# Patient Record
Sex: FEMALE | Race: BLACK OR AFRICAN AMERICAN | NOT HISPANIC OR LATINO | Employment: OTHER | URBAN - METROPOLITAN AREA
[De-identification: names, ages, dates, MRNs, and addresses within clinical notes are randomized per-mention and may not be internally consistent; named-entity substitution may affect disease eponyms.]

---

## 2018-02-08 ENCOUNTER — HOSPITAL ENCOUNTER (EMERGENCY)
Facility: OTHER | Age: 68
Discharge: HOME | End: 2018-02-08
Attending: EMERGENCY MEDICINE
Payer: MEDICARE

## 2018-02-08 VITALS
TEMPERATURE: 98 F | BODY MASS INDEX: 34.15 KG/M2 | RESPIRATION RATE: 18 BRPM | HEART RATE: 61 BPM | WEIGHT: 200 LBS | DIASTOLIC BLOOD PRESSURE: 63 MMHG | OXYGEN SATURATION: 96 % | HEIGHT: 64 IN | SYSTOLIC BLOOD PRESSURE: 135 MMHG

## 2018-02-08 DIAGNOSIS — M54.16 LUMBAR RADICULOPATHY: Primary | ICD-10-CM

## 2018-02-08 LAB
BACTERIA #/AREA URNS HPF: ABNORMAL /HPF
BILIRUB UR QL STRIP: NEGATIVE
CLARITY UR: CLEAR
COLOR UR: YELLOW
GLUCOSE UR QL STRIP: NEGATIVE
HGB UR QL STRIP: ABNORMAL
KETONES UR QL STRIP: NEGATIVE
LEUKOCYTE ESTERASE UR QL STRIP: ABNORMAL
MICROSCOPIC COMMENT: ABNORMAL
NITRITE UR QL STRIP: POSITIVE
PH UR STRIP: 6 [PH] (ref 5–8)
POCT GLUCOSE: 125 MG/DL (ref 70–110)
PROT UR QL STRIP: ABNORMAL
RBC #/AREA URNS HPF: 6 /HPF (ref 0–4)
SP GR UR STRIP: 1.02 (ref 1–1.03)
SQUAMOUS #/AREA URNS HPF: 15 /HPF
URN SPEC COLLECT METH UR: ABNORMAL
UROBILINOGEN UR STRIP-ACNC: NEGATIVE EU/DL
WBC #/AREA URNS HPF: 25 /HPF (ref 0–5)

## 2018-02-08 PROCEDURE — 87086 URINE CULTURE/COLONY COUNT: CPT

## 2018-02-08 PROCEDURE — 99284 EMERGENCY DEPT VISIT MOD MDM: CPT | Mod: 25

## 2018-02-08 PROCEDURE — 81000 URINALYSIS NONAUTO W/SCOPE: CPT

## 2018-02-08 PROCEDURE — 25000003 PHARM REV CODE 250: Performed by: EMERGENCY MEDICINE

## 2018-02-08 PROCEDURE — 82962 GLUCOSE BLOOD TEST: CPT

## 2018-02-08 PROCEDURE — 63600175 PHARM REV CODE 636 W HCPCS: Performed by: EMERGENCY MEDICINE

## 2018-02-08 PROCEDURE — 96372 THER/PROPH/DIAG INJ SC/IM: CPT

## 2018-02-08 RX ORDER — IBUPROFEN 800 MG/1
800 TABLET ORAL EVERY 8 HOURS PRN
Qty: 20 TABLET | Refills: 0 | Status: SHIPPED | OUTPATIENT
Start: 2018-02-08 | End: 2018-06-13

## 2018-02-08 RX ORDER — METFORMIN HYDROCHLORIDE 1000 MG/1
1000 TABLET ORAL NIGHTLY
COMMUNITY
End: 2020-07-22 | Stop reason: SDUPTHER

## 2018-02-08 RX ORDER — ATENOLOL 100 MG/1
100 TABLET ORAL DAILY
COMMUNITY
End: 2018-07-05 | Stop reason: SDUPTHER

## 2018-02-08 RX ORDER — METHOCARBAMOL 500 MG/1
1000 TABLET, FILM COATED ORAL 3 TIMES DAILY PRN
Qty: 20 TABLET | Refills: 0 | Status: SHIPPED | OUTPATIENT
Start: 2018-02-08 | End: 2018-02-13

## 2018-02-08 RX ORDER — METHOCARBAMOL 500 MG/1
1000 TABLET, FILM COATED ORAL
Status: COMPLETED | OUTPATIENT
Start: 2018-02-08 | End: 2018-02-08

## 2018-02-08 RX ORDER — FUROSEMIDE 20 MG/1
20 TABLET ORAL 2 TIMES DAILY
COMMUNITY
End: 2018-07-05 | Stop reason: SDUPTHER

## 2018-02-08 RX ORDER — CIPROFLOXACIN 500 MG/1
500 TABLET ORAL 2 TIMES DAILY
Qty: 10 TABLET | Refills: 0 | Status: SHIPPED | OUTPATIENT
Start: 2018-02-08 | End: 2018-02-15

## 2018-02-08 RX ORDER — GLIPIZIDE 10 MG/1
10 TABLET ORAL 2 TIMES DAILY WITH MEALS
COMMUNITY
End: 2020-07-22 | Stop reason: SDUPTHER

## 2018-02-08 RX ORDER — SIMVASTATIN 40 MG/1
40 TABLET, FILM COATED ORAL NIGHTLY
COMMUNITY
End: 2018-06-07 | Stop reason: DRUGHIGH

## 2018-02-08 RX ORDER — PAROXETINE HYDROCHLORIDE 20 MG/1
20 TABLET, FILM COATED ORAL EVERY MORNING
COMMUNITY
End: 2018-04-24

## 2018-02-08 RX ORDER — KETOROLAC TROMETHAMINE 30 MG/ML
30 INJECTION, SOLUTION INTRAMUSCULAR; INTRAVENOUS
Status: COMPLETED | OUTPATIENT
Start: 2018-02-08 | End: 2018-02-08

## 2018-02-08 RX ORDER — LORAZEPAM 2 MG/1
0.5 TABLET ORAL 2 TIMES DAILY PRN
COMMUNITY
End: 2019-05-13

## 2018-02-08 RX ORDER — TRAZODONE HYDROCHLORIDE 100 MG/1
100 TABLET ORAL NIGHTLY
COMMUNITY
End: 2018-03-27

## 2018-02-08 RX ADMIN — METHOCARBAMOL 1000 MG: 500 TABLET ORAL at 02:02

## 2018-02-08 RX ADMIN — KETOROLAC TROMETHAMINE 30 MG: 30 INJECTION, SOLUTION INTRAMUSCULAR at 02:02

## 2018-02-08 NOTE — DISCHARGE INSTRUCTIONS
We have prescribed you antibiotics. Please fill and take as directed. It is important that you completely finish the antibiotics.      We have prescribed you muscle relaxants and anti-inflammatory medication. Please fill and take as directed.    Please return to the ER if you have chest pain, difficulty breathing, fevers, altered mental status, dizziness, weakness, or any other concerns.      Follow up with your primary care physician.

## 2018-02-08 NOTE — ED PROVIDER NOTES
"Encounter Date: 2018    SCRIBE #1 NOTE: I, Uriel Hamlin, am scribing for, and in the presence of, Dr. Yadav.       History     Chief Complaint   Patient presents with    Leg Pain     L leg pain x 1 month that radiates down leg to foot.      1:22 PM    Patient is a 67 y.o. female with a history of DM who presents to the ED with complaint of left leg pain for the last 3-4 weeks ago. Pain originates in the left hip region and radiates down the front of the leg to the foot. Pain is constant and described as "like a toothache." She states "the joint feels like it slipped out the socket." She reports associated leg stiffness and difficulty moving the leg. She also reports intermittent urinary incontinence for the last 4-5 months.  She states this occurs "once or twice a day." She reports feeling the urge to urinate prior to these episodes, and states "I just can't make it to the bathroom."  She denies dysuria or hematuria.  She denies any bowel incontinence. She also reports chronic numbness and tingling in her feet bilaterally. She denies numbness or tingling in her groin region. She reports a history of arthritis in her back. She denies being evaluated by a physician previously for her current complaints. She states that her sugars have been running between .       The history is provided by the patient.     Review of patient's allergies indicates:  No Known Allergies  Past Medical History:   Diagnosis Date    Arthritis     Diabetes mellitus     Hypertension      Past Surgical History:   Procedure Laterality Date     SECTION      CHOLECYSTECTOMY      HYSTERECTOMY       History reviewed. No pertinent family history.  Social History   Substance Use Topics    Smoking status: Never Smoker    Smokeless tobacco: Never Used    Alcohol use No     Review of Systems   Constitutional: Negative for fever.   Respiratory: Negative for cough and shortness of breath.    Cardiovascular: Negative for chest " pain.   Gastrointestinal: Negative for abdominal pain.        Negative for bowel incontinence.   Genitourinary: Positive for urgency. Negative for dysuria.        Positive for urinary incontinence.   Musculoskeletal: Positive for back pain (chronic, hx arthritis in spine). Negative for gait problem.        Positive for left hip and leg pain and stiffness.   Skin: Negative for rash.   Neurological: Positive for numbness (chronic, bilateral feet). Negative for weakness.   Psychiatric/Behavioral: Negative for confusion.       Physical Exam     Initial Vitals [02/08/18 1156]   BP Pulse Resp Temp SpO2   (!) 141/64 67 18 97.9 °F (36.6 °C) 96 %      MAP       89.67         Physical Exam    Nursing note and vitals reviewed.  Constitutional: She appears well-developed and well-nourished. No distress.   HENT:   Head: Atraumatic.   Eyes: Conjunctivae and EOM are normal.   Neck: Normal range of motion. Neck supple.   Cardiovascular: Normal rate, regular rhythm and normal heart sounds.   2+ DP/PT pulses bilaterally.   Pulmonary/Chest: Breath sounds normal. No respiratory distress. She has no wheezes. She has no rales.   Musculoskeletal: Normal range of motion.   No midline spinal tenderness.   Neurological: She is alert and oriented to person, place, and time.   Strength 5/5 throughout. LLE straight leg raise test positive at 30 degrees. Straight leg raise negative RLE. Ambulatory with steady gait.   Sensation intact   Skin: Skin is warm and dry.   Psychiatric: She has a normal mood and affect. Her behavior is normal.         ED Course   Procedures  Labs Reviewed   URINALYSIS - Abnormal; Notable for the following:        Result Value    Protein, UA Trace (*)     Occult Blood UA Trace (*)     Nitrite, UA Positive (*)     Leukocytes, UA 1+ (*)     All other components within normal limits   URINALYSIS MICROSCOPIC - Abnormal; Notable for the following:     RBC, UA 6 (*)     WBC, UA 25 (*)     Bacteria, UA Moderate (*)     All other  components within normal limits   POCT GLUCOSE - Abnormal; Notable for the following:     POCT Glucose 125 (*)     All other components within normal limits   CULTURE, URINE   CULTURE, URINE      Imaging Results          X-Ray Hip 2 View Left (Final result)  Result time 02/08/18 14:00:45    Final result by Darron Meek Jr., MD (02/08/18 14:00:45)                 Narrative:    Left hip 2 views.  There are degenerative changes at the hips with more narrowing of the right hip.  Hypertrophic new bone noted.  No acute fracture seen.  Degenerative changes in the lower lumbar spine.    Impression degenerative change.  There is some hypertrophic new bone seen about the ischial tuberosities bilaterally.  This is fairly symmetric though if there is concern for fracture oblique films would be helpful.      Electronically signed by: DARRON MEEK MD  Date:     02/08/18  Time:    14:00                                  X-Rays:   Independently Interpreted Readings:   Other Readings:  X-Ray Hip 2 View Left: no fracture or dislocation.        Medical Decision Making:   History:   Old Medical Records: I decided to obtain old medical records.  Old Records Summarized: other records.  Initial Assessment:   1:22PM:  Pt is a 68 y/o F who presents to ED with LLE radiating pain. Pt appears well, nontoxic. She is neurologically intact.  Pt appears to have a lumbar radiculopathy.  However she has been having urinary symptoms, though this seems to be going on longer than her LLE pain.  Will plan for a UA, analgesia, will continue to follow and reassess.    Independently Interpreted Test(s):   I have ordered and independently interpreted X-rays - see prior notes.  Clinical Tests:   Lab Tests: Ordered and Reviewed  Radiological Study: Ordered and Reviewed    3:05 PM:  Pt doing well, remains stable. She remains neurologically intact and her pain has improved with analgesia.  She has been ambulating with no issues.  Her UA does have +nitrite  with moderate bacteria and 1+ leukocytes, possibly a UTI which would explain her urinary urgency and incontinence. Will plan to send culture and treat with abx along with providing a prescription for NSAIDs and muscle relaxants. I did update pt regarding results and counseled pt regarding supportive care measures.   I have discussed with the pt ED return warnings and need for close PCP f/u, edenilson if new neuro symptoms develop  Pt agreeable to plan and all questions answered.  I feel that pt is stable for discharge and management as an outpatient and no further intervention is needed at this time.  Pt is comfortable returning to the ED if needed.  Will DC home in stable condition.                  Scribe Attestation:   Scribe #1: I performed the above scribed service and the documentation accurately describes the services I performed. I attest to the accuracy of the note.    Attending Attestation:           Physician Attestation for Scribe:  Physician Attestation Statement for Scribe #1: I, Dr. Yadav, reviewed documentation, as scribed by Uriel Hamlin in my presence, and it is both accurate and complete.                 ED Course      Clinical Impression:     1. Lumbar radiculopathy                               Mickie Yadav MD  02/08/18 8038

## 2018-02-10 LAB
BACTERIA UR CULT: NORMAL
BACTERIA UR CULT: NORMAL

## 2018-02-15 ENCOUNTER — HOSPITAL ENCOUNTER (EMERGENCY)
Facility: OTHER | Age: 68
Discharge: HOME | End: 2018-02-15
Attending: EMERGENCY MEDICINE
Payer: MEDICARE

## 2018-02-15 VITALS
BODY MASS INDEX: 34.33 KG/M2 | DIASTOLIC BLOOD PRESSURE: 80 MMHG | HEART RATE: 64 BPM | OXYGEN SATURATION: 100 % | RESPIRATION RATE: 17 BRPM | SYSTOLIC BLOOD PRESSURE: 185 MMHG | TEMPERATURE: 98 F | WEIGHT: 200 LBS

## 2018-02-15 DIAGNOSIS — M54.16 LUMBAR RADICULOPATHY: Primary | ICD-10-CM

## 2018-02-15 PROCEDURE — 99283 EMERGENCY DEPT VISIT LOW MDM: CPT

## 2018-02-15 PROCEDURE — 25000003 PHARM REV CODE 250: Performed by: PHYSICIAN ASSISTANT

## 2018-02-15 RX ORDER — TRAMADOL HYDROCHLORIDE 50 MG/1
50 TABLET ORAL
Status: COMPLETED | OUTPATIENT
Start: 2018-02-15 | End: 2018-02-15

## 2018-02-15 RX ORDER — OXYCODONE AND ACETAMINOPHEN 5; 325 MG/1; MG/1
1 TABLET ORAL EVERY 6 HOURS PRN
Qty: 12 TABLET | Refills: 0 | Status: SHIPPED | OUTPATIENT
Start: 2018-02-15 | End: 2018-03-27

## 2018-02-15 RX ADMIN — TRAMADOL HYDROCHLORIDE 50 MG: 50 TABLET, FILM COATED ORAL at 04:02

## 2018-02-15 NOTE — ED PROVIDER NOTES
"Encounter Date: 2/15/2018       History     Chief Complaint   Patient presents with    Hip Pain     Was seen and treated here last Thursday with diagnosis of Lumbar radiculopathy .  She states that she is not any better and can't put weight on her left leg     Patient is a 67-year-old female with arthritis, diabetes, and hypertension who presents to the ED with pain.  Patient reports a 3 week history of atraumatic left-sided hip pain that radiates down her left leg and intermittently radiates to her left lumbar region.  Patient states the pain feels like "a thumping toothache." Patient states she was seen here one week ago and was diagnosed with lumbar radiculopathy.  She was sent home with muscle relaxer and anti-inflammatories that provided her with no relief. Patient also reports intermittent numbness and tingling to her left lower extremity.  She also reports a history of urinary incontinence.  She states she realizes when this happens but cannot get to the restroom in time.  She states this been going on for approximately 4 months.  She denies any new neurologic symptoms in the past week.  She states she has returned today because her pain has worsened, specifically with ambulation.  She denies saddle anesthesia.  She denies bowel incontinence.       The history is provided by the patient.     Review of patient's allergies indicates:   Allergen Reactions    Aspirin Other (See Comments)     Has been told not to take it due to bariatric surgery     Past Medical History:   Diagnosis Date    Arthritis     Diabetes mellitus     Hypertension      Past Surgical History:   Procedure Laterality Date     SECTION      CHOLECYSTECTOMY      HYSTERECTOMY       No family history on file.  Social History   Substance Use Topics    Smoking status: Never Smoker    Smokeless tobacco: Never Used    Alcohol use No     Review of Systems   Constitutional: Negative for chills and fever.   HENT: Negative for " congestion and sore throat.    Eyes: Negative for pain.   Respiratory: Negative for shortness of breath.    Cardiovascular: Negative for chest pain.   Gastrointestinal: Negative for abdominal pain, diarrhea, nausea and vomiting.   Genitourinary: Negative for dysuria.   Musculoskeletal:        Left hip pain radiating to left lower extremity   Skin: Negative for rash.   Neurological: Negative for headaches.       Physical Exam     Initial Vitals [02/15/18 1454]   BP Pulse Resp Temp SpO2   (!) 156/71 67 17 98.6 °F (37 °C) 97 %      MAP       99.33         Physical Exam    Constitutional: Vital signs are normal. She is cooperative.   -American female in no acute distress. She is using a wheelchair secondary to pain.   HENT:   Head: Normocephalic and atraumatic.   Mouth/Throat: Oropharynx is clear and moist.   Eyes: Conjunctivae and EOM are normal. Pupils are equal, round, and reactive to light.   Neck: Normal range of motion. Neck supple.   Cardiovascular: Normal rate, regular rhythm and intact distal pulses.   No murmur heard.  Pulmonary/Chest: Breath sounds normal. She has no wheezes. She has no rhonchi. She has no rales.   Abdominal: Soft. Bowel sounds are normal. There is no tenderness. There is no rebound and no guarding.   Musculoskeletal:   No CTL midline tenderness, crepitus, masses, or step-offs. LLE straight leg raise test positive at 30 degrees. Straight leg raise negative RLE.   Neurological: GCS eye subscore is 4. GCS verbal subscore is 5. GCS motor subscore is 6.   AAOx4. CN II-XII were intact. Good finger-to-nose task ability. Strength 5/5 in all extremities. No ankle clonus.  Patient walks with antalgic gait but is able to bear weight. Normal sensation to light touch.   Skin: Skin is warm and dry. Capillary refill takes less than 2 seconds. No rash noted.   Psychiatric: She has a normal mood and affect. Her behavior is normal.         ED Course   Procedures  Labs Reviewed - No data to display           Medical Decision Making:   Initial Assessment:   Urgent evaluation of a 67 y.o. female presenting to the emergency department complaining of hip pain. Patient is afebrile, nontoxic appearing and hemodynamically stable.  ED Management:  Patient appears to be no acute distress. Patient does not have any new symptoms from visit one week ago.  However, she states her pain is worse.  No focal neurological deficits on exam.  Left lower extremity is distally neurovascular intact. Imaging from one week ago revealed degenerative changes to her hips bilaterally. Patient's reports urinary incontinence is chronic in nature and patient is aware when this happens. There are no signs of saddle anesthesia, bowel incontinence, neurologic deficits, fevers, trauma or midline tenderness on history or physical to suggest cauda equina, infectious process, fracture or subluxation. I do not believe that emergent MRI is indicated at this time.  I have advised the patient with her PCP or the spine clinic for an MRI. Patient will be sent home with a short course of Percocet until she can make an appointment.  I have educated patient that in the future she will need to get pain medication from her primary care provider.  She is amenable to this plan.  She is stable for discharge. I have given specific return precautions for new symptoms. I have discussed the patient with the attending thoroughly and he/she agrees to the treatment and plan.   This note was created using Dragon Medical Dictation. There may be typographical errors secondary to dictation.                    ED Course      Clinical Impression:     1. Lumbar radiculopathy                             Alexis James PA-C  02/15/18 7191

## 2018-03-13 ENCOUNTER — TELEPHONE (OUTPATIENT)
Dept: SPINE | Facility: CLINIC | Age: 68
End: 2018-03-13

## 2018-03-13 DIAGNOSIS — M54.5 LOW BACK PAIN, UNSPECIFIED BACK PAIN LATERALITY, UNSPECIFIED CHRONICITY, WITH SCIATICA PRESENCE UNSPECIFIED: Primary | ICD-10-CM

## 2018-03-14 ENCOUNTER — TELEPHONE (OUTPATIENT)
Dept: SPINE | Facility: CLINIC | Age: 68
End: 2018-03-14

## 2018-03-14 ENCOUNTER — DOCUMENTATION ONLY (OUTPATIENT)
Dept: SPINE | Facility: CLINIC | Age: 68
End: 2018-03-14

## 2018-03-14 NOTE — TELEPHONE ENCOUNTER
----- Message from Kaitlynn John sent at 3/14/2018  9:56 AM CDT -----  Contact: Patient herself  X  1st Request  _  2nd Request  _  3rd Request    Who: Faby Luna (mrn# [id number])    Why: Patient called requesting a to be seen to day and she was upset that she was advised to reschedule. I did reschedule first available and placed patient on the wait list. Please give a call back at your earliest convenience.  THANKS!     What Number to Call Back: [phone]

## 2018-03-14 NOTE — PROGRESS NOTES
Called and left message on patient voice mail to notify patient that the appointment for today has been cancelled due to provider lala out ill.   Patient to call back and reschedule appointment.

## 2018-03-27 ENCOUNTER — OFFICE VISIT (OUTPATIENT)
Dept: PAIN MEDICINE | Facility: CLINIC | Age: 68
End: 2018-03-27
Attending: ANESTHESIOLOGY
Payer: MEDICARE

## 2018-03-27 ENCOUNTER — HOSPITAL ENCOUNTER (OUTPATIENT)
Dept: RADIOLOGY | Facility: OTHER | Age: 68
Discharge: HOME | End: 2018-03-27
Attending: PHYSICIAN ASSISTANT
Payer: MEDICARE

## 2018-03-27 VITALS
WEIGHT: 207.25 LBS | DIASTOLIC BLOOD PRESSURE: 76 MMHG | SYSTOLIC BLOOD PRESSURE: 142 MMHG | BODY MASS INDEX: 35.38 KG/M2 | HEART RATE: 67 BPM | HEIGHT: 64 IN | TEMPERATURE: 98 F

## 2018-03-27 DIAGNOSIS — M54.5 LOW BACK PAIN, UNSPECIFIED BACK PAIN LATERALITY, UNSPECIFIED CHRONICITY, WITH SCIATICA PRESENCE UNSPECIFIED: ICD-10-CM

## 2018-03-27 DIAGNOSIS — M47.26 OSTEOARTHRITIS OF SPINE WITH RADICULOPATHY, LUMBAR REGION: Primary | ICD-10-CM

## 2018-03-27 DIAGNOSIS — N39.41 URGE INCONTINENCE OF URINE: ICD-10-CM

## 2018-03-27 PROCEDURE — 72100 X-RAY EXAM L-S SPINE 2/3 VWS: CPT | Mod: TC,FY

## 2018-03-27 PROCEDURE — 72100 X-RAY EXAM L-S SPINE 2/3 VWS: CPT | Mod: 26,,, | Performed by: RADIOLOGY

## 2018-03-27 PROCEDURE — 99205 OFFICE O/P NEW HI 60 MIN: CPT | Mod: S$GLB,,, | Performed by: ANESTHESIOLOGY

## 2018-03-27 PROCEDURE — 72120 X-RAY BEND ONLY L-S SPINE: CPT | Mod: 26,,, | Performed by: RADIOLOGY

## 2018-03-27 PROCEDURE — 99999 PR PBB SHADOW E&M-EST. PATIENT-LVL IV: CPT | Mod: PBBFAC,,, | Performed by: ANESTHESIOLOGY

## 2018-03-27 RX ORDER — PROMETHAZINE HYDROCHLORIDE 6.25 MG/5ML
SYRUP ORAL
COMMUNITY
Start: 2018-03-05 | End: 2018-05-03

## 2018-03-27 RX ORDER — TRAZODONE HYDROCHLORIDE 50 MG/1
TABLET ORAL
COMMUNITY
Start: 2018-03-22 | End: 2018-04-24

## 2018-03-27 RX ORDER — PANTOPRAZOLE SODIUM 40 MG/1
TABLET, DELAYED RELEASE ORAL
COMMUNITY
Start: 2018-03-22 | End: 2018-05-03 | Stop reason: SDUPTHER

## 2018-03-27 RX ORDER — ZONISAMIDE 100 MG/1
CAPSULE ORAL
Qty: 120 CAPSULE | Refills: 2 | Status: SHIPPED | OUTPATIENT
Start: 2018-03-27 | End: 2018-06-13 | Stop reason: SDUPTHER

## 2018-03-27 NOTE — PROGRESS NOTES
"Subjective:      Patient ID: Faby Luna is a 67 y.o. female.    Chief Complaint: Leg Pain; Low-back Pain; and Knee Pain    Referred by: Self, Aaareferral     Pain Scales  Best: 8/10  Worst: 10/10  Usually: 10/10  Today: 10/10    Leg Pain      Low-back Pain   Associated symptoms include leg pain.   Knee Pain      HPI:  Faby Luna is a 67 y.o. female who presents today with low back pain radiating into LLE. The back pain has been present for 40 years without inciting trauma. She fell in December and has had worsening back pain and worsening radiation of her pain into the left posterior thigh and anterior lower leg. The pain in the back is described as sharp. The pain in the leg is described as "like a toothache." The pain is always on the left but also occasionally on the right. The pain is worsened by lifting heavy objects, walking, or standing too long. She has gone to the ED twice and received Toradol injection and oral medication but she doesn't know what. She reports that her left leg gives out occasionally due to weakness. Of note, she reports new urge incontinence since the fall. She feels the urge to urinate but does not have time to get to the bathroom. Denies dysuria or malodorous urine. No bowel incontinence. No saddle anesthesia. No night sweats, unexpected weight loss, or personal history of cancer.     Imaging was reviewed. X-rays of the l-spine showed degenerative changes including anterior osteophytes at L4 and borderline grade 1-2 anterolisthesis of L4 on L5. No MRI is available.    Interventional Pain History  ~2006: Lumbar ELISE by Dr. Hudson  ~2013: Lumbar ELISE by Dr. Jurado    Past Medical History:   Diagnosis Date    Arthritis     Diabetes mellitus     Hypertension        Past Surgical History:   Procedure Laterality Date     SECTION      CHOLECYSTECTOMY      HYSTERECTOMY         Review of patient's allergies indicates:   Allergen Reactions    Aspirin Other (See Comments)     Has " "been told not to take it due to bariatric surgery       Current Outpatient Prescriptions   Medication Sig Dispense Refill    atenolol (TENORMIN) 100 MG tablet Take 100 mg by mouth once daily.      furosemide (LASIX) 20 MG tablet Take 20 mg by mouth 2 (two) times daily.      glipiZIDE (GLUCOTROL) 10 MG tablet Take 10 mg by mouth 2 (two) times daily before meals.      ibuprofen (ADVIL,MOTRIN) 800 MG tablet Take 1 tablet (800 mg total) by mouth every 8 (eight) hours as needed for Pain. 20 tablet 0    LORazepam (ATIVAN) 2 MG Tab Take 0.5 mg by mouth every 6 (six) hours as needed.      metFORMIN (GLUCOPHAGE) 1000 MG tablet Take 1,000 mg by mouth daily with breakfast.       pantoprazole (PROTONIX) 40 MG tablet       paroxetine (PAXIL) 20 MG tablet Take 20 mg by mouth every morning.      promethazine (PHENERGAN) 6.25 mg/5 mL syrup       simvastatin (ZOCOR) 40 MG tablet Take 40 mg by mouth every evening.      traZODone (DESYREL) 50 MG tablet       zonisamide (ZONEGRAN) 100 MG Cap one @ bedtime X3 days then 2 @ bedtime X3 days then 3 @ bedtime X3 days then 4 @ bedtime to follow.Stay at the most comfortable dose. 120 capsule 2     No current facility-administered medications for this visit.        No family history on file.    Social History     Social History    Marital status:      Spouse name: N/A    Number of children: N/A    Years of education: N/A     Occupational History    Not on file.     Social History Main Topics    Smoking status: Never Smoker    Smokeless tobacco: Never Used    Alcohol use No    Drug use: No    Sexual activity: Not on file     Other Topics Concern    Not on file     Social History Narrative    No narrative on file           Review of Systems   Musculoskeletal: Positive for back pain.        Leg pain, knee pain           Objective:      BP (!) 142/76   Pulse 67   Temp 98.4 °F (36.9 °C)   Ht 5' 4" (1.626 m)   Wt 94 kg (207 lb 3.7 oz)   BMI 35.57 kg/m² "   Normocephalic.  Atraumatic.  Affect appropriate.  Breathing unlabored.  Extra ocular muscles intact.    Lumbar Spine Exam:  INSPECTION: skin intact. No kyphosis, lordosis or scoliosis noted  PALPATION:    Lumbar paraspinals: left sided TTP   SIJ:     Right: no TTP    Left: + TTP  ROM:    LUMBAR FLEXION:  Full ROM   LUMBAR EXTENSION: full ROM   FACET LOADING:  Positive to the left   HIP EXAM: no pain with internal and external rotation of the hip joint.   MOTOR:    RLE: 5 / 5    LLE: 5 / 5   SENSORY:  Intact to light touch in bilateral lower extremities   DEEP TENDON REFLEXES:   PATELLAR:2+ and symmetrical.    ACHILLES: 2+ and symmetrical.   PULSES: 2+ distal Bilateral lower extremities  BABINSKI:  down going toes bilat   ANKLE CLONUS:   Absent.     STRAIGHT LEG RAISE: neg right side; neg left side  FEMORAL NERVE STRETCH TEST:  positive left side        Ortho/SPM Exam      Assessment:       Encounter Diagnoses   Name Primary?    Osteoarthritis of spine with radiculopathy, lumbar region Yes    Urge incontinence of urine          Plan:       Faby was seen today for leg pain, low-back pain and knee pain.    Diagnoses and all orders for this visit:    Osteoarthritis of spine with radiculopathy, lumbar region  -     Ambulatory consult to Physical Therapy  -     MRI Lumbar Spine Without Contrast; Future  -     zonisamide (ZONEGRAN) 100 MG Cap; one @ bedtime X3 days then 2 @ bedtime X3 days then 3 @ bedtime X3 days then 4 @ bedtime to follow.Stay at the most comfortable dose.    Urge incontinence of urine  -     Ambulatory consult to Physical Therapy  -     MRI Lumbar Spine Without Contrast; Future         We discussed with the patient the assessment and recommendations. The following is the plan we agreed on:  1. HTN management per PCP  2. Zonegran as above.  3. Obtain MRI of the lumbar spine. She has new onset urinary incontinence and radicular signs and symptoms.   4. If her MRI shows signs of cauda equina syndrome,  refer to surgery. Otherwise, consider TFESI at the appropriate levels.  5. Refer to physical therapy to eval and treat lumbar radiculopathy  6. Refer to Urology to eval and treat urinary urge incontinence  7. RTC 1 week after MRI      Herrera Ambriz MD, PGY-2  03/27/2018   I have personally taken the history and examined this patient and agree with the resident's note as stated above.

## 2018-03-31 ENCOUNTER — HOSPITAL ENCOUNTER (OUTPATIENT)
Dept: RADIOLOGY | Facility: OTHER | Age: 68
Discharge: HOME | End: 2018-03-31
Attending: ANESTHESIOLOGY
Payer: MEDICARE

## 2018-03-31 DIAGNOSIS — M47.26 OSTEOARTHRITIS OF SPINE WITH RADICULOPATHY, LUMBAR REGION: ICD-10-CM

## 2018-03-31 DIAGNOSIS — N39.41 URGE INCONTINENCE OF URINE: ICD-10-CM

## 2018-03-31 PROCEDURE — 72148 MRI LUMBAR SPINE W/O DYE: CPT | Mod: 26,,, | Performed by: RADIOLOGY

## 2018-03-31 PROCEDURE — 72148 MRI LUMBAR SPINE W/O DYE: CPT | Mod: TC

## 2018-04-04 ENCOUNTER — OFFICE VISIT (OUTPATIENT)
Dept: PAIN MEDICINE | Facility: CLINIC | Age: 68
End: 2018-04-04
Payer: MEDICARE

## 2018-04-04 VITALS
HEART RATE: 69 BPM | DIASTOLIC BLOOD PRESSURE: 75 MMHG | BODY MASS INDEX: 34.25 KG/M2 | HEIGHT: 64 IN | SYSTOLIC BLOOD PRESSURE: 144 MMHG | TEMPERATURE: 97 F | RESPIRATION RATE: 18 BRPM | WEIGHT: 200.63 LBS

## 2018-04-04 DIAGNOSIS — M47.816 LUMBAR SPONDYLOSIS: ICD-10-CM

## 2018-04-04 DIAGNOSIS — M54.16 LUMBAR RADICULOPATHY: Primary | ICD-10-CM

## 2018-04-04 DIAGNOSIS — M48.061 SPINAL STENOSIS OF LUMBAR REGION, UNSPECIFIED WHETHER NEUROGENIC CLAUDICATION PRESENT: ICD-10-CM

## 2018-04-04 PROCEDURE — 99213 OFFICE O/P EST LOW 20 MIN: CPT | Mod: S$GLB,,, | Performed by: NURSE PRACTITIONER

## 2018-04-04 PROCEDURE — 99999 PR PBB SHADOW E&M-EST. PATIENT-LVL III: CPT | Mod: PBBFAC,,, | Performed by: NURSE PRACTITIONER

## 2018-04-04 NOTE — PROGRESS NOTES
Chronic patient Established Note (Follow up visit)      SUBJECTIVE:    Faby Luna presents to the clinic for a follow-up appointment for lower back and lower extremity pain.  He biggest complaint is left sided back pain with radiation into left hip, and anterolateral thigh.  The pain is burning and stabbing in nature.  It prevents her from walking long distances.  She also has an aching back pain which feels separate.  Since her last visit, she did have updated lumbar MRI which does not show any symptoms of caudal equina.  She is scheduled with urology on 5/2/18 to discuss urine leakge.  Since the last visit, Faby Luna states the pain has been persistent. Current pain intensity is 10/10.    Pain Disability Index Review:  Last 3 PDI Scores 4/4/2018 3/27/2018   Pain Disability Index (PDI) 51 46       Pain Medications:  Ibuprofen and Zonegran    Opioid Contract: no     report:  Not applicable    Pain Procedures: None recently- reports lumbar ESIs in the past    Physical Therapy/Home Exercise: yes- starting pelvic floor PT on 5/2/18    Imaging:     3/31/18 Lumbar MRI    Narrative     EXAMINATION:  MRI LUMBAR SPINE WITHOUT CONTRAST    CLINICAL HISTORY:  Low back pain, >6wks conservative tx, persistent-progressive sx, surgical candidate; Other spondylosis with radiculopathy, lumbar region    TECHNIQUE:  Multiplanar, multisequence MR images were acquired from the thoracolumbar junction to the sacrum without the administration of contrast.    COMPARISON:  Plain films from 03/27/2018    FINDINGS:  There is grade 1 spondylolisthesis of L4 on L5. The vertebral body heights are well maintained, with no fracture.  No marrow signal abnormality suspicious for an infiltrative process.    The conus medullaris terminates at approximately the mid body of L1.  There is a cyst associated with the upper pole of the right kidney.  There is disc desiccation noted throughout the lumbar spine with relative sparing of the L5-S1 disc.   Mild disc space narrowing present at the L4-5 level.    L1-L2: Mild diffuse disc bulge resulting in no significant central or neural foraminal canal narrowing.    L2-L3: No significant central canal narrowing.  There is mild narrowing of either neural foraminal canal secondary to disc material.    L3-L4: Disc bulging in the bilateral foraminal regions resulting in no significant central canal narrowing.  Mild-to-moderate bilateral facet arthropathy also noted.  The bilateral neural foraminal canals are moderately narrowed with some mild effacement of either exiting L3 nerve root in the extraforaminal regions bilaterally.    L4-L5:  Grade 1 spondylolisthesis along with moderate to severe bilateral facet arthropathy and ligamentum flavum hypertrophy resulting in at least moderate narrowing of the central canal.  The right neural foraminal canal is mildly to moderately narrowed.  Left neural foraminal canal is moderately to severely narrowed with mild effacement of the exiting L4 nerve root.    L5-S1:  No significant central or neural foraminal canal narrowing noted.  Mild bilateral facet arthropathy noted.   Impression       1. Multilevel degenerative changes of the lumbar spine as detailed above     Lumbar XRAYs 3/27/18    Narrative     EXAMINATION:  XR LUMBAR SPINE AP AND LAT WITH FLEX/EXT    CLINICAL HISTORY:  Low back pain    TECHNIQUE:  AP and lateral views as well as lateral flexion and extension images are performed through the lumbar spine.    COMPARISON:  None    FINDINGS:  X-ray lumbar spine with flexion and extension demonstrates grade 1 spondylolisthesis at L4-5 measuring around 6 mm which does not change significantly with flexion and extension.  The L4-5 disc is narrowed and degenerated.  Other lumbar vertebral discs are maintained.  Vertebral body heights are maintained.  Small anterior spurs are seen, and there is small posterior osteophyte along the inferior endplate of L4.  There is lumbar facet  arthropathy at L4-5 and L5-S1.   Impression       Degenerative changes as above.  Grade 1 anterolisthesis and degenerative disc disease at L4-5.         Allergies:   Review of patient's allergies indicates:   Allergen Reactions    Aspirin Other (See Comments)     Has been told not to take it due to bariatric surgery       Current Medications:   Current Outpatient Prescriptions   Medication Sig Dispense Refill    atenolol (TENORMIN) 100 MG tablet Take 100 mg by mouth once daily.      furosemide (LASIX) 20 MG tablet Take 20 mg by mouth 2 (two) times daily.      glipiZIDE (GLUCOTROL) 10 MG tablet Take 10 mg by mouth 2 (two) times daily before meals.      ibuprofen (ADVIL,MOTRIN) 800 MG tablet Take 1 tablet (800 mg total) by mouth every 8 (eight) hours as needed for Pain. 20 tablet 0    LORazepam (ATIVAN) 2 MG Tab Take 0.5 mg by mouth every 6 (six) hours as needed.      metFORMIN (GLUCOPHAGE) 1000 MG tablet Take 1,000 mg by mouth daily with breakfast.       pantoprazole (PROTONIX) 40 MG tablet       paroxetine (PAXIL) 20 MG tablet Take 20 mg by mouth every morning.      promethazine (PHENERGAN) 6.25 mg/5 mL syrup       simvastatin (ZOCOR) 40 MG tablet Take 40 mg by mouth every evening.      traZODone (DESYREL) 50 MG tablet       zonisamide (ZONEGRAN) 100 MG Cap one @ bedtime X3 days then 2 @ bedtime X3 days then 3 @ bedtime X3 days then 4 @ bedtime to follow.Stay at the most comfortable dose. 120 capsule 2     No current facility-administered medications for this visit.        REVIEW OF SYSTEMS:    GENERAL:  No weight loss, malaise or fevers.  HEENT:  Negative for frequent or significant headaches.  NECK:  Negative for lumps, goiter, pain and significant neck swelling.  RESPIRATORY:  Negative for cough, wheezing or shortness of breath.  CARDIOVASCULAR:  Negative for chest pain, leg swelling or palpitations. Hypertension.  GI:  Negative for abdominal discomfort, blood in stools or black stools or change in  bowel habits.  MUSCULOSKELETAL:  See HPI.  SKIN:  Negative for lesions, rash, and itching.  PSYCH:  Negative for sleep disturbance, mood disorder and recent psychosocial stressors.  HEMATOLOGY/LYMPHOLOGY:  Negative for prolonged bleeding, bruising easily or swollen nodes.  NEURO:   No history of headaches, syncope, paralysis, seizures or tremors.  ENDO: Diabetes.  All other reviewed and negative other than HPI.    Past Medical History:  Past Medical History:   Diagnosis Date    Arthritis     Diabetes mellitus     Hypertension        Past Surgical History:  Past Surgical History:   Procedure Laterality Date     SECTION      CHOLECYSTECTOMY      HYSTERECTOMY         Family History:  No family history on file.    Social History:  Social History     Social History    Marital status:      Spouse name: N/A    Number of children: N/A    Years of education: N/A     Social History Main Topics    Smoking status: Never Smoker    Smokeless tobacco: Never Used    Alcohol use No    Drug use: No    Sexual activity: Not on file     Other Topics Concern    Not on file     Social History Narrative    No narrative on file       OBJECTIVE:    There were no vitals taken for this visit.    PHYSICAL EXAMINATION:    General appearance: Well appearing, in no acute distress, alert and oriented x3.  Psych:  Mood and affect appropriate.  Skin: Skin color, texture, turgor normal, no rashes or lesions, in both upper and lower body.  Head/face:  Atraumatic, normocephalic. No palpable lymph nodes  Cor: RRR  Pulm: CTA  GI: Abdomen soft and non-tender.  Back: Straight leg raising in the sitting and supine positions is negative to radicular pain. Mild pain with palpation to lumbar facet joints.  Limited flexion and extension with pain.  Positive facet loading bilaterally.  Mild pain with palpation to left SI joint.  TOBY is negative.  Extremities: Peripheral joint ROM is full and pain free without obvious instability  or laxity in all four extremities. No deformities, edema, or skin discoloration. Good capillary refill.  Musculoskeletal: Pain with external rotation of left hip.  Beltran's is negative.  No atrophy or tone abnormalities are noted.  Neuro: Bilateral upper and lower extremity coordination and muscle stretch reflexes are physiologic and symmetric.  Plantar response are downgoing. Decreased sensation at left L4 distribution.  Gait: Antalgic.    ASSESSMENT: 67 y.o. year old female with back and leg pain, consistent with the following diagnoses:     1. Lumbar radiculopathy     2. Lumbar spondylosis     3. Spinal stenosis of lumbar region, unspecified whether neurogenic claudication present           PLAN:     - Previous imaging was reviewed and discussed with the patient today.    - I will schedule the patient for left L3 and L4 TF ELISE.   The procedure, risks, benefits and options were discussed with patient. There are no contraindications to the procedure. The patient expressed understanding and agreed to proceed.  Consent obtained today.    - Consider lumbar facet joint and hip injections if limited benefit from above.    - She will keep appointments next month for pelvic floor PT and urology consult.    - Continue Zonegran at current dose.    - The patient will continue a home exercise routine to help with pain and strengthening.      - RTC 2 weeks after procedure.    - Counseled patient regarding the importance of constant sleeping habits and physical therapy.      The above plan and management options were discussed at length with patient. Patient is in agreement with the above and verbalized understanding.    Renetta Steele  04/04/2018

## 2018-04-05 ENCOUNTER — TELEPHONE (OUTPATIENT)
Dept: PAIN MEDICINE | Facility: CLINIC | Age: 68
End: 2018-04-05

## 2018-04-05 NOTE — TELEPHONE ENCOUNTER
----- Message from Jessica Vicente LPN sent at 4/4/2018  5:09 PM CDT -----      ----- Message -----  From: Lazara Collazo  Sent: 4/4/2018   4:29 PM  To: Cedric IRVING Staff    Tried calling pt. To schedule procedure  With Dr. Silverio , mailbox full.

## 2018-04-05 NOTE — TELEPHONE ENCOUNTER
----- Message from Jessica Vicente LPN sent at 4/4/2018  5:09 PM CDT -----  Contact: Pt      ----- Message -----  From: Pravin Martinez  Sent: 4/4/2018   4:32 PM  To: Cedric IRVING Staff    X_ 1st Request  _ 2nd Request  _ 3rd Request    Who: CLAUDIA MARTINEZ [[id number]]    Why: Patient returning a call in regards to an epidural appointment    What Number to Call Back: [phone]    When to Expect a call back: (Before the end of the day)  -- if call after 3:00 call back will be tomorrow.

## 2018-04-05 NOTE — TELEPHONE ENCOUNTER
Patient is returning your call regarding her epidural injection with Dr. Silverio.    Please contact patient.

## 2018-04-06 ENCOUNTER — TELEPHONE (OUTPATIENT)
Dept: PAIN MEDICINE | Facility: CLINIC | Age: 68
End: 2018-04-06

## 2018-04-06 NOTE — TELEPHONE ENCOUNTER
----- Message from Lazara Collazo sent at 4/6/2018 10:05 AM CDT -----  Tried calling pt. Mailbox full     Patient is returning your call regarding her epidural injection with Dr. Silverio.     Please contact patient.

## 2018-04-23 ENCOUNTER — TELEPHONE (OUTPATIENT)
Dept: PAIN MEDICINE | Facility: CLINIC | Age: 68
End: 2018-04-23

## 2018-04-23 NOTE — TELEPHONE ENCOUNTER
----- Message from Cristal Cook sent at 4/23/2018  9:55 AM CDT -----  Contact: perfecto            Name of Who is Calling: perfecto      What is the request in detail: pt wants to schedule epidural. Call pt      Can the clinic reply by [facility]: no      What Number to Call Back if not in [facility]: [phone]

## 2018-04-24 ENCOUNTER — LAB VISIT (OUTPATIENT)
Dept: LAB | Facility: HOSPITAL | Age: 68
End: 2018-04-24
Attending: PHYSICIAN ASSISTANT
Payer: MEDICARE

## 2018-04-24 ENCOUNTER — OFFICE VISIT (OUTPATIENT)
Dept: FAMILY MEDICINE | Facility: CLINIC | Age: 68
End: 2018-04-24
Payer: MEDICARE

## 2018-04-24 VITALS
WEIGHT: 200.19 LBS | HEIGHT: 64 IN | SYSTOLIC BLOOD PRESSURE: 148 MMHG | DIASTOLIC BLOOD PRESSURE: 70 MMHG | OXYGEN SATURATION: 96 % | BODY MASS INDEX: 34.18 KG/M2 | HEART RATE: 70 BPM | TEMPERATURE: 98 F | RESPIRATION RATE: 20 BRPM

## 2018-04-24 DIAGNOSIS — R00.2 PALPITATION: Primary | ICD-10-CM

## 2018-04-24 DIAGNOSIS — I10 ESSENTIAL HYPERTENSION: ICD-10-CM

## 2018-04-24 DIAGNOSIS — F32.A ANXIETY AND DEPRESSION: ICD-10-CM

## 2018-04-24 DIAGNOSIS — R00.2 PALPITATION: ICD-10-CM

## 2018-04-24 DIAGNOSIS — R53.83 FATIGUE, UNSPECIFIED TYPE: ICD-10-CM

## 2018-04-24 DIAGNOSIS — F41.9 ANXIETY AND DEPRESSION: ICD-10-CM

## 2018-04-24 LAB
T4 FREE SERPL-MCNC: 0.77 NG/DL
TSH SERPL DL<=0.005 MIU/L-ACNC: 1.85 UIU/ML

## 2018-04-24 PROCEDURE — 99204 OFFICE O/P NEW MOD 45 MIN: CPT | Mod: S$GLB,,, | Performed by: PHYSICIAN ASSISTANT

## 2018-04-24 PROCEDURE — 82306 VITAMIN D 25 HYDROXY: CPT

## 2018-04-24 PROCEDURE — 36415 COLL VENOUS BLD VENIPUNCTURE: CPT

## 2018-04-24 PROCEDURE — 99999 PR PBB SHADOW E&M-EST. PATIENT-LVL V: CPT | Mod: PBBFAC,,, | Performed by: PHYSICIAN ASSISTANT

## 2018-04-24 PROCEDURE — 84443 ASSAY THYROID STIM HORMONE: CPT

## 2018-04-24 PROCEDURE — 84439 ASSAY OF FREE THYROXINE: CPT

## 2018-04-24 PROCEDURE — 93005 ELECTROCARDIOGRAM TRACING: CPT | Mod: S$GLB,,, | Performed by: PHYSICIAN ASSISTANT

## 2018-04-24 PROCEDURE — 3078F DIAST BP <80 MM HG: CPT | Mod: CPTII,S$GLB,, | Performed by: PHYSICIAN ASSISTANT

## 2018-04-24 PROCEDURE — 3077F SYST BP >= 140 MM HG: CPT | Mod: CPTII,S$GLB,, | Performed by: PHYSICIAN ASSISTANT

## 2018-04-24 PROCEDURE — 93010 ELECTROCARDIOGRAM REPORT: CPT | Mod: S$GLB,,, | Performed by: INTERNAL MEDICINE

## 2018-04-24 RX ORDER — ESCITALOPRAM OXALATE 10 MG/1
10 TABLET ORAL DAILY
Qty: 30 TABLET | Refills: 11 | Status: SHIPPED | OUTPATIENT
Start: 2018-04-24 | End: 2018-05-03 | Stop reason: SDUPTHER

## 2018-04-24 NOTE — PATIENT INSTRUCTIONS
Escitalopram tablets  What is this medicine?  ESCITALOPRAM (es sye ADOLFO oh pram) is used to treat depression and certain types of anxiety.  How should I use this medicine?  Take this medicine by mouth with a glass of water. Follow the directions on the prescription label. You can take it with or without food. If it upsets your stomach, take it with food. Take your medicine at regular intervals. Do not take it more often than directed. Do not stop taking this medicine suddenly except upon the advice of your doctor. Stopping this medicine too quickly may cause serious side effects or your condition may worsen.  A special MedGuide will be given to you by the pharmacist with each prescription and refill. Be sure to read this information carefully each time.  Talk to your pediatrician regarding the use of this medicine in children. Special care may be needed.  What side effects may I notice from receiving this medicine?  Side effects that you should report to your doctor or health care professional as soon as possible:  · allergic reactions like skin rash, itching or hives, swelling of the face, lips, or tongue  · confusion  · feeling faint or lightheaded, falls  · fast talking and excited feelings or actions that are out of control  · hallucination, loss of contact with reality  · seizures  · suicidal thoughts or other mood changes  · unusual bleeding or bruising  Side effects that usually do not require medical attention (report to your doctor or health care professional if they continue or are bothersome):  · blurred vision  · changes in appetite  · change in sex drive or performance  · headache  · increased sweating  · nausea  What may interact with this medicine?  Do not take this medicine with any of the following medications:  · certain medicines for fungal infections like fluconazole, itraconazole, ketoconazole, posaconazole,  voriconazole  · cisapride  · citalopram  · dofetilide  · dronedarone  · linezolid  · MAOIs like Carbex, Eldepryl, Marplan, Nardil, and Parnate  · methylene blue (injected into a vein)  · pimozide  · thioridazine  · ziprasidone  This medicine may also interact with the following medications:  · alcohol  · aspirin and aspirin-like medicines  · carbamazepine  · certain medicines for depression, anxiety, or psychotic disturbances  · certain medicines for migraine headache like almotriptan, eletriptan, frovatriptan, naratriptan, rizatriptan, sumatriptan, zolmitriptan  · certain medicines for sleep  · certain medicines that treat or prevent blood clots like warfarin, enoxaparin, dalteparin  · cimetidine  · diuretics  · fentanyl  · furazolidone  · isoniazid  · lithium  · metoprolol  · NSAIDs, medicines for pain and inflammation, like ibuprofen or naproxen  · other medicines that prolong the QT interval (cause an abnormal heart rhythm)  · procarbazine  · rasagiline  · supplements like David's wort, kava kava, valerian  · tramadol  · tryptophan  What if I miss a dose?  If you miss a dose, take it as soon as you can. If it is almost time for your next dose, take only that dose. Do not take double or extra doses.  Where should I keep my medicine?  Keep out of reach of children.  Store at room temperature between 15 and 30 degrees C (59 and 86 degrees F). Throw away any unused medicine after the expiration date.  What should I tell my health care provider before I take this medicine?  They need to know if you have any of these conditions:  · bipolar disorder or a family history of bipolar disorder  · diabetes  · glaucoma  · heart disease  · kidney or liver disease  · receiving electroconvulsive therapy  · seizures (convulsions)  · suicidal thoughts, plans, or attempt by you or a family member  · an unusual or allergic reaction to escitalopram, the related drug citalopram, other medicines, foods, dyes, or  preservatives  · pregnant or trying to become pregnant  · breast-feeding  What should I watch for while using this medicine?  Tell your doctor if your symptoms do not get better or if they get worse. Visit your doctor or health care professional for regular checks on your progress. Because it may take several weeks to see the full effects of this medicine, it is important to continue your treatment as prescribed by your doctor.  Patients and their families should watch out for new or worsening thoughts of suicide or depression. Also watch out for sudden changes in feelings such as feeling anxious, agitated, panicky, irritable, hostile, aggressive, impulsive, severely restless, overly excited and hyperactive, or not being able to sleep. If this happens, especially at the beginning of treatment or after a change in dose, call your health care professional.  You may get drowsy or dizzy. Do not drive, use machinery, or do anything that needs mental alertness until you know how this medicine affects you. Do not stand or sit up quickly, especially if you are an older patient. This reduces the risk of dizzy or fainting spells. Alcohol may interfere with the effect of this medicine. Avoid alcoholic drinks.  Your mouth may get dry. Chewing sugarless gum or sucking hard candy, and drinking plenty of water may help. Contact your doctor if the problem does not go away or is severe.  NOTE:This sheet is a summary. It may not cover all possible information. If you have questions about this medicine, talk to your doctor, pharmacist, or health care provider. Copyright© 2017 [employer]      [id number] scheduling   blood work and Holter monitor

## 2018-04-24 NOTE — LETTER
April 24, 2018               [facility]  Family Medicine  [address]  [address]  [place] [address]  Phone: [phone]  Fax: [phone]   April 24, 2018     Patient: Faby Luna   YOB: 1950   Date of Visit: 4/24/2018       To Whom it May Concern:    Faby Luna was seen in my clinic on 4/24/2018. She may return to work on 4/26/18.    If you have any questions or concerns, please don't hesitate to call.    Sincerely,         EDMUNDO Kate

## 2018-04-24 NOTE — PROGRESS NOTES
Subjective:       Patient ID: Faby Luna is a 67 y.o. female with multiple medical diagnoses as listed in the medical history and problem list that presents for Discuss Palpitations and Depression  .    Chief Complaint: Discuss Palpitations and Depression      Depression   Visit Type: follow-up (she is a new patient to me and [facility] but was on Paxil with her old PCP )  Patient presents with the following symptoms: chest pain, depressed mood, dizziness, fatigue, insomnia, irritability, nausea, nervousness/anxiety, palpitations, panic and shortness of breath.  Frequency of symptoms: constantly (she states chronic fof years but worse in the last 2 weeks with her current situation of her house for closing and having to care for her elderly mother )   Severity: causing significant distress   Sleep quality: poor  Nighttime awakenings: several  Compliance with medications: was on paxil but she was taken off when her PCP started  her on Zonegran         Palpitations    This is a chronic problem. The current episode started more than 1 year ago. The problem occurs intermittently. Progression since onset: worse in the last week  The symptoms are aggravated by stress. Associated symptoms include anxiety, chest pain (center of chest and she has reflux symptoms does improve with reflux medicine sometimes but states she is off of it right now bc he last doc told her to stop ), dizziness, an irregular heartbeat, nausea (intermittent ), numbness (hands and feet which she states has been associated with her diabetes ) and shortness of breath. Pertinent negatives include no coughing, fever, vomiting or weakness.     Review of Systems   Constitutional: Positive for fatigue and irritability. Negative for chills and fever.   HENT: Negative for congestion, postnasal drip and rhinorrhea.    Eyes: Negative for pain, discharge, redness and itching.   Respiratory: Positive for shortness of breath. Negative for cough.     Cardiovascular: Positive for chest pain (center of chest and she has reflux symptoms does improve with reflux medicine sometimes but states she is off of it right now bc he last doc told her to stop ) and palpitations.   Gastrointestinal: Positive for nausea (intermittent ). Negative for abdominal pain, constipation and vomiting.   Endocrine: Negative for cold intolerance and heat intolerance.   Genitourinary: Negative for dysuria.   Skin: Negative for rash.   Neurological: Positive for dizziness, light-headedness and numbness (hands and feet which she states has been associated with her diabetes ). Negative for facial asymmetry, speech difficulty and weakness.   Psychiatric/Behavioral: Positive for agitation, depression, dysphoric mood and sleep disturbance (issues staying a sleep ). The patient is nervous/anxious and has insomnia.         Current stress: process of moving because house has been foreclosed on, she is plan on moving to ? They are still working it out. She is with her son today.     She is also has to care for her mother who is 89 who is semi-mobile.              PAST MEDICAL HISTORY:  Past Medical History:   Diagnosis Date    Arthritis     Diabetes mellitus     Hypertension        SOCIAL HISTORY:  Social History     Social History    Marital status:      Spouse name: N/A    Number of children: N/A    Years of education: N/A     Occupational History    Not on file.     Social History Main Topics    Smoking status: Never Smoker    Smokeless tobacco: Never Used    Alcohol use No    Drug use: No    Sexual activity: Not on file     Other Topics Concern    Not on file     Social History Narrative    No narrative on file       ALLERGIES AND MEDICATIONS: updated and reviewed.  Review of patient's allergies indicates:   Allergen Reactions    Aspirin Other (See Comments)     Has been told not to take it due to bariatric surgery     Current Outpatient Prescriptions   Medication Sig  "Dispense Refill    atenolol (TENORMIN) 100 MG tablet Take 100 mg by mouth once daily.      furosemide (LASIX) 20 MG tablet Take 20 mg by mouth 2 (two) times daily.      glipiZIDE (GLUCOTROL) 10 MG tablet Take 10 mg by mouth 2 (two) times daily before meals.      ibuprofen (ADVIL,MOTRIN) 800 MG tablet Take 1 tablet (800 mg total) by mouth every 8 (eight) hours as needed for Pain. 20 tablet 0    LORazepam (ATIVAN) 2 MG Tab Take 0.5 mg by mouth every 6 (six) hours as needed.      metFORMIN (GLUCOPHAGE) 1000 MG tablet Take 1,000 mg by mouth daily with breakfast.       pantoprazole (PROTONIX) 40 MG tablet       promethazine (PHENERGAN) 6.25 mg/5 mL syrup       simvastatin (ZOCOR) 40 MG tablet Take 40 mg by mouth every evening.      zonisamide (ZONEGRAN) 100 MG Cap one @ bedtime X3 days then 2 @ bedtime X3 days then 3 @ bedtime X3 days then 4 @ bedtime to follow.Stay at the most comfortable dose. 120 capsule 2    escitalopram oxalate (LEXAPRO) 10 MG tablet Take 1 tablet (10 mg total) by mouth once daily. 30 tablet 11     No current facility-administered medications for this visit.          Objective:   BP (!) 148/70   Pulse 70   Temp 98.4 °F (36.9 °C) (Oral)   Resp 20   Ht 5' 4" (1.626 m)   Wt 90.8 kg (200 lb 2.8 oz)   SpO2 96%   BMI 34.36 kg/m²      Physical Exam   Constitutional: She is oriented to person, place, and time. No distress.   HENT:   Head: Normocephalic and atraumatic.   Eyes: Conjunctivae and EOM are normal.   Neck: Normal range of motion.   Cardiovascular: Normal rate and regular rhythm.    Musculoskeletal: Normal range of motion.   Neurological: She is alert and oriented to person, place, and time.   Psychiatric: Judgment and thought content normal. Cognition and memory are normal.   She does appear stressed and depressed.              Assessment:       1. Palpitation    2. Body mass index (BMI) of 34.0-34.9 in adult     3. Anxiety and depression    4. Essential hypertension        Plan: "       Palpitation  -     IN OFFICE EKG 12-LEAD (to [facility])  -     TSH; Future; Expected date: 04/24/2018  -     T4, free; Future; Expected date: 04/24/2018  -     Holter monitor - 48 hour; Future    Body mass index (BMI) of 34.0-34.9 in adult   -     Vitamin D; Future; Expected date: 04/24/2018  Her son was very adamant about checking this because he was told that it could be related to depression and had a close family friend who was admitted to facilities because this was low  And missed.   We do have old labs form 2011 that showed very high levels of both b12 and vit D  She is unsure if her old PCP was checking. She states she takes vitamins that contain vitamin daily still.     Anxiety and depression  -     escitalopram oxalate (LEXAPRO) 10 MG tablet; Take 1 tablet (10 mg total) by mouth once daily.  Dispense: 30 tablet; Refill: 11      She has an appt to [facility] with Dr. Yessy Keith 5/8/18.  I will have her bring all her medicine bottles to that visit.   I also advised if she could obtain a copy of her last labs and office visit for Dr. Flores it would be helpful.   I believe her palpitations are related to her depression and anxiety from the stresses of her situation. She is on on Ativan for the anxiety but not on an SSRI or SNRI so I will start her on this. I have gone over potential side effects as well as printed the info for her. She will follow up in a couple weeks to est care and assess for this treatment plan. She is aware she will need to be tapered off of this medication in the future if she needs to get off. I will additionally check her thyroid and get a Holter monitor on her. Her EKG showed flatten T waves which I spoke to Dr. Hale about and will not work up at this time as they are nonspecific.     Essential hypertension   Only on atenolol  States she thinks she was on something else before but cannot recall. I mentioned ACE and ARBs. ACE can be stop for cough and she thinks that what is was but  unsure.   If still high, Dr. Keith can add a medication at her next visit. Will see if treatment anxiety helps with this.             No Follow-up on file.

## 2018-04-25 LAB — 25(OH)D3+25(OH)D2 SERPL-MCNC: 32 NG/ML

## 2018-05-01 ENCOUNTER — TELEPHONE (OUTPATIENT)
Dept: PAIN MEDICINE | Facility: CLINIC | Age: 68
End: 2018-05-01

## 2018-05-01 NOTE — TELEPHONE ENCOUNTER
----- Message from Rajinder Hale sent at 5/1/2018  3:17 PM CDT -----  Contact: patient            Name of Who is Calling: Faby      What is the request in detail: patient called is upset no one has called her with her procedure and arrival time tomorrow.  Patient stated she needs to set up her ride.  Please call the patient.      Can the clinic reply by [facility]: no      What [facility]: [phone]

## 2018-05-02 ENCOUNTER — HOSPITAL ENCOUNTER (OUTPATIENT)
Facility: OTHER | Age: 68
Discharge: HOME | End: 2018-05-02
Attending: ANESTHESIOLOGY | Admitting: ANESTHESIOLOGY
Payer: MEDICARE

## 2018-05-02 ENCOUNTER — SURGERY (OUTPATIENT)
Age: 68
End: 2018-05-02

## 2018-05-02 VITALS
OXYGEN SATURATION: 97 % | DIASTOLIC BLOOD PRESSURE: 65 MMHG | SYSTOLIC BLOOD PRESSURE: 136 MMHG | BODY MASS INDEX: 33.63 KG/M2 | WEIGHT: 197 LBS | HEART RATE: 68 BPM | RESPIRATION RATE: 18 BRPM | HEIGHT: 64 IN | TEMPERATURE: 98 F

## 2018-05-02 DIAGNOSIS — M47.26 OSTEOARTHRITIS OF SPINE WITH RADICULOPATHY, LUMBAR REGION: Primary | ICD-10-CM

## 2018-05-02 DIAGNOSIS — G89.29 CHRONIC PAIN: ICD-10-CM

## 2018-05-02 LAB — POCT GLUCOSE: 125 MG/DL (ref 70–110)

## 2018-05-02 PROCEDURE — 25500020 PHARM REV CODE 255: Performed by: ANESTHESIOLOGY

## 2018-05-02 PROCEDURE — 64484 NJX AA&/STRD TFRM EPI L/S EA: CPT | Performed by: ANESTHESIOLOGY

## 2018-05-02 PROCEDURE — 25000003 PHARM REV CODE 250: Performed by: STUDENT IN AN ORGANIZED HEALTH CARE EDUCATION/TRAINING PROGRAM

## 2018-05-02 PROCEDURE — 64483 NJX AA&/STRD TFRM EPI L/S 1: CPT | Mod: LT,,, | Performed by: ANESTHESIOLOGY

## 2018-05-02 PROCEDURE — 64483 NJX AA&/STRD TFRM EPI L/S 1: CPT | Performed by: ANESTHESIOLOGY

## 2018-05-02 PROCEDURE — 25000003 PHARM REV CODE 250: Performed by: ANESTHESIOLOGY

## 2018-05-02 PROCEDURE — 82947 ASSAY GLUCOSE BLOOD QUANT: CPT | Performed by: ANESTHESIOLOGY

## 2018-05-02 PROCEDURE — 64484 NJX AA&/STRD TFRM EPI L/S EA: CPT | Mod: LT,,, | Performed by: ANESTHESIOLOGY

## 2018-05-02 PROCEDURE — 99152 MOD SED SAME PHYS/QHP 5/>YRS: CPT | Mod: ,,, | Performed by: ANESTHESIOLOGY

## 2018-05-02 PROCEDURE — 63600175 PHARM REV CODE 636 W HCPCS: Performed by: ANESTHESIOLOGY

## 2018-05-02 RX ORDER — SODIUM CHLORIDE 9 MG/ML
500 INJECTION, SOLUTION INTRAVENOUS CONTINUOUS
Status: DISCONTINUED | OUTPATIENT
Start: 2018-05-02 | End: 2018-05-02 | Stop reason: HOSPADM

## 2018-05-02 RX ORDER — DEXAMETHASONE SODIUM PHOSPHATE 100 MG/10ML
INJECTION INTRAMUSCULAR; INTRAVENOUS
Status: DISCONTINUED | OUTPATIENT
Start: 2018-05-02 | End: 2018-05-02 | Stop reason: HOSPADM

## 2018-05-02 RX ORDER — LIDOCAINE HYDROCHLORIDE 20 MG/ML
INJECTION, SOLUTION INFILTRATION; PERINEURAL
Status: DISCONTINUED | OUTPATIENT
Start: 2018-05-02 | End: 2018-05-02 | Stop reason: HOSPADM

## 2018-05-02 RX ORDER — FENTANYL CITRATE 50 UG/ML
INJECTION, SOLUTION INTRAMUSCULAR; INTRAVENOUS
Status: DISCONTINUED | OUTPATIENT
Start: 2018-05-02 | End: 2018-05-02 | Stop reason: HOSPADM

## 2018-05-02 RX ORDER — LIDOCAINE HYDROCHLORIDE 10 MG/ML
INJECTION, SOLUTION EPIDURAL; INFILTRATION; INTRACAUDAL; PERINEURAL
Status: DISCONTINUED | OUTPATIENT
Start: 2018-05-02 | End: 2018-05-02 | Stop reason: HOSPADM

## 2018-05-02 RX ORDER — MIDAZOLAM HYDROCHLORIDE 1 MG/ML
INJECTION INTRAMUSCULAR; INTRAVENOUS
Status: DISCONTINUED | OUTPATIENT
Start: 2018-05-02 | End: 2018-05-02 | Stop reason: HOSPADM

## 2018-05-02 RX ADMIN — FENTANYL CITRATE 25 MCG: 50 INJECTION, SOLUTION INTRAMUSCULAR; INTRAVENOUS at 09:05

## 2018-05-02 RX ADMIN — LIDOCAINE HYDROCHLORIDE 10 MG: 20 INJECTION, SOLUTION INFILTRATION; PERINEURAL at 09:05

## 2018-05-02 RX ADMIN — MIDAZOLAM HYDROCHLORIDE 2 MG: 1 INJECTION, SOLUTION INTRAMUSCULAR; INTRAVENOUS at 09:05

## 2018-05-02 RX ADMIN — IOHEXOL 5 ML: 300 INJECTION, SOLUTION INTRAVENOUS at 09:05

## 2018-05-02 RX ADMIN — DEXAMETHASONE SODIUM PHOSPHATE 10 MG: 10 INJECTION INTRAMUSCULAR; INTRAVENOUS at 09:05

## 2018-05-02 RX ADMIN — SODIUM CHLORIDE 500 ML: 0.9 INJECTION, SOLUTION INTRAVENOUS at 09:05

## 2018-05-02 RX ADMIN — LIDOCAINE HYDROCHLORIDE 5 ML: 10 INJECTION, SOLUTION EPIDURAL; INFILTRATION; INTRACAUDAL; PERINEURAL at 09:05

## 2018-05-02 NOTE — OP NOTE
Date of Service: 05/02/2018    PCP: Luis Fernando Rincon MD    Time-out taken to identify patient and procedure side prior to starting the procedure.   I attest that I have reviewed the patient's home medications prior to the procedure and no contraindication have been identified. I  re-evaluated the patient after the patient was positioned for the procedure in the procedure room immediately before the procedural time-out. The vital signs are current and represent the current state of the patient which has not significantly changed since the preprocedure assessment.                                                           PROCEDURE: Left L3 and L4 transforaminal epidural steroid injection under fluoroscopy    REASON FOR PROCEDURE: Left Lumbar radiculopathy [M54.16]  1. Osteoarthritis of spine with radiculopathy, lumbar region    2. Chronic pain      POSTPROCEDURE DIAGNOSIS:   Lumbar radiculopathy [M54.16]    1. Osteoarthritis of spine with radiculopathy, lumbar region    2. Chronic pain           PHYSICIAN: Shaq Silverio MD  ASSISTANTS: Bethel Daniels MD    MEDICATIONS INJECTED:  Preservative-free dexamethasone 10mg, Xylocaine 1% MPF 3-5ml. 3ml per level. Preservative free, sterile normal saline is used to get larger volume as needed.  LOCAL ANESTHETIC INJECTED:  Xylocaine 1% 9ml with Sodium Bicarbonate 1ml. 3ml per site.    SEDATION MEDICATIONS: Versed 2 mg IV; Fentanyl 25 mcg IV    ESTIMATED BLOOD LOSS:  None.    COMPLICATIONS:  None.    TECHNIQUE:   Laying in a prone position, the patient was prepped and draped in the usual sterile fashion using ChloraPrep and fenestrated drape.  The area to be injected was determined under fluoroscopic guidance.  Local anesthetic was given by raising a wheel and going down to the hub of a 27-gauge 1.25 inch needle.  The 3.5inch 22-gauge spinal needle was introduced towards the transverse process of each above named nerve root level.  The needle was walked medially then hinged into  the neural foramen.  Omnipaque was injected to confirm appropriate placement and that there was no vascular runoff.  The medication was then injected after applying negative pressure. The patient tolerated the procedure well.    PAIN BEFORE THE PROCEDURE: 9/10.    PAIN AFTER THE PROCEDURE: 0/10.    The patient was monitored after the procedure.  Patient was given post procedure and discharge instructions to follow at home.  We will see the patient back in two weeks or the patient may call to inform of status. The patient was discharged in a stable condition.

## 2018-05-02 NOTE — INTERVAL H&P NOTE
"HPI  Patient is here today for planned left L3 and L4 TFESI.  Denies any recent fever, chills, nausea/vomiting, new onset numbness/weakness in BLE.    PMHx, PSHx, Allergies, Medications reviewed in epic    ROS negative except pain complaints in HPI    OBJECTIVE:    BP (!) 150/70 (BP Location: Right arm, Patient Position: Lying)   Pulse 71   Temp 98.4 °F (36.9 °C) (Oral)   Resp 18   Ht 5' 4" (1.626 m)   Wt 89.4 kg (197 lb)   SpO2 98%   BMI 33.81 kg/m²     PHYSICAL EXAMINATION:    GENERAL: Well appearing, in no acute distress, alert and oriented x3.  PSYCH:  Mood and affect appropriate.  SKIN: Skin color, texture, turgor normal, no rashes or lesions.  CV: RRR with palpation of the radial artery.  PULM: No evidence of respiratory difficulty, symmetric chest rise. Clear to auscultation.  NEURO: Cranial nerves grossly intact.    Plan:    Proceed with  left L3 and L4 TFESI    Bethel Daniels  05/02/2018    "

## 2018-05-02 NOTE — DISCHARGE INSTRUCTIONS
Adult Procedural Sedation Instructions    Recovery After Procedural Sedation (Adult)  You have been given medicine by vein to make you sleep during your surgery. This may have included both a pain medicine and sleeping medicine. Most of the effects have worn off. But you may still have some drowsiness for the next 6 to 8 hours.  Home care  Follow these guidelines when you get home:  · For the next 8 hours, you should be watched by a responsible adult. This person should make sure your condition is not getting worse.  · Don't drink any alcohol for the next 24 hours.  · Don't drive, operate dangerous machinery, or make important business or personal decisions during the next 24 hours.  Note: Your healthcare provider may tell you not to take any medicine by mouth for pain or sleep in the next 4 hours. These medicines may react with the medicines you were given in the hospital. This could cause a much stronger response than usual.  Follow-up care  Follow up with your healthcare provider if you are not alert and back to your usual level of activity within 12 hours.  When to seek medical advice  Call your healthcare provider right away if any of these occur:  · Drowsiness gets worse  · Weakness or dizziness gets worse  · Repeated vomiting  · You can't be awakened   Date Last Reviewed: 10/18/2016  © 0412-6577 [employer]. [address] [place] [address], [place] [address]. All rights reserved. This information is not intended as a substitute for professional medical care. Always follow your healthcare professional's instructions.    Home Care Instructions Pain Management:    1. DIET:   You may resume your normal diet today.   2. BATHING:   You may shower with luke warm water.  3. DRESSING:   You may remove your bandage today.   4. ACTIVITY LEVEL:   You may resume your normal activities 24 hrs after your procedure.  5. MEDICATIONS:   You may resume your normal medications today.   6. SPECIAL INSTRUCTIONS:   No heat to  the injection site for 24 hrs including, bath or shower, heating pad, moist heat, or hot tubs.    Use ice pack to injection site for any pain or discomfort.  Apply ice packs for 20 minute intervals as needed.   If you have received any sedatives by mouth today you may not drive for 12 hours.    If you have received any sedation through your IV, you may not drive for 24 hrs.     PLEASE CALL YOUR DOCTOR IF:  1. Redness or swelling around the injection site.  2. Fever of 101 degrees  3. Drainage (pus) from the injection site.  4. For any continuous bleeding (some dried blood over the incision is normal.)    FOR EMERGENCIES:   If any unusual problems or difficulties occur during clinic hours, call [phone] or [phone].     Thank you for allowing us to care for you today. You may receive a survey about the care we provided. Your feedback is valuable and helps us provide excellent care throughout the community.

## 2018-05-02 NOTE — DISCHARGE SUMMARY
Discharge Diagnosis:Lumbar djd with radiculopathy   Condition on Discharge: Stable.  Diet on Discharge: Same as before.  Activity: as per instruction sheet.  Discharge to: Home with a responsible adult.  Follow up: 2-4 weeks &/or as per Discharge instructions

## 2018-05-03 ENCOUNTER — OFFICE VISIT (OUTPATIENT)
Dept: FAMILY MEDICINE | Facility: CLINIC | Age: 68
End: 2018-05-03
Payer: MEDICARE

## 2018-05-03 VITALS
DIASTOLIC BLOOD PRESSURE: 72 MMHG | OXYGEN SATURATION: 96 % | SYSTOLIC BLOOD PRESSURE: 140 MMHG | HEIGHT: 64 IN | HEART RATE: 64 BPM | WEIGHT: 198.19 LBS | BODY MASS INDEX: 33.84 KG/M2 | TEMPERATURE: 98 F

## 2018-05-03 DIAGNOSIS — F43.23 ADJUSTMENT REACTION WITH ANXIETY AND DEPRESSION: ICD-10-CM

## 2018-05-03 PROCEDURE — 3078F DIAST BP <80 MM HG: CPT | Mod: CPTII,S$GLB,, | Performed by: FAMILY MEDICINE

## 2018-05-03 PROCEDURE — 99214 OFFICE O/P EST MOD 30 MIN: CPT | Mod: S$GLB,,, | Performed by: FAMILY MEDICINE

## 2018-05-03 PROCEDURE — 99999 PR PBB SHADOW E&M-EST. PATIENT-LVL III: CPT | Mod: PBBFAC,,, | Performed by: FAMILY MEDICINE

## 2018-05-03 PROCEDURE — 3077F SYST BP >= 140 MM HG: CPT | Mod: CPTII,S$GLB,, | Performed by: FAMILY MEDICINE

## 2018-05-03 RX ORDER — PANTOPRAZOLE SODIUM 40 MG/1
40 TABLET, DELAYED RELEASE ORAL
Qty: 90 TABLET | Refills: 0 | Status: SHIPPED | OUTPATIENT
Start: 2018-05-03 | End: 2018-07-05 | Stop reason: SDUPTHER

## 2018-05-03 RX ORDER — ESCITALOPRAM OXALATE 20 MG/1
20 TABLET ORAL DAILY
Qty: 90 TABLET | Refills: 1 | Status: SHIPPED | OUTPATIENT
Start: 2018-05-03 | End: 2018-08-23

## 2018-05-03 NOTE — PROGRESS NOTES
Chief Complaint   Patient presents with    Establish Care    Depression    Palpitations       HPI    Faby Luna is 67 y.o. female. The encounter diagnosis was Adjustment reaction with anxiety and depression.    67 year old female comes to clinic to discuss depression and anxiety.  Patient reports a history of depression.  She is currently caring for her mother.  She notes the death of her spouse 18 months ago which has led to some financial difficulties.  She reports adequate work and shelter and she and her mother are staying with a friend.    Patient reports having an injury and was unable to maintain [employer], which cuased her house to foreclose.  Patient reports that she will be moving in 2 weeks.        Patient notes feeling depressed and anxious for years.  Since her financial difficulties she notes panic symptoms being unable to sit still and palpitations.  She denies the need for counseling as she has a healthy support system.  She does admit need to improve self care.      Review of Systems   Constitutional: Negative for activity change.   HENT: Negative for congestion.    Respiratory: Positive for shortness of breath.    Cardiovascular: Positive for palpitations. Negative for chest pain.   Gastrointestinal: Negative for abdominal pain.   Genitourinary: Negative for dysuria.   Musculoskeletal: Negative for gait problem.   Skin: Negative for rash.   Neurological: Negative for dizziness.   Psychiatric/Behavioral: Positive for agitation, decreased concentration, dysphoric mood and sleep disturbance. Negative for suicidal ideas. The patient is nervous/anxious.            Current Outpatient Prescriptions:     atenolol (TENORMIN) 100 MG tablet, Take 100 mg by mouth once daily., Disp: , Rfl:     escitalopram oxalate (LEXAPRO) 20 MG tablet, Take 1 tablet (20 mg total) by mouth once daily., Disp: 90 tablet, Rfl: 1    furosemide (LASIX) 20 MG tablet, Take 20 mg by mouth 2 (two) times daily., Disp: , Rfl:      "glipiZIDE (GLUCOTROL) 10 MG tablet, Take 10 mg by mouth 2 (two) times daily before meals., Disp: , Rfl:     LORazepam (ATIVAN) 2 MG Tab, Take 0.5 tablets by mouth 2 (two) times daily as needed for Anxiety., Disp: , Rfl:     metFORMIN (GLUCOPHAGE) 1000 MG tablet, Take 1,000 mg by mouth daily with breakfast. , Disp: , Rfl:     pantoprazole (PROTONIX) 40 MG tablet, Take 1 tablet (40 mg total) by mouth before breakfast., Disp: 90 tablet, Rfl: 0    simvastatin (ZOCOR) 40 MG tablet, Take 40 mg by mouth every evening., Disp: , Rfl:     ibuprofen (ADVIL,MOTRIN) 800 MG tablet, Take 1 tablet (800 mg total) by mouth every 8 (eight) hours as needed for Pain., Disp: 20 tablet, Rfl: 0    zonisamide (ZONEGRAN) 100 MG Cap, one @ bedtime X3 days then 2 @ bedtime X3 days then 3 @ bedtime X3 days then 4 @ bedtime to follow.Stay at the most comfortable dose., Disp: 120 capsule, Rfl: 2      Blood pressure (!) 140/72, pulse 64, temperature 97.7 °F (36.5 °C), temperature source Oral, height 5' 4" (1.626 m), weight 89.9 kg (198 lb 3.1 oz), SpO2 96 %.    Physical Exam   Constitutional: Vital signs are normal. She appears well-developed.   HENT:   Mouth/Throat: Normal dentition.   Neck: Trachea normal. No thyromegaly present.   Cardiovascular: Normal rate, regular rhythm and intact distal pulses.    No murmur heard.  Pulmonary/Chest: Effort normal. She has no decreased breath sounds. She has no wheezes. She exhibits no deformity.   Musculoskeletal:   Normal gait. No decreased range of motion of major joints.   Neurological: She is not disoriented.   Skin: Skin is intact. Capillary refill takes less than 2 seconds.   Psychiatric: Judgment normal. Her mood appears not anxious. Her speech is slurred. She is slowed. Cognition and memory are normal. She exhibits a depressed mood.       Admission on 05/02/2018, Discharged on 05/02/2018   Component Date Value Ref Range Status    POCT Glucose 05/02/2018 125* 70 - 110 mg/dL Final   Lab Visit " on 04/24/2018   Component Date Value Ref Range Status    TSH 04/24/2018 1.848  0.400 - 4.000 uIU/mL Final    Free T4 04/24/2018 0.77  0.71 - 1.51 ng/dL Final    Vit D, 25-Hydroxy 04/24/2018 32  30 - 96 ng/mL Final   Admission on 02/08/2018, Discharged on 02/08/2018   Component Date Value Ref Range Status    Specimen UA 02/08/2018 Urine, Clean Catch   Final    Color, UA 02/08/2018 Yellow  Yellow, Straw, Christie Final    Appearance, UA 02/08/2018 Clear  Clear Final    pH, UA 02/08/2018 6.0  5.0 - 8.0 Final    Specific Gravity, UA 02/08/2018 1.025  1.005 - 1.030 Final    Protein, UA 02/08/2018 Trace* Negative Final    Glucose, UA 02/08/2018 Negative  Negative Final    Ketones, UA 02/08/2018 Negative  Negative Final    Bilirubin (UA) 02/08/2018 Negative  Negative Final    Occult Blood UA 02/08/2018 Trace* Negative Final    Nitrite, UA 02/08/2018 Positive* Negative Final    Urobilinogen, UA 02/08/2018 Negative  <2.0 EU/dL Final    Leukocytes, UA 02/08/2018 1+* Negative Final    RBC, UA 02/08/2018 6* 0 - 4 /hpf Final    WBC, UA 02/08/2018 25* 0 - 5 /hpf Final    Bacteria, UA 02/08/2018 Moderate* None-Occ /hpf Final    Squam Epithel, UA 02/08/2018 15  /hpf Final    Microscopic Comment 02/08/2018 SEE COMMENT   Final    POCT Glucose 02/08/2018 125* 70 - 110 mg/dL Final    Urine Culture, Routine 02/08/2018 Multiple organisms isolated. None in predominance.  Repeat if   Final    Urine Culture, Routine 02/08/2018 clinically necessary.   Final   ]    Assessment:    1. Adjustment reaction with anxiety and depression          Faby was seen today for establish care, depression and palpitations.    Diagnoses and all orders for this visit:    Adjustment reaction with anxiety and depression  -     escitalopram oxalate (LEXAPRO) 20 MG tablet; Take 1 tablet (20 mg total) by mouth once daily.  - New problem.  Increase Lexapro to 20 mg daily and reduce benzo use as patient currently taking 2 mg daily. Complete  current prescription at recommended dose of 1/2 tablet 1-2 times per day for panic attacks only.  - No further refills on Xanax.  - Self care as therapy discussed.    Other orders  -     pantoprazole (PROTONIX) 40 MG tablet; Take 1 tablet (40 mg total) by mouth before breakfast.          FOLLOW UP: Follow-up in about 2 weeks (around 5/17/2018) for Follow up.

## 2018-05-03 NOTE — PATIENT INSTRUCTIONS
Homework: twice weekly self -care activity.  1. See your friends  2. _____________    Extra credit:    Self-care calendar

## 2018-05-16 ENCOUNTER — OFFICE VISIT (OUTPATIENT)
Dept: PAIN MEDICINE | Facility: CLINIC | Age: 68
End: 2018-05-16
Payer: MEDICARE

## 2018-05-16 VITALS
HEIGHT: 64 IN | DIASTOLIC BLOOD PRESSURE: 79 MMHG | RESPIRATION RATE: 18 BRPM | TEMPERATURE: 99 F | BODY MASS INDEX: 33.12 KG/M2 | HEART RATE: 66 BPM | WEIGHT: 194 LBS | SYSTOLIC BLOOD PRESSURE: 165 MMHG

## 2018-05-16 DIAGNOSIS — M54.16 LUMBAR RADICULOPATHY: ICD-10-CM

## 2018-05-16 DIAGNOSIS — M47.26 OSTEOARTHRITIS OF SPINE WITH RADICULOPATHY, LUMBAR REGION: ICD-10-CM

## 2018-05-16 DIAGNOSIS — G89.4 CHRONIC PAIN SYNDROME: ICD-10-CM

## 2018-05-16 DIAGNOSIS — M47.816 LUMBAR SPONDYLOSIS: Primary | ICD-10-CM

## 2018-05-16 PROCEDURE — 3077F SYST BP >= 140 MM HG: CPT | Mod: CPTII,S$GLB,, | Performed by: NURSE PRACTITIONER

## 2018-05-16 PROCEDURE — 3078F DIAST BP <80 MM HG: CPT | Mod: CPTII,S$GLB,, | Performed by: NURSE PRACTITIONER

## 2018-05-16 PROCEDURE — 99213 OFFICE O/P EST LOW 20 MIN: CPT | Mod: S$GLB,,, | Performed by: NURSE PRACTITIONER

## 2018-05-16 PROCEDURE — 99999 PR PBB SHADOW E&M-EST. PATIENT-LVL III: CPT | Mod: PBBFAC,,, | Performed by: NURSE PRACTITIONER

## 2018-05-16 NOTE — PROGRESS NOTES
Chronic patient Established Note (Follow up visit)      SUBJECTIVE:    Faby Luna presents to the clinic for a follow-up appointment for lower back and lower extremity pain.  She is s/p left L3 and L4 TF ELISE on 5/2/18 with 20% pain relief.  She continues with pain that starts across the lower back.  It radiates down the side and back of the left leg.  Today, her back pain is greater than her leg pain.  She reports that this pain is worse in the morning.  She feels an aching in the morning which improves when she moves around a bit.  Since the last visit, Faby Luna states the pain has been improving. Current pain intensity is 7/10.    Pain Disability Index Review:  Last 3 PDI Scores 5/16/2018 4/4/2018 3/27/2018   Pain Disability Index (PDI) 35 51 46       Pain Medications:  Ibuprofen and Zonegran    Opioid Contract: no     report:  Not applicable    Pain Procedures:   5/2/18 Left L3 and L4 TF ELISE- 20% relief    Physical Therapy/Home Exercise: yes    Imaging:     3/31/18 Lumbar MRI    Narrative     EXAMINATION:  MRI LUMBAR SPINE WITHOUT CONTRAST    CLINICAL HISTORY:  Low back pain, >6wks conservative tx, persistent-progressive sx, surgical candidate; Other spondylosis with radiculopathy, lumbar region    TECHNIQUE:  Multiplanar, multisequence MR images were acquired from the thoracolumbar junction to the sacrum without the administration of contrast.    COMPARISON:  Plain films from 03/27/2018    FINDINGS:  There is grade 1 spondylolisthesis of L4 on L5. The vertebral body heights are well maintained, with no fracture.  No marrow signal abnormality suspicious for an infiltrative process.    The conus medullaris terminates at approximately the mid body of L1.  There is a cyst associated with the upper pole of the right kidney.  There is disc desiccation noted throughout the lumbar spine with relative sparing of the L5-S1 disc.  Mild disc space narrowing present at the L4-5 level.    L1-L2: Mild diffuse disc bulge  resulting in no significant central or neural foraminal canal narrowing.    L2-L3: No significant central canal narrowing.  There is mild narrowing of either neural foraminal canal secondary to disc material.    L3-L4: Disc bulging in the bilateral foraminal regions resulting in no significant central canal narrowing.  Mild-to-moderate bilateral facet arthropathy also noted.  The bilateral neural foraminal canals are moderately narrowed with some mild effacement of either exiting L3 nerve root in the extraforaminal regions bilaterally.    L4-L5:  Grade 1 spondylolisthesis along with moderate to severe bilateral facet arthropathy and ligamentum flavum hypertrophy resulting in at least moderate narrowing of the central canal.  The right neural foraminal canal is mildly to moderately narrowed.  Left neural foraminal canal is moderately to severely narrowed with mild effacement of the exiting L4 nerve root.    L5-S1:  No significant central or neural foraminal canal narrowing noted.  Mild bilateral facet arthropathy noted.   Impression       1. Multilevel degenerative changes of the lumbar spine as detailed above     Lumbar XRAYs 3/27/18    Narrative     EXAMINATION:  XR LUMBAR SPINE AP AND LAT WITH FLEX/EXT    CLINICAL HISTORY:  Low back pain    TECHNIQUE:  AP and lateral views as well as lateral flexion and extension images are performed through the lumbar spine.    COMPARISON:  None    FINDINGS:  X-ray lumbar spine with flexion and extension demonstrates grade 1 spondylolisthesis at L4-5 measuring around 6 mm which does not change significantly with flexion and extension.  The L4-5 disc is narrowed and degenerated.  Other lumbar vertebral discs are maintained.  Vertebral body heights are maintained.  Small anterior spurs are seen, and there is small posterior osteophyte along the inferior endplate of L4.  There is lumbar facet arthropathy at L4-5 and L5-S1.   Impression       Degenerative changes as above.  Grade 1  anterolisthesis and degenerative disc disease at L4-5.         Allergies:   Review of patient's allergies indicates:   Allergen Reactions    Aspirin Other (See Comments)     Has been told not to take it due to bariatric surgery       Current Medications:   Current Outpatient Prescriptions   Medication Sig Dispense Refill    atenolol (TENORMIN) 100 MG tablet Take 100 mg by mouth once daily.      escitalopram oxalate (LEXAPRO) 20 MG tablet Take 1 tablet (20 mg total) by mouth once daily. 90 tablet 1    furosemide (LASIX) 20 MG tablet Take 20 mg by mouth 2 (two) times daily.      glipiZIDE (GLUCOTROL) 10 MG tablet Take 10 mg by mouth 2 (two) times daily before meals.      ibuprofen (ADVIL,MOTRIN) 800 MG tablet Take 1 tablet (800 mg total) by mouth every 8 (eight) hours as needed for Pain. 20 tablet 0    LORazepam (ATIVAN) 2 MG Tab Take 0.5 tablets by mouth 2 (two) times daily as needed for Anxiety.      metFORMIN (GLUCOPHAGE) 1000 MG tablet Take 1,000 mg by mouth daily with breakfast.       pantoprazole (PROTONIX) 40 MG tablet Take 1 tablet (40 mg total) by mouth before breakfast. 90 tablet 0    simvastatin (ZOCOR) 40 MG tablet Take 40 mg by mouth every evening.      zonisamide (ZONEGRAN) 100 MG Cap one @ bedtime X3 days then 2 @ bedtime X3 days then 3 @ bedtime X3 days then 4 @ bedtime to follow.Stay at the most comfortable dose. 120 capsule 2     No current facility-administered medications for this visit.        REVIEW OF SYSTEMS:    GENERAL:  No weight loss, malaise or fevers.  HEENT:  Negative for frequent or significant headaches.  NECK:  Negative for lumps, goiter, pain and significant neck swelling.  RESPIRATORY:  Negative for cough, wheezing or shortness of breath.  CARDIOVASCULAR:  Negative for chest pain, leg swelling or palpitations. Hypertension.  GI:  Negative for abdominal discomfort, blood in stools or black stools or change in bowel habits.  MUSCULOSKELETAL:  See HPI.  SKIN:  Negative for  "lesions, rash, and itching.  PSYCH:  Negative for sleep disturbance, mood disorder and recent psychosocial stressors.  HEMATOLOGY/LYMPHOLOGY:  Negative for prolonged bleeding, bruising easily or swollen nodes.  NEURO:   No history of headaches, syncope, paralysis, seizures or tremors.  ENDO: Diabetes.  All other reviewed and negative other than HPI.    Past Medical History:  Past Medical History:   Diagnosis Date    Arthritis     Diabetes mellitus     Hypertension        Past Surgical History:  Past Surgical History:   Procedure Laterality Date     SECTION      CHOLECYSTECTOMY      HYSTERECTOMY         Family History:  No family history on file.    Social History:  Social History     Social History    Marital status:      Spouse name: N/A    Number of children: N/A    Years of education: N/A     Social History Main Topics    Smoking status: Never Smoker    Smokeless tobacco: Never Used    Alcohol use No    Drug use: No    Sexual activity: Not on file     Other Topics Concern    Not on file     Social History Narrative    No narrative on file       OBJECTIVE:    BP (!) 165/79   Pulse 66   Temp 98.9 °F (37.2 °C) (Oral)   Resp 18   Ht 5' 4" (1.626 m)   Wt 88 kg (194 lb)   BMI 33.30 kg/m²     PHYSICAL EXAMINATION:    General appearance: Well appearing, in no acute distress, alert and oriented x3.  Psych:  Mood and affect appropriate.  Skin: Skin color, texture, turgor normal, no rashes or lesions, in both upper and lower body.  Head/face:  Atraumatic, normocephalic. No palpable lymph nodes  Cor: RRR  Pulm: CTA  GI: Abdomen soft and non-tender.  Back: Straight leg raising in the sitting and supine positions is negative to radicular pain. No pain with palpation to lumbar facet joints.  Limited flexion and extension with pain.  Positive facet loading bilaterally, L>R.  There is pain with palpation to left SI joint.  TOBY is negative.  Extremities: Peripheral joint ROM is full and pain " free without obvious instability or laxity in all four extremities. No deformities, edema, or skin discoloration. Good capillary refill.  Musculoskeletal: Pain with external rotation of left hip.  Beltran's is negative.  No atrophy or tone abnormalities are noted.  Neuro: Bilateral upper and lower extremity coordination and muscle stretch reflexes are physiologic and symmetric.  Plantar response are downgoing. Decreased sensation at left L4 distribution.  Gait: Antalgic- ambulates without assistance.    ASSESSMENT: 67 y.o. year old female with back and leg pain, consistent with the following diagnoses:     1. Lumbar radiculopathy     2. Osteoarthritis of spine with radiculopathy, lumbar region     3. Chronic pain syndrome           PLAN:     - Previous imaging was reviewed and discussed with the patient today.    - I will schedule the patient for bilateral L4-5 and L5-S1 facet joint injections.  Consider RFA in the future.  The procedure, risks, benefits and options were discussed with patient. There are no contraindications to the procedure. The patient expressed understanding and agreed to proceed.  Consent obtained today.    - If limited benefit, consider neurosurgical consult.    - Continue Zonegran at current dose (400 mg at bedtime).    - The patient will continue a home exercise routine to help with pain and strengthening.      - RTC 2 weeks after procedure.    - Counseled patient regarding the importance of constant sleeping habits and physical therapy.      The above plan and management options were discussed at length with patient. Patient is in agreement with the above and verbalized understanding.    Renetta Steele  05/16/2018

## 2018-05-21 RX ORDER — LORAZEPAM 2 MG/1
1 TABLET ORAL 2 TIMES DAILY PRN
OUTPATIENT
Start: 2018-05-21

## 2018-05-21 NOTE — TELEPHONE ENCOUNTER
----- Message from Nany Villanueva sent at 5/21/2018  3:31 PM CDT -----  Contact: [phone]  Pt is requesting a refill on Lorazepam. [facility]s call  [facility] [phone]. Thanks.......Jeanine

## 2018-05-28 ENCOUNTER — SURGERY (OUTPATIENT)
Age: 68
End: 2018-05-28

## 2018-05-28 ENCOUNTER — HOSPITAL ENCOUNTER (OUTPATIENT)
Facility: OTHER | Age: 68
Discharge: HOME | End: 2018-05-28
Attending: ANESTHESIOLOGY | Admitting: ANESTHESIOLOGY
Payer: MEDICARE

## 2018-05-28 VITALS
RESPIRATION RATE: 18 BRPM | SYSTOLIC BLOOD PRESSURE: 143 MMHG | DIASTOLIC BLOOD PRESSURE: 70 MMHG | HEIGHT: 64 IN | HEART RATE: 62 BPM | BODY MASS INDEX: 33.12 KG/M2 | TEMPERATURE: 98 F | OXYGEN SATURATION: 93 % | WEIGHT: 194 LBS

## 2018-05-28 DIAGNOSIS — M47.816 LUMBAR SPONDYLOSIS: Primary | ICD-10-CM

## 2018-05-28 DIAGNOSIS — G89.29 CHRONIC PAIN: ICD-10-CM

## 2018-05-28 LAB — POCT GLUCOSE: 123 MG/DL (ref 70–110)

## 2018-05-28 PROCEDURE — 25500020 PHARM REV CODE 255: Performed by: ANESTHESIOLOGY

## 2018-05-28 PROCEDURE — 64493 INJ PARAVERT F JNT L/S 1 LEV: CPT | Mod: 50,,, | Performed by: ANESTHESIOLOGY

## 2018-05-28 PROCEDURE — 99152 MOD SED SAME PHYS/QHP 5/>YRS: CPT | Mod: ,,, | Performed by: ANESTHESIOLOGY

## 2018-05-28 PROCEDURE — 82947 ASSAY GLUCOSE BLOOD QUANT: CPT | Performed by: ANESTHESIOLOGY

## 2018-05-28 PROCEDURE — 25000003 PHARM REV CODE 250: Performed by: STUDENT IN AN ORGANIZED HEALTH CARE EDUCATION/TRAINING PROGRAM

## 2018-05-28 PROCEDURE — 25000003 PHARM REV CODE 250: Performed by: ANESTHESIOLOGY

## 2018-05-28 PROCEDURE — 63600175 PHARM REV CODE 636 W HCPCS: Performed by: ANESTHESIOLOGY

## 2018-05-28 PROCEDURE — 64494 INJ PARAVERT F JNT L/S 2 LEV: CPT | Mod: 50 | Performed by: ANESTHESIOLOGY

## 2018-05-28 PROCEDURE — 64493 INJ PARAVERT F JNT L/S 1 LEV: CPT | Mod: 50 | Performed by: ANESTHESIOLOGY

## 2018-05-28 PROCEDURE — 64494 INJ PARAVERT F JNT L/S 2 LEV: CPT | Mod: 50,,, | Performed by: ANESTHESIOLOGY

## 2018-05-28 RX ORDER — TRIAMCINOLONE ACETONIDE 40 MG/ML
INJECTION, SUSPENSION INTRA-ARTICULAR; INTRAMUSCULAR
Status: DISCONTINUED | OUTPATIENT
Start: 2018-05-28 | End: 2018-05-28 | Stop reason: HOSPADM

## 2018-05-28 RX ORDER — LIDOCAINE HYDROCHLORIDE 10 MG/ML
INJECTION INFILTRATION; PERINEURAL
Status: DISCONTINUED | OUTPATIENT
Start: 2018-05-28 | End: 2018-05-28 | Stop reason: HOSPADM

## 2018-05-28 RX ORDER — FENTANYL CITRATE 50 UG/ML
INJECTION, SOLUTION INTRAMUSCULAR; INTRAVENOUS
Status: DISCONTINUED | OUTPATIENT
Start: 2018-05-28 | End: 2018-05-28 | Stop reason: HOSPADM

## 2018-05-28 RX ORDER — MIDAZOLAM HYDROCHLORIDE 1 MG/ML
INJECTION INTRAMUSCULAR; INTRAVENOUS
Status: DISCONTINUED | OUTPATIENT
Start: 2018-05-28 | End: 2018-05-28 | Stop reason: HOSPADM

## 2018-05-28 RX ORDER — BUPIVACAINE HYDROCHLORIDE 2.5 MG/ML
INJECTION, SOLUTION EPIDURAL; INFILTRATION; INTRACAUDAL
Status: DISCONTINUED | OUTPATIENT
Start: 2018-05-28 | End: 2018-05-28 | Stop reason: HOSPADM

## 2018-05-28 RX ORDER — SODIUM CHLORIDE 9 MG/ML
500 INJECTION, SOLUTION INTRAVENOUS CONTINUOUS
Status: DISCONTINUED | OUTPATIENT
Start: 2018-05-28 | End: 2018-05-28 | Stop reason: HOSPADM

## 2018-05-28 RX ADMIN — SODIUM CHLORIDE 500 ML: 0.9 INJECTION, SOLUTION INTRAVENOUS at 03:05

## 2018-05-28 RX ADMIN — LIDOCAINE HYDROCHLORIDE 10 ML: 10 INJECTION, SOLUTION INFILTRATION; PERINEURAL at 03:05

## 2018-05-28 RX ADMIN — FENTANYL CITRATE 25 MCG: 50 INJECTION, SOLUTION INTRAMUSCULAR; INTRAVENOUS at 03:05

## 2018-05-28 RX ADMIN — TRIAMCINOLONE ACETONIDE 40 MG: 40 INJECTION, SUSPENSION INTRA-ARTICULAR; INTRAMUSCULAR at 03:05

## 2018-05-28 RX ADMIN — BUPIVACAINE HYDROCHLORIDE 10 ML: 2.5 INJECTION, SOLUTION EPIDURAL; INFILTRATION; INTRACAUDAL; PERINEURAL at 03:05

## 2018-05-28 RX ADMIN — IOHEXOL 5 ML: 300 INJECTION, SOLUTION INTRAVENOUS at 03:05

## 2018-05-28 RX ADMIN — MIDAZOLAM HYDROCHLORIDE 2 MG: 1 INJECTION, SOLUTION INTRAMUSCULAR; INTRAVENOUS at 03:05

## 2018-05-28 NOTE — DISCHARGE INSTRUCTIONS
Adult Procedural Sedation Instructions    Recovery After Procedural Sedation (Adult)  You have been given medicine by vein to make you sleep during your surgery. This may have included both a pain medicine and sleeping medicine. Most of the effects have worn off. But you may still have some drowsiness for the next 6 to 8 hours.  Home care  Follow these guidelines when you get home:  · For the next 8 hours, you should be watched by a responsible adult. This person should make sure your condition is not getting worse.  · Don't drink any alcohol for the next 24 hours.  · Don't drive, operate dangerous machinery, or make important business or personal decisions during the next 24 hours.  Note: Your healthcare provider may tell you not to take any medicine by mouth for pain or sleep in the next 4 hours. These medicines may react with the medicines you were given in the hospital. This could cause a much stronger response than usual.  Follow-up care  Follow up with your healthcare provider if you are not alert and back to your usual level of activity within 12 hours.  When to seek medical advice  Call your healthcare provider right away if any of these occur:  · Drowsiness gets worse  · Weakness or dizziness gets worse  · Repeated vomiting  · You can't be awakened   Date Last Reviewed: 10/18/2016  © 9395-4188 [employer]. [address] [place] [address], [place] [address]. All rights reserved. This information is not intended as a substitute for professional medical care. Always follow your healthcare professional's instructions.    Home Care Instructions Pain Management:    1. DIET:   You may resume your normal diet today.   2. BATHING:   You may shower with luke warm water.  3. DRESSING:   You may remove your bandage today.   4. ACTIVITY LEVEL:   You may resume your normal activities 24 hrs after your procedure.  5. MEDICATIONS:   You may resume your normal medications today.   6. SPECIAL INSTRUCTIONS:   No heat to  the injection site for 24 hrs including, bath or shower, heating pad, moist heat, or hot tubs.    Use ice pack to injection site for any pain or discomfort.  Apply ice packs for 20 minute intervals as needed.   If you have received any sedatives by mouth today you may not drive for 12 hours.    If you have received any sedation through your IV, you may not drive for 24 hrs.     PLEASE CALL YOUR DOCTOR IF:  1. Redness or swelling around the injection site.  2. Fever of 101 degrees  3. Drainage (pus) from the injection site.  4. For any continuous bleeding (some dried blood over the incision is normal.)    FOR EMERGENCIES:   If any unusual problems or difficulties occur during clinic hours, call [phone] or [phone].     Thank you for allowing us to care for you today. You may receive a survey about the care we provided. Your feedback is valuable and helps us provide excellent care throughout the community.

## 2018-05-28 NOTE — OP NOTE
Thoracic/Lumbar Facet Joint Injection Under Fluoroscopy  Time-out taken to identify patient and procedure side prior to starting the procedure.            I attest that I have reviewed the patient's home medications prior to the procedure and no contraindication have been identified. I  re-evaluated the patient after the patient was positioned for the procedure in the procedure room immediately before the procedural time-out. The vital signs are current and represent the current state of the patient which has not significantly changed since the preprocedure assessment.   Date of Service: 05/28/2018    PCP: Yessy Keith MD        PROCEDURE:  bilateral facet joint injection at L4/5, L5/S1 under fluoroscopy.    REASON FOR PROCEDURE: Lumbar spondylosis [M47.816]  1. Lumbar spondylosis    2. Chronic pain      POSTOP DIAGNOSIS: Lumbar spondylosis [M47.816]  1. Lumbar spondylosis    2. Chronic pain      PHYSICIAN: Shaq Silverio MD  ASSISTANTS: Bethel Daniels MD; Enrique Dietrich DO    MEDICATIONS INJECTED:  0.5ml Kenalog 40mg and Bupivacaine 0.25% 10ml. Injected 1.5ml  per level.  LOCAL ANESTHETIC USED:   Xylocaine 1% 9ml with Sodium Bicarbonate 1ml. 3ml per site.  SEDATION MEDICATIONS: Versed 2 mg IV; Fentanyl 25 mcg IV    ESTIMATED BLOOD LOSS:  None.    COMPLICATIONS:  None.    TECHNIQUE:   Lying in a prone position, the patient was prepped and draped in the usual sterile fashion using ChloraPrep and fenestrated drape.  The level was determined under fluoroscopic guidance.  Local anesthetic was given by going down to the hub of the 27-gauge 1.25in needle and raising a wheel.  The 3.5in 22-gauge needle was introduced into the >>facet joint.  Negative pressure applied to make sure that there was no intravascular placement.  Omnipaque was injected to confirm placement.  It was also done to confirm that there was no vascular runoff.  Medication was then injected slowly.  The patient tolerated the procedure well.     PAIN BEFORE  THE PROCEDURE:  8/10.    PAIN AFTER THE PROCEDURE: 0/10.    The patient was monitored after the procedure.  Patient was given post procedure and discharge instructions to follow at home.  We will see the patient back in two weeks or the patient may call to inform of status. The patient was discharged in a stable condition

## 2018-05-28 NOTE — DISCHARGE SUMMARY
Discharge Diagnosis:Lumbar spondylosis [M47.816]  Condition on Discharge: Stable.  Diet on Discharge: Same as before.  Activity: as per instruction sheet.  Discharge to: Home with a responsible adult.  Follow up: 2-4 weeks &/or as per Discharge instructions

## 2018-05-28 NOTE — INTERVAL H&P NOTE
HPI  Patient is here today for planned Bilateral L4/5 and L5/S1 Facet injections.  Denies recent fever, chills, nausea/vomiting, new onset numbness/weakness in BLE, saddle anesthesia, new onset bowel/bladder incontinence.    Past Medical History:   Diagnosis Date    Arthritis     Diabetes mellitus     Hypertension        Past Surgical History:   Procedure Laterality Date     SECTION      CHOLECYSTECTOMY      HYSTERECTOMY         Review of patient's allergies indicates:   Allergen Reactions    Aspirin Other (See Comments)     Has been told not to take it due to bariatric surgery       Current Facility-Administered Medications   Medication Dose Route Frequency Provider Last Rate Last Dose    0.9%  NaCl infusion  500 mL Intravenous Continuous Bethel Daniels MD           History reviewed. No pertinent family history.    Social History     Social History    Marital status:      Spouse name: N/A    Number of children: N/A    Years of education: N/A     Occupational History    Not on file.     Social History Main Topics    Smoking status: Never Smoker    Smokeless tobacco: Never Used    Alcohol use No    Drug use: No    Sexual activity: Not on file     Other Topics Concern    Not on file     Social History Narrative    No narrative on file         ROS negative except pain complaints in HPI    OBJECTIVE:    Breastfeeding? No     PHYSICAL EXAMINATION:    GENERAL: Well appearing, in no acute distress, alert and oriented x3.  PSYCH:  Mood and affect appropriate.  SKIN: Skin color, texture, turgor normal, no rashes or lesions.  CV: distal pulses intact  PULM: No evidence of respiratory difficulty, symmetric chest rise. Clear to auscultation.  NEURO: Cranial nerves grossly intact.    Plan:    Proceed with Bilateral L4/5 and L5/S1 Facet injections    Bethel Daniels  2018

## 2018-05-30 ENCOUNTER — LAB VISIT (OUTPATIENT)
Dept: LAB | Facility: HOSPITAL | Age: 68
End: 2018-05-30
Attending: FAMILY MEDICINE
Payer: MEDICARE

## 2018-05-30 ENCOUNTER — OFFICE VISIT (OUTPATIENT)
Dept: FAMILY MEDICINE | Facility: CLINIC | Age: 68
End: 2018-05-30
Payer: MEDICARE

## 2018-05-30 VITALS
TEMPERATURE: 98 F | BODY MASS INDEX: 32.03 KG/M2 | DIASTOLIC BLOOD PRESSURE: 82 MMHG | HEIGHT: 64 IN | SYSTOLIC BLOOD PRESSURE: 140 MMHG | HEART RATE: 71 BPM | OXYGEN SATURATION: 95 % | WEIGHT: 187.63 LBS

## 2018-05-30 DIAGNOSIS — Z11.59 NEED FOR HEPATITIS C SCREENING TEST: ICD-10-CM

## 2018-05-30 DIAGNOSIS — Z12.31 ENCOUNTER FOR SCREENING MAMMOGRAM FOR BREAST CANCER: ICD-10-CM

## 2018-05-30 DIAGNOSIS — F43.23 ADJUSTMENT REACTION WITH ANXIETY AND DEPRESSION: ICD-10-CM

## 2018-05-30 DIAGNOSIS — I10 ESSENTIAL HYPERTENSION: ICD-10-CM

## 2018-05-30 DIAGNOSIS — E11.9 TYPE 2 DIABETES MELLITUS WITHOUT COMPLICATION, WITHOUT LONG-TERM CURRENT USE OF INSULIN: ICD-10-CM

## 2018-05-30 DIAGNOSIS — Z23 NEED FOR VACCINATION AGAINST STREPTOCOCCUS PNEUMONIAE USING PNEUMOCOCCAL CONJUGATE VACCINE 13: ICD-10-CM

## 2018-05-30 DIAGNOSIS — E11.9 TYPE 2 DIABETES MELLITUS WITHOUT COMPLICATION, WITHOUT LONG-TERM CURRENT USE OF INSULIN: Primary | ICD-10-CM

## 2018-05-30 LAB
ALBUMIN SERPL BCP-MCNC: 4 G/DL
ALP SERPL-CCNC: 95 U/L
ALT SERPL W/O P-5'-P-CCNC: 28 U/L
ANION GAP SERPL CALC-SCNC: 10 MMOL/L
AST SERPL-CCNC: 23 U/L
BILIRUB SERPL-MCNC: 0.2 MG/DL
BUN SERPL-MCNC: 15 MG/DL
CALCIUM SERPL-MCNC: 10.4 MG/DL
CHLORIDE SERPL-SCNC: 107 MMOL/L
CHOLEST SERPL-MCNC: 215 MG/DL
CHOLEST/HDLC SERPL: 3.6 {RATIO}
CO2 SERPL-SCNC: 24 MMOL/L
CREAT SERPL-MCNC: 1 MG/DL
EST. GFR  (AFRICAN AMERICAN): >60 ML/MIN/1.73 M^2
EST. GFR  (NON AFRICAN AMERICAN): 58 ML/MIN/1.73 M^2
GLUCOSE SERPL-MCNC: 113 MG/DL
HDLC SERPL-MCNC: 60 MG/DL
HDLC SERPL: 27.9 %
LDLC SERPL CALC-MCNC: 116.4 MG/DL
NONHDLC SERPL-MCNC: 155 MG/DL
POTASSIUM SERPL-SCNC: 4.6 MMOL/L
PROT SERPL-MCNC: 8.4 G/DL
SODIUM SERPL-SCNC: 141 MMOL/L
TRIGL SERPL-MCNC: 193 MG/DL
TSH SERPL DL<=0.005 MIU/L-ACNC: 2.27 UIU/ML

## 2018-05-30 PROCEDURE — 90670 PCV13 VACCINE IM: CPT | Mod: S$GLB,,, | Performed by: FAMILY MEDICINE

## 2018-05-30 PROCEDURE — 99214 OFFICE O/P EST MOD 30 MIN: CPT | Mod: S$GLB,,, | Performed by: FAMILY MEDICINE

## 2018-05-30 PROCEDURE — 3079F DIAST BP 80-89 MM HG: CPT | Mod: CPTII,S$GLB,, | Performed by: FAMILY MEDICINE

## 2018-05-30 PROCEDURE — 83036 HEMOGLOBIN GLYCOSYLATED A1C: CPT

## 2018-05-30 PROCEDURE — 99999 PR PBB SHADOW E&M-EST. PATIENT-LVL IV: CPT | Mod: PBBFAC,,, | Performed by: FAMILY MEDICINE

## 2018-05-30 PROCEDURE — 36415 COLL VENOUS BLD VENIPUNCTURE: CPT | Mod: PN

## 2018-05-30 PROCEDURE — 80053 COMPREHEN METABOLIC PANEL: CPT

## 2018-05-30 PROCEDURE — G0009 ADMIN PNEUMOCOCCAL VACCINE: HCPCS | Mod: S$GLB,,, | Performed by: FAMILY MEDICINE

## 2018-05-30 PROCEDURE — 80061 LIPID PANEL: CPT

## 2018-05-30 PROCEDURE — 84443 ASSAY THYROID STIM HORMONE: CPT

## 2018-05-30 PROCEDURE — 86803 HEPATITIS C AB TEST: CPT

## 2018-05-30 PROCEDURE — 3077F SYST BP >= 140 MM HG: CPT | Mod: CPTII,S$GLB,, | Performed by: FAMILY MEDICINE

## 2018-05-30 RX ORDER — LOSARTAN POTASSIUM 25 MG/1
25 TABLET ORAL DAILY
Qty: 90 TABLET | Refills: 3 | Status: SHIPPED | OUTPATIENT
Start: 2018-05-30 | End: 2019-05-14 | Stop reason: SDUPTHER

## 2018-05-30 RX ORDER — INSULIN PUMP SYRINGE, 3 ML
EACH MISCELLANEOUS
Qty: 1 EACH | Refills: 0 | Status: SHIPPED | OUTPATIENT
Start: 2018-05-30 | End: 2021-05-13 | Stop reason: SDUPTHER

## 2018-05-30 RX ORDER — TRAZODONE HYDROCHLORIDE 50 MG/1
TABLET ORAL
COMMUNITY
Start: 2018-05-19 | End: 2018-09-11 | Stop reason: SDUPTHER

## 2018-05-30 RX ORDER — LANCETS
1 EACH MISCELLANEOUS 2 TIMES DAILY WITH MEALS
Qty: 100 EACH | Refills: 11 | Status: SHIPPED | OUTPATIENT
Start: 2018-05-30 | End: 2019-05-13

## 2018-05-30 RX ORDER — LANCING DEVICE
1 EACH MISCELLANEOUS 2 TIMES DAILY WITH MEALS
Qty: 1 EACH | Refills: 0 | Status: SHIPPED | OUTPATIENT
Start: 2018-05-30 | End: 2019-05-13

## 2018-05-30 NOTE — PROGRESS NOTES
Chief Complaint   Patient presents with    Follow-up       HPI    Faby Luna is 67 y.o. female. The primary encounter diagnosis was Type 2 diabetes mellitus without complication, without long-term current use of insulin. Diagnoses of Essential hypertension, Adjustment reaction with anxiety and depression, Encounter for screening mammogram for breast cancer, Need for vaccination against Streptococcus pneumoniae using pneumococcal conjugate vaccine 13, and Need for hepatitis C screening test were also pertinent to this visit.    67 year old female comes to clinic for follow up exam.  Patient reports the following regarding her medical conditions.    Diabetes - patient takes all medications at daily in the evening.  She denies taking these medications 2 times per day as instructed.  She reports that she is unable to find her glucometer and does not check her glucose levels.  She is not aware of any specific changes to be made to follow a Diabetic diet.   HTN - patient admits that she has had elevated blood pressures due to recent stress.  She also reports eating out more. She does not have a blood pressure cuff at home.  She denies owning a blood pressure cuff for home monitoring.  Adjustment - patient reports some improvement in her housing.  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  She reports being compliant with taking the Lexapro.  Patient inquires about refills of Ativan.    Request record of Colonoscopy - 2018 - [facility]    Review of Systems   Constitutional: Negative for activity change, chills, diaphoresis, fatigue and fever.   Respiratory: Negative for shortness of breath.    Cardiovascular: Negative for chest pain.   Musculoskeletal: Negative for gait problem.   Psychiatric/Behavioral: Positive for decreased concentration, dysphoric mood and sleep disturbance. Negative for suicidal ideas. The patient is nervous/anxious.            Current Outpatient Prescriptions:     atenolol (TENORMIN) 100 MG tablet, Take 100  "mg by mouth once daily., Disp: , Rfl:     escitalopram oxalate (LEXAPRO) 20 MG tablet, Take 1 tablet (20 mg total) by mouth once daily., Disp: 90 tablet, Rfl: 1    furosemide (LASIX) 20 MG tablet, Take 20 mg by mouth 2 (two) times daily., Disp: , Rfl:     glipiZIDE (GLUCOTROL) 10 MG tablet, Take 10 mg by mouth every evening., Disp: , Rfl:     LORazepam (ATIVAN) 2 MG Tab, Take 0.5 tablets by mouth 2 (two) times daily as needed for Anxiety., Disp: , Rfl:     metFORMIN (GLUCOPHAGE) 1000 MG tablet, Take 1,000 mg by mouth every evening., Disp: , Rfl:     pantoprazole (PROTONIX) 40 MG tablet, Take 1 tablet (40 mg total) by mouth before breakfast., Disp: 90 tablet, Rfl: 0    simvastatin (ZOCOR) 40 MG tablet, Take 40 mg by mouth every evening., Disp: , Rfl:     traZODone (DESYREL) 50 MG tablet, , Disp: , Rfl:     zonisamide (ZONEGRAN) 100 MG Cap, one @ bedtime X3 days then 2 @ bedtime X3 days then 3 @ bedtime X3 days then 4 @ bedtime to follow.Stay at the most comfortable dose., Disp: 120 capsule, Rfl: 2    blood sugar diagnostic Strp, 1 strip by Misc.(Non-Drug; Combo Route) route 2 (two) times daily with meals., Disp: 100 strip, Rfl: 11    blood-glucose meter kit, Use as instructed, Disp: 1 each, Rfl: 0    ibuprofen (ADVIL,MOTRIN) 800 MG tablet, Take 1 tablet (800 mg total) by mouth every 8 (eight) hours as needed for Pain., Disp: 20 tablet, Rfl: 0    lancets Misc, 1 lancet by Misc.(Non-Drug; Combo Route) route 2 (two) times daily with meals., Disp: 100 each, Rfl: 11    lancing device Misc, 1 Device by Misc.(Non-Drug; Combo Route) route 2 (two) times daily with meals., Disp: 1 each, Rfl: 0    losartan (COZAAR) 25 MG tablet, Take 1 tablet (25 mg total) by mouth once daily., Disp: 90 tablet, Rfl: 3      Blood pressure (!) 140/82, pulse 71, temperature 98 °F (36.7 °C), temperature source Oral, height 5' 4" (1.626 m), weight 85.1 kg (187 lb 9.8 oz), SpO2 95 %.    Physical Exam   Constitutional: Vital signs are " normal. She appears well-developed.   HENT:   Mouth/Throat: Normal dentition.   Neck: Trachea normal. No thyromegaly present.   Cardiovascular: Normal rate, regular rhythm and intact distal pulses.    No murmur heard.  Pulmonary/Chest: Effort normal. She has no decreased breath sounds. She has no wheezes. She exhibits no deformity.   Musculoskeletal:   Normal gait. No decreased range of motion of major joints.   Neurological: She is not disoriented.   Skin: Skin is intact. Capillary refill takes less than 2 seconds.   Psychiatric: Her speech is normal and behavior is normal. Her mood appears not anxious. She does not exhibit a depressed mood.       Lab Visit on 05/30/2018   Component Date Value Ref Range Status    Cholesterol 05/30/2018 215* 120 - 199 mg/dL Final    Triglycerides 05/30/2018 193* 30 - 150 mg/dL Final    HDL 05/30/2018 60  40 - 75 mg/dL Final    LDL Cholesterol 05/30/2018 116.4  63.0 - 159.0 mg/dL Final    HDL/Chol Ratio 05/30/2018 27.9  20.0 - 50.0 % Final    Total Cholesterol/HDL Ratio 05/30/2018 3.6  2.0 - 5.0 Final    Non-HDL Cholesterol 05/30/2018 155  mg/dL Final    Sodium 05/30/2018 141  136 - 145 mmol/L Final    Potassium 05/30/2018 4.6  3.5 - 5.1 mmol/L Final    Chloride 05/30/2018 107  95 - 110 mmol/L Final    CO2 05/30/2018 24  23 - 29 mmol/L Final    Glucose 05/30/2018 113* 70 - 110 mg/dL Final    BUN, Bld 05/30/2018 15  8 - 23 mg/dL Final    Creatinine 05/30/2018 1.0  0.5 - 1.4 mg/dL Final    Calcium 05/30/2018 10.4  8.7 - 10.5 mg/dL Final    Total Protein 05/30/2018 8.4  6.0 - 8.4 g/dL Final    Albumin 05/30/2018 4.0  3.5 - 5.2 g/dL Final    Total Bilirubin 05/30/2018 0.2  0.1 - 1.0 mg/dL Final    Alkaline Phosphatase 05/30/2018 95  55 - 135 U/L Final    AST 05/30/2018 23  10 - 40 U/L Final    ALT 05/30/2018 28  10 - 44 U/L Final    Anion Gap 05/30/2018 10  8 - 16 mmol/L Final    eGFR if African American 05/30/2018 >60  >60 mL/min/1.73 m^2 Final    eGFR if non   05/30/2018 58* >60 mL/min/1.73 m^2 Final    TSH 05/30/2018 2.267  0.400 - 4.000 uIU/mL Final   Admission on 05/28/2018, Discharged on 05/28/2018   Component Date Value Ref Range Status    POCT Glucose 05/28/2018 123* 70 - 110 mg/dL Final   Admission on 05/02/2018, Discharged on 05/02/2018   Component Date Value Ref Range Status    POCT Glucose 05/02/2018 125* 70 - 110 mg/dL Final   Lab Visit on 04/24/2018   Component Date Value Ref Range Status    TSH 04/24/2018 1.848  0.400 - 4.000 uIU/mL Final    Free T4 04/24/2018 0.77  0.71 - 1.51 ng/dL Final    Vit D, 25-Hydroxy 04/24/2018 32  30 - 96 ng/mL Final   ]    Assessment:    1. Type 2 diabetes mellitus without complication, without long-term current use of insulin    2. Essential hypertension    3. Adjustment reaction with anxiety and depression    4. Encounter for screening mammogram for breast cancer    5. Need for vaccination against Streptococcus pneumoniae using pneumococcal conjugate vaccine 13    6. Need for hepatitis C screening test          Faby was seen today for follow-up.    Diagnoses and all orders for this visit:    Type 2 diabetes mellitus without complication, without long-term current use of insulin  -     blood-glucose meter kit; Use as instructed  -     blood sugar diagnostic Strp; 1 strip by Misc.(Non-Drug; Combo Route) route 2 (two) times daily with meals.  -     lancing device Misc; 1 Device by Misc.(Non-Drug; Combo Route) route 2 (two) times daily with meals.  -     lancets Misc; 1 lancet by Misc.(Non-Drug; Combo Route) route 2 (two) times daily with meals.  -     Hemoglobin A1c; Future  -     Lipid panel; Future  -     Comprehensive metabolic panel; Future  -     Microalbumin/creatinine urine ratio; Future  -     TSH; Future  - New problem. Last A1c in 2010 noted to be 7.9%. No previous documentation of Diabetes.  - Supplies prescribed. Begin checking 2 times per day. Obtain labs for A1c and co-morbidities.  - Will  discuss starting statin therapy, eye and foot exams at follow up visit. ARB started today.  See plan below.    Essential hypertension  -     losartan (COZAAR) 25 MG tablet; Take 1 tablet (25 mg total) by mouth once daily.  - New problem.  Trend of blood pressure readings over the last year reviewed with patient.  - Start ARB add HCTZ if needed and once normal CrCl confirmed.    Adjustment reaction with anxiety and depression  -     Ambulatory referral to Psychiatry  - Improved. Patient improved on today's exam. Would like to continue Ativan.   - Patient referred to Psychiatry to discuss medication management.   - Patient informed that no further Ativan refills.    Encounter for screening mammogram for breast cancer  -     Mammo Digital Screening Bilateral With CAD; Future    Need for vaccination against Streptococcus pneumoniae using pneumococcal conjugate vaccine 13  -     (In Office Administered) Pneumococcal Conjugate Vaccine (13 Valent) (IM)    Need for hepatitis C screening test  -     Hepatitis C antibody; Future          FOLLOW UP: Follow-up in about 4 weeks (around 6/27/2018) for Follow up. Bone density, eye/foot exam

## 2018-05-30 NOTE — PATIENT INSTRUCTIONS
Eating Out When You Have Diabetes  Eating right is an important part of keeping your blood sugar in your target range. You just need to make healthy choices.    Tips for restaurant meals  When you eat away from home try these tips:  · Try to schedule your dining-out meal at your normal meal time. Make a reservation if possible, so you don't have to wait to eat. If you can't make a reservation, try to arrive at the restaurant at a less-busy time to cut down your wait time. Eat a small fruit or starch snack at your regular mealtime if your restaurant meal is going to be later than usual.   · Call ahead to see if the restaurant can meet your dietary needs if you've never been there before. Or you can go online to see the menu ahead of time.  · Carry some crackers with you in case the restaurant needs you to wait until you can be served.  · Ask how foods are prepared before you order.  · Instead of fried, sautéed, or breaded foods, choose ones that are broiled, steamed, grilled, or baked.  · Ask for sauces, gravies, and dressings on the side.  · Only eat an amount that fits your meal plan. Remember: You can take home the leftovers.  · Save dessert for special occasions. Then choose a small dessert or share one with a friend or family member.  Make healthy choices  Fast food  · Garden salad with light dressing on the side  · Baked potato with vegetables or herbs  · Broiled, roasted, or grilled chicken sandwich  · Sliced turkey or lean roast beef sandwich  Mexican  · Chicken enchilada, without cheese or sour cream   · Small burrito with whole beans and chicken  · Whole beans (not refried) and rice  · Chicken or fish fajitas  Steakhouse  · Grilled or broiled lean cuts of beef  · Baked potato with vegetables or herbs  · Broiled or baked chicken. Dont eat the skin.  · Steamed vegetables  Asian  · Steamed dumplings or potstickers  · Broiled, boiled, or steamed meats or fish  · Sushi or sashimi  · Steamed rice or boiled  noodles. One serving is equal to 1/3 cup.  Date Last Reviewed: 6/1/2016  © 2511-2569 [employer]. [address] [place] [address], [place] [address]. All rights reserved. This information is not intended as a substitute for professional medical care. Always follow your healthcare professional's instructions.        Understanding Carbohydrates, Fats, and Protein  Food is a source of fuel and nourishment for your body. Its also a source of pleasure. Having diabetes doesnt mean you have to eat special foods or give up desserts. Instead, your dietitian can show you how to plan meals to suit your body. To start, learn how different foods affect blood sugar.  Carbohydrates  Carbohydrates are the main source of fuel for the body. Carbohydrates raise blood sugar. Many people think carbohydrates are only found in pasta or bread. But carbohydrates are actually in many kinds of foods:  · Sugars occur naturally in foods such as fruit, milk, honey, and molasses. Sugars can also be added to many foods, from cereals and yogurt to candy and desserts. Sugars raise blood sugar.  · Starches are found in bread, cereals, pasta, and dried beans. Theyre also found in corn, peas, potatoes, yam, acorn squash, and butternut squash. Starches also raise blood sugar.   · Fiber is found in foods such as vegetables, fruits, beans, and whole grains. Unlike other carbs, fiber isnt digested or absorbed. So it doesnt raise blood sugar. In fact, fiber can help keep blood sugar from rising too fast. It also helps keep blood cholesterol at a healthy level.  Did you know?  Even though carbohydrates raise blood sugar, its best to have some in every meal. They are an important part of a healthy diet.   Fat  Fat is an energy source that can be stored until needed. Fat does not raise blood sugar. However, it can raise blood cholesterol, increasing the risk of heart disease. Fat is also high in calories, which can cause weight gain. Not all types  of fat are the same.  More Healthy:  · Monounsaturated fats are mostly found in vegetable oils, such as olive, canola, and peanut oils. They are also found in avocados and some nuts. Monounsaturated fats are healthy for your heart. Thats because they lower LDL (unhealthy) cholesterol.  · Polyunsaturated fats are mostly found in vegetable oils, such as corn, safflower, and soybean oils. They are also found in some seeds, nuts, and fish. Polyunsaturated fats lower LDL (unhealthy) cholesterol. So, choosing them instead of saturated fats is healthy for your heart. Certain unsaturated fats can help lower triglycerides.   Less Healthy:  · Saturated fats are found in animal products, such as meat, poultry, whole milk, lard, and butter. Saturated fats raise LDL cholesterol and are not healthy for your heart.  · Hydrogenated oils and trans fats are formed when vegetable oils are processed into solid fats. They are found in many processed foods. Hydrogenated oils and trans fats raise LDL cholesterol and lower HDL (healthy) cholesterol. They are not healthy for your heart.  Protein  Protein helps the body build and repair muscle and other tissue. Protein has little or no effect on blood sugar. However, many foods that contain protein also contain saturated fat. By choosing low-fat protein sources, you can get the benefits of protein without the extra fat:  · Plant protein is found in dry beans and peas, nuts, and soy products, such as tofu and soymilk. These sources tend to be cholesterol-free and low in saturated fat.  · Animal protein is found in fish, poultry, meat, cheese, milk, and eggs. These contain cholesterol and can be high in saturated fat. Aim for lean, lower-fat choices.  Date Last Reviewed: 3/1/2016  © 3830-9731 [employer]. [address] [place] [address], [place] [address]. All rights reserved. This information is not intended as a substitute for professional medical care. Always follow your healthcare  professional's instructions.        Healthy Meals for Diabetes     A healthcare provider will help you develop a meal plan that fits your needs.   Ask your healthcare team to help you make a meal plan that fits your needs. Your meal plan tells you when to eat your meals and snacks, what kinds of foods to eat, and how much of each food to eat. You dont have to give up all the foods you like. But you do need to follow some guidelines.  Choose healthy carbohydrates  Starches, sugars, and fiber are all types of carbohydrates. Fiber can help lower your cholesterol and triglycerides. Fiber is also healthy for your heart. You should have 20 to 35 grams of total fiber each day. Fiber-rich foods include:  · Whole-grain breads and cereals  · Bulgur wheat  · Brown rice     · Whole-wheat pasta  · Fruits and vegetables  · Dry beans, and peas   Keep track of the amount of carbohydrates you eat. This can help you keep the right balance of physical activity and medicine. The amount of carbohydrates needed will vary for each person. It depends on many things such as your health, the medicines you take, and how active you are. Your healthcare team will help you figure out the right amount of carbohydrates for you. You may start with around 45 to 60 grams of carbohydrates per meal, depending on your situation.   Here are some examples of foods containing about 15 grams of carbohydrates (1 serving of carbohydrates):  · 1/2 cup of canned or frozen fruit  · A small piece of fresh fruit (4 ounces)  · 1 slice of bread  · 1/2 cup of oatmeal  · 1/3 cup of rice  · 4 to 6 crackers  · 1/2 English muffin  · 1/2 cup of black beans  · 1/4 of a large baked potato (3 ounces)  · 2/3 cup of plain fat-free yogurt  · 1 cup of soup  · 1/2 cup of casserole  · 6 chicken nuggets  · 2-inch-square brownie or cake without frosting  · 2 small cookies  · 1/2 cup of ice cream or sherbet  Choose healthy protein foods  Eating protein that is low in fat can help  you control your weight. It also helps keep your heart healthy. Low-fat protein foods include:  · Fish  · Plant proteins, such as dry beans and peas, nuts, and soy products like tofu and soymilk  · Lean meat with all visible fat removed  · Poultry with the skin removed  · Low-fat or nonfat milk, cheese, and yogurt  Limit unhealthy fats and sugar  Saturated and trans fats are unhealthy for your heart. They raise LDL (bad) cholesterol. Fat is also high in calories, so it can make you gain weight. To cut down on unhealthy fats and sugar, limit these foods:  · Butter or margarine  · Palm and palm kernel oils and coconut oil  · Cream  · Cheese  · Momin  · Lunch meats     · Ice cream  · Sweet bakery goods such as pies, muffins, and donuts  · Jams and jellies  · Candy bars  · Regular sodas   How much to eat  The amount of food you eat affects your blood sugar. It also affects your weight. Your healthcare team will tell you how much of each type of food you should eat.  · Use measuring cups and spoons and a food scale to measure serving sizes.  · Learn what a correct serving size looks like on your plate. This will help when you are away from home and cant measure your servings.  · Eat only the number of servings given on your meal plan for each food. Dont take seconds.  · Learn to read food labels. Be sure to look at serving size, total carbohydrates, fiber, calories, sugar, and saturated and trans fats. Look for healthier alternatives to foods that have added sugar.  · Plan ahead for parties so you can still have a good time without going overboard with unhealthy food choices. Set a good example yourself by bringing a healthy dish to pot lucks.   Choose healthy snacks  When it comes to snacks, we usually think about foods with added sugar and fats. But there are many other options for healthier snack choices. Here are a few snack ideas to choose from:  Snacks with less than 5 grams of carbohydrates  · 1 piece of string  cheese  · 3 celery sticks plus 1 tablespoon of peanut butter  · 5 cherry tomatoes plus 1 tablespoon of ranch dressing  · 1 hard-boiled egg  · 1/4 cup of fresh blueberries  ·  5 baby carrots  · 1 cup of light popcorn  · 1/2 cup of sugar-free gelatin  · 15 almonds  Snacks with about 10 to 20 grams of carbohydrates  · 1/3 cup of hummus plus 1 cup of fresh cut nonstarchy vegetables (carrots, green peppers, broccoli, celery, or a combination)  · 1/2 cup of fresh or canned fruit plus 1/4 cup of cottage cheese  · 1/2 cup of tuna salad with 4 crackers  · 2 rice cakes and a tablespoon of peanut butter  · 1 small apple or orange  · 3 cups light popcorn  · 1/2 of a turkey sandwich (1 slice of whole-wheat bread, 2 ounces of turkey, and mustard)  Portion sizes are important to controlling your blood sugar and staying at a healthy weight. Stock up on healthy snack items so you always have them on hand.  When to eat  Your meal plan will likely include breakfast, lunch, dinner, and some snacks.  · Try to eat your meals and snacks at about the same times each day.  · Eat all your meals and snacks. Skipping a meal or snack can make your blood sugar drop too low. It can also cause you to eat too much at the next meal or snack. Then your blood sugar could get too high.  Date Last Reviewed: 7/1/2016  © 8087-5743 [employer]. [address] [place] [address], [place] [address]. All rights reserved. This information is not intended as a substitute for professional medical care. Always follow your healthcare professional's instructions.

## 2018-05-30 NOTE — PROGRESS NOTES
Pt tolerated pneumonia 13 vaccine to left deltoid without difficulty; no adverse reaction noted; VIS given

## 2018-05-31 DIAGNOSIS — Z12.11 COLON CANCER SCREENING: ICD-10-CM

## 2018-05-31 DIAGNOSIS — E11.9 TYPE 2 DIABETES MELLITUS WITHOUT COMPLICATION: ICD-10-CM

## 2018-05-31 LAB
ESTIMATED AVG GLUCOSE: 143 MG/DL
HBA1C MFR BLD HPLC: 6.6 %
HCV AB SERPL QL IA: NEGATIVE

## 2018-06-07 ENCOUNTER — TELEPHONE (OUTPATIENT)
Dept: FAMILY MEDICINE | Facility: CLINIC | Age: 68
End: 2018-06-07

## 2018-06-07 DIAGNOSIS — E11.9 TYPE 2 DIABETES MELLITUS WITHOUT COMPLICATION, WITHOUT LONG-TERM CURRENT USE OF INSULIN: Primary | ICD-10-CM

## 2018-06-07 RX ORDER — ATORVASTATIN CALCIUM 40 MG/1
40 TABLET, FILM COATED ORAL DAILY
Qty: 90 TABLET | Refills: 3 | Status: SHIPPED | OUTPATIENT
Start: 2018-06-07 | End: 2018-10-16 | Stop reason: CLARIF

## 2018-06-07 NOTE — LETTER
June 12, 2018    Faby Luna  [address]  [place] [address]             [facility]  [address] [place] [address]  Phone: [phone] Dear Dustin Luna:    We have been unable to reach you re: your lab results. Please give our office a call at [phone].         Sincerely,        Natalie Gates MA

## 2018-06-07 NOTE — TELEPHONE ENCOUNTER
----- Message from Yessy Keith MD sent at 6/7/2018  6:59 AM CDT -----  Please contact patient and inform that I have reviewed her labs.  Her hepatitis C screen was negative.  Her Diabetes is well controlled A1c was 6.6%.  Her thyroid, kidney and liver function are normal.  Cholesterol is elevated.  Instructions: No change to Diabetes regimen.  Her cholesterol medication should be increased in strength.  Discontinue Zocor (Simvastatin) start Lipitor 40 mg.  This medication has been sent to your pharmacy.  Please schedule follow up at the end of June to review health maintenance measures and recheck blood pressure.

## 2018-06-13 ENCOUNTER — OFFICE VISIT (OUTPATIENT)
Dept: PAIN MEDICINE | Facility: CLINIC | Age: 68
End: 2018-06-13
Payer: MEDICARE

## 2018-06-13 VITALS
HEIGHT: 64 IN | HEART RATE: 74 BPM | WEIGHT: 186.5 LBS | TEMPERATURE: 99 F | DIASTOLIC BLOOD PRESSURE: 65 MMHG | SYSTOLIC BLOOD PRESSURE: 145 MMHG | BODY MASS INDEX: 31.84 KG/M2

## 2018-06-13 DIAGNOSIS — M47.816 LUMBAR SPONDYLOSIS: ICD-10-CM

## 2018-06-13 DIAGNOSIS — M54.16 LUMBAR RADICULOPATHY: Primary | ICD-10-CM

## 2018-06-13 DIAGNOSIS — M47.26 OSTEOARTHRITIS OF SPINE WITH RADICULOPATHY, LUMBAR REGION: ICD-10-CM

## 2018-06-13 DIAGNOSIS — G89.4 CHRONIC PAIN SYNDROME: ICD-10-CM

## 2018-06-13 PROCEDURE — 3077F SYST BP >= 140 MM HG: CPT | Mod: CPTII,S$GLB,, | Performed by: NURSE PRACTITIONER

## 2018-06-13 PROCEDURE — 99999 PR PBB SHADOW E&M-EST. PATIENT-LVL III: CPT | Mod: PBBFAC,,, | Performed by: NURSE PRACTITIONER

## 2018-06-13 PROCEDURE — 3078F DIAST BP <80 MM HG: CPT | Mod: CPTII,S$GLB,, | Performed by: NURSE PRACTITIONER

## 2018-06-13 PROCEDURE — 99213 OFFICE O/P EST LOW 20 MIN: CPT | Mod: S$GLB,,, | Performed by: NURSE PRACTITIONER

## 2018-06-13 RX ORDER — LANCETS 33 GAUGE
EACH MISCELLANEOUS
COMMUNITY
Start: 2018-05-30 | End: 2019-05-13

## 2018-06-13 RX ORDER — BLOOD-GLUCOSE METER
EACH MISCELLANEOUS
COMMUNITY
Start: 2018-05-30 | End: 2019-05-13

## 2018-06-13 RX ORDER — ZONISAMIDE 100 MG/1
CAPSULE ORAL
Qty: 120 CAPSULE | Refills: 2 | Status: SHIPPED | OUTPATIENT
Start: 2018-06-13 | End: 2018-09-29 | Stop reason: SDUPTHER

## 2018-06-13 NOTE — PROGRESS NOTES
Chronic patient Established Note (Follow up visit)      SUBJECTIVE:    Faby Luna presents to the clinic for a follow-up appointment for lower back and lower extremity pain.  She is s/p bilateral L4-5 and L5-S1 facet joint injections on 5/21/18 with no pain relief, not even for a few days.  She continues with pain that starts across the lower back.  It radiates down the side and back of the left leg to the lateral ankle and anterior foot.  Her back pain is greater than her leg pain.  She would like to try another injection.  She has not completed PT in a few years.  She has some benefit with zonegran at night.  Since the last visit, Faby Luna states the pain has been worsening.  She is no longer having any bladder incontinence.  Current pain intensity is 10/10.    Pain Disability Index Review:  Last 3 PDI Scores 6/13/2018 5/16/2018 4/4/2018   Pain Disability Index (PDI) 54 35 51       Pain Medications:  Ibuprofen and Zonegran    Opioid Contract: no     report:  Not applicable    Pain Procedures:   5/2/18 Left L3 and L4 TF ELISE- 20% relief  5/21/18 Bilateral L4-5 and L5-S1 facet joint injections- no relief    Physical Therapy/Home Exercise: no    Imaging:     3/31/18 Lumbar MRI    Narrative     EXAMINATION:  MRI LUMBAR SPINE WITHOUT CONTRAST    CLINICAL HISTORY:  Low back pain, >6wks conservative tx, persistent-progressive sx, surgical candidate; Other spondylosis with radiculopathy, lumbar region    TECHNIQUE:  Multiplanar, multisequence MR images were acquired from the thoracolumbar junction to the sacrum without the administration of contrast.    COMPARISON:  Plain films from 03/27/2018    FINDINGS:  There is grade 1 spondylolisthesis of L4 on L5. The vertebral body heights are well maintained, with no fracture.  No marrow signal abnormality suspicious for an infiltrative process.    The conus medullaris terminates at approximately the mid body of L1.  There is a cyst associated with the upper pole of the right  kidney.  There is disc desiccation noted throughout the lumbar spine with relative sparing of the L5-S1 disc.  Mild disc space narrowing present at the L4-5 level.    L1-L2: Mild diffuse disc bulge resulting in no significant central or neural foraminal canal narrowing.    L2-L3: No significant central canal narrowing.  There is mild narrowing of either neural foraminal canal secondary to disc material.    L3-L4: Disc bulging in the bilateral foraminal regions resulting in no significant central canal narrowing.  Mild-to-moderate bilateral facet arthropathy also noted.  The bilateral neural foraminal canals are moderately narrowed with some mild effacement of either exiting L3 nerve root in the extraforaminal regions bilaterally.    L4-L5:  Grade 1 spondylolisthesis along with moderate to severe bilateral facet arthropathy and ligamentum flavum hypertrophy resulting in at least moderate narrowing of the central canal.  The right neural foraminal canal is mildly to moderately narrowed.  Left neural foraminal canal is moderately to severely narrowed with mild effacement of the exiting L4 nerve root.    L5-S1:  No significant central or neural foraminal canal narrowing noted.  Mild bilateral facet arthropathy noted.   Impression       1. Multilevel degenerative changes of the lumbar spine as detailed above     Lumbar XRAYs 3/27/18    Narrative     EXAMINATION:  XR LUMBAR SPINE AP AND LAT WITH FLEX/EXT    CLINICAL HISTORY:  Low back pain    TECHNIQUE:  AP and lateral views as well as lateral flexion and extension images are performed through the lumbar spine.    COMPARISON:  None    FINDINGS:  X-ray lumbar spine with flexion and extension demonstrates grade 1 spondylolisthesis at L4-5 measuring around 6 mm which does not change significantly with flexion and extension.  The L4-5 disc is narrowed and degenerated.  Other lumbar vertebral discs are maintained.  Vertebral body heights are maintained.  Small anterior spurs  are seen, and there is small posterior osteophyte along the inferior endplate of L4.  There is lumbar facet arthropathy at L4-5 and L5-S1.   Impression       Degenerative changes as above.  Grade 1 anterolisthesis and degenerative disc disease at L4-5.         Allergies:   Review of patient's allergies indicates:   Allergen Reactions    Aspirin Other (See Comments)     Has been told not to take it due to bariatric surgery       Current Medications:   Current Outpatient Prescriptions   Medication Sig Dispense Refill    atenolol (TENORMIN) 100 MG tablet Take 100 mg by mouth once daily.      atorvastatin (LIPITOR) 40 MG tablet Take 1 tablet (40 mg total) by mouth once daily. 90 tablet 3    blood sugar diagnostic Strp 1 strip by Misc.(Non-Drug; Combo Route) route 2 (two) times daily with meals. 100 strip 11    blood-glucose meter kit Use as instructed 1 each 0    escitalopram oxalate (LEXAPRO) 20 MG tablet Take 1 tablet (20 mg total) by mouth once daily. 90 tablet 1    furosemide (LASIX) 20 MG tablet Take 20 mg by mouth 2 (two) times daily.      glipiZIDE (GLUCOTROL) 10 MG tablet Take 10 mg by mouth every evening.      ibuprofen (ADVIL,MOTRIN) 800 MG tablet Take 1 tablet (800 mg total) by mouth every 8 (eight) hours as needed for Pain. 20 tablet 0    lancets Misc 1 lancet by Misc.(Non-Drug; Combo Route) route 2 (two) times daily with meals. 100 each 11    lancing device Misc 1 Device by Misc.(Non-Drug; Combo Route) route 2 (two) times daily with meals. 1 each 0    LORazepam (ATIVAN) 2 MG Tab Take 0.5 tablets by mouth 2 (two) times daily as needed for Anxiety.      losartan (COZAAR) 25 MG tablet Take 1 tablet (25 mg total) by mouth once daily. 90 tablet 3    metFORMIN (GLUCOPHAGE) 1000 MG tablet Take 1,000 mg by mouth every evening.      pantoprazole (PROTONIX) 40 MG tablet Take 1 tablet (40 mg total) by mouth before breakfast. 90 tablet 0    traZODone (DESYREL) 50 MG tablet       zonisamide (ZONEGRAN)  100 MG Cap one @ bedtime X3 days then 2 @ bedtime X3 days then 3 @ bedtime X3 days then 4 @ bedtime to follow.Stay at the most comfortable dose. 120 capsule 2     No current facility-administered medications for this visit.        REVIEW OF SYSTEMS:    GENERAL:  No weight loss, malaise or fevers.  HEENT:  Negative for frequent or significant headaches.  NECK:  Negative for lumps, goiter, pain and significant neck swelling.  RESPIRATORY:  Negative for cough, wheezing or shortness of breath.  CARDIOVASCULAR:  Negative for chest pain, leg swelling or palpitations. Hypertension.  GI:  Negative for abdominal discomfort, blood in stools or black stools or change in bowel habits.  MUSCULOSKELETAL:  See HPI.  SKIN:  Negative for lesions, rash, and itching.  PSYCH:  Negative for sleep disturbance, mood disorder and recent psychosocial stressors.  HEMATOLOGY/LYMPHOLOGY:  Negative for prolonged bleeding, bruising easily or swollen nodes.  NEURO:   No history of headaches, syncope, paralysis, seizures or tremors.  ENDO: Diabetes.  All other reviewed and negative other than HPI.    Past Medical History:  Past Medical History:   Diagnosis Date    Arthritis     Diabetes mellitus     Hypertension        Past Surgical History:  Past Surgical History:   Procedure Laterality Date     SECTION      CHOLECYSTECTOMY      HYSTERECTOMY      INJECTION OF FACET JOINT Bilateral 2018    Procedure: INJECTION-FACET;  Surgeon: Shaq Silverio MD;  Location: [facility];  Service: Pain Management;  Laterality: Bilateral;  LUMBAR BILATERAL L4-L5 AND L5-S1 FACET STEROID INJECTION  [id number]    W/ SEDATION        Family History:  No family history on file.    Social History:  Social History     Social History    Marital status:      Spouse name: N/A    Number of children: N/A    Years of education: N/A     Social History Main Topics    Smoking status: Never Smoker    Smokeless tobacco: Never Used    Alcohol use No     "Drug use: No    Sexual activity: Not Asked     Other Topics Concern    None     Social History Narrative    None       OBJECTIVE:    BP (!) 145/65   Pulse 74   Temp 99.3 °F (37.4 °C)   Ht 5' 4" (1.626 m)   Wt 84.6 kg (186 lb 8.2 oz)   BMI 32.01 kg/m²     PHYSICAL EXAMINATION:    General appearance: Well appearing, in no acute distress, alert and oriented x3.  Psych:  Mood and affect appropriate.  Skin: Skin color, texture, turgor normal, no rashes or lesions, in both upper and lower body.  Head/face:  Atraumatic, normocephalic. No palpable lymph nodes  Cor: RRR  Pulm: CTA  GI: Abdomen soft and non-tender.  Back: Straight leg raising in the sitting and supine positions is negative to radicular pain. No pain with palpation to lumbar facet joints.  Limited flexion and extension with pain.  Positive facet loading bilaterally, L>R.  There is pain with palpation to bilateral SI joints.  TOBY is negative.  Extremities: Peripheral joint ROM is full and pain free without obvious instability or laxity in all four extremities. No deformities, edema, or skin discoloration. Good capillary refill.  Musculoskeletal: Hip maneuvers are negative.  Beltran's is negative.  No atrophy or tone abnormalities are noted.  Neuro: Bilateral upper and lower extremity coordination and muscle stretch reflexes are physiologic and symmetric.  Plantar response are downgoing.  Decreased sensation at left L4 distribution.  Gait: Antalgic- ambulates without assistance.    ASSESSMENT: 67 y.o. year old female with back and leg pain, consistent with the following diagnoses:     1. Lumbar radiculopathy  Ambulatory consult to Physical Therapy   2. Lumbar spondylosis  Ambulatory consult to Physical Therapy   3. Chronic pain syndrome  Ambulatory consult to Physical Therapy   4. Osteoarthritis of spine with radiculopathy, lumbar region  Ambulatory consult to Physical Therapy    zonisamide (ZONEGRAN) 100 MG Cap         PLAN:     - Previous imaging was " reviewed and discussed with the patient today.    - Schedule for L4/5 IL ELISE.  She is aware that this will be the last injection for some time.  Her sugars have been running around 80 in the morning, per her report.  The procedure, risks, benefits and options were discussed with patient. There are no contraindications to the procedure. The patient expressed understanding and agreed to proceed.      - Start physical therapy.    - Continue Zonegran at current dose (400 mg at bedtime).    - The patient will continue a home exercise routine to help with pain and strengthening.      - If limited benefit, will refer to neurosurgery.  She declined at this time.    - RTC 2 weeks after procedure.    - Counseled patient regarding the importance of constant sleeping habits and physical therapy.      The above plan and management options were discussed at length with patient. Patient is in agreement with the above and verbalized understanding.    Renetta Steele  06/13/2018

## 2018-06-14 ENCOUNTER — SURGERY (OUTPATIENT)
Age: 68
End: 2018-06-14

## 2018-06-14 ENCOUNTER — HOSPITAL ENCOUNTER (OUTPATIENT)
Facility: OTHER | Age: 68
Discharge: HOME | End: 2018-06-14
Attending: ANESTHESIOLOGY | Admitting: ANESTHESIOLOGY
Payer: MEDICARE

## 2018-06-14 VITALS
HEIGHT: 64 IN | TEMPERATURE: 98 F | HEART RATE: 68 BPM | SYSTOLIC BLOOD PRESSURE: 136 MMHG | WEIGHT: 185 LBS | BODY MASS INDEX: 31.58 KG/M2 | DIASTOLIC BLOOD PRESSURE: 68 MMHG | OXYGEN SATURATION: 98 % | RESPIRATION RATE: 18 BRPM

## 2018-06-14 DIAGNOSIS — M47.26 OSTEOARTHRITIS OF SPINE WITH RADICULOPATHY, LUMBAR REGION: Primary | ICD-10-CM

## 2018-06-14 DIAGNOSIS — R52 PAIN: ICD-10-CM

## 2018-06-14 LAB — POCT GLUCOSE: 102 MG/DL (ref 70–110)

## 2018-06-14 PROCEDURE — 62323 NJX INTERLAMINAR LMBR/SAC: CPT | Performed by: ANESTHESIOLOGY

## 2018-06-14 PROCEDURE — 63600175 PHARM REV CODE 636 W HCPCS: Performed by: ANESTHESIOLOGY

## 2018-06-14 PROCEDURE — 25000003 PHARM REV CODE 250: Performed by: STUDENT IN AN ORGANIZED HEALTH CARE EDUCATION/TRAINING PROGRAM

## 2018-06-14 PROCEDURE — 82947 ASSAY GLUCOSE BLOOD QUANT: CPT | Performed by: ANESTHESIOLOGY

## 2018-06-14 PROCEDURE — 25500020 PHARM REV CODE 255: Performed by: ANESTHESIOLOGY

## 2018-06-14 PROCEDURE — 62323 NJX INTERLAMINAR LMBR/SAC: CPT | Mod: ,,, | Performed by: ANESTHESIOLOGY

## 2018-06-14 PROCEDURE — 25000003 PHARM REV CODE 250: Performed by: ANESTHESIOLOGY

## 2018-06-14 RX ORDER — DEXAMETHASONE SODIUM PHOSPHATE 100 MG/10ML
INJECTION INTRAMUSCULAR; INTRAVENOUS
Status: DISCONTINUED | OUTPATIENT
Start: 2018-06-14 | End: 2018-06-14 | Stop reason: HOSPADM

## 2018-06-14 RX ORDER — SODIUM CHLORIDE 9 MG/ML
500 INJECTION, SOLUTION INTRAVENOUS CONTINUOUS
Status: DISCONTINUED | OUTPATIENT
Start: 2018-06-14 | End: 2018-06-14 | Stop reason: HOSPADM

## 2018-06-14 RX ORDER — FENTANYL CITRATE 50 UG/ML
INJECTION, SOLUTION INTRAMUSCULAR; INTRAVENOUS
Status: DISCONTINUED | OUTPATIENT
Start: 2018-06-14 | End: 2018-06-14 | Stop reason: HOSPADM

## 2018-06-14 RX ORDER — LIDOCAINE HYDROCHLORIDE 10 MG/ML
INJECTION INFILTRATION; PERINEURAL
Status: DISCONTINUED | OUTPATIENT
Start: 2018-06-14 | End: 2018-06-14 | Stop reason: HOSPADM

## 2018-06-14 RX ORDER — LIDOCAINE HYDROCHLORIDE 10 MG/ML
INJECTION, SOLUTION EPIDURAL; INFILTRATION; INTRACAUDAL; PERINEURAL
Status: DISCONTINUED | OUTPATIENT
Start: 2018-06-14 | End: 2018-06-14 | Stop reason: HOSPADM

## 2018-06-14 RX ORDER — MIDAZOLAM HYDROCHLORIDE 1 MG/ML
INJECTION INTRAMUSCULAR; INTRAVENOUS
Status: DISCONTINUED | OUTPATIENT
Start: 2018-06-14 | End: 2018-06-14 | Stop reason: HOSPADM

## 2018-06-14 RX ADMIN — LIDOCAINE HYDROCHLORIDE 5 ML: 10 INJECTION, SOLUTION EPIDURAL; INFILTRATION; INTRACAUDAL; PERINEURAL at 11:06

## 2018-06-14 RX ADMIN — DEXAMETHASONE SODIUM PHOSPHATE 10 MG: 10 INJECTION INTRAMUSCULAR; INTRAVENOUS at 11:06

## 2018-06-14 RX ADMIN — FENTANYL CITRATE 25 MCG: 50 INJECTION, SOLUTION INTRAMUSCULAR; INTRAVENOUS at 11:06

## 2018-06-14 RX ADMIN — LIDOCAINE HYDROCHLORIDE 10 ML: 10 INJECTION, SOLUTION INFILTRATION; PERINEURAL at 11:06

## 2018-06-14 RX ADMIN — IOHEXOL 3 ML: 300 INJECTION, SOLUTION INTRAVENOUS at 11:06

## 2018-06-14 RX ADMIN — SODIUM CHLORIDE 500 ML: 0.9 INJECTION, SOLUTION INTRAVENOUS at 11:06

## 2018-06-14 RX ADMIN — MIDAZOLAM HYDROCHLORIDE 2 MG: 1 INJECTION, SOLUTION INTRAMUSCULAR; INTRAVENOUS at 11:06

## 2018-06-14 NOTE — DISCHARGE SUMMARY
Discharge Diagnosis:Lumbar radiculopathy [M54.16], lumbar djd with radiculopathy  Condition on Discharge: Stable.  Diet on Discharge: Same as before.  Activity: as per instruction sheet.  Discharge to: Home with a responsible adult.  Follow up: 2-4 weeks &/or as per Discharge instructions

## 2018-06-14 NOTE — INTERVAL H&P NOTE
"HPI  Ms. Luna is a 67 year old female previously seen in clinic who presents today for L4-L5 ILESI. Her pain complaints are unchanged from previous visit. She has no contraindications currently to procedure. She is agreeable to proceed with L4-L5 ILESI.        Past Medical History:   Diagnosis Date    Arthritis     Diabetes mellitus     Hypertension      Past Surgical History:   Procedure Laterality Date     SECTION      CHOLECYSTECTOMY      HYSTERECTOMY      INJECTION OF FACET JOINT Bilateral 2018    Procedure: INJECTION-FACET;  Surgeon: Shaq Silverio MD;  Location: [facility];  Service: Pain Management;  Laterality: Bilateral;  LUMBAR BILATERAL L4-L5 AND L5-S1 FACET STEROID INJECTION  [id number]    W/ SEDATION      Review of patient's allergies indicates:   Allergen Reactions    Aspirin Other (See Comments)     Has been told not to take it due to bariatric surgery        PMHx, PSHx, Allergies, Medications reviewed in epic      ROS negative except pain complaints in HPI    OBJECTIVE:    /63   Pulse 69   Temp 98.3 °F (36.8 °C) (Oral)   Resp 18   Ht 5' 4" (1.626 m)   Wt 83.9 kg (185 lb)   SpO2 98%   Breastfeeding? No   BMI 31.76 kg/m²     PHYSICAL EXAMINATION:    GENERAL: Well appearing, in no acute distress, alert and oriented x3.  PSYCH:  Mood and affect appropriate.  SKIN: Skin color, texture, turgor normal, no rashes or lesions.  CV: RRR with palpation of the radial artery.  PULM: No evidence of respiratory difficulty, symmetric chest rise. Clear to auscultation.  NEURO: Cranial nerves grossly intact.    Plan:    Proceed with procedure as planned: L4-L5 ILESI    Bethel Lange  2018    The patient has been examined and the H&P has been reviewed:    I concur with the findings and no changes have occurred since H&P was written.    Anesthesia/Surgery risks, benefits and alternative options discussed and understood by patient/family.          Active Hospital Problems    " Diagnosis  POA    Pain [R52]  Yes      Resolved Hospital Problems    Diagnosis Date Resolved POA   No resolved problems to display.

## 2018-06-14 NOTE — OP NOTE
Lumbar Interlaminar Epidural Steroid Injection under Fluoroscopic Guidance.  Time-out taken to identify patient and procedure side prior to starting the procedure.   I attest that I have reviewed the patient's home medications prior to the procedure and no contraindication have been identified. I  re-evaluated the patient after the patient was positioned for the procedure in the procedure room immediately before the procedural time-out. The vital signs are current and represent the current state of the patient which has not significantly changed since the preprocedure assessment.                                                               Date of Service: 06/14/2018    PCP: Yessy eKith MD      Procedure:  L4-L5 Interlaminar epidural steroid injection under fluoroscopic guidance.    Reasons for procedure: Lumbar radiculopathy [M54.16]   1. Osteoarthritis of spine with radiculopathy, lumbar region    2. Pain      POSTOP DIAGNOSIS:  Lumbar radiculopathy [M54.16]     1. Osteoarthritis of spine with radiculopathy, lumbar region    2. Pain      Physician: Shaq Silverio MD  ASSISTANTS: Bethel Lange MD    Medications injected: Preservative-free dexamethasone 10mg with 4mL of sterile Xylocaine-MPF 1% and 1ml of sterile preservative-free normal saline.    Local anesthetic injected:    Xylocaine 1% 9ml with Sodium Bicarbonate 1ml.   Sedation Medications: Versed 2 mg, Fentanyl 75 mcg IV    Estimated blood loss:  none.    Complications:  none.    Technique:  With the patient laying in a prone position, the area was prepped and draped in the usual sterile fashion using ChloraPrep and a fenestrated drape.  Local anesthetic was given using a 27-gauge needle by raising a wheal and going down to the hub of the needle.  A 3.5 inch 20-gauge Touhy needle was introduced under fluoroscopic guidance.  It met the lamina of the posterior element. The needle was then hinged above the lamina.  Loss of resistance technique was employed  while advancing the needle.  Once in the desired position, contrast dye Omnipaque was injected to confirm placement and there was no vascular runoff.  Digital subtraction was employed to confirm that there was no vascular runoff.  The medication was then injected slowly.  The patient tolerated the procedure well.      Pain before the procedure: 10/10    Pain after the procedure: 5/10    The patient was monitored after the procedure.   They were given post-procedure and discharge instructions to follow at home.  The patient was discharged in a stable condition.

## 2018-06-14 NOTE — H&P (VIEW-ONLY)
Chronic patient Established Note (Follow up visit)      SUBJECTIVE:    Faby Luna presents to the clinic for a follow-up appointment for lower back and lower extremity pain.  She is s/p bilateral L4-5 and L5-S1 facet joint injections on 5/21/18 with no pain relief, not even for a few days.  She continues with pain that starts across the lower back.  It radiates down the side and back of the left leg to the lateral ankle and anterior foot.  Her back pain is greater than her leg pain.  She would like to try another injection.  She has not completed PT in a few years.  She has some benefit with zonegran at night.  Since the last visit, Faby Luna states the pain has been worsening.  She is no longer having any bladder incontinence.  Current pain intensity is 10/10.    Pain Disability Index Review:  Last 3 PDI Scores 6/13/2018 5/16/2018 4/4/2018   Pain Disability Index (PDI) 54 [phone] 51       Pain Medications:  Ibuprofen and Zonegran    Opioid Contract: no     report:  Not applicable    Pain Procedures:   5/2/18 Left L3 and L4 TF ELISE- 20% relief  5/21/18 Bilateral L4-5 and L5-S1 facet joint injections- no relief    Physical Therapy/Home Exercise: no    Imaging:     3/31/18 Lumbar MRI    Narrative     EXAMINATION:  MRI LUMBAR SPINE WITHOUT CONTRAST    CLINICAL HISTORY:  Low back pain, >6wks conservative tx, persistent-progressive sx, surgical candidate; Other spondylosis with radiculopathy, lumbar region    TECHNIQUE:  Multiplanar, multisequence MR images were acquired from the thoracolumbar junction to the sacrum without the administration of contrast.    COMPARISON:  Plain films from 03/27/2018    FINDINGS:  There is grade 1 spondylolisthesis of L4 on L5. The vertebral body heights are well maintained, with no fracture.  No marrow signal abnormality suspicious for an infiltrative process.    The conus medullaris terminates at approximately the mid body of L1.  There is a cyst associated with the upper pole of the right  kidney.  There is disc desiccation noted throughout the lumbar spine with relative sparing of the L5-S1 disc.  Mild disc space narrowing present at the L4-5 level.    L1-L2: Mild diffuse disc bulge resulting in no significant central or neural foraminal canal narrowing.    L2-L3: No significant central canal narrowing.  There is mild narrowing of either neural foraminal canal secondary to disc material.    L3-L4: Disc bulging in the bilateral foraminal regions resulting in no significant central canal narrowing.  Mild-to-moderate bilateral facet arthropathy also noted.  The bilateral neural foraminal canals are moderately narrowed with some mild effacement of either exiting L3 nerve root in the extraforaminal regions bilaterally.    L4-L5:  Grade 1 spondylolisthesis along with moderate to severe bilateral facet arthropathy and ligamentum flavum hypertrophy resulting in at least moderate narrowing of the central canal.  The right neural foraminal canal is mildly to moderately narrowed.  Left neural foraminal canal is moderately to severely narrowed with mild effacement of the exiting L4 nerve root.    L5-S1:  No significant central or neural foraminal canal narrowing noted.  Mild bilateral facet arthropathy noted.   Impression       1. Multilevel degenerative changes of the lumbar spine as detailed above     Lumbar XRAYs 3/27/18    Narrative     EXAMINATION:  XR LUMBAR SPINE AP AND LAT WITH FLEX/EXT    CLINICAL HISTORY:  Low back pain    TECHNIQUE:  AP and lateral views as well as lateral flexion and extension images are performed through the lumbar spine.    COMPARISON:  None    FINDINGS:  X-ray lumbar spine with flexion and extension demonstrates grade 1 spondylolisthesis at L4-5 measuring around 6 mm which does not change significantly with flexion and extension.  The L4-5 disc is narrowed and degenerated.  Other lumbar vertebral discs are maintained.  Vertebral body heights are maintained.  Small anterior spurs  are seen, and there is small posterior osteophyte along the inferior endplate of L4.  There is lumbar facet arthropathy at L4-5 and L5-S1.   Impression       Degenerative changes as above.  Grade 1 anterolisthesis and degenerative disc disease at L4-5.         Allergies:   Review of patient's allergies indicates:   Allergen Reactions    Aspirin Other (See Comments)     Has been told not to take it due to bariatric surgery       Current Medications:   Current Outpatient Prescriptions   Medication Sig Dispense Refill    atenolol (TENORMIN) 100 MG tablet Take 100 mg by mouth once daily.      atorvastatin (LIPITOR) 40 MG tablet Take 1 tablet (40 mg total) by mouth once daily. 90 tablet 3    blood sugar diagnostic Strp 1 strip by Misc.(Non-Drug; Combo Route) route 2 (two) times daily with meals. 100 strip 11    blood-glucose meter kit Use as instructed 1 each 0    escitalopram oxalate (LEXAPRO) 20 MG tablet Take 1 tablet (20 mg total) by mouth once daily. 90 tablet 1    furosemide (LASIX) 20 MG tablet Take 20 mg by mouth 2 (two) times daily.      glipiZIDE (GLUCOTROL) 10 MG tablet Take 10 mg by mouth every evening.      ibuprofen (ADVIL,MOTRIN) 800 MG tablet Take 1 tablet (800 mg total) by mouth every 8 (eight) hours as needed for Pain. 20 tablet 0    lancets Misc 1 lancet by Misc.(Non-Drug; Combo Route) route 2 (two) times daily with meals. 100 each 11    lancing device Misc 1 Device by Misc.(Non-Drug; Combo Route) route 2 (two) times daily with meals. 1 each 0    LORazepam (ATIVAN) 2 MG Tab Take 0.5 tablets by mouth 2 (two) times daily as needed for Anxiety.      losartan (COZAAR) 25 MG tablet Take 1 tablet (25 mg total) by mouth once daily. 90 tablet 3    metFORMIN (GLUCOPHAGE) 1000 MG tablet Take 1,000 mg by mouth every evening.      pantoprazole (PROTONIX) 40 MG tablet Take 1 tablet (40 mg total) by mouth before breakfast. 90 tablet 0    traZODone (DESYREL) 50 MG tablet       zonisamide (ZONEGRAN)  100 MG Cap one @ bedtime X3 days then 2 @ bedtime X3 days then 3 @ bedtime X3 days then 4 @ bedtime to follow.Stay at the most comfortable dose. 120 capsule 2     No current facility-administered medications for this visit.        REVIEW OF SYSTEMS:    GENERAL:  No weight loss, malaise or fevers.  HEENT:  Negative for frequent or significant headaches.  NECK:  Negative for lumps, goiter, pain and significant neck swelling.  RESPIRATORY:  Negative for cough, wheezing or shortness of breath.  CARDIOVASCULAR:  Negative for chest pain, leg swelling or palpitations. Hypertension.  GI:  Negative for abdominal discomfort, blood in stools or black stools or change in bowel habits.  MUSCULOSKELETAL:  See HPI.  SKIN:  Negative for lesions, rash, and itching.  PSYCH:  Negative for sleep disturbance, mood disorder and recent psychosocial stressors.  HEMATOLOGY/LYMPHOLOGY:  Negative for prolonged bleeding, bruising easily or swollen nodes.  NEURO:   No history of headaches, syncope, paralysis, seizures or tremors.  ENDO: Diabetes.  All other reviewed and negative other than HPI.    Past Medical History:  Past Medical History:   Diagnosis Date    Arthritis     Diabetes mellitus     Hypertension        Past Surgical History:  Past Surgical History:   Procedure Laterality Date     SECTION      CHOLECYSTECTOMY      HYSTERECTOMY      INJECTION OF FACET JOINT Bilateral 2018    Procedure: INJECTION-FACET;  Surgeon: Shaq Silverio MD;  Location: [facility];  Service: Pain Management;  Laterality: Bilateral;  LUMBAR BILATERAL L4-L5 AND L5-S1 FACET STEROID INJECTION  [id number]    W/ SEDATION        Family History:  No family history on file.    Social History:  Social History     Social History    Marital status:      Spouse name: N/A    Number of children: N/A    Years of education: N/A     Social History Main Topics    Smoking status: Never Smoker    Smokeless tobacco: Never Used    Alcohol use No     "Drug use: No    Sexual activity: Not Asked     Other Topics Concern    None     Social History Narrative    None       OBJECTIVE:    BP (!) 145/65   Pulse 74   Temp 99.3 °F (37.4 °C)   Ht 5' 4" (1.626 m)   Wt 84.6 kg (186 lb 8.2 oz)   BMI 32.01 kg/m²     PHYSICAL EXAMINATION:    General appearance: Well appearing, in no acute distress, alert and oriented x3.  Psych:  Mood and affect appropriate.  Skin: Skin color, texture, turgor normal, no rashes or lesions, in both upper and lower body.  Head/face:  Atraumatic, normocephalic. No palpable lymph nodes  Cor: RRR  Pulm: CTA  GI: Abdomen soft and non-tender.  Back: Straight leg raising in the sitting and supine positions is negative to radicular pain. No pain with palpation to lumbar facet joints.  Limited flexion and extension with pain.  Positive facet loading bilaterally, L>R.  There is pain with palpation to bilateral SI joints.  TOBY is negative.  Extremities: Peripheral joint ROM is full and pain free without obvious instability or laxity in all four extremities. No deformities, edema, or skin discoloration. Good capillary refill.  Musculoskeletal: Hip maneuvers are negative.  Beltran's is negative.  No atrophy or tone abnormalities are noted.  Neuro: Bilateral upper and lower extremity coordination and muscle stretch reflexes are physiologic and symmetric.  Plantar response are downgoing.  Decreased sensation at left L4 distribution.  Gait: Antalgic- ambulates without assistance.    ASSESSMENT: 67 y.o. year old female with back and leg pain, consistent with the following diagnoses:     1. Lumbar radiculopathy  Ambulatory consult to Physical Therapy   2. Lumbar spondylosis  Ambulatory consult to Physical Therapy   3. Chronic pain syndrome  Ambulatory consult to Physical Therapy   4. Osteoarthritis of spine with radiculopathy, lumbar region  Ambulatory consult to Physical Therapy    zonisamide (ZONEGRAN) 100 MG Cap         PLAN:     - Previous imaging was " reviewed and discussed with the patient today.    - Schedule for L4/5 IL ELISE.  She is aware that this will be the last injection for some time.  Her sugars have been running around 80 in the morning, per her report.  The procedure, risks, benefits and options were discussed with patient. There are no contraindications to the procedure. The patient expressed understanding and agreed to proceed.      - Start physical therapy.    - Continue Zonegran at current dose (400 mg at bedtime).    - The patient will continue a home exercise routine to help with pain and strengthening.      - If limited benefit, will refer to neurosurgery.  She declined at this time.    - RTC 2 weeks after procedure.    - Counseled patient regarding the importance of constant sleeping habits and physical therapy.      The above plan and management options were discussed at length with patient. Patient is in agreement with the above and verbalized understanding.    Renetta Steele  06/13/2018

## 2018-06-14 NOTE — DISCHARGE INSTRUCTIONS
Home Care Instructions Pain Management:    1. DIET:   You may resume your normal diet today.   2. BATHING:   You may shower with luke warm water.  3. DRESSING:   You may remove your bandage today.   4. ACTIVITY LEVEL:   You may resume your normal activities 24 hrs after your procedure.  5. MEDICATIONS:   You may resume your normal medications today.   6. SPECIAL INSTRUCTIONS:   No heat to the injection site for 24 hrs including, bath or shower, heating pad, moist heat, or hot tubs.    Use ice pack to injection site for any pain or discomfort.  Apply ice packs for 20 minute intervals as needed.   If you have received any sedatives by mouth today you may not drive for 12 hours.    If you have received any sedation through your IV, you may not drive for 24 hrs.     PLEASE CALL YOUR DOCTOR IF:  1. Redness or swelling around the injection site.  2. Fever of 101 degrees  3. Drainage (pus) from the injection site.  4. For any continuous bleeding (some dried blood over the incision is normal.)    FOR EMERGENCIES:   If any unusual problems or difficulties occur during clinic hours, call [phone] or [phone].     Thank you for allowing us to care for you today. You may receive a survey about the care we provided. Your feedback is valuable and helps us provide excellent care throughout the community.   Adult Procedural Sedation Instructions    Recovery After Procedural Sedation (Adult)  You have been given medicine by vein to make you sleep during your surgery. This may have included both a pain medicine and sleeping medicine. Most of the effects have worn off. But you may still have some drowsiness for the next 6 to 8 hours.  Home care  Follow these guidelines when you get home:  · For the next 8 hours, you should be watched by a responsible adult. This person should make sure your condition is not getting worse.  · Don't drink any alcohol for the next 24 hours.  · Don't drive, operate dangerous machinery, or make important  business or personal decisions during the next 24 hours.  Note: Your healthcare provider may tell you not to take any medicine by mouth for pain or sleep in the next 4 hours. These medicines may react with the medicines you were given in the hospital. This could cause a much stronger response than usual.  Follow-up care  Follow up with your healthcare provider if you are not alert and back to your usual level of activity within 12 hours.  When to seek medical advice  Call your healthcare provider right away if any of these occur:  · Drowsiness gets worse  · Weakness or dizziness gets worse  · Repeated vomiting  · You can't be awakened   Date Last Reviewed: 10/18/2016  © 6940-2882 [employer]. [address] [place] [address], [place] [address]. All rights reserved. This information is not intended as a substitute for professional medical care. Always follow your healthcare professional's instructions.

## 2018-06-15 ENCOUNTER — TELEPHONE (OUTPATIENT)
Dept: FAMILY MEDICINE | Facility: CLINIC | Age: 68
End: 2018-06-15

## 2018-06-15 NOTE — TELEPHONE ENCOUNTER
----- Message from Adan Jalloh sent at 6/15/2018 10:01 AM CDT -----  Contact: [phone]/PT  Calling TO speak nurse regarding medication

## 2018-06-15 NOTE — TELEPHONE ENCOUNTER
----- Message from Adan Jalloh sent at 6/15/2018 10:01 AM CDT -----  Contact: [phone]/PT  Calling TO speak nurse regarding medication

## 2018-06-15 NOTE — TELEPHONE ENCOUNTER
Returned pt's call. Scheduled Mammo, Hmg A1C and f/u appt with Dr. Keith.     Pt. Will sign release for Colonoscopy and eye exam when she comes in for her appt and have the rest of her health main after getting orders

## 2018-06-20 ENCOUNTER — HOSPITAL ENCOUNTER (OUTPATIENT)
Dept: RADIOLOGY | Facility: HOSPITAL | Age: 68
Discharge: HOME | End: 2018-06-20
Attending: FAMILY MEDICINE
Payer: MEDICARE

## 2018-06-20 DIAGNOSIS — Z12.31 ENCOUNTER FOR SCREENING MAMMOGRAM FOR BREAST CANCER: ICD-10-CM

## 2018-06-20 PROCEDURE — 77067 SCR MAMMO BI INCL CAD: CPT | Mod: 26,,, | Performed by: RADIOLOGY

## 2018-06-20 PROCEDURE — 77063 BREAST TOMOSYNTHESIS BI: CPT | Mod: 26,,, | Performed by: RADIOLOGY

## 2018-06-20 PROCEDURE — 77067 SCR MAMMO BI INCL CAD: CPT | Mod: TC

## 2018-06-21 ENCOUNTER — TELEPHONE (OUTPATIENT)
Dept: FAMILY MEDICINE | Facility: CLINIC | Age: 68
End: 2018-06-21

## 2018-06-21 NOTE — TELEPHONE ENCOUNTER
----- Message from Yessy Keith MD sent at 6/21/2018  6:48 AM CDT -----  Please contact patient and inform that her mammogram was normal.  She will repeat her screening mammogram in 1 year.

## 2018-06-22 DIAGNOSIS — E11.9 TYPE 2 DIABETES MELLITUS WITHOUT COMPLICATION, UNSPECIFIED WHETHER LONG TERM INSULIN USE: ICD-10-CM

## 2018-07-03 ENCOUNTER — OFFICE VISIT (OUTPATIENT)
Dept: PAIN MEDICINE | Facility: CLINIC | Age: 68
End: 2018-07-03
Attending: ANESTHESIOLOGY
Payer: MEDICARE

## 2018-07-03 VITALS
SYSTOLIC BLOOD PRESSURE: 123 MMHG | HEART RATE: 84 BPM | HEIGHT: 64 IN | TEMPERATURE: 98 F | BODY MASS INDEX: 31.91 KG/M2 | DIASTOLIC BLOOD PRESSURE: 73 MMHG | WEIGHT: 186.94 LBS

## 2018-07-03 DIAGNOSIS — G89.4 CHRONIC PAIN SYNDROME: ICD-10-CM

## 2018-07-03 DIAGNOSIS — M54.9 INTRACTABLE BACK PAIN: ICD-10-CM

## 2018-07-03 DIAGNOSIS — M54.16 LUMBAR RADICULOPATHY: Primary | ICD-10-CM

## 2018-07-03 PROCEDURE — 99999 PR PBB SHADOW E&M-EST. PATIENT-LVL III: CPT | Mod: PBBFAC,,, | Performed by: ANESTHESIOLOGY

## 2018-07-03 PROCEDURE — 99214 OFFICE O/P EST MOD 30 MIN: CPT | Mod: S$GLB,,, | Performed by: ANESTHESIOLOGY

## 2018-07-03 PROCEDURE — 3078F DIAST BP <80 MM HG: CPT | Mod: CPTII,S$GLB,, | Performed by: ANESTHESIOLOGY

## 2018-07-03 PROCEDURE — 3074F SYST BP LT 130 MM HG: CPT | Mod: CPTII,S$GLB,, | Performed by: ANESTHESIOLOGY

## 2018-07-03 NOTE — PROGRESS NOTES
Subjective:      Patient ID: Faby Luna is a 67 y.o. female.    Chief Complaint: Follow-up (2 week)    Referred by: No ref. provider found     Pain Scales  Best: 0/10  Worst: 0/10  Usually: 0/10  Today: 6/10    HPI  Miss Luna returns today 2 weeks s/p ILESI. Continues to have LBP with L leg radiation in L4 dermatomal fashion with only 2 days relief from last injection. Pain continues to be sharp, burning and electric running down L thigh and medial leg to L foot. Worse with activity and also with sleep but improved since starting Zonegran QHS. No s/e. She is to start PT today but states may not be able to participate as pain may limit her. She continues to deny B/B changes, weakness, fevers, recent illness. No CP, SOB, N/V/D.      Interventional Pain History  18 ILESI L4-L5  18 L4/L5 and L5/S1 FJIs  18 L3 and L4 TFESI      Review of Systems   Constitution: Negative.   HENT: Negative.    Cardiovascular: Negative.    Respiratory: Negative.    Endocrine: Negative.    Hematologic/Lymphatic: Negative.    Skin: Negative.    Musculoskeletal: Negative.    Gastrointestinal: Negative.    Genitourinary: Negative.    Neurological: Negative.    Psychiatric/Behavioral: Negative.    Allergic/Immunologic: Negative.      Past Medical History:   Diagnosis Date    Arthritis     Diabetes mellitus     Hypertension        Past Surgical History:   Procedure Laterality Date     SECTION      CHOLECYSTECTOMY      EPIDURAL STEROID INJECTION INTO LUMBAR SPINE N/A 2018    Procedure: INJECTION, STEROID, SPINE, LUMBAR, EPIDURAL;  Surgeon: Shaq Silverio MD;  Location: [facility];  Service: Pain Management;  Laterality: N/A;  LUMBAR L4-L5 INTERLAMINAR ELISE'  [id number]  W/ SEDATION     HYSTERECTOMY      INJECTION OF FACET JOINT Bilateral 2018    Procedure: INJECTION-FACET;  Surgeon: Shaq Silverio MD;  Location: [facility];  Service: Pain Management;  Laterality: Bilateral;  LUMBAR BILATERAL L4-L5 AND L5-S1  FACET STEROID INJECTION  [id number]    W/ SEDATION          Current Outpatient Prescriptions:     atenolol (TENORMIN) 100 MG tablet, Take 100 mg by mouth once daily., Disp: , Rfl:     atorvastatin (LIPITOR) 40 MG tablet, Take 1 tablet (40 mg total) by mouth once daily., Disp: 90 tablet, Rfl: 3    blood sugar diagnostic Strp, 1 strip by Misc.(Non-Drug; Combo Route) route 2 (two) times daily with meals., Disp: 100 strip, Rfl: 11    blood-glucose meter kit, Use as instructed, Disp: 1 each, Rfl: 0    furosemide (LASIX) 20 MG tablet, Take 20 mg by mouth 2 (two) times daily., Disp: , Rfl:     glipiZIDE (GLUCOTROL) 10 MG tablet, Take 10 mg by mouth every evening., Disp: , Rfl:     lancets Misc, 1 lancet by Misc.(Non-Drug; Combo Route) route 2 (two) times daily with meals., Disp: 100 each, Rfl: 11    lancing device Misc, 1 Device by Misc.(Non-Drug; Combo Route) route 2 (two) times daily with meals., Disp: 1 each, Rfl: 0    losartan (COZAAR) 25 MG tablet, Take 1 tablet (25 mg total) by mouth once daily., Disp: 90 tablet, Rfl: 3    metFORMIN (GLUCOPHAGE) 1000 MG tablet, Take 1,000 mg by mouth every evening., Disp: , Rfl:     pantoprazole (PROTONIX) 40 MG tablet, Take 1 tablet (40 mg total) by mouth before breakfast., Disp: 90 tablet, Rfl: 0    traZODone (DESYREL) 50 MG tablet, , Disp: , Rfl:     TRUE METRIX GLUCOSE METER Misc, , Disp: , Rfl:     TRUEPLUS LANCETS 33 gauge Misc, , Disp: , Rfl:     zonisamide (ZONEGRAN) 100 MG Cap, Take 4 at bedtime, Disp: 120 capsule, Rfl: 2    escitalopram oxalate (LEXAPRO) 20 MG tablet, Take 1 tablet (20 mg total) by mouth once daily., Disp: 90 tablet, Rfl: 1    LORazepam (ATIVAN) 2 MG Tab, Take 0.5 tablets by mouth 2 (two) times daily as needed for Anxiety., Disp: , Rfl:     Social History     Social History    Marital status:      Spouse name: N/A    Number of children: N/A    Years of education: N/A     Occupational History    Not on file.     Social History  Main Topics    Smoking status: Never Smoker    Smokeless tobacco: Never Used    Alcohol use No    Drug use: No    Sexual activity: Not on file     Other Topics Concern    Not on file     Social History Narrative    No narrative on file       Review of patient's allergies indicates:   Allergen Reactions    Aspirin Other (See Comments)     Has been told not to take it due to bariatric surgery           Objective:      General appearance: Well appearing, in no acute distress, alert and oriented x3.  Psych:  Mood and affect appropriate.  Skin: Skin color, texture, turgor normal, no rashes or lesions, in both upper and lower body.  Head/face:  Atraumatic, normocephalic. No palpable lymph nodes  Cor: RRR  Pulm: CTA  GI: Abdomen soft and non-tender.  Back: Straight leg raising in the sitting and supine positions is negative to radicular pain. No pain with palpation to lumbar facet joints.  Limited flexion and extension with pain.  Positive facet loading bilaterally, L>R.  There is pain with palpation to bilateral SI joints.  TOBY is negative.  Extremities: Peripheral joint ROM is full and pain free without obvious instability or laxity in all four extremities. No deformities, edema, or skin discoloration. Good capillary refill.  Musculoskeletal: Hip maneuvers are negative.  Beltran's is negative.  No atrophy or tone abnormalities are noted.  Neuro: Bilateral upper and lower extremity coordination and muscle stretch reflexes are physiologic and symmetric.  Plantar response are downgoing.  Decreased sensation at left L4 distribution.  Gait: Antalgic- ambulates without assistance.    Ortho/SPM Exam      Assessment:       Encounter Diagnoses   Name Primary?    Lumbar radiculopathy Yes    Intractable back pain     Chronic pain syndrome          Plan:       Faby was seen today for follow-up.    Diagnoses and all orders for this visit:    Lumbar radiculopathy  -     Ambulatory consult to Psychology    Intractable back  pain  -     Ambulatory consult to Psychology    Chronic pain syndrome  -     Ambulatory consult to Psychology         We discussed with the patient the assessment and recommendations. The following is the plan we agreed on:    1) Referral to spine surgery, may need surgical intervention now failed interventional therapy.  2) Continue PT  3) RTC PRN. Discussed SCS as option and spine surgery and Psych eval. Info provided. I have explained the risks, benefits, and alternatives of the procedure in detail. The patient voices understanding and all questions have been answered. The patient agrees to proceed as planned.      Yosef Harris, DO  [employer] [facility] PGYIII  I have personally taken the history and examined this patient and agree with the resident's note as stated above.

## 2018-07-05 ENCOUNTER — OFFICE VISIT (OUTPATIENT)
Dept: FAMILY MEDICINE | Facility: CLINIC | Age: 68
End: 2018-07-05
Payer: MEDICARE

## 2018-07-05 VITALS
DIASTOLIC BLOOD PRESSURE: 68 MMHG | RESPIRATION RATE: 18 BRPM | HEIGHT: 64 IN | WEIGHT: 182.31 LBS | SYSTOLIC BLOOD PRESSURE: 131 MMHG | TEMPERATURE: 99 F | BODY MASS INDEX: 31.12 KG/M2 | OXYGEN SATURATION: 98 % | HEART RATE: 95 BPM

## 2018-07-05 DIAGNOSIS — F43.23 ADJUSTMENT REACTION WITH ANXIETY AND DEPRESSION: ICD-10-CM

## 2018-07-05 DIAGNOSIS — I10 ESSENTIAL HYPERTENSION: ICD-10-CM

## 2018-07-05 DIAGNOSIS — M94.9 DISORDER OF BONE AND CARTILAGE: ICD-10-CM

## 2018-07-05 DIAGNOSIS — M89.9 DISORDER OF BONE AND CARTILAGE: ICD-10-CM

## 2018-07-05 DIAGNOSIS — E11.9 TYPE 2 DIABETES MELLITUS WITHOUT COMPLICATION, WITHOUT LONG-TERM CURRENT USE OF INSULIN: Primary | ICD-10-CM

## 2018-07-05 DIAGNOSIS — K21.9 GASTROESOPHAGEAL REFLUX DISEASE WITHOUT ESOPHAGITIS: ICD-10-CM

## 2018-07-05 PROCEDURE — 99214 OFFICE O/P EST MOD 30 MIN: CPT | Mod: S$GLB,,, | Performed by: FAMILY MEDICINE

## 2018-07-05 PROCEDURE — 3075F SYST BP GE 130 - 139MM HG: CPT | Mod: CPTII,S$GLB,, | Performed by: FAMILY MEDICINE

## 2018-07-05 PROCEDURE — 3044F HG A1C LEVEL LT 7.0%: CPT | Mod: CPTII,S$GLB,, | Performed by: FAMILY MEDICINE

## 2018-07-05 PROCEDURE — 3078F DIAST BP <80 MM HG: CPT | Mod: CPTII,S$GLB,, | Performed by: FAMILY MEDICINE

## 2018-07-05 PROCEDURE — 99999 PR PBB SHADOW E&M-EST. PATIENT-LVL IV: CPT | Mod: PBBFAC,,, | Performed by: FAMILY MEDICINE

## 2018-07-05 RX ORDER — PANTOPRAZOLE SODIUM 40 MG/1
40 TABLET, DELAYED RELEASE ORAL
Qty: 90 TABLET | Refills: 1 | Status: SHIPPED | OUTPATIENT
Start: 2018-07-05 | End: 2019-05-13 | Stop reason: SDUPTHER

## 2018-07-05 RX ORDER — ATENOLOL 100 MG/1
100 TABLET ORAL DAILY
Qty: 90 TABLET | Refills: 1 | Status: SHIPPED | OUTPATIENT
Start: 2018-07-05 | End: 2019-01-02 | Stop reason: SDUPTHER

## 2018-07-05 RX ORDER — FUROSEMIDE 20 MG/1
20 TABLET ORAL DAILY
Qty: 90 TABLET | Refills: 1 | Status: SHIPPED | OUTPATIENT
Start: 2018-07-05 | End: 2019-05-13 | Stop reason: SDUPTHER

## 2018-07-05 NOTE — PROGRESS NOTES
Chief Complaint   Patient presents with    Follow-up       HPI    Faby Luna is 67 y.o. female. The primary encounter diagnosis was Type 2 diabetes mellitus without complication, without long-term current use of insulin. Diagnoses of Adjustment reaction with anxiety and depression, Essential hypertension, Gastroesophageal reflux disease without esophagitis, and Disorder of bone and cartilage were also pertinent to this visit.    67-year-old female with diabetes and hypertension comes to clinic for follow-up visit.  Patient reports since last visit she has been compliant with her medication regimen.  She reports continuing to take Lexapro daily and notes an improvement in her depressed mood and fewer anxiety episodes.  Patient requests refills on medications.  She has no other issues for discussion today    Eye exam - up to date - MD on [facility]    Review of Systems   Constitutional: Negative for activity change.   Respiratory: Negative for shortness of breath.    Cardiovascular: Negative for chest pain.   Musculoskeletal: Negative for gait problem.   Psychiatric/Behavioral: Negative for suicidal ideas.           Current Outpatient Prescriptions:     atenolol (TENORMIN) 100 MG tablet, Take 1 tablet (100 mg total) by mouth once daily., Disp: 90 tablet, Rfl: 1    atorvastatin (LIPITOR) 40 MG tablet, Take 1 tablet (40 mg total) by mouth once daily., Disp: 90 tablet, Rfl: 3    blood sugar diagnostic Strp, 1 strip by Misc.(Non-Drug; Combo Route) route 2 (two) times daily with meals., Disp: 100 strip, Rfl: 11    blood-glucose meter kit, Use as instructed, Disp: 1 each, Rfl: 0    escitalopram oxalate (LEXAPRO) 20 MG tablet, Take 1 tablet (20 mg total) by mouth once daily., Disp: 90 tablet, Rfl: 1    furosemide (LASIX) 20 MG tablet, Take 1 tablet (20 mg total) by mouth once daily., Disp: 90 tablet, Rfl: 1    glipiZIDE (GLUCOTROL) 10 MG tablet, Take 10 mg by mouth every evening., Disp: , Rfl:  "    lancets Misc, 1 lancet by Misc.(Non-Drug; Combo Route) route 2 (two) times daily with meals., Disp: 100 each, Rfl: 11    lancing device Misc, 1 Device by Misc.(Non-Drug; Combo Route) route 2 (two) times daily with meals., Disp: 1 each, Rfl: 0    LORazepam (ATIVAN) 2 MG Tab, Take 0.5 tablets by mouth 2 (two) times daily as needed for Anxiety., Disp: , Rfl:     losartan (COZAAR) 25 MG tablet, Take 1 tablet (25 mg total) by mouth once daily., Disp: 90 tablet, Rfl: 3    metFORMIN (GLUCOPHAGE) 1000 MG tablet, Take 1,000 mg by mouth every evening., Disp: , Rfl:     pantoprazole (PROTONIX) 40 MG tablet, Take 1 tablet (40 mg total) by mouth before breakfast., Disp: 90 tablet, Rfl: 1    traZODone (DESYREL) 50 MG tablet, , Disp: , Rfl:     TRUE METRIX GLUCOSE METER Misc, , Disp: , Rfl:     TRUEPLUS LANCETS 33 gauge Misc, , Disp: , Rfl:     zonisamide (ZONEGRAN) 100 MG Cap, Take 4 at bedtime, Disp: 120 capsule, Rfl: 2      Blood pressure 131/68, pulse 95, temperature 98.7 °F (37.1 °C), temperature source Oral, resp. rate 18, height 5' 4" (1.626 m), weight 82.7 kg (182 lb 5.1 oz), SpO2 98 %.    Physical Exam   Constitutional: Vital signs are normal. She appears well-developed.   HENT:   Mouth/Throat: Normal dentition.   Neck: Trachea normal. No thyromegaly present.   Cardiovascular: Normal rate, regular rhythm and intact distal pulses.    No murmur heard.  Pulmonary/Chest: Effort normal. She has no decreased breath sounds. She has no wheezes. She exhibits no deformity.   Musculoskeletal:   Normal gait. No decreased range of motion of major joints.   Neurological: She is not disoriented.   Skin: Skin is intact. Capillary refill takes less than 2 seconds.   Psychiatric: Her speech is normal and behavior is normal. Her mood appears not anxious. She does not exhibit a depressed mood.       Lab Visit on 06/20/2018   Component Date Value Ref Range Status    Hemoglobin A1C 06/20/2018 6.4* 4.0 - 5.6 % Final    Estimated " Avg Glucose 06/20/2018 137* 68 - 131 mg/dL Final   Admission on 06/14/2018, Discharged on 06/14/2018   Component Date Value Ref Range Status    POCT Glucose 06/14/2018 102  70 - 110 mg/dL Final   Lab Visit on 05/30/2018   Component Date Value Ref Range Status    Microalbum.,U,Random 05/30/2018 30.0  ug/mL Final    Creatinine, Random Ur 05/30/2018 163.0  15.0 - 325.0 mg/dL Final    Microalb Creat Ratio 05/30/2018 18.4  0.0 - 30.0 ug/mg Final   Lab Visit on 05/30/2018   Component Date Value Ref Range Status    Hemoglobin A1C 05/30/2018 6.6* 4.0 - 5.6 % Final    Estimated Avg Glucose 05/30/2018 143* 68 - 131 mg/dL Final    Cholesterol 05/30/2018 215* 120 - 199 mg/dL Final    Triglycerides 05/30/2018 193* 30 - 150 mg/dL Final    HDL 05/30/2018 60  40 - 75 mg/dL Final    LDL Cholesterol 05/30/2018 116.4  63.0 - 159.0 mg/dL Final    HDL/Chol Ratio 05/30/2018 27.9  20.0 - 50.0 % Final    Total Cholesterol/HDL Ratio 05/30/2018 3.6  2.0 - 5.0 Final    Non-HDL Cholesterol 05/30/2018 155  mg/dL Final    Sodium 05/30/2018 141  136 - 145 mmol/L Final    Potassium 05/30/2018 4.6  3.5 - 5.1 mmol/L Final    Chloride 05/30/2018 107  95 - 110 mmol/L Final    CO2 05/30/2018 24  23 - 29 mmol/L Final    Glucose 05/30/2018 113* 70 - 110 mg/dL Final    BUN, Bld 05/30/2018 15  8 - 23 mg/dL Final    Creatinine 05/30/2018 1.0  0.5 - 1.4 mg/dL Final    Calcium 05/30/2018 10.4  8.7 - 10.5 mg/dL Final    Total Protein 05/30/2018 8.4  6.0 - 8.4 g/dL Final    Albumin 05/30/2018 4.0  3.5 - 5.2 g/dL Final    Total Bilirubin 05/30/2018 0.2  0.1 - 1.0 mg/dL Final    Alkaline Phosphatase 05/30/2018 95  55 - 135 U/L Final    AST 05/30/2018 23  10 - 40 U/L Final    ALT 05/30/2018 28  10 - 44 U/L Final    Anion Gap 05/30/2018 10  8 - 16 mmol/L Final    eGFR if African American 05/30/2018 >60  >60 mL/min/1.73 m^2 Final    eGFR if non African American 05/30/2018 58* >60 mL/min/1.73 m^2 Final    TSH 05/30/2018 2.267  0.400 -  4.000 uIU/mL Final    Hepatitis C Ab 05/30/2018 Negative   Final   Admission on 05/28/2018, Discharged on 05/28/2018   Component Date Value Ref Range Status    POCT Glucose 05/28/2018 123* 70 - 110 mg/dL Final   Admission on 05/02/2018, Discharged on 05/02/2018   Component Date Value Ref Range Status    POCT Glucose 05/02/2018 125* 70 - 110 mg/dL Final   Lab Visit on 04/24/2018   Component Date Value Ref Range Status    TSH 04/24/2018 1.848  0.400 - 4.000 uIU/mL Final    Free T4 04/24/2018 0.77  0.71 - 1.51 ng/dL Final    Vit D, 25-Hydroxy 04/24/2018 32  30 - 96 ng/mL Final   ]    Assessment:    1. Type 2 diabetes mellitus without complication, without long-term current use of insulin    2. Adjustment reaction with anxiety and depression    3. Essential hypertension    4. Gastroesophageal reflux disease without esophagitis    5. Disorder of bone and cartilage          Faby was seen today for follow-up.    Diagnoses and all orders for this visit:    Type 2 diabetes mellitus without complication, without long-term current use of insulin   -stable.  No change to current medication regimen.  Next follow-up in 6 months with A1c.    Adjustment reaction with anxiety and depression   -improved.  Continue SSRI.  Patient encouraged to continue self-care regimen.  Continue to monitor.    Essential hypertension  -     atenolol (TENORMIN) 100 MG tablet; Take 1 tablet (100 mg total) by mouth once daily.  -     furosemide (LASIX) 20 MG tablet; Take 1 tablet (20 mg total) by mouth once daily.  - stable.  No change to current medication regimen.  Medications refilled per patient request.    Gastroesophageal reflux disease without esophagitis  -     pantoprazole (PROTONIX) 40 MG tablet; Take 1 tablet (40 mg total) by mouth before breakfast.  - stable.  Discussed food triggers and avoidance.  Decreasedbedtime eating and laying flat immediately after eating.  Medication refill per patient request.    Disorder of bone and  cartilage  -     DXA Bone Density Spine And Hip; Future          FOLLOW UP: Follow-up in about 6 months (around 1/5/2019) for Follow up.

## 2018-07-05 NOTE — PATIENT INSTRUCTIONS
September 2018 - Flu shot and eye exam report    ---    Your Bodys Response to Anxiety    Normal anxiety is part of the bodys natural defense system. It's an alert to a threat that is unknown, vague, or comes from your own internal fears. While youre in this state, your feelings can range from a vague sense of worry to physical sensations such as a pounding heartbeat. These feelings make you want to react to the threat. An anxiety response is normal in many situations. But when you have an anxiety disorder, the same response can occur at the wrong times.  Anxiety can be helpful  Normal anxiety is a signal from your brain that warns you of a threat and is a normal response to help you prevent something or decrease the bad effects of something you can't control. For example, anxiety is a normal response to situations that might damage your body, separate you from a loved one, or lose your job. The symptoms of anxiety can be physical and mental.  How does it feel?  At certain times, people with anxiety may have:  · Dizziness  · Muscle tension or pain  · Restlessness  · Sleeplessness  · Trouble concentrating  · Racing heartbeat  · Fast breathing  · Shaking or trembling  · Stomachache  · Diarrhea  · Loss of energy  · Sweating  · Cold, clammy hands  · Chest pain  · Dry mouth  Anxiety can also be a problem  Anxiety can become a problem when it is hard to control, occurs for months, and interferes with important parts of your life. With an anxiety disorder, your body has the response described above, but in inappropriate ways. The response a person has depends on the anxiety disorder he or she has. With some disorders, the anxiety is way out of proportion to the threat that triggers it. With others, anxiety may occur even when there isnt a clear threat or trigger.  Who does it affect?  Some people are more prone to persistent anxiety than others. It tends to run in families, and it affects more younger people than  "older people, and more women than men. But no age, race, or gender is immune to anxiety problems.  Anxiety can be treated  The good news is that the anxiety thats disrupting your life can be treated. Check with your healthcare provider and rule out any physical problems that may be causing the anxiety symptoms. If an anxiety disorder is diagnosed seek mental healthcare. This is an illness and it can respond to treatment. Most types of anxiety disorders will respond to "talk therapy" and medicines. Working with your doctor or other healthcare provider, you can develop skills to help you cope with anxiety. You can also gain the perspective you need to overcome your fears. Note: Good sources of support or guidance can be found at your local hospital, mental health clinic, or an employee assistance program.  How to cope with anxiety  If anxiety is wearing you down, here are some things you can do to cope:  · Keep in mind that you cant control everything about a situation. Change what you can and let the rest take its course.  · Exercise--its a great way to relieve tension and help your body feel relaxed.  · Avoid caffeine and nicotine, which can make anxiety symptoms worse.  · Fight the temptation to turn to alcohol or unprescribed drugs for relief. They only make things worse in the long run.  · Educate yourself about anxiety disorders. Keep track of helpful online resources and books you can use during stressful periods.  · Try stress management techniques such as meditation.  · Consider online or in-person support groups.   Date Last Reviewed: 1/1/2017  © 6056-9021 [employer]. [address] [place] [address], [place] [address]. All rights reserved. This information is not intended as a substitute for professional medical care. Always follow your healthcare professional's instructions.        "

## 2018-08-21 ENCOUNTER — TELEPHONE (OUTPATIENT)
Dept: NEUROSURGERY | Facility: CLINIC | Age: 68
End: 2018-08-21

## 2018-08-23 ENCOUNTER — OFFICE VISIT (OUTPATIENT)
Dept: NEUROSURGERY | Facility: CLINIC | Age: 68
End: 2018-08-23
Payer: MEDICARE

## 2018-08-23 VITALS
BODY MASS INDEX: 31.57 KG/M2 | HEIGHT: 64 IN | SYSTOLIC BLOOD PRESSURE: 135 MMHG | HEART RATE: 65 BPM | WEIGHT: 184.94 LBS | DIASTOLIC BLOOD PRESSURE: 77 MMHG

## 2018-08-23 DIAGNOSIS — M43.16 SPONDYLOLISTHESIS OF LUMBAR REGION: Primary | ICD-10-CM

## 2018-08-23 PROCEDURE — 99999 PR PBB SHADOW E&M-EST. PATIENT-LVL III: CPT | Mod: PBBFAC,,, | Performed by: NEUROLOGICAL SURGERY

## 2018-08-23 PROCEDURE — 3078F DIAST BP <80 MM HG: CPT | Mod: CPTII,S$GLB,, | Performed by: NEUROLOGICAL SURGERY

## 2018-08-23 PROCEDURE — 3075F SYST BP GE 130 - 139MM HG: CPT | Mod: CPTII,S$GLB,, | Performed by: NEUROLOGICAL SURGERY

## 2018-08-23 PROCEDURE — 99204 OFFICE O/P NEW MOD 45 MIN: CPT | Mod: S$GLB,,, | Performed by: NEUROLOGICAL SURGERY

## 2018-08-23 RX ORDER — ESCITALOPRAM OXALATE 20 MG/1
20 TABLET ORAL DAILY
COMMUNITY
Start: 2018-08-02 | End: 2018-10-29 | Stop reason: SDUPTHER

## 2018-08-23 NOTE — LETTER
August 25, 2018      Shaq Silverio MD  [address]  [address]  [facility] [address]           [facility] - Neurosurgery 7th Fl  [address]  [place] [address]  Phone: [phone]          Patient: Faby Luna   MR Number: [id number]   YOB: 1950   Date of Visit: 8/23/2018       Dear Dr. Shaq Silverio:    Thank you for referring Faby Luna to me for evaluation. Attached you will find relevant portions of my assessment and plan of care.    If you have questions, please do not hesitate to call me. I look forward to following Faby Luna along with you.    Sincerely,    Galen Lockhart MD    Enclosure  CC:  No Recipients    If you would like to receive this communication electronically, please contact [email or web address] or [phone] to request more information on [employer] access.    For providers and/or their staff who would like to refer a patient to [facility], please contact us through our one-stop-shop provider referral line, [facility] , at [phone].    If you feel you have received this communication in error or would no longer like to receive these types of communications, please e-mail [email or web address]

## 2018-08-23 NOTE — PROGRESS NOTES
Dear Dr. Silverio,      Thank you for referring this patient to my clinic. The full details of my evaluation will also be forthcoming to you by letter.    CHIEF COMPLAINT:  Consult for evaluation of low back and LLE pain    I, Darshan Haile, attest that this documentation has been prepared under the direction and in the presence of Galen Lockhart MD.    HPI:  Faby Luna is a 67 y.o.  female with history of diabetes, who is referred to me by Dr. Silverio for evaluation of low back and LLE pain. Pt reports longstanding progressive low back pain. She notes shooting BLE pain radiating down her entire legs. Her back pain is worse than her leg pain. Pt feels unsteady on her feet and notes occasional falls. She denies b/b dysfunction. Nothing alleviates her back pain. Standing or walking for long periods and lifting heavy objects exacerbate her pain. She experiences bilateral hand numbness in her index and middle fingers. Pt denies shooting arm pain. She endures occasional neck pain. Pt has not participated in physical therapy recently due to her pain. She states that when she previously participated in physical therapy, it made her pain worse. Pt is interested in a spinal cord stimulator implant.        Review of patient's allergies indicates:   Allergen Reactions    Aspirin Other (See Comments)     Has been told not to take it due to bariatric surgery       Past Medical History:   Diagnosis Date    Arthritis     Diabetes mellitus     Hypertension      Past Surgical History:   Procedure Laterality Date     SECTION      CHOLECYSTECTOMY      HYSTERECTOMY       No family history on file.  Social History     Tobacco Use    Smoking status: Never Smoker    Smokeless tobacco: Never Used   Substance Use Topics    Alcohol use: No    Drug use: No        Review of Systems   Constitutional: Negative.    HENT: Negative.    Eyes: Negative.    Respiratory: Negative.    Cardiovascular: Negative.    Gastrointestinal:  Negative.    Endocrine: Negative.    Genitourinary: Negative.    Musculoskeletal: Positive for back pain (low back), myalgias (BLE) and neck pain. Negative for gait problem.   Skin: Negative.    Allergic/Immunologic: Negative.    Neurological: Positive for numbness (bilateral 2nd and 3rd digits). Negative for weakness, light-headedness and headaches.   Hematological: Negative.    Psychiatric/Behavioral: Negative.        OBJECTIVE:   Vital Signs:  Pulse: 65 (08/23/18 1259)  BP: 135/77 (08/23/18 1259)    Physical Exam:    Vital signs: All nursing notes and vital signs reviewed -- afebrile, vital signs stable.  Constitutional: Patient sitting comfortably in chair. Appears well developed and well nourished.  Skin: Exposed areas are intact without abnormal markings, rashes or other lesions.  HEENT: Normocephalic. Normal conjunctivae.  Cardiovascular: Normal rate and regular rhythm.  Respiratory: Chest wall rises and falls symmetrically, without signs of respiratory distress.  Abdomen: Soft and non-tender.  Extremities: Warm and without edema. Calves supple, non-tender.  Psych/Behavior: Normal affect.    Neurological:    Mental status: Alert and oriented. Conversational and appropriate.       Cranial Nerves: Grossly intact.     Motor:    Upper:  Deltoids Triceps Biceps WE WF     R 5/5 5/5 5/5 5/5 5/5 5/5    L 5/5 5/5 5/5 5/5 5/5 5/5      Lower:  HF KE KF DF PF EHL    R 5/5 5/5 5/5 5/5 5/5 5/5    L 5/5 5/5 5/5 5/5 5/5 5/5     Sensory: Intact sensation to light touch in all extremities. Romberg negative.    Reflexes:          DTR: 2+ symmetrically throughout.     Arredondo's: Negative.     Babinski's: Negative.     Clonus: Negative.    Cerebellar: Finger-to-nose and rapid alternating movements normal. Gait stable, fluid.    Spine:    Posture: Head well aligned over pelvis in front and side views.  No focal or global spinal deformity visible on inspection. Shoulders and hips even. No obvious leg length discrepancy. No  scapula winging.    Bending: Full ROM with forward, back and lateral bending. No rib prominence with forward bend.    Cervical:      ROM: Full with flexion, extension, lateral rotation and ear-to-shoulder bend.      Midline TTP: Negative.     Spurling's test: Negative.     Lhermitte's: Negative.    Thoracic:     Midline TTP: Negative    Lumbar:     Midline TTP: Negative     Straight Leg Test: Negative     Crossed Straight Leg Test: Negative     Sciatic notch tenderness: Negative.    Other:     SI joint TTP: Negative.     Greater trochanter TTP: Negative.     Tenderness with external/internal hip rotation: Negative.    Diagnostic Results:  All imaging was independently reviewed by me.    MRI L-spine, dated 3/31/2018:  1. Grade 1 spondylolisthesis of L4 on L5   2. Severe central and bilateral neuroforaminal stenosis at L4/5   3. Multilevel degenerative changes    Flex/Ex X-ray L-spine, dated 3/27/2018:  1. Grade 1 spondylolisthesis at L4/5   2. No instability    ASSESSMENT/PLAN:     Faby Luna is a 66 y/o female with a grade 1 L4/5 spondylolisthesis. We discussed surgical fusion with the pt as treatment for spondylisthesis versus spinal cord stimulator implantation by Pain Management. At this time, she is more interested in the spinal cord stimulator implantation. We let her know that she can always follow up with us if the spinal cord stimulator trial fails. Pt will follow up as needed.     The patient understands and agrees with the plan of care. All questions were answered.     1. RTC PRN      I, Dr. Galen Lockhart personally performed the services described in this documentation. All medical record entries made by the scribe, Darshan Haile, were at my direction and in my presence.  I have reviewed the chart and agree that the record reflects my personal performance and is accurate and complete.      Galen Lockhart M.D.  Department of Neurosurgery  [facility]      .

## 2018-08-27 ENCOUNTER — TELEPHONE (OUTPATIENT)
Dept: PAIN MEDICINE | Facility: CLINIC | Age: 68
End: 2018-08-27

## 2018-08-27 NOTE — TELEPHONE ENCOUNTER
Attempted to contact patient regarding message, no answer, unable to leave message, recording states mailbox is full.

## 2018-08-27 NOTE — TELEPHONE ENCOUNTER
----- Message from Shaq Silverio MD sent at 8/26/2018  1:54 PM CDT -----  Please schedule psych eval & F/U with me or [facility] to discuss & consent for SCS trial  ----- Message -----  From: Galen Lockhart MD  Sent: 8/25/2018   4:26 PM  To: Shaq Silverio MD

## 2018-08-28 ENCOUNTER — TELEPHONE (OUTPATIENT)
Dept: PAIN MEDICINE | Facility: CLINIC | Age: 68
End: 2018-08-28

## 2018-08-28 NOTE — TELEPHONE ENCOUNTER
----- Message from Maggy Rivas sent at 8/28/2018  4:02 PM CDT -----  Contact: Pt  Name of Who is Calling:CLAUDIA MARTINEZ [[id number]]    What is the request in detail: Patient returning a call to the staff in regarding to procedure Please contact to further discuss and advise    Can the clinic reply by [facility]: No    What Number to Call Back if not in [facility]: [phone]

## 2018-08-28 NOTE — TELEPHONE ENCOUNTER
Contacted and spoke to patient regarding message.     Patient was informed of the SCS Trial process.    Patient was advised to contact [facility] at [phone] to schedule psychological evaluation.    Patient was also advised to call us back and notify when her psych eval is scheduled for because she needs to schedule a follow up with Dr. Silverio or the NP to discuss and consent for the SCS trial.    Patient verbalized understanding and had no additional questions.

## 2018-08-28 NOTE — TELEPHONE ENCOUNTER
----- Message from Jessica Vicente LPN sent at 8/28/2018 10:20 AM CDT -----  Contact: Pt      ----- Message -----  From: Maggy Rivas  Sent: 8/28/2018   9:35 AM  To: [facility] Staff    Name of Who is Calling:CLAUDIA MARTINEZ [[id number]]    What is the request in detail:  Patient returning a call to the staff in regards to  procedure Please contact to further discuss and advise    Can the clinic reply by [facility]: No    What Number to Call Back if not in [facility]: [phone]

## 2018-08-30 NOTE — TELEPHONE ENCOUNTER
Attempted to contact patient regarding scheduling f/u to discuss SCS trial, no answer, left detailed voice message and requested a return call.

## 2018-09-05 ENCOUNTER — OFFICE VISIT (OUTPATIENT)
Dept: PAIN MEDICINE | Facility: CLINIC | Age: 68
End: 2018-09-05
Attending: ANESTHESIOLOGY
Payer: MEDICARE

## 2018-09-05 VITALS
HEIGHT: 64 IN | TEMPERATURE: 98 F | BODY MASS INDEX: 30.39 KG/M2 | SYSTOLIC BLOOD PRESSURE: 129 MMHG | HEART RATE: 64 BPM | WEIGHT: 178 LBS | RESPIRATION RATE: 18 BRPM | DIASTOLIC BLOOD PRESSURE: 58 MMHG

## 2018-09-05 DIAGNOSIS — M54.9 INTRACTABLE BACK PAIN: ICD-10-CM

## 2018-09-05 DIAGNOSIS — M54.16 LUMBAR RADICULOPATHY: Primary | ICD-10-CM

## 2018-09-05 DIAGNOSIS — M47.816 LUMBAR SPONDYLOSIS: ICD-10-CM

## 2018-09-05 PROCEDURE — 1101F PT FALLS ASSESS-DOCD LE1/YR: CPT | Mod: CPTII,,, | Performed by: ANESTHESIOLOGY

## 2018-09-05 PROCEDURE — 3074F SYST BP LT 130 MM HG: CPT | Mod: CPTII,,, | Performed by: ANESTHESIOLOGY

## 2018-09-05 PROCEDURE — 99999 PR PBB SHADOW E&M-EST. PATIENT-LVL III: CPT | Mod: PBBFAC,,, | Performed by: ANESTHESIOLOGY

## 2018-09-05 PROCEDURE — 3078F DIAST BP <80 MM HG: CPT | Mod: CPTII,,, | Performed by: ANESTHESIOLOGY

## 2018-09-05 PROCEDURE — 99213 OFFICE O/P EST LOW 20 MIN: CPT | Mod: S$PBB,,, | Performed by: ANESTHESIOLOGY

## 2018-09-05 PROCEDURE — 99213 OFFICE O/P EST LOW 20 MIN: CPT | Mod: PBBFAC | Performed by: ANESTHESIOLOGY

## 2018-09-05 NOTE — H&P (VIEW-ONLY)
Subjective:      Patient ID: Faby Luna is a 67 y.o. female.    Chief Complaint: Follow-up    Referred by: No ref. provider found     Pain Scales  Best: 9/10  Worst: 9/10  Usually: 910  Today: 9/10     she returns for follow-up after she saw back in spine.  Recommendations were surgery.  She declined.  She has spinal stenosis and spondylolisthesis.  She has radicular symptoms.  She would like to consider spinal cord stimulator rather than spine surgery.  She is scheduled to see the psychologist for psychology evaluation in 2 days.  No new symptomatology.      Past Medical History:   Diagnosis Date    Arthritis     Diabetes mellitus     Hypertension        Past Surgical History:   Procedure Laterality Date     SECTION      CHOLECYSTECTOMY      HYSTERECTOMY         Review of patient's allergies indicates:   Allergen Reactions    Aspirin Other (See Comments)     Has been told not to take it due to bariatric surgery       Current Outpatient Medications   Medication Sig Dispense Refill    atenolol (TENORMIN) 100 MG tablet Take 1 tablet (100 mg total) by mouth once daily. 90 tablet 1    atorvastatin (LIPITOR) 40 MG tablet Take 1 tablet (40 mg total) by mouth once daily. 90 tablet 3    blood sugar diagnostic Strp 1 strip by Misc.(Non-Drug; Combo Route) route 2 (two) times daily with meals. 100 strip 11    blood-glucose meter kit Use as instructed 1 each 0    escitalopram oxalate (LEXAPRO) 20 MG tablet Take 20 mg by mouth once daily.       furosemide (LASIX) 20 MG tablet Take 1 tablet (20 mg total) by mouth once daily. 90 tablet 1    glipiZIDE (GLUCOTROL) 10 MG tablet Take 10 mg by mouth every evening.      lancets Misc 1 lancet by Misc.(Non-Drug; Combo Route) route 2 (two) times daily with meals. 100 each 11    lancing device Misc 1 Device by Misc.(Non-Drug; Combo Route) route 2 (two) times daily with meals. 1 each 0    LORazepam (ATIVAN) 2 MG Tab Take 0.5 tablets by mouth 2 (two) times daily as  "needed for Anxiety.      losartan (COZAAR) 25 MG tablet Take 1 tablet (25 mg total) by mouth once daily. 90 tablet 3    metFORMIN (GLUCOPHAGE) 1000 MG tablet Take 1,000 mg by mouth every evening.      pantoprazole (PROTONIX) 40 MG tablet Take 1 tablet (40 mg total) by mouth before breakfast. 90 tablet 1    traZODone (DESYREL) 50 MG tablet       TRUE METRIX GLUCOSE METER Misc       TRUEPLUS LANCETS 33 gauge Misc       zonisamide (ZONEGRAN) 100 MG Cap Take 4 at bedtime 120 capsule 2     No current facility-administered medications for this visit.        History reviewed. No pertinent family history.    Social History     Socioeconomic History    Marital status:      Spouse name: Not on file    Number of children: Not on file    Years of education: Not on file    Highest education level: Not on file   Social Needs    Financial resource strain: Not on file    Food insecurity - worry: Not on file    Food insecurity - inability: Not on file    Transportation needs - medical: Not on file    Transportation needs - non-medical: Not on file   Occupational History    Not on file   Tobacco Use    Smoking status: Never Smoker    Smokeless tobacco: Never Used   Substance and Sexual Activity    Alcohol use: No    Drug use: No    Sexual activity: Not on file   Other Topics Concern    Not on file   Social History Narrative    Not on file           Review of Systems   Musculoskeletal: Positive for back pain.        Bilateral knee and leg pain           Objective:   BP (!) 129/58   Pulse 64   Temp 98.1 °F (36.7 °C)   Resp 18   Ht 5' 4" (1.626 m)   Wt 80.7 kg (178 lb)   BMI 30.55 kg/m²   Pain Disability Index Review:  Last 3 PDI Scores 9/5/2018 7/3/2018 6/13/2018   Pain Disability Index (PDI) 56 48 54     Normocephalic.  Atraumatic.  Affect appropriate.  Breathing unlabored.  Extra ocular muscles intact.           Ortho/SPM Exam      Imaging reviewed with the patient  Assessment:       Encounter " Diagnoses   Name Primary?    Lumbar radiculopathy Yes    Lumbar spondylosis     Intractable back pain          Plan:   We discussed with the patient the assessment and recommendations. The following is the plan we agreed on:        Faby was seen today for follow-up.    Diagnoses and all orders for this visit:    Lumbar radiculopathy    Lumbar spondylosis    Intractable back pain        1. Schedule patient for spinal cord stimulator trial with  [facility].    2. Return as needed.  Follow-up after the procedure

## 2018-09-11 ENCOUNTER — TELEPHONE (OUTPATIENT)
Dept: PAIN MEDICINE | Facility: CLINIC | Age: 68
End: 2018-09-11

## 2018-09-11 ENCOUNTER — OFFICE VISIT (OUTPATIENT)
Dept: PSYCHIATRY | Facility: CLINIC | Age: 68
End: 2018-09-11
Payer: MEDICARE

## 2018-09-11 DIAGNOSIS — M54.16 LUMBAR RADICULOPATHY: ICD-10-CM

## 2018-09-11 DIAGNOSIS — K21.9 GASTROESOPHAGEAL REFLUX DISEASE WITHOUT ESOPHAGITIS: ICD-10-CM

## 2018-09-11 DIAGNOSIS — F33.41 MAJOR DEPRESSIVE DISORDER, RECURRENT EPISODE, IN PARTIAL REMISSION: ICD-10-CM

## 2018-09-11 DIAGNOSIS — M47.816 LUMBAR SPONDYLOSIS: ICD-10-CM

## 2018-09-11 DIAGNOSIS — Z01.818 PREOP EXAMINATION: Primary | ICD-10-CM

## 2018-09-11 DIAGNOSIS — I10 ESSENTIAL HYPERTENSION: ICD-10-CM

## 2018-09-11 DIAGNOSIS — G89.4 CHRONIC PAIN SYNDROME: Primary | ICD-10-CM

## 2018-09-11 DIAGNOSIS — E11.9 TYPE 2 DIABETES MELLITUS WITHOUT COMPLICATION, WITHOUT LONG-TERM CURRENT USE OF INSULIN: ICD-10-CM

## 2018-09-11 DIAGNOSIS — G89.4 CHRONIC PAIN SYNDROME: ICD-10-CM

## 2018-09-11 DIAGNOSIS — M48.061 SPINAL STENOSIS OF LUMBAR REGION, UNSPECIFIED WHETHER NEUROGENIC CLAUDICATION PRESENT: ICD-10-CM

## 2018-09-11 PROCEDURE — 99999 PR PBB SHADOW E&M-EST. PATIENT-LVL I: CPT | Mod: PBBFAC,,, | Performed by: PSYCHOLOGIST

## 2018-09-11 PROCEDURE — 96102 PR PSYCHOLOGIC TESTING BY TECHNICIAN: CPT | Mod: 59,S$PBB,, | Performed by: PSYCHOLOGIST

## 2018-09-11 PROCEDURE — 90791 PSYCH DIAGNOSTIC EVALUATION: CPT | Mod: PBBFAC | Performed by: PSYCHOLOGIST

## 2018-09-11 PROCEDURE — 90791 PSYCH DIAGNOSTIC EVALUATION: CPT | Mod: S$PBB,,, | Performed by: PSYCHOLOGIST

## 2018-09-11 PROCEDURE — 96102 PR PSYCHOLOGIC TESTING BY TECHNICIAN: CPT | Mod: PBBFAC | Performed by: PSYCHOLOGIST

## 2018-09-11 PROCEDURE — 96101 PR PSYCHOLOGIC TESTING BY PSYCH/PHYS: CPT | Mod: S$PBB,,, | Performed by: PSYCHOLOGIST

## 2018-09-11 PROCEDURE — 99211 OFF/OP EST MAY X REQ PHY/QHP: CPT | Mod: PBBFAC | Performed by: PSYCHOLOGIST

## 2018-09-11 PROCEDURE — 96101 PR PSYCHOLOGIC TESTING BY PSYCH/PHYS: CPT | Mod: PBBFAC | Performed by: PSYCHOLOGIST

## 2018-09-11 RX ORDER — TRAZODONE HYDROCHLORIDE 50 MG/1
50 TABLET ORAL NIGHTLY PRN
Qty: 90 TABLET | Refills: 0 | Status: SHIPPED | OUTPATIENT
Start: 2018-09-11 | End: 2018-12-19 | Stop reason: SDUPTHER

## 2018-09-11 NOTE — PSYCH TESTING
[facility]  [address] [place]  [address]  [phone]    REPORT OF PSYCHOLOGICAL TESTING    NAME: Faby Luna  OC #: [id number]  : 1950    REFERRED BY:  Shaq Silverio M.D.    EVALUATED BY:  Heidi Rodriguez, Ph.D., Clinical Psychologist  JORDY Guzman, Psychometrician    DATES OF EVALUATION:  2018, 2018    EVALUATION PROCEDURES AND TIMES:  Conducted by Psychologist:  Interpretation and report of test data  Integration of information from diagnostic interview, medical record, and testing data  Conducted by Technician:  [place] Multiphasic Personality Inventory - 2 - Restructured Form (MMPI-2-RF)  [facility] Behavioral Medicine Diagnostic (MBMD)  B[place] Depression Inventory - II (BDI-II)  CPT Codes and Time:  89575 - 2 hours; 12500 - 3 hours    EVALUATION FINDINGS:  The diagnostic interview revealed that Ms. Faby Luna is a 67-year-old Black  female referred for Psychological Evaluation prior to surgical implantation of a spinal cord stimulator (SCS) to address chronic pain.  Her pain has been present for many years, but it has worsened in the past five years.  Since 2017, she had a really bad fall and it has really gotten worse since then.  I think I got a pinched nerve.  She has chronic pain in her lower back, legs, and ankles.  She has tried physical therapy (I tried this once upon a time and it actually aggravated the pain more), medications, and injections without receiving sufficient relief.  Her activities are greatly limited.  She has difficulty walking, standing, and sitting.  She cannot walk up and down steps or more than a block.  She is able to complete activities of daily living, but it is very hard.  Ms. Luna was able to walk to her appointment today without assistance.    Ms. Luna is now interested in a trial of SCS.  She has reviewed the educational materials and is familiar with the procedures involved.  When asked about  potential concerns regarding SCS, she indicated no concerns.  Her expectations regarding SCS include I hope it will relieve the pain.  Nitin heard different things about it.  All Nitin heard are good things.  My primary care physician told me its a good choice.  She is motivated to get back to walking, get back to standing as much as I can, and do what I was doing without hurting.  And bending.  And being more active with my grandchildren.  Her children will be available to help her during recovery after surgery.    Ms. Luna has a history of depression and anxiety that is well-managed with psychotropic medication prescribed by her PCP Yessy Keith MD ([facility]).  She does not report current psychiatric problems or adjustment issues.  She was pleasant and cooperative in interview and appeared to be in good spirits.  Of note, Ms. Luna previously attended a pre-operative psychological evaluation for bariatric surgery with clinical psychologist Rick Lawson, PhD, on 11/02/2010.  She was psychologically cleared to proceed with surgery and has continued to maintain weight loss.      The medical record also revealed the following diagnoses:  Lumbar radiculopathy; Lumbar spondylosis; Intractable back pain; Essential hypertension; Type 2 diabetes mellitus without complication, without long-term current use of insulin; GERD.    TEST DATA:  Psychological Testing data revealed that she was fully cooperative and engaged in the assessment process. Effort on all tests was satisfactory to produce interpretable results.    Ms. Luna produced an interpretable MMPI-2-RF profile.  Of note, validity scale results suggest Ms. Luna tended to endorse items that may be considered infrequent as compared to the population.  This can occur in individuals with credible symptoms but can also indicate over-reporting in those with no corroborating evidence of psychopathology.  This profile should be  interpreted with these issues in mind.  Ms. Luna reports vague neurological complaints and is likely to present with physical health concerns.  Emotionally, she reports multiple specific fears (i.e., fear of blood, fire, thunder, water, natural disasters, spiders, mice, and other animals).  Behaviorally, Ms. Luna reports episodes of over-activation such as heightened excitation and energy levels.  She should be queried for a history of manic or hypomanic symptoms.  Interpersonally, Ms. Luna reports disliking people and being around them and is likely to be introverted.  Of note, Ms. Luna reports she has experienced over-activation sometimes but had difficulty explaining when those times occurred.  It appears she endorsed this item based on a day with real good sleep and being excited because I dont always feel active due to my back.    The MBMD indicated that Ms. Luna responded in a manner suggesting a need for social approval or naivete about psychological matters.  Scoring adjustments were made to correct for these tendencies, and the following profile should be interpreted with these issues in mind.  Ms. Luna reports a moderately high level of depressive symptoms.  Her illness may come with a loss of freedom and autonomy, which can cause irritation and frustration for her.  She reports significant decrements in her ability to maintain activities of daily living, and she may experience pain problems that have a psychological component.  Patients with similar profiles may experience significant complications in recovery if negatively influenced by depression (which can affect her ability to follow prescribed medication regimens) or lack of routine exercise; however, these issues may be mitigated by her strong spiritual levi, which can be an asset in her recovery.  Ms. Luna is generally confident and self-assured, and typically exhibits responsible and efficient behavior with healthcare standards.  She  should be encouraged to regain a sense of control and mastery through self-care and proactive behaviors.  Based on her profile, she may benefit from pharmacologic or psychosocial intervention as needed to address psychological issues that could affect her recovery.    The BDI-II revealed the presence of minimal depression with a total score of 0.    DIAGNOSTIC IMPRESSIONS:    ICD-10-CM ICD-9-CM   1. Preop examination Z01.818 V72.84   2. Lumbar radiculopathy M54.16 724.4   3. Chronic pain syndrome G89.4 338.4   4. Lumbar spondylosis M47.816 721.3   5. Essential hypertension I10 401.9   6. Type 2 diabetes mellitus without complication, without long-term current use of insulin E11.9 250.00   7. Gastroesophageal reflux disease without esophagitis K21.9 530.81   8. Major depressive disorder, recurrent episode, in partial remission F33.41 296.35       SUMMARY AND RECOMMENDATIONS:  Ms. Luna has a long history of severe pain and is pursuing the spinal cord stimulator (SCS) in an effort to improve pain and quality of life.  Test results should be considered interpretable, and indicate that she reports physical health concerns and minimal depressive symptoms.  Based on research and recommendations by Tiffany et al. (2017) and Tiffany and Melita (2013) regarding presurgical psychological screening for pain control procedures, her test results and reports are within the expected range to predict adequate outcomes and patient satisfaction.  In the clinical interview, Ms. Luna acknowledged a history of depression and anxiety that is well-managed with psychotropic medication.  There are no recommendations for psychological intervention at this time.  This evaluation revealed NO contraindications to SCS from a psychological perspective.      Report and interpretation and coding were completed on 09/11/2018.

## 2018-09-11 NOTE — TELEPHONE ENCOUNTER
----- Message from Elizabeth Anderson sent at 9/11/2018 11:11 AM CDT -----  Contact: Faby [phone]  REFILL: traZODone (DESYREL) 50 MG tablet    PHARMACY: [facility] - [place], [place] - [address]

## 2018-09-11 NOTE — Clinical Note
There are no overt psychological contraindications for the spinal cord stimulator procedure. Please do not hesitate to contact me with any questions or concerns. JR Helio

## 2018-09-11 NOTE — PROGRESS NOTES
Psychiatry Initial Visit (PhD/LCSW)    Date:  09/11/2018    Site: [facility]     CPT Code: 62540    Site:  [facility]    Referred by:  Shaq Silverio M.D.    Chief complaint/reason for encounter:  Psychological Evaluation prior to surgical implantation of a spinal cord stimulator (SCS) to address chronic pain    Clinical status of patient:  Outpatient    Met with:  Patient    History of present illness:  Ms. Faby Luna is a 67-year-old Black  female referred for Psychological Evaluation prior to surgical implantation of a spinal cord stimulator (SCS) to address chronic pain.  Her pain has been present for many years, but it has worsened in the past five years.  Since December 2017, she had a really bad fall and it has really gotten worse since then.  I think I got a pinched nerve.  She has chronic pain in her lower back, legs, and ankles.  She has tried physical therapy (I tried this once upon a time and it actually aggravated the pain more), medications, and injections without receiving sufficient relief.  Her activities are greatly limited.  She has difficulty walking, standing, and sitting.  She cannot walk up and down steps or more than a block.  She is able to complete activities of daily living, but it is very hard.  Ms. Luna was able to walk to her appointment today without assistance.    Ms. Luna is now interested in a trial of SCS.  She has reviewed the educational materials and is familiar with the procedures involved.  When asked about potential concerns regarding SCS, she indicated no concerns.  Her expectations regarding SCS include I hope it will relieve the pain.  Nitin heard different things about it.  All Nitin heard are good things.  My primary care physician told me its a good choice.  She is motivated to get back to walking, get back to standing as much as I can, and do what I was doing without hurting.  And bending.  And being more active with my  grandchildren.  Her children will be available to help her during recovery after surgery.    Ms. Luna has a history of depression and anxiety that is well-managed with psychotropic medication prescribed by her PCP Yessy Keith MD ([facility]).  She does not report current psychiatric problems or adjustment issues.  She was pleasant and cooperative in interview and appeared to be in good spirits.  Of note, Ms. Luna previously attended a pre-operative psychological evaluation for bariatric surgery with clinical psychologist Rick Lawson, PhD, on 2010.  She was psychologically cleared to proceed with surgery and has continued to maintain weight loss.      Medical history:  Lumbar radiculopathy; Lumbar spondylosis; Intractable back pain; Essential hypertension; Type 2 diabetes mellitus without complication, without long-term current use of insulin; GERD    Pain scales:   Current level of pain:  8/10 (lower back, lower part of my spine)  Worst pain rating:  10/10  Best pain ratin/10    Psychiatric Symptoms:  Depression:  She reports, I went through a period of time after my   and I was really depressed.  I had made some changes in my life and I had given up my house and moved from a larger house to a smaller house.  I was depressed because my mom could not stay in the house we were living in so I had to downsize my house.  I had to adjust my life.  At that time, she was experiencing depressed mood nearly every day for most of the day (pretty much so).  During her depression, she also felt tired, less motivated, less interested, withdrawn from others, decreased appetite, and feelings of worthlessness.  She denied feelings of hopelessness and suicidal ideation.  She stopped feeling depressed within the last 8-9 months.  She notes that this was her first depressive episode.  Because she continues to experience some pain-related depressive symptoms, her  diagnosis will be continued at this time.  Based on her reports, her symptoms met criteria for Major Depressive Disorder, single episode, in partial remission.  Rohini/Hypomania:  Denied.  She denied periods of elevated mood or abnormally increased energy or goal-directed activity.  Anxiety:  She started experiencing anxiety over 30 years ago during her first marriage of 11 years when she was a single parent in the marriage.  The last time she felt anxious was 8-9 months ago when she made her move.  When her anxiety was her worst, she experienced it most of the day nearly every day.  However, at this time it does not appear to be present for her.  She notes, Not even with the pain really (do I experience anxiety).  Based on her reports, it is likely she met criteria for Generalized Anxiety Disorder in the past.  However, she does not currently experience significant distress or impairment due to anxiety.  Therefore, a diagnosis will not be assigned at this time.  Thoughts:  Denied delusions, hallucinations.  Suicidal thoughts/behaviors:  Denied.  Self-injury:  Denied.  Sleep:  She experiences significant problems falling and staying asleep.  It takes her approximately a few hours to fall asleep, if I fall asleep.  She has sleep apnea for which she uses a CPAP machine, but she does not often use it.  Right now, she cannot find it after the move because she has not unpacked everything yet.  However, even with the CPAP machine her sleep is hit or miss.    Cognitive functioning:  Denied problems.    Current psychosocial stressors:  Trying to get over this back issue.  Report of coping skills:  I look to myself and say my prayers.  She enjoys going to [employer] and different meetings at [employer].  Support system:  I have a girlfriend at [employer].  I have one of my classmates I talk to.  Strengths and liabilities:  Strength: Patient accepts guidance/feedback, Strength: Patient is expressive/articulate., Strength:  Patient is intelligent., Strength: Patient is motivated for change., Strength: Patient has positive support network., Strength: Patient has reasonable judgment., Strength: Patient is stable., Liability: Patient has poor health.    Current and past substance use:  Alcohol: Denied current use; denied history of abuse or dependency.   Drugs: Denied current use; denied history of abuse or dependency.  Tobacco: None.   Caffeine: None.    Current Psychiatric Treatment:  Medications:  She is currently prescribed Lexapro, Ativan, and trazodone by her PCP Yessy Keith MD ([facility]).  She has been taking these medications for the past 8-9 months since Ive been going through a little issues.  I had an anxiety problem.  She reports the medications are working very well.  A review of her medical record indicates she started taking the medication in May 2018 (around 4 months ago).  Psychotherapy:  Denied.    Psychiatric History:  Previous diagnosis:  Adjustment reaction with anxiety and depression  Previous hospitalizations:  Denied.  History of outpatient treatment:  She has taken lorazepam prescribed by her PCP on and off in the past for anxiety.  Previous suicide attempt:  Denied.    Trauma history:  Denied.    Family history of psychiatric illness:  None reported.    Social history (marriage, employment, etc.):   Ms. Luna was born and raised in [place], [place] by her biological mother along with one brother.  She described her childhood as very well, good.  She denied childhood trauma, abuse, and neglect.  She did very well in school and earned a Masters degree in Social Work.  She is currently teaching at [employer].  She is not on disability and finances are so-so.  She is currently  (her second  passed away two years ago after 23 years of marriage).  She has three children (ages 46, 41, 37).  She currently lives with her mother.  Synagogue is important to her and  she identifies as Protestant.    Legal history:  Denied.    Mental Status Exam:  General appearance:  appears stated age, neatly dressed, well groomed  Speech:  normal rate and tone  Level of cooperation:  cooperative  Thought processes:  logical, goal-directed  Mood:  euthymic (Good)  Thought content:  no illusions, no visual hallucinations, no auditory hallucinations, no delusions, no active or passive homicidal thoughts, no active or passive suicidal ideation, no obsessions, no compulsions, no violence  Affect:  appropriate  Orientation:  oriented to person, place, and date  Memory:  Recent memory:  2 of 3 objects after brief delay.    Remote memory - intact  Attention span and concentration:  spelled HOUSE forward and backwards  Fund of general knowledge: 3 of 3 recent presidents  Abstract reasoning:    Similarities: abstract.    Proverbs: abstract.  Judgment and insight: fair  Language:  intact    Diagnostic impressions:    ICD-10-CM ICD-9-CM   1. Preop examination Z01.818 V72.84   2. Lumbar radiculopathy M54.16 724.4   3. Chronic pain syndrome G89.4 338.4   4. Lumbar spondylosis M47.816 721.3   5. Essential hypertension I10 401.9   6. Type 2 diabetes mellitus without complication, without long-term current use of insulin E11.9 250.00   7. Gastroesophageal reflux disease without esophagitis K21.9 530.81   8. Major depressive disorder, recurrent episode, in partial remission F33.41 296.35       Plan and Recommendations:  Ms. Luna completed psychological testing.  The testing report of this psychological evaluation will follow in the Notes folder in the patients chart in the encounter titled Psychological Testing.    Ms. Luna acknowledged a history of depression and anxiety that is well-managed with psychotropic medication.  She reports no significant current psychiatric problems or adjustment issues.  There are no recommendations for psychological treatment at this time, and she is aware of resources available  should her needs change in the future.  This evaluation revealed NO contraindications to the spinal cord stimulator (SCS) from a psychological perspective.      Length of time:  45 minutes

## 2018-09-11 NOTE — TELEPHONE ENCOUNTER
----- Message from Ni Barnett sent at 9/11/2018  1:29 PM CDT -----            Name of Who is Calling:CLAUDIA AMRTINEZ [[id number]]      What is the request in detail: Pt is requesting to speak with Amaya regarding a procedure that's supposed to be scheduled for her. Please call to discuss.      Can the clinic reply by [facility]: no      What Number to Call Back if not in [facility]: [phone]

## 2018-09-11 NOTE — TELEPHONE ENCOUNTER
Contacted patient regarding message. Patient was scheduled for SCS Trial and Post-op. Dates/times and Pre-procedure instructions were given, staff answered all patient's questions.    Patient verbalized understanding and had no additional questions.

## 2018-09-24 ENCOUNTER — HOSPITAL ENCOUNTER (OUTPATIENT)
Facility: OTHER | Age: 68
Discharge: HOME | End: 2018-09-24
Attending: ANESTHESIOLOGY | Admitting: ANESTHESIOLOGY
Payer: MEDICARE

## 2018-09-24 VITALS
HEART RATE: 61 BPM | SYSTOLIC BLOOD PRESSURE: 139 MMHG | OXYGEN SATURATION: 97 % | RESPIRATION RATE: 18 BRPM | DIASTOLIC BLOOD PRESSURE: 65 MMHG

## 2018-09-24 DIAGNOSIS — M47.816 DEGENERATIVE JOINT DISEASE (DJD) OF LUMBAR SPINE: ICD-10-CM

## 2018-09-24 DIAGNOSIS — M47.26 OSTEOARTHRITIS OF SPINE WITH RADICULOPATHY, LUMBAR REGION: Primary | ICD-10-CM

## 2018-09-24 LAB — POCT GLUCOSE: 85 MG/DL (ref 70–110)

## 2018-09-24 PROCEDURE — 63650 IMPLANT NEUROELECTRODES: CPT | Mod: ,,, | Performed by: ANESTHESIOLOGY

## 2018-09-24 PROCEDURE — 82947 ASSAY GLUCOSE BLOOD QUANT: CPT | Performed by: ANESTHESIOLOGY

## 2018-09-24 PROCEDURE — 25000003 PHARM REV CODE 250: Performed by: GENERAL PRACTICE

## 2018-09-24 PROCEDURE — 99152 MOD SED SAME PHYS/QHP 5/>YRS: CPT | Mod: ,,, | Performed by: ANESTHESIOLOGY

## 2018-09-24 PROCEDURE — 25000003 PHARM REV CODE 250: Performed by: ANESTHESIOLOGY

## 2018-09-24 PROCEDURE — 63600175 PHARM REV CODE 636 W HCPCS: Performed by: ANESTHESIOLOGY

## 2018-09-24 PROCEDURE — 63650 IMPLANT NEUROELECTRODES: CPT | Performed by: ANESTHESIOLOGY

## 2018-09-24 PROCEDURE — C1778 LEAD, NEUROSTIMULATOR: HCPCS | Performed by: ANESTHESIOLOGY

## 2018-09-24 PROCEDURE — 95971 ALYS SMPL SP/PN NPGT W/PRGRM: CPT | Mod: ,,, | Performed by: ANESTHESIOLOGY

## 2018-09-24 RX ORDER — SODIUM CHLORIDE 9 MG/ML
500 INJECTION, SOLUTION INTRAVENOUS CONTINUOUS
Status: DISCONTINUED | OUTPATIENT
Start: 2018-09-24 | End: 2018-09-24 | Stop reason: HOSPADM

## 2018-09-24 RX ORDER — MIDAZOLAM HYDROCHLORIDE 1 MG/ML
INJECTION INTRAMUSCULAR; INTRAVENOUS
Status: DISCONTINUED | OUTPATIENT
Start: 2018-09-24 | End: 2018-09-24 | Stop reason: HOSPADM

## 2018-09-24 RX ORDER — BUPIVACAINE HYDROCHLORIDE 2.5 MG/ML
INJECTION, SOLUTION EPIDURAL; INFILTRATION; INTRACAUDAL
Status: DISCONTINUED | OUTPATIENT
Start: 2018-09-24 | End: 2018-09-24 | Stop reason: HOSPADM

## 2018-09-24 RX ORDER — FENTANYL CITRATE 50 UG/ML
INJECTION, SOLUTION INTRAMUSCULAR; INTRAVENOUS
Status: DISCONTINUED | OUTPATIENT
Start: 2018-09-24 | End: 2018-09-24 | Stop reason: HOSPADM

## 2018-09-24 RX ORDER — CEFAZOLIN SODIUM 1 G/50ML
1 SOLUTION INTRAVENOUS ONCE
Status: COMPLETED | OUTPATIENT
Start: 2018-09-24 | End: 2018-09-24

## 2018-09-24 RX ORDER — LIDOCAINE HYDROCHLORIDE 10 MG/ML
INJECTION, SOLUTION EPIDURAL; INFILTRATION; INTRACAUDAL; PERINEURAL
Status: DISCONTINUED | OUTPATIENT
Start: 2018-09-24 | End: 2018-09-24 | Stop reason: HOSPADM

## 2018-09-24 NOTE — INTERVAL H&P NOTE
The patient has been examined and the H&P has been reviewed:    I concur with the findings and no changes have occurred since H&P was written.    Anesthesia/Surgery risks, benefits and alternative options discussed and understood by patient/family.    General: well developed, well nourished  Head/Neck: atraumatic, thyroid normal, no palpable mass  Eyes:  fundus normal, no disc edema, no vessel changes EOMI  Respiratory:  clear to auscultation bilaterally and normal respiratory effort  Vascular: no carotid bruits  Cardiac: regular rate and rhythm, S1, S2 normal, no murmur, click, rub or gallop  Abdomen: soft, non-tender non-distented; bowel sounds normal; no masses,  no organomegaly  Skin: Skin color, texture, turgor normal. 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     Proceed as planned with SCS trial placement.

## 2018-09-24 NOTE — DISCHARGE SUMMARY
Discharge Diagnosis:Lumbar radiculopathy [M54.16]  Lumbar spondylosis [M47.816]  Chronic pain syndrome [G89.4]  Spinal stenosis of lumbar region, unspecified whether neurogenic claudication present [M48.061]  Condition on Discharge: Stable.  Diet on Discharge: Same as before.  Activity: as per instruction sheet.  Discharge to: Home with a responsible adult.  Follow up: 2-4 weeks &/or as per Discharge instructions

## 2018-09-24 NOTE — DISCHARGE INSTRUCTIONS
Adult Procedural Sedation Instructions    Recovery After Procedural Sedation (Adult)  You have been given medicine by vein to make you sleep during your surgery. This may have included both a pain medicine and sleeping medicine. Most of the effects have worn off. But you may still have some drowsiness for the next 6 to 8 hours.  Home care  Follow these guidelines when you get home:  · For the next 8 hours, you should be watched by a responsible adult. This person should make sure your condition is not getting worse.  · Don't drink any alcohol for the next 24 hours.  · Don't drive, operate dangerous machinery, or make important business or personal decisions during the next 24 hours.  Note: Your healthcare provider may tell you not to take any medicine by mouth for pain or sleep in the next 4 hours. These medicines may react with the medicines you were given in the hospital. This could cause a much stronger response than usual.  Follow-up care  Follow up with your healthcare provider if you are not alert and back to your usual level of activity within 12 hours.  When to seek medical advice  Call your healthcare provider right away if any of these occur:  · Drowsiness gets worse  · Weakness or dizziness gets worse  · Repeated vomiting  · You can't be awakened   Date Last Reviewed: 10/18/2016  © 2482-6483 [employer]. [address] [place] [address], [place] [id number]. All rights reserved. This information is not intended as a substitute for professional medical care. Always follow your healthcare professional's instructions.         Spinal Cord Stimulation  Pain messages travel over nerve pathways to the spinal cord, inside the spine. The spinal cord carries the messages to the brain. Constant pain messages can cause long-term pain that is hard to treat. This is known as chronic pain. Spinal cord stimulation uses a medical device to send signals to the nerve pathways inside your phil cord. These signals help  block the pain.    Keeping a pain log  Your doctor may ask you to keep a pain log for a certain amount of time. In it, you may answer certain questions: When do you feel pain? What does it feel like? How long does the pain last? What makes it better or worse? When the stimulation is on, is your pain relieved? Your answers help show how well spinal cord stimulation may work for you. Your doctor will give you guidelines for your pain log. During the time you write the log, you may not be able to take pain medications. Be sure to discuss this with your doctor.    Spinal cord stimulation may help  Spinal cord stimulation is one treatment for chronic pain. Certain criteria need to be met to be a good candidate for spinal cord stimulation. A small medical device sends signals to your spinal cord. These signals keep the chronic pain messages from being sent to your brain. Instead, you may feel tingling from the electrical signals. A trial stimulator that is worn outside the body in tried first to see if it will work. If it does, the permanent stimulator system may be used. This device can be removed at any time.  The stimulator system  The stimulator system has several parts. A power source makes the signals. This power source may be worn outside the body or implanted under the skin on your abdomen or buttocks. One or more leads (flexible, plastic-covered wires or paddle) are placed inside the body to carry the signals to the spinal cord. Your doctor can explain the system youll be using in more detail.  Risks and possible complications  · Infection  · Bleeding  · Nerve damage  · Spinal cord damage  · Failure to relieve pain    © 2321-6322 [employer]. [address] [place] [address], [place] [address]. All rights reserved. This information is not intended as a substitute for professional medical care. Always follow your healthcare professional's instructions.      Spinal Cord Stimulation: Your Experience  Pain  messages travel over nerve pathways to the spinal cord, inside the spine. The spinal cord carries the messages to the brain. Constant pain messages can cause long-term pain that is hard to treat. This is known as chronic pain. Spinal cord stimulation uses a medical device to send signals to the nerve pathways inside your spinal cord. These signals help block the pain.  Your healthcare provider does a stimulator placement in two stages. He or she does a test (trial) stage to see how well spinal cord stimulation works for you. If the trial stage is a success, the permanent stimulator system is put into place.      Discuss the results of the trial stage with your doctor.    Getting ready at home  Your doctor will give you guidelines on how to get ready for the procedures. Tell your doctor what medicines you take, and ask if you should stop taking any of them. Do not eat or drink for 8 hours before you check in for the procedures. Certain criteria must be met to be a good candidate for the spinal cord stimulation device.  Placing the trial leads  Your doctor will place the trial leads under the skin on your back through a small incision. He or she will place one end of the leads near your spinal cord. Your doctor will attach the other end of the leads to the stimulator power source. He or she will then adjust the stimulator to the right level. For the trial stage, you wear the power source outside your body.  The trial stage  Your doctor will instruct you to keep a second pain log during the trial stage. You can compare this log with your first pain log to show how well the stimulator system is working for you.  Placing the permanent system  If the trial stimulator works well for you, a permanent system might be indicated. This must be done in the hospital. Prepare for it as instructed. The  or the power source is implanted under the skin on your abdomen or buttocks. The power source is small, so it wont show  under your clothing. Some devices are rechargeable After the system is in place, the settings are checked to make sure they are at the right level for you. If necessary, the spinal cord stimulation device can be removed at any time. Not all these systems are MRI compatible. Find out from your doctor if you still can have an MRI once the system is installed.  After the procedures  You may stay in the hospital overnight. The implant site will be sore for a few days. The leads need some time to become fixed so they dont move around. Your doctor will tell you what activities to avoid for the next month or so.  When to call your doctor  Call your doctor right away if you:  · Have fever over 100.4°F (38°C)  · Have chills  · Have pain, drainage, or increased redness at the implant site  · If you don't feel the stimulation anymore  Also call your doctor if the pain symptoms return.

## 2018-09-24 NOTE — OP NOTE
Spinal Cord Stimulator Trial    Time-out taken to identify patient and procedure side prior to starting the procedure.   I attest that I have reviewed the patient's home medications prior to the procedure and no contraindication have been identified. I  re-evaluated the patient after the patient was positioned for the procedure in the procedure room immediately before the procedural time-out. The vital signs are current and represent the current state of the patient which has not significantly changed since the preprocedure assessment.  Date of Service: 09/24/2018    PCP: Yessy Keith MD      Pre-Op Diagnosis: Lumbar radiculopathy [M54.16]  Lumbar spondylosis [M47.816]  Chronic pain syndrome [G89.4]  Spinal stenosis of lumbar region, unspecified whether neurogenic claudication present [M48.061]  1. Osteoarthritis of spine with radiculopathy, lumbar region    2. Degenerative joint disease (DJD) of lumbar spine        Post-Op Diagnosis: Lumbar radiculopathy [M54.16]  Lumbar spondylosis [M47.816]  Chronic pain syndrome [G89.4]  Spinal stenosis of lumbar region, unspecified whether neurogenic claudication present [M48.061]   1. Osteoarthritis of spine with radiculopathy, lumbar region    2. Degenerative joint disease (DJD) of lumbar spine        Procedure:  1. Insertion of Spinal Cord Stimulator Leads x 2    2. Initial Programming of Device    3. Flouroscopic Needle Guidance    Surgeon:  Shaq Silverio MD    Assistant: Filiberto    Anesthesia: Versed 2 mg IV and Fentanyl 50 mg IV (See nurse documentation and case log for sedation time)    Op Note:     Informed consent was obtained after explaining the risks, benefits and alternatives to the procedure.  The patient was taken to the operating room and placed in the prone position.  Routine monitors were applied and anesthesia was initiated.  The patient remained conversant throughout the procedure.  The patients back and buttocks were prepped in a sterile fashion using  alcohol and a Chloroprep solution.  Sterile drapes were applied.  Fluoroscopy was used to examine the patients spine.     The T12-L1  interspace was identified.  Through a 1% local lidocaine skin wheel a 22 gauge 3 ½ spinal needle was used to anesthetize the deeper tissue up to the supraspinous ligament.   A 14 gauge Touhy needle was then advanced to contact the right L1 lamina.  It was walked off in a superior medial direction until it entered the epidural space using loss of resistance to saline and air.  The spinal cord stimulator lead was advanced through the Touhy needle and directed to rest the tip at the T6-7 intervertebral space.  The same procedure was repeated in detail for the left side to insert a second lead within the epidural space to reside adjacent to the first lead.  The patient was allowed to fully awaken from anesthesia.  Testing was carried out by the device representative under the guidance of Dr. Silverio.  Once the leads were adjusted to by within the proper positioning, needles were withdrawn leaving the leads in place and were then secured to the patients skin using Stay-Fix adhesive bandages and tegaderm.  The patients back was cleaned.  The patient was allowed to fully recover from anesthesia and taken to recovery room in good condition.    There were no complications to the procedure.  Final stimulation programming and testing of the device was done in the recovery room by the device representative under the guidance of Dr. Silverio.    Complications:  None    Disposition:  To home in good condition with resolution of their chief complaint.

## 2018-09-29 DIAGNOSIS — M47.26 OSTEOARTHRITIS OF SPINE WITH RADICULOPATHY, LUMBAR REGION: ICD-10-CM

## 2018-10-01 ENCOUNTER — OFFICE VISIT (OUTPATIENT)
Dept: PAIN MEDICINE | Facility: CLINIC | Age: 68
End: 2018-10-01
Payer: MEDICARE

## 2018-10-01 VITALS
TEMPERATURE: 99 F | WEIGHT: 178.63 LBS | DIASTOLIC BLOOD PRESSURE: 67 MMHG | HEIGHT: 64 IN | SYSTOLIC BLOOD PRESSURE: 121 MMHG | BODY MASS INDEX: 30.5 KG/M2 | HEART RATE: 68 BPM | RESPIRATION RATE: 18 BRPM

## 2018-10-01 DIAGNOSIS — M47.26 OSTEOARTHRITIS OF SPINE WITH RADICULOPATHY, LUMBAR REGION: ICD-10-CM

## 2018-10-01 DIAGNOSIS — M47.816 LUMBAR SPONDYLOSIS: ICD-10-CM

## 2018-10-01 DIAGNOSIS — G89.4 CHRONIC PAIN SYNDROME: Primary | ICD-10-CM

## 2018-10-01 DIAGNOSIS — M54.16 LUMBAR RADICULOPATHY: ICD-10-CM

## 2018-10-01 PROCEDURE — 99024 POSTOP FOLLOW-UP VISIT: CPT | Mod: ,,, | Performed by: NURSE PRACTITIONER

## 2018-10-01 PROCEDURE — 99999 PR PBB SHADOW E&M-EST. PATIENT-LVL III: CPT | Mod: PBBFAC,,, | Performed by: NURSE PRACTITIONER

## 2018-10-01 PROCEDURE — 99213 OFFICE O/P EST LOW 20 MIN: CPT | Mod: PBBFAC | Performed by: NURSE PRACTITIONER

## 2018-10-01 RX ORDER — ZONISAMIDE 100 MG/1
CAPSULE ORAL
Qty: 120 CAPSULE | Refills: 2 | Status: SHIPPED | OUTPATIENT
Start: 2018-10-01 | End: 2019-01-02 | Stop reason: SDUPTHER

## 2018-10-01 NOTE — PROGRESS NOTES
Chronic patient Established Note (Follow up visit)      SUBJECTIVE:    Faby Luna presents to the clinic for a follow-up appointment for lower back and lower extremity pain.  She is s/p lumbar SCS trial on 9/24/18 with Dr. Silverio.  She is reporting 100% pain relief.  Since the last visit, Faby Luna states the pain has been improving.  She denies any fever or drainage during the trial period.  Current pain intensity is 0/10.    Pain Disability Index Review:  Last 3 PDI Scores 10/1/2018 9/5/2018 7/3/2018   Pain Disability Index (PDI) 43 [phone] 48       Pain Medications:  Ibuprofen and Zonegran    Opioid Contract: no     report:  Not applicable    Pain Procedures:   5/2/18 Left L3 and L4 TF ELISE- 20% relief  5/21/18 Bilateral L4-5 and L5-S1 facet joint injections- no relief  9/24/18 Lumbar SCS trial ([facility])- 100% relief    Physical Therapy/Home Exercise: no    Imaging:     3/31/18 Lumbar MRI    Narrative     EXAMINATION:  MRI LUMBAR SPINE WITHOUT CONTRAST    CLINICAL HISTORY:  Low back pain, >6wks conservative tx, persistent-progressive sx, surgical candidate; Other spondylosis with radiculopathy, lumbar region    TECHNIQUE:  Multiplanar, multisequence MR images were acquired from the thoracolumbar junction to the sacrum without the administration of contrast.    COMPARISON:  Plain films from 03/27/2018    FINDINGS:  There is grade 1 spondylolisthesis of L4 on L5. The vertebral body heights are well maintained, with no fracture.  No marrow signal abnormality suspicious for an infiltrative process.    The conus medullaris terminates at approximately the mid body of L1.  There is a cyst associated with the upper pole of the right kidney.  There is disc desiccation noted throughout the lumbar spine with relative sparing of the L5-S1 disc.  Mild disc space narrowing present at the L4-5 level.    L1-L2: Mild diffuse disc bulge resulting in no significant central or neural foraminal canal narrowing.    L2-L3: No significant  central canal narrowing.  There is mild narrowing of either neural foraminal canal secondary to disc material.    L3-L4: Disc bulging in the bilateral foraminal regions resulting in no significant central canal narrowing.  Mild-to-moderate bilateral facet arthropathy also noted.  The bilateral neural foraminal canals are moderately narrowed with some mild effacement of either exiting L3 nerve root in the extraforaminal regions bilaterally.    L4-L5:  Grade 1 spondylolisthesis along with moderate to severe bilateral facet arthropathy and ligamentum flavum hypertrophy resulting in at least moderate narrowing of the central canal.  The right neural foraminal canal is mildly to moderately narrowed.  Left neural foraminal canal is moderately to severely narrowed with mild effacement of the exiting L4 nerve root.    L5-S1:  No significant central or neural foraminal canal narrowing noted.  Mild bilateral facet arthropathy noted.   Impression       1. Multilevel degenerative changes of the lumbar spine as detailed above     Lumbar XRAYs 3/27/18    Narrative     EXAMINATION:  XR LUMBAR SPINE AP AND LAT WITH FLEX/EXT    CLINICAL HISTORY:  Low back pain    TECHNIQUE:  AP and lateral views as well as lateral flexion and extension images are performed through the lumbar spine.    COMPARISON:  None    FINDINGS:  X-ray lumbar spine with flexion and extension demonstrates grade 1 spondylolisthesis at L4-5 measuring around 6 mm which does not change significantly with flexion and extension.  The L4-5 disc is narrowed and degenerated.  Other lumbar vertebral discs are maintained.  Vertebral body heights are maintained.  Small anterior spurs are seen, and there is small posterior osteophyte along the inferior endplate of L4.  There is lumbar facet arthropathy at L4-5 and L5-S1.   Impression       Degenerative changes as above.  Grade 1 anterolisthesis and degenerative disc disease at L4-5.         Allergies:   Review of patient's  allergies indicates:   Allergen Reactions    Aspirin Other (See Comments)     Has been told not to take it due to bariatric surgery       Current Medications:   Current Outpatient Medications   Medication Sig Dispense Refill    atenolol (TENORMIN) 100 MG tablet Take 1 tablet (100 mg total) by mouth once daily. 90 tablet 1    atorvastatin (LIPITOR) 40 MG tablet Take 1 tablet (40 mg total) by mouth once daily. 90 tablet 3    blood sugar diagnostic Strp 1 strip by Misc.(Non-Drug; Combo Route) route 2 (two) times daily with meals. 100 strip 11    blood-glucose meter kit Use as instructed 1 each 0    escitalopram oxalate (LEXAPRO) 20 MG tablet Take 20 mg by mouth once daily.       furosemide (LASIX) 20 MG tablet Take 1 tablet (20 mg total) by mouth once daily. 90 tablet 1    glipiZIDE (GLUCOTROL) 10 MG tablet Take 10 mg by mouth every evening.      lancets Misc 1 lancet by Misc.(Non-Drug; Combo Route) route 2 (two) times daily with meals. 100 each 11    lancing device Misc 1 Device by Misc.(Non-Drug; Combo Route) route 2 (two) times daily with meals. 1 each 0    LORazepam (ATIVAN) 2 MG Tab Take 0.5 tablets by mouth 2 (two) times daily as needed for Anxiety.      losartan (COZAAR) 25 MG tablet Take 1 tablet (25 mg total) by mouth once daily. 90 tablet 3    metFORMIN (GLUCOPHAGE) 1000 MG tablet Take 1,000 mg by mouth every evening.      pantoprazole (PROTONIX) 40 MG tablet Take 1 tablet (40 mg total) by mouth before breakfast. 90 tablet 1    traZODone (DESYREL) 50 MG tablet Take 1 tablet (50 mg total) by mouth nightly as needed for Insomnia. 90 tablet 0    TRUE METRIX GLUCOSE METER Misc       TRUEPLUS LANCETS 33 gauge Misc       zonisamide (ZONEGRAN) 100 MG Cap TAKE 4 CAPSULES BY MOUTH AT BEDTIME 120 capsule 2     No current facility-administered medications for this visit.        REVIEW OF SYSTEMS:    GENERAL:  No weight loss, malaise or fevers.  HEENT:  Negative for frequent or significant  headaches.  NECK:  Negative for lumps, goiter, pain and significant neck swelling.  RESPIRATORY:  Negative for cough, wheezing or shortness of breath.  CARDIOVASCULAR:  Negative for chest pain, leg swelling or palpitations. Hypertension.  GI:  Negative for abdominal discomfort, blood in stools or black stools or change in bowel habits.  MUSCULOSKELETAL:  See HPI.  SKIN:  Negative for lesions, rash, and itching.  PSYCH:  Negative for sleep disturbance, mood disorder and recent psychosocial stressors.  HEMATOLOGY/LYMPHOLOGY:  Negative for prolonged bleeding, bruising easily or swollen nodes.  NEURO:   No history of headaches, syncope, paralysis, seizures or tremors.  ENDO: Diabetes.  All other reviewed and negative other than HPI.    Past Medical History:  Past Medical History:   Diagnosis Date    Arthritis     Diabetes mellitus     Hypertension        Past Surgical History:  Past Surgical History:   Procedure Laterality Date     SECTION      CHOLECYSTECTOMY      EPIDURAL STEROID INJECTION INTO LUMBAR SPINE N/A 2018    Procedure: INJECTION, STEROID, SPINE, LUMBAR, EPIDURAL;  Surgeon: Shaq Silverio MD;  Location: [facility];  Service: Pain Management;  Laterality: N/A;  LUMBAR L4-L5 INTERLAMINAR ELISE'  [id number]  W/ SEDATION     HYSTERECTOMY      INJECTION OF FACET JOINT Bilateral 2018    Procedure: INJECTION-FACET;  Surgeon: Shaq Silverio MD;  Location: [facility];  Service: Pain Management;  Laterality: Bilateral;  LUMBAR BILATERAL L4-L5 AND L5-S1 FACET STEROID INJECTION  [id number]    W/ SEDATION     INJECTION, STEROID, SPINE, LUMBAR, EPIDURAL N/A 2018    [facility]    INJECTION-FACET Bilateral 2018    Performed by [facility] at [facility]    INJECTION-STEROID-EPIDURAL-TRANSFORAMINAL Left 2018    Performed by [facility] MD at [facility]    TRIAL OF SPINAL CORD NERVE STIMULATOR N/A 2018    Procedure: TRIAL,  "NEUROSTIMULATOR, SPINAL CORD, SPINAL CORD STIMULATOR TRIAL-INTERNAL WIRES TO EXTERNAL BATTERY;  Surgeon: Shaq Silverio MD;  Location: [facility];  Service: Pain Management;  Laterality: N/A;  COPE REP NOTIFIED    TRIAL, NEUROSTIMULATOR, SPINAL CORD, SPINAL CORD STIMULATOR TRIAL-INTERNAL WIRES TO EXTERNAL BATTERY N/A 9/24/2018    Performed by [facility] at [facility]       Family History:  No family history on file.    Social History:  Social History     Socioeconomic History    Marital status:      Spouse name: None    Number of children: None    Years of education: None    Highest education level: None   Social Needs    Financial resource strain: None    Food insecurity - worry: None    Food insecurity - inability: None    Transportation needs - medical: None    Transportation needs - non-medical: None   Occupational History    None   Tobacco Use    Smoking status: Never Smoker    Smokeless tobacco: Never Used   Substance and Sexual Activity    Alcohol use: No    Drug use: No    Sexual activity: None   Other Topics Concern    None   Social History Narrative    None       OBJECTIVE:    /67   Pulse 68   Temp 98.7 °F (37.1 °C) (Oral)   Resp 18   Ht 5' 4" (1.626 m)   Wt 81 kg (178 lb 9.6 oz)   BMI 30.66 kg/m²     PHYSICAL EXAMINATION:    General appearance: Well appearing, in no acute distress, alert and oriented x3.  Psych:  Mood and affect appropriate.  Skin: Skin color, texture, turgor normal, no rashes or lesions, in both upper and lower body.  SCS site without drainage or signs of infection.  Leads removed without difficulty with tips intact.  Area cleaned with alcohol and Bacitracin applied.  Head/face:  Atraumatic, normocephalic. No palpable lymph nodes  Cor: RRR  Pulm: CTA  Gait: Antalgic- ambulates without assistance.    ASSESSMENT: 67 y.o. year old female with back and leg pain, consistent with the following diagnoses:     1. Chronic pain syndrome     2. " Osteoarthritis of spine with radiculopathy, lumbar region     3. Lumbar spondylosis           PLAN:     - Schedule for lumbar SCS implant with Dr. Esteves.      - Continue Zonegran at current dose (400 mg at bedtime).    - RTC after implant for post op.    - Counseled patient regarding the importance of constant sleeping habits and physical therapy.      The above plan and management options were discussed at length with patient. Patient is in agreement with the above and verbalized understanding.    Renetta Steele  10/01/2018

## 2018-10-05 ENCOUNTER — TELEPHONE (OUTPATIENT)
Dept: PAIN MEDICINE | Facility: CLINIC | Age: 68
End: 2018-10-05

## 2018-10-05 DIAGNOSIS — Z01.818 PRE-OP TESTING: ICD-10-CM

## 2018-10-05 DIAGNOSIS — G89.4 CHRONIC PAIN SYNDROME: Primary | ICD-10-CM

## 2018-10-05 DIAGNOSIS — M54.16 LUMBAR RADICULOPATHY: ICD-10-CM

## 2018-10-05 DIAGNOSIS — M96.1 FAILED BACK SURGICAL SYNDROME: ICD-10-CM

## 2018-10-05 RX ORDER — CHLORHEXIDINE GLUCONATE 40 MG/ML
SOLUTION TOPICAL ONCE
Refills: 0
Start: 2018-10-05 | End: 2018-10-05

## 2018-10-05 RX ORDER — LIDOCAINE HYDROCHLORIDE 10 MG/ML
1 INJECTION, SOLUTION EPIDURAL; INFILTRATION; INTRACAUDAL; PERINEURAL ONCE
Status: CANCELLED | OUTPATIENT
Start: 2018-10-05 | End: 2018-10-05

## 2018-10-05 RX ORDER — SODIUM CHLORIDE 9 MG/ML
1000 INJECTION, SOLUTION INTRAVENOUS CONTINUOUS
Status: CANCELLED | OUTPATIENT
Start: 2018-10-05

## 2018-10-05 NOTE — TELEPHONE ENCOUNTER
"----- Message from Jessica Vicente LPN sent at 10/5/2018 11:34 AM CDT -----  Contact: Patient herself     JASON  ----- Message -----  From: Haley Whelan MA  Sent: 10/5/2018  11:15 AM  To: Jessica Vicente LPN    Ok. Just waiting on Dr. Arredondo to put the case in.   ----- Message -----  From: Jessica Vicente LPN  Sent: 10/5/2018  11:03 AM  To: Haley Whelan MA    It will done in [place]. Dr Arredondo will be training Dr Silverio  ----- Message -----  From: Haley Whelan MA  Sent: 10/5/2018  10:59 AM  To: Jessica Vicente LPN    Will the procedure be done in [place] or [facility]?  ----- Message -----  From: Jessica Vicente LPN  Sent: 10/5/2018  10:56 AM  To: Maria Elena Blue [facility] Staff     Patient requesting status of being scheduled for procedure.  ----- Message -----  From: Kaitlynn John  Sent: 10/5/2018  10:06 AM  To: [facility] Staff            Name of Who is Calling: Patient herself      What is the request in detail:   Patient called requesting a call. Says, "she ready to schedule her procedure."   Please give a call back at your earliest convenience.     THANKS!      Can the clinic reply by [facility]: NO      What Number to Call Back: Patient / ph# [phone]                                                                    "

## 2018-10-05 NOTE — TELEPHONE ENCOUNTER
Contacted and spoke to patient regarding SCS Implant.     Patient stated she is ready to be scheduled as soon as possible for her SCS Implant.    Staff informed patient that the SCS Implant will be scheduled by Dr. Arredondo's staff in [place], as Dr. Arredondo and Dr. Silverio will be doing the Implant at [facility].    Staff provided patient with Dr. Arredondo's office number and advised her to contact his office to be scheduled as soon as possible, as Dr. Arredondo and Dr. Silverio had agreed to do the SCS Implant on October 19th (as per previous staff messages).     Patient acknowledged information given and expressed understanding.

## 2018-10-05 NOTE — TELEPHONE ENCOUNTER
Dr. Arredondo's staff--please contact patient to schedule SCS Implant.     Please see messages below. Thank you.

## 2018-10-05 NOTE — TELEPHONE ENCOUNTER
"----- Message from Kaitlynn John sent at 10/5/2018 10:06 AM CDT -----  Contact: Patient herself            Name of Who is Calling: Patient herself      What is the request in detail:   Patient called requesting a call. Says, "she ready to schedule her procedure."   Please give a call back at your earliest convenience.     THANKS!      Can the clinic reply by [facility]: NO      What Number to Call Back: Patient / ph# [phone]                                                          "

## 2018-10-05 NOTE — TELEPHONE ENCOUNTER
----- Message from Aide Tierney sent at 10/5/2018 12:30 PM CDT -----  Contact: Self [phone]  New Patient would like to speak with you about getting the information of when her procedure will take place. Please advise

## 2018-10-05 NOTE — PROGRESS NOTES
Surgical orders placed for spinal cord stimulator implant.    Orders were also placed for Hibiclens wash starting 4 days before surgery, Bactroban nasal application, and urinalysis to ensure the absence of urinary tract infection.    Su Arredondo Jr, MD  Interventional Pain Medicine / Anesthesiology

## 2018-10-05 NOTE — TELEPHONE ENCOUNTER
Returned patient call to advise that she is waiting to contacted by [facility] to be scheduled for her procedure,  no answer, message stated mailbox is full, message forwarded  to Dr Su Arredondo staff

## 2018-10-10 ENCOUNTER — TELEPHONE (OUTPATIENT)
Dept: PAIN MEDICINE | Facility: CLINIC | Age: 68
End: 2018-10-10

## 2018-10-10 NOTE — TELEPHONE ENCOUNTER
Attempted to contact pt regarding her procedure with Dr. Arredondo and Dr. Silverio. No answer and voicemail was full.

## 2018-10-10 NOTE — TELEPHONE ENCOUNTER
----- Message from Haley Whelan MA sent at 10/10/2018  9:28 AM CDT -----  Attempted to contact pt twice with no success. Can you please reach out to pt and let her know she's scheduled to have her SCS Implant on 10/26/18 in [place] with Dr. Arredondo and Dr. Silverio. Our scheduling department will contact her the day before to give her a time of arrival. Also, I put in the orders for the surgery.  Can you also call her on Monday and let her know that she needs a urinalysis?  She also needs to  hibiclens and the bactroban nasal ointment.

## 2018-10-16 ENCOUNTER — ANESTHESIA EVENT (OUTPATIENT)
Dept: SURGERY | Facility: HOSPITAL | Age: 68
End: 2018-10-16
Payer: MEDICARE

## 2018-10-16 ENCOUNTER — HOSPITAL ENCOUNTER (OUTPATIENT)
Dept: PREADMISSION TESTING | Facility: HOSPITAL | Age: 68
Discharge: HOME | End: 2018-10-16
Attending: PAIN MEDICINE
Payer: MEDICARE

## 2018-10-16 ENCOUNTER — TELEPHONE (OUTPATIENT)
Dept: PAIN MEDICINE | Facility: CLINIC | Age: 68
End: 2018-10-16

## 2018-10-16 ENCOUNTER — LAB VISIT (OUTPATIENT)
Dept: LAB | Facility: HOSPITAL | Age: 68
End: 2018-10-16
Attending: PAIN MEDICINE
Payer: MEDICARE

## 2018-10-16 DIAGNOSIS — G89.4 CHRONIC PAIN SYNDROME: ICD-10-CM

## 2018-10-16 DIAGNOSIS — Z01.818 PRE-OP TESTING: ICD-10-CM

## 2018-10-16 LAB
BACTERIA #/AREA URNS HPF: ABNORMAL /HPF
BILIRUB UR QL STRIP: NEGATIVE
CLARITY UR: CLEAR
COLOR UR: YELLOW
GLUCOSE UR QL STRIP: NEGATIVE
HGB UR QL STRIP: NEGATIVE
KETONES UR QL STRIP: ABNORMAL
LEUKOCYTE ESTERASE UR QL STRIP: NEGATIVE
MICROSCOPIC COMMENT: ABNORMAL
NITRITE UR QL STRIP: POSITIVE
PH UR STRIP: 6 [PH] (ref 5–8)
PROT UR QL STRIP: NEGATIVE
RBC #/AREA URNS HPF: 1 /HPF (ref 0–4)
SP GR UR STRIP: 1.02 (ref 1–1.03)
SQUAMOUS #/AREA URNS HPF: 0 /HPF
URN SPEC COLLECT METH UR: ABNORMAL
UROBILINOGEN UR STRIP-ACNC: NEGATIVE EU/DL
WBC #/AREA URNS HPF: 3 /HPF (ref 0–5)

## 2018-10-16 PROCEDURE — 81000 URINALYSIS NONAUTO W/SCOPE: CPT

## 2018-10-16 RX ORDER — SODIUM CHLORIDE, SODIUM LACTATE, POTASSIUM CHLORIDE, CALCIUM CHLORIDE 600; 310; 30; 20 MG/100ML; MG/100ML; MG/100ML; MG/100ML
INJECTION, SOLUTION INTRAVENOUS CONTINUOUS
Status: CANCELLED | OUTPATIENT
Start: 2018-10-16

## 2018-10-16 RX ORDER — LIDOCAINE HYDROCHLORIDE 10 MG/ML
1 INJECTION, SOLUTION EPIDURAL; INFILTRATION; INTRACAUDAL; PERINEURAL ONCE
Status: CANCELLED | OUTPATIENT
Start: 2018-10-16 | End: 2018-10-16

## 2018-10-16 NOTE — DISCHARGE INSTRUCTIONS
Your surgery is scheduled for 10/26/18.    Please report to Outpatient Surgery Intake Office on the 2nd FLOOR at 5:30 a.m.          INSTRUCTIONS IMPORTANT!!!  ¨ Do not eat or drink after 12 midnight-including water. OK to brush teeth, no   gum, candy or mints!    ¨ Take only these medicines with a small swallow of water-morning of surgery: losartan        ____  Proceed to [facility] on 10/16/18 for additional blood test.        ____  Do not wear makeup, including mascara.  ____  No powder, lotions or creams to surgical area.  ____  Please remove all jewelry, including piercings and leave at home.  ____  No money or valuables needed. Please leave at home.  ____  Please bring any documents given by your doctor.  ____  If going home the same day, arrange for a ride home. You will not be able to             drive if Anesthesia was used.  ____  Wear loose fitting clothing. Allow for dressings, bandages.  ____  Stop Aspirin, Ibuprofen, Motrin and Aleve at least 3-5 days before surgery, unless otherwise instructed by your doctor, or the nurse.   You MAY use Tylenol/acetaminophen until day of surgery.  ____  Wash the surgical area with Hibiclens the night before surgery, and again the             morning of surgery.  Be sure to rinse hibiclens off completely (if instructed by   nurse).  ____  If you take diabetic medication, do not take am of surgery unless instructed by Doctor.  ____  Call MD for temperature above 101 degrees or any other signs of infection such as Urinary (bladder) infection, Upper respiratory infection, skin boils, etc.  ____ Stop taking any Fish Oil supplement or any Vitamins that contain Vitamin E at least 5 days prior to surgery.  ____ Do Not wear your contact lenses the day of your procedure.  You may wear your glasses.      ____Do not shave for 3 days prior to surgery.  ____ Practice Good hand washing before, during, and after procedure.      I have read or had read and explained to  me, and understand the above information.  Additional comments or instructions:  For additional questions call [phone]      Pre-Op Bathing Instructions    Before surgery, you can play an important role in your own health.    Because skin is not sterile, we need to be sure that your skin is as free of germs as possible. By following the instructions below, you can reduce the number of germs on your skin before surgery.    IMPORTANT: You will need to shower with a special soap called Hibiclens*, available at any pharmacy.  If you are allergic to Chlorhexidine (the antiseptic in Hibiclens), use an antibacterial soap such as Dial Soap for your preoperative shower.  You will shower with Hibiclens both the night before your surgery and the morning of your surgery.  Do not use Hibiclens on the head, face or genitals to avoid injury to those areas.    STEP #1: THE NIGHT BEFORE YOUR SURGERY     1. Do not shave the area of your body where your surgery will be performed.  2. Shower and wash your hair and body as usual with your normal soap and shampoo.  3. Rinse your hair and body thoroughly after you shower to remove all soap residue.  4. With your hand, apply one packet of Hibiclens soap to the surgical site.   5. Wash the site gently for five (5) minutes. Do not scrub your skin too hard.   6. Do not wash with your regular soap after Hibiclens is used.  7. Rinse your body thoroughly.  8. Pat yourself dry with a clean, soft towel.  9. Do not use lotion, cream, or powder.  10. Wear clean clothes.    STEP #2: THE MORNING OF YOUR SURGERY     1. Repeat Step #1.    * Not to be used by people allergic to Chlorhexidine.          Spinal Cord Stimulation    Pain messages travel over nerve pathways to the spinal cord, inside the spine. The spinal cord carries the messages to the brain. Constant pain messages can cause long-term pain that is hard to treat. This is known as chronic pain. Spinal cord stimulation uses a medical device to  send signals to the nerve pathways inside your phil cord. These signals help block the pain.  Keeping a pain log  Your doctor may ask you to keep a pain log for a certain amount of time. In it, you may answer certain questions: When do you feel pain? What does it feel like? How long does the pain last? What makes it better or worse? When the stimulation is on, is your pain relieved? Your answers help show how well spinal cord stimulation may work for you. Your doctor will give you guidelines for your pain log. During the time you write the log, you may not be able to take pain medications. Be sure to discuss this with your doctor.  Spinal cord stimulation may help  Spinal cord stimulation is one treatment for chronic pain. Certain criteria need to be met to be a good candidate for spinal cord stimulation. A small medical device sends signals to your spinal cord. These signals keep the chronic pain messages from being sent to your brain. Instead, you may feel tingling from the electrical signals. A trial stimulator that is worn outside the body in tried first to see if it will work. If it does, the permanent stimulator system may be used. This device can be removed at any time.  The stimulator system  The stimulator system has several parts. A power source makes the signals. This power source may be worn outside the body or implanted under the skin on your abdomen or buttocks. One or more leads (flexible, plastic-covered wires or paddle) are placed inside the body to carry the signals to the spinal cord. Your doctor can explain the system youll be using in more detail.  Risks and possible complications  · Infection  · Bleeding  · Nerve damage  · Spinal cord damage  · Failure to relieve pain   Date Last Reviewed: 10/19/2015  © 2111-9055 [employer] [address] [place] [address]. All rights reserved. This information is not intended as a substitute for professional medical care. Always  follow your healthcare professional's instructions.

## 2018-10-16 NOTE — TELEPHONE ENCOUNTER
Spoke with pt regarding a prescription for Hibiclens. I explained to pt she does not need a prescription for Hibiclens she can buy that OTC at any drug store. Pt verbalized understanding.

## 2018-10-16 NOTE — TELEPHONE ENCOUNTER
----- Message from Garry Berrios sent at 10/16/2018  1:42 PM CDT -----  Contact: self/[phone]  Patient called to speak with your office about the prescription for the body wash she needs for her procedure scheduled next week.    Please call and advise.

## 2018-10-16 NOTE — ANESTHESIA PREPROCEDURE EVALUATION
10/16/2018  Faby Luna is a 68 y.o., female.    Review of patient's allergies indicates:   Allergen Reactions    Aspirin Other (See Comments)     Has been told not to take it due to bariatric surgery     Past Medical History:   Diagnosis Date    Arthritis     Diabetes mellitus     Hypertension      Past Surgical History:   Procedure Laterality Date     SECTION      CHOLECYSTECTOMY      EPIDURAL STEROID INJECTION INTO LUMBAR SPINE N/A 2018    Procedure: INJECTION, STEROID, SPINE, LUMBAR, EPIDURAL;  Surgeon: Shaq Silverio MD;  Location: [facility];  Service: Pain Management;  Laterality: N/A;  LUMBAR L4-L5 INTERLAMINAR ELISE'  [id number]  W/ SEDATION     HYSTERECTOMY      INJECTION OF FACET JOINT Bilateral 2018    Procedure: INJECTION-FACET;  Surgeon: Shaq Silverio MD;  Location: [facility];  Service: Pain Management;  Laterality: Bilateral;  LUMBAR BILATERAL L4-L5 AND L5-S1 FACET STEROID INJECTION  [id number]    W/ SEDATION     INJECTION, STEROID, SPINE, LUMBAR, EPIDURAL N/A 2018    Performed by [facility] at [facility]    INJECTION-FACET Bilateral 2018    [facility]    INJECTION-STEROID-EPIDURAL-TRANSFORAMINAL Left 2018    Performed by Shaq Silverio MD at [facility]    TRIAL OF SPINAL CORD NERVE STIMULATOR N/A 2018    Procedure: TRIAL, NEUROSTIMULATOR, SPINAL CORD, SPINAL CORD STIMULATOR TRIAL-INTERNAL WIRES TO EXTERNAL BATTERY;  Surgeon: Shaq Silverio MD;  Location: [facility];  Service: Pain Management;  Laterality: N/A;  COPE REP NOTIFIED    TRIAL, NEUROSTIMULATOR, SPINAL CORD, SPINAL CORD STIMULATOR TRIAL-INTERNAL WIRES TO EXTERNAL BATTERY N/A 2018    Performed by Shaq Silverio MD at [facility]         Anesthesia Evaluation         Review of Systems  Anesthesia Hx:  No problems with previous  Anesthesia Denies Hx of Anesthetic complications History of prior surgery of interest to airway management or planning:  Denies Personal Hx of Anesthesia complications.   Social:  Non-Smoker, No Alcohol Use    Cardiovascular:   Exercise tolerance: good Hypertension    Pulmonary:   Asthma mild Sleep Apnea, CPAP    Hepatic/GI:   GERD    Musculoskeletal:   Arthritis  Lower back pain   Endocrine:   Diabetes    Psych:   Psychiatric History depression        Lab Results   Component Value Date    WBC 10.85 (H) 07/01/2011    HGB 11.7 (L) 07/01/2011    HCT 35.6 (L) 07/01/2011     (H) 07/01/2011    CHOL 215 (H) 05/30/2018    TRIG 193 (H) 05/30/2018    HDL 60 05/30/2018    ALT 28 05/30/2018    AST 23 05/30/2018     05/30/2018    K 4.6 05/30/2018     05/30/2018    CREATININE 1.0 05/30/2018    BUN 15 05/30/2018    CO2 24 05/30/2018    TSH 2.267 05/30/2018    HGBA1C 6.4 (H) 06/20/2018         Physical Exam     Dental:  Dental Findings: Upper Dentures, Lower Dentures             Anesthesia Plan  Type of Anesthesia, risks & benefits discussed:  Anesthesia Type:  MAC  Patient's Preference:   Intra-op Monitoring Plan: standard ASA monitors  Intra-op Monitoring Plan Comments:   Post Op Pain Control Plan: per primary service following discharge from PACU  Post Op Pain Control Plan Comments:   Induction:   IV  Beta Blocker:         Informed Consent: Patient understands risks and agrees with Anesthesia plan.  Questions answered. Anesthesia consent signed with patient.  ASA Score: 2     Day of Surgery Review of History & Physical:            Ready For Surgery From Anesthesia Perspective.

## 2018-10-16 NOTE — PRE-PROCEDURE INSTRUCTIONS
ramin Holland - [phone]    Allergies, medical, surgical, family and psychosocial histories reviewed with patient. Periop plan of care reviewed. Preop instructions given, including medications to take and to hold. Hibiclens soap and instructions on use given. Time allotted for questions to be addressed.  Patient verbalized understanding.

## 2018-10-17 ENCOUNTER — TELEPHONE (OUTPATIENT)
Dept: PAIN MEDICINE | Facility: CLINIC | Age: 68
End: 2018-10-17

## 2018-10-17 DIAGNOSIS — N39.0 URINARY TRACT INFECTION WITHOUT HEMATURIA, SITE UNSPECIFIED: Primary | ICD-10-CM

## 2018-10-17 RX ORDER — NITROFURANTOIN 25; 75 MG/1; MG/1
100 CAPSULE ORAL 2 TIMES DAILY
Qty: 10 CAPSULE | Refills: 0 | Status: SHIPPED | OUTPATIENT
Start: 2018-10-17 | End: 2018-10-22

## 2018-10-23 ENCOUNTER — TELEPHONE (OUTPATIENT)
Dept: PAIN MEDICINE | Facility: CLINIC | Age: 68
End: 2018-10-23

## 2018-10-23 NOTE — TELEPHONE ENCOUNTER
Patient requesting to be contacted by Haley / Dr. Arredondo's staff regarding SCS Implant on 10/26/18.    Please see message below. Thank you.

## 2018-10-23 NOTE — TELEPHONE ENCOUNTER
Contacted patient to schedule post-op appointment.     Post-op after SCS Implant on 10/26/18 was scheduled for 11/02/18 at 9:20am with Renetta Steele NP.    Patient also stated she's been trying to contact Dr. Arredondo's office to confirm surgery and ask about the nasal ointment, that she left several messages for Haley but has not received a returned call.    Patient was informed that per documentation it shows that Haley has tried to contact her but unable due to no answer and her voice mail is full.    Patient was also informed that system it's showing her surgery is scheduled for 10/26/18 at 7:00am, her arrival time should be 2 hours prior surgery, however staff advised patient to contact Dr. Arredondo's office to confirm surgery and nasal ointment prescription, and also that her message will be relayed to Haley /Dr. Arredondo's staff.    Patient acknowledged information given and expressed understanding.

## 2018-10-23 NOTE — TELEPHONE ENCOUNTER
----- Message from Flory Hale sent at 10/23/2018  2:53 PM CDT -----  Contact: [phone]/self  Patient would like a callback concerning procedure time and schedule. As well as medication for ''nose swipe''. Please call and advise @ [phone].

## 2018-10-24 ENCOUNTER — TELEPHONE (OUTPATIENT)
Dept: PAIN MEDICINE | Facility: CLINIC | Age: 68
End: 2018-10-24

## 2018-10-24 NOTE — TELEPHONE ENCOUNTER
Spoke with pt regarding her procedure scheduled for 10/26/18. Pt stated she wanted to know what time is her arrival time for her procedure. I explained to pt someone from OR will contact her the day before to give her the arrival time. Pt verbalized understanding.

## 2018-10-24 NOTE — TELEPHONE ENCOUNTER
----- Message from Aide Tierney sent at 10/24/2018 12:32 PM CDT -----  Contact: Self [phone]  Patient would like to speak with you about getting the time of her procedure and she has questions about the procedure. Please advise

## 2018-10-26 ENCOUNTER — ANESTHESIA (OUTPATIENT)
Dept: SURGERY | Facility: HOSPITAL | Age: 68
End: 2018-10-26
Payer: MEDICARE

## 2018-10-26 ENCOUNTER — HOSPITAL ENCOUNTER (OUTPATIENT)
Facility: HOSPITAL | Age: 68
Discharge: HOME | End: 2018-10-26
Attending: PAIN MEDICINE | Admitting: PAIN MEDICINE
Payer: MEDICARE

## 2018-10-26 VITALS
BODY MASS INDEX: 30.39 KG/M2 | HEART RATE: 66 BPM | SYSTOLIC BLOOD PRESSURE: 141 MMHG | TEMPERATURE: 98 F | HEIGHT: 64 IN | DIASTOLIC BLOOD PRESSURE: 72 MMHG | RESPIRATION RATE: 18 BRPM | WEIGHT: 178 LBS | OXYGEN SATURATION: 99 %

## 2018-10-26 DIAGNOSIS — M96.1 FAILED BACK SURGICAL SYNDROME: ICD-10-CM

## 2018-10-26 DIAGNOSIS — G89.4 CHRONIC PAIN SYNDROME: Primary | ICD-10-CM

## 2018-10-26 DIAGNOSIS — M54.16 LUMBAR RADICULOPATHY: ICD-10-CM

## 2018-10-26 LAB — POCT GLUCOSE: 66 MG/DL (ref 70–110)

## 2018-10-26 PROCEDURE — 25000003 PHARM REV CODE 250: Performed by: STUDENT IN AN ORGANIZED HEALTH CARE EDUCATION/TRAINING PROGRAM

## 2018-10-26 PROCEDURE — 25000003 PHARM REV CODE 250: Performed by: PAIN MEDICINE

## 2018-10-26 PROCEDURE — 95971 ALYS SMPL SP/PN NPGT W/PRGRM: CPT | Mod: ,,, | Performed by: PAIN MEDICINE

## 2018-10-26 PROCEDURE — 37000009 HC ANESTHESIA EA ADD 15 MINS: Performed by: PAIN MEDICINE

## 2018-10-26 PROCEDURE — 63600175 PHARM REV CODE 636 W HCPCS: Performed by: NURSE ANESTHETIST, CERTIFIED REGISTERED

## 2018-10-26 PROCEDURE — C1767 GENERATOR, NEURO NON-RECHARG: HCPCS | Performed by: PAIN MEDICINE

## 2018-10-26 PROCEDURE — 71000015 HC POSTOP RECOV 1ST HR: Performed by: PAIN MEDICINE

## 2018-10-26 PROCEDURE — 63650 IMPLANT NEUROELECTRODES: CPT | Mod: 51,,, | Performed by: PAIN MEDICINE

## 2018-10-26 PROCEDURE — C1778 LEAD, NEUROSTIMULATOR: HCPCS | Performed by: PAIN MEDICINE

## 2018-10-26 PROCEDURE — 37000008 HC ANESTHESIA 1ST 15 MINUTES: Performed by: PAIN MEDICINE

## 2018-10-26 PROCEDURE — 36000707: Performed by: PAIN MEDICINE

## 2018-10-26 PROCEDURE — 36000706: Performed by: PAIN MEDICINE

## 2018-10-26 PROCEDURE — 71000016 HC POSTOP RECOV ADDL HR: Performed by: PAIN MEDICINE

## 2018-10-26 PROCEDURE — 63600175 PHARM REV CODE 636 W HCPCS: Performed by: PAIN MEDICINE

## 2018-10-26 PROCEDURE — 63685 INS/RPLC SPI NPG/RCVR POCKET: CPT | Mod: ,,, | Performed by: PAIN MEDICINE

## 2018-10-26 DEVICE — PROCLAIM ELITE 5 PATIENT CTRL: Type: IMPLANTABLE DEVICE | Site: BACK | Status: FUNCTIONAL

## 2018-10-26 DEVICE — LEAD OCTRODE 4MM SPACING 60CM: Type: IMPLANTABLE DEVICE | Site: BACK | Status: FUNCTIONAL

## 2018-10-26 RX ORDER — LIDOCAINE HYDROCHLORIDE 10 MG/ML
1 INJECTION, SOLUTION EPIDURAL; INFILTRATION; INTRACAUDAL; PERINEURAL ONCE
Status: DISCONTINUED | OUTPATIENT
Start: 2018-10-26 | End: 2018-10-26 | Stop reason: HOSPADM

## 2018-10-26 RX ORDER — LIDOCAINE HCL/PF 100 MG/5ML
SYRINGE (ML) INTRAVENOUS
Status: DISCONTINUED | OUTPATIENT
Start: 2018-10-26 | End: 2018-10-26

## 2018-10-26 RX ORDER — CEFAZOLIN SODIUM 2 G/50ML
2 SOLUTION INTRAVENOUS
Status: COMPLETED | OUTPATIENT
Start: 2018-10-26 | End: 2018-10-26

## 2018-10-26 RX ORDER — BACITRACIN 50000 [IU]/1
INJECTION, POWDER, FOR SOLUTION INTRAMUSCULAR
Status: DISCONTINUED | OUTPATIENT
Start: 2018-10-26 | End: 2018-10-26 | Stop reason: HOSPADM

## 2018-10-26 RX ORDER — BUPIVACAINE HYDROCHLORIDE AND EPINEPHRINE 5; 5 MG/ML; UG/ML
INJECTION, SOLUTION EPIDURAL; INTRACAUDAL; PERINEURAL
Status: DISCONTINUED | OUTPATIENT
Start: 2018-10-26 | End: 2018-10-26 | Stop reason: HOSPADM

## 2018-10-26 RX ORDER — HYDROCODONE BITARTRATE AND ACETAMINOPHEN 5; 325 MG/1; MG/1
1 TABLET ORAL EVERY 8 HOURS PRN
Qty: 15 TABLET | Refills: 0 | Status: SHIPPED | OUTPATIENT
Start: 2018-10-26 | End: 2018-10-31

## 2018-10-26 RX ORDER — LIDOCAINE HYDROCHLORIDE 10 MG/ML
INJECTION INFILTRATION; PERINEURAL
Status: DISCONTINUED | OUTPATIENT
Start: 2018-10-26 | End: 2018-10-26 | Stop reason: HOSPADM

## 2018-10-26 RX ORDER — SODIUM CHLORIDE, SODIUM LACTATE, POTASSIUM CHLORIDE, CALCIUM CHLORIDE 600; 310; 30; 20 MG/100ML; MG/100ML; MG/100ML; MG/100ML
INJECTION, SOLUTION INTRAVENOUS CONTINUOUS
Status: DISCONTINUED | OUTPATIENT
Start: 2018-10-26 | End: 2018-10-26 | Stop reason: HOSPADM

## 2018-10-26 RX ORDER — PROPOFOL 10 MG/ML
VIAL (ML) INTRAVENOUS
Status: DISCONTINUED | OUTPATIENT
Start: 2018-10-26 | End: 2018-10-26

## 2018-10-26 RX ORDER — PROPOFOL 10 MG/ML
VIAL (ML) INTRAVENOUS CONTINUOUS PRN
Status: DISCONTINUED | OUTPATIENT
Start: 2018-10-26 | End: 2018-10-26

## 2018-10-26 RX ORDER — SODIUM CHLORIDE 9 MG/ML
1000 INJECTION, SOLUTION INTRAVENOUS CONTINUOUS
Status: DISCONTINUED | OUTPATIENT
Start: 2018-10-26 | End: 2018-10-26 | Stop reason: HOSPADM

## 2018-10-26 RX ORDER — FENTANYL CITRATE 50 UG/ML
INJECTION, SOLUTION INTRAMUSCULAR; INTRAVENOUS
Status: DISCONTINUED | OUTPATIENT
Start: 2018-10-26 | End: 2018-10-26

## 2018-10-26 RX ORDER — CEPHALEXIN 500 MG/1
500 CAPSULE ORAL 4 TIMES DAILY
Qty: 28 CAPSULE | Refills: 0 | Status: SHIPPED | OUTPATIENT
Start: 2018-10-26 | End: 2018-11-02

## 2018-10-26 RX ORDER — MIDAZOLAM HYDROCHLORIDE 1 MG/ML
INJECTION, SOLUTION INTRAMUSCULAR; INTRAVENOUS
Status: DISCONTINUED | OUTPATIENT
Start: 2018-10-26 | End: 2018-10-26

## 2018-10-26 RX ADMIN — PROPOFOL 75 MCG/KG/MIN: 10 INJECTION, EMULSION INTRAVENOUS at 07:10

## 2018-10-26 RX ADMIN — CEFAZOLIN SODIUM 2 G: 2 SOLUTION INTRAVENOUS at 07:10

## 2018-10-26 RX ADMIN — PROPOFOL 30 MG: 10 INJECTION, EMULSION INTRAVENOUS at 07:10

## 2018-10-26 RX ADMIN — SODIUM CHLORIDE, SODIUM LACTATE, POTASSIUM CHLORIDE, AND CALCIUM CHLORIDE 10 ML: .6; .31; .03; .02 INJECTION, SOLUTION INTRAVENOUS at 06:10

## 2018-10-26 RX ADMIN — FENTANYL CITRATE 25 MCG: 50 INJECTION, SOLUTION INTRAMUSCULAR; INTRAVENOUS at 07:10

## 2018-10-26 RX ADMIN — SODIUM CHLORIDE, SODIUM LACTATE, POTASSIUM CHLORIDE, AND CALCIUM CHLORIDE: .6; .31; .03; .02 INJECTION, SOLUTION INTRAVENOUS at 07:10

## 2018-10-26 RX ADMIN — MIDAZOLAM 2 MG: 1 INJECTION INTRAMUSCULAR; INTRAVENOUS at 07:10

## 2018-10-26 RX ADMIN — FENTANYL CITRATE 25 MCG: 50 INJECTION, SOLUTION INTRAMUSCULAR; INTRAVENOUS at 08:10

## 2018-10-26 RX ADMIN — LIDOCAINE HYDROCHLORIDE 75 MG: 20 INJECTION, SOLUTION INTRAVENOUS at 07:10

## 2018-10-26 RX ADMIN — FENTANYL CITRATE 50 MCG: 50 INJECTION, SOLUTION INTRAMUSCULAR; INTRAVENOUS at 07:10

## 2018-10-26 NOTE — ANESTHESIA POSTPROCEDURE EVALUATION
"Anesthesia Post Evaluation    Patient: Faby Luna    Procedure(s) Performed: Procedure(s) (LRB):  INSERTION,NEUROSTIMULATOR,SPINAL CORD (N/A)    Final Anesthesia Type: MAC  Patient location during evaluation: OPS  Patient participation: Yes- Able to Participate  Level of consciousness: awake and alert and oriented  Post-procedure vital signs: reviewed and stable  Pain management: adequate  Airway patency: patent  PONV status at discharge: No PONV  Anesthetic complications: no      Cardiovascular status: blood pressure returned to baseline  Respiratory status: unassisted, spontaneous ventilation and room air  Hydration status: euvolemic  Follow-up not needed.        Visit Vitals  /60   Pulse 60   Temp 36.5 °C (97.7 °F) (Skin)   Resp 20   Ht 5' 4" (1.626 m)   Wt 80.7 kg (178 lb)   SpO2 97%   BMI 30.55 kg/m²       Pain/Ron Score: No Data Recorded      "

## 2018-10-26 NOTE — DISCHARGE SUMMARY
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     Discharged Condition: Good, Stable, Alert     Hospital Course: Patient was admitted for an outpatient interventional pain management procedure and tolerated the procedure well with no complications.     Final Diagnoses: Same as principal problem.     Disposition: Home or Self Care     Follow up/Patient Instructions:        Reconciled Medications:     Medication List      START taking these medications    cephALEXin 500 MG capsule  Commonly known as:  KEFLEX  Take 1 capsule (500 mg total) by mouth 4 (four) times daily. for 7 days     HYDROcodone-acetaminophen 5-325 mg per tablet  Commonly known as:  NORCO  Take 1 tablet by mouth every 8 (eight) hours as needed for Pain.        CONTINUE taking these medications    atenolol 100 MG tablet  Commonly known as:  TENORMIN  Take 1 tablet (100 mg total) by mouth once daily.     blood sugar diagnostic Strp  1 strip by Misc.(Non-Drug; Combo Route) route 2 (two) times daily with meals.     * blood-glucose meter kit  Use as instructed     * TRUE METRIX GLUCOSE METER Misc  Generic drug:  blood-glucose meter     escitalopram oxalate 20 MG tablet  Commonly known as:  LEXAPRO  Take 20 mg by mouth once daily.     furosemide 20 MG tablet  Commonly known as:  LASIX  Take 1 tablet (20 mg total) by mouth once daily.     glipiZIDE 10 MG tablet  Commonly known as:  GLUCOTROL  Take 10 mg by mouth every evening.     * lancets Misc  1 lancet by Misc.(Non-Drug; Combo Route) route 2 (two) times daily with meals.     * TRUEPLUS LANCETS 33 gauge Misc  Generic drug:  lancets     lancing device Misc  1 Device by Misc.(Non-Drug; Combo  Route) route 2 (two) times daily with meals.     LORazepam 2 MG Tab  Commonly known as:  ATIVAN  Take 0.5 tablets by mouth 2 (two) times daily as needed for Anxiety.     losartan 25 MG tablet  Commonly known as:  COZAAR  Take 1 tablet (25 mg total) by mouth once daily.     metFORMIN 1000 MG tablet  Commonly known as:  GLUCOPHAGE  Take 1,000 mg by mouth every evening.     pantoprazole 40 MG tablet  Commonly known as:  PROTONIX  Take 1 tablet (40 mg total) by mouth before breakfast.     traZODone 50 MG tablet  Commonly known as:  DESYREL  Take 1 tablet (50 mg total) by mouth nightly as needed for Insomnia.     zonisamide 100 MG Cap  Commonly known as:  ZONEGRAN  TAKE 4 CAPSULES BY MOUTH AT BEDTIME         * This list has 4 medication(s) that are the same as other medications prescribed for you. Read the directions carefully, and ask your doctor or other care provider to review them with you.               Discharge Procedure Orders (must include Diet, Follow-up, Activity)   Call MD for:  temperature >100.4     Call MD for:  severe uncontrolled pain     Call MD for:  redness, tenderness, or signs of infection (pain, swelling, redness, odor or green/yellow discharge around incision site)     Call MD for:  difficulty breathing or increased cough     Call MD for:  severe persistent headache     Call MD for:  worsening rash     Sponge bath only until clinic visit       Su Arredondo Jr, MD  Interventional Pain Medicine / Anesthesiology

## 2018-10-26 NOTE — H&P
[facility]  History & Physical - Short Stay  Pain Management           SUBJECTIVE:     Procedure: Procedure(s) (LRB):  INSERTION,NEUROSTIMULATOR,SPINAL CORD (N/A)    Chief Complaint/Reason for Admission:    Lumbar radiculopathy [M54.16]  Chronic pain syndrome [G89.4]    Patient underwent successful trial and is here for implant.    PTA Medications   Medication Sig    atenolol (TENORMIN) 100 MG tablet Take 1 tablet (100 mg total) by mouth once daily.    escitalopram oxalate (LEXAPRO) 20 MG tablet Take 20 mg by mouth once daily.     furosemide (LASIX) 20 MG tablet Take 1 tablet (20 mg total) by mouth once daily.    glipiZIDE (GLUCOTROL) 10 MG tablet Take 10 mg by mouth every evening.    LORazepam (ATIVAN) 2 MG Tab Take 0.5 tablets by mouth 2 (two) times daily as needed for Anxiety.    losartan (COZAAR) 25 MG tablet Take 1 tablet (25 mg total) by mouth once daily.    metFORMIN (GLUCOPHAGE) 1000 MG tablet Take 1,000 mg by mouth every evening.    pantoprazole (PROTONIX) 40 MG tablet Take 1 tablet (40 mg total) by mouth before breakfast.    traZODone (DESYREL) 50 MG tablet Take 1 tablet (50 mg total) by mouth nightly as needed for Insomnia.    zonisamide (ZONEGRAN) 100 MG Cap TAKE 4 CAPSULES BY MOUTH AT BEDTIME    blood sugar diagnostic Strp 1 strip by Misc.(Non-Drug; Combo Route) route 2 (two) times daily with meals.    blood-glucose meter kit Use as instructed    lancets Misc 1 lancet by Misc.(Non-Drug; Combo Route) route 2 (two) times daily with meals.    lancing device Misc 1 Device by Misc.(Non-Drug; Combo Route) route 2 (two) times daily with meals.    TRUE METRIX GLUCOSE METER Misc     TRUEPLUS LANCETS 33 gauge [facility] [place]        Review of patient's allergies indicates:   Allergen Reactions    Aspirin Other (See Comments)     Has been told not to take it due to bariatric surgery       Past Medical History:   Diagnosis Date    Arthritis     Diabetes mellitus     Hypertension      Past  Surgical History:   Procedure Laterality Date     SECTION      CHOLECYSTECTOMY      EPIDURAL STEROID INJECTION INTO LUMBAR SPINE N/A 2018    Procedure: INJECTION, STEROID, SPINE, LUMBAR, EPIDURAL;  Surgeon: Shaq Silverio MD;  Location: [facility];  Service: Pain Management;  Laterality: N/A;  LUMBAR L4-L5 INTERLAMINAR ELISE'  [id number]  W/ SEDATION     HYSTERECTOMY      INJECTION OF FACET JOINT Bilateral 2018    Procedure: INJECTION-FACET;  Surgeon: Shaq Silverio MD;  Location: [facility];  Service: Pain Management;  Laterality: Bilateral;  LUMBAR BILATERAL L4-L5 AND L5-S1 FACET STEROID INJECTION  [id number]    W/ SEDATION     INJECTION, STEROID, SPINE, LUMBAR, EPIDURAL N/A 2018    Performed by Shaq Silverio MD at [facility]    INJECTION-FACET Bilateral 2018    Performed by Shaq Silverio MD at [facility]    INJECTION-STEROID-EPIDURAL-TRANSFORAMINAL Left 2018    Performed by Shaq Silverio MD at [facility]    TRIAL OF SPINAL CORD NERVE STIMULATOR N/A 2018    Procedure: TRIAL, NEUROSTIMULATOR, SPINAL CORD, SPINAL CORD STIMULATOR TRIAL-INTERNAL WIRES TO EXTERNAL BATTERY;  Surgeon: Shaq Silverio MD;  Location: [facility];  Service: Pain Management;  Laterality: N/A;  COPE REP NOTIFIED    TRIAL, NEUROSTIMULATOR, SPINAL CORD, SPINAL CORD STIMULATOR TRIAL-INTERNAL WIRES TO EXTERNAL BATTERY N/A 2018    Performed by Shaq Silverio MD at [facility]     History reviewed. No pertinent family history.  Social History     Tobacco Use    Smoking status: Never Smoker    Smokeless tobacco: Never Used   Substance Use Topics    Alcohol use: No    Drug use: No        Review of Systems:  ROS    OBJECTIVE:     Vital Signs (Most Recent):  Temp: 97.7 °F (36.5 °C) (10/26/18 0632)  Pulse: 60 (10/26/18 0632)  Resp: 20 (10/26/18 0632)  BP: 129/60 (10/26/18 0632)  SpO2: 97 % (10/26/18 0632)    Physical Exam:  General appearance - alert, well appearing, and in no distress  Mental  status - AOx3  Eyes - pupils equal and reactive, extraocular eye movements intact  Heart - normal rate, regular rhythm, normal S1, S2, no murmurs, rubs, clicks or gallops  Chest - clear to auscultation, no wheezes, rales or rhonchi, symmetric air entry  Abdomen - soft, nontender, nondistended, no masses or organomegaly  Neurological - alert, oriented, normal speech, no focal findings or movement disorder noted  Extremities - peripheral pulses normal, no pedal edema, no clubbing or cyanosis        ASSESSMENT/PLAN:     Active Hospital Problems    Diagnosis  POA    Failed back surgical syndrome [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        The risks and benefits of this intervention, and alternative therapies were discussed with the patient.  The discussion of risks included infection, bleeding, need for additional procedures or surgery, nerve damage, paralysis, adverse medication reaction(s), stroke, and/or death.  Questions regarding the procedure, risks, expected outcome, and possible side effects were solicited and answered to the patient's satisfaction.  Faby Luna wishes to proceed with the implant.  Verbal and written consent were verified.      Proceed with intervention as scheduled.      Su Arredondo Jr, MD  Interventional Pain Medicine / Anesthesiology

## 2018-10-26 NOTE — OP NOTE
Operative Report: Spinal Cord Stimulator Implantation    Pre-operative Diagnosis: Chronic pain syndrome  Post-operative Diagnosis: Chronic pain syndrome  Procedure Date: 10/26/2018  Procedure: (1) Placement of Implanted Spinal Cord Stimulator Lead x2    (2) Placement of Implanted Pulse Generator    (3) Intraoperative fluoroscopy    (4) Initial Device Programming    Device : [employer]  Anesthesia: Local & Monitored Anesthesia Care  Indications: To alleviate pain & suffering and reduce functional impairment associated with Chronic pain syndrome.      Surgeon: Su Arredondo Jr, MD  Assistant: None    Preoperative Antibiotics: Ancef 2gm IV 30 minutes prior to incision    Procedure in Detail: The patient was brought to the operating room and was placed in the prone position, then prepped and draped in the usual sterile fashion with chlorhexidine and ioban. Time out was performed and consent verified.      With fluoroscopic guidance, the location of the desired site for placement of the percutaneous leads was identified and local anesthetic consisting of a mixture of 2% Lidocaine and Bupivacaine 0.5% with epinephrine was injected subcutaneously over the area. An approximately 2 cm longitudinal incision was made in the midline. The incision was dissected down to the supraspinous ligament. Hemostasis was established and a self-retaining retractor was used to allow adequate visualization.  A 14 gauge touhy was then introduced with a paraspinous approach from the left side under fluoroscopic guidance into the L1-2 interlaminar space. Entry of the Tuohy into the epidural space was verified by using loss of resistance to a saline with a 5 mL glass loss-of-resistance syringe. Once the epidural space was entered, the syringe was removed and an 8-contact lead was passed through the needle and advanced to the top of the T8 vertebra using continuous fluoroscopy. A second lead was then placed in similar fashion using a  right paraspinous approach and guided using into position, adjacent to the first lead, with fluoroscopy.  A test stimulation was performed and we ensured adequate coverage of the painful areas as well as appropriate function of the device. After removal of the Touhy needles, the leads were then anchored to the supraspinous ligaments with #2-0 Ethibond suture on a UR-6 needle.  Intermittent fluoroscopy demonstrated maintenance of lead position throughout this process.      A pocket site was identified just below the left iliac crest and marked.  Local anesthetic was administered in the region and a 4-5 centimeter incision was made.  A subcutaneous pocket was then created with a blunt dissection technique for placement of the implantable pulse generator.  The leads were then tunneled from the midline incision to the IPG pocket using a maurizio-tipped tunneling tool.  The leads were then inserted into the implantable pulse generator and the anchoring screws were tightened with the provided torque wrench. The generator was then tested to ensure appropriate function.     Both surgical wounds were irrigated with antibiotic solution.  A small loop in the leads was created and inserted into the midline incision.  Excess lead was coiled behind the IPG and both were inserted into the pocket site.  Both incisions were closed with a 3-layer closure employing #1-0 Vicryl for the deep fascia; #2-0 Vicryl for superficial fascia; and #3-0 Monocryl for skin.  Dermabond was applied to the wounds.  Wound dressing consisted of non-adhesive dressing followed by gauze and secured with transparant, adhesive bandages.    Complications: There were no complications.    Blood Loss: 5-10 mL    Follow-Up: The patient will follow up in clinic as scheduled for post-op wound check.    Discharge Medications: (1) Kelfex 500 mg PO QID x 7 days, (2) Norco 5-325 mg PO q8 prn #15    Su Arredondo Jr, MD  Interventional Pain Medicine /  Anesthesiology

## 2018-10-26 NOTE — TRANSFER OF CARE
"Anesthesia Transfer of Care Note    Patient: Faby Luna    Procedure(s) Performed: Procedure(s) (LRB):  INSERTION,NEUROSTIMULATOR,SPINAL CORD (N/A)    Patient location: OPS    Anesthesia Type: MAC    Transport from OR: Transported from OR on room air with adequate spontaneous ventilation    Post pain: adequate analgesia    Post assessment: no apparent anesthetic complications    Post vital signs: stable    Nausea/Vomiting: no nausea/vomiting    Complications: none    Transfer of care protocol was followed      Last vitals:   Visit Vitals  /60   Pulse 60   Temp 36.5 °C (97.7 °F) (Skin)   Resp 20   Ht 5' 4" (1.626 m)   Wt 80.7 kg (178 lb)   SpO2 97%   BMI 30.55 kg/m²     "

## 2018-10-26 NOTE — DISCHARGE INSTRUCTIONS
Spinal Cord Stimulation    Pain messages travel over nerve pathways to the spinal cord, inside the spine. The spinal cord carries the messages to the brain. Constant pain messages can cause long-term pain that is hard to treat. This is known as chronic pain. Spinal cord stimulation uses a medical device to send signals to the nerve pathways inside your phil cord. These signals help block the pain.    Keeping a pain log  Your doctor may ask you to keep a pain log for a certain amount of time. In it, you may answer certain questions: When do you feel pain? What does it feel like? How long does the pain last? What makes it better or worse? When the stimulation is on, is your pain relieved? Your answers help show how well spinal cord stimulation may work for you. Your doctor will give you guidelines for your pain log. During the time you write the log, you may not be able to take pain medications. Be sure to discuss this with your doctor.    Spinal cord stimulation may help  Spinal cord stimulation is one treatment for chronic pain. Certain criteria need to be met to be a good candidate for spinal cord stimulation. A small medical device sends signals to your spinal cord. These signals keep the chronic pain messages from being sent to your brain. Instead, you may feel tingling from the electrical signals. A trial stimulator that is worn outside the body in tried first to see if it will work. If it does, the permanent stimulator system may be used. This device can be removed at any time.    The stimulator system  The stimulator system has several parts. A power source makes the signals. This power source may be worn outside the body or implanted under the skin on your abdomen or buttocks. One or more leads (flexible, plastic-covered wires or paddle) are placed inside the body to carry the signals to the spinal cord. Your doctor can explain the system youll be using in more detail.    Risks and possible  complications  · Infection  · Bleeding  · Nerve damage  · Spinal cord damage  · Failure to relieve pain     ANESTHESIA  -For the first 24 hours after surgery:  Do not drive, use heavy equipment, make important decisions, or drink alcohol  -It is normal to feel sleepy for several hours.  Rest until you are more awake.  -Have someone stay with you, if needed.  They can watch for problems and help keep you safe.  -Some possible post anesthesia side effects include: nausea and vomiting, sore throat and hoarseness, sleepiness, and dizziness.    PAIN  -If you have pain after surgery, pain medicine will help you feel better.  Take it as directed, before pain becomes severe.  Most pain relievers taken by mouth need at least 20-30 minutes to start working.  -Do not drive or drink alcohol while taking pain medicine.  -Pain medication can upset your stomach.  Taking them with a little food may help.  -Other ways to help control pain: elevation, ice, and relaxation  -Call your surgeon if still having unmanageable pain an hour after taking pain medicine.  -Pain medicine can cause constipation.  Taking an over-the counter stool softener while on prescription pain medicine and drinking plenty of fluids can prevent this side effect.  -Call your surgeon if you have severe side effects like: breathing problems, trouble waking up, dizziness, confusion, or severe constipation.    NAUSEA  -Some people have nausea after surgery.  This is often because of anesthesia, pain, pain medicine, or the stress of surgery.  -Do not push yourself to eat.  Start off with clear liquids and soup.  Slowly move to solid foods.  Don't eat fatty, rich, spicy foods at first.  Eat smaller amounts.  -If you develop persistent nausea and vomiting please notify your surgeon immediately.    BLEEDING  -Different types of surgery require different types of care and dressing changes.  It is important to follow all instructions and advice from your surgeon.  Change  dressing as directed.  Call your surgeon for any concerns regarding postop bleeding.    SIGNS OF INFECTION  -Signs of infection include: fever, swelling, drainage, and redness  -Notify your surgeon if you have a fever of 100.4 F (38.0 C) or higher.  -Notify your surgeon if you notice redness, swelling, increased pain, pus, or a foul smell at the incision site.    Notify Dr. Nino for any problems or concerns.

## 2018-10-29 DIAGNOSIS — F43.23 ADJUSTMENT REACTION WITH ANXIETY AND DEPRESSION: ICD-10-CM

## 2018-10-29 RX ORDER — ESCITALOPRAM OXALATE 20 MG/1
TABLET ORAL
Qty: 90 TABLET | Refills: 1 | Status: SHIPPED | OUTPATIENT
Start: 2018-10-29 | End: 2019-05-13 | Stop reason: SDUPTHER

## 2018-11-02 ENCOUNTER — OFFICE VISIT (OUTPATIENT)
Dept: PAIN MEDICINE | Facility: CLINIC | Age: 68
End: 2018-11-02
Payer: MEDICARE

## 2018-11-02 VITALS
HEIGHT: 64 IN | RESPIRATION RATE: 18 BRPM | TEMPERATURE: 98 F | BODY MASS INDEX: 29.77 KG/M2 | DIASTOLIC BLOOD PRESSURE: 70 MMHG | HEART RATE: 63 BPM | WEIGHT: 174.38 LBS | SYSTOLIC BLOOD PRESSURE: 146 MMHG

## 2018-11-02 DIAGNOSIS — G89.4 CHRONIC PAIN SYNDROME: Primary | ICD-10-CM

## 2018-11-02 DIAGNOSIS — M54.16 LUMBAR RADICULOPATHY: ICD-10-CM

## 2018-11-02 DIAGNOSIS — M96.1 FAILED BACK SURGICAL SYNDROME: ICD-10-CM

## 2018-11-02 PROCEDURE — 99024 POSTOP FOLLOW-UP VISIT: CPT | Mod: S$GLB,,, | Performed by: NURSE PRACTITIONER

## 2018-11-02 PROCEDURE — 99999 PR PBB SHADOW E&M-EST. PATIENT-LVL III: CPT | Mod: PBBFAC,,, | Performed by: NURSE PRACTITIONER

## 2018-11-02 PROCEDURE — 99213 OFFICE O/P EST LOW 20 MIN: CPT | Mod: PBBFAC | Performed by: NURSE PRACTITIONER

## 2018-11-02 NOTE — PROGRESS NOTES
Chronic patient Established Note (Follow up visit)      SUBJECTIVE:    Faby Luna presents to the clinic for a follow-up appointment for lower back and lower extremity pain.  She is s/p lumbar SCS trial on 10/26/18 with Dr. Arredondo.  She is reporting 100% pain relief.  She took pain medication directly after the procedure but has not recently.  She denies any fever or malaise.  She completed oral antibiotics.  Since the last visit, Faby Luna states the pain has been improving.    Current pain intensity is 0/10.    Pain Disability Index Review:  Last 3 PDI Scores 11/2/2018 10/1/2018 9/5/2018   Pain Disability Index (PDI) 0 43 56       Opioid Contract: no     report:  Not applicable    Pain Procedures:   5/2/18 Left L3 and L4 TF ELISE- 20% relief  5/21/18 Bilateral L4-5 and L5-S1 facet joint injections- no relief  9/24/18 Lumbar SCS trial ([facility])- 100% relief    Physical Therapy/Home Exercise: no    Imaging:     3/31/18 Lumbar MRI    Narrative     EXAMINATION:  MRI LUMBAR SPINE WITHOUT CONTRAST    CLINICAL HISTORY:  Low back pain, >6wks conservative tx, persistent-progressive sx, surgical candidate; Other spondylosis with radiculopathy, lumbar region    TECHNIQUE:  Multiplanar, multisequence MR images were acquired from the thoracolumbar junction to the sacrum without the administration of contrast.    COMPARISON:  Plain films from 03/27/2018    FINDINGS:  There is grade 1 spondylolisthesis of L4 on L5. The vertebral body heights are well maintained, with no fracture.  No marrow signal abnormality suspicious for an infiltrative process.    The conus medullaris terminates at approximately the mid body of L1.  There is a cyst associated with the upper pole of the right kidney.  There is disc desiccation noted throughout the lumbar spine with relative sparing of the L5-S1 disc.  Mild disc space narrowing present at the L4-5 level.    L1-L2: Mild diffuse disc bulge resulting in no significant central or  neural foraminal canal narrowing.    L2-L3: No significant central canal narrowing.  There is mild narrowing of either neural foraminal canal secondary to disc material.    L3-L4: Disc bulging in the bilateral foraminal regions resulting in no significant central canal narrowing.  Mild-to-moderate bilateral facet arthropathy also noted.  The bilateral neural foraminal canals are moderately narrowed with some mild effacement of either exiting L3 nerve root in the extraforaminal regions bilaterally.    L4-L5:  Grade 1 spondylolisthesis along with moderate to severe bilateral facet arthropathy and ligamentum flavum hypertrophy resulting in at least moderate narrowing of the central canal.  The right neural foraminal canal is mildly to moderately narrowed.  Left neural foraminal canal is moderately to severely narrowed with mild effacement of the exiting L4 nerve root.    L5-S1:  No significant central or neural foraminal canal narrowing noted.  Mild bilateral facet arthropathy noted.   Impression       1. Multilevel degenerative changes of the lumbar spine as detailed above     Lumbar XRAYs 3/27/18    Narrative     EXAMINATION:  XR LUMBAR SPINE AP AND LAT WITH FLEX/EXT    CLINICAL HISTORY:  Low back pain    TECHNIQUE:  AP and lateral views as well as lateral flexion and extension images are performed through the lumbar spine.    COMPARISON:  None    FINDINGS:  X-ray lumbar spine with flexion and extension demonstrates grade 1 spondylolisthesis at L4-5 measuring around 6 mm which does not change significantly with flexion and extension.  The L4-5 disc is narrowed and degenerated.  Other lumbar vertebral discs are maintained.  Vertebral body heights are maintained.  Small anterior spurs are seen, and there is small posterior osteophyte along the inferior endplate of L4.  There is lumbar facet arthropathy at L4-5 and L5-S1.   Impression       Degenerative changes as above.  Grade 1 anterolisthesis and degenerative disc  disease at L4-5.         Allergies:   Review of patient's allergies indicates:   Allergen Reactions    Aspirin Other (See Comments)     Has been told not to take it due to bariatric surgery       Current Medications:   Current Outpatient Medications   Medication Sig Dispense Refill    atenolol (TENORMIN) 100 MG tablet Take 1 tablet (100 mg total) by mouth once daily. 90 tablet 1    blood sugar diagnostic Strp 1 strip by Misc.(Non-Drug; Combo Route) route 2 (two) times daily with meals. 100 strip 11    blood-glucose meter kit Use as instructed 1 each 0    cephALEXin (KEFLEX) 500 MG capsule Take 1 capsule (500 mg total) by mouth 4 (four) times daily. for 7 days 28 capsule 0    escitalopram oxalate (LEXAPRO) 20 MG tablet TAKE 1 TABLET BY MOUTH ONCE DAILY 90 tablet 1    furosemide (LASIX) 20 MG tablet Take 1 tablet (20 mg total) by mouth once daily. 90 tablet 1    glipiZIDE (GLUCOTROL) 10 MG tablet Take 10 mg by mouth every evening.      lancets Misc 1 lancet by Misc.(Non-Drug; Combo Route) route 2 (two) times daily with meals. 100 each 11    lancing device Misc 1 Device by Misc.(Non-Drug; Combo Route) route 2 (two) times daily with meals. 1 each 0    LORazepam (ATIVAN) 2 MG Tab Take 0.5 tablets by mouth 2 (two) times daily as needed for Anxiety.      losartan (COZAAR) 25 MG tablet Take 1 tablet (25 mg total) by mouth once daily. 90 tablet 3    metFORMIN (GLUCOPHAGE) 1000 MG tablet Take 1,000 mg by mouth every evening.      pantoprazole (PROTONIX) 40 MG tablet Take 1 tablet (40 mg total) by mouth before breakfast. 90 tablet 1    traZODone (DESYREL) 50 MG tablet Take 1 tablet (50 mg total) by mouth nightly as needed for Insomnia. 90 tablet 0    TRUE METRIX GLUCOSE METER Misc       TRUEPLUS LANCETS 33 gauge Misc       zonisamide (ZONEGRAN) 100 MG Cap TAKE 4 CAPSULES BY MOUTH AT BEDTIME 120 capsule 2     No current facility-administered medications for this visit.        REVIEW OF SYSTEMS:    GENERAL:  No  weight loss, malaise or fevers.  HEENT:  Negative for frequent or significant headaches.  NECK:  Negative for lumps, goiter, pain and significant neck swelling.  RESPIRATORY:  Negative for cough, wheezing or shortness of breath.  CARDIOVASCULAR:  Negative for chest pain, leg swelling or palpitations. Hypertension.  GI:  Negative for abdominal discomfort, blood in stools or black stools or change in bowel habits.  MUSCULOSKELETAL:  See HPI.  SKIN:  Negative for lesions, rash, and itching.  PSYCH:  Negative for sleep disturbance, mood disorder and recent psychosocial stressors.  HEMATOLOGY/LYMPHOLOGY:  Negative for prolonged bleeding, bruising easily or swollen nodes.  NEURO:   No history of headaches, syncope, paralysis, seizures or tremors.  ENDO: Diabetes.  All other reviewed and negative other than HPI.    Past Medical History:  Past Medical History:   Diagnosis Date    Arthritis     Diabetes mellitus     Hypertension        Past Surgical History:  Past Surgical History:   Procedure Laterality Date     SECTION      CHOLECYSTECTOMY      EPIDURAL STEROID INJECTION INTO LUMBAR SPINE N/A 2018    Procedure: INJECTION, STEROID, SPINE, LUMBAR, EPIDURAL;  Surgeon: Shaq Silverio MD;  Location: [facility];  Service: Pain Management;  Laterality: N/A;  LUMBAR L4-L5 INTERLAMINAR ELISE'  95712  W/ SEDATION     HYSTERECTOMY      INJECTION OF FACET JOINT Bilateral 2018    Procedure: INJECTION-FACET;  Surgeon: Shaq Silverio MD;  Location: [facility];  Service: Pain Management;  Laterality: Bilateral;  LUMBAR BILATERAL L4-L5 AND L5-S1 FACET STEROID INJECTION  [id number]    W/ SEDATION     INJECTION, STEROID, SPINE, LUMBAR, EPIDURAL N/A 2018    [facility] at [facility]    INJECTION-FACET Bilateral 2018    [facility] at [facility]    INJECTION-STEROID-EPIDURAL-TRANSFORAMINAL Left 2018    [facility] by [facility] at [facility]     "INSERTION,NEUROSTIMULATOR,SPINAL CORD N/A 10/26/2018    [facility] Su [facility] Jr[facility] OR    TRIAL OF SPINAL CORD NERVE STIMULATOR N/A 9/24/2018    Procedure: TRIAL, NEUROSTIMULATOR, SPINAL CORD, SPINAL CORD STIMULATOR TRIAL-INTERNAL WIRES TO EXTERNAL BATTERY;  Surgeon: Shaq Silverio MD;  Location: [facility];  Service: Pain Management;  Laterality: N/A;  COPE REP NOTIFIED    TRIAL, NEUROSTIMULATOR, SPINAL CORD, SPINAL CORD STIMULATOR TRIAL-INTERNAL WIRES TO EXTERNAL BATTERY N/A 9/24/2018    Performed by Shaq Silverio MD at [facility]       Family History:  No family history on file.    Social History:  Social History     Socioeconomic History    Marital status:      Spouse name: None    Number of children: None    Years of education: None    Highest education level: None   Social Needs    Financial resource strain: None    Food insecurity - worry: None    Food insecurity - inability: None    Transportation needs - medical: None    Transportation needs - non-medical: None   Occupational History    None   Tobacco Use    Smoking status: Never Smoker    Smokeless tobacco: Never Used   Substance and Sexual Activity    Alcohol use: No    Drug use: No    Sexual activity: None   Other Topics Concern    None   Social History Narrative    None       OBJECTIVE:    BP (!) 146/70   Pulse 63   Temp 97.7 °F (36.5 °C) (Oral)   Resp 18   Ht 5' 4" (1.626 m)   Wt 79.1 kg (174 lb 6.4 oz)   BMI 29.94 kg/m²     PHYSICAL EXAMINATION:    General appearance: Well appearing, in no acute distress, alert and oriented x3.  Psych:  Mood and affect appropriate.  Skin: Skin color, texture, turgor normal, no rashes or lesions, in both upper and lower body.  SCS sites without drainage or signs of infection.   Head/face:  Atraumatic, normocephalic. No palpable lymph nodes  Cor: RRR  Pulm: CTA  Gait: Antalgic- ambulates without assistance.            ASSESSMENT: 68 y.o. year old female with back " and leg pain, consistent with the following diagnoses:     1. Chronic pain syndrome     2. Failed back surgical syndrome     3. Lumbar radiculopathy           PLAN:     - She is s/p SCS implant without complication.    - Continue with activity restrictions.  She can shower but not bathe.    - She is meeting with Sasha next week for programming.    - RTC in 2 weeks or sooner if needed.    - Counseled patient regarding the importance of constant sleeping habits and physical therapy.      The above plan and management options were discussed at length with patient. Patient is in agreement with the above and verbalized understanding.    Renetta Steele  11/02/2018

## 2018-11-27 ENCOUNTER — OFFICE VISIT (OUTPATIENT)
Dept: PAIN MEDICINE | Facility: CLINIC | Age: 68
End: 2018-11-27
Payer: MEDICARE

## 2018-11-27 VITALS
WEIGHT: 174.19 LBS | BODY MASS INDEX: 29.74 KG/M2 | SYSTOLIC BLOOD PRESSURE: 137 MMHG | HEART RATE: 69 BPM | RESPIRATION RATE: 18 BRPM | DIASTOLIC BLOOD PRESSURE: 77 MMHG | HEIGHT: 64 IN | TEMPERATURE: 98 F

## 2018-11-27 DIAGNOSIS — M96.1 FAILED BACK SURGICAL SYNDROME: ICD-10-CM

## 2018-11-27 DIAGNOSIS — G56.00 CARPAL TUNNEL SYNDROME, UNSPECIFIED LATERALITY: ICD-10-CM

## 2018-11-27 DIAGNOSIS — G89.4 CHRONIC PAIN SYNDROME: Primary | ICD-10-CM

## 2018-11-27 DIAGNOSIS — M54.16 LUMBAR RADICULOPATHY: ICD-10-CM

## 2018-11-27 PROCEDURE — 99213 OFFICE O/P EST LOW 20 MIN: CPT | Mod: HCNC,S$GLB,, | Performed by: NURSE PRACTITIONER

## 2018-11-27 PROCEDURE — 1101F PT FALLS ASSESS-DOCD LE1/YR: CPT | Mod: CPTII,HCNC,S$GLB, | Performed by: NURSE PRACTITIONER

## 2018-11-27 PROCEDURE — 3075F SYST BP GE 130 - 139MM HG: CPT | Mod: CPTII,HCNC,S$GLB, | Performed by: NURSE PRACTITIONER

## 2018-11-27 PROCEDURE — 99999 PR PBB SHADOW E&M-EST. PATIENT-LVL V: CPT | Mod: PBBFAC,HCNC,, | Performed by: NURSE PRACTITIONER

## 2018-11-27 PROCEDURE — 3078F DIAST BP <80 MM HG: CPT | Mod: CPTII,HCNC,S$GLB, | Performed by: NURSE PRACTITIONER

## 2018-11-27 NOTE — PROGRESS NOTES
Chronic patient Established Note (Follow up visit)      SUBJECTIVE:    Faby Luna presents to the clinic for a follow-up appointment for lower back and lower extremity pain.  She is s/p lumbar SCS trial on 10/26/18 with Dr. Arredondo and Jean Claude.  She is reporting 100% pain relief.  She had not had any pain recently.  She denies any fever or malaise.  Since the last visit, Faby Luna states the pain has been improving.  Current pain intensity is 0/10.    Pain Disability Index Review:  Last 3 PDI Scores 11/27/2018 11/2/2018 10/1/2018   Pain Disability Index (PDI) 9 0 43       Opioid Contract: no     report:  Not applicable    Pain Procedures:   5/2/18 Left L3 and L4 TF ELISE- 20% relief  5/21/18 Bilateral L4-5 and L5-S1 facet joint injections- no relief  9/24/18 Lumbar SCS trial ([facility])- 100% relief    Physical Therapy/Home Exercise: no    Imaging:     3/31/18 Lumbar MRI    Narrative     EXAMINATION:  MRI LUMBAR SPINE WITHOUT CONTRAST    CLINICAL HISTORY:  Low back pain, >6wks conservative tx, persistent-progressive sx, surgical candidate; Other spondylosis with radiculopathy, lumbar region    TECHNIQUE:  Multiplanar, multisequence MR images were acquired from the thoracolumbar junction to the sacrum without the administration of contrast.    COMPARISON:  Plain films from 03/27/2018    FINDINGS:  There is grade 1 spondylolisthesis of L4 on L5. The vertebral body heights are well maintained, with no fracture.  No marrow signal abnormality suspicious for an infiltrative process.    The conus medullaris terminates at approximately the mid body of L1.  There is a cyst associated with the upper pole of the right kidney.  There is disc desiccation noted throughout the lumbar spine with relative sparing of the L5-S1 disc.  Mild disc space narrowing present at the L4-5 level.    L1-L2: Mild diffuse disc bulge resulting in no significant central or neural foraminal canal narrowing.    L2-L3: No significant central  canal narrowing.  There is mild narrowing of either neural foraminal canal secondary to disc material.    L3-L4: Disc bulging in the bilateral foraminal regions resulting in no significant central canal narrowing.  Mild-to-moderate bilateral facet arthropathy also noted.  The bilateral neural foraminal canals are moderately narrowed with some mild effacement of either exiting L3 nerve root in the extraforaminal regions bilaterally.    L4-L5:  Grade 1 spondylolisthesis along with moderate to severe bilateral facet arthropathy and ligamentum flavum hypertrophy resulting in at least moderate narrowing of the central canal.  The right neural foraminal canal is mildly to moderately narrowed.  Left neural foraminal canal is moderately to severely narrowed with mild effacement of the exiting L4 nerve root.    L5-S1:  No significant central or neural foraminal canal narrowing noted.  Mild bilateral facet arthropathy noted.   Impression       1. Multilevel degenerative changes of the lumbar spine as detailed above     Lumbar XRAYs 3/27/18    Narrative     EXAMINATION:  XR LUMBAR SPINE AP AND LAT WITH FLEX/EXT    CLINICAL HISTORY:  Low back pain    TECHNIQUE:  AP and lateral views as well as lateral flexion and extension images are performed through the lumbar spine.    COMPARISON:  None    FINDINGS:  X-ray lumbar spine with flexion and extension demonstrates grade 1 spondylolisthesis at L4-5 measuring around 6 mm which does not change significantly with flexion and extension.  The L4-5 disc is narrowed and degenerated.  Other lumbar vertebral discs are maintained.  Vertebral body heights are maintained.  Small anterior spurs are seen, and there is small posterior osteophyte along the inferior endplate of L4.  There is lumbar facet arthropathy at L4-5 and L5-S1.   Impression       Degenerative changes as above.  Grade 1 anterolisthesis and degenerative disc disease at L4-5.         Allergies:   Review of patient's allergies  indicates:   Allergen Reactions    Aspirin Other (See Comments)     Has been told not to take it due to bariatric surgery       Current Medications:   Current Outpatient Medications   Medication Sig Dispense Refill    atenolol (TENORMIN) 100 MG tablet Take 1 tablet (100 mg total) by mouth once daily. 90 tablet 1    blood sugar diagnostic Strp 1 strip by Misc.(Non-Drug; Combo Route) route 2 (two) times daily with meals. 100 strip 11    blood-glucose meter kit Use as instructed 1 each 0    escitalopram oxalate (LEXAPRO) 20 MG tablet TAKE 1 TABLET BY MOUTH ONCE DAILY 90 tablet 1    furosemide (LASIX) 20 MG tablet Take 1 tablet (20 mg total) by mouth once daily. 90 tablet 1    glipiZIDE (GLUCOTROL) 10 MG tablet Take 10 mg by mouth every evening.      lancets Misc 1 lancet by Misc.(Non-Drug; Combo Route) route 2 (two) times daily with meals. 100 each 11    lancing device Misc 1 Device by Misc.(Non-Drug; Combo Route) route 2 (two) times daily with meals. 1 each 0    LORazepam (ATIVAN) 2 MG Tab Take 0.5 tablets by mouth 2 (two) times daily as needed for Anxiety.      losartan (COZAAR) 25 MG tablet Take 1 tablet (25 mg total) by mouth once daily. 90 tablet 3    metFORMIN (GLUCOPHAGE) 1000 MG tablet Take 1,000 mg by mouth every evening.      pantoprazole (PROTONIX) 40 MG tablet Take 1 tablet (40 mg total) by mouth before breakfast. 90 tablet 1    traZODone (DESYREL) 50 MG tablet Take 1 tablet (50 mg total) by mouth nightly as needed for Insomnia. 90 tablet 0    TRUE METRIX GLUCOSE METER Misc       TRUEPLUS LANCETS 33 gauge Misc       zonisamide (ZONEGRAN) 100 MG Cap TAKE 4 CAPSULES BY MOUTH AT BEDTIME 120 capsule 2     No current facility-administered medications for this visit.        REVIEW OF SYSTEMS:    GENERAL:  No weight loss, malaise or fevers.  HEENT:  Negative for frequent or significant headaches.  NECK:  Negative for lumps, goiter, pain and significant neck swelling.  RESPIRATORY:  Negative for  cough, wheezing or shortness of breath.  CARDIOVASCULAR:  Negative for chest pain, leg swelling or palpitations. Hypertension.  GI:  Negative for abdominal discomfort, blood in stools or black stools or change in bowel habits.  MUSCULOSKELETAL:  See HPI.  SKIN:  Negative for lesions, rash, and itching.  PSYCH:  Negative for sleep disturbance, mood disorder and recent psychosocial stressors.  HEMATOLOGY/LYMPHOLOGY:  Negative for prolonged bleeding, bruising easily or swollen nodes.  NEURO:   No history of headaches, syncope, paralysis, seizures or tremors.  ENDO: Diabetes.  All other reviewed and negative other than HPI.    Past Medical History:  Past Medical History:   Diagnosis Date    Arthritis     Diabetes mellitus     Hypertension        Past Surgical History:  Past Surgical History:   Procedure Laterality Date     SECTION      CHOLECYSTECTOMY      EPIDURAL STEROID INJECTION INTO LUMBAR SPINE N/A 2018    Procedure: INJECTION, STEROID, SPINE, LUMBAR, EPIDURAL;  Surgeon: Shaq Silverio MD;  Location: [facility];  Service: Pain Management;  Laterality: N/A;  LUMBAR L4-L5 INTERLAMINAR ELISE'  [id number]  W/ SEDATION     HYSTERECTOMY      INJECTION OF FACET JOINT Bilateral 2018    Procedure: INJECTION-FACET;  Surgeon: Shaq Silverio MD;  Location: [facility];  Service: Pain Management;  Laterality: Bilateral;  LUMBAR BILATERAL L4-L5 AND L5-S1 FACET STEROID INJECTION  [id number]    W/ SEDATION     INJECTION, STEROID, SPINE, LUMBAR, EPIDURAL N/A 2018    [facility]    INJECTION-FACET Bilateral 2018    [facility] at [facility]    INJECTION-STEROID-EPIDURAL-TRANSFORAMINAL Left 2018    [facility] at [facility]    INSERTION,NEUROSTIMULATOR,SPINAL CORD N/A 10/26/2018    [facility] by Su [facility] Jr[facility] OR    TRIAL OF SPINAL CORD NERVE STIMULATOR N/A 2018    Procedure: TRIAL,  "NEUROSTIMULATOR, SPINAL CORD, SPINAL CORD STIMULATOR TRIAL-INTERNAL WIRES TO EXTERNAL BATTERY;  Surgeon: Shaq Silverio MD;  Location: [facility];  Service: Pain Management;  Laterality: N/A;  COPE REP NOTIFIED    TRIAL, NEUROSTIMULATOR, SPINAL CORD, SPINAL CORD STIMULATOR TRIAL-INTERNAL WIRES TO EXTERNAL BATTERY N/A 9/24/2018    Performed by [facility]q [facility]io MD at [facility]       Family History:  History reviewed. No pertinent family history.    Social History:  Social History     Socioeconomic History    Marital status:      Spouse name: None    Number of children: None    Years of education: None    Highest education level: None   Social Needs    Financial resource strain: None    Food insecurity - worry: None    Food insecurity - inability: None    Transportation needs - medical: None    Transportation needs - non-medical: None   Occupational History    None   Tobacco Use    Smoking status: Never Smoker    Smokeless tobacco: Never Used   Substance and Sexual Activity    Alcohol use: No    Drug use: No    Sexual activity: None   Other Topics Concern    None   Social History Narrative    None       OBJECTIVE:    /77   Pulse 69   Temp 97.7 °F (36.5 °C) (Oral)   Resp 18   Ht 5' 4" (1.626 m)   Wt 79 kg (174 lb 3.2 oz)   BMI 29.90 kg/m²     PHYSICAL EXAMINATION:    General appearance: Well appearing, in no acute distress, alert and oriented x3.  Psych:  Mood and affect appropriate.  Skin: Skin color, texture, turgor normal, no rashes or lesions, in both upper and lower body.  SCS sites well healed.  Head/face:  Atraumatic, normocephalic. No palpable lymph nodes  Cor: RRR  Pulm: CTA  Back: No pain with palpation to lumbar spine.  Full ROM without pain.  Negative facet loading.  Negative SLR.  Gait: Antalgic- ambulates without assistance.      ASSESSMENT: 68 y.o. year old female with back and leg pain, consistent with the following diagnoses:     1. Chronic pain syndrome     2. " Failed back surgical syndrome     3. Lumbar radiculopathy     4. Carpal tunnel syndrome, unspecified laterality  Ambulatory Referral to Orthopedics         PLAN:     - She is s/p SCS implant without complication and significant relief.    - She can slowly increase her activity.    - She would like a referral to hand clinic for bilateral carpal tunnel syndrome.    - RTC in 3 months or sooner if needed.    - Counseled patient regarding the importance of constant sleeping habits and physical therapy.      The above plan and management options were discussed at length with patient. Patient is in agreement with the above and verbalized understanding.    Renetta Steele  11/27/2018

## 2018-11-30 DIAGNOSIS — M79.642 BILATERAL HAND PAIN: Primary | ICD-10-CM

## 2018-11-30 DIAGNOSIS — M79.641 BILATERAL HAND PAIN: Primary | ICD-10-CM

## 2018-12-03 ENCOUNTER — OFFICE VISIT (OUTPATIENT)
Dept: ORTHOPEDICS | Facility: CLINIC | Age: 68
End: 2018-12-03
Payer: MEDICARE

## 2018-12-03 ENCOUNTER — HOSPITAL ENCOUNTER (OUTPATIENT)
Dept: RADIOLOGY | Facility: OTHER | Age: 68
Discharge: HOME | End: 2018-12-03
Attending: ORTHOPAEDIC SURGERY
Payer: MEDICARE

## 2018-12-03 VITALS
BODY MASS INDEX: 29.71 KG/M2 | SYSTOLIC BLOOD PRESSURE: 146 MMHG | WEIGHT: 174 LBS | HEIGHT: 64 IN | HEART RATE: 75 BPM | DIASTOLIC BLOOD PRESSURE: 62 MMHG

## 2018-12-03 DIAGNOSIS — G56.03 BILATERAL CARPAL TUNNEL SYNDROME: Primary | ICD-10-CM

## 2018-12-03 DIAGNOSIS — M79.642 BILATERAL HAND PAIN: ICD-10-CM

## 2018-12-03 DIAGNOSIS — M79.641 BILATERAL HAND PAIN: ICD-10-CM

## 2018-12-03 PROCEDURE — 3077F SYST BP >= 140 MM HG: CPT | Mod: CPTII,HCNC,S$GLB, | Performed by: ORTHOPAEDIC SURGERY

## 2018-12-03 PROCEDURE — 1101F PT FALLS ASSESS-DOCD LE1/YR: CPT | Mod: CPTII,HCNC,S$GLB, | Performed by: ORTHOPAEDIC SURGERY

## 2018-12-03 PROCEDURE — 3078F DIAST BP <80 MM HG: CPT | Mod: CPTII,HCNC,S$GLB, | Performed by: ORTHOPAEDIC SURGERY

## 2018-12-03 PROCEDURE — 73130 X-RAY EXAM OF HAND: CPT | Mod: 26,50,HCNC, | Performed by: RADIOLOGY

## 2018-12-03 PROCEDURE — 99999 PR PBB SHADOW E&M-EST. PATIENT-LVL III: CPT | Mod: PBBFAC,HCNC,, | Performed by: ORTHOPAEDIC SURGERY

## 2018-12-03 PROCEDURE — 99203 OFFICE O/P NEW LOW 30 MIN: CPT | Mod: HCNC,S$GLB,, | Performed by: ORTHOPAEDIC SURGERY

## 2018-12-03 PROCEDURE — 73130 X-RAY EXAM OF HAND: CPT | Mod: 50,TC,FY,HCNC

## 2018-12-03 NOTE — LETTER
December 3, 2018      Renetta Steele, [facility]  [address]  [facility] [address]           [facility]  [address], [address]  [facility] [address]  Phone: [phone]          Patient: Faby Luna   MR Number: [id number]   YOB: 1950   Date of Visit: 12/3/2018       Dear Renetta Steele:    Thank you for referring Faby Luna to me for evaluation. Attached you will find relevant portions of my assessment and plan of care.    If you have questions, please do not hesitate to call me. I look forward to following Faby Luna along with you.    Sincerely,    Pan Goodman MD    Enclosure  CC:  No Recipients    If you would like to receive this communication electronically, please contact [email or web address] or [phone] to request more information on [employer] access.    For providers and/or their staff who would like to refer a patient to [facility], please contact us through our one-stop-shop provider referral line, [facility], at [phone].    If you feel you have received this communication in error or would no longer like to receive these types of communications, please e-mail [email or web address]

## 2018-12-03 NOTE — PROGRESS NOTES
[facility]  History & Physical  Orthopedics    SUBJECTIVE:      Chief Complaint: b/l hand N/T    Referring Provider: Renetta Steele,*       History of Present Illness:  Patient is a 68 y.o. right hand dominant female who presents today with complaints of b/l hand N/T.     The patient is a/an teacher.    Onset of symptoms/DOI was years ago.    Symptoms are aggravated by at night.    Symptoms are alleviated by during the day.    Symptoms consist of pain and N/T.    The patient rates their pain as a 10/10.    Attempted treatment(s) and/or interventions include rest and immobilization.     The patient denies any fevers, chills, N/V, D/C and presents for evaluation.       Past Medical History:   Diagnosis Date    Arthritis     Diabetes mellitus     Hypertension      Past Surgical History:   Procedure Laterality Date     SECTION      CHOLECYSTECTOMY      EPIDURAL STEROID INJECTION INTO LUMBAR SPINE N/A 2018    Procedure: INJECTION, STEROID, SPINE, LUMBAR, EPIDURAL;  Surgeon: Shaq Silverio MD;  [facility];  Service: Pain Management;  Laterality: N/A;  LUMBAR L4-L5 INTERLAMINAR ELISE'  29427  W/ SEDATION     HYSTERECTOMY      INJECTION OF FACET JOINT Bilateral 2018    Procedure: INJECTION-FACET;  Surgeon: Shaq Silverio MD;  [facility];  Service: Pain Management;  Laterality: Bilateral;  LUMBAR BILATERAL L4-L5 AND L5-S1 FACET STEROID INJECTION  [id number]    W/ SEDATION     INJECTION, STEROID, SPINE, LUMBAR, EPIDURAL N/A 2018    [facility]    INJECTION-FACET Bilateral 2018    [facility] by [facility] at [facility]    INJECTION-STEROID-EPIDURAL-TRANSFORAMINAL Left 2018    [facility] by [facility] at [facility]    INSERTION,NEUROSTIMULATOR,SPINAL CORD N/A 10/26/2018    Performed by Su Arredondo Jr., [facility] OR    TRIAL OF SPINAL CORD NERVE STIMULATOR N/A 2018     Procedure: TRIAL, NEUROSTIMULATOR, SPINAL CORD, SPINAL CORD STIMULATOR TRIAL-INTERNAL WIRES TO EXTERNAL BATTERY;  Surgeon: Shaq Silverio MD;  Location: [facility];  Service: Pain Management;  Laterality: N/A;  COPE REP NOTIFIED    TRIAL, NEUROSTIMULATOR, SPINAL CORD, SPINAL CORD STIMULATOR TRIAL-INTERNAL WIRES TO EXTERNAL BATTERY N/A 9/24/2018    Performed by [facility] MD at [facility]     Review of patient's allergies indicates:   Allergen Reactions    Aspirin Other (See Comments)     Has been told not to take it due to bariatric surgery     Social History     Social History Narrative    Not on file     No family history on file.      Current Outpatient Medications:     atenolol (TENORMIN) 100 MG tablet, Take 1 tablet (100 mg total) by mouth once daily., Disp: 90 tablet, Rfl: 1    blood sugar diagnostic Strp, 1 strip by Misc.(Non-Drug; Combo Route) route 2 (two) times daily with meals., Disp: 100 strip, Rfl: 11    blood-glucose meter kit, Use as instructed, Disp: 1 each, Rfl: 0    escitalopram oxalate (LEXAPRO) 20 MG tablet, TAKE 1 TABLET BY MOUTH ONCE DAILY, Disp: 90 tablet, Rfl: 1    furosemide (LASIX) 20 MG tablet, Take 1 tablet (20 mg total) by mouth once daily., Disp: 90 tablet, Rfl: 1    glipiZIDE (GLUCOTROL) 10 MG tablet, Take 10 mg by mouth every evening., Disp: , Rfl:     lancets Misc, 1 lancet by Misc.(Non-Drug; Combo Route) route 2 (two) times daily with meals., Disp: 100 each, Rfl: 11    lancing device Misc, 1 Device by Misc.(Non-Drug; Combo Route) route 2 (two) times daily with meals., Disp: 1 each, Rfl: 0    LORazepam (ATIVAN) 2 MG Tab, Take 0.5 tablets by mouth 2 (two) times daily as needed for Anxiety., Disp: , Rfl:     losartan (COZAAR) 25 MG tablet, Take 1 tablet (25 mg total) by mouth once daily., Disp: 90 tablet, Rfl: 3    metFORMIN (GLUCOPHAGE) 1000 MG tablet, Take 1,000 mg by mouth every evening., Disp: , Rfl:     pantoprazole (PROTONIX) 40 MG tablet, Take 1 tablet (40  "mg total) by mouth before breakfast., Disp: 90 tablet, Rfl: 1    traZODone (DESYREL) 50 MG tablet, Take 1 tablet (50 mg total) by mouth nightly as needed for Insomnia., Disp: 90 tablet, Rfl: 0    TRUE METRIX GLUCOSE METER Misc, , Disp: , Rfl:     TRUEPLUS LANCETS 33 gauge Misc, , Disp: , Rfl:     zonisamide (ZONEGRAN) 100 MG Cap, TAKE 4 CAPSULES BY MOUTH AT BEDTIME, Disp: 120 capsule, Rfl: 2      Review of Systems:  Constitutional: no fever or chills  Eyes: no visual changes  ENT: no nasal congestion or sore throat  Respiratory: no cough or shortness of breath  Cardiovascular: no chest pain  Gastrointestinal: no nausea or vomiting, tolerating diet  Musculoskeletal: pain and numbness/tingling    OBJECTIVE:      Vital Signs (Most Recent):  Vitals:    12/03/18 1519   BP: (!) 146/62   Pulse: 75   Weight: 78.9 kg (174 lb)   Height: 5' 4" (1.626 m)     Body mass index is 29.87 kg/m².      Physical Exam:  Constitutional: The patient appears well-developed and well-nourished. No distress.   Head: Normocephalic and atraumatic.   Nose: Nose normal.   Eyes: Conjunctivae and EOM are normal.   Neck: No tracheal deviation present.   Cardiovascular: Normal rate and intact distal pulses.    Pulmonary/Chest: Effort normal. No respiratory distress.   Abdominal: There is no guarding.   Neurological: The patient is alert.   Psychiatric: The patient has a normal mood and affect.     Bilateral Hand/Wrist Examination:    Observation/Inspection:  Swelling  none    Deformity  none  Discoloration  none     Scars   none    Atrophy  + thenar atrophy R>L    HAND/WRIST EXAMINATION:  Finkelstein's Test   Neg  Snuff box tenderness   Neg  Paulino's Test    Neg  Hook of Hamate Tenderness  Neg  CMC grind    Neg  Circumduction test   Neg    Neurovascular Exam:  Digits WWP, brisk CR < 3s throughout  NVI motor/LTS to M/R/U nerves, radial pulse 2+  Tinel's Test - Carpal Tunnel  Pos  Tinel's Test - Cubital Tunnel  Neg  Phalen's Test    Pos  Median " Nerve Compression Test Pos    ROM hand/wrist/elbow full, painless      Diagnostic Results:     Xray - right SL interval widened, no acute findings  EMG - none    ASSESSMENT/PLAN:      68 y.o. yo female with b/l CTS  1) B/L carpal tunnel splints  2) EMG BUE  3) NSAIDs prn pain  4) F/U after EMG        Pan Goodman M.D.

## 2018-12-04 ENCOUNTER — OUTPATIENT CASE MANAGEMENT (OUTPATIENT)
Dept: ADMINISTRATIVE | Facility: OTHER | Age: 68
End: 2018-12-04

## 2018-12-04 NOTE — PROGRESS NOTES
The following patient has been assigned to Janki Dudley RN with Outpatient Complex Care Management for high risk screening.    Reason: High Risk Recently Seen in Clinic/Admission on 10-26    Please contact [facility] at [phone] with any questions.    Thank you,    Claribel Lewis    Outpatient Case Management

## 2018-12-07 ENCOUNTER — OUTPATIENT CASE MANAGEMENT (OUTPATIENT)
Dept: ADMINISTRATIVE | Facility: OTHER | Age: 68
End: 2018-12-07

## 2018-12-07 NOTE — PROGRESS NOTES
12/17/18  Summary:  1st Attempt to complete initial assessment for Outpatient Care Management; left message requesting return call.    Interventions:  None.    Plan:  Make 2nd attempt to complete initial assessment for OPCM

## 2018-12-11 ENCOUNTER — OUTPATIENT CASE MANAGEMENT (OUTPATIENT)
Dept: ADMINISTRATIVE | Facility: OTHER | Age: 68
End: 2018-12-11

## 2018-12-11 NOTE — LETTER
December 11, 2018    Faby Luna  [address]  [place] [address]             [facility]  [address]  [place] [address] Dear Mrs. Faby Luna:    I work with [facility]'s Outpatient Case Management Department. I have been unsuccessful at reaching you to follow-up to see how you have been doing. If you require any future assistance or if any new concerns or problems arise, please do not hesitate to call.     The Outpatient Care Management Department can be reached at [phone] from 8:00 am to 4:30 pm on Monday thru Friday.   [facility] also has a program where a nurse is available 24/7 to answer questions or provide medical advice. Their phone number is [phone].     Sincerely,      Janki Dudley RN, [facility]  Outpatient Care Management  TEL: [phone]    If you have any questions or concerns, please don't hesitate to call.    Sincerely,        Janki Dudley RN

## 2018-12-11 NOTE — PROGRESS NOTES
12/11/18  Summary:  2nd attempt to complete initial assessment for OPCM. Mail box full. Unable to leave a message.      Interventions:  Mailed letter-unable to reach with request for call back.     Plan:  Make 3rd & last attempt to complete initial assessment for OPCM.

## 2018-12-17 ENCOUNTER — OUTPATIENT CASE MANAGEMENT (OUTPATIENT)
Dept: ADMINISTRATIVE | Facility: OTHER | Age: 68
End: 2018-12-17

## 2018-12-17 NOTE — PROGRESS NOTES
12/17/18  Summary:  -- 3rd Attempt to complete initial assessment for Outpatient Care Management; reached mailbox that is full. Unable to leave voice message.     Interventions:  None.     Plan:  Referral closed. Unable to reach. No response to letter mailed or to initial message left.

## 2018-12-19 RX ORDER — TRAZODONE HYDROCHLORIDE 50 MG/1
TABLET ORAL
Qty: 90 TABLET | Refills: 0 | Status: SHIPPED | OUTPATIENT
Start: 2018-12-19 | End: 2019-03-20 | Stop reason: SDUPTHER

## 2019-01-02 DIAGNOSIS — M47.26 OSTEOARTHRITIS OF SPINE WITH RADICULOPATHY, LUMBAR REGION: ICD-10-CM

## 2019-01-02 DIAGNOSIS — I10 ESSENTIAL HYPERTENSION: ICD-10-CM

## 2019-01-02 RX ORDER — ATENOLOL 100 MG/1
TABLET ORAL
Qty: 90 TABLET | Refills: 0 | Status: SHIPPED | OUTPATIENT
Start: 2019-01-02 | End: 2019-03-29 | Stop reason: SDUPTHER

## 2019-01-02 RX ORDER — ZONISAMIDE 100 MG/1
CAPSULE ORAL
Qty: 120 CAPSULE | Refills: 2 | Status: SHIPPED | OUTPATIENT
Start: 2019-01-02 | End: 2019-04-05 | Stop reason: SDUPTHER

## 2019-01-02 NOTE — TELEPHONE ENCOUNTER
Please contact patient and inform that she is due for 6 month follow up this month. Please schedule.

## 2019-01-25 DIAGNOSIS — E11.9 TYPE 2 DIABETES MELLITUS WITHOUT COMPLICATION: ICD-10-CM

## 2019-02-20 ENCOUNTER — PROCEDURE VISIT (OUTPATIENT)
Dept: NEUROLOGY | Facility: CLINIC | Age: 69
End: 2019-02-20
Payer: MEDICARE

## 2019-02-20 DIAGNOSIS — G56.03 BILATERAL CARPAL TUNNEL SYNDROME: ICD-10-CM

## 2019-02-20 PROCEDURE — 95886 MUSC TEST DONE W/N TEST COMP: CPT | Mod: HCNC,S$GLB,, | Performed by: PSYCHIATRY & NEUROLOGY

## 2019-02-20 PROCEDURE — 95913 NRV CNDJ TEST 13/> STUDIES: CPT | Mod: HCNC,S$GLB,, | Performed by: PSYCHIATRY & NEUROLOGY

## 2019-02-20 PROCEDURE — 95886 PR EMG COMPLETE, W/ NERVE CONDUCTION STUDIES, 5+ MUSCLES: ICD-10-PCS | Mod: HCNC,S$GLB,, | Performed by: PSYCHIATRY & NEUROLOGY

## 2019-02-20 PROCEDURE — 95913 PR NERVE CONDUCTION STUDY; 13 OR MORE STUDIES: ICD-10-PCS | Mod: HCNC,S$GLB,, | Performed by: PSYCHIATRY & NEUROLOGY

## 2019-02-25 ENCOUNTER — OFFICE VISIT (OUTPATIENT)
Dept: ORTHOPEDICS | Facility: CLINIC | Age: 69
End: 2019-02-25
Payer: MEDICARE

## 2019-02-25 VITALS — HEIGHT: 64 IN | BODY MASS INDEX: 29.7 KG/M2 | WEIGHT: 173.94 LBS

## 2019-02-25 DIAGNOSIS — G56.01 RIGHT CARPAL TUNNEL SYNDROME: Primary | ICD-10-CM

## 2019-02-25 DIAGNOSIS — G56.21 CUBITAL TUNNEL SYNDROME ON RIGHT: ICD-10-CM

## 2019-02-25 PROCEDURE — 99214 PR OFFICE/OUTPT VISIT, EST, LEVL IV, 30-39 MIN: ICD-10-PCS | Mod: HCNC,S$GLB,, | Performed by: ORTHOPAEDIC SURGERY

## 2019-02-25 PROCEDURE — 1101F PR PT FALLS ASSESS DOC 0-1 FALLS W/OUT INJ PAST YR: ICD-10-PCS | Mod: HCNC,CPTII,S$GLB, | Performed by: ORTHOPAEDIC SURGERY

## 2019-02-25 PROCEDURE — 99999 PR PBB SHADOW E&M-EST. PATIENT-LVL IV: CPT | Mod: PBBFAC,HCNC,, | Performed by: ORTHOPAEDIC SURGERY

## 2019-02-25 PROCEDURE — 99999 PR PBB SHADOW E&M-EST. PATIENT-LVL IV: ICD-10-PCS | Mod: PBBFAC,HCNC,, | Performed by: ORTHOPAEDIC SURGERY

## 2019-02-25 PROCEDURE — 1101F PT FALLS ASSESS-DOCD LE1/YR: CPT | Mod: HCNC,CPTII,S$GLB, | Performed by: ORTHOPAEDIC SURGERY

## 2019-02-25 PROCEDURE — 99214 OFFICE O/P EST MOD 30 MIN: CPT | Mod: HCNC,S$GLB,, | Performed by: ORTHOPAEDIC SURGERY

## 2019-02-25 RX ORDER — SODIUM CHLORIDE 9 MG/ML
INJECTION, SOLUTION INTRAVENOUS CONTINUOUS
Status: CANCELLED | OUTPATIENT
Start: 2019-02-25

## 2019-02-25 RX ORDER — MUPIROCIN 20 MG/G
OINTMENT TOPICAL
Status: CANCELLED | OUTPATIENT
Start: 2019-02-25

## 2019-02-25 NOTE — H&P
[facility]  History & Physical  Orthopedics     SUBJECTIVE:       Chief Complaint: b/l hand N/T     Referring Provider: No ref. provider found      Interval History 19:  Here for follow-up of EMG results. No interval changes in symptoms. States she wears her brace at night. Has had several carpal tunnel injections in past (several years ago).  Has constant N/T in the IF, LF, RFs with intermittent symptoms in the SF. Reports weakness in the thumb and thenar atrophy.     History of Present Illness:  Patient is a 68 y.o. right hand dominant female who presents today with complaints of b/l hand N/T.      The patient is a/an teacher.     Onset of symptoms/DOI was years ago.     Symptoms are aggravated by at night.     Symptoms are alleviated by during the day.     Symptoms consist of pain and N/T.     The patient rates their pain as a 10/10.     Attempted treatment(s) and/or interventions include rest and immobilization.     The patient denies any fevers, chills, N/V, D/C and presents for evaluation.             Past Medical History:   Diagnosis Date    Arthritis      Diabetes mellitus      Hypertension              Past Surgical History:   Procedure Laterality Date     SECTION        CHOLECYSTECTOMY        HYSTERECTOMY        INJECTION, STEROID, SPINE, LUMBAR, EPIDURAL N/A 2018     [facility]    INJECTION-FACET Bilateral 2018     Performed by [facility] at [facility]    INJECTION-STEROID-EPIDURAL-TRANSFORAMINAL Left 2018     Performed by [facility] at [facility]    INSERTION,NEUROSTIMULATOR,SPINAL CORD N/A 10/26/2018     [facility] by [facility] Jr., [facility] at [facility] OR    TRIAL, NEUROSTIMULATOR, SPINAL CORD, SPINAL CORD STIMULATOR TRIAL-INTERNAL WIRES TO EXTERNAL BATTERY N/A 2018     Performed by Shaq [facility]io MD at [facility] PAIN [facility]GT            Review of patient's allergies indicates:   Allergen Reactions     Aspirin Other (See Comments)       Has been told not to take it due to bariatric surgery      Social History          Social History Narrative    Not on file      No family history on file.        Current Outpatient Medications:     atenolol (TENORMIN) 100 MG tablet, TAKE 1 TABLET BY MOUTH ONCE DAILY, Disp: 90 tablet, Rfl: 0    blood sugar diagnostic Strp, 1 strip by Misc.(Non-Drug; Combo Route) route 2 (two) times daily with meals., Disp: 100 strip, Rfl: 11    blood-glucose meter kit, Use as instructed, Disp: 1 each, Rfl: 0    escitalopram oxalate (LEXAPRO) 20 MG tablet, TAKE 1 TABLET BY MOUTH ONCE DAILY, Disp: 90 tablet, Rfl: 1    furosemide (LASIX) 20 MG tablet, Take 1 tablet (20 mg total) by mouth once daily., Disp: 90 tablet, Rfl: 1    glipiZIDE (GLUCOTROL) 10 MG tablet, Take 10 mg by mouth every evening., Disp: , Rfl:     lancets Misc, 1 lancet by Misc.(Non-Drug; Combo Route) route 2 (two) times daily with meals., Disp: 100 each, Rfl: 11    lancing device Misc, 1 Device by Misc.(Non-Drug; Combo Route) route 2 (two) times daily with meals., Disp: 1 each, Rfl: 0    LORazepam (ATIVAN) 2 MG Tab, Take 0.5 tablets by mouth 2 (two) times daily as needed for Anxiety., Disp: , Rfl:     losartan (COZAAR) 25 MG tablet, Take 1 tablet (25 mg total) by mouth once daily., Disp: 90 tablet, Rfl: 3    metFORMIN (GLUCOPHAGE) 1000 MG tablet, Take 1,000 mg by mouth every evening., Disp: , Rfl:     pantoprazole (PROTONIX) 40 MG tablet, Take 1 tablet (40 mg total) by mouth before breakfast., Disp: 90 tablet, Rfl: 1    traZODone (DESYREL) 50 MG tablet, TAKE 1 TABLET BY MOUTH NIGHTLY AS NEEDED FOR  INSOMNIA, Disp: 90 tablet, Rfl: 0    TRUE METRIX GLUCOSE METER Misc, , Disp: , Rfl:     TRUEPLUS LANCETS 33 gauge Misc, , Disp: , Rfl:     zonisamide (ZONEGRAN) 100 MG Cap, TAKE 4 CAPSULES BY MOUTH AT BEDTIME, Disp: 120 capsule, Rfl: 2        Review of Systems:  Constitutional: no fever or chills  Eyes: no visual  "changes  ENT: no nasal congestion or sore throat  Respiratory: no cough or shortness of breath  Cardiovascular: no chest pain  Gastrointestinal: no nausea or vomiting, tolerating diet  Musculoskeletal: pain and numbness/tingling     OBJECTIVE:       Vital Signs (Most Recent):  Vitals       Vitals:     02/25/19 1417   Weight: 78.9 kg (173 lb 15.1 oz)   Height: 5' 4" (1.626 m)         Body mass index is 29.86 kg/m².        Physical Exam:  Constitutional: The patient appears well-developed and well-nourished. No distress.   Head: Normocephalic and atraumatic.   Nose: Nose normal.   Eyes: Conjunctivae and EOM are normal.   Neck: No tracheal deviation present.   Cardiovascular: Normal rate and intact distal pulses.    Pulmonary/Chest: Effort normal. No respiratory distress.   Abdominal: There is no guarding.   Neurological: The patient is alert.   Psychiatric: The patient has a normal mood and affect.      Bilateral Hand/Wrist Examination:     Observation/Inspection:  Swelling                       none                  Deformity                     none  Discoloration               none                  Scars                           none                  Atrophy                        + thenar atrophy R>L     HAND/WRIST EXAMINATION:  Finkelstein's Test                                Neg  Snuff box tenderness                          Neg  Paulino's Test                                     Neg  Hook of Hamate Tenderness              Neg  CMC grind                                           Neg  Circumduction test                              Neg     Neurovascular Exam:  Digits WWP, brisk CR < 3s throughout  NVI motor/LTS to M/R/U nerves, radial pulse 2+  Tinel's Test - Carpal Tunnel                Pos  Tinel's Test - Cubital Tunnel               Pos  Phalen's Test                                      Pos  Median Nerve Compression Test       Pos     ROM hand/wrist/elbow full, painless        Diagnostic Results:     Xray " - right SL interval widened, no acute findings  EMG - neurology read below  1. A moderate left carpal tunnel syndrome and a moderate to severe right carpal tunnel syndrome;   2. 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         ASSESSMENT/PLAN:       68 y.o. yo female with b/l CTS  1) Discussed treatment options with patient including conservative management, injections, carpal/cubital tunnel release  2) Patient would like to proceed with right carpal and cubital tunnel release. Surgical consents signed in clinic. Informed consent was obtained.  3) Patient has DM II, last HbA1c on 6/20/18 was 6.4  4) The risks, benefits and alternatives to surgery were discussed with the patient in detail.  Risks discussed include but are not limited to bleeding, infection, vessel and/or nerve damage, pain, numbness, tingling, complex regional pain syndrome, hardware/surgical failure, prominent and/or symptomatic hardware possibly necessitating future removal, osteomyelitis, amputation, loss of function, stiffness, rotational malalignment, debility, dysfunction, need for further surgery, malunion, nonunion, DVT, PE, arthritis and death.  Patient states an understanding and wishes to proceed with surgery.   All questions were answered.  No guarantees were implied or stated.  Informed consent was obtained.  5) To OR 3/8/19 for right CTR, RobertR             Pan Goodman M.D.

## 2019-02-25 NOTE — PROGRESS NOTES
[facility]  History & Physical  Orthopedics    SUBJECTIVE:      Chief Complaint: b/l hand N/T    Referring Provider: No ref. provider found     Interval History 19:  Here for follow-up of EMG results. No interval changes in symptoms. States she wears her brace at night. Has had several carpal tunnel injections in past (several years ago).  Has constant N/T in the IF, LF, RFs with intermittent symptoms in the SF. Reports weakness in the thumb and thenar atrophy.    History of Present Illness:  Patient is a 68 y.o. right hand dominant female who presents today with complaints of b/l hand N/T.     The patient is a/an teacher.    Onset of symptoms/DOI was years ago.    Symptoms are aggravated by at night.    Symptoms are alleviated by during the day.    Symptoms consist of pain and N/T.    The patient rates their pain as a 10/10.    Attempted treatment(s) and/or interventions include rest and immobilization.     The patient denies any fevers, chills, N/V, D/C and presents for evaluation.       Past Medical History:   Diagnosis Date    Arthritis     Diabetes mellitus     Hypertension      Past Surgical History:   Procedure Laterality Date     SECTION      CHOLECYSTECTOMY      HYSTERECTOMY      INJECTION, STEROID, SPINE, LUMBAR, EPIDURAL N/A 2018    [facility]    INJECTION-FACET Bilateral 2018    [facility] by [facility] at [facility] PA[facility]    INJECTION-STEROID-EPIDURAL-TRANSFORAMINAL Left 2018    [facility] by [facility] at [facility] PA[facility]    INSERTION,NEUROSTIMULATOR,SPINAL CORD N/A 10/26/2018    [facility]    TRIAL, NEUROSTIMULATOR, SPINAL CORD, SPINAL CORD STIMULATOR TRIAL-INTERNAL WIRES TO EXTERNAL BATTERY N/A 2018    Performed by [facility]q [facility] at [facility] PAIN MGT     Review of patient's allergies indicates:   Allergen Reactions    Aspirin Other (See Comments)     Has been told  not to take it due to bariatric surgery     Social History     Social History Narrative    Not on file     No family history on file.      Current Outpatient Medications:     atenolol (TENORMIN) 100 MG tablet, TAKE 1 TABLET BY MOUTH ONCE DAILY, Disp: 90 tablet, Rfl: 0    blood sugar diagnostic Strp, 1 strip by Misc.(Non-Drug; Combo Route) route 2 (two) times daily with meals., Disp: 100 strip, Rfl: 11    blood-glucose meter kit, Use as instructed, Disp: 1 each, Rfl: 0    escitalopram oxalate (LEXAPRO) 20 MG tablet, TAKE 1 TABLET BY MOUTH ONCE DAILY, Disp: 90 tablet, Rfl: 1    furosemide (LASIX) 20 MG tablet, Take 1 tablet (20 mg total) by mouth once daily., Disp: 90 tablet, Rfl: 1    glipiZIDE (GLUCOTROL) 10 MG tablet, Take 10 mg by mouth every evening., Disp: , Rfl:     lancets Misc, 1 lancet by Misc.(Non-Drug; Combo Route) route 2 (two) times daily with meals., Disp: 100 each, Rfl: 11    lancing device Misc, 1 Device by Misc.(Non-Drug; Combo Route) route 2 (two) times daily with meals., Disp: 1 each, Rfl: 0    LORazepam (ATIVAN) 2 MG Tab, Take 0.5 tablets by mouth 2 (two) times daily as needed for Anxiety., Disp: , Rfl:     losartan (COZAAR) 25 MG tablet, Take 1 tablet (25 mg total) by mouth once daily., Disp: 90 tablet, Rfl: 3    metFORMIN (GLUCOPHAGE) 1000 MG tablet, Take 1,000 mg by mouth every evening., Disp: , Rfl:     pantoprazole (PROTONIX) 40 MG tablet, Take 1 tablet (40 mg total) by mouth before breakfast., Disp: 90 tablet, Rfl: 1    traZODone (DESYREL) 50 MG tablet, TAKE 1 TABLET BY MOUTH NIGHTLY AS NEEDED FOR  INSOMNIA, Disp: 90 tablet, Rfl: 0    TRUE METRIX GLUCOSE METER Misc, , Disp: , Rfl:     TRUEPLUS LANCETS 33 gauge Misc, , Disp: , Rfl:     zonisamide (ZONEGRAN) 100 MG Cap, TAKE 4 CAPSULES BY MOUTH AT BEDTIME, Disp: 120 capsule, Rfl: 2      Review of Systems:  Constitutional: no fever or chills  Eyes: no visual changes  ENT: no nasal congestion or sore throat  Respiratory: no  "cough or shortness of breath  Cardiovascular: no chest pain  Gastrointestinal: no nausea or vomiting, tolerating diet  Musculoskeletal: pain and numbness/tingling    OBJECTIVE:      Vital Signs (Most Recent):  Vitals:    02/25/19 1417   Weight: 78.9 kg (173 lb 15.1 oz)   Height: 5' 4" (1.626 m)     Body mass index is 29.86 kg/m².      Physical Exam:  Constitutional: The patient appears well-developed and well-nourished. No distress.   Head: Normocephalic and atraumatic.   Nose: Nose normal.   Eyes: Conjunctivae and EOM are normal.   Neck: No tracheal deviation present.   Cardiovascular: Normal rate and intact distal pulses.    Pulmonary/Chest: Effort normal. No respiratory distress.   Abdominal: There is no guarding.   Neurological: The patient is alert.   Psychiatric: The patient has a normal mood and affect.     Bilateral Hand/Wrist Examination:    Observation/Inspection:  Swelling  none    Deformity  none  Discoloration  none     Scars   none    Atrophy  + thenar atrophy R>L    HAND/WRIST EXAMINATION:  Finkelstein's Test   Neg  Snuff box tenderness   Neg  Paulino's Test    Neg  Hook of Hamate Tenderness  Neg  CMC grind    Neg  Circumduction test   Neg    Neurovascular Exam:  Digits WWP, brisk CR < 3s throughout  NVI motor/LTS to M/R/U nerves, radial pulse 2+  Tinel's Test - Carpal Tunnel  Pos  Tinel's Test - Cubital Tunnel  Pos  Phalen's Test    Pos  Median Nerve Compression Test Pos    ROM hand/wrist/elbow full, painless       Diagnostic Results:     Xray - right SL interval widened, no acute findings  EMG - neurology read below  1. A moderate left carpal tunnel syndrome and a moderate to severe right carpal tunnel syndrome;   2. 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       ASSESSMENT/PLAN:      68 y.o. yo female with b/l CTS  1) Discussed treatment options with patient including conservative management, injections, carpal/cubital tunnel release  2) Patient would " like to proceed with right carpal and cubital tunnel release. Surgical consents signed in clinic. Informed consent was obtained.  3) Patient has DM II, last HbA1c on 6/20/18 was 6.4  4) The risks, benefits and alternatives to surgery were discussed with the patient in detail.  Risks discussed include but are not limited to bleeding, infection, vessel and/or nerve damage, pain, numbness, tingling, complex regional pain syndrome, hardware/surgical failure, prominent and/or symptomatic hardware possibly necessitating future removal, osteomyelitis, amputation, loss of function, stiffness, rotational malalignment, debility, dysfunction, need for further surgery, malunion, nonunion, DVT, PE, arthritis and death.  Patient states an understanding and wishes to proceed with surgery.   All questions were answered.  No guarantees were implied or stated.  Informed consent was obtained.  5) To OR 3/8/19 for right CTR, Seth Goodman M.D.

## 2019-02-25 NOTE — H&P (VIEW-ONLY)
[facility]  History & Physical  Orthopedics     SUBJECTIVE:       Chief Complaint: b/l hand N/T     Referring Provider: No ref. provider found      Interval History 19:  Here for follow-up of EMG results. No interval changes in symptoms. States she wears her brace at night. Has had several carpal tunnel injections in past (several years ago).  Has constant N/T in the IF, LF, RFs with intermittent symptoms in the SF. Reports weakness in the thumb and thenar atrophy.     History of Present Illness:  Patient is a 68 y.o. right hand dominant female who presents today with complaints of b/l hand N/T.      The patient is a/an teacher.     Onset of symptoms/DOI was years ago.     Symptoms are aggravated by at night.     Symptoms are alleviated by during the day.     Symptoms consist of pain and N/T.     The patient rates their pain as a 10/10.     Attempted treatment(s) and/or interventions include rest and immobilization.     The patient denies any fevers, chills, N/V, D/C and presents for evaluation.             Past Medical History:   Diagnosis Date    Arthritis      Diabetes mellitus      Hypertension              Past Surgical History:   Procedure Laterality Date     SECTION        CHOLECYSTECTOMY        HYSTERECTOMY        INJECTION, STEROID, SPINE, LUMBAR, EPIDURAL N/A 2018     [facility]    INJECTION-FACET Bilateral 2018     [facility] at [facility]    INJECTION-STEROID-EPIDURAL-TRANSFORAMINAL Left 2018     [facility] at [facility]    INSERTION,NEUROSTIMULATOR,SPINAL CORD N/A 10/26/2018     [facility] by [facility]    TRIAL, NEUROSTIMULATOR, SPINAL CORD, SPINAL CORD STIMULATOR TRIAL-INTERNAL WIRES TO EXTERNAL BATTERY N/A 2018     Performed by [facility]q [facility] at [facility]            Review of patient's allergies indicates:   Allergen Reactions     Aspirin Other (See Comments)       Has been told not to take it due to bariatric surgery      Social History          Social History Narrative    Not on file      No family history on file.        Current Outpatient Medications:     atenolol (TENORMIN) 100 MG tablet, TAKE 1 TABLET BY MOUTH ONCE DAILY, Disp: 90 tablet, Rfl: 0    blood sugar diagnostic Strp, 1 strip by Misc.(Non-Drug; Combo Route) route 2 (two) times daily with meals., Disp: 100 strip, Rfl: 11    blood-glucose meter kit, Use as instructed, Disp: 1 each, Rfl: 0    escitalopram oxalate (LEXAPRO) 20 MG tablet, TAKE 1 TABLET BY MOUTH ONCE DAILY, Disp: 90 tablet, Rfl: 1    furosemide (LASIX) 20 MG tablet, Take 1 tablet (20 mg total) by mouth once daily., Disp: 90 tablet, Rfl: 1    glipiZIDE (GLUCOTROL) 10 MG tablet, Take 10 mg by mouth every evening., Disp: , Rfl:     lancets Misc, 1 lancet by Misc.(Non-Drug; Combo Route) route 2 (two) times daily with meals., Disp: 100 each, Rfl: 11    lancing device Misc, 1 Device by Misc.(Non-Drug; Combo Route) route 2 (two) times daily with meals., Disp: 1 each, Rfl: 0    LORazepam (ATIVAN) 2 MG Tab, Take 0.5 tablets by mouth 2 (two) times daily as needed for Anxiety., Disp: , Rfl:     losartan (COZAAR) 25 MG tablet, Take 1 tablet (25 mg total) by mouth once daily., Disp: 90 tablet, Rfl: 3    metFORMIN (GLUCOPHAGE) 1000 MG tablet, Take 1,000 mg by mouth every evening., Disp: , Rfl:     pantoprazole (PROTONIX) 40 MG tablet, Take 1 tablet (40 mg total) by mouth before breakfast., Disp: 90 tablet, Rfl: 1    traZODone (DESYREL) 50 MG tablet, TAKE 1 TABLET BY MOUTH NIGHTLY AS NEEDED FOR  INSOMNIA, Disp: 90 tablet, Rfl: 0    TRUE METRIX GLUCOSE METER Misc, , Disp: , Rfl:     TRUEPLUS LANCETS 33 gauge Misc, , Disp: , Rfl:     zonisamide (ZONEGRAN) 100 MG Cap, TAKE 4 CAPSULES BY MOUTH AT BEDTIME, Disp: 120 capsule, Rfl: 2        Review of Systems:  Constitutional: no fever or chills  Eyes: no visual  "changes  ENT: no nasal congestion or sore throat  Respiratory: no cough or shortness of breath  Cardiovascular: no chest pain  Gastrointestinal: no nausea or vomiting, tolerating diet  Musculoskeletal: pain and numbness/tingling     OBJECTIVE:       Vital Signs (Most Recent):  Vitals       Vitals:     02/25/19 1417   Weight: 78.9 kg (173 lb 15.1 oz)   Height: 5' 4" (1.626 m)         Body mass index is 29.86 kg/m².        Physical Exam:  Constitutional: The patient appears well-developed and well-nourished. No distress.   Head: Normocephalic and atraumatic.   Nose: Nose normal.   Eyes: Conjunctivae and EOM are normal.   Neck: No tracheal deviation present.   Cardiovascular: Normal rate and intact distal pulses.    Pulmonary/Chest: Effort normal. No respiratory distress.   Abdominal: There is no guarding.   Neurological: The patient is alert.   Psychiatric: The patient has a normal mood and affect.      Bilateral Hand/Wrist Examination:     Observation/Inspection:  Swelling                       none                  Deformity                     none  Discoloration               none                  Scars                           none                  Atrophy                        + thenar atrophy R>L     HAND/WRIST EXAMINATION:  Finkelstein's Test                                Neg  Snuff box tenderness                          Neg  Paulino's Test                                     Neg  Hook of Hamate Tenderness              Neg  CMC grind                                           Neg  Circumduction test                              Neg     Neurovascular Exam:  Digits WWP, brisk CR < 3s throughout  NVI motor/LTS to M/R/U nerves, radial pulse 2+  Tinel's Test - Carpal Tunnel                Pos  Tinel's Test - Cubital Tunnel               Pos  Phalen's Test                                      Pos  Median Nerve Compression Test       Pos     ROM hand/wrist/elbow full, painless        Diagnostic Results:     Xray " - right SL interval widened, no acute findings  EMG - neurology read below  1. A moderate left carpal tunnel syndrome and a moderate to severe right carpal tunnel syndrome;   2. 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         ASSESSMENT/PLAN:       68 y.o. yo female with b/l CTS  1) Discussed treatment options with patient including conservative management, injections, carpal/cubital tunnel release  2) Patient would like to proceed with right carpal and cubital tunnel release. Surgical consents signed in clinic. Informed consent was obtained.  3) Patient has DM II, last HbA1c on 6/20/18 was 6.4  4) The risks, benefits and alternatives to surgery were discussed with the patient in detail.  Risks discussed include but are not limited to bleeding, infection, vessel and/or nerve damage, pain, numbness, tingling, complex regional pain syndrome, hardware/surgical failure, prominent and/or symptomatic hardware possibly necessitating future removal, osteomyelitis, amputation, loss of function, stiffness, rotational malalignment, debility, dysfunction, need for further surgery, malunion, nonunion, DVT, PE, arthritis and death.  Patient states an understanding and wishes to proceed with surgery.   All questions were answered.  No guarantees were implied or stated.  Informed consent was obtained.  5) To OR 3/8/19 for right CTR, [facility]             Pan Goodman M.D.

## 2019-02-27 ENCOUNTER — OFFICE VISIT (OUTPATIENT)
Dept: PAIN MEDICINE | Facility: CLINIC | Age: 69
End: 2019-02-27
Payer: MEDICARE

## 2019-02-27 VITALS
TEMPERATURE: 98 F | RESPIRATION RATE: 18 BRPM | SYSTOLIC BLOOD PRESSURE: 117 MMHG | HEART RATE: 69 BPM | DIASTOLIC BLOOD PRESSURE: 70 MMHG | BODY MASS INDEX: 30.5 KG/M2 | HEIGHT: 64 IN | WEIGHT: 178.63 LBS

## 2019-02-27 DIAGNOSIS — M54.16 LUMBAR RADICULOPATHY: ICD-10-CM

## 2019-02-27 DIAGNOSIS — G89.4 CHRONIC PAIN SYNDROME: Primary | ICD-10-CM

## 2019-02-27 DIAGNOSIS — M53.3 SACROILIAC JOINT PAIN: ICD-10-CM

## 2019-02-27 DIAGNOSIS — M96.1 FAILED BACK SURGICAL SYNDROME: ICD-10-CM

## 2019-02-27 PROCEDURE — 99999 PR PBB SHADOW E&M-EST. PATIENT-LVL III: CPT | Mod: PBBFAC,HCNC,, | Performed by: NURSE PRACTITIONER

## 2019-02-27 PROCEDURE — 99213 PR OFFICE/OUTPT VISIT, EST, LEVL III, 20-29 MIN: ICD-10-PCS | Mod: HCNC,S$GLB,, | Performed by: NURSE PRACTITIONER

## 2019-02-27 PROCEDURE — 3078F PR MOST RECENT DIASTOLIC BLOOD PRESSURE < 80 MM HG: ICD-10-PCS | Mod: HCNC,CPTII,S$GLB, | Performed by: NURSE PRACTITIONER

## 2019-02-27 PROCEDURE — 1101F PR PT FALLS ASSESS DOC 0-1 FALLS W/OUT INJ PAST YR: ICD-10-PCS | Mod: HCNC,CPTII,S$GLB, | Performed by: NURSE PRACTITIONER

## 2019-02-27 PROCEDURE — 3074F PR MOST RECENT SYSTOLIC BLOOD PRESSURE < 130 MM HG: ICD-10-PCS | Mod: HCNC,CPTII,S$GLB, | Performed by: NURSE PRACTITIONER

## 2019-02-27 PROCEDURE — 99999 PR PBB SHADOW E&M-EST. PATIENT-LVL III: ICD-10-PCS | Mod: PBBFAC,HCNC,, | Performed by: NURSE PRACTITIONER

## 2019-02-27 PROCEDURE — 99213 OFFICE O/P EST LOW 20 MIN: CPT | Mod: HCNC,S$GLB,, | Performed by: NURSE PRACTITIONER

## 2019-02-27 PROCEDURE — 1101F PT FALLS ASSESS-DOCD LE1/YR: CPT | Mod: HCNC,CPTII,S$GLB, | Performed by: NURSE PRACTITIONER

## 2019-02-27 PROCEDURE — 3074F SYST BP LT 130 MM HG: CPT | Mod: HCNC,CPTII,S$GLB, | Performed by: NURSE PRACTITIONER

## 2019-02-27 PROCEDURE — 3078F DIAST BP <80 MM HG: CPT | Mod: HCNC,CPTII,S$GLB, | Performed by: NURSE PRACTITIONER

## 2019-02-27 NOTE — PROGRESS NOTES
Chronic patient Established Note (Follow up visit)      SUBJECTIVE:    Faby Luna presents to the clinic for a follow-up appointment for lower back and lower extremity pain.  She had lumbar SCS implant on 10/26/18 with Dr. Arredondo and Jean Claude.  She initially had 100% pain relief.  She has been having increased back pain over the past week.  She is not having any leg pain.  She has not adjusted her SCS settings or tried to increase it.  She is scheduled for right CTR on 3/8/19 with Dr. Goodman.  Since the last visit, Faby Luna states the pain has been persistent.  Current pain intensity is 8/10.    Pain Disability Index Review:  Last 3 PDI Scores 2/27/2019 11/27/2018 11/2/2018   Pain Disability Index (PDI) 8 9 0       Opioid Contract: no     report:  Not applicable    Pain Procedures:   5/2/18 Left L3 and L4 TF ELISE- 20% relief  5/21/18 Bilateral L4-5 and L5-S1 facet joint injections- no relief  9/24/18 Lumbar SCS trial ([facility])- 100% relief  10/26/18 Lumbar SCS implant ([facility])    Physical Therapy/Home Exercise: no    Imaging:     3/31/18 Lumbar MRI    Narrative     EXAMINATION:  MRI LUMBAR SPINE WITHOUT CONTRAST    CLINICAL HISTORY:  Low back pain, >6wks conservative tx, persistent-progressive sx, surgical candidate; Other spondylosis with radiculopathy, lumbar region    TECHNIQUE:  Multiplanar, multisequence MR images were acquired from the thoracolumbar junction to the sacrum without the administration of contrast.    COMPARISON:  Plain films from 03/27/2018    FINDINGS:  There is grade 1 spondylolisthesis of L4 on L5. The vertebral body heights are well maintained, with no fracture.  No marrow signal abnormality suspicious for an infiltrative process.    The conus medullaris terminates at approximately the mid body of L1.  There is a cyst associated with the upper pole of the right kidney.  There is disc desiccation noted throughout the lumbar spine with relative sparing of the L5-S1 disc.  Mild  disc space narrowing present at the L4-5 level.    L1-L2: Mild diffuse disc bulge resulting in no significant central or neural foraminal canal narrowing.    L2-L3: No significant central canal narrowing.  There is mild narrowing of either neural foraminal canal secondary to disc material.    L3-L4: Disc bulging in the bilateral foraminal regions resulting in no significant central canal narrowing.  Mild-to-moderate bilateral facet arthropathy also noted.  The bilateral neural foraminal canals are moderately narrowed with some mild effacement of either exiting L3 nerve root in the extraforaminal regions bilaterally.    L4-L5:  Grade 1 spondylolisthesis along with moderate to severe bilateral facet arthropathy and ligamentum flavum hypertrophy resulting in at least moderate narrowing of the central canal.  The right neural foraminal canal is mildly to moderately narrowed.  Left neural foraminal canal is moderately to severely narrowed with mild effacement of the exiting L4 nerve root.    L5-S1:  No significant central or neural foraminal canal narrowing noted.  Mild bilateral facet arthropathy noted.   Impression       1. Multilevel degenerative changes of the lumbar spine as detailed above     Lumbar XRAYs 3/27/18    Narrative     EXAMINATION:  XR LUMBAR SPINE AP AND LAT WITH FLEX/EXT    CLINICAL HISTORY:  Low back pain    TECHNIQUE:  AP and lateral views as well as lateral flexion and extension images are performed through the lumbar spine.    COMPARISON:  None    FINDINGS:  X-ray lumbar spine with flexion and extension demonstrates grade 1 spondylolisthesis at L4-5 measuring around 6 mm which does not change significantly with flexion and extension.  The L4-5 disc is narrowed and degenerated.  Other lumbar vertebral discs are maintained.  Vertebral body heights are maintained.  Small anterior spurs are seen, and there is small posterior osteophyte along the inferior endplate of L4.  There is lumbar facet  arthropathy at L4-5 and L5-S1.   Impression       Degenerative changes as above.  Grade 1 anterolisthesis and degenerative disc disease at L4-5.         Allergies:   Review of patient's allergies indicates:   Allergen Reactions    Aspirin Other (See Comments)     Has been told not to take it due to bariatric surgery       Current Medications:   Current Outpatient Medications   Medication Sig Dispense Refill    atenolol (TENORMIN) 100 MG tablet TAKE 1 TABLET BY MOUTH ONCE DAILY 90 tablet 0    blood sugar diagnostic Strp 1 strip by Misc.(Non-Drug; Combo Route) route 2 (two) times daily with meals. 100 strip 11    blood-glucose meter kit Use as instructed 1 each 0    escitalopram oxalate (LEXAPRO) 20 MG tablet TAKE 1 TABLET BY MOUTH ONCE DAILY 90 tablet 1    furosemide (LASIX) 20 MG tablet Take 1 tablet (20 mg total) by mouth once daily. 90 tablet 1    glipiZIDE (GLUCOTROL) 10 MG tablet Take 10 mg by mouth every evening.      lancets Misc 1 lancet by Misc.(Non-Drug; Combo Route) route 2 (two) times daily with meals. 100 each 11    lancing device Misc 1 Device by Misc.(Non-Drug; Combo Route) route 2 (two) times daily with meals. 1 each 0    LORazepam (ATIVAN) 2 MG Tab Take 0.5 tablets by mouth 2 (two) times daily as needed for Anxiety.      losartan (COZAAR) 25 MG tablet Take 1 tablet (25 mg total) by mouth once daily. 90 tablet 3    metFORMIN (GLUCOPHAGE) 1000 MG tablet Take 1,000 mg by mouth every evening.      pantoprazole (PROTONIX) 40 MG tablet Take 1 tablet (40 mg total) by mouth before breakfast. 90 tablet 1    traZODone (DESYREL) 50 MG tablet TAKE 1 TABLET BY MOUTH NIGHTLY AS NEEDED FOR  INSOMNIA 90 tablet 0    TRUE METRIX GLUCOSE METER Misc       TRUEPLUS LANCETS 33 gauge Misc       zonisamide (ZONEGRAN) 100 MG Cap TAKE 4 CAPSULES BY MOUTH AT BEDTIME 120 capsule 2     No current facility-administered medications for this visit.        REVIEW OF SYSTEMS:    GENERAL:  No weight loss, malaise or  fevers.  HEENT:  Negative for frequent or significant headaches.  NECK:  Negative for lumps, goiter, pain and significant neck swelling.  RESPIRATORY:  Negative for cough, wheezing or shortness of breath.  CARDIOVASCULAR:  Negative for chest pain, leg swelling or palpitations. Hypertension.  GI:  Negative for abdominal discomfort, blood in stools or black stools or change in bowel habits.  MUSCULOSKELETAL:  See HPI.  SKIN:  Negative for lesions, rash, and itching.  PSYCH:  Negative for sleep disturbance, mood disorder and recent psychosocial stressors.  HEMATOLOGY/LYMPHOLOGY:  Negative for prolonged bleeding, bruising easily or swollen nodes.  NEURO:   No history of headaches, syncope, paralysis, seizures or tremors.  ENDO: Diabetes.  All other reviewed and negative other than HPI.    Past Medical History:  Past Medical History:   Diagnosis Date    Arthritis     Diabetes mellitus     Hypertension        Past Surgical History:  Past Surgical History:   Procedure Laterality Date     SECTION      CHOLECYSTECTOMY      HYSTERECTOMY      INJECTION, STEROID, SPINE, LUMBAR, EPIDURAL N/A 2018    [facility]    INJECTION-FACET Bilateral 2018    [facility]    INJECTION-STEROID-EPIDURAL-TRANSFORAMINAL Left 2018    [facility] at [facility]    INSERTION,NEUROSTIMULATOR,SPINAL CORD N/A 10/26/2018    Performed by Su Arredondo Jr., [facility]    TRIAL, NEUROSTIMULATOR, SPINAL CORD, SPINAL CORD STIMULATOR TRIAL-INTERNAL WIRES TO EXTERNAL BATTERY N/A 2018    Performed by [facility]q [facility] at [facility]       Family History:  No family history on file.    Social History:  Social History     Socioeconomic History    Marital status:      Spouse name: None    Number of children: None    Years of education: None    Highest education level: None   Social Needs    Financial resource strain: None  "   Food insecurity - worry: None    Food insecurity - inability: None    Transportation needs - medical: None    Transportation needs - non-medical: None   Occupational History    None   Tobacco Use    Smoking status: Never Smoker    Smokeless tobacco: Never Used   Substance and Sexual Activity    Alcohol use: No    Drug use: No    Sexual activity: None   Other Topics Concern    None   Social History Narrative    None       OBJECTIVE:    /70   Pulse 69   Temp 97.8 °F (36.6 °C) (Oral)   Resp 18   Ht 5' 4" (1.626 m)   Wt 81 kg (178 lb 9.6 oz)   BMI 30.66 kg/m²     PHYSICAL EXAMINATION:    General appearance: Well appearing, in no acute distress, alert and oriented x3.  Psych:  Mood and affect appropriate.  Skin: Skin color, texture, turgor normal, no rashes or lesions, in both upper and lower body.  SCS sites well healed.  Head/face:  Atraumatic, normocephalic. No palpable lymph nodes  Back: Straight leg raising in the sitting and supine positions is negative to radicular pain. No pain with palpation to lumbar facet joints.  Limited flexion and extension with pain.  Positive facet loading bilaterally.  There is no pain with palpation to SI joints.  TOBY is negative.  Extremities: Peripheral joint ROM is full and pain free without obvious instability or laxity in all four extremities. No deformities, edema, or skin discoloration. Good capillary refill.  Musculoskeletal: Pain with external rotation of left hip.  No atrophy or tone abnormalities are noted.  Neuro: Bilateral upper and lower extremity coordination and muscle stretch reflexes are physiologic and symmetric.  Plantar response are downgoing.  No loss of sensation.  Gait: Antalgic- ambulates without assistance.    ASSESSMENT: 68 y.o. year old female with back and leg pain, consistent with the following diagnoses:     1. Chronic pain syndrome     2. Sacroiliac joint pain     3. Failed back surgical syndrome     4. Lumbar radiculopathy   "         PLAN:     - She will increase her SCS setting by one every 2 days.  If limited benefit, will contact rep.  I provided her with Ildefonso Kang's number.    - Consider lumbar MBBs.    - She has CTR scheduled for next month.    - RTC PRN.    - Counseled patient regarding the importance of constant sleeping habits and physical therapy.      The above plan and management options were discussed at length with patient. Patient is in agreement with the above and verbalized understanding.    Renetta Steele  02/27/2019

## 2019-03-07 ENCOUNTER — TELEPHONE (OUTPATIENT)
Dept: ORTHOPEDICS | Facility: CLINIC | Age: 69
End: 2019-03-07

## 2019-03-07 NOTE — TELEPHONE ENCOUNTER
Informed patient of arrival time for surgery, location, and scheduled patient for 2 week post op visit. Patient verbalized understanding.

## 2019-03-08 ENCOUNTER — ANESTHESIA (OUTPATIENT)
Dept: SURGERY | Facility: OTHER | Age: 69
End: 2019-03-08
Payer: MEDICARE

## 2019-03-08 ENCOUNTER — HOSPITAL ENCOUNTER (OUTPATIENT)
Facility: OTHER | Age: 69
Discharge: HOME | End: 2019-03-08
Attending: ORTHOPAEDIC SURGERY | Admitting: ORTHOPAEDIC SURGERY
Payer: MEDICARE

## 2019-03-08 ENCOUNTER — ANESTHESIA EVENT (OUTPATIENT)
Dept: SURGERY | Facility: OTHER | Age: 69
End: 2019-03-08
Payer: MEDICARE

## 2019-03-08 VITALS
OXYGEN SATURATION: 95 % | RESPIRATION RATE: 16 BRPM | SYSTOLIC BLOOD PRESSURE: 149 MMHG | HEIGHT: 64 IN | TEMPERATURE: 99 F | WEIGHT: 173.94 LBS | DIASTOLIC BLOOD PRESSURE: 70 MMHG | HEART RATE: 60 BPM | BODY MASS INDEX: 29.7 KG/M2

## 2019-03-08 DIAGNOSIS — G56.01 RIGHT CARPAL TUNNEL SYNDROME: Primary | ICD-10-CM

## 2019-03-08 DIAGNOSIS — G56.21 CUBITAL TUNNEL SYNDROME ON RIGHT: ICD-10-CM

## 2019-03-08 LAB — POCT GLUCOSE: 105 MG/DL (ref 70–110)

## 2019-03-08 PROCEDURE — 64718 REVISE ULNAR NERVE AT ELBOW: CPT | Mod: HCNC,RT,, | Performed by: ORTHOPAEDIC SURGERY

## 2019-03-08 PROCEDURE — 63600175 PHARM REV CODE 636 W HCPCS: Mod: HCNC | Performed by: NURSE ANESTHETIST, CERTIFIED REGISTERED

## 2019-03-08 PROCEDURE — 71000015 HC POSTOP RECOV 1ST HR: Mod: HCNC | Performed by: ORTHOPAEDIC SURGERY

## 2019-03-08 PROCEDURE — 25000003 PHARM REV CODE 250: Mod: HCNC | Performed by: ANESTHESIOLOGY

## 2019-03-08 PROCEDURE — 64718 PR REVISE ULNAR NERVE AT ELBOW: ICD-10-PCS | Mod: HCNC,RT,, | Performed by: ORTHOPAEDIC SURGERY

## 2019-03-08 PROCEDURE — 64721 CARPAL TUNNEL SURGERY: CPT | Mod: HCNC,51,RT, | Performed by: ORTHOPAEDIC SURGERY

## 2019-03-08 PROCEDURE — 37000008 HC ANESTHESIA 1ST 15 MINUTES: Mod: HCNC | Performed by: ORTHOPAEDIC SURGERY

## 2019-03-08 PROCEDURE — 25000003 PHARM REV CODE 250: Mod: HCNC | Performed by: NURSE ANESTHETIST, CERTIFIED REGISTERED

## 2019-03-08 PROCEDURE — 36000706: Mod: HCNC | Performed by: ORTHOPAEDIC SURGERY

## 2019-03-08 PROCEDURE — 36000707: Mod: HCNC | Performed by: ORTHOPAEDIC SURGERY

## 2019-03-08 PROCEDURE — 63600175 PHARM REV CODE 636 W HCPCS: Mod: HCNC | Performed by: ANESTHESIOLOGY

## 2019-03-08 PROCEDURE — 25000003 PHARM REV CODE 250: Mod: HCNC | Performed by: ORTHOPAEDIC SURGERY

## 2019-03-08 PROCEDURE — 63600175 PHARM REV CODE 636 W HCPCS: Mod: HCNC | Performed by: ORTHOPAEDIC SURGERY

## 2019-03-08 PROCEDURE — 64721 PR REVISE MEDIAN N/CARPAL TUNNEL SURG: ICD-10-PCS | Mod: HCNC,51,RT, | Performed by: ORTHOPAEDIC SURGERY

## 2019-03-08 PROCEDURE — 37000009 HC ANESTHESIA EA ADD 15 MINS: Mod: HCNC | Performed by: ORTHOPAEDIC SURGERY

## 2019-03-08 PROCEDURE — 71000016 HC POSTOP RECOV ADDL HR: Mod: HCNC | Performed by: ORTHOPAEDIC SURGERY

## 2019-03-08 RX ORDER — OXYCODONE HYDROCHLORIDE 5 MG/1
5 TABLET ORAL
Status: DISCONTINUED | OUTPATIENT
Start: 2019-03-08 | End: 2019-03-08 | Stop reason: HOSPADM

## 2019-03-08 RX ORDER — SODIUM CHLORIDE 9 MG/ML
INJECTION, SOLUTION INTRAVENOUS CONTINUOUS
Status: DISCONTINUED | OUTPATIENT
Start: 2019-03-08 | End: 2019-03-08 | Stop reason: HOSPADM

## 2019-03-08 RX ORDER — PROPOFOL 10 MG/ML
VIAL (ML) INTRAVENOUS CONTINUOUS PRN
Status: DISCONTINUED | OUTPATIENT
Start: 2019-03-08 | End: 2019-03-08

## 2019-03-08 RX ORDER — HYDROCODONE BITARTRATE AND ACETAMINOPHEN 7.5; 325 MG/1; MG/1
1 TABLET ORAL 3 TIMES DAILY PRN
Qty: 90 TABLET | Refills: 0 | Status: SHIPPED | OUTPATIENT
Start: 2019-03-08 | End: 2019-03-08 | Stop reason: HOSPADM

## 2019-03-08 RX ORDER — HYDROCODONE BITARTRATE AND ACETAMINOPHEN 7.5; 325 MG/1; MG/1
1 TABLET ORAL 3 TIMES DAILY PRN
Qty: 90 TABLET | Refills: 0 | Status: SHIPPED | OUTPATIENT
Start: 2019-05-08 | End: 2019-03-08 | Stop reason: HOSPADM

## 2019-03-08 RX ORDER — HYDROCODONE BITARTRATE AND ACETAMINOPHEN 7.5; 325 MG/1; MG/1
1 TABLET ORAL 3 TIMES DAILY PRN
Qty: 90 TABLET | Refills: 0 | Status: SHIPPED | OUTPATIENT
Start: 2019-04-08 | End: 2019-03-08 | Stop reason: HOSPADM

## 2019-03-08 RX ORDER — ONDANSETRON 8 MG/1
8 TABLET, ORALLY DISINTEGRATING ORAL EVERY 8 HOURS PRN
Status: CANCELLED | OUTPATIENT
Start: 2019-03-08

## 2019-03-08 RX ORDER — MIDAZOLAM HYDROCHLORIDE 1 MG/ML
INJECTION INTRAMUSCULAR; INTRAVENOUS
Status: DISCONTINUED | OUTPATIENT
Start: 2019-03-08 | End: 2019-03-08

## 2019-03-08 RX ORDER — HYDROCODONE BITARTRATE AND ACETAMINOPHEN 5; 325 MG/1; MG/1
1 TABLET ORAL EVERY 4 HOURS PRN
Status: CANCELLED | OUTPATIENT
Start: 2019-03-08

## 2019-03-08 RX ORDER — ROPIVACAINE HYDROCHLORIDE 5 MG/ML
INJECTION, SOLUTION EPIDURAL; INFILTRATION; PERINEURAL
Status: COMPLETED | OUTPATIENT
Start: 2019-03-08 | End: 2019-03-08

## 2019-03-08 RX ORDER — FENTANYL CITRATE 50 UG/ML
100 INJECTION, SOLUTION INTRAMUSCULAR; INTRAVENOUS EVERY 5 MIN PRN
Status: COMPLETED | OUTPATIENT
Start: 2019-03-08 | End: 2019-03-08

## 2019-03-08 RX ORDER — ONDANSETRON 2 MG/ML
4 INJECTION INTRAMUSCULAR; INTRAVENOUS DAILY PRN
Status: DISCONTINUED | OUTPATIENT
Start: 2019-03-08 | End: 2019-03-08 | Stop reason: HOSPADM

## 2019-03-08 RX ORDER — PROPOFOL 10 MG/ML
VIAL (ML) INTRAVENOUS
Status: DISCONTINUED | OUTPATIENT
Start: 2019-03-08 | End: 2019-03-08

## 2019-03-08 RX ORDER — SODIUM CHLORIDE, SODIUM LACTATE, POTASSIUM CHLORIDE, CALCIUM CHLORIDE 600; 310; 30; 20 MG/100ML; MG/100ML; MG/100ML; MG/100ML
INJECTION, SOLUTION INTRAVENOUS CONTINUOUS PRN
Status: DISCONTINUED | OUTPATIENT
Start: 2019-03-08 | End: 2019-03-08

## 2019-03-08 RX ORDER — SODIUM CHLORIDE 0.9 % (FLUSH) 0.9 %
3 SYRINGE (ML) INJECTION
Status: DISCONTINUED | OUTPATIENT
Start: 2019-03-08 | End: 2019-03-08 | Stop reason: HOSPADM

## 2019-03-08 RX ORDER — HYDROMORPHONE HYDROCHLORIDE 2 MG/ML
0.4 INJECTION, SOLUTION INTRAMUSCULAR; INTRAVENOUS; SUBCUTANEOUS EVERY 5 MIN PRN
Status: DISCONTINUED | OUTPATIENT
Start: 2019-03-08 | End: 2019-03-08 | Stop reason: HOSPADM

## 2019-03-08 RX ORDER — HYDROCODONE BITARTRATE AND ACETAMINOPHEN 7.5; 325 MG/1; MG/1
1 TABLET ORAL EVERY 6 HOURS PRN
Qty: 25 TABLET | Refills: 0 | Status: SHIPPED | OUTPATIENT
Start: 2019-03-08 | End: 2019-04-07

## 2019-03-08 RX ORDER — MIDAZOLAM HYDROCHLORIDE 1 MG/ML
2 INJECTION INTRAMUSCULAR; INTRAVENOUS
Status: COMPLETED | OUTPATIENT
Start: 2019-03-08 | End: 2019-03-08

## 2019-03-08 RX ORDER — CEFAZOLIN SODIUM 1 G/3ML
2 INJECTION, POWDER, FOR SOLUTION INTRAMUSCULAR; INTRAVENOUS
Status: DISCONTINUED | OUTPATIENT
Start: 2019-03-08 | End: 2019-03-08 | Stop reason: HOSPADM

## 2019-03-08 RX ORDER — PROMETHAZINE HYDROCHLORIDE 12.5 MG/1
12.5 TABLET ORAL EVERY 6 HOURS PRN
Qty: 60 TABLET | Refills: 0 | Status: SHIPPED | OUTPATIENT
Start: 2019-03-08 | End: 2019-05-13

## 2019-03-08 RX ORDER — POLYETHYLENE GLYCOL 3350 17 G/17G
17 POWDER, FOR SOLUTION ORAL DAILY
Qty: 30 EACH | Refills: 0 | Status: SHIPPED | OUTPATIENT
Start: 2019-03-08 | End: 2019-05-13

## 2019-03-08 RX ORDER — MUPIROCIN 20 MG/G
OINTMENT TOPICAL
Status: DISCONTINUED | OUTPATIENT
Start: 2019-03-08 | End: 2019-03-08 | Stop reason: HOSPADM

## 2019-03-08 RX ORDER — MEPERIDINE HYDROCHLORIDE 25 MG/ML
12.5 INJECTION INTRAMUSCULAR; INTRAVENOUS; SUBCUTANEOUS ONCE AS NEEDED
Status: DISCONTINUED | OUTPATIENT
Start: 2019-03-08 | End: 2019-03-08 | Stop reason: HOSPADM

## 2019-03-08 RX ORDER — FENTANYL CITRATE 50 UG/ML
25 INJECTION, SOLUTION INTRAMUSCULAR; INTRAVENOUS EVERY 5 MIN PRN
Status: DISCONTINUED | OUTPATIENT
Start: 2019-03-08 | End: 2019-03-08 | Stop reason: HOSPADM

## 2019-03-08 RX ORDER — PHENYLEPHRINE HYDROCHLORIDE 10 MG/ML
INJECTION INTRAVENOUS
Status: DISCONTINUED | OUTPATIENT
Start: 2019-03-08 | End: 2019-03-08

## 2019-03-08 RX ORDER — LIDOCAINE HYDROCHLORIDE 10 MG/ML
INJECTION, SOLUTION INTRAVENOUS
Status: DISCONTINUED | OUTPATIENT
Start: 2019-03-08 | End: 2019-03-08

## 2019-03-08 RX ADMIN — SODIUM CHLORIDE, SODIUM LACTATE, POTASSIUM CHLORIDE, AND CALCIUM CHLORIDE: 600; 310; 30; 20 INJECTION, SOLUTION INTRAVENOUS at 11:03

## 2019-03-08 RX ADMIN — PROPOFOL 40 MG: 10 INJECTION, EMULSION INTRAVENOUS at 12:03

## 2019-03-08 RX ADMIN — PHENYLEPHRINE HYDROCHLORIDE 100 MCG: 10 INJECTION INTRAVENOUS at 01:03

## 2019-03-08 RX ADMIN — FENTANYL CITRATE 100 MCG: 50 INJECTION, SOLUTION INTRAMUSCULAR; INTRAVENOUS at 11:03

## 2019-03-08 RX ADMIN — MIDAZOLAM HYDROCHLORIDE 2 MG: 1 INJECTION, SOLUTION INTRAMUSCULAR; INTRAVENOUS at 12:03

## 2019-03-08 RX ADMIN — PROPOFOL 20 MG: 10 INJECTION, EMULSION INTRAVENOUS at 12:03

## 2019-03-08 RX ADMIN — CEFAZOLIN 2 G: 330 INJECTION, POWDER, FOR SOLUTION INTRAMUSCULAR; INTRAVENOUS at 12:03

## 2019-03-08 RX ADMIN — ROPIVACAINE HYDROCHLORIDE 30 ML: 5 INJECTION, SOLUTION EPIDURAL; INFILTRATION; PERINEURAL at 11:03

## 2019-03-08 RX ADMIN — MIDAZOLAM HYDROCHLORIDE 2 MG: 1 INJECTION, SOLUTION INTRAMUSCULAR; INTRAVENOUS at 11:03

## 2019-03-08 RX ADMIN — MUPIROCIN: 20 OINTMENT TOPICAL at 11:03

## 2019-03-08 RX ADMIN — LIDOCAINE HYDROCHLORIDE 50 MG: 10 INJECTION, SOLUTION INTRAVENOUS at 12:03

## 2019-03-08 RX ADMIN — PROPOFOL 50 MCG/KG/MIN: 10 INJECTION, EMULSION INTRAVENOUS at 12:03

## 2019-03-08 NOTE — DISCHARGE INSTRUCTIONS
When to seek medical care  Call 911 right away if you have any of the following:  · Chest pain  · Shortness of breath  Otherwise, call your doctor immediately if you have any of the following:  · A splint, cast, or dressing that has gotten wet  · Increased bleeding or drainage from the incision (cut)  · Opening of the incision  · Fever above 100.4°F (38.9°C) taken by mouth, or shaking chills  · Any new numbness in the fingers or thumb  · Blue hand or fingers  · Increased pain with or without activity  · Increased redness, tenderness, or swelling of the incision       Anesthesia: Monitored Anesthesia Care (MAC)    Anesthesia Safety  · Have an adult family member or friend drive you home after the procedure.  · For the first 24 hours after your surgery:  ¨ Do not drive or use heavy equipment.  ¨ Do not make important decisions or sign documents.  ¨ Avoid alcohol.  ¨ Have someone stay with you, if possible. They can watch for problems and help keep you safe.      Please keep splint clean dry and intact  aggressively ice and elevate extremity  Follow up in clinic in 2 weeks for suture removal    PLEASE FOLLOW ANY OTHER INSTRUCTIONS PROVIDED TO YOU BY DR. RAMIREZ!

## 2019-03-08 NOTE — OP NOTE
DATE OF PROCEDURE:  03/08/2019     SERVICE:  Orthopedics.     SURGEON:  Pan Goodman M.D.     FIRST ASSISTANT:  Raymond Dobbins MD (RES).     PREOPERATIVE DIAGNOSES:  1.  Right cubital tunnel syndrome.  2.  Right carpal tunnel syndrome.     POSTOPERATIVE DIAGNOSES:  1.  Right cubital tunnel syndrome.  2.  Right carpal tunnel syndrome.     PROCEDURES PERFORMED:  1.  Right ulnar nerve decompression at the elbow for a cubital tunnel release.  2.  Right carpal tunnel release.  3.  Application of long arm posterior splint.     ANESTHESIA:  Regional MAC.     ESTIMATED BLOOD LOSS:  5 mL.     URINE OUTPUT:  No Cervantes.     IV FLUIDS:  Crystalloid.     SPECIMENS:  None.     COMPLICATIONS:  None.     TOURNIQUET TIME:  41 minutes at 250 mmHg.     INDICATION FOR PROCEDURE:  The patient is a 67 yo female who   previously presented to the Orthopedics Clinic complaining of significant   numbness, tingling and weakness throughout the right hand and upper extremity.    Preoperative workup as well as EMG nerve conduction test were significant for   carpal and cubital tunnel syndromes.  The risks, benefits and   alternatives to operative intervention were discussed with the patient in   detail.  Specific risks discussed included but were not limited to bleeding,   infection, nerve damage to neurovascular structures, numbness, tingling, pain,   scarring, complex regional pain syndrome, hardware and/or surgical failure, need   for additional surgery, persistent pain, persistent and/or worsening   dysfunction, debility, heart attacks, blood clots, strokes and death, incomplete   nerve recovery and others.  The patient agreed to these risks as described and   consented to procedure.     PROCEDURE IN DETAIL:  On the date of the operative intervention, the patient was   evaluated in the preoperative holding area.  With her participation, the right   upper extremity was marked as the operative site of the carpal and cubital   tunnels.  She was  administered regional anesthesia and taken to the Operative   Theater with the right upper extremity was placed on a hand table.  The right   upper extremity was then prepped and draped in the usual sterile fashion and   timeout was taken and confirmed by all present to confirm correct patient,   procedure and administration of preoperative antibiotics.  All were in   agreement, so I proceeded.  Surgical incisions were marked out first over the   carpal tunnel.  This was marked out for approximately 2 cm overlying the   patient's right carpal tunnel using Jiménez's cardinal lines as well as the   radial border of the right ring finger.  After this was done, incision was   marked out overlying the right cubital tunnel for a length of approximately 8 cm   just posterior to the medial epicondyle.  Esmarch was then utilized to   exsanguinate the right upper extremity and a sterile tourniquet, which was   placed on the upper right arm previously, was then inflated to 250 mmHg where it   remained for the duration of the procedure.  I first turned my attention   towards carpal tunnel release.  The skin incision, which was previously marked   out was then made sharply with a scalpel and dissection was carried down through   the skin and subcutaneous tissues.  Careful dissection was made down to the   palmar fascia which was identified.  The palmar fascia was retracted radially   and the ulnar fat pad was taken ulnarly.  A Mosquito hemostat was utilized to   spread the superficial tissues off of the transverse carpal ligament and then   retractors consisting of a cricket and a Nisha retractor were placed to expose   the ligament along its length.  At this time, the transverse carpal ligament was   then readily identified and isolated.  A scalpel was then utilized to incise   the ligament with a small poke hole incision in its mid substance.  A Mosquito   hemostat and a Kendall elevator were then utilized to free the deep surface  of the   ligament from underlying structures and confirm our orientation within the   carpal tunnel.  Ligament division was then completed with tenotomy scissors,   both proximally and distally.  Care was taken to ensure not to cut past Jiménez's   cardinal line or enter the superficial palmar arch.  The distal fat was   visualized of the distal most aspect of our ligament division and there was no   remaining transverse carpal ligament after our division.  The distal most aspect   of the antebrachial forearm fascia was also sharply divided to ensure complete   release of the median nerve.  Mosquito hemostat was then passed both proximally   and distally to confirm that it was a complete release.  There was no remaining   constriction and at this time I then turned my attention towards the cubital   tunnel release.  The previously marked out incision just posterior to the medial   epicondyle was then made sharply with a scalpel and dissection was carried down   to skin and subcutaneous tissues.  Several branches of the medial antebrachial   cutaneous nerve were identified and protected throughout the duration of the   procedure.  Dissection was carried down more deeply just posterior to the medial   epicondyle and a gentle dissection was carried down to identify the ulnar nerve   in the proximal portion of our incision.  Dissection was then continued   proximally and the ulnar nerve was completely decompressed all the way up the   medial intermuscular septum for approximately 15 cm and well proximal to the   arcade of [place].  Finger dissection along the course of the nerve revealed   that there was no additional constriction at this time and it was free and well   decompressed proximally.  I then continued my decompression of the ulnar nerve   more distally in the wound.  The nerve was decompressed in a proximal to distal   direction at this time, and motor branches to the flexor carpi ulnaris were   identified  and protected throughout the dissection.  The FCU fascia was also   released well distal to the mid aspect of the patient's right forearm.  After   this, finger dissection was utilized to ensure complete release with no evidence   of any further constriction and I was very pleased with my decompression at   this time.  After I had ensured complete decompression with proximally and   distally, I then placed the right elbow through a full range of motion with   flexion and extension and the nerve was stable and did not sublux with full   range of motion of the elbow.  At this time, I then irrigated the wound   copiously with sterile saline.  Tourniquet was then deflated and brisk capillary   refill ensued throughout the patient's right upper extremity.  There was no   significant bleeding and hemostasis was achieved with bipolar electrocautery.    The subcutaneous tissues at the elbow incision were closed with 4-0 Vicryl   fascia in an inverted interrupted fashion and then the skin was closed with 4-0   nylon suture.  Sterile dressing was then applied consisting of Xeroform, 4 x 4,   gauze and a long arm posterior slab splint to the right upper extremity.  The   patient was then awakened from anesthesia and returned to the Postanesthesia   Care Unit in stable condition.  There were no complications.  As attending   surgeon, I was present and performed the critical portion of the procedure.     POSTOPERATIVE PLAN FOR THE PATIENT:  The patient will be discharged home in   stable condition.  I will reevaluate her in approximately 2 weeks for suture   removal and reevaluation of the postoperative plan.

## 2019-03-08 NOTE — ANESTHESIA PREPROCEDURE EVALUATION
03/08/2019  Faby Luna is a 68 y.o., female.    Anesthesia Evaluation    I have reviewed the Patient Summary Reports.    I have reviewed the Nursing Notes.   I have reviewed the Medications.     Review of Systems  Anesthesia Hx:  No problems with previous Anesthesia  Denies Family Hx of Anesthesia complications.   Denies Personal Hx of Anesthesia complications.   Social:  Non-Smoker    Hematology/Oncology:  Hematology Normal   Oncology Normal     Cardiovascular:   Hypertension    Pulmonary:  Pulmonary Normal    Hepatic/GI:   GERD    Musculoskeletal:   Arthritis  Spinal cord stimulator   Neurological:  Neurology Normal    Endocrine:   Diabetes, type 2        Physical Exam   Airway/Jaw/Neck:  Airway Findings: Mouth Opening: Normal Tongue: Normal  General Airway Assessment: Adult  Mallampati: I  TM Distance: Normal, at least 6 cm         Dental:  Dental Findings: Upper Dentures, Lower Dentures             Anesthesia Plan  Type of Anesthesia, risks & benefits discussed:  Anesthesia Type:  regional  Patient's Preference:   Intra-op Monitoring Plan: standard ASA monitors  Intra-op Monitoring Plan Comments:   Post Op Pain Control Plan: peripheral nerve block  Post Op Pain Control Plan Comments:   Induction:    Beta Blocker:         Informed Consent: Patient understands risks and agrees with Anesthesia plan.  Questions answered. Anesthesia consent signed with patient.  ASA Score: 3     Day of Surgery Review of History & Physical:    H&P update referred to the surgeon.         Ready For Surgery From Anesthesia Perspective.

## 2019-03-08 NOTE — ANESTHESIA POSTPROCEDURE EVALUATION
"Anesthesia Post Evaluation    Patient: Faby Luna    Procedure(s) Performed: Procedure(s) (LRB):  RELEASE, CARPAL TUNNEL right (Right)  DECOMPRESSION, NERVE, ULNAR - right (Right)    Final Anesthesia Type: regional  Patient location during evaluation: [facility]  Patient participation: Yes- Able to Participate  Level of consciousness: awake and alert and oriented  Post-procedure vital signs: reviewed and stable  Pain management: adequate  Airway patency: patent  PONV status at discharge: No PONV  Anesthetic complications: no      Cardiovascular status: hemodynamically stable  Respiratory status: unassisted, spontaneous ventilation and room air  Hydration status: euvolemic  Follow-up not needed.        Visit Vitals  /67 (BP Location: Right arm, Patient Position: Sitting)   Pulse 64   Temp 36.9 °C (98.4 °F)   Resp 16   Ht 5' 4" (1.626 m)   Wt 78.9 kg (173 lb 15.1 oz)   SpO2 (!) 94%   Breastfeeding? No   BMI 29.86 kg/m²       Pain/Ron Score: No Data Recorded      "

## 2019-03-08 NOTE — PLAN OF CARE
Aisha Jeremy has met all discharge criteria from Phase II. Vital Signs are stable, ambulating  without difficulty. Discharge instructions given, patient verbalized understanding. Discharged from facility via wheelchair in stable condition.

## 2019-03-08 NOTE — BRIEF OP NOTE
[facility]  Brief Operative Note     SUMMARY     Surgery Date: 3/8/2019     Surgeon(s) and Role:     * Pan Goodman MD - Primary    Assisting Surgeon: None    Pre-op Diagnosis:  Right carpal tunnel syndrome [G56.01]  Cubital tunnel syndrome on right [G56.21]    Post-op Diagnosis:  Post-Op Diagnosis Codes:     * Right carpal tunnel syndrome [G56.01]     * Cubital tunnel syndrome on right [G56.21]    Procedure(s) (LRB):  RELEASE, CARPAL TUNNEL right (Right)  DECOMPRESSION, NERVE, ULNAR - right (Right)    Anesthesia: Regional    Description of the findings of the procedure: see op note    Findings/Key Components: see op note    Estimated Blood Loss: * No values recorded between 3/8/2019  1:04 PM and 3/8/2019  2:09 PM *         Specimens:   Specimen (12h ago, onward)    None          Discharge Note    SUMMARY     Admit Date: 3/8/2019    Discharge Date and Time: No discharge date for patient encounter.    Hospital Course (synopsis of major diagnoses, care, treatment, and services provided during the course of the hospital stay): the patient arrived to pre-op area for proper pre-operative management.  Upon completion of pre-operative preparation, the patient was taken back to the operative theatre.  A Right cubital tunnel release and carpal tunnel release was performed without complication and the patient was transported to the post anesthesia care unit in stable condition. After appropriate recovery from the anaesthetic agents used during the surgery the patient was deemed safe for DC, they were discharged home.           Final Diagnosis: Post-Op Diagnosis Codes:     * Right carpal tunnel syndrome [G56.01]     * Cubital tunnel syndrome on right [G56.21]    Disposition: Home or Self Care    Follow Up/Patient Instructions:     Medications:  Reconciled Home Medications:      Medication List      START taking these medications    HYDROcodone-acetaminophen 7.5-325 mg per tablet  Commonly known as:   NORCO  Take 1 tablet by mouth every 6 (six) hours as needed for Pain.     polyethylene glycol 17 gram Pwpk  Commonly known as:  GLYCOLAX  Take 17 g by mouth once daily.     promethazine 12.5 MG Tab  Commonly known as:  PHENERGAN  Take 1 tablet (12.5 mg total) by mouth every 6 (six) hours as needed.        CONTINUE taking these medications    atenolol 100 MG tablet  Commonly known as:  TENORMIN  TAKE 1 TABLET BY MOUTH ONCE DAILY     blood sugar diagnostic Strp  1 strip by Misc.(Non-Drug; Combo Route) route 2 (two) times daily with meals.     * blood-glucose meter kit  Use as instructed     * TRUE METRIX GLUCOSE METER Misc  Generic drug:  blood-glucose meter     escitalopram oxalate 20 MG tablet  Commonly known as:  LEXAPRO  TAKE 1 TABLET BY MOUTH ONCE DAILY     furosemide 20 MG tablet  Commonly known as:  LASIX  Take 1 tablet (20 mg total) by mouth once daily.     glipiZIDE 10 MG tablet  Commonly known as:  GLUCOTROL  Take 10 mg by mouth every evening.     * lancets Misc  1 lancet by Misc.(Non-Drug; Combo Route) route 2 (two) times daily with meals.     * TRUEPLUS LANCETS 33 gauge Misc  Generic drug:  lancets     lancing device Misc  1 Device by Misc.(Non-Drug; Combo Route) route 2 (two) times daily with meals.     LORazepam 2 MG Tab  Commonly known as:  ATIVAN  Take 0.5 tablets by mouth 2 (two) times daily as needed for Anxiety.     losartan 25 MG tablet  Commonly known as:  COZAAR  Take 1 tablet (25 mg total) by mouth once daily.     metFORMIN 1000 MG tablet  Commonly known as:  GLUCOPHAGE  Take 1,000 mg by mouth every evening.     pantoprazole 40 MG tablet  Commonly known as:  PROTONIX  Take 1 tablet (40 mg total) by mouth before breakfast.     traZODone 50 MG tablet  Commonly known as:  DESYREL  TAKE 1 TABLET BY MOUTH NIGHTLY AS NEEDED FOR  INSOMNIA     zonisamide 100 MG Cap  Commonly known as:  ZONEGRAN  TAKE 4 CAPSULES BY MOUTH AT BEDTIME         * This list has 4 medication(s) that are the same as other  medications prescribed for you. Read the directions carefully, and ask your doctor or other care provider to review them with you.              No discharge procedures on file.

## 2019-03-08 NOTE — ANESTHESIA PROCEDURE NOTES
Right supraclavicular nerve block    Patient location during procedure: holding area    Reason for block: primary anesthetic   Diagnosis: carpal and ulnar tunnels   Start time: 3/8/2019 11:31 AM  Timeout: 3/8/2019 11:30 AM   End time: 3/8/2019 11:40 AM  Staffing  Anesthesiologist: Akil Wise MD  Performed: anesthesiologist   Preanesthetic Checklist  Completed: patient identified, site marked, surgical consent, pre-op evaluation, timeout performed, IV checked, risks and benefits discussed and monitors and equipment checked  Peripheral Block  Patient position: supine  Prep: ChloraPrep and site prepped and draped  Patient monitoring: heart rate, cardiac monitor, continuous pulse ox and frequent blood pressure checks  Block type: supraclavicular  Laterality: right  Injection technique: single shot  Needle  Needle type: Echogenic   Needle gauge: 22 G  Needle length: 4 in  Needle localization: anatomical landmarks, nerve stimulator and ultrasound guidance   -ultrasound image captured on disc.  Assessment  Injection assessment: negative aspiration, negative parasthesia and local visualized surrounding nerve  Paresthesia pain: none  Heart rate change: no  Slow fractionated injection: yes

## 2019-03-13 ENCOUNTER — TELEPHONE (OUTPATIENT)
Dept: ORTHOPEDICS | Facility: CLINIC | Age: 69
End: 2019-03-13

## 2019-03-13 NOTE — TELEPHONE ENCOUNTER
----- Message from Demetra Funk sent at 3/13/2019 11:01 AM CDT -----  Contact: patient   Please call pt at [phone]     Patient has had pain of the entire right arm for 3 days.   Also complaining that the cast is too tight    Thank you

## 2019-03-13 NOTE — TELEPHONE ENCOUNTER
This is my second attempt to reach the patient in regards to her phone messages. I have left two voicemails in regards to her concerns about her cast. Left a detailed message along with a call back number so we can discuss.

## 2019-03-13 NOTE — TELEPHONE ENCOUNTER
Called patient back so we can discuss what's going on with her right arm. Left a detailed message along with a call back number so we can discuss.

## 2019-03-13 NOTE — TELEPHONE ENCOUNTER
----- Message from Xin Love sent at 3/13/2019  3:26 PM CDT -----  Contact: self  Pt called requesting a call back regarding tenderness with her arm cast.Stating that she has called twice with no call back. Pt could be reached at [phone]

## 2019-03-14 ENCOUNTER — TELEPHONE (OUTPATIENT)
Dept: ORTHOPEDICS | Facility: CLINIC | Age: 69
End: 2019-03-14

## 2019-03-14 NOTE — TELEPHONE ENCOUNTER
----- Message from Cindy Pierson sent at 3/14/2019 10:22 AM CDT -----  Contact: CLAUDIA MARTINEZ [[id number]]  Name of Who is Calling: CLAUDIA MARTINEZ [[id number]]      What is the request in detail: Pt is requesting a call back from clinical team in regards to having her cast changed. Please contact to further discuss and advise.          Can the clinic reply by [facility]:       What [facility]: [phone]

## 2019-03-14 NOTE — TELEPHONE ENCOUNTER
Called patient in regards to getting her splint off and redone because she said that it is too tight. She will come to clinic tomorrow 3/15 to get that changed.

## 2019-03-15 ENCOUNTER — DOCUMENTATION ONLY (OUTPATIENT)
Dept: ORTHOPEDICS | Facility: CLINIC | Age: 69
End: 2019-03-15

## 2019-03-15 ENCOUNTER — OFFICE VISIT (OUTPATIENT)
Dept: ORTHOPEDICS | Facility: CLINIC | Age: 69
End: 2019-03-15
Payer: MEDICARE

## 2019-03-15 VITALS
HEART RATE: 71 BPM | WEIGHT: 173.94 LBS | BODY MASS INDEX: 29.7 KG/M2 | HEIGHT: 64 IN | SYSTOLIC BLOOD PRESSURE: 98 MMHG | DIASTOLIC BLOOD PRESSURE: 64 MMHG

## 2019-03-15 DIAGNOSIS — G56.01 RIGHT CARPAL TUNNEL SYNDROME: Primary | ICD-10-CM

## 2019-03-15 PROCEDURE — 99999 PR PBB SHADOW E&M-EST. PATIENT-LVL IV: ICD-10-PCS | Mod: PBBFAC,HCNC,, | Performed by: ORTHOPAEDIC SURGERY

## 2019-03-15 PROCEDURE — 99999 PR PBB SHADOW E&M-EST. PATIENT-LVL IV: CPT | Mod: PBBFAC,HCNC,, | Performed by: ORTHOPAEDIC SURGERY

## 2019-03-15 PROCEDURE — 99024 POSTOP FOLLOW-UP VISIT: CPT | Mod: HCNC,S$GLB,, | Performed by: ORTHOPAEDIC SURGERY

## 2019-03-15 PROCEDURE — 99024 PR POST-OP FOLLOW-UP VISIT: ICD-10-PCS | Mod: HCNC,S$GLB,, | Performed by: ORTHOPAEDIC SURGERY

## 2019-03-15 NOTE — PROGRESS NOTES
Faby Luna is a 68 y.o. female presents for post-operative evaluation.  The patient is now 1 weeks s/p: Right cubital tunnel release with carpal tunnel release     PROCEDURES PERFORMED:  1.Right cubital tunnel release with carpal tunnel release     Overall the patient reports doing well.  The patient reports appropriate postoperative soreness with well controlled overall pain. She presents earlier than scheduled due to splint irritation but denies problems otherwise.  She reports her N/T feels slightly improved and has no new symptoms or complaints.   PE:     AA&O x 4.  NAD  HEENT:  NCAT, sclera nonicteric  Lungs:  Respirations are equal and unlabored.  CV:  2+ bilateral upper and lower extremity pulses.  MSK: The wound is healing well with no signs of erythema or warmth.  There is no drainage.  No clinical signs or symptoms of infection are present.  No sutures were removed today. RUE NVI with profound thenar atrophy and weak opposition similar to preop.  Decreased LTS right IF, LF similar to preop per patient.     A/P: Status post above, doing well  1) Replaced in splint, well padded  2) Encouraged aggressive elevation of extremity   3) Call with any questions/concerns in the interim  4) F/U as scheduled for suture removal

## 2019-03-20 RX ORDER — TRAZODONE HYDROCHLORIDE 50 MG/1
TABLET ORAL
Qty: 90 TABLET | Refills: 1 | Status: SHIPPED | OUTPATIENT
Start: 2019-03-20 | End: 2019-07-16 | Stop reason: CLARIF

## 2019-03-22 ENCOUNTER — DOCUMENTATION ONLY (OUTPATIENT)
Dept: ORTHOPEDICS | Facility: CLINIC | Age: 69
End: 2019-03-22

## 2019-03-22 ENCOUNTER — TELEPHONE (OUTPATIENT)
Dept: ORTHOPEDICS | Facility: CLINIC | Age: 69
End: 2019-03-22

## 2019-03-22 NOTE — PROGRESS NOTES
"Patient called stating that the sutures by her elbow are exposed and that the splint is "hanging off" and wants to know if she can take the splint off.     I spoke with one of our Physician Assistants and I explained to the patient that we advise against that until she sees Dr. Goodman in the office next week on the 26th. I suggested that she get an ace bandage to wrap around the splint enough to stabilize it to make it more comfortable. I also advised her to cover up the sutures with a large band aid if they are exposed. She verbalized understanding.   "

## 2019-03-22 NOTE — TELEPHONE ENCOUNTER
----- Message from Xin Love sent at 3/22/2019  2:13 PM CDT -----  Contact: Self  Pt called requesting a return call back regarding her arm cast. Pt could be reached at [phone]

## 2019-03-22 NOTE — TELEPHONE ENCOUNTER
Called patient back in regards to phone message. Patient did not answer so I left a detailed v/m along with a call back number so we can discuss.

## 2019-03-26 ENCOUNTER — OFFICE VISIT (OUTPATIENT)
Dept: ORTHOPEDICS | Facility: CLINIC | Age: 69
End: 2019-03-26
Payer: MEDICARE

## 2019-03-26 VITALS — HEIGHT: 64 IN | WEIGHT: 173.94 LBS | BODY MASS INDEX: 29.7 KG/M2

## 2019-03-26 DIAGNOSIS — G56.21 CUBITAL TUNNEL SYNDROME ON RIGHT: Primary | ICD-10-CM

## 2019-03-26 PROCEDURE — 99024 POSTOP FOLLOW-UP VISIT: CPT | Mod: HCNC,S$GLB,, | Performed by: ORTHOPAEDIC SURGERY

## 2019-03-26 PROCEDURE — 99999 PR PBB SHADOW E&M-EST. PATIENT-LVL III: ICD-10-PCS | Mod: PBBFAC,HCNC,, | Performed by: ORTHOPAEDIC SURGERY

## 2019-03-26 PROCEDURE — 99999 PR PBB SHADOW E&M-EST. PATIENT-LVL III: CPT | Mod: PBBFAC,HCNC,, | Performed by: ORTHOPAEDIC SURGERY

## 2019-03-26 PROCEDURE — 99024 PR POST-OP FOLLOW-UP VISIT: ICD-10-PCS | Mod: HCNC,S$GLB,, | Performed by: ORTHOPAEDIC SURGERY

## 2019-03-26 NOTE — PROGRESS NOTES
Faby Luna is a 68 y.o. female presents for post-operative evaluation.  The patient is now 2.5 weeks s/p: Right cubital tunnel release with carpal tunnel release     PROCEDURES PERFORMED:  1.Right cubital tunnel release with carpal tunnel release     Overall the patient reports doing well.  The patient reports appropriate postoperative soreness with well controlled overall pain. Her N/T is improved and she is able to sleep through the night now but it is not 100% resolved.  No new complaints.    PE:     AA&O x 4.  NAD  HEENT:  NCAT, sclera nonicteric  Lungs:  Respirations are equal and unlabored.  CV:  2+ bilateral upper and lower extremity pulses.  MSK: The wound is healing well with no signs of erythema or warmth.  There is no drainage.  No clinical signs or symptoms of infection are present.  No sutures were removed today. RUE NVI with profound thenar atrophy and weak opposition similar to preop.  Decreased LTS right IF, LF similar to preop per patient.     A/P: Status post above, doing well  1) Sutures removed  2) OT referral for ROM/strengthening as tolerated  3) F/U 4 weeks for reevaluation

## 2019-03-29 DIAGNOSIS — I10 ESSENTIAL HYPERTENSION: ICD-10-CM

## 2019-03-29 RX ORDER — ATENOLOL 100 MG/1
TABLET ORAL
Qty: 90 TABLET | Refills: 0 | Status: SHIPPED | OUTPATIENT
Start: 2019-03-29 | End: 2019-08-02 | Stop reason: SDUPTHER

## 2019-04-05 DIAGNOSIS — M47.26 OSTEOARTHRITIS OF SPINE WITH RADICULOPATHY, LUMBAR REGION: ICD-10-CM

## 2019-04-05 RX ORDER — ZONISAMIDE 100 MG/1
CAPSULE ORAL
Qty: 120 CAPSULE | Refills: 2 | Status: SHIPPED | OUTPATIENT
Start: 2019-04-05 | End: 2019-07-10 | Stop reason: SDUPTHER

## 2019-04-23 ENCOUNTER — OFFICE VISIT (OUTPATIENT)
Dept: ORTHOPEDICS | Facility: CLINIC | Age: 69
End: 2019-04-23
Payer: MEDICARE

## 2019-04-23 VITALS — WEIGHT: 173.94 LBS | BODY MASS INDEX: 29.7 KG/M2 | HEIGHT: 64 IN

## 2019-04-23 DIAGNOSIS — G56.03 BILATERAL CARPAL TUNNEL SYNDROME: ICD-10-CM

## 2019-04-23 DIAGNOSIS — G56.21 CUBITAL TUNNEL SYNDROME ON RIGHT: Primary | ICD-10-CM

## 2019-04-23 DIAGNOSIS — G56.01 RIGHT CARPAL TUNNEL SYNDROME: ICD-10-CM

## 2019-04-23 PROCEDURE — 99024 POSTOP FOLLOW-UP VISIT: CPT | Mod: HCNC,S$GLB,, | Performed by: ORTHOPAEDIC SURGERY

## 2019-04-23 PROCEDURE — 99999 PR PBB SHADOW E&M-EST. PATIENT-LVL III: CPT | Mod: PBBFAC,HCNC,, | Performed by: ORTHOPAEDIC SURGERY

## 2019-04-23 PROCEDURE — 99999 PR PBB SHADOW E&M-EST. PATIENT-LVL III: ICD-10-PCS | Mod: PBBFAC,HCNC,, | Performed by: ORTHOPAEDIC SURGERY

## 2019-04-23 PROCEDURE — 99024 PR POST-OP FOLLOW-UP VISIT: ICD-10-PCS | Mod: HCNC,S$GLB,, | Performed by: ORTHOPAEDIC SURGERY

## 2019-04-23 NOTE — PROGRESS NOTES
Faby Luna is a 68 y.o. female presents for post-operative evaluation.  The patient is now 6  weeks s/p: Right cubital tunnel release with carpal tunnel release     PROCEDURES PERFORMED:  1.Right cubital tunnel release with carpal tunnel release     Overall the patient reports doing well.  The patient reports appropriate postoperative soreness with well controlled overall pain. Her N/T is improved and she is able to sleep through the night now but it is not 100% resolved.  No new complaints.    Interval history April 23, 2019:  The patient returns today for re-evaluation.  She reports that she is somewhat discouraged by the length of time it is apparently taking her to regain the strength in her hand.  She does recall that her thumb was significantly weak preoperatively and this is evidenced by her profound preoperative thenar atrophy.    PE:     AA&O x 4.  NAD  HEENT:  NCAT, sclera nonicteric  Lungs:  Respirations are equal and unlabored.  CV:  2+ bilateral upper and lower extremity pulses.  MSK: The wound is healing well with no signs of erythema or warmth.  There is no drainage.  No clinical signs or symptoms of infection are present.  RUE NVI with profound thenar atrophy and weak opposition similar to preop.  Able to oppose thumb to small finger.  Decreased LTS right IF, LF similar to preop per patient. No new findings today.     A/P: Status post above, doing well  1) will refer for occupational therapy for strengthening as tolerated and activities as tolerated of the right upper extremity.  I discussed with the patient that she had significant nerve compression preoperatively with profound thenar weakness and atrophy. Her recovery may be prolonged and she is on in understanding of this today.  I believe that she is looking and doing well postoperatively and would like to re-evaluate her in 6 weeks for further evaluation.

## 2019-05-06 ENCOUNTER — CLINICAL SUPPORT (OUTPATIENT)
Dept: REHABILITATION | Facility: HOSPITAL | Age: 69
End: 2019-05-06
Attending: ORTHOPAEDIC SURGERY
Payer: MEDICARE

## 2019-05-06 DIAGNOSIS — M79.641 PAIN OF RIGHT HAND: ICD-10-CM

## 2019-05-06 DIAGNOSIS — G56.01 RIGHT CARPAL TUNNEL SYNDROME: ICD-10-CM

## 2019-05-06 DIAGNOSIS — R29.898 HAND WEAKNESS: ICD-10-CM

## 2019-05-06 DIAGNOSIS — G56.21 CUBITAL TUNNEL SYNDROME ON RIGHT: ICD-10-CM

## 2019-05-06 PROBLEM — M79.643 PAIN, HAND: Status: ACTIVE | Noted: 2019-05-06

## 2019-05-06 PROCEDURE — 97110 THERAPEUTIC EXERCISES: CPT | Mod: HCNC,PN

## 2019-05-06 PROCEDURE — 97165 OT EVAL LOW COMPLEX 30 MIN: CPT | Mod: HCNC,PN

## 2019-05-06 NOTE — PLAN OF CARE
[facility]ational [facility]  Initial Evaluation     Date: 5/6/2019  Name: Faby Luna  Clinic Number: [id number]    Therapy Diagnosis:   1. Cubital tunnel syndrome on right     2. Right carpal tunnel syndrome     3. Pain of right hand     4. Hand weakness           Physician: Pan Goodman MD    Physician Orders: Evaluate and Treat  Medical Diagnosis:   G56.21 (ICD-10-CM) - Cubital tunnel syndrome on right   G56.01 (ICD-10-CM) - Right carpal tunnel syndrome       Surgical Procedure and Date: CTR 3/8/19  Evaluation Date: 5/6/19  Insurance Authorization Period Expiration: 4/22/2020  Plan of Care Certification Period: 5/6/19-7/29/19  Date of Return to MD: 6/11/19    Visit # / Visits authorized: 1 / Pending  Time In:4:00 pm  Time Out: 4:45 pm  Total Billable Time: 45 minutes    Precautions:  Diabetes, Standard, Back Pain Device     Subjective     Involved Side: right  Dominant Side: Right  Date of Onset: 3/8/19  Mechanism of Injury: Insidious  History of Current Condition: Pt is a right handed 68 year old female who arrives to therapy roughly two months post right carpal tunnel and cubital tunnel release.   Surgical Procedure: CTR, Cubital tunnel release, 3/8/19  Imaging: x-rays 3/8/19 unimpressive  Previous Therapy: None    Patient's Goals for Therapy: Pt wishes to be pain free    Pain:  Functional Pain Scale Rating 0-10:   5/10 on average  3/10 at best  10/10 at worst  Location: [place]  Description: Burning  Aggravating Factors: Lifting  Easing Factors rest     Occupation:  Occasional Substitute teaching  Working presently: yes  Duties: Classroom general activities    Functional Limitations/Social History:    Previous functional status includes: Independent with all ADLs. I    Current FunctionalStatus   Home/Living environment : pt cares for her mother      Limitation of Functional Status as follows:   ADLs/IADLs:     - Feeding: I    - Bathing: I    - Dressing/Grooming: I    - Driving:  I     Leisure: none      Past Medical History/Physical Systems Review:   Faby Luna  has a past medical history of Arthritis, Diabetes mellitus, and Hypertension.    Faby Luna  has a past surgical history that includes Cholecystectomy; Hysterectomy;  section; Injection of facet joint (Bilateral, 2018); Epidural steroid injection into lumbar spine (N/A, 2018); Trial of spinal cord nerve stimulator (N/A, 2018); and Carpal tunnel release (Right, 3/8/2019).    Faby has a current medication list which includes the following prescription(s): atenolol, blood sugar diagnostic, blood-glucose meter, escitalopram oxalate, furosemide, glipizide, lancets, lancing device, lorazepam, losartan, metformin, pantoprazole, polyethylene glycol, promethazine, trazodone, true metrix glucose meter, trueplus lancets, and zonisamide.    Review of patient's allergies indicates:   Allergen Reactions    Aspirin Other (See Comments)     Has been told not to take it due to bariatric surgery          Objective       Edema. Measured in centimeters.  Date 2019      Left Right   Wrist Crease 16.5 17 cm   2 below elbow 26 28     Elbow grossly intact  Wrist grossly intact  Hand Grossly intact       Strength (Dyanmometer) and Pinch Strength (Pinch Gauge)  Measured in pounds.  Date 2019      Left Right   Rung II 31 12   [place] Pinch 14 12       Sensation   2019    Right   [place] Kavitha    Normal 1.65-2.83 X Ulnar   Diminished Light Touch 3.22-3.61 X Median          Manual Muscle Test  Date 2019      Right   Wrist Extension  4+/5   Wrist Flexion 5/5   Radial Deviation 5/5   Ulnar Deviation 5/5   Supination 5/5   Pronation 5/5   EPL 4+/5   FLP 5/5   OP 5/5   APL 4+/5         CMS Impairment/Limitation/Restriction for FOTO arm Survey    Therapist reviewed FOTO scores for Faby Luna on 2019.   FOTO documents entered into [employer] - see Media section.    Limitation Score:  50%  Category: Self Care    Current : CK = at least 40% but < 60% impaired, limited or restricted  Goal: CJ = at least 20% but < 40% impaired, limited or restricted           Treatment     Treatment Time In: 4:30 pm  Treatment Time Out: 4:45 pm  Total Treatment time separate from Evaluation time:15 min  Faby Luna received therapeutic exercises to develop strength, endurance, ROM, flexibility and posture for 15 minutes including:  Median nerve glide     Ulnar dynamic nerve glide  Carpal canal stretch  Putty   Intrinsic plus grippinng soft yellow putty  Gross grasp soft yellow putty  Finger abduction soft yellow putty  Digits 4,5 BL grap   Intrinsic scissoring         Home Exercise Program/Education:  Issued HEP (see patient instructions in EMR) and educated on modality use for pain management . Exercises were reviewed and Faby Luna was able to demonstrate them prior to the end of the session.   Pt received a written copy of exercises to perform at home. Faby Luna demonstrated good  understanding of the education provided.  Pt was advised to perform these exercises free of pain, and to stop performing them if pain occurs.    Patient/Family Education: role of OT, goals for OT, scheduling/cancellations - pt verbalized understanding. Discussed insurance limitations with patient.    Additional Education provided: Pt instructed use of ice and heat    Assessment     Faby Luna is a 68 y.o. female referred to outpatient occupational therapy and presents with a medical diagnosis of CTS/Cubital tunnel release, resulting in Decreased  strength, Decreased pinch strength, Decreased muscle strength, Decreased functional hand use, Increased pain and Edema and demonstrates limitations as described in the chart below. Following medical record review it is determined that pt will benefit from occupational therapy services in order to maximize pain free and/or functional use of right arm. The following goals were discussed  with the patient and patient is in agreement with them as to be addressed in the treatment plan. The patient's rehab potential is Excellent.     Anticipated barriers to occupational therapy: none  Pt has no cultural, educational or language barriers to learning provided.    Profile and History Assessment of Occupational Performance Level of Clinical Decision Making Complexity Score   Occupational Profile:   Faby Luna is a 68 y.o. female who lives with their family and is currently employed as . Faby Luna has difficulty with  bathing, grooming and dressing  finance management, driving/transportation management, shopping, phone/computer use and housework/household chores  affecting his/her daily functional abilities. His/her main goal for therapy is to be pain free.     Comorbidities:   none    Medical and Therapy History Review:   Brief               Performance Deficits    Physical:  Muscle Power/Strength  Edema   Strength  Muscle Tone  Pain    Cognitive:  No Deficits    Psychosocial:    No Deficits     Clinical Decision Making:  low    Assessment Process:  Problem-Focused Assessments    Modification/Need for Assistance:  Not Necessary    Intervention Selection:  Multiple Treatment Options       low  Based on PMHX, co morbidities , data from assessments and functional level of assistance required with task and clinical presentation directly impacting function.       The following goals were discussed with the patient and patient is in agreement with them as to be addressed in the treatment plan.     Goals:     Goals to be met in 4 weeks: (6/3/19)  - Patient is independent with initial home exercise program to improve participation with ADL's.  - Pt to increase pinch strength of the right hand by 2 lbs to improve with patients ability to functional utilize arm for snapping snaps.  - Pt to decrease edema in the elbow by .5 cm   - Pt to decrease pain to less than or equal to 3/10  while performing all ADL's.  - Patient will be able to achieve less than or equal to 45% limitation on the FOTO, demonstrating overall improved functional ability with upper extremity.      Goals to be met by discharge:  - Patient is independent with advanced final home exercise program to improve participation with ADL's and IADL's.  - Pt to increase  strength by 20 lbs to improve patients ability to hold grocery bag  - Pt to increase edema to WNL as compared to RUE by d/c to improve patients ability  - Pt to report decrease in pain to less than or equal to 2/10 when performing ADL's such as   - Patient will be able to achieve less than or equal to 30% limitation on the FOTO, demonstrating overall improved functional ability with upper extremity.         Plan   Certification Period/Plan of care expiration: 5/6/2019 to 7/29/19    Outpatient Occupational Therapy 2 times weekly for 12 weeks to include the following interventions: Paraffin, Fluidotherapy, Manual therapy/joint mobilizations, Modalities for pain management, US 3 mhz, Therapeutic exercises/activities., Iontophoresis with 2.0 cc Dexamethasone, Strengthening, Orthotic Fabrication/Fit/Training, Edema Control, Scar Management, Wound Care, Electrical Modalities, Joint Protection and Energy Conservation.      Abhay Gaytan, OTR/L,CHT

## 2019-05-06 NOTE — PATIENT INSTRUCTIONS
"MEDIAN NERVE GLIDE - C    Start with your arm out to the side with your elbows straight and palm facing upward. Next, bend your wrist up and down.       Your other hand should be checking to make sure that your shoulder stays down and drawn back the entire time.    Dynamic Ulnar Nerve Glide    Put both arms in front of you with your pointer finger and your thumb touching. Then, rotate your fingers towards your face so that you can make "goggles" around your eyes with your pointer finger and thumb together. A nerve mobilization should be felt along the pinky side. Hold for 1 second, relax your arms, and repeat.    Carpal Tunnel Stretch    Position self as shown. Gently pull the thumb outward and down to stretch the tissues and ligament above the carpal tunnel.  Hold 20 sec  Repeat hourly during the day     PUTTY     Place putty in your hand and squeeze it firmly and slowly. Reshape it and repeat.     PUTTY LUMBRICALS    Hold the putty with all fingers straight as shown, then squeeze. Keep your fingers straight the entire time.     PUTTY  EXTENSION  LOOP    Create a small tubular section of putty. Form a loop around your fingers and then pull it apart as shown.     Two-Handed Grasp Theraputty    -Grasp the putty with the little finger side of both hands.    -Pull the putty apart.     Putty Finger Abduction    Lay the putty flat on the table. Starting with all four fingers pressed together, push them into the putty and spread them apart.  "

## 2019-05-13 ENCOUNTER — OFFICE VISIT (OUTPATIENT)
Dept: FAMILY MEDICINE | Facility: CLINIC | Age: 69
End: 2019-05-13
Payer: MEDICARE

## 2019-05-13 VITALS
WEIGHT: 185.88 LBS | RESPIRATION RATE: 18 BRPM | DIASTOLIC BLOOD PRESSURE: 78 MMHG | HEIGHT: 64 IN | SYSTOLIC BLOOD PRESSURE: 132 MMHG | BODY MASS INDEX: 31.73 KG/M2 | TEMPERATURE: 97 F | OXYGEN SATURATION: 98 % | HEART RATE: 70 BPM

## 2019-05-13 DIAGNOSIS — G47.00 INSOMNIA, UNSPECIFIED TYPE: ICD-10-CM

## 2019-05-13 DIAGNOSIS — Z12.11 SCREEN FOR COLON CANCER: ICD-10-CM

## 2019-05-13 DIAGNOSIS — I10 ESSENTIAL HYPERTENSION: ICD-10-CM

## 2019-05-13 DIAGNOSIS — E11.9 TYPE 2 DIABETES MELLITUS WITHOUT COMPLICATION, WITHOUT LONG-TERM CURRENT USE OF INSULIN: Primary | ICD-10-CM

## 2019-05-13 DIAGNOSIS — F33.0 MILD RECURRENT MAJOR DEPRESSION: ICD-10-CM

## 2019-05-13 DIAGNOSIS — K21.9 GASTROESOPHAGEAL REFLUX DISEASE WITHOUT ESOPHAGITIS: ICD-10-CM

## 2019-05-13 DIAGNOSIS — F41.9 ANXIETY: ICD-10-CM

## 2019-05-13 DIAGNOSIS — Z12.39 SCREENING FOR BREAST CANCER: ICD-10-CM

## 2019-05-13 DIAGNOSIS — E78.5 DYSLIPIDEMIA: ICD-10-CM

## 2019-05-13 PROBLEM — F33.41 MAJOR DEPRESSIVE DISORDER, RECURRENT EPISODE, IN PARTIAL REMISSION: Status: ACTIVE | Noted: 2019-05-13

## 2019-05-13 PROCEDURE — 3078F PR MOST RECENT DIASTOLIC BLOOD PRESSURE < 80 MM HG: ICD-10-PCS | Mod: HCNC,CPTII,S$GLB, | Performed by: FAMILY MEDICINE

## 2019-05-13 PROCEDURE — 3075F PR MOST RECENT SYSTOLIC BLOOD PRESS GE 130-139MM HG: ICD-10-PCS | Mod: HCNC,CPTII,S$GLB, | Performed by: FAMILY MEDICINE

## 2019-05-13 PROCEDURE — 3044F HG A1C LEVEL LT 7.0%: CPT | Mod: HCNC,CPTII,S$GLB, | Performed by: FAMILY MEDICINE

## 2019-05-13 PROCEDURE — 99999 PR PBB SHADOW E&M-EST. PATIENT-LVL IV: ICD-10-PCS | Mod: PBBFAC,HCNC,, | Performed by: FAMILY MEDICINE

## 2019-05-13 PROCEDURE — 99999 PR PBB SHADOW E&M-EST. PATIENT-LVL IV: CPT | Mod: PBBFAC,HCNC,, | Performed by: FAMILY MEDICINE

## 2019-05-13 PROCEDURE — 99214 OFFICE O/P EST MOD 30 MIN: CPT | Mod: HCNC,S$GLB,, | Performed by: FAMILY MEDICINE

## 2019-05-13 PROCEDURE — 3075F SYST BP GE 130 - 139MM HG: CPT | Mod: HCNC,CPTII,S$GLB, | Performed by: FAMILY MEDICINE

## 2019-05-13 PROCEDURE — 3078F DIAST BP <80 MM HG: CPT | Mod: HCNC,CPTII,S$GLB, | Performed by: FAMILY MEDICINE

## 2019-05-13 PROCEDURE — 1101F PR PT FALLS ASSESS DOC 0-1 FALLS W/OUT INJ PAST YR: ICD-10-PCS | Mod: HCNC,CPTII,S$GLB, | Performed by: FAMILY MEDICINE

## 2019-05-13 PROCEDURE — 1101F PT FALLS ASSESS-DOCD LE1/YR: CPT | Mod: HCNC,CPTII,S$GLB, | Performed by: FAMILY MEDICINE

## 2019-05-13 PROCEDURE — 99214 PR OFFICE/OUTPT VISIT, EST, LEVL IV, 30-39 MIN: ICD-10-PCS | Mod: HCNC,S$GLB,, | Performed by: FAMILY MEDICINE

## 2019-05-13 PROCEDURE — 3044F PR MOST RECENT HEMOGLOBIN A1C LEVEL <7.0%: ICD-10-PCS | Mod: HCNC,CPTII,S$GLB, | Performed by: FAMILY MEDICINE

## 2019-05-13 RX ORDER — FUROSEMIDE 20 MG/1
20 TABLET ORAL DAILY
Qty: 90 TABLET | Refills: 1 | Status: SHIPPED | OUTPATIENT
Start: 2019-05-13 | End: 2020-05-04

## 2019-05-13 RX ORDER — ESCITALOPRAM OXALATE 20 MG/1
20 TABLET ORAL DAILY
Qty: 90 TABLET | Refills: 1 | Status: SHIPPED | OUTPATIENT
Start: 2019-05-13 | End: 2019-11-06 | Stop reason: SDUPTHER

## 2019-05-13 RX ORDER — HYDROXYZINE HYDROCHLORIDE 25 MG/1
25 TABLET, FILM COATED ORAL NIGHTLY PRN
Qty: 90 TABLET | Refills: 0 | Status: SHIPPED | OUTPATIENT
Start: 2019-05-13 | End: 2019-08-27 | Stop reason: SDUPTHER

## 2019-05-13 RX ORDER — PANTOPRAZOLE SODIUM 20 MG/1
40 TABLET, DELAYED RELEASE ORAL DAILY PRN
Qty: 90 TABLET | Refills: 1 | Status: SHIPPED | OUTPATIENT
Start: 2019-05-13 | End: 2019-11-22 | Stop reason: SDUPTHER

## 2019-05-14 DIAGNOSIS — I10 ESSENTIAL HYPERTENSION: ICD-10-CM

## 2019-05-14 RX ORDER — LOSARTAN POTASSIUM 25 MG/1
25 TABLET ORAL DAILY
Qty: 90 TABLET | Refills: 3 | Status: SHIPPED | OUTPATIENT
Start: 2019-05-14 | End: 2019-05-27 | Stop reason: SDUPTHER

## 2019-05-14 NOTE — PROGRESS NOTES
Routine Office Visit    Patient Name: Faby Luna    : 1950  MRN: [id number]    Subjective:  Faby is a 68 y.o. female who presents today for:   Chief Complaint   Patient presents with    Establish Care       68-year-old female with diabetes, hypertension, dyslipidemia, GERD, anxiety disorder, depression, and insomnia comes in to establish care with new PCP.  She reports that she follows the regimen previously prescribed by her for PCP for her medical conditions.  She reports that she tolerates the regimen well.  She states that for her diabetes she is on glipizide 10 mg twice a day, and metformin once a day.  She is on atenolol, Lasix, and losartan for her blood pressure.  She states that she has been on pantoprazole 40 milligrams for several years for gastroesophageal reflux disease. However she only takes it when needed.  For her anxiety and depression she is on Lexapro and states that she does quite well on this her only concern is that she has a lot of insomnia and trazodone does not help for it.    Lastly she is prescribed Zonegran for chronic pain by pain management.  The patient is due for colon cancer screening and breast cancer screening.    Past Medical History  Past Medical History:   Diagnosis Date    Arthritis     Diabetes mellitus     Hypertension        Past Surgical History  Past Surgical History:   Procedure Laterality Date     SECTION      CHOLECYSTECTOMY      DECOMPRESSION, NERVE, ULNAR - right Right 3/8/2019    Performed [facility] at [facility] OR    HYSTERECTOMY      INJECTION, STEROID, SPINE, LUMBAR, EPIDURAL N/A 2018    Performed [facility] Shaq [facility]io [facility] at [facility] PAIN MGT    INJECTION-FACET Bilateral 2018    Per[facility] at [facility] PAIN [facility]GT    INJECTION-STEROID-EPIDURAL-TRANSFORAMINAL Left 2018    Performed by [facility]q [facility] at [facility] PAIN MGT    INSERTION,NEUROSTIMULATOR,SPINAL CORD N/A 10/26/2018    Performed by Su Arredondo Jr.,  [facility] at [facility] OR    RELEASE, CARPAL TUNNEL right Right 3/8/2019    [facility]    TRIAL, NEUROSTIMULATOR, SPINAL CORD, SPINAL CORD STIMULATOR TRIAL-INTERNAL WIRES TO EXTERNAL BATTERY N/A 9/24/2018    Performed by Shaq [facility]io [facility] at [facility] PAIN MGT        Family History  History reviewed. No pertinent family history.    Social History  Social History     Socioeconomic History    Marital status:      Spouse name: Not on file    Number of children: Not on file    Years of education: Not on file    Highest education level: Not on file   Occupational History    Not on file   Social Needs    Financial resource strain: Not on file    Food insecurity:     Worry: Not on file     Inability: Not on file    Transportation needs:     Medical: Not on file     Non-medical: Not on file   Tobacco Use    Smoking status: Never Smoker    Smokeless tobacco: Never Used   Substance and Sexual Activity    Alcohol use: No    Drug use: No    Sexual activity: Not on file   Lifestyle    Physical activity:     Days per week: Not on file     Minutes per session: Not on file    Stress: Not on file   Relationships    Social connections:     Talks on phone: Not on file     Gets together: Not on file     Attends [employer] service: Not on file     Active member of club or organization: Not on file     Attends meetings of clubs or organizations: Not on file     Relationship status: Not on file   Other Topics Concern    Not on file   Social History Narrative    Not on file       Current Medications  Current Outpatient Medications on File Prior to Visit   Medication Sig Dispense Refill    atenolol (TENORMIN) 100 MG tablet TAKE 1 TABLET BY MOUTH ONCE DAILY 90 tablet 0    blood-glucose meter kit Use as instructed 1 each 0    glipiZIDE (GLUCOTROL) 10 MG tablet Take 10 mg by mouth 2 (two) times daily with meals.       losartan (COZAAR) 25 MG tablet Take 1 tablet (25 mg total) by mouth once daily. 90  tablet 3    metFORMIN (GLUCOPHAGE) 1000 MG tablet Take 1,000 mg by mouth every evening.      traZODone (DESYREL) 50 MG tablet TAKE 1 TABLET BY MOUTH NIGHTLY AS NEEDED FOR  INSOMNIA 90 tablet 1    zonisamide (ZONEGRAN) 100 MG Cap TAKE 4 CAPSULES BY MOUTH AT BEDTIME 120 capsule 2    [DISCONTINUED] blood sugar diagnostic Strp 1 strip by Misc.(Non-Drug; Combo Route) route 2 (two) times daily with meals. 100 strip 11    [DISCONTINUED] escitalopram oxalate (LEXAPRO) 20 MG tablet TAKE 1 TABLET BY MOUTH ONCE DAILY 90 tablet 1    [DISCONTINUED] furosemide (LASIX) 20 MG tablet Take 1 tablet (20 mg total) by mouth once daily. 90 tablet 1    [DISCONTINUED] lancets Misc 1 lancet by Misc.(Non-Drug; Combo Route) route 2 (two) times daily with meals. 100 each 11    [DISCONTINUED] lancing device Misc 1 Device by Misc.(Non-Drug; Combo Route) route 2 (two) times daily with meals. 1 each 0    [DISCONTINUED] LORazepam (ATIVAN) 2 MG Tab Take 0.5 tablets by mouth 2 (two) times daily as needed for Anxiety.      [DISCONTINUED] pantoprazole (PROTONIX) 40 MG tablet Take 1 tablet (40 mg total) by mouth before breakfast. 90 tablet 1    [DISCONTINUED] polyethylene glycol (GLYCOLAX) 17 gram PwPk Take 17 g by mouth once daily. 30 each 0    [DISCONTINUED] promethazine (PHENERGAN) 12.5 MG Tab Take 1 tablet (12.5 mg total) by mouth every 6 (six) hours as needed. 60 tablet 0    [DISCONTINUED] TRUE METRIX GLUCOSE METER Misc       [DISCONTINUED] TRUEPLUS LANCETS 33 gauge Misc        No current facility-administered medications on file prior to visit.        Allergies   Review of patient's allergies indicates:   Allergen Reactions    Atorvastatin      Pain    Aspirin Other (See Comments)     Has been told not to take it due to bariatric surgery       Review of Systems   Constitutional: Negative for unexpected weight change.   HENT: Negative for ear pain and sore throat.    Eyes: Negative for visual disturbance.   Respiratory: Negative for  "shortness of breath.    Cardiovascular: Negative for chest pain.   Gastrointestinal: Negative for abdominal pain, blood in stool, constipation and diarrhea.   Genitourinary: Negative for dysuria and frequency.   Skin: Negative for rash.   Neurological: Negative for weakness, numbness and headaches.   Hematological: Negative for adenopathy.   Psychiatric/Behavioral: Positive for sleep disturbance. Negative for dysphoric mood and suicidal ideas. The patient is not nervous/anxious.      /78 (BP Location: Left arm, Patient Position: Sitting, BP Method: Medium (Manual))   Pulse 70   Temp 97.4 °F (36.3 °C) (Oral)   Resp 18   Ht 5' 4" (1.626 m)   Wt 84.3 kg (185 lb 13.6 oz)   SpO2 98%   BMI 31.90 kg/m²     Physical Exam   Constitutional: She appears well-developed and well-nourished.   HENT:   Head: Normocephalic and atraumatic.   Right Ear: External ear normal.   Left Ear: External ear normal.   Nose: Nose normal.   Mouth/Throat: Oropharynx is clear and moist. No oropharyngeal exudate.   Eyes: Pupils are equal, round, and reactive to light. Conjunctivae and EOM are normal. Right eye exhibits no discharge. Left eye exhibits no discharge.   Neck: Normal range of motion. Neck supple. No tracheal deviation present.   Cardiovascular: Normal rate, regular rhythm, S1 normal, S2 normal and intact distal pulses.   Pulses:       Radial pulses are 2+ on the right side, and 2+ on the left side.   Pulmonary/Chest: Effort normal and breath sounds normal. She has no wheezes. She has no rales.   Abdominal: Soft. Bowel sounds are normal. She exhibits no mass. There is no tenderness.   Lymphadenopathy:     She has no cervical adenopathy.   Psychiatric: She has a normal mood and affect.   Vitals reviewed.        Assessment/Plan:  Faby was seen today for establish care.    Diagnoses and all orders for this visit:    Type 2 diabetes mellitus without complication, without long-term current use of insulin  -     Comprehensive " metabolic panel; Future  -     Hemoglobin A1c; Future  -     Lipid panel; Future  -     Microalbumin/creatinine urine ratio; Future  Patient to continue current regimen and have labs done.  The follow up next week for results to review labs and discuss any changes if needed.  Patient has an appointment for her eye exam scheduled in a few weeks, with her eye doctor. .    Essential hypertension  -     Comprehensive metabolic panel; Future  -     Microalbumin/creatinine urine ratio; Future  -     furosemide (LASIX) 20 MG tablet; Take 1 tablet (20 mg total) by mouth once daily.  Continue current blood pressure medication.    Dyslipidemia  -     Comprehensive metabolic panel; Future  -     Lipid panel; Future  Patient reports intolerance to atorvastatin, and to simvastatin.  Will check lipid panel.  Will likely need to prescribed pravastatin given intolerance to atorvastatin and simvastatin.    Anxiety  -     escitalopram oxalate (LEXAPRO) 20 MG tablet; Take 1 tablet (20 mg total) by mouth once daily.  Continue current dose of Lexapro.  Discussed risk of suicidal thoughts.  Patient reports none.    Mild recurrent major depression  -     escitalopram oxalate (LEXAPRO) 20 MG tablet; Take 1 tablet (20 mg total) by mouth once daily.  As above.    Insomnia, unspecified type  -     hydrOXYzine HCl (ATARAX) 25 MG tablet; Take 1 tablet (25 mg total) by mouth nightly as needed (Insomnia).  Discussed with patient using hydroxyzine as needed 1 hour prior to sleep.  Will re-evaluate next week.    Screen for colon cancer  -     Fecal Immunochemical Test (iFOBT); Future  Patient agrees to be screened with fecal immunochemical testing.  [employer] was given to patient on 5/13/2019   Patient expressed understanding of verbal instruction of collection process    Screening for breast cancer  -     Mammo Digital Screening Bilat w/ Misael; Future  Screening mammogram ordered for June.    Gastroesophageal reflux disease without esophagitis  -      pantoprazole (PROTONIX) 20 MG tablet; Take 2 tablets (40 mg total) by mouth daily as needed (Stomach pain).  Discussed with patient some possible side effects of long-term use of proton pump inhibitors.  The encouraged patient to try a reduced dose and to continue using as needed.  Patient was agreeable to this.              -Sea eHr Jr., MD, AAHIVS          This office note has been dictated.  This dictation has been generated using M-Modal Fluency Direct dictation; some phonetic errors may occur.

## 2019-05-15 ENCOUNTER — LAB VISIT (OUTPATIENT)
Dept: LAB | Facility: HOSPITAL | Age: 69
End: 2019-05-15
Attending: FAMILY MEDICINE
Payer: MEDICARE

## 2019-05-15 DIAGNOSIS — E11.9 TYPE 2 DIABETES MELLITUS WITHOUT COMPLICATION, WITHOUT LONG-TERM CURRENT USE OF INSULIN: ICD-10-CM

## 2019-05-15 DIAGNOSIS — I10 ESSENTIAL HYPERTENSION: ICD-10-CM

## 2019-05-15 DIAGNOSIS — E78.5 DYSLIPIDEMIA: ICD-10-CM

## 2019-05-15 LAB
ALBUMIN SERPL BCP-MCNC: 3.8 G/DL (ref 3.5–5.2)
ALP SERPL-CCNC: 109 U/L (ref 55–135)
ALT SERPL W/O P-5'-P-CCNC: 22 U/L (ref 10–44)
ANION GAP SERPL CALC-SCNC: 6 MMOL/L (ref 8–16)
AST SERPL-CCNC: 17 U/L (ref 10–40)
BILIRUB SERPL-MCNC: 0.4 MG/DL (ref 0.1–1)
BUN SERPL-MCNC: 12 MG/DL (ref 8–23)
CALCIUM SERPL-MCNC: 9.5 MG/DL (ref 8.7–10.5)
CHLORIDE SERPL-SCNC: 109 MMOL/L (ref 95–110)
CHOLEST SERPL-MCNC: 272 MG/DL (ref 120–199)
CHOLEST/HDLC SERPL: 4.3 {RATIO} (ref 2–5)
CO2 SERPL-SCNC: 26 MMOL/L (ref 23–29)
CREAT SERPL-MCNC: 0.9 MG/DL (ref 0.5–1.4)
EST. GFR  (AFRICAN AMERICAN): >60 ML/MIN/1.73 M^2
EST. GFR  (NON AFRICAN AMERICAN): >60 ML/MIN/1.73 M^2
GLUCOSE SERPL-MCNC: 87 MG/DL (ref 70–110)
HDLC SERPL-MCNC: 63 MG/DL (ref 40–75)
HDLC SERPL: 23.2 % (ref 20–50)
LDLC SERPL CALC-MCNC: 174.6 MG/DL (ref 63–159)
NONHDLC SERPL-MCNC: 209 MG/DL
POTASSIUM SERPL-SCNC: 4.4 MMOL/L (ref 3.5–5.1)
PROT SERPL-MCNC: 7.7 G/DL (ref 6–8.4)
SODIUM SERPL-SCNC: 141 MMOL/L (ref 136–145)
TRIGL SERPL-MCNC: 172 MG/DL (ref 30–150)

## 2019-05-15 PROCEDURE — 36415 COLL VENOUS BLD VENIPUNCTURE: CPT | Mod: HCNC,PN

## 2019-05-15 PROCEDURE — 83036 HEMOGLOBIN GLYCOSYLATED A1C: CPT | Mod: HCNC

## 2019-05-15 PROCEDURE — 80053 COMPREHEN METABOLIC PANEL: CPT | Mod: HCNC

## 2019-05-15 PROCEDURE — 80061 LIPID PANEL: CPT | Mod: HCNC

## 2019-05-16 LAB
ESTIMATED AVG GLUCOSE: 128 MG/DL (ref 68–131)
HBA1C MFR BLD HPLC: 6.1 % (ref 4–5.6)

## 2019-05-27 ENCOUNTER — OFFICE VISIT (OUTPATIENT)
Dept: FAMILY MEDICINE | Facility: CLINIC | Age: 69
End: 2019-05-27
Payer: MEDICARE

## 2019-05-27 VITALS
WEIGHT: 182.75 LBS | DIASTOLIC BLOOD PRESSURE: 74 MMHG | OXYGEN SATURATION: 97 % | SYSTOLIC BLOOD PRESSURE: 122 MMHG | HEART RATE: 65 BPM | TEMPERATURE: 98 F | BODY MASS INDEX: 31.2 KG/M2 | RESPIRATION RATE: 17 BRPM | HEIGHT: 64 IN

## 2019-05-27 DIAGNOSIS — Z78.0 POSTMENOPAUSAL: ICD-10-CM

## 2019-05-27 DIAGNOSIS — E78.5 DYSLIPIDEMIA: ICD-10-CM

## 2019-05-27 DIAGNOSIS — E11.9 TYPE 2 DIABETES MELLITUS WITHOUT COMPLICATION, WITHOUT LONG-TERM CURRENT USE OF INSULIN: Primary | ICD-10-CM

## 2019-05-27 DIAGNOSIS — J30.2 SEASONAL ALLERGIES: ICD-10-CM

## 2019-05-27 DIAGNOSIS — I10 ESSENTIAL HYPERTENSION: ICD-10-CM

## 2019-05-27 PROCEDURE — 1101F PT FALLS ASSESS-DOCD LE1/YR: CPT | Mod: HCNC,CPTII,S$GLB, | Performed by: FAMILY MEDICINE

## 2019-05-27 PROCEDURE — 99214 OFFICE O/P EST MOD 30 MIN: CPT | Mod: HCNC,S$GLB,, | Performed by: FAMILY MEDICINE

## 2019-05-27 PROCEDURE — 3078F DIAST BP <80 MM HG: CPT | Mod: HCNC,CPTII,S$GLB, | Performed by: FAMILY MEDICINE

## 2019-05-27 PROCEDURE — 99999 PR PBB SHADOW E&M-EST. PATIENT-LVL III: ICD-10-PCS | Mod: PBBFAC,HCNC,, | Performed by: FAMILY MEDICINE

## 2019-05-27 PROCEDURE — 3074F PR MOST RECENT SYSTOLIC BLOOD PRESSURE < 130 MM HG: ICD-10-PCS | Mod: HCNC,CPTII,S$GLB, | Performed by: FAMILY MEDICINE

## 2019-05-27 PROCEDURE — 99999 PR PBB SHADOW E&M-EST. PATIENT-LVL III: CPT | Mod: PBBFAC,HCNC,, | Performed by: FAMILY MEDICINE

## 2019-05-27 PROCEDURE — 99214 PR OFFICE/OUTPT VISIT, EST, LEVL IV, 30-39 MIN: ICD-10-PCS | Mod: HCNC,S$GLB,, | Performed by: FAMILY MEDICINE

## 2019-05-27 PROCEDURE — 3078F PR MOST RECENT DIASTOLIC BLOOD PRESSURE < 80 MM HG: ICD-10-PCS | Mod: HCNC,CPTII,S$GLB, | Performed by: FAMILY MEDICINE

## 2019-05-27 PROCEDURE — 3044F HG A1C LEVEL LT 7.0%: CPT | Mod: HCNC,CPTII,S$GLB, | Performed by: FAMILY MEDICINE

## 2019-05-27 PROCEDURE — 3044F PR MOST RECENT HEMOGLOBIN A1C LEVEL <7.0%: ICD-10-PCS | Mod: HCNC,CPTII,S$GLB, | Performed by: FAMILY MEDICINE

## 2019-05-27 PROCEDURE — 3074F SYST BP LT 130 MM HG: CPT | Mod: HCNC,CPTII,S$GLB, | Performed by: FAMILY MEDICINE

## 2019-05-27 PROCEDURE — 1101F PR PT FALLS ASSESS DOC 0-1 FALLS W/OUT INJ PAST YR: ICD-10-PCS | Mod: HCNC,CPTII,S$GLB, | Performed by: FAMILY MEDICINE

## 2019-05-27 RX ORDER — LORATADINE 10 MG/1
10 TABLET ORAL DAILY PRN
Qty: 90 TABLET | Refills: 0 | Status: SHIPPED | OUTPATIENT
Start: 2019-05-27 | End: 2019-08-27 | Stop reason: SDUPTHER

## 2019-05-27 RX ORDER — PRAVASTATIN SODIUM 20 MG/1
20 TABLET ORAL DAILY
Qty: 90 TABLET | Refills: 0 | Status: SHIPPED | OUTPATIENT
Start: 2019-05-27 | End: 2019-08-19 | Stop reason: SDUPTHER

## 2019-05-27 RX ORDER — LOSARTAN POTASSIUM 25 MG/1
25 TABLET ORAL DAILY
Qty: 90 TABLET | Refills: 3 | Status: SHIPPED | OUTPATIENT
Start: 2019-05-27 | End: 2020-02-03 | Stop reason: SDUPTHER

## 2019-05-27 NOTE — PROGRESS NOTES
Routine Office Visit    Patient Name: Faby Luna    : 1950  MRN: [id number]    Subjective:  Faby is a 68 y.o. female who presents today for:   Chief Complaint   Patient presents with    Diabetes    Follow-up       68-year-old female with diabetes, hypertension, and dyslipidemia comes in for routine follow-up on these.  She reports that she takes her diabetic regimen at nighttime.  She does get some episodes of sweating, and speaking with her today she reports that she does not eat after taking the glipizide.  Otherwise she has no concerns with her current medications.  She was taken off atorvastatin and simvastatin previously because of severe muscle aches with them.  She states that the muscle aches resolved after she stopped them.  No other statins were tried.  The patient reports that she suffers with sinus issues constantly.  She states that during the spring she gets runny nose, postnasal drip, and itchy eyes.  She would like something for this.  She is currently not using anything for it.  The patient reports she has an eye exam scheduled with the doctor on an [place] next month.    Past Medical History  Past Medical History:   Diagnosis Date    Arthritis     Diabetes mellitus     Hypertension        Past Surgical History  Past Surgical History:   Procedure Laterality Date     SECTION      CHOLECYSTECTOMY      DECOMPRESSION, NERVE, ULNAR - right Right 3/8/2019    Performed by [facility] at [facility] OR    HYSTERECTOMY      INJECTION, STEROID, SPINE, LUMBAR, EPIDURAL N/A 2018    Performed by Shaq Silverio MD at [facility] PAIN MGT    INJECTION-FACET Bilateral 2018    Performed by Shaq [facility] at [facility] PAIN [facility]GT    INJECTION-STEROID-EPIDURAL-TRANSFORAMINAL Left 2018    Performed by Shaq [facility]io [facility] at [facility] PAIN MGT    INSERTION,NEUROSTIMULATOR,SPINAL CORD N/A 10/26/2018    Per[facility] Su [facility]redondo Jr[facility] OR    RELEASE, CARPAL TUNNEL right Right  3/8/2019    Performed by [facility] at [facility] OR    TRIAL, NEUROSTIMULATOR, SPINAL CORD, SPINAL CORD STIMULATOR TRIAL-INTERNAL WIRES TO EXTERNAL BATTERY N/A 9/24/2018    Performed by Shaq Silverio MD at [facility] PAIN MGT        Family History  History reviewed. No pertinent family history.    Social History  Social History     Socioeconomic History    Marital status:      Spouse name: Not on file    Number of children: Not on file    Years of education: Not on file    Highest education level: Not on file   Occupational History    Not on file   Social Needs    Financial resource strain: Not on file    Food insecurity:     Worry: Not on file     Inability: Not on file    Transportation needs:     Medical: Not on file     Non-medical: Not on file   Tobacco Use    Smoking status: Never Smoker    Smokeless tobacco: Never Used   Substance and Sexual Activity    Alcohol use: No    Drug use: No    Sexual activity: Not on file   Lifestyle    Physical activity:     Days per week: Not on file     Minutes per session: Not on file    Stress: Not on file   Relationships    Social connections:     Talks on phone: Not on file     Gets together: Not on file     Attends Gnosticism service: Not on file     Active member of club or organization: Not on file     Attends meetings of clubs or organizations: Not on file     Relationship status: Not on file   Other Topics Concern    Not on file   Social History Narrative    Not on file       Current Medications  Current Outpatient Medications on File Prior to Visit   Medication Sig Dispense Refill    atenolol (TENORMIN) 100 MG tablet TAKE 1 TABLET BY MOUTH ONCE DAILY 90 tablet 0    blood-glucose meter kit Use as instructed 1 each 0    escitalopram oxalate (LEXAPRO) 20 MG tablet Take 1 tablet (20 mg total) by mouth once daily. 90 tablet 1    furosemide (LASIX) 20 MG tablet Take 1 tablet (20 mg total) by mouth once daily. 90 tablet 1    glipiZIDE (GLUCOTROL) 10  "MG tablet Take 10 mg by mouth 2 (two) times daily with meals.       hydrOXYzine HCl (ATARAX) 25 MG tablet Take 1 tablet (25 mg total) by mouth nightly as needed (Insomnia). 90 tablet 0    metFORMIN (GLUCOPHAGE) 1000 MG tablet Take 1,000 mg by mouth every evening.      pantoprazole (PROTONIX) 20 MG tablet Take 2 tablets (40 mg total) by mouth daily as needed (Stomach pain). 90 tablet 1    traZODone (DESYREL) 50 MG tablet TAKE 1 TABLET BY MOUTH NIGHTLY AS NEEDED FOR  INSOMNIA 90 tablet 1    zonisamide (ZONEGRAN) 100 MG Cap TAKE 4 CAPSULES BY MOUTH AT BEDTIME 120 capsule 2    [DISCONTINUED] losartan (COZAAR) 25 MG tablet Take 1 tablet (25 mg total) by mouth once daily. 90 tablet 3     No current facility-administered medications on file prior to visit.        Allergies   Review of patient's allergies indicates:   Allergen Reactions    Atorvastatin      Pain    Aspirin Other (See Comments)     Has been told not to take it due to bariatric surgery       Review of Systems   Constitutional: Negative for unexpected weight change.   HENT: Positive for sinus pressure and sneezing. Negative for ear pain and sore throat.    Eyes: Positive for itching. Negative for visual disturbance.   Respiratory: Positive for cough. Negative for shortness of breath.    Cardiovascular: Negative for chest pain.   Gastrointestinal: Negative for abdominal pain, blood in stool, constipation and diarrhea.   Genitourinary: Negative for dysuria and frequency.   Skin: Negative for rash.   Neurological: Negative for weakness, numbness and headaches.   Hematological: Negative for adenopathy.   Psychiatric/Behavioral: Positive for sleep disturbance. Negative for dysphoric mood and suicidal ideas. The patient is not nervous/anxious.      /74 (BP Location: Left arm, Patient Position: Sitting, BP Method: Small (Manual))   Pulse 65   Temp 98 °F (36.7 °C) (Oral)   Resp 17   Ht 5' 4" (1.626 m)   Wt 82.9 kg (182 lb 12.2 oz)   SpO2 97%   BMI " 31.37 kg/m²     Physical Exam   Constitutional: She is oriented to person, place, and time. She appears well-developed and well-nourished.   HENT:   Head: Normocephalic and atraumatic.   Right Ear: External ear normal.   Left Ear: External ear normal.   Nose: Nose normal.   Mouth/Throat: Oropharynx is clear and moist. No oropharyngeal exudate.   Eyes: Pupils are equal, round, and reactive to light. EOM and lids are normal. Right eye exhibits no discharge. Left eye exhibits no discharge. Right conjunctiva is injected. Left conjunctiva is injected.   Neck: Normal range of motion. Neck supple. No tracheal deviation present.   Cardiovascular: Normal rate, regular rhythm, normal heart sounds and intact distal pulses.   No murmur heard.  Pulmonary/Chest: Effort normal and breath sounds normal. She has no wheezes. She has no rales.   Abdominal: Soft. Bowel sounds are normal. She exhibits no mass. There is no tenderness. There is no rigidity, no guarding and no CVA tenderness.   Lymphadenopathy:     She has no cervical adenopathy.   Neurological: She is oriented to person, place, and time. She has normal strength. She displays no atrophy. No sensory deficit. Gait normal.   Reflex Scores:       Patellar reflexes are 2+ on the right side and 2+ on the left side.  Psychiatric: She has a normal mood and affect.   Vitals reviewed.    Lab Visit on 05/15/2019   Component Date Value Ref Range Status    Sodium 05/15/2019 141  136 - 145 mmol/L Final    Potassium 05/15/2019 4.4  3.5 - 5.1 mmol/L Final    Chloride 05/15/2019 109  95 - 110 mmol/L Final    CO2 05/15/2019 26  23 - 29 mmol/L Final    Glucose 05/15/2019 87  70 - 110 mg/dL Final    BUN, Bld 05/15/2019 12  8 - 23 mg/dL Final    Creatinine 05/15/2019 0.9  0.5 - 1.4 mg/dL Final    Calcium 05/15/2019 9.5  8.7 - 10.5 mg/dL Final    Total Protein 05/15/2019 7.7  6.0 - 8.4 g/dL Final    Albumin 05/15/2019 3.8  3.5 - 5.2 g/dL Final    Total Bilirubin 05/15/2019 0.4  0.1 -  1.0 mg/dL Final    Comment: For infants and newborns, interpretation of results should be based  on gestational age, weight and in agreement with clinical  observations.  Premature Infant recommended reference ranges:  Up to 24 hours.............<8.0 mg/dL  Up to 48 hours............<12.0 mg/dL  3-5 days..................<15.0 mg/dL  6-29 days.................<15.0 mg/dL      Alkaline Phosphatase 05/15/2019 109  55 - 135 U/L Final    AST 05/15/2019 17  10 - 40 U/L Final    ALT 05/15/2019 22  10 - 44 U/L Final    Anion Gap 05/15/2019 6* 8 - 16 mmol/L Final    eGFR if African American 05/15/2019 >60  >60 mL/min/1.73 m^2 Final    eGFR if non African American 05/15/2019 >60  >60 mL/min/1.73 m^2 Final    Comment: Calculation used to obtain the estimated glomerular filtration  rate (eGFR) is the CKD-EPI equation.       Hemoglobin A1C 05/15/2019 6.1* 4.0 - 5.6 % Final    Comment: ADA Screening Guidelines:  5.7-6.4%  Consistent with prediabetes  >or=6.5%  Consistent with diabetes  High levels of fetal hemoglobin interfere with the HbA1C  assay. Heterozygous hemoglobin variants (HbS, HgC, etc)do  not significantly interfere with this assay.   However, presence of multiple variants may affect accuracy.      Estimated Avg Glucose 05/15/2019 128  68 - 131 mg/dL Final    Cholesterol 05/15/2019 272* 120 - 199 mg/dL Final    Comment: The National Cholesterol Education Program (NCEP) has set the  following guidelines (reference ranges) for Cholesterol:  Optimal.....................<200 mg/dL  Borderline High.............200-239 mg/dL  High........................> or = 240 mg/dL      Triglycerides 05/15/2019 172* 30 - 150 mg/dL Final    Comment: The National Cholesterol Education Program (NCEP) has set the  following guidelines (reference values) for triglycerides:  Normal......................<150 mg/dL  Borderline High.............150-199 mg/dL  High........................200-499 mg/dL      HDL 05/15/2019 63  40 - 75  mg/dL Final    Comment: The National Cholesterol Education Program (NCEP) has set the  following guidelines (reference values) for HDL Cholesterol:  Low...............<40 mg/dL  Optimal...........>60 mg/dL      LDL Cholesterol 05/15/2019 174.6* 63.0 - 159.0 mg/dL Final    Comment: The National Cholesterol Education Program (NCEP) has set the  following guidelines (reference values) for LDL Cholesterol:  Optimal.......................<130 mg/dL  Borderline High...............130-159 mg/dL  High..........................160-189 mg/dL  Very High.....................>190 mg/dL      Hdl/Cholesterol Ratio 05/15/2019 23.2  20.0 - 50.0 % Final    Total Cholesterol/HDL Ratio 05/15/2019 4.3  2.0 - 5.0 Final    Non-HDL Cholesterol 05/15/2019 209  mg/dL Final    Comment: Risk category and Non-HDL cholesterol goals:  Coronary heart disease (CHD)or equivalent (10-year risk of CHD >20%):  Non-HDL cholesterol goal     <130 mg/dL  Two or more CHD risk factors and 10-year risk of CHD <= 20%:  Non-HDL cholesterol goal     <160 mg/dL  0 to 1 CHD risk factor:  Non-HDL cholesterol goal     <190 mg/dL     Lab Visit on 05/15/2019   Component Date Value Ref Range Status    Microalbum.,U,Random 05/15/2019 47.0  ug/mL Final    Creatinine, Random Ur 05/15/2019 168.0  15.0 - 325.0 mg/dL Final    Comment: The random urine reference ranges provided were established   for 24 hour urine collections.  No reference ranges exist for  random urine specimens.  Correlate clinically.      Microalb Creat Ratio 05/15/2019 28.0  0.0 - 30.0 ug/mg Final         Assessment/Plan:  Faby was seen today for diabetes and follow-up.    Diagnoses and all orders for this visit:    Type 2 diabetes mellitus without complication, without long-term current use of insulin  Discussed with patient that she should take her glipizide prior to breakfast as she needs to eat after taking.  Discussed side effects of it if she does not eat with.  Otherwise will continue  current dosages of the medication because her A1c seems to be well controlled.    Dyslipidemia  -     pravastatin (PRAVACHOL) 20 MG tablet; Take 1 tablet (20 mg total) by mouth once daily.  Lipids not controlled.  Discussed with patient dangers of uncontrolled lipids including increased risk for cardiovascular disease/event.  As patient cannot tolerate atorvastatin or simvastatin secondary to myalgias, will try pravastatin.  Will re-evaluate patient in two weeks to make sure she is tolerating well.  Will need a lipid panel and hepatic panel in four weeks.    Essential hypertension  -     losartan (COZAAR) 25 MG tablet; Take 1 tablet (25 mg total) by mouth once daily.  Continue current dosage of losartan.    Seasonal allergies  -     loratadine (CLARITIN) 10 mg tablet; Take 1 tablet (10 mg total) by mouth daily as needed for Allergies.  Patient reports symptoms are consistent with seasonal allergies.  Discussed with patient starting a daily Claritin for symptom relief.    Postmenopausal  -     DXA Bone Density Spine And Hip; Future  Patient due for bone density and this was ordered.              -Sea Her Jr., MD, AAHIVS          This office note has been dictated.  This dictation has been generated using M-Modal Fluency Direct dictation; some phonetic errors may occur.

## 2019-05-29 NOTE — PROGRESS NOTES
"  Occupational Therapy Daily Treatment Note     Date: 5/31/2019  Name: Faby Luna  Clinic Number: [id number]    Therapy Diagnosis: No diagnosis found.  Physician: Pan Goodman MD    Physician Orders: Evaluate and Treat  Medical Diagnosis:   G56.21 (ICD-10-CM) - Cubital tunnel syndrome on right   G56.01 (ICD-10-CM) - Right carpal tunnel syndrome         Surgical Procedure and Date: CTR 3/8/19  Evaluation Date: 5/6/19  Insurance Authorization Period Expiration: 4/22/2020  Plan of Care Certification Period: 5/6/19-7/29/19  Date of Return to MD: 6/11/19     Visit # / Visits authorized: 2 / 20   Time In:2:55 pm  Time Out: 3:40 pm  Total Billable Time: 45 minutes     Precautions:  Diabetes, Standard, Back Pain Device        Subjective     Pt reports: "Its feeling better now that school things are done"  she was compliant with home exercise program given last session.   Response to previous treatment:Positive  Functional change: Pt reports increased functional use with cooking    Pain: 2/10  Location: right wrists      Objective     Faby Luna received therapeutic exercises to develop strength, endurance, ROM, flexibility and posture for 20 minutes including:    Median nerve glide   Ulnar dynamic nerve glide  Carpal canal stretch  Putty   Intrinsic plus grippinng soft yellow putty  Gross grasp soft yellow putty  Finger abduction soft yellow putty  Digits 4,5 BL grap   Intrinsic scissoring   Theraputty: soft yellow: gross grasp 2 min, flatten into gentry and use highligher to dimple x 2 min, roll into snake chop using scrapping tool. X 2 min. Roll into snake and pinch 3 pt/2 pt x 2 min, intrinsic scissoring finger/abd/add x 2min        Faby Luna received the following manual therapy techniques: applied to the: carpal tunnel for 10 minutes, including:  Joint mobilizations and cross friction massage to carpal tunnel to increase volume  Scar mobilization using dycem    Faby Jeremy received the following supervised " modalities after being cleared for contradictions: Patient received ultrasound to carpal tunnel area to increase blood flow, circulation, tissue elasticity, pain management and for wound/scar management for 8 minutes @ 3.3 Mhz, Intensity .08 w/cm2 at 100% duty cycle.      Faby Luna received hot pack for 5 minutes to right wrist.        Home Exercises and Education Provided     Education provided:   - Reinforced median nerve stretch   - Progress towards goals     Written Home Exercises Provided: Patient instructed to cont prior HEP.  Exercises were reviewed and Faby Luna was able to demonstrate them prior to the end of the session.  Faby Luna demonstrated good  understanding of the HEP provided.   .   See EMR under 5/6/19 for exercises provided 5/6/19.        Assessment     Pt arrives presenting with improved motion, strength and functional mary. Strength and dexterity still having fluidity deficits. Pt reported no discomfort during manual therapy and continues to demonstrate benefit at this time.      Faby Luna is progressing well towards her goals and there are no updates to goals at this time. Pt prognosis is Excellent.     Pt will continue to benefit from skilled outpatient occupational therapy to address the deficits listed in the problem list on initial evaluation provide pt/family education and to maximize pt's level of independence in the home and community environment.     Anticipated barriers to occupational therapy: None    Pt's spiritual, cultural and educational needs considered and pt agreeable to plan of care and goals.    Goals:    Goals to be met in 4 weeks: (6/3/19)  - Patient is independent with initial home exercise program to improve participation with ADL's. NOT MET  - Pt to increase pinch strength of the right hand by 2 lbs to improve with patients ability to functional utilize arm for snapping snaps. NOT MET  - Pt to decrease edema in the elbow by .5 cm   - Pt to decrease pain to less than  or equal to 3/10 while performing all ADL's. NOT MET  - Patient will be able to achieve less than or equal to 45% limitation on the FOTO, demonstrating overall improved functional ability with upper extremity. NOT MET     Goals to be met by discharge:  - Patient is independent with advanced final home exercise program to improve participation with ADL's and IADL's. NOT MET  - Pt to increase  strength by 20 lbs to improve patients ability to hold grocery bag NOT MET  - Pt to increase edema to WNL as compared to RUE by d/c to improve patients ability NOT MET  - Pt to report decrease in pain to less than or equal to 2/10 when performing ADL's such as   - Patient will be able to achieve less than or equal to 30% limitation on the FOTO, demonstrating overall improved functional ability with upper extremity. NOT MET    Plan   Continue with occupational therapy POC to progress patient to improved participation with ADL's and IADL's.   Updates/Grading for next session: Continue to address strength, functional use and scar mobility      Abhay Gaytan, OTR/L,CHT

## 2019-05-31 ENCOUNTER — CLINICAL SUPPORT (OUTPATIENT)
Dept: REHABILITATION | Facility: HOSPITAL | Age: 69
End: 2019-05-31
Attending: ORTHOPAEDIC SURGERY
Payer: MEDICARE

## 2019-05-31 DIAGNOSIS — M79.641 PAIN OF RIGHT HAND: ICD-10-CM

## 2019-05-31 DIAGNOSIS — R29.898 HAND WEAKNESS: ICD-10-CM

## 2019-05-31 PROCEDURE — 97035 APP MDLTY 1+ULTRASOUND EA 15: CPT | Mod: HCNC,PN

## 2019-05-31 PROCEDURE — 97110 THERAPEUTIC EXERCISES: CPT | Mod: HCNC,PN

## 2019-05-31 PROCEDURE — 97140 MANUAL THERAPY 1/> REGIONS: CPT | Mod: HCNC,PN

## 2019-06-07 NOTE — PROGRESS NOTES
"  Occupational Therapy Daily Treatment Note     Date: 6/10/2019  Name: Faby Luna  Clinic Number: [id number]    Therapy Diagnosis:   Encounter Diagnoses   Name Primary?    Pain of right hand     Hand weakness      Physician: Pan Goodman MD    Physician Orders: Evaluate and Treat  Medical Diagnosis:   G56.21 (ICD-10-CM) - Cubital tunnel syndrome on right   G56.01 (ICD-10-CM) - Right carpal tunnel syndrome         Surgical Procedure and Date: CTR 3/8/19  Evaluation Date: 5/6/19  Insurance Authorization Period Expiration: 4/22/2020  Plan of Care Certification Period: 5/6/19-7/29/19  Date of Return to MD: 6/11/19     Visit # / Visits authorized: 3 / 20   Time In:2:05 pm  Time Out: 3:35 pm  Total Billable Time: 35 minutes     Precautions:  Diabetes, Standard, Back Pain Device        Subjective     Pt reports: "Its feeling better in the fingertips"  she was compliant with home exercise program given last session.   Response to previous treatment:Positive  Functional change: Pt able to cook for party    Pain: 2/10  Location: right wrists      Objective     Faby uLna received therapeutic exercises to develop strength, endurance, ROM, flexibility and posture for 20 minutes including:    Median nerve glide   Ulnar dynamic nerve glide  Carpal canal stretch  Putty   Intrinsic plus grippinng soft yellow putty  Gross grasp soft yellow putty  Finger abduction soft yellow putty  Digits 4,5 BL grap   Intrinsic scissoring   Theraputty: soft yellow: gross grasp 2 min, flatten into getnry and use highligher to dimple x 2 min, roll into snake chop using scrapping tool. X 2 min. Roll into snake and pinch 3 pt/2 pt x 2 min, intrinsic scissoring finger/abd/add x 2min  KT tape to palm for support of scar mobility    Faby Luna received the following manual therapy techniques: applied to the: carpal tunnel for 5 minutes, including:  Joint mobilizations and cross friction massage to carpal tunnel to increase volume  Scar " mobilization using dycem    Faby Luna received the following supervised modalities after being cleared for contradictions: Patient received ultrasound to carpal tunnel area to increase blood flow, circulation, tissue elasticity, pain management and for wound/scar management for 8 minutes @ 3.3 Mhz, Intensity .08 w/cm2 at 100% duty cycle.            Home Exercises and Education Provided     Education provided:   - Reinforced median nerve stretch   - Progress towards goals     Written Home Exercises Provided: Patient instructed to cont prior HEP.  Exercises were reviewed and Faby Luna was able to demonstrate them prior to the end of the session.  Faby Luna demonstrated good  understanding of the HEP provided.   .   See EMR under 5/6/19 for exercises provided 5/6/19.        Assessment     Pt continues to present with improving strength and decreased pain. Fine motor activities performed with excellent mary. DC planning initiated. Pt will benefit from last two sessions to address final edema and strength.     Faby Luna is progressing well towards her goals and there are no updates to goals at this time. Pt prognosis is Excellent.     Pt will continue to benefit from skilled outpatient occupational therapy to address the deficits listed in the problem list on initial evaluation provide pt/family education and to maximize pt's level of independence in the home and community environment.     Anticipated barriers to occupational therapy: None    Pt's spiritual, cultural and educational needs considered and pt agreeable to plan of care and goals.    Goals:    Goals to be met in 4 weeks: (6/3/19)  - Patient is independent with initial home exercise program to improve participation with ADL's. NOT MET  - Pt to increase pinch strength of the right hand by 2 lbs to improve with patients ability to functional utilize arm for snapping snaps. NOT MET  - Pt to decrease edema in the elbow by .5 cm   - Pt to decrease pain to less  than or equal to 3/10 while performing all ADL's. NOT MET  - Patient will be able to achieve less than or equal to 45% limitation on the FOTO, demonstrating overall improved functional ability with upper extremity. NOT MET     Goals to be met by discharge:  - Patient is independent with advanced final home exercise program to improve participation with ADL's and IADL's. NOT MET  - Pt to increase  strength by 20 lbs to improve patients ability to hold grocery bag NOT MET  - Pt to increase edema to WNL as compared to RUE by d/c to improve patients ability NOT MET  - Pt to report decrease in pain to less than or equal to 2/10 when performing ADL's such as   - Patient will be able to achieve less than or equal to 30% limitation on the FOTO, demonstrating overall improved functional ability with upper extremity. NOT MET    Plan   Continue with occupational therapy POC to progress patient to improved participation with ADL's and IADL's.   Updates/Grading for next session: Continue to address strength, functional use and scar mobility      Abhay Gaytan, OTR/L,CHT

## 2019-06-10 ENCOUNTER — OFFICE VISIT (OUTPATIENT)
Dept: FAMILY MEDICINE | Facility: CLINIC | Age: 69
End: 2019-06-10
Payer: MEDICARE

## 2019-06-10 ENCOUNTER — CLINICAL SUPPORT (OUTPATIENT)
Dept: REHABILITATION | Facility: HOSPITAL | Age: 69
End: 2019-06-10
Attending: ORTHOPAEDIC SURGERY
Payer: MEDICARE

## 2019-06-10 ENCOUNTER — LAB VISIT (OUTPATIENT)
Dept: LAB | Facility: HOSPITAL | Age: 69
End: 2019-06-10
Attending: FAMILY MEDICINE
Payer: MEDICARE

## 2019-06-10 VITALS
TEMPERATURE: 98 F | OXYGEN SATURATION: 97 % | BODY MASS INDEX: 31.24 KG/M2 | WEIGHT: 183 LBS | HEIGHT: 64 IN | SYSTOLIC BLOOD PRESSURE: 130 MMHG | DIASTOLIC BLOOD PRESSURE: 64 MMHG | HEART RATE: 63 BPM

## 2019-06-10 DIAGNOSIS — E78.5 DYSLIPIDEMIA: Primary | ICD-10-CM

## 2019-06-10 DIAGNOSIS — E11.9 TYPE 2 DIABETES MELLITUS WITHOUT COMPLICATION, WITHOUT LONG-TERM CURRENT USE OF INSULIN: ICD-10-CM

## 2019-06-10 DIAGNOSIS — I10 ESSENTIAL HYPERTENSION: ICD-10-CM

## 2019-06-10 DIAGNOSIS — R29.898 HAND WEAKNESS: ICD-10-CM

## 2019-06-10 DIAGNOSIS — M79.641 PAIN OF RIGHT HAND: ICD-10-CM

## 2019-06-10 DIAGNOSIS — Z23 NEED FOR VACCINATION: ICD-10-CM

## 2019-06-10 DIAGNOSIS — E78.5 DYSLIPIDEMIA: ICD-10-CM

## 2019-06-10 LAB
ALBUMIN SERPL BCP-MCNC: 3.7 G/DL (ref 3.5–5.2)
ALP SERPL-CCNC: 92 U/L (ref 55–135)
ALT SERPL W/O P-5'-P-CCNC: 37 U/L (ref 10–44)
AST SERPL-CCNC: 23 U/L (ref 10–40)
BILIRUB DIRECT SERPL-MCNC: 0.2 MG/DL (ref 0.1–0.3)
BILIRUB SERPL-MCNC: 0.4 MG/DL (ref 0.1–1)
PROT SERPL-MCNC: 7.2 G/DL (ref 6–8.4)

## 2019-06-10 PROCEDURE — 1101F PR PT FALLS ASSESS DOC 0-1 FALLS W/OUT INJ PAST YR: ICD-10-PCS | Mod: HCNC,CPTII,S$GLB, | Performed by: FAMILY MEDICINE

## 2019-06-10 PROCEDURE — 3075F PR MOST RECENT SYSTOLIC BLOOD PRESS GE 130-139MM HG: ICD-10-PCS | Mod: HCNC,CPTII,S$GLB, | Performed by: FAMILY MEDICINE

## 2019-06-10 PROCEDURE — 3078F PR MOST RECENT DIASTOLIC BLOOD PRESSURE < 80 MM HG: ICD-10-PCS | Mod: HCNC,CPTII,S$GLB, | Performed by: FAMILY MEDICINE

## 2019-06-10 PROCEDURE — G0009 PNEUMOCOCCAL POLYSACCHARIDE VACCINE 23-VALENT =>2YO SQ IM: ICD-10-PCS | Mod: HCNC,59,S$GLB, | Performed by: FAMILY MEDICINE

## 2019-06-10 PROCEDURE — 99999 PR PBB SHADOW E&M-EST. PATIENT-LVL III: CPT | Mod: PBBFAC,HCNC,, | Performed by: FAMILY MEDICINE

## 2019-06-10 PROCEDURE — 97140 MANUAL THERAPY 1/> REGIONS: CPT | Mod: HCNC,PN

## 2019-06-10 PROCEDURE — 90471 IMMUNIZATION ADMIN: CPT | Mod: HCNC,S$GLB,, | Performed by: FAMILY MEDICINE

## 2019-06-10 PROCEDURE — 97110 THERAPEUTIC EXERCISES: CPT | Mod: HCNC,PN

## 2019-06-10 PROCEDURE — 99999 PR PBB SHADOW E&M-EST. PATIENT-LVL III: ICD-10-PCS | Mod: PBBFAC,HCNC,, | Performed by: FAMILY MEDICINE

## 2019-06-10 PROCEDURE — 90732 PPSV23 VACC 2 YRS+ SUBQ/IM: CPT | Mod: HCNC,S$GLB,, | Performed by: FAMILY MEDICINE

## 2019-06-10 PROCEDURE — G0009 ADMIN PNEUMOCOCCAL VACCINE: HCPCS | Mod: HCNC,59,S$GLB, | Performed by: FAMILY MEDICINE

## 2019-06-10 PROCEDURE — 97035 APP MDLTY 1+ULTRASOUND EA 15: CPT | Mod: HCNC,PN

## 2019-06-10 PROCEDURE — 3075F SYST BP GE 130 - 139MM HG: CPT | Mod: HCNC,CPTII,S$GLB, | Performed by: FAMILY MEDICINE

## 2019-06-10 PROCEDURE — 99214 OFFICE O/P EST MOD 30 MIN: CPT | Mod: HCNC,25,S$GLB, | Performed by: FAMILY MEDICINE

## 2019-06-10 PROCEDURE — 3078F DIAST BP <80 MM HG: CPT | Mod: HCNC,CPTII,S$GLB, | Performed by: FAMILY MEDICINE

## 2019-06-10 PROCEDURE — 3044F HG A1C LEVEL LT 7.0%: CPT | Mod: HCNC,CPTII,S$GLB, | Performed by: FAMILY MEDICINE

## 2019-06-10 PROCEDURE — 99214 PR OFFICE/OUTPT VISIT, EST, LEVL IV, 30-39 MIN: ICD-10-PCS | Mod: HCNC,25,S$GLB, | Performed by: FAMILY MEDICINE

## 2019-06-10 PROCEDURE — 3044F PR MOST RECENT HEMOGLOBIN A1C LEVEL <7.0%: ICD-10-PCS | Mod: HCNC,CPTII,S$GLB, | Performed by: FAMILY MEDICINE

## 2019-06-10 PROCEDURE — 36415 COLL VENOUS BLD VENIPUNCTURE: CPT | Mod: HCNC,PN

## 2019-06-10 PROCEDURE — 90714 TD VACCINE GREATER THAN OR EQUAL TO 7YO PRESERVATIVE FREE IM: ICD-10-PCS | Mod: HCNC,S$GLB,, | Performed by: FAMILY MEDICINE

## 2019-06-10 PROCEDURE — 90714 TD VACC NO PRESV 7 YRS+ IM: CPT | Mod: HCNC,S$GLB,, | Performed by: FAMILY MEDICINE

## 2019-06-10 PROCEDURE — 1101F PT FALLS ASSESS-DOCD LE1/YR: CPT | Mod: HCNC,CPTII,S$GLB, | Performed by: FAMILY MEDICINE

## 2019-06-10 PROCEDURE — 80076 HEPATIC FUNCTION PANEL: CPT | Mod: HCNC

## 2019-06-10 PROCEDURE — 90732 PNEUMOCOCCAL POLYSACCHARIDE VACCINE 23-VALENT =>2YO SQ IM: ICD-10-PCS | Mod: HCNC,S$GLB,, | Performed by: FAMILY MEDICINE

## 2019-06-10 PROCEDURE — 90471 TD VACCINE GREATER THAN OR EQUAL TO 7YO PRESERVATIVE FREE IM: ICD-10-PCS | Mod: HCNC,S$GLB,, | Performed by: FAMILY MEDICINE

## 2019-06-10 RX ORDER — LORAZEPAM 2 MG/1
TABLET ORAL
COMMUNITY
Start: 2019-05-31 | End: 2019-07-16 | Stop reason: CLARIF

## 2019-06-10 RX ORDER — LORAZEPAM 2 MG/1
2 TABLET ORAL 2 TIMES DAILY
Refills: 0 | COMMUNITY
Start: 2019-05-31 | End: 2019-07-16 | Stop reason: CLARIF

## 2019-06-10 NOTE — PROGRESS NOTES
Pt tolerated PCV 23 Left arm and TD Right arm injection well. Instructed to wait in the clinic for 15 minutes and report any adverse effects immediately to the nurse. Verbalized understanding.

## 2019-06-10 NOTE — PROGRESS NOTES
Routine Office Visit    Patient Name: Faby Luna    : 1950  MRN: [id number]    Subjective:  Faby is a 68 y.o. female who presents today for:   Chief Complaint   Patient presents with    Hyperlipidemia     68-year-old female comes in for follow-up on hyperlipidemia since starting pravastatin.  She was started on pravastatin for her cholesterol because in the past she had severe muscle aches with atorvastatin and with simvastatin.  She reports that she is tolerating the pravastatin without any concerns.  She has not gotten any muscle aches.  The patient is diabetic reports that since her last visit when she checks her sugars they are usually inches the 90s.  She reports no concerns with her blood pressure or diabetic medications.  She is scheduled to have her mammogram and bone density done on .  She is scheduled to see her eye doctor on .    Past Medical History  Past Medical History:   Diagnosis Date    Arthritis     Diabetes mellitus     Hypertension        Past Surgical History  Past Surgical History:   Procedure Laterality Date     SECTION      CHOLECYSTECTOMY      DECOMPRESSION, NERVE, ULNAR - right Right 3/8/2019    Performed by [facility] at [facility] OR    HYSTERECTOMY      INJECTION, STEROID, SPINE, LUMBAR, EPIDURAL N/A 2018    Performed by hSaq Silverio [facility] at [facility] PAIN [facility]GT    INJECTION-FACET Bilateral 2018    Performed by Shaq [facility] at [facility]    INJECTION-STEROID-EPIDURAL-TRANSFORAMINAL Left 2018    Performed by [facility]q [facility] at [facility]    INSERTION,NEUROSTIMULATOR,SPINAL CORD N/A 10/26/2018    Performed by Su Arredondo Jr., [facility] at [facility] OR    RELEASE, CARPAL TUNNEL right Right 3/8/2019    Performed by [facility] at [facility] OR    TRIAL, NEUROSTIMULATOR, SPINAL CORD, SPINAL CORD STIMULATOR TRIAL-INTERNAL WIRES TO EXTERNAL BATTERY N/A 2018    Performed by Shaq [facility]io MD at [facility] PAIN [facility]GT        Family  History  History reviewed. No pertinent family history.    Social History  Social History     Socioeconomic History    Marital status:      Spouse name: Not on file    Number of children: Not on file    Years of education: Not on file    Highest education level: Not on file   Occupational History    Not on file   Social Needs    Financial resource strain: Not on file    Food insecurity:     Worry: Not on file     Inability: Not on file    Transportation needs:     Medical: Not on file     Non-medical: Not on file   Tobacco Use    Smoking status: Never Smoker    Smokeless tobacco: Never Used   Substance and Sexual Activity    Alcohol use: No    Drug use: No    Sexual activity: Not on file   Lifestyle    Physical activity:     Days per week: Not on file     Minutes per session: Not on file    Stress: Not on file   Relationships    Social connections:     Talks on phone: Not on file     Gets together: Not on file     Attends L[employer] service: Not on file     Active member of club or organization: Not on file     Attends meetings of clubs or organizations: Not on file     Relationship status: Not on file   Other Topics Concern    Not on file   Social History Narrative    Not on file       Current Medications  Current Outpatient Medications on File Prior to Visit   Medication Sig Dispense Refill    atenolol (TENORMIN) 100 MG tablet TAKE 1 TABLET BY MOUTH ONCE DAILY 90 tablet 0    escitalopram oxalate (LEXAPRO) 20 MG tablet Take 1 tablet (20 mg total) by mouth once daily. 90 tablet 1    furosemide (LASIX) 20 MG tablet Take 1 tablet (20 mg total) by mouth once daily. 90 tablet 1    glipiZIDE (GLUCOTROL) 10 MG tablet Take 10 mg by mouth 2 (two) times daily with meals.       hydrOXYzine HCl (ATARAX) 25 MG tablet Take 1 tablet (25 mg total) by mouth nightly as needed (Insomnia). 90 tablet 0    loratadine (CLARITIN) 10 mg tablet Take 1 tablet (10 mg total) by mouth daily as needed for Allergies.  "90 tablet 0    LORazepam (ATIVAN) 2 MG Tab TAKE 1 TABLET BY MOUTH TWICE DAILY AS NEEDED FOR ANXIETY      LORazepam (ATIVAN) 2 MG Tab Take 2 mg by mouth 2 (two) times daily. as needed for anxiety.  0    losartan (COZAAR) 25 MG tablet Take 1 tablet (25 mg total) by mouth once daily. 90 tablet 3    metFORMIN (GLUCOPHAGE) 1000 MG tablet Take 1,000 mg by mouth every evening.      pantoprazole (PROTONIX) 20 MG tablet Take 2 tablets (40 mg total) by mouth daily as needed (Stomach pain). 90 tablet 1    pravastatin (PRAVACHOL) 20 MG tablet Take 1 tablet (20 mg total) by mouth once daily. 90 tablet 0    traZODone (DESYREL) 50 MG tablet TAKE 1 TABLET BY MOUTH NIGHTLY AS NEEDED FOR  INSOMNIA 90 tablet 1    zonisamide (ZONEGRAN) 100 MG Cap TAKE 4 CAPSULES BY MOUTH AT BEDTIME 120 capsule 2    blood-glucose meter kit Use as instructed 1 each 0     No current facility-administered medications on file prior to visit.        Allergies   Review of patient's allergies indicates:   Allergen Reactions    Atorvastatin      Pain    Aspirin Other (See Comments)     Has been told not to take it due to bariatric surgery       ROS    /64 (BP Location: Right arm, Patient Position: Sitting, BP Method: Medium (Manual))   Pulse 63   Temp 98.3 °F (36.8 °C) (Oral)   Ht 5' 4" (1.626 m)   Wt 83 kg (182 lb 15.7 oz)   SpO2 97%   BMI 31.41 kg/m²     PHYS    Assessment/Plan:  Faby was seen today for hyperlipidemia.    Diagnoses and all orders for this visit:    Dyslipidemia  -     Hepatic function panel; Future  -     Comprehensive metabolic panel; Future  -     Lipid panel; Future  Patient tolerating current statin without any concerns.  Will check hepatic function today to make sure no liver side effects.  Otherwise follow-up in August for her regular diabetic checkup.    Essential hypertension  -     Comprehensive metabolic panel; Future  Continue current blood pressure regimen as pressure well controlled.  Follow-up in " August.    Type 2 diabetes mellitus without complication, without long-term current use of insulin  -     Comprehensive metabolic panel; Future  -     Hemoglobin A1c; Future  Reported fingersticks are within good control.  Continue current regimen.  Follow-up in August.    Need for vaccination  -     (In Office Administered) Pneumococcal Polysaccharide Vaccine (23 Valent) (SQ/IM)  -     (In Office Administered) Td Vaccine - Preservative Free  Patient due for Pneumovax and tetanus vaccination agrees to been done today.              -Sea Her Jr., MD, AAHIVS          This office note has been dictated.  This dictation has been generated using M-Modal Fluency Direct dictation; some phonetic errors may occur.

## 2019-06-11 ENCOUNTER — OFFICE VISIT (OUTPATIENT)
Dept: ORTHOPEDICS | Facility: CLINIC | Age: 69
End: 2019-06-11
Payer: MEDICARE

## 2019-06-11 VITALS — WEIGHT: 183 LBS | HEIGHT: 64 IN | BODY MASS INDEX: 31.24 KG/M2

## 2019-06-11 DIAGNOSIS — G56.21 CUBITAL TUNNEL SYNDROME ON RIGHT: Primary | ICD-10-CM

## 2019-06-11 PROCEDURE — 99024 POSTOP FOLLOW-UP VISIT: CPT | Mod: HCNC,S$GLB,, | Performed by: ORTHOPAEDIC SURGERY

## 2019-06-11 PROCEDURE — 99999 PR PBB SHADOW E&M-EST. PATIENT-LVL III: ICD-10-PCS | Mod: PBBFAC,HCNC,, | Performed by: ORTHOPAEDIC SURGERY

## 2019-06-11 PROCEDURE — 99024 PR POST-OP FOLLOW-UP VISIT: ICD-10-PCS | Mod: HCNC,S$GLB,, | Performed by: ORTHOPAEDIC SURGERY

## 2019-06-11 PROCEDURE — 99999 PR PBB SHADOW E&M-EST. PATIENT-LVL III: CPT | Mod: PBBFAC,HCNC,, | Performed by: ORTHOPAEDIC SURGERY

## 2019-06-11 NOTE — PROGRESS NOTES
Faby Luna is a 68 y.o. female presents for post-operative evaluation.  The patient is now 6  weeks s/p: Right cubital tunnel release with carpal tunnel release     PROCEDURES PERFORMED:  1.Right cubital tunnel release with carpal tunnel release     Overall the patient reports doing well.  The patient reports appropriate postoperative soreness with well controlled overall pain. Her N/T is improved and she is able to sleep through the night now but it is not 100% resolved.  No new complaints.    Interval history April 23, 2019:  The patient returns today for re-evaluation.  She reports that she is somewhat discouraged by the length of time it is apparently taking her to regain the strength in her hand.  She does recall that her thumb was significantly weak preoperatively and this is evidenced by her profound preoperative thenar atrophy.    Interval update June 11, 2019:  Patient returns today for re-evaluation she states that the symptoms in her ring and small finger have significantly improved she has good sensation in these digits and she feels much less pain.  She still has significant numbness in her thumb index and long finger and she still has weakness.  She is doing therapy she is only completed 2 weeks of therapy.      PE:     AA&O x 4.  NAD  HEENT:  NCAT, sclera nonicteric  Lungs:  Respirations are equal and unlabored.  CV:  2+ bilateral upper and lower extremity pulses.  MSK: The wound is well healed.  There is no drainage.  No clinical signs or symptoms of infection are present.  RUE NVI with profound thenar atrophy and weak opposition similar to preop.  Able to oppose thumb to small finger.  Improved sensation to ring and small finger.  No worsening.     A/P: Status post above, doing well  1) she is only completed a few weeks of therapy and we will have her continue her occupational therapy she is aware that she had profound carpal tunnel significant thenar weakness and atrophy preop and that she  will likely have a long recovery.  She also knows that she main never have completely normal function in the median nerve distribution due to her prolonged median nerve compression.  She is understanding of this.  She is happy that her ulnar nerve symptoms have improved.  We will see her back in 2 weeks to discuss her left carpal tunnel.

## 2019-06-17 NOTE — PROGRESS NOTES
"  Occupational Therapy Daily Treatment Note     Date: 6/18/2019  Name: Faby Luna  Clinic Number: [id number]    Therapy Diagnosis:   1. Pain of right hand     2. Hand weakness         Physician: Pan Goodman MD    Physician Orders: Evaluate and Treat  Medical Diagnosis:   G56.21 (ICD-10-CM) - Cubital tunnel syndrome on right   G56.01 (ICD-10-CM) - Right carpal tunnel syndrome         Surgical Procedure and Date: CTR 3/8/19  Evaluation Date: 5/6/19  Insurance Authorization Period Expiration: 4/22/2020  Plan of Care Certification Period: 5/6/19-7/29/19  Date of Return to MD: 6/11/19     Visit # / Visits authorized: 4 / 20   Time In: 2:10 pm  Time Out: 2:33 pm  Total Billable Time: 23 minutes     Precautions:  Diabetes, Standard, Back Pain Device        Subjective     Pt reports: "Its getting slightly better, I was able to cook for my firend" Pt arrives 10 min late to session  she was compliant with home exercise program given last session.   Response to previous treatment:Positive  Functional change: Pt able to cook for party    Pain: 2/10  Location: right wrists      Objective     Faby Luna received therapeutic exercises to develop strength, endurance, ROM, flexibility and posture for 10 minutes including:    Median nerve glide   Ulnar dynamic nerve glide  Carpal canal stretch  Putty   Intrinsic plus grippinng soft yellow putty  Gross grasp soft yellow putty  Finger abduction soft yellow putty  Digits 4,5 BL grap   Intrinsic scissoring   Theraputty: soft yellow: gross grasp 2 min, flatten into gentry and use highligher to dimple x 2 min, roll into snake chop using scrapping tool. X 2 min. Roll into snake and pinch 3 pt/2 pt x 2 min, intrinsic scissoring finger/abd/add x 2min  flexbar smiles and frowns x 20 each    Faby Luna received the following manual therapy techniques: applied to the: carpal tunnel for 5 minutes, including:  Joint mobilizations and cross friction massage to carpal tunnel to increase " volume  Scar mobilization using dycem    Faby Luna received the following supervised modalities after being cleared for contradictions: Patient received ultrasound to carpal tunnel area to increase blood flow, circulation, tissue elasticity, pain management and for wound/scar management for 8 minutes @ 3.3 Mhz, Intensity .08 w/cm2 at 100% duty cycle.      Home Exercises and Education Provided     Education provided:   - Reinforced median nerve stretch   - Progress towards goals     Written Home Exercises Provided: Patient instructed to cont prior HEP.  Exercises were reviewed and Faby Luna was able to demonstrate them prior to the end of the session.  Faby Luna demonstrated good  understanding of the HEP provided.   .   See EMR under 5/6/19 for exercises provided 5/6/19.        Assessment     Mrs. Luna returns to therapy reporting improved functional use of her hand to cook and carry bags. She appears to be pain free during all therex. No difficulty reported with the exception of strength during flex bar therex.     Faby Luna is progressing well towards her goals and there are no updates to goals at this time. Pt prognosis is Excellent.     Pt will continue to benefit from skilled outpatient occupational therapy to address the deficits listed in the problem list on initial evaluation provide pt/family education and to maximize pt's level of independence in the home and community environment.     Anticipated barriers to occupational therapy: None    Pt's spiritual, cultural and educational needs considered and pt agreeable to plan of care and goals.    Goals:    Goals to be met in 4 weeks: (6/3/19)  - Patient is independent with initial home exercise program to improve participation with ADL's. NOT MET  - Pt to increase pinch strength of the right hand by 2 lbs to improve with patients ability to functional utilize arm for snapping snaps. NOT MET  - Pt to decrease edema in the elbow by .5 cm   - Pt to decrease  pain to less than or equal to 3/10 while performing all ADL's. NOT MET  - Patient will be able to achieve less than or equal to 45% limitation on the FOTO, demonstrating overall improved functional ability with upper extremity. NOT MET     Goals to be met by discharge:  - Patient is independent with advanced final home exercise program to improve participation with ADL's and IADL's. NOT MET  - Pt to increase  strength by 20 lbs to improve patients ability to hold grocery bag NOT MET  - Pt to increase edema to WNL as compared to RUE by d/c to improve patients ability NOT MET  - Pt to report decrease in pain to less than or equal to 2/10 when performing ADL's such as   - Patient will be able to achieve less than or equal to 30% limitation on the FOTO, demonstrating overall improved functional ability with upper extremity. NOT MET    Plan   Continue with occupational therapy POC to progress patient to improved participation with ADL's and IADL's.   Updates/Grading for next session: Continue to address strength, functional use and scar mobility      Abhay Gaytan, OTR/L,CHT

## 2019-06-18 ENCOUNTER — CLINICAL SUPPORT (OUTPATIENT)
Dept: REHABILITATION | Facility: HOSPITAL | Age: 69
End: 2019-06-18
Attending: ORTHOPAEDIC SURGERY
Payer: MEDICARE

## 2019-06-18 DIAGNOSIS — M79.641 PAIN OF RIGHT HAND: ICD-10-CM

## 2019-06-18 DIAGNOSIS — R29.898 HAND WEAKNESS: ICD-10-CM

## 2019-06-18 PROCEDURE — 97110 THERAPEUTIC EXERCISES: CPT | Mod: HCNC,PN

## 2019-06-18 PROCEDURE — 97140 MANUAL THERAPY 1/> REGIONS: CPT | Mod: HCNC,PN

## 2019-06-24 ENCOUNTER — HOSPITAL ENCOUNTER (OUTPATIENT)
Dept: RADIOLOGY | Facility: HOSPITAL | Age: 69
Discharge: HOME | End: 2019-06-24
Attending: FAMILY MEDICINE
Payer: MEDICARE

## 2019-06-24 DIAGNOSIS — Z12.39 SCREENING FOR BREAST CANCER: ICD-10-CM

## 2019-06-24 DIAGNOSIS — Z78.0 POSTMENOPAUSAL: ICD-10-CM

## 2019-06-24 PROCEDURE — 77067 SCR MAMMO BI INCL CAD: CPT | Mod: 26,HCNC,, | Performed by: RADIOLOGY

## 2019-06-24 PROCEDURE — 77080 DXA BONE DENSITY AXIAL: CPT | Mod: TC,HCNC

## 2019-06-24 PROCEDURE — 77063 BREAST TOMOSYNTHESIS BI: CPT | Mod: 26,HCNC,, | Performed by: RADIOLOGY

## 2019-06-24 PROCEDURE — 77080 DXA BONE DENSITY AXIAL: CPT | Mod: 26,HCNC,, | Performed by: RADIOLOGY

## 2019-06-24 PROCEDURE — 77067 SCR MAMMO BI INCL CAD: CPT | Mod: TC,HCNC

## 2019-06-24 PROCEDURE — 77080 DEXA BONE DENSITY SPINE HIP: ICD-10-PCS | Mod: 26,HCNC,, | Performed by: RADIOLOGY

## 2019-06-24 PROCEDURE — 77067 MAMMO DIGITAL SCREENING BILAT WITH TOMOSYNTHESIS_CAD: ICD-10-PCS | Mod: 26,HCNC,, | Performed by: RADIOLOGY

## 2019-06-24 PROCEDURE — 77063 MAMMO DIGITAL SCREENING BILAT WITH TOMOSYNTHESIS_CAD: ICD-10-PCS | Mod: 26,HCNC,, | Performed by: RADIOLOGY

## 2019-06-27 ENCOUNTER — PATIENT OUTREACH (OUTPATIENT)
Dept: ADMINISTRATIVE | Facility: OTHER | Age: 69
End: 2019-06-27

## 2019-06-27 DIAGNOSIS — Z13.5 ENCOUNTER FOR SCREENING FOR DIABETIC RETINOPATHY: Primary | ICD-10-CM

## 2019-06-27 LAB
LEFT EYE DM RETINOPATHY: NEGATIVE
RIGHT EYE DM RETINOPATHY: NEGATIVE

## 2019-06-28 ENCOUNTER — OFFICE VISIT (OUTPATIENT)
Dept: ORTHOPEDICS | Facility: CLINIC | Age: 69
End: 2019-06-28
Payer: MEDICARE

## 2019-06-28 VITALS — BODY MASS INDEX: 31.24 KG/M2 | HEIGHT: 64 IN | WEIGHT: 183 LBS

## 2019-06-28 DIAGNOSIS — G56.00 CARPAL TUNNEL SYNDROME: ICD-10-CM

## 2019-06-28 DIAGNOSIS — G56.00 CARPAL TUNNEL SYNDROME, UNSPECIFIED LATERALITY: Primary | ICD-10-CM

## 2019-06-28 PROCEDURE — 99024 PR POST-OP FOLLOW-UP VISIT: ICD-10-PCS | Mod: HCNC,S$GLB,, | Performed by: ORTHOPAEDIC SURGERY

## 2019-06-28 PROCEDURE — 99024 POSTOP FOLLOW-UP VISIT: CPT | Mod: HCNC,S$GLB,, | Performed by: ORTHOPAEDIC SURGERY

## 2019-06-28 PROCEDURE — 99999 PR PBB SHADOW E&M-EST. PATIENT-LVL IV: ICD-10-PCS | Mod: PBBFAC,HCNC,, | Performed by: ORTHOPAEDIC SURGERY

## 2019-06-28 PROCEDURE — 99999 PR PBB SHADOW E&M-EST. PATIENT-LVL IV: CPT | Mod: PBBFAC,HCNC,, | Performed by: ORTHOPAEDIC SURGERY

## 2019-06-28 RX ORDER — SODIUM CHLORIDE 9 MG/ML
INJECTION, SOLUTION INTRAVENOUS CONTINUOUS
Status: CANCELLED | OUTPATIENT
Start: 2019-06-28

## 2019-06-28 NOTE — H&P
I have seen the patient, reviewed the Resident's history and physical. I have personally interviewed and examined the patient at bedside and agree with the findings.      Clinical and EMG proven left carpal tunnel syndrome.  Patient is feeling well status post right carpal and cubital tunnel releases.  She wishes to proceed with a left carpal tunnel release which I feel is reasonable.  The patient has not responded to adequate non operative treatment at this time and/or non operative treatment is not indicated. Thus, the risks, benefits and alternatives to surgery were discussed with the patient in detail.  Specific risks include but are not limited to bleeding, infection, vessel and/or nerve damage, pain, numbness, tingling, complex regional pain syndrome, compartment syndrome, failure to return to pre-injury and/or preoperative functional status, scar sensitivity, delayed healing, inability to return to work, pulley injury, tendon injury, bowstringing, partial and/or incomplete relief of symptoms, weakness, persistence of and/or worsening of symptoms, surgical failure, osteomyelitis, amputation, loss of function, stiffness, functional debility, dysfunction, decreased  strength, need for prolonged postoperative rehabilitation, need for further surgery, deep venous thrombosis, pulmonary embolism, arthritis and death.  The patient states an understanding and wishes to proceed with surgery.   All questions were answered.  No guarantees were implied or stated.  Written informed consent was obtained.     RRR  CTAB  Abd S/NT/ND +BS            Pan Goodman MD  [facility] [address]      Expand All Collapse All      []Hide copied text    []Hover for details  [facility]  History & Physical  Orthopedics     SUBJECTIVE:       Chief Complaint: Left carpal tunnel      Referring Provider: No ref. provider found      History of Present Illness:  Patient is a 68 y.o. right hand dominant  female who is status post right cubital tunnel and carpal tunnel release by me on 2019.  The patient states that she is doing very well from that she has had significant pain relief as well as improvement in her sensory and motor function.  The patient presents today to discuss her left carpal tunnel.  She has had EMG proven severe left carpal tunnel syndrome which she has been waiting to get taken care of in till after she has fully recovered from her previous surgery.      The patient is a/an .        Symptoms are aggravated by activity.     Symptoms are alleviated by rest.     Symptoms consist of pain.     The patient rates their pain as a 8/10.     Attempted treatment(s) and/or interventions include rest, activity modification, anti-inflammatory medications and steroid injection.     The patient denies any fevers, chills, N/V, D/C and presents for evaluation.             Past Medical History:   Diagnosis Date    Arthritis      Diabetes mellitus      Hypertension              Past Surgical History:   Procedure Laterality Date     SECTION        CHOLECYSTECTOMY        DECOMPRESSION, NERVE, ULNAR - right Right 3/8/2019     Performed by Pan Goodman MD at [facility] [place], [facility] by [facility] OR    HYSTERECTOMY        INJECTION, STEROID, SPINE, LUMBAR, EPIDURAL N/A 2018     Performed by Shaq Silverio MD at [facility], [place], operated by [facility] PAIN MGT    INJECTION-FACET Bilateral 2018     Performed by Shaq Silverio MD at [facility], [place], operated [facility] PAIN MGT    INJECTION-STEROID-EPIDURAL-TRANSFORAMINAL Left 2018     Performed by Shaq Silverio MD at [facility] [place], operated [facility] PAIN [facility]GT    INSERTION,NEUROSTIMULATOR,SPINAL CORD N/A 10/26/2018     Performed by Su Arredondo Jr., MD at [facility] [place]    RELEASE, CARPAL TUNNEL right Right 3/8/2019     Performed by Pan Goodman MD at [facility], [place], [facility] OR    TRIAL, NEUROSTIMULATOR, SPINAL CORD, SPINAL CORD STIMULATOR TRIAL-INTERNAL WIRES TO EXTERNAL BATTERY N/A 2018     Performed by Shaq Silverio MD at [facility], [place], operated by [facility] PAIN T             Review of patient's allergies indicates:   Allergen Reactions    Atorvastatin         Pain    Aspirin Other (See Comments)       Has been told not to take it due to bariatric surgery      Social History          Social History Narrative    Not on file      No family history on file.        Current Outpatient Medications:     atenolol (TENORMIN) 100 MG tablet, TAKE 1 TABLET BY MOUTH ONCE DAILY, Disp: 90 tablet, Rfl: 0    escitalopram oxalate (LEXAPRO) 20 MG tablet, Take 1 tablet (20 mg total) by mouth once daily., Disp: 90 tablet, Rfl: 1    furosemide (LASIX) 20 MG tablet, Take 1 tablet (20 mg total) by mouth once daily., Disp: 90 tablet, Rfl: 1    glipiZIDE (GLUCOTROL) 10 MG tablet, Take 10 mg by mouth 2 (two) times daily with meals. , Disp: , Rfl:     hydrOXYzine HCl (ATARAX) 25 MG tablet, Take 1 tablet (25 mg total) by mouth nightly as needed (Insomnia)., Disp: 90 tablet, Rfl: 0    loratadine (CLARITIN) 10 mg tablet, Take 1 tablet (10 mg total) by mouth daily as needed for Allergies., Disp: 90 tablet, Rfl: 0    LORazepam (ATIVAN) 2 MG Tab, TAKE 1 TABLET BY MOUTH TWICE DAILY AS NEEDED FOR ANXIETY, Disp: , Rfl:     LORazepam (ATIVAN) 2 MG Tab, Take 2 mg by mouth 2 (two) times daily. as needed for anxiety., Disp: , Rfl: 0    losartan (COZAAR) 25 MG tablet, Take 1 tablet (25 mg total) by mouth once daily., Disp: 90 tablet, Rfl: 3    metFORMIN (GLUCOPHAGE) 1000 MG tablet, Take 1,000 mg by mouth every evening., Disp: , Rfl:     pantoprazole (PROTONIX) 20 MG tablet, Take 2 tablets (40 mg total) by mouth daily as needed (Stomach pain)., Disp: 90 tablet, Rfl: 1    pravastatin (PRAVACHOL) 20 MG tablet, Take 1 tablet (20 mg total) by mouth once daily., Disp: 90 tablet, Rfl: 0    traZODone (DESYREL) 50 MG tablet, TAKE 1 TABLET BY MOUTH NIGHTLY AS NEEDED FOR  INSOMNIA, Disp: 90 tablet, Rfl: 1    zonisamide (ZONEGRAN) 100 MG Cap, TAKE 4 CAPSULES BY MOUTH AT BEDTIME, Disp: 120 capsule, Rfl: 2    blood-glucose  "meter kit, Use as instructed, Disp: 1 each, Rfl: 0        Review of Systems:  Constitutional: no fever or chills  Eyes: no visual changes  ENT: no nasal congestion or sore throat  Respiratory: no cough or shortness of breath  Cardiovascular: no chest pain  Gastrointestinal: no nausea or vomiting, tolerating diet  Musculoskeletal: pain and numbness/tingling     OBJECTIVE:       Vital Signs (Most Recent):  Vitals       Vitals:     06/28/19 1015   Weight: 83 kg (182 lb 15.7 oz)   Height: 5' 4" (1.626 m)         Body mass index is 31.41 kg/m².        Physical Exam:  Constitutional: The patient appears well-developed and well-nourished. No distress.   Head: Normocephalic and atraumatic.   Nose: Nose normal.   Eyes: Conjunctivae and EOM are normal.   Neck: No tracheal deviation present.   Cardiovascular: Normal rate and intact distal pulses.    Pulmonary/Chest: Effort normal. No respiratory distress.   Abdominal: There is no guarding.   Neurological: The patient is alert.   Psychiatric: The patient has a normal mood and affect.      Left Hand/Wrist Examination:     Observation/Inspection:  Swelling                       none                  Deformity                     none  Discoloration               none                  Scars                           none                  Atrophy                        none     HAND/WRIST EXAMINATION:  Finkelstein's Test                                Neg  WHAT Test                                         Neg  Snuff box tenderness                          Neg  Paulino's Test                                     Neg  Hook of Hamate Tenderness              Neg  CMC grind                                           Neg  Circumduction test                              Neg     Neurovascular Exam:  Digits WWP, brisk CR < 3s throughout  NVI motor/LTS to M/R/U nerves, radial pulse 2+  Tinel's Test - Carpal Tunnel                positive  Tinel's Test - Cubital Tunnel               " Neg  Phalen's Test                                      positive  Median Nerve Compression Test       Neg     ROM hand/wrist/elbow full, painless     RRR  CTAB  Abd S/NT/ND +BS     Diagnostic Results:        EMG - severe left carpal tunnel syndrome     ASSESSMENT/PLAN:       68 y.o. yo female who is status post right carpal tunnel and cubital tunnel release by me on March 9, 2019 who is here today to schedule her left carpal tunnel release.  Plan: Discussed with the patient extensively about the different management options both conservative and surgical options.  Patient would like to have her left carpal tunnel release done we will schedule this for July 18, 2019.  I have explained the risks, benefits, and alternatives of the procedure in detail.  The patient voices understanding and all questions have been answered.  The patient agrees to proceed as planned.            Pan Goodman M.D.     · Please be aware that this note has been generated with the assistance of Kassie voice-to-text.  Please excuse any spelling or grammatical errors.

## 2019-06-28 NOTE — PROGRESS NOTES
[facility]  History & Physical  Orthopedics    SUBJECTIVE:      Chief Complaint: Left carpal tunnel     Referring Provider: No ref. provider found     History of Present Illness:  Patient is a 68 y.o. right hand dominant female who is status post right cubital tunnel and carpal tunnel release by me on 2019.  The patient states that she is doing very well from that she has had significant pain relief as well as improvement in her sensory and motor function.  The patient presents today to discuss her left carpal tunnel.  She has had EMG proven severe left carpal tunnel syndrome which she has been waiting to get taken care of in till after she has fully recovered from her previous surgery.     The patient is a/an .      Symptoms are aggravated by activity.    Symptoms are alleviated by rest.    Symptoms consist of pain.    The patient rates their pain as a 8/10.    Attempted treatment(s) and/or interventions include rest, activity modification, anti-inflammatory medications and steroid injection.     The patient denies any fevers, chills, N/V, D/C and presents for evaluation.       Past Medical History:   Diagnosis Date    Arthritis     Diabetes mellitus     Hypertension      Past Surgical History:   Procedure Laterality Date     SECTION      CHOLECYSTECTOMY      DECOMPRESSION, NERVE, ULNAR - right Right 3/8/2019    Performed by Pan Goodman MD at [facility], [place], operated by [facility] OR    HYSTERECTOMY      INJECTION, STEROID, SPINE, LUMBAR, EPIDURAL N/A 2018    Performed by Shaq Silverio MD at [facility], [place], operated by [facility] PAIN MGT    INJECTION-FACET Bilateral 2018    Performed by Shaq Silverio MD at [facility], [place], operated by [facility] PAIN MGT    INJECTION-STEROID-EPIDURAL-TRANSFORAMINAL Left 2018    Performed by Shaq Silverio MD at [facility], [place], operated by [facility] PAIN MGT    INSERTION,NEUROSTIMULATOR,SPINAL CORD N/A 10/26/2018    Performed by Su Arredondo Jr., MD at [facility] OR    RELEASE, CARPAL TUNNEL right Right 3/8/2019    Performed by Pan CAMPOVERDE  MD Rex at [facility] OR    TRIAL, NEUROSTIMULATOR, SPINAL CORD, SPINAL CORD STIMULATOR TRIAL-INTERNAL WIRES TO EXTERNAL BATTERY N/A 9/24/2018    Performed by Shaq Silverio MD at [facility] PAIN [facility]GT     Review of patient's allergies indicates:   Allergen Reactions    Atorvastatin      Pain    Aspirin Other (See Comments)     Has been told not to take it due to bariatric surgery     Social History     Social History Narrative    Not on file     No family history on file.      Current Outpatient Medications:     atenolol (TENORMIN) 100 MG tablet, TAKE 1 TABLET BY MOUTH ONCE DAILY, Disp: 90 tablet, Rfl: 0    escitalopram oxalate (LEXAPRO) 20 MG tablet, Take 1 tablet (20 mg total) by mouth once daily., Disp: 90 tablet, Rfl: 1    furosemide (LASIX) 20 MG tablet, Take 1 tablet (20 mg total) by mouth once daily., Disp: 90 tablet, Rfl: 1    glipiZIDE (GLUCOTROL) 10 MG tablet, Take 10 mg by mouth 2 (two) times daily with meals. , Disp: , Rfl:     hydrOXYzine HCl (ATARAX) 25 MG tablet, Take 1 tablet (25 mg total) by mouth nightly as needed (Insomnia)., Disp: 90 tablet, Rfl: 0    loratadine (CLARITIN) 10 mg tablet, Take 1 tablet (10 mg total) by mouth daily as needed for Allergies., Disp: 90 tablet, Rfl: 0    LORazepam (ATIVAN) 2 MG Tab, TAKE 1 TABLET BY MOUTH TWICE DAILY AS NEEDED FOR ANXIETY, Disp: , Rfl:     LORazepam (ATIVAN) 2 MG Tab, Take 2 mg by mouth 2 (two) times daily. as needed for anxiety., Disp: , Rfl: 0    losartan (COZAAR) 25 MG tablet, Take 1 tablet (25 mg total) by mouth once daily., Disp: 90 tablet, Rfl: 3    metFORMIN (GLUCOPHAGE) 1000 MG tablet, Take 1,000 mg by mouth every evening., Disp: , Rfl:     pantoprazole (PROTONIX) 20 MG tablet, Take 2 tablets (40 mg total) by mouth daily as needed (Stomach pain)., Disp: 90 tablet, Rfl: 1    pravastatin (PRAVACHOL) 20 MG tablet, Take 1 tablet (20 mg total) by mouth once daily., Disp: 90 tablet, Rfl: 0    traZODone (DESYREL) 50 MG tablet, TAKE 1 TABLET BY  "MOUTH NIGHTLY AS NEEDED FOR  INSOMNIA, Disp: 90 tablet, Rfl: 1    zonisamide (ZONEGRAN) 100 MG Cap, TAKE 4 CAPSULES BY MOUTH AT BEDTIME, Disp: 120 capsule, Rfl: 2    blood-glucose meter kit, Use as instructed, Disp: 1 each, Rfl: 0      Review of Systems:  Constitutional: no fever or chills  Eyes: no visual changes  ENT: no nasal congestion or sore throat  Respiratory: no cough or shortness of breath  Cardiovascular: no chest pain  Gastrointestinal: no nausea or vomiting, tolerating diet  Musculoskeletal: pain and numbness/tingling    OBJECTIVE:      Vital Signs (Most Recent):  Vitals:    06/28/19 1015   Weight: 83 kg (182 lb 15.7 oz)   Height: 5' 4" (1.626 m)     Body mass index is 31.41 kg/m².      Physical Exam:  Constitutional: The patient appears well-developed and well-nourished. No distress.   Head: Normocephalic and atraumatic.   Nose: Nose normal.   Eyes: Conjunctivae and EOM are normal.   Neck: No tracheal deviation present.   Cardiovascular: Normal rate and intact distal pulses.    Pulmonary/Chest: Effort normal. No respiratory distress.   Abdominal: There is no guarding.   Neurological: The patient is alert.   Psychiatric: The patient has a normal mood and affect.     Left Hand/Wrist Examination:    Observation/Inspection:  Swelling  none    Deformity  none  Discoloration  none     Scars   none    Atrophy  none    HAND/WRIST EXAMINATION:  Finkelstein's Test   Neg  WHAT Test    Neg  Snuff box tenderness   Neg  Paulino's Test    Neg  Hook of Hamate Tenderness  Neg  CMC grind    Neg  Circumduction test   Neg    Neurovascular Exam:  Digits WWP, brisk CR < 3s throughout  NVI motor/LTS to M/R/U nerves, radial pulse 2+  Tinel's Test - Carpal Tunnel  positive  Tinel's Test - Cubital Tunnel  Neg  Phalen's Test    positive  Median Nerve Compression Test Neg    ROM hand/wrist/elbow full, painless     RRR  CTAB  Abd S/NT/ND +BS    Diagnostic Results:       EMG - severe left carpal tunnel syndrome    ASSESSMENT/PLAN: "      68 y.o. yo female who is status post right carpal tunnel and cubital tunnel release by me on March 9, 2019 who is here today to schedule her left carpal tunnel release.  Plan: Discussed with the patient extensively about the different management options both conservative and surgical options.  Patient would like to have her left carpal tunnel release done we will schedule this for July 18, 2019.  I have explained the risks, benefits, and alternatives of the procedure in detail.  The patient voices understanding and all questions have been answered.  The patient agrees to proceed as planned.          Pan Goodman M.D.     Please be aware that this note has been generated with the assistance of yrn voice-to-text.  Please excuse any spelling or grammatical errors.

## 2019-06-28 NOTE — H&P (VIEW-ONLY)
I have seen the patient, reviewed the Resident's history and physical. I have personally interviewed and examined the patient at bedside and agree with the findings.      Clinical and EMG proven left carpal tunnel syndrome.  Patient is feeling well status post right carpal and cubital tunnel releases.  She wishes to proceed with a left carpal tunnel release which I feel is reasonable.  The patient has not responded to adequate non operative treatment at this time and/or non operative treatment is not indicated. Thus, the risks, benefits and alternatives to surgery were discussed with the patient in detail.  Specific risks include but are not limited to bleeding, infection, vessel and/or nerve damage, pain, numbness, tingling, complex regional pain syndrome, compartment syndrome, failure to return to pre-injury and/or preoperative functional status, scar sensitivity, delayed healing, inability to return to work, pulley injury, tendon injury, bowstringing, partial and/or incomplete relief of symptoms, weakness, persistence of and/or worsening of symptoms, surgical failure, osteomyelitis, amputation, loss of function, stiffness, functional debility, dysfunction, decreased  strength, need for prolonged postoperative rehabilitation, need for further surgery, deep venous thrombosis, pulmonary embolism, arthritis and death.  The patient states an understanding and wishes to proceed with surgery.   All questions were answered.  No guarantees were implied or stated.  Written informed consent was obtained.     RRR  CTAB  Abd S/NT/ND +BS            Pan Goodman MD  [facility] [address]      Expand All Collapse All      []Hide copied text    []Hover for details  [facility]  History & Physical  Orthopedics     SUBJECTIVE:       Chief Complaint: Left carpal tunnel      Referring Provider: No ref. provider found      History of Present Illness:  Patient is a 68 y.o. right hand dominant  female who is status post right cubital tunnel and carpal tunnel release by me on 2019.  The patient states that she is doing very well from that she has had significant pain relief as well as improvement in her sensory and motor function.  The patient presents today to discuss her left carpal tunnel.  She has had EMG proven severe left carpal tunnel syndrome which she has been waiting to get taken care of in till after she has fully recovered from her previous surgery.      The patient is a/an .        Symptoms are aggravated by activity.     Symptoms are alleviated by rest.     Symptoms consist of pain.     The patient rates their pain as a 8/10.     Attempted treatment(s) and/or interventions include rest, activity modification, anti-inflammatory medications and steroid injection.     The patient denies any fevers, chills, N/V, D/C and presents for evaluation.             Past Medical History:   Diagnosis Date    Arthritis      Diabetes mellitus      Hypertension              Past Surgical History:   Procedure Laterality Date     SECTION        CHOLECYSTECTOMY        DECOMPRESSION, NERVE, ULNAR - right Right 3/8/2019     Performed by Pan Goodman MD at [facility] OR    HYSTERECTOMY        INJECTION, STEROID, SPINE, LUMBAR, EPIDURAL N/A 2018     Performed by Shaq Silverio MD at [facility] PAIN MGT    INJECTION-FACET Bilateral 2018     Performed by Shaq Silverio MD at [facility] PAIN MGT    INJECTION-STEROID-EPIDURAL-TRANSFORAMINAL Left 2018     Performed by Shaq Silverio MD at [facility] PAIN MGT    INSERTION,NEUROSTIMULATOR,SPINAL CORD N/A 10/26/2018     Performed by Su [facility]redondo Jr., [facility] at [facility] OR    RELEASE, CARPAL TUNNEL right Right 3/8/2019     Performed by Pan Goodman MD at [facility] OR    TRIAL, NEUROSTIMULATOR, SPINAL CORD, SPINAL CORD STIMULATOR TRIAL-INTERNAL WIRES TO EXTERNAL BATTERY N/A 2018     Performed by Shaq Silverio MD at [facility] PAIN T             Review of patient's allergies indicates:   Allergen Reactions    Atorvastatin         Pain    Aspirin Other (See Comments)       Has been told not to take it due to bariatric surgery      Social History          Social History Narrative    Not on file      No family history on file.        Current Outpatient Medications:     atenolol (TENORMIN) 100 MG tablet, TAKE 1 TABLET BY MOUTH ONCE DAILY, Disp: 90 tablet, Rfl: 0    escitalopram oxalate (LEXAPRO) 20 MG tablet, Take 1 tablet (20 mg total) by mouth once daily., Disp: 90 tablet, Rfl: 1    furosemide (LASIX) 20 MG tablet, Take 1 tablet (20 mg total) by mouth once daily., Disp: 90 tablet, Rfl: 1    glipiZIDE (GLUCOTROL) 10 MG tablet, Take 10 mg by mouth 2 (two) times daily with meals. , Disp: , Rfl:     hydrOXYzine HCl (ATARAX) 25 MG tablet, Take 1 tablet (25 mg total) by mouth nightly as needed (Insomnia)., Disp: 90 tablet, Rfl: 0    loratadine (CLARITIN) 10 mg tablet, Take 1 tablet (10 mg total) by mouth daily as needed for Allergies., Disp: 90 tablet, Rfl: 0    LORazepam (ATIVAN) 2 MG Tab, TAKE 1 TABLET BY MOUTH TWICE DAILY AS NEEDED FOR ANXIETY, Disp: , Rfl:     LORazepam (ATIVAN) 2 MG Tab, Take 2 mg by mouth 2 (two) times daily. as needed for anxiety., Disp: , Rfl: 0    losartan (COZAAR) 25 MG tablet, Take 1 tablet (25 mg total) by mouth once daily., Disp: 90 tablet, Rfl: 3    metFORMIN (GLUCOPHAGE) 1000 MG tablet, Take 1,000 mg by mouth every evening., Disp: , Rfl:     pantoprazole (PROTONIX) 20 MG tablet, Take 2 tablets (40 mg total) by mouth daily as needed (Stomach pain)., Disp: 90 tablet, Rfl: 1    pravastatin (PRAVACHOL) 20 MG tablet, Take 1 tablet (20 mg total) by mouth once daily., Disp: 90 tablet, Rfl: 0    traZODone (DESYREL) 50 MG tablet, TAKE 1 TABLET BY MOUTH NIGHTLY AS NEEDED FOR  INSOMNIA, Disp: 90 tablet, Rfl: 1    zonisamide (ZONEGRAN) 100 MG Cap, TAKE 4 CAPSULES BY MOUTH AT BEDTIME, Disp: 120 capsule, Rfl: 2    blood-glucose  "meter kit, Use as instructed, Disp: 1 each, Rfl: 0        Review of Systems:  Constitutional: no fever or chills  Eyes: no visual changes  ENT: no nasal congestion or sore throat  Respiratory: no cough or shortness of breath  Cardiovascular: no chest pain  Gastrointestinal: no nausea or vomiting, tolerating diet  Musculoskeletal: pain and numbness/tingling     OBJECTIVE:       Vital Signs (Most Recent):  Vitals       Vitals:     06/28/19 1015   Weight: 83 kg (182 lb 15.7 oz)   Height: 5' 4" (1.626 m)         Body mass index is 31.41 kg/m².        Physical Exam:  Constitutional: The patient appears well-developed and well-nourished. No distress.   Head: Normocephalic and atraumatic.   Nose: Nose normal.   Eyes: Conjunctivae and EOM are normal.   Neck: No tracheal deviation present.   Cardiovascular: Normal rate and intact distal pulses.    Pulmonary/Chest: Effort normal. No respiratory distress.   Abdominal: There is no guarding.   Neurological: The patient is alert.   Psychiatric: The patient has a normal mood and affect.      Left Hand/Wrist Examination:     Observation/Inspection:  Swelling                       none                  Deformity                     none  Discoloration               none                  Scars                           none                  Atrophy                        none     HAND/WRIST EXAMINATION:  Finkelstein's Test                                Neg  WHAT Test                                         Neg  Snuff box tenderness                          Neg  Paulino's Test                                     Neg  Hook of Hamate Tenderness              Neg  CMC grind                                           Neg  Circumduction test                              Neg     Neurovascular Exam:  Digits WWP, brisk CR < 3s throughout  NVI motor/LTS to M/R/U nerves, radial pulse 2+  Tinel's Test - Carpal Tunnel                positive  Tinel's Test - Cubital Tunnel               " Neg  Phalen's Test                                      positive  Median Nerve Compression Test       Neg     ROM hand/wrist/elbow full, painless     RRR  CTAB  Abd S/NT/ND +BS     Diagnostic Results:        EMG - severe left carpal tunnel syndrome     ASSESSMENT/PLAN:       68 y.o. yo female who is status post right carpal tunnel and cubital tunnel release by me on March 9, 2019 who is here today to schedule her left carpal tunnel release.  Plan: Discussed with the patient extensively about the different management options both conservative and surgical options.  Patient would like to have her left carpal tunnel release done we will schedule this for July 18, 2019.  I have explained the risks, benefits, and alternatives of the procedure in detail.  The patient voices understanding and all questions have been answered.  The patient agrees to proceed as planned.            Pan Goodman M.D.     · Please be aware that this note has been generated with the assistance of Kassie voice-to-text.  Please excuse any spelling or grammatical errors.

## 2019-07-10 DIAGNOSIS — M47.26 OSTEOARTHRITIS OF SPINE WITH RADICULOPATHY, LUMBAR REGION: ICD-10-CM

## 2019-07-11 RX ORDER — ZONISAMIDE 100 MG/1
CAPSULE ORAL
Qty: 360 CAPSULE | Refills: 0 | Status: SHIPPED | OUTPATIENT
Start: 2019-07-11 | End: 2019-10-18 | Stop reason: SDUPTHER

## 2019-07-16 ENCOUNTER — TELEPHONE (OUTPATIENT)
Dept: ORTHOPEDICS | Facility: CLINIC | Age: 69
End: 2019-07-16

## 2019-07-16 NOTE — PRE-PROCEDURE INSTRUCTIONS
Preop instructions: NPO solids/ milk products after midnight or clears up to 2 hours before arrival (clear liquids are: water, SUGAR-FREE Gatorade & Jell-O, black coffee/no milk, cream or creamer), shower instructions, directions, leave all valuables at home, medication instructions for PM prior & am of procedure explained. Patient stated an understanding.      Patient denies any side effects or issues with anesthesia or sedation.                                                                                                                                    Patient does not know arrival time. Explained that this information comes from the surgeons office and if they have not heard from them by 2 pm, to call office. Patient stated an understanding.

## 2019-07-16 NOTE — TELEPHONE ENCOUNTER
----- Message from Desirae Aldana sent at 7/16/2019 10:11 AM CDT -----  Contact: pt   Name of Who is Calling: CLAUDIA MARTINEZ [[id number]]    What is the request in detail: Patient is requesting a call back in regards to her surgery on 7/18 patient states she was told to reach out to her doctors office for approval....Please contact to further discuss and advise      Can the clinic reply by [facility]: No     What Number to Call Back if not in [facility]: [phone].

## 2019-07-16 NOTE — TELEPHONE ENCOUNTER
Called patient in regard to phone message. Informed her that her surgery case for the 18th has been approved. I also informed her that I will call her the day before with her time of arrival. She verbalized understanding of information.

## 2019-07-17 ENCOUNTER — TELEPHONE (OUTPATIENT)
Dept: ORTHOPEDICS | Facility: CLINIC | Age: 69
End: 2019-07-17

## 2019-07-17 ENCOUNTER — ANESTHESIA EVENT (OUTPATIENT)
Dept: SURGERY | Facility: HOSPITAL | Age: 69
End: 2019-07-17
Payer: MEDICARE

## 2019-07-17 NOTE — ANESTHESIA PREPROCEDURE EVALUATION
[facility]  Anesthesia Pre-Operative Evaluation         Patient Name: Faby Luna  YOB: 1950  MRN: [id number]    SUBJECTIVE:     Pre-operative evaluation for Procedure(s) (LRB):  RELEASE, CARPAL TUNNEL- LEFT (Left)     07/17/2019    Faby Luna is a 68 y.o. female w/ a significant PMHx of arthritis, diabetes, hypertension, anxiety, depression, DJD of lumbar spine .    Clinical and EMG proven left carpal tunnel syndrome.  Patient s/p right carpal and cubital tunnel release and tolerated procedure well.    Patient now presents for the above procedure(s).      LDA: None documented.     Prev airway: None documented.    Drips: None documented.      Patient Active Problem List   Diagnosis    Chronic pain    Adjustment reaction with anxiety and depression    Type 2 diabetes mellitus without complication, without long-term current use of insulin    Essential hypertension    Pain    Gastroesophageal reflux disease without esophagitis    Degenerative joint disease (DJD) of lumbar spine    Lumbar radiculopathy    Pre-op testing    Failed back surgical syndrome    Chronic pain syndrome    Bilateral carpal tunnel syndrome    Right carpal tunnel syndrome    Cubital tunnel syndrome on right    Pain, hand    Hand weakness    Anxiety    Major depressive disorder, recurrent episode, in partial remission    Carpal tunnel syndrome       Review of patient's allergies indicates:   Allergen Reactions    Atorvastatin      Pain    Aspirin Other (See Comments)     Has been told not to take it due to bariatric surgery       Current Inpatient Medications:      No current facility-administered medications on file prior to encounter.      Current Outpatient Medications on File Prior to Encounter   Medication Sig Dispense Refill    atenolol (TENORMIN) 100 MG tablet TAKE 1 TABLET BY MOUTH ONCE DAILY 90 tablet 0    escitalopram oxalate (LEXAPRO) 20 MG tablet Take 1 tablet (20 mg  total) by mouth once daily. 90 tablet 1    furosemide (LASIX) 20 MG tablet Take 1 tablet (20 mg total) by mouth once daily. 90 tablet 1    glipiZIDE (GLUCOTROL) 10 MG tablet Take 10 mg by mouth 2 (two) times daily with meals.       hydrOXYzine HCl (ATARAX) 25 MG tablet Take 1 tablet (25 mg total) by mouth nightly as needed (Insomnia). 90 tablet 0    loratadine (CLARITIN) 10 mg tablet Take 1 tablet (10 mg total) by mouth daily as needed for Allergies. 90 tablet 0    losartan (COZAAR) 25 MG tablet Take 1 tablet (25 mg total) by mouth once daily. 90 tablet 3    metFORMIN (GLUCOPHAGE) 1000 MG tablet Take 1,000 mg by mouth every evening.      pantoprazole (PROTONIX) 20 MG tablet Take 2 tablets (40 mg total) by mouth daily as needed (Stomach pain). 90 tablet 1    pravastatin (PRAVACHOL) 20 MG tablet Take 1 tablet (20 mg total) by mouth once daily. 90 tablet 0    blood-glucose meter kit Use as instructed 1 each 0       Past Surgical History:   Procedure Laterality Date     SECTION      CHOLECYSTECTOMY      DECOMPRESSION, NERVE, ULNAR - right Right 3/8/2019    Performed by Pan Goodman [facility] at [facility] OR    HYSTERECTOMY      INJECTION, STEROID, SPINE, LUMBAR, EPIDURAL N/A 2018    Performed by Shaq Silverio MD at [facility] PAIN MGT    INJECTION-FACET Bilateral 2018    Performed by Shaq [facility]io MD at [facility] PAIN MGT    INJECTION-STEROID-EPIDURAL-TRANSFORAMINAL Left 2018    Performed by Shaq Silverio MD at [facility] PAIN MGT    INSERTION,NEUROSTIMULATOR,SPINAL CORD N/A 10/26/2018    Performed by Su Arredondo Jr., [facility] at [facility] OR    RELEASE, CARPAL TUNNEL right Right 3/8/2019    Performed by Pan Goodman MD at [facility] OR    TRIAL, NEUROSTIMULATOR, SPINAL CORD, SPINAL CORD STIMULATOR TRIAL-INTERNAL WIRES TO EXTERNAL BATTERY N/A 2018    Performed by Shaq Silverio MD at [facility] PAIN MGT       Social History     Socioeconomic History    Marital status:      Spouse name: Not on file    Number of  children: Not on file    Years of education: Not on file    Highest education level: Not on file   Occupational History    Not on file   Social Needs    Financial resource strain: Not on file    Food insecurity:     Worry: Not on file     Inability: Not on file    Transportation needs:     Medical: Not on file     Non-medical: Not on file   Tobacco Use    Smoking status: Never Smoker    Smokeless tobacco: Never Used   Substance and Sexual Activity    Alcohol use: No    Drug use: No    Sexual activity: Not on file   Lifestyle    Physical activity:     Days per week: Not on file     Minutes per session: Not on file    Stress: Not on file   Relationships    Social connections:     Talks on phone: Not on file     Gets together: Not on file     Attends Sikhism service: Not on file     Active member of club or organization: Not on file     Attends meetings of clubs or organizations: Not on file     Relationship status: Not on file   Other Topics Concern    Not on file   Social History Narrative    Not on file       OBJECTIVE:     Vital Signs Range (Last 24H):         Significant Labs:  Lab Results   Component Value Date    WBC 10.85 (H) 07/01/2011    HGB 11.7 (L) 07/01/2011    HCT 35.6 (L) 07/01/2011     (H) 07/01/2011    CHOL 272 (H) 05/15/2019    TRIG 172 (H) 05/15/2019    HDL 63 05/15/2019    ALT 37 06/10/2019    AST 23 06/10/2019     05/15/2019    K 4.4 05/15/2019     05/15/2019    CREATININE 0.9 05/15/2019    BUN 12 05/15/2019    CO2 26 05/15/2019    TSH 2.267 05/30/2018    HGBA1C 6.1 (H) 05/15/2019       Diagnostic Studies: No relevant studies.    EKG: Normal sinus rhythm  Nonspecific T wave abnormality  Abnormal ECG  When compared with ECG of 20-DEC-2010 15:20,  Vent. rate has decreased BY  38 BPM  Confirmed by Alexsander Rodriguez MD ([id number]) on 4/24/2018 7:32:07 PM    2D ECHO:  No results found for this or any previous visit.      ASSESSMENT/PLAN:                                                                                                               07/17/2019  Faby Luna is a 68 y.o., female.    Anesthesia Evaluation    I have reviewed the Patient Summary Reports.    I have reviewed the Nursing Notes.   I have reviewed the Medications.     Review of Systems      Physical Exam  General:  Well nourished    Airway/Jaw/Neck:  Airway Findings: Mouth Opening: Normal General Airway Assessment: Adult  Mallampati: II  Improves to II with phonation.  Jaw/Neck Findings:  Limited Ability to Prognath  Neck ROM: Normal ROM     Eyes/Ears/Nose:  Eyes/Ears/Nose Findings:    Dental:  Dental Findings: In tact   Chest/Lungs:  Chest/Lungs Findings: Clear to auscultation, Normal Respiratory Rate     Heart/Vascular:  Heart Findings: Rate: Normal  Rhythm: Regular Rhythm  Sounds: Normal     Abdomen:  Abdomen Findings:  Normal     Musculoskeletal:  Musculoskeletal Findings:    Skin:  Skin Findings:     Mental Status:  Mental Status Findings:  Cooperative, Alert and Oriented         Anesthesia Plan  Type of Anesthesia, risks & benefits discussed:  Anesthesia Type:  MAC  Patient's Preference:   Intra-op Monitoring Plan: standard ASA monitors  Intra-op Monitoring Plan Comments:   Post Op Pain Control Plan: per primary service following discharge from PACU, IV/PO Opioids PRN and multimodal analgesia  Post Op Pain Control Plan Comments:   Induction:   IV  Beta Blocker:  Patient is not currently on a Beta-Blocker (No further documentation required).       Informed Consent: Patient understands risks and agrees with Anesthesia plan.  Questions answered. Anesthesia consent signed with patient.  ASA Score: 3     Day of Surgery Review of History & Physical:    H&P update referred to the surgeon.         Ready For Surgery From Anesthesia Perspective.

## 2019-07-17 NOTE — TELEPHONE ENCOUNTER
Second attempt to call the patient. Left two voicemails.     Left arrival time for the patient on her voicemail since I could not reach her and she is not active in the patient portal. Arrival time is 5:00 am with surgery at 7:00 am. Left a call back number for the patient to call me back.     2 week post op has been made.

## 2019-07-18 ENCOUNTER — ANESTHESIA (OUTPATIENT)
Dept: SURGERY | Facility: HOSPITAL | Age: 69
End: 2019-07-18
Payer: MEDICARE

## 2019-07-18 ENCOUNTER — HOSPITAL ENCOUNTER (OUTPATIENT)
Facility: HOSPITAL | Age: 69
Discharge: HOME | End: 2019-07-18
Attending: ORTHOPAEDIC SURGERY | Admitting: ORTHOPAEDIC SURGERY
Payer: MEDICARE

## 2019-07-18 VITALS
HEART RATE: 77 BPM | WEIGHT: 183 LBS | SYSTOLIC BLOOD PRESSURE: 132 MMHG | DIASTOLIC BLOOD PRESSURE: 60 MMHG | HEIGHT: 64 IN | BODY MASS INDEX: 31.24 KG/M2 | RESPIRATION RATE: 20 BRPM | OXYGEN SATURATION: 97 % | TEMPERATURE: 98 F

## 2019-07-18 DIAGNOSIS — G56.00 CARPAL TUNNEL SYNDROME, UNSPECIFIED LATERALITY: ICD-10-CM

## 2019-07-18 DIAGNOSIS — G56.00 CARPAL TUNNEL SYNDROME: ICD-10-CM

## 2019-07-18 LAB
POCT GLUCOSE: 114 MG/DL (ref 70–110)
POCT GLUCOSE: 120 MG/DL (ref 70–110)

## 2019-07-18 PROCEDURE — 71000045 HC DOSC ROUTINE RECOVERY EA ADD'L HR: Mod: HCNC | Performed by: ORTHOPAEDIC SURGERY

## 2019-07-18 PROCEDURE — 82962 GLUCOSE BLOOD TEST: CPT | Mod: HCNC | Performed by: ORTHOPAEDIC SURGERY

## 2019-07-18 PROCEDURE — 64721 PR REVISE MEDIAN N/CARPAL TUNNEL SURG: ICD-10-PCS | Mod: HCNC,LT,, | Performed by: ORTHOPAEDIC SURGERY

## 2019-07-18 PROCEDURE — 64721 CARPAL TUNNEL SURGERY: CPT | Mod: HCNC,LT,, | Performed by: ORTHOPAEDIC SURGERY

## 2019-07-18 PROCEDURE — 25000003 PHARM REV CODE 250: Mod: HCNC | Performed by: STUDENT IN AN ORGANIZED HEALTH CARE EDUCATION/TRAINING PROGRAM

## 2019-07-18 PROCEDURE — 71000015 HC POSTOP RECOV 1ST HR: Mod: HCNC | Performed by: ORTHOPAEDIC SURGERY

## 2019-07-18 PROCEDURE — 37000008 HC ANESTHESIA 1ST 15 MINUTES: Mod: HCNC | Performed by: ORTHOPAEDIC SURGERY

## 2019-07-18 PROCEDURE — 25000003 PHARM REV CODE 250: Mod: HCNC | Performed by: ORTHOPAEDIC SURGERY

## 2019-07-18 PROCEDURE — 71000044 HC DOSC ROUTINE RECOVERY FIRST HOUR: Mod: HCNC | Performed by: ORTHOPAEDIC SURGERY

## 2019-07-18 PROCEDURE — D9220A PRA ANESTHESIA: Mod: HCNC,,, | Performed by: ANESTHESIOLOGY

## 2019-07-18 PROCEDURE — 37000009 HC ANESTHESIA EA ADD 15 MINS: Mod: HCNC | Performed by: ORTHOPAEDIC SURGERY

## 2019-07-18 PROCEDURE — D9220A PRA ANESTHESIA: ICD-10-PCS | Mod: HCNC,,, | Performed by: ANESTHESIOLOGY

## 2019-07-18 PROCEDURE — 36000707: Mod: HCNC | Performed by: ORTHOPAEDIC SURGERY

## 2019-07-18 PROCEDURE — 36000706: Mod: HCNC | Performed by: ORTHOPAEDIC SURGERY

## 2019-07-18 PROCEDURE — 63600175 PHARM REV CODE 636 W HCPCS: Mod: HCNC | Performed by: STUDENT IN AN ORGANIZED HEALTH CARE EDUCATION/TRAINING PROGRAM

## 2019-07-18 PROCEDURE — 27201423 OPTIME MED/SURG SUP & DEVICES STERILE SUPPLY: Mod: HCNC | Performed by: ORTHOPAEDIC SURGERY

## 2019-07-18 RX ORDER — KETAMINE HCL IN 0.9 % NACL 50 MG/5 ML
SYRINGE (ML) INTRAVENOUS
Status: DISCONTINUED | OUTPATIENT
Start: 2019-07-18 | End: 2019-07-18

## 2019-07-18 RX ORDER — HYDROCODONE BITARTRATE AND ACETAMINOPHEN 5; 325 MG/1; MG/1
1 TABLET ORAL EVERY 4 HOURS PRN
Qty: 20 TABLET | Refills: 0 | Status: SHIPPED | OUTPATIENT
Start: 2019-07-18 | End: 2019-07-18 | Stop reason: SDUPTHER

## 2019-07-18 RX ORDER — FENTANYL CITRATE 50 UG/ML
INJECTION, SOLUTION INTRAMUSCULAR; INTRAVENOUS
Status: DISCONTINUED | OUTPATIENT
Start: 2019-07-18 | End: 2019-07-18

## 2019-07-18 RX ORDER — PHENYLEPHRINE HYDROCHLORIDE 10 MG/ML
INJECTION INTRAVENOUS
Status: DISCONTINUED | OUTPATIENT
Start: 2019-07-18 | End: 2019-07-18

## 2019-07-18 RX ORDER — SODIUM CHLORIDE 9 MG/ML
INJECTION, SOLUTION INTRAVENOUS CONTINUOUS
Status: DISCONTINUED | OUTPATIENT
Start: 2019-07-18 | End: 2019-07-18 | Stop reason: HOSPADM

## 2019-07-18 RX ORDER — LIDOCAINE HCL/PF 100 MG/5ML
SYRINGE (ML) INTRAVENOUS
Status: DISCONTINUED | OUTPATIENT
Start: 2019-07-18 | End: 2019-07-18

## 2019-07-18 RX ORDER — HYDROCODONE BITARTRATE AND ACETAMINOPHEN 5; 325 MG/1; MG/1
1 TABLET ORAL EVERY 4 HOURS PRN
Qty: 20 TABLET | Refills: 0 | Status: SHIPPED | OUTPATIENT
Start: 2019-07-18 | End: 2019-08-29

## 2019-07-18 RX ORDER — ONDANSETRON 2 MG/ML
INJECTION INTRAMUSCULAR; INTRAVENOUS
Status: DISCONTINUED | OUTPATIENT
Start: 2019-07-18 | End: 2019-07-18

## 2019-07-18 RX ORDER — PROPOFOL 10 MG/ML
VIAL (ML) INTRAVENOUS
Status: DISCONTINUED | OUTPATIENT
Start: 2019-07-18 | End: 2019-07-18

## 2019-07-18 RX ORDER — LIDOCAINE HYDROCHLORIDE 10 MG/ML
1 INJECTION, SOLUTION EPIDURAL; INFILTRATION; INTRACAUDAL; PERINEURAL ONCE
Status: COMPLETED | OUTPATIENT
Start: 2019-07-18 | End: 2019-07-18

## 2019-07-18 RX ORDER — DEXAMETHASONE SODIUM PHOSPHATE 4 MG/ML
INJECTION, SOLUTION INTRA-ARTICULAR; INTRALESIONAL; INTRAMUSCULAR; INTRAVENOUS; SOFT TISSUE
Status: DISCONTINUED | OUTPATIENT
Start: 2019-07-18 | End: 2019-07-18

## 2019-07-18 RX ORDER — PROPOFOL 10 MG/ML
VIAL (ML) INTRAVENOUS CONTINUOUS PRN
Status: DISCONTINUED | OUTPATIENT
Start: 2019-07-18 | End: 2019-07-18

## 2019-07-18 RX ORDER — LIDOCAINE HYDROCHLORIDE 10 MG/ML
INJECTION, SOLUTION EPIDURAL; INFILTRATION; INTRACAUDAL; PERINEURAL
Status: DISCONTINUED | OUTPATIENT
Start: 2019-07-18 | End: 2019-07-18 | Stop reason: HOSPADM

## 2019-07-18 RX ADMIN — PHENYLEPHRINE HYDROCHLORIDE 100 MCG: 10 INJECTION INTRAVENOUS at 07:07

## 2019-07-18 RX ADMIN — DEXAMETHASONE SODIUM PHOSPHATE 4 MG: 4 INJECTION, SOLUTION INTRAMUSCULAR; INTRAVENOUS at 07:07

## 2019-07-18 RX ADMIN — LIDOCAINE HYDROCHLORIDE 100 MG: 20 INJECTION, SOLUTION INTRAVENOUS at 07:07

## 2019-07-18 RX ADMIN — PROPOFOL 20 MG: 10 INJECTION, EMULSION INTRAVENOUS at 07:07

## 2019-07-18 RX ADMIN — PROPOFOL 50 MCG/KG/MIN: 10 INJECTION, EMULSION INTRAVENOUS at 07:07

## 2019-07-18 RX ADMIN — ONDANSETRON 4 MG: 2 INJECTION INTRAMUSCULAR; INTRAVENOUS at 07:07

## 2019-07-18 RX ADMIN — Medication 20 MG: at 07:07

## 2019-07-18 RX ADMIN — LIDOCAINE HYDROCHLORIDE 0.1 MG: 10 INJECTION, SOLUTION EPIDURAL; INFILTRATION; INTRACAUDAL; PERINEURAL at 06:07

## 2019-07-18 RX ADMIN — FENTANYL CITRATE 50 MCG: 50 INJECTION, SOLUTION INTRAMUSCULAR; INTRAVENOUS at 07:07

## 2019-07-18 RX ADMIN — SODIUM CHLORIDE: 0.9 INJECTION, SOLUTION INTRAVENOUS at 06:07

## 2019-07-18 NOTE — OP NOTE
ORTHOPEDIC SURGERY OPERATIVE NOTE    SERVICE: ORTHOPEDICS     DATE OF PROCEDURE: 7/18/2019     SURGEON: Pan Goodman M.D.    ASSISTANT: None    PREOPERATIVE DIAGNOSIS: Left sided carpal tunnel syndrome    POSTOPERATIVE DIAGNOSIS: Left sided carpal tunnel syndrome    PROCEDURE PERFORMED: Left sided carpal tunnel release    ANESTHESIA: Local/MAC    ESTIMATED BLOOD LOSS: Minimal           SPECIMENS: None    COMPLICATIONS: None              CONDITION: Stable    IV FLUIDS: Crystalloid per anesthesia    IMPLANTS: None    TOURNIQUET TIME: 20 minutes at 250 mmHg    INDICATIONS FOR PROCEDURE: Faby Luna is a 68 y.o. female who presented to clinic with findings and workup consistent with carpal tunnel syndrome of the left hand.  The patient had not responded to nonoperative management and wished to proceed with surgical intervention.  The risks, benefits and alternatives to surgery were discussed with the patient at great length and in great detail.  Specific risks discussed include but are not limited to bleeding, infection, vessel damage, nerve damage, pain, numbness, tingling, weakness, stiffness, complex regional pain syndrome, hardware/surgical failure, need for further surgery, DVT, PE, arthritis, scar sensitivity, delayed healing, need for prolonged postoperative rehabilitation, and death amongst others.  The patient stated an understanding of the risks and wished to proceed with surgery.   All questions were answered.  No guarantees were implied or stated.  Informed consent was obtained.    PROCEDURE IN DETAIL: With the patient's participation in the preoperative holding area, the left upper extremity was marked as the surgical site. Preoperative checks were completed and consents were verified.  The patient was brought to the Operating Room and placed in supine position.  A well-padded nonsterile tourniquet was placed on the upper arm.  The left arm was then prepped and draped in the usual sterile  fashion.  Timeout was called for to verify the correct site, procedure and patient.  All were in agreement and we proceeded.  MAC anesthesia was introduced.  Under sterile conditions, an injection of 10cc 1% plain lidocaine was injected into the skin and subcutaneous tissues. The incision was marked out using Jiménez's cardinal line and the radial border of the ring finger. The arm was exsanguinated using an Esmarch and the tourniquet was inflated to 250mmHg. The incision was made in line with radial aspect of the 4th finger over the carpal tunnel for a distance of approximately 1.5cm. Careful dissection was made down to the palmar fascia. The ulnar fat pad was taken ulnarly and the palmar fascia was taken radially.  A mosquito hemostat was used to spread the superficial tissues off the transverse carpal ligament.  Retractors were placed proximally and distally to expose the ligament along its length.  The transverse carpal ligament was thus identified and exposed.  A scalpel was utilized to incise the ligament mid-substance.  A mosquito hemostat and freer elevator were then utilized to free the deep surface of the ligament from underlying structures.  Ligament division was then completed with tenotomy scissors both proximally and distally.  Care was taken distally to ensure not to cut past Kaplans Cardinal Line or injure the superficial palmar arch. The distal fat was visualized at the distalmost aspect of the ligament division.  Mosquito hemostat was passed proximally and distally to confirm that it was a complete release. The area was irrigated with copious amounts of normal saline and 4-0 nylon suture was used to close the skin with horizontal mattress sutures. Sterile dressing was applied. Tourniquet was deflated. Brisk capillary refill ensued. The patient was transferred to the recovery room in stable condition.     DISPOSITION: The patient will be discharged home with followup in approximately 2 weeks for  suture removal.  Early active range of motion in the acute postoperative period was discussed and encouraged.  They are to keep the dressing clean, dry, and intact until that time.

## 2019-07-18 NOTE — ANESTHESIA POSTPROCEDURE EVALUATION
Anesthesia Post Evaluation    Patient: Faby Luna    Procedure(s) Performed: Procedure(s) (LRB):  RELEASE, CARPAL TUNNEL- LEFT (Left)    Final Anesthesia Type: MAC  Patient location during evaluation: PACU  Patient participation: Yes- Able to Participate  Level of consciousness: awake and alert and oriented  Pain management: adequate  Airway patency: patent  PONV status at discharge: No PONV  Anesthetic complications: no      Cardiovascular status: blood pressure returned to baseline and hemodynamically stable  Respiratory status: unassisted  Hydration status: euvolemic  Follow-up not needed.          Vitals Value Taken Time   BP 99/52 7/18/2019  8:45 AM   Temp 36.2 °C (97.2 °F) 7/18/2019  7:58 AM   Pulse 78 7/18/2019  8:46 AM   Resp 29 7/18/2019  8:46 AM   SpO2 97 % 7/18/2019  8:46 AM   Vitals shown include unvalidated device data.      No case tracking events are documented in the log.      Pain/Ron Score: Ron Score: 10 (7/18/2019  8:45 AM)

## 2019-07-18 NOTE — BRIEF OP NOTE
[facility]  Brief Operative Note     SUMMARY     Surgery Date: 7/18/2019     Surgeon(s) and Role:     * Pan Goodman MD - Primary    Assisting Surgeon: None    Pre-op Diagnosis:  Carpal tunnel syndrome, unspecified laterality [G56.00]    Post-op Diagnosis:  Post-Op Diagnosis Codes:     * Carpal tunnel syndrome, unspecified laterality [G56.00]    Procedure(s) (LRB):  RELEASE, CARPAL TUNNEL- LEFT (Left)    Anesthesia: Local MAC    Description of the findings of the procedure: see op note    Findings/Key Components: see op note    Estimated Blood Loss: * No values recorded between 7/18/2019  7:26 AM and 7/18/2019  7:53 AM *         Specimens:   Specimen (12h ago, onward)    None          Discharge Note    SUMMARY     Admit Date: 7/18/2019    Discharge Date and Time: 07/18/2019     Hospital Course (synopsis of major diagnoses, care, treatment, and services provided during the course of the hospital stay): Hospital Course:  On 7/18/2019, the patient arrived to pre-op area for proper pre-operative management.  Upon completion of pre-operative preparation, the patient was taken back to the operative theatre.  A left carpal tunnel release was performed without complication and the patient was transported to the post anesthesia care unit in stable condition. The patient suffered minimal blood loss and electrolyte imbalances during the procedure, which were correct accordingly if neccessary. After appropriate recovery from the anaesthetic agents used during the surgery the patient was discharged home.         Final Diagnosis: Post-Op Diagnosis Codes:     * Carpal tunnel syndrome, unspecified laterality [G56.00]    Disposition: Home or Self Care    Follow Up/Patient Instructions:     Medications:  Reconciled Home Medications:      Medication List      START taking these medications    HYDROcodone-acetaminophen 5-325 mg per tablet  Commonly known as:  NORCO  Take 1 tablet by mouth every 4 (four) hours as  needed for Pain.        CONTINUE taking these medications    atenolol 100 MG tablet  Commonly known as:  TENORMIN  TAKE 1 TABLET BY MOUTH ONCE DAILY     blood-glucose meter kit  Use as instructed     escitalopram oxalate 20 MG tablet  Commonly known as:  LEXAPRO  Take 1 tablet (20 mg total) by mouth once daily.     furosemide 20 MG tablet  Commonly known as:  LASIX  Take 1 tablet (20 mg total) by mouth once daily.     glipiZIDE 10 MG tablet  Commonly known as:  GLUCOTROL  Take 10 mg by mouth 2 (two) times daily with meals.     hydrOXYzine HCl 25 MG tablet  Commonly known as:  ATARAX  Take 1 tablet (25 mg total) by mouth nightly as needed (Insomnia).     loratadine 10 mg tablet  Commonly known as:  CLARITIN  Take 1 tablet (10 mg total) by mouth daily as needed for Allergies.     losartan 25 MG tablet  Commonly known as:  COZAAR  Take 1 tablet (25 mg total) by mouth once daily.     metFORMIN 1000 MG tablet  Commonly known as:  GLUCOPHAGE  Take 1,000 mg by mouth every evening.     pantoprazole 20 MG tablet  Commonly known as:  PROTONIX  Take 2 tablets (40 mg total) by mouth daily as needed (Stomach pain).     pravastatin 20 MG tablet  Commonly known as:  PRAVACHOL  Take 1 tablet (20 mg total) by mouth once daily.     zonisamide 100 MG Cap  Commonly known as:  ZONEGRAN  TAKE 4 CAPSULES BY MOUTH AT BEDTIME          No discharge procedures on file.

## 2019-07-18 NOTE — TRANSFER OF CARE
"Anesthesia Transfer of Care Note    Patient: Faby Luna    Procedure(s) Performed: Procedure(s) (LRB):  RELEASE, CARPAL TUNNEL- LEFT (Left)    Patient location: [facility]    Anesthesia Type: general    Transport from OR: Transported from OR on 6-10 L/min O2 by face mask with adequate spontaneous ventilation    Post pain: adequate analgesia    Post assessment: no apparent anesthetic complications    Post vital signs: stable    Level of consciousness: awake    Nausea/Vomiting: no nausea/vomiting    Complications: none    Transfer of care protocol was followed      Last vitals:   Visit Vitals  BP (!) 75/54 (BP Location: Right arm, Patient Position: Lying)   Pulse 76   Temp 36.2 °C (97.2 °F) (Temporal)   Resp 20   Ht 5' 4" (1.626 m)   Wt 83 kg (183 lb)   SpO2 100%   Breastfeeding? No   BMI 31.41 kg/m²     "

## 2019-07-18 NOTE — PROGRESS NOTES
Patient assigned to this writer by charge nurse. The patient is in the waiting room but, will be escorted to [facility] room 4. This writer is completing a chart review and reviewing MD orders.

## 2019-07-18 NOTE — ANESTHESIA RELEASE NOTE
"Anesthesia Release from PACU Note    Patient: Faby Luna    Procedure(s) Performed: Procedure(s) (LRB):  RELEASE, CARPAL TUNNEL- LEFT (Left)    Anesthesia type: MAC    Post pain: Adequate analgesia    Post assessment: no apparent anesthetic complications    Last Vitals:   Visit Vitals  BP (!) 99/52 (BP Location: Right arm, Patient Position: Sitting)   Pulse 76   Temp 36.2 °C (97.2 °F) (Temporal)   Resp 20   Ht 5' 4" (1.626 m)   Wt 83 kg (183 lb)   SpO2 100%   Breastfeeding? No   BMI 31.41 kg/m²       Post vital signs: stable    Level of consciousness: awake    Nausea/Vomiting: no nausea/no vomiting    Complications: none    Airway Patency: patent    Respiratory: unassisted    Cardiovascular: stable and blood pressure at baseline    Hydration: euvolemic  "

## 2019-07-30 ENCOUNTER — OFFICE VISIT (OUTPATIENT)
Dept: ORTHOPEDICS | Facility: CLINIC | Age: 69
End: 2019-07-30
Payer: MEDICARE

## 2019-07-30 VITALS — HEIGHT: 64 IN | WEIGHT: 183 LBS | BODY MASS INDEX: 31.24 KG/M2

## 2019-07-30 DIAGNOSIS — G56.02 LEFT CARPAL TUNNEL SYNDROME: Primary | ICD-10-CM

## 2019-07-30 PROCEDURE — 99999 PR PBB SHADOW E&M-EST. PATIENT-LVL III: ICD-10-PCS | Mod: PBBFAC,HCNC,, | Performed by: ORTHOPAEDIC SURGERY

## 2019-07-30 PROCEDURE — 99999 PR PBB SHADOW E&M-EST. PATIENT-LVL III: CPT | Mod: PBBFAC,HCNC,, | Performed by: ORTHOPAEDIC SURGERY

## 2019-07-30 PROCEDURE — 99024 PR POST-OP FOLLOW-UP VISIT: ICD-10-PCS | Mod: HCNC,S$GLB,, | Performed by: ORTHOPAEDIC SURGERY

## 2019-07-30 PROCEDURE — 99024 POSTOP FOLLOW-UP VISIT: CPT | Mod: HCNC,S$GLB,, | Performed by: ORTHOPAEDIC SURGERY

## 2019-07-30 NOTE — PROGRESS NOTES
Faby Luna presents for post-operative evaluation.  The patient is now 2 weeks s/p left carpal tunnel release.  Overall the patient reports doing well.  She has essentially no complaints of pain today.  She notes that she feels she has 100% better than preop and .  She reports that her numbness and tingling was worse in the thumb index and long fingers and is now essentially resolved.  She did remove her postoperative dressing and has been applying some cream/spray topically to the wound but otherwise has no complaints and is very pleased thus far her recovery.      PE:    AA&O x 4.  NAD  HEENT:  NCAT, sclera nonicteric  Lungs:  Respirations are equal and unlabored.  CV:  2+ bilateral upper and lower extremity pulses.  MSK: The wound is healing well with no signs of erythema or warmth.  There is minimal maceration.  There is no drainage.  No clinical signs or symptoms of infection are present.  All sutures were removed today. The patient is able to make a full composite fist without any difficulty and extend all fingers fully.  The left upper extremity is neurovascularly intact.    A/P: Status post above, doing well  1) Continue with full weight bearing  2) F/U 4 weeks  3) Call with any questions/concerns in the interim     Please be aware that this note has been generated with the assistance of [facility] voice-to-text.  Please excuse any spelling or grammatical errors.

## 2019-08-02 DIAGNOSIS — I10 ESSENTIAL HYPERTENSION: ICD-10-CM

## 2019-08-02 RX ORDER — ATENOLOL 100 MG/1
100 TABLET ORAL DAILY
Qty: 90 TABLET | Refills: 0 | Status: SHIPPED | OUTPATIENT
Start: 2019-08-02 | End: 2019-11-11 | Stop reason: SDUPTHER

## 2019-08-05 ENCOUNTER — PATIENT OUTREACH (OUTPATIENT)
Dept: ADMINISTRATIVE | Facility: HOSPITAL | Age: 69
End: 2019-08-05

## 2019-08-05 NOTE — LETTER
AUTHORIZATION FOR RELEASE OF   CONFIDENTIAL INFORMATION    Dear Babar Garcia MD,    We are seeing Faby Luna, date of birth 1950, in the clinic at [facility]. Sea Her Jr, MD is the patient's PCP. Faby Luna has an outstanding lab/procedure at the time we reviewed her chart. In order to help keep her health information updated, she has authorized us to request the following medical record(s):        (  )  MAMMOGRAM                                      (  )  COLONOSCOPY      (  )  PAP SMEAR                                          (  )  OUTSIDE LAB RESULTS     (  )  DEXA SCAN                                          ( X )  DIABETIC EYE EXAM            (  )  FOOT EXAM                                          (  )  ENTIRE RECORD     (  )  OUTSIDE IMMUNIZATIONS                 (  )  _______________         Please fax records to [facility], Cesar R Roque Jr, MD,  [phone]   [address] [facility] [address]       If you have any questions, please contact Fariba Pascal at [phone].           Patient Name: Faby Luna  : 1950  Patient Phone #: [phone]

## 2019-08-16 ENCOUNTER — LAB VISIT (OUTPATIENT)
Dept: LAB | Facility: HOSPITAL | Age: 69
End: 2019-08-16
Attending: FAMILY MEDICINE
Payer: MEDICARE

## 2019-08-16 DIAGNOSIS — E78.5 DYSLIPIDEMIA: ICD-10-CM

## 2019-08-16 DIAGNOSIS — I10 ESSENTIAL HYPERTENSION: ICD-10-CM

## 2019-08-16 DIAGNOSIS — E11.9 TYPE 2 DIABETES MELLITUS WITHOUT COMPLICATION, WITHOUT LONG-TERM CURRENT USE OF INSULIN: ICD-10-CM

## 2019-08-16 LAB
ALBUMIN SERPL BCP-MCNC: 3.5 G/DL (ref 3.5–5.2)
ALP SERPL-CCNC: 100 U/L (ref 55–135)
ALT SERPL W/O P-5'-P-CCNC: 16 U/L (ref 10–44)
ANION GAP SERPL CALC-SCNC: 8 MMOL/L (ref 8–16)
AST SERPL-CCNC: 14 U/L (ref 10–40)
BILIRUB SERPL-MCNC: 0.1 MG/DL (ref 0.1–1)
BUN SERPL-MCNC: 14 MG/DL (ref 8–23)
CALCIUM SERPL-MCNC: 9.4 MG/DL (ref 8.7–10.5)
CHLORIDE SERPL-SCNC: 108 MMOL/L (ref 95–110)
CHOLEST SERPL-MCNC: 218 MG/DL (ref 120–199)
CHOLEST/HDLC SERPL: 4.8 {RATIO} (ref 2–5)
CO2 SERPL-SCNC: 26 MMOL/L (ref 23–29)
CREAT SERPL-MCNC: 1 MG/DL (ref 0.5–1.4)
EST. GFR  (AFRICAN AMERICAN): >60 ML/MIN/1.73 M^2
EST. GFR  (NON AFRICAN AMERICAN): 58 ML/MIN/1.73 M^2
ESTIMATED AVG GLUCOSE: 146 MG/DL (ref 68–131)
GLUCOSE SERPL-MCNC: 84 MG/DL (ref 70–110)
HBA1C MFR BLD HPLC: 6.7 % (ref 4–5.6)
HDLC SERPL-MCNC: 45 MG/DL (ref 40–75)
HDLC SERPL: 20.6 % (ref 20–50)
LDLC SERPL CALC-MCNC: 142.2 MG/DL (ref 63–159)
NONHDLC SERPL-MCNC: 173 MG/DL
POTASSIUM SERPL-SCNC: 4.4 MMOL/L (ref 3.5–5.1)
PROT SERPL-MCNC: 7.8 G/DL (ref 6–8.4)
SODIUM SERPL-SCNC: 142 MMOL/L (ref 136–145)
TRIGL SERPL-MCNC: 154 MG/DL (ref 30–150)

## 2019-08-16 PROCEDURE — 80061 LIPID PANEL: CPT | Mod: HCNC

## 2019-08-16 PROCEDURE — 83036 HEMOGLOBIN GLYCOSYLATED A1C: CPT | Mod: HCNC

## 2019-08-16 PROCEDURE — 80053 COMPREHEN METABOLIC PANEL: CPT | Mod: HCNC

## 2019-08-16 PROCEDURE — 36415 COLL VENOUS BLD VENIPUNCTURE: CPT | Mod: HCNC,PN

## 2019-08-19 ENCOUNTER — OFFICE VISIT (OUTPATIENT)
Dept: FAMILY MEDICINE | Facility: CLINIC | Age: 69
End: 2019-08-19
Payer: MEDICARE

## 2019-08-19 VITALS
HEIGHT: 64 IN | TEMPERATURE: 98 F | WEIGHT: 201.94 LBS | HEART RATE: 73 BPM | BODY MASS INDEX: 34.48 KG/M2 | RESPIRATION RATE: 17 BRPM | OXYGEN SATURATION: 97 % | DIASTOLIC BLOOD PRESSURE: 76 MMHG | SYSTOLIC BLOOD PRESSURE: 136 MMHG

## 2019-08-19 DIAGNOSIS — E78.5 DYSLIPIDEMIA: ICD-10-CM

## 2019-08-19 DIAGNOSIS — E11.9 TYPE 2 DIABETES MELLITUS WITHOUT COMPLICATION, WITHOUT LONG-TERM CURRENT USE OF INSULIN: Primary | ICD-10-CM

## 2019-08-19 DIAGNOSIS — Z23 NEED FOR VACCINATION: ICD-10-CM

## 2019-08-19 DIAGNOSIS — I10 ESSENTIAL HYPERTENSION: ICD-10-CM

## 2019-08-19 PROCEDURE — 3044F HG A1C LEVEL LT 7.0%: CPT | Mod: HCNC,CPTII,S$GLB, | Performed by: FAMILY MEDICINE

## 2019-08-19 PROCEDURE — 99214 PR OFFICE/OUTPT VISIT, EST, LEVL IV, 30-39 MIN: ICD-10-PCS | Mod: HCNC,S$GLB,, | Performed by: FAMILY MEDICINE

## 2019-08-19 PROCEDURE — 99999 PR PBB SHADOW E&M-EST. PATIENT-LVL III: ICD-10-PCS | Mod: PBBFAC,HCNC,, | Performed by: FAMILY MEDICINE

## 2019-08-19 PROCEDURE — 1101F PT FALLS ASSESS-DOCD LE1/YR: CPT | Mod: HCNC,CPTII,S$GLB, | Performed by: FAMILY MEDICINE

## 2019-08-19 PROCEDURE — 3044F PR MOST RECENT HEMOGLOBIN A1C LEVEL <7.0%: ICD-10-PCS | Mod: HCNC,CPTII,S$GLB, | Performed by: FAMILY MEDICINE

## 2019-08-19 PROCEDURE — 3075F PR MOST RECENT SYSTOLIC BLOOD PRESS GE 130-139MM HG: ICD-10-PCS | Mod: HCNC,CPTII,S$GLB, | Performed by: FAMILY MEDICINE

## 2019-08-19 PROCEDURE — 1101F PR PT FALLS ASSESS DOC 0-1 FALLS W/OUT INJ PAST YR: ICD-10-PCS | Mod: HCNC,CPTII,S$GLB, | Performed by: FAMILY MEDICINE

## 2019-08-19 PROCEDURE — 3078F PR MOST RECENT DIASTOLIC BLOOD PRESSURE < 80 MM HG: ICD-10-PCS | Mod: HCNC,CPTII,S$GLB, | Performed by: FAMILY MEDICINE

## 2019-08-19 PROCEDURE — 99999 PR PBB SHADOW E&M-EST. PATIENT-LVL III: CPT | Mod: PBBFAC,HCNC,, | Performed by: FAMILY MEDICINE

## 2019-08-19 PROCEDURE — 3075F SYST BP GE 130 - 139MM HG: CPT | Mod: HCNC,CPTII,S$GLB, | Performed by: FAMILY MEDICINE

## 2019-08-19 PROCEDURE — 99214 OFFICE O/P EST MOD 30 MIN: CPT | Mod: HCNC,S$GLB,, | Performed by: FAMILY MEDICINE

## 2019-08-19 PROCEDURE — 3078F DIAST BP <80 MM HG: CPT | Mod: HCNC,CPTII,S$GLB, | Performed by: FAMILY MEDICINE

## 2019-08-19 RX ORDER — ZOSTER VACCINE RECOMBINANT, ADJUVANTED 50 MCG/0.5
0.5 KIT INTRAMUSCULAR ONCE
Qty: 1 EACH | Refills: 1 | Status: SHIPPED | OUTPATIENT
Start: 2019-08-19 | End: 2019-08-19

## 2019-08-19 RX ORDER — PRAVASTATIN SODIUM 40 MG/1
40 TABLET ORAL DAILY
Qty: 90 TABLET | Refills: 0 | Status: SHIPPED | OUTPATIENT
Start: 2019-08-19 | End: 2019-10-11

## 2019-08-20 ENCOUNTER — TELEPHONE (OUTPATIENT)
Dept: ORTHOPEDICS | Facility: CLINIC | Age: 69
End: 2019-08-20

## 2019-08-20 NOTE — TELEPHONE ENCOUNTER
Called patient in regard to appt on 8/30/19 with Dr Goodman at [facility]. Left a detailed message explaining to her that Dr. Goodman will be out and that we need to get her rescheduled. Left a call back number so we could take care of this for her.

## 2019-08-20 NOTE — PROGRESS NOTES
Routine Office Visit    Patient Name: Faby Luna    : 1950  MRN: [id number]    Subjective:  Faby is a 68 y.o. female who presents today for:   Chief Complaint   Patient presents with    Diabetes    Follow-up     68-year-old female with diabetes, hypertension, and dyslipidemia comes in for routine follow-up on these.  She reports taking her medications as prescribed.  She reports that overall she is feeling well.  She reports no new concerns since her last visit.  She reports that she is tolerating the pravastatin well without any pain.  She had been prescribed pravastatin as in the past she could not tolerate atorvastatin at all, as it caused her severe muscle pains.    Past Medical History  Past Medical History:   Diagnosis Date    Arthritis     Diabetes mellitus     Hypertension        Past Surgical History  Past Surgical History:   Procedure Laterality Date     SECTION      CHOLECYSTECTOMY      DECOMPRESSION, NERVE, ULNAR - right Right 3/8/2019    Performed by Pan Goodman [facility] at [facility] OR    HYSTERECTOMY      INJECTION, STEROID, SPINE, LUMBAR, EPIDURAL N/A 2018    Performed by Shaq Silverio MD at [facility] PAIN MGT    INJECTION-FACET Bilateral 2018    Performed by Shaq [facility]io [facility] at [facility] PAIN [facility]GT    INJECTION-STEROID-EPIDURAL-TRANSFORAMINAL Left 2018    Performed by Shaq [facility]io [facility] at [facility] PAIN [facility]GT    INSERTION,NEUROSTIMULATOR,SPINAL CORD N/A 10/26/2018    [facility]    RELEASE, CARPAL TUNNEL right Right 3/8/2019    Performed by Pan [facility] at [facility] OR    RELEASE, CARPAL TUNNEL- LEFT Left 2019    Performed by Pan Goodman MD at [facility] OR 2ND FLR    TRIAL, NEUROSTIMULATOR, SPINAL CORD, SPINAL CORD STIMULATOR TRIAL-INTERNAL WIRES TO EXTERNAL BATTERY N/A 2018    Performed by Shaq Silverio MD at [facility] PAIN MGT        Family History  No family history on file.    Social History  Social History     Socioeconomic  History    Marital status:      Spouse name: Not on file    Number of children: Not on file    Years of education: Not on file    Highest education level: Not on file   Occupational History    Not on file   Social Needs    Financial resource strain: Not on file    Food insecurity:     Worry: Not on file     Inability: Not on file    Transportation needs:     Medical: Not on file     Non-medical: Not on file   Tobacco Use    Smoking status: Never Smoker    Smokeless tobacco: Never Used   Substance and Sexual Activity    Alcohol use: No    Drug use: No    Sexual activity: Not on file   Lifestyle    Physical activity:     Days per week: Not on file     Minutes per session: Not on file    Stress: Not on file   Relationships    Social connections:     Talks on phone: Not on file     Gets together: Not on file     Attends [employer] service: Not on file     Active member of club or organization: Not on file     Attends meetings of clubs or organizations: Not on file     Relationship status: Not on file   Other Topics Concern    Not on file   Social History Narrative    Not on file       Current Medications  Current Outpatient Medications on File Prior to Visit   Medication Sig Dispense Refill    atenolol (TENORMIN) 100 MG tablet Take 1 tablet (100 mg total) by mouth once daily. 90 tablet 0    blood-glucose meter kit Use as instructed 1 each 0    escitalopram oxalate (LEXAPRO) 20 MG tablet Take 1 tablet (20 mg total) by mouth once daily. 90 tablet 1    furosemide (LASIX) 20 MG tablet Take 1 tablet (20 mg total) by mouth once daily. 90 tablet 1    glipiZIDE (GLUCOTROL) 10 MG tablet Take 10 mg by mouth 2 (two) times daily with meals.       HYDROcodone-acetaminophen (NORCO) 5-325 mg per tablet Take 1 tablet by mouth every 4 (four) hours as needed for Pain. 20 tablet 0    hydrOXYzine HCl (ATARAX) 25 MG tablet Take 1 tablet (25 mg total) by mouth nightly as needed (Insomnia). 90 tablet 0     "loratadine (CLARITIN) 10 mg tablet Take 1 tablet (10 mg total) by mouth daily as needed for Allergies. 90 tablet 0    losartan (COZAAR) 25 MG tablet Take 1 tablet (25 mg total) by mouth once daily. 90 tablet 3    metFORMIN (GLUCOPHAGE) 1000 MG tablet Take 1,000 mg by mouth every evening.      pantoprazole (PROTONIX) 20 MG tablet Take 2 tablets (40 mg total) by mouth daily as needed (Stomach pain). 90 tablet 1    zonisamide (ZONEGRAN) 100 MG Cap TAKE 4 CAPSULES BY MOUTH AT BEDTIME 360 capsule 0    [DISCONTINUED] pravastatin (PRAVACHOL) 20 MG tablet Take 1 tablet (20 mg total) by mouth once daily. 90 tablet 0     No current facility-administered medications on file prior to visit.        Allergies   Review of patient's allergies indicates:   Allergen Reactions    Atorvastatin      Pain    Aspirin Other (See Comments)     Has been told not to take it due to bariatric surgery     Review of Systems   Constitutional: Negative for unexpected weight change.   HENT: Negative for ear pain and sore throat.    Eyes: Negative for visual disturbance.   Respiratory: Negative for shortness of breath.    Cardiovascular: Negative for chest pain.   Gastrointestinal: Negative for abdominal pain, blood in stool, constipation and diarrhea.   Genitourinary: Negative for dysuria and frequency.   Skin: Negative for rash.   Neurological: Negative for weakness, numbness and headaches.   Hematological: Negative for adenopathy.     /76 (BP Location: Left arm, Patient Position: Sitting, BP Method: Small (Manual))   Pulse 73   Temp 97.6 °F (36.4 °C) (Oral)   Resp 17   Ht 5' 4" (1.626 m)   Wt 91.6 kg (201 lb 15.1 oz)   SpO2 97%   BMI 34.66 kg/m²     Physical Exam   Constitutional: She is oriented to person, place, and time. She appears well-developed and well-nourished.   HENT:   Head: Normocephalic and atraumatic.   Right Ear: External ear normal.   Left Ear: External ear normal.   Nose: Nose normal.   Mouth/Throat: Oropharynx " is clear and moist. No oropharyngeal exudate.   Eyes: Pupils are equal, round, and reactive to light. Conjunctivae and EOM are normal. Right eye exhibits no discharge. Left eye exhibits no discharge.   Neck: Normal range of motion. Neck supple. No tracheal deviation present.   Cardiovascular: Normal rate, regular rhythm, normal heart sounds and intact distal pulses.   No murmur heard.  Pulmonary/Chest: Effort normal and breath sounds normal. She has no wheezes. She has no rales.   Abdominal: Soft. Bowel sounds are normal. She exhibits no mass. There is no tenderness. There is no rigidity, no guarding and no CVA tenderness.   Lymphadenopathy:     She has no cervical adenopathy.   Neurological: She is oriented to person, place, and time. She has normal strength. She displays no atrophy. No sensory deficit. Gait normal.   Reflex Scores:       Patellar reflexes are 2+ on the right side and 2+ on the left side.  Psychiatric: She has a normal mood and affect.   Vitals reviewed.    Protective Sensation (w/ 10 gram monofilament):  Right: Intact  Left: Intact    Visual Inspection:  Normal -  Bilateral    Pedal Pulses:   Right: Present  Left: Present    Posterior tibialis:   Right:Present  Left: Present        Lab Visit on 08/16/2019   Component Date Value Ref Range Status    Sodium 08/16/2019 142  136 - 145 mmol/L Final    Potassium 08/16/2019 4.4  3.5 - 5.1 mmol/L Final    Chloride 08/16/2019 108  95 - 110 mmol/L Final    CO2 08/16/2019 26  23 - 29 mmol/L Final    Glucose 08/16/2019 84  70 - 110 mg/dL Final    BUN, Bld 08/16/2019 14  8 - 23 mg/dL Final    Creatinine 08/16/2019 1.0  0.5 - 1.4 mg/dL Final    Calcium 08/16/2019 9.4  8.7 - 10.5 mg/dL Final    Total Protein 08/16/2019 7.8  6.0 - 8.4 g/dL Final    Albumin 08/16/2019 3.5  3.5 - 5.2 g/dL Final    Total Bilirubin 08/16/2019 0.1  0.1 - 1.0 mg/dL Final    Comment: For infants and newborns, interpretation of results should be based  on gestational age,  weight and in agreement with clinical  observations.  Premature Infant recommended reference ranges:  Up to 24 hours.............<8.0 mg/dL  Up to 48 hours............<12.0 mg/dL  3-5 days..................<15.0 mg/dL  6-29 days.................<15.0 mg/dL      Alkaline Phosphatase 08/16/2019 100  55 - 135 U/L Final    AST 08/16/2019 14  10 - 40 U/L Final    ALT 08/16/2019 16  10 - 44 U/L Final    Anion Gap 08/16/2019 8  8 - 16 mmol/L Final    eGFR if African American 08/16/2019 >60  >60 mL/min/1.73 m^2 Final    eGFR if non African American 08/16/2019 58* >60 mL/min/1.73 m^2 Final    Comment: Calculation used to obtain the estimated glomerular filtration  rate (eGFR) is the CKD-EPI equation.       Cholesterol 08/16/2019 218* 120 - 199 mg/dL Final    Comment: The National Cholesterol Education Program (NCEP) has set the  following guidelines (reference ranges) for Cholesterol:  Optimal.....................<200 mg/dL  Borderline High.............200-239 mg/dL  High........................> or = 240 mg/dL      Triglycerides 08/16/2019 154* 30 - 150 mg/dL Final    Comment: The National Cholesterol Education Program (NCEP) has set the  following guidelines (reference values) for triglycerides:  Normal......................<150 mg/dL  Borderline High.............150-199 mg/dL  High........................200-499 mg/dL      HDL 08/16/2019 45  40 - 75 mg/dL Final    Comment: The National Cholesterol Education Program (NCEP) has set the  following guidelines (reference values) for HDL Cholesterol:  Low...............<40 mg/dL  Optimal...........>60 mg/dL      LDL Cholesterol 08/16/2019 142.2  63.0 - 159.0 mg/dL Final    Comment: The National Cholesterol Education Program (NCEP) has set the  following guidelines (reference values) for LDL Cholesterol:  Optimal.......................<130 mg/dL  Borderline High...............130-159 mg/dL  High..........................160-189 mg/dL  Very High.....................>190  mg/dL      Hdl/Cholesterol Ratio 08/16/2019 20.6  20.0 - 50.0 % Final    Total Cholesterol/HDL Ratio 08/16/2019 4.8  2.0 - 5.0 Final    Non-HDL Cholesterol 08/16/2019 173  mg/dL Final    Comment: Risk category and Non-HDL cholesterol goals:  Coronary heart disease (CHD)or equivalent (10-year risk of CHD >20%):  Non-HDL cholesterol goal     <130 mg/dL  Two or more CHD risk factors and 10-year risk of CHD <= 20%:  Non-HDL cholesterol goal     <160 mg/dL  0 to 1 CHD risk factor:  Non-HDL cholesterol goal     <190 mg/dL      Hemoglobin A1C 08/16/2019 6.7* 4.0 - 5.6 % Final    Comment: ADA Screening Guidelines:  5.7-6.4%  Consistent with prediabetes  >or=6.5%  Consistent with diabetes  High levels of fetal hemoglobin interfere with the HbA1C  assay. Heterozygous hemoglobin variants (HbS, HgC, etc)do  not significantly interfere with this assay.   However, presence of multiple variants may affect accuracy.      Estimated Avg Glucose 08/16/2019 146* 68 - 131 mg/dL Final         Assessment/Plan:  Faby was seen today for diabetes and follow-up.    Diagnoses and all orders for this visit:    Type 2 diabetes mellitus without complication, without long-term current use of insulin  -     Comprehensive metabolic panel; Future  -     Hemoglobin A1c; Future  A1c shows good control.  Continue current regimen.  Follow-up in three months.  Labs to be done prior to next visit.    Essential hypertension  -     Comprehensive metabolic panel; Future  Fair control blood pressure.  Continue current regimen.    Dyslipidemia  -     pravastatin (PRAVACHOL) 40 MG tablet; Take 1 tablet (40 mg total) by mouth once daily.  -     Comprehensive metabolic panel; Future  -     Lipid panel; Future  LDL still elevated, will increase her pravastatin to 40 milligrams.    Need for vaccination  -     SHINGRIX, PF, 50 mcg/0.5 mL injection; Inject 0.5 mLs into the muscle once. Now and 1 dose in 2-6 months for 1 dose  Shingles vaccination prescription  sent to pharmacy.              -Sea Her Jr., MD, AAHIVS          This office note has been dictated.  This dictation has been generated using M-Modal Fluency Direct dictation; some phonetic errors may occur.

## 2019-08-23 DIAGNOSIS — Z12.11 COLON CANCER SCREENING: Primary | ICD-10-CM

## 2019-08-27 DIAGNOSIS — J30.2 SEASONAL ALLERGIES: ICD-10-CM

## 2019-08-27 DIAGNOSIS — G47.00 INSOMNIA, UNSPECIFIED TYPE: ICD-10-CM

## 2019-08-27 RX ORDER — HYDROXYZINE HYDROCHLORIDE 25 MG/1
TABLET, FILM COATED ORAL
Qty: 90 TABLET | Refills: 0 | Status: SHIPPED | OUTPATIENT
Start: 2019-08-27 | End: 2020-07-16

## 2019-08-27 RX ORDER — LORATADINE 10 MG/1
TABLET ORAL
Qty: 90 TABLET | Refills: 0 | Status: SHIPPED | OUTPATIENT
Start: 2019-08-27 | End: 2019-11-22 | Stop reason: SDUPTHER

## 2019-08-28 ENCOUNTER — TELEPHONE (OUTPATIENT)
Dept: PAIN MEDICINE | Facility: CLINIC | Age: 69
End: 2019-08-28

## 2019-08-28 ENCOUNTER — TELEPHONE (OUTPATIENT)
Dept: ORTHOPEDICS | Facility: CLINIC | Age: 69
End: 2019-08-28

## 2019-08-28 ENCOUNTER — PATIENT OUTREACH (OUTPATIENT)
Dept: ADMINISTRATIVE | Facility: OTHER | Age: 69
End: 2019-08-28

## 2019-08-28 NOTE — TELEPHONE ENCOUNTER
My name is Staff, I am contacting you from [facility] regarding your appointment scheduled for 08.29.19, with WARREN, just confirming you will be able to make it.    If you feel you need to reschedule or canceled please give our office a call so we can better assist you.      Staff requesting patient to arrive 15 mins ahead of schedule appointment time.    Pt mailbox was full to leave a messag

## 2019-08-28 NOTE — TELEPHONE ENCOUNTER
2nd attempt to reach patient in regard to appt scheduled on 8/30/2019. Called the patient on 8/20/2019 to let her know that Dr. Goodman would be out of the office on the 30th and that we needed to reschedule. Haven't heard back since.     Tried to call again today, and was unable to make contact or leave a voicemail due to the mailbox being full.

## 2019-08-29 ENCOUNTER — OFFICE VISIT (OUTPATIENT)
Dept: PAIN MEDICINE | Facility: CLINIC | Age: 69
End: 2019-08-29
Payer: MEDICARE

## 2019-08-29 VITALS
BODY MASS INDEX: 31.24 KG/M2 | TEMPERATURE: 99 F | DIASTOLIC BLOOD PRESSURE: 54 MMHG | WEIGHT: 183 LBS | RESPIRATION RATE: 18 BRPM | HEART RATE: 75 BPM | SYSTOLIC BLOOD PRESSURE: 110 MMHG | HEIGHT: 64 IN

## 2019-08-29 DIAGNOSIS — M47.26 OSTEOARTHRITIS OF SPINE WITH RADICULOPATHY, LUMBAR REGION: ICD-10-CM

## 2019-08-29 DIAGNOSIS — G89.4 CHRONIC PAIN SYNDROME: ICD-10-CM

## 2019-08-29 DIAGNOSIS — M48.061 SPINAL STENOSIS OF LUMBAR REGION, UNSPECIFIED WHETHER NEUROGENIC CLAUDICATION PRESENT: ICD-10-CM

## 2019-08-29 DIAGNOSIS — M47.816 LUMBAR SPONDYLOSIS: Primary | ICD-10-CM

## 2019-08-29 PROCEDURE — 99213 OFFICE O/P EST LOW 20 MIN: CPT | Mod: HCNC,S$GLB,, | Performed by: NURSE PRACTITIONER

## 2019-08-29 PROCEDURE — 3078F DIAST BP <80 MM HG: CPT | Mod: HCNC,CPTII,S$GLB, | Performed by: NURSE PRACTITIONER

## 2019-08-29 PROCEDURE — 1101F PT FALLS ASSESS-DOCD LE1/YR: CPT | Mod: HCNC,CPTII,S$GLB, | Performed by: NURSE PRACTITIONER

## 2019-08-29 PROCEDURE — 3074F SYST BP LT 130 MM HG: CPT | Mod: HCNC,CPTII,S$GLB, | Performed by: NURSE PRACTITIONER

## 2019-08-29 PROCEDURE — 3074F PR MOST RECENT SYSTOLIC BLOOD PRESSURE < 130 MM HG: ICD-10-PCS | Mod: HCNC,CPTII,S$GLB, | Performed by: NURSE PRACTITIONER

## 2019-08-29 PROCEDURE — 99999 PR PBB SHADOW E&M-EST. PATIENT-LVL III: CPT | Mod: PBBFAC,HCNC,, | Performed by: NURSE PRACTITIONER

## 2019-08-29 PROCEDURE — 99213 PR OFFICE/OUTPT VISIT, EST, LEVL III, 20-29 MIN: ICD-10-PCS | Mod: HCNC,S$GLB,, | Performed by: NURSE PRACTITIONER

## 2019-08-29 PROCEDURE — 99999 PR PBB SHADOW E&M-EST. PATIENT-LVL III: ICD-10-PCS | Mod: PBBFAC,HCNC,, | Performed by: NURSE PRACTITIONER

## 2019-08-29 PROCEDURE — 3078F PR MOST RECENT DIASTOLIC BLOOD PRESSURE < 80 MM HG: ICD-10-PCS | Mod: HCNC,CPTII,S$GLB, | Performed by: NURSE PRACTITIONER

## 2019-08-29 PROCEDURE — 1101F PR PT FALLS ASSESS DOC 0-1 FALLS W/OUT INJ PAST YR: ICD-10-PCS | Mod: HCNC,CPTII,S$GLB, | Performed by: NURSE PRACTITIONER

## 2019-08-29 NOTE — H&P (VIEW-ONLY)
Chronic patient Established Note (Follow up visit)      SUBJECTIVE:    Faby Luna presents to the clinic for a follow-up appointment for lower back pain.  She previously had significant leg pain which has resolved with [employer] SCS implant.  She is here today to discuss increased lower back pain.  It is sharp and throbbing in nature.  It is significant in the morning and with prolonged standing and activity.  She has some benefit with stretching.  She denies any recent falls.  Since the last visit, Faby Luna states the pain has been worsening.  Current pain intensity is 8/10.    Pain Disability Index Review:  Last 3 PDI Scores 8/29/2019 2/27/2019 11/27/2018   Pain Disability Index (PDI) 44 8 9       Opioid Contract: no     report:  Not applicable    Pain Procedures:   5/2/18 Left L3 and L4 TF ELISE- 20% relief  5/21/18 Bilateral L4-5 and L5-S1 facet joint injections- no relief  9/24/18 Lumbar SCS trial ([employer]ott)- 100% relief  10/26/18 Lumbar SCS implant ([employer])- 100% relief of leg pain    Physical Therapy/Home Exercise:   yes in the past    Imaging:     3/31/18 Lumbar MRI    Narrative     EXAMINATION:  MRI LUMBAR SPINE WITHOUT CONTRAST    CLINICAL HISTORY:  Low back pain, >6wks conservative tx, persistent-progressive sx, surgical candidate; Other spondylosis with radiculopathy, lumbar region    TECHNIQUE:  Multiplanar, multisequence MR images were acquired from the thoracolumbar junction to the sacrum without the administration of contrast.    COMPARISON:  Plain films from 03/27/2018    FINDINGS:  There is grade 1 spondylolisthesis of L4 on L5. The vertebral body heights are well maintained, with no fracture.  No marrow signal abnormality suspicious for an infiltrative process.    The conus medullaris terminates at approximately the mid body of L1.  There is a cyst associated with the upper pole of the right kidney.  There is disc desiccation noted throughout the lumbar spine with  relative sparing of the L5-S1 disc.  Mild disc space narrowing present at the L4-5 level.    L1-L2: Mild diffuse disc bulge resulting in no significant central or neural foraminal canal narrowing.    L2-L3: No significant central canal narrowing.  There is mild narrowing of either neural foraminal canal secondary to disc material.    L3-L4: Disc bulging in the bilateral foraminal regions resulting in no significant central canal narrowing.  Mild-to-moderate bilateral facet arthropathy also noted.  The bilateral neural foraminal canals are moderately narrowed with some mild effacement of either exiting L3 nerve root in the extraforaminal regions bilaterally.    L4-L5:  Grade 1 spondylolisthesis along with moderate to severe bilateral facet arthropathy and ligamentum flavum hypertrophy resulting in at least moderate narrowing of the central canal.  The right neural foraminal canal is mildly to moderately narrowed.  Left neural foraminal canal is moderately to severely narrowed with mild effacement of the exiting L4 nerve root.    L5-S1:  No significant central or neural foraminal canal narrowing noted.  Mild bilateral facet arthropathy noted.   Impression       1. Multilevel degenerative changes of the lumbar spine as detailed above     Lumbar XRAYs 3/27/18    Narrative     EXAMINATION:  XR LUMBAR SPINE AP AND LAT WITH FLEX/EXT    CLINICAL HISTORY:  Low back pain    TECHNIQUE:  AP and lateral views as well as lateral flexion and extension images are performed through the lumbar spine.    COMPARISON:  None    FINDINGS:  X-ray lumbar spine with flexion and extension demonstrates grade 1 spondylolisthesis at L4-5 measuring around 6 mm which does not change significantly with flexion and extension.  The L4-5 disc is narrowed and degenerated.  Other lumbar vertebral discs are maintained.  Vertebral body heights are maintained.  Small anterior spurs are seen, and there is small posterior osteophyte along the inferior  endplate of L4.  There is lumbar facet arthropathy at L4-5 and L5-S1.   Impression       Degenerative changes as above.  Grade 1 anterolisthesis and degenerative disc disease at L4-5.         Allergies:   Review of patient's allergies indicates:   Allergen Reactions    Aspirin Other (See Comments)     Has been told not to take it due to bariatric surgery       Current Medications:   Current Outpatient Medications   Medication Sig Dispense Refill    atenolol (TENORMIN) 100 MG tablet Take 1 tablet (100 mg total) by mouth once daily. 90 tablet 0    escitalopram oxalate (LEXAPRO) 20 MG tablet Take 1 tablet (20 mg total) by mouth once daily. 90 tablet 1    furosemide (LASIX) 20 MG tablet Take 1 tablet (20 mg total) by mouth once daily. 90 tablet 1    glipiZIDE (GLUCOTROL) 10 MG tablet Take 10 mg by mouth 2 (two) times daily with meals.       HYDROcodone-acetaminophen (NORCO) 5-325 mg per tablet Take 1 tablet by mouth every 4 (four) hours as needed for Pain. 20 tablet 0    hydrOXYzine HCl (ATARAX) 25 MG tablet TAKE 1 TABLET BY MOUTH NIGHTLY AS NEEDED FOR INSOMNIA 90 tablet 0    loratadine (CLARITIN) 10 mg tablet TAKE 1 TABLET BY MOUTH ONCE DAILY AS NEEDED FOR ALLERGIES 90 tablet 0    losartan (COZAAR) 25 MG tablet Take 1 tablet (25 mg total) by mouth once daily. 90 tablet 3    metFORMIN (GLUCOPHAGE) 1000 MG tablet Take 1,000 mg by mouth every evening.      pantoprazole (PROTONIX) 20 MG tablet Take 2 tablets (40 mg total) by mouth daily as needed (Stomach pain). 90 tablet 1    pravastatin (PRAVACHOL) 40 MG tablet Take 1 tablet (40 mg total) by mouth once daily. 90 tablet 0    zonisamide (ZONEGRAN) 100 MG Cap TAKE 4 CAPSULES BY MOUTH AT BEDTIME 360 capsule 0    blood-glucose meter kit Use as instructed 1 each 0     No current facility-administered medications for this visit.        REVIEW OF SYSTEMS:    GENERAL:  No weight loss, malaise or fevers.  HEENT:  Negative for frequent or significant headaches.  NECK:   Negative for lumps, goiter, pain and significant neck swelling.  RESPIRATORY:  Negative for cough, wheezing or shortness of breath.  CARDIOVASCULAR:  Negative for chest pain, leg swelling or palpitations. Hypertension.  GI:  Negative for abdominal discomfort, blood in stools or black stools or change in bowel habits.  MUSCULOSKELETAL:  See HPI.  SKIN:  Negative for lesions, rash, and itching.  PSYCH:  Negative for sleep disturbance, mood disorder and recent psychosocial stressors.  HEMATOLOGY/LYMPHOLOGY:  Negative for prolonged bleeding, bruising easily or swollen nodes.  NEURO:   No history of headaches, syncope, paralysis, seizures or tremors.  ENDO: Diabetes.  All other reviewed and negative other than HPI.    Past Medical History:  Past Medical History:   Diagnosis Date    Arthritis     Diabetes mellitus     Hypertension        Past Surgical History:  Past Surgical History:   Procedure Laterality Date     SECTION      CHOLECYSTECTOMY      DECOMPRESSION, NERVE, ULNAR - right Right 3/8/2019    [facility] OR    HYSTERECTOMY      INJECTION, STEROID, SPINE, LUMBAR, EPIDURAL N/A 2018    Performed by [facility]q [facility]io [facility] at [facility] PAIN MGT    INJECTION-FACET Bilateral 2018    [facility] at [facility] PAIN MGT    INJECTION-STEROID-EPIDURAL-TRANSFORAMINAL Left 2018    Performed [facility] at [facility] PAIN MGT    INSERTION,NEUROSTIMULATOR,SPINAL CORD N/A 10/26/2018    [facility] OR    RELEASE, CARPAL TUNNEL right Right 3/8/2019    [facility] by [facility] at [facility] OR    RELEASE, CARPAL TUNNEL- LEFT Left 2019    [facility] 2ND FLR    TRIAL, NEUROSTIMULATOR, SPINAL CORD, SPINAL CORD STIMULATOR TRIAL-INTERNAL WIRES TO EXTERNAL BATTERY N/A 2018    Performed by [facility]q [facility]io [facility] at [facility] PAIN MGT       Family History:  No family history on file.    Social History:  Social  "History     Socioeconomic History    Marital status:      Spouse name: Not on file    Number of children: Not on file    Years of education: Not on file    Highest education level: Not on file   Occupational History    Not on file   Social Needs    Financial resource strain: Not on file    Food insecurity:     Worry: Not on file     Inability: Not on file    Transportation needs:     Medical: Not on file     Non-medical: Not on file   Tobacco Use    Smoking status: Never Smoker    Smokeless tobacco: Never Used   Substance and Sexual Activity    Alcohol use: No    Drug use: No    Sexual activity: Not on file   Lifestyle    Physical activity:     Days per week: Not on file     Minutes per session: Not on file    Stress: Not on file   Relationships    Social connections:     Talks on phone: Not on file     Gets together: Not on file     Attends Hoahaoism service: Not on file     Active member of club or organization: Not on file     Attends meetings of clubs or organizations: Not on file     Relationship status: Not on file   Other Topics Concern    Not on file   Social History Narrative    Not on file       OBJECTIVE:    BP (!) 110/54   Pulse 75   Temp 98.5 °F (36.9 °C)   Resp 18   Ht 5' 4" (1.626 m)   Wt 83 kg (183 lb)   BMI 31.41 kg/m²     PHYSICAL EXAMINATION:    General appearance: Well appearing, in no acute distress, alert and oriented x3.  Psych:  Mood and affect appropriate.  Skin: Skin color, texture, turgor normal, no rashes or lesions, in both upper and lower body.  SCS sites well healed.  Head/face:  Atraumatic, normocephalic. No palpable lymph nodes  Back: Straight leg raising in the sitting and supine positions is negative to radicular pain. No pain with palpation to lumbar facet joints.  Limited flexion and extension with pain.  Positive facet loading bilaterally.  There is mild pain with palpation to SI joints.  TOBY is negative bilaterally.  Extremities: Peripheral " joint ROM is full and pain free without obvious instability or laxity in all four extremities. No deformities, edema, or skin discoloration. Good capillary refill.  Musculoskeletal: 5/5 strength in right ankle with plantar and dorsiflexion. 5/5 strength in left ankle with plantar and dorsiflexion. 5/5 strength with right knee flexion and extension. 5/5 strength with left knee flexion and extension. 5/5 strength in right EHL, 5/5 strength in left EHL. No atrophy or tone abnormalities are noted.  Neuro: Bilateral upper and lower extremity coordination and muscle stretch reflexes are physiologic and symmetric.  Plantar response are downgoing.  No loss of sensation in BLE.  Gait: Antalgic- ambulates without assistance.    ASSESSMENT: 68 y.o. year old female with back pain, consistent with the following diagnoses:     1. Lumbar spondylosis     2. Chronic pain syndrome     3. Osteoarthritis of spine with radiculopathy, lumbar region     4. Spinal stenosis of lumbar region, unspecified whether neurogenic claudication present           PLAN:     - Previous imaging was reviewed and discussed with the patient today.    - Continue with lumbar SCS for leg pain.    - She is having back pain which seems mainly consistent with lumbar facet arthropathy.  Will schedule for bilateral L3,4,5 MBB.  The patient will call with relief and if diagnostic, we can schedule radiofrequency ablation of affected nerves, one side followed by the other 2 weeks apart.    - RTC after completion of procedures.    - Counseled patient regarding the importance of constant sleeping habits and physical therapy.      The above plan and management options were discussed at length with patient. Patient is in agreement with the above and verbalized understanding.    Renetta Steele  08/29/2019

## 2019-08-30 ENCOUNTER — TELEPHONE (OUTPATIENT)
Dept: ORTHOPEDICS | Facility: CLINIC | Age: 69
End: 2019-08-30

## 2019-08-30 NOTE — TELEPHONE ENCOUNTER
----- Message from Kvng Quijano, Patient Care Assistant sent at 8/30/2019  9:18 AM CDT -----  Contact: CLAUDIA MARTINEZ [[id number]  Name of Who is Calling: CLAUDIA MARTINEZ [[phone]    What is the request in detail: Patient requesting a call back in regards to rescheduling Post Op appointment. Please contact to further discuss and advise      Can the clinic reply by [facility]: No    [facility]   [phone]

## 2019-09-06 ENCOUNTER — OFFICE VISIT (OUTPATIENT)
Dept: ORTHOPEDICS | Facility: CLINIC | Age: 69
End: 2019-09-06
Payer: MEDICARE

## 2019-09-06 VITALS — HEIGHT: 64 IN | BODY MASS INDEX: 31.24 KG/M2 | WEIGHT: 183 LBS

## 2019-09-06 DIAGNOSIS — G56.02 LEFT CARPAL TUNNEL SYNDROME: Primary | ICD-10-CM

## 2019-09-06 PROCEDURE — 99999 PR PBB SHADOW E&M-EST. PATIENT-LVL III: ICD-10-PCS | Mod: PBBFAC,HCNC,, | Performed by: ORTHOPAEDIC SURGERY

## 2019-09-06 PROCEDURE — 99024 POSTOP FOLLOW-UP VISIT: CPT | Mod: HCNC,S$GLB,, | Performed by: ORTHOPAEDIC SURGERY

## 2019-09-06 PROCEDURE — 99024 PR POST-OP FOLLOW-UP VISIT: ICD-10-PCS | Mod: HCNC,S$GLB,, | Performed by: ORTHOPAEDIC SURGERY

## 2019-09-06 PROCEDURE — 99999 PR PBB SHADOW E&M-EST. PATIENT-LVL III: CPT | Mod: PBBFAC,HCNC,, | Performed by: ORTHOPAEDIC SURGERY

## 2019-09-06 NOTE — PROGRESS NOTES
AishaAnjelica Luna presents for post-operative evaluation.  The patient is now 6 weeks s/p left carpal tunnel release.  Overall the patient reports doing well.  She has essentially no complaints of pain today.  She notes that her symptoms are 100% resolved bilaterally and she is very pleased with her results of surgical treatment with bilateral carpal tunnel releases.  No complaints today.    PE:    AA&O x 4.  NAD  HEENT:  NCAT, sclera nonicteric  Lungs:  Respirations are equal and unlabored.  CV:  2+ bilateral upper and lower extremity pulses.  MSK: The wound is healing well with no signs of erythema or warmth.  There is no drainage.  No clinical signs or symptoms of infection are present. The patient is able to make a full composite fist without any difficulty and extend all fingers fully.  Bilateral upper extremities are neurovascularly intact.    A/P: Status post above, doing well  1) Continue with full weight bearing  2) F/U as needed and I would be happy to re-evaluate her at any time     Please be aware that this note has been generated with the assistance of Kassie voice-to-text.  Please excuse any spelling or grammatical errors.

## 2019-09-11 ENCOUNTER — HOSPITAL ENCOUNTER (EMERGENCY)
Facility: HOSPITAL | Age: 69
Discharge: HOME | End: 2019-09-11
Attending: EMERGENCY MEDICINE
Payer: MEDICARE

## 2019-09-11 VITALS
OXYGEN SATURATION: 99 % | BODY MASS INDEX: 30.39 KG/M2 | HEIGHT: 64 IN | RESPIRATION RATE: 22 BRPM | HEART RATE: 64 BPM | DIASTOLIC BLOOD PRESSURE: 70 MMHG | WEIGHT: 178 LBS | TEMPERATURE: 98 F | SYSTOLIC BLOOD PRESSURE: 153 MMHG

## 2019-09-11 DIAGNOSIS — R10.9 ACUTE ABDOMINAL PAIN: Primary | ICD-10-CM

## 2019-09-11 DIAGNOSIS — R11.0 NAUSEA: ICD-10-CM

## 2019-09-11 LAB
ALBUMIN SERPL BCP-MCNC: 3.4 G/DL (ref 3.5–5.2)
ALP SERPL-CCNC: 88 U/L (ref 55–135)
ALT SERPL W/O P-5'-P-CCNC: 18 U/L (ref 10–44)
ANION GAP SERPL CALC-SCNC: 6 MMOL/L (ref 8–16)
AST SERPL-CCNC: 18 U/L (ref 10–40)
BACTERIA #/AREA URNS HPF: ABNORMAL /HPF
BASOPHILS # BLD AUTO: 0.02 K/UL (ref 0–0.2)
BASOPHILS NFR BLD: 0.1 % (ref 0–1.9)
BILIRUB SERPL-MCNC: 0.2 MG/DL (ref 0.1–1)
BILIRUB UR QL STRIP: NEGATIVE
BUN SERPL-MCNC: 13 MG/DL (ref 8–23)
CALCIUM SERPL-MCNC: 8.9 MG/DL (ref 8.7–10.5)
CHLORIDE SERPL-SCNC: 110 MMOL/L (ref 95–110)
CLARITY UR: ABNORMAL
CO2 SERPL-SCNC: 24 MMOL/L (ref 23–29)
COLOR UR: YELLOW
CREAT SERPL-MCNC: 1 MG/DL (ref 0.5–1.4)
DIFFERENTIAL METHOD: ABNORMAL
EOSINOPHIL # BLD AUTO: 0.3 K/UL (ref 0–0.5)
EOSINOPHIL NFR BLD: 2.1 % (ref 0–8)
ERYTHROCYTE [DISTWIDTH] IN BLOOD BY AUTOMATED COUNT: 15.3 % (ref 11.5–14.5)
EST. GFR  (AFRICAN AMERICAN): >60 ML/MIN/1.73 M^2
EST. GFR  (NON AFRICAN AMERICAN): 58 ML/MIN/1.73 M^2
GLUCOSE SERPL-MCNC: 126 MG/DL (ref 70–110)
GLUCOSE UR QL STRIP: NEGATIVE
HCT VFR BLD AUTO: 37.7 % (ref 37–48.5)
HGB BLD-MCNC: 12 G/DL (ref 12–16)
HGB UR QL STRIP: NEGATIVE
KETONES UR QL STRIP: NEGATIVE
LEUKOCYTE ESTERASE UR QL STRIP: ABNORMAL
LIPASE SERPL-CCNC: 22 U/L (ref 4–60)
LYMPHOCYTES # BLD AUTO: 3 K/UL (ref 1–4.8)
LYMPHOCYTES NFR BLD: 21.1 % (ref 18–48)
MCH RBC QN AUTO: 27.1 PG (ref 27–31)
MCHC RBC AUTO-ENTMCNC: 31.8 G/DL (ref 32–36)
MCV RBC AUTO: 85 FL (ref 82–98)
MICROSCOPIC COMMENT: ABNORMAL
MONOCYTES # BLD AUTO: 0.4 K/UL (ref 0.3–1)
MONOCYTES NFR BLD: 2.5 % (ref 4–15)
NEUTROPHILS # BLD AUTO: 10.5 K/UL (ref 1.8–7.7)
NEUTROPHILS NFR BLD: 74.6 % (ref 38–73)
NITRITE UR QL STRIP: POSITIVE
PH UR STRIP: 6 [PH] (ref 5–8)
PLATELET # BLD AUTO: 431 K/UL (ref 150–350)
PMV BLD AUTO: 9.3 FL (ref 9.2–12.9)
POTASSIUM SERPL-SCNC: 4.2 MMOL/L (ref 3.5–5.1)
PROT SERPL-MCNC: 7.2 G/DL (ref 6–8.4)
PROT UR QL STRIP: NEGATIVE
RBC # BLD AUTO: 4.43 M/UL (ref 4–5.4)
RBC #/AREA URNS HPF: 3 /HPF (ref 0–4)
SODIUM SERPL-SCNC: 140 MMOL/L (ref 136–145)
SP GR UR STRIP: 1.01 (ref 1–1.03)
SQUAMOUS #/AREA URNS HPF: 5 /HPF
URN SPEC COLLECT METH UR: ABNORMAL
UROBILINOGEN UR STRIP-ACNC: NEGATIVE EU/DL
WBC # BLD AUTO: 14.19 K/UL (ref 3.9–12.7)
WBC #/AREA URNS HPF: 50 /HPF (ref 0–5)

## 2019-09-11 PROCEDURE — 96374 THER/PROPH/DIAG INJ IV PUSH: CPT | Mod: HCNC

## 2019-09-11 PROCEDURE — 85025 COMPLETE CBC W/AUTO DIFF WBC: CPT | Mod: HCNC

## 2019-09-11 PROCEDURE — 80053 COMPREHEN METABOLIC PANEL: CPT | Mod: HCNC

## 2019-09-11 PROCEDURE — 81000 URINALYSIS NONAUTO W/SCOPE: CPT | Mod: HCNC

## 2019-09-11 PROCEDURE — 87086 URINE CULTURE/COLONY COUNT: CPT | Mod: HCNC

## 2019-09-11 PROCEDURE — 25000003 PHARM REV CODE 250: Mod: HCNC | Performed by: EMERGENCY MEDICINE

## 2019-09-11 PROCEDURE — 99284 EMERGENCY DEPT VISIT MOD MDM: CPT | Mod: 25,HCNC

## 2019-09-11 PROCEDURE — 87077 CULTURE AEROBIC IDENTIFY: CPT | Mod: HCNC

## 2019-09-11 PROCEDURE — 83690 ASSAY OF LIPASE: CPT | Mod: HCNC

## 2019-09-11 PROCEDURE — 63600175 PHARM REV CODE 636 W HCPCS: Mod: HCNC | Performed by: EMERGENCY MEDICINE

## 2019-09-11 PROCEDURE — 87088 URINE BACTERIA CULTURE: CPT | Mod: HCNC

## 2019-09-11 PROCEDURE — 87186 SC STD MICRODIL/AGAR DIL: CPT | Mod: HCNC

## 2019-09-11 RX ORDER — ONDANSETRON 4 MG/1
4 TABLET, ORALLY DISINTEGRATING ORAL EVERY 6 HOURS PRN
Qty: 10 TABLET | Refills: 0 | Status: SHIPPED | OUTPATIENT
Start: 2019-09-11 | End: 2020-01-09

## 2019-09-11 RX ORDER — ONDANSETRON 2 MG/ML
4 INJECTION INTRAMUSCULAR; INTRAVENOUS
Status: COMPLETED | OUTPATIENT
Start: 2019-09-11 | End: 2019-09-11

## 2019-09-11 RX ORDER — DICYCLOMINE HYDROCHLORIDE 20 MG/1
20 TABLET ORAL 4 TIMES DAILY PRN
Qty: 20 TABLET | Refills: 0 | Status: SHIPPED | OUTPATIENT
Start: 2019-09-11 | End: 2019-10-01

## 2019-09-11 RX ORDER — DICYCLOMINE HYDROCHLORIDE 10 MG/1
20 CAPSULE ORAL
Status: COMPLETED | OUTPATIENT
Start: 2019-09-11 | End: 2019-09-11

## 2019-09-11 RX ADMIN — DICYCLOMINE HYDROCHLORIDE 20 MG: 10 CAPSULE ORAL at 07:09

## 2019-09-11 RX ADMIN — ONDANSETRON 4 MG: 2 INJECTION INTRAMUSCULAR; INTRAVENOUS at 07:09

## 2019-09-12 ENCOUNTER — HOSPITAL ENCOUNTER (OUTPATIENT)
Facility: OTHER | Age: 69
Discharge: HOME | End: 2019-09-12
Attending: ANESTHESIOLOGY | Admitting: ANESTHESIOLOGY
Payer: MEDICARE

## 2019-09-12 VITALS
HEIGHT: 64 IN | RESPIRATION RATE: 18 BRPM | WEIGHT: 178 LBS | TEMPERATURE: 99 F | BODY MASS INDEX: 30.39 KG/M2 | HEART RATE: 70 BPM | OXYGEN SATURATION: 98 % | DIASTOLIC BLOOD PRESSURE: 88 MMHG | SYSTOLIC BLOOD PRESSURE: 132 MMHG

## 2019-09-12 DIAGNOSIS — G89.29 CHRONIC PAIN: ICD-10-CM

## 2019-09-12 DIAGNOSIS — M47.816 LUMBAR SPONDYLOSIS: Primary | ICD-10-CM

## 2019-09-12 DIAGNOSIS — G89.4 CHRONIC PAIN SYNDROME: ICD-10-CM

## 2019-09-12 LAB — POCT GLUCOSE: 118 MG/DL (ref 70–110)

## 2019-09-12 PROCEDURE — 64493 INJ PARAVERT F JNT L/S 1 LEV: CPT | Mod: 50,HCNC | Performed by: ANESTHESIOLOGY

## 2019-09-12 PROCEDURE — 64493 PR INJ DX/THER AGNT PARAVERT FACET JOINT,IMG GUIDE,LUMBAR/SAC,1ST LVL: ICD-10-PCS | Mod: 50,HCNC,, | Performed by: ANESTHESIOLOGY

## 2019-09-12 PROCEDURE — 64494 PR INJ DX/THER AGNT PARAVERT FACET JOINT,IMG GUIDE,LUMBAR/SAC, 2ND LEVEL: ICD-10-PCS | Mod: 50,HCNC,, | Performed by: ANESTHESIOLOGY

## 2019-09-12 PROCEDURE — 25000003 PHARM REV CODE 250: Mod: HCNC | Performed by: ANESTHESIOLOGY

## 2019-09-12 PROCEDURE — 64494 INJ PARAVERT F JNT L/S 2 LEV: CPT | Mod: 50,HCNC,, | Performed by: ANESTHESIOLOGY

## 2019-09-12 PROCEDURE — 64494 INJ PARAVERT F JNT L/S 2 LEV: CPT | Mod: 50,HCNC | Performed by: ANESTHESIOLOGY

## 2019-09-12 PROCEDURE — 64493 INJ PARAVERT F JNT L/S 1 LEV: CPT | Mod: 50,HCNC,, | Performed by: ANESTHESIOLOGY

## 2019-09-12 PROCEDURE — 64495 INJ PARAVERT F JNT L/S 3 LEV: CPT | Mod: 50,HCNC | Performed by: ANESTHESIOLOGY

## 2019-09-12 RX ORDER — LIDOCAINE HYDROCHLORIDE 10 MG/ML
INJECTION INFILTRATION; PERINEURAL
Status: DISCONTINUED | OUTPATIENT
Start: 2019-09-12 | End: 2019-09-12 | Stop reason: HOSPADM

## 2019-09-12 RX ORDER — BUPIVACAINE HYDROCHLORIDE 2.5 MG/ML
INJECTION, SOLUTION EPIDURAL; INFILTRATION; INTRACAUDAL
Status: DISCONTINUED | OUTPATIENT
Start: 2019-09-12 | End: 2019-09-12 | Stop reason: HOSPADM

## 2019-09-12 RX ORDER — SODIUM CHLORIDE 9 MG/ML
500 INJECTION, SOLUTION INTRAVENOUS CONTINUOUS
Status: DISCONTINUED | OUTPATIENT
Start: 2019-09-12 | End: 2019-09-12 | Stop reason: HOSPADM

## 2019-09-12 NOTE — OP NOTE
LUMBAR Medial Branch Block Under Fluoroscopy  Time-out taken to identify patient and procedure side prior to starting the procedure.   I attest that I have reviewed the patient's home medications prior to the procedure and no contraindication have been identified. I  re-evaluated the patient after the patient was positioned for the procedure in the procedure room immediately before the procedural time-out. The vital signs are current and represent the current state of the patient which has not significantly changed since the preprocedure assessment.            Date of Service: 09/12/2019    PCP: Sea Her Jr, MD    Referring Physician:                                                           PROCEDURE:  Bilateral  L3, 4, & 5 medial branch block    REASON FOR PROCEDURE: Lumbar spondylosis [M47.816]  1. Lumbar spondylosis    2. Chronic pain syndrome    3. Chronic pain      POSTPROCEDURE DIAGNOSIS:   Lumbar spondylosis [M47.816]    1. Lumbar spondylosis    2. Chronic pain syndrome    3. Chronic pain           PHYSICIAN: Shaq Silverio MD  ASSISTANTS: Jarrett Sewell MD, fellow     Jonny Maloney MD resident      MEDICATIONS INJECTED: Bupivicaine 25% 1.5ml at each level      LOCAL ANESTHETIC USED: Xylocaine 1% 9ml with Sodium Bicarbonate 1ml.  3ml each site.    SEDATION MEDICATIONS: None    ESTIMATED BLOOD LOSS:  None.    COMPLICATIONS:  None.    TECHNIQUE: Laying in a prone position, the patient was prepped and draped in the usual sterile fashion using ChloraPrep and fenestrated drape.  The level was determined under fluoroscopic guidance.  Local anesthetic was given by going down to the hub of the 27-gauge 1.25in needle and raising a wheel.  A 22-gauge 3.5inch needle was introduced to the anatomic local of the medial branch at the lateral mass of all levels as stated above utilizing live fluoroscopy. Medication was then injected slowly. The patient tolerated the procedure well.     PAIN BEFORE THE PROCEDURE:   8/10.    PAIN AFTER THE PROCEDURE:  0/10.    The patient was monitored after the procedure.  Patient was given post procedure and discharge instructions to follow at home.  We will see the patient back in two weeks or the patient may call to inform of status. The patient was discharged in a stable condition

## 2019-09-12 NOTE — ED PROVIDER NOTES
Encounter Date: 2019    SCRIBE #1 NOTE: I, Elisabet Medrano, am scribing for, and in the presence of, Nirav Peter MD. Other sections scribed: JEWELS, ALLISON.       History     Chief Complaint   Patient presents with    Abdominal Pain     upper abdominal pain onset 3PM today, also reports nausea. Denies emeis or diarrhea     Time of initial exam: 19:30     The patient is a 68-year-old female who has a past medical history of arthritis, diabetes mellitus, hypertension who presents with acute abdominal pain that began at around 3:00 p.m. today.  The pain is located in the central abdominal region.  She describes it as severe cramping.  She has associated nausea but has not vomited.  The symptoms began 5 min after eating a premade BLT salad.  The salad did not smell or taste abnormal.  She took Tums and Protonix but had no relief.  She denies fever and chills. She denies chest pain but did have mild shortness of breath with the onset of the symptoms. She sometimes has gastroesophageal reflux but not regularly.  She does not take Protonix daily.  She had a normal bowel movement prior to arriving at the ED.  There are no exacerbating or alleviating factors.    The history is provided by the patient. No  was used.     Review of patient's allergies indicates:   Allergen Reactions    Atorvastatin      Pain    Aspirin Other (See Comments)     Has been told not to take it due to bariatric surgery     Past Medical History:   Diagnosis Date    Arthritis     Diabetes mellitus     Hypertension      Past Surgical History:   Procedure Laterality Date     SECTION      CHOLECYSTECTOMY      DECOMPRESSION, NERVE, ULNAR - right Right 3/8/2019    [facility] OR    HYSTERECTOMY      INJECTION, STEROID, SPINE, LUMBAR, EPIDURAL N/A 2018    [facility] at [facility] PAIN MGT    INJECTION-FACET Bilateral 2018    Performed by [facility] at [facility] PAIN T     INJECTION-STEROID-EPIDURAL-TRANSFORAMINAL Left 5/2/2018    [facility] at [facility] PAIN MGT    INSERTION,NEUROSTIMULATOR,SPINAL CORD N/A 10/26/2018    [facility]    RELEASE, CARPAL TUNNEL right Right 3/8/2019    [facility] by [facility] at [facility] OR    RELEASE, CARPAL TUNNEL- LEFT Left 7/18/2019    [facility]    TRIAL, NEUROSTIMULATOR, SPINAL CORD, SPINAL CORD STIMULATOR TRIAL-INTERNAL WIRES TO EXTERNAL BATTERY N/A 9/24/2018    Performed by [facility]q [facility] at [facility] PAIN MGT     No family history on file.  Social History     Tobacco Use    Smoking status: Never Smoker    Smokeless tobacco: Never Used   Substance Use Topics    Alcohol use: No    Drug use: No     Review of Systems   Constitutional: Negative for chills and fever.   HENT: Negative for sore throat and trouble swallowing.    Eyes: Negative for visual disturbance.   Respiratory: Positive for shortness of breath. Negative for cough.    Cardiovascular: Negative for chest pain and palpitations.   Gastrointestinal: Positive for abdominal pain and nausea. Negative for blood in stool, constipation, diarrhea and vomiting.   Endocrine: Negative for polydipsia and polyuria.   Genitourinary: Negative for difficulty urinating, dysuria and hematuria.   Musculoskeletal: Negative for arthralgias and myalgias.   Neurological: Negative for dizziness, syncope, light-headedness and headaches.       Physical Exam     Initial Vitals [09/11/19 1847]   BP Pulse Resp Temp SpO2   (!) 151/72 72 18 98.3 °F (36.8 °C) 98 %      MAP       --         Physical Exam    Nursing note and vitals reviewed.  Constitutional: She appears well-developed and well-nourished. She is not diaphoretic. No distress.   HENT:   Head: Normocephalic and atraumatic.   Mouth/Throat: Oropharynx is clear and moist.   Eyes: Conjunctivae are normal. No scleral icterus.   Neck: No JVD present.    Cardiovascular: Normal rate, regular rhythm and normal heart sounds. Exam reveals no gallop and no friction rub.    No murmur heard.  Pulmonary/Chest: Breath sounds normal. No stridor. No respiratory distress. She has no wheezes. She has no rhonchi. She has no rales.   Abdominal: Soft. Bowel sounds are normal. She exhibits no distension. There is no tenderness.   Neurological: She is alert and oriented to person, place, and time. GCS score is 15. GCS eye subscore is 4. GCS verbal subscore is 5. GCS motor subscore is 6.   Skin: Skin is warm and dry. No pallor.         ED Course   Procedures  Labs Reviewed   CBC W/ AUTO DIFFERENTIAL - Abnormal; Notable for the following components:       Result Value    WBC 14.19 (*)     Mean Corpuscular Hemoglobin Conc 31.8 (*)     RDW 15.3 (*)     Platelets 431 (*)     Gran # (ANC) 10.5 (*)     Gran% 74.6 (*)     Mono% 2.5 (*)     All other components within normal limits   COMPREHENSIVE METABOLIC PANEL - Abnormal; Notable for the following components:    Glucose 126 (*)     Albumin 3.4 (*)     Anion Gap 6 (*)     eGFR if non  58 (*)     All other components within normal limits   URINALYSIS, REFLEX TO URINE CULTURE - Abnormal; Notable for the following components:    Appearance, UA Hazy (*)     Nitrite, UA Positive (*)     Leukocytes, UA 2+ (*)     All other components within normal limits    Narrative:     Preferred Collection Type->Urine, Clean Catch   URINALYSIS MICROSCOPIC - Abnormal; Notable for the following components:    WBC, UA 50 (*)     Bacteria Many (*)     All other components within normal limits    Narrative:     Preferred Collection Type->Urine, Clean Catch   CULTURE, URINE   CULTURE, RESPIRATORY   LIPASE          Imaging Results    None          Medical Decision Making:   History:   Old Medical Records: I decided to obtain old medical records.  Clinical Tests:   Lab Tests: Ordered and Reviewed    Medical decision making:  This patient was  evaluated a few hours after acute onset of abdominal pain with associated nausea and shortness of breath. She was afebrile with stable vital signs. She had no clinical signs of dehydration.  She was in no respiratory distress and had clear breath sounds. Abdomen was soft, nontender, nondistended. She was evaluated with labs.  White blood cell count was mildly elevated.  There were no electrolyte anomalies or renal insufficiency.  She was treated with oral Bentyl and IV Zofran.  She reported significant improvement in her symptoms with these therapies.  She was tolerating oral intake.  She requested to be discharged.  Symptoms likely due to acute GI irritation, gastroenteritis, food poisoning, or some other benign, self-limiting process.  She was given strict ED return precautions.  She was prescribed a course of Bentyl and Zofran for home and instructed to arrange close follow-up with her PCP.            Scribe Attestation:   Scribe #1: I performed the above scribed service and the documentation accurately describes the services I performed. I attest to the accuracy of the note.    Attending Attestation:             Attending ED Notes:   Portions of this chart were completed by the scribe by interpretive transcription of statements made by the patient as a result of my questions at the bedside. Other portions were completed by the scribe from statements made by me for direct transcription into the medical record. Following completion of the charting by the scribe, I made modifications for both correctness and proper phrasing.  - Nirav Peter III, M.D.          ED Course as of Sep 11 2120   Wed Sep 11, 2019   2115 The patient is feeling much better.    [LP]      ED Course User Index  [LP] Nirav Peter III, MD     Clinical Impression:     1. Acute abdominal pain    2. Nausea          Disposition:   Disposition: Discharged  Condition: Stable                        Nirav Peter III, MD  09/11/19 [id number]

## 2019-09-12 NOTE — DISCHARGE SUMMARY
Discharge Note  Short Stay      SUMMARY     Admit Date: 9/12/2019    Attending Physician: Shaq Silverio      Discharge Physician: Shaq Silverio      Discharge Date: 9/12/2019 3:36 PM    Procedure(s) (LRB):  BLOCK, NERVE, L3-L4-L5 MEDIAL BRANCH (Bilateral)    Final Diagnosis: Lumbar spondylosis [M47.816]    Disposition: Home or self care    Patient Instructions:   Current Discharge Medication List      CONTINUE these medications which have NOT CHANGED    Details   atenolol (TENORMIN) 100 MG tablet Take 1 tablet (100 mg total) by mouth once daily.  Qty: 90 tablet, Refills: 0    Associated Diagnoses: Essential hypertension      blood-glucose meter kit Use as instructed  Qty: 1 each, Refills: 0    Associated Diagnoses: Type 2 diabetes mellitus without complication, without long-term current use of insulin      dicyclomine (BENTYL) 20 mg tablet Take 1 tablet (20 mg total) by mouth 4 (four) times daily as needed (abdominal cramping).  Qty: 20 tablet, Refills: 0      escitalopram oxalate (LEXAPRO) 20 MG tablet Take 1 tablet (20 mg total) by mouth once daily.  Qty: 90 tablet, Refills: 1    Associated Diagnoses: Mild recurrent major depression; Anxiety      furosemide (LASIX) 20 MG tablet Take 1 tablet (20 mg total) by mouth once daily.  Qty: 90 tablet, Refills: 1    Associated Diagnoses: Essential hypertension      glipiZIDE (GLUCOTROL) 10 MG tablet Take 10 mg by mouth 2 (two) times daily with meals.       hydrOXYzine HCl (ATARAX) 25 MG tablet TAKE 1 TABLET BY MOUTH NIGHTLY AS NEEDED FOR INSOMNIA  Qty: 90 tablet, Refills: 0    Associated Diagnoses: Insomnia, unspecified type      loratadine (CLARITIN) 10 mg tablet TAKE 1 TABLET BY MOUTH ONCE DAILY AS NEEDED FOR ALLERGIES  Qty: 90 tablet, Refills: 0    Associated Diagnoses: Seasonal allergies      losartan (COZAAR) 25 MG tablet Take 1 tablet (25 mg total) by mouth once daily.  Qty: 90 tablet, Refills: 3    Associated Diagnoses: Essential hypertension      metFORMIN (GLUCOPHAGE)  1000 MG tablet Take 1,000 mg by mouth every evening.      ondansetron (ZOFRAN-ODT) 4 MG TbDL Take 1 tablet (4 mg total) by mouth every 6 (six) hours as needed (nausea).  Qty: 10 tablet, Refills: 0      pantoprazole (PROTONIX) 20 MG tablet Take 2 tablets (40 mg total) by mouth daily as needed (Stomach pain).  Qty: 90 tablet, Refills: 1    Associated Diagnoses: Gastroesophageal reflux disease without esophagitis      pravastatin (PRAVACHOL) 40 MG tablet Take 1 tablet (40 mg total) by mouth once daily.  Qty: 90 tablet, Refills: 0    Associated Diagnoses: Dyslipidemia      zonisamide (ZONEGRAN) 100 MG Cap TAKE 4 CAPSULES BY MOUTH AT BEDTIME  Qty: 360 capsule, Refills: 0    Associated Diagnoses: Osteoarthritis of spine with radiculopathy, lumbar region                 Discharge Diagnosis: Lumbar spondylosis [M47.816]  Condition on Discharge: Stable with no complications to procedure   Diet on Discharge: Same as before.  Activity: as per instruction sheet.  Discharge to: Home with a responsible adult.  Follow up: 2-4 weeks       Please call my office or pager at [phone] if experienced any weakness or loss of sensation, fever > 101.5, pain uncontrolled with oral medications, persistent nausea/vomiting/or diarrhea, redness or drainage from the incisions, or any other worrisome concerns. If physician on call was not reached or could not communicate with our office for any reason please go to the nearest emergency department

## 2019-09-12 NOTE — DISCHARGE INSTRUCTIONS

## 2019-09-13 LAB — BACTERIA UR CULT: ABNORMAL

## 2019-09-16 ENCOUNTER — TELEPHONE (OUTPATIENT)
Dept: PAIN MEDICINE | Facility: CLINIC | Age: 69
End: 2019-09-16

## 2019-09-16 NOTE — TELEPHONE ENCOUNTER
----- Message from Martha Cash sent at 9/16/2019  1:21 PM CDT -----  Contact: CLAUDIA MARTINEZ   Name of Who is Calling: CLAUDIA MARTINEZ       What is the request in detail: Patient is requesting a call back.   Thursday 4:30pm no pain  5:30 no pain  6:30 no pain  7:30 pain level 6  8:30 pain level 7  9:30 pain level 7  10:30 to 7:30 am Sleeping  Friday 7:30am    8:30 pain level 6  9:30 no pain  10:30 pain level 5  11:30 pain level 5  12:30 no pain  1:30 no pain  2:30 pain level 7  3:30 pain level 7    Can the clinic reply by [facility]: No      What Number to Call Back if not in [facility]:  [phone]

## 2019-09-16 NOTE — TELEPHONE ENCOUNTER
Shaq Silverio MD  You 20 minutes ago (2:04 PM)     repeat    Routing comment       You  Shaq Silverio MD 46 minutes ago (1:38 PM)     Patient stated she received about 50% out of 100% relief from BLOCK, NERVE, L3-L4-L5 MEDIAL BRANCH     1st Block    Routing comment       You  Claudia Martinez 47 minutes ago (1:37 PM)      You 47 minutes ago (1:37 PM)        ----- Message from Martha Cash sent at 9/16/2019  1:21 PM CDT -----  Contact: CLAUDIA MARTINEZ   Name of Who is Calling: CLAUDIA MARTINEZ         What is the request in detail: Patient is requesting a call back.   Thursday 4:30pm no pain  5:30 no pain  6:30 no pain  7:30 pain level 6  8:30 pain level 7  9:30 pain level 7  10:30 to 7:30 am Sleeping  Friday 7:30am    8:30 pain level 6  9:30 no pain  10:30 pain level 5  11:30 pain level 5  12:30 no pain  1:30 no pain  2:30 pain level 7  3:30 pain level 7     Can the clinic reply by [facility]: No        What Number to Call Back if not in [facility]:  [phone]

## 2019-09-17 ENCOUNTER — TELEPHONE (OUTPATIENT)
Dept: PAIN MEDICINE | Facility: CLINIC | Age: 69
End: 2019-09-17

## 2019-09-17 NOTE — TELEPHONE ENCOUNTER
Faby Tam [phone]  15 minutes ago (2:10 PM)     L/M for pt to return call to schedule repeat block with Dr Silverio.

## 2019-09-30 ENCOUNTER — TELEPHONE (OUTPATIENT)
Dept: EMERGENCY MEDICINE | Facility: HOSPITAL | Age: 69
End: 2019-09-30

## 2019-09-30 DIAGNOSIS — N30.00 ACUTE CYSTITIS WITHOUT HEMATURIA: Primary | ICD-10-CM

## 2019-09-30 RX ORDER — NITROFURANTOIN 25; 75 MG/1; MG/1
100 CAPSULE ORAL 2 TIMES DAILY
Qty: 10 CAPSULE | Refills: 0 | Status: SHIPPED | OUTPATIENT
Start: 2019-09-30 | End: 2019-10-05

## 2019-09-30 NOTE — TELEPHONE ENCOUNTER
Patient with UTI on Culture. No abx on discharge from ED on 9/11/2019. Called back today. Will treat with macrobid per recommendations by Dr. Lucio. ELTON Edmonds, FNP-C

## 2019-10-11 ENCOUNTER — OFFICE VISIT (OUTPATIENT)
Dept: FAMILY MEDICINE | Facility: CLINIC | Age: 69
End: 2019-10-11
Payer: MEDICARE

## 2019-10-11 VITALS
TEMPERATURE: 99 F | OXYGEN SATURATION: 98 % | BODY MASS INDEX: 30.53 KG/M2 | HEART RATE: 69 BPM | HEIGHT: 64 IN | SYSTOLIC BLOOD PRESSURE: 113 MMHG | WEIGHT: 178.81 LBS | DIASTOLIC BLOOD PRESSURE: 55 MMHG

## 2019-10-11 DIAGNOSIS — Z12.11 SCREEN FOR COLON CANCER: ICD-10-CM

## 2019-10-11 DIAGNOSIS — N39.0 URINARY TRACT INFECTION WITHOUT HEMATURIA, SITE UNSPECIFIED: Primary | ICD-10-CM

## 2019-10-11 DIAGNOSIS — G47.00 INSOMNIA, UNSPECIFIED TYPE: ICD-10-CM

## 2019-10-11 DIAGNOSIS — E78.5 DYSLIPIDEMIA: ICD-10-CM

## 2019-10-11 DIAGNOSIS — Z23 NEED FOR VACCINATION: ICD-10-CM

## 2019-10-11 PROCEDURE — 3078F DIAST BP <80 MM HG: CPT | Mod: HCNC,CPTII,S$GLB, | Performed by: FAMILY MEDICINE

## 2019-10-11 PROCEDURE — 99214 PR OFFICE/OUTPT VISIT, EST, LEVL IV, 30-39 MIN: ICD-10-PCS | Mod: HCNC,25,S$GLB, | Performed by: FAMILY MEDICINE

## 2019-10-11 PROCEDURE — 3074F PR MOST RECENT SYSTOLIC BLOOD PRESSURE < 130 MM HG: ICD-10-PCS | Mod: HCNC,CPTII,S$GLB, | Performed by: FAMILY MEDICINE

## 2019-10-11 PROCEDURE — G0008 ADMIN INFLUENZA VIRUS VAC: HCPCS | Mod: HCNC,S$GLB,, | Performed by: FAMILY MEDICINE

## 2019-10-11 PROCEDURE — 3074F SYST BP LT 130 MM HG: CPT | Mod: HCNC,CPTII,S$GLB, | Performed by: FAMILY MEDICINE

## 2019-10-11 PROCEDURE — 1101F PR PT FALLS ASSESS DOC 0-1 FALLS W/OUT INJ PAST YR: ICD-10-PCS | Mod: HCNC,CPTII,S$GLB, | Performed by: FAMILY MEDICINE

## 2019-10-11 PROCEDURE — 99999 PR PBB SHADOW E&M-EST. PATIENT-LVL III: CPT | Mod: PBBFAC,HCNC,, | Performed by: FAMILY MEDICINE

## 2019-10-11 PROCEDURE — G0008 FLU VACCINE - HIGH DOSE (65+) PRESERVATIVE FREE IM: ICD-10-PCS | Mod: HCNC,S$GLB,, | Performed by: FAMILY MEDICINE

## 2019-10-11 PROCEDURE — 90662 FLU VACCINE - HIGH DOSE (65+) PRESERVATIVE FREE IM: ICD-10-PCS | Mod: HCNC,S$GLB,, | Performed by: FAMILY MEDICINE

## 2019-10-11 PROCEDURE — 99214 OFFICE O/P EST MOD 30 MIN: CPT | Mod: HCNC,25,S$GLB, | Performed by: FAMILY MEDICINE

## 2019-10-11 PROCEDURE — 1101F PT FALLS ASSESS-DOCD LE1/YR: CPT | Mod: HCNC,CPTII,S$GLB, | Performed by: FAMILY MEDICINE

## 2019-10-11 PROCEDURE — 99999 PR PBB SHADOW E&M-EST. PATIENT-LVL III: ICD-10-PCS | Mod: PBBFAC,HCNC,, | Performed by: FAMILY MEDICINE

## 2019-10-11 PROCEDURE — 90662 IIV NO PRSV INCREASED AG IM: CPT | Mod: HCNC,S$GLB,, | Performed by: FAMILY MEDICINE

## 2019-10-11 PROCEDURE — 3078F PR MOST RECENT DIASTOLIC BLOOD PRESSURE < 80 MM HG: ICD-10-PCS | Mod: HCNC,CPTII,S$GLB, | Performed by: FAMILY MEDICINE

## 2019-10-11 RX ORDER — TRAZODONE HYDROCHLORIDE 50 MG/1
50 TABLET ORAL NIGHTLY
Qty: 30 TABLET | Refills: 5 | Status: SHIPPED | OUTPATIENT
Start: 2019-10-11 | End: 2020-04-20

## 2019-10-11 RX ORDER — PRAVASTATIN SODIUM 40 MG/1
20 TABLET ORAL DAILY
Qty: 90 TABLET | Refills: 0
Start: 2019-10-11 | End: 2019-12-30

## 2019-10-11 RX ORDER — LORAZEPAM 2 MG/1
2 TABLET ORAL 2 TIMES DAILY
Refills: 0 | COMMUNITY
Start: 2019-07-23 | End: 2020-01-09

## 2019-10-14 NOTE — PROGRESS NOTES
Routine Office Visit    Patient Name: Faby Luna    : 1950  MRN: [id number]    Subjective:  Faby is a 69 y.o. female who presents today for:   Chief Complaint   Patient presents with    ER follow up     68-year-old female comes follow-up from the emergency room for low back pain for which she was eventually diagnosed with a UTI according to the patient.  She reports improvement since completing antibiotic.  The patient reports that she continues to have difficult time sleeping.  She has not made appointment with Psychiatry as previously advised.  She has used melatonin, hydroxyzine, and neither have helped.    Past Medical History  Past Medical History:   Diagnosis Date    Arthritis     Diabetes mellitus     Hypertension        Past Surgical History  Past Surgical History:   Procedure Laterality Date    CARPAL TUNNEL RELEASE Right 3/8/2019    Procedure: RELEASE, CARPAL TUNNEL right;  Surgeon: Pan Goodman MD;  Location: [facility];  Service: Orthopedics;  Laterality: Right;    CARPAL TUNNEL RELEASE Left 2019    Procedure: RELEASE, CARPAL TUNNEL- LEFT;  Surgeon: Pan Goodman MD;  Location: [address];  Service: Orthopedics;  Laterality: Left;     SECTION      CHOLECYSTECTOMY      EPIDURAL STEROID INJECTION INTO LUMBAR SPINE N/A 2018    Procedure: INJECTION, STEROID, SPINE, LUMBAR, EPIDURAL;  Surgeon: Shaq Silverio MD;  Location: [facility];  Service: Pain Management;  Laterality: N/A;  LUMBAR L4-L5 INTERLAMINAR ELISE'  [id number]  W/ SEDATION     HYSTERECTOMY      INJECTION OF ANESTHETIC AGENT AROUND NERVE Bilateral 2019    Procedure: BLOCK, NERVE, L3-L4-L5 MEDIAL BRANCH;  Surgeon: Shaq Silverio MD;  Location: [facility];  Service: Pain Management;  Laterality: Bilateral;    INJECTION OF FACET JOINT Bilateral 2018    Procedure: INJECTION-FACET;  Surgeon: Shaq Silverio MD;  Location: [facility];  Service: Pain Management;  Laterality: Bilateral;   LUMBAR BILATERAL L4-L5 AND L5-S1 FACET STEROID INJECTION  [id number]    W/ SEDATION     TRIAL OF SPINAL CORD NERVE STIMULATOR N/A 9/24/2018    Procedure: TRIAL, NEUROSTIMULATOR, SPINAL CORD, SPINAL CORD STIMULATOR TRIAL-INTERNAL WIRES TO EXTERNAL BATTERY;  Surgeon: Shaq Silverio MD;  Location: [facility];  Service: Pain Management;  Laterality: N/A;  [facility]OTT REP NOTIFIED        Family History  History reviewed. No pertinent family history.    Social History  Social History     Socioeconomic History    Marital status:      Spouse name: Not on file    Number of children: Not on file    Years of education: Not on file    Highest education level: Not on file   Occupational History    Not on file   Social Needs    Financial resource strain: Not on file    Food insecurity:     Worry: Not on file     Inability: Not on file    Transportation needs:     Medical: Not on file     Non-medical: Not on file   Tobacco Use    Smoking status: Never Smoker    Smokeless tobacco: Never Used   Substance and Sexual Activity    Alcohol use: No    Drug use: No    Sexual activity: Not on file   Lifestyle    Physical activity:     Days per week: Not on file     Minutes per session: Not on file    Stress: Not on file   Relationships    Social connections:     Talks on phone: Not on file     Gets together: Not on file     Attends Bahai service: Not on file     Active member of club or organization: Not on file     Attends meetings of clubs or organizations: Not on file     Relationship status: Not on file   Other Topics Concern    Not on file   Social History Narrative    Not on file       Current Medications  Current Outpatient Medications on File Prior to Visit   Medication Sig Dispense Refill    atenolol (TENORMIN) 100 MG tablet Take 1 tablet (100 mg total) by mouth once daily. 90 tablet 0    escitalopram oxalate (LEXAPRO) 20 MG tablet Take 1 tablet (20 mg total) by mouth once daily. 90 tablet 1     "furosemide (LASIX) 20 MG tablet Take 1 tablet (20 mg total) by mouth once daily. 90 tablet 1    glipiZIDE (GLUCOTROL) 10 MG tablet Take 10 mg by mouth 2 (two) times daily with meals.       hydrOXYzine HCl (ATARAX) 25 MG tablet TAKE 1 TABLET BY MOUTH NIGHTLY AS NEEDED FOR INSOMNIA 90 tablet 0    loratadine (CLARITIN) 10 mg tablet TAKE 1 TABLET BY MOUTH ONCE DAILY AS NEEDED FOR ALLERGIES 90 tablet 0    LORazepam (ATIVAN) 2 MG Tab Take 2 mg by mouth 2 (two) times daily. as needed for anxiety.  0    losartan (COZAAR) 25 MG tablet Take 1 tablet (25 mg total) by mouth once daily. 90 tablet 3    metFORMIN (GLUCOPHAGE) 1000 MG tablet Take 1,000 mg by mouth every evening.      ondansetron (ZOFRAN-ODT) 4 MG TbDL Take 1 tablet (4 mg total) by mouth every 6 (six) hours as needed (nausea). 10 tablet 0    pantoprazole (PROTONIX) 20 MG tablet Take 2 tablets (40 mg total) by mouth daily as needed (Stomach pain). 90 tablet 1    zonisamide (ZONEGRAN) 100 MG Cap TAKE 4 CAPSULES BY MOUTH AT BEDTIME 360 capsule 0    blood-glucose meter kit Use as instructed 1 each 0     No current facility-administered medications on file prior to visit.        Allergies   Review of patient's allergies indicates:   Allergen Reactions    Atorvastatin      Pain    Aspirin Other (See Comments)     Has been told not to take it due to bariatric surgery     Review of Systems   Constitutional: Negative for unexpected weight change.   Respiratory: Negative for shortness of breath and wheezing.    Cardiovascular: Negative for chest pain and palpitations.   Gastrointestinal: Negative for abdominal pain, blood in stool, constipation and diarrhea.   Genitourinary: Negative for dysuria.   Psychiatric/Behavioral: Positive for decreased concentration and sleep disturbance. Negative for self-injury and suicidal ideas. The patient is not nervous/anxious.        BP (!) 113/55   Pulse 69   Temp 98.5 °F (36.9 °C) (Oral)   Ht 5' 4" (1.626 m)   Wt 81.1 kg " (178 lb 12.7 oz)   SpO2 98%   BMI 30.69 kg/m²     Physical Exam   Constitutional: She is oriented to person, place, and time. She appears well-developed and well-nourished.   HENT:   Head: Normocephalic and atraumatic.   Right Ear: External ear normal.   Left Ear: External ear normal.   Nose: Nose normal.   Mouth/Throat: Oropharynx is clear and moist. No oropharyngeal exudate.   Eyes: Pupils are equal, round, and reactive to light. Conjunctivae and EOM are normal. Right eye exhibits no discharge. Left eye exhibits no discharge.   Neck: Normal range of motion. Neck supple. No tracheal deviation present.   Cardiovascular: Normal rate, regular rhythm, normal heart sounds and intact distal pulses.   No murmur heard.  Pulmonary/Chest: Effort normal and breath sounds normal. She has no wheezes. She has no rales.   Abdominal: Soft. Bowel sounds are normal. She exhibits no mass. There is no tenderness. There is no rigidity, no guarding and no CVA tenderness.   Lymphadenopathy:     She has no cervical adenopathy.   Neurological: She is oriented to person, place, and time. She has normal strength. She displays no atrophy. No sensory deficit. Gait normal.   Reflex Scores:       Patellar reflexes are 2+ on the right side and 2+ on the left side.  Psychiatric: She has a normal mood and affect.   Vitals reviewed.        Assessment/Plan:  Diagnoses and all orders for this visit:    Urinary tract infection without hematuria, site unspecified  No current symptoms.  Patient is concerns for which she went to the emergency room have resolved according to her.    Insomnia, unspecified type  -     traZODone (DESYREL) 50 MG tablet; Take 1 tablet (50 mg total) by mouth every evening.  Will do a trial trazodone.  Advised patient that if she continues to have difficulty may increase to 100 milligrams.  Past this, will need to see Psychiatry for evaluation.    Dyslipidemia  -     pravastatin (PRAVACHOL) 40 MG tablet; Take 0.5 tablets (20 mg  total) by mouth once daily.  Patient reports that she started to have the muscle aches with a pravastatin 40.  She wants to return to taking 20 milligrams as she did not have any concerns with that.  Will return to 20 milligrams and recheck lipids with next labs.    Need for vaccination  -     Influenza - High Dose (65+) (PF) (IM)    Screen for colon cancer  -     Fecal Immunochemical Test (iFOBT); Future  [employer]Kit was given to patient on 10/11/2019                      -Sea Her Jr., MD, AAHIVS          This office note has been dictated.  This dictation has been generated using M-Modal Fluency Direct dictation; some phonetic errors may occur.

## 2019-10-14 NOTE — H&P (VIEW-ONLY)
Routine Office Visit    Patient Name: Faby Luna    : 1950  MRN: [id number]    Subjective:  Faby is a 69 y.o. female who presents today for:   Chief Complaint   Patient presents with    ER follow up     68-year-old female comes follow-up from the emergency room for low back pain for which she was eventually diagnosed with a UTI according to the patient.  She reports improvement since completing antibiotic.  The patient reports that she continues to have difficult time sleeping.  She has not made appointment with Psychiatry as previously advised.  She has used melatonin, hydroxyzine, and neither have helped.    Past Medical History  Past Medical History:   Diagnosis Date    Arthritis     Diabetes mellitus     Hypertension        Past Surgical History  Past Surgical History:   Procedure Laterality Date    CARPAL TUNNEL RELEASE Right 3/8/2019    Procedure: RELEASE, CARPAL TUNNEL right;  Surgeon: Pan Goodman MD;  Location: [facility];  Service: Orthopedics;  Laterality: Right;    CARPAL TUNNEL RELEASE Left 2019    Procedure: RELEASE, CARPAL TUNNEL- LEFT;  Surgeon: Pan Goodman MD;  Location: [address];  Service: Orthopedics;  Laterality: Left;     SECTION      CHOLECYSTECTOMY      EPIDURAL STEROID INJECTION INTO LUMBAR SPINE N/A 2018    Procedure: INJECTION, STEROID, SPINE, LUMBAR, EPIDURAL;  Surgeon: Shaq Silverio MD;  Location: [facility];  Service: Pain Management;  Laterality: N/A;  LUMBAR L4-L5 INTERLAMINAR ELISE'  [id number]  W/ SEDATION     HYSTERECTOMY      INJECTION OF ANESTHETIC AGENT AROUND NERVE Bilateral 2019    Procedure: BLOCK, NERVE, L3-L4-L5 MEDIAL BRANCH;  Surgeon: Shaq Silverio MD;  Location: [facility];  Service: Pain Management;  Laterality: Bilateral;    INJECTION OF FACET JOINT Bilateral 2018    Procedure: INJECTION-FACET;  Surgeon: Shaq Silverio MD;  Location: [facility];  Service: Pain Management;  Laterality: Bilateral;   LUMBAR BILATERAL L4-L5 AND L5-S1 FACET STEROID INJECTION  [id number]    W/ SEDATION     TRIAL OF SPINAL CORD NERVE STIMULATOR N/A 9/24/2018    Procedure: TRIAL, NEUROSTIMULATOR, SPINAL CORD, SPINAL CORD STIMULATOR TRIAL-INTERNAL WIRES TO EXTERNAL BATTERY;  Surgeon: Shaq Silverio MD;  Location: [facility];  Service: Pain Management;  Laterality: N/A;  [facility] REP NOTIFIED        Family History  History reviewed. No pertinent family history.    Social History  Social History     Socioeconomic History    Marital status:      Spouse name: Not on file    Number of children: Not on file    Years of education: Not on file    Highest education level: Not on file   Occupational History    Not on file   Social Needs    Financial resource strain: Not on file    Food insecurity:     Worry: Not on file     Inability: Not on file    Transportation needs:     Medical: Not on file     Non-medical: Not on file   Tobacco Use    Smoking status: Never Smoker    Smokeless tobacco: Never Used   Substance and Sexual Activity    Alcohol use: No    Drug use: No    Sexual activity: Not on file   Lifestyle    Physical activity:     Days per week: Not on file     Minutes per session: Not on file    Stress: Not on file   Relationships    Social connections:     Talks on phone: Not on file     Gets together: Not on file     Attends Ep[employer] service: Not on file     Active member of club or organization: Not on file     Attends meetings of clubs or organizations: Not on file     Relationship status: Not on file   Other Topics Concern    Not on file   Social History Narrative    Not on file       Current Medications  Current Outpatient Medications on File Prior to Visit   Medication Sig Dispense Refill    atenolol (TENORMIN) 100 MG tablet Take 1 tablet (100 mg total) by mouth once daily. 90 tablet 0    escitalopram oxalate (LEXAPRO) 20 MG tablet Take 1 tablet (20 mg total) by mouth once daily. 90 tablet 1     "furosemide (LASIX) 20 MG tablet Take 1 tablet (20 mg total) by mouth once daily. 90 tablet 1    glipiZIDE (GLUCOTROL) 10 MG tablet Take 10 mg by mouth 2 (two) times daily with meals.       hydrOXYzine HCl (ATARAX) 25 MG tablet TAKE 1 TABLET BY MOUTH NIGHTLY AS NEEDED FOR INSOMNIA 90 tablet 0    loratadine (CLARITIN) 10 mg tablet TAKE 1 TABLET BY MOUTH ONCE DAILY AS NEEDED FOR ALLERGIES 90 tablet 0    LORazepam (ATIVAN) 2 MG Tab Take 2 mg by mouth 2 (two) times daily. as needed for anxiety.  0    losartan (COZAAR) 25 MG tablet Take 1 tablet (25 mg total) by mouth once daily. 90 tablet 3    metFORMIN (GLUCOPHAGE) 1000 MG tablet Take 1,000 mg by mouth every evening.      ondansetron (ZOFRAN-ODT) 4 MG TbDL Take 1 tablet (4 mg total) by mouth every 6 (six) hours as needed (nausea). 10 tablet 0    pantoprazole (PROTONIX) 20 MG tablet Take 2 tablets (40 mg total) by mouth daily as needed (Stomach pain). 90 tablet 1    zonisamide (ZONEGRAN) 100 MG Cap TAKE 4 CAPSULES BY MOUTH AT BEDTIME 360 capsule 0    blood-glucose meter kit Use as instructed 1 each 0     No current facility-administered medications on file prior to visit.        Allergies   Review of patient's allergies indicates:   Allergen Reactions    Atorvastatin      Pain    Aspirin Other (See Comments)     Has been told not to take it due to bariatric surgery     Review of Systems   Constitutional: Negative for unexpected weight change.   Respiratory: Negative for shortness of breath and wheezing.    Cardiovascular: Negative for chest pain and palpitations.   Gastrointestinal: Negative for abdominal pain, blood in stool, constipation and diarrhea.   Genitourinary: Negative for dysuria.   Psychiatric/Behavioral: Positive for decreased concentration and sleep disturbance. Negative for self-injury and suicidal ideas. The patient is not nervous/anxious.        BP (!) 113/55   Pulse 69   Temp 98.5 °F (36.9 °C) (Oral)   Ht 5' 4" (1.626 m)   Wt 81.1 kg " (178 lb 12.7 oz)   SpO2 98%   BMI 30.69 kg/m²     Physical Exam   Constitutional: She is oriented to person, place, and time. She appears well-developed and well-nourished.   HENT:   Head: Normocephalic and atraumatic.   Right Ear: External ear normal.   Left Ear: External ear normal.   Nose: Nose normal.   Mouth/Throat: Oropharynx is clear and moist. No oropharyngeal exudate.   Eyes: Pupils are equal, round, and reactive to light. Conjunctivae and EOM are normal. Right eye exhibits no discharge. Left eye exhibits no discharge.   Neck: Normal range of motion. Neck supple. No tracheal deviation present.   Cardiovascular: Normal rate, regular rhythm, normal heart sounds and intact distal pulses.   No murmur heard.  Pulmonary/Chest: Effort normal and breath sounds normal. She has no wheezes. She has no rales.   Abdominal: Soft. Bowel sounds are normal. She exhibits no mass. There is no tenderness. There is no rigidity, no guarding and no CVA tenderness.   Lymphadenopathy:     She has no cervical adenopathy.   Neurological: She is oriented to person, place, and time. She has normal strength. She displays no atrophy. No sensory deficit. Gait normal.   Reflex Scores:       Patellar reflexes are 2+ on the right side and 2+ on the left side.  Psychiatric: She has a normal mood and affect.   Vitals reviewed.        Assessment/Plan:  Diagnoses and all orders for this visit:    Urinary tract infection without hematuria, site unspecified  No current symptoms.  Patient is concerns for which she went to the emergency room have resolved according to her.    Insomnia, unspecified type  -     traZODone (DESYREL) 50 MG tablet; Take 1 tablet (50 mg total) by mouth every evening.  Will do a trial trazodone.  Advised patient that if she continues to have difficulty may increase to 100 milligrams.  Past this, will need to see Psychiatry for evaluation.    Dyslipidemia  -     pravastatin (PRAVACHOL) 40 MG tablet; Take 0.5 tablets (20 mg  total) by mouth once daily.  Patient reports that she started to have the muscle aches with a pravastatin 40.  She wants to return to taking 20 milligrams as she did not have any concerns with that.  Will return to 20 milligrams and recheck lipids with next labs.    Need for vaccination  -     Influenza - High Dose (65+) (PF) (IM)    Screen for colon cancer  -     Fecal Immunochemical Test (iFOBT); Future  [employer] was given to patient on 10/11/2019                      -Sea Her Jr., MD, AAHIVS          This office note has been dictated.  This dictation has been generated using M-Modal Fluency Direct dictation; some phonetic errors may occur.

## 2019-10-17 ENCOUNTER — HOSPITAL ENCOUNTER (OUTPATIENT)
Facility: OTHER | Age: 69
Discharge: HOME | End: 2019-10-17
Attending: ANESTHESIOLOGY | Admitting: ANESTHESIOLOGY
Payer: MEDICARE

## 2019-10-17 VITALS
OXYGEN SATURATION: 97 % | HEART RATE: 65 BPM | SYSTOLIC BLOOD PRESSURE: 145 MMHG | BODY MASS INDEX: 30.22 KG/M2 | TEMPERATURE: 99 F | HEIGHT: 64 IN | DIASTOLIC BLOOD PRESSURE: 71 MMHG | RESPIRATION RATE: 18 BRPM | WEIGHT: 177 LBS

## 2019-10-17 DIAGNOSIS — M47.9 OSTEOARTHRITIS OF SPINE, UNSPECIFIED SPINAL OSTEOARTHRITIS COMPLICATION STATUS, UNSPECIFIED SPINAL REGION: Primary | ICD-10-CM

## 2019-10-17 DIAGNOSIS — M47.9 SPONDYLOSIS: ICD-10-CM

## 2019-10-17 LAB — POCT GLUCOSE: 60 MG/DL (ref 70–110)

## 2019-10-17 PROCEDURE — 64493 INJ PARAVERT F JNT L/S 1 LEV: CPT | Mod: HCNC | Performed by: ANESTHESIOLOGY

## 2019-10-17 PROCEDURE — 64495 INJ PARAVERT F JNT L/S 3 LEV: CPT | Mod: HCNC | Performed by: ANESTHESIOLOGY

## 2019-10-17 PROCEDURE — 64494 INJ PARAVERT F JNT L/S 2 LEV: CPT | Mod: 50,HCNC,, | Performed by: ANESTHESIOLOGY

## 2019-10-17 PROCEDURE — 64493 INJ PARAVERT F JNT L/S 1 LEV: CPT | Mod: 50,HCNC,, | Performed by: ANESTHESIOLOGY

## 2019-10-17 PROCEDURE — 64493 PR INJ DX/THER AGNT PARAVERT FACET JOINT,IMG GUIDE,LUMBAR/SAC,1ST LVL: ICD-10-PCS | Mod: 50,HCNC,, | Performed by: ANESTHESIOLOGY

## 2019-10-17 PROCEDURE — 64494 INJ PARAVERT F JNT L/S 2 LEV: CPT | Mod: HCNC | Performed by: ANESTHESIOLOGY

## 2019-10-17 PROCEDURE — 25000003 PHARM REV CODE 250: Mod: HCNC | Performed by: ANESTHESIOLOGY

## 2019-10-17 PROCEDURE — 64494 PR INJ DX/THER AGNT PARAVERT FACET JOINT,IMG GUIDE,LUMBAR/SAC, 2ND LEVEL: ICD-10-PCS | Mod: 50,HCNC,, | Performed by: ANESTHESIOLOGY

## 2019-10-17 RX ORDER — LIDOCAINE HYDROCHLORIDE 10 MG/ML
INJECTION INFILTRATION; PERINEURAL
Status: DISCONTINUED | OUTPATIENT
Start: 2019-10-17 | End: 2019-10-17 | Stop reason: HOSPADM

## 2019-10-17 RX ORDER — BUPIVACAINE HYDROCHLORIDE 2.5 MG/ML
INJECTION, SOLUTION EPIDURAL; INFILTRATION; INTRACAUDAL
Status: DISCONTINUED | OUTPATIENT
Start: 2019-10-17 | End: 2019-10-17 | Stop reason: HOSPADM

## 2019-10-17 RX ORDER — SODIUM CHLORIDE 9 MG/ML
500 INJECTION, SOLUTION INTRAVENOUS CONTINUOUS
Status: DISCONTINUED | OUTPATIENT
Start: 2019-10-17 | End: 2019-10-17 | Stop reason: HOSPADM

## 2019-10-17 NOTE — OP NOTE
LUMBAR Medial Branch Block Under Fluoroscopy  Time-out taken to identify patient and procedure side prior to starting the procedure.   I attest that I have reviewed the patient's home medications prior to the procedure and no contraindication have been identified. I  re-evaluated the patient after the patient was positioned for the procedure in the procedure room immediately before the procedural time-out. The vital signs are current and represent the current state of the patient which has not significantly changed since the preprocedure assessment.            Date of Service: 10/17/2019    PCP: Sea Her Jr, MD    Referring Physician:                                                           PROCEDURE:  Bilateral  L3, 4, & 5 medial branch block    REASON FOR PROCEDURE: Lumbar spondylosis [M47.816]  1. Osteoarthritis of spine, unspecified spinal osteoarthritis complication status, unspecified spinal region    2. Spondylosis      POSTPROCEDURE DIAGNOSIS:   Lumbar spondylosis [M47.816]    1. Osteoarthritis of spine, unspecified spinal osteoarthritis complication status, unspecified spinal region    2. Spondylosis           PHYSICIAN: Shaq Silverio MD  ASSISTANTS: None    MEDICATIONS INJECTED: Bupivicaine 25% 1.5ml at each level      LOCAL ANESTHETIC USED: Xylocaine 1% 9ml with Sodium Bicarbonate 1ml.  3ml each site.    SEDATION MEDICATIONS: None    ESTIMATED BLOOD LOSS:  None.    COMPLICATIONS:  None.    TECHNIQUE: Laying in a prone position, the patient was prepped and draped in the usual sterile fashion using ChloraPrep and fenestrated drape.  The level was determined under fluoroscopic guidance.  Local anesthetic was given by going down to the hub of the 27-gauge 1.25in needle and raising a wheel.  A 22-gauge 3.5inch needle was introduced to the anatomic local of the medial branch at the lateral mass of all levels as stated above utilizing live fluoroscopy. Medication was then injected slowly. The patient  tolerated the procedure well.     PAIN BEFORE THE PROCEDURE:  0-10/10.    PAIN AFTER THE PROCEDURE:  0/10.    The patient was monitored after the procedure.  Patient was given post procedure and discharge instructions to follow at home.  We will see the patient back in two weeks or the patient may call to inform of status. The patient was discharged in a stable condition

## 2019-10-17 NOTE — DISCHARGE INSTRUCTIONS

## 2019-10-17 NOTE — DISCHARGE SUMMARY
Discharge Note  Short Stay      SUMMARY     Admit Date: 10/17/2019    Attending Physician: Shaq Silverio      Discharge Physician: Shaq Silverio      Discharge Date: 10/17/2019 3:20 PM    Procedure(s) (LRB):  BLOCK, NERVE (Bilateral)    Final Diagnosis: Lumbar spondylosis [M47.816]    Disposition: Home or self care    Patient Instructions:   Current Discharge Medication List      CONTINUE these medications which have NOT CHANGED    Details   atenolol (TENORMIN) 100 MG tablet Take 1 tablet (100 mg total) by mouth once daily.  Qty: 90 tablet, Refills: 0    Associated Diagnoses: Essential hypertension      blood-glucose meter kit Use as instructed  Qty: 1 each, Refills: 0    Associated Diagnoses: Type 2 diabetes mellitus without complication, without long-term current use of insulin      escitalopram oxalate (LEXAPRO) 20 MG tablet Take 1 tablet (20 mg total) by mouth once daily.  Qty: 90 tablet, Refills: 1    Associated Diagnoses: Mild recurrent major depression; Anxiety      furosemide (LASIX) 20 MG tablet Take 1 tablet (20 mg total) by mouth once daily.  Qty: 90 tablet, Refills: 1    Associated Diagnoses: Essential hypertension      glipiZIDE (GLUCOTROL) 10 MG tablet Take 10 mg by mouth 2 (two) times daily with meals.       hydrOXYzine HCl (ATARAX) 25 MG tablet TAKE 1 TABLET BY MOUTH NIGHTLY AS NEEDED FOR INSOMNIA  Qty: 90 tablet, Refills: 0    Associated Diagnoses: Insomnia, unspecified type      loratadine (CLARITIN) 10 mg tablet TAKE 1 TABLET BY MOUTH ONCE DAILY AS NEEDED FOR ALLERGIES  Qty: 90 tablet, Refills: 0    Associated Diagnoses: Seasonal allergies      LORazepam (ATIVAN) 2 MG Tab Take 2 mg by mouth 2 (two) times daily. as needed for anxiety.  Refills: 0      losartan (COZAAR) 25 MG tablet Take 1 tablet (25 mg total) by mouth once daily.  Qty: 90 tablet, Refills: 3    Associated Diagnoses: Essential hypertension      metFORMIN (GLUCOPHAGE) 1000 MG tablet Take 1,000 mg by mouth every evening.      ondansetron  (ZOFRAN-ODT) 4 MG TbDL Take 1 tablet (4 mg total) by mouth every 6 (six) hours as needed (nausea).  Qty: 10 tablet, Refills: 0      pantoprazole (PROTONIX) 20 MG tablet Take 2 tablets (40 mg total) by mouth daily as needed (Stomach pain).  Qty: 90 tablet, Refills: 1    Associated Diagnoses: Gastroesophageal reflux disease without esophagitis      pravastatin (PRAVACHOL) 40 MG tablet Take 0.5 tablets (20 mg total) by mouth once daily.  Qty: 90 tablet, Refills: 0    Associated Diagnoses: Dyslipidemia      traZODone (DESYREL) 50 MG tablet Take 1 tablet (50 mg total) by mouth every evening.  Qty: 30 tablet, Refills: 5    Associated Diagnoses: Insomnia, unspecified type      zonisamide (ZONEGRAN) 100 MG Cap TAKE 4 CAPSULES BY MOUTH AT BEDTIME  Qty: 360 capsule, Refills: 0    Associated Diagnoses: Osteoarthritis of spine with radiculopathy, lumbar region                 Discharge Diagnosis: Lumbar spondylosis [M47.816]  Condition on Discharge: Stable with no complications to procedure   Diet on Discharge: Same as before.  Activity: as per instruction sheet.  Discharge to: Home with a responsible adult.  Follow up: 2-4 weeks       Please call my office or pager at [phone] if experienced any weakness or loss of sensation, fever > 101.5, pain uncontrolled with oral medications, persistent nausea/vomiting/or diarrhea, redness or drainage from the incisions, or any other worrisome concerns. If physician on call was not reached or could not communicate with our office for any reason please go to the nearest emergency department

## 2019-10-18 DIAGNOSIS — M47.26 OSTEOARTHRITIS OF SPINE WITH RADICULOPATHY, LUMBAR REGION: ICD-10-CM

## 2019-10-18 RX ORDER — ZONISAMIDE 100 MG/1
CAPSULE ORAL
Qty: 360 CAPSULE | Refills: 0 | Status: SHIPPED | OUTPATIENT
Start: 2019-10-18 | End: 2020-01-27

## 2019-10-21 ENCOUNTER — TELEPHONE (OUTPATIENT)
Dept: PAIN MEDICINE | Facility: CLINIC | Age: 69
End: 2019-10-21

## 2019-10-21 NOTE — TELEPHONE ENCOUNTER
Shaq Silverio MD  You 3 minutes ago (3:44 PM)     RFA    Routing comment       You  Shaq Silverio MD 8 minutes ago (3:38 PM)     Spoke with patient in regards to retrieving her pain scores from B/L MBB L3-L4-L5   REPEAT that was done on 10.17.19     Patient has stated that she have received 100% out of 100% relief from 2nd block     How would like to proceed?    Routing comment       You  Claudia Martinez 9 minutes ago (3:37 PM)      You 9 minutes ago (3:37 PM)        ----- Message from Ijeoma Santos sent at 10/21/2019  3:26 PM CDT -----  Contact: CLAUDIA MARTINEZ [[id number]]  Name of Who is Calling: CLAUDIA MARTINEZ [[id number]]      What is the request in detail:CLAUDIA MARTINEZ [[id number]]  Is requesting a call back in regards to recent procedure .Patient is stating that she is not experiencing any pain  .. ..  Please contact to further discuss and advise       Can the clinic reply by [facility]: No     What Number to Call Back if not in [facility]:  [phone]

## 2019-10-21 NOTE — TELEPHONE ENCOUNTER
----- Message from Ijeoma Santos sent at 10/21/2019  3:26 PM CDT -----  Contact: CLAUDIA MARTINEZ [[id number]]  Name of Who is Calling: CLAUDIA MARTINEZ [[id number]]     What is the request in detail:CLAUDIA MARTINEZ [[id number]]  Is requesting a call back in regards to recent procedure .Patient is stating that she is not experiencing any pain  .. ..  Please contact to further discuss and advise      Can the clinic reply by [facility]: No    [facility]:  [phone]

## 2019-11-06 DIAGNOSIS — F33.0 MILD RECURRENT MAJOR DEPRESSION: ICD-10-CM

## 2019-11-06 DIAGNOSIS — F41.9 ANXIETY: ICD-10-CM

## 2019-11-06 RX ORDER — ESCITALOPRAM OXALATE 20 MG/1
TABLET ORAL
Qty: 90 TABLET | Refills: 1 | Status: SHIPPED | OUTPATIENT
Start: 2019-11-06 | End: 2020-05-04

## 2019-11-11 DIAGNOSIS — I10 ESSENTIAL HYPERTENSION: ICD-10-CM

## 2019-11-13 RX ORDER — ATENOLOL 100 MG/1
TABLET ORAL
Qty: 90 TABLET | Refills: 0 | Status: SHIPPED | OUTPATIENT
Start: 2019-11-13 | End: 2020-02-19

## 2019-11-21 ENCOUNTER — HOSPITAL ENCOUNTER (OUTPATIENT)
Facility: OTHER | Age: 69
Discharge: HOME | End: 2019-11-21
Attending: ANESTHESIOLOGY | Admitting: ANESTHESIOLOGY
Payer: MEDICARE

## 2019-11-21 VITALS
BODY MASS INDEX: 30.73 KG/M2 | WEIGHT: 180 LBS | HEIGHT: 64 IN | SYSTOLIC BLOOD PRESSURE: 157 MMHG | RESPIRATION RATE: 18 BRPM | HEART RATE: 65 BPM | TEMPERATURE: 98 F | DIASTOLIC BLOOD PRESSURE: 66 MMHG | OXYGEN SATURATION: 99 %

## 2019-11-21 DIAGNOSIS — M47.9 SPONDYLOSIS: ICD-10-CM

## 2019-11-21 DIAGNOSIS — G89.29 CHRONIC PAIN: ICD-10-CM

## 2019-11-21 DIAGNOSIS — M47.9 OSTEOARTHRITIS OF SPINE, UNSPECIFIED SPINAL OSTEOARTHRITIS COMPLICATION STATUS, UNSPECIFIED SPINAL REGION: Primary | ICD-10-CM

## 2019-11-21 LAB — POCT GLUCOSE: 68 MG/DL (ref 70–110)

## 2019-11-21 PROCEDURE — 63600175 PHARM REV CODE 636 W HCPCS: Mod: HCNC | Performed by: STUDENT IN AN ORGANIZED HEALTH CARE EDUCATION/TRAINING PROGRAM

## 2019-11-21 PROCEDURE — 64636 PR DESTROY L/S FACET JNT ADDL: ICD-10-PCS | Mod: HCNC,RT,, | Performed by: ANESTHESIOLOGY

## 2019-11-21 PROCEDURE — 64635 PR DESTROY LUMB/SAC FACET JNT: ICD-10-PCS | Mod: HCNC,RT,, | Performed by: ANESTHESIOLOGY

## 2019-11-21 PROCEDURE — 99152 MOD SED SAME PHYS/QHP 5/>YRS: CPT | Mod: HCNC,,, | Performed by: ANESTHESIOLOGY

## 2019-11-21 PROCEDURE — 64636 DESTROY L/S FACET JNT ADDL: CPT | Mod: HCNC,RT,, | Performed by: ANESTHESIOLOGY

## 2019-11-21 PROCEDURE — 99152 PR MOD CONSCIOUS SEDATION, SAME PHYS, 5+ YRS, FIRST 15 MIN: ICD-10-PCS | Mod: HCNC,,, | Performed by: ANESTHESIOLOGY

## 2019-11-21 PROCEDURE — 63600175 PHARM REV CODE 636 W HCPCS: Mod: HCNC | Performed by: ANESTHESIOLOGY

## 2019-11-21 PROCEDURE — 64635 DESTROY LUMB/SAC FACET JNT: CPT | Mod: HCNC,RT | Performed by: ANESTHESIOLOGY

## 2019-11-21 PROCEDURE — 25000003 PHARM REV CODE 250: Mod: HCNC | Performed by: ANESTHESIOLOGY

## 2019-11-21 PROCEDURE — 64635 DESTROY LUMB/SAC FACET JNT: CPT | Mod: HCNC,RT,, | Performed by: ANESTHESIOLOGY

## 2019-11-21 PROCEDURE — 64636 DESTROY L/S FACET JNT ADDL: CPT | Mod: HCNC,RT | Performed by: ANESTHESIOLOGY

## 2019-11-21 RX ORDER — SODIUM CHLORIDE 9 MG/ML
500 INJECTION, SOLUTION INTRAVENOUS CONTINUOUS
Status: DISCONTINUED | OUTPATIENT
Start: 2019-11-21 | End: 2019-11-21 | Stop reason: HOSPADM

## 2019-11-21 RX ORDER — LIDOCAINE HYDROCHLORIDE 10 MG/ML
INJECTION INFILTRATION; PERINEURAL
Status: DISCONTINUED | OUTPATIENT
Start: 2019-11-21 | End: 2019-11-21 | Stop reason: HOSPADM

## 2019-11-21 RX ORDER — TRIAMCINOLONE ACETONIDE 40 MG/ML
INJECTION, SUSPENSION INTRA-ARTICULAR; INTRAMUSCULAR
Status: DISCONTINUED | OUTPATIENT
Start: 2019-11-21 | End: 2019-11-21 | Stop reason: HOSPADM

## 2019-11-21 RX ORDER — MIDAZOLAM HYDROCHLORIDE 1 MG/ML
INJECTION INTRAMUSCULAR; INTRAVENOUS
Status: DISCONTINUED | OUTPATIENT
Start: 2019-11-21 | End: 2019-11-21 | Stop reason: HOSPADM

## 2019-11-21 RX ORDER — BUPIVACAINE HYDROCHLORIDE 2.5 MG/ML
INJECTION, SOLUTION EPIDURAL; INFILTRATION; INTRACAUDAL
Status: DISCONTINUED | OUTPATIENT
Start: 2019-11-21 | End: 2019-11-21 | Stop reason: HOSPADM

## 2019-11-21 RX ORDER — FENTANYL CITRATE 50 UG/ML
INJECTION, SOLUTION INTRAMUSCULAR; INTRAVENOUS
Status: DISCONTINUED | OUTPATIENT
Start: 2019-11-21 | End: 2019-11-21 | Stop reason: HOSPADM

## 2019-11-21 RX ADMIN — SODIUM CHLORIDE 500 ML: 0.9 INJECTION, SOLUTION INTRAVENOUS at 10:11

## 2019-11-21 NOTE — DISCHARGE INSTRUCTIONS

## 2019-11-21 NOTE — H&P
HPI  Patient presenting for Procedure(s) (LRB):  RADIOFREQUENCY ABLATION RIGHT L3, L4, L5 (Right)     Patient on Anti-coagulation No    No health changes since previous encounter    Past Medical History:   Diagnosis Date    Arthritis     Diabetes mellitus     Hypertension      Past Surgical History:   Procedure Laterality Date    CARPAL TUNNEL RELEASE Right 3/8/2019    Procedure: RELEASE, CARPAL TUNNEL right;  Surgeon: Pan Goodman MD;  Location: [facility] OR;  Service: Orthopedics;  Laterality: Right;    CARPAL TUNNEL RELEASE Left 2019    Procedure: RELEASE, CARPAL TUNNEL- LEFT;  Surgeon: Pan Goodman MD;  [facility];  Service: Orthopedics;  Laterality: Left;     SECTION      CHOLECYSTECTOMY      EPIDURAL STEROID INJECTION INTO LUMBAR SPINE N/A 2018    Procedure: INJECTION, STEROID, SPINE, LUMBAR, EPIDURAL;  Surgeon: Shaq Silverio MD;  Location: [facility] PAIN MGT;  Service: Pain Management;  Laterality: N/A;  LUMBAR L4-L5 INTERLAMINAR ELISE'  [id number]  W/ SEDATION     HYSTERECTOMY      INJECTION OF ANESTHETIC AGENT AROUND NERVE Bilateral 2019    Procedure: BLOCK, NERVE, L3-L4-L5 MEDIAL BRANCH;  Surgeon: hSaq Silveiro MD;  Location: [facility] PAIN MGT;  Service: Pain Management;  Laterality: Bilateral;    INJECTION OF ANESTHETIC AGENT AROUND NERVE Bilateral 10/17/2019    Procedure: BLOCK, NERVE;  Surgeon: Shaq Silverio MD;  Location: [facility] PAIN MGT;  Service: Pain Management;  Laterality: Bilateral;  B/L MBB L3-L4-L5  REPEAT  CONSENT NEEDED    INJECTION OF FACET JOINT Bilateral 2018    Procedure: INJECTION-FACET;  Surgeon: Shaq Silverio MD;  Location: [facility] PAIN MGT;  Service: Pain Management;  Laterality: Bilateral;  LUMBAR BILATERAL L4-L5 AND L5-S1 FACET STEROID INJECTION  [id number]    W/ SEDATION     TRIAL OF SPINAL CORD NERVE STIMULATOR N/A 2018    Procedure: TRIAL, NEUROSTIMULATOR, SPINAL CORD, SPINAL CORD STIMULATOR TRIAL-INTERNAL WIRES TO EXTERNAL BATTERY;  Surgeon:  "Shaq Silverio MD;  Location: [facility] PAIN MGT;  Service: Pain Management;  Laterality: N/A;  ABBOTT REP NOTIFIED     Review of patient's allergies indicates:   Allergen Reactions    Atorvastatin      Pain    Aspirin Other (See Comments)     Has been told not to take it due to bariatric surgery      No current facility-administered medications for this encounter.        PMHx, PSHx, Allergies, Medications reviewed in epic    ROS negative except pain complaints in HPI    OBJECTIVE:    BP (!) 142/77   Pulse (!) 58   Temp 98.3 °F (36.8 °C) (Oral)   Resp 18   Ht 5' 4" (1.626 m)   Wt 81.6 kg (180 lb)   SpO2 99%   BMI 30.90 kg/m²     PHYSICAL EXAMINATION:    GENERAL: Well appearing, in no acute distress, alert and oriented x3.  PSYCH:  Mood and affect appropriate.  SKIN: Skin color, texture, turgor normal, no rashes or lesions which will impact the procedure.  CV: RRR with palpation of the radial artery.  PULM: No evidence of respiratory difficulty, symmetric chest rise. Clear to auscultation.  NEURO: Cranial nerves grossly intact.    Plan:    Proceed with procedure as planned Procedure(s) (LRB):  RADIOFREQUENCY ABLATION RIGHT L3, L4, L5 (Right)    Manuel Hurtado  11/21/2019          "

## 2019-11-21 NOTE — DISCHARGE SUMMARY
Discharge Note  Short Stay      SUMMARY     Admit Date: 11/21/2019    Attending Physician: Shaq Silverio      Discharge Physician: Shaq Silverio      Discharge Date: 11/21/2019 11:14 AM    Procedure(s) (LRB):  RADIOFREQUENCY ABLATION RIGHT L3, L4, L5 (Right)    Final Diagnosis: Lumbar spondylosis [M47.816]    Disposition: Home or self care    Patient Instructions:   Current Discharge Medication List      CONTINUE these medications which have NOT CHANGED    Details   atenolol (TENORMIN) 100 MG tablet TAKE 1 TABLET BY MOUTH ONCE DAILY  Qty: 90 tablet, Refills: 0    Associated Diagnoses: Essential hypertension      escitalopram oxalate (LEXAPRO) 20 MG tablet TAKE 1 TABLET BY MOUTH ONCE DAILY  Qty: 90 tablet, Refills: 1    Associated Diagnoses: Mild recurrent major depression; Anxiety      furosemide (LASIX) 20 MG tablet Take 1 tablet (20 mg total) by mouth once daily.  Qty: 90 tablet, Refills: 1    Associated Diagnoses: Essential hypertension      glipiZIDE (GLUCOTROL) 10 MG tablet Take 10 mg by mouth 2 (two) times daily with meals.       hydrOXYzine HCl (ATARAX) 25 MG tablet TAKE 1 TABLET BY MOUTH NIGHTLY AS NEEDED FOR INSOMNIA  Qty: 90 tablet, Refills: 0    Associated Diagnoses: Insomnia, unspecified type      LORazepam (ATIVAN) 2 MG Tab Take 2 mg by mouth 2 (two) times daily. as needed for anxiety.  Refills: 0      losartan (COZAAR) 25 MG tablet Take 1 tablet (25 mg total) by mouth once daily.  Qty: 90 tablet, Refills: 3    Associated Diagnoses: Essential hypertension      metFORMIN (GLUCOPHAGE) 1000 MG tablet Take 1,000 mg by mouth every evening.      traZODone (DESYREL) 50 MG tablet Take 1 tablet (50 mg total) by mouth every evening.  Qty: 30 tablet, Refills: 5    Associated Diagnoses: Insomnia, unspecified type      zonisamide (ZONEGRAN) 100 MG Cap TAKE 4 CAPSULES BY MOUTH AT BEDTIME  Qty: 360 capsule, Refills: 0    Associated Diagnoses: Osteoarthritis of spine with radiculopathy, lumbar region      blood-glucose  meter kit Use as instructed  Qty: 1 each, Refills: 0    Associated Diagnoses: Type 2 diabetes mellitus without complication, without long-term current use of insulin      loratadine (CLARITIN) 10 mg tablet TAKE 1 TABLET BY MOUTH ONCE DAILY AS NEEDED FOR ALLERGIES  Qty: 90 tablet, Refills: 0    Associated Diagnoses: Seasonal allergies      ondansetron (ZOFRAN-ODT) 4 MG TbDL Take 1 tablet (4 mg total) by mouth every 6 (six) hours as needed (nausea).  Qty: 10 tablet, Refills: 0      pantoprazole (PROTONIX) 20 MG tablet Take 2 tablets (40 mg total) by mouth daily as needed (Stomach pain).  Qty: 90 tablet, Refills: 1    Associated Diagnoses: Gastroesophageal reflux disease without esophagitis      pravastatin (PRAVACHOL) 40 MG tablet Take 0.5 tablets (20 mg total) by mouth once daily.  Qty: 90 tablet, Refills: 0    Associated Diagnoses: Dyslipidemia                 Discharge Diagnosis: Lumbar spondylosis [M47.816]  Condition on Discharge: Stable with no complications to procedure   Diet on Discharge: Same as before.  Activity: as per instruction sheet.  Discharge to: Home with a responsible adult.  Follow up: 2-4 weeks       Please call my office or pager at [phone] if experienced any weakness or loss of sensation, fever > 101.5, pain uncontrolled with oral medications, persistent nausea/vomiting/or diarrhea, redness or drainage from the incisions, or any other worrisome concerns. If physician on call was not reached or could not communicate with our office for any reason please go to the nearest emergency department

## 2019-11-21 NOTE — OP NOTE
LUMBAR Medial Branch Radiofrequency Ablation Under Fluoroscopy  Time-out taken to identify patient and procedure side prior to starting the procedure.   I attest that I have reviewed the patient's home medications prior to the procedure and no contraindication have been identified. I  re-evaluated the patient after the patient was positioned for the procedure in the procedure room immediately before the procedural time-out. The vital signs are current and represent the current state of the patient which has not significantly changed since the preprocedure assessment.                   Date of Service: 11/21/2019    PCP: Sea Her Jr, MD    Referring Physician:                                                PROCEDURE:  right L3, 4, & 5 medial branch radiofrequency ablation    REASON FOR PROCEDURE: Lumbar spondylosis [M47.816]  1. Osteoarthritis of spine, unspecified spinal osteoarthritis complication status, unspecified spinal region    2. Spondylosis    3. Chronic pain      POSTPROCEDURE DIAGNOSIS:   Lumbar spondylosis [M47.816]    1. Osteoarthritis of spine, unspecified spinal osteoarthritis complication status, unspecified spinal region    2. Spondylosis    3. Chronic pain           PHYSICIAN: Shaq Silverio MD  ASSISTANTS: William Hurtado MD resident     MEDICATIONS INJECTED:  0.5 ml of Kenalog 40mg/ml, and Bupivacaine 0.25% 10ml. Of that, 1.5-3ml injected per level before & after the ablation.    LOCAL ANESTHETIC USED: Xylocaine 1% 9ml with Sodium Bicarbonate 1ml.  3ml each site.  SEDATION MEDICATIONS: Versed 2 mg IV, Fentanyl 25 mcg IV    ESTIMATED BLOOD LOSS:  None.    COMPLICATIONS:  None.    TECHNIQUE:   Laying in a prone position, the patient was prepped and draped in the usual sterile fashion using ChloraPrep and fenestrated drape.  The level was determined under fluoroscopic guidance.  Local anesthetic was given by going down to the hub of the 27-gauge 1.25in needle and raising a wheel.  The 20-gauge  needle was introduced to the anatomic local of the median branch at the lateral mass of all levels as stated above utilizing live fluoroscopy. Motor stimulation done to confirm no motor nerve ablation takes place up to 2 Volt 2Hz..  Sensory stimulation done to detect similarities in pain location 1.5 Volts 50Hz.. Medication was then injected slowly.  Ablation then done per level utilizing Refresh Body radiofrequency generator 80°C for 90 seconds. The patient tolerated the procedure well.     PAIN BEFORE THE PROCEDURE:  10/10.    PAIN AFTER THE PROCEDURE: 0/10.    The patient was monitored after the procedure.  Patient was given post procedure and discharge instructions to follow at home.  We will see the patient back in two weeks or the patient may call to inform of status. The patient was discharged in a stable condition

## 2019-11-22 DIAGNOSIS — J30.2 SEASONAL ALLERGIES: ICD-10-CM

## 2019-11-22 DIAGNOSIS — K21.9 GASTROESOPHAGEAL REFLUX DISEASE WITHOUT ESOPHAGITIS: ICD-10-CM

## 2019-11-22 RX ORDER — LORATADINE 10 MG/1
TABLET ORAL
Qty: 90 TABLET | Refills: 0 | Status: SHIPPED | OUTPATIENT
Start: 2019-11-22 | End: 2020-02-19

## 2019-11-22 RX ORDER — PANTOPRAZOLE SODIUM 20 MG/1
TABLET, DELAYED RELEASE ORAL
Qty: 180 TABLET | Refills: 0 | Status: SHIPPED | OUTPATIENT
Start: 2019-11-22 | End: 2020-10-18

## 2019-12-05 ENCOUNTER — HOSPITAL ENCOUNTER (OUTPATIENT)
Facility: OTHER | Age: 69
Discharge: HOME | End: 2019-12-05
Attending: ANESTHESIOLOGY | Admitting: ANESTHESIOLOGY
Payer: MEDICARE

## 2019-12-05 VITALS
RESPIRATION RATE: 18 BRPM | DIASTOLIC BLOOD PRESSURE: 50 MMHG | TEMPERATURE: 98 F | WEIGHT: 180 LBS | HEART RATE: 65 BPM | SYSTOLIC BLOOD PRESSURE: 105 MMHG | BODY MASS INDEX: 30.73 KG/M2 | OXYGEN SATURATION: 97 % | HEIGHT: 64 IN

## 2019-12-05 DIAGNOSIS — M47.816 SPONDYLOSIS OF LUMBAR REGION WITHOUT MYELOPATHY OR RADICULOPATHY: ICD-10-CM

## 2019-12-05 DIAGNOSIS — G89.29 CHRONIC PAIN: ICD-10-CM

## 2019-12-05 DIAGNOSIS — M54.16 LUMBAR RADICULOPATHY: Primary | ICD-10-CM

## 2019-12-05 LAB — POCT GLUCOSE: 62 MG/DL (ref 70–110)

## 2019-12-05 PROCEDURE — 64636 DESTROY L/S FACET JNT ADDL: CPT | Mod: HCNC,LT,, | Performed by: ANESTHESIOLOGY

## 2019-12-05 PROCEDURE — 99152 MOD SED SAME PHYS/QHP 5/>YRS: CPT | Mod: HCNC,,, | Performed by: ANESTHESIOLOGY

## 2019-12-05 PROCEDURE — 64636 DESTROY L/S FACET JNT ADDL: CPT | Mod: HCNC | Performed by: ANESTHESIOLOGY

## 2019-12-05 PROCEDURE — 64635 DESTROY LUMB/SAC FACET JNT: CPT | Mod: HCNC,LT,, | Performed by: ANESTHESIOLOGY

## 2019-12-05 PROCEDURE — 63600175 PHARM REV CODE 636 W HCPCS: Mod: HCNC | Performed by: ANESTHESIOLOGY

## 2019-12-05 PROCEDURE — 64636 PR DESTROY L/S FACET JNT ADDL: ICD-10-PCS | Mod: HCNC,LT,, | Performed by: ANESTHESIOLOGY

## 2019-12-05 PROCEDURE — 64635 DESTROY LUMB/SAC FACET JNT: CPT | Mod: HCNC,LT | Performed by: ANESTHESIOLOGY

## 2019-12-05 PROCEDURE — 64635 PR DESTROY LUMB/SAC FACET JNT: ICD-10-PCS | Mod: HCNC,LT,, | Performed by: ANESTHESIOLOGY

## 2019-12-05 PROCEDURE — 25000003 PHARM REV CODE 250: Mod: HCNC | Performed by: ANESTHESIOLOGY

## 2019-12-05 PROCEDURE — 99152 PR MOD CONSCIOUS SEDATION, SAME PHYS, 5+ YRS, FIRST 15 MIN: ICD-10-PCS | Mod: HCNC,,, | Performed by: ANESTHESIOLOGY

## 2019-12-05 RX ORDER — SODIUM CHLORIDE 9 MG/ML
500 INJECTION, SOLUTION INTRAVENOUS CONTINUOUS
Status: DISCONTINUED | OUTPATIENT
Start: 2019-12-05 | End: 2019-12-05 | Stop reason: HOSPADM

## 2019-12-05 RX ORDER — LIDOCAINE HYDROCHLORIDE 10 MG/ML
INJECTION INFILTRATION; PERINEURAL
Status: DISCONTINUED | OUTPATIENT
Start: 2019-12-05 | End: 2019-12-05 | Stop reason: HOSPADM

## 2019-12-05 RX ORDER — MIDAZOLAM HYDROCHLORIDE 1 MG/ML
INJECTION INTRAMUSCULAR; INTRAVENOUS
Status: DISCONTINUED | OUTPATIENT
Start: 2019-12-05 | End: 2019-12-05 | Stop reason: HOSPADM

## 2019-12-05 RX ORDER — TRIAMCINOLONE ACETONIDE 40 MG/ML
INJECTION, SUSPENSION INTRA-ARTICULAR; INTRAMUSCULAR
Status: DISCONTINUED | OUTPATIENT
Start: 2019-12-05 | End: 2019-12-05 | Stop reason: HOSPADM

## 2019-12-05 RX ORDER — FENTANYL CITRATE 50 UG/ML
INJECTION, SOLUTION INTRAMUSCULAR; INTRAVENOUS
Status: DISCONTINUED | OUTPATIENT
Start: 2019-12-05 | End: 2019-12-05 | Stop reason: HOSPADM

## 2019-12-05 RX ORDER — BUPIVACAINE HYDROCHLORIDE 2.5 MG/ML
INJECTION, SOLUTION EPIDURAL; INFILTRATION; INTRACAUDAL
Status: DISCONTINUED | OUTPATIENT
Start: 2019-12-05 | End: 2019-12-05 | Stop reason: HOSPADM

## 2019-12-05 NOTE — DISCHARGE INSTRUCTIONS
Thank you for allowing us to care for you today. You may receive a survey about the care we provided. Your feedback is valuable and helps us provide excellent care throughout the community.     Home Care Instructions for Pain Management:    1. DIET:   You may resume your normal diet today.   2. BATHING:   You may shower with luke warm water. No tub baths or anything that will soak injection sites under water for the next 24 hours.  3. DRESSING:   You may remove your bandage today.   4. ACTIVITY LEVEL:   You may resume your normal activities 24 hrs after your procedure. Nothing strenuous today.  5. MEDICATIONS:   You may resume your normal medications today. To restart blood thinners, ask your doctor.  6. DRIVING    If you have received any sedatives by mouth today, you may not drive for 12 hours.    If you have received any sedation through your IV, you may not drive for 24 hrs.   7. SPECIAL INSTRUCTIONS:   No heat to the injection site for 24 hrs including, hot bath or shower, heating pad, moist heat, or hot tubs.    Use ice pack to injection site for any pain or discomfort.  Apply ice packs for 20 minute intervals as needed.    IF you have diabetes, be sure to monitor your blood sugar more closely. IF your injection contained steroids your blood sugar levels may become higher than normal.    If you are still having pain upon discharge:  Your pain may improve over the next 48 hours. The anesthetic (numbing medication) works immediately to 48 hours. IF your injection contained a steroid (anti-inflammatory medication), it takes approximately 3 days to start feeling relief and 7-10 days to see your greatest results from the medication. It is possible you may need subsequent injections. This would be discussed at your follow up appointment with pain management or your referring doctor.    Please call the PAIN MANAGEMENT office at [phone] or ON CALL pager at [phone] if you experienced any:   -Weakness or  loss of sensation  -Fever > 101.5  -Pain uncontrolled with oral medications   -Persistent nausea, vomiting, or diarrhea  -Redness or drainage from the injection sites, or any other worrisome concerns.   If physician on call was not reached or could not communicate with our office for any reason please go to the nearest emergency department.    Recovery After Procedural Sedation (Adult)  You have been given medicine by vein to make you sleep during your surgery. This may have included both a pain medicine and sleeping medicine. Most of the effects have worn off. But you may still have some drowsiness for the next 6 to 8 hours.  Home care  Follow these guidelines when you get home:  · For the next 8 hours, you should be watched by a responsible adult. This person should make sure your condition is not getting worse.  · Don't drink any alcohol for the next 24 hours.  · Don't drive, operate dangerous machinery, or make important business or personal decisions during the next 24 hours.  Note: Your healthcare provider may tell you not to take any medicine by mouth for pain or sleep in the next 4 hours. These medicines may react with the medicines you were given in the hospital. This could cause a much stronger response than usual.  Follow-up care  Follow up with your healthcare provider if you are not alert and back to your usual level of activity within 12 hours.  When to seek medical advice  Call your healthcare provider right away if any of these occur:  · Drowsiness gets worse  · Weakness or dizziness gets worse  · Repeated vomiting  · You can't be awakened   Date Last Reviewed: 10/18/2016  © 6031-1484 [employer]. [address] [place] [address], [place] [address]. All rights reserved. This information is not intended as a substitute for professional medical care. Always follow your healthcare professional's instructions.

## 2019-12-05 NOTE — H&P
HPI  Patient presenting for Procedure(s) (LRB):  RADIOFREQUENCY ABLATION LEFT L3-5 (Left)     Patient on Anti-coagulation No    No health changes since previous encounter    Past Medical History:   Diagnosis Date    Arthritis     Diabetes mellitus     Hypertension      Past Surgical History:   Procedure Laterality Date    CARPAL TUNNEL RELEASE Right 3/8/2019    Procedure: RELEASE, CARPAL TUNNEL right;  Surgeon: Pan Goodman MD;  Location: [facility] OR;  Service: Orthopedics;  Laterality: Right;    CARPAL TUNNEL RELEASE Left 2019    Procedure: RELEASE, CARPAL TUNNEL- LEFT;  Surgeon: Pan Goodman MD;  Location: [facility] OR 2ND FLR;  Service: Orthopedics;  Laterality: Left;     SECTION      CHOLECYSTECTOMY      EPIDURAL STEROID INJECTION INTO LUMBAR SPINE N/A 2018    Procedure: INJECTION, STEROID, SPINE, LUMBAR, EPIDURAL;  Surgeon: Shaq Silverio MD;  Location: [facility] PAIN MGT;  Service: Pain Management;  Laterality: N/A;  LUMBAR L4-L5 INTERLAMINAR ELISE'  [id number]  W/ SEDATION     HYSTERECTOMY      INJECTION OF ANESTHETIC AGENT AROUND NERVE Bilateral 2019    Procedure: BLOCK, NERVE, L3-L4-L5 MEDIAL BRANCH;  Surgeon: Shaq Silverio MD;  Location: [facility] PAIN MGT;  Service: Pain Management;  Laterality: Bilateral;    INJECTION OF ANESTHETIC AGENT AROUND NERVE Bilateral 10/17/2019    Procedure: BLOCK, NERVE;  Surgeon: Shaq Silverio MD;  Location: [facility] PAIN MGT;  Service: Pain Management;  Laterality: Bilateral;  B/L MBB L3-L4-L5  REPEAT  CONSENT NEEDED    INJECTION OF FACET JOINT Bilateral 2018    Procedure: INJECTION-FACET;  Surgeon: Shaq Silverio MD;  Location: [facility] PAIN MGT;  Service: Pain Management;  Laterality: Bilateral;  LUMBAR BILATERAL L4-L5 AND L5-S1 FACET STEROID INJECTION  [id number]    W/ SEDATION     RADIOFREQUENCY ABLATION Right 2019    Procedure: RADIOFREQUENCY ABLATION RIGHT L3, L4, L5;  Surgeon: Shaq Silverio MD;  Location: [facility] PAIN MGT;  Service: Pain Management;   "Laterality: Right;  Right RFA L3-L4-L5  1 of 2  Consent Needed    TRIAL OF SPINAL CORD NERVE STIMULATOR N/A 9/24/2018    Procedure: TRIAL, NEUROSTIMULATOR, SPINAL CORD, SPINAL CORD STIMULATOR TRIAL-INTERNAL WIRES TO EXTERNAL BATTERY;  Surgeon: Shaq Silverio MD;  Location: [facility] MGT;  Service: Pain Management;  Laterality: N/A;  [facility]OTT REP NOTIFIED     Review of patient's allergies indicates:   Allergen Reactions    Atorvastatin      Pain    Aspirin Other (See Comments)     Has been told not to take it due to bariatric surgery      Current Facility-Administered Medications   Medication    0.9%  NaCl infusion       PMHx, PSHx, Allergies, Medications reviewed in epic    ROS negative except pain complaints in HPI    OBJECTIVE:    /66 (BP Location: Right arm, Patient Position: Lying)   Pulse 62   Temp 98.2 °F (36.8 °C) (Oral)   Resp 18   Ht 5' 4" (1.626 m)   Wt 81.6 kg (180 lb)   SpO2 97%   Breastfeeding? No   BMI 30.90 kg/m²     PHYSICAL EXAMINATION:    GENERAL: Well appearing, in no acute distress, alert and oriented x3.  PSYCH:  Mood and affect appropriate.  SKIN: Skin color, texture, turgor normal, no rashes or lesions which will impact the procedure.  CV: RRR with palpation of the radial artery.  PULM: No evidence of respiratory difficulty, symmetric chest rise. Clear to auscultation.  NEURO: Cranial nerves grossly intact.    Plan:    Proceed with procedure as planned Procedure(s) (LRB):  RADIOFREQUENCY ABLATION LEFT L3-5 (Left)    Don Miller  12/05/2019            "

## 2019-12-05 NOTE — OP NOTE
LUMBAR Medial Branch Radiofrequency Ablation Under Fluoroscopy  Time-out taken to identify patient and procedure side prior to starting the procedure.   I attest that I have reviewed the patient's home medications prior to the procedure and no contraindication have been identified. I  re-evaluated the patient after the patient was positioned for the procedure in the procedure room immediately before the procedural time-out. The vital signs are current and represent the current state of the patient which has not significantly changed since the preprocedure assessment.                   Date of Service: 12/05/2019    PCP: Sea Her Jr, MD    Referring Physician:                                                PROCEDURE:  left L3, 4, & 5 medial branch radiofrequency ablation    REASON FOR PROCEDURE: Lumbar spondylosis [M47.816]  1. Lumbar radiculopathy    2. Spondylosis of lumbar region without myelopathy or radiculopathy    3. Chronic pain      POSTPROCEDURE DIAGNOSIS:   Lumbar spondylosis [M47.816]    1. Lumbar radiculopathy    2. Spondylosis of lumbar region without myelopathy or radiculopathy    3. Chronic pain           PHYSICIAN: Shaq Silverio MD  ASSISTANTS: Don Miller MD [employer] pain fellow       MEDICATIONS INJECTED:  0.5 ml of Kenalog 40mg/ml, and Bupivacaine 0.25% 10ml. Of that, 1.5-3ml injected per level before & after the ablation.    LOCAL ANESTHETIC USED: Xylocaine 1% 9ml with Sodium Bicarbonate 1ml.  3ml each site.  SEDATION MEDICATIONS: Versed 2mg IV, Fentanyl 25mcg IV    ESTIMATED BLOOD LOSS:  None.    COMPLICATIONS:  None.    TECHNIQUE:   Laying in a prone position, the patient was prepped and draped in the usual sterile fashion using ChloraPrep and fenestrated drape.  The level was determined under fluoroscopic guidance.  Local anesthetic was given by going down to the hub of the 27-gauge 1.25in needle and raising a wheel.  The 20-gauge needle was introduced to the anatomic local of the median branch  at the lateral mass of all levels as stated above utilizing live fluoroscopy. Motor stimulation done to confirm no motor nerve ablation takes place up to 2 Volt 2Hz..  Sensory stimulation done to detect similarities in pain location 1.5 Volts 50Hz.. Medication was then injected slowly.  Ablation then done per level utilizing Apiary radiofrequency generator 80°C for 90 seconds. The patient tolerated the procedure well.     PAIN BEFORE THE PROCEDURE:  5/10.    PAIN AFTER THE PROCEDURE: 0/10.    The patient was monitored after the procedure.  Patient was given post procedure and discharge instructions to follow at home.  We will see the patient back in two weeks or the patient may call to inform of status. The patient was discharged in a stable condition

## 2019-12-05 NOTE — BRIEF OP NOTE
Discharge Note  Short Stay      SUMMARY     Admit Date: 12/5/2019    Attending Physician: Shaq Silverio      Discharge Physician: Shaq Silverio      Discharge Date: 12/5/2019 11:12 AM    Procedure(s) (LRB):  RADIOFREQUENCY ABLATION LEFT L3-5 (Left)    Final Diagnosis: Lumbar spondylosis [M47.816]    Disposition: Home or self care    Patient Instructions:   Current Discharge Medication List      CONTINUE these medications which have NOT CHANGED    Details   atenolol (TENORMIN) 100 MG tablet TAKE 1 TABLET BY MOUTH ONCE DAILY  Qty: 90 tablet, Refills: 0    Associated Diagnoses: Essential hypertension      blood-glucose meter kit Use as instructed  Qty: 1 each, Refills: 0    Associated Diagnoses: Type 2 diabetes mellitus without complication, without long-term current use of insulin      escitalopram oxalate (LEXAPRO) 20 MG tablet TAKE 1 TABLET BY MOUTH ONCE DAILY  Qty: 90 tablet, Refills: 1    Associated Diagnoses: Mild recurrent major depression; Anxiety      furosemide (LASIX) 20 MG tablet Take 1 tablet (20 mg total) by mouth once daily.  Qty: 90 tablet, Refills: 1    Associated Diagnoses: Essential hypertension      glipiZIDE (GLUCOTROL) 10 MG tablet Take 10 mg by mouth 2 (two) times daily with meals.       hydrOXYzine HCl (ATARAX) 25 MG tablet TAKE 1 TABLET BY MOUTH NIGHTLY AS NEEDED FOR INSOMNIA  Qty: 90 tablet, Refills: 0    Associated Diagnoses: Insomnia, unspecified type      loratadine (CLARITIN) 10 mg tablet TAKE 1 TABLET BY MOUTH ONCE DAILY AS NEEDED FOR ALLERGIES  Qty: 90 tablet, Refills: 0    Associated Diagnoses: Seasonal allergies      LORazepam (ATIVAN) 2 MG Tab Take 2 mg by mouth 2 (two) times daily. as needed for anxiety.  Refills: 0      losartan (COZAAR) 25 MG tablet Take 1 tablet (25 mg total) by mouth once daily.  Qty: 90 tablet, Refills: 3    Associated Diagnoses: Essential hypertension      metFORMIN (GLUCOPHAGE) 1000 MG tablet Take 1,000 mg by mouth every evening.      ondansetron (ZOFRAN-ODT) 4 MG  TbDL Take 1 tablet (4 mg total) by mouth every 6 (six) hours as needed (nausea).  Qty: 10 tablet, Refills: 0      pantoprazole (PROTONIX) 20 MG tablet TAKE 2 TABLETS BY MOUTH ONCE DAILY AS NEEDED (STOMACH  PAIN)  Qty: 180 tablet, Refills: 0    Associated Diagnoses: Gastroesophageal reflux disease without esophagitis      pravastatin (PRAVACHOL) 40 MG tablet Take 0.5 tablets (20 mg total) by mouth once daily.  Qty: 90 tablet, Refills: 0    Associated Diagnoses: Dyslipidemia      traZODone (DESYREL) 50 MG tablet Take 1 tablet (50 mg total) by mouth every evening.  Qty: 30 tablet, Refills: 5    Associated Diagnoses: Insomnia, unspecified type      zonisamide (ZONEGRAN) 100 MG Cap TAKE 4 CAPSULES BY MOUTH AT BEDTIME  Qty: 360 capsule, Refills: 0    Associated Diagnoses: Osteoarthritis of spine with radiculopathy, lumbar region                 Discharge Diagnosis: Lumbar spondylosis [M47.816]  Condition on Discharge: Stable with no complications to procedure   Diet on Discharge: Same as before.  Activity: as per instruction sheet.  Discharge to: Home with a responsible adult.  Follow up: 2-4 weeks       Please call my office or pager at [phone] if experienced any weakness or loss of sensation, fever > 101.5, pain uncontrolled with oral medications, persistent nausea/vomiting/or diarrhea, redness or drainage from the incisions, or any other worrisome concerns. If physician on call was not reached or could not communicate with our office for any reason please go to the nearest emergency department

## 2019-12-17 ENCOUNTER — PATIENT OUTREACH (OUTPATIENT)
Dept: ADMINISTRATIVE | Facility: OTHER | Age: 69
End: 2019-12-17

## 2019-12-29 DIAGNOSIS — E78.5 DYSLIPIDEMIA: ICD-10-CM

## 2019-12-30 RX ORDER — PRAVASTATIN SODIUM 40 MG/1
TABLET ORAL
Qty: 90 TABLET | Refills: 3 | Status: SHIPPED | OUTPATIENT
Start: 2019-12-30 | End: 2020-01-09

## 2020-01-07 ENCOUNTER — PATIENT OUTREACH (OUTPATIENT)
Dept: ADMINISTRATIVE | Facility: OTHER | Age: 70
End: 2020-01-07

## 2020-01-09 ENCOUNTER — OFFICE VISIT (OUTPATIENT)
Dept: SPINE | Facility: CLINIC | Age: 70
End: 2020-01-09
Payer: MEDICARE

## 2020-01-09 VITALS
TEMPERATURE: 98 F | HEART RATE: 64 BPM | DIASTOLIC BLOOD PRESSURE: 71 MMHG | SYSTOLIC BLOOD PRESSURE: 116 MMHG | WEIGHT: 180 LBS | BODY MASS INDEX: 30.73 KG/M2 | HEIGHT: 64 IN | RESPIRATION RATE: 18 BRPM

## 2020-01-09 DIAGNOSIS — M51.36 DDD (DEGENERATIVE DISC DISEASE), LUMBAR: ICD-10-CM

## 2020-01-09 DIAGNOSIS — M47.816 LUMBAR SPONDYLOSIS: ICD-10-CM

## 2020-01-09 DIAGNOSIS — G89.4 CHRONIC PAIN SYNDROME: Primary | ICD-10-CM

## 2020-01-09 PROCEDURE — 99213 PR OFFICE/OUTPT VISIT, EST, LEVL III, 20-29 MIN: ICD-10-PCS | Mod: HCNC,S$GLB,, | Performed by: NURSE PRACTITIONER

## 2020-01-09 PROCEDURE — 3078F PR MOST RECENT DIASTOLIC BLOOD PRESSURE < 80 MM HG: ICD-10-PCS | Mod: HCNC,CPTII,S$GLB, | Performed by: NURSE PRACTITIONER

## 2020-01-09 PROCEDURE — 99999 PR PBB SHADOW E&M-EST. PATIENT-LVL III: CPT | Mod: PBBFAC,HCNC,, | Performed by: NURSE PRACTITIONER

## 2020-01-09 PROCEDURE — 3078F DIAST BP <80 MM HG: CPT | Mod: HCNC,CPTII,S$GLB, | Performed by: NURSE PRACTITIONER

## 2020-01-09 PROCEDURE — 1101F PT FALLS ASSESS-DOCD LE1/YR: CPT | Mod: HCNC,CPTII,S$GLB, | Performed by: NURSE PRACTITIONER

## 2020-01-09 PROCEDURE — 1159F PR MEDICATION LIST DOCUMENTED IN MEDICAL RECORD: ICD-10-PCS | Mod: HCNC,S$GLB,, | Performed by: NURSE PRACTITIONER

## 2020-01-09 PROCEDURE — 3074F PR MOST RECENT SYSTOLIC BLOOD PRESSURE < 130 MM HG: ICD-10-PCS | Mod: HCNC,CPTII,S$GLB, | Performed by: NURSE PRACTITIONER

## 2020-01-09 PROCEDURE — 1125F AMNT PAIN NOTED PAIN PRSNT: CPT | Mod: HCNC,S$GLB,, | Performed by: NURSE PRACTITIONER

## 2020-01-09 PROCEDURE — 99999 PR PBB SHADOW E&M-EST. PATIENT-LVL III: ICD-10-PCS | Mod: PBBFAC,HCNC,, | Performed by: NURSE PRACTITIONER

## 2020-01-09 PROCEDURE — 99213 OFFICE O/P EST LOW 20 MIN: CPT | Mod: HCNC,S$GLB,, | Performed by: NURSE PRACTITIONER

## 2020-01-09 PROCEDURE — 1125F PR PAIN SEVERITY QUANTIFIED, PAIN PRESENT: ICD-10-PCS | Mod: HCNC,S$GLB,, | Performed by: NURSE PRACTITIONER

## 2020-01-09 PROCEDURE — 1159F MED LIST DOCD IN RCRD: CPT | Mod: HCNC,S$GLB,, | Performed by: NURSE PRACTITIONER

## 2020-01-09 PROCEDURE — 1101F PR PT FALLS ASSESS DOC 0-1 FALLS W/OUT INJ PAST YR: ICD-10-PCS | Mod: HCNC,CPTII,S$GLB, | Performed by: NURSE PRACTITIONER

## 2020-01-09 PROCEDURE — 3074F SYST BP LT 130 MM HG: CPT | Mod: HCNC,CPTII,S$GLB, | Performed by: NURSE PRACTITIONER

## 2020-01-09 RX ORDER — MELOXICAM 7.5 MG/1
7.5 TABLET ORAL 2 TIMES DAILY PRN
Qty: 60 TABLET | Refills: 2 | Status: SHIPPED | OUTPATIENT
Start: 2020-01-09 | End: 2020-04-08

## 2020-01-09 NOTE — PROGRESS NOTES
Chronic patient Established Note (Follow up visit)      SUBJECTIVE:    Interval History 1/9/2020:  The patient is here for follow up of lower back and leg pain.  She is s/p lumbar RFAs with about 50% relief.  Her leg pain is well controlled with implant.  She still has intermittent flare ups of back pain, like today.  When this happens, she has some benefit with rest.  She has tried Tylenol and OTC NSAIDs without benefit.  She denies any recent falls or weakness.  The patient denies any bowel or bladder incontinence or signs of saddle paresthesia.  The patient denies any major medical changes since last office visit.  Her pain today is 7/10.    Previous Encounter:  Faby Luna presents to the clinic for a follow-up appointment for lower back pain.  She previously had significant leg pain which has resolved with [employer] SCS implant.  She is here today to discuss increased lower back pain.  It is sharp and throbbing in nature.  It is significant in the morning and with prolonged standing and activity.  She has some benefit with stretching.  She denies any recent falls.  Since the last visit, Faby Luna states the pain has been worsening.  Current pain intensity is 8/10.    Pain Disability Index Review:  Last 3 PDI Scores 1/9/2020 8/29/2019 2/27/2019   Pain Disability Index (PDI) 40 44 8       Opioid Contract: no     report:  Not applicable    Pain Procedures:   5/2/18 Left L3 and L4 TF ELISE- 20% relief  5/21/18 Bilateral L4-5 and L5-S1 facet joint injections- no relief  9/24/18 Lumbar SCS trial ([employer])- 100% relief  10/26/18 Lumbar SCS implant ([employer])- 100% relief of leg pain  9/12/19 Bilateral L3,4,5 MBB- helpful  10/17/19 Bilateral L3,4,5 MBB- helpful  11/21/19 Right L3,4,5 RFA- 50% relief  12/5/19 Left L3,4,5 RFA- 50% relief    Physical Therapy/Home Exercise:   yes in the past with limited benefit    Imaging:     3/31/18 Lumbar MRI    Narrative     EXAMINATION:  MRI LUMBAR SPINE  WITHOUT CONTRAST    CLINICAL HISTORY:  Low back pain, >6wks conservative tx, persistent-progressive sx, surgical candidate; Other spondylosis with radiculopathy, lumbar region    TECHNIQUE:  Multiplanar, multisequence MR images were acquired from the thoracolumbar junction to the sacrum without the administration of contrast.    COMPARISON:  Plain films from 03/27/2018    FINDINGS:  There is grade 1 spondylolisthesis of L4 on L5. The vertebral body heights are well maintained, with no fracture.  No marrow signal abnormality suspicious for an infiltrative process.    The conus medullaris terminates at approximately the mid body of L1.  There is a cyst associated with the upper pole of the right kidney.  There is disc desiccation noted throughout the lumbar spine with relative sparing of the L5-S1 disc.  Mild disc space narrowing present at the L4-5 level.    L1-L2: Mild diffuse disc bulge resulting in no significant central or neural foraminal canal narrowing.    L2-L3: No significant central canal narrowing.  There is mild narrowing of either neural foraminal canal secondary to disc material.    L3-L4: Disc bulging in the bilateral foraminal regions resulting in no significant central canal narrowing.  Mild-to-moderate bilateral facet arthropathy also noted.  The bilateral neural foraminal canals are moderately narrowed with some mild effacement of either exiting L3 nerve root in the extraforaminal regions bilaterally.    L4-L5:  Grade 1 spondylolisthesis along with moderate to severe bilateral facet arthropathy and ligamentum flavum hypertrophy resulting in at least moderate narrowing of the central canal.  The right neural foraminal canal is mildly to moderately narrowed.  Left neural foraminal canal is moderately to severely narrowed with mild effacement of the exiting L4 nerve root.    L5-S1:  No significant central or neural foraminal canal narrowing noted.  Mild bilateral facet arthropathy noted.   Impression        1. Multilevel degenerative changes of the lumbar spine as detailed above     Lumbar XRAYs 3/27/18    Narrative     EXAMINATION:  XR LUMBAR SPINE AP AND LAT WITH FLEX/EXT    CLINICAL HISTORY:  Low back pain    TECHNIQUE:  AP and lateral views as well as lateral flexion and extension images are performed through the lumbar spine.    COMPARISON:  None    FINDINGS:  X-ray lumbar spine with flexion and extension demonstrates grade 1 spondylolisthesis at L4-5 measuring around 6 mm which does not change significantly with flexion and extension.  The L4-5 disc is narrowed and degenerated.  Other lumbar vertebral discs are maintained.  Vertebral body heights are maintained.  Small anterior spurs are seen, and there is small posterior osteophyte along the inferior endplate of L4.  There is lumbar facet arthropathy at L4-5 and L5-S1.   Impression       Degenerative changes as above.  Grade 1 anterolisthesis and degenerative disc disease at L4-5.         Allergies:   Review of patient's allergies indicates:   Allergen Reactions    Aspirin Other (See Comments)     Has been told not to take it due to bariatric surgery       Current Medications:   Current Outpatient Medications   Medication Sig Dispense Refill    atenolol (TENORMIN) 100 MG tablet TAKE 1 TABLET BY MOUTH ONCE DAILY 90 tablet 0    escitalopram oxalate (LEXAPRO) 20 MG tablet TAKE 1 TABLET BY MOUTH ONCE DAILY 90 tablet 1    furosemide (LASIX) 20 MG tablet Take 1 tablet (20 mg total) by mouth once daily. 90 tablet 1    glipiZIDE (GLUCOTROL) 10 MG tablet Take 10 mg by mouth 2 (two) times daily with meals.       hydrOXYzine HCl (ATARAX) 25 MG tablet TAKE 1 TABLET BY MOUTH NIGHTLY AS NEEDED FOR INSOMNIA 90 tablet 0    loratadine (CLARITIN) 10 mg tablet TAKE 1 TABLET BY MOUTH ONCE DAILY AS NEEDED FOR ALLERGIES 90 tablet 0    losartan (COZAAR) 25 MG tablet Take 1 tablet (25 mg total) by mouth once daily. 90 tablet 3    metFORMIN (GLUCOPHAGE) 1000 MG tablet  Take 1,000 mg by mouth every evening.      pantoprazole (PROTONIX) 20 MG tablet TAKE 2 TABLETS BY MOUTH ONCE DAILY AS NEEDED (STOMACH  PAIN) 180 tablet 0    traZODone (DESYREL) 50 MG tablet Take 1 tablet (50 mg total) by mouth every evening. 30 tablet 5    zonisamide (ZONEGRAN) 100 MG Cap TAKE 4 CAPSULES BY MOUTH AT BEDTIME 360 capsule 0    blood-glucose meter kit Use as instructed 1 each 0    LORazepam (ATIVAN) 2 MG Tab Take 2 mg by mouth 2 (two) times daily. as needed for anxiety.  0    ondansetron (ZOFRAN-ODT) 4 MG TbDL Take 1 tablet (4 mg total) by mouth every 6 (six) hours as needed (nausea). (Patient not taking: Reported on 1/9/2020) 10 tablet 0    pravastatin (PRAVACHOL) 40 MG tablet TAKE 1 TABLET BY MOUTH ONCE DAILY (Patient not taking: Reported on 1/9/2020) 90 tablet 3     No current facility-administered medications for this visit.        REVIEW OF SYSTEMS:    GENERAL:  No weight loss, malaise or fevers.  HEENT:  Negative for frequent or significant headaches.  NECK:  Negative for lumps, goiter, pain and significant neck swelling.  RESPIRATORY:  Negative for cough, wheezing or shortness of breath.  CARDIOVASCULAR:  Negative for chest pain, leg swelling or palpitations. Hypertension.  GI:  Negative for abdominal discomfort, blood in stools or black stools or change in bowel habits.  MUSCULOSKELETAL:  See HPI.  SKIN:  Negative for lesions, rash, and itching.  PSYCH:  Negative for sleep disturbance, mood disorder and recent psychosocial stressors.  HEMATOLOGY/LYMPHOLOGY:  Negative for prolonged bleeding, bruising easily or swollen nodes.  NEURO:   No history of headaches, syncope, paralysis, seizures or tremors.  ENDO: Diabetes.  All other reviewed and negative other than HPI.    Past Medical History:  Past Medical History:   Diagnosis Date    Arthritis     Diabetes mellitus     Hypertension        Past Surgical History:  Past Surgical History:   Procedure Laterality Date    CARPAL TUNNEL RELEASE  Right 3/8/2019    Procedure: RELEASE, CARPAL TUNNEL right;  Surgeon: Pan Goodman MD;  Location: [facility] OR;  Service: Orthopedics;  Laterality: Right;    CARPAL TUNNEL RELEASE Left 2019    Procedure: RELEASE, CARPAL TUNNEL- LEFT;  Surgeon: Pan Goodman MD;  Location: [facility] OR 2ND FLR;  Service: Orthopedics;  Laterality: Left;     SECTION      CHOLECYSTECTOMY      EPIDURAL STEROID INJECTION INTO LUMBAR SPINE N/A 2018    Procedure: INJECTION, STEROID, SPINE, LUMBAR, EPIDURAL;  Surgeon: Shaq Silverio MD;  Location: [facility] PAIN MGT;  Service: Pain Management;  Laterality: N/A;  LUMBAR L4-L5 INTERLAMINAR ELISE'  [id number]  W/ SEDATION     HYSTERECTOMY      INJECTION OF ANESTHETIC AGENT AROUND NERVE Bilateral 2019    Procedure: BLOCK, NERVE, L3-L4-L5 MEDIAL BRANCH;  Surgeon: Shaq Silverio MD;  Location: [facility] PAIN MGT;  Service: Pain Management;  Laterality: Bilateral;    INJECTION OF ANESTHETIC AGENT AROUND NERVE Bilateral 10/17/2019    Procedure: BLOCK, NERVE;  Surgeon: Shaq Silverio MD;  Location: [facility] PAIN MGT;  Service: Pain Management;  Laterality: Bilateral;  B/L MBB L3-L4-L5  REPEAT  CONSENT NEEDED    INJECTION OF FACET JOINT Bilateral 2018    Procedure: INJECTION-FACET;  Surgeon: Shaq Silverio MD;  Location: [facility] PAIN MGT;  Service: Pain Management;  Laterality: Bilateral;  LUMBAR BILATERAL L4-L5 AND L5-S1 FACET STEROID INJECTION  [id number]    W/ SEDATION     RADIOFREQUENCY ABLATION Right 2019    Procedure: RADIOFREQUENCY ABLATION RIGHT L3, L4, L5;  Surgeon: Shaq Silverio MD;  Location: [facility] PAIN MGT;  Service: Pain Management;  Laterality: Right;  Right RFA L3-L4-L5  1 of 2  Consent Needed    RADIOFREQUENCY ABLATION Left 2019    Procedure: RADIOFREQUENCY ABLATION LEFT L3-5;  Surgeon: Shaq Silverio MD;  Location: [facility] PAIN MGT;  Service: Pain Management;  Laterality: Left;  Left RFA L3-L4-L5  2 of 2  Consent Needed    TRIAL OF SPINAL CORD NERVE STIMULATOR N/A 2018     "Procedure: TRIAL, NEUROSTIMULATOR, SPINAL CORD, SPINAL CORD STIMULATOR TRIAL-INTERNAL WIRES TO EXTERNAL BATTERY;  Surgeon: Shaq Silverio MD;  Location: [facility];  Service: Pain Management;  Laterality: N/A;  [facility] REP NOTIFIED       Family History:  No family history on file.    Social History:  Social History     Socioeconomic History    Marital status:      Spouse name: Not on file    Number of children: Not on file    Years of education: Not on file    Highest education level: Not on file   Occupational History    Not on file   Social Needs    Financial resource strain: Not on file    Food insecurity:     Worry: Not on file     Inability: Not on file    Transportation needs:     Medical: Not on file     Non-medical: Not on file   Tobacco Use    Smoking status: Never Smoker    Smokeless tobacco: Never Used   Substance and Sexual Activity    Alcohol use: No    Drug use: No    Sexual activity: Not on file   Lifestyle    Physical activity:     Days per week: Not on file     Minutes per session: Not on file    Stress: Not on file   Relationships    Social connections:     Talks on phone: Not on file     Gets together: Not on file     Attends Taoism service: Not on file     Active member of club or organization: Not on file     Attends meetings of clubs or organizations: Not on file     Relationship status: Not on file   Other Topics Concern    Not on file   Social History Narrative    Not on file       OBJECTIVE:    /71   Pulse 64   Temp 98.1 °F (36.7 °C) (Oral)   Resp 18   Ht 5' 4" (1.626 m)   Wt 81.6 kg (180 lb)   BMI 30.90 kg/m²     PHYSICAL EXAMINATION:    General appearance: Well appearing, in no acute distress, alert and oriented x3.  Psych:  Mood and affect appropriate.  Skin: Skin color, texture, turgor normal, no rashes or lesions, in both upper and lower body.  SCS sites well healed.  Head/face:  Atraumatic, normocephalic. No palpable lymph nodes  Back: Straight " leg raising in the sitting and supine positions is negative to radicular pain. No pain with palpation to lumbar facet joints.  Limited flexion and extension with pain.  Positive facet loading bilaterally, R>L.  There is no pain with palpation to SI joints.  TOBY is negative bilaterally.  Extremities: Peripheral joint ROM is full and pain free without obvious instability or laxity in all four extremities. No deformities, edema, or skin discoloration. Good capillary refill.  Musculoskeletal: 5/5 strength in right ankle with plantar and dorsiflexion. 5/5 strength in left ankle with plantar and dorsiflexion. 5/5 strength with right knee flexion and extension. 5/5 strength with left knee flexion and extension. 5/5 strength in right EHL, 5/5 strength in left EHL. No atrophy or tone abnormalities are noted.  Neuro: Bilateral upper and lower extremity coordination and muscle stretch reflexes are physiologic and symmetric.  Plantar response are downgoing.  No loss of sensation in BLE.  Gait: Antalgic- ambulates without assistance.    ASSESSMENT: 69 y.o. year old female with back pain, consistent with the following diagnoses:     No diagnosis found.      PLAN:     - Previous imaging was reviewed and discussed with the patient today.    - Continue with lumbar SCS for leg pain.    - She is having back pain which seems mainly consistent with lumbar facet arthropathy.  Will schedule for bilateral L3,4,5 MBB.  The patient will call with relief and if diagnostic, we can schedule radiofrequency ablation of affected nerves, one side followed by the other 2 weeks apart.    - RTC after completion of procedures.    - Counseled patient regarding the importance of constant sleeping habits and physical therapy.      The above plan and management options were discussed at length with patient. Patient is in agreement with the above and verbalized understanding.    Renetta Steele  01/09/2020

## 2020-01-20 ENCOUNTER — OFFICE VISIT (OUTPATIENT)
Dept: FAMILY MEDICINE | Facility: CLINIC | Age: 70
End: 2020-01-20
Payer: MEDICARE

## 2020-01-20 VITALS
HEART RATE: 61 BPM | RESPIRATION RATE: 17 BRPM | OXYGEN SATURATION: 97 % | SYSTOLIC BLOOD PRESSURE: 135 MMHG | DIASTOLIC BLOOD PRESSURE: 80 MMHG | WEIGHT: 188.06 LBS | TEMPERATURE: 98 F | BODY MASS INDEX: 32.28 KG/M2

## 2020-01-20 DIAGNOSIS — Z12.11 SCREEN FOR COLON CANCER: ICD-10-CM

## 2020-01-20 DIAGNOSIS — E78.5 DYSLIPIDEMIA: ICD-10-CM

## 2020-01-20 DIAGNOSIS — E11.9 TYPE 2 DIABETES MELLITUS WITHOUT COMPLICATION, WITHOUT LONG-TERM CURRENT USE OF INSULIN: Primary | ICD-10-CM

## 2020-01-20 DIAGNOSIS — I10 ESSENTIAL HYPERTENSION: ICD-10-CM

## 2020-01-20 DIAGNOSIS — F33.41 MAJOR DEPRESSIVE DISORDER, RECURRENT EPISODE, IN PARTIAL REMISSION: ICD-10-CM

## 2020-01-20 DIAGNOSIS — Z12.12 SCREENING FOR RECTAL CANCER: ICD-10-CM

## 2020-01-20 PROCEDURE — 99499 RISK ADDL DX/OHS AUDIT: ICD-10-PCS | Mod: HCNC,S$GLB,, | Performed by: FAMILY MEDICINE

## 2020-01-20 PROCEDURE — 1101F PR PT FALLS ASSESS DOC 0-1 FALLS W/OUT INJ PAST YR: ICD-10-PCS | Mod: HCNC,CPTII,S$GLB, | Performed by: FAMILY MEDICINE

## 2020-01-20 PROCEDURE — 99999 PR PBB SHADOW E&M-EST. PATIENT-LVL III: ICD-10-PCS | Mod: PBBFAC,HCNC,, | Performed by: FAMILY MEDICINE

## 2020-01-20 PROCEDURE — 1159F MED LIST DOCD IN RCRD: CPT | Mod: HCNC,S$GLB,, | Performed by: FAMILY MEDICINE

## 2020-01-20 PROCEDURE — 99214 PR OFFICE/OUTPT VISIT, EST, LEVL IV, 30-39 MIN: ICD-10-PCS | Mod: HCNC,S$GLB,, | Performed by: FAMILY MEDICINE

## 2020-01-20 PROCEDURE — 1101F PT FALLS ASSESS-DOCD LE1/YR: CPT | Mod: HCNC,CPTII,S$GLB, | Performed by: FAMILY MEDICINE

## 2020-01-20 PROCEDURE — 99999 PR PBB SHADOW E&M-EST. PATIENT-LVL III: CPT | Mod: PBBFAC,HCNC,, | Performed by: FAMILY MEDICINE

## 2020-01-20 PROCEDURE — 99499 UNLISTED E&M SERVICE: CPT | Mod: HCNC,S$GLB,, | Performed by: FAMILY MEDICINE

## 2020-01-20 PROCEDURE — 3044F PR MOST RECENT HEMOGLOBIN A1C LEVEL <7.0%: ICD-10-PCS | Mod: HCNC,CPTII,S$GLB, | Performed by: FAMILY MEDICINE

## 2020-01-20 PROCEDURE — 3075F SYST BP GE 130 - 139MM HG: CPT | Mod: HCNC,CPTII,S$GLB, | Performed by: FAMILY MEDICINE

## 2020-01-20 PROCEDURE — 1159F PR MEDICATION LIST DOCUMENTED IN MEDICAL RECORD: ICD-10-PCS | Mod: HCNC,S$GLB,, | Performed by: FAMILY MEDICINE

## 2020-01-20 PROCEDURE — 3044F HG A1C LEVEL LT 7.0%: CPT | Mod: HCNC,CPTII,S$GLB, | Performed by: FAMILY MEDICINE

## 2020-01-20 PROCEDURE — 99214 OFFICE O/P EST MOD 30 MIN: CPT | Mod: HCNC,S$GLB,, | Performed by: FAMILY MEDICINE

## 2020-01-20 PROCEDURE — 3079F PR MOST RECENT DIASTOLIC BLOOD PRESSURE 80-89 MM HG: ICD-10-PCS | Mod: HCNC,CPTII,S$GLB, | Performed by: FAMILY MEDICINE

## 2020-01-20 PROCEDURE — 3079F DIAST BP 80-89 MM HG: CPT | Mod: HCNC,CPTII,S$GLB, | Performed by: FAMILY MEDICINE

## 2020-01-20 PROCEDURE — 3075F PR MOST RECENT SYSTOLIC BLOOD PRESS GE 130-139MM HG: ICD-10-PCS | Mod: HCNC,CPTII,S$GLB, | Performed by: FAMILY MEDICINE

## 2020-01-20 RX ORDER — ROSUVASTATIN CALCIUM 5 MG/1
5 TABLET, COATED ORAL DAILY
Qty: 90 TABLET | Refills: 3 | Status: SHIPPED | OUTPATIENT
Start: 2020-01-20 | End: 2021-01-25

## 2020-01-20 NOTE — PROGRESS NOTES
Subjective:       Patient ID: Faby Luna is a 69 y.o. female.    Chief Complaint: Diabetes and Hyperlipidemia    Diabetes   She presents for her follow-up diabetic visit. She has type 2 diabetes mellitus. There are no hypoglycemic associated symptoms. Pertinent negatives for diabetes include no chest pain, no fatigue, no foot paresthesias, no polydipsia, no polyphagia, no polyuria, no visual change, no weakness and no weight loss. There are no hypoglycemic complications. Risk factors for coronary artery disease include diabetes mellitus, hypertension and obesity. Current diabetic treatment includes oral agent (dual therapy). She is compliant with treatment all of the time. Her weight is stable. She is following a generally healthy diet. When asked about meal planning, she reported none. She participates in exercise intermittently. There is no change in her home blood glucose trend. Her breakfast blood glucose range is generally  mg/dl. An ACE inhibitor/angiotensin II receptor blocker is being taken. Eye exam is current.   Hyperlipidemia   This is a chronic problem. The problem is uncontrolled. Exacerbating diseases include diabetes and obesity. She has no history of chronic renal disease, hypothyroidism, liver disease or nephrotic syndrome. Pertinent negatives include no chest pain. She is currently on no antihyperlipidemic treatment (did not tolerate pravastatin, and states not tolerating atorvastatin so stopped). Compliance problems include medication side effects.  Risk factors for coronary artery disease include diabetes mellitus, hypertension and obesity.       Review of Systems   Constitutional: Negative for fatigue and weight loss.   Cardiovascular: Negative for chest pain.   Endocrine: Negative for polydipsia, polyphagia and polyuria.   Neurological: Negative for weakness.       Objective:     /80 (BP Location: Left arm, Patient Position: Sitting, BP Method: Large (Automatic))    Pulse 61   Temp 98.3 °F (36.8 °C) (Oral)   Resp 17   Wt 85.3 kg (188 lb 0.8 oz)   SpO2 97%   BMI 32.28 kg/m²   Physical Exam   Constitutional: She is oriented to person, place, and time. She appears well-developed and well-nourished.   HENT:   Head: Normocephalic and atraumatic.   Right Ear: External ear normal.   Left Ear: External ear normal.   Nose: Nose normal.   Mouth/Throat: Oropharynx is clear and moist. No oropharyngeal exudate.   Eyes: Pupils are equal, round, and reactive to light. Conjunctivae and EOM are normal. Right eye exhibits no discharge. Left eye exhibits no discharge.   Neck: Normal range of motion. Neck supple. No tracheal deviation present.   Cardiovascular: Normal rate, regular rhythm, normal heart sounds and intact distal pulses.   No murmur heard.  Pulmonary/Chest: Effort normal and breath sounds normal. She has no wheezes. She has no rales.   Abdominal: Soft. Bowel sounds are normal. She exhibits no mass. There is no tenderness. There is no rigidity, no guarding and no CVA tenderness.   Lymphadenopathy:     She has no cervical adenopathy.   Neurological: She is oriented to person, place, and time. She has normal strength. She displays no atrophy. No sensory deficit. Gait normal.   Reflex Scores:       Patellar reflexes are 2+ on the right side and 2+ on the left side.  Psychiatric: She has a normal mood and affect.   Vitals reviewed.          Assessment:       1. Type 2 diabetes mellitus without complication, without long-term current use of insulin    2. Essential hypertension    3. Dyslipidemia    4. Major depressive disorder, recurrent episode, in partial remission    5. Screening for rectal cancer    6. Screen for colon cancer        Plan:       Faby was seen today for diabetes and hyperlipidemia.    Diagnoses and all orders for this visit:    Type 2 diabetes mellitus without complication, without long-term current use of insulin  -     Comprehensive metabolic panel; Future  -      Hemoglobin A1c; Future  -     Microalbumin/creatinine urine ratio; Future  Check labs.  Continue current regimen for now.  Follow up in 2 weeks.    Essential hypertension  -     Comprehensive metabolic panel; Future  -     Microalbumin/creatinine urine ratio; Future  BP slightly above target.  Repeat BP in 2 weeks    Dyslipidemia  -     Comprehensive metabolic panel; Future  -     rosuvastatin (CRESTOR) 5 MG tablet; Take 1 tablet (5 mg total) by mouth once daily.  -     Lipid panel; Future  Change statin to Crestor and if still has aches, will go to [facility]    Major depressive disorder, recurrent episode, in partial remission  Good control as per patient with current regimen    Screening for rectal cancer  -     Cologuard Screening (Multitarget Stool DNA); Future  -     Cologuard Screening (Multitarget Stool DNA)    Screen for colon cancer  -     Cologuard Screening (Multitarget Stool DNA); Future  -     Cologuard Screening (Multitarget Stool DNA)

## 2020-01-25 DIAGNOSIS — M47.26 OSTEOARTHRITIS OF SPINE WITH RADICULOPATHY, LUMBAR REGION: ICD-10-CM

## 2020-01-27 RX ORDER — ZONISAMIDE 100 MG/1
CAPSULE ORAL
Qty: 120 CAPSULE | Refills: 2 | Status: SHIPPED | OUTPATIENT
Start: 2020-01-27 | End: 2020-07-16

## 2020-02-03 ENCOUNTER — OFFICE VISIT (OUTPATIENT)
Dept: FAMILY MEDICINE | Facility: CLINIC | Age: 70
End: 2020-02-03
Payer: MEDICARE

## 2020-02-03 VITALS
WEIGHT: 186.75 LBS | RESPIRATION RATE: 16 BRPM | DIASTOLIC BLOOD PRESSURE: 72 MMHG | TEMPERATURE: 98 F | HEART RATE: 75 BPM | SYSTOLIC BLOOD PRESSURE: 140 MMHG | OXYGEN SATURATION: 96 % | BODY MASS INDEX: 32.05 KG/M2

## 2020-02-03 DIAGNOSIS — B96.89 ACUTE BACTERIAL SINUSITIS: ICD-10-CM

## 2020-02-03 DIAGNOSIS — I10 ESSENTIAL HYPERTENSION: ICD-10-CM

## 2020-02-03 DIAGNOSIS — E11.9 TYPE 2 DIABETES MELLITUS WITHOUT COMPLICATION, WITHOUT LONG-TERM CURRENT USE OF INSULIN: Primary | ICD-10-CM

## 2020-02-03 DIAGNOSIS — E78.5 DYSLIPIDEMIA: ICD-10-CM

## 2020-02-03 DIAGNOSIS — J01.90 ACUTE BACTERIAL SINUSITIS: ICD-10-CM

## 2020-02-03 PROCEDURE — 1101F PT FALLS ASSESS-DOCD LE1/YR: CPT | Mod: HCNC,CPTII,S$GLB, | Performed by: FAMILY MEDICINE

## 2020-02-03 PROCEDURE — 1159F MED LIST DOCD IN RCRD: CPT | Mod: HCNC,S$GLB,, | Performed by: FAMILY MEDICINE

## 2020-02-03 PROCEDURE — 3044F PR MOST RECENT HEMOGLOBIN A1C LEVEL <7.0%: ICD-10-PCS | Mod: HCNC,CPTII,S$GLB, | Performed by: FAMILY MEDICINE

## 2020-02-03 PROCEDURE — 99214 OFFICE O/P EST MOD 30 MIN: CPT | Mod: HCNC,S$GLB,, | Performed by: FAMILY MEDICINE

## 2020-02-03 PROCEDURE — 3078F PR MOST RECENT DIASTOLIC BLOOD PRESSURE < 80 MM HG: ICD-10-PCS | Mod: HCNC,CPTII,S$GLB, | Performed by: FAMILY MEDICINE

## 2020-02-03 PROCEDURE — 99214 PR OFFICE/OUTPT VISIT, EST, LEVL IV, 30-39 MIN: ICD-10-PCS | Mod: HCNC,S$GLB,, | Performed by: FAMILY MEDICINE

## 2020-02-03 PROCEDURE — 99999 PR PBB SHADOW E&M-EST. PATIENT-LVL III: CPT | Mod: PBBFAC,HCNC,, | Performed by: FAMILY MEDICINE

## 2020-02-03 PROCEDURE — 3077F SYST BP >= 140 MM HG: CPT | Mod: HCNC,CPTII,S$GLB, | Performed by: FAMILY MEDICINE

## 2020-02-03 PROCEDURE — 3078F DIAST BP <80 MM HG: CPT | Mod: HCNC,CPTII,S$GLB, | Performed by: FAMILY MEDICINE

## 2020-02-03 PROCEDURE — 99999 PR PBB SHADOW E&M-EST. PATIENT-LVL III: ICD-10-PCS | Mod: PBBFAC,HCNC,, | Performed by: FAMILY MEDICINE

## 2020-02-03 PROCEDURE — 3077F PR MOST RECENT SYSTOLIC BLOOD PRESSURE >= 140 MM HG: ICD-10-PCS | Mod: HCNC,CPTII,S$GLB, | Performed by: FAMILY MEDICINE

## 2020-02-03 PROCEDURE — 3044F HG A1C LEVEL LT 7.0%: CPT | Mod: HCNC,CPTII,S$GLB, | Performed by: FAMILY MEDICINE

## 2020-02-03 PROCEDURE — 1101F PR PT FALLS ASSESS DOC 0-1 FALLS W/OUT INJ PAST YR: ICD-10-PCS | Mod: HCNC,CPTII,S$GLB, | Performed by: FAMILY MEDICINE

## 2020-02-03 PROCEDURE — 1159F PR MEDICATION LIST DOCUMENTED IN MEDICAL RECORD: ICD-10-PCS | Mod: HCNC,S$GLB,, | Performed by: FAMILY MEDICINE

## 2020-02-03 RX ORDER — LOSARTAN POTASSIUM 50 MG/1
50 TABLET ORAL DAILY
Qty: 90 TABLET | Refills: 3 | Status: SHIPPED | OUTPATIENT
Start: 2020-02-03 | End: 2021-02-15 | Stop reason: SDUPTHER

## 2020-02-03 RX ORDER — AMOXICILLIN AND CLAVULANATE POTASSIUM 875; 125 MG/1; MG/1
1 TABLET, FILM COATED ORAL 2 TIMES DAILY
Qty: 14 TABLET | Refills: 0 | Status: SHIPPED | OUTPATIENT
Start: 2020-02-03 | End: 2020-07-16

## 2020-02-03 RX ORDER — PROMETHAZINE HYDROCHLORIDE AND CODEINE PHOSPHATE 6.25; 1 MG/5ML; MG/5ML
5 SOLUTION ORAL EVERY 4 HOURS PRN
Qty: 240 ML | Refills: 0 | Status: SHIPPED | OUTPATIENT
Start: 2020-02-03 | End: 2020-02-13

## 2020-02-03 RX ORDER — IPRATROPIUM BROMIDE 42 UG/1
2 SPRAY, METERED NASAL 4 TIMES DAILY
Qty: 15 ML | Refills: 0 | Status: SHIPPED | OUTPATIENT
Start: 2020-02-03 | End: 2022-04-26

## 2020-02-03 NOTE — PROGRESS NOTES
Subjective:       Patient ID: Faby Luna is a 69 y.o. female.    Chief Complaint: Diabetes; Hypertension; and URI    Diabetes   She presents for her follow-up diabetic visit. She has type 2 diabetes mellitus. Hypoglycemia symptoms include headaches. Associated symptoms include fatigue. Pertinent negatives for diabetes include no chest pain, no foot paresthesias, no polydipsia, no polyphagia, no polyuria, no visual change, no weakness and no weight loss. There are no hypoglycemic complications. Risk factors for coronary artery disease include diabetes mellitus, hypertension, dyslipidemia and obesity. Current diabetic treatment includes oral agent (dual therapy). She is compliant with treatment all of the time. Her weight is stable. She is following a generally healthy diet. When asked about meal planning, she reported none. She participates in exercise intermittently. There is no change in her home blood glucose trend. Her breakfast blood glucose range is generally  mg/dl. An ACE inhibitor/angiotensin II receptor blocker is being taken. Eye exam is current.   Hypertension   Associated symptoms include headaches. Pertinent negatives include no chest pain, palpitations or shortness of breath. There is no history of chronic renal disease.   URI    Associated symptoms include coughing, headaches, rhinorrhea, sinus pain, sneezing and wheezing. Pertinent negatives include no abdominal pain or chest pain.   Hyperlipidemia   This is a chronic problem. The problem is uncontrolled. Exacerbating diseases include diabetes and obesity. She has no history of chronic renal disease, hypothyroidism, liver disease or nephrotic syndrome. Pertinent negatives include no chest pain or shortness of breath. Current antihyperlipidemic treatment includes statins (did not tolerate pravastatin, nor atorvastatin, taking rosuvastatin and has minim al muscle aches). Compliance problems include medication side effects.  Risk  factors for coronary artery disease include diabetes mellitus, hypertension and obesity.   Sinus Pain  Patient complains of congestion, cough, fevers, itchy eyes, itchy nose, nasal congestion, post nasal drip, sinus pressure and sore throat. Onset of symptoms was 2 weeks ago. Symptoms have been gradually worsening since that time. She is drinking plenty of fluids.   Patient is non-smoker.    Review of Systems   Constitutional: Positive for fatigue and fever. Negative for weight loss.   HENT: Positive for postnasal drip, rhinorrhea, sinus pain and sneezing.    Eyes: Positive for itching.   Respiratory: Positive for cough and wheezing. Negative for shortness of breath.    Cardiovascular: Negative for chest pain and palpitations.   Gastrointestinal: Negative for abdominal pain and blood in stool.   Endocrine: Negative for polydipsia, polyphagia and polyuria.   Neurological: Positive for headaches. Negative for weakness.       Objective:     BP (!) 140/72 (BP Location: Left arm, Patient Position: Sitting, BP Method: Small (Manual))   Pulse 75   Temp 98.2 °F (36.8 °C) (Oral)   Resp 16   Wt 84.7 kg (186 lb 11.7 oz)   SpO2 96%   BMI 32.05 kg/m²   Physical Exam   Constitutional: She is oriented to person, place, and time. She appears well-developed and well-nourished.   HENT:   Head: Normocephalic and atraumatic.   Right Ear: External ear normal.   Left Ear: External ear normal.   Nose: Nose normal.   Mouth/Throat: Oropharynx is clear and moist. No oropharyngeal exudate.   Eyes: Pupils are equal, round, and reactive to light. Conjunctivae and EOM are normal. Right eye exhibits no discharge. Left eye exhibits no discharge.   Neck: Normal range of motion. Neck supple. No tracheal deviation present.   Cardiovascular: Normal rate, regular rhythm, normal heart sounds and intact distal pulses.   No murmur heard.  Pulmonary/Chest: Effort normal and breath sounds normal. She has no wheezes. She has no rales.   Abdominal:  Soft. Bowel sounds are normal. She exhibits no mass. There is no tenderness. There is no rigidity, no guarding and no CVA tenderness.   Lymphadenopathy:     She has no cervical adenopathy.   Neurological: She is oriented to person, place, and time. She has normal strength. She displays no atrophy. No sensory deficit. Gait normal.   Reflex Scores:       Patellar reflexes are 2+ on the right side and 2+ on the left side.  Psychiatric: She has a normal mood and affect.   Vitals reviewed.    Lab Visit on 01/20/2020   Component Date Value Ref Range Status    Microalbum.,U,Random 01/20/2020 256.0  ug/mL Final    Creatinine, Random Ur 01/20/2020 288.0  15.0 - 325.0 mg/dL Final    Comment: The random urine reference ranges provided were established   for 24 hour urine collections.  No reference ranges exist for  random urine specimens.  Correlate clinically.      Microalb Creat Ratio 01/20/2020 88.9* 0.0 - 30.0 ug/mg Final   Lab Visit on 01/20/2020   Component Date Value Ref Range Status    Sodium 01/20/2020 141  136 - 145 mmol/L Final    Potassium 01/20/2020 3.6  3.5 - 5.1 mmol/L Final    Chloride 01/20/2020 110  95 - 110 mmol/L Final    CO2 01/20/2020 24  23 - 29 mmol/L Final    Glucose 01/20/2020 68* 70 - 110 mg/dL Final    BUN, Bld 01/20/2020 11  8 - 23 mg/dL Final    Creatinine 01/20/2020 1.0  0.5 - 1.4 mg/dL Final    Calcium 01/20/2020 9.0  8.7 - 10.5 mg/dL Final    Total Protein 01/20/2020 7.2  6.0 - 8.4 g/dL Final    Albumin 01/20/2020 3.5  3.5 - 5.2 g/dL Final    Total Bilirubin 01/20/2020 0.3  0.1 - 1.0 mg/dL Final    Comment: For infants and newborns, interpretation of results should be based  on gestational age, weight and in agreement with clinical  observations.  Premature Infant recommended reference ranges:  Up to 24 hours.............<8.0 mg/dL  Up to 48 hours............<12.0 mg/dL  3-5 days..................<15.0 mg/dL  6-29 days.................<15.0 mg/dL      Alkaline Phosphatase  01/20/2020 103  55 - 135 U/L Final    AST 01/20/2020 21  10 - 40 U/L Final    ALT 01/20/2020 31  10 - 44 U/L Final    Anion Gap 01/20/2020 7* 8 - 16 mmol/L Final    eGFR if African American 01/20/2020 >60  >60 mL/min/1.73 m^2 Final    eGFR if non African American 01/20/2020 57* >60 mL/min/1.73 m^2 Final    Comment: Calculation used to obtain the estimated glomerular filtration  rate (eGFR) is the CKD-EPI equation.       Hemoglobin A1C 01/20/2020 6.2* 4.0 - 5.6 % Final    Comment: ADA Screening Guidelines:  5.7-6.4%  Consistent with prediabetes  >or=6.5%  Consistent with diabetes  High levels of fetal hemoglobin interfere with the HbA1C  assay. Heterozygous hemoglobin variants (HbS, HgC, etc)do  not significantly interfere with this assay.   However, presence of multiple variants may affect accuracy.      Estimated Avg Glucose 01/20/2020 131  68 - 131 mg/dL Final    Cholesterol 01/20/2020 240* 120 - 199 mg/dL Final    Comment: The National Cholesterol Education Program (NCEP) has set the  following guidelines (reference ranges) for Cholesterol:  Optimal.....................<200 mg/dL  Borderline High.............200-239 mg/dL  High........................> or = 240 mg/dL      Triglycerides 01/20/2020 136  30 - 150 mg/dL Final    Comment: The National Cholesterol Education Program (NCEP) has set the  following guidelines (reference values) for triglycerides:  Normal......................<150 mg/dL  Borderline High.............150-199 mg/dL  High........................200-499 mg/dL      HDL 01/20/2020 62  40 - 75 mg/dL Final    Comment: The National Cholesterol Education Program (NCEP) has set the  following guidelines (reference values) for HDL Cholesterol:  Low...............<40 mg/dL  Optimal...........>60 mg/dL      LDL Cholesterol 01/20/2020 150.8  63.0 - 159.0 mg/dL Final    Comment: The National Cholesterol Education Program (NCEP) has set the  following guidelines (reference values) for LDL  Cholesterol:  Optimal.......................<130 mg/dL  Borderline High...............130-159 mg/dL  High..........................160-189 mg/dL  Very High.....................>190 mg/dL      Hdl/Cholesterol Ratio 01/20/2020 25.8  20.0 - 50.0 % Final    Total Cholesterol/HDL Ratio 01/20/2020 3.9  2.0 - 5.0 Final    Non-HDL Cholesterol 01/20/2020 178  mg/dL Final    Comment: Risk category and Non-HDL cholesterol goals:  Coronary heart disease (CHD)or equivalent (10-year risk of CHD >20%):  Non-HDL cholesterol goal     <130 mg/dL  Two or more CHD risk factors and 10-year risk of CHD <= 20%:  Non-HDL cholesterol goal     <160 mg/dL  0 to 1 CHD risk factor:  Non-HDL cholesterol goal     <190 mg/dL             Assessment:       1. Type 2 diabetes mellitus without complication, without long-term current use of insulin    2. Essential hypertension    3. Dyslipidemia    4. Acute bacterial sinusitis        Plan:       Faby was seen today for diabetes, hypertension and uri.    Diagnoses and all orders for this visit:    Type 2 diabetes mellitus without complication, without long-term current use of insulin  Continue current regimen.    Essential hypertension  -     losartan (COZAAR) 50 MG tablet; Take 1 tablet (50 mg total) by mouth once daily.  BP still not controlled.  Increase losartan to 50 mg and recheck BP in 4 weeks.    Dyslipidemia  -     Comprehensive metabolic panel; Future  -     Lipid panel; Future  Repeat lipid panel in 4 weeks    Acute bacterial sinusitis  -     amoxicillin-clavulanate 875-125mg (AUGMENTIN) 875-125 mg per tablet; Take 1 tablet by mouth 2 (two) times daily.  -     ipratropium (ATROVENT) 42 mcg (0.06 %) nasal spray; 2 sprays by Nasal route 4 (four) times daily.  -     promethazine-codeine 6.25-10 mg/5 ml (PHENERGAN WITH CODEINE) 6.25-10 mg/5 mL syrup; Take 5 mLs by mouth every 4 (four) hours as needed for Cough.  Advised patient to use an over the counter nasal saline every 1-2 hours.  Patient  instructed on how to use the nasal saline.   Use prescribed nasal spray as instructed.  Given severity of symptoms will start on antibiotic regimen.  Advised using a probiotic or eating a yogurt every day to minimize GI side effects of antibiotics.

## 2020-02-19 DIAGNOSIS — I10 ESSENTIAL HYPERTENSION: ICD-10-CM

## 2020-02-19 DIAGNOSIS — J30.2 SEASONAL ALLERGIES: ICD-10-CM

## 2020-02-19 RX ORDER — LORATADINE 10 MG/1
TABLET ORAL
Qty: 90 TABLET | Refills: 3 | Status: SHIPPED | OUTPATIENT
Start: 2020-02-19 | End: 2021-02-15 | Stop reason: SDUPTHER

## 2020-02-19 RX ORDER — ATENOLOL 100 MG/1
TABLET ORAL
Qty: 90 TABLET | Refills: 3 | Status: SHIPPED | OUTPATIENT
Start: 2020-02-19 | End: 2021-02-15 | Stop reason: SDUPTHER

## 2020-02-21 ENCOUNTER — PATIENT OUTREACH (OUTPATIENT)
Dept: ADMINISTRATIVE | Facility: HOSPITAL | Age: 70
End: 2020-02-21

## 2020-03-03 ENCOUNTER — LAB VISIT (OUTPATIENT)
Dept: LAB | Facility: HOSPITAL | Age: 70
End: 2020-03-03
Attending: FAMILY MEDICINE
Payer: MEDICARE

## 2020-03-03 DIAGNOSIS — E78.5 DYSLIPIDEMIA: ICD-10-CM

## 2020-03-03 LAB
ALBUMIN SERPL BCP-MCNC: 3.5 G/DL (ref 3.5–5.2)
ALP SERPL-CCNC: 91 U/L (ref 55–135)
ALT SERPL W/O P-5'-P-CCNC: 17 U/L (ref 10–44)
ANION GAP SERPL CALC-SCNC: 8 MMOL/L (ref 8–16)
AST SERPL-CCNC: 14 U/L (ref 10–40)
BILIRUB SERPL-MCNC: 0.2 MG/DL (ref 0.1–1)
BUN SERPL-MCNC: 15 MG/DL (ref 8–23)
CALCIUM SERPL-MCNC: 9.3 MG/DL (ref 8.7–10.5)
CHLORIDE SERPL-SCNC: 110 MMOL/L (ref 95–110)
CHOLEST SERPL-MCNC: 163 MG/DL (ref 120–199)
CHOLEST/HDLC SERPL: 3 {RATIO} (ref 2–5)
CO2 SERPL-SCNC: 24 MMOL/L (ref 23–29)
CREAT SERPL-MCNC: 0.9 MG/DL (ref 0.5–1.4)
EST. GFR  (AFRICAN AMERICAN): >60 ML/MIN/1.73 M^2
EST. GFR  (NON AFRICAN AMERICAN): >60 ML/MIN/1.73 M^2
GLUCOSE SERPL-MCNC: 68 MG/DL (ref 70–110)
HDLC SERPL-MCNC: 54 MG/DL (ref 40–75)
HDLC SERPL: 33.1 % (ref 20–50)
LDLC SERPL CALC-MCNC: 78.2 MG/DL (ref 63–159)
NONHDLC SERPL-MCNC: 109 MG/DL
POTASSIUM SERPL-SCNC: 4 MMOL/L (ref 3.5–5.1)
PROT SERPL-MCNC: 7.3 G/DL (ref 6–8.4)
SODIUM SERPL-SCNC: 142 MMOL/L (ref 136–145)
TRIGL SERPL-MCNC: 154 MG/DL (ref 30–150)

## 2020-03-03 PROCEDURE — 36415 COLL VENOUS BLD VENIPUNCTURE: CPT | Mod: HCNC,PN

## 2020-03-03 PROCEDURE — 80061 LIPID PANEL: CPT | Mod: HCNC

## 2020-03-03 PROCEDURE — 80053 COMPREHEN METABOLIC PANEL: CPT | Mod: HCNC

## 2020-03-31 ENCOUNTER — TELEPHONE (OUTPATIENT)
Dept: PAIN MEDICINE | Facility: CLINIC | Age: 70
End: 2020-03-31

## 2020-03-31 DIAGNOSIS — E11.9 TYPE 2 DIABETES MELLITUS WITHOUT COMPLICATION, WITHOUT LONG-TERM CURRENT USE OF INSULIN: Primary | ICD-10-CM

## 2020-03-31 DIAGNOSIS — E78.5 DYSLIPIDEMIA: ICD-10-CM

## 2020-03-31 DIAGNOSIS — I10 ESSENTIAL HYPERTENSION: ICD-10-CM

## 2020-03-31 NOTE — TELEPHONE ENCOUNTER
"Staff contacted the patient regarding rescheduling her 4/9/20 2 pm appointment with Renetta Steele Np due to her being redeployed to the hospital to assist. Staff offered to get the patient rescheduled to Dr. Jean Claude horan as a video call through the [employer] chart mike. Patient currently pending access. Staff asked if the patient had a smart device.     Patient did not answer home and mobile number on file an automated message displayed saying, " We're sorry your call cannot be completed at this time please hand up and try your call again later.     Staff called Ariel Bolaños (son) number on file and asked if he would get in contact with the patient and give her a verbal message informing the patient that [facility] office contacted her regarding her 4/9/20 appointment and have cancelled it at this time due to Renetta not being available. If the patient feels the need to she may contact our office at [phone] to reschedule visit as a video visit through [employer] chart mike.     Ariel verbalized understanding and expressed thanks for the call.           "

## 2020-04-01 ENCOUNTER — PATIENT OUTREACH (OUTPATIENT)
Dept: ADMINISTRATIVE | Facility: HOSPITAL | Age: 70
End: 2020-04-01

## 2020-04-18 DIAGNOSIS — G47.00 INSOMNIA, UNSPECIFIED TYPE: ICD-10-CM

## 2020-04-20 RX ORDER — MELOXICAM 7.5 MG/1
TABLET ORAL
Qty: 180 TABLET | Refills: 0 | OUTPATIENT
Start: 2020-04-20

## 2020-04-20 RX ORDER — TRAZODONE HYDROCHLORIDE 50 MG/1
TABLET ORAL
Qty: 90 TABLET | Refills: 0 | Status: SHIPPED | OUTPATIENT
Start: 2020-04-20 | End: 2020-07-22

## 2020-05-03 DIAGNOSIS — I10 ESSENTIAL HYPERTENSION: ICD-10-CM

## 2020-05-03 DIAGNOSIS — F41.9 ANXIETY: ICD-10-CM

## 2020-05-03 DIAGNOSIS — F33.0 MILD RECURRENT MAJOR DEPRESSION: ICD-10-CM

## 2020-05-04 RX ORDER — FUROSEMIDE 20 MG/1
TABLET ORAL
Qty: 90 TABLET | Refills: 0 | Status: SHIPPED | OUTPATIENT
Start: 2020-05-04 | End: 2020-08-04

## 2020-05-04 RX ORDER — ESCITALOPRAM OXALATE 20 MG/1
TABLET ORAL
Qty: 90 TABLET | Refills: 0 | Status: SHIPPED | OUTPATIENT
Start: 2020-05-04 | End: 2020-07-22 | Stop reason: SDUPTHER

## 2020-05-18 DIAGNOSIS — J01.90 ACUTE BACTERIAL SINUSITIS: ICD-10-CM

## 2020-05-18 DIAGNOSIS — B96.89 ACUTE BACTERIAL SINUSITIS: ICD-10-CM

## 2020-05-18 RX ORDER — PROMETHAZINE HYDROCHLORIDE AND CODEINE PHOSPHATE 6.25; 1 MG/5ML; MG/5ML
SOLUTION ORAL
Qty: 240 ML | Refills: 0 | OUTPATIENT
Start: 2020-05-18

## 2020-06-05 ENCOUNTER — TELEPHONE (OUTPATIENT)
Dept: FAMILY MEDICINE | Facility: CLINIC | Age: 70
End: 2020-06-05

## 2020-06-05 ENCOUNTER — TELEPHONE (OUTPATIENT)
Dept: PAIN MEDICINE | Facility: CLINIC | Age: 70
End: 2020-06-05

## 2020-06-05 NOTE — TELEPHONE ENCOUNTER
Pt contacted and given message from PCP. Pt verbalizes understanding and scheduled for 06/08/2020 with NP

## 2020-06-05 NOTE — TELEPHONE ENCOUNTER
----- Message from Martha Cash sent at 6/5/2020 10:19 AM CDT -----  Contact: CLAUDIA MARTINEZ [[id number]]  Type: Patient Call Back    Who called: CLAUDIA MARTINEZ [[id number]]    What is the request in detail: CLAUDIA MARTINEZ [[id number]]  is requesting a call back. She states that she is having back pain and she would like to know if zonisamide (ZONEGRAN) 100 MG Cap can be called into [facility] [address].   Please advise.    Can the clinic reply by [facility]? No    Best call back number: [phone]    Additional Information: N/A

## 2020-06-05 NOTE — TELEPHONE ENCOUNTER
----- Message from Patricia Alexis MA sent at 6/5/2020 10:49 AM CDT -----  Contact: Self/  [phone]      ----- Message -----  From: Jeni Lucas  Sent: 6/5/2020  10:24 AM CDT  To: Arden Osei Staff    Type: Patient Call Back    Who called:  Patient    What is the request in detail:  Patient would like PCP to give her a call.  She stated she would like to know if a cough syrup can be called in for her.  Thank you    Would the patient rather a call back or a response via [facility]?   Call back    Best call back number:   [phone]

## 2020-06-05 NOTE — TELEPHONE ENCOUNTER
Staff was unable to leave a message for patient.    Staff wanted to informed patient that WARREN Renetta is out of clinic today and patient needs to schedule an office visit, Audio visit, or Virtual Visit with provider to discuss about having medication called in (pt last visit was 01/09/20)    IF PATIENT CALLS BACK PLEASE HAVE PT SCHEDULE WITH PROVIDER AT NEXT AVAILABLE DATE & TIME

## 2020-07-03 DIAGNOSIS — E11.9 TYPE 2 DIABETES MELLITUS WITHOUT COMPLICATION, UNSPECIFIED WHETHER LONG TERM INSULIN USE: ICD-10-CM

## 2020-07-15 ENCOUNTER — PATIENT OUTREACH (OUTPATIENT)
Dept: ADMINISTRATIVE | Facility: OTHER | Age: 70
End: 2020-07-15

## 2020-07-15 DIAGNOSIS — Z12.31 BREAST CANCER SCREENING BY MAMMOGRAM: Primary | ICD-10-CM

## 2020-07-15 NOTE — PROGRESS NOTES
Chart reviewed.   Immunizations: Triggered Imm Registry     Orders placed: Mammo w/ CAD   Upcoming appts to satisfy TESSY topics: n/a

## 2020-07-16 ENCOUNTER — OFFICE VISIT (OUTPATIENT)
Dept: PAIN MEDICINE | Facility: CLINIC | Age: 70
End: 2020-07-16
Payer: MEDICARE

## 2020-07-16 VITALS
RESPIRATION RATE: 18 BRPM | SYSTOLIC BLOOD PRESSURE: 144 MMHG | HEART RATE: 67 BPM | DIASTOLIC BLOOD PRESSURE: 76 MMHG | TEMPERATURE: 98 F | WEIGHT: 205 LBS | HEIGHT: 64 IN | BODY MASS INDEX: 35 KG/M2

## 2020-07-16 DIAGNOSIS — M54.16 LUMBAR RADICULOPATHY: ICD-10-CM

## 2020-07-16 DIAGNOSIS — M47.9 SPONDYLOSIS: Primary | ICD-10-CM

## 2020-07-16 DIAGNOSIS — M47.816 SPONDYLOSIS OF LUMBAR REGION WITHOUT MYELOPATHY OR RADICULOPATHY: ICD-10-CM

## 2020-07-16 PROCEDURE — 99213 OFFICE O/P EST LOW 20 MIN: CPT | Mod: HCNC,S$GLB,, | Performed by: NURSE PRACTITIONER

## 2020-07-16 PROCEDURE — 1125F AMNT PAIN NOTED PAIN PRSNT: CPT | Mod: HCNC,S$GLB,, | Performed by: NURSE PRACTITIONER

## 2020-07-16 PROCEDURE — 1101F PR PT FALLS ASSESS DOC 0-1 FALLS W/OUT INJ PAST YR: ICD-10-PCS | Mod: HCNC,CPTII,S$GLB, | Performed by: NURSE PRACTITIONER

## 2020-07-16 PROCEDURE — 99213 PR OFFICE/OUTPT VISIT, EST, LEVL III, 20-29 MIN: ICD-10-PCS | Mod: HCNC,S$GLB,, | Performed by: NURSE PRACTITIONER

## 2020-07-16 PROCEDURE — 1125F PR PAIN SEVERITY QUANTIFIED, PAIN PRESENT: ICD-10-PCS | Mod: HCNC,S$GLB,, | Performed by: NURSE PRACTITIONER

## 2020-07-16 PROCEDURE — 1101F PT FALLS ASSESS-DOCD LE1/YR: CPT | Mod: HCNC,CPTII,S$GLB, | Performed by: NURSE PRACTITIONER

## 2020-07-16 PROCEDURE — 3077F SYST BP >= 140 MM HG: CPT | Mod: HCNC,CPTII,S$GLB, | Performed by: NURSE PRACTITIONER

## 2020-07-16 PROCEDURE — 1159F PR MEDICATION LIST DOCUMENTED IN MEDICAL RECORD: ICD-10-PCS | Mod: HCNC,S$GLB,, | Performed by: NURSE PRACTITIONER

## 2020-07-16 PROCEDURE — 3078F PR MOST RECENT DIASTOLIC BLOOD PRESSURE < 80 MM HG: ICD-10-PCS | Mod: HCNC,CPTII,S$GLB, | Performed by: NURSE PRACTITIONER

## 2020-07-16 PROCEDURE — 3008F BODY MASS INDEX DOCD: CPT | Mod: HCNC,CPTII,S$GLB, | Performed by: NURSE PRACTITIONER

## 2020-07-16 PROCEDURE — 3008F PR BODY MASS INDEX (BMI) DOCUMENTED: ICD-10-PCS | Mod: HCNC,CPTII,S$GLB, | Performed by: NURSE PRACTITIONER

## 2020-07-16 PROCEDURE — 3077F PR MOST RECENT SYSTOLIC BLOOD PRESSURE >= 140 MM HG: ICD-10-PCS | Mod: HCNC,CPTII,S$GLB, | Performed by: NURSE PRACTITIONER

## 2020-07-16 PROCEDURE — 3078F DIAST BP <80 MM HG: CPT | Mod: HCNC,CPTII,S$GLB, | Performed by: NURSE PRACTITIONER

## 2020-07-16 PROCEDURE — 1159F MED LIST DOCD IN RCRD: CPT | Mod: HCNC,S$GLB,, | Performed by: NURSE PRACTITIONER

## 2020-07-16 PROCEDURE — 99999 PR PBB SHADOW E&M-EST. PATIENT-LVL III: CPT | Mod: PBBFAC,HCNC,, | Performed by: NURSE PRACTITIONER

## 2020-07-16 PROCEDURE — 99999 PR PBB SHADOW E&M-EST. PATIENT-LVL III: ICD-10-PCS | Mod: PBBFAC,HCNC,, | Performed by: NURSE PRACTITIONER

## 2020-07-16 NOTE — PROGRESS NOTES
Chronic patient Established Note (Follow up visit)      SUBJECTIVE:    Interval History 7/16/2020:  The patient is here for follow up of lower back pain.  She previously had benefit with lumbar RFAs for similar pain.  She is also having radiation into her legs with numbness, left greater than right.  Ildefonso is here today to meet with her for programming.  She previously was having significant benefit of leg pain with SCS implant.  She denies any recent trauma or falls. Her pain today is 9/10.    Interval History 1/9/2020:  The patient is here for follow up of lower back and leg pain.  She is s/p lumbar RFAs with about 50% relief.  Her leg pain is well controlled with implant.  She still has intermittent flare ups of back pain, like today.  When this happens, she has some benefit with rest.  She has tried Tylenol and OTC NSAIDs without benefit.  She denies any recent falls or weakness.  The patient denies any bowel or bladder incontinence or signs of saddle paresthesia.  The patient denies any major medical changes since last office visit.  Her pain today is 7/10.    Previous Encounter:  Faby Luna presents to the clinic for a follow-up appointment for lower back pain.  She previously had significant leg pain which has resolved with [employer] SCS implant.  She is here today to discuss increased lower back pain.  It is sharp and throbbing in nature.  It is significant in the morning and with prolonged standing and activity.  She has some benefit with stretching.  She denies any recent falls.  Since the last visit, Faby Luna states the pain has been worsening.  Current pain intensity is 8/10.    Pain Disability Index Review:  Last 3 PDI Scores 7/16/2020 1/9/2020 8/29/2019   Pain Disability Index (PDI) 63 40 44       Opioid Contract: no     report:  Not applicable    Pain Procedures:   5/2/18 Left L3 and L4 TF ELISE- 20% relief  5/21/18 Bilateral L4-5 and L5-S1 facet joint injections- no  relief  9/24/18 Lumbar SCS trial ([employer])- 100% relief  10/26/18 Lumbar SCS implant ([facility])- 100% relief of leg pain  9/12/19 Bilateral L3,4,5 MBB- helpful  10/17/19 Bilateral L3,4,5 MBB- helpful  11/21/19 Right L3,4,5 RFA- 50% relief  12/5/19 Left L3,4,5 RFA- 50% relief    Physical Therapy/Home Exercise:   yes in the past with limited benefit    Imaging:     3/31/18 Lumbar MRI    Narrative     EXAMINATION:  MRI LUMBAR SPINE WITHOUT CONTRAST    CLINICAL HISTORY:  Low back pain, >6wks conservative tx, persistent-progressive sx, surgical candidate; Other spondylosis with radiculopathy, lumbar region    TECHNIQUE:  Multiplanar, multisequence MR images were acquired from the thoracolumbar junction to the sacrum without the administration of contrast.    COMPARISON:  Plain films from 03/27/2018    FINDINGS:  There is grade 1 spondylolisthesis of L4 on L5. The vertebral body heights are well maintained, with no fracture.  No marrow signal abnormality suspicious for an infiltrative process.    The conus medullaris terminates at approximately the mid body of L1.  There is a cyst associated with the upper pole of the right kidney.  There is disc desiccation noted throughout the lumbar spine with relative sparing of the L5-S1 disc.  Mild disc space narrowing present at the L4-5 level.    L1-L2: Mild diffuse disc bulge resulting in no significant central or neural foraminal canal narrowing.    L2-L3: No significant central canal narrowing.  There is mild narrowing of either neural foraminal canal secondary to disc material.    L3-L4: Disc bulging in the bilateral foraminal regions resulting in no significant central canal narrowing.  Mild-to-moderate bilateral facet arthropathy also noted.  The bilateral neural foraminal canals are moderately narrowed with some mild effacement of either exiting L3 nerve root in the extraforaminal regions bilaterally.    L4-L5:  Grade 1 spondylolisthesis along with moderate to severe bilateral  facet arthropathy and ligamentum flavum hypertrophy resulting in at least moderate narrowing of the central canal.  The right neural foraminal canal is mildly to moderately narrowed.  Left neural foraminal canal is moderately to severely narrowed with mild effacement of the exiting L4 nerve root.    L5-S1:  No significant central or neural foraminal canal narrowing noted.  Mild bilateral facet arthropathy noted.   Impression       1. Multilevel degenerative changes of the lumbar spine as detailed above     Lumbar XRAYs 3/27/18    Narrative     EXAMINATION:  XR LUMBAR SPINE AP AND LAT WITH FLEX/EXT    CLINICAL HISTORY:  Low back pain    TECHNIQUE:  AP and lateral views as well as lateral flexion and extension images are performed through the lumbar spine.    COMPARISON:  None    FINDINGS:  X-ray lumbar spine with flexion and extension demonstrates grade 1 spondylolisthesis at L4-5 measuring around 6 mm which does not change significantly with flexion and extension.  The L4-5 disc is narrowed and degenerated.  Other lumbar vertebral discs are maintained.  Vertebral body heights are maintained.  Small anterior spurs are seen, and there is small posterior osteophyte along the inferior endplate of L4.  There is lumbar facet arthropathy at L4-5 and L5-S1.   Impression       Degenerative changes as above.  Grade 1 anterolisthesis and degenerative disc disease at L4-5.         Allergies:   Review of patient's allergies indicates:   Allergen Reactions    Aspirin Other (See Comments)     Has been told not to take it due to bariatric surgery       Current Medications:   Current Outpatient Medications   Medication Sig Dispense Refill    atenoloL (TENORMIN) 100 MG tablet TAKE 1 TABLET BY MOUTH ONCE DAILY 90 tablet 3    escitalopram oxalate (LEXAPRO) 20 MG tablet Take 1 tablet by mouth once daily 90 tablet 0    furosemide (LASIX) 20 MG tablet Take 1 tablet by mouth once daily 90 tablet 0    glipiZIDE (GLUCOTROL) 10 MG tablet  Take 10 mg by mouth 2 (two) times daily with meals.       ipratropium (ATROVENT) 42 mcg (0.06 %) nasal spray 2 sprays by Nasal route 4 (four) times daily. 15 mL 0    loratadine (CLARITIN) 10 mg tablet TAKE 1 TABLET BY MOUTH ONCE DAILY AS NEEDED FOR ALLERGIES 90 tablet 3    losartan (COZAAR) 50 MG tablet Take 1 tablet (50 mg total) by mouth once daily. 90 tablet 3    metFORMIN (GLUCOPHAGE) 1000 MG tablet Take 1,000 mg by mouth every evening.      pantoprazole (PROTONIX) 20 MG tablet TAKE 2 TABLETS BY MOUTH ONCE DAILY AS NEEDED (STOMACH  PAIN) 180 tablet 0    rosuvastatin (CRESTOR) 5 MG tablet Take 1 tablet (5 mg total) by mouth once daily. 90 tablet 3    traZODone (DESYREL) 50 MG tablet Take 1 tablet by mouth in the evening 90 tablet 0    amoxicillin-clavulanate 875-125mg (AUGMENTIN) 875-125 mg per tablet Take 1 tablet by mouth 2 (two) times daily. (Patient not taking: Reported on 7/16/2020) 14 tablet 0    blood-glucose meter kit Use as instructed 1 each 0    hydrOXYzine HCl (ATARAX) 25 MG tablet TAKE 1 TABLET BY MOUTH NIGHTLY AS NEEDED FOR INSOMNIA (Patient not taking: Reported on 7/16/2020) 90 tablet 0    zonisamide (ZONEGRAN) 100 MG Cap TAKE 4 CAPSULES BY MOUTH AT BEDTIME (Patient not taking: Reported on 7/16/2020) 120 capsule 2     No current facility-administered medications for this visit.        REVIEW OF SYSTEMS:    GENERAL:  No weight loss, malaise or fevers.  HEENT:  Negative for frequent or significant headaches.  NECK:  Negative for lumps, goiter, pain and significant neck swelling.  RESPIRATORY:  Negative for cough, wheezing or shortness of breath.  CARDIOVASCULAR:  Negative for chest pain, leg swelling or palpitations. Hypertension.  GI:  Negative for abdominal discomfort, blood in stools or black stools or change in bowel habits.  MUSCULOSKELETAL:  See HPI.  SKIN:  Negative for lesions, rash, and itching.  PSYCH:  Negative for sleep disturbance, mood disorder and recent psychosocial  stressors.  HEMATOLOGY/LYMPHOLOGY:  Negative for prolonged bleeding, bruising easily or swollen nodes.  NEURO:   No history of headaches, syncope, paralysis, seizures or tremors.  ENDO: Diabetes.  All other reviewed and negative other than HPI.    Past Medical History:  Past Medical History:   Diagnosis Date    Arthritis     Diabetes mellitus     Hypertension        Past Surgical History:  Past Surgical History:   Procedure Laterality Date    CARPAL TUNNEL RELEASE Right 3/8/2019    Procedure: RELEASE, CARPAL TUNNEL right;  Surgeon: Pan Goodman MD;  Location: [facility] OR;  Service: Orthopedics;  Laterality: Right;    CARPAL TUNNEL RELEASE Left 2019    Procedure: RELEASE, CARPAL TUNNEL- LEFT;  Surgeon: Pan Goodman MD;  [facility];  Service: Orthopedics;  Laterality: Left;     SECTION      CHOLECYSTECTOMY      EPIDURAL STEROID INJECTION INTO LUMBAR SPINE N/A 2018    Procedure: INJECTION, STEROID, SPINE, LUMBAR, EPIDURAL;  Surgeon: Shaq Silverio MD;  Location: [facility] PAIN MGT;  Service: Pain Management;  Laterality: N/A;  LUMBAR L4-L5 INTERLAMINAR EILSE'  92729  W/ SEDATION     HYSTERECTOMY      INJECTION OF ANESTHETIC AGENT AROUND NERVE Bilateral 2019    Procedure: BLOCK, NERVE, L3-L4-L5 MEDIAL BRANCH;  Surgeon: Shaq Silverio MD;  Location: [facility] PAIN [facility]GT;  Service: Pain Management;  Laterality: Bilateral;    INJECTION OF ANESTHETIC AGENT AROUND NERVE Bilateral 10/17/2019    Procedure: BLOCK, NERVE;  Surgeon: Shaq Silverio MD;  Location: [facility] PAIN [facility]GT;  Service: Pain Management;  Laterality: Bilateral;  B/L MBB L3-L4-L5  REPEAT  CONSENT NEEDED    INJECTION OF FACET JOINT Bilateral 2018    Procedure: INJECTION-FACET;  Surgeon: Shaq Silverio MD;  Location: [facility] PAIN [facility]GT;  Service: Pain Management;  Laterality: Bilateral;  LUMBAR BILATERAL L4-L5 AND L5-S1 FACET STEROID INJECTION  [id number]    W/ SEDATION     RADIOFREQUENCY ABLATION Right 2019    Procedure:  RADIOFREQUENCY ABLATION RIGHT L3, L4, L5;  Surgeon: Shaq Silverio MD;  Location: [facility]IN [facility]GT;  Service: Pain Management;  Laterality: Right;  Right RFA L3-L4-L5  1 of 2  Consent Needed    RADIOFREQUENCY ABLATION Left 12/5/2019    Procedure: RADIOFREQUENCY ABLATION LEFT L3-5;  Surgeon: Shaq Silverio MD;  Location: [facility];  Service: Pain Management;  Laterality: Left;  Left RFA L3-L4-L5  2 of 2  Consent Needed    TRIAL OF SPINAL CORD NERVE STIMULATOR N/A 9/24/2018    Procedure: TRIAL, NEUROSTIMULATOR, SPINAL CORD, SPINAL CORD STIMULATOR TRIAL-INTERNAL WIRES TO EXTERNAL BATTERY;  Surgeon: Shaq Silverio MD;  Location: [facility];  Service: Pain Management;  Laterality: N/A;  [facility] REP NOTIFIED       Family History:  No family history on file.    Social History:  Social History     Socioeconomic History    Marital status:      Spouse name: Not on file    Number of children: Not on file    Years of education: Not on file    Highest education level: Not on file   Occupational History    Not on file   Social Needs    Financial resource strain: Not on file    Food insecurity     Worry: Not on file     Inability: Not on file    Transportation needs     Medical: Not on file     Non-medical: Not on file   Tobacco Use    Smoking status: Never Smoker    Smokeless tobacco: Never Used   Substance and Sexual Activity    Alcohol use: No    Drug use: No    Sexual activity: Not on file   Lifestyle    Physical activity     Days per week: Not on file     Minutes per session: Not on file    Stress: Not on file   Relationships    Social connections     Talks on phone: Not on file     Gets together: Not on file     Attends [employer] service: Not on file     Active member of club or organization: Not on file     Attends meetings of clubs or organizations: Not on file     Relationship status: Not on file   Other Topics Concern    Not on file   Social History Narrative    Not on file       OBJECTIVE:    BP  "(!) 144/76   Pulse 67   Temp 98 °F (36.7 °C) (Oral)   Resp 18   Ht 5' 4" (1.626 m)   Wt 93 kg (205 lb 0.4 oz)   BMI 35.19 kg/m²     PHYSICAL EXAMINATION:    General appearance: Well appearing, in no acute distress, alert and oriented x3.  Psych:  Mood and affect appropriate.  Skin: Skin color, texture, turgor normal, no rashes or lesions, in both upper and lower body.   Head/face:  Atraumatic, normocephalic. No palpable lymph nodes  Back: Straight leg raising in the sitting and supine positions is negative to radicular pain. There is pain with palpation to lumbar facet joints.  Limited flexion and extension with pain.  Positive facet loading bilaterally, L>R.  There is no pain with palpation to SI joints.  TOBY is negative bilaterally.  Extremities: Peripheral joint ROM is full and pain free without obvious instability or laxity in all four extremities. No deformities, edema, or skin discoloration. Good capillary refill.  Musculoskeletal: 5/5 strength in right ankle with plantar and dorsiflexion. 5/5 strength in left ankle with plantar and dorsiflexion. 5/5 strength with right knee flexion and extension. 5/5 strength with left knee flexion and extension. 5/5 strength in right EHL, 5/5 strength in left EHL. No atrophy or tone abnormalities are noted.  Neuro: Bilateral upper and lower extremity coordination and muscle stretch reflexes are physiologic and symmetric.  Plantar response are downgoing. Decreased sensation to LLE.  Gait: Abnormal.    ASSESSMENT: 69 y.o. year old female with back pain, consistent with the following diagnoses:     1. Spondylosis     2. Lumbar radiculopathy     3. Spondylosis of lumbar region without myelopathy or radiculopathy           PLAN:     - Previous imaging was reviewed and discussed with the patient today.    - Ildefonso Potts met with her today and programmed her lumbar SCS for leg pain.      - Schedule for repeat left then right L3,4,5 RFAs, 2 weeks apart.  Previous provided " significant benefit for about 6 months.    - We discussed exercising for lower back pain today.    - RTC 4 weeks after completion of procedures.    - Counseled patient regarding the importance of constant sleeping habits and physical therapy.      The above plan and management options were discussed at length with patient. Patient is in agreement with the above and verbalized understanding.    Renetta Steele  07/16/2020

## 2020-07-16 NOTE — PATIENT INSTRUCTIONS
Exercises to Strengthen Your Lower Back  Strong lower back and abdominal muscles work together to support your spine. The exercises below will help strengthen the lower back. It is important that you begin exercising slowly and increase levels gradually.  Always begin any exercise program with stretching. If you feel pain while doing any of these exercises, stop and talk to your doctor about a more specific exercise program that better suits your condition.   Low back stretch  The point of stretching is to make you more flexible and increase your range of motion. Stretch only as much as you are able. Stretch slowly. Do not push your stretch to the limit. If at any point you feel pain while stretching, this is your (temporary) limit.  · Lie on your back with your knees bent and both feet on the ground.  · Slowly raise your left knee to your chest as you flatten your lower back against the floor. Hold for 5 seconds.  · Relax and repeat the exercise with your right knee.  · Do 10 of these exercises for each leg.  · Repeat hugging both knees to your chest at the same time.  Building lower back strength  Start your exercise routine with 10 to 30 minutes a day, 1 to 3 times a day.  Initial exercises  Lying on your back:  1. Ankle pumps: Move your foot up and down, towards your head, and then away. Repeat 10 times with each foot.  2. Heel slides: Slowly bend your knee, drawing the heel of your foot towards you. Then slide your heel/foot from you, straightening your knee. Do not lift your foot off the floor (this is not a leg lift).  3. Abdominal contraction: Bend your knees and put your hands on your stomach. Tighten your stomach muscles. Hold for 5 seconds, then relax. Repeat 10 times.  4. Straight leg raise: Bend one leg at the knee and keep the other leg straight. Tighten your stomach muscles. Slowly lift your straight leg 6 to 12 inches off the floor and hold for up to 5 seconds. Repeat 10 times on each  side.  Standin. Wall squats: Stand with your back against the wall. Move your feet about 12 inches away from the wall. Tighten your stomach muscles, and slowly bend your knees until they are at about a 45 degree angle. Do not go down too far. Hold about 5 seconds. Then slowly return to your starting position. Repeat 10 times.  2. Heel raises: Stand facing the wall. Slowly raise the heels of your feet up and down, while keeping your toes on the floor. If you have trouble balancing, you can touch the wall with your hands. Repeat 10 times.  More advanced exercises  When you feel comfortable enough, try these exercises.  1. Kneeling lumbar extension: Begin on your hands and knees. At the same time, raise and straighten your right arm and left leg until they are parallel to the ground. Hold for 2 seconds and come back slowly to a starting position. Repeat with left arm and right leg, alternating 10 times.  2. Prone lumbar extension: Lie face down, arms extended overhead, palms on the floor. At the same time, raise your right arm and left leg as high as comfortably possible. Hold for 10 seconds and slowly return to start. Repeat with left arm and right leg, alternating 10 times. Gradually build up to 20 times. (Advanced: Repeat this exercise raising both arms and both legs a few inches off the floor at the same time. Hold for 5 seconds and release.)  3. Pelvic tilt: Lie on the floor on your back with your knees bent at 90 degrees. Your feet should be flat on the floor. Inhale, exhale, then slowly contract your abdominal muscles bringing your navel toward your spine. Let your pelvis rock back until your lower back is flat on the floor. Hold for 10 seconds while breathing smoothly.  4. Abdominal crunch: Perform a pelvic tilt (above) flattening your lower back against the floor. Holding the tension in your abdominal muscles, take another breath and raise your shoulder blades off the ground (this is not a full sit-up).  Keep your head in line with your body (dont bend your neck forward). Hold for 2 seconds, then slowly lower.  Date Last Reviewed: 6/1/2016  © 2680-0975 [employer]. [address] [place] [address], [place] [address]. All rights reserved. This information is not intended as a substitute for professional medical care. Always follow your healthcare professional's instructions.

## 2020-07-17 ENCOUNTER — TELEPHONE (OUTPATIENT)
Dept: PAIN MEDICINE | Facility: CLINIC | Age: 70
End: 2020-07-17

## 2020-07-17 RX ORDER — ZONISAMIDE 25 MG/1
50 CAPSULE ORAL NIGHTLY
Qty: 60 CAPSULE | Refills: 11 | Status: SHIPPED | OUTPATIENT
Start: 2020-07-17 | End: 2020-09-28 | Stop reason: SDUPTHER

## 2020-07-17 NOTE — TELEPHONE ENCOUNTER
----- Message from Sonya Berrios sent at 7/17/2020  8:05 AM CDT -----  Regarding: medication  Name of Who is Calling: CLAUDIA MARTINEZ [[id number]]      What is the request in detail: Patient is requesting a call back she states she was suppose to have medication called in yesterday but it was never called in for her        Can the clinic reply by [facility]: no      What Number to Call Back if not in [facility]: [phone]

## 2020-07-20 ENCOUNTER — TELEPHONE (OUTPATIENT)
Dept: PAIN MEDICINE | Facility: CLINIC | Age: 70
End: 2020-07-20

## 2020-07-20 NOTE — TELEPHONE ENCOUNTER
Staff contacted the patient regarding scheduling an injection.    Patient did not answer therefore staff left a detailed voice message instructing the patient to ggive our office a call back.

## 2020-07-20 NOTE — TELEPHONE ENCOUNTER
----- Message from Maggy Rivas sent at 7/20/2020 10:33 AM CDT -----    Name of Who is Calling: CLAUDIA MARTINEZ [[id number]]    What is the request in detail: Pt would like a call back regarding scheduling injection Please contact to further discuss and advise    Can the clinic reply by [facility]: No    What Number to Call Back if not in [facility]: [phone]

## 2020-07-21 DIAGNOSIS — M47.26 OSTEOARTHRITIS OF SPINE WITH RADICULOPATHY, LUMBAR REGION: ICD-10-CM

## 2020-07-21 DIAGNOSIS — M47.9 OSTEOARTHRITIS OF SPINE, UNSPECIFIED SPINAL OSTEOARTHRITIS COMPLICATION STATUS, UNSPECIFIED SPINAL REGION: Primary | ICD-10-CM

## 2020-07-21 RX ORDER — ZONISAMIDE 100 MG/1
CAPSULE ORAL
Qty: 120 CAPSULE | Refills: 2 | Status: SHIPPED | OUTPATIENT
Start: 2020-07-21 | End: 2020-09-28 | Stop reason: SDUPTHER

## 2020-07-21 NOTE — TELEPHONE ENCOUNTER
----- Message from Kvng Quijano, Patient Care Assistant sent at 7/21/2020  9:21 AM CDT -----  Name of Who is Calling: CLAUDIA MARTINEZ [[id number]]    What is the request in detail: Requesting a call back in regards of Rx zonisamide (ZONEGRAN) 25 MG Cap, stating the wrong strength was called in. Please contact to further discuss and advise      Can the clinic reply by [facility]: No    [facility]:   [phone]

## 2020-07-21 NOTE — TELEPHONE ENCOUNTER
Staff contacted the patient to inform her that her request for refill of zonegran 100mg has been approved and e prescribed to her requested pharmacy [facility] [address] [place], [place] - [address].     Patient verbalized understanding and expressed thanks.

## 2020-07-21 NOTE — TELEPHONE ENCOUNTER
Staff spoke to patient to inform her that her refill correction will be presented to you for review. Patient take Zonegran 100 mg and 25 mg was sent in.     Please review and resign prescription attached.

## 2020-07-22 ENCOUNTER — TELEPHONE (OUTPATIENT)
Dept: FAMILY MEDICINE | Facility: CLINIC | Age: 70
End: 2020-07-22

## 2020-07-22 ENCOUNTER — OFFICE VISIT (OUTPATIENT)
Dept: FAMILY MEDICINE | Facility: CLINIC | Age: 70
End: 2020-07-22
Payer: MEDICARE

## 2020-07-22 VITALS
HEART RATE: 68 BPM | SYSTOLIC BLOOD PRESSURE: 134 MMHG | OXYGEN SATURATION: 95 % | DIASTOLIC BLOOD PRESSURE: 78 MMHG | TEMPERATURE: 99 F | BODY MASS INDEX: 35.38 KG/M2 | WEIGHT: 207.25 LBS | HEIGHT: 64 IN | RESPIRATION RATE: 18 BRPM

## 2020-07-22 DIAGNOSIS — G47.00 INSOMNIA, UNSPECIFIED TYPE: ICD-10-CM

## 2020-07-22 DIAGNOSIS — E78.5 DYSLIPIDEMIA: ICD-10-CM

## 2020-07-22 DIAGNOSIS — G47.33 OBSTRUCTIVE SLEEP APNEA: ICD-10-CM

## 2020-07-22 DIAGNOSIS — F41.9 ANXIETY: ICD-10-CM

## 2020-07-22 DIAGNOSIS — Z13.5 SCREENING FOR DIABETIC RETINOPATHY: ICD-10-CM

## 2020-07-22 DIAGNOSIS — E11.9 TYPE 2 DIABETES MELLITUS WITHOUT COMPLICATION, UNSPECIFIED WHETHER LONG TERM INSULIN USE: Primary | ICD-10-CM

## 2020-07-22 DIAGNOSIS — F33.0 MILD RECURRENT MAJOR DEPRESSION: ICD-10-CM

## 2020-07-22 DIAGNOSIS — R06.01 ORTHOPNEA: Primary | ICD-10-CM

## 2020-07-22 DIAGNOSIS — I10 ESSENTIAL HYPERTENSION: ICD-10-CM

## 2020-07-22 DIAGNOSIS — Z12.31 VISIT FOR SCREENING MAMMOGRAM: ICD-10-CM

## 2020-07-22 PROCEDURE — 1159F MED LIST DOCD IN RCRD: CPT | Mod: HCNC,S$GLB,, | Performed by: FAMILY MEDICINE

## 2020-07-22 PROCEDURE — 3078F DIAST BP <80 MM HG: CPT | Mod: HCNC,CPTII,S$GLB, | Performed by: FAMILY MEDICINE

## 2020-07-22 PROCEDURE — 3075F SYST BP GE 130 - 139MM HG: CPT | Mod: HCNC,CPTII,S$GLB, | Performed by: FAMILY MEDICINE

## 2020-07-22 PROCEDURE — 99499 RISK ADDL DX/OHS AUDIT: ICD-10-PCS | Mod: HCNC,S$GLB,, | Performed by: FAMILY MEDICINE

## 2020-07-22 PROCEDURE — 99999 PR PBB SHADOW E&M-EST. PATIENT-LVL V: ICD-10-PCS | Mod: PBBFAC,HCNC,, | Performed by: FAMILY MEDICINE

## 2020-07-22 PROCEDURE — 3075F PR MOST RECENT SYSTOLIC BLOOD PRESS GE 130-139MM HG: ICD-10-PCS | Mod: HCNC,CPTII,S$GLB, | Performed by: FAMILY MEDICINE

## 2020-07-22 PROCEDURE — 3044F PR MOST RECENT HEMOGLOBIN A1C LEVEL <7.0%: ICD-10-PCS | Mod: HCNC,CPTII,S$GLB, | Performed by: FAMILY MEDICINE

## 2020-07-22 PROCEDURE — 1159F PR MEDICATION LIST DOCUMENTED IN MEDICAL RECORD: ICD-10-PCS | Mod: HCNC,S$GLB,, | Performed by: FAMILY MEDICINE

## 2020-07-22 PROCEDURE — 3008F PR BODY MASS INDEX (BMI) DOCUMENTED: ICD-10-PCS | Mod: HCNC,CPTII,S$GLB, | Performed by: FAMILY MEDICINE

## 2020-07-22 PROCEDURE — 3044F HG A1C LEVEL LT 7.0%: CPT | Mod: HCNC,CPTII,S$GLB, | Performed by: FAMILY MEDICINE

## 2020-07-22 PROCEDURE — 1101F PT FALLS ASSESS-DOCD LE1/YR: CPT | Mod: HCNC,CPTII,S$GLB, | Performed by: FAMILY MEDICINE

## 2020-07-22 PROCEDURE — 3078F PR MOST RECENT DIASTOLIC BLOOD PRESSURE < 80 MM HG: ICD-10-PCS | Mod: HCNC,CPTII,S$GLB, | Performed by: FAMILY MEDICINE

## 2020-07-22 PROCEDURE — 99499 UNLISTED E&M SERVICE: CPT | Mod: HCNC,S$GLB,, | Performed by: FAMILY MEDICINE

## 2020-07-22 PROCEDURE — 1101F PR PT FALLS ASSESS DOC 0-1 FALLS W/OUT INJ PAST YR: ICD-10-PCS | Mod: HCNC,CPTII,S$GLB, | Performed by: FAMILY MEDICINE

## 2020-07-22 PROCEDURE — 99214 PR OFFICE/OUTPT VISIT, EST, LEVL IV, 30-39 MIN: ICD-10-PCS | Mod: HCNC,S$GLB,, | Performed by: FAMILY MEDICINE

## 2020-07-22 PROCEDURE — 3008F BODY MASS INDEX DOCD: CPT | Mod: HCNC,CPTII,S$GLB, | Performed by: FAMILY MEDICINE

## 2020-07-22 PROCEDURE — 99999 PR PBB SHADOW E&M-EST. PATIENT-LVL V: CPT | Mod: PBBFAC,HCNC,, | Performed by: FAMILY MEDICINE

## 2020-07-22 PROCEDURE — 99214 OFFICE O/P EST MOD 30 MIN: CPT | Mod: HCNC,S$GLB,, | Performed by: FAMILY MEDICINE

## 2020-07-22 PROCEDURE — 1126F PR PAIN SEVERITY QUANTIFIED, NO PAIN PRESENT: ICD-10-PCS | Mod: HCNC,S$GLB,, | Performed by: FAMILY MEDICINE

## 2020-07-22 PROCEDURE — 1126F AMNT PAIN NOTED NONE PRSNT: CPT | Mod: HCNC,S$GLB,, | Performed by: FAMILY MEDICINE

## 2020-07-22 RX ORDER — GLIPIZIDE 10 MG/1
10 TABLET ORAL 2 TIMES DAILY WITH MEALS
Qty: 180 TABLET | Refills: 3 | Status: SHIPPED | OUTPATIENT
Start: 2020-07-22 | End: 2021-02-15 | Stop reason: SDUPTHER

## 2020-07-22 RX ORDER — ESCITALOPRAM OXALATE 20 MG/1
20 TABLET ORAL DAILY
Qty: 90 TABLET | Refills: 3 | Status: SHIPPED | OUTPATIENT
Start: 2020-07-22 | End: 2021-04-21 | Stop reason: SDUPTHER

## 2020-07-22 RX ORDER — METFORMIN HYDROCHLORIDE 1000 MG/1
1000 TABLET ORAL NIGHTLY
Qty: 90 TABLET | Refills: 3 | Status: SHIPPED | OUTPATIENT
Start: 2020-07-22 | End: 2021-02-23 | Stop reason: SDUPTHER

## 2020-07-22 RX ORDER — ALBUTEROL SULFATE 90 UG/1
2 AEROSOL, METERED RESPIRATORY (INHALATION) EVERY 6 HOURS PRN
Qty: 8.5 G | Refills: 1 | Status: SHIPPED | OUTPATIENT
Start: 2020-07-22 | End: 2021-12-31 | Stop reason: SDUPTHER

## 2020-07-22 RX ORDER — TRAZODONE HYDROCHLORIDE 100 MG/1
50 TABLET ORAL NIGHTLY
Qty: 90 TABLET | Refills: 1 | Status: SHIPPED | OUTPATIENT
Start: 2020-07-22 | End: 2020-08-03

## 2020-07-22 NOTE — TELEPHONE ENCOUNTER
----- Message from Haley Francois MA sent at 7/22/2020 10:19 AM CDT -----    ----- Message -----  From: Sugey shericonchis  Sent: 7/22/2020   9:59 AM CDT  To: Arden Osei Staff    Type: Patient Call Back    Who called: pt     What is the request in detail: pt states she just left appt and fogot to ask for a prescription for cough syrup.     Can the clinic reply by [facility]? No     Would the patient rather a call back or a response via [facility]? Call back     Best call back number: [phone]    Additional Information:  ..  [facility] [address] [place] [address] - [address] [place] [address]  Phone: [phone] Fax: [phone]

## 2020-07-22 NOTE — PROGRESS NOTES
"Subjective:       Patient ID: Faby Luna is a 69 y.o. female.    Chief Complaint: Cough and Insomnia    HPI   69 year old female with diabetes, hypertension, dyslipidemia, depression, and anxiety, comes in for follow up on these. She also reports chronic insomnia and dry cough for several years. The cough is not associated with chest pain, chest tightness, shortness of breath, or wheezing. She reports no coughing blood. She states that trazodone 50 mg helped her fall asleep initially but no longer working. She wants the dose increased. When suggestion that her symptoms may be consistent with sleep apnea she states she was diagnosed with that over 10 years ago, she just doesn't use a machine as it got misplaced many years ago. She reports having to sleep in sitting position. She states that if she sleeps laying back she feels short of breath. She denies fluid in the legs.     Review of Systems   Constitutional: Positive for activity change and fatigue. Negative for appetite change, chills, diaphoresis, fever and unexpected weight change.   Respiratory: Positive for cough.    Cardiovascular: Negative for palpitations and leg swelling.   Gastrointestinal: Negative for abdominal pain, constipation, diarrhea, reflux and fecal incontinence.   Genitourinary: Negative for dysuria, flank pain, frequency and hematuria.   Psychiatric/Behavioral: Positive for sleep disturbance. Negative for suicidal ideas.         Objective:     /78 (BP Location: Right arm, Patient Position: Sitting, BP Method: Large (Manual))   Pulse 68   Temp 98.7 °F (37.1 °C)   Resp 18   Ht 5' 4" (1.626 m)   Wt 94 kg (207 lb 3.7 oz)   SpO2 95%   BMI 35.57 kg/m²     Physical Exam  Vitals signs reviewed.   Constitutional:       Appearance: She is well-developed.   HENT:      Head: Normocephalic and atraumatic.      Right Ear: External ear normal.      Left Ear: External ear normal.      Nose: Nose normal.      Mouth/Throat:      " Pharynx: No oropharyngeal exudate.   Eyes:      General:         Right eye: No discharge.         Left eye: No discharge.      Conjunctiva/sclera: Conjunctivae normal.      Pupils: Pupils are equal, round, and reactive to light.   Neck:      Musculoskeletal: Normal range of motion and neck supple.      Trachea: No tracheal deviation.   Cardiovascular:      Rate and Rhythm: Normal rate and regular rhythm.      Heart sounds: Normal heart sounds. No murmur.   Pulmonary:      Effort: Pulmonary effort is normal.      Breath sounds: Normal breath sounds. No wheezing or rales.   Abdominal:      General: Bowel sounds are normal.      Palpations: Abdomen is soft. Abdomen is not rigid. There is no mass.      Tenderness: There is no abdominal tenderness. There is no guarding.   Lymphadenopathy:      Cervical: No cervical adenopathy.   Neurological:      Mental Status: She is oriented to person, place, and time.      Sensory: No sensory deficit.      Motor: No atrophy.      Gait: Gait normal.      Deep Tendon Reflexes:      Reflex Scores:       Patellar reflexes are 2+ on the right side and 2+ on the left side.        Protective Sensation (w/ 10 gram monofilament):  Right: Intact  Left: Intact    Visual Inspection:  Normal -  Bilateral    Pedal Pulses:   Right: Present  Left: Present    Posterior tibialis:   Right:Present  Left: Present      Assessment:       1. Type 2 diabetes mellitus without complication, unspecified whether long term insulin use    2. Essential hypertension    3. Dyslipidemia    4. Obstructive sleep apnea    5. Screening for diabetic retinopathy    6. Visit for screening mammogram    7. Insomnia, unspecified type    8. Mild recurrent major depression    9. Anxiety        Plan:       Faby was seen today for cough and insomnia.    Diagnoses and all orders for this visit:    Type 2 diabetes mellitus without complication, unspecified whether long term insulin use  -     Comprehensive metabolic panel; Future  -      Lipid Panel; Future  -     Hemoglobin A1C; Future  -     Microalbumin/creatinine urine ratio; Future  -     metFORMIN (GLUCOPHAGE) 1000 MG tablet; Take 1 tablet (1,000 mg total) by mouth every evening.  -     glipiZIDE (GLUCOTROL) 10 MG tablet; Take 1 tablet (10 mg total) by mouth 2 (two) times daily with meals.  Check diabetic labs    Essential hypertension  -     Comprehensive metabolic panel; Future  -     Lipid Panel; Future  -     Microalbumin/creatinine urine ratio; Future  Continue current regimen.    Dyslipidemia  -     Comprehensive metabolic panel; Future  -     Lipid Panel; Future  Check lipids    Obstructive sleep apnea  -     Ambulatory referral/consult to Sleep Disorders; Future  -     albuterol (PROAIR HFA) 90 mcg/actuation inhaler; Inhale 2 puffs into the lungs every 6 (six) hours as needed for Shortness of Breath. Rescue  Importance of managing sleep apnea explained.  Patient agrees to make an appointment.    Screening for diabetic retinopathy  Patient states she had her eye exam earlier this month with Dr. Luis Fernando Garcia.  Will request report    Visit for screening mammogram  -     Mammo Digital Screening Bilat w/ Misael; Future    Insomnia, unspecified type  -     traZODone (DESYREL) 100 MG tablet; Take 0.5 tablets (50 mg total) by mouth every evening.  Discussed with patient that insomnia is likely from DAHLIA.   Insists on increasing trazodone.    Mild recurrent major depression  -     escitalopram oxalate (LEXAPRO) 20 MG tablet; Take 1 tablet (20 mg total) by mouth once daily.    Anxiety  -     escitalopram oxalate (LEXAPRO) 20 MG tablet; Take 1 tablet (20 mg total) by mouth once daily.

## 2020-07-22 NOTE — TELEPHONE ENCOUNTER
As we discussed at visit, cough is chronic, and likely from sleep apnea.   Treatment is not cough syrup, rather seeing sleep medicine, to get sleep apnea under controlled

## 2020-07-22 NOTE — TELEPHONE ENCOUNTER
Attempted to contact pt regarding pt's upcoming procedure. I could not leave a message pt's voicemail box was full.   
Patient's urine came back positive for UTI.  We will reach out to her to confirm allergies and prescribe Macrobid 100 mg PO BID x5 days.    Su Arredondo Jr, MD  Interventional Pain Medicine / Anesthesiology    
no

## 2020-07-23 NOTE — PROGRESS NOTES
Patient, Faby Luna (MRN #[id number]), presented with a recorded BMI of 35.57 kg/m^2 and a documented comorbidity(s):  - Diabetes Mellitus Type 2  - Obstructive Sleep Apnea  - Hypertension  - Hyperlipidemia  to which the severe obesity is a contributing factor. This is consistent with the definition of severe obesity (BMI 35.0-39.9) with comorbidity (ICD-10 E66.01, Z68.35). The patient's severe obesity was monitored, evaluated, addressed and/or treated. This addendum to the medical record is made on 07/22/2020.

## 2020-07-23 NOTE — TELEPHONE ENCOUNTER
Please call patient and let her know I was reviewing some records from [facility]. Based on her cough and those records, I placed an order for an echocardiogram and a chest xray. Should call scheduling to get scheduled as soon as she can. The chest xray can be done at time of echo or can be done her in clinic.

## 2020-07-23 NOTE — TELEPHONE ENCOUNTER
I spoke to the pt and advised per PCP that she needs an ECHO and a Chest x-ray. Pt was provided the contact number for scheduling and she will call and have both tests completed the same day.

## 2020-07-28 ENCOUNTER — TELEPHONE (OUTPATIENT)
Dept: PAIN MEDICINE | Facility: CLINIC | Age: 70
End: 2020-07-28

## 2020-07-29 ENCOUNTER — PATIENT OUTREACH (OUTPATIENT)
Dept: ADMINISTRATIVE | Facility: OTHER | Age: 70
End: 2020-07-29

## 2020-07-29 NOTE — PROGRESS NOTES
Chart reviewed.   Immunizations: Triggered Imm Registry     Orders placed: n/a  Upcoming appts to satisfy TESSY topics: n/a

## 2020-07-30 ENCOUNTER — OFFICE VISIT (OUTPATIENT)
Dept: SLEEP MEDICINE | Facility: CLINIC | Age: 70
End: 2020-07-30
Payer: MEDICARE

## 2020-07-30 VITALS
BODY MASS INDEX: 35.12 KG/M2 | SYSTOLIC BLOOD PRESSURE: 144 MMHG | DIASTOLIC BLOOD PRESSURE: 79 MMHG | HEART RATE: 72 BPM | WEIGHT: 205.69 LBS | HEIGHT: 64 IN

## 2020-07-30 DIAGNOSIS — F43.23 ADJUSTMENT REACTION WITH ANXIETY AND DEPRESSION: Primary | ICD-10-CM

## 2020-07-30 DIAGNOSIS — I10 ESSENTIAL HYPERTENSION: ICD-10-CM

## 2020-07-30 DIAGNOSIS — G47.33 OSA (OBSTRUCTIVE SLEEP APNEA): ICD-10-CM

## 2020-07-30 DIAGNOSIS — G47.33 OBSTRUCTIVE SLEEP APNEA: ICD-10-CM

## 2020-07-30 DIAGNOSIS — E11.9 TYPE 2 DIABETES MELLITUS WITHOUT COMPLICATION, WITHOUT LONG-TERM CURRENT USE OF INSULIN: ICD-10-CM

## 2020-07-30 PROCEDURE — 1101F PR PT FALLS ASSESS DOC 0-1 FALLS W/OUT INJ PAST YR: ICD-10-PCS | Mod: HCNC,CPTII,S$GLB, | Performed by: INTERNAL MEDICINE

## 2020-07-30 PROCEDURE — 99999 PR PBB SHADOW E&M-EST. PATIENT-LVL IV: CPT | Mod: PBBFAC,HCNC,, | Performed by: INTERNAL MEDICINE

## 2020-07-30 PROCEDURE — 1126F PR PAIN SEVERITY QUANTIFIED, NO PAIN PRESENT: ICD-10-PCS | Mod: HCNC,S$GLB,, | Performed by: INTERNAL MEDICINE

## 2020-07-30 PROCEDURE — 3008F BODY MASS INDEX DOCD: CPT | Mod: HCNC,CPTII,S$GLB, | Performed by: INTERNAL MEDICINE

## 2020-07-30 PROCEDURE — 3077F SYST BP >= 140 MM HG: CPT | Mod: HCNC,CPTII,S$GLB, | Performed by: INTERNAL MEDICINE

## 2020-07-30 PROCEDURE — 1159F MED LIST DOCD IN RCRD: CPT | Mod: HCNC,S$GLB,, | Performed by: INTERNAL MEDICINE

## 2020-07-30 PROCEDURE — 3078F PR MOST RECENT DIASTOLIC BLOOD PRESSURE < 80 MM HG: ICD-10-PCS | Mod: HCNC,CPTII,S$GLB, | Performed by: INTERNAL MEDICINE

## 2020-07-30 PROCEDURE — 3077F PR MOST RECENT SYSTOLIC BLOOD PRESSURE >= 140 MM HG: ICD-10-PCS | Mod: HCNC,CPTII,S$GLB, | Performed by: INTERNAL MEDICINE

## 2020-07-30 PROCEDURE — 99202 PR OFFICE/OUTPT VISIT, NEW, LEVL II, 15-29 MIN: ICD-10-PCS | Mod: HCNC,S$GLB,, | Performed by: INTERNAL MEDICINE

## 2020-07-30 PROCEDURE — 3008F PR BODY MASS INDEX (BMI) DOCUMENTED: ICD-10-PCS | Mod: HCNC,CPTII,S$GLB, | Performed by: INTERNAL MEDICINE

## 2020-07-30 PROCEDURE — 3044F HG A1C LEVEL LT 7.0%: CPT | Mod: HCNC,CPTII,S$GLB, | Performed by: INTERNAL MEDICINE

## 2020-07-30 PROCEDURE — 3078F DIAST BP <80 MM HG: CPT | Mod: HCNC,CPTII,S$GLB, | Performed by: INTERNAL MEDICINE

## 2020-07-30 PROCEDURE — 1101F PT FALLS ASSESS-DOCD LE1/YR: CPT | Mod: HCNC,CPTII,S$GLB, | Performed by: INTERNAL MEDICINE

## 2020-07-30 PROCEDURE — 3044F PR MOST RECENT HEMOGLOBIN A1C LEVEL <7.0%: ICD-10-PCS | Mod: HCNC,CPTII,S$GLB, | Performed by: INTERNAL MEDICINE

## 2020-07-30 PROCEDURE — 99999 PR PBB SHADOW E&M-EST. PATIENT-LVL IV: ICD-10-PCS | Mod: PBBFAC,HCNC,, | Performed by: INTERNAL MEDICINE

## 2020-07-30 PROCEDURE — 1126F AMNT PAIN NOTED NONE PRSNT: CPT | Mod: HCNC,S$GLB,, | Performed by: INTERNAL MEDICINE

## 2020-07-30 PROCEDURE — 99202 OFFICE O/P NEW SF 15 MIN: CPT | Mod: HCNC,S$GLB,, | Performed by: INTERNAL MEDICINE

## 2020-07-30 PROCEDURE — 1159F PR MEDICATION LIST DOCUMENTED IN MEDICAL RECORD: ICD-10-PCS | Mod: HCNC,S$GLB,, | Performed by: INTERNAL MEDICINE

## 2020-07-30 NOTE — LETTER
July 30, 2020      Sea Her Jr., MD  [address]  [place] [address]           [id number]  [address]  [facility] [address]  Phone: [phone]          Patient: Faby Luna   MR Number: [id number]   YOB: 1950   Date of Visit: 7/30/2020       Dear Dr. Sea Her Jr.:    Thank you for referring Faby Luna to me for evaluation. Attached you will find relevant portions of my assessment and plan of care.    If you have questions, please do not hesitate to call me. I look forward to following Faby Luna along with you.    Sincerely,    Kimberly Stewart MD    Enclosure  CC:  No Recipients    If you would like to receive this communication electronically, please contact [email or web address] or [phone] to request more information on [employer] access.    For providers and/or their staff who would like to refer a patient to [facility], please contact us through our one-stop-shop provider referral line, [facility], at [phone].    If you feel you have received this communication in error or would no longer like to receive these types of communications, please e-mail [email or web address]

## 2020-07-30 NOTE — PROGRESS NOTES
"              After Visit Summary   2018    Eugenia Krishnamurthy    MRN: [id number]           Patient Information     Date Of Birth          2019        Visit Information        Provider Department      2018 10:20 AM Effie Meade MD [facility]        Today's Diagnoses      acne    -  1       Follow-ups after your visit        Who to contact     If you have questions or need follow up information about today's clinic visit or your schedule please contact [facility] directly at [phone].  Normal or non-critical lab and imaging results will be communicated to you by [employer], letter or phone within 4 business days after the clinic has received the results. If you do not hear from us within 7 days, please contact the clinic through [employer] or phone. If you have a critical or abnormal lab result, we will notify you by phone as soon as possible.  Submit refill requests through [employer] or call your pharmacy and they will forward the refill request to us. Please allow 3 business days for your refill to be completed.          Additional Information About Your Visit        MyChart Information     [employer] lets you send messages to your doctor, view your test results, renew your prescriptions, schedule appointments and more. To sign up, go to [email or web address], contact your [place] clinic or call [phone] during business hours.            Care EveryWhere ID     This is your Care EveryWhere ID. This could be used by other organizations to access your [place] medical records  [id number]        Your Vitals Were     Pulse Temperature Height Pulse Oximetry BMI (Body Mass Index)       158 98.5  F (36.9  C) (Axillary) 1' 8\" (0.508 m) 98% 12.3 kg/m2        Blood Pressure from Last 3 Encounters:   No data found for BP    Weight from Last 3 Encounters:   18 7 lb (3.175 kg) (7 %)*   18 6 lb 9.4 oz (2.988 kg) (6 %)*   18 5 lb 11.5 oz " Subjective:       Patient ID: Faby Luna is a 69 y.o. female.    Chief Complaint: Sleeping Problem    HPI     I had the pleasure of seeing Faby Luna today, who is a 69 y.o. female that presents with Obstructive Sleep Apnea.    Faby Luna does not have a CDL.    Faby Luna is not a shift worker.    Faby Luna presents with DAHLIA that has been going on for 15 years  I do not have copy of prior sleep studies.   She was on CPAP for a few years but it got lost in a move.      Bedtime when working ranges from NA to NA.   When not working, bedtime ranges from 2000 to 0300.   Sleep latency ranges from 3 to 4 hours.  She watches TV prior to sleep   Average number of awakenings is 2-3 and return to sleep is quick.   Wake up time when working is NA to NA.   When not working, wake up time is 0330 to 0400.   Patient does not feel rested upon awakening.    Faby Luna consumes approximately 0 beverages with caffeine are consumed daily.   An average of 1 beverages with alcohol are consumed monthly   Medications taken for sleep currently: none  Previous medications taken: none     Faby Luna does experience daytime sleepiness.   Naps are taken about 0 times weekly, usually lasting NA to NA minutes.  aFby currently does operate an automobile.  Faby Luna does not experience drowsiness when driving.   Patient does doze off when sedentary.   Faby Luna does not have auxiliary symptoms of narcolepsy including sleep onset paralysis, hypnagogic hallucinations, sleep attacks and cataplexy  No flowsheet data found.    Faby Luna has a history of snoring.   Snoring is described as unknown.   Apneic episodes have been noticed during sleep.   A witness to sleep is not present.   The patient awakens with mouth dryness.      Faby Luna does not not have symptoms of Restless Legs Syndrome.  (2.594 kg) (5 %)*     * Growth percentiles are based on WHO (Girls, 0-2 years) data.              Today, you had the following     No orders found for display       Primary Care Provider Office Phone # Fax #    Effie Meade [phone]       [address]  [facility] [address]        Equal Access to Services     [facility]: Hadii aad ku hadasho Soomaali, waaxda luqadaha, qaybta kaalmada adeegyada, waxay idiin hayaan adeeg kharash la'aan ah. So [facility] [phone].    ATENCIÓN: Si habla español, tiene a larry disposición servicios gratuitos de asistencia lingüística. Llame al [phone].    We comply with applicable federal civil rights laws and [place] laws. We do not discriminate on the basis of race, color, national origin, age, disability, sex, sexual orientation, or gender identity.            Thank you!     Thank you for choosing [facility]  for your care. Our goal is always to provide you with excellent care. Hearing back from our patients is one way we can continue to improve our services. Please take a few minutes to complete the written survey that you may receive in the mail after your visit with us. Thank you!             Your Updated Medication List - Protect others around you: Learn how to safely use, store and throw away your medicines at www.disposemymeds.org.          This list is accurate as of 9/11/18 10:52 AM.  Always use your most recent med list.                   Brand Name Dispense Instructions for use Diagnosis    cholecalciferol 400 UNIT/ML Liqd liquid    vitamin D/D-VI-SOL    30 mL    Take 1 mL (400 Units) by mouth daily    Encounter for routine child health examination w/o abnormal findings          Nocturnal leg movements have not been noticed.   The patient does experience sleep related leg cramps.   There is not a history of parasomnia.      Current Outpatient Medications:     albuterol (PROAIR HFA) 90 mcg/actuation inhaler, Inhale 2 puffs into the lungs every 6 (six) hours as needed for Shortness of Breath. Rescue, Disp: 8.5 g, Rfl: 1    atenoloL (TENORMIN) 100 MG tablet, TAKE 1 TABLET BY MOUTH ONCE DAILY, Disp: 90 tablet, Rfl: 3    blood-glucose meter kit, Use as instructed, Disp: 1 each, Rfl: 0    escitalopram oxalate (LEXAPRO) 20 MG tablet, Take 1 tablet (20 mg total) by mouth once daily., Disp: 90 tablet, Rfl: 3    furosemide (LASIX) 20 MG tablet, Take 1 tablet by mouth once daily, Disp: 90 tablet, Rfl: 0    glipiZIDE (GLUCOTROL) 10 MG tablet, Take 1 tablet (10 mg total) by mouth 2 (two) times daily with meals., Disp: 180 tablet, Rfl: 3    ipratropium (ATROVENT) 42 mcg (0.06 %) nasal spray, 2 sprays by Nasal route 4 (four) times daily., Disp: 15 mL, Rfl: 0    loratadine (CLARITIN) 10 mg tablet, TAKE 1 TABLET BY MOUTH ONCE DAILY AS NEEDED FOR ALLERGIES, Disp: 90 tablet, Rfl: 3    losartan (COZAAR) 50 MG tablet, Take 1 tablet (50 mg total) by mouth once daily., Disp: 90 tablet, Rfl: 3    metFORMIN (GLUCOPHAGE) 1000 MG tablet, Take 1 tablet (1,000 mg total) by mouth every evening., Disp: 90 tablet, Rfl: 3    pantoprazole (PROTONIX) 20 MG tablet, TAKE 2 TABLETS BY MOUTH ONCE DAILY AS NEEDED (STOMACH  PAIN), Disp: 180 tablet, Rfl: 0    rosuvastatin (CRESTOR) 5 MG tablet, Take 1 tablet (5 mg total) by mouth once daily., Disp: 90 tablet, Rfl: 3    traZODone (DESYREL) 100 MG tablet, Take 0.5 tablets (50 mg total) by mouth every evening., Disp: 90 tablet, Rfl: 1    zonisamide (ZONEGRAN) 100 MG Cap, TAKE 4 CAPSULES BY MOUTH AT BEDTIME (Patient not taking: Reported on 7/22/2020), Disp: 120 capsule, Rfl: 2    zonisamide (ZONEGRAN) 25 MG Cap, Take 2 capsules (50 mg total) by mouth nightly. (Patient  not taking: Reported on 2020), Disp: 60 capsule, Rfl: 11     Review of patient's allergies indicates:   Allergen Reactions    Atorvastatin      Pain    Aspirin Other (See Comments)     Has been told not to take it due to bariatric surgery         Past Medical History:   Diagnosis Date    Arthritis     Diabetes mellitus     Hypertension        Past Surgical History:   Procedure Laterality Date    CARPAL TUNNEL RELEASE Right 3/8/2019    Procedure: RELEASE, CARPAL TUNNEL right;  Surgeon: Pan Goodman MD;  Location: [facility] OR;  Service: Orthopedics;  Laterality: Right;    CARPAL TUNNEL RELEASE Left 2019    Procedure: RELEASE, CARPAL TUNNEL- LEFT;  Surgeon: Pan Goodman MD;  Location: [facility] OR 2ND FLR;  Service: Orthopedics;  Laterality: Left;     SECTION      CHOLECYSTECTOMY      EPIDURAL STEROID INJECTION INTO LUMBAR SPINE N/A 2018    Procedure: INJECTION, STEROID, SPINE, LUMBAR, EPIDURAL;  Surgeon: Shaq Silverio MD;  Location: [facility] PAIN MGT;  Service: Pain Management;  Laterality: N/A;  LUMBAR L4-L5 INTERLAMINAR ELISE'  [id number]  W/ SEDATION     HYSTERECTOMY      INJECTION OF ANESTHETIC AGENT AROUND NERVE Bilateral 2019    Procedure: BLOCK, NERVE, L3-L4-L5 MEDIAL BRANCH;  Surgeon: Shaq Silverio MD;  Location: [facility] PAIN MGT;  Service: Pain Management;  Laterality: Bilateral;    INJECTION OF ANESTHETIC AGENT AROUND NERVE Bilateral 10/17/2019    Procedure: BLOCK, NERVE;  Surgeon: Shaq Silverio MD;  Location: [facility] PAIN MGT;  Service: Pain Management;  Laterality: Bilateral;  B/L MBB L3-L4-L5  REPEAT  CONSENT NEEDED    INJECTION OF FACET JOINT Bilateral 2018    Procedure: INJECTION-FACET;  Surgeon: Shaq Silverio MD;  Location: [facility] PAIN MGT;  Service: Pain Management;  Laterality: Bilateral;  LUMBAR BILATERAL L4-L5 AND L5-S1 FACET STEROID INJECTION  [id number]    W/ SEDATION     RADIOFREQUENCY ABLATION Right 2019    Procedure: RADIOFREQUENCY ABLATION RIGHT L3, L4, L5;   Surgeon: Shaq Silverio MD;  Location: [facility] PAIN MGT;  Service: Pain Management;  Laterality: Right;  Right RFA L3-L4-L5  1 of 2  Consent Needed    RADIOFREQUENCY ABLATION Left 12/5/2019    Procedure: RADIOFREQUENCY ABLATION LEFT L3-5;  Surgeon: Shaq Silverio MD;  Location: [facility] PAIN MGT;  Service: Pain Management;  Laterality: Left;  Left RFA L3-L4-L5  2 of 2  Consent Needed    TRIAL OF SPINAL CORD NERVE STIMULATOR N/A 9/24/2018    Procedure: TRIAL, NEUROSTIMULATOR, SPINAL CORD, SPINAL CORD STIMULATOR TRIAL-INTERNAL WIRES TO EXTERNAL BATTERY;  Surgeon: Shaq Silverio MD;  Location: [facility] PAIN MGT;  Service: Pain Management;  Laterality: N/A;  ABBOTT REP NOTIFIED       History reviewed. No pertinent family history.    Social History     Socioeconomic History    Marital status:      Spouse name: Not on file    Number of children: Not on file    Years of education: Not on file    Highest education level: Not on file   Occupational History    Not on file   Social Needs    Financial resource strain: Not on file    Food insecurity     Worry: Not on file     Inability: Not on file    Transportation needs     Medical: Not on file     Non-medical: Not on file   Tobacco Use    Smoking status: Never Smoker    Smokeless tobacco: Never Used   Substance and Sexual Activity    Alcohol use: No    Drug use: No    Sexual activity: Not on file   Lifestyle    Physical activity     Days per week: Not on file     Minutes per session: Not on file    Stress: Not on file   Relationships    Social connections     Talks on phone: Not on file     Gets together: Not on file     Attends Latter-day service: Not on file     Active member of club or organization: Not on file     Attends meetings of clubs or organizations: Not on file     Relationship status: Not on file   Other Topics Concern    Not on file   Social History Narrative    Not on file           Old medical records.    Vitals:    07/30/20 1155   BP: (!) 144/79    Pulse: 72              The patient was given open opportunity to ask questions and/or express concerns about treatment plan.   All questions/concerns were discussed.   Driving precautions were provided.     Two patient identifiers used prior to evaluation.    Thank you for referring Aishaepifanio Luna for evaluation.           Review of Systems      Objective:      Physical Exam    Assessment:       1. Adjustment reaction with anxiety and depression    2. Obstructive sleep apnea    3. Type 2 diabetes mellitus without complication, without long-term current use of insulin    4. Essential hypertension    5. DAHLIA (obstructive sleep apnea)        Plan:       Due to listed symptoms, a polysomnogram is recommended and ordered.   Description of procedure given to patient.   If significant Obstructive Sleep Apnea (DAHLIA) is found during the initial portion of the study, therapy will be initiated with nasal Continuous Positive Airway Pressure (CPAP).   Goals of therapy were discussed, alternative treatments listed and patient agrees to this form of therapy if indicated.   The pathophysiology of DAHLIA was discussed.   The effects of DAHLIA on patient's co-morbid conditions and the increased morbidity and/or mortality associated with this condition were reviewed.   The patient was given open opportunity to ask questions and/or express concerns about treatment plan.   All questions/concerns were discussed.   Driving precautions were provided.       Thank you for referring Aisha Manoj Luna for evaluation.       21-minute visit. >50% spent counseling patient and coordination of care.

## 2020-07-31 ENCOUNTER — PES CALL (OUTPATIENT)
Dept: ADMINISTRATIVE | Facility: CLINIC | Age: 70
End: 2020-07-31

## 2020-08-03 ENCOUNTER — TELEPHONE (OUTPATIENT)
Dept: FAMILY MEDICINE | Facility: CLINIC | Age: 70
End: 2020-08-03

## 2020-08-03 DIAGNOSIS — G47.00 INSOMNIA, UNSPECIFIED TYPE: ICD-10-CM

## 2020-08-03 RX ORDER — TRAZODONE HYDROCHLORIDE 100 MG/1
100 TABLET ORAL NIGHTLY
Qty: 90 TABLET | Refills: 1 | Status: SHIPPED | OUTPATIENT
Start: 2020-08-03 | End: 2021-02-23 | Stop reason: SDUPTHER

## 2020-08-03 NOTE — TELEPHONE ENCOUNTER
----- Message from Sugey Allison sent at 8/3/2020 12:24 PM CDT -----  Type: Patient Call Back    Who called: pt     What is the request in detail: pt asking for a call back to discuss med directions.     Can the clinic reply by [facility]? No     Would the patient rather a call back or a response via [facility]? Call back     Best call back number: [phone]    Additional Information:

## 2020-08-03 NOTE — TELEPHONE ENCOUNTER
I spoke to the pt and she reports that she believes her Trazodone was going to be raised to 100MG and the prescription was still written for 50MG. Please advise

## 2020-08-07 DIAGNOSIS — E78.5 DYSLIPIDEMIA: ICD-10-CM

## 2020-08-07 DIAGNOSIS — I10 ESSENTIAL HYPERTENSION: Primary | ICD-10-CM

## 2020-08-07 DIAGNOSIS — E11.9 TYPE 2 DIABETES MELLITUS WITHOUT COMPLICATION, UNSPECIFIED WHETHER LONG TERM INSULIN USE: ICD-10-CM

## 2020-08-10 ENCOUNTER — TELEPHONE (OUTPATIENT)
Dept: FAMILY MEDICINE | Facility: CLINIC | Age: 70
End: 2020-08-10

## 2020-08-10 NOTE — TELEPHONE ENCOUNTER
----- Message from Sea Her Jr., MD sent at 8/7/2020 12:30 AM CDT -----  Labs show good control. 6 month labs placed. To be done fasting, and follow in office a few days later.

## 2020-08-10 NOTE — TELEPHONE ENCOUNTER
Pt voice understanding  about labs. Pt is schedule for 02/10/21 for lab and 02/15/21 for follow up visit.

## 2020-08-17 ENCOUNTER — TELEPHONE (OUTPATIENT)
Dept: PAIN MEDICINE | Facility: CLINIC | Age: 70
End: 2020-08-17

## 2020-08-17 ENCOUNTER — HOSPITAL ENCOUNTER (OUTPATIENT)
Facility: OTHER | Age: 70
Discharge: HOME | End: 2020-08-17
Attending: ANESTHESIOLOGY | Admitting: ANESTHESIOLOGY
Payer: MEDICARE

## 2020-08-17 VITALS
RESPIRATION RATE: 16 BRPM | BODY MASS INDEX: 33.97 KG/M2 | SYSTOLIC BLOOD PRESSURE: 133 MMHG | WEIGHT: 199 LBS | HEIGHT: 64 IN | DIASTOLIC BLOOD PRESSURE: 61 MMHG | HEART RATE: 69 BPM | TEMPERATURE: 99 F | OXYGEN SATURATION: 96 %

## 2020-08-17 DIAGNOSIS — M47.9 OSTEOARTHRITIS OF SPINE, UNSPECIFIED SPINAL OSTEOARTHRITIS COMPLICATION STATUS, UNSPECIFIED SPINAL REGION: ICD-10-CM

## 2020-08-17 DIAGNOSIS — M47.819 SPONDYLOSIS WITHOUT MYELOPATHY: Primary | ICD-10-CM

## 2020-08-17 LAB — POCT GLUCOSE: 105 MG/DL (ref 70–110)

## 2020-08-17 PROCEDURE — 25000003 PHARM REV CODE 250: Mod: HCNC | Performed by: ANESTHESIOLOGY

## 2020-08-17 PROCEDURE — 82947 ASSAY GLUCOSE BLOOD QUANT: CPT | Mod: HCNC | Performed by: ANESTHESIOLOGY

## 2020-08-17 PROCEDURE — 64636 DESTROY L/S FACET JNT ADDL: CPT | Mod: HCNC,LT,, | Performed by: ANESTHESIOLOGY

## 2020-08-17 PROCEDURE — 64635 DESTROY LUMB/SAC FACET JNT: CPT | Mod: HCNC,LT,, | Performed by: ANESTHESIOLOGY

## 2020-08-17 PROCEDURE — 64635 DESTROY LUMB/SAC FACET JNT: CPT | Mod: HCNC,LT | Performed by: ANESTHESIOLOGY

## 2020-08-17 PROCEDURE — 64636 DESTROY L/S FACET JNT ADDL: CPT | Mod: HCNC,LT | Performed by: ANESTHESIOLOGY

## 2020-08-17 PROCEDURE — 64635 PR DESTROY LUMB/SAC FACET JNT: ICD-10-PCS | Mod: HCNC,LT,, | Performed by: ANESTHESIOLOGY

## 2020-08-17 PROCEDURE — 63600175 PHARM REV CODE 636 W HCPCS: Mod: HCNC | Performed by: ANESTHESIOLOGY

## 2020-08-17 PROCEDURE — 64636 PR DESTROY L/S FACET JNT ADDL: ICD-10-PCS | Mod: HCNC,LT,, | Performed by: ANESTHESIOLOGY

## 2020-08-17 RX ORDER — LIDOCAINE HYDROCHLORIDE 10 MG/ML
INJECTION INFILTRATION; PERINEURAL
Status: DISCONTINUED | OUTPATIENT
Start: 2020-08-17 | End: 2020-08-17 | Stop reason: HOSPADM

## 2020-08-17 RX ORDER — TRIAMCINOLONE ACETONIDE 40 MG/ML
INJECTION, SUSPENSION INTRA-ARTICULAR; INTRAMUSCULAR
Status: DISCONTINUED | OUTPATIENT
Start: 2020-08-17 | End: 2020-08-17 | Stop reason: HOSPADM

## 2020-08-17 RX ORDER — SODIUM CHLORIDE 9 MG/ML
500 INJECTION, SOLUTION INTRAVENOUS CONTINUOUS
Status: DISCONTINUED | OUTPATIENT
Start: 2020-08-17 | End: 2020-08-17 | Stop reason: HOSPADM

## 2020-08-17 RX ORDER — BUPIVACAINE HYDROCHLORIDE 2.5 MG/ML
INJECTION, SOLUTION EPIDURAL; INFILTRATION; INTRACAUDAL
Status: DISCONTINUED | OUTPATIENT
Start: 2020-08-17 | End: 2020-08-17 | Stop reason: HOSPADM

## 2020-08-17 RX ORDER — MIDAZOLAM HYDROCHLORIDE 1 MG/ML
INJECTION INTRAMUSCULAR; INTRAVENOUS
Status: DISCONTINUED | OUTPATIENT
Start: 2020-08-17 | End: 2020-08-17 | Stop reason: HOSPADM

## 2020-08-17 RX ORDER — FENTANYL CITRATE 50 UG/ML
INJECTION, SOLUTION INTRAMUSCULAR; INTRAVENOUS
Status: DISCONTINUED | OUTPATIENT
Start: 2020-08-17 | End: 2020-08-17 | Stop reason: HOSPADM

## 2020-08-17 NOTE — DISCHARGE INSTRUCTIONS
Thank you for allowing us to care for you today. You may receive a survey about the care we provided. Your feedback is valuable and helps us provide excellent care throughout the community.     Home Care Instructions for Pain Management:    1. DIET:   You may resume your normal diet today.   2. BATHING:   You may shower with luke warm water. No tub baths or anything that will soak injection sites under water for the next 24 hours.  3. DRESSING:   You may remove your bandage today.   4. ACTIVITY LEVEL:   You may resume your normal activities 24 hrs after your procedure. Nothing strenuous today.  5. MEDICATIONS:   You may resume your normal medications today. To restart blood thinners, ask your doctor.  6. DRIVING    If you have received any sedatives by mouth today, you may not drive for 12 hours.    If you have received any sedation through your IV, you may not drive for 24 hrs.   7. SPECIAL INSTRUCTIONS:   No heat to the injection site for 24 hrs including, hot bath or shower, heating pad, moist heat, or hot tubs.    Use ice pack to injection site for any pain or discomfort.  Apply ice packs for 20 minute intervals as needed.    IF you have diabetes, be sure to monitor your blood sugar more closely. IF your injection contained steroids your blood sugar levels may become higher than normal.    If you are still having pain upon discharge:  Your pain may improve over the next 48 hours. The anesthetic (numbing medication) works immediately to 48 hours. IF your injection contained a steroid (anti-inflammatory medication), it takes approximately 3 days to start feeling relief and 7-10 days to see your greatest results from the medication. It is possible you may need subsequent injections. This would be discussed at your follow up appointment with pain management or your referring doctor.    Please call the PAIN MANAGEMENT office at [phone] or ON CALL pager at [phone] if you experienced any:   -Weakness or  loss of sensation  -Fever > 101.5  -Pain uncontrolled with oral medications   -Persistent nausea, vomiting, or diarrhea  -Redness or drainage from the injection sites, or any other worrisome concerns.   If physician on call was not reached or could not communicate with our office for any reason please go to the nearest emergency department.    Recovery After Procedural Sedation (Adult)   You have been given medicine by vein to make you sleep during your surgery. This may have included both a pain medicine and sleeping medicine. Most of the effects have worn off. But you may still have some drowsiness for the next 6 to 8 hours.  Home care  Follow these guidelines when you get home:  · For the next 8 hours, you should be watched by a responsible adult. This person should make sure your condition is not getting worse.  · Don't drink any alcohol for the next 24 hours.  · Don't drive, operate dangerous machinery, or make important business or personal decisions during the next 24 hours.  · To prevent injury or falls, use caution when standing and walking for at least 24 hours after your procedure.  Note: Your healthcare provider may tell you not to take any medicine by mouth for pain or sleep in the next 4 hours. These medicines may react with the medicines you were given in the hospital. This could cause a much stronger response than usual.  Follow-up care  Follow up with your healthcare provider if you are not alert and back to your usual level of activity within 12 hours.  When to seek medical advice  Call your healthcare provider right away if any of these occur:  · Drowsiness gets worse  · Weakness or dizziness gets worse  · Repeated vomiting  · You can't be awakened  · Fever  · New rash  [employer] last reviewed this educational content on 9/1/2019 © 2000-2020 [employer]. [address] [place] [address], [place] [address]. All rights reserved. This information is not intended as a substitute for professional  medical care. Always follow your healthcare professional's instructions.

## 2020-08-17 NOTE — TELEPHONE ENCOUNTER
Patient was contacted and informed that her  can drop her off and procedure area staff will notify them when she is ready for

## 2020-08-17 NOTE — H&P (VIEW-ONLY)
HPI  Patient presenting for Procedure(s) (LRB):  RADIOFREQUENCY ABLATION LEFT L3,4,5 1 of 2 (Left)     Patient on Anti-coagulation No    No health changes since previous encounter    Past Medical History:   Diagnosis Date    Arthritis     Diabetes mellitus     Hypertension      Past Surgical History:   Procedure Laterality Date    CARPAL TUNNEL RELEASE Right 3/8/2019    Procedure: RELEASE, CARPAL TUNNEL right;  Surgeon: Pan Goodman MD;  Location: [facility] OR;  Service: Orthopedics;  Laterality: Right;    CARPAL TUNNEL RELEASE Left 2019    Procedure: RELEASE, CARPAL TUNNEL- LEFT;  Surgeon: Pan Goodman MD;  Location: [facility] OR 2ND FLR;  Service: Orthopedics;  Laterality: Left;     SECTION      CHOLECYSTECTOMY      EPIDURAL STEROID INJECTION INTO LUMBAR SPINE N/A 2018    Procedure: INJECTION, STEROID, SPINE, LUMBAR, EPIDURAL;  Surgeon: Shaq Silverio MD;  Location: [facility] PAIN MGT;  Service: Pain Management;  Laterality: N/A;  LUMBAR L4-L5 INTERLAMINAR ELISE'  [address]  W/ SEDATION     HYSTERECTOMY      INJECTION OF ANESTHETIC AGENT AROUND NERVE Bilateral 2019    Procedure: BLOCK, NERVE, L3-L4-L5 MEDIAL BRANCH;  Surgeon: Shaq Silverio MD;  Location: [facility] PAIN MGT;  Service: Pain Management;  Laterality: Bilateral;    INJECTION OF ANESTHETIC AGENT AROUND NERVE Bilateral 10/17/2019    Procedure: BLOCK, NERVE;  Surgeon: Shaq Silverio MD;  Location: [facility] PAIN MGT;  Service: Pain Management;  Laterality: Bilateral;  B/L MBB L3-L4-L5  REPEAT  CONSENT NEEDED    INJECTION OF FACET JOINT Bilateral 2018    Procedure: INJECTION-FACET;  Surgeon: Shaq Silverio MD;  Location: [facility] PAIN MGT;  Service: Pain Management;  Laterality: Bilateral;  LUMBAR BILATERAL L4-L5 AND L5-S1 FACET STEROID INJECTION  [id number]    W/ SEDATION     RADIOFREQUENCY ABLATION Right 2019    Procedure: RADIOFREQUENCY ABLATION RIGHT L3, L4, L5;  Surgeon: Shaq Silverio MD;  Location: [facility] PAIN MGT;  Service: Pain Management;  " Laterality: Right;  Right RFA L3-L4-L5  1 of 2  Consent Needed    RADIOFREQUENCY ABLATION Left 12/5/2019    Procedure: RADIOFREQUENCY ABLATION LEFT L3-5;  Surgeon: Shaq Silverio MD;  Location: [facility] PAIN MGT;  Service: Pain Management;  Laterality: Left;  Left RFA L3-L4-L5  2 of 2  Consent Needed    TRIAL OF SPINAL CORD NERVE STIMULATOR N/A 9/24/2018    Procedure: TRIAL, NEUROSTIMULATOR, SPINAL CORD, SPINAL CORD STIMULATOR TRIAL-INTERNAL WIRES TO EXTERNAL BATTERY;  Surgeon: Shaq Silverio MD;  Location: [facility] PAIN MGT;  Service: Pain Management;  Laterality: N/A;  ABBOTT REP NOTIFIED     Review of patient's allergies indicates:   Allergen Reactions    Atorvastatin      Pain    Aspirin Other (See Comments)     Has been told not to take it due to bariatric surgery      No current facility-administered medications for this encounter.        PMHx, PSHx, Allergies, Medications reviewed in epic    ROS negative except pain complaints in HPI    OBJECTIVE:    /60 (BP Location: Right arm, Patient Position: Sitting)   Pulse 70   Temp 98.8 °F (37.1 °C) (Oral)   Resp 18   Ht 5' 4" (1.626 m)   Wt 90.3 kg (199 lb)   SpO2 100%   BMI 34.16 kg/m²     PHYSICAL EXAMINATION:    GENERAL: Well appearing, in no acute distress, alert and oriented x3.  PSYCH:  Mood and affect appropriate.  SKIN: Skin color, texture, turgor normal, no rashes or lesions which will impact the procedure.  CV: RRR with palpation of the radial artery.  PULM: No evidence of respiratory difficulty, symmetric chest rise. Clear to auscultation.  NEURO: Cranial nerves grossly intact.    Plan:    Proceed with procedure as planned Procedure(s) (LRB):  RADIOFREQUENCY ABLATION LEFT L3,4,5 1 of 2 (Left)    Shaq Sliverio  08/17/2020        "

## 2020-08-17 NOTE — H&P
HPI  Patient presenting for Procedure(s) (LRB):  RADIOFREQUENCY ABLATION LEFT L3,4,5 1 of 2 (Left)     Patient on Anti-coagulation No    No health changes since previous encounter    Past Medical History:   Diagnosis Date    Arthritis     Diabetes mellitus     Hypertension      Past Surgical History:   Procedure Laterality Date    CARPAL TUNNEL RELEASE Right 3/8/2019    Procedure: RELEASE, CARPAL TUNNEL right;  Surgeon: Pan Goodman MD;  Location: [facility] OR;  Service: Orthopedics;  Laterality: Right;    CARPAL TUNNEL RELEASE Left 2019    Procedure: RELEASE, CARPAL TUNNEL- LEFT;  Surgeon: Pan Goodman MD;  Location: [facility] OR 2ND FLR;  Service: Orthopedics;  Laterality: Left;     SECTION      CHOLECYSTECTOMY      EPIDURAL STEROID INJECTION INTO LUMBAR SPINE N/A 2018    Procedure: INJECTION, STEROID, SPINE, LUMBAR, EPIDURAL;  Surgeon: Shaq Silverio MD;  Location: [facility] PAIN MGT;  Service: Pain Management;  Laterality: N/A;  LUMBAR L4-L5 INTERLAMINAR ELISE'  [address]457  W/ SEDATION     HYSTERECTOMY      INJECTION OF ANESTHETIC AGENT AROUND NERVE Bilateral 2019    Procedure: BLOCK, NERVE, L3-L4-L5 MEDIAL BRANCH;  Surgeon: Shaq Silverio MD;  Location: [facility] PAIN MGT;  Service: Pain Management;  Laterality: Bilateral;    INJECTION OF ANESTHETIC AGENT AROUND NERVE Bilateral 10/17/2019    Procedure: BLOCK, NERVE;  Surgeon: Shaq Silverio MD;  Location: [facility] PAIN MGT;  Service: Pain Management;  Laterality: Bilateral;  B/L MBB L3-L4-L5  REPEAT  CONSENT NEEDED    INJECTION OF FACET JOINT Bilateral 2018    Procedure: INJECTION-FACET;  Surgeon: Shaq Silverio MD;  Location: [facility] PAIN MGT;  Service: Pain Management;  Laterality: Bilateral;  LUMBAR BILATERAL L4-L5 AND L5-S1 FACET STEROID INJECTION  [id number]    W/ SEDATION     RADIOFREQUENCY ABLATION Right 2019    Procedure: RADIOFREQUENCY ABLATION RIGHT L3, L4, L5;  Surgeon: Shaq Silverio MD;  Location: [facility] PAIN MGT;  Service: Pain Management;  " Laterality: Right;  Right RFA L3-L4-L5  1 of 2  Consent Needed    RADIOFREQUENCY ABLATION Left 12/5/2019    Procedure: RADIOFREQUENCY ABLATION LEFT L3-5;  Surgeon: Shaq Silverio MD;  Location: [facility] PAIN MGT;  Service: Pain Management;  Laterality: Left;  Left RFA L3-L4-L5  2 of 2  Consent Needed    TRIAL OF SPINAL CORD NERVE STIMULATOR N/A 9/24/2018    Procedure: TRIAL, NEUROSTIMULATOR, SPINAL CORD, SPINAL CORD STIMULATOR TRIAL-INTERNAL WIRES TO EXTERNAL BATTERY;  Surgeon: Shaq Silverio MD;  Location: [facility] PAIN MGT;  Service: Pain Management;  Laterality: N/A;  ABBOTT REP NOTIFIED     Review of patient's allergies indicates:   Allergen Reactions    Atorvastatin      Pain    Aspirin Other (See Comments)     Has been told not to take it due to bariatric surgery      No current facility-administered medications for this encounter.        PMHx, PSHx, Allergies, Medications reviewed in epic    ROS negative except pain complaints in HPI    OBJECTIVE:    /60 (BP Location: Right arm, Patient Position: Sitting)   Pulse 70   Temp 98.8 °F (37.1 °C) (Oral)   Resp 18   Ht 5' 4" (1.626 m)   Wt 90.3 kg (199 lb)   SpO2 100%   BMI 34.16 kg/m²     PHYSICAL EXAMINATION:    GENERAL: Well appearing, in no acute distress, alert and oriented x3.  PSYCH:  Mood and affect appropriate.  SKIN: Skin color, texture, turgor normal, no rashes or lesions which will impact the procedure.  CV: RRR with palpation of the radial artery.  PULM: No evidence of respiratory difficulty, symmetric chest rise. Clear to auscultation.  NEURO: Cranial nerves grossly intact.    Plan:    Proceed with procedure as planned Procedure(s) (LRB):  RADIOFREQUENCY ABLATION LEFT L3,4,5 1 of 2 (Left)    Shaq Silverio  08/17/2020        "

## 2020-08-17 NOTE — OP NOTE
LUMBAR Medial Branch Radiofrequency Ablation Under Fluoroscopy  Time-out taken to identify patient and procedure side prior to starting the procedure.   I attest that I have reviewed the patient's home medications prior to the procedure and no contraindication have been identified. I  re-evaluated the patient after the patient was positioned for the procedure in the procedure room immediately before the procedural time-out. The vital signs are current and represent the current state of the patient which has not significantly changed since the preprocedure assessment.                   Date of Service: 08/17/2020    PCP: Sea Her Jr, MD    Referring Physician:                                                PROCEDURE:  left L3, 4, & 5 medial branch radiofrequency ablation    REASON FOR PROCEDURE: Lumbar spondylosis [M47.816]  1. Spondylosis without myelopathy    2. Osteoarthritis of spine, unspecified spinal osteoarthritis complication status, unspecified spinal region      POSTPROCEDURE DIAGNOSIS:   Lumbar spondylosis [M47.816]    1. Spondylosis without myelopathy    2. Osteoarthritis of spine, unspecified spinal osteoarthritis complication status, unspecified spinal region           PHYSICIAN: Shaq Silverio MD  ASSISTANTS: Raul Quick M.D. [employer] Pain Fellow  Beltran Edgar MD  [employer] [facility]R      MEDICATIONS INJECTED:  0.5 ml of Kenalog 40mg/ml, and Bupivacaine 0.25% 10ml. Of that, 1.5-3ml injected per level before & after the ablation.    LOCAL ANESTHETIC USED: Xylocaine 1% 9ml with Sodium Bicarbonate 1ml.  3ml each site.  SEDATION MEDICATIONS: local/IV sedation: Versed 2 mg and fentanyl 25 mcg IV.  Conscious sedation ordered by MD.  Patient reevaluated and sedation administered by MD and monitored by RN.  Total sedation time was less than 20 min. (See nurse documentation and case log for sedation time)    ESTIMATED BLOOD LOSS:  None.    COMPLICATIONS:  None.    TECHNIQUE:   Laying in a prone position, the  patient was prepped and draped in the usual sterile fashion using ChloraPrep and fenestrated drape.  The level was determined under fluoroscopic guidance.  Local anesthetic was given by going down to the hub of the 27-gauge 1.25in needle and raising a wheel.  The 20-gauge needle was introduced to the anatomic local of the median branch at the lateral mass of all levels as stated above utilizing live fluoroscopy. Motor stimulation done to confirm no motor nerve ablation takes place up to 2 Volt 2Hz..  Sensory stimulation done to detect similarities in pain location 1.5 Volts 50Hz.. Medication was then injected slowly.  Ablation then done per level utilizing Interface21 radiofrequency generator 80°C for 90 seconds. The patient tolerated the procedure well.     PAIN BEFORE THE PROCEDURE:  8/10.    PAIN AFTER THE PROCEDURE: 0/10.    The patient was monitored after the procedure.  Patient was given post procedure and discharge instructions to follow at home.  We will see the patient back in two weeks or the patient may call to inform of status. The patient was discharged in a stable condition

## 2020-08-17 NOTE — TELEPHONE ENCOUNTER
----- Message from Martha Cash sent at 8/17/2020  8:53 AM CDT -----  Contact: CLAUDIA MARTINEZ [[id number]]  Type: Patient Call Back    Who called: CLAUDIA MARTINEZ [[id number]]    What is the request in detail: Patient is requesting a call back. She would like to know if she can have the person that is bringing her to her procedure appointment drop her off and pick her up. She states that the person do not want to sit in the waiting room. She also states that she did not get a arrival time for the procedure.   Please advise.    Can the clinic reply by [facility]? No    Best call back number: [phone]    Additional Information: N/A

## 2020-08-17 NOTE — DISCHARGE SUMMARY
Discharge Note  Short Stay      SUMMARY     Admit Date: 8/17/2020    Attending Physician: Shaq Silverio      Discharge Physician: Shaq Silverio      Discharge Date: 8/17/2020 3:20 PM    Procedure(s) (LRB):  RADIOFREQUENCY ABLATION LEFT L3,4,5 1 of 2 (Left)    Final Diagnosis: Lumbar spondylosis [M47.816]    Disposition: Home or self care    Patient Instructions:   Current Discharge Medication List      CONTINUE these medications which have NOT CHANGED    Details   albuterol (PROAIR HFA) 90 mcg/actuation inhaler Inhale 2 puffs into the lungs every 6 (six) hours as needed for Shortness of Breath. Rescue  Qty: 8.5 g, Refills: 1    Associated Diagnoses: Obstructive sleep apnea      atenoloL (TENORMIN) 100 MG tablet TAKE 1 TABLET BY MOUTH ONCE DAILY  Qty: 90 tablet, Refills: 3    Associated Diagnoses: Essential hypertension      blood-glucose meter kit Use as instructed  Qty: 1 each, Refills: 0    Associated Diagnoses: Type 2 diabetes mellitus without complication, without long-term current use of insulin      escitalopram oxalate (LEXAPRO) 20 MG tablet Take 1 tablet (20 mg total) by mouth once daily.  Qty: 90 tablet, Refills: 3    Associated Diagnoses: Mild recurrent major depression; Anxiety      furosemide (LASIX) 20 MG tablet Take 1 tablet by mouth once daily  Qty: 90 tablet, Refills: 0    Associated Diagnoses: Essential hypertension      glipiZIDE (GLUCOTROL) 10 MG tablet Take 1 tablet (10 mg total) by mouth 2 (two) times daily with meals.  Qty: 180 tablet, Refills: 3    Associated Diagnoses: Type 2 diabetes mellitus without complication, unspecified whether long term insulin use      ipratropium (ATROVENT) 42 mcg (0.06 %) nasal spray 2 sprays by Nasal route 4 (four) times daily.  Qty: 15 mL, Refills: 0    Associated Diagnoses: Acute bacterial sinusitis      loratadine (CLARITIN) 10 mg tablet TAKE 1 TABLET BY MOUTH ONCE DAILY AS NEEDED FOR ALLERGIES  Qty: 90 tablet, Refills: 3    Associated Diagnoses: Seasonal allergies       losartan (COZAAR) 50 MG tablet Take 1 tablet (50 mg total) by mouth once daily.  Qty: 90 tablet, Refills: 3    Associated Diagnoses: Essential hypertension      metFORMIN (GLUCOPHAGE) 1000 MG tablet Take 1 tablet (1,000 mg total) by mouth every evening.  Qty: 90 tablet, Refills: 3    Associated Diagnoses: Type 2 diabetes mellitus without complication, unspecified whether long term insulin use      pantoprazole (PROTONIX) 20 MG tablet TAKE 2 TABLETS BY MOUTH ONCE DAILY AS NEEDED (STOMACH  PAIN)  Qty: 180 tablet, Refills: 0    Associated Diagnoses: Gastroesophageal reflux disease without esophagitis      rosuvastatin (CRESTOR) 5 MG tablet Take 1 tablet (5 mg total) by mouth once daily.  Qty: 90 tablet, Refills: 3    Associated Diagnoses: Dyslipidemia      traZODone (DESYREL) 100 MG tablet Take 1 tablet (100 mg total) by mouth every evening.  Qty: 90 tablet, Refills: 1    Associated Diagnoses: Insomnia, unspecified type      !! zonisamide (ZONEGRAN) 100 MG Cap TAKE 4 CAPSULES BY MOUTH AT BEDTIME  Qty: 120 capsule, Refills: 2    Associated Diagnoses: Osteoarthritis of spine with radiculopathy, lumbar region      !! zonisamide (ZONEGRAN) 25 MG Cap Take 2 capsules (50 mg total) by mouth nightly.  Qty: 60 capsule, Refills: 11       !! - Potential duplicate medications found. Please discuss with provider.              Discharge Diagnosis: Lumbar spondylosis [M47.816]  Condition on Discharge: Stable with no complications to procedure   Diet on Discharge: Same as before.  Activity: as per instruction sheet.  Discharge to: Home with a responsible adult.  Follow up: 2-4 weeks       Please call my office or pager at [phone] if experienced any weakness or loss of sensation, fever > 101.5, pain uncontrolled with oral medications, persistent nausea/vomiting/or diarrhea, redness or drainage from the incisions, or any other worrisome concerns. If physician on call was not reached or could not communicate with our office for  any reason please go to the nearest emergency department

## 2020-08-21 ENCOUNTER — TELEPHONE (OUTPATIENT)
Dept: PAIN MEDICINE | Facility: CLINIC | Age: 70
End: 2020-08-21

## 2020-08-21 NOTE — TELEPHONE ENCOUNTER
Faby Luna was contacted in regard to a research survey questionnaire (IRB #[id number]) by myself. This is the 1st contact attempt in regard to this study. Prior to administration of the survey, the following consent elements were discussed and understood by the patient:    ? Purpose of the study   ? Study and questionnaire design  ? Confidentiality and HIPAA Authorization for Release of Medical Records  ? Risks, Benefits, Compensations, and Costs  ? Participation in the survey is voluntary and patient may withdraw at anytime  ? Contact information for questions regarding your rights    Patient verbalizes understanding of the above: Yes  Contact information for CRC and PI given to patient: Yes  Patient was able to adequately summarize the study and seems to have a generalizable understanding of the study: Yes  Survey questionnaire completed: Yes.

## 2020-08-31 ENCOUNTER — HOSPITAL ENCOUNTER (OUTPATIENT)
Facility: OTHER | Age: 70
Discharge: HOME | End: 2020-08-31
Attending: ANESTHESIOLOGY | Admitting: ANESTHESIOLOGY
Payer: MEDICARE

## 2020-08-31 VITALS
HEART RATE: 62 BPM | OXYGEN SATURATION: 95 % | DIASTOLIC BLOOD PRESSURE: 61 MMHG | HEIGHT: 64 IN | TEMPERATURE: 99 F | SYSTOLIC BLOOD PRESSURE: 129 MMHG | WEIGHT: 199 LBS | BODY MASS INDEX: 33.97 KG/M2 | RESPIRATION RATE: 16 BRPM

## 2020-08-31 DIAGNOSIS — G89.29 CHRONIC PAIN: ICD-10-CM

## 2020-08-31 DIAGNOSIS — M47.816 OSTEOARTHRITIS OF LUMBAR SPINE, UNSPECIFIED SPINAL OSTEOARTHRITIS COMPLICATION STATUS: Primary | ICD-10-CM

## 2020-08-31 DIAGNOSIS — M47.819 SPONDYLOSIS WITHOUT MYELOPATHY: ICD-10-CM

## 2020-08-31 LAB
POCT GLUCOSE: 144 MG/DL (ref 70–110)
POCT GLUCOSE: 47 MG/DL (ref 70–110)
POCT GLUCOSE: 49 MG/DL (ref 70–110)

## 2020-08-31 PROCEDURE — 64636 PR DESTROY L/S FACET JNT ADDL: ICD-10-PCS | Mod: HCNC,RT,, | Performed by: ANESTHESIOLOGY

## 2020-08-31 PROCEDURE — 64636 DESTROY L/S FACET JNT ADDL: CPT | Mod: HCNC,RT | Performed by: ANESTHESIOLOGY

## 2020-08-31 PROCEDURE — 25000003 PHARM REV CODE 250: Mod: HCNC | Performed by: STUDENT IN AN ORGANIZED HEALTH CARE EDUCATION/TRAINING PROGRAM

## 2020-08-31 PROCEDURE — 64635 PR DESTROY LUMB/SAC FACET JNT: ICD-10-PCS | Mod: HCNC,RT,, | Performed by: ANESTHESIOLOGY

## 2020-08-31 PROCEDURE — 99152 PR MOD CONSCIOUS SEDATION, SAME PHYS, 5+ YRS, FIRST 15 MIN: ICD-10-PCS | Mod: HCNC,,, | Performed by: ANESTHESIOLOGY

## 2020-08-31 PROCEDURE — 99152 MOD SED SAME PHYS/QHP 5/>YRS: CPT | Mod: HCNC,,, | Performed by: ANESTHESIOLOGY

## 2020-08-31 PROCEDURE — 64635 DESTROY LUMB/SAC FACET JNT: CPT | Mod: HCNC,RT,, | Performed by: ANESTHESIOLOGY

## 2020-08-31 PROCEDURE — 25000003 PHARM REV CODE 250: Mod: HCNC | Performed by: ANESTHESIOLOGY

## 2020-08-31 PROCEDURE — 99152 MOD SED SAME PHYS/QHP 5/>YRS: CPT | Mod: HCNC | Performed by: ANESTHESIOLOGY

## 2020-08-31 PROCEDURE — 63600175 PHARM REV CODE 636 W HCPCS: Mod: HCNC | Performed by: ANESTHESIOLOGY

## 2020-08-31 PROCEDURE — 64636 DESTROY L/S FACET JNT ADDL: CPT | Mod: HCNC,RT,, | Performed by: ANESTHESIOLOGY

## 2020-08-31 PROCEDURE — 64635 DESTROY LUMB/SAC FACET JNT: CPT | Mod: HCNC,RT | Performed by: ANESTHESIOLOGY

## 2020-08-31 RX ORDER — BUPIVACAINE HYDROCHLORIDE 2.5 MG/ML
INJECTION, SOLUTION EPIDURAL; INFILTRATION; INTRACAUDAL
Status: DISCONTINUED | OUTPATIENT
Start: 2020-08-31 | End: 2020-08-31 | Stop reason: HOSPADM

## 2020-08-31 RX ORDER — DEXTROSE 50 % IN WATER (D50W) INTRAVENOUS SYRINGE
25 ONCE
Status: COMPLETED | OUTPATIENT
Start: 2020-08-31 | End: 2020-08-31

## 2020-08-31 RX ORDER — TRIAMCINOLONE ACETONIDE 40 MG/ML
INJECTION, SUSPENSION INTRA-ARTICULAR; INTRAMUSCULAR
Status: DISCONTINUED | OUTPATIENT
Start: 2020-08-31 | End: 2020-08-31 | Stop reason: HOSPADM

## 2020-08-31 RX ORDER — LIDOCAINE HYDROCHLORIDE 10 MG/ML
INJECTION INFILTRATION; PERINEURAL
Status: DISCONTINUED | OUTPATIENT
Start: 2020-08-31 | End: 2020-08-31 | Stop reason: HOSPADM

## 2020-08-31 RX ORDER — SODIUM CHLORIDE 9 MG/ML
500 INJECTION, SOLUTION INTRAVENOUS CONTINUOUS
Status: DISCONTINUED | OUTPATIENT
Start: 2020-08-31 | End: 2020-08-31 | Stop reason: HOSPADM

## 2020-08-31 RX ORDER — FENTANYL CITRATE 50 UG/ML
INJECTION, SOLUTION INTRAMUSCULAR; INTRAVENOUS
Status: DISCONTINUED | OUTPATIENT
Start: 2020-08-31 | End: 2020-08-31 | Stop reason: HOSPADM

## 2020-08-31 RX ORDER — MIDAZOLAM HYDROCHLORIDE 1 MG/ML
INJECTION INTRAMUSCULAR; INTRAVENOUS
Status: DISCONTINUED | OUTPATIENT
Start: 2020-08-31 | End: 2020-08-31 | Stop reason: HOSPADM

## 2020-08-31 RX ADMIN — SODIUM CHLORIDE 500 ML: 0.9 INJECTION, SOLUTION INTRAVENOUS at 02:08

## 2020-08-31 RX ADMIN — DEXTROSE MONOHYDRATE 25 G: 25 INJECTION, SOLUTION INTRAVENOUS at 02:08

## 2020-08-31 NOTE — DISCHARGE SUMMARY
Discharge Note  Short Stay      SUMMARY     Admit Date: 8/31/2020    Attending Physician: Shaq Silverio      Discharge Physician: Shaq Silverio      Discharge Date: 8/31/2020 3:26 PM    Procedure(s) (LRB):  RADIOFREQUENCY ABLATION RIGHT L3,4,5 2 of 2 (Right)    Final Diagnosis: Lumbar spondylosis [M47.816]    Disposition: Home or self care    Patient Instructions:   Current Discharge Medication List      CONTINUE these medications which have NOT CHANGED    Details   albuterol (PROAIR HFA) 90 mcg/actuation inhaler Inhale 2 puffs into the lungs every 6 (six) hours as needed for Shortness of Breath. Rescue  Qty: 8.5 g, Refills: 1    Associated Diagnoses: Obstructive sleep apnea      atenoloL (TENORMIN) 100 MG tablet TAKE 1 TABLET BY MOUTH ONCE DAILY  Qty: 90 tablet, Refills: 3    Associated Diagnoses: Essential hypertension      blood-glucose meter kit Use as instructed  Qty: 1 each, Refills: 0    Associated Diagnoses: Type 2 diabetes mellitus without complication, without long-term current use of insulin      escitalopram oxalate (LEXAPRO) 20 MG tablet Take 1 tablet (20 mg total) by mouth once daily.  Qty: 90 tablet, Refills: 3    Associated Diagnoses: Mild recurrent major depression; Anxiety      furosemide (LASIX) 20 MG tablet Take 1 tablet by mouth once daily  Qty: 90 tablet, Refills: 0    Associated Diagnoses: Essential hypertension      glipiZIDE (GLUCOTROL) 10 MG tablet Take 1 tablet (10 mg total) by mouth 2 (two) times daily with meals.  Qty: 180 tablet, Refills: 3    Associated Diagnoses: Type 2 diabetes mellitus without complication, unspecified whether long term insulin use      ipratropium (ATROVENT) 42 mcg (0.06 %) nasal spray 2 sprays by Nasal route 4 (four) times daily.  Qty: 15 mL, Refills: 0    Associated Diagnoses: Acute bacterial sinusitis      loratadine (CLARITIN) 10 mg tablet TAKE 1 TABLET BY MOUTH ONCE DAILY AS NEEDED FOR ALLERGIES  Qty: 90 tablet, Refills: 3    Associated Diagnoses: Seasonal  allergies      losartan (COZAAR) 50 MG tablet Take 1 tablet (50 mg total) by mouth once daily.  Qty: 90 tablet, Refills: 3    Associated Diagnoses: Essential hypertension      metFORMIN (GLUCOPHAGE) 1000 MG tablet Take 1 tablet (1,000 mg total) by mouth every evening.  Qty: 90 tablet, Refills: 3    Associated Diagnoses: Type 2 diabetes mellitus without complication, unspecified whether long term insulin use      pantoprazole (PROTONIX) 20 MG tablet TAKE 2 TABLETS BY MOUTH ONCE DAILY AS NEEDED (STOMACH  PAIN)  Qty: 180 tablet, Refills: 0    Associated Diagnoses: Gastroesophageal reflux disease without esophagitis      rosuvastatin (CRESTOR) 5 MG tablet Take 1 tablet (5 mg total) by mouth once daily.  Qty: 90 tablet, Refills: 3    Associated Diagnoses: Dyslipidemia      traZODone (DESYREL) 100 MG tablet Take 1 tablet (100 mg total) by mouth every evening.  Qty: 90 tablet, Refills: 1    Associated Diagnoses: Insomnia, unspecified type      !! zonisamide (ZONEGRAN) 100 MG Cap TAKE 4 CAPSULES BY MOUTH AT BEDTIME  Qty: 120 capsule, Refills: 2    Associated Diagnoses: Osteoarthritis of spine with radiculopathy, lumbar region      !! zonisamide (ZONEGRAN) 25 MG Cap Take 2 capsules (50 mg total) by mouth nightly.  Qty: 60 capsule, Refills: 11       !! - Potential duplicate medications found. Please discuss with provider.          Discharge Diagnosis: Lumbar spondylosis [M47.816]  Condition on Discharge: Stable with no complications to procedure   Diet on Discharge: Same as before.  Activity: as per instruction sheet.  Discharge to: Home with a responsible adult.  Follow up: 2-4 weeks    Please call my office or pager at [phone] if experienced any weakness or loss of sensation, fever > 101.5, pain uncontrolled with oral medications, persistent nausea/vomiting/or diarrhea, redness or drainage from the incisions, or any other worrisome concerns. If physician on call was not reached or could not communicate with our office  for any reason please go to the nearest emergency department

## 2020-08-31 NOTE — PROGRESS NOTES
Pts fasting BS-47, retaken-49. Pt reports feeling hot as only symptom. Dr Silverio notifed. Received order to give pt Dextrose IVP and retake BS. Pt instructed to F/U with primary care regarding recent low blood sugars.

## 2020-08-31 NOTE — OP NOTE
LUMBAR Medial Branch Radiofrequency Ablation Under Fluoroscopy  Time-out taken to identify patient and procedure side prior to starting the procedure.   I attest that I have reviewed the patient's home medications prior to the procedure and no contraindication have been identified. I  re-evaluated the patient after the patient was positioned for the procedure in the procedure room immediately before the procedural time-out. The vital signs are current and represent the current state of the patient which has not significantly changed since the preprocedure assessment.                   Date of Service: 08/31/2020    PCP: Sea Her Jr, MD    Referring Physician:                                              PROCEDURE:  right L3, 4, 5 medial branch radiofrequency ablation    REASON FOR PROCEDURE: Lumbar spondylosis [M47.816]  1. Osteoarthritis of lumbar spine, unspecified spinal osteoarthritis complication status    2. Spondylosis without myelopathy    3. Chronic pain      POSTPROCEDURE DIAGNOSIS:   Lumbar spondylosis [M47.816]    1. Osteoarthritis of lumbar spine, unspecified spinal osteoarthritis complication status    2. Spondylosis without myelopathy    3. Chronic pain           PHYSICIAN: Shaq Silverio MD  ASSISTANTS: Dania Rivera DO ([facility])    MEDICATIONS INJECTED:  0.5 ml of Kenalog 40mg/ml, and Bupivacaine 0.25% 10ml. Of that, 1.5-3ml injected per level before & after the ablation.    LOCAL ANESTHETIC USED: Xylocaine 1% 9ml with Sodium Bicarbonate 1ml.  3ml each site.  SEDATION MEDICATIONS: local/IV sedation: Versed 2 mg and fentanyl 50 mcg IV.  Conscious sedation ordered by MD.  Patient reevaluated and sedation administered by MD and monitored by RN.  Total sedation time was less than 20 min. (See nurse documentation and case log for sedation time)    ESTIMATED BLOOD LOSS:  None.    COMPLICATIONS:  None.    TECHNIQUE:   Laying in a prone position, the patient was prepped and draped in the usual  sterile fashion using ChloraPrep and fenestrated drape.  The level was determined under fluoroscopic guidance.  Local anesthetic was given by going down to the hub of the 27-gauge 1.25in needle and raising a wheel.  The 20-gauge needle was introduced to the anatomic local of the median branch at the lateral mass of all levels as stated above utilizing live fluoroscopy. Motor stimulation done to confirm no motor nerve ablation takes place up to 2 Volt 2Hz..  Sensory stimulation done to detect similarities in pain location 1.5 Volts 50Hz.. Medication was then injected slowly.  Ablation then done per level utilizing [employer] radiofrequency generator 80°C for 90 seconds. The patient tolerated the procedure well.     PAIN BEFORE THE PROCEDURE:  5/10.    PAIN AFTER THE PROCEDURE: 3/10.    The patient was monitored after the procedure.  Patient was given post procedure and discharge instructions to follow at home.  We will see the patient back in two weeks or the patient may call to inform of status. The patient was discharged in a stable condition

## 2020-08-31 NOTE — DISCHARGE SUMMARY
Discharge Note  Short Stay      SUMMARY     Admit Date: 8/31/2020    Attending Physician: Shaq Silverio      Discharge Physician: Shaq Silverio      Discharge Date: 8/31/2020 3:26 PM    Procedure(s) (LRB):  RADIOFREQUENCY ABLATION RIGHT L3,4,5 2 of 2 (Right)    Final Diagnosis: Lumbar spondylosis [M47.816]    Disposition: Home or self care    Patient Instructions:   Current Discharge Medication List      CONTINUE these medications which have NOT CHANGED    Details   albuterol (PROAIR HFA) 90 mcg/actuation inhaler Inhale 2 puffs into the lungs every 6 (six) hours as needed for Shortness of Breath. Rescue  Qty: 8.5 g, Refills: 1    Associated Diagnoses: Obstructive sleep apnea      atenoloL (TENORMIN) 100 MG tablet TAKE 1 TABLET BY MOUTH ONCE DAILY  Qty: 90 tablet, Refills: 3    Associated Diagnoses: Essential hypertension      blood-glucose meter kit Use as instructed  Qty: 1 each, Refills: 0    Associated Diagnoses: Type 2 diabetes mellitus without complication, without long-term current use of insulin      escitalopram oxalate (LEXAPRO) 20 MG tablet Take 1 tablet (20 mg total) by mouth once daily.  Qty: 90 tablet, Refills: 3    Associated Diagnoses: Mild recurrent major depression; Anxiety      furosemide (LASIX) 20 MG tablet Take 1 tablet by mouth once daily  Qty: 90 tablet, Refills: 0    Associated Diagnoses: Essential hypertension      glipiZIDE (GLUCOTROL) 10 MG tablet Take 1 tablet (10 mg total) by mouth 2 (two) times daily with meals.  Qty: 180 tablet, Refills: 3    Associated Diagnoses: Type 2 diabetes mellitus without complication, unspecified whether long term insulin use      ipratropium (ATROVENT) 42 mcg (0.06 %) nasal spray 2 sprays by Nasal route 4 (four) times daily.  Qty: 15 mL, Refills: 0    Associated Diagnoses: Acute bacterial sinusitis      loratadine (CLARITIN) 10 mg tablet TAKE 1 TABLET BY MOUTH ONCE DAILY AS NEEDED FOR ALLERGIES  Qty: 90 tablet, Refills: 3    Associated Diagnoses: Seasonal  allergies      losartan (COZAAR) 50 MG tablet Take 1 tablet (50 mg total) by mouth once daily.  Qty: 90 tablet, Refills: 3    Associated Diagnoses: Essential hypertension      metFORMIN (GLUCOPHAGE) 1000 MG tablet Take 1 tablet (1,000 mg total) by mouth every evening.  Qty: 90 tablet, Refills: 3    Associated Diagnoses: Type 2 diabetes mellitus without complication, unspecified whether long term insulin use      pantoprazole (PROTONIX) 20 MG tablet TAKE 2 TABLETS BY MOUTH ONCE DAILY AS NEEDED (STOMACH  PAIN)  Qty: 180 tablet, Refills: 0    Associated Diagnoses: Gastroesophageal reflux disease without esophagitis      rosuvastatin (CRESTOR) 5 MG tablet Take 1 tablet (5 mg total) by mouth once daily.  Qty: 90 tablet, Refills: 3    Associated Diagnoses: Dyslipidemia      traZODone (DESYREL) 100 MG tablet Take 1 tablet (100 mg total) by mouth every evening.  Qty: 90 tablet, Refills: 1    Associated Diagnoses: Insomnia, unspecified type      !! zonisamide (ZONEGRAN) 100 MG Cap TAKE 4 CAPSULES BY MOUTH AT BEDTIME  Qty: 120 capsule, Refills: 2    Associated Diagnoses: Osteoarthritis of spine with radiculopathy, lumbar region      !! zonisamide (ZONEGRAN) 25 MG Cap Take 2 capsules (50 mg total) by mouth nightly.  Qty: 60 capsule, Refills: 11       !! - Potential duplicate medications found. Please discuss with provider.              Discharge Diagnosis: Lumbar spondylosis [M47.816]  Condition on Discharge: Stable with no complications to procedure   Diet on Discharge: Same as before.  Activity: as per instruction sheet.  Discharge to: Home with a responsible adult.  Follow up: 2-4 weeks       Please call my office or pager at [phone] if experienced any weakness or loss of sensation, fever > 101.5, pain uncontrolled with oral medications, persistent nausea/vomiting/or diarrhea, redness or drainage from the incisions, or any other worrisome concerns. If physician on call was not reached or could not communicate with our  office for any reason please go to the nearest emergency department

## 2020-08-31 NOTE — DISCHARGE INSTRUCTIONS
Thank you for allowing us to care for you today. You may receive a survey about the care we provided. Your feedback is valuable and helps us provide excellent care throughout the community.     Home Care Instructions for Pain Management:    1. DIET:   You may resume your normal diet today.   2. BATHING:   You may shower with luke warm water. No tub baths or anything that will soak injection sites under water for the next 24 hours.  3. DRESSING:   You may remove your bandage today.   4. ACTIVITY LEVEL:   You may resume your normal activities 24 hrs after your procedure. Nothing strenuous today.  5. MEDICATIONS:   You may resume your normal medications today. To restart blood thinners, ask your doctor.  6. DRIVING    If you have received any sedatives by mouth today, you may not drive for 12 hours.    If you have received any sedation through your IV, you may not drive for 24 hrs.   7. SPECIAL INSTRUCTIONS:   No heat to the injection site for 24 hrs including, hot bath or shower, heating pad, moist heat, or hot tubs.    Use ice pack to injection site for any pain or discomfort.  Apply ice packs for 20 minute intervals as needed.    IF you have diabetes, be sure to monitor your blood sugar more closely. IF your injection contained steroids your blood sugar levels may become higher than normal.    If you are still having pain upon discharge:  Your pain may improve over the next 48 hours. The anesthetic (numbing medication) works immediately to 48 hours. IF your injection contained a steroid (anti-inflammatory medication), it takes approximately 3 days to start feeling relief and 7-10 days to see your greatest results from the medication. It is possible you may need subsequent injections. This would be discussed at your follow up appointment with pain management or your referring doctor.    Please call the PAIN MANAGEMENT office at [phone] or ON CALL pager at [phone] if you experienced any:   -Weakness or  loss of sensation  -Fever > 101.5  -Pain uncontrolled with oral medications   -Persistent nausea, vomiting, or diarrhea  -Redness or drainage from the injection sites, or any other worrisome concerns.   If physician on call was not reached or could not communicate with our office for any reason please go to the nearest emergency department.    Recovery After Procedural Sedation (Adult)   You have been given medicine by vein to make you sleep during your surgery. This may have included both a pain medicine and sleeping medicine. Most of the effects have worn off. But you may still have some drowsiness for the next 6 to 8 hours.  Home care  Follow these guidelines when you get home:  · For the next 8 hours, you should be watched by a responsible adult. This person should make sure your condition is not getting worse.  · Don't drink any alcohol for the next 24 hours.  · Don't drive, operate dangerous machinery, or make important business or personal decisions during the next 24 hours.  · To prevent injury or falls, use caution when standing and walking for at least 24 hours after your procedure.  Note: Your healthcare provider may tell you not to take any medicine by mouth for pain or sleep in the next 4 hours. These medicines may react with the medicines you were given in the hospital. This could cause a much stronger response than usual.  Follow-up care  Follow up with your healthcare provider if you are not alert and back to your usual level of activity within 12 hours.  When to seek medical advice  Call your healthcare provider right away if any of these occur:  · Drowsiness gets worse  · Weakness or dizziness gets worse  · Repeated vomiting  · You can't be awakened  · Fever  · New rash  mAPPn last reviewed this educational content on 9/1/2019 © 2000-2020 [employer]. [address] [place] [address], [place] [address]. All rights reserved. This information is not intended as a substitute for professional  medical care. Always follow your healthcare professional's instructions.

## 2020-09-09 ENCOUNTER — TELEPHONE (OUTPATIENT)
Dept: FAMILY MEDICINE | Facility: CLINIC | Age: 70
End: 2020-09-09

## 2020-09-09 ENCOUNTER — LAB VISIT (OUTPATIENT)
Dept: FAMILY MEDICINE | Facility: CLINIC | Age: 70
End: 2020-09-09
Payer: MEDICARE

## 2020-09-09 DIAGNOSIS — Z01.818 PREPROCEDURAL EXAMINATION: Primary | ICD-10-CM

## 2020-09-09 DIAGNOSIS — Z01.818 PREPROCEDURAL EXAMINATION: ICD-10-CM

## 2020-09-09 PROCEDURE — U0003 INFECTIOUS AGENT DETECTION BY NUCLEIC ACID (DNA OR RNA); SEVERE ACUTE RESPIRATORY SYNDROME CORONAVIRUS 2 (SARS-COV-2) (CORONAVIRUS DISEASE [COVID-19]), AMPLIFIED PROBE TECHNIQUE, MAKING USE OF HIGH THROUGHPUT TECHNOLOGIES AS DESCRIBED BY CMS-2020-01-R: HCPCS | Mod: HCNC

## 2020-09-09 NOTE — TELEPHONE ENCOUNTER
----- Message from Natty Loyola MA sent at 9/9/2020 12:58 PM CDT -----  Regarding: FW: pt  Pt requesting a covid test before sleep study on Saturday   ----- Message -----  From: Cristal Cook  Sent: 9/9/2020  11:48 AM CDT  To: [facility]se[facility] Staff  Subject: pt                                               Type: Patient Call Back    Who called:pt    What is the request in detail:pt requesting covid test to be done today. She has a sleep study on Saturday and requires her to get test. Call pt    Can the clinic reply by [facility]?    Would the patient rather a call back or a response via [facility]? call    Best call back number: [phone] (home)         Additional Information:

## 2020-09-10 ENCOUNTER — TELEPHONE (OUTPATIENT)
Dept: FAMILY MEDICINE | Facility: CLINIC | Age: 70
End: 2020-09-10

## 2020-09-10 LAB — SARS-COV-2 RNA RESP QL NAA+PROBE: NOT DETECTED

## 2020-09-12 ENCOUNTER — HOSPITAL ENCOUNTER (OUTPATIENT)
Dept: SLEEP MEDICINE | Facility: HOSPITAL | Age: 70
Discharge: HOME | End: 2020-09-12
Attending: INTERNAL MEDICINE
Payer: MEDICARE

## 2020-09-12 DIAGNOSIS — F43.23 ADJUSTMENT REACTION WITH ANXIETY AND DEPRESSION: ICD-10-CM

## 2020-09-12 DIAGNOSIS — E11.9 TYPE 2 DIABETES MELLITUS WITHOUT COMPLICATION, WITHOUT LONG-TERM CURRENT USE OF INSULIN: ICD-10-CM

## 2020-09-12 DIAGNOSIS — G47.33 OSA (OBSTRUCTIVE SLEEP APNEA): ICD-10-CM

## 2020-09-12 DIAGNOSIS — I10 ESSENTIAL HYPERTENSION: ICD-10-CM

## 2020-09-12 PROCEDURE — 95810 PR POLYSOMNOGRAPHY, 4 OR MORE: ICD-10-PCS | Mod: 26,HCNC,, | Performed by: INTERNAL MEDICINE

## 2020-09-12 PROCEDURE — 95810 POLYSOM 6/> YRS 4/> PARAM: CPT | Mod: HCNC

## 2020-09-12 PROCEDURE — 95810 POLYSOM 6/> YRS 4/> PARAM: CPT | Mod: 26,HCNC,, | Performed by: INTERNAL MEDICINE

## 2020-09-13 NOTE — PROGRESS NOTES
A PSG was performed on Faby Luna. The entire procedure was explained, including Bio calibration procedure, patient was informed that there may be a need to enter the room during the night to fix lead or make adjustments to the equipment. Questions were answered prior to start of study.She was given instructions on how to call out for help including how to use the nurse call unit in the bathroom. Patient was given [employer] phone number to call if patient needed tech for anything, in case tech was out of tech room.  Patient education was performed. This includes the possible use of CPAP machine and different CPAP masks. She was given the after visit summary.   She did not meet criteria  EKG: The EKG appeared to be NSR  Technical difficulties during the study: Fixing leads and adjusting belts throuhout the study.  Soft Snoring was observed. Involuntary eye movements was observed doing the study.Applying lead to patients scalp was challenging due to patient having hair extentions.Patient usually takes a sleep aid before bed but forgot it.

## 2020-09-13 NOTE — PATIENT INSTRUCTIONS
Your sleep study will be scored and interpreted by one of our physicians who are board certified in sleep medicine.  Within two weeks the results will be sent to the physician who referred you. Your physician should then contact you to go over the results, along with any recommendations. If you do not hear from your physician within two weeks, please call them

## 2020-09-17 NOTE — PROCEDURES
"Diagnostic Report  [facility] [address], [place] [address]  Phone: [phone]  Fax: [phone]       Patient Name: Faby Luna Study Date: 9/12/2020   YOB: 1950 Patient MRN: [id number]   Age:  69 year Hospital #: [id number]   Sex: Female Referring Physician: Kimberly Stewart MD   Height: 5' 4" Recording Tech: Shelly Berrios LPSGT   Weight: 199.0 lbs Scoring Tech: MAURICE Conley RRT, RPSGT   BMI:  34.4 AASM:  1B   AHI: 8.3 Interpreting Physician:       RERA index: 3.0 Low oxygen sat: 81.0%   RDI: 11.3     Sleep architecture: This is a baseline polysomnogram. At lights out, the patient fell asleep in 15.7 minutes and slept for 73.2% of the time. Total sleep time (TST) was 281.0 minutes. 6.2% of TST was in Stage N1 sleep, 0.0% TST in slow wave sleep, and 15.5% TST in REM sleep. The REM latency was 168.5 minutes.     Respiratory: Mild snoring was present. The overall AHI was 8.3 with an oxygen tiffany of 81.0%. The AHI in REM sleep was 16.6. The central apnea index was 0.0.  The supine AHI was 5.6.  The patient did not qualify for a split night study due to an insufficient number of events in the first half of the study.     Motor movement / Parasomnia: There were no significant limb movements of sleep noted. The total limb movement index was - (- with arousal).     Cardiac: Cardiac rhythm monitoring revealed a sinus rhythm. with occasional PAC's and PVC's.    IMPRESSION:  1. Obstructive Sleep Apnea (G47.33),  moderate based on AHI criteria.    RECOMMENDATION:  1. CPAP is recommended.  2. An oral appliance is alternative treatment option  3. Patient has follow up with Sleep Medicine                                          Study Overview    First Lights Off: 10:56:20 PM  COUNT INDEX   Last Lights On: 05:20:21 AM Awakenings: 14 3.0   Time in Bed: 384.0 Arousals: 41 8.8   Total Sleep Time: 281.0 Apneas & Hypopneas: 39 8.3   Sleep Efficiency: 73.2% Limb Movements: - - "   Sleep Period Time: 372.0 Snores: - -   Sleep Maintenance Efficiency: 75.5% Desaturations: 40 8.5   Sleep Latency: 15.7      REM Latency from Sleep Onset: 168.5 Minimum Oxygen Saturation during Desaturation:  83.0%      SAO2 Range(%) Time in range (min) Time in range (%)   0.0 - 88.0 0.9 0.3%       Sleep Architecture                   % of Time in Bed  Stages TIME (mins) % SLEEP TIME   WAKE 103.5    Stage N1 17.5 6.2%   Stage N2 220.0 78.3%   Stage N3 - 0.0%   REM 43.5 15.5%     Arousal Summary     NREM REM Total Sleep Time   Apnea & Hypopnea Arousals 8 - 8   PLM Arousals - - -   Isolated Limb Movement Arousals - - -   Spontaneous Arousals 8 - 8   RERAs  13 1 14   Total 39 2 41   Arousal Index 9.9 2.8 8.8     Respiratory Summary     By Sleep Stage By Body Position TOTAL    NREM REM SUPINE NON-SUPINE    Time (min) 237.5 43.5 74.5 206.5 281.0           Hypopneas   20     11 4 27 31   Obstructive Apnea 7 1 3 5 8   Mixed Apnea - - - - -   Central Apnea - - - - -   Central Apnea Index     -     Total Apneas 7 1 3 5 8   Total Apnea Index 1.8 1.4 2.4 1.5 1.7           AHI 6.8 16.6 5.6 9.3 8.3           RERAs 13 1 6 8 14   RERA Index 3.3 1.4 4.8 2.3 3.0           Respiratory Event Durations     Apnea Hypopnea    NREM REM NREM REM   Average (seconds) 16.8 21.1 17.7 18.1   Maximum (seconds) 19.7 21.1 34.1 22.9     Table of EtCO2 by Distribution    Range(mmHg) Time in range (min) Time in range (%) Time in or below range (min) Time in or below range (%)   0.0 - 20.0 - - - -   20.0 - 40.0 - - - -   Excluded data <20.0 & >65.0 384.5 100.0% - -         Oxygen Saturation Summary     WAKE NREM REM   Average OSat (%) 95.6% 94.3% 96.1%   Minimum OSat (%) 81.0% 82.0% 86.0%   Maximum OSat (%) 99.0% 99.0% 99.0%                            Oxygen Saturation Distribution    Range(%) Time in range (min) Time in range (%)    90.0 - 100.0 352.7 93.7%   80.0 - 90.0 2.7 0.7%   70.0 - 80.0 - -   60.0 - 70.0 - -   50.0 - 60.0 - -   0.0 - 50.0 - -                 Time Spent Less than 88% OSat    Range(%) Time in range (min) Time in range (%)   0.0 - 88.0 0.9 0.3%     # of Desaturations 40   Minimum Oxygen Saturation During Desaturation 83.0%      SUPINE LEFT RIGHT PRONE   Minimum Oxygen Saturation During Desaturation by BP 89.0% - 83.0% -     Limb Movement Summary      COUNT INDEX   Isolated Limb Movements - -   Periodic Limb Movements (PLMs) - -   Total Limb Movements - -     Cardiac Summary     WAKE NREM REM Sleep TOTAL    Average Pulse Rate (BPM) 70.9 64.3 70.3 65.3 66.7   Minimum Pulse Rate (BPM) 25.0 54.0 61.0 54.0 25.0   Maximum Pulse Rate (BPM) 95.0 90.0 85.0 90.0 95.0     Pulse Rate Distribution    Range(bpm) Time in range (min) Time in range (%)   0.0 - 40.0 0.1 0.0%   40.0 - 60.0 17.6 4.7%   60.0 - 80.0 345.6 91.8%   80.0 - 100.0 13.0 3.5%   100.0 - 120.0 - -   120.0 - 140.0 - -   140.0 - 200.0 - -

## 2020-09-18 ENCOUNTER — TELEPHONE (OUTPATIENT)
Dept: PAIN MEDICINE | Facility: CLINIC | Age: 70
End: 2020-09-18

## 2020-09-18 NOTE — TELEPHONE ENCOUNTER
"This message is for patient in regards to his/her appointment 09/21/20 at 1:40p       Staff informed patient of the policy in place by [facility] for the safety of all patients and staff members.   Staff also informed patient that all visitors will not be allowed to accompany patient during their appointment with provider ZAY Magana and any visitors will have to remain inside his/her vehicle until patient appointment is over and patient must wear a face mask the whole time here at [facility].    Upon arriving patient must contact clinic at this number [phone] to notify staff that they have arrive, Staff will give the patient the "okay" to come up or wait inside their vehicle until clinic is ready for the next patient to enter inside the building.       Pt verbalized understanding and confirmed appt  "

## 2020-09-24 ENCOUNTER — TELEPHONE (OUTPATIENT)
Dept: PAIN MEDICINE | Facility: CLINIC | Age: 70
End: 2020-09-24

## 2020-09-24 ENCOUNTER — PATIENT OUTREACH (OUTPATIENT)
Dept: ADMINISTRATIVE | Facility: OTHER | Age: 70
End: 2020-09-24

## 2020-09-24 NOTE — TELEPHONE ENCOUNTER
Staff called to confirm 09/28/20 1:40 pm in office visit with Renetta Steele Np. Patient informed of instructions.     Patient confirmed and verbalized understanding and expressed thanks.

## 2020-09-25 NOTE — PROGRESS NOTES
Health Maintenance Due   Topic Date Due    Shingles Vaccine (1 of 2) 10/14/1999    Mammogram  06/24/2020    Influenza Vaccine (1) 08/01/2020     Updates were requested from care everywhere.  Chart was reviewed for overdue Proactive [facility] Encounters (TESSY) topics (CRS, Breast Cancer Screening, Eye exam)  Health Maintenance has been updated.  LINKS immunization registry triggered.  Immunizations were reconciled.

## 2020-09-28 ENCOUNTER — OFFICE VISIT (OUTPATIENT)
Dept: PAIN MEDICINE | Facility: CLINIC | Age: 70
End: 2020-09-28
Payer: MEDICARE

## 2020-09-28 VITALS
SYSTOLIC BLOOD PRESSURE: 154 MMHG | OXYGEN SATURATION: 100 % | DIASTOLIC BLOOD PRESSURE: 78 MMHG | BODY MASS INDEX: 34.16 KG/M2 | HEART RATE: 73 BPM | RESPIRATION RATE: 18 BRPM | HEIGHT: 64 IN

## 2020-09-28 DIAGNOSIS — M47.819 SPONDYLOSIS WITHOUT MYELOPATHY: ICD-10-CM

## 2020-09-28 DIAGNOSIS — M47.816 OSTEOARTHRITIS OF LUMBAR SPINE, UNSPECIFIED SPINAL OSTEOARTHRITIS COMPLICATION STATUS: Primary | ICD-10-CM

## 2020-09-28 DIAGNOSIS — M54.16 LUMBAR RADICULOPATHY: ICD-10-CM

## 2020-09-28 DIAGNOSIS — M79.18 MYOFASCIAL PAIN: ICD-10-CM

## 2020-09-28 DIAGNOSIS — M47.26 OSTEOARTHRITIS OF SPINE WITH RADICULOPATHY, LUMBAR REGION: ICD-10-CM

## 2020-09-28 PROCEDURE — 20553 NJX 1/MLT TRIGGER POINTS 3/>: CPT | Mod: HCNC,S$GLB,, | Performed by: NURSE PRACTITIONER

## 2020-09-28 PROCEDURE — 3078F DIAST BP <80 MM HG: CPT | Mod: HCNC,CPTII,S$GLB, | Performed by: NURSE PRACTITIONER

## 2020-09-28 PROCEDURE — 20553 PR INJECT TRIGGER POINTS, > 3: ICD-10-PCS | Mod: HCNC,S$GLB,, | Performed by: NURSE PRACTITIONER

## 2020-09-28 PROCEDURE — 3008F PR BODY MASS INDEX (BMI) DOCUMENTED: ICD-10-PCS | Mod: HCNC,CPTII,S$GLB, | Performed by: NURSE PRACTITIONER

## 2020-09-28 PROCEDURE — 1125F PR PAIN SEVERITY QUANTIFIED, PAIN PRESENT: ICD-10-PCS | Mod: HCNC,S$GLB,, | Performed by: NURSE PRACTITIONER

## 2020-09-28 PROCEDURE — 1101F PT FALLS ASSESS-DOCD LE1/YR: CPT | Mod: HCNC,CPTII,S$GLB, | Performed by: NURSE PRACTITIONER

## 2020-09-28 PROCEDURE — 3077F SYST BP >= 140 MM HG: CPT | Mod: HCNC,CPTII,S$GLB, | Performed by: NURSE PRACTITIONER

## 2020-09-28 PROCEDURE — 1159F MED LIST DOCD IN RCRD: CPT | Mod: HCNC,S$GLB,, | Performed by: NURSE PRACTITIONER

## 2020-09-28 PROCEDURE — 3077F PR MOST RECENT SYSTOLIC BLOOD PRESSURE >= 140 MM HG: ICD-10-PCS | Mod: HCNC,CPTII,S$GLB, | Performed by: NURSE PRACTITIONER

## 2020-09-28 PROCEDURE — 3008F BODY MASS INDEX DOCD: CPT | Mod: HCNC,CPTII,S$GLB, | Performed by: NURSE PRACTITIONER

## 2020-09-28 PROCEDURE — 99213 PR OFFICE/OUTPT VISIT, EST, LEVL III, 20-29 MIN: ICD-10-PCS | Mod: 25,HCNC,S$GLB, | Performed by: NURSE PRACTITIONER

## 2020-09-28 PROCEDURE — 99999 PR PBB SHADOW E&M-EST. PATIENT-LVL IV: CPT | Mod: PBBFAC,HCNC,, | Performed by: NURSE PRACTITIONER

## 2020-09-28 PROCEDURE — 1125F AMNT PAIN NOTED PAIN PRSNT: CPT | Mod: HCNC,S$GLB,, | Performed by: NURSE PRACTITIONER

## 2020-09-28 PROCEDURE — 3078F PR MOST RECENT DIASTOLIC BLOOD PRESSURE < 80 MM HG: ICD-10-PCS | Mod: HCNC,CPTII,S$GLB, | Performed by: NURSE PRACTITIONER

## 2020-09-28 PROCEDURE — 99999 PR PBB SHADOW E&M-EST. PATIENT-LVL IV: ICD-10-PCS | Mod: PBBFAC,HCNC,, | Performed by: NURSE PRACTITIONER

## 2020-09-28 PROCEDURE — 99213 OFFICE O/P EST LOW 20 MIN: CPT | Mod: 25,HCNC,S$GLB, | Performed by: NURSE PRACTITIONER

## 2020-09-28 PROCEDURE — 1159F PR MEDICATION LIST DOCUMENTED IN MEDICAL RECORD: ICD-10-PCS | Mod: HCNC,S$GLB,, | Performed by: NURSE PRACTITIONER

## 2020-09-28 PROCEDURE — 1101F PR PT FALLS ASSESS DOC 0-1 FALLS W/OUT INJ PAST YR: ICD-10-PCS | Mod: HCNC,CPTII,S$GLB, | Performed by: NURSE PRACTITIONER

## 2020-09-28 RX ORDER — ZONISAMIDE 100 MG/1
CAPSULE ORAL
Qty: 120 CAPSULE | Refills: 2 | Status: SHIPPED | OUTPATIENT
Start: 2020-09-28 | End: 2021-01-22

## 2020-09-28 RX ORDER — BUPIVACAINE HYDROCHLORIDE 2.5 MG/ML
9 INJECTION, SOLUTION EPIDURAL; INFILTRATION; INTRACAUDAL
Status: COMPLETED | OUTPATIENT
Start: 2020-09-28 | End: 2020-09-28

## 2020-09-28 RX ORDER — METHYLPREDNISOLONE ACETATE 40 MG/ML
40 INJECTION, SUSPENSION INTRA-ARTICULAR; INTRALESIONAL; INTRAMUSCULAR; SOFT TISSUE
Status: COMPLETED | OUTPATIENT
Start: 2020-09-28 | End: 2020-09-28

## 2020-09-28 RX ADMIN — BUPIVACAINE HYDROCHLORIDE 22.5 MG: 2.5 INJECTION, SOLUTION EPIDURAL; INFILTRATION; INTRACAUDAL at 02:09

## 2020-09-28 RX ADMIN — METHYLPREDNISOLONE ACETATE 40 MG: 40 INJECTION, SUSPENSION INTRA-ARTICULAR; INTRALESIONAL; INTRAMUSCULAR; SOFT TISSUE at 02:09

## 2020-09-28 NOTE — PROGRESS NOTES
Chronic patient Established Note (Follow up visit)      SUBJECTIVE:    Interval History 9/28/2020:  The patient is here today for increased lower back pain.  She is status post right than left L3, 4, 5 radiofrequency ablation completed on 08/31/2020 with approximately 50% relief.  However, she is feeling tightness across the lower back without radiation at this time.  She would like an injection today.  Additionally, she states that she has not completed physical therapy for her back in some time and is not currently doing any exercises.  Her leg pain is currently well controlled with spinal cord stimulator and she had a recent adjustment.  Her pain today is 8/10.    Interval History 7/16/2020:  The patient is here for follow up of lower back pain.  She previously had benefit with lumbar RFAs for similar pain.  She is also having radiation into her legs with numbness, left greater than right.  Ildefonso is here today to meet with her for programming.  She previously was having significant benefit of leg pain with SCS implant.  She denies any recent trauma or falls. Her pain today is 9/10.    Interval History 1/9/2020:  The patient is here for follow up of lower back and leg pain.  She is s/p lumbar RFAs with about 50% relief.  Her leg pain is well controlled with implant.  She still has intermittent flare ups of back pain, like today.  When this happens, she has some benefit with rest.  She has tried Tylenol and OTC NSAIDs without benefit.  She denies any recent falls or weakness.  The patient denies any bowel or bladder incontinence or signs of saddle paresthesia.  The patient denies any major medical changes since last office visit.  Her pain today is 7/10.    Previous Encounter:  Faby Luna presents to the clinic for a follow-up appointment for lower back pain.  She previously had significant leg pain which has resolved with [employer] SCS implant.  She is here today to discuss increased lower back pain.  It  is sharp and throbbing in nature.  It is significant in the morning and with prolonged standing and activity.  She has some benefit with stretching.  She denies any recent falls.  Since the last visit, Faby Luna states the pain has been worsening.  Current pain intensity is 8/10.    Pain Disability Index Review:  Last 3 PDI Scores 9/28/2020 7/16/2020 1/9/2020   Pain Disability Index (PDI) 5 63 40       Opioid Contract: no     report:  Not applicable    Pain Procedures:   5/2/18 Left L3 and L4 TF ELISE- 20% relief  5/21/18 Bilateral L4-5 and L5-S1 facet joint injections- no relief  9/24/18 Lumbar SCS trial ([facility])- 100% relief  10/26/18 Lumbar SCS implant ([facility])- 100% relief of leg pain  9/12/19 Bilateral L3,4,5 MBB- helpful  10/17/19 Bilateral L3,4,5 MBB- helpful  11/21/19 Right L3,4,5 RFA- 50% relief  12/5/19 Left L3,4,5 RFA- 50% relief  8/17/20 Left L3,4,5 RFA- 50% relief  8/31/20 Right L3,4,5 RFA- 50% relief    Physical Therapy/Home Exercise:   yes in the past with limited benefit    Imaging:     3/31/18 Lumbar MRI    Narrative     EXAMINATION:  MRI LUMBAR SPINE WITHOUT CONTRAST    CLINICAL HISTORY:  Low back pain, >6wks conservative tx, persistent-progressive sx, surgical candidate; Other spondylosis with radiculopathy, lumbar region    TECHNIQUE:  Multiplanar, multisequence MR images were acquired from the thoracolumbar junction to the sacrum without the administration of contrast.    COMPARISON:  Plain films from 03/27/2018    FINDINGS:  There is grade 1 spondylolisthesis of L4 on L5. The vertebral body heights are well maintained, with no fracture.  No marrow signal abnormality suspicious for an infiltrative process.    The conus medullaris terminates at approximately the mid body of L1.  There is a cyst associated with the upper pole of the right kidney.  There is disc desiccation noted throughout the lumbar spine with relative sparing of the L5-S1 disc.  Mild disc space narrowing present  at the L4-5 level.    L1-L2: Mild diffuse disc bulge resulting in no significant central or neural foraminal canal narrowing.    L2-L3: No significant central canal narrowing.  There is mild narrowing of either neural foraminal canal secondary to disc material.    L3-L4: Disc bulging in the bilateral foraminal regions resulting in no significant central canal narrowing.  Mild-to-moderate bilateral facet arthropathy also noted.  The bilateral neural foraminal canals are moderately narrowed with some mild effacement of either exiting L3 nerve root in the extraforaminal regions bilaterally.    L4-L5:  Grade 1 spondylolisthesis along with moderate to severe bilateral facet arthropathy and ligamentum flavum hypertrophy resulting in at least moderate narrowing of the central canal.  The right neural foraminal canal is mildly to moderately narrowed.  Left neural foraminal canal is moderately to severely narrowed with mild effacement of the exiting L4 nerve root.    L5-S1:  No significant central or neural foraminal canal narrowing noted.  Mild bilateral facet arthropathy noted.   Impression       1. Multilevel degenerative changes of the lumbar spine as detailed above     Lumbar XRAYs 3/27/18    Narrative     EXAMINATION:  XR LUMBAR SPINE AP AND LAT WITH FLEX/EXT    CLINICAL HISTORY:  Low back pain    TECHNIQUE:  AP and lateral views as well as lateral flexion and extension images are performed through the lumbar spine.    COMPARISON:  None    FINDINGS:  X-ray lumbar spine with flexion and extension demonstrates grade 1 spondylolisthesis at L4-5 measuring around 6 mm which does not change significantly with flexion and extension.  The L4-5 disc is narrowed and degenerated.  Other lumbar vertebral discs are maintained.  Vertebral body heights are maintained.  Small anterior spurs are seen, and there is small posterior osteophyte along the inferior endplate of L4.  There is lumbar facet arthropathy at L4-5 and L5-S1.    Impression       Degenerative changes as above.  Grade 1 anterolisthesis and degenerative disc disease at L4-5.         Allergies:   Review of patient's allergies indicates:   Allergen Reactions    Aspirin Other (See Comments)     Has been told not to take it due to bariatric surgery       Current Medications:   Current Outpatient Medications   Medication Sig Dispense Refill    albuterol (PROAIR HFA) 90 mcg/actuation inhaler Inhale 2 puffs into the lungs every 6 (six) hours as needed for Shortness of Breath. Rescue 8.5 g 1    atenoloL (TENORMIN) 100 MG tablet TAKE 1 TABLET BY MOUTH ONCE DAILY 90 tablet 3    escitalopram oxalate (LEXAPRO) 20 MG tablet Take 1 tablet (20 mg total) by mouth once daily. 90 tablet 3    furosemide (LASIX) 20 MG tablet Take 1 tablet by mouth once daily 90 tablet 0    glipiZIDE (GLUCOTROL) 10 MG tablet Take 1 tablet (10 mg total) by mouth 2 (two) times daily with meals. 180 tablet 3    ipratropium (ATROVENT) 42 mcg (0.06 %) nasal spray 2 sprays by Nasal route 4 (four) times daily. 15 mL 0    loratadine (CLARITIN) 10 mg tablet TAKE 1 TABLET BY MOUTH ONCE DAILY AS NEEDED FOR ALLERGIES 90 tablet 3    losartan (COZAAR) 50 MG tablet Take 1 tablet (50 mg total) by mouth once daily. 90 tablet 3    metFORMIN (GLUCOPHAGE) 1000 MG tablet Take 1 tablet (1,000 mg total) by mouth every evening. 90 tablet 3    pantoprazole (PROTONIX) 20 MG tablet TAKE 2 TABLETS BY MOUTH ONCE DAILY AS NEEDED (STOMACH  PAIN) 180 tablet 0    rosuvastatin (CRESTOR) 5 MG tablet Take 1 tablet (5 mg total) by mouth once daily. 90 tablet 3    traZODone (DESYREL) 100 MG tablet Take 1 tablet (100 mg total) by mouth every evening. 90 tablet 1    zonisamide (ZONEGRAN) 100 MG Cap TAKE 4 CAPSULES BY MOUTH AT BEDTIME 120 capsule 2    blood-glucose meter kit Use as instructed 1 each 0    zonisamide (ZONEGRAN) 25 MG Cap Take 2 capsules (50 mg total) by mouth nightly. (Patient not taking: Reported on 7/22/2020) 60 capsule  11     No current facility-administered medications for this visit.        REVIEW OF SYSTEMS:    GENERAL:  No weight loss, malaise or fevers.  HEENT:  Negative for frequent or significant headaches.  NECK:  Negative for lumps, goiter, pain and significant neck swelling.  RESPIRATORY:  Negative for cough, wheezing or shortness of breath.  CARDIOVASCULAR:  Negative for chest pain, leg swelling or palpitations. Hypertension.  GI:  Negative for abdominal discomfort, blood in stools or black stools or change in bowel habits.  MUSCULOSKELETAL:  See HPI.  SKIN:  Negative for lesions, rash, and itching.  PSYCH:  Negative for sleep disturbance, mood disorder and recent psychosocial stressors.  HEMATOLOGY/LYMPHOLOGY:  Negative for prolonged bleeding, bruising easily or swollen nodes.  NEURO:   No history of headaches, syncope, paralysis, seizures or tremors.  ENDO: Diabetes.  All other reviewed and negative other than HPI.    Past Medical History:  Past Medical History:   Diagnosis Date    Arthritis     Diabetes mellitus     Hypertension        Past Surgical History:  Past Surgical History:   Procedure Laterality Date    CARPAL TUNNEL RELEASE Right 3/8/2019    Procedure: RELEASE, CARPAL TUNNEL right;  Surgeon: Pan Goodman MD;  Location: [facility] OR;  Service: Orthopedics;  Laterality: Right;    CARPAL TUNNEL RELEASE Left 2019    Procedure: RELEASE, CARPAL TUNNEL- LEFT;  Surgeon: Pan Goodman MD;  Location: [address];  Service: Orthopedics;  Laterality: Left;     SECTION      CHOLECYSTECTOMY      EPIDURAL STEROID INJECTION INTO LUMBAR SPINE N/A 2018    Procedure: INJECTION, STEROID, SPINE, LUMBAR, EPIDURAL;  Surgeon: Shaq Silverio MD;  Location: [facility] PAIN MGT;  Service: Pain Management;  Laterality: N/A;  LUMBAR L4-L5 INTERLAMINAR ELISE'  51310  W/ SEDATION     HYSTERECTOMY      INJECTION OF ANESTHETIC AGENT AROUND NERVE Bilateral 2019    Procedure: BLOCK, NERVE, L3-L4-L5 MEDIAL BRANCH;   Surgeon: Shaq Silverio MD;  Location: [facility] PAIN MGT;  Service: Pain Management;  Laterality: Bilateral;    INJECTION OF ANESTHETIC AGENT AROUND NERVE Bilateral 10/17/2019    Procedure: BLOCK, NERVE;  Surgeon: Shaq Silverio MD;  Location: [facility] PAIN MGT;  Service: Pain Management;  Laterality: Bilateral;  B/L MBB L3-L4-L5  REPEAT  CONSENT NEEDED    INJECTION OF FACET JOINT Bilateral 5/28/2018    Procedure: INJECTION-FACET;  Surgeon: Shaq Silverio MD;  Location: [facility] PAIN MGT;  Service: Pain Management;  Laterality: Bilateral;  LUMBAR BILATERAL L4-L5 AND L5-S1 FACET STEROID INJECTION  [id number]    W/ SEDATION     RADIOFREQUENCY ABLATION Right 11/21/2019    Procedure: RADIOFREQUENCY ABLATION RIGHT L3, L4, L5;  Surgeon: Shaq Silverio MD;  Location: [facility] PAIN MGT;  Service: Pain Management;  Laterality: Right;  Right RFA L3-L4-L5  1 of 2  Consent Needed    RADIOFREQUENCY ABLATION Left 12/5/2019    Procedure: RADIOFREQUENCY ABLATION LEFT L3-5;  Surgeon: Shaq Silverio MD;  Location: [facility] PAIN MGT;  Service: Pain Management;  Laterality: Left;  Left RFA L3-L4-L5  2 of 2  Consent Needed    RADIOFREQUENCY ABLATION Left 8/17/2020    Procedure: RADIOFREQUENCY ABLATION LEFT L3,4,5 1 of 2;  Surgeon: Shaq Silverio MD;  Location: [facility] PAIN MGT;  Service: Pain Management;  Laterality: Left;  Left RFA L3,4,5  1 of 2    RADIOFREQUENCY ABLATION Right 8/31/2020    Procedure: RADIOFREQUENCY ABLATION RIGHT L3,4,5 2 of 2;  Surgeon: Shaq Silverio MD;  Location: [facility] PAIN MGT;  Service: Pain Management;  Laterality: Right;  RADIOFREQUENCY ABLATION RIGHT L3,4,5  2 of 2    TRIAL OF SPINAL CORD NERVE STIMULATOR N/A 9/24/2018    Procedure: TRIAL, NEUROSTIMULATOR, SPINAL CORD, SPINAL CORD STIMULATOR TRIAL-INTERNAL WIRES TO EXTERNAL BATTERY;  Surgeon: Shaq Silverio MD;  Location: [facility] PAIN MGT;  Service: Pain Management;  Laterality: N/A;  [facility] NOTIFIED       Family History:  No family history on file.    Social History:  Social History  "    Socioeconomic History    Marital status:      Spouse name: Not on file    Number of children: Not on file    Years of education: Not on file    Highest education level: Not on file   Occupational History    Not on file   Social Needs    Financial resource strain: Not on file    Food insecurity     Worry: Not on file     Inability: Not on file    Transportation needs     Medical: Not on file     Non-medical: Not on file   Tobacco Use    Smoking status: Never Smoker    Smokeless tobacco: Never Used   Substance and Sexual Activity    Alcohol use: No    Drug use: No    Sexual activity: Not on file   Lifestyle    Physical activity     Days per week: Not on file     Minutes per session: Not on file    Stress: Not on file   Relationships    Social connections     Talks on phone: Not on file     Gets together: Not on file     Attends Christian service: Not on file     Active member of club or organization: Not on file     Attends meetings of clubs or organizations: Not on file     Relationship status: Not on file   Other Topics Concern    Not on file   Social History Narrative    Not on file       OBJECTIVE:    BP (!) 154/78   Pulse 73   Resp 18   Ht 5' 4" (1.626 m)   SpO2 100%   BMI 34.16 kg/m²     PHYSICAL EXAMINATION:    General appearance: Well appearing, in no acute distress, alert and oriented x3.  Psych:  Mood and affect appropriate.  Skin: Skin color, texture, turgor normal, no rashes or lesions, in both upper and lower body.   Head/face:  Atraumatic, normocephalic. No palpable lymph nodes  Back: Straight leg raising in the sitting and supine positions is negative to radicular pain. There is pain with palpation to lumbar paraspinals bilaterally.  Limited flexion and extension with pain.  Positive facet loading bilaterally.  There is no pain with palpation to SI joints.  TOBY is negative bilaterally.  Extremities: Peripheral joint ROM is full and pain free without obvious " instability or laxity in all four extremities. No deformities, edema, or skin discoloration. Good capillary refill.  Musculoskeletal: 5/5 strength in right ankle with plantar and dorsiflexion. 5/5 strength in left ankle with plantar and dorsiflexion. 5/5 strength with right knee flexion and extension. 5/5 strength with left knee flexion and extension. 5/5 strength in right EHL, 5/5 strength in left EHL. No atrophy or tone abnormalities are noted.  Neuro: Bilateral upper and lower extremity coordination and muscle stretch reflexes are physiologic and symmetric.  Plantar response are downgoing. No loss of sensation.  Gait: Antalgic- ambulates without assistance.    ASSESSMENT: 69 y.o. year old female with back pain, consistent with the following diagnoses:     1. Osteoarthritis of lumbar spine, unspecified spinal osteoarthritis complication status     2. Osteoarthritis of spine with radiculopathy, lumbar region  zonisamide (ZONEGRAN) 100 MG Cap   3. Spondylosis without myelopathy     4. Lumbar radiculopathy           PLAN:     - Previous imaging was reviewed and discussed with the patient today.    - She is s/p repeat L3,4,5 RFAs with some benefit.      - We discussed exercising for lower back pain today. Will start PT.    - TPIs today as per below.    - RTC in 2 months or sooner if needed.    - Counseled patient regarding the importance of constant sleeping habits and physical therapy.      The above plan and management options were discussed at length with patient. Patient is in agreement with the above and verbalized understanding.    Renetta Steele  09/28/2020     Trigger Point Injection:   The procedure was discussed with the patient including complications of nerve damage,  bleeding, infection, and failure of pain relief.   Trigger points were identified by palpation and marked. Alcohol prep of sites done. A mixture of 9mL 0.25% bupivacaine +40mg Depo-Medrol was prepared (10 mL total).   A 27-gauge needle  was advanced to the point of maximal tenderness, and medication was injected after negative aspiration. All sites done in the same manner. Patient tolerated the procedure well and without complications. Sites injected included: lumbar paraspinals bilaterally (6 sites total).

## 2020-10-30 ENCOUNTER — TELEPHONE (OUTPATIENT)
Dept: PAIN MEDICINE | Facility: CLINIC | Age: 70
End: 2020-10-30

## 2020-10-30 NOTE — TELEPHONE ENCOUNTER
Staff contacted the patient to reschedule 11/24/20 3 pm in office visit  appointment with Renetta Steele Np due to the provider being unavailable. At this time the appointment is cancelled and may be rescheduled by calling [phone]. Staff apologize for any inconvenience.    Patient did not answer and mail box was full. Staff was unable to leave a voice message and my chart is in pending status.

## 2020-11-16 ENCOUNTER — TELEPHONE (OUTPATIENT)
Dept: FAMILY MEDICINE | Facility: CLINIC | Age: 70
End: 2020-11-16

## 2021-02-10 ENCOUNTER — LAB VISIT (OUTPATIENT)
Dept: LAB | Facility: HOSPITAL | Age: 71
End: 2021-02-10
Attending: FAMILY MEDICINE
Payer: MEDICARE

## 2021-02-10 DIAGNOSIS — E11.9 TYPE 2 DIABETES MELLITUS WITHOUT COMPLICATION, UNSPECIFIED WHETHER LONG TERM INSULIN USE: ICD-10-CM

## 2021-02-10 DIAGNOSIS — I10 ESSENTIAL HYPERTENSION: ICD-10-CM

## 2021-02-10 DIAGNOSIS — E78.5 DYSLIPIDEMIA: ICD-10-CM

## 2021-02-10 LAB
ALBUMIN SERPL BCP-MCNC: 3.8 G/DL (ref 3.5–5.2)
ALP SERPL-CCNC: 85 U/L (ref 55–135)
ALT SERPL W/O P-5'-P-CCNC: 16 U/L (ref 10–44)
ANION GAP SERPL CALC-SCNC: 12 MMOL/L (ref 8–16)
AST SERPL-CCNC: 13 U/L (ref 10–40)
BILIRUB SERPL-MCNC: 0.2 MG/DL (ref 0.1–1)
BUN SERPL-MCNC: 12 MG/DL (ref 8–23)
CALCIUM SERPL-MCNC: 9.2 MG/DL (ref 8.7–10.5)
CHLORIDE SERPL-SCNC: 105 MMOL/L (ref 95–110)
CHOLEST SERPL-MCNC: 174 MG/DL (ref 120–199)
CHOLEST/HDLC SERPL: 3.3 {RATIO} (ref 2–5)
CO2 SERPL-SCNC: 25 MMOL/L (ref 23–29)
CREAT SERPL-MCNC: 1.1 MG/DL (ref 0.5–1.4)
EST. GFR  (AFRICAN AMERICAN): 58 ML/MIN/1.73 M^2
EST. GFR  (NON AFRICAN AMERICAN): 51 ML/MIN/1.73 M^2
ESTIMATED AVG GLUCOSE: 126 MG/DL (ref 68–131)
GLUCOSE SERPL-MCNC: 62 MG/DL (ref 70–110)
HBA1C MFR BLD: 6 % (ref 4–5.6)
HDLC SERPL-MCNC: 52 MG/DL (ref 40–75)
HDLC SERPL: 29.9 % (ref 20–50)
LDLC SERPL CALC-MCNC: 90.2 MG/DL (ref 63–159)
NONHDLC SERPL-MCNC: 122 MG/DL
POTASSIUM SERPL-SCNC: 4.1 MMOL/L (ref 3.5–5.1)
PROT SERPL-MCNC: 8 G/DL (ref 6–8.4)
SODIUM SERPL-SCNC: 142 MMOL/L (ref 136–145)
TRIGL SERPL-MCNC: 159 MG/DL (ref 30–150)

## 2021-02-10 PROCEDURE — 80053 COMPREHEN METABOLIC PANEL: CPT

## 2021-02-10 PROCEDURE — 83036 HEMOGLOBIN GLYCOSYLATED A1C: CPT

## 2021-02-10 PROCEDURE — 80061 LIPID PANEL: CPT

## 2021-02-10 PROCEDURE — 36415 COLL VENOUS BLD VENIPUNCTURE: CPT | Mod: PN

## 2021-02-15 ENCOUNTER — OFFICE VISIT (OUTPATIENT)
Dept: FAMILY MEDICINE | Facility: CLINIC | Age: 71
End: 2021-02-15
Payer: MEDICARE

## 2021-02-15 VITALS
HEIGHT: 64 IN | TEMPERATURE: 98 F | DIASTOLIC BLOOD PRESSURE: 60 MMHG | RESPIRATION RATE: 18 BRPM | BODY MASS INDEX: 30.78 KG/M2 | OXYGEN SATURATION: 98 % | WEIGHT: 180.31 LBS | SYSTOLIC BLOOD PRESSURE: 121 MMHG | HEART RATE: 73 BPM

## 2021-02-15 DIAGNOSIS — M24.28 HYPERTROPHY OF LIGAMENTUM FLAVUM: ICD-10-CM

## 2021-02-15 DIAGNOSIS — M47.26 OSTEOARTHRITIS OF SPINE WITH RADICULOPATHY, LUMBAR REGION: ICD-10-CM

## 2021-02-15 DIAGNOSIS — R10.30 LOWER ABDOMINAL PAIN: ICD-10-CM

## 2021-02-15 DIAGNOSIS — N18.31 CHRONIC RENAL FAILURE, STAGE 3A: ICD-10-CM

## 2021-02-15 DIAGNOSIS — E11.22 TYPE 2 DIABETES MELLITUS WITH STAGE 3A CHRONIC KIDNEY DISEASE, WITHOUT LONG-TERM CURRENT USE OF INSULIN: Primary | ICD-10-CM

## 2021-02-15 DIAGNOSIS — F33.0 MILD RECURRENT MAJOR DEPRESSION: ICD-10-CM

## 2021-02-15 DIAGNOSIS — R10.2 PELVIC PAIN: ICD-10-CM

## 2021-02-15 DIAGNOSIS — E78.5 DYSLIPIDEMIA: ICD-10-CM

## 2021-02-15 DIAGNOSIS — J30.2 SEASONAL ALLERGIES: ICD-10-CM

## 2021-02-15 DIAGNOSIS — I10 ESSENTIAL HYPERTENSION: ICD-10-CM

## 2021-02-15 DIAGNOSIS — N18.31 TYPE 2 DIABETES MELLITUS WITH STAGE 3A CHRONIC KIDNEY DISEASE, WITHOUT LONG-TERM CURRENT USE OF INSULIN: Primary | ICD-10-CM

## 2021-02-15 PROCEDURE — 99499 RISK ADDL DX/OHS AUDIT: ICD-10-PCS | Mod: S$PBB,,, | Performed by: FAMILY MEDICINE

## 2021-02-15 PROCEDURE — 3078F PR MOST RECENT DIASTOLIC BLOOD PRESSURE < 80 MM HG: ICD-10-PCS | Mod: CPTII,S$GLB,, | Performed by: FAMILY MEDICINE

## 2021-02-15 PROCEDURE — 1126F AMNT PAIN NOTED NONE PRSNT: CPT | Mod: S$GLB,,, | Performed by: FAMILY MEDICINE

## 2021-02-15 PROCEDURE — 99999 PR PBB SHADOW E&M-EST. PATIENT-LVL IV: CPT | Mod: PBBFAC,,, | Performed by: FAMILY MEDICINE

## 2021-02-15 PROCEDURE — 1126F PR PAIN SEVERITY QUANTIFIED, NO PAIN PRESENT: ICD-10-PCS | Mod: S$GLB,,, | Performed by: FAMILY MEDICINE

## 2021-02-15 PROCEDURE — 99499 UNLISTED E&M SERVICE: CPT | Mod: S$PBB,,, | Performed by: FAMILY MEDICINE

## 2021-02-15 PROCEDURE — 3288F FALL RISK ASSESSMENT DOCD: CPT | Mod: CPTII,S$GLB,, | Performed by: FAMILY MEDICINE

## 2021-02-15 PROCEDURE — 99214 OFFICE O/P EST MOD 30 MIN: CPT | Mod: S$GLB,,, | Performed by: FAMILY MEDICINE

## 2021-02-15 PROCEDURE — 1101F PR PT FALLS ASSESS DOC 0-1 FALLS W/OUT INJ PAST YR: ICD-10-PCS | Mod: CPTII,S$GLB,, | Performed by: FAMILY MEDICINE

## 2021-02-15 PROCEDURE — 3074F PR MOST RECENT SYSTOLIC BLOOD PRESSURE < 130 MM HG: ICD-10-PCS | Mod: CPTII,S$GLB,, | Performed by: FAMILY MEDICINE

## 2021-02-15 PROCEDURE — 1159F PR MEDICATION LIST DOCUMENTED IN MEDICAL RECORD: ICD-10-PCS | Mod: S$GLB,,, | Performed by: FAMILY MEDICINE

## 2021-02-15 PROCEDURE — 3288F PR FALLS RISK ASSESSMENT DOCUMENTED: ICD-10-PCS | Mod: CPTII,S$GLB,, | Performed by: FAMILY MEDICINE

## 2021-02-15 PROCEDURE — 3044F HG A1C LEVEL LT 7.0%: CPT | Mod: CPTII,S$GLB,, | Performed by: FAMILY MEDICINE

## 2021-02-15 PROCEDURE — 3008F PR BODY MASS INDEX (BMI) DOCUMENTED: ICD-10-PCS | Mod: CPTII,S$GLB,, | Performed by: FAMILY MEDICINE

## 2021-02-15 PROCEDURE — 3044F PR MOST RECENT HEMOGLOBIN A1C LEVEL <7.0%: ICD-10-PCS | Mod: CPTII,S$GLB,, | Performed by: FAMILY MEDICINE

## 2021-02-15 PROCEDURE — 3008F BODY MASS INDEX DOCD: CPT | Mod: CPTII,S$GLB,, | Performed by: FAMILY MEDICINE

## 2021-02-15 PROCEDURE — 3078F DIAST BP <80 MM HG: CPT | Mod: CPTII,S$GLB,, | Performed by: FAMILY MEDICINE

## 2021-02-15 PROCEDURE — 3074F SYST BP LT 130 MM HG: CPT | Mod: CPTII,S$GLB,, | Performed by: FAMILY MEDICINE

## 2021-02-15 PROCEDURE — 1159F MED LIST DOCD IN RCRD: CPT | Mod: S$GLB,,, | Performed by: FAMILY MEDICINE

## 2021-02-15 PROCEDURE — 99999 PR PBB SHADOW E&M-EST. PATIENT-LVL IV: ICD-10-PCS | Mod: PBBFAC,,, | Performed by: FAMILY MEDICINE

## 2021-02-15 PROCEDURE — 99214 PR OFFICE/OUTPT VISIT, EST, LEVL IV, 30-39 MIN: ICD-10-PCS | Mod: S$GLB,,, | Performed by: FAMILY MEDICINE

## 2021-02-15 PROCEDURE — 1101F PT FALLS ASSESS-DOCD LE1/YR: CPT | Mod: CPTII,S$GLB,, | Performed by: FAMILY MEDICINE

## 2021-02-15 RX ORDER — LORATADINE 10 MG/1
TABLET ORAL
Qty: 90 TABLET | Refills: 3 | Status: SHIPPED | OUTPATIENT
Start: 2021-02-15 | End: 2021-10-11 | Stop reason: SDUPTHER

## 2021-02-15 RX ORDER — DICYCLOMINE HYDROCHLORIDE 10 MG/1
10 CAPSULE ORAL
Qty: 120 CAPSULE | Refills: 0 | Status: SHIPPED | OUTPATIENT
Start: 2021-02-15 | End: 2021-03-17

## 2021-02-15 RX ORDER — FUROSEMIDE 20 MG/1
20 TABLET ORAL DAILY
Qty: 90 TABLET | Refills: 3 | Status: SHIPPED | OUTPATIENT
Start: 2021-02-15 | End: 2022-02-10

## 2021-02-15 RX ORDER — GLIPIZIDE 10 MG/1
10 TABLET ORAL 2 TIMES DAILY WITH MEALS
Qty: 180 TABLET | Refills: 3 | Status: SHIPPED | OUTPATIENT
Start: 2021-02-15 | End: 2021-02-23 | Stop reason: SDUPTHER

## 2021-02-15 RX ORDER — ATENOLOL 100 MG/1
100 TABLET ORAL DAILY
Qty: 90 TABLET | Refills: 3 | Status: SHIPPED | OUTPATIENT
Start: 2021-02-15 | End: 2021-10-11 | Stop reason: SDUPTHER

## 2021-02-15 RX ORDER — LOSARTAN POTASSIUM 50 MG/1
50 TABLET ORAL DAILY
Qty: 90 TABLET | Refills: 3 | Status: SHIPPED | OUTPATIENT
Start: 2021-02-15 | End: 2021-02-23 | Stop reason: SDUPTHER

## 2021-02-17 ENCOUNTER — TELEPHONE (OUTPATIENT)
Dept: FAMILY MEDICINE | Facility: CLINIC | Age: 71
End: 2021-02-17

## 2021-02-17 RX ORDER — SULFAMETHOXAZOLE AND TRIMETHOPRIM 800; 160 MG/1; MG/1
1 TABLET ORAL 2 TIMES DAILY
Qty: 10 TABLET | Refills: 0 | Status: SHIPPED | OUTPATIENT
Start: 2021-02-17 | End: 2021-02-22

## 2021-02-19 ENCOUNTER — HOSPITAL ENCOUNTER (OUTPATIENT)
Dept: RADIOLOGY | Facility: HOSPITAL | Age: 71
Discharge: HOME | End: 2021-02-19
Attending: FAMILY MEDICINE
Payer: MEDICARE

## 2021-02-19 VITALS — HEIGHT: 64 IN | BODY MASS INDEX: 30.78 KG/M2 | WEIGHT: 180.31 LBS

## 2021-02-19 DIAGNOSIS — Z12.31 BREAST CANCER SCREENING BY MAMMOGRAM: ICD-10-CM

## 2021-02-19 PROCEDURE — 77067 SCR MAMMO BI INCL CAD: CPT | Mod: TC

## 2021-02-19 PROCEDURE — 77067 SCR MAMMO BI INCL CAD: CPT | Mod: 26,,, | Performed by: RADIOLOGY

## 2021-02-19 PROCEDURE — 77063 MAMMO DIGITAL SCREENING BILAT WITH TOMO: ICD-10-PCS | Mod: 26,,, | Performed by: RADIOLOGY

## 2021-02-19 PROCEDURE — 77063 BREAST TOMOSYNTHESIS BI: CPT | Mod: 26,,, | Performed by: RADIOLOGY

## 2021-02-19 PROCEDURE — 77067 MAMMO DIGITAL SCREENING BILAT WITH TOMO: ICD-10-PCS | Mod: 26,,, | Performed by: RADIOLOGY

## 2021-02-20 ENCOUNTER — TELEPHONE (OUTPATIENT)
Dept: FAMILY MEDICINE | Facility: CLINIC | Age: 71
End: 2021-02-20

## 2021-02-23 DIAGNOSIS — N18.31 TYPE 2 DIABETES MELLITUS WITH STAGE 3A CHRONIC KIDNEY DISEASE, WITHOUT LONG-TERM CURRENT USE OF INSULIN: ICD-10-CM

## 2021-02-23 DIAGNOSIS — G47.00 INSOMNIA, UNSPECIFIED TYPE: ICD-10-CM

## 2021-02-23 DIAGNOSIS — E11.22 TYPE 2 DIABETES MELLITUS WITH STAGE 3A CHRONIC KIDNEY DISEASE, WITHOUT LONG-TERM CURRENT USE OF INSULIN: ICD-10-CM

## 2021-02-23 DIAGNOSIS — E78.5 DYSLIPIDEMIA: ICD-10-CM

## 2021-02-23 DIAGNOSIS — E11.9 TYPE 2 DIABETES MELLITUS WITHOUT COMPLICATION, UNSPECIFIED WHETHER LONG TERM INSULIN USE: ICD-10-CM

## 2021-02-23 DIAGNOSIS — I10 ESSENTIAL HYPERTENSION: ICD-10-CM

## 2021-02-23 DIAGNOSIS — M47.26 OSTEOARTHRITIS OF SPINE WITH RADICULOPATHY, LUMBAR REGION: ICD-10-CM

## 2021-02-23 DIAGNOSIS — K21.9 GASTROESOPHAGEAL REFLUX DISEASE WITHOUT ESOPHAGITIS: ICD-10-CM

## 2021-02-25 RX ORDER — GLIPIZIDE 10 MG/1
10 TABLET ORAL 2 TIMES DAILY WITH MEALS
Qty: 180 TABLET | Refills: 3 | Status: SHIPPED | OUTPATIENT
Start: 2021-02-25 | End: 2022-02-10

## 2021-02-25 RX ORDER — PANTOPRAZOLE SODIUM 20 MG/1
20 TABLET, DELAYED RELEASE ORAL
Qty: 180 TABLET | Refills: 0 | Status: SHIPPED | OUTPATIENT
Start: 2021-02-25 | End: 2021-04-25

## 2021-02-25 RX ORDER — TRAZODONE HYDROCHLORIDE 100 MG/1
100 TABLET ORAL NIGHTLY
Qty: 90 TABLET | Refills: 1 | Status: SHIPPED | OUTPATIENT
Start: 2021-02-25 | End: 2021-10-11 | Stop reason: SDUPTHER

## 2021-02-25 RX ORDER — ZONISAMIDE 100 MG/1
400 CAPSULE ORAL NIGHTLY
Qty: 360 CAPSULE | Refills: 0 | OUTPATIENT
Start: 2021-02-25

## 2021-02-25 RX ORDER — ROSUVASTATIN CALCIUM 5 MG/1
5 TABLET, COATED ORAL DAILY
Qty: 90 TABLET | Refills: 0 | Status: SHIPPED | OUTPATIENT
Start: 2021-02-25 | End: 2021-04-21 | Stop reason: SDUPTHER

## 2021-02-25 RX ORDER — METFORMIN HYDROCHLORIDE 1000 MG/1
1000 TABLET ORAL NIGHTLY
Qty: 90 TABLET | Refills: 3 | Status: SHIPPED | OUTPATIENT
Start: 2021-02-25 | End: 2021-10-11

## 2021-02-25 RX ORDER — LOSARTAN POTASSIUM 50 MG/1
50 TABLET ORAL DAILY
Qty: 90 TABLET | Refills: 3 | Status: SHIPPED | OUTPATIENT
Start: 2021-02-25 | End: 2021-05-21 | Stop reason: SDUPTHER

## 2021-03-14 ENCOUNTER — HOSPITAL ENCOUNTER (EMERGENCY)
Facility: HOSPITAL | Age: 71
Discharge: HOME | End: 2021-03-14
Attending: EMERGENCY MEDICINE
Payer: MEDICARE

## 2021-03-14 VITALS
DIASTOLIC BLOOD PRESSURE: 65 MMHG | HEART RATE: 68 BPM | SYSTOLIC BLOOD PRESSURE: 150 MMHG | HEIGHT: 64 IN | TEMPERATURE: 99 F | WEIGHT: 179 LBS | BODY MASS INDEX: 30.56 KG/M2 | RESPIRATION RATE: 18 BRPM | OXYGEN SATURATION: 98 %

## 2021-03-14 DIAGNOSIS — M25.552 LEFT HIP PAIN: Primary | ICD-10-CM

## 2021-03-14 DIAGNOSIS — M79.605 LEFT LEG PAIN: ICD-10-CM

## 2021-03-14 PROCEDURE — 63600175 PHARM REV CODE 636 W HCPCS: Performed by: PHYSICIAN ASSISTANT

## 2021-03-14 PROCEDURE — 96372 THER/PROPH/DIAG INJ SC/IM: CPT

## 2021-03-14 PROCEDURE — 99284 EMERGENCY DEPT VISIT MOD MDM: CPT | Mod: 25

## 2021-03-14 PROCEDURE — 25000003 PHARM REV CODE 250: Performed by: PHYSICIAN ASSISTANT

## 2021-03-14 RX ORDER — HYDROCODONE BITARTRATE AND ACETAMINOPHEN 5; 325 MG/1; MG/1
1 TABLET ORAL
Status: COMPLETED | OUTPATIENT
Start: 2021-03-14 | End: 2021-03-14

## 2021-03-14 RX ORDER — HYDROCODONE BITARTRATE AND ACETAMINOPHEN 5; 325 MG/1; MG/1
1 TABLET ORAL EVERY 6 HOURS PRN
Qty: 8 TABLET | Refills: 0 | Status: SHIPPED | OUTPATIENT
Start: 2021-03-14 | End: 2021-08-04

## 2021-03-14 RX ORDER — DEXAMETHASONE SODIUM PHOSPHATE 4 MG/ML
12 INJECTION, SOLUTION INTRA-ARTICULAR; INTRALESIONAL; INTRAMUSCULAR; INTRAVENOUS; SOFT TISSUE
Status: COMPLETED | OUTPATIENT
Start: 2021-03-14 | End: 2021-03-14

## 2021-03-14 RX ADMIN — HYDROCODONE BITARTRATE AND ACETAMINOPHEN 1 TABLET: 5; 325 TABLET ORAL at 06:03

## 2021-03-14 RX ADMIN — DEXAMETHASONE SODIUM PHOSPHATE 12 MG: 4 INJECTION INTRA-ARTICULAR; INTRALESIONAL; INTRAMUSCULAR; INTRAVENOUS; SOFT TISSUE at 06:03

## 2021-03-17 ENCOUNTER — PATIENT OUTREACH (OUTPATIENT)
Dept: ADMINISTRATIVE | Facility: OTHER | Age: 71
End: 2021-03-17

## 2021-04-21 DIAGNOSIS — F41.9 ANXIETY: ICD-10-CM

## 2021-04-21 DIAGNOSIS — F33.0 MILD RECURRENT MAJOR DEPRESSION: ICD-10-CM

## 2021-04-21 DIAGNOSIS — E78.5 DYSLIPIDEMIA: ICD-10-CM

## 2021-04-21 RX ORDER — ESCITALOPRAM OXALATE 20 MG/1
20 TABLET ORAL DAILY
Qty: 90 TABLET | Refills: 3 | Status: SHIPPED | OUTPATIENT
Start: 2021-04-21 | End: 2021-04-27 | Stop reason: SDUPTHER

## 2021-04-21 RX ORDER — ROSUVASTATIN CALCIUM 5 MG/1
5 TABLET, COATED ORAL DAILY
Qty: 90 TABLET | Refills: 0 | Status: SHIPPED | OUTPATIENT
Start: 2021-04-21 | End: 2021-04-27 | Stop reason: SDUPTHER

## 2021-04-27 DIAGNOSIS — F33.0 MILD RECURRENT MAJOR DEPRESSION: ICD-10-CM

## 2021-04-27 DIAGNOSIS — E78.5 DYSLIPIDEMIA: ICD-10-CM

## 2021-04-27 DIAGNOSIS — F41.9 ANXIETY: ICD-10-CM

## 2021-04-29 DIAGNOSIS — E78.5 DYSLIPIDEMIA: ICD-10-CM

## 2021-04-29 DIAGNOSIS — F33.0 MILD RECURRENT MAJOR DEPRESSION: ICD-10-CM

## 2021-04-29 DIAGNOSIS — F41.9 ANXIETY: ICD-10-CM

## 2021-04-29 RX ORDER — ROSUVASTATIN CALCIUM 5 MG/1
5 TABLET, COATED ORAL DAILY
Qty: 90 TABLET | Refills: 0 | Status: SHIPPED | OUTPATIENT
Start: 2021-04-29 | End: 2021-10-12 | Stop reason: SDUPTHER

## 2021-04-29 RX ORDER — ESCITALOPRAM OXALATE 20 MG/1
20 TABLET ORAL DAILY
Qty: 90 TABLET | Refills: 3 | Status: SHIPPED | OUTPATIENT
Start: 2021-04-29 | End: 2022-06-09

## 2021-04-29 RX ORDER — ESCITALOPRAM OXALATE 20 MG/1
20 TABLET ORAL DAILY
Qty: 90 TABLET | Refills: 3 | Status: SHIPPED | OUTPATIENT
Start: 2021-04-29 | End: 2021-04-29 | Stop reason: SDUPTHER

## 2021-04-29 RX ORDER — ROSUVASTATIN CALCIUM 5 MG/1
5 TABLET, COATED ORAL DAILY
Qty: 90 TABLET | Refills: 0 | Status: SHIPPED | OUTPATIENT
Start: 2021-04-29 | End: 2021-04-29 | Stop reason: SDUPTHER

## 2021-05-05 ENCOUNTER — PATIENT OUTREACH (OUTPATIENT)
Dept: ADMINISTRATIVE | Facility: HOSPITAL | Age: 71
End: 2021-05-05

## 2021-05-13 DIAGNOSIS — M47.26 OSTEOARTHRITIS OF SPINE WITH RADICULOPATHY, LUMBAR REGION: ICD-10-CM

## 2021-05-13 DIAGNOSIS — E11.9 TYPE 2 DIABETES MELLITUS WITHOUT COMPLICATION, WITHOUT LONG-TERM CURRENT USE OF INSULIN: ICD-10-CM

## 2021-05-13 RX ORDER — ZONISAMIDE 100 MG/1
400 CAPSULE ORAL NIGHTLY
Qty: 360 CAPSULE | Refills: 0 | OUTPATIENT
Start: 2021-05-13

## 2021-05-13 RX ORDER — INSULIN PUMP SYRINGE, 3 ML
EACH MISCELLANEOUS
Qty: 1 EACH | Refills: 0 | Status: SHIPPED | OUTPATIENT
Start: 2021-05-13 | End: 2023-05-03 | Stop reason: CLARIF

## 2021-05-14 RX ORDER — ZONISAMIDE 100 MG/1
400 CAPSULE ORAL NIGHTLY
Qty: 360 CAPSULE | Refills: 0 | OUTPATIENT
Start: 2021-05-14

## 2021-05-21 DIAGNOSIS — I10 ESSENTIAL HYPERTENSION: ICD-10-CM

## 2021-05-21 RX ORDER — LOSARTAN POTASSIUM 50 MG/1
50 TABLET ORAL DAILY
Qty: 90 TABLET | Refills: 3 | Status: SHIPPED | OUTPATIENT
Start: 2021-05-21 | End: 2021-06-02 | Stop reason: SDUPTHER

## 2021-06-02 DIAGNOSIS — I10 ESSENTIAL HYPERTENSION: ICD-10-CM

## 2021-06-02 RX ORDER — LOSARTAN POTASSIUM 50 MG/1
50 TABLET ORAL DAILY
Qty: 90 TABLET | Refills: 3 | Status: SHIPPED | OUTPATIENT
Start: 2021-06-02 | End: 2022-01-10

## 2021-07-07 ENCOUNTER — OFFICE VISIT (OUTPATIENT)
Dept: FAMILY MEDICINE | Facility: CLINIC | Age: 71
End: 2021-07-07
Payer: MEDICARE

## 2021-07-07 ENCOUNTER — TELEPHONE (OUTPATIENT)
Dept: FAMILY MEDICINE | Facility: CLINIC | Age: 71
End: 2021-07-07

## 2021-07-07 VITALS
HEIGHT: 64 IN | OXYGEN SATURATION: 96 % | DIASTOLIC BLOOD PRESSURE: 70 MMHG | TEMPERATURE: 98 F | WEIGHT: 193.44 LBS | HEART RATE: 77 BPM | BODY MASS INDEX: 33.02 KG/M2 | SYSTOLIC BLOOD PRESSURE: 118 MMHG

## 2021-07-07 DIAGNOSIS — Z09 HOSPITAL DISCHARGE FOLLOW-UP: Primary | ICD-10-CM

## 2021-07-07 DIAGNOSIS — N12 PYELONEPHRITIS: ICD-10-CM

## 2021-07-07 DIAGNOSIS — N17.9 AKI (ACUTE KIDNEY INJURY): ICD-10-CM

## 2021-07-07 DIAGNOSIS — B96.20 BACTEREMIA DUE TO ESCHERICHIA COLI: ICD-10-CM

## 2021-07-07 DIAGNOSIS — Z86.19 HISTORY OF SEPSIS: ICD-10-CM

## 2021-07-07 DIAGNOSIS — R78.81 BACTEREMIA DUE TO ESCHERICHIA COLI: ICD-10-CM

## 2021-07-07 PROCEDURE — 3008F PR BODY MASS INDEX (BMI) DOCUMENTED: ICD-10-PCS | Mod: CPTII,S$GLB,, | Performed by: FAMILY MEDICINE

## 2021-07-07 PROCEDURE — 1101F PT FALLS ASSESS-DOCD LE1/YR: CPT | Mod: CPTII,S$GLB,, | Performed by: FAMILY MEDICINE

## 2021-07-07 PROCEDURE — 99999 PR PBB SHADOW E&M-EST. PATIENT-LVL V: CPT | Mod: PBBFAC,,, | Performed by: FAMILY MEDICINE

## 2021-07-07 PROCEDURE — 3288F FALL RISK ASSESSMENT DOCD: CPT | Mod: CPTII,S$GLB,, | Performed by: FAMILY MEDICINE

## 2021-07-07 PROCEDURE — 3008F BODY MASS INDEX DOCD: CPT | Mod: CPTII,S$GLB,, | Performed by: FAMILY MEDICINE

## 2021-07-07 PROCEDURE — 1159F MED LIST DOCD IN RCRD: CPT | Mod: S$GLB,,, | Performed by: FAMILY MEDICINE

## 2021-07-07 PROCEDURE — 3288F PR FALLS RISK ASSESSMENT DOCUMENTED: ICD-10-PCS | Mod: CPTII,S$GLB,, | Performed by: FAMILY MEDICINE

## 2021-07-07 PROCEDURE — 99999 PR PBB SHADOW E&M-EST. PATIENT-LVL V: ICD-10-PCS | Mod: PBBFAC,,, | Performed by: FAMILY MEDICINE

## 2021-07-07 PROCEDURE — 1126F PR PAIN SEVERITY QUANTIFIED, NO PAIN PRESENT: ICD-10-PCS | Mod: S$GLB,,, | Performed by: FAMILY MEDICINE

## 2021-07-07 PROCEDURE — 1126F AMNT PAIN NOTED NONE PRSNT: CPT | Mod: S$GLB,,, | Performed by: FAMILY MEDICINE

## 2021-07-07 PROCEDURE — 1101F PR PT FALLS ASSESS DOC 0-1 FALLS W/OUT INJ PAST YR: ICD-10-PCS | Mod: CPTII,S$GLB,, | Performed by: FAMILY MEDICINE

## 2021-07-07 PROCEDURE — 99214 PR OFFICE/OUTPT VISIT, EST, LEVL IV, 30-39 MIN: ICD-10-PCS | Mod: S$GLB,,, | Performed by: FAMILY MEDICINE

## 2021-07-07 PROCEDURE — 99214 OFFICE O/P EST MOD 30 MIN: CPT | Mod: S$GLB,,, | Performed by: FAMILY MEDICINE

## 2021-07-07 PROCEDURE — 1159F PR MEDICATION LIST DOCUMENTED IN MEDICAL RECORD: ICD-10-PCS | Mod: S$GLB,,, | Performed by: FAMILY MEDICINE

## 2021-07-09 ENCOUNTER — LAB VISIT (OUTPATIENT)
Dept: LAB | Facility: HOSPITAL | Age: 71
End: 2021-07-09
Attending: FAMILY MEDICINE
Payer: MEDICARE

## 2021-07-09 DIAGNOSIS — R78.81 BACTEREMIA DUE TO ESCHERICHIA COLI: ICD-10-CM

## 2021-07-09 DIAGNOSIS — N12 PYELONEPHRITIS: ICD-10-CM

## 2021-07-09 DIAGNOSIS — Z86.19 HISTORY OF SEPSIS: ICD-10-CM

## 2021-07-09 DIAGNOSIS — B96.20 BACTEREMIA DUE TO ESCHERICHIA COLI: ICD-10-CM

## 2021-07-09 DIAGNOSIS — N17.9 AKI (ACUTE KIDNEY INJURY): ICD-10-CM

## 2021-07-09 LAB
ALBUMIN SERPL BCP-MCNC: 3.4 G/DL (ref 3.5–5.2)
ALP SERPL-CCNC: 90 U/L (ref 55–135)
ALT SERPL W/O P-5'-P-CCNC: 26 U/L (ref 10–44)
ANION GAP SERPL CALC-SCNC: 10 MMOL/L (ref 8–16)
AST SERPL-CCNC: 27 U/L (ref 10–40)
BASOPHILS # BLD AUTO: 0.06 K/UL (ref 0–0.2)
BASOPHILS NFR BLD: 0.5 % (ref 0–1.9)
BILIRUB SERPL-MCNC: 0.1 MG/DL (ref 0.1–1)
BUN SERPL-MCNC: 13 MG/DL (ref 8–23)
CALCIUM SERPL-MCNC: 9.5 MG/DL (ref 8.7–10.5)
CHLORIDE SERPL-SCNC: 112 MMOL/L (ref 95–110)
CO2 SERPL-SCNC: 21 MMOL/L (ref 23–29)
CREAT SERPL-MCNC: 1 MG/DL (ref 0.5–1.4)
DIFFERENTIAL METHOD: ABNORMAL
EOSINOPHIL # BLD AUTO: 0.4 K/UL (ref 0–0.5)
EOSINOPHIL NFR BLD: 3.4 % (ref 0–8)
ERYTHROCYTE [DISTWIDTH] IN BLOOD BY AUTOMATED COUNT: 16.1 % (ref 11.5–14.5)
EST. GFR  (AFRICAN AMERICAN): >60 ML/MIN/1.73 M^2
EST. GFR  (NON AFRICAN AMERICAN): 56.8 ML/MIN/1.73 M^2
GLUCOSE SERPL-MCNC: 89 MG/DL (ref 70–110)
HCT VFR BLD AUTO: 36.1 % (ref 37–48.5)
HGB BLD-MCNC: 11.6 G/DL (ref 12–16)
IMM GRANULOCYTES # BLD AUTO: 0.13 K/UL (ref 0–0.04)
IMM GRANULOCYTES NFR BLD AUTO: 1.1 % (ref 0–0.5)
LYMPHOCYTES # BLD AUTO: 3.4 K/UL (ref 1–4.8)
LYMPHOCYTES NFR BLD: 28.8 % (ref 18–48)
MCH RBC QN AUTO: 27 PG (ref 27–31)
MCHC RBC AUTO-ENTMCNC: 32.1 G/DL (ref 32–36)
MCV RBC AUTO: 84 FL (ref 82–98)
MONOCYTES # BLD AUTO: 0.6 K/UL (ref 0.3–1)
MONOCYTES NFR BLD: 5.3 % (ref 4–15)
NEUTROPHILS # BLD AUTO: 7.1 K/UL (ref 1.8–7.7)
NEUTROPHILS NFR BLD: 60.9 % (ref 38–73)
NRBC BLD-RTO: 0 /100 WBC
PLATELET # BLD AUTO: 433 K/UL (ref 150–450)
PMV BLD AUTO: 9.9 FL (ref 9.2–12.9)
POTASSIUM SERPL-SCNC: 4 MMOL/L (ref 3.5–5.1)
PROT SERPL-MCNC: 7.4 G/DL (ref 6–8.4)
RBC # BLD AUTO: 4.3 M/UL (ref 4–5.4)
SODIUM SERPL-SCNC: 143 MMOL/L (ref 136–145)
WBC # BLD AUTO: 11.64 K/UL (ref 3.9–12.7)

## 2021-07-09 PROCEDURE — 80053 COMPREHEN METABOLIC PANEL: CPT | Performed by: FAMILY MEDICINE

## 2021-07-09 PROCEDURE — 36415 COLL VENOUS BLD VENIPUNCTURE: CPT | Mod: PO | Performed by: FAMILY MEDICINE

## 2021-07-09 PROCEDURE — 85025 COMPLETE CBC W/AUTO DIFF WBC: CPT | Performed by: FAMILY MEDICINE

## 2021-07-09 PROCEDURE — 87040 BLOOD CULTURE FOR BACTERIA: CPT | Performed by: FAMILY MEDICINE

## 2021-07-12 ENCOUNTER — TELEPHONE (OUTPATIENT)
Dept: FAMILY MEDICINE | Facility: CLINIC | Age: 71
End: 2021-07-12

## 2021-07-13 ENCOUNTER — TELEPHONE (OUTPATIENT)
Dept: FAMILY MEDICINE | Facility: CLINIC | Age: 71
End: 2021-07-13

## 2021-07-14 LAB — BACTERIA BLD CULT: NORMAL

## 2021-07-15 ENCOUNTER — HOSPITAL ENCOUNTER (EMERGENCY)
Facility: HOSPITAL | Age: 71
Discharge: HOME | End: 2021-07-15
Attending: EMERGENCY MEDICINE
Payer: MEDICARE

## 2021-07-15 VITALS
SYSTOLIC BLOOD PRESSURE: 151 MMHG | HEART RATE: 55 BPM | BODY MASS INDEX: 32.27 KG/M2 | RESPIRATION RATE: 16 BRPM | WEIGHT: 189 LBS | HEIGHT: 64 IN | DIASTOLIC BLOOD PRESSURE: 75 MMHG | OXYGEN SATURATION: 100 % | TEMPERATURE: 98 F

## 2021-07-15 DIAGNOSIS — T82.898A OCCLUSION OF PERIPHERALLY INSERTED CENTRAL CATHETER (PICC) LINE, INITIAL ENCOUNTER: Primary | ICD-10-CM

## 2021-07-15 PROCEDURE — 99282 EMERGENCY DEPT VISIT SF MDM: CPT

## 2021-07-30 ENCOUNTER — PATIENT OUTREACH (OUTPATIENT)
Dept: ADMINISTRATIVE | Facility: OTHER | Age: 71
End: 2021-07-30

## 2021-07-30 DIAGNOSIS — E11.22 TYPE 2 DIABETES MELLITUS WITH STAGE 3A CHRONIC KIDNEY DISEASE, WITHOUT LONG-TERM CURRENT USE OF INSULIN: Primary | ICD-10-CM

## 2021-07-30 DIAGNOSIS — N18.31 TYPE 2 DIABETES MELLITUS WITH STAGE 3A CHRONIC KIDNEY DISEASE, WITHOUT LONG-TERM CURRENT USE OF INSULIN: Primary | ICD-10-CM

## 2021-08-03 ENCOUNTER — OFFICE VISIT (OUTPATIENT)
Dept: SPINE | Facility: CLINIC | Age: 71
End: 2021-08-03
Attending: ANESTHESIOLOGY
Payer: MEDICARE

## 2021-08-03 VITALS
BODY MASS INDEX: 32.26 KG/M2 | SYSTOLIC BLOOD PRESSURE: 118 MMHG | WEIGHT: 188.94 LBS | HEIGHT: 64 IN | DIASTOLIC BLOOD PRESSURE: 56 MMHG | HEART RATE: 66 BPM

## 2021-08-03 DIAGNOSIS — G89.4 CHRONIC PAIN SYNDROME: ICD-10-CM

## 2021-08-03 DIAGNOSIS — M47.26 OSTEOARTHRITIS OF SPINE WITH RADICULOPATHY, LUMBAR REGION: Primary | ICD-10-CM

## 2021-08-03 PROCEDURE — 3044F PR MOST RECENT HEMOGLOBIN A1C LEVEL <7.0%: ICD-10-PCS | Mod: CPTII,S$GLB,, | Performed by: ANESTHESIOLOGY

## 2021-08-03 PROCEDURE — 99214 PR OFFICE/OUTPT VISIT, EST, LEVL IV, 30-39 MIN: ICD-10-PCS | Mod: S$GLB,,, | Performed by: ANESTHESIOLOGY

## 2021-08-03 PROCEDURE — 99999 PR PBB SHADOW E&M-EST. PATIENT-LVL III: ICD-10-PCS | Mod: PBBFAC,,, | Performed by: ANESTHESIOLOGY

## 2021-08-03 PROCEDURE — 3044F HG A1C LEVEL LT 7.0%: CPT | Mod: CPTII,S$GLB,, | Performed by: ANESTHESIOLOGY

## 2021-08-03 PROCEDURE — 3288F PR FALLS RISK ASSESSMENT DOCUMENTED: ICD-10-PCS | Mod: CPTII,S$GLB,, | Performed by: ANESTHESIOLOGY

## 2021-08-03 PROCEDURE — 99214 OFFICE O/P EST MOD 30 MIN: CPT | Mod: S$GLB,,, | Performed by: ANESTHESIOLOGY

## 2021-08-03 PROCEDURE — 99499 RISK ADDL DX/OHS AUDIT: ICD-10-PCS | Mod: S$PBB,,, | Performed by: ANESTHESIOLOGY

## 2021-08-03 PROCEDURE — 1101F PT FALLS ASSESS-DOCD LE1/YR: CPT | Mod: CPTII,S$GLB,, | Performed by: ANESTHESIOLOGY

## 2021-08-03 PROCEDURE — 3074F SYST BP LT 130 MM HG: CPT | Mod: CPTII,S$GLB,, | Performed by: ANESTHESIOLOGY

## 2021-08-03 PROCEDURE — 99499 UNLISTED E&M SERVICE: CPT | Mod: S$PBB,,, | Performed by: ANESTHESIOLOGY

## 2021-08-03 PROCEDURE — 3078F PR MOST RECENT DIASTOLIC BLOOD PRESSURE < 80 MM HG: ICD-10-PCS | Mod: CPTII,S$GLB,, | Performed by: ANESTHESIOLOGY

## 2021-08-03 PROCEDURE — 1125F PR PAIN SEVERITY QUANTIFIED, PAIN PRESENT: ICD-10-PCS | Mod: CPTII,S$GLB,, | Performed by: ANESTHESIOLOGY

## 2021-08-03 PROCEDURE — 3074F PR MOST RECENT SYSTOLIC BLOOD PRESSURE < 130 MM HG: ICD-10-PCS | Mod: CPTII,S$GLB,, | Performed by: ANESTHESIOLOGY

## 2021-08-03 PROCEDURE — 3288F FALL RISK ASSESSMENT DOCD: CPT | Mod: CPTII,S$GLB,, | Performed by: ANESTHESIOLOGY

## 2021-08-03 PROCEDURE — 3008F BODY MASS INDEX DOCD: CPT | Mod: CPTII,S$GLB,, | Performed by: ANESTHESIOLOGY

## 2021-08-03 PROCEDURE — 1101F PR PT FALLS ASSESS DOC 0-1 FALLS W/OUT INJ PAST YR: ICD-10-PCS | Mod: CPTII,S$GLB,, | Performed by: ANESTHESIOLOGY

## 2021-08-03 PROCEDURE — 99999 PR PBB SHADOW E&M-EST. PATIENT-LVL III: CPT | Mod: PBBFAC,,, | Performed by: ANESTHESIOLOGY

## 2021-08-03 PROCEDURE — 3008F PR BODY MASS INDEX (BMI) DOCUMENTED: ICD-10-PCS | Mod: CPTII,S$GLB,, | Performed by: ANESTHESIOLOGY

## 2021-08-03 PROCEDURE — 1160F RVW MEDS BY RX/DR IN RCRD: CPT | Mod: CPTII,S$GLB,, | Performed by: ANESTHESIOLOGY

## 2021-08-03 PROCEDURE — 1159F PR MEDICATION LIST DOCUMENTED IN MEDICAL RECORD: ICD-10-PCS | Mod: CPTII,S$GLB,, | Performed by: ANESTHESIOLOGY

## 2021-08-03 PROCEDURE — 1159F MED LIST DOCD IN RCRD: CPT | Mod: CPTII,S$GLB,, | Performed by: ANESTHESIOLOGY

## 2021-08-03 PROCEDURE — 1125F AMNT PAIN NOTED PAIN PRSNT: CPT | Mod: CPTII,S$GLB,, | Performed by: ANESTHESIOLOGY

## 2021-08-03 PROCEDURE — 1160F PR REVIEW ALL MEDS BY PRESCRIBER/CLIN PHARMACIST DOCUMENTED: ICD-10-PCS | Mod: CPTII,S$GLB,, | Performed by: ANESTHESIOLOGY

## 2021-08-03 PROCEDURE — 3078F DIAST BP <80 MM HG: CPT | Mod: CPTII,S$GLB,, | Performed by: ANESTHESIOLOGY

## 2021-08-04 ENCOUNTER — TELEPHONE (OUTPATIENT)
Dept: ADMINISTRATIVE | Facility: OTHER | Age: 71
End: 2021-08-04

## 2021-08-05 ENCOUNTER — OFFICE VISIT (OUTPATIENT)
Dept: INFECTIOUS DISEASES | Facility: CLINIC | Age: 71
End: 2021-08-05
Payer: MEDICARE

## 2021-08-05 VITALS
BODY MASS INDEX: 33.69 KG/M2 | DIASTOLIC BLOOD PRESSURE: 73 MMHG | HEIGHT: 64 IN | WEIGHT: 197.31 LBS | HEART RATE: 72 BPM | TEMPERATURE: 99 F | SYSTOLIC BLOOD PRESSURE: 138 MMHG

## 2021-08-05 DIAGNOSIS — N12 PYELONEPHRITIS: ICD-10-CM

## 2021-08-05 DIAGNOSIS — R78.81 BACTEREMIA DUE TO ESCHERICHIA COLI: Primary | ICD-10-CM

## 2021-08-05 DIAGNOSIS — N39.0 COMPLICATED UTI (URINARY TRACT INFECTION): ICD-10-CM

## 2021-08-05 DIAGNOSIS — N18.31 TYPE 2 DIABETES MELLITUS WITH STAGE 3A CHRONIC KIDNEY DISEASE, WITHOUT LONG-TERM CURRENT USE OF INSULIN: ICD-10-CM

## 2021-08-05 DIAGNOSIS — E11.22 TYPE 2 DIABETES MELLITUS WITH STAGE 3A CHRONIC KIDNEY DISEASE, WITHOUT LONG-TERM CURRENT USE OF INSULIN: ICD-10-CM

## 2021-08-05 DIAGNOSIS — B96.20 BACTEREMIA DUE TO ESCHERICHIA COLI: Primary | ICD-10-CM

## 2021-08-05 DIAGNOSIS — N18.31 CHRONIC RENAL FAILURE, STAGE 3A: ICD-10-CM

## 2021-08-05 PROCEDURE — 3044F HG A1C LEVEL LT 7.0%: CPT | Mod: CPTII,S$GLB,, | Performed by: INTERNAL MEDICINE

## 2021-08-05 PROCEDURE — 3075F PR MOST RECENT SYSTOLIC BLOOD PRESS GE 130-139MM HG: ICD-10-PCS | Mod: CPTII,S$GLB,, | Performed by: INTERNAL MEDICINE

## 2021-08-05 PROCEDURE — 99202 PR OFFICE/OUTPT VISIT, NEW, LEVL II, 15-29 MIN: ICD-10-PCS | Mod: S$GLB,,, | Performed by: INTERNAL MEDICINE

## 2021-08-05 PROCEDURE — 3078F DIAST BP <80 MM HG: CPT | Mod: CPTII,S$GLB,, | Performed by: INTERNAL MEDICINE

## 2021-08-05 PROCEDURE — 3288F PR FALLS RISK ASSESSMENT DOCUMENTED: ICD-10-PCS | Mod: CPTII,S$GLB,, | Performed by: INTERNAL MEDICINE

## 2021-08-05 PROCEDURE — 1126F AMNT PAIN NOTED NONE PRSNT: CPT | Mod: CPTII,S$GLB,, | Performed by: INTERNAL MEDICINE

## 2021-08-05 PROCEDURE — 99499 RISK ADDL DX/OHS AUDIT: ICD-10-PCS | Mod: S$PBB,,, | Performed by: INTERNAL MEDICINE

## 2021-08-05 PROCEDURE — 1159F MED LIST DOCD IN RCRD: CPT | Mod: CPTII,S$GLB,, | Performed by: INTERNAL MEDICINE

## 2021-08-05 PROCEDURE — 99999 PR PBB SHADOW E&M-EST. PATIENT-LVL III: CPT | Mod: PBBFAC,,, | Performed by: INTERNAL MEDICINE

## 2021-08-05 PROCEDURE — 3075F SYST BP GE 130 - 139MM HG: CPT | Mod: CPTII,S$GLB,, | Performed by: INTERNAL MEDICINE

## 2021-08-05 PROCEDURE — 99999 PR PBB SHADOW E&M-EST. PATIENT-LVL III: ICD-10-PCS | Mod: PBBFAC,,, | Performed by: INTERNAL MEDICINE

## 2021-08-05 PROCEDURE — 99202 OFFICE O/P NEW SF 15 MIN: CPT | Mod: S$GLB,,, | Performed by: INTERNAL MEDICINE

## 2021-08-05 PROCEDURE — 3008F BODY MASS INDEX DOCD: CPT | Mod: CPTII,S$GLB,, | Performed by: INTERNAL MEDICINE

## 2021-08-05 PROCEDURE — 3008F PR BODY MASS INDEX (BMI) DOCUMENTED: ICD-10-PCS | Mod: CPTII,S$GLB,, | Performed by: INTERNAL MEDICINE

## 2021-08-05 PROCEDURE — 1101F PR PT FALLS ASSESS DOC 0-1 FALLS W/OUT INJ PAST YR: ICD-10-PCS | Mod: CPTII,S$GLB,, | Performed by: INTERNAL MEDICINE

## 2021-08-05 PROCEDURE — 1126F PR PAIN SEVERITY QUANTIFIED, NO PAIN PRESENT: ICD-10-PCS | Mod: CPTII,S$GLB,, | Performed by: INTERNAL MEDICINE

## 2021-08-05 PROCEDURE — 99499 UNLISTED E&M SERVICE: CPT | Mod: S$PBB,,, | Performed by: INTERNAL MEDICINE

## 2021-08-05 PROCEDURE — 1159F PR MEDICATION LIST DOCUMENTED IN MEDICAL RECORD: ICD-10-PCS | Mod: CPTII,S$GLB,, | Performed by: INTERNAL MEDICINE

## 2021-08-05 PROCEDURE — 3044F PR MOST RECENT HEMOGLOBIN A1C LEVEL <7.0%: ICD-10-PCS | Mod: CPTII,S$GLB,, | Performed by: INTERNAL MEDICINE

## 2021-08-05 PROCEDURE — 3078F PR MOST RECENT DIASTOLIC BLOOD PRESSURE < 80 MM HG: ICD-10-PCS | Mod: CPTII,S$GLB,, | Performed by: INTERNAL MEDICINE

## 2021-08-05 PROCEDURE — 1101F PT FALLS ASSESS-DOCD LE1/YR: CPT | Mod: CPTII,S$GLB,, | Performed by: INTERNAL MEDICINE

## 2021-08-05 PROCEDURE — 3288F FALL RISK ASSESSMENT DOCD: CPT | Mod: CPTII,S$GLB,, | Performed by: INTERNAL MEDICINE

## 2021-08-05 RX ORDER — LINAGLIPTIN 5 MG/1
5 TABLET, FILM COATED ORAL DAILY
COMMUNITY
End: 2021-10-11 | Stop reason: SDUPTHER

## 2021-08-18 ENCOUNTER — TELEPHONE (OUTPATIENT)
Dept: FAMILY MEDICINE | Facility: CLINIC | Age: 71
End: 2021-08-18

## 2021-09-08 ENCOUNTER — TELEPHONE (OUTPATIENT)
Dept: PAIN MEDICINE | Facility: CLINIC | Age: 71
End: 2021-09-08

## 2021-09-08 NOTE — TELEPHONE ENCOUNTER
----- Message from Lina Leon sent at 9/7/2021  8:56 AM CDT -----  Regarding: missed call   Who Called: Faby Who Left Message for Patient:Unknown Does the patient know what this is regarding? [facility] Call Back Number:[phone] Additional Information:

## 2021-09-09 ENCOUNTER — HOSPITAL ENCOUNTER (OUTPATIENT)
Facility: OTHER | Age: 71
Discharge: HOME | End: 2021-09-09
Attending: ANESTHESIOLOGY | Admitting: ANESTHESIOLOGY
Payer: MEDICARE

## 2021-09-09 VITALS
OXYGEN SATURATION: 97 % | RESPIRATION RATE: 16 BRPM | DIASTOLIC BLOOD PRESSURE: 58 MMHG | HEART RATE: 68 BPM | SYSTOLIC BLOOD PRESSURE: 124 MMHG | TEMPERATURE: 99 F | HEIGHT: 64 IN | WEIGHT: 189 LBS | BODY MASS INDEX: 32.27 KG/M2

## 2021-09-09 DIAGNOSIS — M47.819 SPONDYLOSIS WITHOUT MYELOPATHY: ICD-10-CM

## 2021-09-09 DIAGNOSIS — M47.816 OSTEOARTHRITIS OF LUMBAR SPINE: ICD-10-CM

## 2021-09-09 DIAGNOSIS — M47.816 OSTEOARTHRITIS OF LUMBAR SPINE, UNSPECIFIED SPINAL OSTEOARTHRITIS COMPLICATION STATUS: Primary | ICD-10-CM

## 2021-09-09 LAB — POCT GLUCOSE: 77 MG/DL (ref 70–110)

## 2021-09-09 PROCEDURE — 25000003 PHARM REV CODE 250: Performed by: STUDENT IN AN ORGANIZED HEALTH CARE EDUCATION/TRAINING PROGRAM

## 2021-09-09 PROCEDURE — 64636 DESTROY L/S FACET JNT ADDL: CPT | Mod: LT,,, | Performed by: ANESTHESIOLOGY

## 2021-09-09 PROCEDURE — 25000003 PHARM REV CODE 250: Performed by: ANESTHESIOLOGY

## 2021-09-09 PROCEDURE — 64635 PR DESTROY LUMB/SAC FACET JNT: ICD-10-PCS | Mod: LT,,, | Performed by: ANESTHESIOLOGY

## 2021-09-09 PROCEDURE — 64635 DESTROY LUMB/SAC FACET JNT: CPT | Mod: LT | Performed by: ANESTHESIOLOGY

## 2021-09-09 PROCEDURE — 64636 PR DESTROY L/S FACET JNT ADDL: ICD-10-PCS | Mod: LT,,, | Performed by: ANESTHESIOLOGY

## 2021-09-09 PROCEDURE — 82947 ASSAY GLUCOSE BLOOD QUANT: CPT | Performed by: ANESTHESIOLOGY

## 2021-09-09 PROCEDURE — 63600175 PHARM REV CODE 636 W HCPCS: Performed by: ANESTHESIOLOGY

## 2021-09-09 PROCEDURE — 64635 DESTROY LUMB/SAC FACET JNT: CPT | Mod: LT,,, | Performed by: ANESTHESIOLOGY

## 2021-09-09 PROCEDURE — 64636 DESTROY L/S FACET JNT ADDL: CPT | Mod: LT | Performed by: ANESTHESIOLOGY

## 2021-09-09 RX ORDER — FENTANYL CITRATE 50 UG/ML
INJECTION, SOLUTION INTRAMUSCULAR; INTRAVENOUS
Status: DISCONTINUED | OUTPATIENT
Start: 2021-09-09 | End: 2021-09-09 | Stop reason: HOSPADM

## 2021-09-09 RX ORDER — LIDOCAINE HYDROCHLORIDE 10 MG/ML
INJECTION INFILTRATION; PERINEURAL
Status: DISCONTINUED | OUTPATIENT
Start: 2021-09-09 | End: 2021-09-09 | Stop reason: HOSPADM

## 2021-09-09 RX ORDER — SODIUM CHLORIDE 9 MG/ML
INJECTION, SOLUTION INTRAVENOUS CONTINUOUS
Status: DISCONTINUED | OUTPATIENT
Start: 2021-09-09 | End: 2021-09-09 | Stop reason: HOSPADM

## 2021-09-09 RX ORDER — TRIAMCINOLONE ACETONIDE 40 MG/ML
INJECTION, SUSPENSION INTRA-ARTICULAR; INTRAMUSCULAR
Status: DISCONTINUED | OUTPATIENT
Start: 2021-09-09 | End: 2021-09-09 | Stop reason: HOSPADM

## 2021-09-09 RX ORDER — BUPIVACAINE HYDROCHLORIDE 2.5 MG/ML
INJECTION, SOLUTION EPIDURAL; INFILTRATION; INTRACAUDAL
Status: DISCONTINUED | OUTPATIENT
Start: 2021-09-09 | End: 2021-09-09 | Stop reason: HOSPADM

## 2021-09-09 RX ORDER — MIDAZOLAM HYDROCHLORIDE 1 MG/ML
INJECTION INTRAMUSCULAR; INTRAVENOUS
Status: DISCONTINUED | OUTPATIENT
Start: 2021-09-09 | End: 2021-09-09 | Stop reason: HOSPADM

## 2021-09-09 RX ADMIN — SODIUM CHLORIDE: 0.9 INJECTION, SOLUTION INTRAVENOUS at 10:09

## 2021-09-20 ENCOUNTER — HOSPITAL ENCOUNTER (OUTPATIENT)
Facility: OTHER | Age: 71
Discharge: HOME | End: 2021-09-20
Attending: ANESTHESIOLOGY | Admitting: ANESTHESIOLOGY
Payer: MEDICARE

## 2021-09-20 VITALS
RESPIRATION RATE: 18 BRPM | DIASTOLIC BLOOD PRESSURE: 75 MMHG | BODY MASS INDEX: 32.27 KG/M2 | HEART RATE: 68 BPM | WEIGHT: 189 LBS | HEIGHT: 64 IN | SYSTOLIC BLOOD PRESSURE: 136 MMHG | OXYGEN SATURATION: 95 % | TEMPERATURE: 99 F

## 2021-09-20 DIAGNOSIS — M47.819 SPONDYLOSIS WITHOUT MYELOPATHY: ICD-10-CM

## 2021-09-20 DIAGNOSIS — M47.816 OSTEOARTHRITIS OF LUMBAR SPINE, UNSPECIFIED SPINAL OSTEOARTHRITIS COMPLICATION STATUS: Primary | ICD-10-CM

## 2021-09-20 DIAGNOSIS — M47.816 OSTEOARTHRITIS OF LUMBAR SPINE: ICD-10-CM

## 2021-09-20 LAB — POCT GLUCOSE: 85 MG/DL (ref 70–110)

## 2021-09-20 PROCEDURE — 82947 ASSAY GLUCOSE BLOOD QUANT: CPT | Performed by: ANESTHESIOLOGY

## 2021-09-20 PROCEDURE — 64635 PR DESTROY LUMB/SAC FACET JNT: ICD-10-PCS | Mod: RT,,, | Performed by: ANESTHESIOLOGY

## 2021-09-20 PROCEDURE — 64636 DESTROY L/S FACET JNT ADDL: CPT | Mod: RT | Performed by: ANESTHESIOLOGY

## 2021-09-20 PROCEDURE — 25000003 PHARM REV CODE 250: Performed by: STUDENT IN AN ORGANIZED HEALTH CARE EDUCATION/TRAINING PROGRAM

## 2021-09-20 PROCEDURE — 64636 PR DESTROY L/S FACET JNT ADDL: ICD-10-PCS | Mod: RT,,, | Performed by: ANESTHESIOLOGY

## 2021-09-20 PROCEDURE — 64636 DESTROY L/S FACET JNT ADDL: CPT | Mod: RT,,, | Performed by: ANESTHESIOLOGY

## 2021-09-20 PROCEDURE — 25000003 PHARM REV CODE 250: Performed by: ANESTHESIOLOGY

## 2021-09-20 PROCEDURE — 64635 DESTROY LUMB/SAC FACET JNT: CPT | Mod: RT | Performed by: ANESTHESIOLOGY

## 2021-09-20 PROCEDURE — 63600175 PHARM REV CODE 636 W HCPCS: Performed by: ANESTHESIOLOGY

## 2021-09-20 PROCEDURE — 64635 DESTROY LUMB/SAC FACET JNT: CPT | Mod: RT,,, | Performed by: ANESTHESIOLOGY

## 2021-09-20 RX ORDER — SODIUM CHLORIDE 9 MG/ML
INJECTION, SOLUTION INTRAVENOUS CONTINUOUS
Status: DISCONTINUED | OUTPATIENT
Start: 2021-09-20 | End: 2021-09-20 | Stop reason: HOSPADM

## 2021-09-20 RX ORDER — LIDOCAINE HYDROCHLORIDE 10 MG/ML
INJECTION INFILTRATION; PERINEURAL
Status: DISCONTINUED | OUTPATIENT
Start: 2021-09-20 | End: 2021-09-20 | Stop reason: HOSPADM

## 2021-09-20 RX ORDER — MIDAZOLAM HYDROCHLORIDE 1 MG/ML
INJECTION INTRAMUSCULAR; INTRAVENOUS
Status: DISCONTINUED | OUTPATIENT
Start: 2021-09-20 | End: 2021-09-20 | Stop reason: HOSPADM

## 2021-09-20 RX ORDER — TRIAMCINOLONE ACETONIDE 40 MG/ML
INJECTION, SUSPENSION INTRA-ARTICULAR; INTRAMUSCULAR
Status: DISCONTINUED | OUTPATIENT
Start: 2021-09-20 | End: 2021-09-20 | Stop reason: HOSPADM

## 2021-09-20 RX ORDER — BUPIVACAINE HYDROCHLORIDE 2.5 MG/ML
INJECTION, SOLUTION EPIDURAL; INFILTRATION; INTRACAUDAL
Status: DISCONTINUED | OUTPATIENT
Start: 2021-09-20 | End: 2021-09-20 | Stop reason: HOSPADM

## 2021-09-20 RX ORDER — FENTANYL CITRATE 50 UG/ML
INJECTION, SOLUTION INTRAMUSCULAR; INTRAVENOUS
Status: DISCONTINUED | OUTPATIENT
Start: 2021-09-20 | End: 2021-09-20 | Stop reason: HOSPADM

## 2021-09-20 RX ADMIN — SODIUM CHLORIDE: 0.9 INJECTION, SOLUTION INTRAVENOUS at 09:09

## 2021-09-30 ENCOUNTER — OFFICE VISIT (OUTPATIENT)
Dept: PAIN MEDICINE | Facility: CLINIC | Age: 71
End: 2021-09-30
Payer: MEDICARE

## 2021-09-30 VITALS
SYSTOLIC BLOOD PRESSURE: 144 MMHG | BODY MASS INDEX: 33.46 KG/M2 | DIASTOLIC BLOOD PRESSURE: 75 MMHG | TEMPERATURE: 98 F | RESPIRATION RATE: 19 BRPM | WEIGHT: 196 LBS | HEIGHT: 64 IN | HEART RATE: 68 BPM

## 2021-09-30 DIAGNOSIS — M79.18 MYOFASCIAL PAIN: ICD-10-CM

## 2021-09-30 DIAGNOSIS — M47.816 SPONDYLOSIS OF LUMBAR REGION WITHOUT MYELOPATHY OR RADICULOPATHY: ICD-10-CM

## 2021-09-30 DIAGNOSIS — M79.2 NEURITIS: Primary | ICD-10-CM

## 2021-09-30 DIAGNOSIS — M51.36 DDD (DEGENERATIVE DISC DISEASE), LUMBAR: ICD-10-CM

## 2021-09-30 PROCEDURE — 99999 PR PBB SHADOW E&M-EST. PATIENT-LVL IV: CPT | Mod: PBBFAC,,, | Performed by: NURSE PRACTITIONER

## 2021-09-30 PROCEDURE — 99213 OFFICE O/P EST LOW 20 MIN: CPT | Mod: 24,S$GLB,, | Performed by: NURSE PRACTITIONER

## 2021-09-30 PROCEDURE — 1160F RVW MEDS BY RX/DR IN RCRD: CPT | Mod: CPTII,S$GLB,, | Performed by: NURSE PRACTITIONER

## 2021-09-30 PROCEDURE — 3077F PR MOST RECENT SYSTOLIC BLOOD PRESSURE >= 140 MM HG: ICD-10-PCS | Mod: CPTII,S$GLB,, | Performed by: NURSE PRACTITIONER

## 2021-09-30 PROCEDURE — 3078F PR MOST RECENT DIASTOLIC BLOOD PRESSURE < 80 MM HG: ICD-10-PCS | Mod: CPTII,S$GLB,, | Performed by: NURSE PRACTITIONER

## 2021-09-30 PROCEDURE — 3044F HG A1C LEVEL LT 7.0%: CPT | Mod: CPTII,S$GLB,, | Performed by: NURSE PRACTITIONER

## 2021-09-30 PROCEDURE — 4010F ACE/ARB THERAPY RXD/TAKEN: CPT | Mod: CPTII,S$GLB,, | Performed by: NURSE PRACTITIONER

## 2021-09-30 PROCEDURE — 3288F FALL RISK ASSESSMENT DOCD: CPT | Mod: CPTII,S$GLB,, | Performed by: NURSE PRACTITIONER

## 2021-09-30 PROCEDURE — 4010F PR ACE/ARB THEARPY RXD/TAKEN: ICD-10-PCS | Mod: CPTII,S$GLB,, | Performed by: NURSE PRACTITIONER

## 2021-09-30 PROCEDURE — 1160F PR REVIEW ALL MEDS BY PRESCRIBER/CLIN PHARMACIST DOCUMENTED: ICD-10-PCS | Mod: CPTII,S$GLB,, | Performed by: NURSE PRACTITIONER

## 2021-09-30 PROCEDURE — 3077F SYST BP >= 140 MM HG: CPT | Mod: CPTII,S$GLB,, | Performed by: NURSE PRACTITIONER

## 2021-09-30 PROCEDURE — 99213 PR OFFICE/OUTPT VISIT, EST, LEVL III, 20-29 MIN: ICD-10-PCS | Mod: 24,S$GLB,, | Performed by: NURSE PRACTITIONER

## 2021-09-30 PROCEDURE — 3288F PR FALLS RISK ASSESSMENT DOCUMENTED: ICD-10-PCS | Mod: CPTII,S$GLB,, | Performed by: NURSE PRACTITIONER

## 2021-09-30 PROCEDURE — 99999 PR PBB SHADOW E&M-EST. PATIENT-LVL IV: ICD-10-PCS | Mod: PBBFAC,,, | Performed by: NURSE PRACTITIONER

## 2021-09-30 PROCEDURE — 1125F PR PAIN SEVERITY QUANTIFIED, PAIN PRESENT: ICD-10-PCS | Mod: CPTII,S$GLB,, | Performed by: NURSE PRACTITIONER

## 2021-09-30 PROCEDURE — 1101F PR PT FALLS ASSESS DOC 0-1 FALLS W/OUT INJ PAST YR: ICD-10-PCS | Mod: CPTII,S$GLB,, | Performed by: NURSE PRACTITIONER

## 2021-09-30 PROCEDURE — 3078F DIAST BP <80 MM HG: CPT | Mod: CPTII,S$GLB,, | Performed by: NURSE PRACTITIONER

## 2021-09-30 PROCEDURE — 1159F MED LIST DOCD IN RCRD: CPT | Mod: CPTII,S$GLB,, | Performed by: NURSE PRACTITIONER

## 2021-09-30 PROCEDURE — 3008F PR BODY MASS INDEX (BMI) DOCUMENTED: ICD-10-PCS | Mod: CPTII,S$GLB,, | Performed by: NURSE PRACTITIONER

## 2021-09-30 PROCEDURE — 3008F BODY MASS INDEX DOCD: CPT | Mod: CPTII,S$GLB,, | Performed by: NURSE PRACTITIONER

## 2021-09-30 PROCEDURE — 1125F AMNT PAIN NOTED PAIN PRSNT: CPT | Mod: CPTII,S$GLB,, | Performed by: NURSE PRACTITIONER

## 2021-09-30 PROCEDURE — 1159F PR MEDICATION LIST DOCUMENTED IN MEDICAL RECORD: ICD-10-PCS | Mod: CPTII,S$GLB,, | Performed by: NURSE PRACTITIONER

## 2021-09-30 PROCEDURE — 3044F PR MOST RECENT HEMOGLOBIN A1C LEVEL <7.0%: ICD-10-PCS | Mod: CPTII,S$GLB,, | Performed by: NURSE PRACTITIONER

## 2021-09-30 PROCEDURE — 1101F PT FALLS ASSESS-DOCD LE1/YR: CPT | Mod: CPTII,S$GLB,, | Performed by: NURSE PRACTITIONER

## 2021-09-30 RX ORDER — METHYLPREDNISOLONE 4 MG/1
TABLET ORAL
Qty: 1 PACKAGE | Refills: 0 | Status: SHIPPED | OUTPATIENT
Start: 2021-09-30 | End: 2021-10-21

## 2021-10-11 ENCOUNTER — OFFICE VISIT (OUTPATIENT)
Dept: FAMILY MEDICINE | Facility: CLINIC | Age: 71
End: 2021-10-11
Payer: MEDICARE

## 2021-10-11 ENCOUNTER — LAB VISIT (OUTPATIENT)
Dept: LAB | Facility: HOSPITAL | Age: 71
End: 2021-10-11
Attending: FAMILY MEDICINE
Payer: MEDICARE

## 2021-10-11 VITALS
OXYGEN SATURATION: 98 % | HEIGHT: 64 IN | TEMPERATURE: 99 F | WEIGHT: 191.38 LBS | BODY MASS INDEX: 32.67 KG/M2 | DIASTOLIC BLOOD PRESSURE: 68 MMHG | SYSTOLIC BLOOD PRESSURE: 120 MMHG | HEART RATE: 74 BPM

## 2021-10-11 DIAGNOSIS — Z00.00 WELLNESS EXAMINATION: Primary | ICD-10-CM

## 2021-10-11 DIAGNOSIS — E11.22 TYPE 2 DIABETES MELLITUS WITH STAGE 3A CHRONIC KIDNEY DISEASE, WITHOUT LONG-TERM CURRENT USE OF INSULIN: ICD-10-CM

## 2021-10-11 DIAGNOSIS — J30.2 SEASONAL ALLERGIES: ICD-10-CM

## 2021-10-11 DIAGNOSIS — M51.36 DDD (DEGENERATIVE DISC DISEASE), LUMBAR: ICD-10-CM

## 2021-10-11 DIAGNOSIS — I10 ESSENTIAL HYPERTENSION: ICD-10-CM

## 2021-10-11 DIAGNOSIS — Z00.00 WELLNESS EXAMINATION: ICD-10-CM

## 2021-10-11 DIAGNOSIS — G47.00 INSOMNIA, UNSPECIFIED TYPE: ICD-10-CM

## 2021-10-11 DIAGNOSIS — N18.31 TYPE 2 DIABETES MELLITUS WITH STAGE 3A CHRONIC KIDNEY DISEASE, WITHOUT LONG-TERM CURRENT USE OF INSULIN: ICD-10-CM

## 2021-10-11 DIAGNOSIS — Z23 NEED FOR INFLUENZA VACCINATION: ICD-10-CM

## 2021-10-11 LAB
ALBUMIN/CREAT UR: 34.7 UG/MG (ref 0–30)
CREAT UR-MCNC: 291 MG/DL (ref 15–325)
MICROALBUMIN UR DL<=1MG/L-MCNC: 101 UG/ML

## 2021-10-11 PROCEDURE — G0008 FLU VACCINE - QUADRIVALENT - ADJUVANTED: ICD-10-PCS | Mod: S$GLB,,, | Performed by: FAMILY MEDICINE

## 2021-10-11 PROCEDURE — 3008F BODY MASS INDEX DOCD: CPT | Mod: CPTII,S$GLB,, | Performed by: FAMILY MEDICINE

## 2021-10-11 PROCEDURE — 3044F HG A1C LEVEL LT 7.0%: CPT | Mod: CPTII,S$GLB,, | Performed by: FAMILY MEDICINE

## 2021-10-11 PROCEDURE — 1101F PR PT FALLS ASSESS DOC 0-1 FALLS W/OUT INJ PAST YR: ICD-10-PCS | Mod: CPTII,S$GLB,, | Performed by: FAMILY MEDICINE

## 2021-10-11 PROCEDURE — 99214 PR OFFICE/OUTPT VISIT, EST, LEVL IV, 30-39 MIN: ICD-10-PCS | Mod: 25,S$GLB,, | Performed by: FAMILY MEDICINE

## 2021-10-11 PROCEDURE — 1101F PT FALLS ASSESS-DOCD LE1/YR: CPT | Mod: CPTII,S$GLB,, | Performed by: FAMILY MEDICINE

## 2021-10-11 PROCEDURE — 4010F ACE/ARB THERAPY RXD/TAKEN: CPT | Mod: CPTII,S$GLB,, | Performed by: FAMILY MEDICINE

## 2021-10-11 PROCEDURE — 3044F PR MOST RECENT HEMOGLOBIN A1C LEVEL <7.0%: ICD-10-PCS | Mod: CPTII,S$GLB,, | Performed by: FAMILY MEDICINE

## 2021-10-11 PROCEDURE — 3288F FALL RISK ASSESSMENT DOCD: CPT | Mod: CPTII,S$GLB,, | Performed by: FAMILY MEDICINE

## 2021-10-11 PROCEDURE — 1160F RVW MEDS BY RX/DR IN RCRD: CPT | Mod: CPTII,S$GLB,, | Performed by: FAMILY MEDICINE

## 2021-10-11 PROCEDURE — 99999 PR PBB SHADOW E&M-EST. PATIENT-LVL V: CPT | Mod: PBBFAC,,, | Performed by: FAMILY MEDICINE

## 2021-10-11 PROCEDURE — 99999 PR PBB SHADOW E&M-EST. PATIENT-LVL V: ICD-10-PCS | Mod: PBBFAC,,, | Performed by: FAMILY MEDICINE

## 2021-10-11 PROCEDURE — 3074F PR MOST RECENT SYSTOLIC BLOOD PRESSURE < 130 MM HG: ICD-10-PCS | Mod: CPTII,S$GLB,, | Performed by: FAMILY MEDICINE

## 2021-10-11 PROCEDURE — 99214 OFFICE O/P EST MOD 30 MIN: CPT | Mod: 25,S$GLB,, | Performed by: FAMILY MEDICINE

## 2021-10-11 PROCEDURE — 82570 ASSAY OF URINE CREATININE: CPT | Performed by: FAMILY MEDICINE

## 2021-10-11 PROCEDURE — 1160F PR REVIEW ALL MEDS BY PRESCRIBER/CLIN PHARMACIST DOCUMENTED: ICD-10-PCS | Mod: CPTII,S$GLB,, | Performed by: FAMILY MEDICINE

## 2021-10-11 PROCEDURE — 99499 UNLISTED E&M SERVICE: CPT | Mod: S$GLB,,, | Performed by: FAMILY MEDICINE

## 2021-10-11 PROCEDURE — 1159F PR MEDICATION LIST DOCUMENTED IN MEDICAL RECORD: ICD-10-PCS | Mod: CPTII,S$GLB,, | Performed by: FAMILY MEDICINE

## 2021-10-11 PROCEDURE — 3078F PR MOST RECENT DIASTOLIC BLOOD PRESSURE < 80 MM HG: ICD-10-PCS | Mod: CPTII,S$GLB,, | Performed by: FAMILY MEDICINE

## 2021-10-11 PROCEDURE — 3078F DIAST BP <80 MM HG: CPT | Mod: CPTII,S$GLB,, | Performed by: FAMILY MEDICINE

## 2021-10-11 PROCEDURE — 1159F MED LIST DOCD IN RCRD: CPT | Mod: CPTII,S$GLB,, | Performed by: FAMILY MEDICINE

## 2021-10-11 PROCEDURE — 99499 RISK ADDL DX/OHS AUDIT: ICD-10-PCS | Mod: S$GLB,,, | Performed by: FAMILY MEDICINE

## 2021-10-11 PROCEDURE — 3288F PR FALLS RISK ASSESSMENT DOCUMENTED: ICD-10-PCS | Mod: CPTII,S$GLB,, | Performed by: FAMILY MEDICINE

## 2021-10-11 PROCEDURE — 90694 VACC AIIV4 NO PRSRV 0.5ML IM: CPT | Mod: S$GLB,,, | Performed by: FAMILY MEDICINE

## 2021-10-11 PROCEDURE — G0008 ADMIN INFLUENZA VIRUS VAC: HCPCS | Mod: S$GLB,,, | Performed by: FAMILY MEDICINE

## 2021-10-11 PROCEDURE — 3008F PR BODY MASS INDEX (BMI) DOCUMENTED: ICD-10-PCS | Mod: CPTII,S$GLB,, | Performed by: FAMILY MEDICINE

## 2021-10-11 PROCEDURE — 1126F PR PAIN SEVERITY QUANTIFIED, NO PAIN PRESENT: ICD-10-PCS | Mod: CPTII,S$GLB,, | Performed by: FAMILY MEDICINE

## 2021-10-11 PROCEDURE — 1126F AMNT PAIN NOTED NONE PRSNT: CPT | Mod: CPTII,S$GLB,, | Performed by: FAMILY MEDICINE

## 2021-10-11 PROCEDURE — 4010F PR ACE/ARB THEARPY RXD/TAKEN: ICD-10-PCS | Mod: CPTII,S$GLB,, | Performed by: FAMILY MEDICINE

## 2021-10-11 PROCEDURE — 90694 FLU VACCINE - QUADRIVALENT - ADJUVANTED: ICD-10-PCS | Mod: S$GLB,,, | Performed by: FAMILY MEDICINE

## 2021-10-11 PROCEDURE — 3074F SYST BP LT 130 MM HG: CPT | Mod: CPTII,S$GLB,, | Performed by: FAMILY MEDICINE

## 2021-10-11 RX ORDER — TRAZODONE HYDROCHLORIDE 100 MG/1
100 TABLET ORAL NIGHTLY
Qty: 90 TABLET | Refills: 3 | Status: SHIPPED | OUTPATIENT
Start: 2021-10-11 | End: 2022-12-21 | Stop reason: SDUPTHER

## 2021-10-11 RX ORDER — ATENOLOL 100 MG/1
100 TABLET ORAL DAILY
Qty: 90 TABLET | Refills: 3 | Status: SHIPPED | OUTPATIENT
Start: 2021-10-11 | End: 2021-10-14 | Stop reason: SDUPTHER

## 2021-10-11 RX ORDER — LINAGLIPTIN 5 MG/1
5 TABLET, FILM COATED ORAL DAILY
Qty: 90 TABLET | Refills: 3 | Status: SHIPPED | OUTPATIENT
Start: 2021-10-11 | End: 2022-08-17 | Stop reason: SDUPTHER

## 2021-10-11 RX ORDER — GABAPENTIN 100 MG/1
100 CAPSULE ORAL 3 TIMES DAILY PRN
Qty: 90 CAPSULE | Refills: 11 | Status: SHIPPED | OUTPATIENT
Start: 2021-10-11 | End: 2021-11-04 | Stop reason: SDUPTHER

## 2021-10-11 RX ORDER — LORATADINE 10 MG/1
TABLET ORAL
Qty: 90 TABLET | Refills: 3 | Status: SHIPPED | OUTPATIENT
Start: 2021-10-11 | End: 2021-12-29

## 2021-10-12 ENCOUNTER — TELEPHONE (OUTPATIENT)
Dept: FAMILY MEDICINE | Facility: CLINIC | Age: 71
End: 2021-10-12

## 2021-10-12 ENCOUNTER — IMMUNIZATION (OUTPATIENT)
Dept: OBSTETRICS AND GYNECOLOGY | Facility: CLINIC | Age: 71
End: 2021-10-12
Payer: MEDICARE

## 2021-10-12 DIAGNOSIS — Z00.00 WELLNESS EXAMINATION: Primary | ICD-10-CM

## 2021-10-12 DIAGNOSIS — Z23 NEED FOR VACCINATION: Primary | ICD-10-CM

## 2021-10-12 DIAGNOSIS — E78.5 DYSLIPIDEMIA: ICD-10-CM

## 2021-10-12 DIAGNOSIS — E11.65 TYPE 2 DIABETES MELLITUS WITH HYPERGLYCEMIA, WITHOUT LONG-TERM CURRENT USE OF INSULIN: ICD-10-CM

## 2021-10-12 PROCEDURE — 91300 COVID-19, MRNA, LNP-S, PF, 30 MCG/0.3 ML DOSE VACCINE: CPT | Mod: PBBFAC | Performed by: FAMILY MEDICINE

## 2021-10-12 PROCEDURE — 0003A COVID-19, MRNA, LNP-S, PF, 30 MCG/0.3 ML DOSE VACCINE: CPT | Mod: PBBFAC | Performed by: FAMILY MEDICINE

## 2021-10-12 RX ORDER — ROSUVASTATIN CALCIUM 20 MG/1
20 TABLET, COATED ORAL DAILY
Qty: 30 TABLET | Refills: 11 | Status: SHIPPED | OUTPATIENT
Start: 2021-10-12 | End: 2022-12-21 | Stop reason: SDUPTHER

## 2021-10-14 ENCOUNTER — TELEPHONE (OUTPATIENT)
Dept: PAIN MEDICINE | Facility: CLINIC | Age: 71
End: 2021-10-14

## 2021-10-14 DIAGNOSIS — I10 ESSENTIAL HYPERTENSION: ICD-10-CM

## 2021-10-14 RX ORDER — ATENOLOL 100 MG/1
100 TABLET ORAL DAILY
Qty: 90 TABLET | Refills: 3 | Status: SHIPPED | OUTPATIENT
Start: 2021-10-14 | End: 2021-12-29

## 2021-11-04 ENCOUNTER — TELEPHONE (OUTPATIENT)
Dept: FAMILY MEDICINE | Facility: CLINIC | Age: 71
End: 2021-11-04
Payer: MEDICARE

## 2021-11-04 DIAGNOSIS — M51.36 DDD (DEGENERATIVE DISC DISEASE), LUMBAR: ICD-10-CM

## 2021-11-04 RX ORDER — GABAPENTIN 300 MG/1
300 CAPSULE ORAL 3 TIMES DAILY PRN
Qty: 90 CAPSULE | Refills: 11 | Status: SHIPPED | OUTPATIENT
Start: 2021-11-04 | End: 2021-12-29

## 2021-12-07 ENCOUNTER — OFFICE VISIT (OUTPATIENT)
Dept: OPTOMETRY | Facility: CLINIC | Age: 71
End: 2021-12-07
Payer: COMMERCIAL

## 2021-12-07 DIAGNOSIS — H52.7 REFRACTIVE ERROR: ICD-10-CM

## 2021-12-07 DIAGNOSIS — Z01.00 DIABETIC EYE EXAM: Primary | ICD-10-CM

## 2021-12-07 DIAGNOSIS — Z96.1 PSEUDOPHAKIA: ICD-10-CM

## 2021-12-07 DIAGNOSIS — E11.9 DIABETIC EYE EXAM: Primary | ICD-10-CM

## 2021-12-07 PROCEDURE — 92015 DETERMINE REFRACTIVE STATE: CPT | Mod: S$GLB,,, | Performed by: OPTOMETRIST

## 2021-12-07 PROCEDURE — 92015 PR REFRACTION: ICD-10-PCS | Mod: S$GLB,,, | Performed by: OPTOMETRIST

## 2021-12-07 PROCEDURE — 92004 PR EYE EXAM, NEW PATIENT,COMPREHESV: ICD-10-PCS | Mod: S$GLB,,, | Performed by: OPTOMETRIST

## 2021-12-07 PROCEDURE — 99999 PR PBB SHADOW E&M-EST. PATIENT-LVL III: ICD-10-PCS | Mod: PBBFAC,,, | Performed by: OPTOMETRIST

## 2021-12-07 PROCEDURE — 99999 PR PBB SHADOW E&M-EST. PATIENT-LVL III: CPT | Mod: PBBFAC,,, | Performed by: OPTOMETRIST

## 2021-12-07 PROCEDURE — 92004 COMPRE OPH EXAM NEW PT 1/>: CPT | Mod: S$GLB,,, | Performed by: OPTOMETRIST

## 2021-12-30 ENCOUNTER — HOSPITAL ENCOUNTER (EMERGENCY)
Facility: HOSPITAL | Age: 71
Discharge: HOME | End: 2021-12-31
Attending: EMERGENCY MEDICINE
Payer: MEDICARE

## 2021-12-30 DIAGNOSIS — E16.2 HYPOGLYCEMIA: ICD-10-CM

## 2021-12-30 DIAGNOSIS — S90.01XA CONTUSION OF RIGHT ANKLE, INITIAL ENCOUNTER: ICD-10-CM

## 2021-12-30 DIAGNOSIS — M25.571 ACUTE RIGHT ANKLE PAIN: ICD-10-CM

## 2021-12-30 DIAGNOSIS — G47.33 OBSTRUCTIVE SLEEP APNEA: ICD-10-CM

## 2021-12-30 DIAGNOSIS — R03.0 ELEVATED BLOOD PRESSURE READING: ICD-10-CM

## 2021-12-30 DIAGNOSIS — J45.901 EXACERBATION OF ASTHMA, UNSPECIFIED ASTHMA SEVERITY, UNSPECIFIED WHETHER PERSISTENT: Primary | ICD-10-CM

## 2021-12-30 DIAGNOSIS — R09.02 HYPOXIA: ICD-10-CM

## 2021-12-30 DIAGNOSIS — M25.571 RIGHT ANKLE PAIN: ICD-10-CM

## 2021-12-30 DIAGNOSIS — R55 SYNCOPE, UNSPECIFIED SYNCOPE TYPE: ICD-10-CM

## 2021-12-30 DIAGNOSIS — R06.2 WHEEZING: ICD-10-CM

## 2021-12-30 LAB
ALBUMIN SERPL BCP-MCNC: 3.2 G/DL (ref 3.5–5.2)
ALP SERPL-CCNC: 84 U/L (ref 55–135)
ALT SERPL W/O P-5'-P-CCNC: 14 U/L (ref 10–44)
AMPHET+METHAMPHET UR QL: NEGATIVE
ANION GAP SERPL CALC-SCNC: 8 MMOL/L (ref 8–16)
AST SERPL-CCNC: 20 U/L (ref 10–40)
BACTERIA #/AREA URNS HPF: ABNORMAL /HPF
BARBITURATES UR QL SCN>200 NG/ML: NEGATIVE
BASOPHILS # BLD AUTO: 0.06 K/UL (ref 0–0.2)
BASOPHILS NFR BLD: 0.5 % (ref 0–1.9)
BENZODIAZ UR QL SCN>200 NG/ML: NEGATIVE
BILIRUB SERPL-MCNC: 0.4 MG/DL (ref 0.1–1)
BILIRUB UR QL STRIP: NEGATIVE
BNP SERPL-MCNC: 151 PG/ML (ref 0–99)
BUN SERPL-MCNC: 9 MG/DL (ref 8–23)
BZE UR QL SCN: NEGATIVE
CALCIUM SERPL-MCNC: 8.8 MG/DL (ref 8.7–10.5)
CANNABINOIDS UR QL SCN: NEGATIVE
CHLORIDE SERPL-SCNC: 110 MMOL/L (ref 95–110)
CK SERPL-CCNC: 162 U/L (ref 20–180)
CLARITY UR: ABNORMAL
CO2 SERPL-SCNC: 24 MMOL/L (ref 23–29)
COLOR UR: YELLOW
CREAT SERPL-MCNC: 1 MG/DL (ref 0.5–1.4)
CREAT UR-MCNC: 36 MG/DL (ref 15–325)
CRP SERPL-MCNC: 7.7 MG/L (ref 0–8.2)
CTP QC/QA: YES
CTP QC/QA: YES
DIFFERENTIAL METHOD: ABNORMAL
EOSINOPHIL # BLD AUTO: 0.2 K/UL (ref 0–0.5)
EOSINOPHIL NFR BLD: 1.2 % (ref 0–8)
ERYTHROCYTE [DISTWIDTH] IN BLOOD BY AUTOMATED COUNT: 15.9 % (ref 11.5–14.5)
EST. GFR  (AFRICAN AMERICAN): >60 ML/MIN/1.73 M^2
EST. GFR  (NON AFRICAN AMERICAN): 56 ML/MIN/1.73 M^2
ETHANOL SERPL-MCNC: <10 MG/DL
FERRITIN SERPL-MCNC: 49 NG/ML (ref 20–300)
GLUCOSE SERPL-MCNC: 89 MG/DL (ref 70–110)
GLUCOSE UR QL STRIP: NEGATIVE
HCT VFR BLD AUTO: 37.4 % (ref 37–48.5)
HGB BLD-MCNC: 11.3 G/DL (ref 12–16)
HGB UR QL STRIP: ABNORMAL
HYALINE CASTS #/AREA URNS LPF: 0 /LPF
IMM GRANULOCYTES # BLD AUTO: 0.05 K/UL (ref 0–0.04)
IMM GRANULOCYTES NFR BLD AUTO: 0.4 % (ref 0–0.5)
KETONES UR QL STRIP: NEGATIVE
LACTATE SERPL-SCNC: 1 MMOL/L (ref 0.5–2.2)
LDH SERPL L TO P-CCNC: 253 U/L (ref 110–260)
LEUKOCYTE ESTERASE UR QL STRIP: ABNORMAL
LYMPHOCYTES # BLD AUTO: 3.1 K/UL (ref 1–4.8)
LYMPHOCYTES NFR BLD: 24.1 % (ref 18–48)
MCH RBC QN AUTO: 24.9 PG (ref 27–31)
MCHC RBC AUTO-ENTMCNC: 30.2 G/DL (ref 32–36)
MCV RBC AUTO: 83 FL (ref 82–98)
METHADONE UR QL SCN>300 NG/ML: NEGATIVE
MICROSCOPIC COMMENT: ABNORMAL
MONOCYTES # BLD AUTO: 0.5 K/UL (ref 0.3–1)
MONOCYTES NFR BLD: 4.1 % (ref 4–15)
NEUTROPHILS # BLD AUTO: 9 K/UL (ref 1.8–7.7)
NEUTROPHILS NFR BLD: 69.7 % (ref 38–73)
NITRITE UR QL STRIP: NEGATIVE
NRBC BLD-RTO: 0 /100 WBC
OPIATES UR QL SCN: NEGATIVE
PCP UR QL SCN>25 NG/ML: NEGATIVE
PH UR STRIP: 6 [PH] (ref 5–8)
PLATELET # BLD AUTO: 426 K/UL (ref 150–450)
PMV BLD AUTO: 9.5 FL (ref 9.2–12.9)
POC MOLECULAR INFLUENZA A AGN: NEGATIVE
POC MOLECULAR INFLUENZA B AGN: NEGATIVE
POTASSIUM SERPL-SCNC: 4 MMOL/L (ref 3.5–5.1)
PROCALCITONIN SERPL IA-MCNC: 0.03 NG/ML
PROT SERPL-MCNC: 7.7 G/DL (ref 6–8.4)
PROT UR QL STRIP: ABNORMAL
RBC # BLD AUTO: 4.53 M/UL (ref 4–5.4)
RBC #/AREA URNS HPF: 3 /HPF (ref 0–4)
SARS-COV-2 RDRP RESP QL NAA+PROBE: NEGATIVE
SODIUM SERPL-SCNC: 142 MMOL/L (ref 136–145)
SP GR UR STRIP: 1.01 (ref 1–1.03)
SQUAMOUS #/AREA URNS HPF: 5 /HPF
TOXICOLOGY INFORMATION: NORMAL
TROPONIN I SERPL DL<=0.01 NG/ML-MCNC: <0.006 NG/ML (ref 0–0.03)
URN SPEC COLLECT METH UR: ABNORMAL
UROBILINOGEN UR STRIP-ACNC: NEGATIVE EU/DL
WBC # BLD AUTO: 12.93 K/UL (ref 3.9–12.7)
WBC #/AREA URNS HPF: 49 /HPF (ref 0–5)

## 2021-12-30 PROCEDURE — 82077 ASSAY SPEC XCP UR&BREATH IA: CPT | Performed by: EMERGENCY MEDICINE

## 2021-12-30 PROCEDURE — U0002 COVID-19 LAB TEST NON-CDC: HCPCS | Performed by: EMERGENCY MEDICINE

## 2021-12-30 PROCEDURE — 85025 COMPLETE CBC W/AUTO DIFF WBC: CPT | Performed by: EMERGENCY MEDICINE

## 2021-12-30 PROCEDURE — 25000242 PHARM REV CODE 250 ALT 637 W/ HCPCS: Performed by: EMERGENCY MEDICINE

## 2021-12-30 PROCEDURE — 83615 LACTATE (LD) (LDH) ENZYME: CPT | Performed by: EMERGENCY MEDICINE

## 2021-12-30 PROCEDURE — 87088 URINE BACTERIA CULTURE: CPT | Performed by: EMERGENCY MEDICINE

## 2021-12-30 PROCEDURE — 96375 TX/PRO/DX INJ NEW DRUG ADDON: CPT

## 2021-12-30 PROCEDURE — 87186 SC STD MICRODIL/AGAR DIL: CPT | Performed by: EMERGENCY MEDICINE

## 2021-12-30 PROCEDURE — 94761 N-INVAS EAR/PLS OXIMETRY MLT: CPT

## 2021-12-30 PROCEDURE — 96374 THER/PROPH/DIAG INJ IV PUSH: CPT

## 2021-12-30 PROCEDURE — 93010 ELECTROCARDIOGRAM REPORT: CPT | Mod: ,,, | Performed by: INTERNAL MEDICINE

## 2021-12-30 PROCEDURE — 84145 PROCALCITONIN (PCT): CPT | Performed by: EMERGENCY MEDICINE

## 2021-12-30 PROCEDURE — 93005 ELECTROCARDIOGRAM TRACING: CPT

## 2021-12-30 PROCEDURE — 83605 ASSAY OF LACTIC ACID: CPT | Performed by: EMERGENCY MEDICINE

## 2021-12-30 PROCEDURE — 93010 EKG 12-LEAD: ICD-10-PCS | Mod: ,,, | Performed by: INTERNAL MEDICINE

## 2021-12-30 PROCEDURE — 81000 URINALYSIS NONAUTO W/SCOPE: CPT | Mod: 59 | Performed by: EMERGENCY MEDICINE

## 2021-12-30 PROCEDURE — 86140 C-REACTIVE PROTEIN: CPT | Performed by: EMERGENCY MEDICINE

## 2021-12-30 PROCEDURE — 99285 EMERGENCY DEPT VISIT HI MDM: CPT | Mod: 25

## 2021-12-30 PROCEDURE — 87502 INFLUENZA DNA AMP PROBE: CPT

## 2021-12-30 PROCEDURE — 84484 ASSAY OF TROPONIN QUANT: CPT | Performed by: EMERGENCY MEDICINE

## 2021-12-30 PROCEDURE — 94640 AIRWAY INHALATION TREATMENT: CPT

## 2021-12-30 PROCEDURE — 94644 CONT INHLJ TX 1ST HOUR: CPT

## 2021-12-30 PROCEDURE — 80307 DRUG TEST PRSMV CHEM ANLYZR: CPT | Performed by: EMERGENCY MEDICINE

## 2021-12-30 PROCEDURE — 82728 ASSAY OF FERRITIN: CPT | Performed by: EMERGENCY MEDICINE

## 2021-12-30 PROCEDURE — 63600175 PHARM REV CODE 636 W HCPCS: Performed by: EMERGENCY MEDICINE

## 2021-12-30 PROCEDURE — 83880 ASSAY OF NATRIURETIC PEPTIDE: CPT | Performed by: EMERGENCY MEDICINE

## 2021-12-30 PROCEDURE — 80053 COMPREHEN METABOLIC PANEL: CPT | Performed by: EMERGENCY MEDICINE

## 2021-12-30 PROCEDURE — 82550 ASSAY OF CK (CPK): CPT | Performed by: EMERGENCY MEDICINE

## 2021-12-30 PROCEDURE — 87077 CULTURE AEROBIC IDENTIFY: CPT | Performed by: EMERGENCY MEDICINE

## 2021-12-30 PROCEDURE — 87086 URINE CULTURE/COLONY COUNT: CPT | Performed by: EMERGENCY MEDICINE

## 2021-12-30 RX ORDER — DEXAMETHASONE SODIUM PHOSPHATE 4 MG/ML
6 INJECTION, SOLUTION INTRA-ARTICULAR; INTRALESIONAL; INTRAMUSCULAR; INTRAVENOUS; SOFT TISSUE
Status: COMPLETED | OUTPATIENT
Start: 2021-12-30 | End: 2021-12-30

## 2021-12-30 RX ORDER — ALBUTEROL SULFATE 2.5 MG/.5ML
10 SOLUTION RESPIRATORY (INHALATION)
Status: COMPLETED | OUTPATIENT
Start: 2021-12-30 | End: 2021-12-30

## 2021-12-30 RX ORDER — IPRATROPIUM BROMIDE 0.5 MG/2.5ML
1.5 SOLUTION RESPIRATORY (INHALATION) CONTINUOUS
Status: DISCONTINUED | OUTPATIENT
Start: 2021-12-30 | End: 2021-12-31 | Stop reason: HOSPADM

## 2021-12-30 RX ORDER — HYDRALAZINE HYDROCHLORIDE 20 MG/ML
10 INJECTION INTRAMUSCULAR; INTRAVENOUS
Status: COMPLETED | OUTPATIENT
Start: 2021-12-30 | End: 2021-12-30

## 2021-12-30 RX ADMIN — DEXAMETHASONE SODIUM PHOSPHATE 6 MG: 4 INJECTION INTRA-ARTICULAR; INTRALESIONAL; INTRAMUSCULAR; INTRAVENOUS; SOFT TISSUE at 10:12

## 2021-12-30 RX ADMIN — ALBUTEROL SULFATE 10 MG: 2.5 SOLUTION RESPIRATORY (INHALATION) at 09:12

## 2021-12-30 RX ADMIN — HYDRALAZINE HYDROCHLORIDE 10 MG: 20 INJECTION INTRAMUSCULAR; INTRAVENOUS at 10:12

## 2021-12-30 RX ADMIN — IPRATROPIUM BROMIDE 1.5 MG: 0.5 SOLUTION RESPIRATORY (INHALATION) at 09:12

## 2021-12-31 VITALS
HEART RATE: 87 BPM | BODY MASS INDEX: 32.78 KG/M2 | OXYGEN SATURATION: 96 % | RESPIRATION RATE: 16 BRPM | TEMPERATURE: 98 F | HEIGHT: 64 IN | DIASTOLIC BLOOD PRESSURE: 64 MMHG | SYSTOLIC BLOOD PRESSURE: 146 MMHG | WEIGHT: 192 LBS

## 2021-12-31 PROCEDURE — 63600175 PHARM REV CODE 636 W HCPCS: Performed by: EMERGENCY MEDICINE

## 2021-12-31 PROCEDURE — 25000003 PHARM REV CODE 250: Performed by: EMERGENCY MEDICINE

## 2021-12-31 RX ORDER — CEPHALEXIN 250 MG/1
500 CAPSULE ORAL
Status: COMPLETED | OUTPATIENT
Start: 2021-12-31 | End: 2021-12-31

## 2021-12-31 RX ORDER — CEPHALEXIN 500 MG/1
500 CAPSULE ORAL EVERY 8 HOURS
Qty: 30 CAPSULE | Refills: 0 | Status: SHIPPED | OUTPATIENT
Start: 2021-12-31 | End: 2022-01-10

## 2021-12-31 RX ORDER — ALBUTEROL SULFATE 90 UG/1
2 AEROSOL, METERED RESPIRATORY (INHALATION) EVERY 4 HOURS PRN
Qty: 8.5 G | Refills: 1 | Status: SHIPPED | OUTPATIENT
Start: 2021-12-31 | End: 2023-11-27

## 2021-12-31 RX ORDER — ALBUTEROL SULFATE 2.5 MG/.5ML
2.5 SOLUTION RESPIRATORY (INHALATION) EVERY 4 HOURS PRN
Qty: 30 EACH | Refills: 1 | Status: SHIPPED | OUTPATIENT
Start: 2021-12-31 | End: 2022-04-26 | Stop reason: SDUPTHER

## 2021-12-31 RX ORDER — KETOROLAC TROMETHAMINE 30 MG/ML
15 INJECTION, SOLUTION INTRAMUSCULAR; INTRAVENOUS
Status: COMPLETED | OUTPATIENT
Start: 2021-12-31 | End: 2021-12-31

## 2021-12-31 RX ORDER — HYDROCODONE BITARTRATE AND ACETAMINOPHEN 5; 325 MG/1; MG/1
1 TABLET ORAL EVERY 4 HOURS PRN
Qty: 12 TABLET | Refills: 0 | Status: SHIPPED | OUTPATIENT
Start: 2021-12-31 | End: 2022-05-05

## 2021-12-31 RX ORDER — PREDNISONE 20 MG/1
40 TABLET ORAL DAILY
Qty: 10 TABLET | Refills: 0 | Status: SHIPPED | OUTPATIENT
Start: 2021-12-31 | End: 2022-01-03

## 2021-12-31 RX ORDER — KETOROLAC TROMETHAMINE 10 MG/1
10 TABLET, FILM COATED ORAL EVERY 6 HOURS PRN
Qty: 20 TABLET | Refills: 1 | Status: ON HOLD | OUTPATIENT
Start: 2021-12-31 | End: 2023-05-06 | Stop reason: HOSPADM

## 2021-12-31 RX ADMIN — CEPHALEXIN 500 MG: 250 CAPSULE ORAL at 12:12

## 2021-12-31 RX ADMIN — KETOROLAC TROMETHAMINE 15 MG: 30 INJECTION, SOLUTION INTRAMUSCULAR at 12:12

## 2021-12-31 NOTE — ED NOTES
Patient and family ready for discharge per MD orders. IV DC'd and bandage applied. D/c instructions reviewed with family, verbalize understanding. Encouraged to call MD with any questions or concerns. Aware of need to set follow up appt and verbalize understanding of all medications. Patient and family left at this time with all belongings, RX and discharge instructions to private transportation at this time
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
## 2021-12-31 NOTE — ED NOTES
SOB on arrival , Patient received a breathing TX.   Patient able to ambulate in the hallway   with 94% on Room air

## 2022-01-01 LAB — BACTERIA UR CULT: ABNORMAL

## 2022-01-08 DIAGNOSIS — I10 ESSENTIAL HYPERTENSION: ICD-10-CM

## 2022-01-08 NOTE — TELEPHONE ENCOUNTER
No new care gaps identified.  Powered by [employer]. Reference number: [id number].   1/08/2022 11:40:41 AM CST

## 2022-01-10 RX ORDER — LOSARTAN POTASSIUM 50 MG/1
TABLET ORAL
Qty: 90 TABLET | Refills: 3 | Status: SHIPPED | OUTPATIENT
Start: 2022-01-10 | End: 2022-06-28

## 2022-02-03 DIAGNOSIS — K21.9 GASTROESOPHAGEAL REFLUX DISEASE WITHOUT ESOPHAGITIS: ICD-10-CM

## 2022-02-03 NOTE — TELEPHONE ENCOUNTER
Care Due:                  Date            Visit Type   Department     Provider  --------------------------------------------------------------------------------                                [facility]                              PRIMARY      MED/ INTERNAL  Last Visit: 10-      CARE ([facility])   MED/ PEDS      Renae Sprague                              [facility]      MED/ INTERNAL  Next Visit: 04-      CARE ([facility])   MED/ PEDS      Renae Sprague                                                            Last  Test          Frequency    Reason                     Performed    Due Date  --------------------------------------------------------------------------------    HBA1C.......  6 months...  glipiZIDE, linaGLIPtin...  10-   04-    Powered by [employer]. Reference number: [id number].   2/03/2022 6:32:28 AM CST

## 2022-02-09 RX ORDER — PANTOPRAZOLE SODIUM 20 MG/1
TABLET, DELAYED RELEASE ORAL
Qty: 180 TABLET | Refills: 3 | Status: SHIPPED | OUTPATIENT
Start: 2022-02-09 | End: 2022-11-29

## 2022-02-09 NOTE — TELEPHONE ENCOUNTER
This Rx Request does not qualify for assessment with the OR   Please review protocol details and the Care Due Message for extra clinical information    Reasons Rx Request may be deferred:  Labs/Vitals overdue  Labs/Vitals abnormal  Patient has been seen in the ED/Hospital since the last PCP visit  Medication is non-delegated  Provider is a non-participating provider

## 2022-03-04 NOTE — INTERVAL H&P NOTE
The patient has been examined and the H&P has been reviewed:     I concur with the findings and no changes have occurred since H&P was written.    Anesthesia/Surgery risks, benefits and alternative options discussed and understood by patient/family.          Active Hospital Problems    Diagnosis  POA    Chronic pain [G89.29]  Yes      Resolved Hospital Problems   No resolved problems to display.      given metro card by SW/Walking

## 2022-04-19 NOTE — TELEPHONE ENCOUNTER
Care Due:                  Date            Visit Type   Department     Provider  --------------------------------------------------------------------------------                                [facility]                              PRIMARY      MED/ INTERNAL  Last Visit: 10-      CARE ([facility])   MED/ PEDS      Renae Sprague                              [facility]      MED/ INTERNAL  Next Visit: 07-      CARE ([facility])   MED/ PEDS      Renae Sprague                                                            Last  Test          Frequency    Reason                     Performed    Due Date  --------------------------------------------------------------------------------    HBA1C.......  6 months...  glipiZIDE, linaGLIPtin...  10-   04-    Powered by [employer] by [employer]. Reference number: [id number].   4/19/2022 3:14:24 PM CDT

## 2022-04-20 ENCOUNTER — PATIENT OUTREACH (OUTPATIENT)
Dept: ADMINISTRATIVE | Facility: OTHER | Age: 72
End: 2022-04-20
Payer: MEDICARE

## 2022-04-20 ENCOUNTER — HOSPITAL ENCOUNTER (OUTPATIENT)
Dept: RADIOLOGY | Facility: OTHER | Age: 72
Discharge: HOME | End: 2022-04-20
Attending: STUDENT IN AN ORGANIZED HEALTH CARE EDUCATION/TRAINING PROGRAM
Payer: MEDICARE

## 2022-04-20 DIAGNOSIS — Z96.89 S/P INSERTION OF SPINAL CORD STIMULATOR: ICD-10-CM

## 2022-04-20 DIAGNOSIS — Z12.31 ENCOUNTER FOR SCREENING MAMMOGRAM FOR MALIGNANT NEOPLASM OF BREAST: Primary | ICD-10-CM

## 2022-04-20 PROCEDURE — 72080 XR THORACOLUMBAR SPINE AP LATERAL: ICD-10-PCS | Mod: 26,,, | Performed by: RADIOLOGY

## 2022-04-20 PROCEDURE — 72080 X-RAY EXAM THORACOLMB 2/> VW: CPT | Mod: TC

## 2022-04-20 PROCEDURE — 72080 X-RAY EXAM THORACOLMB 2/> VW: CPT | Mod: 26,,, | Performed by: RADIOLOGY

## 2022-04-20 RX ORDER — ALBUTEROL SULFATE 0.83 MG/ML
SOLUTION RESPIRATORY (INHALATION)
Refills: 0 | OUTPATIENT
Start: 2022-04-20

## 2022-04-20 NOTE — PROGRESS NOTES
Care Everywhere: updated  Immunization: updated  Health Maintenance: updated  Media Review: review for outside mammogram report   Legacy Review:   DIS:  Order placed: mammogram   Upcoming appts:  EFAX:  Task Tickets:  Referrals:

## 2022-04-25 ENCOUNTER — TELEPHONE (OUTPATIENT)
Dept: FAMILY MEDICINE | Facility: CLINIC | Age: 72
End: 2022-04-25
Payer: MEDICARE

## 2022-04-25 NOTE — TELEPHONE ENCOUNTER
----- Message from Vicky Carmen sent at 4/25/2022  2:34 PM CDT -----  Type: Patient Call Back    Who called: self     What is the request in detail: Patient believes she has a sinus infection and would like to know if she can get something prescribed for this.      [facility] [address] [place], [place] - [address]  [address] [place] [address]  Phone: [phone] Fax: [phone]    Can the clinic reply by [facility]? No     Would the patient rather a call back or a response via [facility]? Call back     Best call back number: [phone]

## 2022-04-26 ENCOUNTER — TELEPHONE (OUTPATIENT)
Dept: FAMILY MEDICINE | Facility: CLINIC | Age: 72
End: 2022-04-26

## 2022-04-26 ENCOUNTER — OFFICE VISIT (OUTPATIENT)
Dept: FAMILY MEDICINE | Facility: CLINIC | Age: 72
End: 2022-04-26
Payer: MEDICARE

## 2022-04-26 VITALS
HEART RATE: 65 BPM | SYSTOLIC BLOOD PRESSURE: 130 MMHG | TEMPERATURE: 99 F | DIASTOLIC BLOOD PRESSURE: 64 MMHG | BODY MASS INDEX: 37.01 KG/M2 | WEIGHT: 215.63 LBS | OXYGEN SATURATION: 95 %

## 2022-04-26 DIAGNOSIS — F33.0 MILD RECURRENT MAJOR DEPRESSION: ICD-10-CM

## 2022-04-26 DIAGNOSIS — T82.898A OCCLUSION OF PERIPHERALLY INSERTED CENTRAL CATHETER (PICC) LINE, INITIAL ENCOUNTER: ICD-10-CM

## 2022-04-26 DIAGNOSIS — N18.31 CHRONIC RENAL FAILURE, STAGE 3A: ICD-10-CM

## 2022-04-26 DIAGNOSIS — J01.90 ACUTE BACTERIAL SINUSITIS: ICD-10-CM

## 2022-04-26 DIAGNOSIS — B96.89 ACUTE BACTERIAL SINUSITIS: ICD-10-CM

## 2022-04-26 DIAGNOSIS — F33.41 MAJOR DEPRESSIVE DISORDER, RECURRENT EPISODE, IN PARTIAL REMISSION: ICD-10-CM

## 2022-04-26 DIAGNOSIS — M46.00 SPINAL ENTHESOPATHY, SITE UNSPECIFIED: ICD-10-CM

## 2022-04-26 DIAGNOSIS — J30.2 SEASONAL ALLERGIC RHINITIS, UNSPECIFIED TRIGGER: Primary | ICD-10-CM

## 2022-04-26 DIAGNOSIS — N18.31 TYPE 2 DIABETES MELLITUS WITH STAGE 3A CHRONIC KIDNEY DISEASE, WITHOUT LONG-TERM CURRENT USE OF INSULIN: ICD-10-CM

## 2022-04-26 DIAGNOSIS — E11.22 TYPE 2 DIABETES MELLITUS WITH STAGE 3A CHRONIC KIDNEY DISEASE, WITHOUT LONG-TERM CURRENT USE OF INSULIN: ICD-10-CM

## 2022-04-26 DIAGNOSIS — I10 ESSENTIAL HYPERTENSION: ICD-10-CM

## 2022-04-26 DIAGNOSIS — E66.01 MORBID (SEVERE) OBESITY DUE TO EXCESS CALORIES: ICD-10-CM

## 2022-04-26 PROCEDURE — 4010F PR ACE/ARB THEARPY RXD/TAKEN: ICD-10-PCS | Mod: CPTII,S$GLB,, | Performed by: NURSE PRACTITIONER

## 2022-04-26 PROCEDURE — 1159F PR MEDICATION LIST DOCUMENTED IN MEDICAL RECORD: ICD-10-PCS | Mod: CPTII,S$GLB,, | Performed by: NURSE PRACTITIONER

## 2022-04-26 PROCEDURE — 99999 PR PBB SHADOW E&M-EST. PATIENT-LVL V: ICD-10-PCS | Mod: PBBFAC,,, | Performed by: NURSE PRACTITIONER

## 2022-04-26 PROCEDURE — 3075F PR MOST RECENT SYSTOLIC BLOOD PRESS GE 130-139MM HG: ICD-10-PCS | Mod: CPTII,S$GLB,, | Performed by: NURSE PRACTITIONER

## 2022-04-26 PROCEDURE — 3078F PR MOST RECENT DIASTOLIC BLOOD PRESSURE < 80 MM HG: ICD-10-PCS | Mod: CPTII,S$GLB,, | Performed by: NURSE PRACTITIONER

## 2022-04-26 PROCEDURE — 3008F BODY MASS INDEX DOCD: CPT | Mod: CPTII,S$GLB,, | Performed by: NURSE PRACTITIONER

## 2022-04-26 PROCEDURE — 99499 RISK ADDL DX/OHS AUDIT: ICD-10-PCS | Mod: S$GLB,,, | Performed by: NURSE PRACTITIONER

## 2022-04-26 PROCEDURE — 1100F PR PT FALLS ASSESS DOC 2+ FALLS/FALL W/INJURY/YR: ICD-10-PCS | Mod: CPTII,S$GLB,, | Performed by: NURSE PRACTITIONER

## 2022-04-26 PROCEDURE — 99214 PR OFFICE/OUTPT VISIT, EST, LEVL IV, 30-39 MIN: ICD-10-PCS | Mod: S$GLB,,, | Performed by: NURSE PRACTITIONER

## 2022-04-26 PROCEDURE — 3075F SYST BP GE 130 - 139MM HG: CPT | Mod: CPTII,S$GLB,, | Performed by: NURSE PRACTITIONER

## 2022-04-26 PROCEDURE — 3072F PR LOW RISK FOR RETINOPATHY: ICD-10-PCS | Mod: CPTII,S$GLB,, | Performed by: NURSE PRACTITIONER

## 2022-04-26 PROCEDURE — 3288F FALL RISK ASSESSMENT DOCD: CPT | Mod: CPTII,S$GLB,, | Performed by: NURSE PRACTITIONER

## 2022-04-26 PROCEDURE — 99214 OFFICE O/P EST MOD 30 MIN: CPT | Mod: S$GLB,,, | Performed by: NURSE PRACTITIONER

## 2022-04-26 PROCEDURE — 3288F PR FALLS RISK ASSESSMENT DOCUMENTED: ICD-10-PCS | Mod: CPTII,S$GLB,, | Performed by: NURSE PRACTITIONER

## 2022-04-26 PROCEDURE — 1126F AMNT PAIN NOTED NONE PRSNT: CPT | Mod: CPTII,S$GLB,, | Performed by: NURSE PRACTITIONER

## 2022-04-26 PROCEDURE — 99499 UNLISTED E&M SERVICE: CPT | Mod: S$GLB,,, | Performed by: NURSE PRACTITIONER

## 2022-04-26 PROCEDURE — 1159F MED LIST DOCD IN RCRD: CPT | Mod: CPTII,S$GLB,, | Performed by: NURSE PRACTITIONER

## 2022-04-26 PROCEDURE — 1100F PTFALLS ASSESS-DOCD GE2>/YR: CPT | Mod: CPTII,S$GLB,, | Performed by: NURSE PRACTITIONER

## 2022-04-26 PROCEDURE — 4010F ACE/ARB THERAPY RXD/TAKEN: CPT | Mod: CPTII,S$GLB,, | Performed by: NURSE PRACTITIONER

## 2022-04-26 PROCEDURE — 1126F PR PAIN SEVERITY QUANTIFIED, NO PAIN PRESENT: ICD-10-PCS | Mod: CPTII,S$GLB,, | Performed by: NURSE PRACTITIONER

## 2022-04-26 PROCEDURE — 99999 PR PBB SHADOW E&M-EST. PATIENT-LVL V: CPT | Mod: PBBFAC,,, | Performed by: NURSE PRACTITIONER

## 2022-04-26 PROCEDURE — 3008F PR BODY MASS INDEX (BMI) DOCUMENTED: ICD-10-PCS | Mod: CPTII,S$GLB,, | Performed by: NURSE PRACTITIONER

## 2022-04-26 PROCEDURE — 3072F LOW RISK FOR RETINOPATHY: CPT | Mod: CPTII,S$GLB,, | Performed by: NURSE PRACTITIONER

## 2022-04-26 PROCEDURE — 3078F DIAST BP <80 MM HG: CPT | Mod: CPTII,S$GLB,, | Performed by: NURSE PRACTITIONER

## 2022-04-26 RX ORDER — MONTELUKAST SODIUM 10 MG/1
10 TABLET ORAL NIGHTLY
Qty: 10 TABLET | Refills: 0 | Status: SHIPPED | OUTPATIENT
Start: 2022-04-26 | End: 2022-05-05

## 2022-04-26 RX ORDER — FLUTICASONE PROPIONATE 50 MCG
2 SPRAY, SUSPENSION (ML) NASAL DAILY
Qty: 11.1 ML | Refills: 1 | Status: SHIPPED | OUTPATIENT
Start: 2022-04-26

## 2022-04-26 RX ORDER — PROMETHAZINE HYDROCHLORIDE AND DEXTROMETHORPHAN HYDROBROMIDE 6.25; 15 MG/5ML; MG/5ML
5 SYRUP ORAL EVERY 8 HOURS PRN
Qty: 240 ML | Refills: 0 | Status: SHIPPED | OUTPATIENT
Start: 2022-04-26 | End: 2024-02-29

## 2022-04-26 RX ORDER — ALBUTEROL SULFATE 2.5 MG/.5ML
2.5 SOLUTION RESPIRATORY (INHALATION) EVERY 4 HOURS PRN
Qty: 30 EACH | Refills: 1 | Status: SHIPPED | OUTPATIENT
Start: 2022-04-26 | End: 2023-04-26

## 2022-04-26 NOTE — ASSESSMENT & PLAN NOTE
We discussed weight issues and safe, effective ways of losing pounds, includin) diet:  low carbohydrate, low fat diet, stay away from fast food, fried and processed food, use whole grain, lot of fruits and vegetables, use healthy fat such as avocado, nuts and olive oil in reasonable quantity, stay away from sodas. Regular meals with lean proteins.  2) physical activity: ideally 150 min a week, with cardiovascular and resistance activity.  Patient was encouraged to set realistic attainable goals for weight loss, and we will follow up periodically.

## 2022-04-26 NOTE — ASSESSMENT & PLAN NOTE
/64 (BP Location: Left arm)   Pulse 65   Temp 98.6 °F (37 °C) (Oral)   Wt 97.8 kg (215 lb 9.8 oz)   SpO2 95%   BMI 37.01 kg/m²    -continue current medication regimen  -DASH diet, regular cardiovascular exercises, portion control  - ?weight loss  -f/u with BP logs in 2 weeks if BP is not consistently <140/90

## 2022-04-26 NOTE — ASSESSMENT & PLAN NOTE
Continue ARB, sodium restriction, and avoidance of nephrotoxic agents.    CMP  Sodium   Date Value Ref Range Status   12/30/2021 142 136 - 145 mmol/L Final     Potassium   Date Value Ref Range Status   12/30/2021 4.0 3.5 - 5.1 mmol/L Final     Chloride   Date Value Ref Range Status   12/30/2021 110 95 - 110 mmol/L Final     CO2   Date Value Ref Range Status   12/30/2021 24 23 - 29 mmol/L Final     Glucose   Date Value Ref Range Status   12/30/2021 89 70 - 110 mg/dL Final     BUN   Date Value Ref Range Status   12/30/2021 9 8 - 23 mg/dL Final     Creatinine   Date Value Ref Range Status   12/30/2021 1.0 0.5 - 1.4 mg/dL Final     Calcium   Date Value Ref Range Status   12/30/2021 8.8 8.7 - 10.5 mg/dL Final     Total Protein   Date Value Ref Range Status   12/30/2021 7.7 6.0 - 8.4 g/dL Final     Albumin   Date Value Ref Range Status   12/30/2021 3.2 (L) 3.5 - 5.2 g/dL Final     Total Bilirubin   Date Value Ref Range Status   12/30/2021 0.4 0.1 - 1.0 mg/dL Final     Comment:     For infants and newborns, interpretation of results should be based  on gestational age, weight and in agreement with clinical  observations.    Premature Infant recommended reference ranges:  Up to 24 hours.............<8.0 mg/dL  Up to 48 hours............<12.0 mg/dL  3-5 days..................<15.0 mg/dL  6-29 days.................<15.0 mg/dL       Alkaline Phosphatase   Date Value Ref Range Status   12/30/2021 84 55 - 135 U/L Final     AST   Date Value Ref Range Status   12/30/2021 20 10 - 40 U/L Final     ALT   Date Value Ref Range Status   12/30/2021 14 10 - 44 U/L Final     Anion Gap   Date Value Ref Range Status   12/30/2021 8 8 - 16 mmol/L Final     eGFR if    Date Value Ref Range Status   12/30/2021 >60 >60 mL/min/1.73 m^2 Final     eGFR if non    Date Value Ref Range Status   12/30/2021 56 (A) >60 mL/min/1.73 m^2 Final     Comment:     Calculation used to obtain the estimated glomerular filtration  rate  (eGFR) is the CKD-EPI equation.

## 2022-04-26 NOTE — ASSESSMENT & PLAN NOTE
VSS, afebrile. Lung sound clear on auscultation. Sinus congestion, mid ear effusion bilaterally. Exam and Hx consistent with seasonal allergic rhinitis.    Medication as below    Education provided on condition and medication     Increase hydration, rest, avoid triggers    ED precautions given.   Notify provider if symptoms do not resolve or increase in severity.   Patient verbalizes understanding and agrees with plan of care.

## 2022-04-26 NOTE — ASSESSMENT & PLAN NOTE
Reports mood is stable. Denies thoughts of self harm.    The current medical regimen is effective;  continue present plan and medications.

## 2022-04-26 NOTE — PATIENT INSTRUCTIONS
Patient Education       Cough, Runny Nose, and the Common Cold Discharge Instructions   About this topic   The common cold is an infection in the nose and throat. A tiny germ, called a virus, causes this infection. It often affects your nose, throat, ears, and sinuses. A cold can easily spread from person to person. Coughing, sneezing, or touching something with the germ on it spreads the cold.  Most colds go away on their own without treatment. Antibiotics will not help a cold. Your doctor may order drugs to help with the signs of a cold. Even though you may feel very sick, the common cold is not a health emergency.         What care is needed at home?   Ask your doctor what you need to do when you go home. Make sure you ask questions if you do not understand what the doctor says.  To help you feel better:  Use a cool mist humidifier to avoid breathing dry air.  Use hard candy or cough drops to soothe sore throat and cough.  Gargle with salt water. Mix 1/2 teaspoon (2.5 grams) salt with a cup (240 mL) of warm water a few times a day.  Spray saltwater mist in each nostril. Any normal saline spray works.  Sip warm liquids to keep your throat moist.  Take warm, steamy showers to help soothe the cough.  Do not smoke or be in smoke-filled places. Avoid things that may cause breathing problems like vaping, fumes, pollution, dust, and other common allergens.  You may want to use over-the-counter medicines for allergies or acid reflux if your cough is due to one of these problems.  You can also use an over-the-counter cough medicine.  Drink plenty of fluids whenever you are thirsty.  What follow-up care is needed?   Your doctor may ask you to make visits to the office to check on your progress. Be sure to keep these visits.  What drugs may be needed?   Your doctor may order drugs to:  Help a stuffy nose  Lower a fever  Help with pain  Treat an allergy  Help with cough  Always talk to your doctor before you take any drugs.  This includes over-the-counter (OTC) drugs and herbal supplements. This is very important if you have high blood pressure.  Will physical activity be limited?   Get lots of rest. Sleep when you are feeling tired. Avoid doing tiring activities.  What changes to diet are needed?   Chicken soup may be helpful. Warm fluids help thin mucus and help the body get rid of it easier.  What problems could happen?   Colds may cause signs of asthma for people who have asthma.  Sinus or ear infection  Lung infections  Bronchitis  What can be done to prevent this health problem?   Wash your hands often with soap and water for at least 20 seconds, especially after coughing or sneezing. Alcohol-based hand sanitizers also work to kill the virus.  If you are sick, cover your mouth and nose with tissue when you cough or sneeze. You can also cough into your elbow. Throw away tissues in the trash and wash your hands after touching used tissues.  Clean items and surfaces you most often touch like door handles, remotes, phones, or toys. Wipe them with a disinfectant. This can help reduce the spread of infection.  Do not get too close (kissing, hugging) to people who are sick.  Do not share towels or hankies with anyone who is sick.  Stay away from crowded places.  Keep your hands away from your eyes and nose because the virus can enter easily to those body areas.  Get a flu shot each year.  When do I need to call the doctor?   You have chest pain when you cough or trouble breathing.  You start to cough up blood or yellow or green mucus.  You have a fever of 100.4°F (38°C) or higher or chills.  You cough so hard you throw up.  You are still coughing in 10 days.  Helpful tips   It is normal that mucus from your runny nose may become thicker and change color. Do not take an antibiotic. Instead, drink more water-based fluids, especially hot tea and soups.  Most colds go away within a week. If you are still sick after 14 days, see your  doctor.  Teach Back: Helping You Understand   The Teach Back Method helps you understand the information we are giving you. After you talk with the staff, tell them in your own words what you learned. This helps to make sure the staff has described each thing clearly. It also helps to explain things that may have been confusing. Before going home, make sure you can do these:  I can tell you about my condition.  I can tell you what may help ease my breathing.  I can tell you what I can do to help avoid passing the infection to others.  I can tell you what I will do if I have a fever, chills, or trouble breathing.  Where can I learn more?   American Academy of Family Physicians  https://familydoctor.org/condition/colds-and-the-flu/   American Lung Association  http://www.lung.org/lung-health-and-diseases/lung-disease-lookup/influenza/facts-about-the-common-cold.html   Centers for Disease Control and Prevention  https://www.cdc.gov/features/rhinoviruses/   Kids Health  http://kidshealth.org/en/parents/cold.html?ref=search&WT.ac=ijw-i-zucw-dp-cjulha-vki   Last Reviewed Date   2021-06-09  Consumer Information Use and Disclaimer   This information is not specific medical advice and does not replace information you receive from your health care provider. This is only a brief summary of general information. It does NOT include all information about conditions, illnesses, injuries, tests, procedures, treatments, therapies, discharge instructions or life-style choices that may apply to you. You must talk with your health care provider for complete information about your health and treatment options. This information should not be used to decide whether or not to accept your health care providers advice, instructions or recommendations. Only your health care provider has the knowledge and training to provide advice that is right for you.  Copyright   Copyright © 2021 [employer] and its affiliates and/or licensors. All  rights reserved.  Patient Education       Sinusitis Discharge Instructions, Adult   About this topic   Your sinuses are hollow areas in the bones of your face. They have a thin lining that normally makes a small amount of mucus. When you have sinusitis, the lining gets swollen and makes extra mucus. You may have sinusitis with or after a cold. Most of the time sinusitis will get better in 1 to 2 weeks.  Sinusitis is most often caused by a virus, so antibiotics wont help. But some people do need antibiotics. If the doctor ordered antibiotics for you, be sure to follow the instructions. It is important to take all of your antibiotics even if you start to feel better.       What care is needed at home?   Ask your doctor what you need to do when you go home. Make sure you ask questions if you do not understand what you need to do.  Try to thin the mucus.  Drink lots of liquids to stay hydrated.  Use a cool mist humidifier to avoid dry air.  Use saline nose drops or a saline nose rinse to relieve stuffiness.  Wash your hands often. This will help keep others healthy.  Do not smoke or be in smoke-filled places. Avoid things that may cause breathing problems like fumes, pollution, dust, and other common allergens and irritants.  You may want to take medicine like ibuprofen, naproxen, or acetaminophen to help with pain.  What follow-up care is needed?   Your doctor may ask you to make visits to the office to check on your progress. Be sure to keep your visits.  Your doctor will tell you if other tests are needed.  Your doctor may send you for allergy tests or to an allergy expert.   What lifestyle changes are needed?   Avoid drinks that contain caffeine or alcohol.  Try to stop smoking. Avoid being around others who smoke. Smoke can damage the small hairs inside your sinuses.  What drugs may be needed?   The doctor may order drugs to:  Help with pain and swelling  Fight an infection  Control coughing  Dry up the  sinuses  Help a runny or stuffy nose  Will physical activity be limited?   You do not have to limit your activity. You may want to rest more if you have a fever or headache.   What problems could happen?   Infections that happen again and again  Asthma attack  Coughing  Loss of voice  What can be done to prevent this health problem?   Keep your nose as moist as possible. Use saline sprays, washes, and a humidifier often.  Avoid being around cigarette and cigar smoke or strong odors from chemicals.  Avoid long periods of swimming in pools treated with chlorine. This can bother the lining of the nose and sinuses.  Avoid water diving. This forces water into the sinuses from the nasal passages.  Manage your allergies with your doctor's help.  Use an air conditioner if allergies are a problem.  Before air travel, use a drug to dry up mucus. As the plane takes off or lands, the pressure can cause sinus pain.  When do I need to call the doctor?   You have a stiff neck, especially if you also have fever, chills, vomiting, or severe headache  You have trouble thinking clearly.  You have trouble seeing or have double vision.  You have swelling or redness or pain around one or both eyes.  You have a fever of 102°F (38.9°C) or higher, or have shaking chills or sweats.  You have an upset stomach and throwing up.  You have more pain in your face and head.  You are not getting better within 1 to 2 weeks.  Teach Back: Helping You Understand   The Teach Back Method helps you understand the information we are giving you. After you talk with the staff, tell them in your own words what you learned. This helps to make sure the staff has covered each thing clearly. It also helps to explain things that may have been confusing. Before going home, make sure you can do these:  I can tell you about my condition.  I can tell you what may help ease my breathing.  I can tell you what I will do if I have a fever, chills, or trouble  breathing.  Where can I learn more?   American Academy of Family Physicians  https://familydoctor.org/condition/sinusitis/   Centers for Disease Control and Prevention  https://www.cdc.gov/antibiotic-use/community/for-hcp/outpatient-hcp/adult-treatment-rec.html   Last Reviewed Date   2021-06-09  Consumer Information Use and Disclaimer   This information is not specific medical advice and does not replace information you receive from your health care provider. This is only a brief summary of general information. It does NOT include all information about conditions, illnesses, injuries, tests, procedures, treatments, therapies, discharge instructions or life-style choices that may apply to you. You must talk with your health care provider for complete information about your health and treatment options. This information should not be used to decide whether or not to accept your health care providers advice, instructions or recommendations. Only your health care provider has the knowledge and training to provide advice that is right for you.  Copyright   Copyright © 2021 UpToDate, Inc. and its affiliates and/or licensors. All rights reserved.  Patient Education       Sinusitis in Adults   The Basics   Written by the doctors and editors at [employer]   What is sinusitis? -- Sinusitis is a condition that can cause a stuffy nose, pain in the face, and discharge (mucus) from the nose. The sinuses are hollow areas in the bones of the face (figure 1). They have a thin lining that normally makes a small amount of mucus. When this lining gets irritated or infected, it swells and makes extra mucus. This causes symptoms.  Sinusitis can occur when a person gets sick with a cold. The germs causing the cold can also infect the sinuses. Many times, a person feels like their cold is getting better. But then they get sinusitis and begin to feel sick again.  What are the symptoms of sinusitis? -- Common symptoms of sinusitis  include:  Stuffy or blocked nose  Thick white, yellow, or green discharge from the nose  Pain in the teeth  Pain or pressure in the face - This often feels worse when a person bends forward.  People with sinusitis can also have other symptoms that include:  Fever  Cough  Trouble smelling  Ear pressure or fullness  Headache  Bad breath  Feeling tired  Most of the time, symptoms start to improve in 7 to 10 days.  Should I see a doctor or nurse? -- See your doctor or nurse if your symptoms last more than 10 days, or if your symptoms first get better but then get worse.  Rarely, sinusitis can lead to serious problems. See your doctor or nurse right away (do not wait 10 days) if you have:  Fever higher than 102°F (38.9°C)  Sudden and severe pain in the face and head  Trouble seeing or seeing double  Trouble thinking clearly  Swelling or redness around one or both eyes  A stiff neck  Is there anything I can do on my own to feel better? -- Yes. To reduce your symptoms, you can:  Take an over-the-counter pain reliever to reduce the pain  Rinse your nose and sinuses with salt water a few times a day - Ask your doctor or nurse about the best way to do this.  Your doctor might also prescribe a steroid nose spray to reduce the swelling in your nose. (These kinds of steroid nose sprays are safe to take, and do not contain the same steroids that some athletes take illegally.)  How is sinusitis treated? -- Most of the time, sinusitis does not need to be treated with antibiotic medicines. This is because most sinusitis is caused by viruses, not bacteria, and antibiotics do not kill viruses. In fact, even sinusitis caused by bacteria will usually get better on its own without antibiotics.   Some people with sinusitis do need treatment with antibiotics. If your symptoms have not improved after 10 days, ask your doctor if you should take antibiotics. Your doctor might recommend that you wait 1 more week to see if your symptoms  "improve. But if you have symptoms such as a fever or a lot of pain, they might prescribe antibiotics. It is important to follow your doctor's instructions about taking your antibiotics.  What if my symptoms do not get better? -- If your symptoms do not get better, talk with your doctor or nurse. They might order tests to figure out why you still have symptoms. These can include:  CT scan or other imaging tests - Imaging tests create pictures of the inside of the body.  A test to look inside the sinuses - For this test, a doctor puts a thin tube with a camera on the end into the nose and up into the sinuses.  Some people get a lot of sinus infections or have symptoms that last at least 3 months. These people can have a different type of sinusitis called "chronic sinusitis." Chronic sinusitis can be caused by different things. For example, some people have growths inside their nose or sinuses that are called "polyps." Other people have allergies that cause their symptoms.  Chronic sinusitis can be treated in different ways. If you have chronic sinusitis, talk with your doctor about which treatments are right for you.  All topics are updated as new evidence becomes available and our peer review process is complete.  This topic retrieved from [employer] on: Sep 21, 2021.  Topic 82907 Version 17.0  Release: 29.4.2 - C29.263  © 2021 UpToDate, Inc. and/or its affiliates. All rights reserved.  figure 1: Sinuses of the face     This drawing shows the sinuses of the face, from the side and front views.  Graphic 542872 Version 1.0    Consumer Information Use and Disclaimer   This information is not specific medical advice and does not replace information you receive from your health care provider. This is only a brief summary of general information. It does NOT include all information about conditions, illnesses, injuries, tests, procedures, treatments, therapies, discharge instructions or life-style choices that may apply to " you. You must talk with your health care provider for complete information about your health and treatment options. This information should not be used to decide whether or not to accept your health care provider's advice, instructions or recommendations. Only your health care provider has the knowledge and training to provide advice that is right for you. The use of this information is governed by the [employer] End User License Agreement, available at https://www.[employer]/en/solutions/[employer]/about/jessica.The use of [employer] content is governed by the [employer] Terms of Use. ©2021 [employer] All rights reserved.  Copyright   © 2021 UpToDate, Inc. and/or its affiliates. All rights reserved.

## 2022-04-26 NOTE — ASSESSMENT & PLAN NOTE
-discussed with patient about routine diabetic care that includes but are not limited to regular eye exams, skin care, daily foot exam, proper nutrition, regular BG monitoring at home (fasting and mealtime) and medication compliance in a diabetic.  Target morning BS  and meal-time BS <180    Reports pre prandial BG has averaged 90s    Hemoglobin A1C   Date Value Ref Range Status   10/11/2021 6.5 (H) 4.0 - 5.6 % Final     Comment:     ADA Screening Guidelines:  5.7-6.4%  Consistent with prediabetes  >or=6.5%  Consistent with diabetes    High levels of fetal hemoglobin interfere with the HbA1C  assay. Heterozygous hemoglobin variants (HbS, HgC, etc)do  not significantly interfere with this assay.   However, presence of multiple variants may affect accuracy.     02/10/2021 6.0 (H) 4.0 - 5.6 % Final     Comment:     ADA Screening Guidelines:  5.7-6.4%  Consistent with prediabetes  >or=6.5%  Consistent with diabetes    High levels of fetal hemoglobin interfere with the HbA1C  assay. Heterozygous hemoglobin variants (HbS, HgC, etc)do  not significantly interfere with this assay.   However, presence of multiple variants may affect accuracy.     07/22/2020 6.6 (H) 4.0 - 5.6 % Final     Comment:     ADA Screening Guidelines:  5.7-6.4%  Consistent with prediabetes  >or=6.5%  Consistent with diabetes  High levels of fetal hemoglobin interfere with the HbA1C  assay. Heterozygous hemoglobin variants (HbS, HgC, etc)do  not significantly interfere with this assay.   However, presence of multiple variants may affect accuracy.

## 2022-04-26 NOTE — PROGRESS NOTES
"  HPI     Chief Complaint:  Chief Complaint   Patient presents with    Sinus Problem       Faby Luna is a 71 y.o. female with multiple medical diagnoses as listed in the medical history and problem list that presents for sinus pressure and congestion.  Pt is new to me but is known to this clinic with her last appointment being Visit date not found.      Sinus Problem  This is a recurrent problem. Episode onset: . The problem has been gradually worsening since onset. There has been no fever. Her pain is at a severity of 0/10. Associated symptoms include congestion, coughing (productive yellow), headaches, a hoarse voice, shortness of breath, sinus pressure, sneezing and swollen glands. Pertinent negatives include no chills, diaphoresis, ear pain, neck pain or sore throat.     Pt does reports Hx of recurrent sinusitis. "I usually have symptoms in the spring." Hx of asthma. Pt using albuterol inhaler x2 weekly on average. Pt has been taking claritin with minimal relief.     Patient is doing well and has no other major complaints today.  she is compliant with medications daily without any adverse side effects.    History     Past Medical History:  Past Medical History:   Diagnosis Date    Arthritis     Diabetes mellitus     Hypertension        Past Surgical History:  Past Surgical History:   Procedure Laterality Date    CARPAL TUNNEL RELEASE Right 3/8/2019    Procedure: RELEASE, CARPAL TUNNEL right;  Surgeon: Pan Goodman MD;  Location: [facility];  Service: Orthopedics;  Laterality: Right;    CARPAL TUNNEL RELEASE Left 2019    Procedure: RELEASE, CARPAL TUNNEL- LEFT;  Surgeon: Pan Goodman MD;  Location: [address];  Service: Orthopedics;  Laterality: Left;    CATARACT EXTRACTION Bilateral      SECTION      CHOLECYSTECTOMY      EPIDURAL STEROID INJECTION INTO LUMBAR SPINE N/A 2018    Procedure: INJECTION, STEROID, SPINE, LUMBAR, EPIDURAL;  Surgeon: Shaq Silverio MD; "  Location: [facility];  Service: Pain Management;  Laterality: N/A;  LUMBAR L4-L5 INTERLAMINAR ELISE'  [id number]  W/ SEDATION     HYSTERECTOMY      INJECTION OF ANESTHETIC AGENT AROUND NERVE Bilateral 9/12/2019    Procedure: BLOCK, NERVE, L3-L4-L5 MEDIAL BRANCH;  Surgeon: Shaq Silverio MD;  Location: [facility];  Service: Pain Management;  Laterality: Bilateral;    INJECTION OF ANESTHETIC AGENT AROUND NERVE Bilateral 10/17/2019    Procedure: BLOCK, NERVE;  Surgeon: Shaq Silverio MD;  Location: [facility];  Service: Pain Management;  Laterality: Bilateral;  B/L MBB L3-L4-L5  REPEAT  CONSENT NEEDED    INJECTION OF FACET JOINT Bilateral 5/28/2018    Procedure: INJECTION-FACET;  Surgeon: Shaq Silverio MD;  Location: [facility];  Service: Pain Management;  Laterality: Bilateral;  LUMBAR BILATERAL L4-L5 AND L5-S1 FACET STEROID INJECTION  [id number]    W/ SEDATION     RADIOFREQUENCY ABLATION Right 11/21/2019    Procedure: RADIOFREQUENCY ABLATION RIGHT L3, L4, L5;  Surgeon: Shaq Silverio MD;  Location: [facility];  Service: Pain Management;  Laterality: Right;  Right RFA L3-L4-L5  1 of 2  Consent Needed    RADIOFREQUENCY ABLATION Left 12/5/2019    Procedure: RADIOFREQUENCY ABLATION LEFT L3-5;  Surgeon: Shaq Silverio MD;  Location: [facility];  Service: Pain Management;  Laterality: Left;  Left RFA L3-L4-L5  2 of 2  Consent Needed    RADIOFREQUENCY ABLATION Left 8/17/2020    Procedure: RADIOFREQUENCY ABLATION LEFT L3,4,5 1 of 2;  Surgeon: Shaq Silverio MD;  Location: [facility];  Service: Pain Management;  Laterality: Left;  Left RFA L3,4,5  1 of 2    RADIOFREQUENCY ABLATION Right 8/31/2020    Procedure: RADIOFREQUENCY ABLATION RIGHT L3,4,5 2 of 2;  Surgeon: Shaq Silverio MD;  Location: [facility];  Service: Pain Management;  Laterality: Right;  RADIOFREQUENCY ABLATION RIGHT L3,4,5  2 of 2    RADIOFREQUENCY ABLATION Left 9/9/2021    Procedure: RADIOFREQUENCY ABLATION, L3-L4 AND L5 MEDIAL BRANCH 1 OF 2;   Surgeon: Shaq Silverio MD;  Location: [facility];  Service: Pain Management;  Laterality: Left;    RADIOFREQUENCY ABLATION Right 9/20/2021    Procedure: RADIOFREQUENCY ABLATION, L3-L4 AND L5 MEDIAL BRANCH 2 OF 2;  Surgeon: Shaq Silverio MD;  Location: [facility];  Service: Pain Management;  Laterality: Right;    TRIAL OF SPINAL CORD NERVE STIMULATOR N/A 9/24/2018    Procedure: TRIAL, NEUROSTIMULATOR, SPINAL CORD, SPINAL CORD STIMULATOR TRIAL-INTERNAL WIRES TO EXTERNAL BATTERY;  Surgeon: Shaq Silverio MD;  Location: [facility];  Service: Pain Management;  Laterality: N/A;  ABBOTT REP NOTIFIED       Social History:  Social History     Socioeconomic History    Marital status:    Tobacco Use    Smoking status: Never Smoker    Smokeless tobacco: Never Used   Substance and Sexual Activity    Alcohol use: No    Drug use: No       Family History:  Family History   Problem Relation Age of Onset    Cataracts Mother     Glaucoma Mother     No Known Problems Father     No Known Problems Sister     No Known Problems Brother     No Known Problems Maternal Aunt     No Known Problems Maternal Uncle     No Known Problems Paternal Aunt     No Known Problems Paternal Uncle     No Known Problems Maternal Grandmother     No Known Problems Maternal Grandfather     No Known Problems Paternal Grandmother     No Known Problems Paternal Grandfather        Allergies and Medications: (updated and reviewed)  Review of patient's allergies indicates:   Allergen Reactions    Atorvastatin      Pain    Aspirin Other (See Comments)     Has been told not to take it due to bariatric surgery     Current Outpatient Medications   Medication Sig Dispense Refill    albuterol (PROAIR HFA) 90 mcg/actuation inhaler Inhale 2 puffs into the lungs every 4 (four) hours as needed for Wheezing or Shortness of Breath. Rescue 8.5 g 1    atenoloL (TENORMIN) 100 MG tablet Take 1 tablet (100 mg total) by mouth once daily. 90 tablet 3     blood-glucose meter kit Use as instructed 1 each 0    EScitalopram oxalate (LEXAPRO) 20 MG tablet Take 1 tablet (20 mg total) by mouth once daily. 90 tablet 3    furosemide (LASIX) 20 MG tablet Take 1 tablet (20 mg total) by mouth once daily. 90 tablet 3    gabapentin (NEURONTIN) 300 MG capsule Take 1 capsule (300 mg total) by mouth 3 (three) times daily as needed (pain). 270 capsule 3    glipiZIDE (GLUCOTROL) 10 MG tablet Take 1 tablet (10 mg total) by mouth daily with breakfast. 180 tablet 3    HYDROcodone-acetaminophen (NORCO) 5-325 mg per tablet Take 1 tablet by mouth every 4 (four) hours as needed for Pain. 12 tablet 0    ketorolac (TORADOL) 10 mg tablet Take 1 tablet (10 mg total) by mouth every 6 (six) hours as needed for Pain. Take with food to prevent heartburn. 20 tablet 1    linaGLIPtin (TRADJENTA) 5 mg Tab tablet Take 1 tablet (5 mg total) by mouth once daily. 90 tablet 3    loratadine (CLARITIN) 10 mg tablet Take 1 tablet (10 mg total) by mouth daily as needed for Allergies. 90 tablet 3    losartan (COZAAR) 50 MG tablet Take 1 tablet by mouth once daily 90 tablet 3    nebulizer accessories Misc 1 Device by Misc.(Non-Drug; Combo Route) route as needed (breathing treatments for cough, shortness of breath, wheezing). 1 each 0    pantoprazole (PROTONIX) 20 MG tablet TAKE 1 TABLET TWICE DAILY  BEFORE  MEALS 180 tablet 3    rosuvastatin (CRESTOR) 20 MG tablet Take 1 tablet (20 mg total) by mouth once daily. 30 tablet 11    traZODone (DESYREL) 100 MG tablet Take 1 tablet (100 mg total) by mouth every evening. 90 tablet 3    zonisamide (ZONEGRAN) 100 MG Cap TAKE 4 CAPSULES BY MOUTH AT BEDTIME 360 capsule 0    albuterol sulfate 2.5 mg/0.5 mL Nebu Take 2.5 mg by nebulization every 4 (four) hours as needed (shortness of breath, wheezing, coughing). Rescue 30 each 1    fluticasone propionate (FLONASE) 50 mcg/actuation nasal spray 2 sprays (100 mcg total) by Each Nostril route once daily. 11.1 mL  1    montelukast (SINGULAIR) 10 mg tablet Take 1 tablet (10 mg total) by mouth every evening. for 10 days 10 tablet 0    promethazine-dextromethorphan (PROMETHAZINE-DM) 6.25-15 mg/5 mL Syrp Take 5 mLs by mouth every 8 (eight) hours as needed (cough). 240 mL 0     No current facility-administered medications for this visit.       Exam     Review of Systems:  (as noted above)  Review of Systems   Constitutional: Negative for chills, diaphoresis, fever and unexpected weight change.   HENT: Positive for congestion, hoarse voice, postnasal drip, rhinorrhea, sinus pressure and sneezing. Negative for ear pain, hearing loss, sore throat and trouble swallowing.    Eyes: Negative for discharge, redness and visual disturbance.   Respiratory: Positive for cough (productive yellow) and shortness of breath. Negative for chest tightness and wheezing.    Cardiovascular: Negative for chest pain, palpitations and leg swelling.   Gastrointestinal: Negative for abdominal pain, constipation, diarrhea, nausea and vomiting.   Endocrine: Negative for polydipsia, polyphagia and polyuria.   Genitourinary: Negative for decreased urine volume, dysuria and hematuria.   Musculoskeletal: Negative for arthralgias, joint swelling, myalgias and neck pain.   Skin: Negative for color change and rash.   Neurological: Positive for headaches. Negative for dizziness, weakness and light-headedness.   Psychiatric/Behavioral: Negative for decreased concentration, dysphoric mood, sleep disturbance and suicidal ideas.       Physical Exam:   Physical Exam  Constitutional:       General: She is not in acute distress.     Appearance: She is obese. She is not ill-appearing or diaphoretic.   HENT:      Head: Normocephalic and atraumatic.      Right Ear: A middle ear effusion is present.      Left Ear: A middle ear effusion is present.      Nose: Congestion present.      Right Turbinates: Swollen.      Left Turbinates: Swollen.   Eyes:      General: No scleral  icterus.     Pupils: Pupils are equal, round, and reactive to light.   Neck:      Vascular: No carotid bruit.   Cardiovascular:      Rate and Rhythm: Normal rate and regular rhythm.      Pulses: Normal pulses.      Heart sounds: No murmur heard.    No friction rub. No gallop.   Pulmonary:      Effort: No respiratory distress.      Breath sounds: No wheezing.   Chest:      Chest wall: No tenderness.   Musculoskeletal:         General: No signs of injury.      Cervical back: No rigidity or tenderness.   Lymphadenopathy:      Cervical: No cervical adenopathy.   Skin:     Capillary Refill: Capillary refill takes 2 to 3 seconds.      Findings: No rash.   Neurological:      Mental Status: She is alert.      Cranial Nerves: No cranial nerve deficit.      Sensory: No sensory deficit.      Motor: No weakness.   Psychiatric:         Mood and Affect: Mood normal.       Vitals:    04/26/22 1548   BP: 130/64   BP Location: Left arm   Pulse: 65   Temp: 98.6 °F (37 °C)   TempSrc: Oral   SpO2: 95%   Weight: 97.8 kg (215 lb 9.8 oz)      Body mass index is 37.01 kg/m².    Assessment & Plan   (all problems are new to me)      Problem List Items Addressed This Visit        Psychiatric    Major depressive disorder, recurrent episode, in partial remission    Current Assessment & Plan     Reports mood is stable. Denies thoughts of self harm.    The current medical regimen is effective;  continue present plan and medications.                ENT    Seasonal allergic rhinitis - Primary    Current Assessment & Plan      VSS, afebrile. Lung sound clear on auscultation. Sinus congestion, mid ear effusion bilaterally. Exam and Hx consistent with seasonal allergic rhinitis.    Medication as below    Education provided on condition and medication     Increase hydration, rest, avoid triggers    ED precautions given.   Notify provider if symptoms do not resolve or increase in severity.   Patient verbalizes understanding and agrees with plan of  care.             Relevant Medications    fluticasone propionate (FLONASE) 50 mcg/actuation nasal spray    promethazine-dextromethorphan (PROMETHAZINE-DM) 6.25-15 mg/5 mL Syrp    montelukast (SINGULAIR) 10 mg tablet    albuterol sulfate 2.5 mg/0.5 mL Nebu       Cardiac/Vascular    Essential hypertension    Current Assessment & Plan     /64 (BP Location: Left arm)   Pulse 65   Temp 98.6 °F (37 °C) (Oral)   Wt 97.8 kg (215 lb 9.8 oz)   SpO2 95%   BMI 37.01 kg/m²    -continue current medication regimen  -DASH diet, regular cardiovascular exercises, portion control  - ?weight loss  -f/u with BP logs in 2 weeks if BP is not consistently <140/90                  Renal/    Chronic renal failure, stage 3a    Current Assessment & Plan     Continue ARB, sodium restriction, and avoidance of nephrotoxic agents.    CMP  Sodium   Date Value Ref Range Status   12/30/2021 142 136 - 145 mmol/L Final     Potassium   Date Value Ref Range Status   12/30/2021 4.0 3.5 - 5.1 mmol/L Final     Chloride   Date Value Ref Range Status   12/30/2021 110 95 - 110 mmol/L Final     CO2   Date Value Ref Range Status   12/30/2021 24 23 - 29 mmol/L Final     Glucose   Date Value Ref Range Status   12/30/2021 89 70 - 110 mg/dL Final     BUN   Date Value Ref Range Status   12/30/2021 9 8 - 23 mg/dL Final     Creatinine   Date Value Ref Range Status   12/30/2021 1.0 0.5 - 1.4 mg/dL Final     Calcium   Date Value Ref Range Status   12/30/2021 8.8 8.7 - 10.5 mg/dL Final     Total Protein   Date Value Ref Range Status   12/30/2021 7.7 6.0 - 8.4 g/dL Final     Albumin   Date Value Ref Range Status   12/30/2021 3.2 (L) 3.5 - 5.2 g/dL Final     Total Bilirubin   Date Value Ref Range Status   12/30/2021 0.4 0.1 - 1.0 mg/dL Final     Comment:     For infants and newborns, interpretation of results should be based  on gestational age, weight and in agreement with clinical  observations.    Premature Infant recommended reference ranges:  Up to 24  hours.............<8.0 mg/dL  Up to 48 hours............<12.0 mg/dL  3-5 days..................<15.0 mg/dL  6-29 days.................<15.0 mg/dL       Alkaline Phosphatase   Date Value Ref Range Status   12/30/2021 84 55 - 135 U/L Final     AST   Date Value Ref Range Status   12/30/2021 20 10 - 40 U/L Final     ALT   Date Value Ref Range Status   12/30/2021 14 10 - 44 U/L Final     Anion Gap   Date Value Ref Range Status   12/30/2021 8 8 - 16 mmol/L Final     eGFR if    Date Value Ref Range Status   12/30/2021 >60 >60 mL/min/1.73 m^2 Final     eGFR if non    Date Value Ref Range Status   12/30/2021 56 (A) >60 mL/min/1.73 m^2 Final     Comment:     Calculation used to obtain the estimated glomerular filtration  rate (eGFR) is the CKD-EPI equation.                      Endocrine    Type 2 diabetes mellitus with stage 3a chronic kidney disease, without long-term current use of insulin    Current Assessment & Plan     -discussed with patient about routine diabetic care that includes but are not limited to regular eye exams, skin care, daily foot exam, proper nutrition, regular BG monitoring at home (fasting and mealtime) and medication compliance in a diabetic.  Target morning BS  and meal-time BS <180    Reports pre prandial BG has averaged 90s    Hemoglobin A1C   Date Value Ref Range Status   10/11/2021 6.5 (H) 4.0 - 5.6 % Final     Comment:     ADA Screening Guidelines:  5.7-6.4%  Consistent with prediabetes  >or=6.5%  Consistent with diabetes    High levels of fetal hemoglobin interfere with the HbA1C  assay. Heterozygous hemoglobin variants (HbS, HgC, etc)do  not significantly interfere with this assay.   However, presence of multiple variants may affect accuracy.     02/10/2021 6.0 (H) 4.0 - 5.6 % Final     Comment:     ADA Screening Guidelines:  5.7-6.4%  Consistent with prediabetes  >or=6.5%  Consistent with diabetes    High levels of fetal hemoglobin interfere with the  HbA1C  assay. Heterozygous hemoglobin variants (HbS, HgC, etc)do  not significantly interfere with this assay.   However, presence of multiple variants may affect accuracy.     2020 6.6 (H) 4.0 - 5.6 % Final     Comment:     ADA Screening Guidelines:  5.7-6.4%  Consistent with prediabetes  >or=6.5%  Consistent with diabetes  High levels of fetal hemoglobin interfere with the HbA1C  assay. Heterozygous hemoglobin variants (HbS, HgC, etc)do  not significantly interfere with this assay.   However, presence of multiple variants may affect accuracy.                    Morbid (severe) obesity due to excess calories    Current Assessment & Plan     We discussed weight issues and safe, effective ways of losing pounds, includin) diet:  low carbohydrate, low fat diet, stay away from fast food, fried and processed food, use whole grain, lot of fruits and vegetables, use healthy fat such as avocado, nuts and olive oil in reasonable quantity, stay away from sodas. Regular meals with lean proteins.  2) physical activity: ideally 150 min a week, with cardiovascular and resistance activity.  Patient was encouraged to set realistic attainable goals for weight loss, and we will follow up periodically.             Relevant Orders    Ambulatory referral/consult to Bariatric Medicine       Orthopedic    Spinal enthesopathy, site unspecified    Current Assessment & Plan     The current medical regimen is effective;  continue present plan and medications.                Other    Occlusion of peripherally inserted central catheter (PICC) line, initial encounter    Current Assessment & Plan     Pt no longer has PICC line             Other Visit Diagnoses     Mild recurrent major depression        Acute bacterial sinusitis              --------------------------------------------    Health Maintenance:  Health Maintenance       Date Due Completion Date    Shingles Vaccine (1 of 2) Never done ---    Mammogram 2022     Hemoglobin A1c 04/11/2022 10/11/2021    DEXA Scan 06/24/2022 6/24/2019    Colorectal Cancer Screening 09/13/2022 9/13/2012    Diabetes Urine Screening 10/11/2022 10/11/2021    Foot Exam 10/11/2022 10/11/2021    Override on 7/5/2018: Done    Lipid Panel 10/11/2022 10/11/2021    Eye Exam 12/07/2022 12/7/2021    TETANUS VACCINE 06/10/2029 6/10/2019          Health maintenance reviewed. Vaccines offered patient declined at this time     Follow Up:  Follow up in about 2 weeks (around 5/10/2022), or if symptoms worsen or fail to improve.      The patient expressed understanding and no barriers to adherence were identified.      1. The patient indicates understanding of these issues and agrees with the plan. Brief care plan is updated and reviewed with the patient as applicable.      2. The patient is given an After Visit Summary that lists all medications with directions, allergies, education, orders placed during this encounter and follow-up instructions.      3. I have reviewed the patient's medical information including past medical, family, and social history sections including the medications and allergies.      4. We discussed the patient's current medications.     This note was created by combination of typed  and MModal dictation.  Transcription errors may be present.  If there are any questions, please contact me.       Marcos Santos NP

## 2022-04-26 NOTE — TELEPHONE ENCOUNTER
Lvm for patient to contact [facility] at [phone] to assits with scheduling

## 2022-04-28 ENCOUNTER — TELEPHONE (OUTPATIENT)
Dept: PAIN MEDICINE | Facility: CLINIC | Age: 72
End: 2022-04-28
Payer: MEDICARE

## 2022-05-02 ENCOUNTER — TELEPHONE (OUTPATIENT)
Dept: BARIATRICS | Facility: CLINIC | Age: 72
End: 2022-05-02
Payer: MEDICARE

## 2022-05-02 ENCOUNTER — TELEPHONE (OUTPATIENT)
Dept: PAIN MEDICINE | Facility: CLINIC | Age: 72
End: 2022-05-02
Payer: MEDICARE

## 2022-05-02 NOTE — TELEPHONE ENCOUNTER
This message is for patient in regards to his/her appointment 05/03/22 at 2:40pm      [facility] Policy: For the safety of all patients and staff members.     Patient Visitor policy: During this visit we're asking all patients to only have one visitor over the age of 18yrs old to accompany to be seen by LORNA De La Cruz. If patient do not required assistance with their visit, we're asking all visitors to remain outside the waiting area.    Upon arriving to your schedule appointment; patients are required to wear a face mask, if patient do not have a face mask one will be provided entering the facility. If you have any questions or concerns please contact [phone]

## 2022-05-02 NOTE — TELEPHONE ENCOUNTER
----- Message from Caterina Krishnan sent at 5/2/2022  1:30 PM CDT -----  Who Called: CLAUDIA MARTINEZ    What is the request in detail: Pt called in to make NP appt. Please advise.     Can the clinic reply by [facility]?    Best Call Back Number: [phone]    Additional Information:

## 2022-05-02 NOTE — TELEPHONE ENCOUNTER
Returned pt call in regard to scheduling FC phone call appt. Pt is aware to not come into clinic for Fc appt. Pt stated she had bariatric surgery over 10 years ago at [facility]. Pt will call back once she speaks to the FC to be scheduled for a consult. Informed pt to allow Fc about 1 hour before and 1 hour after scheduled appt to contact her.

## 2022-05-05 ENCOUNTER — OFFICE VISIT (OUTPATIENT)
Dept: PAIN MEDICINE | Facility: CLINIC | Age: 72
End: 2022-05-05
Payer: MEDICARE

## 2022-05-05 VITALS
SYSTOLIC BLOOD PRESSURE: 119 MMHG | HEART RATE: 73 BPM | TEMPERATURE: 98 F | RESPIRATION RATE: 18 BRPM | DIASTOLIC BLOOD PRESSURE: 67 MMHG | WEIGHT: 209.19 LBS | HEIGHT: 64 IN | BODY MASS INDEX: 35.71 KG/M2

## 2022-05-05 DIAGNOSIS — M47.26 OSTEOARTHRITIS OF SPINE WITH RADICULOPATHY, LUMBAR REGION: ICD-10-CM

## 2022-05-05 DIAGNOSIS — M47.816 OSTEOARTHRITIS OF LUMBAR SPINE, UNSPECIFIED SPINAL OSTEOARTHRITIS COMPLICATION STATUS: ICD-10-CM

## 2022-05-05 DIAGNOSIS — M79.18 MYOFASCIAL PAIN: Primary | ICD-10-CM

## 2022-05-05 DIAGNOSIS — M51.36 DDD (DEGENERATIVE DISC DISEASE), LUMBAR: ICD-10-CM

## 2022-05-05 DIAGNOSIS — M54.16 LUMBAR RADICULOPATHY: ICD-10-CM

## 2022-05-05 PROCEDURE — 3072F LOW RISK FOR RETINOPATHY: CPT | Mod: CPTII,S$GLB,, | Performed by: NURSE PRACTITIONER

## 2022-05-05 PROCEDURE — 1101F PT FALLS ASSESS-DOCD LE1/YR: CPT | Mod: CPTII,S$GLB,, | Performed by: NURSE PRACTITIONER

## 2022-05-05 PROCEDURE — 99999 PR PBB SHADOW E&M-EST. PATIENT-LVL IV: ICD-10-PCS | Mod: PBBFAC,,, | Performed by: NURSE PRACTITIONER

## 2022-05-05 PROCEDURE — 3074F SYST BP LT 130 MM HG: CPT | Mod: CPTII,S$GLB,, | Performed by: NURSE PRACTITIONER

## 2022-05-05 PROCEDURE — 3008F BODY MASS INDEX DOCD: CPT | Mod: CPTII,S$GLB,, | Performed by: NURSE PRACTITIONER

## 2022-05-05 PROCEDURE — 1159F PR MEDICATION LIST DOCUMENTED IN MEDICAL RECORD: ICD-10-PCS | Mod: CPTII,S$GLB,, | Performed by: NURSE PRACTITIONER

## 2022-05-05 PROCEDURE — 99999 PR PBB SHADOW E&M-EST. PATIENT-LVL IV: CPT | Mod: PBBFAC,,, | Performed by: NURSE PRACTITIONER

## 2022-05-05 PROCEDURE — 3072F PR LOW RISK FOR RETINOPATHY: ICD-10-PCS | Mod: CPTII,S$GLB,, | Performed by: NURSE PRACTITIONER

## 2022-05-05 PROCEDURE — 4010F PR ACE/ARB THEARPY RXD/TAKEN: ICD-10-PCS | Mod: CPTII,S$GLB,, | Performed by: NURSE PRACTITIONER

## 2022-05-05 PROCEDURE — 99213 PR OFFICE/OUTPT VISIT, EST, LEVL III, 20-29 MIN: ICD-10-PCS | Mod: S$GLB,,, | Performed by: NURSE PRACTITIONER

## 2022-05-05 PROCEDURE — 3078F PR MOST RECENT DIASTOLIC BLOOD PRESSURE < 80 MM HG: ICD-10-PCS | Mod: CPTII,S$GLB,, | Performed by: NURSE PRACTITIONER

## 2022-05-05 PROCEDURE — 99213 OFFICE O/P EST LOW 20 MIN: CPT | Mod: S$GLB,,, | Performed by: NURSE PRACTITIONER

## 2022-05-05 PROCEDURE — 3074F PR MOST RECENT SYSTOLIC BLOOD PRESSURE < 130 MM HG: ICD-10-PCS | Mod: CPTII,S$GLB,, | Performed by: NURSE PRACTITIONER

## 2022-05-05 PROCEDURE — 1125F PR PAIN SEVERITY QUANTIFIED, PAIN PRESENT: ICD-10-PCS | Mod: CPTII,S$GLB,, | Performed by: NURSE PRACTITIONER

## 2022-05-05 PROCEDURE — 1160F RVW MEDS BY RX/DR IN RCRD: CPT | Mod: CPTII,S$GLB,, | Performed by: NURSE PRACTITIONER

## 2022-05-05 PROCEDURE — 1160F PR REVIEW ALL MEDS BY PRESCRIBER/CLIN PHARMACIST DOCUMENTED: ICD-10-PCS | Mod: CPTII,S$GLB,, | Performed by: NURSE PRACTITIONER

## 2022-05-05 PROCEDURE — 1159F MED LIST DOCD IN RCRD: CPT | Mod: CPTII,S$GLB,, | Performed by: NURSE PRACTITIONER

## 2022-05-05 PROCEDURE — 1101F PR PT FALLS ASSESS DOC 0-1 FALLS W/OUT INJ PAST YR: ICD-10-PCS | Mod: CPTII,S$GLB,, | Performed by: NURSE PRACTITIONER

## 2022-05-05 PROCEDURE — 3008F PR BODY MASS INDEX (BMI) DOCUMENTED: ICD-10-PCS | Mod: CPTII,S$GLB,, | Performed by: NURSE PRACTITIONER

## 2022-05-05 PROCEDURE — 4010F ACE/ARB THERAPY RXD/TAKEN: CPT | Mod: CPTII,S$GLB,, | Performed by: NURSE PRACTITIONER

## 2022-05-05 PROCEDURE — 3078F DIAST BP <80 MM HG: CPT | Mod: CPTII,S$GLB,, | Performed by: NURSE PRACTITIONER

## 2022-05-05 PROCEDURE — 3288F PR FALLS RISK ASSESSMENT DOCUMENTED: ICD-10-PCS | Mod: CPTII,S$GLB,, | Performed by: NURSE PRACTITIONER

## 2022-05-05 PROCEDURE — 1125F AMNT PAIN NOTED PAIN PRSNT: CPT | Mod: CPTII,S$GLB,, | Performed by: NURSE PRACTITIONER

## 2022-05-05 PROCEDURE — 3288F FALL RISK ASSESSMENT DOCD: CPT | Mod: CPTII,S$GLB,, | Performed by: NURSE PRACTITIONER

## 2022-05-05 RX ORDER — TIZANIDINE 4 MG/1
4 TABLET ORAL EVERY 8 HOURS PRN
Qty: 90 TABLET | Refills: 0 | Status: SHIPPED | OUTPATIENT
Start: 2022-05-05 | End: 2022-06-17 | Stop reason: SDUPTHER

## 2022-05-05 RX ORDER — GABAPENTIN 400 MG/1
400 CAPSULE ORAL 3 TIMES DAILY
Qty: 90 CAPSULE | Refills: 2 | Status: SHIPPED | OUTPATIENT
Start: 2022-05-05 | End: 2022-08-23

## 2022-05-05 NOTE — PROGRESS NOTES
Chronic patient Established Note (Follow up visit)      SUBJECTIVE:    Interval History 5/5/2022:  The patient is here for follow up of chronic lower back pain. She has radiation into the legs. No recent falls or trauma. She saw Dr. Silverio last month and was under the impression that an [employer] rep would be here today for programming. She is still having difficulty programming her device. She has mild benefit with Gabapentin 900 mg daily without side effects. She is also having muscle spasms intermittently. She is asking for a muscle relaxer. She has tried Robaxin in the past with mild benefit. She would like to try another medication. Her pain today is 8/10.    Interval History 4/20/2022: She presents today for follow up of low back pain. She states that she only had about 40% relief of LBP following RFAs in September that lasted a few weeks. Pain  continues to be in the low back and radiates into b/l LE. Pain encompasses the entire leg and does not follow a specific dermatomal pattern in the leg. She denies weakness, numbness or tingling in the lower extremities. She denies bowel or bladder incontinence. Pain is currently rated 8/10. She is currently on gabapentin 300mg tid with minimal relief. She has been unable to charge bret SCS for the last month and does not have the contact number for her rep.      Interval History 9/30/2021:  The patient returns to clinic today for follow up of low back pain. She is s/p left L3,4,5 RFA on 9/9/2021 and right L3,4,5 RFA on 9/20/2021. She is unsure of relief at this time. She reports increased pain to the right side. She describes this pain as burning in nature. She denies any radiating leg pain. Her pain is worse with bending and standing. She continues to report benefit with [employer] SCS. She denies any weakness. She denies any other health changes. Her pain today is 9/10.    Interval History 8/3/2021:   Ms. Luna returns in f/u re: low back/leg pain. She reports about three  months ago she noticed her low back started bothering her, worse with bending and prolonged standing. No inciting event, no trauma to back since last visit. Reports continued leg pain down the backs of her legs as well. She reports intermittent instability in her legs - on and off - over the last year. Last time she has met with [facility] for reprogramming of SCS was fall 2020. Denies any current issues with SCS function/battery/remote.     Interval History 9/28/2020:  The patient is here today for increased lower back pain.  She is status post right than left L3, 4, 5 radiofrequency ablation completed on 08/31/2020 with approximately 50% relief.  However, she is feeling tightness across the lower back without radiation at this time.  She would like an injection today.  Additionally, she states that she has not completed physical therapy for her back in some time and is not currently doing any exercises.  Her leg pain is currently well controlled with spinal cord stimulator and she had a recent adjustment.  Her pain today is 8/10.    Interval History 7/16/2020:  The patient is here for follow up of lower back pain.  She previously had benefit with lumbar RFAs for similar pain.  She is also having radiation into her legs with numbness, left greater than right.  I[facility] is here today to meet with her for programming.  She previously was having significant benefit of leg pain with SCS implant.  She denies any recent trauma or falls. Her pain today is 9/10.    Interval History 1/9/2020:  The patient is here for follow up of lower back and leg pain.  She is s/p lumbar RFAs with about 50% relief.  Her leg pain is well controlled with implant.  She still has intermittent flare ups of back pain, like today.  When this happens, she has some benefit with rest.  She has tried Tylenol and OTC NSAIDs without benefit.  She denies any recent falls or weakness.  The patient denies any bowel or bladder incontinence or signs of saddle  paresthesia.  The patient denies any major medical changes since last office visit.  Her pain today is 7/10.    Previous Encounter:  Faby Luna presents to the clinic for a follow-up appointment for lower back pain.  She previously had significant leg pain which has resolved with [employer] SCS implant.  She is here today to discuss increased lower back pain.  It is sharp and throbbing in nature.  It is significant in the morning and with prolonged standing and activity.  She has some benefit with stretching.  She denies any recent falls.  Since the last visit, Faby Luna states the pain has been worsening.  Current pain intensity is 8/10.    Pain Disability Index Review:  Last 3 PDI Scores 5/5/2022 4/20/2022 9/30/2021   Pain Disability Index (PDI) 33 50 53       Opioid Contract: no     report:  Not applicable    Pain Procedures:   5/2/18 Left L3 and L4 TF ELISE- 20% relief  5/21/18 Bilateral L4-5 and L5-S1 facet joint injections- no relief  9/24/18 Lumbar SCS trial ([facility])- 100% relief  10/26/18 Lumbar SCS implant ([facility])- 100% relief of leg pain  9/12/19 Bilateral L3,4,5 MBB- helpful  10/17/19 Bilateral L3,4,5 MBB- helpful  11/21/19 Right L3,4,5 RFA- 50% relief  12/5/19 Left L3,4,5 RFA- 50% relief  8/17/20 Left L3,4,5 RFA- 50% relief  8/31/20 Right L3,4,5 RFA- 50% relief  9/9/2021- Left L3,4,5 RFA - 40% relief  9/20/2021- Right L3,4,5 RFA - 40% relief    Physical Therapy/Home Exercise:   yes in the past with limited benefit    Imaging:     3/31/18 Lumbar MRI    Narrative     EXAMINATION:  MRI LUMBAR SPINE WITHOUT CONTRAST    CLINICAL HISTORY:  Low back pain, >6wks conservative tx, persistent-progressive sx, surgical candidate; Other spondylosis with radiculopathy, lumbar region    TECHNIQUE:  Multiplanar, multisequence MR images were acquired from the thoracolumbar junction to the sacrum without the administration of contrast.    COMPARISON:  Plain films from  03/27/2018    FINDINGS:  There is grade 1 spondylolisthesis of L4 on L5. The vertebral body heights are well maintained, with no fracture.  No marrow signal abnormality suspicious for an infiltrative process.    The conus medullaris terminates at approximately the mid body of L1.  There is a cyst associated with the upper pole of the right kidney.  There is disc desiccation noted throughout the lumbar spine with relative sparing of the L5-S1 disc.  Mild disc space narrowing present at the L4-5 level.    L1-L2: Mild diffuse disc bulge resulting in no significant central or neural foraminal canal narrowing.    L2-L3: No significant central canal narrowing.  There is mild narrowing of either neural foraminal canal secondary to disc material.    L3-L4: Disc bulging in the bilateral foraminal regions resulting in no significant central canal narrowing.  Mild-to-moderate bilateral facet arthropathy also noted.  The bilateral neural foraminal canals are moderately narrowed with some mild effacement of either exiting L3 nerve root in the extraforaminal regions bilaterally.    L4-L5:  Grade 1 spondylolisthesis along with moderate to severe bilateral facet arthropathy and ligamentum flavum hypertrophy resulting in at least moderate narrowing of the central canal.  The right neural foraminal canal is mildly to moderately narrowed.  Left neural foraminal canal is moderately to severely narrowed with mild effacement of the exiting L4 nerve root.    L5-S1:  No significant central or neural foraminal canal narrowing noted.  Mild bilateral facet arthropathy noted.   Impression       1. Multilevel degenerative changes of the lumbar spine as detailed above     Lumbar XRAYs 3/27/18    Narrative     EXAMINATION:  XR LUMBAR SPINE AP AND LAT WITH FLEX/EXT    CLINICAL HISTORY:  Low back pain    TECHNIQUE:  AP and lateral views as well as lateral flexion and extension images are performed through the lumbar  spine.    COMPARISON:  None    FINDINGS:  X-ray lumbar spine with flexion and extension demonstrates grade 1 spondylolisthesis at L4-5 measuring around 6 mm which does not change significantly with flexion and extension.  The L4-5 disc is narrowed and degenerated.  Other lumbar vertebral discs are maintained.  Vertebral body heights are maintained.  Small anterior spurs are seen, and there is small posterior osteophyte along the inferior endplate of L4.  There is lumbar facet arthropathy at L4-5 and L5-S1.   Impression       Degenerative changes as above.  Grade 1 anterolisthesis and degenerative disc disease at L4-5.         Allergies:   Review of patient's allergies indicates:   Allergen Reactions    Aspirin Other (See Comments)     Has been told not to take it due to bariatric surgery       Current Medications:   Current Outpatient Medications   Medication Sig Dispense Refill    albuterol (PROAIR HFA) 90 mcg/actuation inhaler Inhale 2 puffs into the lungs every 4 (four) hours as needed for Wheezing or Shortness of Breath. Rescue 8.5 g 1    albuterol sulfate 2.5 mg/0.5 mL Nebu Take 2.5 mg by nebulization every 4 (four) hours as needed (shortness of breath, wheezing, coughing). Rescue 30 each 1    atenoloL (TENORMIN) 100 MG tablet Take 1 tablet (100 mg total) by mouth once daily. 90 tablet 3    blood-glucose meter kit Use as instructed 1 each 0    EScitalopram oxalate (LEXAPRO) 20 MG tablet Take 1 tablet (20 mg total) by mouth once daily. 90 tablet 3    fluticasone propionate (FLONASE) 50 mcg/actuation nasal spray 2 sprays (100 mcg total) by Each Nostril route once daily. 11.1 mL 1    furosemide (LASIX) 20 MG tablet Take 1 tablet (20 mg total) by mouth once daily. 90 tablet 3    gabapentin (NEURONTIN) 300 MG capsule Take 1 capsule (300 mg total) by mouth 3 (three) times daily as needed (pain). 270 capsule 3    glipiZIDE (GLUCOTROL) 10 MG tablet Take 1 tablet (10 mg total) by mouth daily with breakfast.  180 tablet 3    HYDROcodone-acetaminophen (NORCO) 5-325 mg per tablet Take 1 tablet by mouth every 4 (four) hours as needed for Pain. 12 tablet 0    ketorolac (TORADOL) 10 mg tablet Take 1 tablet (10 mg total) by mouth every 6 (six) hours as needed for Pain. Take with food to prevent heartburn. 20 tablet 1    linaGLIPtin (TRADJENTA) 5 mg Tab tablet Take 1 tablet (5 mg total) by mouth once daily. 90 tablet 3    loratadine (CLARITIN) 10 mg tablet Take 1 tablet (10 mg total) by mouth daily as needed for Allergies. 90 tablet 3    losartan (COZAAR) 50 MG tablet Take 1 tablet by mouth once daily 90 tablet 3    pantoprazole (PROTONIX) 20 MG tablet TAKE 1 TABLET TWICE DAILY  BEFORE  MEALS 180 tablet 3    promethazine-dextromethorphan (PROMETHAZINE-DM) 6.25-15 mg/5 mL Syrp Take 5 mLs by mouth every 8 (eight) hours as needed (cough). 240 mL 0    rosuvastatin (CRESTOR) 20 MG tablet Take 1 tablet (20 mg total) by mouth once daily. 30 tablet 11    traZODone (DESYREL) 100 MG tablet Take 1 tablet (100 mg total) by mouth every evening. 90 tablet 3    montelukast (SINGULAIR) 10 mg tablet Take 1 tablet (10 mg total) by mouth every evening. for 10 days 10 tablet 0    nebulizer accessories Misc 1 Device by Misc.(Non-Drug; Combo Route) route as needed (breathing treatments for cough, shortness of breath, wheezing). 1 each 0    zonisamide (ZONEGRAN) 100 MG Cap TAKE 4 CAPSULES BY MOUTH AT BEDTIME 360 capsule 0     No current facility-administered medications for this visit.       REVIEW OF SYSTEMS:    GENERAL:  No weight loss, malaise or fevers.  HEENT:  Negative for frequent or significant headaches.  NECK:  Negative for lumps, goiter, pain and significant neck swelling.  RESPIRATORY:  Negative for cough, wheezing or shortness of breath.  CARDIOVASCULAR:  Negative for chest pain, leg swelling or palpitations. Hypertension.  GI:  Negative for abdominal discomfort, blood in stools or black stools or change in bowel  habits.  MUSCULOSKELETAL:  See HPI.  SKIN:  Negative for lesions, rash, and itching.  PSYCH:  Negative for sleep disturbance, mood disorder and recent psychosocial stressors.  HEMATOLOGY/LYMPHOLOGY:  Negative for prolonged bleeding, bruising easily or swollen nodes.  NEURO:   No history of headaches, syncope, paralysis, seizures or tremors.  ENDO: Diabetes.  All other reviewed and negative other than HPI.    Past Medical History:  Past Medical History:   Diagnosis Date    Arthritis     Diabetes mellitus     Hypertension        Past Surgical History:  Past Surgical History:   Procedure Laterality Date    CARPAL TUNNEL RELEASE Right 3/8/2019    Procedure: RELEASE, CARPAL TUNNEL right;  Surgeon: Pan Goodman MD;  [facility];  Service: Orthopedics;  Laterality: Right;    CARPAL TUNNEL RELEASE Left 2019    Procedure: RELEASE, CARPAL TUNNEL- LEFT;  Surgeon: Pan Goodman MD;  Location: [address];  Service: Orthopedics;  Laterality: Left;    CATARACT EXTRACTION Bilateral      SECTION      CHOLECYSTECTOMY      EPIDURAL STEROID INJECTION INTO LUMBAR SPINE N/A 2018    Procedure: INJECTION, STEROID, SPINE, LUMBAR, EPIDURAL;  Surgeon: Shaq Silverio MD;  Location: [facility];  Service: Pain Management;  Laterality: N/A;  LUMBAR L4-L5 INTERLAMINAR ELISE'  [id number]  W/ SEDATION     HYSTERECTOMY      INJECTION OF ANESTHETIC AGENT AROUND NERVE Bilateral 2019    Procedure: BLOCK, NERVE, L3-L4-L5 MEDIAL BRANCH;  Surgeon: Shaq Silverio MD;  Location: [facility];  Service: Pain Management;  Laterality: Bilateral;    INJECTION OF ANESTHETIC AGENT AROUND NERVE Bilateral 10/17/2019    Procedure: BLOCK, NERVE;  Surgeon: Shaq Silverio MD;  Location: [facility];  Service: Pain Management;  Laterality: Bilateral;  B/L MBB L3-L4-L5  REPEAT  CONSENT NEEDED    INJECTION OF FACET JOINT Bilateral 2018    Procedure: INJECTION-FACET;  Surgeon: Shaq Silverio MD;  Location: [facility];   Service: Pain Management;  Laterality: Bilateral;  LUMBAR BILATERAL L4-L5 AND L5-S1 FACET STEROID INJECTION  [id number]    W/ SEDATION     RADIOFREQUENCY ABLATION Right 11/21/2019    Procedure: RADIOFREQUENCY ABLATION RIGHT L3, L4, L5;  Surgeon: Shaq Silverio MD;  Location: [facility];  Service: Pain Management;  Laterality: Right;  Right RFA L3-L4-L5  1 of 2  Consent Needed    RADIOFREQUENCY ABLATION Left 12/5/2019    Procedure: RADIOFREQUENCY ABLATION LEFT L3-5;  Surgeon: Shaq Silverio MD;  Location: [facility];  Service: Pain Management;  Laterality: Left;  Left RFA L3-L4-L5  2 of 2  Consent Needed    RADIOFREQUENCY ABLATION Left 8/17/2020    Procedure: RADIOFREQUENCY ABLATION LEFT L3,4,5 1 of 2;  Surgeon: Shaq Silverio MD;  Location: [facility];  Service: Pain Management;  Laterality: Left;  Left RFA L3,4,5  1 of 2    RADIOFREQUENCY ABLATION Right 8/31/2020    Procedure: RADIOFREQUENCY ABLATION RIGHT L3,4,5 2 of 2;  Surgeon: Shaq Silverio MD;  Location: [facility];  Service: Pain Management;  Laterality: Right;  RADIOFREQUENCY ABLATION RIGHT L3,4,5  2 of 2    RADIOFREQUENCY ABLATION Left 9/9/2021    Procedure: RADIOFREQUENCY ABLATION, L3-L4 AND L5 MEDIAL BRANCH 1 OF 2;  Surgeon: Shaq Silverio MD;  Location: [facility];  Service: Pain Management;  Laterality: Left;    RADIOFREQUENCY ABLATION Right 9/20/2021    Procedure: RADIOFREQUENCY ABLATION, L3-L4 AND L5 MEDIAL BRANCH 2 OF 2;  Surgeon: Shaq Silverio MD;  Location: [facility];  Service: Pain Management;  Laterality: Right;    TRIAL OF SPINAL CORD NERVE STIMULATOR N/A 9/24/2018    Procedure: TRIAL, NEUROSTIMULATOR, SPINAL CORD, SPINAL CORD STIMULATOR TRIAL-INTERNAL WIRES TO EXTERNAL BATTERY;  Surgeon: Shaq Silverio MD;  Location: [facility];  Service: Pain Management;  Laterality: N/A;  [facility] REP NOTIFIED       Family History:  Family History   Problem Relation Age of Onset    Cataracts Mother     Glaucoma Mother     No Known Problems  "Father     No Known Problems Sister     No Known Problems Brother     No Known Problems Maternal Aunt     No Known Problems Maternal Uncle     No Known Problems Paternal Aunt     No Known Problems Paternal Uncle     No Known Problems Maternal Grandmother     No Known Problems Maternal Grandfather     No Known Problems Paternal Grandmother     No Known Problems Paternal Grandfather        Social History:  Social History     Socioeconomic History    Marital status:    Tobacco Use    Smoking status: Never Smoker    Smokeless tobacco: Never Used   Substance and Sexual Activity    Alcohol use: No    Drug use: No       OBJECTIVE:    /67 (BP Location: Right arm, Patient Position: Sitting, BP Method: Medium (Automatic))   Pulse 73   Temp 97.6 °F (36.4 °C) (Temporal)   Resp 18   Ht 5' 4" (1.626 m)   Wt 94.9 kg (209 lb 3.5 oz)   BMI 35.91 kg/m²     PHYSICAL EXAMINATION:    General appearance: Well appearing, in no acute distress, alert and oriented x3.  Psych:  Mood and affect appropriate.  Skin: Skin color, texture, turgor normal, no rashes or lesions, in both upper and lower body.   Head/face:  Atraumatic, normocephalic. No palpable lymph nodes  Back: Straight leg raising in the sitting and supine positions is negative to radicular pain bilaterally. There is pain with palpation to lumbar paraspinals and facet joints. Limited ROM with mild pain on extension. Positive facet loading bilaterally.  There is no pain with palpation to SI joints.   Extremities: Peripheral joint ROM is full and pain free without obvious instability or laxity in all four extremities. No deformities, edema, or skin discoloration. Good capillary refill.  Musculoskeletal: Normal strength to BLE. No atrophy or tone abnormalities are noted.  Neuro: No loss of sensation.  Gait: Antalgic- ambulates without assistance.    ASSESSMENT: 71 y.o. year old female with back pain, consistent with the following diagnoses:     1. " Myofascial pain     2. DDD (degenerative disc disease), lumbar     3. Lumbar radiculopathy     4. Osteoarthritis of lumbar spine, unspecified spinal osteoarthritis complication status     5. Osteoarthritis of spine with radiculopathy, lumbar region           PLAN:   We discussed with the patient the assessment and recommendations. The following is the plan we agreed on:     - Reached out to [facility] today to ensure that he reaches out to patient to discuss reprogramming of SCS device.     - Increase gabapentin to 400 mg TID (1200 mg daily).    - Start Zanflex Q8h PRN muscle spasms.    - Consider caudal ELISE.    - RTC in 4-6 weeks or sooner if needed.      The above plan and management options were discussed at length with patient. Patient is in agreement with the above and verbalized understanding.    Renetta Steele  05/05/2022

## 2022-06-08 DIAGNOSIS — F33.0 MILD RECURRENT MAJOR DEPRESSION: ICD-10-CM

## 2022-06-08 DIAGNOSIS — F41.9 ANXIETY: ICD-10-CM

## 2022-06-08 NOTE — TELEPHONE ENCOUNTER
No new care gaps identified.  [facility] Embedded Care Gaps. Reference number: [id number]. 6/08/2022   2:14:15 PM CDT

## 2022-06-08 NOTE — TELEPHONE ENCOUNTER
Refill Routing Note   Medication(s) are not appropriate for processing by [facility] for the following reason(s):      - Patient has been seen in the ED/Hospital since the last PCP visit    [facility] action(s):  Defer          Medication reconciliation completed: No     Appointments  past 12m or future 3m with PCP    Date Provider   Last Visit   10/11/2021 Renae Sprague, DO   Next Visit   7/25/2022 Renae Sprague, DO   ED visits in past 90 days: 0        Note composed:2:23 PM 06/08/2022

## 2022-06-09 RX ORDER — ESCITALOPRAM OXALATE 20 MG/1
TABLET ORAL
Qty: 90 TABLET | Refills: 3 | Status: SHIPPED | OUTPATIENT
Start: 2022-06-09 | End: 2023-08-18 | Stop reason: SDUPTHER

## 2022-06-16 ENCOUNTER — TELEPHONE (OUTPATIENT)
Dept: PAIN MEDICINE | Facility: CLINIC | Age: 72
End: 2022-06-16
Payer: MEDICARE

## 2022-06-16 NOTE — TELEPHONE ENCOUNTER
This message is for patient in regards to his/her appointment 06/17/22 at 1:40p  With  LORNA Magana.      [facility] Policy: For the safety of all patients and staff members.     Patient Visitor policy: During this visit we're informing all patients that face mask are now optional, upon visit you have the options of requesting clinical staff to wear a mask for your protection and thiers.     If you have any questions or concerns please contact [phone]      Pt verbalized understanding and has confirmed appt

## 2022-06-17 ENCOUNTER — OFFICE VISIT (OUTPATIENT)
Dept: PAIN MEDICINE | Facility: CLINIC | Age: 72
End: 2022-06-17
Payer: MEDICARE

## 2022-06-17 VITALS
HEIGHT: 64 IN | TEMPERATURE: 98 F | RESPIRATION RATE: 18 BRPM | BODY MASS INDEX: 36.88 KG/M2 | DIASTOLIC BLOOD PRESSURE: 72 MMHG | HEART RATE: 69 BPM | SYSTOLIC BLOOD PRESSURE: 121 MMHG | OXYGEN SATURATION: 99 % | WEIGHT: 216 LBS

## 2022-06-17 DIAGNOSIS — M54.16 LUMBAR RADICULOPATHY: ICD-10-CM

## 2022-06-17 DIAGNOSIS — M47.26 OSTEOARTHRITIS OF SPINE WITH RADICULOPATHY, LUMBAR REGION: ICD-10-CM

## 2022-06-17 DIAGNOSIS — M51.36 DDD (DEGENERATIVE DISC DISEASE), LUMBAR: ICD-10-CM

## 2022-06-17 DIAGNOSIS — Z96.89 S/P INSERTION OF SPINAL CORD STIMULATOR: ICD-10-CM

## 2022-06-17 DIAGNOSIS — M47.816 OSTEOARTHRITIS OF LUMBAR SPINE, UNSPECIFIED SPINAL OSTEOARTHRITIS COMPLICATION STATUS: Primary | ICD-10-CM

## 2022-06-17 DIAGNOSIS — M79.18 MYOFASCIAL PAIN: ICD-10-CM

## 2022-06-17 PROCEDURE — 4010F PR ACE/ARB THEARPY RXD/TAKEN: ICD-10-PCS | Mod: CPTII,S$GLB,, | Performed by: NURSE PRACTITIONER

## 2022-06-17 PROCEDURE — 1160F RVW MEDS BY RX/DR IN RCRD: CPT | Mod: CPTII,S$GLB,, | Performed by: NURSE PRACTITIONER

## 2022-06-17 PROCEDURE — 3288F PR FALLS RISK ASSESSMENT DOCUMENTED: ICD-10-PCS | Mod: CPTII,S$GLB,, | Performed by: NURSE PRACTITIONER

## 2022-06-17 PROCEDURE — 3008F PR BODY MASS INDEX (BMI) DOCUMENTED: ICD-10-PCS | Mod: CPTII,S$GLB,, | Performed by: NURSE PRACTITIONER

## 2022-06-17 PROCEDURE — 99213 OFFICE O/P EST LOW 20 MIN: CPT | Mod: S$GLB,,, | Performed by: NURSE PRACTITIONER

## 2022-06-17 PROCEDURE — 1125F AMNT PAIN NOTED PAIN PRSNT: CPT | Mod: CPTII,S$GLB,, | Performed by: NURSE PRACTITIONER

## 2022-06-17 PROCEDURE — 1101F PR PT FALLS ASSESS DOC 0-1 FALLS W/OUT INJ PAST YR: ICD-10-PCS | Mod: CPTII,S$GLB,, | Performed by: NURSE PRACTITIONER

## 2022-06-17 PROCEDURE — 3074F SYST BP LT 130 MM HG: CPT | Mod: CPTII,S$GLB,, | Performed by: NURSE PRACTITIONER

## 2022-06-17 PROCEDURE — 3078F DIAST BP <80 MM HG: CPT | Mod: CPTII,S$GLB,, | Performed by: NURSE PRACTITIONER

## 2022-06-17 PROCEDURE — 99999 PR PBB SHADOW E&M-EST. PATIENT-LVL V: ICD-10-PCS | Mod: PBBFAC,,, | Performed by: NURSE PRACTITIONER

## 2022-06-17 PROCEDURE — 1159F PR MEDICATION LIST DOCUMENTED IN MEDICAL RECORD: ICD-10-PCS | Mod: CPTII,S$GLB,, | Performed by: NURSE PRACTITIONER

## 2022-06-17 PROCEDURE — 99999 PR PBB SHADOW E&M-EST. PATIENT-LVL V: CPT | Mod: PBBFAC,,, | Performed by: NURSE PRACTITIONER

## 2022-06-17 PROCEDURE — 1159F MED LIST DOCD IN RCRD: CPT | Mod: CPTII,S$GLB,, | Performed by: NURSE PRACTITIONER

## 2022-06-17 PROCEDURE — 99213 PR OFFICE/OUTPT VISIT, EST, LEVL III, 20-29 MIN: ICD-10-PCS | Mod: S$GLB,,, | Performed by: NURSE PRACTITIONER

## 2022-06-17 PROCEDURE — 3008F BODY MASS INDEX DOCD: CPT | Mod: CPTII,S$GLB,, | Performed by: NURSE PRACTITIONER

## 2022-06-17 PROCEDURE — 1160F PR REVIEW ALL MEDS BY PRESCRIBER/CLIN PHARMACIST DOCUMENTED: ICD-10-PCS | Mod: CPTII,S$GLB,, | Performed by: NURSE PRACTITIONER

## 2022-06-17 PROCEDURE — 1101F PT FALLS ASSESS-DOCD LE1/YR: CPT | Mod: CPTII,S$GLB,, | Performed by: NURSE PRACTITIONER

## 2022-06-17 PROCEDURE — 3072F LOW RISK FOR RETINOPATHY: CPT | Mod: CPTII,S$GLB,, | Performed by: NURSE PRACTITIONER

## 2022-06-17 PROCEDURE — 1125F PR PAIN SEVERITY QUANTIFIED, PAIN PRESENT: ICD-10-PCS | Mod: CPTII,S$GLB,, | Performed by: NURSE PRACTITIONER

## 2022-06-17 PROCEDURE — 3288F FALL RISK ASSESSMENT DOCD: CPT | Mod: CPTII,S$GLB,, | Performed by: NURSE PRACTITIONER

## 2022-06-17 PROCEDURE — 4010F ACE/ARB THERAPY RXD/TAKEN: CPT | Mod: CPTII,S$GLB,, | Performed by: NURSE PRACTITIONER

## 2022-06-17 PROCEDURE — 3074F PR MOST RECENT SYSTOLIC BLOOD PRESSURE < 130 MM HG: ICD-10-PCS | Mod: CPTII,S$GLB,, | Performed by: NURSE PRACTITIONER

## 2022-06-17 PROCEDURE — 3078F PR MOST RECENT DIASTOLIC BLOOD PRESSURE < 80 MM HG: ICD-10-PCS | Mod: CPTII,S$GLB,, | Performed by: NURSE PRACTITIONER

## 2022-06-17 PROCEDURE — 3072F PR LOW RISK FOR RETINOPATHY: ICD-10-PCS | Mod: CPTII,S$GLB,, | Performed by: NURSE PRACTITIONER

## 2022-06-17 RX ORDER — TIZANIDINE 4 MG/1
4 TABLET ORAL EVERY 8 HOURS PRN
Qty: 90 TABLET | Refills: 2 | Status: SHIPPED | OUTPATIENT
Start: 2022-06-17 | End: 2022-09-21

## 2022-06-17 NOTE — H&P (VIEW-ONLY)
Chronic patient Established Note (Follow up visit)      SUBJECTIVE:    Interval History 6/17/2022:  The patient is here today for follow up of back pain. She has had more aching pain across the lower back. She had benefit with lumbar RFAs in the past and would like to reschedule this. She would also like to repeat aquatic PT as this was helpful in the past. Her pain is aching and throbbing in nature without radiation currently. No new falls or trauma. Her pain today is 8/10.    Interval History 5/5/2022:  The patient is here for follow up of chronic lower back pain. She has radiation into the legs. No recent falls or trauma. She saw Dr. Silverio last month and was under the impression that an [employer] rep would be here today for programming. She is still having difficulty programming her device. She has mild benefit with Gabapentin 900 mg daily without side effects. She is also having muscle spasms intermittently. She is asking for a muscle relaxer. She has tried Robaxin in the past with mild benefit. She would like to try another medication. Her pain today is 8/10.    Interval History 4/20/2022: She presents today for follow up of low back pain. She states that she only had about 40% relief of LBP following RFAs in September that lasted a few weeks. Pain  continues to be in the low back and radiates into b/l LE. Pain encompasses the entire leg and does not follow a specific dermatomal pattern in the leg. She denies weakness, numbness or tingling in the lower extremities. She denies bowel or bladder incontinence. Pain is currently rated 8/10. She is currently on gabapentin 300mg tid with minimal relief. She has been unable to charge bret SCS for the last month and does not have the contact number for her rep.      Interval History 9/30/2021:  The patient returns to clinic today for follow up of low back pain. She is s/p left L3,4,5 RFA on 9/9/2021 and right L3,4,5 RFA on 9/20/2021. She is unsure of relief at this time.  She reports increased pain to the right side. She describes this pain as burning in nature. She denies any radiating leg pain. Her pain is worse with bending and standing. She continues to report benefit with [employer] SCS. She denies any weakness. She denies any other health changes. Her pain today is 9/10.    Interval History 8/3/2021:   Ms. Luna returns in f/u re: low back/leg pain. She reports about three months ago she noticed her low back started bothering her, worse with bending and prolonged standing. No inciting event, no trauma to back since last visit. Reports continued leg pain down the backs of her legs as well. She reports intermittent instability in her legs - on and off - over the last year. Last time she has met with [employer] rep for reprogramming of SCS was fall 2020. Denies any current issues with SCS function/battery/remote.     Interval History 9/28/2020:  The patient is here today for increased lower back pain.  She is status post right than left L3, 4, 5 radiofrequency ablation completed on 08/31/2020 with approximately 50% relief.  However, she is feeling tightness across the lower back without radiation at this time.  She would like an injection today.  Additionally, she states that she has not completed physical therapy for her back in some time and is not currently doing any exercises.  Her leg pain is currently well controlled with spinal cord stimulator and she had a recent adjustment.  Her pain today is 8/10.    Interval History 7/16/2020:  The patient is here for follow up of lower back pain.  She previously had benefit with lumbar RFAs for similar pain.  She is also having radiation into her legs with numbness, left greater than right.  Ildefonso is here today to meet with her for programming.  She previously was having significant benefit of leg pain with SCS implant.  She denies any recent trauma or falls. Her pain today is 9/10.    Interval History 1/9/2020:  The patient is here for follow  up of lower back and leg pain.  She is s/p lumbar RFAs with about 50% relief.  Her leg pain is well controlled with implant.  She still has intermittent flare ups of back pain, like today.  When this happens, she has some benefit with rest.  She has tried Tylenol and OTC NSAIDs without benefit.  She denies any recent falls or weakness.  The patient denies any bowel or bladder incontinence or signs of saddle paresthesia.  The patient denies any major medical changes since last office visit.  Her pain today is 7/10.    Previous Encounter:  Faby Luna presents to the clinic for a follow-up appointment for lower back pain.  She previously had significant leg pain which has resolved with [employer] SCS implant.  She is here today to discuss increased lower back pain.  It is sharp and throbbing in nature.  It is significant in the morning and with prolonged standing and activity.  She has some benefit with stretching.  She denies any recent falls.  Since the last visit, Faby Luna states the pain has been worsening.  Current pain intensity is 8/10.    Pain Disability Index Review:  Last 3 PDI Scores 6/17/2022 5/5/2022 4/20/2022   Pain Disability Index (PDI) 37 33 50       Opioid Contract: no     report:  Not applicable    Pain Procedures:   5/2/18 Left L3 and L4 TF ELISE- 20% relief  5/21/18 Bilateral L4-5 and L5-S1 facet joint injections- no relief  9/24/18 Lumbar SCS trial ([employer])- 100% relief  10/26/18 Lumbar SCS implant ([employer])- 100% relief of leg pain  9/12/19 Bilateral L3,4,5 MBB- helpful  10/17/19 Bilateral L3,4,5 MBB- helpful  11/21/19 Right L3,4,5 RFA- 50% relief  12/5/19 Left L3,4,5 RFA- 50% relief  8/17/20 Left L3,4,5 RFA- 50% relief  8/31/20 Right L3,4,5 RFA- 50% relief  9/9/2021- Left L3,4,5 RFA - 60% relief  9/20/2021- Right L3,4,5 RFA -60% relief    Physical Therapy/Home Exercise:   yes in the past with limited benefit    Imaging:     3/31/18 Lumbar MRI    Narrative      EXAMINATION:  MRI LUMBAR SPINE WITHOUT CONTRAST    CLINICAL HISTORY:  Low back pain, >6wks conservative tx, persistent-progressive sx, surgical candidate; Other spondylosis with radiculopathy, lumbar region    TECHNIQUE:  Multiplanar, multisequence MR images were acquired from the thoracolumbar junction to the sacrum without the administration of contrast.    COMPARISON:  Plain films from 03/27/2018    FINDINGS:  There is grade 1 spondylolisthesis of L4 on L5. The vertebral body heights are well maintained, with no fracture.  No marrow signal abnormality suspicious for an infiltrative process.    The conus medullaris terminates at approximately the mid body of L1.  There is a cyst associated with the upper pole of the right kidney.  There is disc desiccation noted throughout the lumbar spine with relative sparing of the L5-S1 disc.  Mild disc space narrowing present at the L4-5 level.    L1-L2: Mild diffuse disc bulge resulting in no significant central or neural foraminal canal narrowing.    L2-L3: No significant central canal narrowing.  There is mild narrowing of either neural foraminal canal secondary to disc material.    L3-L4: Disc bulging in the bilateral foraminal regions resulting in no significant central canal narrowing.  Mild-to-moderate bilateral facet arthropathy also noted.  The bilateral neural foraminal canals are moderately narrowed with some mild effacement of either exiting L3 nerve root in the extraforaminal regions bilaterally.    L4-L5:  Grade 1 spondylolisthesis along with moderate to severe bilateral facet arthropathy and ligamentum flavum hypertrophy resulting in at least moderate narrowing of the central canal.  The right neural foraminal canal is mildly to moderately narrowed.  Left neural foraminal canal is moderately to severely narrowed with mild effacement of the exiting L4 nerve root.    L5-S1:  No significant central or neural foraminal canal narrowing noted.  Mild bilateral  facet arthropathy noted.   Impression       1. Multilevel degenerative changes of the lumbar spine as detailed above     Lumbar XRAYs 3/27/18    Narrative     EXAMINATION:  XR LUMBAR SPINE AP AND LAT WITH FLEX/EXT    CLINICAL HISTORY:  Low back pain    TECHNIQUE:  AP and lateral views as well as lateral flexion and extension images are performed through the lumbar spine.    COMPARISON:  None    FINDINGS:  X-ray lumbar spine with flexion and extension demonstrates grade 1 spondylolisthesis at L4-5 measuring around 6 mm which does not change significantly with flexion and extension.  The L4-5 disc is narrowed and degenerated.  Other lumbar vertebral discs are maintained.  Vertebral body heights are maintained.  Small anterior spurs are seen, and there is small posterior osteophyte along the inferior endplate of L4.  There is lumbar facet arthropathy at L4-5 and L5-S1.   Impression       Degenerative changes as above.  Grade 1 anterolisthesis and degenerative disc disease at L4-5.         Allergies:   Review of patient's allergies indicates:   Allergen Reactions    Aspirin Other (See Comments)     Has been told not to take it due to bariatric surgery       Current Medications:   Current Outpatient Medications   Medication Sig Dispense Refill    albuterol (PROAIR HFA) 90 mcg/actuation inhaler Inhale 2 puffs into the lungs every 4 (four) hours as needed for Wheezing or Shortness of Breath. Rescue 8.5 g 1    albuterol sulfate 2.5 mg/0.5 mL Nebu Take 2.5 mg by nebulization every 4 (four) hours as needed (shortness of breath, wheezing, coughing). Rescue 30 each 1    atenoloL (TENORMIN) 100 MG tablet Take 1 tablet (100 mg total) by mouth once daily. 90 tablet 3    EScitalopram oxalate (LEXAPRO) 20 MG tablet TAKE 1 TABLET EVERY DAY 90 tablet 3    fluticasone propionate (FLONASE) 50 mcg/actuation nasal spray 2 sprays (100 mcg total) by Each Nostril route once daily. 11.1 mL 1    furosemide (LASIX) 20 MG tablet Take 1  tablet (20 mg total) by mouth once daily. 90 tablet 3    gabapentin (NEURONTIN) 400 MG capsule Take 1 capsule (400 mg total) by mouth 3 (three) times daily. 90 capsule 2    glipiZIDE (GLUCOTROL) 10 MG tablet Take 1 tablet (10 mg total) by mouth daily with breakfast. 180 tablet 3    linaGLIPtin (TRADJENTA) 5 mg Tab tablet Take 1 tablet (5 mg total) by mouth once daily. 90 tablet 3    loratadine (CLARITIN) 10 mg tablet Take 1 tablet (10 mg total) by mouth daily as needed for Allergies. 90 tablet 3    losartan (COZAAR) 50 MG tablet Take 1 tablet by mouth once daily 90 tablet 3    pantoprazole (PROTONIX) 20 MG tablet TAKE 1 TABLET TWICE DAILY  BEFORE  MEALS 180 tablet 3    rosuvastatin (CRESTOR) 20 MG tablet Take 1 tablet (20 mg total) by mouth once daily. 30 tablet 11    traZODone (DESYREL) 100 MG tablet Take 1 tablet (100 mg total) by mouth every evening. 90 tablet 3    blood-glucose meter kit Use as instructed 1 each 0    ketorolac (TORADOL) 10 mg tablet Take 1 tablet (10 mg total) by mouth every 6 (six) hours as needed for Pain. Take with food to prevent heartburn. (Patient not taking: Reported on 6/17/2022) 20 tablet 1    promethazine-dextromethorphan (PROMETHAZINE-DM) 6.25-15 mg/5 mL Syrp Take 5 mLs by mouth every 8 (eight) hours as needed (cough). (Patient not taking: Reported on 6/17/2022) 240 mL 0     No current facility-administered medications for this visit.       REVIEW OF SYSTEMS:    GENERAL:  No weight loss, malaise or fevers.  HEENT:  Negative for frequent or significant headaches.  NECK:  Negative for lumps, goiter, pain and significant neck swelling.  RESPIRATORY:  Negative for cough, wheezing or shortness of breath.  CARDIOVASCULAR:  Negative for chest pain, leg swelling or palpitations. Hypertension.  GI:  Negative for abdominal discomfort, blood in stools or black stools or change in bowel habits.  MUSCULOSKELETAL:  See HPI.  SKIN:  Negative for lesions, rash, and itching.  PSYCH:   Negative for sleep disturbance, mood disorder and recent psychosocial stressors.  HEMATOLOGY/LYMPHOLOGY:  Negative for prolonged bleeding, bruising easily or swollen nodes.  NEURO:   No history of headaches, syncope, paralysis, seizures or tremors.  ENDO: Diabetes.  All other reviewed and negative other than HPI.    Past Medical History:  Past Medical History:   Diagnosis Date    Arthritis     Diabetes mellitus     Hypertension        Past Surgical History:  Past Surgical History:   Procedure Laterality Date    CARPAL TUNNEL RELEASE Right 3/8/2019    Procedure: RELEASE, CARPAL TUNNEL right;  Surgeon: Pan Goodman MD;  Location: [facility] OR;  Service: Orthopedics;  Laterality: Right;    CARPAL TUNNEL RELEASE Left 2019    Procedure: RELEASE, CARPAL TUNNEL- LEFT;  Surgeon: Pan Goodman MD;  Location: [facility] OR [address] [place] [address];  Service: Orthopedics;  Laterality: Left;    CATARACT EXTRACTION Bilateral      SECTION      CHOLECYSTECTOMY      EPIDURAL STEROID INJECTION INTO LUMBAR SPINE N/A 2018    Procedure: INJECTION, STEROID, SPINE, LUMBAR, EPIDURAL;  Surgeon: Shaq Silverio MD;  Location: [facility] PAIN MGT;  Service: Pain Management;  Laterality: N/A;  LUMBAR L4-L5 INTERLAMINAR ELISE'  [id number]  W/ SEDATION     HYSTERECTOMY      INJECTION OF ANESTHETIC AGENT AROUND NERVE Bilateral 2019    Procedure: BLOCK, NERVE, L3-L4-L5 MEDIAL BRANCH;  Surgeon: Shaq Silverio MD;  Location: [facility] PAIN MGT;  Service: Pain Management;  Laterality: Bilateral;    INJECTION OF ANESTHETIC AGENT AROUND NERVE Bilateral 10/17/2019    Procedure: BLOCK, NERVE;  Surgeon: Shaq Silverio MD;  Location: [facility] PAIN MGT;  Service: Pain Management;  Laterality: Bilateral;  B/L MBB L3-L4-L5  REPEAT  CONSENT NEEDED    INJECTION OF FACET JOINT Bilateral 2018    Procedure: INJECTION-FACET;  Surgeon: Shaq Silverio MD;  Location: [facility] PAIN MGT;  Service: Pain Management;  Laterality: Bilateral;  LUMBAR BILATERAL L4-L5 AND L5-S1 FACET STEROID  INJECTION  [id number]    W/ SEDATION     RADIOFREQUENCY ABLATION Right 11/21/2019    Procedure: RADIOFREQUENCY ABLATION RIGHT L3, L4, L5;  Surgeon: Shaq Silverio MD;  Location: [facility] PAIN MGT;  Service: Pain Management;  Laterality: Right;  Right RFA L3-L4-L5  1 of 2  Consent Needed    RADIOFREQUENCY ABLATION Left 12/5/2019    Procedure: RADIOFREQUENCY ABLATION LEFT L3-5;  Surgeon: Shaq Silverio MD;  Location: [facility] PAIN MGT;  Service: Pain Management;  Laterality: Left;  Left RFA L3-L4-L5  2 of 2  Consent Needed    RADIOFREQUENCY ABLATION Left 8/17/2020    Procedure: RADIOFREQUENCY ABLATION LEFT L3,4,5 1 of 2;  Surgeon: Shaq Silverio MD;  Location: [facility] PAIN MGT;  Service: Pain Management;  Laterality: Left;  Left RFA L3,4,5  1 of 2    RADIOFREQUENCY ABLATION Right 8/31/2020    Procedure: RADIOFREQUENCY ABLATION RIGHT L3,4,5 2 of 2;  Surgeon: Shaq Silverio MD;  Location: [facility] PAIN [facility]GT;  Service: Pain Management;  Laterality: Right;  RADIOFREQUENCY ABLATION RIGHT L3,4,5  2 of 2    RADIOFREQUENCY ABLATION Left 9/9/2021    Procedure: RADIOFREQUENCY ABLATION, L3-L4 AND L5 MEDIAL BRANCH 1 OF 2;  Surgeon: Shaq Silverio MD;  Location: [facility] PAIN [facility]GT;  Service: Pain Management;  Laterality: Left;    RADIOFREQUENCY ABLATION Right 9/20/2021    Procedure: RADIOFREQUENCY ABLATION, L3-L4 AND L5 MEDIAL BRANCH 2 OF 2;  Surgeon: Shaq Silverio MD;  Location: [facility] PAIN MGT;  Service: Pain Management;  Laterality: Right;    TRIAL OF SPINAL CORD NERVE STIMULATOR N/A 9/24/2018    Procedure: TRIAL, NEUROSTIMULATOR, SPINAL CORD, SPINAL CORD STIMULATOR TRIAL-INTERNAL WIRES TO EXTERNAL BATTERY;  Surgeon: Shaq Silverio MD;  Location: [facility] PAIN [facility]GT;  Service: Pain Management;  Laterality: N/A;  [employer] REP NOTIFIED       Family History:  Family History   Problem Relation Age of Onset    Cataracts Mother     Glaucoma Mother     No Known Problems Father     No Known Problems Sister     No Known Problems Brother     No Known Problems  "Maternal Aunt     No Known Problems Maternal Uncle     No Known Problems Paternal Aunt     No Known Problems Paternal Uncle     No Known Problems Maternal Grandmother     No Known Problems Maternal Grandfather     No Known Problems Paternal Grandmother     No Known Problems Paternal Grandfather        Social History:  Social History     Socioeconomic History    Marital status:    Tobacco Use    Smoking status: Never Smoker    Smokeless tobacco: Never Used   Substance and Sexual Activity    Alcohol use: No    Drug use: No       OBJECTIVE:    /72 (BP Location: Right arm)   Pulse 69   Temp 98.2 °F (36.8 °C)   Resp 18   Ht 5' 4" (1.626 m)   Wt 98 kg (216 lb)   SpO2 99%   BMI 37.08 kg/m²     PHYSICAL EXAMINATION:    General appearance: Well appearing, in no acute distress, alert and oriented x3.  Psych:  Mood and affect appropriate.  Skin: Skin color, texture, turgor normal, no rashes or lesions, in both upper and lower body.   Head/face:  Atraumatic, normocephalic. No palpable lymph nodes  Back: Straight leg raising in the sitting and supine positions is negative to radicular pain bilaterally. There is pain with palpation to lumbar paraspinals and facet joints. Limited ROM with pain on extension. Positive facet loading bilaterally.  There is no pain with palpation to SI joints.   Extremities: Peripheral joint ROM is full and pain free without obvious instability or laxity in all four extremities. No deformities, edema, or skin discoloration. Good capillary refill.  Musculoskeletal: Normal strength to BLE. No atrophy or tone abnormalities are noted.  Neuro: No loss of sensation.  Gait: Antalgic.    ASSESSMENT: 71 y.o. year old female with back pain, consistent with the following diagnoses:     1. Myofascial pain  Ambulatory referral/consult to Physical/Occupational Therapy   2. Lumbar radiculopathy  Ambulatory referral/consult to Physical/Occupational Therapy   3. Osteoarthritis of lumbar " spine, unspecified spinal osteoarthritis complication status  Ambulatory referral/consult to Physical/Occupational Therapy   4. DDD (degenerative disc disease), lumbar     5. Osteoarthritis of spine with radiculopathy, lumbar region     6. S/P insertion of spinal cord stimulator           PLAN:   We discussed with the patient the assessment and recommendations. The following is the plan we agreed on:     - Previous imaging was reviewed and discussed with the patient today.    - Schedule for repeat left then right L3,4,5 RFAs 2 weeks apart.    - Order given for aquatic PT.    - Continue gabapentin 400 mg TID (1200 mg daily).    - Continue Zanflex Q8h PRN muscle spasms.    - RTC 4 weeks after completion of procedures.      The above plan and management options were discussed at length with patient. Patient is in agreement with the above and verbalized understanding.    Renetta Steele  06/17/2022

## 2022-06-17 NOTE — PROGRESS NOTES
Chronic patient Established Note (Follow up visit)      SUBJECTIVE:    Interval History 6/17/2022:  The patient is here today for follow up of back pain. She has had more aching pain across the lower back. She had benefit with lumbar RFAs in the past and would like to reschedule this. She would also like to repeat aquatic PT as this was helpful in the past. Her pain is aching and throbbing in nature without radiation currently. No new falls or trauma. Her pain today is 8/10.    Interval History 5/5/2022:  The patient is here for follow up of chronic lower back pain. She has radiation into the legs. No recent falls or trauma. She saw Dr. Silverio last month and was under the impression that an [employer] rep would be here today for programming. She is still having difficulty programming her device. She has mild benefit with Gabapentin 900 mg daily without side effects. She is also having muscle spasms intermittently. She is asking for a muscle relaxer. She has tried Robaxin in the past with mild benefit. She would like to try another medication. Her pain today is 8/10.    Interval History 4/20/2022: She presents today for follow up of low back pain. She states that she only had about 40% relief of LBP following RFAs in September that lasted a few weeks. Pain  continues to be in the low back and radiates into b/l LE. Pain encompasses the entire leg and does not follow a specific dermatomal pattern in the leg. She denies weakness, numbness or tingling in the lower extremities. She denies bowel or bladder incontinence. Pain is currently rated 8/10. She is currently on gabapentin 300mg tid with minimal relief. She has been unable to charge bret SCS for the last month and does not have the contact number for her rep.      Interval History 9/30/2021:  The patient returns to clinic today for follow up of low back pain. She is s/p left L3,4,5 RFA on 9/9/2021 and right L3,4,5 RFA on 9/20/2021. She is unsure of relief at this time.  She reports increased pain to the right side. She describes this pain as burning in nature. She denies any radiating leg pain. Her pain is worse with bending and standing. She continues to report benefit with [employer] SCS. She denies any weakness. She denies any other health changes. Her pain today is 9/10.    Interval History 8/3/2021:   Ms. Luna returns in f/u re: low back/leg pain. She reports about three months ago she noticed her low back started bothering her, worse with bending and prolonged standing. No inciting event, no trauma to back since last visit. Reports continued leg pain down the backs of her legs as well. She reports intermittent instability in her legs - on and off - over the last year. Last time she has met with [employer] rep for reprogramming of SCS was fall 2020. Denies any current issues with SCS function/battery/remote.     Interval History 9/28/2020:  The patient is here today for increased lower back pain.  She is status post right than left L3, 4, 5 radiofrequency ablation completed on 08/31/2020 with approximately 50% relief.  However, she is feeling tightness across the lower back without radiation at this time.  She would like an injection today.  Additionally, she states that she has not completed physical therapy for her back in some time and is not currently doing any exercises.  Her leg pain is currently well controlled with spinal cord stimulator and she had a recent adjustment.  Her pain today is 8/10.    Interval History 7/16/2020:  The patient is here for follow up of lower back pain.  She previously had benefit with lumbar RFAs for similar pain.  She is also having radiation into her legs with numbness, left greater than right.  Ildefonso is here today to meet with her for programming.  She previously was having significant benefit of leg pain with SCS implant.  She denies any recent trauma or falls. Her pain today is 9/10.    Interval History 1/9/2020:  The patient is here for follow  up of lower back and leg pain.  She is s/p lumbar RFAs with about 50% relief.  Her leg pain is well controlled with implant.  She still has intermittent flare ups of back pain, like today.  When this happens, she has some benefit with rest.  She has tried Tylenol and OTC NSAIDs without benefit.  She denies any recent falls or weakness.  The patient denies any bowel or bladder incontinence or signs of saddle paresthesia.  The patient denies any major medical changes since last office visit.  Her pain today is 7/10.    Previous Encounter:  Faby Luna presents to the clinic for a follow-up appointment for lower back pain.  She previously had significant leg pain which has resolved with [employer] SCS implant.  She is here today to discuss increased lower back pain.  It is sharp and throbbing in nature.  It is significant in the morning and with prolonged standing and activity.  She has some benefit with stretching.  She denies any recent falls.  Since the last visit, Faby Luna states the pain has been worsening.  Current pain intensity is 8/10.    Pain Disability Index Review:  Last 3 PDI Scores 6/17/2022 5/5/2022 4/20/2022   Pain Disability Index (PDI) 37 33 50       Opioid Contract: no     report:  Not applicable    Pain Procedures:   5/2/18 Left L3 and L4 TF ELISE- 20% relief  5/21/18 Bilateral L4-5 and L5-S1 facet joint injections- no relief  9/24/18 Lumbar SCS trial ([employer])- 100% relief  10/26/18 Lumbar SCS implant ([employer])- 100% relief of leg pain  9/12/19 Bilateral L3,4,5 MBB- helpful  10/17/19 Bilateral L3,4,5 MBB- helpful  11/21/19 Right L3,4,5 RFA- 50% relief  12/5/19 Left L3,4,5 RFA- 50% relief  8/17/20 Left L3,4,5 RFA- 50% relief  8/31/20 Right L3,4,5 RFA- 50% relief  9/9/2021- Left L3,4,5 RFA - 60% relief  9/20/2021- Right L3,4,5 RFA -60% relief    Physical Therapy/Home Exercise:   yes in the past with limited benefit    Imaging:     3/31/18 Lumbar MRI    Narrative      EXAMINATION:  MRI LUMBAR SPINE WITHOUT CONTRAST    CLINICAL HISTORY:  Low back pain, >6wks conservative tx, persistent-progressive sx, surgical candidate; Other spondylosis with radiculopathy, lumbar region    TECHNIQUE:  Multiplanar, multisequence MR images were acquired from the thoracolumbar junction to the sacrum without the administration of contrast.    COMPARISON:  Plain films from 03/27/2018    FINDINGS:  There is grade 1 spondylolisthesis of L4 on L5. The vertebral body heights are well maintained, with no fracture.  No marrow signal abnormality suspicious for an infiltrative process.    The conus medullaris terminates at approximately the mid body of L1.  There is a cyst associated with the upper pole of the right kidney.  There is disc desiccation noted throughout the lumbar spine with relative sparing of the L5-S1 disc.  Mild disc space narrowing present at the L4-5 level.    L1-L2: Mild diffuse disc bulge resulting in no significant central or neural foraminal canal narrowing.    L2-L3: No significant central canal narrowing.  There is mild narrowing of either neural foraminal canal secondary to disc material.    L3-L4: Disc bulging in the bilateral foraminal regions resulting in no significant central canal narrowing.  Mild-to-moderate bilateral facet arthropathy also noted.  The bilateral neural foraminal canals are moderately narrowed with some mild effacement of either exiting L3 nerve root in the extraforaminal regions bilaterally.    L4-L5:  Grade 1 spondylolisthesis along with moderate to severe bilateral facet arthropathy and ligamentum flavum hypertrophy resulting in at least moderate narrowing of the central canal.  The right neural foraminal canal is mildly to moderately narrowed.  Left neural foraminal canal is moderately to severely narrowed with mild effacement of the exiting L4 nerve root.    L5-S1:  No significant central or neural foraminal canal narrowing noted.  Mild bilateral  facet arthropathy noted.   Impression       1. Multilevel degenerative changes of the lumbar spine as detailed above     Lumbar XRAYs 3/27/18    Narrative     EXAMINATION:  XR LUMBAR SPINE AP AND LAT WITH FLEX/EXT    CLINICAL HISTORY:  Low back pain    TECHNIQUE:  AP and lateral views as well as lateral flexion and extension images are performed through the lumbar spine.    COMPARISON:  None    FINDINGS:  X-ray lumbar spine with flexion and extension demonstrates grade 1 spondylolisthesis at L4-5 measuring around 6 mm which does not change significantly with flexion and extension.  The L4-5 disc is narrowed and degenerated.  Other lumbar vertebral discs are maintained.  Vertebral body heights are maintained.  Small anterior spurs are seen, and there is small posterior osteophyte along the inferior endplate of L4.  There is lumbar facet arthropathy at L4-5 and L5-S1.   Impression       Degenerative changes as above.  Grade 1 anterolisthesis and degenerative disc disease at L4-5.         Allergies:   Review of patient's allergies indicates:   Allergen Reactions    Aspirin Other (See Comments)     Has been told not to take it due to bariatric surgery       Current Medications:   Current Outpatient Medications   Medication Sig Dispense Refill    albuterol (PROAIR HFA) 90 mcg/actuation inhaler Inhale 2 puffs into the lungs every 4 (four) hours as needed for Wheezing or Shortness of Breath. Rescue 8.5 g 1    albuterol sulfate 2.5 mg/0.5 mL Nebu Take 2.5 mg by nebulization every 4 (four) hours as needed (shortness of breath, wheezing, coughing). Rescue 30 each 1    atenoloL (TENORMIN) 100 MG tablet Take 1 tablet (100 mg total) by mouth once daily. 90 tablet 3    EScitalopram oxalate (LEXAPRO) 20 MG tablet TAKE 1 TABLET EVERY DAY 90 tablet 3    fluticasone propionate (FLONASE) 50 mcg/actuation nasal spray 2 sprays (100 mcg total) by Each Nostril route once daily. 11.1 mL 1    furosemide (LASIX) 20 MG tablet Take 1  tablet (20 mg total) by mouth once daily. 90 tablet 3    gabapentin (NEURONTIN) 400 MG capsule Take 1 capsule (400 mg total) by mouth 3 (three) times daily. 90 capsule 2    glipiZIDE (GLUCOTROL) 10 MG tablet Take 1 tablet (10 mg total) by mouth daily with breakfast. 180 tablet 3    linaGLIPtin (TRADJENTA) 5 mg Tab tablet Take 1 tablet (5 mg total) by mouth once daily. 90 tablet 3    loratadine (CLARITIN) 10 mg tablet Take 1 tablet (10 mg total) by mouth daily as needed for Allergies. 90 tablet 3    losartan (COZAAR) 50 MG tablet Take 1 tablet by mouth once daily 90 tablet 3    pantoprazole (PROTONIX) 20 MG tablet TAKE 1 TABLET TWICE DAILY  BEFORE  MEALS 180 tablet 3    rosuvastatin (CRESTOR) 20 MG tablet Take 1 tablet (20 mg total) by mouth once daily. 30 tablet 11    traZODone (DESYREL) 100 MG tablet Take 1 tablet (100 mg total) by mouth every evening. 90 tablet 3    blood-glucose meter kit Use as instructed 1 each 0    ketorolac (TORADOL) 10 mg tablet Take 1 tablet (10 mg total) by mouth every 6 (six) hours as needed for Pain. Take with food to prevent heartburn. (Patient not taking: Reported on 6/17/2022) 20 tablet 1    promethazine-dextromethorphan (PROMETHAZINE-DM) 6.25-15 mg/5 mL Syrp Take 5 mLs by mouth every 8 (eight) hours as needed (cough). (Patient not taking: Reported on 6/17/2022) 240 mL 0     No current facility-administered medications for this visit.       REVIEW OF SYSTEMS:    GENERAL:  No weight loss, malaise or fevers.  HEENT:  Negative for frequent or significant headaches.  NECK:  Negative for lumps, goiter, pain and significant neck swelling.  RESPIRATORY:  Negative for cough, wheezing or shortness of breath.  CARDIOVASCULAR:  Negative for chest pain, leg swelling or palpitations. Hypertension.  GI:  Negative for abdominal discomfort, blood in stools or black stools or change in bowel habits.  MUSCULOSKELETAL:  See HPI.  SKIN:  Negative for lesions, rash, and itching.  PSYCH:   Negative for sleep disturbance, mood disorder and recent psychosocial stressors.  HEMATOLOGY/LYMPHOLOGY:  Negative for prolonged bleeding, bruising easily or swollen nodes.  NEURO:   No history of headaches, syncope, paralysis, seizures or tremors.  ENDO: Diabetes.  All other reviewed and negative other than HPI.    Past Medical History:  Past Medical History:   Diagnosis Date    Arthritis     Diabetes mellitus     Hypertension        Past Surgical History:  Past Surgical History:   Procedure Laterality Date    CARPAL TUNNEL RELEASE Right 3/8/2019    Procedure: RELEASE, CARPAL TUNNEL right;  Surgeon: Pan Goodman MD;  Location: [facility] OR;  Service: Orthopedics;  Laterality: Right;    CARPAL TUNNEL RELEASE Left 2019    Procedure: RELEASE, CARPAL TUNNEL- LEFT;  Surgeon: Pan Goodman MD;  Location: [facility] OR [address] [place] [address];  Service: Orthopedics;  Laterality: Left;    CATARACT EXTRACTION Bilateral      SECTION      CHOLECYSTECTOMY      EPIDURAL STEROID INJECTION INTO LUMBAR SPINE N/A 2018    Procedure: INJECTION, STEROID, SPINE, LUMBAR, EPIDURAL;  Surgeon: Shaq Silverio MD;  Location: [facility] PAIN MGT;  Service: Pain Management;  Laterality: N/A;  LUMBAR L4-L5 INTERLAMINAR ELISE'  [id number]  W/ SEDATION     HYSTERECTOMY      INJECTION OF ANESTHETIC AGENT AROUND NERVE Bilateral 2019    Procedure: BLOCK, NERVE, L3-L4-L5 MEDIAL BRANCH;  Surgeon: Shaq Silverio MD;  Location: [facility] PAIN MGT;  Service: Pain Management;  Laterality: Bilateral;    INJECTION OF ANESTHETIC AGENT AROUND NERVE Bilateral 10/17/2019    Procedure: BLOCK, NERVE;  Surgeon: Shaq Silverio MD;  Location: [facility] PAIN MGT;  Service: Pain Management;  Laterality: Bilateral;  B/L MBB L3-L4-L5  REPEAT  CONSENT NEEDED    INJECTION OF FACET JOINT Bilateral 2018    Procedure: INJECTION-FACET;  Surgeon: Shaq Silverio MD;  Location: [facility] PAIN MGT;  Service: Pain Management;  Laterality: Bilateral;  LUMBAR BILATERAL L4-L5 AND L5-S1 FACET STEROID  INJECTION  [id number]    W/ SEDATION     RADIOFREQUENCY ABLATION Right 11/21/2019    Procedure: RADIOFREQUENCY ABLATION RIGHT L3, L4, L5;  Surgeon: Shaq Silverio MD;  Location: [facility] PAIN MGT;  Service: Pain Management;  Laterality: Right;  Right RFA L3-L4-L5  1 of 2  Consent Needed    RADIOFREQUENCY ABLATION Left 12/5/2019    Procedure: RADIOFREQUENCY ABLATION LEFT L3-5;  Surgeon: Shaq Silverio MD;  Location: [facility] PAIN MGT;  Service: Pain Management;  Laterality: Left;  Left RFA L3-L4-L5  2 of 2  Consent Needed    RADIOFREQUENCY ABLATION Left 8/17/2020    Procedure: RADIOFREQUENCY ABLATION LEFT L3,4,5 1 of 2;  Surgeon: Shaq Silverio MD;  Location: [facility] PAIN MGT;  Service: Pain Management;  Laterality: Left;  Left RFA L3,4,5  1 of 2    RADIOFREQUENCY ABLATION Right 8/31/2020    Procedure: RADIOFREQUENCY ABLATION RIGHT L3,4,5 2 of 2;  Surgeon: Shaq Silverio MD;  Location: [facility] PAIN [facility]GT;  Service: Pain Management;  Laterality: Right;  RADIOFREQUENCY ABLATION RIGHT L3,4,5  2 of 2    RADIOFREQUENCY ABLATION Left 9/9/2021    Procedure: RADIOFREQUENCY ABLATION, L3-L4 AND L5 MEDIAL BRANCH 1 OF 2;  Surgeon: Shaq Silverio MD;  Location: [facility] PAIN MGT;  Service: Pain Management;  Laterality: Left;    RADIOFREQUENCY ABLATION Right 9/20/2021    Procedure: RADIOFREQUENCY ABLATION, L3-L4 AND L5 MEDIAL BRANCH 2 OF 2;  Surgeon: Shaq Silverio MD;  Location: [facility] PAIN MGT;  Service: Pain Management;  Laterality: Right;    TRIAL OF SPINAL CORD NERVE STIMULATOR N/A 9/24/2018    Procedure: TRIAL, NEUROSTIMULATOR, SPINAL CORD, SPINAL CORD STIMULATOR TRIAL-INTERNAL WIRES TO EXTERNAL BATTERY;  Surgeon: Shaq Silverio MD;  Location: [facility] PAIN [facility]GT;  Service: Pain Management;  Laterality: N/A;  [employer] REP NOTIFIED       Family History:  Family History   Problem Relation Age of Onset    Cataracts Mother     Glaucoma Mother     No Known Problems Father     No Known Problems Sister     No Known Problems Brother     No Known Problems  "Maternal Aunt     No Known Problems Maternal Uncle     No Known Problems Paternal Aunt     No Known Problems Paternal Uncle     No Known Problems Maternal Grandmother     No Known Problems Maternal Grandfather     No Known Problems Paternal Grandmother     No Known Problems Paternal Grandfather        Social History:  Social History     Socioeconomic History    Marital status:    Tobacco Use    Smoking status: Never Smoker    Smokeless tobacco: Never Used   Substance and Sexual Activity    Alcohol use: No    Drug use: No       OBJECTIVE:    /72 (BP Location: Right arm)   Pulse 69   Temp 98.2 °F (36.8 °C)   Resp 18   Ht 5' 4" (1.626 m)   Wt 98 kg (216 lb)   SpO2 99%   BMI 37.08 kg/m²     PHYSICAL EXAMINATION:    General appearance: Well appearing, in no acute distress, alert and oriented x3.  Psych:  Mood and affect appropriate.  Skin: Skin color, texture, turgor normal, no rashes or lesions, in both upper and lower body.   Head/face:  Atraumatic, normocephalic. No palpable lymph nodes  Back: Straight leg raising in the sitting and supine positions is negative to radicular pain bilaterally. There is pain with palpation to lumbar paraspinals and facet joints. Limited ROM with pain on extension. Positive facet loading bilaterally.  There is no pain with palpation to SI joints.   Extremities: Peripheral joint ROM is full and pain free without obvious instability or laxity in all four extremities. No deformities, edema, or skin discoloration. Good capillary refill.  Musculoskeletal: Normal strength to BLE. No atrophy or tone abnormalities are noted.  Neuro: No loss of sensation.  Gait: Antalgic.    ASSESSMENT: 71 y.o. year old female with back pain, consistent with the following diagnoses:     1. Myofascial pain  Ambulatory referral/consult to Physical/Occupational Therapy   2. Lumbar radiculopathy  Ambulatory referral/consult to Physical/Occupational Therapy   3. Osteoarthritis of lumbar " spine, unspecified spinal osteoarthritis complication status  Ambulatory referral/consult to Physical/Occupational Therapy   4. DDD (degenerative disc disease), lumbar     5. Osteoarthritis of spine with radiculopathy, lumbar region     6. S/P insertion of spinal cord stimulator           PLAN:   We discussed with the patient the assessment and recommendations. The following is the plan we agreed on:     - Previous imaging was reviewed and discussed with the patient today.    - Schedule for repeat left then right L3,4,5 RFAs 2 weeks apart.    - Order given for aquatic PT.    - Continue gabapentin 400 mg TID (1200 mg daily).    - Continue Zanflex Q8h PRN muscle spasms.    - RTC 4 weeks after completion of procedures.      The above plan and management options were discussed at length with patient. Patient is in agreement with the above and verbalized understanding.    Renetta Steele  06/17/2022

## 2022-06-20 ENCOUNTER — TELEPHONE (OUTPATIENT)
Dept: ADMINISTRATIVE | Facility: OTHER | Age: 72
End: 2022-06-20
Payer: MEDICARE

## 2022-07-06 ENCOUNTER — TELEPHONE (OUTPATIENT)
Dept: BARIATRICS | Facility: CLINIC | Age: 72
End: 2022-07-06
Payer: MEDICARE

## 2022-07-06 ENCOUNTER — TELEPHONE (OUTPATIENT)
Dept: PAIN MEDICINE | Facility: CLINIC | Age: 72
End: 2022-07-06
Payer: MEDICARE

## 2022-07-06 NOTE — TELEPHONE ENCOUNTER
Staff spoke with rehab center and they are just requesting that pt orders be faxed over for therapy.

## 2022-07-06 NOTE — TELEPHONE ENCOUNTER
----- Message from Audrey Sanz sent at 7/6/2022  2:47 PM CDT -----  Name of Who is Calling:[facility]              What is the request in detail:[facility] is requesting a referral for patient.              Can the clinic reply by [facility]:              What Number to Call Back if not in [facility]:[phone]

## 2022-07-07 ENCOUNTER — HOSPITAL ENCOUNTER (OUTPATIENT)
Facility: OTHER | Age: 72
Discharge: HOME | End: 2022-07-07
Attending: ANESTHESIOLOGY | Admitting: ANESTHESIOLOGY
Payer: MEDICARE

## 2022-07-07 VITALS
HEIGHT: 64 IN | DIASTOLIC BLOOD PRESSURE: 68 MMHG | TEMPERATURE: 99 F | SYSTOLIC BLOOD PRESSURE: 151 MMHG | HEART RATE: 71 BPM | WEIGHT: 215 LBS | RESPIRATION RATE: 16 BRPM | OXYGEN SATURATION: 95 % | BODY MASS INDEX: 36.7 KG/M2

## 2022-07-07 DIAGNOSIS — M47.819 SPONDYLOSIS WITHOUT MYELOPATHY: ICD-10-CM

## 2022-07-07 DIAGNOSIS — M47.816 OSTEOARTHRITIS OF LUMBAR SPINE, UNSPECIFIED SPINAL OSTEOARTHRITIS COMPLICATION STATUS: Primary | ICD-10-CM

## 2022-07-07 DIAGNOSIS — G89.29 CHRONIC PAIN: ICD-10-CM

## 2022-07-07 PROCEDURE — 64636 DESTROY L/S FACET JNT ADDL: CPT | Mod: RT,,, | Performed by: ANESTHESIOLOGY

## 2022-07-07 PROCEDURE — 99152 MOD SED SAME PHYS/QHP 5/>YRS: CPT | Performed by: ANESTHESIOLOGY

## 2022-07-07 PROCEDURE — 63600175 PHARM REV CODE 636 W HCPCS: Performed by: ANESTHESIOLOGY

## 2022-07-07 PROCEDURE — 64635 PR DESTROY LUMB/SAC FACET JNT: ICD-10-PCS | Mod: RT,,, | Performed by: ANESTHESIOLOGY

## 2022-07-07 PROCEDURE — 99152 MOD SED SAME PHYS/QHP 5/>YRS: CPT | Mod: ,,, | Performed by: ANESTHESIOLOGY

## 2022-07-07 PROCEDURE — 25000003 PHARM REV CODE 250: Performed by: ANESTHESIOLOGY

## 2022-07-07 PROCEDURE — 99152 PR MOD CONSCIOUS SEDATION, SAME PHYS, 5+ YRS, FIRST 15 MIN: ICD-10-PCS | Mod: ,,, | Performed by: ANESTHESIOLOGY

## 2022-07-07 PROCEDURE — 64635 DESTROY LUMB/SAC FACET JNT: CPT | Mod: RT,,, | Performed by: ANESTHESIOLOGY

## 2022-07-07 PROCEDURE — 64635 DESTROY LUMB/SAC FACET JNT: CPT | Mod: RT | Performed by: ANESTHESIOLOGY

## 2022-07-07 PROCEDURE — 64636 DESTROY L/S FACET JNT ADDL: CPT | Mod: RT | Performed by: ANESTHESIOLOGY

## 2022-07-07 PROCEDURE — 64636 PR DESTROY L/S FACET JNT ADDL: ICD-10-PCS | Mod: RT,,, | Performed by: ANESTHESIOLOGY

## 2022-07-07 RX ORDER — MIDAZOLAM HYDROCHLORIDE 1 MG/ML
INJECTION INTRAMUSCULAR; INTRAVENOUS
Status: DISCONTINUED | OUTPATIENT
Start: 2022-07-07 | End: 2022-07-07 | Stop reason: HOSPADM

## 2022-07-07 RX ORDER — TRIAMCINOLONE ACETONIDE 40 MG/ML
INJECTION, SUSPENSION INTRA-ARTICULAR; INTRAMUSCULAR
Status: DISCONTINUED | OUTPATIENT
Start: 2022-07-07 | End: 2022-07-07 | Stop reason: HOSPADM

## 2022-07-07 RX ORDER — SODIUM CHLORIDE 9 MG/ML
500 INJECTION, SOLUTION INTRAVENOUS CONTINUOUS
Status: DISCONTINUED | OUTPATIENT
Start: 2022-07-07 | End: 2022-07-07 | Stop reason: HOSPADM

## 2022-07-07 RX ORDER — BUPIVACAINE HYDROCHLORIDE 2.5 MG/ML
INJECTION, SOLUTION EPIDURAL; INFILTRATION; INTRACAUDAL
Status: DISCONTINUED | OUTPATIENT
Start: 2022-07-07 | End: 2022-07-07 | Stop reason: HOSPADM

## 2022-07-07 RX ORDER — LIDOCAINE HYDROCHLORIDE 20 MG/ML
INJECTION, SOLUTION INFILTRATION; PERINEURAL
Status: DISCONTINUED | OUTPATIENT
Start: 2022-07-07 | End: 2022-07-07 | Stop reason: HOSPADM

## 2022-07-07 RX ORDER — FENTANYL CITRATE 50 UG/ML
INJECTION, SOLUTION INTRAMUSCULAR; INTRAVENOUS
Status: DISCONTINUED | OUTPATIENT
Start: 2022-07-07 | End: 2022-07-07 | Stop reason: HOSPADM

## 2022-07-07 NOTE — DISCHARGE INSTRUCTIONS

## 2022-07-07 NOTE — BRIEF OP NOTE
Discharge Note  Short Stay      SUMMARY     Admit Date: 7/7/2022    Attending Physician: Shaq Silverio      Discharge Physician: Shaq Silverio      Discharge Date: 7/7/2022 9:21 AM    Procedure(s) (LRB):  RADIOFREQUENCY ABLATION, RIGHT L3-L4-L5 ONE OF TWO (Right)    Final Diagnosis: Osteoarthritis of lumbar spine, unspecified spinal osteoarthritis complication status [M47.816]    Disposition: Home or self care    Patient Instructions:   Current Discharge Medication List      CONTINUE these medications which have NOT CHANGED    Details   albuterol (PROAIR HFA) 90 mcg/actuation inhaler Inhale 2 puffs into the lungs every 4 (four) hours as needed for Wheezing or Shortness of Breath. Rescue  Qty: 8.5 g, Refills: 1    Associated Diagnoses: Obstructive sleep apnea      albuterol sulfate 2.5 mg/0.5 mL Nebu Take 2.5 mg by nebulization every 4 (four) hours as needed (shortness of breath, wheezing, coughing). Rescue  Qty: 30 each, Refills: 1    Associated Diagnoses: Seasonal allergic rhinitis, unspecified trigger      atenoloL (TENORMIN) 100 MG tablet Take 1 tablet (100 mg total) by mouth once daily.  Qty: 90 tablet, Refills: 3    Associated Diagnoses: Essential hypertension      blood-glucose meter kit Use as instructed  Qty: 1 each, Refills: 0    Associated Diagnoses: Type 2 diabetes mellitus without complication, without long-term current use of insulin      EScitalopram oxalate (LEXAPRO) 20 MG tablet TAKE 1 TABLET EVERY DAY  Qty: 90 tablet, Refills: 3    Associated Diagnoses: Mild recurrent major depression; Anxiety      fluticasone propionate (FLONASE) 50 mcg/actuation nasal spray 2 sprays (100 mcg total) by Each Nostril route once daily.  Qty: 11.1 mL, Refills: 1    Associated Diagnoses: Seasonal allergic rhinitis, unspecified trigger      furosemide (LASIX) 20 MG tablet Take 1 tablet (20 mg total) by mouth once daily.  Qty: 90 tablet, Refills: 3    Associated Diagnoses: Essential hypertension      gabapentin (NEURONTIN)  400 MG capsule Take 1 capsule (400 mg total) by mouth 3 (three) times daily.  Qty: 90 capsule, Refills: 2      glipiZIDE (GLUCOTROL) 10 MG tablet Take 1 tablet (10 mg total) by mouth daily with breakfast.  Qty: 180 tablet, Refills: 3    Associated Diagnoses: Type 2 diabetes mellitus with stage 3a chronic kidney disease, without long-term current use of insulin      ketorolac (TORADOL) 10 mg tablet Take 1 tablet (10 mg total) by mouth every 6 (six) hours as needed for Pain. Take with food to prevent heartburn.  Qty: 20 tablet, Refills: 1      linaGLIPtin (TRADJENTA) 5 mg Tab tablet Take 1 tablet (5 mg total) by mouth once daily.  Qty: 90 tablet, Refills: 3    Associated Diagnoses: Type 2 diabetes mellitus with stage 3a chronic kidney disease, without long-term current use of insulin      loratadine (CLARITIN) 10 mg tablet Take 1 tablet (10 mg total) by mouth daily as needed for Allergies.  Qty: 90 tablet, Refills: 3    Associated Diagnoses: Seasonal allergies      losartan (COZAAR) 50 MG tablet TAKE 1 TABLET EVERY DAY  Qty: 90 tablet, Refills: 3    Associated Diagnoses: Essential hypertension      pantoprazole (PROTONIX) 20 MG tablet TAKE 1 TABLET TWICE DAILY  BEFORE  MEALS  Qty: 180 tablet, Refills: 3    Associated Diagnoses: Gastroesophageal reflux disease without esophagitis      promethazine-dextromethorphan (PROMETHAZINE-DM) 6.25-15 mg/5 mL Syrp Take 5 mLs by mouth every 8 (eight) hours as needed (cough).  Qty: 240 mL, Refills: 0    Associated Diagnoses: Seasonal allergic rhinitis, unspecified trigger      rosuvastatin (CRESTOR) 20 MG tablet Take 1 tablet (20 mg total) by mouth once daily.  Qty: 30 tablet, Refills: 11    Associated Diagnoses: Dyslipidemia      tiZANidine (ZANAFLEX) 4 MG tablet Take 1 tablet (4 mg total) by mouth every 8 (eight) hours as needed (muscle pain).  Qty: 90 tablet, Refills: 2      traZODone (DESYREL) 100 MG tablet Take 1 tablet (100 mg total) by mouth every evening.  Qty: 90 tablet,  Refills: 3    Associated Diagnoses: Insomnia, unspecified type                 Discharge Diagnosis: Osteoarthritis of lumbar spine, unspecified spinal osteoarthritis complication status [M47.816]  Condition on Discharge: Stable with no complications to procedure   Diet on Discharge: Same as before.  Activity: as per instruction sheet.  Discharge to: Home with a responsible adult.  Follow up: 2-4 weeks       Please call my office or pager at [phone] if experienced any weakness or loss of sensation, fever > 101.5, pain uncontrolled with oral medications, persistent nausea/vomiting/or diarrhea, redness or drainage from the incisions, or any other worrisome concerns. If physician on call was not reached or could not communicate with our office for any reason please go to the nearest emergency department

## 2022-07-07 NOTE — OP NOTE
Therapeutic Lumbar Medial Branch Radiofrequency Ablation under Fluoroscopy     The procedure, risks, benefits, and options were discussed with the patient. There are no contraindications to the procedure. The patent expressed understanding and agreed to the procedure. Informed written consent was obtained prior to the start of the procedure and can be found in the patient's chart.        PATIENT NAME: Faby Luna   MRN: [id number]     DATE OF PROCEDURE: 07/07/2022     PROCEDURE:  Right L3, L4 and L5 Lumbar Radiofrequency Ablation under Fluoroscopy    PRE-OP DIAGNOSIS: Osteoarthritis of lumbar spine, unspecified spinal osteoarthritis complication status [M47.816] Lumbar spondylosis [M47.816]    POST-OP DIAGNOSIS: Same    PHYSICIAN: Shaq Silverio MD    ASSISTANTS: Rakesh Ocampo D.O. ([facility] Fellow) . Roxana Mendez MD Anesthesiology PGY3     MEDICATIONS INJECTED:  Triamcinolone 40mg with 9cc of Bupivicaine 0.25%    LOCAL ANESTHETIC INJECTED:   Xylocaine 2%    SEDATION:   Versed 2mg and Fentanyl 50mcg                                                                                                                                                                                     Conscious sedation ordered by M.D. Patient re-evaluation prior to administration of conscious sedation. No changes noted in patient's status from initial evaluation. The patient's vital signs were monitored by RN and patient remained hemodynamically stable throughout the procedure.    Event Time In   Sedation Start 0854   Sedation End 0917       ESTIMATED BLOOD LOSS:  None    COMPLICATIONS:  None     INTERVAL HISTORY: Patient has clinical and imaging findings suggestive of recurrent facet mediated pain. Patient has undergone a previous RFA at specified levels with at least 50% relief for at least 6 months. Successful diagnostic medial branch blocks have been completed within the past 2 years.    TECHNIQUE: Time-out was performed to  identify the patient and procedure to be performed. With the patient laying in a prone position, the surgical area was prepped and draped in the usual sterile fashion using ChloraPrep and fenestrated drape. The levels were determined under fluoroscopic guidance. Skin anesthesia was achieved by injecting Lidocaine 2% over the injection sites. A 20 gauge 10mm curved active tip needle was introduced to the anatomic local of the medial branch at each of the above levels using AP, lateral and/or contralateral oblique fluoroscopic imaging. Then sensory and motor testing was performed to confirm that the needle tips were in the correct location. After negative aspiration for blood or CSF was confirmed, 1 mL of the lidocaine 2% listed above was injected slowly at each site. This was followed by thermal lesioning at 80 degrees celsius for 90 seconds. That was followed by slowly injecting 2 mL of the medication mixture listed above at each site. The needles were removed and bleeding was nil. A sterile dressing was applied. No specimens collected. The patient tolerated the procedure well and did not have any procedure related motor deficit at the conclusion of the procedure.  PAIN BEFORE THE PROCEDURE: 10/10    PAIN AFTER THE PROCEDURE: 0/10   The patient was monitored after the procedure in the recovery area. They were given post-procedure and discharge instructions to follow at home. The patient was discharged in a stable condition.        Shaq Silverio MD

## 2022-07-08 LAB — POCT GLUCOSE: 128 MG/DL (ref 70–110)

## 2022-07-12 ENCOUNTER — PATIENT OUTREACH (OUTPATIENT)
Dept: ADMINISTRATIVE | Facility: HOSPITAL | Age: 72
End: 2022-07-12
Payer: MEDICARE

## 2022-07-12 NOTE — LETTER
AUTHORIZATION FOR RELEASE OF   CONFIDENTIAL INFORMATION      We are seeing Faby Luna, date of birth 1950, in the clinic at [facility]/ INTERNAL MED/ PEDS. Renae Sprague DO is the patient's PCP. Faby Luna has an outstanding lab/procedure at the time we reviewed her chart. In order to help keep her health information updated, she has authorized us to request the following medical record(s):        (  )  MAMMOGRAM                                      ( X )  COLONOSCOPY      (  )  PAP SMEAR                                          (  )  OUTSIDE LAB RESULTS     (  )  DEXA SCAN                                          (  )  DIABETIC EYE EXAM            (  )  FOOT EXAM                                          (  )  ENTIRE RECORD     (  )  OUTSIDE IMMUNIZATIONS                 (  )  _______________         Please fax records to [facility], Lyndsey R Adams, DO, FAX [phone] [address]. [address] [facility] [address]       If you have any questions, please contact Fariba Pascal at [phone].           Patient Name: Faby Luna  : 1950  Patient Phone #: [phone]

## 2022-07-14 ENCOUNTER — TELEPHONE (OUTPATIENT)
Dept: BARIATRICS | Facility: CLINIC | Age: 72
End: 2022-07-14
Payer: MEDICARE

## 2022-07-21 ENCOUNTER — HOSPITAL ENCOUNTER (OUTPATIENT)
Facility: OTHER | Age: 72
Discharge: HOME | End: 2022-07-21
Attending: ANESTHESIOLOGY | Admitting: ANESTHESIOLOGY
Payer: MEDICARE

## 2022-07-21 VITALS
TEMPERATURE: 99 F | SYSTOLIC BLOOD PRESSURE: 130 MMHG | HEART RATE: 90 BPM | OXYGEN SATURATION: 98 % | DIASTOLIC BLOOD PRESSURE: 78 MMHG | RESPIRATION RATE: 16 BRPM | WEIGHT: 210 LBS | HEIGHT: 64 IN | BODY MASS INDEX: 35.85 KG/M2

## 2022-07-21 DIAGNOSIS — G89.29 CHRONIC PAIN: ICD-10-CM

## 2022-07-21 DIAGNOSIS — M47.26 OSTEOARTHRITIS OF SPINE WITH RADICULOPATHY, LUMBAR REGION: ICD-10-CM

## 2022-07-21 DIAGNOSIS — M47.819 SPONDYLOSIS WITHOUT MYELOPATHY: Primary | ICD-10-CM

## 2022-07-21 LAB — POCT GLUCOSE: 109 MG/DL (ref 70–110)

## 2022-07-21 PROCEDURE — 64635 PR DESTROY LUMB/SAC FACET JNT: ICD-10-PCS | Mod: LT,,, | Performed by: ANESTHESIOLOGY

## 2022-07-21 PROCEDURE — 64636 PR DESTROY L/S FACET JNT ADDL: ICD-10-PCS | Mod: LT,,, | Performed by: ANESTHESIOLOGY

## 2022-07-21 PROCEDURE — 64636 DESTROY L/S FACET JNT ADDL: CPT | Mod: LT | Performed by: ANESTHESIOLOGY

## 2022-07-21 PROCEDURE — 25000003 PHARM REV CODE 250: Performed by: ANESTHESIOLOGY

## 2022-07-21 PROCEDURE — 82947 ASSAY GLUCOSE BLOOD QUANT: CPT | Performed by: ANESTHESIOLOGY

## 2022-07-21 PROCEDURE — 64636 DESTROY L/S FACET JNT ADDL: CPT | Mod: LT,,, | Performed by: ANESTHESIOLOGY

## 2022-07-21 PROCEDURE — 25000003 PHARM REV CODE 250: Performed by: STUDENT IN AN ORGANIZED HEALTH CARE EDUCATION/TRAINING PROGRAM

## 2022-07-21 PROCEDURE — 63600175 PHARM REV CODE 636 W HCPCS: Performed by: ANESTHESIOLOGY

## 2022-07-21 PROCEDURE — 99152 PR MOD CONSCIOUS SEDATION, SAME PHYS, 5+ YRS, FIRST 15 MIN: ICD-10-PCS | Mod: ,,, | Performed by: ANESTHESIOLOGY

## 2022-07-21 PROCEDURE — 64635 DESTROY LUMB/SAC FACET JNT: CPT | Mod: LT,,, | Performed by: ANESTHESIOLOGY

## 2022-07-21 PROCEDURE — 64635 DESTROY LUMB/SAC FACET JNT: CPT | Mod: LT | Performed by: ANESTHESIOLOGY

## 2022-07-21 PROCEDURE — 99152 MOD SED SAME PHYS/QHP 5/>YRS: CPT | Performed by: ANESTHESIOLOGY

## 2022-07-21 PROCEDURE — 99152 MOD SED SAME PHYS/QHP 5/>YRS: CPT | Mod: ,,, | Performed by: ANESTHESIOLOGY

## 2022-07-21 RX ORDER — MIDAZOLAM HYDROCHLORIDE 1 MG/ML
INJECTION INTRAMUSCULAR; INTRAVENOUS
Status: DISCONTINUED | OUTPATIENT
Start: 2022-07-21 | End: 2022-07-21 | Stop reason: HOSPADM

## 2022-07-21 RX ORDER — BUPIVACAINE HYDROCHLORIDE 2.5 MG/ML
INJECTION, SOLUTION EPIDURAL; INFILTRATION; INTRACAUDAL
Status: DISCONTINUED | OUTPATIENT
Start: 2022-07-21 | End: 2022-07-21 | Stop reason: HOSPADM

## 2022-07-21 RX ORDER — FENTANYL CITRATE 50 UG/ML
INJECTION, SOLUTION INTRAMUSCULAR; INTRAVENOUS
Status: DISCONTINUED | OUTPATIENT
Start: 2022-07-21 | End: 2022-07-21 | Stop reason: HOSPADM

## 2022-07-21 RX ORDER — LIDOCAINE HYDROCHLORIDE 20 MG/ML
INJECTION, SOLUTION INFILTRATION; PERINEURAL
Status: DISCONTINUED | OUTPATIENT
Start: 2022-07-21 | End: 2022-07-21 | Stop reason: HOSPADM

## 2022-07-21 RX ORDER — DEXAMETHASONE SODIUM PHOSPHATE 10 MG/ML
INJECTION INTRAMUSCULAR; INTRAVENOUS
Status: DISCONTINUED | OUTPATIENT
Start: 2022-07-21 | End: 2022-07-21 | Stop reason: HOSPADM

## 2022-07-21 RX ORDER — SODIUM CHLORIDE 9 MG/ML
500 INJECTION, SOLUTION INTRAVENOUS CONTINUOUS
Status: ACTIVE | OUTPATIENT
Start: 2022-07-21 | End: 2022-07-22

## 2022-07-21 NOTE — DISCHARGE SUMMARY
Discharge Note  Short Stay      SUMMARY     Admit Date: 7/21/2022    Attending Physician: Shaq Silverio      Discharge Physician: Shaq Silverio      Discharge Date: 7/21/2022 9:07 AM    Procedure(s) (LRB):  RADIOFREQUENCY ABLATION, LEFT L3-L4-L5 TWO OF TWO *BRING SCS REMOTE* (Left)    Final Diagnosis: Osteoarthritis of lumbar spine, unspecified spinal osteoarthritis complication status [M47.816]    Disposition: Home or self care    Patient Instructions:   Current Discharge Medication List      CONTINUE these medications which have NOT CHANGED    Details   albuterol (PROAIR HFA) 90 mcg/actuation inhaler Inhale 2 puffs into the lungs every 4 (four) hours as needed for Wheezing or Shortness of Breath. Rescue  Qty: 8.5 g, Refills: 1    Associated Diagnoses: Obstructive sleep apnea      albuterol sulfate 2.5 mg/0.5 mL Nebu Take 2.5 mg by nebulization every 4 (four) hours as needed (shortness of breath, wheezing, coughing). Rescue  Qty: 30 each, Refills: 1    Associated Diagnoses: Seasonal allergic rhinitis, unspecified trigger      atenoloL (TENORMIN) 100 MG tablet Take 1 tablet (100 mg total) by mouth once daily.  Qty: 90 tablet, Refills: 3    Associated Diagnoses: Essential hypertension      blood-glucose meter kit Use as instructed  Qty: 1 each, Refills: 0    Associated Diagnoses: Type 2 diabetes mellitus without complication, without long-term current use of insulin      EScitalopram oxalate (LEXAPRO) 20 MG tablet TAKE 1 TABLET EVERY DAY  Qty: 90 tablet, Refills: 3    Associated Diagnoses: Mild recurrent major depression; Anxiety      fluticasone propionate (FLONASE) 50 mcg/actuation nasal spray 2 sprays (100 mcg total) by Each Nostril route once daily.  Qty: 11.1 mL, Refills: 1    Associated Diagnoses: Seasonal allergic rhinitis, unspecified trigger      furosemide (LASIX) 20 MG tablet Take 1 tablet (20 mg total) by mouth once daily.  Qty: 90 tablet, Refills: 3    Associated Diagnoses: Essential hypertension       gabapentin (NEURONTIN) 400 MG capsule Take 1 capsule (400 mg total) by mouth 3 (three) times daily.  Qty: 90 capsule, Refills: 2      glipiZIDE (GLUCOTROL) 10 MG tablet Take 1 tablet (10 mg total) by mouth daily with breakfast.  Qty: 180 tablet, Refills: 3    Associated Diagnoses: Type 2 diabetes mellitus with stage 3a chronic kidney disease, without long-term current use of insulin      ketorolac (TORADOL) 10 mg tablet Take 1 tablet (10 mg total) by mouth every 6 (six) hours as needed for Pain. Take with food to prevent heartburn.  Qty: 20 tablet, Refills: 1      linaGLIPtin (TRADJENTA) 5 mg Tab tablet Take 1 tablet (5 mg total) by mouth once daily.  Qty: 90 tablet, Refills: 3    Associated Diagnoses: Type 2 diabetes mellitus with stage 3a chronic kidney disease, without long-term current use of insulin      loratadine (CLARITIN) 10 mg tablet Take 1 tablet (10 mg total) by mouth daily as needed for Allergies.  Qty: 90 tablet, Refills: 3    Associated Diagnoses: Seasonal allergies      losartan (COZAAR) 50 MG tablet TAKE 1 TABLET EVERY DAY  Qty: 90 tablet, Refills: 3    Associated Diagnoses: Essential hypertension      pantoprazole (PROTONIX) 20 MG tablet TAKE 1 TABLET TWICE DAILY  BEFORE  MEALS  Qty: 180 tablet, Refills: 3    Associated Diagnoses: Gastroesophageal reflux disease without esophagitis      promethazine-dextromethorphan (PROMETHAZINE-DM) 6.25-15 mg/5 mL Syrp Take 5 mLs by mouth every 8 (eight) hours as needed (cough).  Qty: 240 mL, Refills: 0    Associated Diagnoses: Seasonal allergic rhinitis, unspecified trigger      rosuvastatin (CRESTOR) 20 MG tablet Take 1 tablet (20 mg total) by mouth once daily.  Qty: 30 tablet, Refills: 11    Associated Diagnoses: Dyslipidemia      tiZANidine (ZANAFLEX) 4 MG tablet Take 1 tablet (4 mg total) by mouth every 8 (eight) hours as needed (muscle pain).  Qty: 90 tablet, Refills: 2      traZODone (DESYREL) 100 MG tablet Take 1 tablet (100 mg total) by mouth every  evening.  Qty: 90 tablet, Refills: 3    Associated Diagnoses: Insomnia, unspecified type                 Discharge Diagnosis: Osteoarthritis of lumbar spine, unspecified spinal osteoarthritis complication status [M47.816]  Condition on Discharge: Stable with no complications to procedure   Diet on Discharge: Same as before.  Activity: as per instruction sheet.  Discharge to: Home with a responsible adult.  Follow up: 2-4 weeks       Please call my office or pager at [phone] if experienced any weakness or loss of sensation, fever > 101.5, pain uncontrolled with oral medications, persistent nausea/vomiting/or diarrhea, redness or drainage from the incisions, or any other worrisome concerns. If physician on call was not reached or could not communicate with our office for any reason please go to the nearest emergency department

## 2022-07-21 NOTE — H&P
HPI  Patient presenting for Procedure(s) (LRB):  RADIOFREQUENCY ABLATION, LEFT L3-L4-L5 TWO OF TWO *BRING SCS REMOTE* (Left)     Patient on Anti-coagulation No    No health changes since previous encounter    Past Medical History:   Diagnosis Date    Arthritis     Diabetes mellitus     Hypertension      Past Surgical History:   Procedure Laterality Date    CARPAL TUNNEL RELEASE Right 3/8/2019    Procedure: RELEASE, CARPAL TUNNEL right;  Surgeon: Pan Goodman MD;  Location: [facility] OR;  Service: Orthopedics;  Laterality: Right;    CARPAL TUNNEL RELEASE Left 2019    Procedure: RELEASE, CARPAL TUNNEL- LEFT;  Surgeon: Pan Goodman MD;  Location: [facility] OR [facility] FLR;  Service: Orthopedics;  Laterality: Left;    CATARACT EXTRACTION Bilateral      SECTION      CHOLECYSTECTOMY      EPIDURAL STEROID INJECTION INTO LUMBAR SPINE N/A 2018    Procedure: INJECTION, STEROID, SPINE, LUMBAR, EPIDURAL;  Surgeon: Shaq Silverio MD;  Location: [facility] PAIN MGT;  Service: Pain Management;  Laterality: N/A;  LUMBAR L4-L5 INTERLAMINAR ELISE'  [id number]  W/ SEDATION     HYSTERECTOMY      INJECTION OF ANESTHETIC AGENT AROUND NERVE Bilateral 2019    Procedure: BLOCK, NERVE, L3-L4-L5 MEDIAL BRANCH;  Surgeon: Shaq Silverio MD;  Location: [facility] PAIN MGT;  Service: Pain Management;  Laterality: Bilateral;    INJECTION OF ANESTHETIC AGENT AROUND NERVE Bilateral 10/17/2019    Procedure: BLOCK, NERVE;  Surgeon: Shaq Silverio MD;  Location: [facility] PAIN MGT;  Service: Pain Management;  Laterality: Bilateral;  B/L MBB L3-L4-L5  REPEAT  CONSENT NEEDED    INJECTION OF FACET JOINT Bilateral 2018    Procedure: INJECTION-FACET;  Surgeon: Shaq Silverio MD;  Location: [facility] PAIN MGT;  Service: Pain Management;  Laterality: Bilateral;  LUMBAR BILATERAL L4-L5 AND L5-S1 FACET STEROID INJECTION  [id number]    W/ SEDATION     RADIOFREQUENCY ABLATION Right 2019    Procedure: RADIOFREQUENCY ABLATION RIGHT L3, L4, L5;  Surgeon: Shaq  MD Jean Claude;  Location: [facility] PAIN MGT;  Service: Pain Management;  Laterality: Right;  Right RFA L3-L4-L5  1 of 2  Consent Needed    RADIOFREQUENCY ABLATION Left 12/5/2019    Procedure: RADIOFREQUENCY ABLATION LEFT L3-5;  Surgeon: Shaq Silverio MD;  Location: [facility] PAIN MGT;  Service: Pain Management;  Laterality: Left;  Left RFA L3-L4-L5  2 of 2  Consent Needed    RADIOFREQUENCY ABLATION Left 8/17/2020    Procedure: RADIOFREQUENCY ABLATION LEFT L3,4,5 1 of 2;  Surgeon: Shaq Silverio MD;  Location: [facility] PAIN MGT;  Service: Pain Management;  Laterality: Left;  Left RFA L3,4,5  1 of 2    RADIOFREQUENCY ABLATION Right 8/31/2020    Procedure: RADIOFREQUENCY ABLATION RIGHT L3,4,5 2 of 2;  Surgeon: Shaq Silverio MD;  Location: [facility] PAIN MGT;  Service: Pain Management;  Laterality: Right;  RADIOFREQUENCY ABLATION RIGHT L3,4,5  2 of 2    RADIOFREQUENCY ABLATION Left 9/9/2021    Procedure: RADIOFREQUENCY ABLATION, L3-L4 AND L5 MEDIAL BRANCH 1 OF 2;  Surgeon: Shaq Silverio MD;  Location: [facility] PAIN MGT;  Service: Pain Management;  Laterality: Left;    RADIOFREQUENCY ABLATION Right 9/20/2021    Procedure: RADIOFREQUENCY ABLATION, L3-L4 AND L5 MEDIAL BRANCH 2 OF 2;  Surgeon: Shaq Silverio MD;  Location: [facility] PAIN MGT;  Service: Pain Management;  Laterality: Right;    RADIOFREQUENCY ABLATION Right 7/7/2022    Procedure: RADIOFREQUENCY ABLATION, RIGHT L3-L4-L5 ONE OF TWO;  Surgeon: Shaq Silverio MD;  Location: [facility] PAIN MGT;  Service: Pain Management;  Laterality: Right;    TRIAL OF SPINAL CORD NERVE STIMULATOR N/A 9/24/2018    Procedure: TRIAL, NEUROSTIMULATOR, SPINAL CORD, SPINAL CORD STIMULATOR TRIAL-INTERNAL WIRES TO EXTERNAL BATTERY;  Surgeon: Shaq Silverio MD;  Location: [facility] PAIN MGT;  Service: Pain Management;  Laterality: N/A;  [facility] NOTIFIED     Review of patient's allergies indicates:  No Known Allergies   No current facility-administered medications for this encounter.       PMHx, PSHx, Allergies, Medications  reviewed in epic    ROS negative except pain complaints in HPI    OBJECTIVE:    Breastfeeding No     PHYSICAL EXAMINATION:    GENERAL: Well appearing, in no acute distress, alert and oriented x3.  PSYCH:  Mood and affect appropriate.  SKIN: Skin color, texture, turgor normal, no rashes or lesions which will impact the procedure.  CV: RRR with palpation of the radial artery.  PULM: No evidence of respiratory difficulty, symmetric chest rise. Clear to auscultation.  NEURO: Cranial nerves grossly intact.    Plan:    Proceed with procedure as planned Procedure(s) (LRB):  RADIOFREQUENCY ABLATION, LEFT L3-L4-L5 TWO OF TWO *BRING SCS REMOTE* (Left)    Rakesh Ocampo  07/21/2022

## 2022-07-21 NOTE — OP NOTE
Therapeutic Lumbar Medial Branch Radiofrequency Ablation under Fluoroscopy     The procedure, risks, benefits, and options were discussed with the patient. There are no contraindications to the procedure. The patent expressed understanding and agreed to the procedure. Informed written consent was obtained prior to the start of the procedure and can be found in the patient's chart.        PATIENT NAME: Faby Luna   MRN: [id number]     DATE OF PROCEDURE: 07/21/2022     PROCEDURE:  Left L3, L4 and L5 Lumbar Radiofrequency Ablation under Fluoroscopy    PRE-OP DIAGNOSIS: Osteoarthritis of lumbar spine, unspecified spinal osteoarthritis complication status [M47.816] Lumbar spondylosis [M47.816]    POST-OP DIAGNOSIS: Same    PHYSICIAN: Shaq Silverio MD    ASSISTANTS: Rakesh Ocampo D.O. ([facility] Fellow)     MEDICATIONS INJECTED:  Preservative-free Decadron 10mg with 9cc of Bupivicaine 0.25%    LOCAL ANESTHETIC INJECTED:   Xylocaine 2%    SEDATION:   Versed 2mg and Fentanyl 50mcg                                                                                                                                                                                     Conscious sedation ordered by M.D. Patient re-evaluation prior to administration of conscious sedation. No changes noted in patient's status from initial evaluation. The patient's vital signs were monitored by RN and patient remained hemodynamically stable throughout the procedure.    Event Time In   Sedation Start 0848   Sedation End 0906       ESTIMATED BLOOD LOSS:  None    COMPLICATIONS:  None     INTERVAL HISTORY: Patient has clinical and imaging findings suggestive of facet mediated pain. Patients has completed 2 previous diagnostic medial branch blocks at specified levels with at least 80% relief for the expected duration of the local anesthetic utilized.    TECHNIQUE: Time-out was performed to identify the patient and procedure to be performed. With the  patient laying in a prone position, the surgical area was prepped and draped in the usual sterile fashion using ChloraPrep and fenestrated drape. The levels were determined under fluoroscopic guidance. Skin anesthesia was achieved by injecting Lidocaine 2% over the injection sites. A 20 gauge 10mm curved active tip needle was introduced to the anatomic local of the medial branch at each of the above levels using AP, lateral and/or contralateral oblique fluoroscopic imaging. Then sensory and motor testing was performed to confirm that the needle tips were in the correct location. After negative aspiration for blood or CSF was confirmed, 1 mL of the lidocaine 2% listed above was injected slowly at each site. This was followed by thermal lesioning at 80 degrees celsius for 90 seconds. That was followed by slowly injecting 2 mL of the medication mixture listed above at each site. The needles were removed and bleeding was nil. A sterile dressing was applied. No specimens collected. The patient tolerated the procedure well and did not have any procedure related motor deficit at the conclusion of the procedure.    PAIN BEFORE THE PROCEDURE: 10/10    PAIN AFTER THE PROCEDURE: 0/10    The patient was monitored after the procedure in the recovery area. They were given post-procedure and discharge instructions to follow at home. The patient was discharged in a stable condition.        Shaq Silverio MD

## 2022-07-21 NOTE — DISCHARGE INSTRUCTIONS
Thank you for allowing us to care for you today. You may receive a survey about the care we provided. Your feedback is valuable and helps us provide excellent care throughout the community.     Home Care Instructions for Pain Management:    1. DIET:   You may resume your normal diet today.   2. BATHING:   You may shower with luke warm water. No tub baths or anything that will soak injection sites under water for the next 24 hours.  3. DRESSING:   You may remove your bandage today.   4. ACTIVITY LEVEL:   You may resume your normal activities 24 hrs after your procedure. Nothing strenuous today.  5. MEDICATIONS:   You may resume your normal medications today. To restart blood thinners, ask your doctor.  6. DRIVING    If you have received any sedatives by mouth today, you may not drive for 12 hours.    If you have received any sedation through your IV, you may not drive for 24 hrs.   7. SPECIAL INSTRUCTIONS:   No heat to the injection site for 24 hrs including, hot bath or shower, heating pad, moist heat, or hot tubs.    Use ice pack to injection site for any pain or discomfort.  Apply ice packs for 20 minute intervals as needed.    IF you have diabetes, be sure to monitor your blood sugar more closely. IF your injection contained steroids your blood sugar levels may become higher than normal.    If you are still having pain upon discharge:  Your pain may improve over the next 48 hours. The anesthetic (numbing medication) works immediately to 48 hours. IF your injection contained a steroid (anti-inflammatory medication), it takes approximately 3 days to start feeling relief and 7-10 days to see your greatest results from the medication. It is possible you may need subsequent injections. This would be discussed at your follow up appointment with pain management or your referring doctor.    Please call the PAIN MANAGEMENT office at [phone] or ON CALL pager at [phone] if you experienced any:   -Weakness or  loss of sensation  -Fever > 101.5  -Pain uncontrolled with oral medications   -Persistent nausea, vomiting, or diarrhea  -Redness or drainage from the injection sites, or any other worrisome concerns.   If physician on call was not reached or could not communicate with our office for any reason please go to the nearest emergency department. Procedural Sedation  Procedural sedation is medicine to ease discomfort, pain, and anxiety during a procedure. The medicine is often given through an intravenous (IV) line in your arm or hand. In some cases, the medicine may be taken by mouth or inhaled. While you are under sedation, you will likely be awake. But you may not remember anything afterward.  Why procedural sedation is used  Sedation is used for many types of procedures. The goal is to reduce pain, anxiety, and stressful memories of a procedure. It can also help your health care provider treat you. For example, having a broken bone fixed may be easier if you feel relaxed.  Procedural sedation is used only for short, basic procedures. It is not used for complex surgeries. Some procedures that use this type of sedation include:  Dental surgery  Breast biopsy, to take a sample of breast tissue  Endoscopy, to look at gastrointestinal problems  Bronchoscopy, to check for lung problems  Bone or joint realignment, to fix a broken bone or dislocated joint  Minor foot or skin surgery  Electrical cardioversion, to restore a normal heart rhythm  Lumbar puncture, to assess neurological disease  Risks of procedural sedation  Procedural sedation has some risks and possible side effects, such as:  Headache  Nausea and vomiting  Unpleasant memory of the procedure  Lowered rate of breathing  Changes in heart rate and blood pressure (rare)  Inhalation of stomach contents into your lungs (rare)  Side effects will likely go away shortly after the procedure. Your health care team will watch your heart rate and breathing during your  sedation. This is to help prevent problems.  Your own risks may vary based on your age and your overall health. They also depend on the type of sedation you are given. Talk with your health care provider about the risks that apply most to you.  Getting ready for procedural sedation  Talk with your health care provider how to get ready for your procedure. Tell him or her about all the medicines you take. This includes over-the-counter medicines such as ibuprofen. It also includes vitamins, herbs, and other supplements. You may need to stop taking some medicines before the procedure, such as blood thinners and aspirin. If you smoke, you may need to stop. Talk with your health care provider if you need help to stop smoking.  Tell your health care provider if you:  Have had any problems in the past with sedation or anesthesia  Have had any recent changes in your health, such as an infection or fever  Are pregnant or think you may be pregnant  Also, make sure to:  Ask a family member or friend to take you home after the procedure. You cannot drive on the day you receive sedation.  Not eat or drink after midnight the night before your procedure, if advised.  Follow all other instructions from your health care provider.  During your procedural sedation  You may have your procedure in a hospital or a medical clinic. Sedation is done by a trained health care provider. In general, you can expect the following:  You will be given medicine through an IV line in your arm or hand. Or you may receive a shot. The medicine may also be given by mouth. Or you may inhale it through a mask.  If you receive medicine through an IV, you may feel the effects very quickly. You will start to feel relaxed and drowsy.  During the procedure, your heart rate, breathing, and blood pressure will be closely watched. Your breathing and blood pressure may decrease a little. But you will likely not need help with your breathing. You may receive a  little extra oxygen through a mask.  You will probably be awake the entire time. If you do fall asleep, you should be easy to wake up, if needed. You should feel little or no pain.  When your procedure is over, the sedative medicine will be stopped.  After your procedural sedation  You will begin to feel more awake and aware. But you will likely be drowsy for a while afterward. You will be closely watched as you become more alert. You may have a faint memory of the procedure. Or you may not remember it at all.  You should be able to return home within an hour or two after your procedure. Plan to have someone stay with you for a few hours. Side effects such as headache and nausea may go away quickly. Tell your health care provider if they continue.  Dont drive or make any important decisions for at least 24 hours. Be sure to follow all after-care instructions.      When to call your health care provider  Have someone call your health care provider right away if you have any of these:  Drowsiness that gets worse  Weakness or dizziness that gets worse  Repeated vomiting  You cant be awakened   Date Last Reviewed: 2/6/2015  © 4276-3042 [employer], [employer]. [address] [place] [id number]. All rights reserved. This information is not intended as a substitute for professional medical care. Always follow your healthcare professional's instructions.

## 2022-07-25 ENCOUNTER — OFFICE VISIT (OUTPATIENT)
Dept: FAMILY MEDICINE | Facility: CLINIC | Age: 72
End: 2022-07-25
Payer: MEDICARE

## 2022-07-25 VITALS
RESPIRATION RATE: 14 BRPM | HEART RATE: 70 BPM | BODY MASS INDEX: 36.72 KG/M2 | WEIGHT: 215.06 LBS | TEMPERATURE: 98 F | OXYGEN SATURATION: 98 % | HEIGHT: 64 IN | DIASTOLIC BLOOD PRESSURE: 68 MMHG | SYSTOLIC BLOOD PRESSURE: 130 MMHG

## 2022-07-25 DIAGNOSIS — G44.89 OTHER HEADACHE SYNDROME: ICD-10-CM

## 2022-07-25 DIAGNOSIS — Z12.11 COLON CANCER SCREENING: ICD-10-CM

## 2022-07-25 DIAGNOSIS — E11.69 DYSLIPIDEMIA ASSOCIATED WITH TYPE 2 DIABETES MELLITUS: ICD-10-CM

## 2022-07-25 DIAGNOSIS — Z12.31 ENCOUNTER FOR SCREENING MAMMOGRAM FOR MALIGNANT NEOPLASM OF BREAST: ICD-10-CM

## 2022-07-25 DIAGNOSIS — Z13.820 OSTEOPOROSIS SCREENING: ICD-10-CM

## 2022-07-25 DIAGNOSIS — F33.0 MILD RECURRENT MAJOR DEPRESSION: ICD-10-CM

## 2022-07-25 DIAGNOSIS — E11.22 TYPE 2 DIABETES MELLITUS WITH STAGE 3A CHRONIC KIDNEY DISEASE, WITHOUT LONG-TERM CURRENT USE OF INSULIN: ICD-10-CM

## 2022-07-25 DIAGNOSIS — F41.1 GAD (GENERALIZED ANXIETY DISORDER): ICD-10-CM

## 2022-07-25 DIAGNOSIS — E78.5 DYSLIPIDEMIA ASSOCIATED WITH TYPE 2 DIABETES MELLITUS: ICD-10-CM

## 2022-07-25 DIAGNOSIS — Z00.00 WELLNESS EXAMINATION: Primary | ICD-10-CM

## 2022-07-25 DIAGNOSIS — N18.31 TYPE 2 DIABETES MELLITUS WITH STAGE 3A CHRONIC KIDNEY DISEASE, WITHOUT LONG-TERM CURRENT USE OF INSULIN: ICD-10-CM

## 2022-07-25 DIAGNOSIS — J45.909 ASTHMA, UNSPECIFIED ASTHMA SEVERITY, UNSPECIFIED WHETHER COMPLICATED, UNSPECIFIED WHETHER PERSISTENT: ICD-10-CM

## 2022-07-25 DIAGNOSIS — I10 ESSENTIAL HYPERTENSION: ICD-10-CM

## 2022-07-25 DIAGNOSIS — R15.9 INCONTINENCE OF FECES, UNSPECIFIED FECAL INCONTINENCE TYPE: ICD-10-CM

## 2022-07-25 PROCEDURE — 3288F PR FALLS RISK ASSESSMENT DOCUMENTED: ICD-10-PCS | Mod: CPTII,S$GLB,, | Performed by: FAMILY MEDICINE

## 2022-07-25 PROCEDURE — 1160F RVW MEDS BY RX/DR IN RCRD: CPT | Mod: CPTII,S$GLB,, | Performed by: FAMILY MEDICINE

## 2022-07-25 PROCEDURE — 3008F BODY MASS INDEX DOCD: CPT | Mod: CPTII,S$GLB,, | Performed by: FAMILY MEDICINE

## 2022-07-25 PROCEDURE — 4010F PR ACE/ARB THEARPY RXD/TAKEN: ICD-10-PCS | Mod: CPTII,S$GLB,, | Performed by: FAMILY MEDICINE

## 2022-07-25 PROCEDURE — 3075F PR MOST RECENT SYSTOLIC BLOOD PRESS GE 130-139MM HG: ICD-10-PCS | Mod: CPTII,S$GLB,, | Performed by: FAMILY MEDICINE

## 2022-07-25 PROCEDURE — 99215 PR OFFICE/OUTPT VISIT, EST, LEVL V, 40-54 MIN: ICD-10-PCS | Mod: S$GLB,,, | Performed by: FAMILY MEDICINE

## 2022-07-25 PROCEDURE — 1125F AMNT PAIN NOTED PAIN PRSNT: CPT | Mod: CPTII,S$GLB,, | Performed by: FAMILY MEDICINE

## 2022-07-25 PROCEDURE — 4010F ACE/ARB THERAPY RXD/TAKEN: CPT | Mod: CPTII,S$GLB,, | Performed by: FAMILY MEDICINE

## 2022-07-25 PROCEDURE — 99215 OFFICE O/P EST HI 40 MIN: CPT | Mod: S$GLB,,, | Performed by: FAMILY MEDICINE

## 2022-07-25 PROCEDURE — 1125F PR PAIN SEVERITY QUANTIFIED, PAIN PRESENT: ICD-10-PCS | Mod: CPTII,S$GLB,, | Performed by: FAMILY MEDICINE

## 2022-07-25 PROCEDURE — 1159F MED LIST DOCD IN RCRD: CPT | Mod: CPTII,S$GLB,, | Performed by: FAMILY MEDICINE

## 2022-07-25 PROCEDURE — 3288F FALL RISK ASSESSMENT DOCD: CPT | Mod: CPTII,S$GLB,, | Performed by: FAMILY MEDICINE

## 2022-07-25 PROCEDURE — 3072F LOW RISK FOR RETINOPATHY: CPT | Mod: CPTII,S$GLB,, | Performed by: FAMILY MEDICINE

## 2022-07-25 PROCEDURE — 99999 PR PBB SHADOW E&M-EST. PATIENT-LVL V: CPT | Mod: PBBFAC,,, | Performed by: FAMILY MEDICINE

## 2022-07-25 PROCEDURE — 3075F SYST BP GE 130 - 139MM HG: CPT | Mod: CPTII,S$GLB,, | Performed by: FAMILY MEDICINE

## 2022-07-25 PROCEDURE — 1101F PT FALLS ASSESS-DOCD LE1/YR: CPT | Mod: CPTII,S$GLB,, | Performed by: FAMILY MEDICINE

## 2022-07-25 PROCEDURE — 99999 PR PBB SHADOW E&M-EST. PATIENT-LVL V: ICD-10-PCS | Mod: PBBFAC,,, | Performed by: FAMILY MEDICINE

## 2022-07-25 PROCEDURE — 1159F PR MEDICATION LIST DOCUMENTED IN MEDICAL RECORD: ICD-10-PCS | Mod: CPTII,S$GLB,, | Performed by: FAMILY MEDICINE

## 2022-07-25 PROCEDURE — 1160F PR REVIEW ALL MEDS BY PRESCRIBER/CLIN PHARMACIST DOCUMENTED: ICD-10-PCS | Mod: CPTII,S$GLB,, | Performed by: FAMILY MEDICINE

## 2022-07-25 PROCEDURE — 1101F PR PT FALLS ASSESS DOC 0-1 FALLS W/OUT INJ PAST YR: ICD-10-PCS | Mod: CPTII,S$GLB,, | Performed by: FAMILY MEDICINE

## 2022-07-25 PROCEDURE — 3072F PR LOW RISK FOR RETINOPATHY: ICD-10-PCS | Mod: CPTII,S$GLB,, | Performed by: FAMILY MEDICINE

## 2022-07-25 PROCEDURE — 3078F PR MOST RECENT DIASTOLIC BLOOD PRESSURE < 80 MM HG: ICD-10-PCS | Mod: CPTII,S$GLB,, | Performed by: FAMILY MEDICINE

## 2022-07-25 PROCEDURE — 3008F PR BODY MASS INDEX (BMI) DOCUMENTED: ICD-10-PCS | Mod: CPTII,S$GLB,, | Performed by: FAMILY MEDICINE

## 2022-07-25 PROCEDURE — 3078F DIAST BP <80 MM HG: CPT | Mod: CPTII,S$GLB,, | Performed by: FAMILY MEDICINE

## 2022-07-25 RX ORDER — FLUTICASONE PROPIONATE 110 UG/1
1 AEROSOL, METERED RESPIRATORY (INHALATION) 2 TIMES DAILY
Qty: 12 G | Refills: 0 | Status: SHIPPED | OUTPATIENT
Start: 2022-07-25 | End: 2022-08-02

## 2022-07-25 RX ORDER — HYDROXYZINE PAMOATE 25 MG/1
25 CAPSULE ORAL DAILY PRN
Qty: 30 CAPSULE | Refills: 2 | Status: SHIPPED | OUTPATIENT
Start: 2022-07-25 | End: 2022-10-27

## 2022-07-25 NOTE — PROGRESS NOTES
Assessment & Plan  Wellness examination  -     CBC Auto Differential; Future; Expected date: 07/25/2022  -     Comprehensive Metabolic Panel; Future; Expected date: 07/25/2022  -     Hemoglobin A1C; Future; Expected date: 07/25/2022  -     Lipid Panel; Future; Expected date: 07/25/2022  -     Microalbumin/Creatinine Ratio, Urine; Future; Expected date: 07/25/2022  -     Vitamin D; Future; Expected date: 07/25/2022    Routine fasting labs to be scheduled.    Type 2 diabetes mellitus with stage 3a chronic kidney disease, without long-term current use of insulin  -     CBC Auto Differential; Future; Expected date: 07/25/2022  -     Comprehensive Metabolic Panel; Future; Expected date: 07/25/2022  -     Hemoglobin A1C; Future; Expected date: 07/25/2022  -     Lipid Panel; Future; Expected date: 07/25/2022  -     Microalbumin/Creatinine Ratio, Urine; Future; Expected date: 07/25/2022    Previously well controlled. See above.    Dyslipidemia associated with type 2 diabetes mellitus  -     Lipid Panel; Future; Expected date: 07/25/2022    Essential hypertension  -     CBC Auto Differential; Future; Expected date: 07/25/2022  -     Comprehensive Metabolic Panel; Future; Expected date: 07/25/2022  -     Hemoglobin A1C; Future; Expected date: 07/25/2022  -     Lipid Panel; Future; Expected date: 07/25/2022    Controlled. Continue current therapy.     SANDRA (generalized anxiety disorder)  -     hydrOXYzine pamoate (VISTARIL) 25 MG Cap; Take 1 capsule (25 mg total) by mouth daily as needed (anxiety).  Dispense: 30 capsule; Refill: 2    Advised against the use of BZDs for anxiety in individuals over 65. Will try trial of Vistaril prn.    Mild recurrent major depression  Continue Lexapro.    Other headache syndrome  Most likely muscle spasm vs cluster HA. Reassurance. Tylenol prn.    Asthma, unspecified asthma severity, unspecified whether complicated, unspecified whether persistent  -     fluticasone propionate (FLOVENT HFA) 110  mcg/actuation inhaler; Inhale 1 puff into the lungs 2 (two) times daily. Controller  Dispense: 12 g; Refill: 0  -     Ambulatory referral/consult to Pulmonology; Future; Expected date: 08/01/2022    Diabetes is generally well controlled. Will start daily low dose ICS. Referral to Pulmonology for PFT and further management.    Encounter for screening mammogram for malignant neoplasm of breast  -     Mammo Digital Screening Bilat; Future; Expected date: 07/25/2022    Osteoporosis screening  -     DXA Bone Density Spine And Hip; Future; Expected date: 07/25/2022    Incontinence of feces, unspecified fecal incontinence type  -     Case Request Endoscopy: COLONOSCOPY    Advised to increase fiber intake. Referral for colonoscopy.    Colon cancer screening  -     Case Request Endoscopy: COLONOSCOPY      Health maintenance reviewed & addressed above.     Follow-up: Follow up in about 6 months (around 1/25/2023).  ______________________________________________________________________    Chief Complaint  Chief Complaint   Patient presents with    Anxiety    anal leakage    Headache       HPI  Faby Luna is a 71 y.o. female with medical diagnoses as listed in the medical history and problem list that presents to the office for a routine follow up of her chronic conditions. She has the following concerns:    1. Anxiety - Patient is requesting to start lorazepam to take as needed for anxiety. She is taking care of her 92 year old mother who has dementia. Taking Lexapro 20 mg and she does not think this is helping.    2. Head pains - Occur primarily on the right side of her head. Episodes are brief, lasting 45 seconds, then disappear. Denies n/v/photophobia/numbness/tingling/drooping of the face/dysarthria.     3. Leakage of stool - Noticed when she wipes when she urinates. Denies umu diarrhea.     4. Shortness of breath - Occurs at rest. Uses albuterol inhaler 1-2 times daily for asthma. Not on a daily  inhaler. Denies chest pain, leg swelling.     Health Maintenance       Date Due Completion Date    Shingles Vaccine (1 of 2) Never done ---    COVID-19 Vaccine (4 - Booster for Pfizer series) 2022 10/12/2021    Mammogram 2022    Hemoglobin A1c 2022 10/11/2021    DEXA Scan 2022    Colorectal Cancer Screening 2022    Influenza Vaccine (1) 2022 10/11/2021    Diabetes Urine Screening 10/11/2022 10/11/2021    Foot Exam 10/11/2022 10/11/2021    Override on 2018: Done    Lipid Panel 10/11/2022 10/11/2021    Eye Exam 2022    TETANUS VACCINE 06/10/2029 6/10/2019            PAST MEDICAL HISTORY:  Past Medical History:   Diagnosis Date    Arthritis     Diabetes mellitus     Hypertension        PAST SURGICAL HISTORY:  Past Surgical History:   Procedure Laterality Date    CARPAL TUNNEL RELEASE Right 3/8/2019    Procedure: RELEASE, CARPAL TUNNEL right;  Surgeon: Pan Goodman MD;  Location: [facility];  Service: Orthopedics;  Laterality: Right;    CARPAL TUNNEL RELEASE Left 2019    Procedure: RELEASE, CARPAL TUNNEL- LEFT;  Surgeon: Pan Goodman MD;  Location: [address];  Service: Orthopedics;  Laterality: Left;    CATARACT EXTRACTION Bilateral      SECTION      CHOLECYSTECTOMY      EPIDURAL STEROID INJECTION INTO LUMBAR SPINE N/A 2018    Procedure: INJECTION, STEROID, SPINE, LUMBAR, EPIDURAL;  Surgeon: Shaq Silverio MD;  Location: [facility];  Service: Pain Management;  Laterality: N/A;  LUMBAR L4-L5 INTERLAMINAR ELISE'  [id number]  W/ SEDATION     HYSTERECTOMY      INJECTION OF ANESTHETIC AGENT AROUND NERVE Bilateral 2019    Procedure: BLOCK, NERVE, L3-L4-L5 MEDIAL BRANCH;  Surgeon: Shaq Silvreio MD;  Location: [facility];  Service: Pain Management;  Laterality: Bilateral;    INJECTION OF ANESTHETIC AGENT AROUND NERVE Bilateral 10/17/2019    Procedure: BLOCK, NERVE;  Surgeon: Shaq Silverio MD;  Location:  [facility];  Service: Pain Management;  Laterality: Bilateral;  B/L MBB L3-L4-L5  REPEAT  CONSENT NEEDED    INJECTION OF FACET JOINT Bilateral 5/28/2018    Procedure: INJECTION-FACET;  Surgeon: Shaq Silverio MD;  Location: [facility];  Service: Pain Management;  Laterality: Bilateral;  LUMBAR BILATERAL L4-L5 AND L5-S1 FACET STEROID INJECTION  [id number]    W/ SEDATION     RADIOFREQUENCY ABLATION Right 11/21/2019    Procedure: RADIOFREQUENCY ABLATION RIGHT L3, L4, L5;  Surgeon: Shaq Silverio MD;  Location: [facility];  Service: Pain Management;  Laterality: Right;  Right RFA L3-L4-L5  1 of 2  Consent Needed    RADIOFREQUENCY ABLATION Left 12/5/2019    Procedure: RADIOFREQUENCY ABLATION LEFT L3-5;  Surgeon: Shaq Silverio MD;  Location: [facility];  Service: Pain Management;  Laterality: Left;  Left RFA L3-L4-L5  2 of 2  Consent Needed    RADIOFREQUENCY ABLATION Left 8/17/2020    Procedure: RADIOFREQUENCY ABLATION LEFT L3,4,5 1 of 2;  Surgeon: Shaq Silverio MD;  Location: [facility];  Service: Pain Management;  Laterality: Left;  Left RFA L3,4,5  1 of 2    RADIOFREQUENCY ABLATION Right 8/31/2020    Procedure: RADIOFREQUENCY ABLATION RIGHT L3,4,5 2 of 2;  Surgeon: Shaq Silverio MD;  Location: [facility];  Service: Pain Management;  Laterality: Right;  RADIOFREQUENCY ABLATION RIGHT L3,4,5  2 of 2    RADIOFREQUENCY ABLATION Left 9/9/2021    Procedure: RADIOFREQUENCY ABLATION, L3-L4 AND L5 MEDIAL BRANCH 1 OF 2;  Surgeon: Shaq Silverio MD;  Location: [facility];  Service: Pain Management;  Laterality: Left;    RADIOFREQUENCY ABLATION Right 9/20/2021    Procedure: RADIOFREQUENCY ABLATION, L3-L4 AND L5 MEDIAL BRANCH 2 OF 2;  Surgeon: Shaq Silverio MD;  Location: [facility];  Service: Pain Management;  Laterality: Right;    RADIOFREQUENCY ABLATION Right 7/7/2022    Procedure: RADIOFREQUENCY ABLATION, RIGHT L3-L4-L5 ONE OF TWO;  Surgeon: Shaq Silverio MD;  Location: [facility];  Service: Pain  Management;  Laterality: Right;    RADIOFREQUENCY ABLATION Left 7/21/2022    Procedure: RADIOFREQUENCY ABLATION, LEFT L3-L4-L5 TWO OF TWO *BRING SCS REMOTE*;  Surgeon: Shaq Silverio MD;  Location: [facility];  Service: Pain Management;  Laterality: Left;    TRIAL OF SPINAL CORD NERVE STIMULATOR N/A 9/24/2018    Procedure: TRIAL, NEUROSTIMULATOR, SPINAL CORD, SPINAL CORD STIMULATOR TRIAL-INTERNAL WIRES TO EXTERNAL BATTERY;  Surgeon: Shaq Silverio MD;  Location: [facility];  Service: Pain Management;  Laterality: N/A;  ABBOTT REP NOTIFIED       SOCIAL HISTORY:  Social History     Socioeconomic History    Marital status:    Tobacco Use    Smoking status: Never Smoker    Smokeless tobacco: Never Used   Substance and Sexual Activity    Alcohol use: No    Drug use: No       FAMILY HISTORY:  Family History   Problem Relation Age of Onset    Cataracts Mother     Glaucoma Mother     No Known Problems Father     No Known Problems Sister     No Known Problems Brother     No Known Problems Maternal Aunt     No Known Problems Maternal Uncle     No Known Problems Paternal Aunt     No Known Problems Paternal Uncle     No Known Problems Maternal Grandmother     No Known Problems Maternal Grandfather     No Known Problems Paternal Grandmother     No Known Problems Paternal Grandfather        ALLERGIES AND MEDICATIONS: updated and reviewed.  Review of patient's allergies indicates:  No Known Allergies  Current Outpatient Medications   Medication Sig Dispense Refill    albuterol (PROAIR HFA) 90 mcg/actuation inhaler Inhale 2 puffs into the lungs every 4 (four) hours as needed for Wheezing or Shortness of Breath. Rescue 8.5 g 1    albuterol sulfate 2.5 mg/0.5 mL Nebu Take 2.5 mg by nebulization every 4 (four) hours as needed (shortness of breath, wheezing, coughing). Rescue 30 each 1    atenoloL (TENORMIN) 100 MG tablet Take 1 tablet (100 mg total) by mouth once daily. 90 tablet 3    EScitalopram  oxalate (LEXAPRO) 20 MG tablet TAKE 1 TABLET EVERY DAY 90 tablet 3    fluticasone propionate (FLONASE) 50 mcg/actuation nasal spray 2 sprays (100 mcg total) by Each Nostril route once daily. 11.1 mL 1    furosemide (LASIX) 20 MG tablet Take 1 tablet (20 mg total) by mouth once daily. 90 tablet 3    gabapentin (NEURONTIN) 400 MG capsule Take 1 capsule (400 mg total) by mouth 3 (three) times daily. 90 capsule 2    glipiZIDE (GLUCOTROL) 10 MG tablet Take 1 tablet (10 mg total) by mouth daily with breakfast. 180 tablet 3    linaGLIPtin (TRADJENTA) 5 mg Tab tablet Take 1 tablet (5 mg total) by mouth once daily. 90 tablet 3    loratadine (CLARITIN) 10 mg tablet Take 1 tablet (10 mg total) by mouth daily as needed for Allergies. 90 tablet 3    losartan (COZAAR) 50 MG tablet TAKE 1 TABLET EVERY DAY 90 tablet 3    pantoprazole (PROTONIX) 20 MG tablet TAKE 1 TABLET TWICE DAILY  BEFORE  MEALS 180 tablet 3    rosuvastatin (CRESTOR) 20 MG tablet Take 1 tablet (20 mg total) by mouth once daily. 30 tablet 11    tiZANidine (ZANAFLEX) 4 MG tablet Take 1 tablet (4 mg total) by mouth every 8 (eight) hours as needed (muscle pain). 90 tablet 2    traZODone (DESYREL) 100 MG tablet Take 1 tablet (100 mg total) by mouth every evening. 90 tablet 3    blood-glucose meter kit Use as instructed 1 each 0    fluticasone propionate (FLOVENT HFA) 110 mcg/actuation inhaler Inhale 1 puff into the lungs 2 (two) times daily. Controller 12 g 0    hydrOXYzine pamoate (VISTARIL) 25 MG Cap Take 1 capsule (25 mg total) by mouth daily as needed (anxiety). 30 capsule 2    ketorolac (TORADOL) 10 mg tablet Take 1 tablet (10 mg total) by mouth every 6 (six) hours as needed for Pain. Take with food to prevent heartburn. 20 tablet 1    promethazine-dextromethorphan (PROMETHAZINE-DM) 6.25-15 mg/5 mL Syrp Take 5 mLs by mouth every 8 (eight) hours as needed (cough). 240 mL 0     No current facility-administered medications for this visit.  "        ROS  Review of Systems   Constitutional: Negative for activity change, fever and unexpected weight change.   HENT: Negative for congestion and sore throat.    Eyes: Negative for photophobia and visual disturbance.   Respiratory: Positive for shortness of breath. Negative for cough.    Cardiovascular: Negative for chest pain and leg swelling.   Gastrointestinal: Negative for abdominal pain, constipation, diarrhea, nausea and vomiting.   Endocrine: Negative for polydipsia, polyphagia and polyuria.   Genitourinary: Negative for dysuria and urgency.   Musculoskeletal: Positive for arthralgias and back pain.   Skin: Negative for color change.   Neurological: Positive for headaches. Negative for dizziness, facial asymmetry, speech difficulty, weakness, light-headedness and numbness.   Psychiatric/Behavioral: Negative for dysphoric mood and sleep disturbance. The patient is nervous/anxious.            Physical Exam  Vitals:    07/25/22 1033   BP: 130/68   Pulse: 70   Resp: 14   Temp: 97.9 °F (36.6 °C)   TempSrc: Oral   SpO2: 98%   Weight: 97.6 kg (215 lb 0.9 oz)   Height: 5' 4" (1.626 m)    Body mass index is 36.91 kg/m².  Weight: 97.6 kg (215 lb 0.9 oz)   Height: 5' 4" (162.6 cm)   Physical Exam  Constitutional:       General: She is not in acute distress.     Appearance: She is obese.   HENT:      Head: Normocephalic and atraumatic.   Neck:      Thyroid: No thyromegaly.      Vascular: No carotid bruit.   Cardiovascular:      Rate and Rhythm: Normal rate and regular rhythm.      Pulses: Normal pulses.      Heart sounds: Normal heart sounds.   Pulmonary:      Effort: Pulmonary effort is normal. No respiratory distress.      Breath sounds: Normal breath sounds.   Musculoskeletal:      Cervical back: Neck supple.      Right lower leg: No edema.      Left lower leg: No edema.   Lymphadenopathy:      Cervical: No cervical adenopathy.   Skin:     General: Skin is warm and dry.      Findings: No rash.   Neurological:     "  General: No focal deficit present.      Mental Status: She is alert and oriented to person, place, and time.   Psychiatric:         Mood and Affect: Mood normal.         Behavior: Behavior normal.         Thought Content: Thought content normal.

## 2022-07-25 NOTE — PATIENT INSTRUCTIONS
You will be scheduled for blood work, a bone density scan, and a mammogram  You were referred to Pulmonology for asthma. Take the Flovent inhaler twice a day.  Take Vistaril as needed for anxiety.  Follow up in 6 months or sooner if needed.

## 2022-07-26 ENCOUNTER — TELEPHONE (OUTPATIENT)
Dept: FAMILY MEDICINE | Facility: CLINIC | Age: 72
End: 2022-07-26
Payer: MEDICARE

## 2022-07-29 ENCOUNTER — TELEPHONE (OUTPATIENT)
Dept: FAMILY MEDICINE | Facility: CLINIC | Age: 72
End: 2022-07-29
Payer: MEDICARE

## 2022-07-29 DIAGNOSIS — R05.9 COUGH: Primary | ICD-10-CM

## 2022-07-29 RX ORDER — BENZONATATE 200 MG/1
200 CAPSULE ORAL 3 TIMES DAILY PRN
Qty: 30 CAPSULE | Refills: 1 | Status: ON HOLD | OUTPATIENT
Start: 2022-07-29 | End: 2023-05-06 | Stop reason: HOSPADM

## 2022-07-29 RX ORDER — PROMETHAZINE HYDROCHLORIDE AND DEXTROMETHORPHAN HYDROBROMIDE 6.25; 15 MG/5ML; MG/5ML
SYRUP ORAL
Qty: 240 ML | Refills: 0 | Status: SHIPPED | OUTPATIENT
Start: 2022-07-29 | End: 2022-11-04

## 2022-07-29 NOTE — TELEPHONE ENCOUNTER
----- Message from Danita Escoto sent at 7/29/2022 10:04 AM CDT -----  Regarding: Patient call back  Who called:CLAUDIA MARTINEZ [[id number]]    What is the request in detail: Patient is requesting a call back. She states she is having another sinus infection and she would like a script called into her pharmacy [place] [id number] [address]  Please advise.    Can the clinic reply by [facility]? No    Best call back number: [phone]    Additional Information: N/A

## 2022-07-29 NOTE — TELEPHONE ENCOUNTER
----- Message from Yohana Griffin sent at 7/29/2022 12:05 PM CDT -----  Contact: Patient [phone]  Type:  Patient Returning Call    Who Called: Patient     Who Left Message for Patient: Sosalizzieyandel    Does the patient know what this is regarding?: Returning call.    Would the patient rather a call back or a response via [facility]? Call back    Best Call Back Number: [phone]

## 2022-07-29 NOTE — TELEPHONE ENCOUNTER
Spoke with patient regarding below message, she states that when she had this issues a few months ago she was giving an abx, patient is asking me to fyi on that.

## 2022-07-29 NOTE — TELEPHONE ENCOUNTER
Sent in cough medicine. Recommend OTC nasal decongestants, Tylenol, and antihistamines as needed.

## 2022-07-29 NOTE — TELEPHONE ENCOUNTER
Spoke with patient she states that she has a sinus infection, headache, post nasal drip, coughing up a little blood and pain in face. Patient says she tested negative for covid on yesterday and is asking can you send her something in for this ?

## 2022-08-02 ENCOUNTER — OFFICE VISIT (OUTPATIENT)
Dept: PULMONOLOGY | Facility: CLINIC | Age: 72
End: 2022-08-02
Payer: MEDICARE

## 2022-08-02 VITALS
HEIGHT: 64 IN | DIASTOLIC BLOOD PRESSURE: 79 MMHG | BODY MASS INDEX: 36.28 KG/M2 | HEART RATE: 79 BPM | OXYGEN SATURATION: 97 % | WEIGHT: 212.5 LBS | TEMPERATURE: 99 F | SYSTOLIC BLOOD PRESSURE: 136 MMHG

## 2022-08-02 DIAGNOSIS — J45.40 MODERATE PERSISTENT ASTHMA, UNSPECIFIED WHETHER COMPLICATED: Primary | ICD-10-CM

## 2022-08-02 PROCEDURE — 1126F PR PAIN SEVERITY QUANTIFIED, NO PAIN PRESENT: ICD-10-PCS | Mod: CPTII,S$GLB,, | Performed by: NURSE PRACTITIONER

## 2022-08-02 PROCEDURE — 99203 PR OFFICE/OUTPT VISIT, NEW, LEVL III, 30-44 MIN: ICD-10-PCS | Mod: S$GLB,,, | Performed by: NURSE PRACTITIONER

## 2022-08-02 PROCEDURE — 3066F NEPHROPATHY DOC TX: CPT | Mod: CPTII,S$GLB,, | Performed by: NURSE PRACTITIONER

## 2022-08-02 PROCEDURE — 3008F BODY MASS INDEX DOCD: CPT | Mod: CPTII,S$GLB,, | Performed by: NURSE PRACTITIONER

## 2022-08-02 PROCEDURE — 1101F PT FALLS ASSESS-DOCD LE1/YR: CPT | Mod: CPTII,S$GLB,, | Performed by: NURSE PRACTITIONER

## 2022-08-02 PROCEDURE — 3075F PR MOST RECENT SYSTOLIC BLOOD PRESS GE 130-139MM HG: ICD-10-PCS | Mod: CPTII,S$GLB,, | Performed by: NURSE PRACTITIONER

## 2022-08-02 PROCEDURE — 99999 PR PBB SHADOW E&M-EST. PATIENT-LVL V: CPT | Mod: PBBFAC,,, | Performed by: NURSE PRACTITIONER

## 2022-08-02 PROCEDURE — 3044F PR MOST RECENT HEMOGLOBIN A1C LEVEL <7.0%: ICD-10-PCS | Mod: CPTII,S$GLB,, | Performed by: NURSE PRACTITIONER

## 2022-08-02 PROCEDURE — 3072F LOW RISK FOR RETINOPATHY: CPT | Mod: CPTII,S$GLB,, | Performed by: NURSE PRACTITIONER

## 2022-08-02 PROCEDURE — 3078F DIAST BP <80 MM HG: CPT | Mod: CPTII,S$GLB,, | Performed by: NURSE PRACTITIONER

## 2022-08-02 PROCEDURE — 3078F PR MOST RECENT DIASTOLIC BLOOD PRESSURE < 80 MM HG: ICD-10-PCS | Mod: CPTII,S$GLB,, | Performed by: NURSE PRACTITIONER

## 2022-08-02 PROCEDURE — 3066F PR DOCUMENTATION OF TREATMENT FOR NEPHROPATHY: ICD-10-PCS | Mod: CPTII,S$GLB,, | Performed by: NURSE PRACTITIONER

## 2022-08-02 PROCEDURE — 3060F POS MICROALBUMINURIA REV: CPT | Mod: CPTII,S$GLB,, | Performed by: NURSE PRACTITIONER

## 2022-08-02 PROCEDURE — 3075F SYST BP GE 130 - 139MM HG: CPT | Mod: CPTII,S$GLB,, | Performed by: NURSE PRACTITIONER

## 2022-08-02 PROCEDURE — 3072F PR LOW RISK FOR RETINOPATHY: ICD-10-PCS | Mod: CPTII,S$GLB,, | Performed by: NURSE PRACTITIONER

## 2022-08-02 PROCEDURE — 4010F ACE/ARB THERAPY RXD/TAKEN: CPT | Mod: CPTII,S$GLB,, | Performed by: NURSE PRACTITIONER

## 2022-08-02 PROCEDURE — 3060F PR POS MICROALBUMINURIA RESULT DOCUMENTED/REVIEW: ICD-10-PCS | Mod: CPTII,S$GLB,, | Performed by: NURSE PRACTITIONER

## 2022-08-02 PROCEDURE — 3044F HG A1C LEVEL LT 7.0%: CPT | Mod: CPTII,S$GLB,, | Performed by: NURSE PRACTITIONER

## 2022-08-02 PROCEDURE — 3288F FALL RISK ASSESSMENT DOCD: CPT | Mod: CPTII,S$GLB,, | Performed by: NURSE PRACTITIONER

## 2022-08-02 PROCEDURE — 1101F PR PT FALLS ASSESS DOC 0-1 FALLS W/OUT INJ PAST YR: ICD-10-PCS | Mod: CPTII,S$GLB,, | Performed by: NURSE PRACTITIONER

## 2022-08-02 PROCEDURE — 3288F PR FALLS RISK ASSESSMENT DOCUMENTED: ICD-10-PCS | Mod: CPTII,S$GLB,, | Performed by: NURSE PRACTITIONER

## 2022-08-02 PROCEDURE — 1126F AMNT PAIN NOTED NONE PRSNT: CPT | Mod: CPTII,S$GLB,, | Performed by: NURSE PRACTITIONER

## 2022-08-02 PROCEDURE — 3008F PR BODY MASS INDEX (BMI) DOCUMENTED: ICD-10-PCS | Mod: CPTII,S$GLB,, | Performed by: NURSE PRACTITIONER

## 2022-08-02 PROCEDURE — 99999 PR PBB SHADOW E&M-EST. PATIENT-LVL V: ICD-10-PCS | Mod: PBBFAC,,, | Performed by: NURSE PRACTITIONER

## 2022-08-02 PROCEDURE — 99203 OFFICE O/P NEW LOW 30 MIN: CPT | Mod: S$GLB,,, | Performed by: NURSE PRACTITIONER

## 2022-08-02 PROCEDURE — 4010F PR ACE/ARB THEARPY RXD/TAKEN: ICD-10-PCS | Mod: CPTII,S$GLB,, | Performed by: NURSE PRACTITIONER

## 2022-08-02 RX ORDER — PREDNISONE 50 MG/1
50 TABLET ORAL DAILY
Qty: 5 TABLET | Refills: 0 | Status: SHIPPED | OUTPATIENT
Start: 2022-08-02 | End: 2022-08-07

## 2022-08-02 RX ORDER — FLUTICASONE FUROATE AND VILANTEROL 200; 25 UG/1; UG/1
1 POWDER RESPIRATORY (INHALATION) DAILY
Qty: 30 EACH | Refills: 5 | Status: SHIPPED | OUTPATIENT
Start: 2022-08-02 | End: 2022-09-13 | Stop reason: ALTCHOICE

## 2022-08-02 NOTE — PROGRESS NOTES
Faby Luna  was seen as a new patient at the request of Renae Byrnes DO for the evaluation of  asthma.    CHIEF COMPLAINT:    Chief Complaint   Patient presents with    Asthma       HISTORY OF PRESENT ILLNESS: Faby Luna is a 71 y.o. female never smoker with  has a past medical history of Anxiety with depression, Arthritis, CKD (chronic kidney disease) stage 2, GFR 60-89 ml/min, Diabetes mellitus, History of Clarisse-en-Y gastric bypass, Hypertension, Obesity, unspecified, DAHLIA (obstructive sleep apnea), and Spondylosis. is here for pulmonary evaluation. Patient with symptoms of wheezing, occasional dry cough, sob at random  Menthol baths help  Adult onset asthma 10-15 years  Hospitalization: ED exacerbation 12/30/2021, no hospitalizations  Past treatments:albuterol 2-3 x a day, no nebulizer  Diuretics: furosemide  Controller use:flovent too expensive   Travel:no  Significant family history: no fam lung dz  Pets:no  Occupational exposures: retired /teacher  Triggers: wake up in am sob few steps then rest clears day  Asthma Control Test  In the past 4  weeks, how much of the time did your asthma keep you from getting as much done at work, school or at home?: Some of the time  During the past 4 weeks, how often have you had shortness of breath?: 3 to 6 times a week  During the past 4 weeks, how often did your asthma symptoms (wheezing, couging, shortness of breath, chest tightness or pain) wake you up at night or earlier than usual in the morning?: Once or twice  During the past 4 weeks, how often have you used your rescue inhaler or nebulizer medication (such as albuterol)?: 3 or more times per day  How would you rate your asthma control during the past 4 weeks?: Poorly controlled  If your score is 19 or less, your asthma may not be under control: 13      REVIEW OF SYSTEMS:   Review of Systems   Constitutional:  Positive for weight gain and fatigue. Negative for fever, chills, weight  loss, activity change, appetite change and night sweats.   HENT:  Positive for congestion.    Eyes: Negative.    Respiratory:  Positive for apnea, snoring, cough, shortness of breath, wheezing, orthopnea (sometimes), dyspnea on extertion, use of rescue inhaler and somnolence. Negative for sputum production, chest tightness and previous hospitialization due to pulmonary problems.    Cardiovascular:  Negative for chest pain and palpitations.   Genitourinary: Negative.    Endocrine: endocrine negative    Musculoskeletal:  Negative for gait problem.   Skin: Negative.    Gastrointestinal:  Negative for acid reflux.   Neurological: Negative.    Psychiatric/Behavioral:  Positive for sleep disturbance (2-3 x).      DATA  PERSONALLY REVIEWED:    PFT:NA      CXR:NA      Sleep study 9/12/2020:The overall AHI was 8.3 with an oxygen tiffany of 81.0%. The AHI in REM sleep was 16.6. The central apnea index was 0.0.  The supine AHI was 5.6.  Lost cpap during move   ECHO:NA    Cardiac stress:NA    Labs:  Eos:0.2  IgE:NA    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    Chronic pain syndrome 10/26/2018    Bilateral carpal tunnel syndrome 12/03/2018    Right carpal tunnel syndrome 03/08/2019    Cubital tunnel syndrome on right 03/08/2019    Pain, hand 05/06/2019    Hand weakness 05/06/2019    Anxiety 05/28/2018    Major depressive disorder, recurrent episode, in partial remission 05/13/2019    Carpal tunnel  syndrome 2019    Osteoarthritis of spine with radiculopathy, lumbar region 10/17/2019    Spondylosis without myelopathy 2020    Hypertrophy of ligamentum flavum 02/15/2021    Chronic renal failure, stage 3a 02/15/2021    Osteoarthritis of lumbar spine 2021    Refractive error 2021    Pseudophakia 2021    Morbid (severe) obesity due to excess calories 2022    Spinal enthesopathy, site unspecified 2022    Occlusion of peripherally inserted central catheter (PICC) line, initial encounter 2022    Seasonal allergic rhinitis 2022    Moderate persistent asthma 2022     Resolved Ambulatory Problems     Diagnosis Date Noted    Bacteremia due to Escherichia coli 2021    Pyelonephritis 2021    Complicated UTI (urinary tract infection) 2021     Past Medical History:   Diagnosis Date    Anxiety with depression     Arthritis     CKD (chronic kidney disease) stage 2, GFR 60-89 ml/min     Diabetes mellitus     History of Clarisse-en-Y gastric bypass     Hypertension     Obesity, unspecified     DAHLIA (obstructive sleep apnea)     Spondylosis                 PAST SURGICAL HISTORY:    Past Surgical History:   Procedure Laterality Date    CARPAL TUNNEL RELEASE Right 3/8/2019    Procedure: RELEASE, CARPAL TUNNEL right;  Surgeon: Pan Goodman MD;  Location: [facility];  Service: Orthopedics;  Laterality: Right;    CARPAL TUNNEL RELEASE Left 2019    Procedure: RELEASE, CARPAL TUNNEL- LEFT;  Surgeon: Pan Goodman MD;  Location: [address];  Service: Orthopedics;  Laterality: Left;    CATARACT EXTRACTION Bilateral      SECTION      CHOLECYSTECTOMY      EPIDURAL STEROID INJECTION INTO LUMBAR SPINE N/A 2018    Procedure: INJECTION, STEROID, SPINE, LUMBAR, EPIDURAL;  Surgeon: Shaq Silverio MD;  Location: [facility] ([facility];  Service: Pain Management;  Laterality: N/A;  LUMBAR L4-L5 INTERLAMINAR ELISE'  [id number]  W/ SEDATION     HYSTERECTOMY      INJECTION  OF ANESTHETIC AGENT AROUND NERVE Bilateral 9/12/2019    Procedure: BLOCK, NERVE, L3-L4-L5 MEDIAL BRANCH;  Surgeon: Shaq Silverio MD;  Location: [facility];  Service: Pain Management;  Laterality: Bilateral;    INJECTION OF ANESTHETIC AGENT AROUND NERVE Bilateral 10/17/2019    Procedure: BLOCK, NERVE;  Surgeon: Shaq Silverio MD;  Location: [facility];  Service: Pain Management;  Laterality: Bilateral;  B/L MBB L3-L4-L5  REPEAT  CONSENT NEEDED    INJECTION OF FACET JOINT Bilateral 5/28/2018    Procedure: INJECTION-FACET;  Surgeon: Shaq Silverio MD;  Location: [facility];  Service: Pain Management;  Laterality: Bilateral;  LUMBAR BILATERAL L4-L5 AND L5-S1 FACET STEROID INJECTION  [id number]    W/ SEDATION     RADIOFREQUENCY ABLATION Right 11/21/2019    Procedure: RADIOFREQUENCY ABLATION RIGHT L3, L4, L5;  Surgeon: Shaq Silverio MD;  Location: [facility];  Service: Pain Management;  Laterality: Right;  Right RFA L3-L4-L5  1 of 2  Consent Needed    RADIOFREQUENCY ABLATION Left 12/5/2019    Procedure: RADIOFREQUENCY ABLATION LEFT L3-5;  Surgeon: Shaq Silverio MD;  Location: [facility];  Service: Pain Management;  Laterality: Left;  Left RFA L3-L4-L5  2 of 2  Consent Needed    RADIOFREQUENCY ABLATION Left 8/17/2020    Procedure: RADIOFREQUENCY ABLATION LEFT L3,4,5 1 of 2;  Surgeon: Shaq Silverio MD;  Location: [facility];  Service: Pain Management;  Laterality: Left;  Left RFA L3,4,5  1 of 2    RADIOFREQUENCY ABLATION Right 8/31/2020    Procedure: RADIOFREQUENCY ABLATION RIGHT L3,4,5 2 of 2;  Surgeon: Shaq Silverio MD;  Location: [facility];  Service: Pain Management;  Laterality: Right;  RADIOFREQUENCY ABLATION RIGHT L3,4,5  2 of 2    RADIOFREQUENCY ABLATION Left 9/9/2021    Procedure: RADIOFREQUENCY ABLATION, L3-L4 AND L5 MEDIAL BRANCH 1 OF 2;  Surgeon: Shaq Silverio MD;  Location: [facility];  Service: Pain Management;  Laterality: Left;    RADIOFREQUENCY ABLATION Right 9/20/2021    Procedure:  RADIOFREQUENCY ABLATION, L3-L4 AND L5 MEDIAL BRANCH 2 OF 2;  Surgeon: Shaq Silverio MD;  Location: [facility];  Service: Pain Management;  Laterality: Right;    RADIOFREQUENCY ABLATION Right 7/7/2022    Procedure: RADIOFREQUENCY ABLATION, RIGHT L3-L4-L5 ONE OF TWO;  Surgeon: Shaq Silverio MD;  Location: [facility];  Service: Pain Management;  Laterality: Right;    RADIOFREQUENCY ABLATION Left 7/21/2022    Procedure: RADIOFREQUENCY ABLATION, LEFT L3-L4-L5 TWO OF TWO *BRING SCS REMOTE*;  Surgeon: Shaq Silverio MD;  Location: [facility];  Service: Pain Management;  Laterality: Left;    TRIAL OF SPINAL CORD NERVE STIMULATOR N/A 9/24/2018    Procedure: TRIAL, NEUROSTIMULATOR, SPINAL CORD, SPINAL CORD STIMULATOR TRIAL-INTERNAL WIRES TO EXTERNAL BATTERY;  Surgeon: Shaq Silverio MD;  Location: [facility];  Service: Pain Management;  Laterality: N/A;  ABBOTT REP NOTIFIED         FAMILY HISTORY:                Family History   Problem Relation Age of Onset    Cataracts Mother     Glaucoma Mother     No Known Problems Father     No Known Problems Sister     No Known Problems Brother     No Known Problems Maternal Aunt     No Known Problems Maternal Uncle     No Known Problems Paternal Aunt     No Known Problems Paternal Uncle     No Known Problems Maternal Grandmother     No Known Problems Maternal Grandfather     No Known Problems Paternal Grandmother     No Known Problems Paternal Grandfather        SOCIAL HISTORY:          Tobacco:   Social History     Tobacco Use   Smoking Status Never   Smokeless Tobacco Never       alcohol use:    Social History     Substance and Sexual Activity   Alcohol Use No                   ALLERGIES:  Review of patient's allergies indicates:  No Known Allergies    CURRENT MEDICATIONS:    Current Outpatient Medications   Medication Sig Dispense Refill    albuterol (PROAIR HFA) 90 mcg/actuation inhaler Inhale 2 puffs into the lungs every 4 (four) hours as needed for Wheezing or Shortness of  Breath. Rescue 8.5 g 1    albuterol sulfate 2.5 mg/0.5 mL Nebu Take 2.5 mg by nebulization every 4 (four) hours as needed (shortness of breath, wheezing, coughing). Rescue 30 each 1    atenoloL (TENORMIN) 100 MG tablet Take 1 tablet (100 mg total) by mouth once daily. 90 tablet 3    benzonatate (TESSALON) 200 MG capsule Take 1 capsule (200 mg total) by mouth 3 (three) times daily as needed for Cough. (Patient not taking: Reported on 8/23/2022) 30 capsule 1    EScitalopram oxalate (LEXAPRO) 20 MG tablet TAKE 1 TABLET EVERY DAY (Patient not taking: Reported on 8/23/2022) 90 tablet 3    fluticasone propionate (FLONASE) 50 mcg/actuation nasal spray 2 sprays (100 mcg total) by Each Nostril route once daily. 11.1 mL 1    furosemide (LASIX) 20 MG tablet Take 1 tablet (20 mg total) by mouth once daily. 90 tablet 3    glipiZIDE (GLUCOTROL) 10 MG tablet Take 1 tablet (10 mg total) by mouth daily with breakfast. 180 tablet 3    hydrOXYzine pamoate (VISTARIL) 25 MG Cap Take 1 capsule (25 mg total) by mouth daily as needed (anxiety). 30 capsule 2    ketorolac (TORADOL) 10 mg tablet Take 1 tablet (10 mg total) by mouth every 6 (six) hours as needed for Pain. Take with food to prevent heartburn. (Patient not taking: Reported on 8/23/2022) 20 tablet 1    loratadine (CLARITIN) 10 mg tablet Take 1 tablet (10 mg total) by mouth daily as needed for Allergies. 90 tablet 3    losartan (COZAAR) 50 MG tablet TAKE 1 TABLET EVERY DAY 90 tablet 3    pantoprazole (PROTONIX) 20 MG tablet TAKE 1 TABLET TWICE DAILY  BEFORE  MEALS 180 tablet 3    promethazine-dextromethorphan (PROMETHAZINE-DM) 6.25-15 mg/5 mL Syrp Take 5 mLs by mouth every 8 (eight) hours as needed (cough). 240 mL 0    promethazine-dextromethorphan (PROMETHAZINE-DM) 6.25-15 mg/5 mL Syrp Take 10-15ml by mouth every 8 hours as needed for coughing 240 mL 0    rosuvastatin (CRESTOR) 20 MG tablet Take 1 tablet (20 mg total) by mouth once daily. 30 tablet 11    tiZANidine (ZANAFLEX) 4  "MG tablet Take 1 tablet (4 mg total) by mouth every 8 (eight) hours as needed (muscle pain). 90 tablet 2    traZODone (DESYREL) 100 MG tablet Take 1 tablet (100 mg total) by mouth every evening. 90 tablet 3    blood-glucose meter kit Use as instructed 1 each 0    fluticasone furoate-vilanteroL (BREO ELLIPTA) 200-25 mcg/dose DsDv diskus inhaler Inhale 1 puff into the lungs once daily. Controller 30 each 5    gabapentin (NEURONTIN) 800 MG tablet Take 1 tablet (800 mg total) by mouth 3 (three) times daily. 90 tablet 2    linaGLIPtin (TRADJENTA) 5 mg Tab tablet Take 1 tablet (5 mg total) by mouth once daily. 90 tablet 3     No current facility-administered medications for this visit.                       PHYSICAL EXAM:  Vitals:    08/02/22 0847   BP: 136/79   Pulse: 79   Temp: 98.5 °F (36.9 °C)   SpO2: 97%   Weight: 96.4 kg (212 lb 8.4 oz)   Height: 5' 4" (1.626 m)   PainSc: 0-No pain     Body mass index is 36.48 kg/m².   Physical Exam   Constitutional: She is oriented to person, place, and time. She appears well-developed. She is obese.   HENT:   Head: Normocephalic.   Mouth/Throat: Oropharynx is clear and moist. Mallampati Score: II.   Cardiovascular: Normal rate, regular rhythm and normal heart sounds.   Pulmonary/Chest: Normal expansion and effort normal. She has wheezes (expiratory).   Musculoskeletal:         General: No edema.      Cervical back: Neck supple.   Neurological: She is alert and oriented to person, place, and time. Gait normal.   Skin: Skin is warm. She is not diaphoretic.   Psychiatric: She has a normal mood and affect. Her behavior is normal. Judgment and thought content normal.                                                   Lab Results   Component Value Date    TSH 2.267 05/30/2018      Lab Results   Component Value Date    WBC 12.83 (H) 08/03/2022    HGB 12.4 08/03/2022    HCT 40.3 08/03/2022    MCV 81 (L) 08/03/2022     08/03/2022     BMP  Lab Results   Component Value Date     " 08/03/2022    K 4.4 08/03/2022     08/03/2022    CO2 28 08/03/2022    BUN 15 08/03/2022    CREATININE 1.2 08/03/2022    CALCIUM 9.6 08/03/2022    ANIONGAP 10 08/03/2022    ESTGFRAFRICA >60 12/30/2021    EGFRNONAA 56 (A) 12/30/2021     Lab Results   Component Value Date    HGBA1C 6.6 (H) 08/03/2022        ASSESSMENT    ICD-10-CM ICD-9-CM    1. Moderate persistent asthma, unspecified whether complicated  J45.40 493.90 predniSONE (DELTASONE) 50 MG Tab      fluticasone furoate-vilanteroL (BREO ELLIPTA) 200-25 mcg/dose DsDv diskus inhaler      Complete PFT with bronchodilator      X-Ray Chest PA And Lateral          PLAN:    Problem List Items Addressed This Visit          Unprioritized    Moderate persistent asthma - Primary    Overview     Adult onset asthma 10-15 years with persistent symptoms, recommend maintenance ics/laba         Relevant Medications    fluticasone furoate-vilanteroL (BREO ELLIPTA) 200-25 mcg/dose DsDv diskus inhaler    Other Relevant Orders    Complete PFT with bronchodilator    X-Ray Chest PA And Lateral       Thank you for allowing me the opportunity to participate in the care of your patient.    Patient will Follow up in about 1 month (around 9/2/2022), or if symptoms worsen or fail to improve in 3 days, for following pft and cxr.     Please cc note to  Renae Byrnes DO.

## 2022-08-03 ENCOUNTER — HOSPITAL ENCOUNTER (OUTPATIENT)
Dept: RADIOLOGY | Facility: CLINIC | Age: 72
Discharge: HOME | End: 2022-08-03
Attending: FAMILY MEDICINE
Payer: MEDICARE

## 2022-08-03 DIAGNOSIS — Z13.820 OSTEOPOROSIS SCREENING: ICD-10-CM

## 2022-08-03 PROCEDURE — 77080 DXA BONE DENSITY AXIAL: CPT | Mod: 26,,, | Performed by: INTERNAL MEDICINE

## 2022-08-03 PROCEDURE — 77080 DEXA BONE DENSITY SPINE HIP: ICD-10-PCS | Mod: 26,,, | Performed by: INTERNAL MEDICINE

## 2022-08-03 PROCEDURE — 77080 DXA BONE DENSITY AXIAL: CPT | Mod: TC,PO

## 2022-08-04 ENCOUNTER — TELEPHONE (OUTPATIENT)
Dept: FAMILY MEDICINE | Facility: CLINIC | Age: 72
End: 2022-08-04
Payer: MEDICARE

## 2022-08-04 DIAGNOSIS — N18.31 TYPE 2 DIABETES MELLITUS WITH STAGE 3A CHRONIC KIDNEY DISEASE, WITHOUT LONG-TERM CURRENT USE OF INSULIN: Primary | ICD-10-CM

## 2022-08-04 DIAGNOSIS — E11.69 DYSLIPIDEMIA ASSOCIATED WITH TYPE 2 DIABETES MELLITUS: ICD-10-CM

## 2022-08-04 DIAGNOSIS — E78.5 DYSLIPIDEMIA ASSOCIATED WITH TYPE 2 DIABETES MELLITUS: ICD-10-CM

## 2022-08-04 DIAGNOSIS — E11.22 TYPE 2 DIABETES MELLITUS WITH STAGE 3A CHRONIC KIDNEY DISEASE, WITHOUT LONG-TERM CURRENT USE OF INSULIN: Primary | ICD-10-CM

## 2022-08-12 ENCOUNTER — TELEPHONE (OUTPATIENT)
Dept: FAMILY MEDICINE | Facility: CLINIC | Age: 72
End: 2022-08-12
Payer: MEDICARE

## 2022-08-12 NOTE — TELEPHONE ENCOUNTER
----- Message from Renae Sprague DO sent at 8/11/2022  8:25 PM CDT -----  Please inform patient of normal result.

## 2022-08-17 ENCOUNTER — TELEPHONE (OUTPATIENT)
Dept: FAMILY MEDICINE | Facility: CLINIC | Age: 72
End: 2022-08-17
Payer: MEDICARE

## 2022-08-17 ENCOUNTER — HOSPITAL ENCOUNTER (OUTPATIENT)
Dept: RADIOLOGY | Facility: HOSPITAL | Age: 72
Discharge: HOME | End: 2022-08-17
Attending: FAMILY MEDICINE
Payer: MEDICARE

## 2022-08-17 DIAGNOSIS — N18.31 TYPE 2 DIABETES MELLITUS WITH STAGE 3A CHRONIC KIDNEY DISEASE, WITHOUT LONG-TERM CURRENT USE OF INSULIN: ICD-10-CM

## 2022-08-17 DIAGNOSIS — E11.22 TYPE 2 DIABETES MELLITUS WITH STAGE 3A CHRONIC KIDNEY DISEASE, WITHOUT LONG-TERM CURRENT USE OF INSULIN: ICD-10-CM

## 2022-08-17 DIAGNOSIS — Z12.31 ENCOUNTER FOR SCREENING MAMMOGRAM FOR MALIGNANT NEOPLASM OF BREAST: ICD-10-CM

## 2022-08-17 PROCEDURE — 77067 SCR MAMMO BI INCL CAD: CPT | Mod: TC,PO

## 2022-08-17 PROCEDURE — 77067 SCR MAMMO BI INCL CAD: CPT | Mod: 26,,, | Performed by: RADIOLOGY

## 2022-08-17 PROCEDURE — 77063 BREAST TOMOSYNTHESIS BI: CPT | Mod: 26,,, | Performed by: RADIOLOGY

## 2022-08-17 PROCEDURE — 77063 MAMMO DIGITAL SCREENING BILAT WITH TOMO: ICD-10-PCS | Mod: 26,,, | Performed by: RADIOLOGY

## 2022-08-17 PROCEDURE — 77063 BREAST TOMOSYNTHESIS BI: CPT | Mod: TC,PO

## 2022-08-17 PROCEDURE — 77067 MAMMO DIGITAL SCREENING BILAT WITH TOMO: ICD-10-PCS | Mod: 26,,, | Performed by: RADIOLOGY

## 2022-08-17 RX ORDER — LINAGLIPTIN 5 MG/1
5 TABLET, FILM COATED ORAL DAILY
Qty: 90 TABLET | Refills: 3 | Status: SHIPPED | OUTPATIENT
Start: 2022-08-17 | End: 2023-05-19

## 2022-08-17 NOTE — TELEPHONE ENCOUNTER
No new care gaps identified.  [facility] Embedded Care Gaps. Reference number: [id number]. 8/17/2022   5:24:32 PM CDT

## 2022-08-17 NOTE — TELEPHONE ENCOUNTER
----- Message from Renae Sprague DO sent at 8/17/2022  5:16 PM CDT -----  Please inform patient of normal result.    Repeat in 1 year.

## 2022-08-23 ENCOUNTER — OFFICE VISIT (OUTPATIENT)
Dept: PAIN MEDICINE | Facility: CLINIC | Age: 72
End: 2022-08-23
Payer: MEDICARE

## 2022-08-23 VITALS
HEART RATE: 97 BPM | RESPIRATION RATE: 18 BRPM | DIASTOLIC BLOOD PRESSURE: 78 MMHG | BODY MASS INDEX: 37.26 KG/M2 | HEIGHT: 64 IN | OXYGEN SATURATION: 99 % | SYSTOLIC BLOOD PRESSURE: 134 MMHG | WEIGHT: 218.25 LBS

## 2022-08-23 DIAGNOSIS — G89.4 CHRONIC PAIN SYNDROME: ICD-10-CM

## 2022-08-23 DIAGNOSIS — M47.26 OSTEOARTHRITIS OF SPINE WITH RADICULOPATHY, LUMBAR REGION: Primary | ICD-10-CM

## 2022-08-23 DIAGNOSIS — M47.819 SPONDYLOSIS WITHOUT MYELOPATHY: ICD-10-CM

## 2022-08-23 DIAGNOSIS — M79.18 MYOFASCIAL PAIN: ICD-10-CM

## 2022-08-23 PROCEDURE — 3008F PR BODY MASS INDEX (BMI) DOCUMENTED: ICD-10-PCS | Mod: CPTII,S$GLB,, | Performed by: NURSE PRACTITIONER

## 2022-08-23 PROCEDURE — 3066F NEPHROPATHY DOC TX: CPT | Mod: CPTII,S$GLB,, | Performed by: NURSE PRACTITIONER

## 2022-08-23 PROCEDURE — 99999 PR PBB SHADOW E&M-EST. PATIENT-LVL V: ICD-10-PCS | Mod: PBBFAC,,, | Performed by: NURSE PRACTITIONER

## 2022-08-23 PROCEDURE — 3008F BODY MASS INDEX DOCD: CPT | Mod: CPTII,S$GLB,, | Performed by: NURSE PRACTITIONER

## 2022-08-23 PROCEDURE — 4010F PR ACE/ARB THEARPY RXD/TAKEN: ICD-10-PCS | Mod: CPTII,S$GLB,, | Performed by: NURSE PRACTITIONER

## 2022-08-23 PROCEDURE — 3288F PR FALLS RISK ASSESSMENT DOCUMENTED: ICD-10-PCS | Mod: CPTII,S$GLB,, | Performed by: NURSE PRACTITIONER

## 2022-08-23 PROCEDURE — 3072F LOW RISK FOR RETINOPATHY: CPT | Mod: CPTII,S$GLB,, | Performed by: NURSE PRACTITIONER

## 2022-08-23 PROCEDURE — 1101F PR PT FALLS ASSESS DOC 0-1 FALLS W/OUT INJ PAST YR: ICD-10-PCS | Mod: CPTII,S$GLB,, | Performed by: NURSE PRACTITIONER

## 2022-08-23 PROCEDURE — 3288F FALL RISK ASSESSMENT DOCD: CPT | Mod: CPTII,S$GLB,, | Performed by: NURSE PRACTITIONER

## 2022-08-23 PROCEDURE — 3066F PR DOCUMENTATION OF TREATMENT FOR NEPHROPATHY: ICD-10-PCS | Mod: CPTII,S$GLB,, | Performed by: NURSE PRACTITIONER

## 2022-08-23 PROCEDURE — 3060F POS MICROALBUMINURIA REV: CPT | Mod: CPTII,S$GLB,, | Performed by: NURSE PRACTITIONER

## 2022-08-23 PROCEDURE — 99214 OFFICE O/P EST MOD 30 MIN: CPT | Mod: S$GLB,,, | Performed by: NURSE PRACTITIONER

## 2022-08-23 PROCEDURE — 3078F PR MOST RECENT DIASTOLIC BLOOD PRESSURE < 80 MM HG: ICD-10-PCS | Mod: CPTII,S$GLB,, | Performed by: NURSE PRACTITIONER

## 2022-08-23 PROCEDURE — 1125F AMNT PAIN NOTED PAIN PRSNT: CPT | Mod: CPTII,S$GLB,, | Performed by: NURSE PRACTITIONER

## 2022-08-23 PROCEDURE — 99999 PR PBB SHADOW E&M-EST. PATIENT-LVL V: CPT | Mod: PBBFAC,,, | Performed by: NURSE PRACTITIONER

## 2022-08-23 PROCEDURE — 1159F PR MEDICATION LIST DOCUMENTED IN MEDICAL RECORD: ICD-10-PCS | Mod: CPTII,S$GLB,, | Performed by: NURSE PRACTITIONER

## 2022-08-23 PROCEDURE — 1159F MED LIST DOCD IN RCRD: CPT | Mod: CPTII,S$GLB,, | Performed by: NURSE PRACTITIONER

## 2022-08-23 PROCEDURE — 3078F DIAST BP <80 MM HG: CPT | Mod: CPTII,S$GLB,, | Performed by: NURSE PRACTITIONER

## 2022-08-23 PROCEDURE — 4010F ACE/ARB THERAPY RXD/TAKEN: CPT | Mod: CPTII,S$GLB,, | Performed by: NURSE PRACTITIONER

## 2022-08-23 PROCEDURE — 3075F SYST BP GE 130 - 139MM HG: CPT | Mod: CPTII,S$GLB,, | Performed by: NURSE PRACTITIONER

## 2022-08-23 PROCEDURE — 3060F PR POS MICROALBUMINURIA RESULT DOCUMENTED/REVIEW: ICD-10-PCS | Mod: CPTII,S$GLB,, | Performed by: NURSE PRACTITIONER

## 2022-08-23 PROCEDURE — 3072F PR LOW RISK FOR RETINOPATHY: ICD-10-PCS | Mod: CPTII,S$GLB,, | Performed by: NURSE PRACTITIONER

## 2022-08-23 PROCEDURE — 1125F PR PAIN SEVERITY QUANTIFIED, PAIN PRESENT: ICD-10-PCS | Mod: CPTII,S$GLB,, | Performed by: NURSE PRACTITIONER

## 2022-08-23 PROCEDURE — 99214 PR OFFICE/OUTPT VISIT, EST, LEVL IV, 30-39 MIN: ICD-10-PCS | Mod: S$GLB,,, | Performed by: NURSE PRACTITIONER

## 2022-08-23 PROCEDURE — 3044F PR MOST RECENT HEMOGLOBIN A1C LEVEL <7.0%: ICD-10-PCS | Mod: CPTII,S$GLB,, | Performed by: NURSE PRACTITIONER

## 2022-08-23 PROCEDURE — 3075F PR MOST RECENT SYSTOLIC BLOOD PRESS GE 130-139MM HG: ICD-10-PCS | Mod: CPTII,S$GLB,, | Performed by: NURSE PRACTITIONER

## 2022-08-23 PROCEDURE — 3044F HG A1C LEVEL LT 7.0%: CPT | Mod: CPTII,S$GLB,, | Performed by: NURSE PRACTITIONER

## 2022-08-23 PROCEDURE — 1101F PT FALLS ASSESS-DOCD LE1/YR: CPT | Mod: CPTII,S$GLB,, | Performed by: NURSE PRACTITIONER

## 2022-08-23 RX ORDER — GABAPENTIN 800 MG/1
800 TABLET ORAL 3 TIMES DAILY
Qty: 90 TABLET | Refills: 2 | Status: SHIPPED | OUTPATIENT
Start: 2022-08-23 | End: 2023-02-08 | Stop reason: SDUPTHER

## 2022-08-23 NOTE — PROGRESS NOTES
Chronic patient Established Note (Follow up visit)      SUBJECTIVE:    Interval History 8/23/2022:  Faby is here for follow up of chronic lower back pain. She is now s/p right then left L3,4,5 RFAs completed on 7/21/22 with limited relief so far. She continues to report aching back pain with radiation into the legs and numbness. She has had her SCS off for a couple of months. She has not been in contact with [employer] rep for programming. She says that she turned it off due to limited benefit. No new weakness to legs or falls. No bowel/bladder incontinence. Her pain today is 10/10.    Interval History 6/17/2022:  The patient is here today for follow up of back pain. She has had more aching pain across the lower back. She had benefit with lumbar RFAs in the past and would like to reschedule this. She would also like to repeat aquatic PT as this was helpful in the past. Her pain is aching and throbbing in nature without radiation currently. No new falls or trauma. Her pain today is 8/10.    Interval History 5/5/2022:  The patient is here for follow up of chronic lower back pain. She has radiation into the legs. No recent falls or trauma. She saw Dr. Silverio last month and was under the impression that an [employer] rep would be here today for programming. She is still having difficulty programming her device. She has mild benefit with Gabapentin 900 mg daily without side effects. She is also having muscle spasms intermittently. She is asking for a muscle relaxer. She has tried Robaxin in the past with mild benefit. She would like to try another medication. Her pain today is 8/10.    Interval History 4/20/2022: She presents today for follow up of low back pain. She states that she only had about 40% relief of LBP following RFAs in September that lasted a few weeks. Pain  continues to be in the low back and radiates into b/l LE. Pain encompasses the entire leg and does not follow a specific dermatomal pattern in the leg. She  denies weakness, numbness or tingling in the lower extremities. She denies bowel or bladder incontinence. Pain is currently rated 8/10. She is currently on gabapentin 300mg tid with minimal relief. She has been unable to charge bret SCS for the last month and does not have the contact number for her rep.      Interval History 9/30/2021:  The patient returns to clinic today for follow up of low back pain. She is s/p left L3,4,5 RFA on 9/9/2021 and right L3,4,5 RFA on 9/20/2021. She is unsure of relief at this time. She reports increased pain to the right side. She describes this pain as burning in nature. She denies any radiating leg pain. Her pain is worse with bending and standing. She continues to report benefit with [employer] SCS. She denies any weakness. She denies any other health changes. Her pain today is 9/10.    Interval History 8/3/2021:   Ms. Luna returns in f/u re: low back/leg pain. She reports about three months ago she noticed her low back started bothering her, worse with bending and prolonged standing. No inciting event, no trauma to back since last visit. Reports continued leg pain down the backs of her legs as well. She reports intermittent instability in her legs - on and off - over the last year. Last time she has met with [employer] rep for reprogramming of SCS was fall 2020. Denies any current issues with SCS function/battery/remote.     Interval History 9/28/2020:  The patient is here today for increased lower back pain.  She is status post right than left L3, 4, 5 radiofrequency ablation completed on 08/31/2020 with approximately 50% relief.  However, she is feeling tightness across the lower back without radiation at this time.  She would like an injection today.  Additionally, she states that she has not completed physical therapy for her back in some time and is not currently doing any exercises.  Her leg pain is currently well controlled with spinal cord stimulator and she had a recent  adjustment.  Her pain today is 8/10.    Interval History 7/16/2020:  The patient is here for follow up of lower back pain.  She previously had benefit with lumbar RFAs for similar pain.  She is also having radiation into her legs with numbness, left greater than right.  Ildefonso is here today to meet with her for programming.  She previously was having significant benefit of leg pain with SCS implant.  She denies any recent trauma or falls. Her pain today is 9/10.    Interval History 1/9/2020:  The patient is here for follow up of lower back and leg pain.  She is s/p lumbar RFAs with about 50% relief.  Her leg pain is well controlled with implant.  She still has intermittent flare ups of back pain, like today.  When this happens, she has some benefit with rest.  She has tried Tylenol and OTC NSAIDs without benefit.  She denies any recent falls or weakness.  The patient denies any bowel or bladder incontinence or signs of saddle paresthesia.  The patient denies any major medical changes since last office visit.  Her pain today is 7/10.    Previous Encounter:  Faby Luna presents to the clinic for a follow-up appointment for lower back pain.  She previously had significant leg pain which has resolved with [employer] SCS implant.  She is here today to discuss increased lower back pain.  It is sharp and throbbing in nature.  It is significant in the morning and with prolonged standing and activity.  She has some benefit with stretching.  She denies any recent falls.  Since the last visit, Faby Luna states the pain has been worsening.  Current pain intensity is 8/10.    Pain Disability Index Review:  Last 3 PDI Scores 8/23/2022 6/17/2022 5/5/2022   Pain Disability Index (PDI) 42 [phone]       Opioid Contract: no     report:  Not applicable    Pain Procedures:   5/2/18 Left L3 and L4 TF ELISE- 20% relief  5/21/18 Bilateral L4-5 and L5-S1 facet joint injections- no relief  9/24/18 Lumbar SCS trial  ([employer])- 100% relief  10/26/18 Lumbar SCS implant ([employer])- 100% relief of leg pain  9/12/19 Bilateral L3,4,5 MBB- helpful  10/17/19 Bilateral L3,4,5 MBB- helpful  11/21/19 Right L3,4,5 RFA- 50% relief  12/5/19 Left L3,4,5 RFA- 50% relief  8/17/20 Left L3,4,5 RFA- 50% relief  8/31/20 Right L3,4,5 RFA- 50% relief  9/9/2021- Left L3,4,5 RFA - 60% relief  9/20/2021- Right L3,4,5 RFA -60% relief  7/7/22 Right L3,4,5 RFA   7/21/22 Left L3,4,5 RFA     Physical Therapy/Home Exercise:   yes in the past with limited benefit    Imaging:     3/31/18 Lumbar MRI    Narrative     EXAMINATION:  MRI LUMBAR SPINE WITHOUT CONTRAST    CLINICAL HISTORY:  Low back pain, >6wks conservative tx, persistent-progressive sx, surgical candidate; Other spondylosis with radiculopathy, lumbar region    TECHNIQUE:  Multiplanar, multisequence MR images were acquired from the thoracolumbar junction to the sacrum without the administration of contrast.    COMPARISON:  Plain films from 03/27/2018    FINDINGS:  There is grade 1 spondylolisthesis of L4 on L5. The vertebral body heights are well maintained, with no fracture.  No marrow signal abnormality suspicious for an infiltrative process.    The conus medullaris terminates at approximately the mid body of L1.  There is a cyst associated with the upper pole of the right kidney.  There is disc desiccation noted throughout the lumbar spine with relative sparing of the L5-S1 disc.  Mild disc space narrowing present at the L4-5 level.    L1-L2: Mild diffuse disc bulge resulting in no significant central or neural foraminal canal narrowing.    L2-L3: No significant central canal narrowing.  There is mild narrowing of either neural foraminal canal secondary to disc material.    L3-L4: Disc bulging in the bilateral foraminal regions resulting in no significant central canal narrowing.  Mild-to-moderate bilateral facet arthropathy also noted.  The bilateral neural foraminal canals are moderately narrowed  with some mild effacement of either exiting L3 nerve root in the extraforaminal regions bilaterally.    L4-L5:  Grade 1 spondylolisthesis along with moderate to severe bilateral facet arthropathy and ligamentum flavum hypertrophy resulting in at least moderate narrowing of the central canal.  The right neural foraminal canal is mildly to moderately narrowed.  Left neural foraminal canal is moderately to severely narrowed with mild effacement of the exiting L4 nerve root.    L5-S1:  No significant central or neural foraminal canal narrowing noted.  Mild bilateral facet arthropathy noted.   Impression       1. Multilevel degenerative changes of the lumbar spine as detailed above     Lumbar XRAYs 3/27/18    Narrative     EXAMINATION:  XR LUMBAR SPINE AP AND LAT WITH FLEX/EXT    CLINICAL HISTORY:  Low back pain    TECHNIQUE:  AP and lateral views as well as lateral flexion and extension images are performed through the lumbar spine.    COMPARISON:  None    FINDINGS:  X-ray lumbar spine with flexion and extension demonstrates grade 1 spondylolisthesis at L4-5 measuring around 6 mm which does not change significantly with flexion and extension.  The L4-5 disc is narrowed and degenerated.  Other lumbar vertebral discs are maintained.  Vertebral body heights are maintained.  Small anterior spurs are seen, and there is small posterior osteophyte along the inferior endplate of L4.  There is lumbar facet arthropathy at L4-5 and L5-S1.   Impression       Degenerative changes as above.  Grade 1 anterolisthesis and degenerative disc disease at L4-5.         Allergies:   Review of patient's allergies indicates:   Allergen Reactions    Aspirin Other (See Comments)     Has been told not to take it due to bariatric surgery       Current Medications:   Current Outpatient Medications   Medication Sig Dispense Refill    albuterol (PROAIR HFA) 90 mcg/actuation inhaler Inhale 2 puffs into the lungs every 4 (four) hours as needed for  Wheezing or Shortness of Breath. Rescue 8.5 g 1    albuterol sulfate 2.5 mg/0.5 mL Nebu Take 2.5 mg by nebulization every 4 (four) hours as needed (shortness of breath, wheezing, coughing). Rescue 30 each 1    atenoloL (TENORMIN) 100 MG tablet Take 1 tablet (100 mg total) by mouth once daily. 90 tablet 3    benzonatate (TESSALON) 200 MG capsule Take 1 capsule (200 mg total) by mouth 3 (three) times daily as needed for Cough. 30 capsule 1    blood-glucose meter kit Use as instructed 1 each 0    EScitalopram oxalate (LEXAPRO) 20 MG tablet TAKE 1 TABLET EVERY DAY 90 tablet 3    fluticasone furoate-vilanteroL (BREO ELLIPTA) 200-25 mcg/dose DsDv diskus inhaler Inhale 1 puff into the lungs once daily. Controller 30 each 5    fluticasone propionate (FLONASE) 50 mcg/actuation nasal spray 2 sprays (100 mcg total) by Each Nostril route once daily. 11.1 mL 1    furosemide (LASIX) 20 MG tablet Take 1 tablet (20 mg total) by mouth once daily. 90 tablet 3    gabapentin (NEURONTIN) 400 MG capsule TAKE 1 CAPSULE BY MOUTH THREE TIMES DAILY 90 capsule 0    glipiZIDE (GLUCOTROL) 10 MG tablet Take 1 tablet (10 mg total) by mouth daily with breakfast. 180 tablet 3    hydrOXYzine pamoate (VISTARIL) 25 MG Cap Take 1 capsule (25 mg total) by mouth daily as needed (anxiety). 30 capsule 2    ketorolac (TORADOL) 10 mg tablet Take 1 tablet (10 mg total) by mouth every 6 (six) hours as needed for Pain. Take with food to prevent heartburn. 20 tablet 1    linaGLIPtin (TRADJENTA) 5 mg Tab tablet Take 1 tablet (5 mg total) by mouth once daily. 90 tablet 3    loratadine (CLARITIN) 10 mg tablet Take 1 tablet (10 mg total) by mouth daily as needed for Allergies. 90 tablet 3    losartan (COZAAR) 50 MG tablet TAKE 1 TABLET EVERY DAY 90 tablet 3    pantoprazole (PROTONIX) 20 MG tablet TAKE 1 TABLET TWICE DAILY  BEFORE  MEALS 180 tablet 3    promethazine-dextromethorphan (PROMETHAZINE-DM) 6.25-15 mg/5 mL Syrp Take 5 mLs by mouth every 8  (eight) hours as needed (cough). 240 mL 0    promethazine-dextromethorphan (PROMETHAZINE-DM) 6.25-15 mg/5 mL Syrp Take 10-15ml by mouth every 8 hours as needed for coughing 240 mL 0    rosuvastatin (CRESTOR) 20 MG tablet Take 1 tablet (20 mg total) by mouth once daily. 30 tablet 11    tiZANidine (ZANAFLEX) 4 MG tablet Take 1 tablet (4 mg total) by mouth every 8 (eight) hours as needed (muscle pain). 90 tablet 2    traZODone (DESYREL) 100 MG tablet Take 1 tablet (100 mg total) by mouth every evening. 90 tablet 3     No current facility-administered medications for this visit.       REVIEW OF SYSTEMS:    GENERAL:  No weight loss, malaise or fevers.  HEENT:  Negative for frequent or significant headaches.  NECK:  Negative for lumps, goiter, pain and significant neck swelling.  RESPIRATORY:  Negative for cough, wheezing or shortness of breath.  CARDIOVASCULAR:  Negative for chest pain, leg swelling or palpitations. Hypertension.  GI:  Negative for abdominal discomfort, blood in stools or black stools or change in bowel habits.  MUSCULOSKELETAL:  See HPI.  SKIN:  Negative for lesions, rash, and itching.  PSYCH:  Negative for sleep disturbance, mood disorder and recent psychosocial stressors.  HEMATOLOGY/LYMPHOLOGY:  Negative for prolonged bleeding, bruising easily or swollen nodes.  NEURO:   No history of headaches, syncope, paralysis, seizures or tremors.  ENDO: Diabetes.  All other reviewed and negative other than HPI.    Past Medical History:  Past Medical History:   Diagnosis Date    Anxiety with depression     Arthritis     CKD (chronic kidney disease) stage 2, GFR 60-89 ml/min     Diabetes mellitus     History of Clarisse-en-Y gastric bypass     Hypertension     Obesity, unspecified     DAHLIA (obstructive sleep apnea)     Spondylosis        Past Surgical History:  Past Surgical History:   Procedure Laterality Date    CARPAL TUNNEL RELEASE Right 3/8/2019    Procedure: RELEASE, CARPAL TUNNEL right;  Surgeon:  Pan Goodman MD;  Location: [facility] OR;  Service: Orthopedics;  Laterality: Right;    CARPAL TUNNEL RELEASE Left 2019    Procedure: RELEASE, CARPAL TUNNEL- LEFT;  Surgeon: Pan Goodman MD;  Location: [facility] OR 2ND FLR;  Service: Orthopedics;  Laterality: Left;    CATARACT EXTRACTION Bilateral      SECTION      CHOLECYSTECTOMY      EPIDURAL STEROID INJECTION INTO LUMBAR SPINE N/A 2018    Procedure: INJECTION, STEROID, SPINE, LUMBAR, EPIDURAL;  Surgeon: Shaq Silverio MD;  Location: [facility] PAIN MGT;  Service: Pain Management;  Laterality: N/A;  LUMBAR L4-L5 INTERLAMINAR ELISE'  20880  W/ SEDATION     HYSTERECTOMY      INJECTION OF ANESTHETIC AGENT AROUND NERVE Bilateral 2019    Procedure: BLOCK, NERVE, L3-L4-L5 MEDIAL BRANCH;  Surgeon: Shaq Silverio MD;  Location: [facility] PAIN MGT;  Service: Pain Management;  Laterality: Bilateral;    INJECTION OF ANESTHETIC AGENT AROUND NERVE Bilateral 10/17/2019    Procedure: BLOCK, NERVE;  Surgeon: Shaq Silverio MD;  Location: [facility] PAIN MGT;  Service: Pain Management;  Laterality: Bilateral;  B/L MBB L3-L4-L5  REPEAT  CONSENT NEEDED    INJECTION OF FACET JOINT Bilateral 2018    Procedure: INJECTION-FACET;  Surgeon: Shaq Silverio MD;  Location: [facility] PAIN MGT;  Service: Pain Management;  Laterality: Bilateral;  LUMBAR BILATERAL L4-L5 AND L5-S1 FACET STEROID INJECTION  [id number]    W/ SEDATION     RADIOFREQUENCY ABLATION Right 2019    Procedure: RADIOFREQUENCY ABLATION RIGHT L3, L4, L5;  Surgeon: Shaq Silverio MD;  Location: [facility] PAIN MGT;  Service: Pain Management;  Laterality: Right;  Right RFA L3-L4-L5  1 of 2  Consent Needed    RADIOFREQUENCY ABLATION Left 2019    Procedure: RADIOFREQUENCY ABLATION LEFT L3-5;  Surgeon: Shaq Silverio MD;  Location: [facility] PAIN MGT;  Service: Pain Management;  Laterality: Left;  Left RFA L3-L4-L5  2 of 2  Consent Needed    RADIOFREQUENCY ABLATION Left 2020    Procedure: RADIOFREQUENCY ABLATION LEFT L3,4,5 1  of 2;  Surgeon: Shaq Silverio MD;  Location: [facility] PAIN MGT;  Service: Pain Management;  Laterality: Left;  Left RFA L3,4,5  1 of 2    RADIOFREQUENCY ABLATION Right 8/31/2020    Procedure: RADIOFREQUENCY ABLATION RIGHT L3,4,5 2 of 2;  Surgeon: Shaq Silverio MD;  Location: [facility] PAIN MGT;  Service: Pain Management;  Laterality: Right;  RADIOFREQUENCY ABLATION RIGHT L3,4,5  2 of 2    RADIOFREQUENCY ABLATION Left 9/9/2021    Procedure: RADIOFREQUENCY ABLATION, L3-L4 AND L5 MEDIAL BRANCH 1 OF 2;  Surgeon: Shaq Silverio MD;  Location: [facility] PAIN MGT;  Service: Pain Management;  Laterality: Left;    RADIOFREQUENCY ABLATION Right 9/20/2021    Procedure: RADIOFREQUENCY ABLATION, L3-L4 AND L5 MEDIAL BRANCH 2 OF 2;  Surgeon: Shaq Silverio MD;  Location: [facility] PAIN MGT;  Service: Pain Management;  Laterality: Right;    RADIOFREQUENCY ABLATION Right 7/7/2022    Procedure: RADIOFREQUENCY ABLATION, RIGHT L3-L4-L5 ONE OF TWO;  Surgeon: Shaq Silverio MD;  Location: [facility] PAIN MGT;  Service: Pain Management;  Laterality: Right;    RADIOFREQUENCY ABLATION Left 7/21/2022    Procedure: RADIOFREQUENCY ABLATION, LEFT L3-L4-L5 TWO OF TWO *BRING SCS REMOTE*;  Surgeon: Shaq Silverio MD;  Location: [facility] PAIN MGT;  Service: Pain Management;  Laterality: Left;    TRIAL OF SPINAL CORD NERVE STIMULATOR N/A 9/24/2018    Procedure: TRIAL, NEUROSTIMULATOR, SPINAL CORD, SPINAL CORD STIMULATOR TRIAL-INTERNAL WIRES TO EXTERNAL BATTERY;  Surgeon: Shaq Silverio MD;  Location: [facility] PAIN MGT;  Service: Pain Management;  Laterality: N/A;  [employer] REP NOTIFIED       Family History:  Family History   Problem Relation Age of Onset    Cataracts Mother     Glaucoma Mother     No Known Problems Father     No Known Problems Sister     No Known Problems Brother     No Known Problems Maternal Aunt     No Known Problems Maternal Uncle     No Known Problems Paternal Aunt     No Known Problems Paternal Uncle     No Known Problems Maternal Grandmother     No Known  "Problems Maternal Grandfather     No Known Problems Paternal Grandmother     No Known Problems Paternal Grandfather        Social History:  Social History     Socioeconomic History    Marital status:    Tobacco Use    Smoking status: Never Smoker    Smokeless tobacco: Never Used   Substance and Sexual Activity    Alcohol use: No    Drug use: No       OBJECTIVE:    /78   Pulse 97   Resp 18   Ht 5' 4" (1.626 m)   Wt 99 kg (218 lb 4.1 oz)   SpO2 99%   BMI 37.46 kg/m²     PHYSICAL EXAMINATION:    General appearance: Well appearing, in no acute distress, alert and oriented x3.  Psych:  Mood and affect appropriate.  Skin: Skin color, texture, turgor normal, no rashes or lesions, in both upper and lower body.   Head/face:  Atraumatic, normocephalic. No palpable lymph nodes  Back: Straight leg raising in the sitting and supine positions is negative to radicular pain bilaterally. There is pain with palpation to lumbar paraspinals and facet joints. Limited ROM with pain on extension. Positive facet loading bilaterally, R>L.  There is no pain with palpation to SI joints.   Extremities: Peripheral joint ROM is full and pain free without obvious instability or laxity in all four extremities. No deformities, edema, or skin discoloration. Good capillary refill.  Musculoskeletal: Normal strength to BLE. No atrophy or tone abnormalities are noted.  Neuro: No loss of sensation.  Gait: Antalgic- ambulates without assistance.    ASSESSMENT: 71 y.o. year old female with back pain, consistent with the following diagnoses:     1. Osteoarthritis of spine with radiculopathy, lumbar region     2. Chronic pain syndrome     3. Myofascial pain     4. Spondylosis without myelopathy           PLAN:   We discussed with the patient the assessment and recommendations. The following is the plan we agreed on:     - Previous imaging was reviewed and discussed with the patient today.    - She is s/p repeat left then right " L3,4,5 RFA with mild benefit.     - She is having a lot of leg and nerve pain at this time. I will have Roque reach out to her for programming.    - Continue gabapentin which I will increase to 800 mg TID.    - Continue Zanflex Q8h PRN muscle spasms. She has not used much recently.    - RTC in 6-8 weeks or sooner if needed.      The above plan and management options were discussed at length with patient. Patient is in agreement with the above and verbalized understanding.    Renetta Steele  08/23/2022

## 2022-08-29 PROBLEM — J45.40 MODERATE PERSISTENT ASTHMA: Status: ACTIVE | Noted: 2022-08-29

## 2022-09-06 ENCOUNTER — HOSPITAL ENCOUNTER (OUTPATIENT)
Dept: RESPIRATORY THERAPY | Facility: HOSPITAL | Age: 72
Discharge: HOME | End: 2022-09-06
Attending: NURSE PRACTITIONER
Payer: MEDICARE

## 2022-09-06 ENCOUNTER — HOSPITAL ENCOUNTER (OUTPATIENT)
Dept: RADIOLOGY | Facility: HOSPITAL | Age: 72
Discharge: HOME | End: 2022-09-06
Attending: NURSE PRACTITIONER
Payer: MEDICARE

## 2022-09-06 VITALS — OXYGEN SATURATION: 99 % | HEART RATE: 69 BPM | RESPIRATION RATE: 18 BRPM

## 2022-09-06 DIAGNOSIS — J45.40 MODERATE PERSISTENT ASTHMA, UNSPECIFIED WHETHER COMPLICATED: ICD-10-CM

## 2022-09-06 LAB
BRPFT: NORMAL
DLCO ADJ PRE: 14.26 ML/(MIN*MMHG)
DLCO SINGLE BREATH LLN: 15.36
DLCO SINGLE BREATH PRE REF: 65.4 %
DLCO SINGLE BREATH REF: 21.1
DLCOC SBVA LLN: 2.81
DLCOC SBVA PRE REF: 114.1 %
DLCOC SBVA REF: 4.25
DLCOC SINGLE BREATH LLN: 15.36
DLCOC SINGLE BREATH PRE REF: 67.6 %
DLCOC SINGLE BREATH REF: 21.1
DLCOVA LLN: 2.81
DLCOVA PRE REF: 110.5 %
DLCOVA PRE: 4.69 ML/(MIN*MMHG*L)
DLCOVA REF: 4.25
DLVAADJ PRE: 4.85 ML/(MIN*MMHG*L)
ERVN2 LLN: -16449.38
ERVN2 PRE REF: 41.9 %
ERVN2 PRE: 0.26 L
ERVN2 REF: 0.62
FEF 25 75 CHG: 22.4 %
FEF 25 75 LLN: 0.57
FEF 25 75 POST REF: 117.1 %
FEF 25 75 PRE REF: 95.7 %
FEF 25 75 REF: 1.57
FET100 CHG: 8.3 %
FEV1 CHG: 6.2 %
FEV1 FVC CHG: 2.1 %
FEV1 FVC LLN: 65
FEV1 FVC POST REF: 105.8 %
FEV1 FVC PRE REF: 103.6 %
FEV1 FVC REF: 78
FEV1 LLN: 1.27
FEV1 POST REF: 80.6 %
FEV1 PRE REF: 75.9 %
FEV1 REF: 1.84
FRCN2 LLN: 1.9
FRCN2 PRE REF: 52.9 %
FRCN2 REF: 2.72
FVC CHG: 4.1 %
FVC LLN: 1.66
FVC POST REF: 75.7 %
FVC PRE REF: 72.7 %
FVC REF: 2.37
IVC PRE: 1.6 L
IVC SINGLE BREATH LLN: 1.66
IVC SINGLE BREATH PRE REF: 67.2 %
IVC SINGLE BREATH REF: 2.37
PEF CHG: 6.5 %
PEF LLN: 2.69
PEF POST REF: 105.3 %
PEF PRE REF: 98.8 %
PEF REF: 4.7
POST FEF 25 75: 1.84 L/S
POST FET 100: 8.34 SEC
POST FEV1 FVC: 82.69 %
POST FEV1: 1.49 L
POST FVC: 1.8 L
POST PEF: 4.95 L/S
PRE DLCO: 13.8 ML/(MIN*MMHG)
PRE FEF 25 75: 1.5 L/S
PRE FET 100: 7.7 SEC
PRE FEV1 FVC: 81.01 %
PRE FEV1: 1.4 L
PRE FRC N2: 1.44 L
PRE FVC: 1.73 L
PRE PEF: 4.65 L/S
RVN2 LLN: 1.53
RVN2 PRE REF: 56.1 %
RVN2 PRE: 1.18 L
RVN2 REF: 2.1
RVN2TLCN2 LLN: 33.85
RVN2TLCN2 PRE REF: 97 %
RVN2TLCN2 PRE: 42.14 %
RVN2TLCN2 REF: 43.44
TLCN2 LLN: 3.98
TLCN2 PRE REF: 56.4 %
TLCN2 PRE: 2.8 L
TLCN2 REF: 4.97
VA PRE: 2.94 L
VA SINGLE BREATH LLN: 4.82
VA SINGLE BREATH PRE REF: 61.1 %
VA SINGLE BREATH REF: 4.82
VCMAXN2 LLN: 1.66
VCMAXN2 PRE REF: 68.2 %
VCMAXN2 PRE: 1.62 L
VCMAXN2 REF: 2.37

## 2022-09-06 PROCEDURE — 94727 GAS DIL/WSHOT DETER LNG VOL: CPT | Mod: 26,,, | Performed by: INTERNAL MEDICINE

## 2022-09-06 PROCEDURE — 94010 BREATHING CAPACITY TEST: CPT

## 2022-09-06 PROCEDURE — 25000242 PHARM REV CODE 250 ALT 637 W/ HCPCS: Performed by: NURSE PRACTITIONER

## 2022-09-06 PROCEDURE — 71046 X-RAY EXAM CHEST 2 VIEWS: CPT | Mod: TC,FY

## 2022-09-06 PROCEDURE — 94727 GAS DIL/WSHOT DETER LNG VOL: CPT

## 2022-09-06 PROCEDURE — 94060 PR EVAL OF BRONCHOSPASM: ICD-10-PCS | Mod: 26,,, | Performed by: INTERNAL MEDICINE

## 2022-09-06 PROCEDURE — 94727 PR PULM FUNCTION TEST BY GAS: ICD-10-PCS | Mod: 26,,, | Performed by: INTERNAL MEDICINE

## 2022-09-06 PROCEDURE — 94729 DIFFUSING CAPACITY: CPT | Mod: 26,,, | Performed by: INTERNAL MEDICINE

## 2022-09-06 PROCEDURE — 71046 XR CHEST PA AND LATERAL: ICD-10-PCS | Mod: 26,,, | Performed by: RADIOLOGY

## 2022-09-06 PROCEDURE — 94729 PR C02/MEMBANE DIFFUSE CAPACITY: ICD-10-PCS | Mod: 26,,, | Performed by: INTERNAL MEDICINE

## 2022-09-06 PROCEDURE — 94060 EVALUATION OF WHEEZING: CPT | Mod: 26,,, | Performed by: INTERNAL MEDICINE

## 2022-09-06 PROCEDURE — 71046 X-RAY EXAM CHEST 2 VIEWS: CPT | Mod: 26,,, | Performed by: RADIOLOGY

## 2022-09-06 PROCEDURE — 94729 DIFFUSING CAPACITY: CPT

## 2022-09-06 RX ORDER — ALBUTEROL SULFATE 2.5 MG/.5ML
2.5 SOLUTION RESPIRATORY (INHALATION) ONCE
Status: COMPLETED | OUTPATIENT
Start: 2022-09-06 | End: 2022-09-06

## 2022-09-06 RX ADMIN — ALBUTEROL SULFATE 2.5 MG: 2.5 SOLUTION RESPIRATORY (INHALATION) at 11:09

## 2022-09-07 ENCOUNTER — TELEPHONE (OUTPATIENT)
Dept: PULMONOLOGY | Facility: CLINIC | Age: 72
End: 2022-09-07

## 2022-09-07 DIAGNOSIS — R93.89 ABNORMAL CHEST X-RAY: Primary | ICD-10-CM

## 2022-09-07 NOTE — TELEPHONE ENCOUNTER
Pt has an upcoming appointment. CXR indicates enlarged heart. Recommend echo prior to follow up appt. Please contact pt and schedule this. Thank you Josy

## 2022-09-13 ENCOUNTER — OFFICE VISIT (OUTPATIENT)
Dept: PULMONOLOGY | Facility: CLINIC | Age: 72
End: 2022-09-13
Payer: MEDICARE

## 2022-09-13 VITALS
BODY MASS INDEX: 37.48 KG/M2 | HEART RATE: 77 BPM | SYSTOLIC BLOOD PRESSURE: 145 MMHG | OXYGEN SATURATION: 96 % | DIASTOLIC BLOOD PRESSURE: 78 MMHG | WEIGHT: 219.56 LBS | HEIGHT: 64 IN

## 2022-09-13 DIAGNOSIS — G47.33 OSA (OBSTRUCTIVE SLEEP APNEA): ICD-10-CM

## 2022-09-13 DIAGNOSIS — J45.40 MODERATE PERSISTENT ASTHMA, UNSPECIFIED WHETHER COMPLICATED: ICD-10-CM

## 2022-09-13 DIAGNOSIS — R06.00 DYSPNEA, UNSPECIFIED TYPE: Primary | ICD-10-CM

## 2022-09-13 PROCEDURE — 3008F PR BODY MASS INDEX (BMI) DOCUMENTED: ICD-10-PCS | Mod: CPTII,S$GLB,, | Performed by: NURSE PRACTITIONER

## 2022-09-13 PROCEDURE — 3077F SYST BP >= 140 MM HG: CPT | Mod: CPTII,S$GLB,, | Performed by: NURSE PRACTITIONER

## 2022-09-13 PROCEDURE — 3078F PR MOST RECENT DIASTOLIC BLOOD PRESSURE < 80 MM HG: ICD-10-PCS | Mod: CPTII,S$GLB,, | Performed by: NURSE PRACTITIONER

## 2022-09-13 PROCEDURE — 3008F BODY MASS INDEX DOCD: CPT | Mod: CPTII,S$GLB,, | Performed by: NURSE PRACTITIONER

## 2022-09-13 PROCEDURE — 3078F DIAST BP <80 MM HG: CPT | Mod: CPTII,S$GLB,, | Performed by: NURSE PRACTITIONER

## 2022-09-13 PROCEDURE — 3288F PR FALLS RISK ASSESSMENT DOCUMENTED: ICD-10-PCS | Mod: CPTII,S$GLB,, | Performed by: NURSE PRACTITIONER

## 2022-09-13 PROCEDURE — 1101F PT FALLS ASSESS-DOCD LE1/YR: CPT | Mod: CPTII,S$GLB,, | Performed by: NURSE PRACTITIONER

## 2022-09-13 PROCEDURE — 3060F POS MICROALBUMINURIA REV: CPT | Mod: CPTII,S$GLB,, | Performed by: NURSE PRACTITIONER

## 2022-09-13 PROCEDURE — 1126F AMNT PAIN NOTED NONE PRSNT: CPT | Mod: CPTII,S$GLB,, | Performed by: NURSE PRACTITIONER

## 2022-09-13 PROCEDURE — 3044F HG A1C LEVEL LT 7.0%: CPT | Mod: CPTII,S$GLB,, | Performed by: NURSE PRACTITIONER

## 2022-09-13 PROCEDURE — 1101F PR PT FALLS ASSESS DOC 0-1 FALLS W/OUT INJ PAST YR: ICD-10-PCS | Mod: CPTII,S$GLB,, | Performed by: NURSE PRACTITIONER

## 2022-09-13 PROCEDURE — 3060F PR POS MICROALBUMINURIA RESULT DOCUMENTED/REVIEW: ICD-10-PCS | Mod: CPTII,S$GLB,, | Performed by: NURSE PRACTITIONER

## 2022-09-13 PROCEDURE — 1126F PR PAIN SEVERITY QUANTIFIED, NO PAIN PRESENT: ICD-10-PCS | Mod: CPTII,S$GLB,, | Performed by: NURSE PRACTITIONER

## 2022-09-13 PROCEDURE — 3072F LOW RISK FOR RETINOPATHY: CPT | Mod: CPTII,S$GLB,, | Performed by: NURSE PRACTITIONER

## 2022-09-13 PROCEDURE — 3288F FALL RISK ASSESSMENT DOCD: CPT | Mod: CPTII,S$GLB,, | Performed by: NURSE PRACTITIONER

## 2022-09-13 PROCEDURE — 3077F PR MOST RECENT SYSTOLIC BLOOD PRESSURE >= 140 MM HG: ICD-10-PCS | Mod: CPTII,S$GLB,, | Performed by: NURSE PRACTITIONER

## 2022-09-13 PROCEDURE — 3044F PR MOST RECENT HEMOGLOBIN A1C LEVEL <7.0%: ICD-10-PCS | Mod: CPTII,S$GLB,, | Performed by: NURSE PRACTITIONER

## 2022-09-13 PROCEDURE — 3072F PR LOW RISK FOR RETINOPATHY: ICD-10-PCS | Mod: CPTII,S$GLB,, | Performed by: NURSE PRACTITIONER

## 2022-09-13 PROCEDURE — 1159F MED LIST DOCD IN RCRD: CPT | Mod: CPTII,S$GLB,, | Performed by: NURSE PRACTITIONER

## 2022-09-13 PROCEDURE — 99214 OFFICE O/P EST MOD 30 MIN: CPT | Mod: S$GLB,,, | Performed by: NURSE PRACTITIONER

## 2022-09-13 PROCEDURE — 1159F PR MEDICATION LIST DOCUMENTED IN MEDICAL RECORD: ICD-10-PCS | Mod: CPTII,S$GLB,, | Performed by: NURSE PRACTITIONER

## 2022-09-13 PROCEDURE — 99214 PR OFFICE/OUTPT VISIT, EST, LEVL IV, 30-39 MIN: ICD-10-PCS | Mod: S$GLB,,, | Performed by: NURSE PRACTITIONER

## 2022-09-13 PROCEDURE — 3066F PR DOCUMENTATION OF TREATMENT FOR NEPHROPATHY: ICD-10-PCS | Mod: CPTII,S$GLB,, | Performed by: NURSE PRACTITIONER

## 2022-09-13 PROCEDURE — 4010F ACE/ARB THERAPY RXD/TAKEN: CPT | Mod: CPTII,S$GLB,, | Performed by: NURSE PRACTITIONER

## 2022-09-13 PROCEDURE — 99999 PR PBB SHADOW E&M-EST. PATIENT-LVL IV: CPT | Mod: PBBFAC,,, | Performed by: NURSE PRACTITIONER

## 2022-09-13 PROCEDURE — 4010F PR ACE/ARB THEARPY RXD/TAKEN: ICD-10-PCS | Mod: CPTII,S$GLB,, | Performed by: NURSE PRACTITIONER

## 2022-09-13 PROCEDURE — 3066F NEPHROPATHY DOC TX: CPT | Mod: CPTII,S$GLB,, | Performed by: NURSE PRACTITIONER

## 2022-09-13 PROCEDURE — 99999 PR PBB SHADOW E&M-EST. PATIENT-LVL IV: ICD-10-PCS | Mod: PBBFAC,,, | Performed by: NURSE PRACTITIONER

## 2022-09-13 RX ORDER — FLUTICASONE FUROATE, UMECLIDINIUM BROMIDE AND VILANTEROL TRIFENATATE 200; 62.5; 25 UG/1; UG/1; UG/1
1 POWDER RESPIRATORY (INHALATION) DAILY
Qty: 60 EACH | Refills: 5 | Status: SHIPPED | OUTPATIENT
Start: 2022-09-13 | End: 2022-10-03

## 2022-09-13 RX ORDER — DOXYCYCLINE 100 MG/1
100 CAPSULE ORAL EVERY 12 HOURS
Qty: 20 CAPSULE | Refills: 0 | Status: SHIPPED | OUTPATIENT
Start: 2022-09-13 | End: 2022-09-23

## 2022-09-13 NOTE — PROGRESS NOTES
CHIEF COMPLAINT:    Chief Complaint   Patient presents with    Results       HISTORY OF PRESENT ILLNESS: Faby Luna is a 71 y.o. female never smoker with  has a past medical history of Anxiety with depression, Arthritis, CKD (chronic kidney disease) stage 2, GFR 60-89 ml/min, Diabetes mellitus, History of Clarisse-en-Y gastric bypass, Hypertension, Obesity, unspecified, DAHLIA (obstructive sleep apnea), and Spondylosis. is here for asthma follow up.  Patient with symptoms of wheezing, occasional dry cough, sob at random. Started on Breo initial office visit 8/2/2022. Helping somewhat, per pt but reports nothing was helping this past weekend with cool front change in weather. Reports sinus congestion and pressure over 1 week.   Menthol baths help  Adult onset asthma 10-15 years  Hospitalization: ED exacerbation 12/30/2021, no hospitalizations  Past treatments:albuterol 2-3 x a day, no nebulizer  Diuretics: furosemide  Controller use:flovent too expensive   Travel:no  Significant family history: no fam lung dz  Pets:no  Occupational exposures: retired /teacher  Triggers: wake up in am sob few steps then rest clears day         REVIEW OF SYSTEMS:   Review of Systems   Constitutional:  Positive for weight gain and fatigue. Negative for fever, chills, weight loss, activity change, appetite change and night sweats.   HENT:  Positive for congestion.    Eyes: Negative.    Respiratory:  Positive for apnea, snoring, cough, shortness of breath, wheezing, orthopnea (sometimes), dyspnea on extertion, use of rescue inhaler and somnolence. Negative for sputum production, chest tightness and previous hospitialization due to pulmonary problems.    Cardiovascular:  Negative for chest pain and palpitations.   Genitourinary: Negative.    Endocrine: endocrine negative    Musculoskeletal:  Negative for gait problem.   Skin: Negative.    Gastrointestinal:  Negative for acid reflux.   Neurological: Negative.     Psychiatric/Behavioral:  Positive for sleep disturbance (2-3 x).      DATA  PERSONALLY REVIEWED:    PFT 9/6/2022: ratio 81 fev1 1.4 (75) fvc 1.73 (72) tlc 56 dlco 65  Spirometry is normal. Spirometry remains unimproved following bronchodilator. Lung volumes show moderate restriction is present. DLCO is mildly decreased.     CXR9/6/2022: Mildly enlarged cardiac silhouette, stable.  The lungs are clear.  No pneumothorax.  No pleural effusions. Spinal cord stimulator with tip in the midthoracic spine noted.     Sleep study 9/12/2020:The overall AHI was 8.3 with an oxygen tiffany of 81.0%. The AHI in REM sleep was 16.6. The central apnea index was 0.0.  The supine AHI was 5.6.  Lost cpap during move     ECHO:scheduled    Cardiac stress:NA    Labs:  Eos:0.2  IgE:NA    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    Chronic pain syndrome 10/26/2018    Bilateral carpal tunnel syndrome 12/03/2018    Right carpal tunnel syndrome 03/08/2019    Cubital tunnel syndrome on right 03/08/2019    Pain, hand 05/06/2019    Hand weakness 05/06/2019    Anxiety 05/28/2018    Major depressive disorder, recurrent episode, in partial remission 05/13/2019    Carpal tunnel syndrome 06/28/2019    Osteoarthritis of spine with radiculopathy, lumbar region 10/17/2019    Spondylosis without myelopathy 08/17/2020    Hypertrophy of ligamentum flavum 02/15/2021    Chronic renal failure, stage 3a 02/15/2021    Osteoarthritis of  lumbar spine 2021    Refractive error 2021    Pseudophakia 2021    Morbid (severe) obesity due to excess calories 2022    Spinal enthesopathy, site unspecified 2022    Occlusion of peripherally inserted central catheter (PICC) line, initial encounter 2022    Seasonal allergic rhinitis 2022    Moderate persistent asthma 2022    Dyspnea 09/15/2022    DAHLIA (obstructive sleep apnea) 09/15/2022     Resolved Ambulatory Problems     Diagnosis Date Noted    Bacteremia due to Escherichia coli 2021    Pyelonephritis 2021    Complicated UTI (urinary tract infection) 2021     Past Medical History:   Diagnosis Date    Anxiety with depression     Arthritis     CKD (chronic kidney disease) stage 2, GFR 60-89 ml/min     Diabetes mellitus     History of Clarisse-en-Y gastric bypass     Hypertension     Obesity, unspecified     Spondylosis                 PAST SURGICAL HISTORY:    Past Surgical History:   Procedure Laterality Date    CARPAL TUNNEL RELEASE Right 3/8/2019    Procedure: RELEASE, CARPAL TUNNEL right;  Surgeon: Pan Goodman MD;  Location: [facility];  Service: Orthopedics;  Laterality: Right;    CARPAL TUNNEL RELEASE Left 2019    Procedure: RELEASE, CARPAL TUNNEL- LEFT;  Surgeon: Pan Goodman MD;  Location: [address];  Service: Orthopedics;  Laterality: Left;    CATARACT EXTRACTION Bilateral      SECTION      CHOLECYSTECTOMY      EPIDURAL STEROID INJECTION INTO LUMBAR SPINE N/A 2018    Procedure: INJECTION, STEROID, SPINE, LUMBAR, EPIDURAL;  Surgeon: Shaq Silverio MD;  Location: [facility];  Service: Pain Management;  Laterality: N/A;  LUMBAR L4-L5 INTERLAMINAR ELISE'  [id number]  W/ SEDATION     HYSTERECTOMY      INJECTION OF ANESTHETIC AGENT AROUND NERVE Bilateral 2019    Procedure: BLOCK, NERVE, L3-L4-L5 MEDIAL BRANCH;  Surgeon: Shaq Silverio MD;  Location: [facility];  Service: Pain Management;  Laterality: Bilateral;     INJECTION OF ANESTHETIC AGENT AROUND NERVE Bilateral 10/17/2019    Procedure: BLOCK, NERVE;  Surgeon: Shaq Silverio MD;  Location: [facility];  Service: Pain Management;  Laterality: Bilateral;  B/L MBB L3-L4-L5  REPEAT  CONSENT NEEDED    INJECTION OF FACET JOINT Bilateral 5/28/2018    Procedure: INJECTION-FACET;  Surgeon: Shaq Silverio MD;  Location: [facility];  Service: Pain Management;  Laterality: Bilateral;  LUMBAR BILATERAL L4-L5 AND L5-S1 FACET STEROID INJECTION  [id number]    W/ SEDATION     RADIOFREQUENCY ABLATION Right 11/21/2019    Procedure: RADIOFREQUENCY ABLATION RIGHT L3, L4, L5;  Surgeon: Shaq Silverio MD;  Location: [facility];  Service: Pain Management;  Laterality: Right;  Right RFA L3-L4-L5  1 of 2  Consent Needed    RADIOFREQUENCY ABLATION Left 12/5/2019    Procedure: RADIOFREQUENCY ABLATION LEFT L3-5;  Surgeon: Shaq Silverio MD;  Location: [facility];  Service: Pain Management;  Laterality: Left;  Left RFA L3-L4-L5  2 of 2  Consent Needed    RADIOFREQUENCY ABLATION Left 8/17/2020    Procedure: RADIOFREQUENCY ABLATION LEFT L3,4,5 1 of 2;  Surgeon: Shaq Silverio MD;  Location: [facility];  Service: Pain Management;  Laterality: Left;  Left RFA L3,4,5  1 of 2    RADIOFREQUENCY ABLATION Right 8/31/2020    Procedure: RADIOFREQUENCY ABLATION RIGHT L3,4,5 2 of 2;  Surgeon: Shaq Silverio MD;  Location: [facility];  Service: Pain Management;  Laterality: Right;  RADIOFREQUENCY ABLATION RIGHT L3,4,5  2 of 2    RADIOFREQUENCY ABLATION Left 9/9/2021    Procedure: RADIOFREQUENCY ABLATION, L3-L4 AND L5 MEDIAL BRANCH 1 OF 2;  Surgeon: Shaq Silverio MD;  Location: [facility];  Service: Pain Management;  Laterality: Left;    RADIOFREQUENCY ABLATION Right 9/20/2021    Procedure: RADIOFREQUENCY ABLATION, L3-L4 AND L5 MEDIAL BRANCH 2 OF 2;  Surgeon: Shaq Silverio MD;  Location: [facility];  Service: Pain Management;  Laterality: Right;    RADIOFREQUENCY ABLATION Right 7/7/2022    Procedure:  RADIOFREQUENCY ABLATION, RIGHT L3-L4-L5 ONE OF TWO;  Surgeon: Shaq Silverio MD;  Location: [facility];  Service: Pain Management;  Laterality: Right;    RADIOFREQUENCY ABLATION Left 7/21/2022    Procedure: RADIOFREQUENCY ABLATION, LEFT L3-L4-L5 TWO OF TWO *BRING SCS REMOTE*;  Surgeon: Shaq Silverio MD;  Location: [facility];  Service: Pain Management;  Laterality: Left;    TRIAL OF SPINAL CORD NERVE STIMULATOR N/A 9/24/2018    Procedure: TRIAL, NEUROSTIMULATOR, SPINAL CORD, SPINAL CORD STIMULATOR TRIAL-INTERNAL WIRES TO EXTERNAL BATTERY;  Surgeon: Shaq Silverio MD;  Location: [facility];  Service: Pain Management;  Laterality: N/A;  ABBOTT REP NOTIFIED         FAMILY HISTORY:                Family History   Problem Relation Age of Onset    Cataracts Mother     Glaucoma Mother     No Known Problems Father     No Known Problems Sister     No Known Problems Brother     No Known Problems Maternal Aunt     No Known Problems Maternal Uncle     No Known Problems Paternal Aunt     No Known Problems Paternal Uncle     No Known Problems Maternal Grandmother     No Known Problems Maternal Grandfather     No Known Problems Paternal Grandmother     No Known Problems Paternal Grandfather        SOCIAL HISTORY:          Tobacco:   Social History     Tobacco Use   Smoking Status Never   Smokeless Tobacco Never       alcohol use:    Social History     Substance and Sexual Activity   Alcohol Use No                   ALLERGIES:  Review of patient's allergies indicates:  No Known Allergies    CURRENT MEDICATIONS:    Current Outpatient Medications   Medication Sig Dispense Refill    albuterol (PROAIR HFA) 90 mcg/actuation inhaler Inhale 2 puffs into the lungs every 4 (four) hours as needed for Wheezing or Shortness of Breath. Rescue 8.5 g 1    albuterol sulfate 2.5 mg/0.5 mL Nebu Take 2.5 mg by nebulization every 4 (four) hours as needed (shortness of breath, wheezing, coughing). Rescue 30 each 1    atenoloL (TENORMIN) 100 MG  tablet Take 1 tablet (100 mg total) by mouth once daily. 90 tablet 3    benzonatate (TESSALON) 200 MG capsule Take 1 capsule (200 mg total) by mouth 3 (three) times daily as needed for Cough. 30 capsule 1    EScitalopram oxalate (LEXAPRO) 20 MG tablet TAKE 1 TABLET EVERY DAY 90 tablet 3    fluticasone propionate (FLONASE) 50 mcg/actuation nasal spray 2 sprays (100 mcg total) by Each Nostril route once daily. 11.1 mL 1    furosemide (LASIX) 20 MG tablet Take 1 tablet (20 mg total) by mouth once daily. 90 tablet 3    gabapentin (NEURONTIN) 800 MG tablet Take 1 tablet (800 mg total) by mouth 3 (three) times daily. 90 tablet 2    glipiZIDE (GLUCOTROL) 10 MG tablet Take 1 tablet (10 mg total) by mouth daily with breakfast. 180 tablet 3    hydrOXYzine pamoate (VISTARIL) 25 MG Cap Take 1 capsule (25 mg total) by mouth daily as needed (anxiety). 30 capsule 2    ketorolac (TORADOL) 10 mg tablet Take 1 tablet (10 mg total) by mouth every 6 (six) hours as needed for Pain. Take with food to prevent heartburn. 20 tablet 1    linaGLIPtin (TRADJENTA) 5 mg Tab tablet Take 1 tablet (5 mg total) by mouth once daily. 90 tablet 3    loratadine (CLARITIN) 10 mg tablet Take 1 tablet (10 mg total) by mouth daily as needed for Allergies. 90 tablet 3    losartan (COZAAR) 50 MG tablet TAKE 1 TABLET EVERY DAY 90 tablet 3    pantoprazole (PROTONIX) 20 MG tablet TAKE 1 TABLET TWICE DAILY  BEFORE  MEALS 180 tablet 3    promethazine-dextromethorphan (PROMETHAZINE-DM) 6.25-15 mg/5 mL Syrp Take 5 mLs by mouth every 8 (eight) hours as needed (cough). 240 mL 0    promethazine-dextromethorphan (PROMETHAZINE-DM) 6.25-15 mg/5 mL Syrp Take 10-15ml by mouth every 8 hours as needed for coughing 240 mL 0    rosuvastatin (CRESTOR) 20 MG tablet Take 1 tablet (20 mg total) by mouth once daily. 30 tablet 11    tiZANidine (ZANAFLEX) 4 MG tablet Take 1 tablet (4 mg total) by mouth every 8 (eight) hours as needed (muscle pain). 90 tablet 2    traZODone (DESYREL)  "100 MG tablet Take 1 tablet (100 mg total) by mouth every evening. 90 tablet 3    blood-glucose meter kit Use as instructed 1 each 0    doxycycline (VIBRAMYCIN) 100 MG Cap Take 1 capsule (100 mg total) by mouth every 12 (twelve) hours. for 10 days 20 capsule 0    fluticasone-umeclidin-vilanter (TRELEGY ELLIPTA) 200-62.5-25 mcg inhaler Inhale 1 puff into the lungs once daily. (Discontinue breo) 60 each 5     No current facility-administered medications for this visit.                       PHYSICAL EXAM:  Vitals:    09/13/22 1106   BP: (!) 145/78   Pulse: 77   SpO2: 96%   Weight: 99.6 kg (219 lb 9.3 oz)   Height: 5' 4" (1.626 m)   PainSc: 0-No pain     Body mass index is 37.69 kg/m².   Physical Exam   Constitutional: She is oriented to person, place, and time. She appears well-developed. She is obese.   HENT:   Head: Normocephalic.   Mouth/Throat: Oropharynx is clear and moist. Mallampati Score: II.   Cardiovascular: Normal rate, regular rhythm and normal heart sounds.   Pulmonary/Chest: Normal expansion and effort normal. She has wheezes (expiratory).   Musculoskeletal:         General: No edema.      Cervical back: Neck supple.   Neurological: She is alert and oriented to person, place, and time. Gait normal.   Skin: Skin is warm. She is not diaphoretic.   Psychiatric: She has a normal mood and affect. Her behavior is normal. Judgment and thought content normal.                                                   Lab Results   Component Value Date    TSH 2.267 05/30/2018      Lab Results   Component Value Date    WBC 12.83 (H) 08/03/2022    HGB 12.4 08/03/2022    HCT 40.3 08/03/2022    MCV 81 (L) 08/03/2022     08/03/2022     BMP  Lab Results   Component Value Date     08/03/2022    K 4.4 08/03/2022     08/03/2022    CO2 28 08/03/2022    BUN 15 08/03/2022    CREATININE 1.2 08/03/2022    CALCIUM 9.6 08/03/2022    ANIONGAP 10 08/03/2022    ESTGFRAFRICA >60 12/30/2021    EGFRNONAA 56 (A) 12/30/2021 "     Lab Results   Component Value Date    HGBA1C 6.6 (H) 08/03/2022        ASSESSMENT    ICD-10-CM ICD-9-CM    1. Dyspnea, unspecified type  R06.00 786.09 CT Chest Without Contrast      2. DAHLIA (obstructive sleep apnea)  G47.33 327.23 Home Sleep Studies      3. Moderate persistent asthma, unspecified whether complicated  J45.40 493.90 fluticasone-umeclidin-vilanter (TRELEGY ELLIPTA) 200-62.5-25 mcg inhaler      doxycycline (VIBRAMYCIN) 100 MG Cap          PLAN:    Problem List Items Addressed This Visit          Unprioritized    Dyspnea - Primary    Overview     Multifactorial-obesity, deconditioning, asthma, cxr indicates enlarge heart, echo pending         Relevant Orders    CT Chest Without Contrast    Moderate persistent asthma    Overview     Adult onset asthma 10-15 years with persistent symptoms, recommend maintenance ics/laba/lama  Manage triggers-AR         Relevant Medications    fluticasone-umeclidin-vilanter (TRELEGY ELLIPTA) 200-62.5-25 mcg inhaler    doxycycline (VIBRAMYCIN) 100 MG Cap    DAHLIA (obstructive sleep apnea)    Overview     Sleep study 9/12/2020:The overall AHI was 8.3 with an oxygen tiffany of 81.0%. The AHI in REM sleep was 16.6. The central apnea index was 0.0.  The supine AHI was 5.6.  Lost cpap during move, not on treatment but reports it was helpful  Need re qualifying test         Relevant Orders    Home Sleep Studies         Patient will Follow up if symptoms worsen or fail to improve, for will contact you to return once we obtain result.

## 2022-09-15 PROBLEM — R06.00 DYSPNEA: Status: ACTIVE | Noted: 2022-09-15

## 2022-09-15 PROBLEM — G47.33 OSA (OBSTRUCTIVE SLEEP APNEA): Status: ACTIVE | Noted: 2022-09-15

## 2022-09-16 ENCOUNTER — HOSPITAL ENCOUNTER (OUTPATIENT)
Dept: RADIOLOGY | Facility: HOSPITAL | Age: 72
Discharge: HOME | End: 2022-09-16
Attending: NURSE PRACTITIONER
Payer: MEDICARE

## 2022-09-16 ENCOUNTER — HOSPITAL ENCOUNTER (OUTPATIENT)
Dept: CARDIOLOGY | Facility: HOSPITAL | Age: 72
Discharge: HOME | End: 2022-09-16
Attending: NURSE PRACTITIONER
Payer: MEDICARE

## 2022-09-16 ENCOUNTER — TELEPHONE (OUTPATIENT)
Dept: PULMONOLOGY | Facility: CLINIC | Age: 72
End: 2022-09-16
Payer: MEDICARE

## 2022-09-16 DIAGNOSIS — R06.00 DYSPNEA, UNSPECIFIED TYPE: ICD-10-CM

## 2022-09-16 DIAGNOSIS — R93.89 ABNORMAL CHEST X-RAY: ICD-10-CM

## 2022-09-16 LAB
AORTIC ROOT ANNULUS: 1.8 CM
AV PEAK GRADIENT: 8 MMHG
AV VELOCITY RATIO: 0.8
CV ECHO LV RWT: 0.65 CM
DOP CALC AO PEAK VEL: 1.37 M/S
DOP CALC LVOT AREA: 2.5 CM2
DOP CALC LVOT DIAMETER: 1.8 CM
DOP CALC LVOT PEAK VEL: 1.1 M/S
DOP CALC LVOT STROKE VOLUME: 64.09 CM3
DOP CALCLVOT PEAK VEL VTI: 25.2 CM
E WAVE DECELERATION TIME: 222.18 MSEC
E/A RATIO: 0.92
E/E' RATIO: 9.07 M/S
ECHO LV POSTERIOR WALL: 1.2 CM (ref 0.6–1.1)
EJECTION FRACTION: 55 %
FRACTIONAL SHORTENING: 25 % (ref 28–44)
INTERVENTRICULAR SEPTUM: 1.26 CM (ref 0.6–1.1)
IVRT: 103.81 MSEC
LA MAJOR: 5.62 CM
LEFT ATRIUM SIZE: 4.93 CM
LEFT INTERNAL DIMENSION IN SYSTOLE: 2.77 CM (ref 2.1–4)
LEFT VENTRICLE DIASTOLIC VOLUME: 58.46 ML
LEFT VENTRICLE SYSTOLIC VOLUME: 28.79 ML
LEFT VENTRICULAR INTERNAL DIMENSION IN DIASTOLE: 3.71 CM (ref 3.5–6)
LEFT VENTRICULAR MASS: 153.54 G
LV LATERAL E/E' RATIO: 8.5 M/S
LV SEPTAL E/E' RATIO: 9.71 M/S
LVOT MG: 2.96 MMHG
LVOT MV: 0.83 CM/S
MV PEAK A VEL: 0.74 M/S
MV PEAK E VEL: 0.68 M/S
PISA TR MAX VEL: 1.89 M/S
PV PEAK VELOCITY: 0.86 CM/S
RA MAJOR: 5.32 CM
RA PRESSURE: 3 MMHG
RA WIDTH: 3.7 CM
RIGHT VENTRICULAR END-DIASTOLIC DIMENSION: 3.53 CM
SINUS: 3.4 CM
STJ: 2.35 CM
TDI LATERAL: 0.08 M/S
TDI SEPTAL: 0.07 M/S
TDI: 0.08 M/S
TR MAX PG: 14 MMHG
TRICUSPID ANNULAR PLANE SYSTOLIC EXCURSION: 2.1 CM
TV REST PULMONARY ARTERY PRESSURE: 17 MMHG

## 2022-09-16 PROCEDURE — 71250 CT THORAX DX C-: CPT | Mod: TC

## 2022-09-16 PROCEDURE — 93306 TTE W/DOPPLER COMPLETE: CPT

## 2022-09-16 PROCEDURE — 71250 CT THORAX DX C-: CPT | Mod: 26,,, | Performed by: RADIOLOGY

## 2022-09-16 PROCEDURE — 93306 TTE W/DOPPLER COMPLETE: CPT | Mod: 26,,, | Performed by: INTERNAL MEDICINE

## 2022-09-16 PROCEDURE — 93306 ECHO (CUPID ONLY): ICD-10-PCS | Mod: 26,,, | Performed by: INTERNAL MEDICINE

## 2022-09-16 PROCEDURE — 71250 CT CHEST WITHOUT CONTRAST: ICD-10-PCS | Mod: 26,,, | Performed by: RADIOLOGY

## 2022-09-16 NOTE — TELEPHONE ENCOUNTER
Returned call no answer.            ----- Message from Vicky Carmen sent at 9/16/2022  3:21 PM CDT -----  Type: Patient Call Back    Who called: self     What is the request in detail: Patient requesting more information regarding her upcoming home sleep study.     Can the clinic reply by [facility]? No     Would the patient rather a call back or a response via [facility]? Call back     Best call back number: [phone]

## 2022-09-20 ENCOUNTER — HOSPITAL ENCOUNTER (OUTPATIENT)
Dept: SLEEP MEDICINE | Facility: HOSPITAL | Age: 72
Discharge: HOME | End: 2022-09-20
Attending: NURSE PRACTITIONER
Payer: MEDICARE

## 2022-09-20 DIAGNOSIS — G47.33 OSA (OBSTRUCTIVE SLEEP APNEA): ICD-10-CM

## 2022-09-20 PROCEDURE — 95806 PR SLEEP STUDY, UNATTENDED, SIMUL RECORD HR/O2 SAT/RESP FLOW/RESP EFFT: ICD-10-PCS | Mod: 26,,, | Performed by: INTERNAL MEDICINE

## 2022-09-20 PROCEDURE — 95806 SLEEP STUDY UNATT&RESP EFFT: CPT | Mod: 26,,, | Performed by: INTERNAL MEDICINE

## 2022-09-20 PROCEDURE — 95800 SLP STDY UNATTENDED: CPT

## 2022-09-20 NOTE — PROGRESS NOTES
The patient ID was verified. She was instructed on how to turn the Home Sleep Testing device on and off, how to apply the sensors. She was encouraged to sleep on supine position and must have 6 hours of sleep. The patient was instructed not to get the device wet and return it back to us. All questions were answered prior to patient leaving.  She was provided the after visit summary. She does not use PAP machine, she ;ost it during moving.

## 2022-09-21 NOTE — PROGRESS NOTES
The HSAT device was received and uploaded this morning. The recorded sleep data noted to be adequate and the patient did not report issue with the device during the study.

## 2022-09-22 NOTE — PROCEDURES
"Dear Provider,     You have ordered sleep LAB services to perform the sleep study for Faby Luna.  The sleep study that you ordered is complete.      Please find Sleep Study result in "Chart Review" under the "Media tab."      As the ordering provider, you are responsible for reviewing the results and implementing a treatment plan with your patient.    If you need a Sleep Medicine provider to explain the sleep study findings and arrange treatment for the patient, please refer patient for consultation to our Sleep Clinic via [facility] with Ambulatory Consult Sleep.    To do that please place an order for an  "Ambulatory Consult Sleep" - it will go to our clinic work queue for our Medical Assistant to contact the patient for an appointment.     For any questions, please contact our clinic staff at [phone] to talk to clinical staff.   "

## 2022-09-26 ENCOUNTER — TELEPHONE (OUTPATIENT)
Dept: PULMONOLOGY | Facility: CLINIC | Age: 72
End: 2022-09-26
Payer: MEDICARE

## 2022-09-26 NOTE — TELEPHONE ENCOUNTER
Spoke with patient scheduled an appointment to see provider.    OLINDA Moise                 ----- Message from Josy Gray NP sent at 9/26/2022  3:06 PM CDT -----  Please call the patient  and let them know their test results are back. Patient has abnormal CT chest and sleep study with significant DAHLIA. Please call the patient to schedule a follow up appointment to discuss results and treatment recommendation. Thank you.

## 2022-09-26 NOTE — PROGRESS NOTES
Please call the patient  and let them know their test results are back. Patient has abnormal CT chest and sleep study with significant DAHLIA. Please call the patient to schedule a follow up appointment to discuss results and treatment recommendation. Thank you.

## 2022-09-27 NOTE — PROGRESS NOTES
Subjective:       Patient ID: Faby Luna is a 71 y.o. female.    Chief Complaint: Consult    CC:    New pt to me, referred by Marcos Santos, NP  [address] [facility] [id number] , with Patient Active Problem List:     Chronic pain     Adjustment reaction with anxiety and depression     Type 2 diabetes mellitus with stage 3a chronic kidney disease, without long-term current use of insulin- on tradjenta.      Essential hypertension     Pain     Gastroesophageal reflux disease without esophagitis     Degenerative joint disease (DJD) of lumbar spine     Lumbar radiculopathy     Pre-op testing     Failed back surgical syndrome     Chronic pain syndrome     Bilateral carpal tunnel syndrome     Right carpal tunnel syndrome     Cubital tunnel syndrome on right     Pain, hand     Hand weakness     Anxiety     Major depressive disorder, recurrent episode, in partial remission     Carpal tunnel syndrome     Osteoarthritis of spine with radiculopathy, lumbar region     Spondylosis without myelopathy     Hypertrophy of ligamentum flavum     Chronic renal failure, stage 3a     Osteoarthritis of lumbar spine     Refractive error     Pseudophakia     Morbid (severe) obesity due to excess calories     Spinal enthesopathy, site unspecified     Occlusion of peripherally inserted central catheter (PICC) line, initial encounter     Seasonal allergic rhinitis     Moderate persistent asthma     Dyspnea     DAHLIA (obstructive sleep apnea)       Lab Results       Component                Value               Date                       HGBA1C                   6.6 (H)             08/03/2022                 HGBA1C                   6.5 (H)             10/11/2021                 HGBA1C                   6.0 (H)             02/10/2021            Lab Results       Component                Value               Date                       LDLCALC                  97.2                08/03/2022                 CREATININE           "     1.2                 08/03/2022               Current attempts at weight loss: No efforts currently.     Previous diet attempts: Walking. LRNY with Dr. Kovacs 12/10/2010.     History of medication for loss:   checked today. Years ago, before surgery.     Heaviest weight: 231 before surgery    Lightest weight after surgery: 168# a few years after surgery. Has been gaining weight in past few years with pandemic and MCFP.     Goal weight:160#      Last eye exam: 2021. No glaucoma or retinopathy               Provider:    Typical eating patterns: Retired. Lives mother. Pt does cook.   Breakfast: skips. Oatmeal. Pancakes, egg and sausage.     lunch: skips.     dinner: chicken- baked or BBQ., mac and cheese or rice., veg, salad. If out- chicken or seafood, veg salad.     snacks: chips , a lot. Sometimes cookies.     Beverages: diet soda- 1/2 per day. Water- not much, juice. ETOH- 1 drink 2 times a month. Tea- AS.     Willingness to change:  9/10    Cardiac studies: · The estimated ejection fraction is 55%.  · The left ventricle is normal in size with mild concentric hypertrophy and normal systolic function.  · Grade I left ventricular diastolic dysfunction.  · Normal right ventricular size with normal right ventricular systolic function.  · Mild left atrial enlargement.  · Normal central venous pressure (3 mmHg).  · The estimated PA systolic pressure is 17 mmHg    Normal sinus rhythm   Nonspecific T wave abnormality   Abnormal ECG   When compared with ECG of 24-APR-2018 12:36,   No significant change was found   Confirmed by Tramaine Fraga MD ([id number]) on 1/3/2022 9:43:15 AM     BMR:1617    PBF:  41.6%    Review of Systems      Objective:    /74 (BP Location: Left arm, Patient Position: Sitting, BP Method: Large (Manual))   Pulse 76   Ht 5' 4" (1.626 m)   Wt 98.8 kg (217 lb 14.4 oz)   SpO2 96%   BMI 37.40 kg/m²    Physical Exam  Vitals reviewed.   Constitutional:       General: She is not in acute " distress.     Appearance: She is well-developed.   HENT:      Head: Normocephalic and atraumatic.   Eyes:      General: No scleral icterus.     Pupils: Pupils are equal, round, and reactive to light.   Neck:      Thyroid: No thyromegaly.   Cardiovascular:      Rate and Rhythm: Normal rate.      Heart sounds: Normal heart sounds. No murmur heard.    No friction rub.   Pulmonary:      Effort: Pulmonary effort is normal. No respiratory distress.      Breath sounds: Normal breath sounds. No wheezing.   Abdominal:      General: Bowel sounds are normal. There is no distension.      Palpations: Abdomen is soft.      Tenderness: There is no abdominal tenderness.   Musculoskeletal:         General: Normal range of motion.      Cervical back: Normal range of motion and neck supple.   Skin:     General: Skin is warm and dry.      Findings: No erythema.   Neurological:      Mental Status: She is alert and oriented to person, place, and time.      Cranial Nerves: No cranial nerve deficit.   Psychiatric:         Behavior: Behavior normal.         Judgment: Judgment normal.       Assessment:       Problem List Items Addressed This Visit       Chronic renal failure, stage 3a    Lumbar radiculopathy    Moderate persistent asthma    DAHLIA (obstructive sleep apnea)    Type 2 diabetes mellitus with stage 3a chronic kidney disease, without long-term current use of insulin     Other Visit Diagnoses       Class 2 severe obesity with body mass index (BMI) of 35 to 39.9 with serious comorbidity    -  Primary            Plan:           1. Class 2 severe obesity with body mass index (BMI) of 35 to 39.9 with serious comorbidity    - Ambulatory referral/consult to Bariatric Medicine  Patient was informed that topiramate is used for migraine prevention and seizures. Weight loss is a common side effect that is well documented. S/he understands this. S/he was informed of the potential side effects such as serious and possibly fatal rash in which  case the medication should be discontinued immediately. Paresthesias, forgetfulness, fatigue, kidney stones, GI symptoms, and changes in lab values such as electrolytes, blood counts and kidney function.      Start topiramate  in the evening for 1 week, then morning and evening.     - To lose weight you want to cut 100% starchy carbohydrates out of your diet (bread, rice, pasta, potatoes, granola, flour, corn, peas, oatmeal, grits, tortillas, crackers, chips) and get  grams of protein.  Aim for 100 grams of protein 5100-7634 bianka  daily.        - No soda, sweet tea, juices, sports drinks or lemonade     -Exercise daily. Increase low impact activity as tolerated.  Avoid high impact activity, very heavy lifting or other exercise regimens that may cause discomfort.     2608-9360 bianka menu and meal ideas given.   2. Type 2 diabetes mellitus with stage 3a chronic kidney disease, without long-term current use of insulin  Pt advised to check blood sugar regularly as medications may need to be adjusted with changes in diet and weight loss.    GLP1a could be a consideration, but would push pt into donut hole quickly.   3. Lumbar radiculopathy  Optimize BMI.     4. Chronic renal failure, stage 3a  Optimize BMI     5. Moderate persistent asthma, unspecified whether complicated  Expect improvement in sx control with weight loss.     6. DAHLIA (obstructive sleep apnea)   DAHLIA does require getting closer to normal BMI range to see improvement that some other co-morbidities. Continue with, or consider, CPAP if indicated.

## 2022-09-28 ENCOUNTER — OFFICE VISIT (OUTPATIENT)
Dept: BARIATRICS | Facility: CLINIC | Age: 72
End: 2022-09-28
Payer: MEDICARE

## 2022-09-28 VITALS
SYSTOLIC BLOOD PRESSURE: 112 MMHG | DIASTOLIC BLOOD PRESSURE: 74 MMHG | WEIGHT: 217.88 LBS | HEIGHT: 64 IN | BODY MASS INDEX: 37.2 KG/M2 | OXYGEN SATURATION: 96 % | HEART RATE: 76 BPM

## 2022-09-28 DIAGNOSIS — E11.22 TYPE 2 DIABETES MELLITUS WITH STAGE 3A CHRONIC KIDNEY DISEASE, WITHOUT LONG-TERM CURRENT USE OF INSULIN: ICD-10-CM

## 2022-09-28 DIAGNOSIS — E66.01 CLASS 2 SEVERE OBESITY WITH BODY MASS INDEX (BMI) OF 35 TO 39.9 WITH SERIOUS COMORBIDITY: Primary | ICD-10-CM

## 2022-09-28 DIAGNOSIS — N18.31 TYPE 2 DIABETES MELLITUS WITH STAGE 3A CHRONIC KIDNEY DISEASE, WITHOUT LONG-TERM CURRENT USE OF INSULIN: ICD-10-CM

## 2022-09-28 DIAGNOSIS — M54.16 LUMBAR RADICULOPATHY: ICD-10-CM

## 2022-09-28 DIAGNOSIS — N18.31 CHRONIC RENAL FAILURE, STAGE 3A: ICD-10-CM

## 2022-09-28 DIAGNOSIS — G47.33 OSA (OBSTRUCTIVE SLEEP APNEA): ICD-10-CM

## 2022-09-28 DIAGNOSIS — J45.40 MODERATE PERSISTENT ASTHMA, UNSPECIFIED WHETHER COMPLICATED: ICD-10-CM

## 2022-09-28 PROCEDURE — 3072F PR LOW RISK FOR RETINOPATHY: ICD-10-PCS | Mod: CPTII,S$GLB,, | Performed by: INTERNAL MEDICINE

## 2022-09-28 PROCEDURE — 3066F PR DOCUMENTATION OF TREATMENT FOR NEPHROPATHY: ICD-10-PCS | Mod: CPTII,S$GLB,, | Performed by: INTERNAL MEDICINE

## 2022-09-28 PROCEDURE — 3044F HG A1C LEVEL LT 7.0%: CPT | Mod: CPTII,S$GLB,, | Performed by: INTERNAL MEDICINE

## 2022-09-28 PROCEDURE — 3074F PR MOST RECENT SYSTOLIC BLOOD PRESSURE < 130 MM HG: ICD-10-PCS | Mod: CPTII,S$GLB,, | Performed by: INTERNAL MEDICINE

## 2022-09-28 PROCEDURE — 3060F POS MICROALBUMINURIA REV: CPT | Mod: CPTII,S$GLB,, | Performed by: INTERNAL MEDICINE

## 2022-09-28 PROCEDURE — 3044F PR MOST RECENT HEMOGLOBIN A1C LEVEL <7.0%: ICD-10-PCS | Mod: CPTII,S$GLB,, | Performed by: INTERNAL MEDICINE

## 2022-09-28 PROCEDURE — 3072F LOW RISK FOR RETINOPATHY: CPT | Mod: CPTII,S$GLB,, | Performed by: INTERNAL MEDICINE

## 2022-09-28 PROCEDURE — 1101F PR PT FALLS ASSESS DOC 0-1 FALLS W/OUT INJ PAST YR: ICD-10-PCS | Mod: CPTII,S$GLB,, | Performed by: INTERNAL MEDICINE

## 2022-09-28 PROCEDURE — 1160F RVW MEDS BY RX/DR IN RCRD: CPT | Mod: CPTII,S$GLB,, | Performed by: INTERNAL MEDICINE

## 2022-09-28 PROCEDURE — 1101F PT FALLS ASSESS-DOCD LE1/YR: CPT | Mod: CPTII,S$GLB,, | Performed by: INTERNAL MEDICINE

## 2022-09-28 PROCEDURE — 1159F PR MEDICATION LIST DOCUMENTED IN MEDICAL RECORD: ICD-10-PCS | Mod: CPTII,S$GLB,, | Performed by: INTERNAL MEDICINE

## 2022-09-28 PROCEDURE — 3078F PR MOST RECENT DIASTOLIC BLOOD PRESSURE < 80 MM HG: ICD-10-PCS | Mod: CPTII,S$GLB,, | Performed by: INTERNAL MEDICINE

## 2022-09-28 PROCEDURE — 3066F NEPHROPATHY DOC TX: CPT | Mod: CPTII,S$GLB,, | Performed by: INTERNAL MEDICINE

## 2022-09-28 PROCEDURE — 3008F PR BODY MASS INDEX (BMI) DOCUMENTED: ICD-10-PCS | Mod: CPTII,S$GLB,, | Performed by: INTERNAL MEDICINE

## 2022-09-28 PROCEDURE — 3288F PR FALLS RISK ASSESSMENT DOCUMENTED: ICD-10-PCS | Mod: CPTII,S$GLB,, | Performed by: INTERNAL MEDICINE

## 2022-09-28 PROCEDURE — 3288F FALL RISK ASSESSMENT DOCD: CPT | Mod: CPTII,S$GLB,, | Performed by: INTERNAL MEDICINE

## 2022-09-28 PROCEDURE — 4010F ACE/ARB THERAPY RXD/TAKEN: CPT | Mod: CPTII,S$GLB,, | Performed by: INTERNAL MEDICINE

## 2022-09-28 PROCEDURE — 3074F SYST BP LT 130 MM HG: CPT | Mod: CPTII,S$GLB,, | Performed by: INTERNAL MEDICINE

## 2022-09-28 PROCEDURE — 99205 PR OFFICE/OUTPT VISIT, NEW, LEVL V, 60-74 MIN: ICD-10-PCS | Mod: S$GLB,,, | Performed by: INTERNAL MEDICINE

## 2022-09-28 PROCEDURE — 3008F BODY MASS INDEX DOCD: CPT | Mod: CPTII,S$GLB,, | Performed by: INTERNAL MEDICINE

## 2022-09-28 PROCEDURE — 99999 PR PBB SHADOW E&M-EST. PATIENT-LVL V: CPT | Mod: PBBFAC,,, | Performed by: INTERNAL MEDICINE

## 2022-09-28 PROCEDURE — 1160F PR REVIEW ALL MEDS BY PRESCRIBER/CLIN PHARMACIST DOCUMENTED: ICD-10-PCS | Mod: CPTII,S$GLB,, | Performed by: INTERNAL MEDICINE

## 2022-09-28 PROCEDURE — 3060F PR POS MICROALBUMINURIA RESULT DOCUMENTED/REVIEW: ICD-10-PCS | Mod: CPTII,S$GLB,, | Performed by: INTERNAL MEDICINE

## 2022-09-28 PROCEDURE — 1159F MED LIST DOCD IN RCRD: CPT | Mod: CPTII,S$GLB,, | Performed by: INTERNAL MEDICINE

## 2022-09-28 PROCEDURE — 99999 PR PBB SHADOW E&M-EST. PATIENT-LVL V: ICD-10-PCS | Mod: PBBFAC,,, | Performed by: INTERNAL MEDICINE

## 2022-09-28 PROCEDURE — 99205 OFFICE O/P NEW HI 60 MIN: CPT | Mod: S$GLB,,, | Performed by: INTERNAL MEDICINE

## 2022-09-28 PROCEDURE — 3078F DIAST BP <80 MM HG: CPT | Mod: CPTII,S$GLB,, | Performed by: INTERNAL MEDICINE

## 2022-09-28 PROCEDURE — 4010F PR ACE/ARB THEARPY RXD/TAKEN: ICD-10-PCS | Mod: CPTII,S$GLB,, | Performed by: INTERNAL MEDICINE

## 2022-09-28 PROCEDURE — 1126F AMNT PAIN NOTED NONE PRSNT: CPT | Mod: CPTII,S$GLB,, | Performed by: INTERNAL MEDICINE

## 2022-09-28 PROCEDURE — 1126F PR PAIN SEVERITY QUANTIFIED, NO PAIN PRESENT: ICD-10-PCS | Mod: CPTII,S$GLB,, | Performed by: INTERNAL MEDICINE

## 2022-09-28 RX ORDER — TOPIRAMATE 25 MG/1
25 TABLET ORAL 2 TIMES DAILY
Qty: 180 TABLET | Refills: 0 | Status: SHIPPED | OUTPATIENT
Start: 2022-09-28 | End: 2022-12-19 | Stop reason: SDUPTHER

## 2022-09-28 NOTE — PATIENT INSTRUCTIONS
Patient was informed that topiramate is used for migraine prevention and seizures. Weight loss is a common side effect that is well documented. S/he understands this. S/he was informed of the potential side effects such as serious and possibly fatal rash in which case the medication should be discontinued immediately. Paresthesias, forgetfulness, fatigue, kidney stones, GI symptoms, and changes in lab values such as electrolytes, blood counts and kidney function.      Start topiramate  in the evening for 1 week, then morning and evening.     - To lose weight you want to cut 100% starchy carbohydrates out of your diet (bread, rice, pasta, potatoes, granola, flour, corn, peas, oatmeal, grits, tortillas, crackers, chips) and get  grams of protein.  Aim for 100 grams of protein 0623-2288 bianka  daily.        - No soda, sweet tea, juices, sports drinks or lemonade     -Exercise daily. Increase low impact activity as tolerated.  Avoid high impact activity, very heavy lifting or other exercise regimens that may cause discomfort.       - Premier Protein (Chocolate, Bananas & Cream, Strawberries & Cream, Vanilla) [place] or [employer]    - Syntrax Penryn from [employer], www.bariatricadvantage.com, www.bariatricchoice.com. (LACTOSE FREE)    - [employer] - Amazon.com (LACTOSE FREE)    - Veggetti Pro from [employer], [employer], [employer]    - www.pinterest.com (cauliflower, cloud bread, quest bar cookies, eggplant, zucchini, zucchini noodles, crustless quiche, no carb meals, taco lettuce boats)    - http://juningashleyace.Avalara/    Sample meal plan  80-120g protein; 7079-1851 calories  Protein drinks and bars: 0-4 grams sugar  Drink lots of water throughout the day and exercise!  MENU # 1  Breakfast: 2 eggs, 1 turkey sausage gentry  Lunch: 2-3 roll-ups (sliced turkey, low-fat slice cheese), baby carrots dipped in 1 Tbsp natural peanut butter  Mid-Day Snack: ¼ cup unsalted almonds, ½ cup fruit  Supper: 1 chicken  thigh simmered in tomato sauce + 2 Tbsp mozzarella cheese, ½ cup black beans, 1/2 cup steamed veggies  Evening Snack: 1/2 cup grapes with 4 cubes low-fat cheddar cheese   ___________________________________________________  MENU # 2  Breakfast: protein drink  Mid-morning snack : ¼ cup unsalted nuts  Lunch: 1 cup tuna or chicken salad made with light reyes, over salad.   Supper: hamburger gentry, slice of cheese, 1 cup steamed veggies.   Snack: light yogurt      Menu #3  Breakfast: 6oz plain Greek yogurt + fruit (½ banana, ½ cup fruit - pineapple, blueberries, strawberries, peach), may add Splenda to liza.  Lunch: ½  chicken breast w/ slice pepper ella cheese, 1/2 cup steamed veggies and small salad with Salad Spritzer.    Mid-Day snack: protein drink   Supper: 4oz Grilled fish, grilled veggie kabob ( mushrooms, onion, bell pepper, yellow squash, zucchini, cherry tomatoes)  Evening Snack: fudgsicle no-sugar-added    Menu # 4  Breakfast: vanilla iced coffee: 1 scoop vanilla protein powder + 4oz skim milk + 4oz coffee   Snack: protein bar  Lunch: 2 Lettuce tacos: ¼ cup seasoned ground turkey wrapped in a Parminder lettuce leaf with 1 Tbsp shredded cheese and dollop low-fat sour cream  Dinner: Shrimp omelet: 2 eggs, ½ cup shrimp, green onions, and shredded cheese        Menu #5  Breakfast: 1 cup low-fat cottage cheese, ½ cup peaches (no sugar added)  Lunch: 2 oz baked pork chop, 1 cup okra and tomato stew  Snack: 1 cup black beans + salsa + dollop sour cream  Dinner: Caprese chicken salad: 2 oz chicken, 1oz fresh mozzarella, sliced tomato, 1 Tbsp olive oil, basil  Snack: sugar-free pudding cup      Menu #6  Breakfast: ½ cup part-skim ricotta cheese, 2 Tbsp sugar-free strawberry preserves, ¼ cup slivered almonds  Lunch: 2 oz canned chicken, 1oz shredded cheddar cheese, ½ cup black beans, 2 Tbsp salsa  Snack: Protein drink  Dinner: 4 oz grilled salmon steak, over mixed green salad with light dressing     Meal Ideas for  Regular Bariatric Diet  *Recipes and products available at www.bariatriceating.com      Breakfast: (15-20g protein)    - Egg white omelet: 2 egg whites or ½ cup Egg Beaters. (Optional proteins: cheese, shrimp, black beans, chicken, sliced turkey) (Optional veggies: tomatoes, salsa, spinach, mushrooms, onions, green peppers, or small slice avocado)     - Egg and sausage: 1 egg or ¼ cup Egg Beaters (any variety), with 1 gentry or 2 links of Turkey sausage or Veggie breakfast sausage (NavigatorMD or [employer])    - Crust-less breakfast quiche: To make a glass pie dish, mix 4oz part skim Ricotta, 1 cup skim milk, and 2 eggs as your base. Add protein: shredded cheese, sliced lean ham or turkey, turkey lugo/sausage. Add veggies: tomato, onion, green onion, mushroom, green pepper, spinach, etc.    - Yogurt parfait: Mix 1 - 6oz container Dannon Light N Fit vanilla yogurt, with ¼ cup Kashi Go Lean cereal    - Cottage cheese and fruit: ½ cup part-skim cottage cheese or ricotta cheese topped with fresh fruit or sugar free preserves     - Aimee Shearer's Vanilla Egg custard* (add 2 Tbsp instant coffee granules to make Cappuccino Custard*)    - Hi-Protein café latte (skim milk, decaf coffee, 1 scoop protein powder). Optional to add Sugar free syrup or extract flavoring.    Lunch: (20-30g protein)    - ½ cup Black bean soup (Homemade or Progresso), with ¼ cup shredded low-fat cheese. Top with chopped tomato or fresh salsa.     - Lean deli turkey breast and low-fat sliced cheese, mustard or light reyes to moisten, rolled up together, or wrapped in a Parminder lettuce leaf    - Chicken salad made from dinner leftovers, moisten with low-fat salad dressing or light reyes. Also try leftover salmon, shrimp, tuna or boiled eggs. Serve ½ cup over dark green salad    - Fat-free canned refried beans, topped with ¼ cup shredded low-fat cheese. Top with chopped tomato or fresh salsa.     - Greek salad: Top mixed greens with 1-2oz grilled  chicken, tomatoes, red onions, 2-3 kalamata olives, and sprinkle lightly with feta cheese. Spritz with Balsamic vinegar to taste.     - Crust-less lunch quiche: To make a glass pie dish, mix 4oz part skim Ricotta, 1 cup skim milk, and 2 eggs as your base. Add protein: shredded cheese, sliced lean ham or turkey, shrimp, chicken. Add veggies: tomato, onion, green onion, mushroom, green pepper, spinach, artichoke, broccoli, etc.    - Pizza bake: tomato sauce, low-fat shredded mozzarella and turkey pepperoni or Cameron lugo. Add any veggies.    - Cucumber crab bites: Spread ¼ cup crab dip (lump crabmeat + light cream cheese and green onions) over sliced cucumber.     - Chicken with light spinach and artichoke dip*: Puree in : 6oz cooked and drained spinach, 2 cloves garlic, 1 can cannelloni beans, ½ cup chopped green onions, 1 can drained artichoke hearts (not marinated in oil), lemon juice and basil. Mix in 2oz chopped up chicken.    Supper: (20-30g protein)    - Serve grilled fish over dark green salad tossed with low-fat dressing, served with grilled asparagus reynolds     - Rotisserie chicken salad: served with sliced strawberries, walnuts, fat-free feta cheese crumbles and 1 tbsp Esparzas Own Light Raspberry [place] Vinaigrette    - Shrimp cocktail: Dip cold boiled shrimp in homemade low-sugar cocktail sauce (1/2 cup Darian One Carb ketchup, 2 tbsp horseradish, 1/4 tsp hot sauce, 1 tsp Worcestershire sauce, 1 tbsp freshly-squeezed lemon juice). Serve with dark green salad, walnuts, and crumbled blue cheese drizzled with olive oil and Balsamic vinegar    - Tuna Melt: Spread tuna salad onto 2 thick slices of tomato. Top with low-fat cheese and broil until cheese is melted. May also be made with chicken salad of shrimp salad. [place] with different types of cheeses.    - Homemade low-fat Chili using extra lean ground beef or ground turkey. Top with shredded cheese and salsa as desired. May add dollop  fat-free sour cream if desired    - Dinner Omelet with shrimp or chicken and onion, green peppers and chives.    - No noodle lasagna: Use sliced zucchini or eggplant in place of noodles.  Layer with part skim ricotta cheese and low sugar meat sauce (use very lean ground beef or ground turkey).    - Mexican chicken bake: Bake chunks of chicken breast or thigh with taco seasoning, Pace brand enchilada sauce, green onions and low-fat cheese. Serve with ¼ cup black beans or fat free refried beans topped with chopped tomatoes or salsa.    - Darlene frozen meatballs, simmered in Classico Marinara sauce. Different flavors of salsa or spaghetti sauce create different dishes! Sprinkle with parmesan cheese. Serve with grilled or steamed veggies, or a dark green salad.    - Simmer boneless skinless chicken thigh chunks in Classico Marinara sauce or roasted salsa until tender with chopped onion, bell pepper, garlic, mushrooms, spinach, etc.     - Hamburger, without the bun, dressed the way you like. Served with grilled or steamed veggies.    - Eggplant parmesan: Bake slices of eggplant at 350 degrees for 15 minutes. Layer tomato sauce, sliced eggplant and low-fat mozzarella cheese in a baking dish and cover with foil. Bake 30-40 more minutes or until bubbly. Uncover and bake at 400 degrees for about 15 more minutes, or until top is slightly crisp.    - Fish tacos: grilled/baked white fish, wrapped in Parminder lettuce leaf, topped with salsa, shredded low-fat cheese, and light coleslaw.    Snacks: (100-200 calories; >5g protein)    - 1 low-fat cheese stick with 8 cherry tomatoes or 1 serving fresh fruit  - 4 thin slices fat-free turkey breast and 1 slice low-fat cheese  - 4 thin slices fat-free honey ham with wedge of melon  - 1/4 cup unsalted nuts with ½ cup fruit  - 6-oz container Dannon Light n Fit vanilla yogurt, topped with 1oz unsalted nuts         - apple, celery or baby carrots spread with 2 Tbsp natural peanut butter  or almond butter   - apple slices with 1 oz slice low-fat cheese  - celery, cucumber, bell pepper or baby carrots dipped in ¼ cup hummus bean spread or light spinach and artichoke dip (*recipe in lunch section)  - 100 calorie bag microwave light popcorn with 3 tbsp grated parmesan cheese  - Don Miky Beef Steak - 14g protein! (similar to beef jerky)  - 2 wedges Laughing Cow - Light Herb & Garlic Cheese with sliced cucumber or green bell pepper  - 1/2 cup low-fat cottage cheese with ¼ cup fruit or ¼ cup salsa  - RTD Protein drinks: Atkins, Low Carb Slim Fast, EAS light, Muscle Milk Light, etc.  - Homemade Protein drinks: GNC Soy95, Isopure, Nectar, UNJURY, Whey Gourmet, etc. Mix 1 scoop powder with 8oz skim/1% milk or light soymilk.  - Protein bars: Atkins, EAS, Pure Protein, Think Thin, Detour, etc. Must have 0-4 grams sugar - Read the label.    Takeout Options: No more than twice/week  Deli - Salads (no pasta or rice), meats, cheeses. Roasted chicken. Lox (salmon)    Mexican - Platters which don't include tortillas, chips, or rice. Go easy on the beans. Example: Fajitas without the tortillas. Ask the  not to bring chips to the table if they are too tempting.    Greek - Meat or fish and vegetable, but no bread or rice. Including hummus, baba ganoush, etc, is OK. Most sit-down Greek restaurants can provide you with cucumber slices for dipping instead of julia bread.    Fast Food (Avoid as much as possible) - Salads (no croutons and limit salad dressing to 2 tbsp), grilled chicken sandwich without the bun and ask for no reyes. Anns low fat chili or Taco Bell pintos and cheese.    BBQ - The meats are fine if you ask for sauces on the side, but most of the traditional side dishes are loaded with carbs. Venancio slaw, baked beans and BBQ sauce are typically made with sugar.    Chinese - Nothing deep-fried, no rice or noodles. Many Chinese sauces have starch and sugar in them, so you'll have to use your judgement. If  you find that these sauces trigger cravings, or cause Dumping, you can ask for the sauce to be made without sugar or just use soy sauce.      SEATED RESISTANCE BAND EXERCISES     If you do not have a resistance band, or do not feel comfortable using a resistance band, these exercises can also be done holding a light hand weight or water bottle.  If you are just starting to exercise, you may want to go through the motions without any weights or resistance till you become comfortable with the movements.     Do each of the movements shown 10 times (10 repetitions). You can repeat the exercises a second or  third time as well for greater benefit. The amount of tension on the resistance bands should be adjusted so  you can complete one set of 10 repetitions with effort. Increase the tension every few weeks. Do these  exercises 2 or 3 times a week.     * If an exercise hurts your back or joints, stop doing that particular exercise, but keep doing all the others *        CHEST EXERCISE          Start Position:   Sit tall, with feet shoulder width apart and feet in front of knees.   Belly pulled in.   Place the band around your upper back, grasp band in each hand, knuckles (rings) facing front. Arms  should be bent so that knuckles are in front of elbows   Slide shoulder blades down your back and slightly together (as if making a V).   Relax your neck.     To Perform This Exercise:   Press hands forward to lengthen arms using chest muscles (not arms!).   Dont arch your back!)   Return to start position and repeat 10 times.   Breathe!           BACK EXERCISE          Start Position:   Sit tall, with feet shoulder width apart and feet in front of knees.   Belly pulled in.   Grasp band in each hand and raise overhead. Arms should be slightly wider than shoulder width and no  slack in the band.   Slide shoulder blades down your back and slightly together (as if making a V).   Relax your neck.     To Perform This  Exercise:   Open arms pulling down towards chest using upper back muscles (not arms!).   Squeeze through shoulder blades at the bottom of the movement (dont arch your back!).   Return to start position and repeat 10 times.   Breathe!           SHOULDER EXERCISE          Start Position:   Sit tall, with feet shoulder width apart and feet in front of knees.   Belly pulled in.   Sit on band so that you can grasp band in one hand with tension on the band, but not so much tension that  you cannot straighten the arm. It may take a few tries to find the right amount of tension.   Slide shoulder blades down your back and slightly together (as if making a V).   Relax your neck.     To Perform This Exercise:   Press fist to the ceiling, slightly in front of the body.   SLOWLY return to start position and repeat 10 times.   Switch sides and repeat on the other side.   Breathe!           TRICEPS EXERCISE          Start Position:   Sit tall, with feet shoulder width apart and feet in front of knees.   Belly pulled in.   Sit on one end of the band. Grasp other end of band in one hand.   Point elbow directly toward the ceiling (if this is difficult, you may support the upper arm with the opposite  hand)   Be sure there is no slack in the band in the starting position.   Slide shoulder blades down your back and slightly together (as if making a V).   Relax your neck.     To Perform This Exercise:   Extend your arm up to the ceiling, as shown.   Squeeze through shoulder blades at the bottom of the movement (dont arch your back!).   SLOWLY return to start position and repeat 10 times.   Repeat on the other side.   Breathe!           BICEP EXERCISE          Start Position:   Sit tall, with feet shoulder width apart and feet in front of knees.   Belly pulled in.   Place center of exercise band under one foot and step the end of the band under the other foot.   Grasp each end of the band with one hand. Make sure there is no slack  between your foot and the hand  that holds the band.   Hold your elbows to your sides.   Pull abdominals in, lift chest, press shoulders down and back.     To Perform This Exercise:   As you curl up, keep your wrist from changing position in relation to your forearm and your arm stable  from the shoulder to the elbow.   Bend and straighten your elbow in a slow and controlled movement. Repeat this motion 10 times.   Repeat on the other side.   Breathe!

## 2022-10-03 ENCOUNTER — OFFICE VISIT (OUTPATIENT)
Dept: PULMONOLOGY | Facility: CLINIC | Age: 72
End: 2022-10-03
Payer: MEDICARE

## 2022-10-03 VITALS
OXYGEN SATURATION: 95 % | WEIGHT: 221.25 LBS | HEART RATE: 76 BPM | BODY MASS INDEX: 37.77 KG/M2 | SYSTOLIC BLOOD PRESSURE: 128 MMHG | HEIGHT: 64 IN | DIASTOLIC BLOOD PRESSURE: 75 MMHG

## 2022-10-03 DIAGNOSIS — G47.33 OSA (OBSTRUCTIVE SLEEP APNEA): ICD-10-CM

## 2022-10-03 DIAGNOSIS — J84.10: ICD-10-CM

## 2022-10-03 DIAGNOSIS — J47.9 BRONCHIECTASIS WITHOUT COMPLICATION: ICD-10-CM

## 2022-10-03 DIAGNOSIS — J45.40 MODERATE PERSISTENT ASTHMA, UNSPECIFIED WHETHER COMPLICATED: Primary | ICD-10-CM

## 2022-10-03 DIAGNOSIS — R06.00 DYSPNEA, UNSPECIFIED TYPE: ICD-10-CM

## 2022-10-03 DIAGNOSIS — J84.10 PULMONARY GRANULOMA: ICD-10-CM

## 2022-10-03 PROCEDURE — 3288F PR FALLS RISK ASSESSMENT DOCUMENTED: ICD-10-PCS | Mod: CPTII,S$GLB,, | Performed by: NURSE PRACTITIONER

## 2022-10-03 PROCEDURE — 3074F SYST BP LT 130 MM HG: CPT | Mod: CPTII,S$GLB,, | Performed by: NURSE PRACTITIONER

## 2022-10-03 PROCEDURE — 3008F BODY MASS INDEX DOCD: CPT | Mod: CPTII,S$GLB,, | Performed by: NURSE PRACTITIONER

## 2022-10-03 PROCEDURE — 4010F ACE/ARB THERAPY RXD/TAKEN: CPT | Mod: CPTII,S$GLB,, | Performed by: NURSE PRACTITIONER

## 2022-10-03 PROCEDURE — 3044F PR MOST RECENT HEMOGLOBIN A1C LEVEL <7.0%: ICD-10-PCS | Mod: CPTII,S$GLB,, | Performed by: NURSE PRACTITIONER

## 2022-10-03 PROCEDURE — 1126F PR PAIN SEVERITY QUANTIFIED, NO PAIN PRESENT: ICD-10-PCS | Mod: CPTII,S$GLB,, | Performed by: NURSE PRACTITIONER

## 2022-10-03 PROCEDURE — 99999 PR PBB SHADOW E&M-EST. PATIENT-LVL V: CPT | Mod: PBBFAC,,, | Performed by: NURSE PRACTITIONER

## 2022-10-03 PROCEDURE — 3008F PR BODY MASS INDEX (BMI) DOCUMENTED: ICD-10-PCS | Mod: CPTII,S$GLB,, | Performed by: NURSE PRACTITIONER

## 2022-10-03 PROCEDURE — 99999 PR PBB SHADOW E&M-EST. PATIENT-LVL V: ICD-10-PCS | Mod: PBBFAC,,, | Performed by: NURSE PRACTITIONER

## 2022-10-03 PROCEDURE — 1101F PT FALLS ASSESS-DOCD LE1/YR: CPT | Mod: CPTII,S$GLB,, | Performed by: NURSE PRACTITIONER

## 2022-10-03 PROCEDURE — 99214 PR OFFICE/OUTPT VISIT, EST, LEVL IV, 30-39 MIN: ICD-10-PCS | Mod: S$GLB,,, | Performed by: NURSE PRACTITIONER

## 2022-10-03 PROCEDURE — 4010F PR ACE/ARB THEARPY RXD/TAKEN: ICD-10-PCS | Mod: CPTII,S$GLB,, | Performed by: NURSE PRACTITIONER

## 2022-10-03 PROCEDURE — 3078F PR MOST RECENT DIASTOLIC BLOOD PRESSURE < 80 MM HG: ICD-10-PCS | Mod: CPTII,S$GLB,, | Performed by: NURSE PRACTITIONER

## 2022-10-03 PROCEDURE — 3060F PR POS MICROALBUMINURIA RESULT DOCUMENTED/REVIEW: ICD-10-PCS | Mod: CPTII,S$GLB,, | Performed by: NURSE PRACTITIONER

## 2022-10-03 PROCEDURE — 99499 RISK ADDL DX/OHS AUDIT: ICD-10-PCS | Mod: S$GLB,,, | Performed by: NURSE PRACTITIONER

## 2022-10-03 PROCEDURE — 3288F FALL RISK ASSESSMENT DOCD: CPT | Mod: CPTII,S$GLB,, | Performed by: NURSE PRACTITIONER

## 2022-10-03 PROCEDURE — 3074F PR MOST RECENT SYSTOLIC BLOOD PRESSURE < 130 MM HG: ICD-10-PCS | Mod: CPTII,S$GLB,, | Performed by: NURSE PRACTITIONER

## 2022-10-03 PROCEDURE — 3066F NEPHROPATHY DOC TX: CPT | Mod: CPTII,S$GLB,, | Performed by: NURSE PRACTITIONER

## 2022-10-03 PROCEDURE — 3072F LOW RISK FOR RETINOPATHY: CPT | Mod: CPTII,S$GLB,, | Performed by: NURSE PRACTITIONER

## 2022-10-03 PROCEDURE — 3066F PR DOCUMENTATION OF TREATMENT FOR NEPHROPATHY: ICD-10-PCS | Mod: CPTII,S$GLB,, | Performed by: NURSE PRACTITIONER

## 2022-10-03 PROCEDURE — 3078F DIAST BP <80 MM HG: CPT | Mod: CPTII,S$GLB,, | Performed by: NURSE PRACTITIONER

## 2022-10-03 PROCEDURE — 99214 OFFICE O/P EST MOD 30 MIN: CPT | Mod: S$GLB,,, | Performed by: NURSE PRACTITIONER

## 2022-10-03 PROCEDURE — 3044F HG A1C LEVEL LT 7.0%: CPT | Mod: CPTII,S$GLB,, | Performed by: NURSE PRACTITIONER

## 2022-10-03 PROCEDURE — 1126F AMNT PAIN NOTED NONE PRSNT: CPT | Mod: CPTII,S$GLB,, | Performed by: NURSE PRACTITIONER

## 2022-10-03 PROCEDURE — 99499 UNLISTED E&M SERVICE: CPT | Mod: S$GLB,,, | Performed by: NURSE PRACTITIONER

## 2022-10-03 PROCEDURE — 3072F PR LOW RISK FOR RETINOPATHY: ICD-10-PCS | Mod: CPTII,S$GLB,, | Performed by: NURSE PRACTITIONER

## 2022-10-03 PROCEDURE — 1101F PR PT FALLS ASSESS DOC 0-1 FALLS W/OUT INJ PAST YR: ICD-10-PCS | Mod: CPTII,S$GLB,, | Performed by: NURSE PRACTITIONER

## 2022-10-03 PROCEDURE — 3060F POS MICROALBUMINURIA REV: CPT | Mod: CPTII,S$GLB,, | Performed by: NURSE PRACTITIONER

## 2022-10-03 RX ORDER — FLUTICASONE FUROATE AND VILANTEROL 200; 25 UG/1; UG/1
1 POWDER RESPIRATORY (INHALATION) DAILY
COMMUNITY
End: 2022-12-16 | Stop reason: ALTCHOICE

## 2022-10-03 NOTE — PATIENT INSTRUCTIONS
Ross Luna     Your home sleep study confirmed sleep apnea  AHI 29 meaning you are having 29 apneas per hour. I recommend continuous positive air pressure to treat this. I will place the order. Please schedule a follow up appointment with myself  5-6 week after using the cpap to assess treatment effectiveness and make adjustments if needed. Bring your machine.     Someone from the medical equipment department will be contacting you to set you up with the CPAP.  If not please,  contact them to check on the status of your order at [phone] option 1 then option 2    Plan:   1. Start auto CPAP therapy(the order will go to medical equipment and they will contact you after it gets processed through insurance which takes approximately 2-12 weeks)   2. There is a compliance requirement on machine. The requirement  is minimum 4 hours or more 70% nights (22/30 days) x 1 year or until machine paid off otherwise you risk not having the machine cover by your insurance company.   3. Pick a comfortable mask; but should you have problems with the mask, [facility] (medical equipment) has a 30 day mask exchangepolicy so exchange any mask within 30 days if the mask is uncomfortable. You will need to contact medical equipment to schedule an appointment with them to get another mask fitting. DME [phone] option 1 then option 2  4. We do not actively monitor you.  Please schedule a follow up appointment after using your cpap x 5-6 weeks to determine treatment effectiveness and address problems. This is also a requirement by some insurance in order to meet compliance.     The potential ramifications of untreated sleep apnea  could include hypoxia, daytime sleepiness, dementia, cognitive impairment, hypertension, heart disease and/or stroke. Potential treatment options, which could include weight loss, body positioning, oral appliances (OA), continuous positive airway pressure (CPAP), or referral for surgical  consideration. CPAP is the most effective and least invasive treatment and I would recommend this as a first line treatment.    Behavior modification which includes losing weight, exercising, changing the sleep position, abstaining from alcohol, and avoiding certain medications    Please  abstain from driving should you feel sleepy or drowsy      Please contact us if you have questions, persistent problems or concerns.    Thank you,    Josy CONDE, [facility]  [phone]

## 2022-10-03 NOTE — PROGRESS NOTES
CHIEF COMPLAINT:    Chief Complaint   Patient presents with    Results       HISTORY OF PRESENT ILLNESS: Faby Luna is a 71 y.o. female never smoker with  has a past medical history of Anxiety with depression, Arthritis, CKD (chronic kidney disease) stage 2, GFR 60-89 ml/min, Diabetes mellitus, History of Clarisse-en-Y gastric bypass, Hypertension, Obesity, unspecified, DAHLIA (obstructive sleep apnea), and Spondylosis. is here for follow up pulmonary test results.     Patient with adult onset asthma 10-15 years with symptoms of wheezing, occasional dry cough, sob at random. Started on Breo initial office visit 8/2/2022. Helping somewhat, per pt but reports nothing was helping when cool front change in weather so recommended trelegy 9/13/2022.  States trelegy was too expensive and never picked.  She is currently on Breo and reports this is affordable. Menthol baths help.    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 Spirometry remains unimproved following bronchodilator. Lung volumes show moderate restriction is present. DLCO is mildly decreased.     CXR9/6/2022: Mildly enlarged cardiac silhouette, stable.  The lungs are clear.  No pneumothorax.  No pleural effusions. Spinal cord stimulator with tip in the midthoracic spine noted.    CT chest 9/16/2022: Lungs/Pleura: Pleural based plaque versus calcified granuloma right lower lobe posteriorly.  Mild bibasilar reticulonodular fibrotic changes and subpleural cysts are present.  There is a focal  area of fibrosis and architectural distortion in the anterior left upper lobe.  Mild bronchiectasis is present in the lung bases   Impression:     Mild pulmonary fibrosis, possibly UIP pattern.     Coronary and aortic atherosclerosis     Nonspecific small calcified pleural base nodule versus pleural plaque may indicate previous granulomatous disease, prior pleural based infection or possibly exposure to asbestos    Sleep study 9/12/2020:The overall AHI was 8.3 with an oxygen tiffany of 81.0%. The AHI in REM sleep was 16.6. The central apnea index was 0.0.  The supine AHI was 5.6.  Lost cpap during move     ECHO 9/16/2022:  The estimated ejection fraction is 55%.  The left ventricle is normal in size with mild concentric hypertrophy and normal systolic function.  Grade I left ventricular diastolic dysfunction.  Normal right ventricular size with normal right ventricular systolic function.  Mild left atrial enlargement.  Normal central venous pressure (3 mmHg).  The estimated PA systolic pressure is 17 mmHg.    Labs:  Eos:0.2  IgE:NA  REVIEW OF SYSTEMS:   Review of Systems   Constitutional:  Positive for weight gain and fatigue. Negative for fever, chills, weight loss, activity change, appetite change and night sweats.   HENT:  Positive for congestion.    Eyes: Negative.    Respiratory:  Positive for apnea, snoring, cough, shortness of breath, wheezing, orthopnea (sometimes), dyspnea on extertion, use of rescue inhaler and somnolence. Negative for sputum production, chest tightness and previous hospitialization due to pulmonary problems.    Cardiovascular:  Negative for chest pain and palpitations.   Genitourinary: Negative.    Endocrine: endocrine negative    Musculoskeletal:  Negative for gait problem.   Skin: Negative.    Gastrointestinal:  Negative for acid reflux.   Neurological: Negative.    Psychiatric/Behavioral:  Positive for sleep disturbance (2-3 x).          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    Chronic pain syndrome 10/26/2018    Bilateral carpal tunnel syndrome 12/03/2018    Right carpal tunnel syndrome 03/08/2019    Cubital tunnel syndrome on right 03/08/2019    Pain, hand 05/06/2019    Hand weakness 05/06/2019    Anxiety 05/28/2018    Major depressive disorder, recurrent episode, in partial remission 05/13/2019    Carpal tunnel syndrome 06/28/2019    Osteoarthritis of spine with radiculopathy, lumbar region 10/17/2019    Spondylosis without myelopathy 08/17/2020    Hypertrophy of ligamentum flavum 02/15/2021    Chronic renal failure, stage 3a 02/15/2021    Osteoarthritis of lumbar spine 09/09/2021    Refractive error 12/07/2021    Pseudophakia 12/07/2021    Morbid (severe) obesity due to excess calories 04/26/2022    Spinal enthesopathy, site unspecified 04/26/2022    Occlusion of peripherally inserted central catheter (PICC) line, initial encounter 04/26/2022    Seasonal allergic rhinitis 04/26/2022    Moderate persistent asthma 08/29/2022    Dyspnea 09/15/2022    DAHLIA (obstructive sleep apnea) 09/15/2022    Pulmonary granuloma 10/03/2022    Localized pulmonary fibrosis 10/03/2022    Bronchiectasis without complication 10/03/2022     Resolved Ambulatory Problems     Diagnosis Date Noted    Bacteremia due to Escherichia coli 08/05/2021    Pyelonephritis 08/05/2021    Complicated UTI (urinary tract infection) 08/05/2021     Past Medical History:   Diagnosis Date    Anxiety with  depression     Arthritis     CKD (chronic kidney disease) stage 2, GFR 60-89 ml/min     Diabetes mellitus     History of Clarisse-en-Y gastric bypass     Hypertension     Obesity, unspecified     Spondylosis                 PAST SURGICAL HISTORY:    Past Surgical History:   Procedure Laterality Date    CARPAL TUNNEL RELEASE Right 3/8/2019    Procedure: RELEASE, CARPAL TUNNEL right;  Surgeon: Pan Goodman MD;  Location: [facility] OR;  Service: Orthopedics;  Laterality: Right;    CARPAL TUNNEL RELEASE Left 2019    Procedure: RELEASE, CARPAL TUNNEL- LEFT;  Surgeon: Pan Goodman MD;  [facility];  Service: Orthopedics;  Laterality: Left;    CATARACT EXTRACTION Bilateral      SECTION      CHOLECYSTECTOMY      EPIDURAL STEROID INJECTION INTO LUMBAR SPINE N/A 2018    Procedure: INJECTION, STEROID, SPINE, LUMBAR, EPIDURAL;  Surgeon: Shaq Silverio MD;  Location: [facility] PAIN MGT;  Service: Pain Management;  Laterality: N/A;  LUMBAR L4-L5 INTERLAMINAR ELISE'  [id number]  W/ SEDATION     HYSTERECTOMY      INJECTION OF ANESTHETIC AGENT AROUND NERVE Bilateral 2019    Procedure: BLOCK, NERVE, L3-L4-L5 MEDIAL BRANCH;  Surgeon: Shaq Silverio MD;  Location: [facility] PAIN MGT;  Service: Pain Management;  Laterality: Bilateral;    INJECTION OF ANESTHETIC AGENT AROUND NERVE Bilateral 10/17/2019    Procedure: BLOCK, NERVE;  Surgeon: Shaq Silverio MD;  Location: [facility] PAIN MGT;  Service: Pain Management;  Laterality: Bilateral;  B/L MBB L3-L4-L5  REPEAT  CONSENT NEEDED    INJECTION OF FACET JOINT Bilateral 2018    Procedure: INJECTION-FACET;  Surgeon: Shaq Silverio MD;  Location: [facility] PAIN MGT;  Service: Pain Management;  Laterality: Bilateral;  LUMBAR BILATERAL L4-L5 AND L5-S1 FACET STEROID INJECTION  [id number]    W/ SEDATION     RADIOFREQUENCY ABLATION Right 2019    Procedure: RADIOFREQUENCY ABLATION RIGHT L3, L4, L5;  Surgeon: Shaq Silverio MD;  Location: [facility] PAIN MGT;  Service: Pain Management;   Laterality: Right;  Right RFA L3-L4-L5  1 of 2  Consent Needed    RADIOFREQUENCY ABLATION Left 12/5/2019    Procedure: RADIOFREQUENCY ABLATION LEFT L3-5;  Surgeon: Shaq Silverio MD;  Location: [facility] PAIN MGT;  Service: Pain Management;  Laterality: Left;  Left RFA L3-L4-L5  2 of 2  Consent Needed    RADIOFREQUENCY ABLATION Left 8/17/2020    Procedure: RADIOFREQUENCY ABLATION LEFT L3,4,5 1 of 2;  Surgeon: Shaq Silverio MD;  Location: [facility] PAIN MGT;  Service: Pain Management;  Laterality: Left;  Left RFA L3,4,5  1 of 2    RADIOFREQUENCY ABLATION Right 8/31/2020    Procedure: RADIOFREQUENCY ABLATION RIGHT L3,4,5 2 of 2;  Surgeon: Shaq Silverio MD;  Location: [facility] PAIN MGT;  Service: Pain Management;  Laterality: Right;  RADIOFREQUENCY ABLATION RIGHT L3,4,5  2 of 2    RADIOFREQUENCY ABLATION Left 9/9/2021    Procedure: RADIOFREQUENCY ABLATION, L3-L4 AND L5 MEDIAL BRANCH 1 OF 2;  Surgeon: Shaq Silverio MD;  Location: [facility] PAIN MGT;  Service: Pain Management;  Laterality: Left;    RADIOFREQUENCY ABLATION Right 9/20/2021    Procedure: RADIOFREQUENCY ABLATION, L3-L4 AND L5 MEDIAL BRANCH 2 OF 2;  Surgeon: Shaq Silverio MD;  Location: [facility] PAIN MGT;  Service: Pain Management;  Laterality: Right;    RADIOFREQUENCY ABLATION Right 7/7/2022    Procedure: RADIOFREQUENCY ABLATION, RIGHT L3-L4-L5 ONE OF TWO;  Surgeon: Shaq Silverio MD;  Location: [facility] PAIN MGT;  Service: Pain Management;  Laterality: Right;    RADIOFREQUENCY ABLATION Left 7/21/2022    Procedure: RADIOFREQUENCY ABLATION, LEFT L3-L4-L5 TWO OF TWO *BRING SCS REMOTE*;  Surgeon: Shaq Silverio MD;  Location: [facility] PAIN MGT;  Service: Pain Management;  Laterality: Left;    TRIAL OF SPINAL CORD NERVE STIMULATOR N/A 9/24/2018    Procedure: TRIAL, NEUROSTIMULATOR, SPINAL CORD, SPINAL CORD STIMULATOR TRIAL-INTERNAL WIRES TO EXTERNAL BATTERY;  Surgeon: Shaq Silverio MD;  Location: [facility] PAIN MGT;  Service: Pain Management;  Laterality: N/A;  ABBOTT REP NOTIFIED         FAMILY HISTORY:                 Family History   Problem Relation Age of Onset    Cataracts Mother     Glaucoma Mother     No Known Problems Father     No Known Problems Sister     No Known Problems Brother     No Known Problems Maternal Aunt     No Known Problems Maternal Uncle     No Known Problems Paternal Aunt     No Known Problems Paternal Uncle     No Known Problems Maternal Grandmother     No Known Problems Maternal Grandfather     No Known Problems Paternal Grandmother     No Known Problems Paternal Grandfather        SOCIAL HISTORY:          Tobacco:   Social History     Tobacco Use   Smoking Status Never   Smokeless Tobacco Never       alcohol use:    Social History     Substance and Sexual Activity   Alcohol Use No                   ALLERGIES:  Review of patient's allergies indicates:  No Known Allergies    CURRENT MEDICATIONS:    Current Outpatient Medications   Medication Sig Dispense Refill    albuterol (PROAIR HFA) 90 mcg/actuation inhaler Inhale 2 puffs into the lungs every 4 (four) hours as needed for Wheezing or Shortness of Breath. Rescue 8.5 g 1    albuterol sulfate 2.5 mg/0.5 mL Nebu Take 2.5 mg by nebulization every 4 (four) hours as needed (shortness of breath, wheezing, coughing). Rescue 30 each 1    atenoloL (TENORMIN) 100 MG tablet Take 1 tablet (100 mg total) by mouth once daily. 90 tablet 3    benzonatate (TESSALON) 200 MG capsule Take 1 capsule (200 mg total) by mouth 3 (three) times daily as needed for Cough. 30 capsule 1    EScitalopram oxalate (LEXAPRO) 20 MG tablet TAKE 1 TABLET EVERY DAY 90 tablet 3    fluticasone furoate-vilanteroL (BREO) 200-25 mcg/dose DsDv diskus inhaler Inhale 1 puff into the lungs once daily. Controller      fluticasone propionate (FLONASE) 50 mcg/actuation nasal spray 2 sprays (100 mcg total) by Each Nostril route once daily. 11.1 mL 1    furosemide (LASIX) 20 MG tablet Take 1 tablet (20 mg total) by mouth once daily. 90 tablet 3    gabapentin (NEURONTIN) 800 MG tablet Take 1  "tablet (800 mg total) by mouth 3 (three) times daily. 90 tablet 2    glipiZIDE (GLUCOTROL) 10 MG tablet Take 1 tablet (10 mg total) by mouth daily with breakfast. 180 tablet 3    hydrOXYzine pamoate (VISTARIL) 25 MG Cap Take 1 capsule (25 mg total) by mouth daily as needed (anxiety). 30 capsule 2    ketorolac (TORADOL) 10 mg tablet Take 1 tablet (10 mg total) by mouth every 6 (six) hours as needed for Pain. Take with food to prevent heartburn. 20 tablet 1    linaGLIPtin (TRADJENTA) 5 mg Tab tablet Take 1 tablet (5 mg total) by mouth once daily. 90 tablet 3    loratadine (CLARITIN) 10 mg tablet Take 1 tablet (10 mg total) by mouth daily as needed for Allergies. 90 tablet 3    losartan (COZAAR) 50 MG tablet TAKE 1 TABLET EVERY DAY 90 tablet 3    pantoprazole (PROTONIX) 20 MG tablet TAKE 1 TABLET TWICE DAILY  BEFORE  MEALS 180 tablet 3    promethazine-dextromethorphan (PROMETHAZINE-DM) 6.25-15 mg/5 mL Syrp Take 5 mLs by mouth every 8 (eight) hours as needed (cough). 240 mL 0    promethazine-dextromethorphan (PROMETHAZINE-DM) 6.25-15 mg/5 mL Syrp Take 10-15ml by mouth every 8 hours as needed for coughing 240 mL 0    rosuvastatin (CRESTOR) 20 MG tablet Take 1 tablet (20 mg total) by mouth once daily. 30 tablet 11    tiZANidine (ZANAFLEX) 4 MG tablet TAKE 1 TABLET BY MOUTH EVERY 8 HOURS AS NEEDED FOR  MUSCLE  PAIN 90 tablet 0    topiramate (TOPAMAX) 25 MG tablet Take 1 tablet (25 mg total) by mouth 2 (two) times daily. 180 tablet 0    traZODone (DESYREL) 100 MG tablet Take 1 tablet (100 mg total) by mouth every evening. 90 tablet 3    blood-glucose meter kit Use as instructed 1 each 0    tiotropium bromide (SPIRIVA RESPIMAT) 2.5 mcg/actuation inhaler Inhale 1 puff into the lungs Daily. Controller 4 g 11     No current facility-administered medications for this visit.                       PHYSICAL EXAM:  Vitals:    10/03/22 1152   BP: 128/75   Pulse: 76   SpO2: 95%   Weight: 100.4 kg (221 lb 3.7 oz)   Height: 5' 4" (1.626 " m)   PainSc: 0-No pain     Body mass index is 37.97 kg/m².   Physical Exam   Constitutional: She is oriented to person, place, and time. She appears well-developed. She is obese.   HENT:   Head: Normocephalic.   Mouth/Throat: Oropharynx is clear and moist. Mallampati Score: II.   Cardiovascular: Normal rate, regular rhythm and normal heart sounds.   Pulmonary/Chest: Normal expansion, effort normal and breath sounds normal.   Musculoskeletal:         General: No edema.      Cervical back: Neck supple.   Neurological: She is alert and oriented to person, place, and time. Gait normal.   Skin: Skin is warm. She is not diaphoretic.   Psychiatric: She has a normal mood and affect. Her behavior is normal. Judgment and thought content normal.                                                   Lab Results   Component Value Date    TSH 2.267 05/30/2018      Lab Results   Component Value Date    WBC 12.83 (H) 08/03/2022    HGB 12.4 08/03/2022    HCT 40.3 08/03/2022    MCV 81 (L) 08/03/2022     08/03/2022     BMP  Lab Results   Component Value Date     08/03/2022    K 4.4 08/03/2022     08/03/2022    CO2 28 08/03/2022    BUN 15 08/03/2022    CREATININE 1.2 08/03/2022    CALCIUM 9.6 08/03/2022    ANIONGAP 10 08/03/2022    ESTGFRAFRICA >60 12/30/2021    EGFRNONAA 56 (A) 12/30/2021     Lab Results   Component Value Date    HGBA1C 6.6 (H) 08/03/2022        ASSESSMENT    ICD-10-CM ICD-9-CM    1. Moderate persistent asthma, unspecified whether complicated  J45.40 493.90 Complete PFT with bronchodilator      tiotropium bromide (SPIRIVA RESPIMAT) 2.5 mcg/actuation inhaler      2. Dyspnea, unspecified type  R06.00 786.09 Complete PFT with bronchodilator      tiotropium bromide (SPIRIVA RESPIMAT) 2.5 mcg/actuation inhaler      Ambulatory referral/consult to Pulmonary Rehab      3. Pulmonary granuloma  J84.10 515 Complete PFT with bronchodilator      4. Localized pulmonary fibrosis  J84.10 515 Complete PFT with  bronchodilator      Ambulatory referral/consult to Pulmonary Rehab      5. Bronchiectasis without complication  J47.9 494.0 tiotropium bromide (SPIRIVA RESPIMAT) 2.5 mcg/actuation inhaler      Ambulatory referral/consult to Pulmonary Rehab      6. DAHLIA (obstructive sleep apnea)  G47.33 327.23 CPAP FOR HOME USE            PLAN:    Problem List Items Addressed This Visit          Unprioritized    Bronchiectasis without complication    Overview     Noted ct chest 9/16/2022  Pt on breo   + spiriva  May benefit from airway clearance         Relevant Medications    tiotropium bromide (SPIRIVA RESPIMAT) 2.5 mcg/actuation inhaler    Other Relevant Orders    Ambulatory referral/consult to Pulmonary Rehab    Dyspnea    Overview     Multifactorial-obesity, deconditioning, asthma with bronchiectasis and mild pulmonary fibrosis         Relevant Medications    tiotropium bromide (SPIRIVA RESPIMAT) 2.5 mcg/actuation inhaler    Other Relevant Orders    Complete PFT with bronchodilator    Ambulatory referral/consult to Pulmonary Rehab    Localized pulmonary fibrosis    Overview     Noted on CT chest 9/16/2022 monitor for progression with pft         Relevant Orders    Complete PFT with bronchodilator    Ambulatory referral/consult to Pulmonary Rehab    Moderate persistent asthma - Primary    Overview     Adult onset asthma 10-15 years with persistent symptoms, recommend maintenance ics/laba/lama, trelegy not affordable, pt on breo addition spiriva  Manage triggers-AR, GERD         Relevant Medications    tiotropium bromide (SPIRIVA RESPIMAT) 2.5 mcg/actuation inhaler    Other Relevant Orders    Complete PFT with bronchodilator    DAHLIA (obstructive sleep apnea)    Overview     Sleep study 9/12/2020:The overall AHI was 8.3 with an oxygen tiffany of 81.0%. The AHI in REM sleep was 16.6. The central apnea index was 0.0.  The supine AHI was 5.6.  Home sleep study 09/20/2022 AHI 29  After discussing all options, patient agree to cpap.            Relevant Orders    CPAP FOR HOME USE    Pulmonary granuloma    Relevant Orders    Complete PFT with bronchodilator         Patient will Follow up for 5-6 weeks following cpap usage, please bring cpap.

## 2022-10-04 ENCOUNTER — OFFICE VISIT (OUTPATIENT)
Dept: PAIN MEDICINE | Facility: CLINIC | Age: 72
End: 2022-10-04
Payer: MEDICARE

## 2022-10-04 ENCOUNTER — HOSPITAL ENCOUNTER (OUTPATIENT)
Dept: RADIOLOGY | Facility: OTHER | Age: 72
Discharge: HOME | End: 2022-10-04
Attending: NURSE PRACTITIONER
Payer: MEDICARE

## 2022-10-04 VITALS
BODY MASS INDEX: 36.74 KG/M2 | TEMPERATURE: 97 F | HEIGHT: 64 IN | SYSTOLIC BLOOD PRESSURE: 108 MMHG | DIASTOLIC BLOOD PRESSURE: 61 MMHG | WEIGHT: 215.19 LBS | HEART RATE: 71 BPM

## 2022-10-04 DIAGNOSIS — M54.16 LUMBAR RADICULOPATHY: ICD-10-CM

## 2022-10-04 DIAGNOSIS — G89.4 CHRONIC PAIN SYNDROME: Primary | ICD-10-CM

## 2022-10-04 DIAGNOSIS — M54.6 THORACIC SPINE PAIN: ICD-10-CM

## 2022-10-04 DIAGNOSIS — M47.819 SPONDYLOSIS WITHOUT MYELOPATHY: ICD-10-CM

## 2022-10-04 PROCEDURE — 1160F RVW MEDS BY RX/DR IN RCRD: CPT | Mod: CPTII,S$GLB,, | Performed by: NURSE PRACTITIONER

## 2022-10-04 PROCEDURE — 3288F FALL RISK ASSESSMENT DOCD: CPT | Mod: CPTII,S$GLB,, | Performed by: NURSE PRACTITIONER

## 2022-10-04 PROCEDURE — 3078F DIAST BP <80 MM HG: CPT | Mod: CPTII,S$GLB,, | Performed by: NURSE PRACTITIONER

## 2022-10-04 PROCEDURE — 3008F BODY MASS INDEX DOCD: CPT | Mod: CPTII,S$GLB,, | Performed by: NURSE PRACTITIONER

## 2022-10-04 PROCEDURE — 72100 XR LUMBAR SPINE AP AND LATERAL: ICD-10-PCS | Mod: 26,,, | Performed by: RADIOLOGY

## 2022-10-04 PROCEDURE — 3074F PR MOST RECENT SYSTOLIC BLOOD PRESSURE < 130 MM HG: ICD-10-PCS | Mod: CPTII,S$GLB,, | Performed by: NURSE PRACTITIONER

## 2022-10-04 PROCEDURE — 1159F MED LIST DOCD IN RCRD: CPT | Mod: CPTII,S$GLB,, | Performed by: NURSE PRACTITIONER

## 2022-10-04 PROCEDURE — 1101F PT FALLS ASSESS-DOCD LE1/YR: CPT | Mod: CPTII,S$GLB,, | Performed by: NURSE PRACTITIONER

## 2022-10-04 PROCEDURE — 72070 XR THORACIC SPINE AP LATERAL: ICD-10-PCS | Mod: 26,,, | Performed by: RADIOLOGY

## 2022-10-04 PROCEDURE — 72100 X-RAY EXAM L-S SPINE 2/3 VWS: CPT | Mod: TC,FY

## 2022-10-04 PROCEDURE — 99214 PR OFFICE/OUTPT VISIT, EST, LEVL IV, 30-39 MIN: ICD-10-PCS | Mod: S$GLB,,, | Performed by: NURSE PRACTITIONER

## 2022-10-04 PROCEDURE — 1125F PR PAIN SEVERITY QUANTIFIED, PAIN PRESENT: ICD-10-PCS | Mod: CPTII,S$GLB,, | Performed by: NURSE PRACTITIONER

## 2022-10-04 PROCEDURE — 3072F PR LOW RISK FOR RETINOPATHY: ICD-10-PCS | Mod: CPTII,S$GLB,, | Performed by: NURSE PRACTITIONER

## 2022-10-04 PROCEDURE — 3074F SYST BP LT 130 MM HG: CPT | Mod: CPTII,S$GLB,, | Performed by: NURSE PRACTITIONER

## 2022-10-04 PROCEDURE — 1101F PR PT FALLS ASSESS DOC 0-1 FALLS W/OUT INJ PAST YR: ICD-10-PCS | Mod: CPTII,S$GLB,, | Performed by: NURSE PRACTITIONER

## 2022-10-04 PROCEDURE — 3060F POS MICROALBUMINURIA REV: CPT | Mod: CPTII,S$GLB,, | Performed by: NURSE PRACTITIONER

## 2022-10-04 PROCEDURE — 3072F LOW RISK FOR RETINOPATHY: CPT | Mod: CPTII,S$GLB,, | Performed by: NURSE PRACTITIONER

## 2022-10-04 PROCEDURE — 72070 X-RAY EXAM THORAC SPINE 2VWS: CPT | Mod: TC,FY

## 2022-10-04 PROCEDURE — 3008F PR BODY MASS INDEX (BMI) DOCUMENTED: ICD-10-PCS | Mod: CPTII,S$GLB,, | Performed by: NURSE PRACTITIONER

## 2022-10-04 PROCEDURE — 1159F PR MEDICATION LIST DOCUMENTED IN MEDICAL RECORD: ICD-10-PCS | Mod: CPTII,S$GLB,, | Performed by: NURSE PRACTITIONER

## 2022-10-04 PROCEDURE — 3060F PR POS MICROALBUMINURIA RESULT DOCUMENTED/REVIEW: ICD-10-PCS | Mod: CPTII,S$GLB,, | Performed by: NURSE PRACTITIONER

## 2022-10-04 PROCEDURE — 3288F PR FALLS RISK ASSESSMENT DOCUMENTED: ICD-10-PCS | Mod: CPTII,S$GLB,, | Performed by: NURSE PRACTITIONER

## 2022-10-04 PROCEDURE — 3066F PR DOCUMENTATION OF TREATMENT FOR NEPHROPATHY: ICD-10-PCS | Mod: CPTII,S$GLB,, | Performed by: NURSE PRACTITIONER

## 2022-10-04 PROCEDURE — 72100 X-RAY EXAM L-S SPINE 2/3 VWS: CPT | Mod: 26,,, | Performed by: RADIOLOGY

## 2022-10-04 PROCEDURE — 99214 OFFICE O/P EST MOD 30 MIN: CPT | Mod: S$GLB,,, | Performed by: NURSE PRACTITIONER

## 2022-10-04 PROCEDURE — 99999 PR PBB SHADOW E&M-EST. PATIENT-LVL V: ICD-10-PCS | Mod: PBBFAC,,, | Performed by: NURSE PRACTITIONER

## 2022-10-04 PROCEDURE — 99999 PR PBB SHADOW E&M-EST. PATIENT-LVL V: CPT | Mod: PBBFAC,,, | Performed by: NURSE PRACTITIONER

## 2022-10-04 PROCEDURE — 3044F HG A1C LEVEL LT 7.0%: CPT | Mod: CPTII,S$GLB,, | Performed by: NURSE PRACTITIONER

## 2022-10-04 PROCEDURE — 3066F NEPHROPATHY DOC TX: CPT | Mod: CPTII,S$GLB,, | Performed by: NURSE PRACTITIONER

## 2022-10-04 PROCEDURE — 72070 X-RAY EXAM THORAC SPINE 2VWS: CPT | Mod: 26,,, | Performed by: RADIOLOGY

## 2022-10-04 PROCEDURE — 4010F ACE/ARB THERAPY RXD/TAKEN: CPT | Mod: CPTII,S$GLB,, | Performed by: NURSE PRACTITIONER

## 2022-10-04 PROCEDURE — 3044F PR MOST RECENT HEMOGLOBIN A1C LEVEL <7.0%: ICD-10-PCS | Mod: CPTII,S$GLB,, | Performed by: NURSE PRACTITIONER

## 2022-10-04 PROCEDURE — 1160F PR REVIEW ALL MEDS BY PRESCRIBER/CLIN PHARMACIST DOCUMENTED: ICD-10-PCS | Mod: CPTII,S$GLB,, | Performed by: NURSE PRACTITIONER

## 2022-10-04 PROCEDURE — 4010F PR ACE/ARB THEARPY RXD/TAKEN: ICD-10-PCS | Mod: CPTII,S$GLB,, | Performed by: NURSE PRACTITIONER

## 2022-10-04 PROCEDURE — 1125F AMNT PAIN NOTED PAIN PRSNT: CPT | Mod: CPTII,S$GLB,, | Performed by: NURSE PRACTITIONER

## 2022-10-04 PROCEDURE — 3078F PR MOST RECENT DIASTOLIC BLOOD PRESSURE < 80 MM HG: ICD-10-PCS | Mod: CPTII,S$GLB,, | Performed by: NURSE PRACTITIONER

## 2022-10-04 NOTE — PROGRESS NOTES
Chronic patient Established Note (Follow up visit)      SUBJECTIVE:    Interval History 10/4/2022:  The patient is here for follow up of chronic back pain. She reports more pain over the past month. No injury or trauma. She does have a lumbar SCS but is unsure if it is even on at this time. She does not think that it is. It did previously help with her back and leg pain. She has not been in contact with [employer] recently. The back pain radiates down the back of both legs to the feet. It worsens with walking and standing. Her pain today is 10/10.    Interval History 8/23/2022:  Faby is here for follow up of chronic lower back pain. She is now s/p right then left L3,4,5 RFAs completed on 7/21/22 with limited relief so far. She continues to report aching back pain with radiation into the legs and numbness. She has had her SCS off for a couple of months. She has not been in contact with [employer] rep for programming. She says that she turned it off due to limited benefit. No new weakness to legs or falls. No bowel/bladder incontinence. Her pain today is 10/10.    Interval History 6/17/2022:  The patient is here today for follow up of back pain. She has had more aching pain across the lower back. She had benefit with lumbar RFAs in the past and would like to reschedule this. She would also like to repeat aquatic PT as this was helpful in the past. Her pain is aching and throbbing in nature without radiation currently. No new falls or trauma. Her pain today is 8/10.    Interval History 5/5/2022:  The patient is here for follow up of chronic lower back pain. She has radiation into the legs. No recent falls or trauma. She saw Dr. Silverio last month and was under the impression that an [employer] rep would be here today for programming. She is still having difficulty programming her device. She has mild benefit with Gabapentin 900 mg daily without side effects. She is also having muscle spasms intermittently. She is asking for a  muscle relaxer. She has tried Robaxin in the past with mild benefit. She would like to try another medication. Her pain today is 8/10.    Interval History 4/20/2022: She presents today for follow up of low back pain. She states that she only had about 40% relief of LBP following RFAs in September that lasted a few weeks. Pain  continues to be in the low back and radiates into b/l LE. Pain encompasses the entire leg and does not follow a specific dermatomal pattern in the leg. She denies weakness, numbness or tingling in the lower extremities. She denies bowel or bladder incontinence. Pain is currently rated 8/10. She is currently on gabapentin 300mg tid with minimal relief. She has been unable to charge bret SCS for the last month and does not have the contact number for her rep.      Interval History 9/30/2021:  The patient returns to clinic today for follow up of low back pain. She is s/p left L3,4,5 RFA on 9/9/2021 and right L3,4,5 RFA on 9/20/2021. She is unsure of relief at this time. She reports increased pain to the right side. She describes this pain as burning in nature. She denies any radiating leg pain. Her pain is worse with bending and standing. She continues to report benefit with [employer] SCS. She denies any weakness. She denies any other health changes. Her pain today is 9/10.    Interval History 8/3/2021:   Ms. Luna returns in f/u re: low back/leg pain. She reports about three months ago she noticed her low back started bothering her, worse with bending and prolonged standing. No inciting event, no trauma to back since last visit. Reports continued leg pain down the backs of her legs as well. She reports intermittent instability in her legs - on and off - over the last year. Last time she has met with [employer] rep for reprogramming of SCS was fall 2020. Denies any current issues with SCS function/battery/remote.     Interval History 9/28/2020:  The patient is here today for increased lower back pain.   She is status post right than left L3, 4, 5 radiofrequency ablation completed on 08/31/2020 with approximately 50% relief.  However, she is feeling tightness across the lower back without radiation at this time.  She would like an injection today.  Additionally, she states that she has not completed physical therapy for her back in some time and is not currently doing any exercises.  Her leg pain is currently well controlled with spinal cord stimulator and she had a recent adjustment.  Her pain today is 8/10.    Interval History 7/16/2020:  The patient is here for follow up of lower back pain.  She previously had benefit with lumbar RFAs for similar pain.  She is also having radiation into her legs with numbness, left greater than right.  Ildef[employer] is here today to meet with her for programming.  She previously was having significant benefit of leg pain with SCS implant.  She denies any recent trauma or falls. Her pain today is 9/10.    Interval History 1/9/2020:  The patient is here for follow up of lower back and leg pain.  She is s/p lumbar RFAs with about 50% relief.  Her leg pain is well controlled with implant.  She still has intermittent flare ups of back pain, like today.  When this happens, she has some benefit with rest.  She has tried Tylenol and OTC NSAIDs without benefit.  She denies any recent falls or weakness.  The patient denies any bowel or bladder incontinence or signs of saddle paresthesia.  The patient denies any major medical changes since last office visit.  Her pain today is 7/10.    Previous Encounter:  Faby Dejesus Manoj Jeremy presents to the clinic for a follow-up appointment for lower back pain.  She previously had significant leg pain which has resolved with [employer] SCS implant.  She is here today to discuss increased lower back pain.  It is sharp and throbbing in nature.  It is significant in the morning and with prolonged standing and activity.  She has some benefit with stretching.  She  denies any recent falls.  Since the last visit, Faby Luna states the pain has been worsening.  Current pain intensity is 8/10.    Pain Disability Index Review:  Last 3 PDI Scores 10/4/2022 8/23/2022 6/17/2022   Pain Disability Index (PDI) 50 [phone]       Opioid Contract: no     report:  Not applicable    Pain Procedures:   5/2/18 Left L3 and L4 TF ELISE- 20% relief  5/21/18 Bilateral L4-5 and L5-S1 facet joint injections- no relief  9/24/18 Lumbar SCS trial ([employer])- 100% relief  10/26/18 Lumbar SCS implant ([employer])- 100% relief of leg pain  9/12/19 Bilateral L3,4,5 MBB- helpful  10/17/19 Bilateral L3,4,5 MBB- helpful  11/21/19 Right L3,4,5 RFA- 50% relief  12/5/19 Left L3,4,5 RFA- 50% relief  8/17/20 Left L3,4,5 RFA- 50% relief  8/31/20 Right L3,4,5 RFA- 50% relief  9/9/2021- Left L3,4,5 RFA - 60% relief  9/20/2021- Right L3,4,5 RFA -60% relief  7/7/22 Right L3,4,5 RFA   7/21/22 Left L3,4,5 RFA     Physical Therapy/Home Exercise:   yes in the past with limited benefit    Imaging:     3/31/18 Lumbar MRI    Narrative     EXAMINATION:  MRI LUMBAR SPINE WITHOUT CONTRAST    CLINICAL HISTORY:  Low back pain, >6wks conservative tx, persistent-progressive sx, surgical candidate; Other spondylosis with radiculopathy, lumbar region    TECHNIQUE:  Multiplanar, multisequence MR images were acquired from the thoracolumbar junction to the sacrum without the administration of contrast.    COMPARISON:  Plain films from 03/27/2018    FINDINGS:  There is grade 1 spondylolisthesis of L4 on L5. The vertebral body heights are well maintained, with no fracture.  No marrow signal abnormality suspicious for an infiltrative process.    The conus medullaris terminates at approximately the mid body of L1.  There is a cyst associated with the upper pole of the right kidney.  There is disc desiccation noted throughout the lumbar spine with relative sparing of the L5-S1 disc.  Mild disc space narrowing present at the L4-5  level.    L1-L2: Mild diffuse disc bulge resulting in no significant central or neural foraminal canal narrowing.    L2-L3: No significant central canal narrowing.  There is mild narrowing of either neural foraminal canal secondary to disc material.    L3-L4: Disc bulging in the bilateral foraminal regions resulting in no significant central canal narrowing.  Mild-to-moderate bilateral facet arthropathy also noted.  The bilateral neural foraminal canals are moderately narrowed with some mild effacement of either exiting L3 nerve root in the extraforaminal regions bilaterally.    L4-L5:  Grade 1 spondylolisthesis along with moderate to severe bilateral facet arthropathy and ligamentum flavum hypertrophy resulting in at least moderate narrowing of the central canal.  The right neural foraminal canal is mildly to moderately narrowed.  Left neural foraminal canal is moderately to severely narrowed with mild effacement of the exiting L4 nerve root.    L5-S1:  No significant central or neural foraminal canal narrowing noted.  Mild bilateral facet arthropathy noted.   Impression       1. Multilevel degenerative changes of the lumbar spine as detailed above     Lumbar XRAYs 3/27/18    Narrative     EXAMINATION:  XR LUMBAR SPINE AP AND LAT WITH FLEX/EXT    CLINICAL HISTORY:  Low back pain    TECHNIQUE:  AP and lateral views as well as lateral flexion and extension images are performed through the lumbar spine.    COMPARISON:  None    FINDINGS:  X-ray lumbar spine with flexion and extension demonstrates grade 1 spondylolisthesis at L4-5 measuring around 6 mm which does not change significantly with flexion and extension.  The L4-5 disc is narrowed and degenerated.  Other lumbar vertebral discs are maintained.  Vertebral body heights are maintained.  Small anterior spurs are seen, and there is small posterior osteophyte along the inferior endplate of L4.  There is lumbar facet arthropathy at L4-5 and L5-S1.   Impression        Degenerative changes as above.  Grade 1 anterolisthesis and degenerative disc disease at L4-5.         Allergies:   Review of patient's allergies indicates:   Allergen Reactions    Aspirin Other (See Comments)     Has been told not to take it due to bariatric surgery       Current Medications:   Current Outpatient Medications   Medication Sig Dispense Refill    albuterol (PROAIR HFA) 90 mcg/actuation inhaler Inhale 2 puffs into the lungs every 4 (four) hours as needed for Wheezing or Shortness of Breath. Rescue 8.5 g 1    albuterol sulfate 2.5 mg/0.5 mL Nebu Take 2.5 mg by nebulization every 4 (four) hours as needed (shortness of breath, wheezing, coughing). Rescue 30 each 1    atenoloL (TENORMIN) 100 MG tablet Take 1 tablet (100 mg total) by mouth once daily. 90 tablet 3    benzonatate (TESSALON) 200 MG capsule Take 1 capsule (200 mg total) by mouth 3 (three) times daily as needed for Cough. 30 capsule 1    EScitalopram oxalate (LEXAPRO) 20 MG tablet TAKE 1 TABLET EVERY DAY 90 tablet 3    fluticasone furoate-vilanteroL (BREO) 200-25 mcg/dose DsDv diskus inhaler Inhale 1 puff into the lungs once daily. Controller      fluticasone propionate (FLONASE) 50 mcg/actuation nasal spray 2 sprays (100 mcg total) by Each Nostril route once daily. 11.1 mL 1    furosemide (LASIX) 20 MG tablet Take 1 tablet (20 mg total) by mouth once daily. 90 tablet 3    gabapentin (NEURONTIN) 800 MG tablet Take 1 tablet (800 mg total) by mouth 3 (three) times daily. 90 tablet 2    glipiZIDE (GLUCOTROL) 10 MG tablet Take 1 tablet (10 mg total) by mouth daily with breakfast. 180 tablet 3    hydrOXYzine pamoate (VISTARIL) 25 MG Cap Take 1 capsule (25 mg total) by mouth daily as needed (anxiety). 30 capsule 2    ketorolac (TORADOL) 10 mg tablet Take 1 tablet (10 mg total) by mouth every 6 (six) hours as needed for Pain. Take with food to prevent heartburn. 20 tablet 1    linaGLIPtin (TRADJENTA) 5 mg Tab tablet Take 1 tablet (5 mg total) by  mouth once daily. 90 tablet 3    loratadine (CLARITIN) 10 mg tablet Take 1 tablet (10 mg total) by mouth daily as needed for Allergies. 90 tablet 3    losartan (COZAAR) 50 MG tablet TAKE 1 TABLET EVERY DAY 90 tablet 3    pantoprazole (PROTONIX) 20 MG tablet TAKE 1 TABLET TWICE DAILY  BEFORE  MEALS 180 tablet 3    promethazine-dextromethorphan (PROMETHAZINE-DM) 6.25-15 mg/5 mL Syrp Take 5 mLs by mouth every 8 (eight) hours as needed (cough). 240 mL 0    promethazine-dextromethorphan (PROMETHAZINE-DM) 6.25-15 mg/5 mL Syrp Take 10-15ml by mouth every 8 hours as needed for coughing 240 mL 0    rosuvastatin (CRESTOR) 20 MG tablet Take 1 tablet (20 mg total) by mouth once daily. 30 tablet 11    tiotropium bromide (SPIRIVA RESPIMAT) 2.5 mcg/actuation inhaler Inhale 1 puff into the lungs Daily. Controller 4 g 11    tiZANidine (ZANAFLEX) 4 MG tablet TAKE 1 TABLET BY MOUTH EVERY 8 HOURS AS NEEDED FOR  MUSCLE  PAIN 90 tablet 0    topiramate (TOPAMAX) 25 MG tablet Take 1 tablet (25 mg total) by mouth 2 (two) times daily. 180 tablet 0    traZODone (DESYREL) 100 MG tablet Take 1 tablet (100 mg total) by mouth every evening. 90 tablet 3    blood-glucose meter kit Use as instructed 1 each 0     No current facility-administered medications for this visit.       REVIEW OF SYSTEMS:    GENERAL:  No weight loss, malaise or fevers.  HEENT:  Negative for frequent or significant headaches.  NECK:  Negative for lumps, goiter, pain and significant neck swelling.  RESPIRATORY:  Negative for cough, wheezing or shortness of breath.  CARDIOVASCULAR:  Negative for chest pain, leg swelling or palpitations. Hypertension.  GI:  Negative for abdominal discomfort, blood in stools or black stools or change in bowel habits.  MUSCULOSKELETAL:  See HPI.  SKIN:  Negative for lesions, rash, and itching.  PSYCH:  Negative for sleep disturbance, mood disorder and recent psychosocial stressors.  HEMATOLOGY/LYMPHOLOGY:  Negative for prolonged bleeding, bruising  easily or swollen nodes.  NEURO:   No history of headaches, syncope, paralysis, seizures or tremors.  ENDO: Diabetes.  All other reviewed and negative other than HPI.    Past Medical History:  Past Medical History:   Diagnosis Date    Anxiety with depression     Arthritis     CKD (chronic kidney disease) stage 2, GFR 60-89 ml/min     Diabetes mellitus     History of Clarisse-en-Y gastric bypass     Hypertension     Obesity, unspecified     DAHLIA (obstructive sleep apnea)     Spondylosis        Past Surgical History:  Past Surgical History:   Procedure Laterality Date    CARPAL TUNNEL RELEASE Right 3/8/2019    Procedure: RELEASE, CARPAL TUNNEL right;  Surgeon: Pan Goodman MD;  Location: [facility];  Service: Orthopedics;  Laterality: Right;    CARPAL TUNNEL RELEASE Left 2019    Procedure: RELEASE, CARPAL TUNNEL- LEFT;  Surgeon: Pan Goodman MD;  Location: [address];  Service: Orthopedics;  Laterality: Left;    CATARACT EXTRACTION Bilateral      SECTION      CHOLECYSTECTOMY      EPIDURAL STEROID INJECTION INTO LUMBAR SPINE N/A 2018    Procedure: INJECTION, STEROID, SPINE, LUMBAR, EPIDURAL;  Surgeon: Shaq Silverio MD;  Location: [facility];  Service: Pain Management;  Laterality: N/A;  LUMBAR L4-L5 INTERLAMINAR ELISE'  [id number]  W/ SEDATION     HYSTERECTOMY      INJECTION OF ANESTHETIC AGENT AROUND NERVE Bilateral 2019    Procedure: BLOCK, NERVE, L3-L4-L5 MEDIAL BRANCH;  Surgeon: Shaq Silverio MD;  Location: [facility];  Service: Pain Management;  Laterality: Bilateral;    INJECTION OF ANESTHETIC AGENT AROUND NERVE Bilateral 10/17/2019    Procedure: BLOCK, NERVE;  Surgeon: Shaq Silverio MD;  Location: [facility];  Service: Pain Management;  Laterality: Bilateral;  B/L MBB L3-L4-L5  REPEAT  CONSENT NEEDED    INJECTION OF FACET JOINT Bilateral 2018    Procedure: INJECTION-FACET;  Surgeon: Shaq Silverio MD;  Location: [facility];  Service: Pain Management;  Laterality: Bilateral;  LUMBAR  BILATERAL L4-L5 AND L5-S1 FACET STEROID INJECTION  [id number]    W/ SEDATION     RADIOFREQUENCY ABLATION Right 11/21/2019    Procedure: RADIOFREQUENCY ABLATION RIGHT L3, L4, L5;  Surgeon: Shaq Silverio MD;  Location: [facility];  Service: Pain Management;  Laterality: Right;  Right RFA L3-L4-L5  1 of 2  Consent Needed    RADIOFREQUENCY ABLATION Left 12/5/2019    Procedure: RADIOFREQUENCY ABLATION LEFT L3-5;  Surgeon: Shaq Silverio MD;  Location: [facility];  Service: Pain Management;  Laterality: Left;  Left RFA L3-L4-L5  2 of 2  Consent Needed    RADIOFREQUENCY ABLATION Left 8/17/2020    Procedure: RADIOFREQUENCY ABLATION LEFT L3,4,5 1 of 2;  Surgeon: Shaq Silverio MD;  Location: [facility];  Service: Pain Management;  Laterality: Left;  Left RFA L3,4,5  1 of 2    RADIOFREQUENCY ABLATION Right 8/31/2020    Procedure: RADIOFREQUENCY ABLATION RIGHT L3,4,5 2 of 2;  Surgeon: Shaq Silverio MD;  Location: [facility];  Service: Pain Management;  Laterality: Right;  RADIOFREQUENCY ABLATION RIGHT L3,4,5  2 of 2    RADIOFREQUENCY ABLATION Left 9/9/2021    Procedure: RADIOFREQUENCY ABLATION, L3-L4 AND L5 MEDIAL BRANCH 1 OF 2;  Surgeon: Shaq Silverio MD;  Location: [facility];  Service: Pain Management;  Laterality: Left;    RADIOFREQUENCY ABLATION Right 9/20/2021    Procedure: RADIOFREQUENCY ABLATION, L3-L4 AND L5 MEDIAL BRANCH 2 OF 2;  Surgeon: Shaq Silverio MD;  Location: [facility];  Service: Pain Management;  Laterality: Right;    RADIOFREQUENCY ABLATION Right 7/7/2022    Procedure: RADIOFREQUENCY ABLATION, RIGHT L3-L4-L5 ONE OF TWO;  Surgeon: Shaq Silverio MD;  Location: [facility];  Service: Pain Management;  Laterality: Right;    RADIOFREQUENCY ABLATION Left 7/21/2022    Procedure: RADIOFREQUENCY ABLATION, LEFT L3-L4-L5 TWO OF TWO *BRING SCS REMOTE*;  Surgeon: Shaq Silverio MD;  Location: [facility];  Service: Pain Management;  Laterality: Left;    TRIAL OF SPINAL CORD NERVE STIMULATOR N/A 9/24/2018     "Procedure: TRIAL, NEUROSTIMULATOR, SPINAL CORD, SPINAL CORD STIMULATOR TRIAL-INTERNAL WIRES TO EXTERNAL BATTERY;  Surgeon: Shaq Silverio MD;  Location: [facility];  Service: Pain Management;  Laterality: N/A;  [employer] REP NOTIFIED       Family History:  Family History   Problem Relation Age of Onset    Cataracts Mother     Glaucoma Mother     No Known Problems Father     No Known Problems Sister     No Known Problems Brother     No Known Problems Maternal Aunt     No Known Problems Maternal Uncle     No Known Problems Paternal Aunt     No Known Problems Paternal Uncle     No Known Problems Maternal Grandmother     No Known Problems Maternal Grandfather     No Known Problems Paternal Grandmother     No Known Problems Paternal Grandfather        Social History:  Social History     Socioeconomic History    Marital status:    Tobacco Use    Smoking status: Never    Smokeless tobacco: Never   Substance and Sexual Activity    Alcohol use: No    Drug use: No       OBJECTIVE:    /61 (BP Location: Right arm, Patient Position: Sitting, BP Method: Large (Automatic))   Pulse 71   Temp 97.4 °F (36.3 °C)   Ht 5' 4" (1.626 m)   Wt 97.6 kg (215 lb 2.7 oz)   BMI 36.93 kg/m²     PHYSICAL EXAMINATION:    General appearance: Well appearing, in no acute distress, alert and oriented x3.  Psych:  Mood and affect appropriate.  Skin: Skin color, texture, turgor normal, no rashes or lesions, in both upper and lower body.   Head/face:  Atraumatic, normocephalic. No palpable lymph nodes  Back: Straight leg raising in the sitting and supine positions is negative to radicular pain bilaterally. There is pain with palpation to lumbar paraspinals and facet joints. Limited ROM with pain on extension. Positive facet loading bilaterally.There is mild pain with palpation to SI joints.   Extremities: Peripheral joint ROM is full and pain free without obvious instability or laxity in all four extremities. No deformities, edema, or skin " discoloration. Good capillary refill.  Musculoskeletal: Normal strength to BLE. No atrophy or tone abnormalities are noted.  Neuro: Decreased sensation to BLE.  Gait: Antalgic- ambulates without assistance.    ASSESSMENT: 71 y.o. year old female with back pain, consistent with the following diagnoses:     1. Chronic pain syndrome        2. Spondylosis without myelopathy        3. Lumbar radiculopathy  X-Ray Thoracic Spine AP Lateral    X-Ray Lumbar Spine AP And Lateral      4. Thoracic spine pain  X-Ray Thoracic Spine AP Lateral              PLAN:   We discussed with the patient the assessment and recommendations. The following is the plan we agreed on:     - Previous imaging was reviewed and discussed with the patient today. I will order updated lumbar and thoracic XRAYs to check for lead placement and any acute changed.    - I spoke with Roque and he will reach out. We will consider battery upgrade if needed.    - Continue Zanflex Q8h PRN muscle spasms.     - RTC in 6-8 weeks or sooner if needed.      The above plan and management options were discussed at length with patient. Patient is in agreement with the above and verbalized understanding.    Renetta Steele  10/04/2022

## 2022-10-12 ENCOUNTER — CLINICAL SUPPORT (OUTPATIENT)
Dept: BARIATRICS | Facility: CLINIC | Age: 72
End: 2022-10-12
Payer: MEDICARE

## 2022-10-12 ENCOUNTER — OFFICE VISIT (OUTPATIENT)
Dept: BARIATRICS | Facility: CLINIC | Age: 72
End: 2022-10-12
Payer: MEDICARE

## 2022-10-12 VITALS — HEIGHT: 62 IN | WEIGHT: 220 LBS | BODY MASS INDEX: 40.48 KG/M2

## 2022-10-12 VITALS
SYSTOLIC BLOOD PRESSURE: 130 MMHG | HEART RATE: 72 BPM | WEIGHT: 220 LBS | BODY MASS INDEX: 37.56 KG/M2 | OXYGEN SATURATION: 94 % | HEIGHT: 64 IN | DIASTOLIC BLOOD PRESSURE: 63 MMHG

## 2022-10-12 DIAGNOSIS — E11.22 TYPE 2 DIABETES MELLITUS WITH STAGE 3A CHRONIC KIDNEY DISEASE, WITHOUT LONG-TERM CURRENT USE OF INSULIN: Primary | ICD-10-CM

## 2022-10-12 DIAGNOSIS — Z98.84 HISTORY OF ROUX-EN-Y GASTRIC BYPASS: ICD-10-CM

## 2022-10-12 DIAGNOSIS — E66.9 OBESITY (BMI 30-39.9): ICD-10-CM

## 2022-10-12 DIAGNOSIS — E46 PROTEIN-CALORIE MALNUTRITION, UNSPECIFIED SEVERITY: ICD-10-CM

## 2022-10-12 DIAGNOSIS — Z98.84 S/P BARIATRIC SURGERY: ICD-10-CM

## 2022-10-12 DIAGNOSIS — R63.5 WEIGHT GAIN: Primary | ICD-10-CM

## 2022-10-12 DIAGNOSIS — Z71.3 DIETARY COUNSELING AND SURVEILLANCE: ICD-10-CM

## 2022-10-12 DIAGNOSIS — G47.33 OSA (OBSTRUCTIVE SLEEP APNEA): ICD-10-CM

## 2022-10-12 DIAGNOSIS — N18.31 TYPE 2 DIABETES MELLITUS WITH STAGE 3A CHRONIC KIDNEY DISEASE, WITHOUT LONG-TERM CURRENT USE OF INSULIN: Primary | ICD-10-CM

## 2022-10-12 PROCEDURE — 99999 PR PBB SHADOW E&M-EST. PATIENT-LVL V: ICD-10-PCS | Mod: PBBFAC,,, | Performed by: PHYSICIAN ASSISTANT

## 2022-10-12 PROCEDURE — 1160F PR REVIEW ALL MEDS BY PRESCRIBER/CLIN PHARMACIST DOCUMENTED: ICD-10-PCS | Mod: CPTII,S$GLB,, | Performed by: PHYSICIAN ASSISTANT

## 2022-10-12 PROCEDURE — 1126F AMNT PAIN NOTED NONE PRSNT: CPT | Mod: CPTII,S$GLB,, | Performed by: PHYSICIAN ASSISTANT

## 2022-10-12 PROCEDURE — 3288F FALL RISK ASSESSMENT DOCD: CPT | Mod: CPTII,S$GLB,, | Performed by: PHYSICIAN ASSISTANT

## 2022-10-12 PROCEDURE — 3060F POS MICROALBUMINURIA REV: CPT | Mod: CPTII,S$GLB,, | Performed by: PHYSICIAN ASSISTANT

## 2022-10-12 PROCEDURE — 3066F PR DOCUMENTATION OF TREATMENT FOR NEPHROPATHY: ICD-10-PCS | Mod: CPTII,S$GLB,, | Performed by: PHYSICIAN ASSISTANT

## 2022-10-12 PROCEDURE — 3075F SYST BP GE 130 - 139MM HG: CPT | Mod: CPTII,S$GLB,, | Performed by: PHYSICIAN ASSISTANT

## 2022-10-12 PROCEDURE — 3044F PR MOST RECENT HEMOGLOBIN A1C LEVEL <7.0%: ICD-10-PCS | Mod: CPTII,S$GLB,, | Performed by: PHYSICIAN ASSISTANT

## 2022-10-12 PROCEDURE — 3078F DIAST BP <80 MM HG: CPT | Mod: CPTII,S$GLB,, | Performed by: PHYSICIAN ASSISTANT

## 2022-10-12 PROCEDURE — 99499 NO LOS: ICD-10-PCS | Mod: S$GLB,,, | Performed by: DIETITIAN, REGISTERED

## 2022-10-12 PROCEDURE — 99999 PR PBB SHADOW E&M-EST. PATIENT-LVL II: CPT | Mod: PBBFAC,,, | Performed by: DIETITIAN, REGISTERED

## 2022-10-12 PROCEDURE — 1159F PR MEDICATION LIST DOCUMENTED IN MEDICAL RECORD: ICD-10-PCS | Mod: CPTII,S$GLB,, | Performed by: PHYSICIAN ASSISTANT

## 2022-10-12 PROCEDURE — 3066F NEPHROPATHY DOC TX: CPT | Mod: CPTII,S$GLB,, | Performed by: PHYSICIAN ASSISTANT

## 2022-10-12 PROCEDURE — 3288F PR FALLS RISK ASSESSMENT DOCUMENTED: ICD-10-PCS | Mod: CPTII,S$GLB,, | Performed by: PHYSICIAN ASSISTANT

## 2022-10-12 PROCEDURE — 99203 OFFICE O/P NEW LOW 30 MIN: CPT | Mod: S$GLB,,, | Performed by: PHYSICIAN ASSISTANT

## 2022-10-12 PROCEDURE — 1160F RVW MEDS BY RX/DR IN RCRD: CPT | Mod: CPTII,S$GLB,, | Performed by: PHYSICIAN ASSISTANT

## 2022-10-12 PROCEDURE — 3044F HG A1C LEVEL LT 7.0%: CPT | Mod: CPTII,S$GLB,, | Performed by: PHYSICIAN ASSISTANT

## 2022-10-12 PROCEDURE — 3060F PR POS MICROALBUMINURIA RESULT DOCUMENTED/REVIEW: ICD-10-PCS | Mod: CPTII,S$GLB,, | Performed by: PHYSICIAN ASSISTANT

## 2022-10-12 PROCEDURE — 3008F PR BODY MASS INDEX (BMI) DOCUMENTED: ICD-10-PCS | Mod: CPTII,S$GLB,, | Performed by: PHYSICIAN ASSISTANT

## 2022-10-12 PROCEDURE — 99499 UNLISTED E&M SERVICE: CPT | Mod: S$GLB,,, | Performed by: DIETITIAN, REGISTERED

## 2022-10-12 PROCEDURE — 1159F MED LIST DOCD IN RCRD: CPT | Mod: CPTII,S$GLB,, | Performed by: PHYSICIAN ASSISTANT

## 2022-10-12 PROCEDURE — 3008F BODY MASS INDEX DOCD: CPT | Mod: CPTII,S$GLB,, | Performed by: PHYSICIAN ASSISTANT

## 2022-10-12 PROCEDURE — 3072F LOW RISK FOR RETINOPATHY: CPT | Mod: CPTII,S$GLB,, | Performed by: PHYSICIAN ASSISTANT

## 2022-10-12 PROCEDURE — 99999 PR PBB SHADOW E&M-EST. PATIENT-LVL II: ICD-10-PCS | Mod: PBBFAC,,, | Performed by: DIETITIAN, REGISTERED

## 2022-10-12 PROCEDURE — 3072F PR LOW RISK FOR RETINOPATHY: ICD-10-PCS | Mod: CPTII,S$GLB,, | Performed by: PHYSICIAN ASSISTANT

## 2022-10-12 PROCEDURE — 3075F PR MOST RECENT SYSTOLIC BLOOD PRESS GE 130-139MM HG: ICD-10-PCS | Mod: CPTII,S$GLB,, | Performed by: PHYSICIAN ASSISTANT

## 2022-10-12 PROCEDURE — 3078F PR MOST RECENT DIASTOLIC BLOOD PRESSURE < 80 MM HG: ICD-10-PCS | Mod: CPTII,S$GLB,, | Performed by: PHYSICIAN ASSISTANT

## 2022-10-12 PROCEDURE — 1101F PR PT FALLS ASSESS DOC 0-1 FALLS W/OUT INJ PAST YR: ICD-10-PCS | Mod: CPTII,S$GLB,, | Performed by: PHYSICIAN ASSISTANT

## 2022-10-12 PROCEDURE — 1101F PT FALLS ASSESS-DOCD LE1/YR: CPT | Mod: CPTII,S$GLB,, | Performed by: PHYSICIAN ASSISTANT

## 2022-10-12 PROCEDURE — 99203 PR OFFICE/OUTPT VISIT, NEW, LEVL III, 30-44 MIN: ICD-10-PCS | Mod: S$GLB,,, | Performed by: PHYSICIAN ASSISTANT

## 2022-10-12 PROCEDURE — 4010F ACE/ARB THERAPY RXD/TAKEN: CPT | Mod: CPTII,S$GLB,, | Performed by: PHYSICIAN ASSISTANT

## 2022-10-12 PROCEDURE — 99999 PR PBB SHADOW E&M-EST. PATIENT-LVL V: CPT | Mod: PBBFAC,,, | Performed by: PHYSICIAN ASSISTANT

## 2022-10-12 PROCEDURE — 4010F PR ACE/ARB THEARPY RXD/TAKEN: ICD-10-PCS | Mod: CPTII,S$GLB,, | Performed by: PHYSICIAN ASSISTANT

## 2022-10-12 PROCEDURE — 1126F PR PAIN SEVERITY QUANTIFIED, NO PAIN PRESENT: ICD-10-PCS | Mod: CPTII,S$GLB,, | Performed by: PHYSICIAN ASSISTANT

## 2022-10-12 NOTE — PATIENT INSTRUCTIONS
Menu Plan: 800-1000 Calories;  grams of Protein    DAY 1     Breakfast  ½ cup 2% cottage cheese  ¼ cup fruit (no sugar added)    Snack  2% mozzarella string cheese  10 grapes    Lunch  2oz Lean hamburger or turkey fede  1 slice low-fat cheese  ¼ cup green beans    Snack  200 calorie low-carb protein drink (4 grams sugar or less)    Dinner  2oz chicken thigh  ¼ cup cooked spinach     Snack  Atkins bar (15g protein)      DAY 2    Breakfast  1 egg with 1oz shredded cheddar cheese and 2T salsa    Snack  200 calorie low-carb protein drink (4 grams sugar or less)    Lunch  Lettuce Wraps: 2oz sliced turkey, 1 slice low-fat Swiss cheese, tomato, and mustard wrapped in a Parminder lettuce leaf    Snack  ½ cup low-fat cottage cheese  Pear cup (no sugar added)    Dinner  2oz baked fish  ½ cup cooked broccoli    Snack  Sugar-free pudding cup      DAY 3    Breakfast   ½ cup low-fat ricotta cheese w/ Splenda to liza  ½ scoop Vanilla protein powder   ¼ cup fresh fruit    Snack  2% string cheese  6 unsalted almonds    Lunch  Tuna/Chicken Salad: 2oz canned tuna/chicken, 1 egg white, and 1 tsp light reyes  Pineapple cup (no sugar added)    Snack  200 calorie low-carb protein drink (4 grams sugar or less)    Dinner  ½ baked pork chop   ¼ cup beans      DAY 4    Breakfast  200 calorie low-carb protein drink (4 grams sugar or less)    Snack  Boiled egg    Lunch  ½ cup grilled shrimp  Salad w/ 2 tbsp crumbled fat-free feta  1 tbsp light vinaigrette    Snack  200 calorie low-carb protein drink (4 grams sugar or less)    Dinner  ¾ cup red beans    Snack  Mini Babybell light      DAY 5    Breakfast  [place] pie: 3oz Greek yogurt, 1 tbsp Splenda, ½ individual pack Crystal Light lemonade. Top with ¼ cup chopped walnuts     Snack  3-4 lean ham or turkey slices, ¼ - ½ cup fruit    Lunch  Fiesta Chicken: 2oz canned chicken, 1oz shredded cheddar cheese, ¼ cup black beans  Top with 2 tbsp salsa and a small dollop light sour  cream    Snack  200 calorie low-carb protein drink (4 grams sugar or less)    Dinner  Omelette: ¼ cup Egg Beaters, 4 large (1oz) shrimp, 1oz shredded low-fat cheese. Add bell pepper, onion, mushrooms, green onions, or salsa, optional.      DAY 6    Breakfast  1 gentry or 2 links turkey sausage  ½ cup fruit    Snack  200 calorie low-carb protein drink (4 grams sugar or less)    Lunch  Grilled tilapia  Salad of baby spinach leaves with light dressing    Snack  200 calorie low-carb protein drink (4 grams sugar or less)    Dinner  Chicken thigh simmered in 98% fat free cream of mushroom soup  ½ cup cooked green beans      Meal Ideas for Regular Bariatric Diet  *Recipes and products available at [email or web address]      Breakfast: (15-20g protein)    - Egg white omelet: 2 egg whites or ½ cup Egg Beaters. (Optional proteins: cheese, shrimp, black beans, chicken, sliced turkey) (Optional veggies: tomatoes, salsa, spinach, mushrooms, onions, green peppers, or small slice avocado)     - Egg and sausage: 1 egg or ¼ cup Egg Beaters (any variety), with 1 gentry or 2 links of Turkey sausage or Veggie breakfast sausage ([employer] or [employer])    - Crust-less breakfast quiche: To make a glass pie dish, mix 4oz part skim Ricotta, 1 cup skim milk, and 2 eggs as your base. Add protein: shredded cheese, sliced lean ham or turkey, turkey lugo/sausage. Add veggies: tomato, onion, green onion, mushroom, green pepper, spinach, etc.    - Yogurt parfait: Mix 1 - 6oz container Dannon Light N Fit vanilla yogurt, with ¼ cup crushed unsalted nuts    - Cottage cheese and fruit: ½ cup part-skim cottage cheese or ricotta cheese topped with fresh fruit or sugar free preserves     - Aimee Shearer's Vanilla Egg custard* (add 2 Tbsp instant coffee granules to make Cappuccino Custard*)    - Hi-Protein café latte (skim milk, decaf coffee, 1 scoop protein powder). Optional to add Sugar free syrup or extract flavoring.    - Breakfast Lox: spread fat  free cream cheese on slices of smoked salmon. Serve over scrambled or egg over easy (sauteed with nonstick cookspray) OR on a cucumber slice    - Eggwhich: Scramble or cook 1 large egg over easy using nonstick cookspray. Place between 2 slices of Georgian lugo and low fat cheese.     Lunch: (20-30g protein)    - ½ cup Black bean soup (Homemade or Progresso), with ¼ cup shredded low-fat cheese. Top with chopped tomato or fresh salsa.     - Lean deli turkey breast and low-fat sliced cheese, mustard or light reyes to moisten, rolled up together, or wrapped in a Parminder lettuce leaf    - Chicken salad made from dinner leftovers, moisten with low-fat salad dressing or light reyes. Also try leftover salmon, shrimp, tuna or boiled eggs. Serve ½ cup over dark green salad    - Fat-free canned refried beans, topped with ¼ cup shredded low-fat cheese. Top with chopped tomato or fresh salsa.     - Greek salad: Top mixed greens with 1-2oz grilled chicken, tomatoes, red onions, 2-3 kalamata olives, and sprinkle lightly with feta cheese. Spritz with Balsamic vinegar to taste.     - Crust-less lunch quiche: To make a glass pie dish, mix 4oz part skim Ricotta, 1 cup skim milk, and 2 eggs as your base. Add protein: shredded cheese, sliced lean ham or turkey, shrimp, chicken. Add veggies: tomato, onion, green onion, mushroom, green pepper, spinach, artichoke, broccoli, etc.    - Pizza bake: spread a  jessica blair mushroom with tomato sauce, low-fat shredded mozzarella and turkey pepperoni or [place] lugo. Add any veggies. Roast for 10-15 minutes, until cheese melted.     - Cucumber crab bites: Spread ¼ cup crab dip (lump crabmeat + light cream cheese and green onions) over sliced cucumber.     - Chicken with light spinach and artichoke dip*: Puree in : 6oz cooked and drained spinach, 2 cloves garlic, 1 can cannelloni beans, ½ cup chopped green onions, 1 can drained artichoke hearts (not marinated in oil), lemon juice and  basil. Mix in 2oz chopped up chicken.    Supper: (20-30g protein)    - Serve grilled fish over dark green salad tossed with low-fat dressing, served with grilled asparagus reynolds     - Rotisserie chicken salad: served with sliced strawberries, walnuts, fat-free feta cheese crumbles and 1 tbsp Esparzas Own Light Raspberry North English Vinaigrette    - Shrimp cocktail: Dip cold boiled shrimp in homemade low-sugar cocktail sauce (1/2 cup Darian One Carb ketchup, 2 tbsp horseradish, 1/4 tsp hot sauce, 1 tsp Worcestershire sauce, 1 tbsp freshly-squeezed lemon juice). Serve with dark green salad, walnuts, and crumbled blue cheese drizzled with olive oil and Balsamic vinegar    - Tuna Melt: Spread tuna salad onto 2 thick slices of tomato. Top with low-fat cheese and broil until cheese is melted. May also be made with chicken salad of shrimp salad. [place] with different types of cheeses.    - Chicken or beef fajitas (no tortilla, rice, beans, chips). Top meat and veggies w/ fresh salsa, fat free sour cream.     - Homemade low-fat Chili using extra lean ground beef or ground turkey. Top with shredded cheese and salsa as desired. May add dollop fat-free sour cream if desired    - Chicken parmesan: Top chicken breast w/ low sugar marinara sauce, mozzarella cheese and bake until chicken reaches 165*.  Serve w/ spaghetti SQUASH or Chilean cut green beans    - Dinner Omelet with shrimp or chicken and onion, green peppers and chives.    - No noodle lasagna: Use sliced zucchini or eggplant in place of noodles.  Layer with part skim ricotta cheese and low sugar meat sauce (use very lean ground beef or ground turkey).    - Mexican chicken bake: Bake chunks of chicken breast or thigh with taco seasoning, Pace brand enchilada sauce, green onions and low-fat cheese. Serve with ¼ cup black beans or fat free refried beans topped with chopped tomatoes or salsa.    - Darlene frozen meatballs, simmered in Classico Maricarmena sauce. Different  flavors of salsa or spaghetti sauce create different dishes! Sprinkle with parmesan cheese. Serve with grilled or steamed veggies, or a dark green salad.    - Simmer boneless skinless chicken thigh chunks in Classico Marinara sauce or roasted salsa until tender with chopped onion, bell pepper, garlic, mushrooms, spinach, etc.     - Hamburger or veggie burger, without the bun, dressed the way you like. Served with grilled or steamed veggies.    - Eggplant parmesan: Bake slices of eggplant at 350 degrees for 15 minutes. Layer tomato sauce, sliced eggplant and low-fat mozzarella cheese in a baking dish and cover with foil. Bake 30-40 more minutes or until bubbly. Uncover and bake at 400 degrees for about 15 more minutes, or until top is slightly crisp.    - Fish tacos: grilled/baked white fish, wrapped in Parminder lettuce leaf, topped with salsa, shredded low-fat cheese, and light coleslaw.    - Chicken chanel: Sprinkle chicken w/ 1 tsp of hidden [place] ranch dip mix. Then grill chicken and top with black beans, salsa and 1 tsp fat free sour cream.     - Cauliflower pizza crust: Use cauliflower as crust (see recipe on [facility], no flour!). Top w/ low fat cheese, turkey pepperoni and veggies and bake again    - chicken or turkey crust pizza: use ground chicken or turkey instead of cauliflower, spread in [place] and bake at 350 for about 20-30 minutes(may want to add garlic, black pepper, oregano and other herbs to ground meat mixture).  Remove and top w/ low fat cheese, turkey pepperoni and veggies and bake again for another 10 minutes or until cheese is browned.     Snacks: (100-200 calories; >5g protein)    - 1 low-fat cheese stick with 8 cherry tomatoes or 1 serving fresh fruit  - 4 thin slices fat-free turkey breast and 1 slice low-fat cheese  - 4 thin slices fat-free honey ham with wedge of melon  - 6-8 edamame pods (equivalent to about 1/4 cup edamame without pods).   - 1/4 cup unsalted nuts with ½ cup fruit  -  6-oz container Dannon Light n Fit vanilla yogurt, topped with 1oz unsalted nuts         - apple, celery or baby carrots spread with 2 Tbsp PB2  - apple slices with 1 oz slice low-fat cheese  - Apple slices dipped in 2 Tbsp of PB2  - celery, cucumber, bell pepper or baby carrots dipped in ¼ cup hummus bean spread or light spinach and artichoke dip (*recipe in lunch section)  - celery, cucumber, baby carrots dipped in high protein greek yogurt (Mix 16 oz plain greek yogurt + 1 packet of hidden valley ranch dip mix)  - Don Links Beef Steak - 14g protein! (similar to beef jerky)  - 2 wedges L[place] Cow - Light Herb & Garlic Cheese with sliced cucumber or green bell pepper  - 1/2 cup low-fat cottage cheese with ¼ cup fruit or ¼ cup salsa  - RTD Protein drinks: Atkins, Low Carb Slim Fast, EAS light, Muscle Milk Light, etc.  - Homemade Protein drinks: [facility] Soy95, Isopure, Nectar, UNJURY, Whey Gourmet, etc. Mix 1 scoop powder with 8oz skim/1% milk or light soymilk.  - Protein bars: Atkins, EAS, Pure Protein, Think Thin, Detour, etc. Must have 0-4 grams sugar - Read the label.    Takeout Options: No more than twice/week  Deli - Salads (no pasta or rice), meats, cheeses. Roasted chicken. Lox (salmon)    Mexican - Platters which don't include tortillas, chips, or rice. Go easy on the beans. Example: Fajitas without the tortillas. Ask the  not to bring chips to the table if they are too tempting.    Greek - Meat or fish and vegetable, but no bread or rice. Including hummus, baba ganoush, etc, is OK. Most sit-down Greek restaurants can provide you with cucumber slices for dipping instead of julia bread.    Fast Food (Avoid as much as possible) - Salads (no croutons and limit salad dressing to 2 tbsp), grilled chicken sandwich without the bun and ask for no reyes. Anns low fat chili or Taco Bell pintos and cheese.    BBQ - The meats are fine if you ask for sauces on the side, but most of the traditional side dishes are  loaded with carbs. Venancio slaw, baked beans and BBQ sauce are typically made with sugar.    Chinese - Nothing deep-fried, no rice or noodles. Many Chinese sauces have starch and sugar in them, so you'll have to use your judgement. If you find that these sauces trigger cravings, or cause Dumping, you can ask for the sauce to be made without sugar or just use soy sauce.

## 2022-10-12 NOTE — PROGRESS NOTES
Subjective:       Patient ID: Faby Luna is a 71 y.o. female.    Chief Complaint:   Chief Complaint   Patient presents with    Follow-up     LRNY 2010 with weight gain         HPI: Patient presents for weight gain following LRNY with Dr Kovacs in 2011.  She has not been seen in clinic since 2011. She saw Dr Finn 2 weeks ago and started on Topiramate with no results.    She saw RD this morning who reviewed the Nutrition Booklet with her to get her back on track.    Her weight is at 220 pounds.   Problem for weight gain was eating junk at night, now she has stopped that.  Chips at night: she is no longer doing that for the past month.   She has not GI complaints.                                                                        EXERCISE: None  VITAMINS: Reviewed in bariatric assessment.                                                                                DIET:  Regular diet, eats 1 meal daily.                                                                                                                                MEDICATIONS AND ALLERGIES:  Reviewed in Navigator.     Review of Systems   Constitutional:  Negative for activity change, appetite change, fatigue and fever.   HENT:  Negative for trouble swallowing and voice change.    Eyes:  Negative for visual disturbance.   Respiratory:  Negative for cough, choking and chest tightness.    Cardiovascular:  Negative for chest pain and palpitations.   Gastrointestinal:  Negative for abdominal distention, abdominal pain, blood in stool, constipation, diarrhea, nausea and vomiting.   Genitourinary:  Negative for decreased urine volume, difficulty urinating, frequency, hematuria and urgency.   Skin:  Negative for color change, pallor, rash and wound.   Neurological:  Negative for dizziness, syncope, light-headedness and headaches.   Psychiatric/Behavioral:  Negative for suicidal ideas. The patient is not nervous/anxious.       Objective:      Physical Exam  Vitals and nursing note reviewed.   Constitutional:       General: She is not in acute distress.     Appearance: Normal appearance. She is well-developed and normal weight. She is not ill-appearing, toxic-appearing or diaphoretic.   HENT:      Head: Normocephalic and atraumatic.   Eyes:      General: No scleral icterus.        Right eye: No discharge.         Left eye: No discharge.      Extraocular Movements: Extraocular movements intact.      Conjunctiva/sclera: Conjunctivae normal.      Pupils: Pupils are equal, round, and reactive to light.   Skin:     General: Skin is warm and dry.   Neurological:      General: No focal deficit present.      Mental Status: She is alert and oriented to person, place, and time.   Psychiatric:         Mood and Affect: Mood normal.         Behavior: Behavior normal.         Thought Content: Thought content normal.         Judgment: Judgment normal.     Assessment:       -  12 pound (12%) overall weight loss s/p LRNY on 12/20/2010.  -  Poor diet.  -  No exercise.  -  Good vitamin regimen.    Plan:       - Get back on track with meal pattern and food choices.  - Patient to follow bariatric diet plan.  - Patient to exercise on a regular basis.  - Reviewed Labs from Aug 2022.  Add Vitamin B1 and B12 to labs for next week.   - Patient to take bariatric vitamin regimen: MVI, calcium citrate, and Vitamin B12 as directed.  - RTC as scheduled.  - Call the office for any issues.

## 2022-10-12 NOTE — PROGRESS NOTES
NUTRITION NOTE    Referring Physician: Nico Kovacs M.D.  Reason for MNT Referral: Follow-up 12 years s/p Gastric Bypass    Denies nausea, vomiting, constipation, and diarrhea.  Reports doing well. Lowest weight 170 lbs. States she gained a lot of weight during the Pandemic and [facility], less active and snacking more on junk food edenilson chips. States she has been trying to cut back on chips the past month.    Past Medical History:   Diagnosis Date    Anxiety with depression     Arthritis     CKD (chronic kidney disease) stage 2, GFR 60-89 ml/min     Diabetes mellitus     History of Clarisse-en-Y gastric bypass     Hypertension     Obesity, unspecified     DAHLIA (obstructive sleep apnea)     Spondylosis        CLINICAL DATA:  71 y.o. female. 5 ft. 2 in.    Current Weight: 220 lbs  BMI: 40.2  Total Weight Loss: 10 lbs    Weight prior to sx: 230 lbs    LABS:  Reviewed.    CURRENT DIET:  Regular Diet.  Diet Recall: < 20 grams of protein/day; 20-40 oz of fluids/day    3:30pm: part of a smothered Turkey wing, 1/2 cup rice and gravy, 1/2 cup canned peas and carrots, 2-3 canned beets. Water  8pm: Praline candy    Past diet recall (right after surgery):  B: prot shake  L: salad bowl with grilled chicken chunks and light dressing  D: smothered turkey, mixed veg  Sn: 1/4 cup nuts, piece of fruit    Diet includes:  Meal Pattern: 1 meal(s) + snack(s) + 0 protein supplement(s)  Inadequate protein supplement intake.  Inadequate dairy intake.  Adequate vegetable intake. Tolerates raw vegetables and lettuce.  Inadequate fruit intake.  Starchy CHO: daily, small amounts  Beverages: water, CL, Coke zero  Alcohol: twice/month white liquor + diet cranberry juice    EXERCISE:  None.    Restrictions to Exercise: None.    VITAMINS / MINERALS:  Multivitamins  B-Complex  Calcium Citrate + Vitamin D  Vitamin B12: Sublingual.    ASSESSMENT:  Doing fairly well overall.  Weight gain.  Inadequate calorie intake.  Inadequate protein  intake.  Inadequate fluid intake.  Following diet inappropriately.  Not exercising.  Adequate vitamins & minerals.    BARIATRIC DIET DISCUSSION:  Instructed and provided written materials on bariatric diet plan.  Reinforced post-op nutrition guidelines.    PLAN / RECOMMENDATIONS:  Back on track with diet plan.  Increase protein intake.  Increase fluid intake.  Begin light exercise.  Continue appropriate vitamins & minerals.    Return to clinic in 1 months.    SESSION TIME: 30 minutes

## 2022-10-18 ENCOUNTER — LAB VISIT (OUTPATIENT)
Dept: LAB | Facility: HOSPITAL | Age: 72
End: 2022-10-18
Attending: FAMILY MEDICINE
Payer: MEDICARE

## 2022-10-18 DIAGNOSIS — R63.5 WEIGHT GAIN: ICD-10-CM

## 2022-10-18 DIAGNOSIS — E46 PROTEIN-CALORIE MALNUTRITION, UNSPECIFIED SEVERITY: ICD-10-CM

## 2022-10-18 DIAGNOSIS — Z98.84 HISTORY OF ROUX-EN-Y GASTRIC BYPASS: ICD-10-CM

## 2022-10-18 LAB — VIT B12 SERPL-MCNC: 1590 PG/ML (ref 210–950)

## 2022-10-18 PROCEDURE — 82607 VITAMIN B-12: CPT | Performed by: PHYSICIAN ASSISTANT

## 2022-10-18 PROCEDURE — 84425 ASSAY OF VITAMIN B-1: CPT | Performed by: PHYSICIAN ASSISTANT

## 2022-10-18 PROCEDURE — 36415 COLL VENOUS BLD VENIPUNCTURE: CPT | Mod: PO | Performed by: PHYSICIAN ASSISTANT

## 2022-11-01 LAB — VIT B1 BLD-MCNC: 100 UG/L (ref 38–122)

## 2022-11-02 ENCOUNTER — TELEPHONE (OUTPATIENT)
Dept: PULMONOLOGY | Facility: CLINIC | Age: 72
End: 2022-11-02
Payer: MEDICARE

## 2022-11-02 NOTE — TELEPHONE ENCOUNTER
Gave patient [facility] phone number.                      ----- Message from Vicky Carmen sent at 11/2/2022  3:33 PM CDT -----  Type: Patient Call Back    Who called: self     What is the request in detail: Would like to know what time she needs to come in for her cpap fitting tomorrow.     Can the clinic reply by [facility]? No     Would the patient rather a call back or a response via [facility]? Call back     Best call back number: [phone]

## 2022-11-03 ENCOUNTER — TELEPHONE (OUTPATIENT)
Dept: PAIN MEDICINE | Facility: CLINIC | Age: 72
End: 2022-11-03
Payer: MEDICARE

## 2022-11-04 ENCOUNTER — OFFICE VISIT (OUTPATIENT)
Dept: PAIN MEDICINE | Facility: CLINIC | Age: 72
End: 2022-11-04
Payer: MEDICARE

## 2022-11-04 VITALS
WEIGHT: 220 LBS | SYSTOLIC BLOOD PRESSURE: 146 MMHG | TEMPERATURE: 98 F | RESPIRATION RATE: 18 BRPM | DIASTOLIC BLOOD PRESSURE: 76 MMHG | HEIGHT: 64 IN | HEART RATE: 61 BPM | BODY MASS INDEX: 37.56 KG/M2

## 2022-11-04 DIAGNOSIS — G89.4 CHRONIC PAIN SYNDROME: Primary | ICD-10-CM

## 2022-11-04 DIAGNOSIS — M47.819 SPONDYLOSIS WITHOUT MYELOPATHY: ICD-10-CM

## 2022-11-04 DIAGNOSIS — M79.18 MYOFASCIAL PAIN: ICD-10-CM

## 2022-11-04 DIAGNOSIS — M54.16 LUMBAR RADICULOPATHY: ICD-10-CM

## 2022-11-04 PROCEDURE — 99999 PR PBB SHADOW E&M-EST. PATIENT-LVL IV: ICD-10-PCS | Mod: PBBFAC,,, | Performed by: NURSE PRACTITIONER

## 2022-11-04 PROCEDURE — 3044F PR MOST RECENT HEMOGLOBIN A1C LEVEL <7.0%: ICD-10-PCS | Mod: CPTII,S$GLB,, | Performed by: NURSE PRACTITIONER

## 2022-11-04 PROCEDURE — 3060F POS MICROALBUMINURIA REV: CPT | Mod: CPTII,S$GLB,, | Performed by: NURSE PRACTITIONER

## 2022-11-04 PROCEDURE — 4010F ACE/ARB THERAPY RXD/TAKEN: CPT | Mod: CPTII,S$GLB,, | Performed by: NURSE PRACTITIONER

## 2022-11-04 PROCEDURE — 3066F NEPHROPATHY DOC TX: CPT | Mod: CPTII,S$GLB,, | Performed by: NURSE PRACTITIONER

## 2022-11-04 PROCEDURE — 3078F PR MOST RECENT DIASTOLIC BLOOD PRESSURE < 80 MM HG: ICD-10-PCS | Mod: CPTII,S$GLB,, | Performed by: NURSE PRACTITIONER

## 2022-11-04 PROCEDURE — 99213 PR OFFICE/OUTPT VISIT, EST, LEVL III, 20-29 MIN: ICD-10-PCS | Mod: S$GLB,,, | Performed by: NURSE PRACTITIONER

## 2022-11-04 PROCEDURE — 1160F PR REVIEW ALL MEDS BY PRESCRIBER/CLIN PHARMACIST DOCUMENTED: ICD-10-PCS | Mod: CPTII,S$GLB,, | Performed by: NURSE PRACTITIONER

## 2022-11-04 PROCEDURE — 3044F HG A1C LEVEL LT 7.0%: CPT | Mod: CPTII,S$GLB,, | Performed by: NURSE PRACTITIONER

## 2022-11-04 PROCEDURE — 3060F PR POS MICROALBUMINURIA RESULT DOCUMENTED/REVIEW: ICD-10-PCS | Mod: CPTII,S$GLB,, | Performed by: NURSE PRACTITIONER

## 2022-11-04 PROCEDURE — 1101F PT FALLS ASSESS-DOCD LE1/YR: CPT | Mod: CPTII,S$GLB,, | Performed by: NURSE PRACTITIONER

## 2022-11-04 PROCEDURE — 3077F SYST BP >= 140 MM HG: CPT | Mod: CPTII,S$GLB,, | Performed by: NURSE PRACTITIONER

## 2022-11-04 PROCEDURE — 3078F DIAST BP <80 MM HG: CPT | Mod: CPTII,S$GLB,, | Performed by: NURSE PRACTITIONER

## 2022-11-04 PROCEDURE — 3008F PR BODY MASS INDEX (BMI) DOCUMENTED: ICD-10-PCS | Mod: CPTII,S$GLB,, | Performed by: NURSE PRACTITIONER

## 2022-11-04 PROCEDURE — 3072F PR LOW RISK FOR RETINOPATHY: ICD-10-PCS | Mod: CPTII,S$GLB,, | Performed by: NURSE PRACTITIONER

## 2022-11-04 PROCEDURE — 1125F AMNT PAIN NOTED PAIN PRSNT: CPT | Mod: CPTII,S$GLB,, | Performed by: NURSE PRACTITIONER

## 2022-11-04 PROCEDURE — 4010F PR ACE/ARB THEARPY RXD/TAKEN: ICD-10-PCS | Mod: CPTII,S$GLB,, | Performed by: NURSE PRACTITIONER

## 2022-11-04 PROCEDURE — 3066F PR DOCUMENTATION OF TREATMENT FOR NEPHROPATHY: ICD-10-PCS | Mod: CPTII,S$GLB,, | Performed by: NURSE PRACTITIONER

## 2022-11-04 PROCEDURE — 99999 PR PBB SHADOW E&M-EST. PATIENT-LVL IV: CPT | Mod: PBBFAC,,, | Performed by: NURSE PRACTITIONER

## 2022-11-04 PROCEDURE — 3077F PR MOST RECENT SYSTOLIC BLOOD PRESSURE >= 140 MM HG: ICD-10-PCS | Mod: CPTII,S$GLB,, | Performed by: NURSE PRACTITIONER

## 2022-11-04 PROCEDURE — 1101F PR PT FALLS ASSESS DOC 0-1 FALLS W/OUT INJ PAST YR: ICD-10-PCS | Mod: CPTII,S$GLB,, | Performed by: NURSE PRACTITIONER

## 2022-11-04 PROCEDURE — 1159F PR MEDICATION LIST DOCUMENTED IN MEDICAL RECORD: ICD-10-PCS | Mod: CPTII,S$GLB,, | Performed by: NURSE PRACTITIONER

## 2022-11-04 PROCEDURE — 3008F BODY MASS INDEX DOCD: CPT | Mod: CPTII,S$GLB,, | Performed by: NURSE PRACTITIONER

## 2022-11-04 PROCEDURE — 3072F LOW RISK FOR RETINOPATHY: CPT | Mod: CPTII,S$GLB,, | Performed by: NURSE PRACTITIONER

## 2022-11-04 PROCEDURE — 1160F RVW MEDS BY RX/DR IN RCRD: CPT | Mod: CPTII,S$GLB,, | Performed by: NURSE PRACTITIONER

## 2022-11-04 PROCEDURE — 99213 OFFICE O/P EST LOW 20 MIN: CPT | Mod: S$GLB,,, | Performed by: NURSE PRACTITIONER

## 2022-11-04 PROCEDURE — 3288F FALL RISK ASSESSMENT DOCD: CPT | Mod: CPTII,S$GLB,, | Performed by: NURSE PRACTITIONER

## 2022-11-04 PROCEDURE — 1159F MED LIST DOCD IN RCRD: CPT | Mod: CPTII,S$GLB,, | Performed by: NURSE PRACTITIONER

## 2022-11-04 PROCEDURE — 3288F PR FALLS RISK ASSESSMENT DOCUMENTED: ICD-10-PCS | Mod: CPTII,S$GLB,, | Performed by: NURSE PRACTITIONER

## 2022-11-04 PROCEDURE — 1125F PR PAIN SEVERITY QUANTIFIED, PAIN PRESENT: ICD-10-PCS | Mod: CPTII,S$GLB,, | Performed by: NURSE PRACTITIONER

## 2022-11-04 NOTE — PROGRESS NOTES
Chronic patient Established Note (Follow up visit)      SUBJECTIVE:    Interval History 11/4/2022:  Faby is here for follow up of back and leg pain. Unfortunately, she has been unable to meet up with Roque for SCS programming. She does report some improvement in symptoms since previous visit. She still has back pain with radiation into the legs. Recent XRAYs do not show any significant lead migration. She only takes Gabapentin intermittently because she says it makes her feet swell but it does help. Her pain today is 8/10.    Interval History 10/4/2022:  The patient is here for follow up of chronic back pain. She reports more pain over the past month. No injury or trauma. She does have a lumbar SCS but is unsure if it is even on at this time. She does not think that it is. It did previously help with her back and leg pain. She has not been in contact with [employer] recently. The back pain radiates down the back of both legs to the feet. It worsens with walking and standing. Her pain today is 10/10.    Interval History 8/23/2022:  Faby is here for follow up of chronic lower back pain. She is now s/p right then left L3,4,5 RFAs completed on 7/21/22 with limited relief so far. She continues to report aching back pain with radiation into the legs and numbness. She has had her SCS off for a couple of months. She has not been in contact with [employer] rep for programming. She says that she turned it off due to limited benefit. No new weakness to legs or falls. No bowel/bladder incontinence. Her pain today is 10/10.    Interval History 6/17/2022:  The patient is here today for follow up of back pain. She has had more aching pain across the lower back. She had benefit with lumbar RFAs in the past and would like to reschedule this. She would also like to repeat aquatic PT as this was helpful in the past. Her pain is aching and throbbing in nature without radiation currently. No new falls or trauma. Her pain today is  8/10.    Interval History 5/5/2022:  The patient is here for follow up of chronic lower back pain. She has radiation into the legs. No recent falls or trauma. She saw Dr. Silverio last month and was under the impression that an [employer] rep would be here today for programming. She is still having difficulty programming her device. She has mild benefit with Gabapentin 900 mg daily without side effects. She is also having muscle spasms intermittently. She is asking for a muscle relaxer. She has tried Robaxin in the past with mild benefit. She would like to try another medication. Her pain today is 8/10.    Interval History 4/20/2022: She presents today for follow up of low back pain. She states that she only had about 40% relief of LBP following RFAs in September that lasted a few weeks. Pain  continues to be in the low back and radiates into b/l LE. Pain encompasses the entire leg and does not follow a specific dermatomal pattern in the leg. She denies weakness, numbness or tingling in the lower extremities. She denies bowel or bladder incontinence. Pain is currently rated 8/10. She is currently on gabapentin 300mg tid with minimal relief. She has been unable to charge bret SCS for the last month and does not have the contact number for her rep.      Interval History 9/30/2021:  The patient returns to clinic today for follow up of low back pain. She is s/p left L3,4,5 RFA on 9/9/2021 and right L3,4,5 RFA on 9/20/2021. She is unsure of relief at this time. She reports increased pain to the right side. She describes this pain as burning in nature. She denies any radiating leg pain. Her pain is worse with bending and standing. She continues to report benefit with [employer] SCS. She denies any weakness. She denies any other health changes. Her pain today is 9/10.    Interval History 8/3/2021:   Ms. Luna returns in f/u re: low back/leg pain. She reports about three months ago she noticed her low back started bothering her, worse  with bending and prolonged standing. No inciting event, no trauma to back since last visit. Reports continued leg pain down the backs of her legs as well. She reports intermittent instability in her legs - on and off - over the last year. Last time she has met with [facility] for reprogramming of SCS was fall 2020. Denies any current issues with SCS function/battery/remote.     Interval History 9/28/2020:  The patient is here today for increased lower back pain.  She is status post right than left L3, 4, 5 radiofrequency ablation completed on 08/31/2020 with approximately 50% relief.  However, she is feeling tightness across the lower back without radiation at this time.  She would like an injection today.  Additionally, she states that she has not completed physical therapy for her back in some time and is not currently doing any exercises.  Her leg pain is currently well controlled with spinal cord stimulator and she had a recent adjustment.  Her pain today is 8/10.    Interval History 7/16/2020:  The patient is here for follow up of lower back pain.  She previously had benefit with lumbar RFAs for similar pain.  She is also having radiation into her legs with numbness, left greater than right.  Ildefonso is here today to meet with her for programming.  She previously was having significant benefit of leg pain with SCS implant.  She denies any recent trauma or falls. Her pain today is 9/10.    Interval History 1/9/2020:  The patient is here for follow up of lower back and leg pain.  She is s/p lumbar RFAs with about 50% relief.  Her leg pain is well controlled with implant.  She still has intermittent flare ups of back pain, like today.  When this happens, she has some benefit with rest.  She has tried Tylenol and OTC NSAIDs without benefit.  She denies any recent falls or weakness.  The patient denies any bowel or bladder incontinence or signs of saddle paresthesia.  The patient denies any major medical changes since  last office visit.  Her pain today is 7/10.    Previous Encounter:  Faby Luna presents to the clinic for a follow-up appointment for lower back pain.  She previously had significant leg pain which has resolved with [employer] SCS implant.  She is here today to discuss increased lower back pain.  It is sharp and throbbing in nature.  It is significant in the morning and with prolonged standing and activity.  She has some benefit with stretching.  She denies any recent falls.  Since the last visit, Faby Luna states the pain has been worsening.  Current pain intensity is 8/10.    Pain Disability Index Review:  Last 3 PDI Scores 11/4/2022 10/4/2022 8/23/2022   Pain Disability Index (PDI) 40 50 42       Opioid Contract: no     report:  Not applicable    Pain Procedures:   5/2/18 Left L3 and L4 TF ELISE- 20% relief  5/21/18 Bilateral L4-5 and L5-S1 facet joint injections- no relief  9/24/18 Lumbar SCS trial ([facility])- 100% relief  10/26/18 Lumbar SCS implant ([employer])- 100% relief of leg pain  9/12/19 Bilateral L3,4,5 MBB- helpful  10/17/19 Bilateral L3,4,5 MBB- helpful  11/21/19 Right L3,4,5 RFA- 50% relief  12/5/19 Left L3,4,5 RFA- 50% relief  8/17/20 Left L3,4,5 RFA- 50% relief  8/31/20 Right L3,4,5 RFA- 50% relief  9/9/2021- Left L3,4,5 RFA - 60% relief  9/20/2021- Right L3,4,5 RFA -60% relief  7/7/22 Right L3,4,5 RFA   7/21/22 Left L3,4,5 RFA     Physical Therapy/Home Exercise:   yes in the past with limited benefit    Imaging:     3/31/18 Lumbar MRI    Narrative     EXAMINATION:  MRI LUMBAR SPINE WITHOUT CONTRAST    CLINICAL HISTORY:  Low back pain, >6wks conservative tx, persistent-progressive sx, surgical candidate; Other spondylosis with radiculopathy, lumbar region    TECHNIQUE:  Multiplanar, multisequence MR images were acquired from the thoracolumbar junction to the sacrum without the administration of contrast.    COMPARISON:  Plain films from 03/27/2018    FINDINGS:  There is grade 1  spondylolisthesis of L4 on L5. The vertebral body heights are well maintained, with no fracture.  No marrow signal abnormality suspicious for an infiltrative process.    The conus medullaris terminates at approximately the mid body of L1.  There is a cyst associated with the upper pole of the right kidney.  There is disc desiccation noted throughout the lumbar spine with relative sparing of the L5-S1 disc.  Mild disc space narrowing present at the L4-5 level.    L1-L2: Mild diffuse disc bulge resulting in no significant central or neural foraminal canal narrowing.    L2-L3: No significant central canal narrowing.  There is mild narrowing of either neural foraminal canal secondary to disc material.    L3-L4: Disc bulging in the bilateral foraminal regions resulting in no significant central canal narrowing.  Mild-to-moderate bilateral facet arthropathy also noted.  The bilateral neural foraminal canals are moderately narrowed with some mild effacement of either exiting L3 nerve root in the extraforaminal regions bilaterally.    L4-L5:  Grade 1 spondylolisthesis along with moderate to severe bilateral facet arthropathy and ligamentum flavum hypertrophy resulting in at least moderate narrowing of the central canal.  The right neural foraminal canal is mildly to moderately narrowed.  Left neural foraminal canal is moderately to severely narrowed with mild effacement of the exiting L4 nerve root.    L5-S1:  No significant central or neural foraminal canal narrowing noted.  Mild bilateral facet arthropathy noted.   Impression       1. Multilevel degenerative changes of the lumbar spine as detailed above     Lumbar XRAYs 3/27/18    Narrative     EXAMINATION:  XR LUMBAR SPINE AP AND LAT WITH FLEX/EXT    CLINICAL HISTORY:  Low back pain    TECHNIQUE:  AP and lateral views as well as lateral flexion and extension images are performed through the lumbar spine.    COMPARISON:  None    FINDINGS:  X-ray lumbar spine with flexion  and extension demonstrates grade 1 spondylolisthesis at L4-5 measuring around 6 mm which does not change significantly with flexion and extension.  The L4-5 disc is narrowed and degenerated.  Other lumbar vertebral discs are maintained.  Vertebral body heights are maintained.  Small anterior spurs are seen, and there is small posterior osteophyte along the inferior endplate of L4.  There is lumbar facet arthropathy at L4-5 and L5-S1.   Impression       Degenerative changes as above.  Grade 1 anterolisthesis and degenerative disc disease at L4-5.         Allergies:   Review of patient's allergies indicates:   Allergen Reactions    Aspirin Other (See Comments)     Has been told not to take it due to bariatric surgery       Current Medications:   Current Outpatient Medications   Medication Sig Dispense Refill    albuterol (PROAIR HFA) 90 mcg/actuation inhaler Inhale 2 puffs into the lungs every 4 (four) hours as needed for Wheezing or Shortness of Breath. Rescue 8.5 g 1    albuterol sulfate 2.5 mg/0.5 mL Nebu Take 2.5 mg by nebulization every 4 (four) hours as needed (shortness of breath, wheezing, coughing). Rescue 30 each 1    atenoloL (TENORMIN) 100 MG tablet Take 1 tablet (100 mg total) by mouth once daily. 90 tablet 3    benzonatate (TESSALON) 200 MG capsule Take 1 capsule (200 mg total) by mouth 3 (three) times daily as needed for Cough. 30 capsule 1    blood-glucose meter kit Use as instructed 1 each 0    EScitalopram oxalate (LEXAPRO) 20 MG tablet TAKE 1 TABLET EVERY DAY 90 tablet 3    fluticasone furoate-vilanteroL (BREO) 200-25 mcg/dose DsDv diskus inhaler Inhale 1 puff into the lungs once daily. Controller      fluticasone propionate (FLONASE) 50 mcg/actuation nasal spray 2 sprays (100 mcg total) by Each Nostril route once daily. 11.1 mL 1    furosemide (LASIX) 20 MG tablet Take 1 tablet (20 mg total) by mouth once daily. 90 tablet 3    gabapentin (NEURONTIN) 800 MG tablet Take 1 tablet (800 mg total) by  mouth 3 (three) times daily. 90 tablet 2    glipiZIDE (GLUCOTROL) 10 MG tablet Take 1 tablet (10 mg total) by mouth daily with breakfast. 180 tablet 3    hydrOXYzine pamoate (VISTARIL) 25 MG Cap TAKE 1 CAPSULE BY MOUTH ONCE DAILY AS NEEDED FOR ANXIETY 30 capsule 2    ketorolac (TORADOL) 10 mg tablet Take 1 tablet (10 mg total) by mouth every 6 (six) hours as needed for Pain. Take with food to prevent heartburn. 20 tablet 1    linaGLIPtin (TRADJENTA) 5 mg Tab tablet Take 1 tablet (5 mg total) by mouth once daily. 90 tablet 3    loratadine (CLARITIN) 10 mg tablet Take 1 tablet (10 mg total) by mouth daily as needed for Allergies. 90 tablet 3    losartan (COZAAR) 50 MG tablet TAKE 1 TABLET EVERY DAY 90 tablet 3    pantoprazole (PROTONIX) 20 MG tablet TAKE 1 TABLET TWICE DAILY  BEFORE  MEALS 180 tablet 3    promethazine-dextromethorphan (PROMETHAZINE-DM) 6.25-15 mg/5 mL Syrp Take 5 mLs by mouth every 8 (eight) hours as needed (cough). 240 mL 0    rosuvastatin (CRESTOR) 20 MG tablet Take 1 tablet (20 mg total) by mouth once daily. 30 tablet 11    tiotropium bromide (SPIRIVA RESPIMAT) 2.5 mcg/actuation inhaler Inhale 1 puff into the lungs Daily. Controller 4 g 11    tiZANidine (ZANAFLEX) 4 MG tablet TAKE 1 TABLET BY MOUTH EVERY 8 HOURS AS NEEDED FOR  MUSCLE  PAIN 90 tablet 0    topiramate (TOPAMAX) 25 MG tablet Take 1 tablet (25 mg total) by mouth 2 (two) times daily. 180 tablet 0    traZODone (DESYREL) 100 MG tablet Take 1 tablet (100 mg total) by mouth every evening. 90 tablet 3    promethazine-dextromethorphan (PROMETHAZINE-DM) 6.25-15 mg/5 mL Syrp Take 10-15ml by mouth every 8 hours as needed for coughing 240 mL 0     No current facility-administered medications for this visit.       REVIEW OF SYSTEMS:    GENERAL:  No weight loss, malaise or fevers.  HEENT:  Negative for frequent or significant headaches.  NECK:  Negative for lumps, goiter, pain and significant neck swelling.  RESPIRATORY:  Negative for cough,  wheezing or shortness of breath.  CARDIOVASCULAR:  Negative for chest pain, leg swelling or palpitations. Hypertension.  GI:  Negative for abdominal discomfort, blood in stools or black stools or change in bowel habits.  MUSCULOSKELETAL:  See HPI.  SKIN:  Negative for lesions, rash, and itching.  PSYCH:  Negative for sleep disturbance, mood disorder and recent psychosocial stressors.  HEMATOLOGY/LYMPHOLOGY:  Negative for prolonged bleeding, bruising easily or swollen nodes.  NEURO:   No history of headaches, syncope, paralysis, seizures or tremors.  ENDO: Diabetes.  All other reviewed and negative other than HPI.    Past Medical History:  Past Medical History:   Diagnosis Date    Anxiety with depression     Arthritis     CKD (chronic kidney disease) stage 2, GFR 60-89 ml/min     Diabetes mellitus     History of Clarisse-en-Y gastric bypass     Hypertension     Obesity, unspecified     DAHLIA (obstructive sleep apnea)     Spondylosis        Past Surgical History:  Past Surgical History:   Procedure Laterality Date    CARPAL TUNNEL RELEASE Right 3/8/2019    Procedure: RELEASE, CARPAL TUNNEL right;  Surgeon: Pan Goodman MD;  Location: [facility];  Service: Orthopedics;  Laterality: Right;    CARPAL TUNNEL RELEASE Left 2019    Procedure: RELEASE, CARPAL TUNNEL- LEFT;  Surgeon: Pan Goodman MD;  Location: [address];  Service: Orthopedics;  Laterality: Left;    CATARACT EXTRACTION Bilateral      SECTION      CHOLECYSTECTOMY      EPIDURAL STEROID INJECTION INTO LUMBAR SPINE N/A 2018    Procedure: INJECTION, STEROID, SPINE, LUMBAR, EPIDURAL;  Surgeon: Shaq Silverio MD;  Location: [facility];  Service: Pain Management;  Laterality: N/A;  LUMBAR L4-L5 INTERLAMINAR ELISE'  24006  W/ SEDATION     HYSTERECTOMY      INJECTION OF ANESTHETIC AGENT AROUND NERVE Bilateral 2019    Procedure: BLOCK, NERVE, L3-L4-L5 MEDIAL BRANCH;  Surgeon: Shaq Silveiro MD;  Location: [facility];  Service: Pain Management;   Laterality: Bilateral;    INJECTION OF ANESTHETIC AGENT AROUND NERVE Bilateral 10/17/2019    Procedure: BLOCK, NERVE;  Surgeon: Shaq Silverio MD;  Location: [facility];  Service: Pain Management;  Laterality: Bilateral;  B/L MBB L3-L4-L5  REPEAT  CONSENT NEEDED    INJECTION OF FACET JOINT Bilateral 5/28/2018    Procedure: INJECTION-FACET;  Surgeon: Shaq Silverio MD;  Location: [facility];  Service: Pain Management;  Laterality: Bilateral;  LUMBAR BILATERAL L4-L5 AND L5-S1 FACET STEROID INJECTION  [id number]    W/ SEDATION     RADIOFREQUENCY ABLATION Right 11/21/2019    Procedure: RADIOFREQUENCY ABLATION RIGHT L3, L4, L5;  Surgeon: Shaq Silverio MD;  Location: [facility];  Service: Pain Management;  Laterality: Right;  Right RFA L3-L4-L5  1 of 2  Consent Needed    RADIOFREQUENCY ABLATION Left 12/5/2019    Procedure: RADIOFREQUENCY ABLATION LEFT L3-5;  Surgeon: Shaq Silverio MD;  Location: [facility];  Service: Pain Management;  Laterality: Left;  Left RFA L3-L4-L5  2 of 2  Consent Needed    RADIOFREQUENCY ABLATION Left 8/17/2020    Procedure: RADIOFREQUENCY ABLATION LEFT L3,4,5 1 of 2;  Surgeon: Shaq Silverio MD;  Location: [facility];  Service: Pain Management;  Laterality: Left;  Left RFA L3,4,5  1 of 2    RADIOFREQUENCY ABLATION Right 8/31/2020    Procedure: RADIOFREQUENCY ABLATION RIGHT L3,4,5 2 of 2;  Surgeon: Shaq Silverio MD;  Location: [facility];  Service: Pain Management;  Laterality: Right;  RADIOFREQUENCY ABLATION RIGHT L3,4,5  2 of 2    RADIOFREQUENCY ABLATION Left 9/9/2021    Procedure: RADIOFREQUENCY ABLATION, L3-L4 AND L5 MEDIAL BRANCH 1 OF 2;  Surgeon: Shaq Silverio MD;  Location: [facility];  Service: Pain Management;  Laterality: Left;    RADIOFREQUENCY ABLATION Right 9/20/2021    Procedure: RADIOFREQUENCY ABLATION, L3-L4 AND L5 MEDIAL BRANCH 2 OF 2;  Surgeon: Shaq Silverio MD;  Location: [facility];  Service: Pain Management;  Laterality: Right;    RADIOFREQUENCY ABLATION Right  "7/7/2022    Procedure: RADIOFREQUENCY ABLATION, RIGHT L3-L4-L5 ONE OF TWO;  Surgeon: Shaq Silverio MD;  Location: [facility] [place];  Service: Pain Management;  Laterality: Right;    RADIOFREQUENCY ABLATION Left 7/21/2022    Procedure: RADIOFREQUENCY ABLATION, LEFT L3-L4-L5 TWO OF TWO *BRING SCS REMOTE*;  Surgeon: Shaq Silverio MD;  Location: [facility] [place];  Service: Pain Management;  Laterality: Left;    TRIAL OF SPINAL CORD NERVE STIMULATOR N/A 9/24/2018    Procedure: TRIAL, NEUROSTIMULATOR, SPINAL CORD, SPINAL CORD STIMULATOR TRIAL-INTERNAL WIRES TO EXTERNAL BATTERY;  Surgeon: Shaq Silverio MD;  Location: [facility] [place];  Service: Pain Management;  Laterality: N/A;  [employer] REP NOTIFIED       Family History:  Family History   Problem Relation Age of Onset    Cataracts Mother     Glaucoma Mother     No Known Problems Father     No Known Problems Sister     No Known Problems Brother     No Known Problems Maternal Aunt     No Known Problems Maternal Uncle     No Known Problems Paternal Aunt     No Known Problems Paternal Uncle     No Known Problems Maternal Grandmother     No Known Problems Maternal Grandfather     No Known Problems Paternal Grandmother     No Known Problems Paternal Grandfather        Social History:  Social History     Socioeconomic History    Marital status:    Tobacco Use    Smoking status: Never    Smokeless tobacco: Never   Substance and Sexual Activity    Alcohol use: No    Drug use: No       OBJECTIVE:    BP (!) 146/76   Pulse 61   Temp 98 °F (36.7 °C) (Oral)   Resp 18   Ht 5' 4" (1.626 m)   Wt 99.8 kg (220 lb)   BMI 37.76 kg/m²     PHYSICAL EXAMINATION:    General appearance: Well appearing, in no acute distress, alert and oriented x3.  Psych:  Mood and affect appropriate.  Skin: Skin color, texture, turgor normal, no rashes or lesions, in both upper and lower body.   Head/face:  Atraumatic, normocephalic. No palpable lymph nodes  Back: Straight leg raising in the sitting and supine " positions is negative to radicular pain bilaterally. There is pain with palpation to lumbar paraspinals and facet joints. Limited ROM with pain on extension. Positive facet loading bilaterally, L>R..There is pain with palpation to SI joints. Negative facet loading.  Extremities: Peripheral joint ROM is full and pain free without obvious instability or laxity in all four extremities. No deformities, edema, or skin discoloration. Good capillary refill.  Musculoskeletal: Normal strength to BLE. No atrophy or tone abnormalities are noted.  Neuro: Decreased sensation to BLE.  Gait: Antalgic- ambulates without assistance.    ASSESSMENT: 72 y.o. year old female with back pain, consistent with the following diagnoses:     1. Chronic pain syndrome        2. Lumbar radiculopathy        3. Myofascial pain        4. Spondylosis without myelopathy                  PLAN:   We discussed with the patient the assessment and recommendations. The following is the plan we agreed on:     - Recent imaging was reviewed and discussed with the patient today.    - I spoke with Roque and he called and left patient VM after her visit.    - Continue Zanflex Q8h PRN muscle spasms.     - RTC as needed. She will call for next appointment after SCS programming.      The above plan and management options were discussed at length with patient. Patient is in agreement with the above and verbalized understanding.    Renetta Steele  11/04/2022

## 2022-11-09 DIAGNOSIS — R15.9 INCONTINENCE OF FECES, UNSPECIFIED FECAL INCONTINENCE TYPE: Primary | ICD-10-CM

## 2022-11-09 DIAGNOSIS — Z12.11 COLON CANCER SCREENING: ICD-10-CM

## 2022-11-10 ENCOUNTER — CLINICAL SUPPORT (OUTPATIENT)
Dept: BARIATRICS | Facility: CLINIC | Age: 72
End: 2022-11-10
Payer: MEDICARE

## 2022-11-10 VITALS — WEIGHT: 214.31 LBS | BODY MASS INDEX: 36.78 KG/M2

## 2022-11-10 DIAGNOSIS — E66.9 OBESITY (BMI 30-39.9): ICD-10-CM

## 2022-11-10 DIAGNOSIS — Z71.3 DIETARY COUNSELING: ICD-10-CM

## 2022-11-10 DIAGNOSIS — I10 ESSENTIAL HYPERTENSION: ICD-10-CM

## 2022-11-10 DIAGNOSIS — E11.22 TYPE 2 DIABETES MELLITUS WITH STAGE 3A CHRONIC KIDNEY DISEASE, WITHOUT LONG-TERM CURRENT USE OF INSULIN: Primary | ICD-10-CM

## 2022-11-10 DIAGNOSIS — N18.31 TYPE 2 DIABETES MELLITUS WITH STAGE 3A CHRONIC KIDNEY DISEASE, WITHOUT LONG-TERM CURRENT USE OF INSULIN: Primary | ICD-10-CM

## 2022-11-10 DIAGNOSIS — G47.33 OSA (OBSTRUCTIVE SLEEP APNEA): ICD-10-CM

## 2022-11-10 DIAGNOSIS — Z98.84 S/P BARIATRIC SURGERY: ICD-10-CM

## 2022-11-10 PROCEDURE — 97803 PR MED NUTR THER, SUBSQ, INDIV, EA 15 MIN: ICD-10-PCS | Mod: S$GLB,,, | Performed by: DIETITIAN, REGISTERED

## 2022-11-10 PROCEDURE — 99499 NO LOS: ICD-10-PCS | Mod: S$GLB,,, | Performed by: DIETITIAN, REGISTERED

## 2022-11-10 PROCEDURE — 99499 UNLISTED E&M SERVICE: CPT | Mod: S$GLB,,, | Performed by: DIETITIAN, REGISTERED

## 2022-11-10 PROCEDURE — 97803 MED NUTRITION INDIV SUBSEQ: CPT | Mod: S$GLB,,, | Performed by: DIETITIAN, REGISTERED

## 2022-11-10 NOTE — PROGRESS NOTES
NUTRITION NOTE     Referring Physician: Nico Kovacs M.D.  Reason for MNT Referral: Follow-up 12 years s/p Gastric Bypass     72 yr old pt presents with 6 lbs weight loss over the past month by getting back on track with bariatric diet plan. States med wt loss is helping with appetite and cutting cravings for junk and sweets. Still craves chocolate sometimes but stays focused and does not give in.          Past Medical History:   Diagnosis Date    Anxiety with depression      Arthritis      CKD (chronic kidney disease) stage 2, GFR 60-89 ml/min      Diabetes mellitus      History of Clarisse-en-Y gastric bypass      Hypertension      Obesity, unspecified      DAHLIA (obstructive sleep apnea)      Spondylosis           CLINICAL DATA:  72 y.o. female. 5 ft. 2 in.     Current Weight: 214 lbs  BMI: 36.7  Total Weight Loss: 16 lbs  EWL: 16%     LABS:  Reviewed.     CURRENT DIET:  Back on track Bariatric Diet.  Diet Recall: 60-80 grams of protein/day; 32-48 oz of fluids/day     Current diet recall:  B: prot shake OR skips  L: 1/4 cup nuts or cheese + grapes or kiwi or small apple or banana  D: baked/smothered chicken or fish with variety of veg (asparagus, greens or spinach cooked down with olive oil, brussels, broccoli)  Sn: 1/4 cup nuts, piece of fruit     Diet includes:  Meal Pattern: 1 meal(s) + 1-2 snack(s) + 2 protein supplement(s)  Adequate protein supplement intake. Premier shakes.  Adequate dairy intake.  Adequate vegetable intake. Tolerates raw vegetables and lettuce.  Adequate fruit intake.  Starchy CHO: reduced lately, small amounts  Beverages: water, CL, Coke zero  Alcohol: twice/month white liquor + diet cranberry juice     EXERCISE:  None.  Considering joining the gym (it's free with her insurance)     Restrictions to Exercise: None. Back pain.     VITAMINS / MINERALS:  Multivitamins  B-Complex  Calcium Citrate + Vitamin D  Vitamin B12: Sublingual.     ASSESSMENT:  Doing well overall.  Weight  loss.  Adequate calorie intake.  Adequate protein intake.  Inadequate fluid intake.  Following diet inappropriately.  Not exercising.  Adequate vitamins & minerals.     BARIATRIC DIET DISCUSSION:  Instructed and provided written materials on bariatric diet plan.  Reinforced post-op nutrition guidelines.     PLAN / RECOMMENDATIONS:  Continue Back on track with diet plan.  Maintain/Increase protein intake.  Maintain/Increase fluid intake.  Begin light exercise as mary.  Continue appropriate vitamins & minerals.     Return to clinic in 1 months with med wt loss.     SESSION TIME: 30 minutes

## 2022-11-16 DIAGNOSIS — J30.2 SEASONAL ALLERGIC RHINITIS, UNSPECIFIED TRIGGER: ICD-10-CM

## 2022-11-16 NOTE — TELEPHONE ENCOUNTER
----- Message from Pratima Mac sent at 11/16/2022  3:14 PM CST -----  Regarding: [id number]  Type: RX Refill Request    Who Called: self    Have you contacted your pharmacy:yes    Refill or New Rx:refill    RX Name and Strength:promethazine-dextromethorphan (PROMETHAZINE-DM) 6.25-15 mg/5 mL Syrp    Preferred Pharmacy with phone number:  [facility] [address], [place] - [address]  [address] [place] [address]  Phone: [phone] Fax: [phone]          Local or Mail Order:local    Ordering Provider:Tom    Would the patient rather a call back or a response via [facility]? Call back    Best Call Back Number:[phone]      Additional Information: pt states she has been out since July

## 2022-11-17 RX ORDER — PROMETHAZINE HYDROCHLORIDE AND DEXTROMETHORPHAN HYDROBROMIDE 6.25; 15 MG/5ML; MG/5ML
5 SYRUP ORAL EVERY 8 HOURS PRN
Qty: 240 ML | Refills: 0 | OUTPATIENT
Start: 2022-11-17

## 2022-11-18 ENCOUNTER — TELEPHONE (OUTPATIENT)
Dept: FAMILY MEDICINE | Facility: CLINIC | Age: 72
End: 2022-11-18
Payer: MEDICARE

## 2022-11-18 DIAGNOSIS — J30.2 SEASONAL ALLERGIC RHINITIS, UNSPECIFIED TRIGGER: ICD-10-CM

## 2022-11-18 NOTE — TELEPHONE ENCOUNTER
----- Message from Kalpana Coyne sent at 11/18/2022  4:38 PM CST -----  Regarding: Refill Request  Type: RX Refill Request      Who Called: Self       Refill or New Rx: Refill       RX Name and Strength: promethazine-dextromethorphan (PROMETHAZINE-DM) 6.25-15 mg/5 mL Syrp        Preferred Pharmacy with phone number:   .  [place] [facility] [id number] [place] [address]  Phone: [phone] Fax: [phone]          Would the patient rather a call back or a response via [facility]? Call       Best Call Back Number: [phone]          Additional Information: Patient is scheduled for the soonest apt and would like to see if she can get the cough syrup filled.

## 2022-11-18 NOTE — TELEPHONE ENCOUNTER
Called patient and informed patient medication was denied due to patient needing an appointment. Appointment scheduled 12/05 at 2:20 pm.

## 2022-11-22 ENCOUNTER — TELEPHONE (OUTPATIENT)
Dept: FAMILY MEDICINE | Facility: CLINIC | Age: 72
End: 2022-11-22
Payer: MEDICARE

## 2022-11-22 DIAGNOSIS — R06.00 DYSPNEA, UNSPECIFIED TYPE: ICD-10-CM

## 2022-11-22 DIAGNOSIS — J45.40 MODERATE PERSISTENT ASTHMA, UNSPECIFIED WHETHER COMPLICATED: Primary | ICD-10-CM

## 2022-11-22 NOTE — TELEPHONE ENCOUNTER
Returned call to pt, pt states she never got her Nebulized due to supplier was out of stock. Pt states her old Rx has  and needs a new order. Pt wants order sent to [facility].

## 2022-11-22 NOTE — TELEPHONE ENCOUNTER
----- Message from Saumya Kemp sent at 11/22/2022  2:01 PM CST -----  Type: Patient Call Back    Who called: self    What is the request in detail: patient would like to have orders for a nebulizer sent to DME.     Can the clinic reply by [facility]? no    Would the patient rather a call back or a response via [facility]? call    Best call back number: .[phone] (home)

## 2022-11-25 ENCOUNTER — HOSPITAL ENCOUNTER (EMERGENCY)
Facility: HOSPITAL | Age: 72
Discharge: HOME | End: 2022-11-25
Attending: EMERGENCY MEDICINE
Payer: MEDICARE

## 2022-11-25 VITALS
HEIGHT: 64 IN | SYSTOLIC BLOOD PRESSURE: 116 MMHG | RESPIRATION RATE: 19 BRPM | BODY MASS INDEX: 36.54 KG/M2 | HEART RATE: 76 BPM | WEIGHT: 214 LBS | DIASTOLIC BLOOD PRESSURE: 58 MMHG | TEMPERATURE: 99 F | OXYGEN SATURATION: 96 %

## 2022-11-25 DIAGNOSIS — J10.1 INFLUENZA A: Primary | ICD-10-CM

## 2022-11-25 DIAGNOSIS — N39.0 URINARY TRACT INFECTION WITHOUT HEMATURIA, SITE UNSPECIFIED: ICD-10-CM

## 2022-11-25 DIAGNOSIS — J45.901 MILD ASTHMA WITH EXACERBATION, UNSPECIFIED WHETHER PERSISTENT: ICD-10-CM

## 2022-11-25 DIAGNOSIS — R05.9 COUGH: ICD-10-CM

## 2022-11-25 DIAGNOSIS — R06.02 SHORTNESS OF BREATH: ICD-10-CM

## 2022-11-25 LAB
BACTERIA #/AREA URNS HPF: ABNORMAL /HPF
BILIRUB UR QL STRIP: NEGATIVE
CLARITY UR: ABNORMAL
COLOR UR: YELLOW
CTP QC/QA: YES
CTP QC/QA: YES
GLUCOSE UR QL STRIP: NEGATIVE
HGB UR QL STRIP: NEGATIVE
HYALINE CASTS #/AREA URNS LPF: 40 /LPF
KETONES UR QL STRIP: NEGATIVE
LEUKOCYTE ESTERASE UR QL STRIP: ABNORMAL
MICROSCOPIC COMMENT: ABNORMAL
NITRITE UR QL STRIP: NEGATIVE
PH UR STRIP: 6 [PH] (ref 5–8)
POC MOLECULAR INFLUENZA A AGN: POSITIVE
POC MOLECULAR INFLUENZA B AGN: NEGATIVE
POCT GLUCOSE: 126 MG/DL (ref 70–110)
PROT UR QL STRIP: ABNORMAL
RBC #/AREA URNS HPF: 4 /HPF (ref 0–4)
SARS-COV-2 RDRP RESP QL NAA+PROBE: NEGATIVE
SP GR UR STRIP: 1.03 (ref 1–1.03)
SQUAMOUS #/AREA URNS HPF: 13 /HPF
URN SPEC COLLECT METH UR: ABNORMAL
UROBILINOGEN UR STRIP-ACNC: ABNORMAL EU/DL
WBC #/AREA URNS HPF: 8 /HPF (ref 0–5)

## 2022-11-25 PROCEDURE — 93005 ELECTROCARDIOGRAM TRACING: CPT

## 2022-11-25 PROCEDURE — 93010 ELECTROCARDIOGRAM REPORT: CPT | Mod: ,,, | Performed by: INTERNAL MEDICINE

## 2022-11-25 PROCEDURE — 82962 GLUCOSE BLOOD TEST: CPT

## 2022-11-25 PROCEDURE — 81000 URINALYSIS NONAUTO W/SCOPE: CPT

## 2022-11-25 PROCEDURE — 25000003 PHARM REV CODE 250

## 2022-11-25 PROCEDURE — 87635 SARS-COV-2 COVID-19 AMP PRB: CPT

## 2022-11-25 PROCEDURE — 94640 AIRWAY INHALATION TREATMENT: CPT | Mod: XB

## 2022-11-25 PROCEDURE — 87502 INFLUENZA DNA AMP PROBE: CPT

## 2022-11-25 PROCEDURE — 93010 EKG 12-LEAD: ICD-10-PCS | Mod: ,,, | Performed by: INTERNAL MEDICINE

## 2022-11-25 PROCEDURE — 25000242 PHARM REV CODE 250 ALT 637 W/ HCPCS

## 2022-11-25 PROCEDURE — 94761 N-INVAS EAR/PLS OXIMETRY MLT: CPT

## 2022-11-25 PROCEDURE — 99285 EMERGENCY DEPT VISIT HI MDM: CPT | Mod: 25

## 2022-11-25 RX ORDER — IPRATROPIUM BROMIDE AND ALBUTEROL SULFATE 2.5; .5 MG/3ML; MG/3ML
3 SOLUTION RESPIRATORY (INHALATION)
Status: COMPLETED | OUTPATIENT
Start: 2022-11-25 | End: 2022-11-25

## 2022-11-25 RX ORDER — ACETAMINOPHEN 500 MG
500 TABLET ORAL
Status: COMPLETED | OUTPATIENT
Start: 2022-11-25 | End: 2022-11-25

## 2022-11-25 RX ORDER — ALBUTEROL SULFATE 2.5 MG/.5ML
2.5 SOLUTION RESPIRATORY (INHALATION) EVERY 4 HOURS PRN
Qty: 20 EACH | Refills: 0 | Status: SHIPPED | OUTPATIENT
Start: 2022-11-25 | End: 2023-11-27

## 2022-11-25 RX ORDER — BENZONATATE 100 MG/1
100 CAPSULE ORAL 3 TIMES DAILY PRN
Qty: 30 CAPSULE | Refills: 0 | Status: SHIPPED | OUTPATIENT
Start: 2022-11-25 | End: 2023-03-14

## 2022-11-25 RX ORDER — ACETAMINOPHEN 500 MG
500 TABLET ORAL EVERY 6 HOURS PRN
Qty: 20 TABLET | Refills: 0 | Status: SHIPPED | OUTPATIENT
Start: 2022-11-25 | End: 2023-11-27

## 2022-11-25 RX ORDER — CETIRIZINE HYDROCHLORIDE 10 MG/1
10 TABLET ORAL DAILY PRN
Qty: 30 TABLET | Refills: 0 | Status: SHIPPED | OUTPATIENT
Start: 2022-11-25 | End: 2022-11-29 | Stop reason: ALTCHOICE

## 2022-11-25 RX ORDER — ONDANSETRON 4 MG/1
4 TABLET, ORALLY DISINTEGRATING ORAL EVERY 6 HOURS PRN
Qty: 15 TABLET | Refills: 0 | Status: ON HOLD | OUTPATIENT
Start: 2022-11-25 | End: 2023-05-06 | Stop reason: HOSPADM

## 2022-11-25 RX ORDER — GUAIFENESIN 100 MG/5ML
100-200 SOLUTION ORAL EVERY 4 HOURS PRN
Qty: 118 ML | Refills: 0 | Status: SHIPPED | OUTPATIENT
Start: 2022-11-25 | End: 2024-02-29

## 2022-11-25 RX ORDER — FLUTICASONE PROPIONATE 50 MCG
1 SPRAY, SUSPENSION (ML) NASAL 2 TIMES DAILY PRN
Qty: 15 G | Refills: 0 | Status: ON HOLD | OUTPATIENT
Start: 2022-11-25 | End: 2023-05-06 | Stop reason: HOSPADM

## 2022-11-25 RX ORDER — ALBUTEROL SULFATE 90 UG/1
1-2 AEROSOL, METERED RESPIRATORY (INHALATION) EVERY 6 HOURS PRN
Qty: 8 G | Refills: 0 | Status: SHIPPED | OUTPATIENT
Start: 2022-11-25 | End: 2023-11-27

## 2022-11-25 RX ORDER — CEPHALEXIN 500 MG/1
500 CAPSULE ORAL 4 TIMES DAILY
Qty: 20 CAPSULE | Refills: 0 | Status: SHIPPED | OUTPATIENT
Start: 2022-11-25 | End: 2022-11-30

## 2022-11-25 RX ADMIN — IPRATROPIUM BROMIDE AND ALBUTEROL SULFATE 3 ML: 2.5; .5 SOLUTION RESPIRATORY (INHALATION) at 03:11

## 2022-11-25 RX ADMIN — ACETAMINOPHEN 500 MG: 500 TABLET ORAL at 03:11

## 2022-11-25 NOTE — ED PROVIDER NOTES
Encounter Date: 11/25/2022       History     Chief Complaint   Patient presents with    Shortness of Breath     71 yo female to triage for SOB and rib pain from coughing. Pt has Asthma, she has been doing neb treatments and using inhaler but it isn't working. VSS, NAD, AAO(x4     72-year-old female with past medical history of diabetes, CKD, anxiety, depression, DAHLIA, asthma, hypertension, gastric bypass, and arthritis presents to the ED for shortness of breath patient states is normal for 4 days.  Patient has tried her nebulizers at home with no relief.  Patient denies any sick contacts or recent travel outside [facility].  Patient denies any long car rides, long airplane rides, hemoptysis, previous DVTs, or calf swelling.  Patient admits to chills, congestion, runny nose, sore throat, wet cough, shortness breath on exertion, pleuritic chest pain, nausea, loose stools, frequency, urgency, body aches, right rib pain from coughing, intermittent headaches, and lightheadedness.  Patient denies fever, swallowing, chest pain, calf swelling, abdominal pain, vomiting, diarrhea, constipation, dysuria, vaginal pain, vaginal bleeding, vaginal discharge, dizziness, rashes, and visual changes.    Review of patient's allergies indicates:  No Known Allergies  Past Medical History:   Diagnosis Date    Anxiety with depression     Arthritis     CKD (chronic kidney disease) stage 2, GFR 60-89 ml/min     Diabetes mellitus     History of Clarisse-en-Y gastric bypass     Hypertension     Obesity, unspecified     DAHLIA (obstructive sleep apnea)     Spondylosis      Past Surgical History:   Procedure Laterality Date    CARPAL TUNNEL RELEASE Right 3/8/2019    Procedure: RELEASE, CARPAL TUNNEL right;  Surgeon: Pan Goodman MD;  Location: [facility];  Service: Orthopedics;  Laterality: Right;    CARPAL TUNNEL RELEASE Left 7/18/2019    Procedure: RELEASE, CARPAL TUNNEL- LEFT;  Surgeon: Pan Goodman MD;  Location: [facility] OR [address] [place] [address];  Service:  Orthopedics;  Laterality: Left;    CATARACT EXTRACTION Bilateral      SECTION      CHOLECYSTECTOMY      EPIDURAL STEROID INJECTION INTO LUMBAR SPINE N/A 2018    Procedure: INJECTION, STEROID, SPINE, LUMBAR, EPIDURAL;  Surgeon: Shaq Silverio MD;  Location: [facility];  Service: Pain Management;  Laterality: N/A;  LUMBAR L4-L5 INTERLAMINAR ELISE'  [id number]  W/ SEDATION     HYSTERECTOMY      INJECTION OF ANESTHETIC AGENT AROUND NERVE Bilateral 2019    Procedure: BLOCK, NERVE, L3-L4-L5 MEDIAL BRANCH;  Surgeon: Shaq Silverio MD;  Location: [facility];  Service: Pain Management;  Laterality: Bilateral;    INJECTION OF ANESTHETIC AGENT AROUND NERVE Bilateral 10/17/2019    Procedure: BLOCK, NERVE;  Surgeon: Shaq Silverio MD;  Location: [facility];  Service: Pain Management;  Laterality: Bilateral;  B/L MBB L3-L4-L5  REPEAT  CONSENT NEEDED    INJECTION OF FACET JOINT Bilateral 2018    Procedure: INJECTION-FACET;  Surgeon: Shaq Silverio MD;  Location: [facility];  Service: Pain Management;  Laterality: Bilateral;  LUMBAR BILATERAL L4-L5 AND L5-S1 FACET STEROID INJECTION  [id number]    W/ SEDATION     RADIOFREQUENCY ABLATION Right 2019    Procedure: RADIOFREQUENCY ABLATION RIGHT L3, L4, L5;  Surgeon: Shaq Silverio MD;  Location: [facility];  Service: Pain Management;  Laterality: Right;  Right RFA L3-L4-L5  1 of 2  Consent Needed    RADIOFREQUENCY ABLATION Left 2019    Procedure: RADIOFREQUENCY ABLATION LEFT L3-5;  Surgeon: Shaq Silverio MD;  Location: [facility];  Service: Pain Management;  Laterality: Left;  Left RFA L3-L4-L5  2 of 2  Consent Needed    RADIOFREQUENCY ABLATION Left 2020    Procedure: RADIOFREQUENCY ABLATION LEFT L3,4,5 1 of 2;  Surgeon: Shaq Silverio MD;  Location: [facility];  Service: Pain Management;  Laterality: Left;  Left RFA L3,4,5  1 of 2    RADIOFREQUENCY ABLATION Right 2020    Procedure: RADIOFREQUENCY ABLATION RIGHT L3,4,5 2 of 2;  Surgeon:  Shaq Silverio MD;  Location: [facility] [place];  Service: Pain Management;  Laterality: Right;  RADIOFREQUENCY ABLATION RIGHT L3,4,5  2 of 2    RADIOFREQUENCY ABLATION Left 9/9/2021    Procedure: RADIOFREQUENCY ABLATION, L3-L4 AND L5 MEDIAL BRANCH 1 OF 2;  Surgeon: Shaq Silverio MD;  Location: [facility] [place];  Service: Pain Management;  Laterality: Left;    RADIOFREQUENCY ABLATION Right 9/20/2021    Procedure: RADIOFREQUENCY ABLATION, L3-L4 AND L5 MEDIAL BRANCH 2 OF 2;  Surgeon: Shaq Silverio MD;  Location: [facility] [place];  Service: Pain Management;  Laterality: Right;    RADIOFREQUENCY ABLATION Right 7/7/2022    Procedure: RADIOFREQUENCY ABLATION, RIGHT L3-L4-L5 ONE OF TWO;  Surgeon: Shaq Silverio MD;  Location: [facility] [place];  Service: Pain Management;  Laterality: Right;    RADIOFREQUENCY ABLATION Left 7/21/2022    Procedure: RADIOFREQUENCY ABLATION, LEFT L3-L4-L5 TWO OF TWO *BRING SCS REMOTE*;  Surgeon: Shaq Silverio MD;  Location: [facility] [place];  Service: Pain Management;  Laterality: Left;    TRIAL OF SPINAL CORD NERVE STIMULATOR N/A 9/24/2018    Procedure: TRIAL, NEUROSTIMULATOR, SPINAL CORD, SPINAL CORD STIMULATOR TRIAL-INTERNAL WIRES TO EXTERNAL BATTERY;  Surgeon: Shaq Silverio MD;  Location: [facility] [place];  Service: Pain Management;  Laterality: N/A;  ABBOTT REP NOTIFIED     Family History   Problem Relation Age of Onset    Cataracts Mother     Glaucoma Mother     No Known Problems Father     No Known Problems Sister     No Known Problems Brother     No Known Problems Maternal Aunt     No Known Problems Maternal Uncle     No Known Problems Paternal Aunt     No Known Problems Paternal Uncle     No Known Problems Maternal Grandmother     No Known Problems Maternal Grandfather     No Known Problems Paternal Grandmother     No Known Problems Paternal Grandfather      Social History     Tobacco Use    Smoking status: Never    Smokeless tobacco: Never   Substance Use Topics    Alcohol use: No    Drug use: No     Review of  Systems   Constitutional:  Positive for chills. Negative for diaphoresis and fever.   HENT:  Positive for congestion, rhinorrhea and sore throat. Negative for trouble swallowing.    Eyes:  Negative for visual disturbance.   Respiratory:  Positive for cough and shortness of breath.         (+) pleuritic chest pain   Cardiovascular:  Negative for chest pain and leg swelling.   Gastrointestinal:  Positive for nausea. Negative for abdominal pain, constipation, diarrhea and vomiting.        (+) loose stools   Genitourinary:  Positive for frequency and urgency. Negative for decreased urine volume, difficulty urinating, dysuria, vaginal bleeding, vaginal discharge and vaginal pain.   Musculoskeletal:  Positive for arthralgias (right rib soreness from coughing) and myalgias (body aches).   Skin:  Negative for rash.   Neurological:  Positive for light-headedness and headaches (Intermittent). Negative for dizziness.     Physical Exam     Initial Vitals [11/25/22 1424]   BP Pulse Resp Temp SpO2   (!) 123/58 80 19 98.5 °F (36.9 °C) 96 %      MAP       --         Physical Exam    Nursing note and vitals reviewed.  Constitutional: She appears well-developed and well-nourished. She is not diaphoretic. She is active. She does not appear ill. No distress.   HENT:   Head: Normocephalic and atraumatic.   Right Ear: External ear normal.   Left Ear: External ear normal.   Nose: Nose normal.   Mouth/Throat: Uvula is midline, oropharynx is clear and moist and mucous membranes are normal.   Eyes: Conjunctivae, EOM and lids are normal. Pupils are equal, round, and reactive to light. Right eye exhibits no discharge. Left eye exhibits no discharge.   Neck: Neck supple.   Normal range of motion.   Full passive range of motion without pain.     Cardiovascular:  Normal rate and regular rhythm.           Pulses:       Dorsalis pedis pulses are 2+ on the right side and 2+ on the left side.   Pulmonary/Chest: Effort normal. No respiratory distress.  She has wheezes in the right upper field, the right middle field, the right lower field, the left upper field, the left middle field and the left lower field. She exhibits tenderness (right ribs). She exhibits no mass, no edema, no deformity and no swelling.   Abdominal: Abdomen is soft. She exhibits no distension. There is no abdominal tenderness.   Musculoskeletal:         General: Normal range of motion.      Cervical back: Full passive range of motion without pain, normal range of motion and neck supple.      Right lower leg: Normal. No swelling. No edema.      Left lower leg: Normal. No swelling. No edema.      Right ankle: Normal.      Left ankle: Normal.      Right foot: Normal. Normal pulse.      Left foot: Normal. Normal pulse.     Neurological: She is alert and oriented to person, place, and time. GCS eye subscore is 4. GCS verbal subscore is 5. GCS motor subscore is 6.   Skin: Skin is dry. Capillary refill takes less than 2 seconds.       ED Course   Procedures  Labs Reviewed   URINALYSIS, REFLEX TO URINE CULTURE - Abnormal; Notable for the following components:       Result Value    Appearance, UA Hazy (*)     Protein, UA 1+ (*)     Urobilinogen, UA 2.0-3.0 (*)     Leukocytes, UA Trace (*)     All other components within normal limits    Narrative:     Specimen Source->Urine   URINALYSIS MICROSCOPIC - Abnormal; Notable for the following components:    WBC, UA 8 (*)     Hyaline Casts, UA 40 (*)     All other components within normal limits    Narrative:     Specimen Source->Urine   POCT INFLUENZA A/B MOLECULAR - Abnormal; Notable for the following components:    POC Molecular Influenza A Ag Positive (*)     All other components within normal limits   POCT GLUCOSE - Abnormal; Notable for the following components:    POCT Glucose 126 (*)     All other components within normal limits   CULTURE, URINE   SARS-COV-2 RDRP GENE     EKG Readings: (Independently Interpreted)   EKG showed normal sinus rhythm with a  rate of 72 beats per minute.  LA interval of 140 milliseconds.  QRS of 80 milliseconds.  QTC of 438 milliseconds.  Nonspecific T-wave abnormalities, no STEMI noted.  Signed by Dr. Perry.    ECG Results              EKG 12-lead (Preliminary result)  Result time 11/25/22 15:13:50      ED Interpretation by Svitlana Perry MD (11/25/22 15:13:50, [facility], Emergency Medicine)    Normal sinus rhythm, rate 72 beats per minute, normal LA interval,  milliseconds, no STEMI                                  Imaging Results              X-Ray Chest 1 View (Final result)  Result time 11/25/22 16:14:17      Final result by Trey Wood MD (11/25/22 16:14:17)                   Impression:      No acute findings.      Electronically signed by: Trey Wood MD  Date:    11/25/2022  Time:    16:14               Narrative:    EXAMINATION:  XR CHEST 1 VIEW    CLINICAL HISTORY:  Cough, unspecified    TECHNIQUE:  Single frontal view of the chest was performed.    COMPARISON:  09/06/2022    FINDINGS:  The patient is moderately rotated.  Mildly enlarged cardiac silhouette, unchanged.  The lungs are grossly clear.  No pneumothorax or pleural effusions.                                       Medications   acetaminophen tablet 500 mg (500 mg Oral Given 11/25/22 1503)   albuterol-ipratropium 2.5 mg-0.5 mg/3 mL nebulizer solution 3 mL (3 mLs Nebulization Given 11/25/22 3946)     Medical Decision Making:   Initial Assessment:   72-year-old female with past medical history of diabetes, CKD, anxiety, depression, DAHLIA, asthma, hypertension, gastric bypass, and arthritis presents to the ED for shortness of breath patient states is normal for 4 days.   Patient's chart and medical history reviewed.  Differential Diagnosis:   COVID  Flu  Viral URI  Asthma exacerbation  Pneumonia   Bronchitis  PE  MI  Pleural effusion   Pneumothorax  UTI  Clinical Tests:   Lab Tests: Ordered and Reviewed  Radiological Study: Ordered and  Reviewed  Medical Tests: Ordered and Reviewed  ED Management:  Patient's vitals reviewed.  She is afebrile, no respiratory distress, nontoxic-appearing in the ED. Patient had bilateral wheezing on exam with right rib tenderness to palpation.  Patient given Tylenol for headache.  Point of care glucose is 126.  Patient is COVID negative.  Patient given a breathing treatment. UA was remarkable for UTI. Patient will be sent home on Keflex.  Discussed with her a urine culture will be performed and if anything grows thatt is not covered by this antibiotic she will be called and an appropriate antibiotic will be prescribed.  She verbalized understanding.  Chest x-ray showed no acute findings. EKG showed normal sinus rhythm with a rate of 72 beats per minute.  SD interval of 140 milliseconds.  QRS of 80 milliseconds.  QTC of 438 milliseconds.  Nonspecific T-wave abnormalities, no STEMI noted.  Signed by Dr. Perry. Patient is influenza A positive. Patient's O2 sat is 96% on room air with no respiratory distress.  Patient states she is feeling a lot better after the breathing treatments.  Discussed with patient she will need to quarantine until she is afebrile for 24 hours without taking any medications that would lower her temperature or any new symptoms developing.  Discussed with her the influenza is most likely causing acute asthma exacerbation.  Discussed with patient she can use Tylenol and ibuprofen as needed for fevers and headaches, as well as staying hydrated and resting until this clears from her system.  I will send in Tylenol, Flonase, albuterol inhaler, albuterol nebulizers, Robitussin cough syrup, Tessalon Perles, Zyrtec, and Zofran for symptomatic control.  Patient will follow-up with her PCP next week.  Discussed with patient strict return precautions, she verbalized understanding.  Patient agrees with this plan.  Patient is stable for discharge.                            Clinical Impression:   Final  diagnoses:  [R05.9] Cough  [R06.02] Shortness of breath  [J10.1] Influenza A (Primary)  [N39.0] Urinary tract infection without hematuria, site unspecified  [J45.901] Mild asthma with exacerbation, unspecified whether persistent      ED Disposition Condition    Discharge Stable          ED Prescriptions       Medication Sig Dispense Start Date End Date Auth. Provider    albuterol (PROVENTIL/VENTOLIN HFA) 90 mcg/actuation inhaler Inhale 1-2 puffs into the lungs every 6 (six) hours as needed for Wheezing or Shortness of Breath. Rescue 8 g 11/25/2022 -- Alayna Holdsworth, PA-C    albuterol sulfate 2.5 mg/0.5 mL Nebu Take 2.5 mg by nebulization every 4 (four) hours as needed (SOB/wheezing). Rescue 20 each 11/25/2022 11/25/2023 Alayna Holdsworth, PA-C    fluticasone propionate (FLONASE) 50 mcg/actuation nasal spray 1 spray (50 mcg total) by Each Nostril route 2 (two) times daily as needed for Rhinitis or Allergies. 15 g 11/25/2022 -- Alayna Holdsworth, PA-C    cetirizine (ZYRTEC) 10 MG tablet Take 1 tablet (10 mg total) by mouth daily as needed for Allergies or Rhinitis. 30 tablet 11/25/2022 -- Alayna Holdsworth, PA-C    benzonatate (TESSALON) 100 MG capsule Take 1 capsule (100 mg total) by mouth 3 (three) times daily as needed for Cough. 30 capsule 11/25/2022 -- Alayna Holdsworth, PA-C    guaiFENesin 100 mg/5 ml (ROBITUSSIN) 100 mg/5 mL syrup Take 5-10 mLs (100-200 mg total) by mouth every 4 (four) hours as needed for Cough or Congestion. 118 mL 11/25/2022 -- Alayna Holdsworth, PA-C    cephALEXin (KEFLEX) 500 MG capsule Take 1 capsule (500 mg total) by mouth 4 (four) times daily. for 5 days 20 capsule 11/25/2022 11/30/2022 Alayna Holdsworth, PA-C    acetaminophen (TYLENOL) 500 MG tablet Take 1 tablet (500 mg total) by mouth every 6 (six) hours as needed for Temperature greater than or Pain. 20 tablet 11/25/2022 -- Alayna Holdsworth, PA-C    ondansetron (ZOFRAN-ODT) 4 MG TbDL Take 1 tablet (4 mg total) by mouth every 6  (six) hours as needed (Nausea). 15 tablet 11/25/2022 -- Alayna Holdsworth, PA-C          Follow-up Information       Follow up With Specialties Details Why Contact Info    Renae Sprague,  Family Medicine   [address] [facility] [place] [address]  [phone]               Alayna Holdsworth, PA-C  11/25/22 8906

## 2022-11-25 NOTE — ED NOTES
Patient c/o SOA, right sided rib pain, headache for approximately 4 days. Patient also mentioned urgency with urination. Pt reports hx of asthma.

## 2022-11-25 NOTE — DISCHARGE INSTRUCTIONS
Thank you for coming to our Emergency Department today. It is important to remember that some problems are difficult to diagnose and may not be found during your first visit. Be sure to follow up with your primary care doctor and review any labs/imaging that was performed with them. If you do not have a primary care doctor, you may contact the one listed on your discharge paperwork or you may also call the [facility] Appointment Desk at [phone] to schedule an appointment with one.     All medications may potentially have side effects and it is impossible to predict which medications may give you side effects. If you feel that you are having a negative effect of any medication you should immediately stop taking them and seek medical attention.    Return to the ER with any questions/concerns, new/concerning symptoms, worsening or failure to improve. Do not drive or make any important decisions for 24 hours if you have received any pain medications, sedatives or mood altering drugs during your ER visit.

## 2022-12-16 ENCOUNTER — OFFICE VISIT (OUTPATIENT)
Dept: PULMONOLOGY | Facility: CLINIC | Age: 72
End: 2022-12-16
Payer: MEDICARE

## 2022-12-16 VITALS
OXYGEN SATURATION: 97 % | BODY MASS INDEX: 36.17 KG/M2 | SYSTOLIC BLOOD PRESSURE: 160 MMHG | DIASTOLIC BLOOD PRESSURE: 72 MMHG | HEART RATE: 72 BPM | WEIGHT: 211.88 LBS | HEIGHT: 64 IN

## 2022-12-16 DIAGNOSIS — J47.9 BRONCHIECTASIS WITHOUT COMPLICATION: ICD-10-CM

## 2022-12-16 DIAGNOSIS — G47.33 OSA ON CPAP: ICD-10-CM

## 2022-12-16 DIAGNOSIS — J84.10: ICD-10-CM

## 2022-12-16 DIAGNOSIS — J45.40 MODERATE PERSISTENT ASTHMA, UNSPECIFIED WHETHER COMPLICATED: Primary | ICD-10-CM

## 2022-12-16 PROCEDURE — 3008F PR BODY MASS INDEX (BMI) DOCUMENTED: ICD-10-PCS | Mod: CPTII,S$GLB,, | Performed by: NURSE PRACTITIONER

## 2022-12-16 PROCEDURE — 99999 PR PBB SHADOW E&M-EST. PATIENT-LVL III: ICD-10-PCS | Mod: PBBFAC,,, | Performed by: NURSE PRACTITIONER

## 2022-12-16 PROCEDURE — 1101F PT FALLS ASSESS-DOCD LE1/YR: CPT | Mod: CPTII,S$GLB,, | Performed by: NURSE PRACTITIONER

## 2022-12-16 PROCEDURE — 3078F DIAST BP <80 MM HG: CPT | Mod: CPTII,S$GLB,, | Performed by: NURSE PRACTITIONER

## 2022-12-16 PROCEDURE — 99214 PR OFFICE/OUTPT VISIT, EST, LEVL IV, 30-39 MIN: ICD-10-PCS | Mod: S$GLB,,, | Performed by: NURSE PRACTITIONER

## 2022-12-16 PROCEDURE — 3078F PR MOST RECENT DIASTOLIC BLOOD PRESSURE < 80 MM HG: ICD-10-PCS | Mod: CPTII,S$GLB,, | Performed by: NURSE PRACTITIONER

## 2022-12-16 PROCEDURE — 1101F PR PT FALLS ASSESS DOC 0-1 FALLS W/OUT INJ PAST YR: ICD-10-PCS | Mod: CPTII,S$GLB,, | Performed by: NURSE PRACTITIONER

## 2022-12-16 PROCEDURE — 3008F BODY MASS INDEX DOCD: CPT | Mod: CPTII,S$GLB,, | Performed by: NURSE PRACTITIONER

## 2022-12-16 PROCEDURE — 1126F PR PAIN SEVERITY QUANTIFIED, NO PAIN PRESENT: ICD-10-PCS | Mod: CPTII,S$GLB,, | Performed by: NURSE PRACTITIONER

## 2022-12-16 PROCEDURE — 99999 PR PBB SHADOW E&M-EST. PATIENT-LVL III: CPT | Mod: PBBFAC,,, | Performed by: NURSE PRACTITIONER

## 2022-12-16 PROCEDURE — 3288F PR FALLS RISK ASSESSMENT DOCUMENTED: ICD-10-PCS | Mod: CPTII,S$GLB,, | Performed by: NURSE PRACTITIONER

## 2022-12-16 PROCEDURE — 99499 UNLISTED E&M SERVICE: CPT | Mod: S$GLB,,, | Performed by: NURSE PRACTITIONER

## 2022-12-16 PROCEDURE — 1126F AMNT PAIN NOTED NONE PRSNT: CPT | Mod: CPTII,S$GLB,, | Performed by: NURSE PRACTITIONER

## 2022-12-16 PROCEDURE — 3288F FALL RISK ASSESSMENT DOCD: CPT | Mod: CPTII,S$GLB,, | Performed by: NURSE PRACTITIONER

## 2022-12-16 PROCEDURE — 3077F SYST BP >= 140 MM HG: CPT | Mod: CPTII,S$GLB,, | Performed by: NURSE PRACTITIONER

## 2022-12-16 PROCEDURE — 99214 OFFICE O/P EST MOD 30 MIN: CPT | Mod: S$GLB,,, | Performed by: NURSE PRACTITIONER

## 2022-12-16 PROCEDURE — 99499 RISK ADDL DX/OHS AUDIT: ICD-10-PCS | Mod: S$GLB,,, | Performed by: NURSE PRACTITIONER

## 2022-12-16 PROCEDURE — 3077F PR MOST RECENT SYSTOLIC BLOOD PRESSURE >= 140 MM HG: ICD-10-PCS | Mod: CPTII,S$GLB,, | Performed by: NURSE PRACTITIONER

## 2022-12-16 RX ORDER — BUDESONIDE AND FORMOTEROL FUMARATE DIHYDRATE 160; 4.5 UG/1; UG/1
2 AEROSOL RESPIRATORY (INHALATION) EVERY 12 HOURS
Qty: 10.2 G | Refills: 11 | Status: SHIPPED | OUTPATIENT
Start: 2022-12-16 | End: 2024-02-29

## 2022-12-16 RX ORDER — DOXYCYCLINE 100 MG/1
100 CAPSULE ORAL EVERY 12 HOURS
Qty: 20 CAPSULE | Refills: 0 | Status: SHIPPED | OUTPATIENT
Start: 2022-12-16 | End: 2022-12-26

## 2022-12-16 RX ORDER — PREDNISONE 20 MG/1
40 TABLET ORAL DAILY
Qty: 10 TABLET | Refills: 0 | Status: SHIPPED | OUTPATIENT
Start: 2022-12-16 | End: 2022-12-21

## 2022-12-16 NOTE — PROGRESS NOTES
CHIEF COMPLAINT:    Chief Complaint   Patient presents with    Apnea       HISTORY OF PRESENT ILLNESS: Faby Luna is a 72 y.o. female never smoker with  has a past medical history of Anxiety with depression, Arthritis, CKD (chronic kidney disease) stage 2, GFR 60-89 ml/min, Diabetes mellitus, History of Clarisse-en-Y gastric bypass, Hypertension, Obesity, unspecified, DAHLIA (obstructive sleep apnea), and Spondylosis. is here for  for management of obstructive sleep apnea and CPAP equipment check.    CPAP setup 11/3/2022:   Usage 11/16/2022 - 12/15/2022  Usage days 16/30 days (53%)  >= 4 hours 16 days (53%)  < 4 hours 0 days (0%)  Usage hours 77 hours 27 minutes  Average usage (total days) 2 hours 35 minutes  Average usage (days used) 4 hours 50 minutes  Median usage (days used) 4 hours 32 minutes  Total used hours (value since last reset - 12/15/2022) 137 hours  AirSense 11 AutoSet  Serial number [id number]  Mode AutoSet  Min Pressure 5 cmH2O  Max Pressure 20 cmH2O  EPR Fulltime  EPR level 3  Response Standard  Therapy  Pressure - cmH2O Median: 7.9 95th percentile: 10.4 Maximum: 12.1  Leaks - L/min Median: 7.7 95th percentile: 23.6 Maximum: 84.1  Events per hour AI: 2.5 HI: 0.2 AHI: 2.7  Apnea Index Central: 0.1 Obstructive: 1.7 Unknown: 0.7  RERA Index 0.2  Cheyne-Swenson respiration (average duration per night) 0 minutes (0%)  Not as short of breath      Patient with adult onset asthma 10-15 years with symptoms of wheezing, occasional dry cough, sob at random. Started on Breo initial office visit 8/2/2022. Helping somewhat, per pt but reports nothing was helping when cool front change in weather so recommended trelegy 9/13/2022.  States trelegy, spiriva was too expensive and never picked.  She is currently on Breo and reports this is affordable. Menthol baths help. Breathing much better per pt. Not compliant when breo when breathing improves.    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 Spirometry remains unimproved following bronchodilator. Lung volumes show moderate restriction is present. DLCO is mildly decreased.     CXR9/6/2022: Mildly enlarged cardiac silhouette, stable.  The lungs are clear.  No pneumothorax.  No pleural effusions. Spinal cord stimulator with tip in the midthoracic spine noted.    CT chest 9/16/2022: Lungs/Pleura: Pleural based plaque versus calcified granuloma right lower lobe posteriorly.  Mild bibasilar reticulonodular fibrotic changes and subpleural cysts are present.  There is a focal area of fibrosis and architectural distortion in the anterior left upper lobe.  Mild bronchiectasis is present in the lung bases   Impression:     Mild pulmonary fibrosis, possibly UIP pattern.     Coronary and aortic atherosclerosis     Nonspecific small calcified pleural base nodule versus pleural plaque may indicate previous granulomatous disease, prior pleural based infection or possibly exposure to asbestos    Sleep study 9/12/2020:The overall AHI was 8.3 with an oxygen tiffany of 81.0%. The AHI in REM sleep was 16.6. The central apnea index was 0.0.  The supine AHI was 5.6.  Lost cpap during move     ECHO 9/16/2022:  The estimated ejection fraction is 55%.  The left ventricle is normal in size with mild concentric hypertrophy and normal systolic function.  Grade I left ventricular diastolic dysfunction.  Normal right ventricular size with normal right ventricular systolic function.  Mild left atrial enlargement.  Normal central  venous pressure (3 mmHg).  The estimated PA systolic pressure is 17 mmHg.    Labs:  Eos:0.2  IgE:NA  REVIEW OF SYSTEMS:   Review of Systems   Constitutional:  Positive for weight gain and fatigue. Negative for fever, chills, weight loss, activity change, appetite change and night sweats.   HENT:  Positive for congestion.    Eyes: Negative.    Respiratory:  Positive for apnea, snoring, cough, shortness of breath, wheezing, orthopnea (sometimes), dyspnea on extertion, use of rescue inhaler and somnolence. Negative for sputum production, chest tightness and previous hospitialization due to pulmonary problems.    Cardiovascular:  Negative for chest pain and palpitations.   Genitourinary: Negative.    Endocrine: endocrine negative    Musculoskeletal:  Negative for gait problem.   Skin: Negative.    Gastrointestinal:  Negative for acid reflux.   Neurological: Negative.    Psychiatric/Behavioral:  Positive for sleep disturbance (2-3 x).          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    Chronic pain syndrome 10/26/2018    Bilateral carpal tunnel syndrome 12/03/2018    Right carpal tunnel syndrome 03/08/2019    Cubital tunnel syndrome on right 03/08/2019    Pain, hand 05/06/2019    Hand weakness 05/06/2019    Anxiety 05/28/2018    Major depressive disorder, recurrent episode, in partial remission 05/13/2019    Carpal tunnel syndrome 06/28/2019    Osteoarthritis of spine with radiculopathy, lumbar  region 10/17/2019    Spondylosis without myelopathy 2020    Hypertrophy of ligamentum flavum 02/15/2021    Chronic renal failure, stage 3a 02/15/2021    Osteoarthritis of lumbar spine 2021    Refractive error 2021    Pseudophakia 2021    Morbid (severe) obesity due to excess calories 2022    Spinal enthesopathy, site unspecified 2022    Occlusion of peripherally inserted central catheter (PICC) line, initial encounter 2022    Seasonal allergic rhinitis 2022    Moderate persistent asthma 2022    Dyspnea 09/15/2022    DAHLIA on CPAP 09/15/2022    Pulmonary granuloma 10/03/2022    Localized pulmonary fibrosis 10/03/2022    Bronchiectasis without complication 10/03/2022     Resolved Ambulatory Problems     Diagnosis Date Noted    Bacteremia due to Escherichia coli 2021    Pyelonephritis 2021    Complicated UTI (urinary tract infection) 2021     Past Medical History:   Diagnosis Date    Anxiety with depression     Arthritis     CKD (chronic kidney disease) stage 2, GFR 60-89 ml/min     Diabetes mellitus     History of Clarisse-en-Y gastric bypass     Hypertension     Obesity, unspecified     DAHLIA (obstructive sleep apnea)     Spondylosis                 PAST SURGICAL HISTORY:    Past Surgical History:   Procedure Laterality Date    CARPAL TUNNEL RELEASE Right 3/8/2019    Procedure: RELEASE, CARPAL TUNNEL right;  Surgeon: Pan Goodman MD;  Location: [facility] OR;  Service: Orthopedics;  Laterality: Right;    CARPAL TUNNEL RELEASE Left 2019    Procedure: RELEASE, CARPAL TUNNEL- LEFT;  Surgeon: Pan Goodman MD;  Location: [address];  Service: Orthopedics;  Laterality: Left;    CATARACT EXTRACTION Bilateral      SECTION      CHOLECYSTECTOMY      EPIDURAL STEROID INJECTION INTO LUMBAR SPINE N/A 2018    Procedure: INJECTION, STEROID, SPINE, LUMBAR, EPIDURAL;  Surgeon: Shaq Silverio MD;  Location: [facility] PAIN MGT;  Service: Pain Management;   Laterality: N/A;  LUMBAR L4-L5 INTERLAMINAR ELISE'  [id number]  W/ SEDATION     HYSTERECTOMY      INJECTION OF ANESTHETIC AGENT AROUND NERVE Bilateral 9/12/2019    Procedure: BLOCK, NERVE, L3-L4-L5 MEDIAL BRANCH;  Surgeon: Shaq Silverio MD;  Location: [facility] PAIN MGT;  Service: Pain Management;  Laterality: Bilateral;    INJECTION OF ANESTHETIC AGENT AROUND NERVE Bilateral 10/17/2019    Procedure: BLOCK, NERVE;  Surgeon: Shaq Silverio MD;  Location: [facility] PAIN MGT;  Service: Pain Management;  Laterality: Bilateral;  B/L MBB L3-L4-L5  REPEAT  CONSENT NEEDED    INJECTION OF FACET JOINT Bilateral 5/28/2018    Procedure: INJECTION-FACET;  Surgeon: Shaq Silverio MD;  Location: [facility] PAIN MGT;  Service: Pain Management;  Laterality: Bilateral;  LUMBAR BILATERAL L4-L5 AND L5-S1 FACET STEROID INJECTION  [id number]    W/ SEDATION     RADIOFREQUENCY ABLATION Right 11/21/2019    Procedure: RADIOFREQUENCY ABLATION RIGHT L3, L4, L5;  Surgeon: Shaq Silverio MD;  Location: [facility] PAIN MGT;  Service: Pain Management;  Laterality: Right;  Right RFA L3-L4-L5  1 of 2  Consent Needed    RADIOFREQUENCY ABLATION Left 12/5/2019    Procedure: RADIOFREQUENCY ABLATION LEFT L3-5;  Surgeon: Shaq Silverio MD;  Location: [facility] PAIN MGT;  Service: Pain Management;  Laterality: Left;  Left RFA L3-L4-L5  2 of 2  Consent Needed    RADIOFREQUENCY ABLATION Left 8/17/2020    Procedure: RADIOFREQUENCY ABLATION LEFT L3,4,5 1 of 2;  Surgeon: Shaq Silverio MD;  Location: [facility] PAIN MGT;  Service: Pain Management;  Laterality: Left;  Left RFA L3,4,5  1 of 2    RADIOFREQUENCY ABLATION Right 8/31/2020    Procedure: RADIOFREQUENCY ABLATION RIGHT L3,4,5 2 of 2;  Surgeon: Shaq Silverio MD;  Location: [facility] PAIN MGT;  Service: Pain Management;  Laterality: Right;  RADIOFREQUENCY ABLATION RIGHT L3,4,5  2 of 2    RADIOFREQUENCY ABLATION Left 9/9/2021    Procedure: RADIOFREQUENCY ABLATION, L3-L4 AND L5 MEDIAL BRANCH 1 OF 2;  Surgeon: Shaq Silverio MD;  Location: [facility] PAIN MGT;  Service:  Pain Management;  Laterality: Left;    RADIOFREQUENCY ABLATION Right 9/20/2021    Procedure: RADIOFREQUENCY ABLATION, L3-L4 AND L5 MEDIAL BRANCH 2 OF 2;  Surgeon: Shaq Silverio MD;  Location: [facility] PAIN MGT;  Service: Pain Management;  Laterality: Right;    RADIOFREQUENCY ABLATION Right 7/7/2022    Procedure: RADIOFREQUENCY ABLATION, RIGHT L3-L4-L5 ONE OF TWO;  Surgeon: Shaq Silverio MD;  Location: [facility] PAIN MGT;  Service: Pain Management;  Laterality: Right;    RADIOFREQUENCY ABLATION Left 7/21/2022    Procedure: RADIOFREQUENCY ABLATION, LEFT L3-L4-L5 TWO OF TWO *BRING SCS REMOTE*;  Surgeon: Shaq Silverio MD;  Location: [facility] PAIN MGT;  Service: Pain Management;  Laterality: Left;    TRIAL OF SPINAL CORD NERVE STIMULATOR N/A 9/24/2018    Procedure: TRIAL, NEUROSTIMULATOR, SPINAL CORD, SPINAL CORD STIMULATOR TRIAL-INTERNAL WIRES TO EXTERNAL BATTERY;  Surgeon: Shaq Silverio MD;  Location: [facility] PAIN MGT;  Service: Pain Management;  Laterality: N/A;  ABBOTT REP NOTIFIED         FAMILY HISTORY:                Family History   Problem Relation Age of Onset    Cataracts Mother     Glaucoma Mother     No Known Problems Father     No Known Problems Sister     No Known Problems Brother     No Known Problems Maternal Aunt     No Known Problems Maternal Uncle     No Known Problems Paternal Aunt     No Known Problems Paternal Uncle     No Known Problems Maternal Grandmother     No Known Problems Maternal Grandfather     No Known Problems Paternal Grandmother     No Known Problems Paternal Grandfather        SOCIAL HISTORY:          Tobacco:   Social History     Tobacco Use   Smoking Status Never   Smokeless Tobacco Never       alcohol use:    Social History     Substance and Sexual Activity   Alcohol Use No                   ALLERGIES:  Review of patient's allergies indicates:  No Known Allergies    CURRENT MEDICATIONS:    Current Outpatient Medications   Medication Sig Dispense Refill    acetaminophen (TYLENOL) 500 MG tablet Take 1  tablet (500 mg total) by mouth every 6 (six) hours as needed for Temperature greater than or Pain. 20 tablet 0    albuterol (PROAIR HFA) 90 mcg/actuation inhaler Inhale 2 puffs into the lungs every 4 (four) hours as needed for Wheezing or Shortness of Breath. Rescue 8.5 g 1    albuterol (PROVENTIL/VENTOLIN HFA) 90 mcg/actuation inhaler Inhale 1-2 puffs into the lungs every 6 (six) hours as needed for Wheezing or Shortness of Breath. Rescue 8 g 0    albuterol sulfate 2.5 mg/0.5 mL Nebu Take 2.5 mg by nebulization every 4 (four) hours as needed (shortness of breath, wheezing, coughing). Rescue 30 each 1    albuterol sulfate 2.5 mg/0.5 mL Nebu Take 2.5 mg by nebulization every 4 (four) hours as needed (SOB/wheezing). Rescue 20 each 0    atenoloL (TENORMIN) 100 MG tablet Take 1 tablet (100 mg total) by mouth once daily. 90 tablet 3    benzonatate (TESSALON) 100 MG capsule Take 1 capsule (100 mg total) by mouth 3 (three) times daily as needed for Cough. 30 capsule 0    benzonatate (TESSALON) 200 MG capsule Take 1 capsule (200 mg total) by mouth 3 (three) times daily as needed for Cough. 30 capsule 1    EScitalopram oxalate (LEXAPRO) 20 MG tablet TAKE 1 TABLET EVERY DAY 90 tablet 3    fluticasone propionate (FLONASE) 50 mcg/actuation nasal spray 2 sprays (100 mcg total) by Each Nostril route once daily. 11.1 mL 1    fluticasone propionate (FLONASE) 50 mcg/actuation nasal spray 1 spray (50 mcg total) by Each Nostril route 2 (two) times daily as needed for Rhinitis or Allergies. 15 g 0    furosemide (LASIX) 20 MG tablet TAKE 1 TABLET EVERY DAY 90 tablet 3    glipiZIDE (GLUCOTROL) 10 MG tablet Take 1 tablet (10 mg total) by mouth daily with breakfast. 180 tablet 3    guaiFENesin 100 mg/5 ml (ROBITUSSIN) 100 mg/5 mL syrup Take 5-10 mLs (100-200 mg total) by mouth every 4 (four) hours as needed for Cough or Congestion. 118 mL 0    hydrOXYzine pamoate (VISTARIL) 25 MG Cap TAKE 1 CAPSULE BY MOUTH ONCE DAILY AS NEEDED FOR ANXIETY  "30 capsule 2    ketorolac (TORADOL) 10 mg tablet Take 1 tablet (10 mg total) by mouth every 6 (six) hours as needed for Pain. Take with food to prevent heartburn. 20 tablet 1    linaGLIPtin (TRADJENTA) 5 mg Tab tablet Take 1 tablet (5 mg total) by mouth once daily. 90 tablet 3    loratadine (CLARITIN) 10 mg tablet TAKE 1 TABLET EVERY DAY AS NEEDED FOR ALLERGIES 90 tablet 3    losartan (COZAAR) 50 MG tablet TAKE 1 TABLET EVERY DAY 90 tablet 3    ondansetron (ZOFRAN-ODT) 4 MG TbDL Take 1 tablet (4 mg total) by mouth every 6 (six) hours as needed (Nausea). 15 tablet 0    pantoprazole (PROTONIX) 20 MG tablet TAKE 1 TABLET TWICE DAILY  BEFORE  MEALS 180 tablet 3    promethazine-dextromethorphan (PROMETHAZINE-DM) 6.25-15 mg/5 mL Syrp Take 5 mLs by mouth every 8 (eight) hours as needed (cough). 240 mL 0    tiotropium bromide (SPIRIVA RESPIMAT) 2.5 mcg/actuation inhaler Inhale 1 puff into the lungs Daily. Controller 4 g 11    tiZANidine (ZANAFLEX) 4 MG tablet TAKE 1 TABLET BY MOUTH EVERY 8 HOURS AS NEEDED FOR  MUSCLE  PAIN 90 tablet 0    blood-glucose meter kit Use as instructed 1 each 0    budesonide-formoterol 160-4.5 mcg (SYMBICORT) 160-4.5 mcg/actuation HFAA Inhale 2 puffs into the lungs every 12 (twelve) hours. Controller (brand only) (stop breo) 10.2 g 11    gabapentin (NEURONTIN) 800 MG tablet Take 1 tablet (800 mg total) by mouth 3 (three) times daily. 90 tablet 2    rosuvastatin (CRESTOR) 20 MG tablet Take 1 tablet (20 mg total) by mouth once daily. 30 tablet 11    topiramate (TOPAMAX) 50 MG tablet Take 1 tablet (50 mg total) by mouth 2 (two) times daily. 180 tablet 0    traZODone (DESYREL) 100 MG tablet Take 1 tablet (100 mg total) by mouth every evening. 90 tablet 3     No current facility-administered medications for this visit.                       PHYSICAL EXAM:  Vitals:    12/16/22 1149   BP: (!) 160/72   Pulse: 72   SpO2: 97%   Weight: 96.1 kg (211 lb 13.8 oz)   Height: 5' 4" (1.626 m)   PainSc: 0-No pain "       Body mass index is 36.37 kg/m².   Physical Exam   Constitutional: She is oriented to person, place, and time. She appears well-developed. She is obese.   HENT:   Head: Normocephalic.   Mouth/Throat: Oropharynx is clear and moist. Mallampati Score: II.   Cardiovascular: Normal rate, regular rhythm and normal heart sounds.   Pulmonary/Chest: Normal expansion and effort normal. She has wheezes. She has rhonchi.   Musculoskeletal:         General: No edema.      Cervical back: Neck supple.   Neurological: She is alert and oriented to person, place, and time. Gait normal.   Skin: Skin is warm. She is not diaphoretic.   Psychiatric: She has a normal mood and affect. Her behavior is normal. Judgment and thought content normal.                                                   Lab Results   Component Value Date    TSH 2.267 05/30/2018      Lab Results   Component Value Date    WBC 12.83 (H) 08/03/2022    HGB 12.4 08/03/2022    HCT 40.3 08/03/2022    MCV 81 (L) 08/03/2022     08/03/2022     BMP  Lab Results   Component Value Date     08/03/2022    K 4.4 08/03/2022     08/03/2022    CO2 28 08/03/2022    BUN 15 08/03/2022    CREATININE 1.2 08/03/2022    CALCIUM 9.6 08/03/2022    ANIONGAP 10 08/03/2022    ESTGFRAFRICA >60 12/30/2021    EGFRNONAA 56 (A) 12/30/2021     Lab Results   Component Value Date    HGBA1C 6.6 (H) 08/03/2022        ASSESSMENT    ICD-10-CM ICD-9-CM    1. Moderate persistent asthma, unspecified whether complicated  J45.40 493.90 budesonide-formoterol 160-4.5 mcg (SYMBICORT) 160-4.5 mcg/actuation HFAA      predniSONE (DELTASONE) 20 MG tablet      doxycycline (VIBRAMYCIN) 100 MG Cap      2. Bronchiectasis without complication  J47.9 494.0 predniSONE (DELTASONE) 20 MG tablet      doxycycline (VIBRAMYCIN) 100 MG Cap      3. Localized pulmonary fibrosis  J84.10 515       4. DAHLIA on CPAP  G47.33 327.23     Z99.89 V46.8               PLAN:    Problem List Items Addressed This Visit           Unprioritized    Bronchiectasis without complication    Overview     Noted ct chest 9/16/2022  Pt on breo + spiriva- however not taking due to cost  May benefit from airway clearance         Localized pulmonary fibrosis    Overview     Noted on CT chest 9/16/2022 monitor for progression with pft         Moderate persistent asthma - Primary    Overview     Adult onset asthma 10-15 years with persistent symptoms, recommend maintenance ics/laba  Manage triggers-AR, GERD         Relevant Medications    budesonide-formoterol 160-4.5 mcg (SYMBICORT) 160-4.5 mcg/actuation HFAA    DAHLIA on CPAP    Overview     Sleep study 9/12/2020:The overall AHI was 8.3 with an oxygen tiffany of 81.0%. The AHI in REM sleep was 16.6. The central apnea index was 0.0.  The supine AHI was 5.6.  Home sleep study 09/20/2022 AHI 29  After discussing all options, patient agree to cpap.                  Patient will Follow up in about 4 weeks (around 1/13/2023), or if symptoms worsen or fail to improve in 3-5 days, for 1/13/2023 8 am.

## 2022-12-19 ENCOUNTER — OFFICE VISIT (OUTPATIENT)
Dept: BARIATRICS | Facility: CLINIC | Age: 72
End: 2022-12-19
Payer: MEDICARE

## 2022-12-19 VITALS
BODY MASS INDEX: 36.23 KG/M2 | DIASTOLIC BLOOD PRESSURE: 84 MMHG | HEIGHT: 64 IN | HEART RATE: 74 BPM | SYSTOLIC BLOOD PRESSURE: 134 MMHG | WEIGHT: 212.19 LBS | OXYGEN SATURATION: 98 %

## 2022-12-19 DIAGNOSIS — E11.22 TYPE 2 DIABETES MELLITUS WITH STAGE 3A CHRONIC KIDNEY DISEASE, WITHOUT LONG-TERM CURRENT USE OF INSULIN: ICD-10-CM

## 2022-12-19 DIAGNOSIS — N18.31 TYPE 2 DIABETES MELLITUS WITH STAGE 3A CHRONIC KIDNEY DISEASE, WITHOUT LONG-TERM CURRENT USE OF INSULIN: ICD-10-CM

## 2022-12-19 DIAGNOSIS — E66.01 CLASS 2 SEVERE OBESITY WITH BODY MASS INDEX (BMI) OF 35 TO 39.9 WITH SERIOUS COMORBIDITY: Primary | ICD-10-CM

## 2022-12-19 DIAGNOSIS — Z98.84 HISTORY OF ROUX-EN-Y GASTRIC BYPASS: ICD-10-CM

## 2022-12-19 PROCEDURE — 1126F PR PAIN SEVERITY QUANTIFIED, NO PAIN PRESENT: ICD-10-PCS | Mod: CPTII,S$GLB,, | Performed by: INTERNAL MEDICINE

## 2022-12-19 PROCEDURE — 3075F SYST BP GE 130 - 139MM HG: CPT | Mod: CPTII,S$GLB,, | Performed by: INTERNAL MEDICINE

## 2022-12-19 PROCEDURE — 3066F PR DOCUMENTATION OF TREATMENT FOR NEPHROPATHY: ICD-10-PCS | Mod: CPTII,S$GLB,, | Performed by: INTERNAL MEDICINE

## 2022-12-19 PROCEDURE — 1160F RVW MEDS BY RX/DR IN RCRD: CPT | Mod: CPTII,S$GLB,, | Performed by: INTERNAL MEDICINE

## 2022-12-19 PROCEDURE — 3079F PR MOST RECENT DIASTOLIC BLOOD PRESSURE 80-89 MM HG: ICD-10-PCS | Mod: CPTII,S$GLB,, | Performed by: INTERNAL MEDICINE

## 2022-12-19 PROCEDURE — 4010F PR ACE/ARB THEARPY RXD/TAKEN: ICD-10-PCS | Mod: CPTII,S$GLB,, | Performed by: INTERNAL MEDICINE

## 2022-12-19 PROCEDURE — 1101F PR PT FALLS ASSESS DOC 0-1 FALLS W/OUT INJ PAST YR: ICD-10-PCS | Mod: CPTII,S$GLB,, | Performed by: INTERNAL MEDICINE

## 2022-12-19 PROCEDURE — 3066F NEPHROPATHY DOC TX: CPT | Mod: CPTII,S$GLB,, | Performed by: INTERNAL MEDICINE

## 2022-12-19 PROCEDURE — 3044F HG A1C LEVEL LT 7.0%: CPT | Mod: CPTII,S$GLB,, | Performed by: INTERNAL MEDICINE

## 2022-12-19 PROCEDURE — 3044F PR MOST RECENT HEMOGLOBIN A1C LEVEL <7.0%: ICD-10-PCS | Mod: CPTII,S$GLB,, | Performed by: INTERNAL MEDICINE

## 2022-12-19 PROCEDURE — 1101F PT FALLS ASSESS-DOCD LE1/YR: CPT | Mod: CPTII,S$GLB,, | Performed by: INTERNAL MEDICINE

## 2022-12-19 PROCEDURE — 3288F FALL RISK ASSESSMENT DOCD: CPT | Mod: CPTII,S$GLB,, | Performed by: INTERNAL MEDICINE

## 2022-12-19 PROCEDURE — 3075F PR MOST RECENT SYSTOLIC BLOOD PRESS GE 130-139MM HG: ICD-10-PCS | Mod: CPTII,S$GLB,, | Performed by: INTERNAL MEDICINE

## 2022-12-19 PROCEDURE — 1159F PR MEDICATION LIST DOCUMENTED IN MEDICAL RECORD: ICD-10-PCS | Mod: CPTII,S$GLB,, | Performed by: INTERNAL MEDICINE

## 2022-12-19 PROCEDURE — 99999 PR PBB SHADOW E&M-EST. PATIENT-LVL V: ICD-10-PCS | Mod: PBBFAC,,, | Performed by: INTERNAL MEDICINE

## 2022-12-19 PROCEDURE — 99214 PR OFFICE/OUTPT VISIT, EST, LEVL IV, 30-39 MIN: ICD-10-PCS | Mod: S$GLB,,, | Performed by: INTERNAL MEDICINE

## 2022-12-19 PROCEDURE — 3072F PR LOW RISK FOR RETINOPATHY: ICD-10-PCS | Mod: CPTII,S$GLB,, | Performed by: INTERNAL MEDICINE

## 2022-12-19 PROCEDURE — 3008F BODY MASS INDEX DOCD: CPT | Mod: CPTII,S$GLB,, | Performed by: INTERNAL MEDICINE

## 2022-12-19 PROCEDURE — 99999 PR PBB SHADOW E&M-EST. PATIENT-LVL V: CPT | Mod: PBBFAC,,, | Performed by: INTERNAL MEDICINE

## 2022-12-19 PROCEDURE — 1126F AMNT PAIN NOTED NONE PRSNT: CPT | Mod: CPTII,S$GLB,, | Performed by: INTERNAL MEDICINE

## 2022-12-19 PROCEDURE — 3288F PR FALLS RISK ASSESSMENT DOCUMENTED: ICD-10-PCS | Mod: CPTII,S$GLB,, | Performed by: INTERNAL MEDICINE

## 2022-12-19 PROCEDURE — 3072F LOW RISK FOR RETINOPATHY: CPT | Mod: CPTII,S$GLB,, | Performed by: INTERNAL MEDICINE

## 2022-12-19 PROCEDURE — 3079F DIAST BP 80-89 MM HG: CPT | Mod: CPTII,S$GLB,, | Performed by: INTERNAL MEDICINE

## 2022-12-19 PROCEDURE — 4010F ACE/ARB THERAPY RXD/TAKEN: CPT | Mod: CPTII,S$GLB,, | Performed by: INTERNAL MEDICINE

## 2022-12-19 PROCEDURE — 99214 OFFICE O/P EST MOD 30 MIN: CPT | Mod: S$GLB,,, | Performed by: INTERNAL MEDICINE

## 2022-12-19 PROCEDURE — 1160F PR REVIEW ALL MEDS BY PRESCRIBER/CLIN PHARMACIST DOCUMENTED: ICD-10-PCS | Mod: CPTII,S$GLB,, | Performed by: INTERNAL MEDICINE

## 2022-12-19 PROCEDURE — 3060F PR POS MICROALBUMINURIA RESULT DOCUMENTED/REVIEW: ICD-10-PCS | Mod: CPTII,S$GLB,, | Performed by: INTERNAL MEDICINE

## 2022-12-19 PROCEDURE — 3008F PR BODY MASS INDEX (BMI) DOCUMENTED: ICD-10-PCS | Mod: CPTII,S$GLB,, | Performed by: INTERNAL MEDICINE

## 2022-12-19 PROCEDURE — 1159F MED LIST DOCD IN RCRD: CPT | Mod: CPTII,S$GLB,, | Performed by: INTERNAL MEDICINE

## 2022-12-19 PROCEDURE — 3060F POS MICROALBUMINURIA REV: CPT | Mod: CPTII,S$GLB,, | Performed by: INTERNAL MEDICINE

## 2022-12-19 RX ORDER — TOPIRAMATE 50 MG/1
50 TABLET, FILM COATED ORAL 2 TIMES DAILY
Qty: 180 TABLET | Refills: 0 | Status: SHIPPED | OUTPATIENT
Start: 2022-12-19 | End: 2023-04-06

## 2022-12-19 NOTE — PROGRESS NOTES
Subjective:       Patient ID: Faby Luna is a 72 y.o. female.    Chief Complaint: Follow-up      CC:    Pt here today for follow-up. Has lost 5.7 lbs (-0.9 lbs muscle. -4.6 lbs fat). WAs to get back on track with bariatric diet and started topiramate 25 mg BID. Has been taking 50 mg qhs. She has been having more SOB with her asthma, son not a lot of exercise.     New BMR: 1607    New PBF: 40.5%     Initial:  BMR:1617    PBF:  41.6%    From recent RD notes.   Back on track Bariatric Diet.  Diet Recall: 60-80 grams of protein/day; 32-48 oz of fluids/day     Current diet recall:  B: prot shake OR skips  L: 1/4 cup nuts or cheese + grapes or kiwi or small apple or banana  D: baked/smothered chicken or fish with variety of veg (asparagus, greens or spinach cooked down with olive oil, brussels, broccoli)  Sn: 1/4 cup nuts, piece of fruit     Diet includes:  Meal Pattern: 1 meal(s) + 1-2 snack(s) + 2 protein supplement(s)  Adequate protein supplement intake. Premier shakes.  Adequate dairy intake.  Adequate vegetable intake. Tolerates raw vegetables and lettuce.  Adequate fruit intake.  Starchy CHO: reduced lately, small amounts  Beverages: water, CL, Coke zero  Alcohol: twice/month white liquor + diet cranberry juice     EXERCISE:  None.  Considering joining the gym (it's free with her insurance)     Restrictions to Exercise: None. Back pain.     VITAMINS / MINERALS:  Multivitamins  B-Complex  Calcium Citrate + Vitamin D  Vitamin B12: Sublingual.     ASSESSMENT:  Doing well overall.  Weight loss.  Adequate calorie intake.  Adequate protein intake.  Inadequate fluid intake.  Following diet inappropriately.  Not exercising.  Adequate vitamins & minerals.     BARIATRIC DIET DISCUSSION:  Instructed and provided written materials on bariatric diet plan.  Reinforced post-op nutrition guidelines.     PLAN / RECOMMENDATIONS:  Continue Back on track with diet plan.  Maintain/Increase protein  "intake.  Maintain/Increase fluid intake.  Begin light exercise as mary.  Continue appropriate vitamins & minerals.     Return to clinic in 1 months with med wt loss.    Lab Results       Component                Value               Date                       HGBA1C                   6.6 (H)             08/03/2022                 HGBA1C                   6.5 (H)             10/11/2021                 HGBA1C                   6.0 (H)             02/10/2021            Lab Results       Component                Value               Date                       LDLCALC                  97.2                08/03/2022                 CREATININE               1.2                 08/03/2022          '    Review of Systems      Objective:    /84 (BP Location: Left arm, Patient Position: Sitting, BP Method: Large (Manual))   Pulse 74   Ht 5' 4" (1.626 m)   Wt 96.3 kg (212 lb 3.2 oz)   SpO2 98%   BMI 36.42 kg/m²    Physical Exam  Vitals reviewed.   Constitutional:       General: She is not in acute distress.     Appearance: She is well-developed.   HENT:      Head: Normocephalic and atraumatic.   Eyes:      General: No scleral icterus.     Pupils: Pupils are equal, round, and reactive to light.   Neck:      Thyroid: No thyromegaly.   Cardiovascular:      Rate and Rhythm: Normal rate.      Heart sounds: Normal heart sounds. No murmur heard.    No friction rub.   Pulmonary:      Effort: Pulmonary effort is normal. No respiratory distress.      Breath sounds: Normal breath sounds. No wheezing.   Abdominal:      General: Bowel sounds are normal. There is no distension.      Palpations: Abdomen is soft.      Tenderness: There is no abdominal tenderness.   Musculoskeletal:         General: Normal range of motion.      Cervical back: Normal range of motion and neck supple.   Skin:     General: Skin is warm and dry.      Findings: No erythema.   Neurological:      Mental Status: She is alert and oriented to person, place, and " time.      Cranial Nerves: No cranial nerve deficit.   Psychiatric:         Behavior: Behavior normal.         Judgment: Judgment normal.       Assessment:       Problem List Items Addressed This Visit       Type 2 diabetes mellitus with stage 3a chronic kidney disease, without long-term current use of insulin     Other Visit Diagnoses       Class 2 severe obesity with body mass index (BMI) of 35 to 39.9 with serious comorbidity    -  Primary    History of Clarisse-en-Y gastric bypass                  Plan:           Faby was seen today for follow-up.    Diagnoses and all orders for this visit:    Class 2 severe obesity with body mass index (BMI) of 35 to 39.9 with serious comorbidity    History of Clarisse-en-Y gastric bypass    Type 2 diabetes mellitus with stage 3a chronic kidney disease, without long-term current use of insulin    Other orders  -     topiramate (TOPAMAX) 50 MG tablet; Take 1 tablet (50 mg total) by mouth 2 (two) times daily.        Patient was informed that topiramate is used for migraine prevention and seizures. Weight loss is a common side effect that is well documented. S/he understands this. S/he was informed of the potential side effects such as serious and possibly fatal rash in which case the medication should be discontinued immediately. Paresthesias, forgetfulness, fatigue, kidney stones, GI symptoms, and changes in lab values such as electrolytes, blood counts and kidney function.      Start topiramate 50 mg morning and evening.     - To lose weight you want to cut 100% starchy carbohydrates out of your diet (bread, rice, pasta, potatoes, granola, flour, corn, peas, oatmeal, grits, tortillas, crackers, chips) and get  grams of protein.  Aim for 100 grams of protein 6823-0237 bianka  daily.        - No soda, sweet tea, juices, sports drinks or lemonade     -Exercise daily. Increase low impact activity as tolerated.  Avoid high impact activity, very heavy lifting or other exercise regimens  that may cause discomfort.     Pt advised to check blood sugar regularly as medications may need to be adjusted with changes in diet and weight loss.     Holiday tips given.

## 2022-12-19 NOTE — PATIENT INSTRUCTIONS
Start topiramate 50 mg morning and evening.     - To lose weight you want to cut 100% starchy carbohydrates out of your diet (bread, rice, pasta, potatoes, granola, flour, corn, peas, oatmeal, grits, tortillas, crackers, chips) and get  grams of protein.  Aim for 100 grams of protein [id number] bianka  daily.        - No soda, sweet tea, juices, sports drinks or lemonade     -Exercise daily. Increase low impact activity as tolerated.  Avoid high impact activity, very heavy lifting or other exercise regimens that may cause discomfort.     Pt advised to check blood sugar regularly as medications may need to be adjusted with changes in diet and weight loss.       Helpful tips to survive the holidays:  Dont save yourself for the meal: Make sure you continue to eat high protein small meals every 3-4 hours to ensure to do not become over-hungry. Avoid temptation by not showing up to a holiday party or gathering hungry.   Plan ahead. Bring a dish to a party if you know there may not be an appropriate option.   Choose sugar-free drinks: Stick to water or other sugar-free beverages and make sure you are getting 6-8 cups of fluid each day (but not with meals!). Avoid alcohol, carbonated beverages, and high-fat/high-sugar beverages like hot chocolate and eggnog. Try sugar-free hot cocoa made with skim milk or water, or sugar-free spiced tea to add some holiday flair to your beverage (see sugar-free mulled cider recipe below)  Take your time: Eat mindfully. Dont graze on food throughout the day. Sit down to enjoy your small meals. Chew slowly and thoroughly. Cut your food into small pieces before eating.  Listen to your body: Stop eating as soon as you feel full. Do not feel pressured to try certain (or all) foods or to eat all of the food on your plate. Listen to your hunger cues.   REMEMBER: Make your holidays about spending time with family and friends instead of focusing gatherings around food.  Keep up your  exercise routine: Make sure you continue to get regular exercise throughout the holiday season. Encourage friends and family to be active by taking a walk together after a meal, to look at holiday lights, or to window-shop.    Good Holiday Meal Options:  Roasted Turkey, NO skin. Use low sodium broth instead of gravy.   Stuffed Bell Peppers made WITHOUT bread crumbs or Rice. Try using parmesan cheese instead  Gumbo, NO rice. Try picking out mostly the meat/seafood and vegetables with little broth.   Green Bean Casserole made with 98% fat free cream of mushroom soup and crushed almonds/pecans instead of fried onions  Side salad w/ low fat dressing. Try a different kind of salad maybe use Kale or spinach.   Roasted non-starchy vegetables like brussel sprouts, broccoli, green beans, zucchini, butternut squash, cauliflower  Cauliflower Mash (steam or roast cauliflower, puree w/ low fat cheese, dash of fat free milk and 2-3 sprays of I cant believe its not butter spray. Add garlic powder and black pepper to season). Use Low sodium broth instead of gravy.   Try Loaded Cauliflower Mash (Make cauliflower like above cauliflower mash. Top with diced turkey lugo, ¼ cup low fat cheddar cheese and bake @ 350* F for 5-10 minutes, until cheese is melted. Top with minced chives, black pepper and garlic to taste).   Homemade cranberry sauce using Splenda or another alternative sweetener. Boil fresh cranberries and add splenda to taste. Boil until cranberries break open and then simmer until it reaches the consistency you want (less time for more watery sauce and simmer for longer to create a thicker sauce).   Deviled eggs: make using low fat reyes, mustard, DILL relish (not sweet relish).   Vegetable tray w/ Greek yogurt Ranch Dip. Mix 1 packet of [place] ranch dip mix w/ 16 oz low fat plain greek yogurt.     Good Holiday Dessert Options:  High protein Pumpkin Cheesecake (see recipe below)  Pumpkin Whip (see recipe  below)  Quest Apple Pie or Cinnamon Roll flavored protein bar (warm in microwave for 10-15 seconds)  Eggnog Protein shake (see recipe below)  Fresh fruit w/ low fat cheese  Sugar-free Jello Parfaits. Layer Red and Green sugar-free jello in cups and top w/ 2 tbsp Sugar-free cool-whip    Pumpkin Cheesecake    8 ounces fat free cream cheese, softened   2 scoops of vanilla protein powder (<4 g sugar per serving)   ¼ tsp Fine salt   2 eggs, at room temperature   1/3 cup fat free sour cream  1/3 cup fat free half and half  1 15 -ounce can pure pumpkin puree   1 tablespoon pumpkin pie spice, plus more for dusting   Unsalted nuts, crushed  *Add splenda to taste    Directions     1. Preheat the oven to 300 degrees F. Line 18 muffin cups with paper liners. Sprinkle 1 tsp crushed unsalted nuts at the bottom of each of muffin cup liner.     2. In a large bowl, beat the cream cheese, vanilla protein powder and 1/4 teaspoon fine salt on medium-high speed until smooth and creamy, 2 to 3 minutes. Scrape down the sides, reduce speed to low and beat in the eggs, 1 at a time, until combined. Beat in 1/3 cup fat free sour cream and fat free half and half. Stir in the pumpkin puree and pumpkin pie spice until smooth. Divide evenly among cookie-lined paper cups, filling almost all the way to the top.     3. Bake until the filling is just set, 40 to 45 minutes. A sharp knife inserted into the center will come out moist, but clean. Cool completely in tins on a wire rack. Refrigerate until cold, 4 hours, or overnight. Top with a dusting of pumpkin pie spice.    Recipe altered from the following recipe: http://[email or web address].com/recipes/food-network-zay/mini-pumpkin-cheesecakes-recipe.print.html?oc=linkback    Pumpkin Whip    Box of sugar-free vanilla pudding  Can of pumpkin puree  Pumpkin Pie spice (sprinkle to taste)  ½ cup of sugar-free Cool Whip    Directions:  Make sugar-free pudding according to package directions using fat free  or 1% milk. Stir in pumpkin and cool whip. Add pumpkin pie spice to taste.     Egg Nog Protein shake    8 oz skim or 1% milk  1 scoop vanilla protein powder  1 tbsp sugar-free vanilla pudding mix  ½ tsp butter flavor extract  ½ tsp rum extract  ½ tsp cinnamon     Shake together or blend with ice and serve.     Sugar-Free Mulled Cider    3 oz diet cran-apple juice  6 oz water  1 packet sugar-free apple cider mix  ½ tsp apple pie spice  ½ tsp butter flavor extract  1 tbsp Sugar-free Syrup    Mix together. Warm if needed and serve w/ orange wedge and cinnamon stick.

## 2022-12-21 DIAGNOSIS — G47.00 INSOMNIA, UNSPECIFIED TYPE: ICD-10-CM

## 2022-12-21 DIAGNOSIS — E78.5 DYSLIPIDEMIA: ICD-10-CM

## 2022-12-21 NOTE — TELEPHONE ENCOUNTER
No new care gaps identified.  [facility] Embedded Care Gaps. Reference number: [id number]. 12/21/2022   4:10:08 PM CST

## 2022-12-21 NOTE — TELEPHONE ENCOUNTER
----- Message from Judie Mg sent at 12/21/2022  3:55 PM CST -----  Regarding: self [phone]  Type: Patient Call Back    Who called: self     What is the request in detail: stated pharmacy has been trying to get in touch with office but the office doesn't call them back, she needs a refill for traZODone (DESYREL) 100 MG tablet and rosuvastatin (CRESTOR) 20 MG tablet. She would like a call back.     [id number] - [place], [place] - [address] [place] [address]  Phone: [phone] Fax: [phone]      Can the clinic reply by [facility]? no    Would the patient rather a call back or a response via [facility]? Call back     Best call back number: [phone]

## 2022-12-22 RX ORDER — ROSUVASTATIN CALCIUM 20 MG/1
20 TABLET, COATED ORAL DAILY
Qty: 30 TABLET | Refills: 11 | Status: SHIPPED | OUTPATIENT
Start: 2022-12-22 | End: 2024-01-18

## 2022-12-22 RX ORDER — TRAZODONE HYDROCHLORIDE 100 MG/1
100 TABLET ORAL NIGHTLY
Qty: 90 TABLET | Refills: 3 | Status: SHIPPED | OUTPATIENT
Start: 2022-12-22 | End: 2024-01-18

## 2023-01-13 ENCOUNTER — OFFICE VISIT (OUTPATIENT)
Dept: PULMONOLOGY | Facility: CLINIC | Age: 73
End: 2023-01-13
Payer: MEDICARE

## 2023-01-13 VITALS
HEART RATE: 89 BPM | SYSTOLIC BLOOD PRESSURE: 124 MMHG | HEIGHT: 64 IN | BODY MASS INDEX: 35.49 KG/M2 | DIASTOLIC BLOOD PRESSURE: 78 MMHG | OXYGEN SATURATION: 96 % | WEIGHT: 207.88 LBS

## 2023-01-13 DIAGNOSIS — J84.10 PULMONARY GRANULOMA: ICD-10-CM

## 2023-01-13 DIAGNOSIS — J47.9 BRONCHIECTASIS WITHOUT COMPLICATION: ICD-10-CM

## 2023-01-13 DIAGNOSIS — J45.40 MODERATE PERSISTENT ASTHMA WITHOUT COMPLICATION: ICD-10-CM

## 2023-01-13 DIAGNOSIS — I25.10 ATHEROSCLEROSIS OF NATIVE CORONARY ARTERY OF NATIVE HEART WITHOUT ANGINA PECTORIS: ICD-10-CM

## 2023-01-13 DIAGNOSIS — J84.10: ICD-10-CM

## 2023-01-13 DIAGNOSIS — G47.33 OSA ON CPAP: Primary | ICD-10-CM

## 2023-01-13 PROCEDURE — 99214 OFFICE O/P EST MOD 30 MIN: CPT | Mod: S$GLB,,, | Performed by: NURSE PRACTITIONER

## 2023-01-13 PROCEDURE — 99499 RISK ADDL DX/OHS AUDIT: ICD-10-PCS | Mod: S$GLB,,, | Performed by: NURSE PRACTITIONER

## 2023-01-13 PROCEDURE — 3078F DIAST BP <80 MM HG: CPT | Mod: CPTII,S$GLB,, | Performed by: NURSE PRACTITIONER

## 2023-01-13 PROCEDURE — 3288F FALL RISK ASSESSMENT DOCD: CPT | Mod: CPTII,S$GLB,, | Performed by: NURSE PRACTITIONER

## 2023-01-13 PROCEDURE — 1159F PR MEDICATION LIST DOCUMENTED IN MEDICAL RECORD: ICD-10-PCS | Mod: CPTII,S$GLB,, | Performed by: NURSE PRACTITIONER

## 2023-01-13 PROCEDURE — 99214 PR OFFICE/OUTPT VISIT, EST, LEVL IV, 30-39 MIN: ICD-10-PCS | Mod: S$GLB,,, | Performed by: NURSE PRACTITIONER

## 2023-01-13 PROCEDURE — 3074F SYST BP LT 130 MM HG: CPT | Mod: CPTII,S$GLB,, | Performed by: NURSE PRACTITIONER

## 2023-01-13 PROCEDURE — 3078F PR MOST RECENT DIASTOLIC BLOOD PRESSURE < 80 MM HG: ICD-10-PCS | Mod: CPTII,S$GLB,, | Performed by: NURSE PRACTITIONER

## 2023-01-13 PROCEDURE — 99999 PR PBB SHADOW E&M-EST. PATIENT-LVL III: CPT | Mod: PBBFAC,,, | Performed by: NURSE PRACTITIONER

## 2023-01-13 PROCEDURE — 3008F PR BODY MASS INDEX (BMI) DOCUMENTED: ICD-10-PCS | Mod: CPTII,S$GLB,, | Performed by: NURSE PRACTITIONER

## 2023-01-13 PROCEDURE — 1159F MED LIST DOCD IN RCRD: CPT | Mod: CPTII,S$GLB,, | Performed by: NURSE PRACTITIONER

## 2023-01-13 PROCEDURE — 99499 UNLISTED E&M SERVICE: CPT | Mod: S$GLB,,, | Performed by: NURSE PRACTITIONER

## 2023-01-13 PROCEDURE — 1101F PT FALLS ASSESS-DOCD LE1/YR: CPT | Mod: CPTII,S$GLB,, | Performed by: NURSE PRACTITIONER

## 2023-01-13 PROCEDURE — 1126F AMNT PAIN NOTED NONE PRSNT: CPT | Mod: CPTII,S$GLB,, | Performed by: NURSE PRACTITIONER

## 2023-01-13 PROCEDURE — 1126F PR PAIN SEVERITY QUANTIFIED, NO PAIN PRESENT: ICD-10-PCS | Mod: CPTII,S$GLB,, | Performed by: NURSE PRACTITIONER

## 2023-01-13 PROCEDURE — 99999 PR PBB SHADOW E&M-EST. PATIENT-LVL III: ICD-10-PCS | Mod: PBBFAC,,, | Performed by: NURSE PRACTITIONER

## 2023-01-13 PROCEDURE — 1101F PR PT FALLS ASSESS DOC 0-1 FALLS W/OUT INJ PAST YR: ICD-10-PCS | Mod: CPTII,S$GLB,, | Performed by: NURSE PRACTITIONER

## 2023-01-13 PROCEDURE — 3008F BODY MASS INDEX DOCD: CPT | Mod: CPTII,S$GLB,, | Performed by: NURSE PRACTITIONER

## 2023-01-13 PROCEDURE — 3074F PR MOST RECENT SYSTOLIC BLOOD PRESSURE < 130 MM HG: ICD-10-PCS | Mod: CPTII,S$GLB,, | Performed by: NURSE PRACTITIONER

## 2023-01-13 PROCEDURE — 3288F PR FALLS RISK ASSESSMENT DOCUMENTED: ICD-10-PCS | Mod: CPTII,S$GLB,, | Performed by: NURSE PRACTITIONER

## 2023-01-13 NOTE — PROGRESS NOTES
CHIEF COMPLAINT:    Chief Complaint   Patient presents with    Apnea       HISTORY OF PRESENT ILLNESS: Faby Luna is a 72 y.o. female never smoker with  has a past medical history of Anxiety with depression, Arthritis, CKD (chronic kidney disease) stage 2, GFR 60-89 ml/min, Diabetes mellitus, History of Clarisse-en-Y gastric bypass, Hypertension, Obesity, unspecified, DAHLIA (obstructive sleep apnea), and Spondylosis. is here for  for management of obstructive sleep apnea and CPAP equipment check.      Sleep study 9/12/2020:The overall AHI was 8.3 with an oxygen tiffany of 81.0%. The AHI in REM sleep was 16.6. The central apnea index was 0.0.  The supine AHI was 5.6.  Lost cpap during move   CPAP setup 11/3/2022: helping, Not as short of breath, sleeping better  Usage 12/13/2022 - 01/11/2023  Usage days 26/30 days (87%)  >= 4 hours 22 days (73%)  < 4 hours 4 days (13%)  Usage hours 120 hours 31 minutes  Average usage (total days) 4 hours 1 minutes  Average usage (days used) 4 hours 38 minutes  Median usage (days used) 4 hours 46 minutes  Total used hours (value since last reset - 01/11/2023) 244 hours  AirSense 11 AutoSet  Serial number [id number]  Mode AutoSet  Min Pressure 5 cmH2O  Max Pressure 20 cmH2O  EPR Fulltime  EPR level 3  Response Standard  Therapy  Pressure - cmH2O Median: 7.8 95th percentile: 11.1 Maximum: 12.8  Leaks - L/min Median: 7.4 95th percentile: 23.3 Maximum: 74.3  Events per hour AI: 2.5 HI: 0.2 AHI: 2.7  Apnea Index Central: 0.2 Obstructive: 2.1 Unknown: 0.2  RERA Index 0.3  Cheyne-Swenson respiration (average duration per night) 0 minutes (0%)       Patient with adult onset asthma 10-15 years with symptoms of wheezing, occasional dry cough, sob at random. Started on Breo initial office visit 8/2/2022. Helping somewhat, per pt but reports nothing was helping when cool front change in weather so recommended trelegy 9/13/2022.  States trelegy was too expensive and never picked.  Currently symbicort now affordable off [facility].     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 Spirometry remains unimproved following bronchodilator. Lung volumes show moderate restriction is present. DLCO is mildly decreased.     CXR9/6/2022: Mildly enlarged cardiac silhouette, stable.  The lungs are clear.  No pneumothorax.  No pleural effusions. Spinal cord stimulator with tip in the midthoracic spine noted.    CT chest 9/16/2022: Lungs/Pleura: Pleural based plaque versus calcified granuloma right lower lobe posteriorly.  Mild bibasilar reticulonodular fibrotic changes and subpleural cysts are present.  There is a focal area of fibrosis and architectural distortion in the anterior left upper lobe.  Mild bronchiectasis is present in the lung bases   Impression:     Mild pulmonary fibrosis, possibly UIP pattern.     Coronary and aortic atherosclerosis     Nonspecific small calcified pleural base nodule versus pleural plaque may indicate previous granulomatous disease, prior pleural based infection or possibly exposure to asbestos        ECHO 9/16/2022:  The estimated ejection fraction is 55%.  The left ventricle is normal in size with mild concentric hypertrophy and normal systolic function.  Grade I left ventricular diastolic dysfunction.  Normal right ventricular size with normal right ventricular systolic function.  Mild left atrial enlargement.  Normal central venous pressure (3 mmHg).  The estimated PA systolic pressure is 17  mmHg.    Labs:  Eos:0.2  IgE:NA  REVIEW OF SYSTEMS:   Review of Systems   Constitutional:  Positive for weight gain. Negative for fever, chills, weight loss, activity change, appetite change, fatigue and night sweats.   HENT:  Negative for congestion.    Eyes: Negative.    Respiratory:  Positive for dyspnea on extertion (improved, all adl, mostly sedentary). Negative for apnea, snoring, cough, sputum production, chest tightness, shortness of breath, wheezing, orthopnea, previous hospitialization due to pulmonary problems, use of rescue inhaler and somnolence.    Cardiovascular:  Negative for chest pain and palpitations.   Genitourinary: Negative.    Endocrine: endocrine negative    Musculoskeletal:  Negative for gait problem.   Skin: Negative.    Gastrointestinal:  Negative for acid reflux.   Neurological: Negative.    Psychiatric/Behavioral:  Negative for sleep disturbance (sleeping through night, maybe once not regular).          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    Chronic pain syndrome 10/26/2018    Bilateral carpal tunnel syndrome 12/03/2018    Right carpal tunnel syndrome 03/08/2019    Cubital tunnel syndrome on right 03/08/2019    Pain, hand 05/06/2019    Hand weakness 05/06/2019    Anxiety 05/28/2018    Major depressive disorder, recurrent episode, in partial remission 05/13/2019    Carpal tunnel syndrome 06/28/2019    Osteoarthritis of spine with radiculopathy, lumbar region  10/17/2019    Spondylosis without myelopathy 2020    Hypertrophy of ligamentum flavum 02/15/2021    Chronic renal failure, stage 3a 02/15/2021    Osteoarthritis of lumbar spine 2021    Refractive error 2021    Pseudophakia 2021    Morbid (severe) obesity due to excess calories 2022    Spinal enthesopathy, site unspecified 2022    Occlusion of peripherally inserted central catheter (PICC) line, initial encounter 2022    Seasonal allergic rhinitis 2022    Moderate persistent asthma 2022    Dyspnea 09/15/2022    DAHLIA on CPAP 09/15/2022    Pulmonary granuloma 10/03/2022    Localized pulmonary fibrosis 10/03/2022    Bronchiectasis without complication 10/03/2022    Atherosclerosis of native coronary artery of native heart without angina pectoris 2023     Resolved Ambulatory Problems     Diagnosis Date Noted    Bacteremia due to Escherichia coli 2021    Pyelonephritis 2021    Complicated UTI (urinary tract infection) 2021     Past Medical History:   Diagnosis Date    Anxiety with depression     Arthritis     CKD (chronic kidney disease) stage 2, GFR 60-89 ml/min     Diabetes mellitus     History of Clarisse-en-Y gastric bypass     Hypertension     Obesity, unspecified     DAHLIA (obstructive sleep apnea)     Spondylosis                 PAST SURGICAL HISTORY:    Past Surgical History:   Procedure Laterality Date    CARPAL TUNNEL RELEASE Right 3/8/2019    Procedure: RELEASE, CARPAL TUNNEL right;  Surgeon: Pan Goodman MD;  Location: [facility];  Service: Orthopedics;  Laterality: Right;    CARPAL TUNNEL RELEASE Left 2019    Procedure: RELEASE, CARPAL TUNNEL- LEFT;  Surgeon: Pan Goodman MD;  Location: [address];  Service: Orthopedics;  Laterality: Left;    CATARACT EXTRACTION Bilateral      SECTION      CHOLECYSTECTOMY      EPIDURAL STEROID INJECTION INTO LUMBAR SPINE N/A 2018    Procedure: INJECTION, STEROID, SPINE, LUMBAR,  EPIDURAL;  Surgeon: Shaq Silverio MD;  Location: [facility];  Service: Pain Management;  Laterality: N/A;  LUMBAR L4-L5 INTERLAMINAR ELISE'  [address]22  W/ SEDATION     HYSTERECTOMY      INJECTION OF ANESTHETIC AGENT AROUND NERVE Bilateral 9/12/2019    Procedure: BLOCK, NERVE, L3-L4-L5 MEDIAL BRANCH;  Surgeon: Shaq Silverio MD;  Location: [facility];  Service: Pain Management;  Laterality: Bilateral;    INJECTION OF ANESTHETIC AGENT AROUND NERVE Bilateral 10/17/2019    Procedure: BLOCK, NERVE;  Surgeon: Shaq Silverio MD;  Location: [facility];  Service: Pain Management;  Laterality: Bilateral;  B/L MBB L3-L4-L5  REPEAT  CONSENT NEEDED    INJECTION OF FACET JOINT Bilateral 5/28/2018    Procedure: INJECTION-FACET;  Surgeon: Shaq Silverio MD;  Location: [facility];  Service: Pain Management;  Laterality: Bilateral;  LUMBAR BILATERAL L4-L5 AND L5-S1 FACET STEROID INJECTION  [id number]    W/ SEDATION     RADIOFREQUENCY ABLATION Right 11/21/2019    Procedure: RADIOFREQUENCY ABLATION RIGHT L3, L4, L5;  Surgeon: Shaq Silverio MD;  Location: [facility];  Service: Pain Management;  Laterality: Right;  Right RFA L3-L4-L5  1 of 2  Consent Needed    RADIOFREQUENCY ABLATION Left 12/5/2019    Procedure: RADIOFREQUENCY ABLATION LEFT L3-5;  Surgeon: Shaq Silverio MD;  Location: [facility];  Service: Pain Management;  Laterality: Left;  Left RFA L3-L4-L5  2 of 2  Consent Needed    RADIOFREQUENCY ABLATION Left 8/17/2020    Procedure: RADIOFREQUENCY ABLATION LEFT L3,4,5 1 of 2;  Surgeon: Shaq Silverio MD;  Location: [facility];  Service: Pain Management;  Laterality: Left;  Left RFA L3,4,5  1 of 2    RADIOFREQUENCY ABLATION Right 8/31/2020    Procedure: RADIOFREQUENCY ABLATION RIGHT L3,4,5 2 of 2;  Surgeon: Shaq Silverio MD;  Location: [facility];  Service: Pain Management;  Laterality: Right;  RADIOFREQUENCY ABLATION RIGHT L3,4,5  2 of 2    RADIOFREQUENCY ABLATION Left 9/9/2021    Procedure: RADIOFREQUENCY ABLATION, L3-L4  AND L5 MEDIAL BRANCH 1 OF 2;  Surgeon: Shaq Silverio MD;  Location: [facility];  Service: Pain Management;  Laterality: Left;    RADIOFREQUENCY ABLATION Right 9/20/2021    Procedure: RADIOFREQUENCY ABLATION, L3-L4 AND L5 MEDIAL BRANCH 2 OF 2;  Surgeon: Shaq Silverio MD;  Location: [facility];  Service: Pain Management;  Laterality: Right;    RADIOFREQUENCY ABLATION Right 7/7/2022    Procedure: RADIOFREQUENCY ABLATION, RIGHT L3-L4-L5 ONE OF TWO;  Surgeon: Shaq Silverio MD;  Location: [facility];  Service: Pain Management;  Laterality: Right;    RADIOFREQUENCY ABLATION Left 7/21/2022    Procedure: RADIOFREQUENCY ABLATION, LEFT L3-L4-L5 TWO OF TWO *BRING SCS REMOTE*;  Surgeon: Shaq Silverio MD;  Location: [facility];  Service: Pain Management;  Laterality: Left;    TRIAL OF SPINAL CORD NERVE STIMULATOR N/A 9/24/2018    Procedure: TRIAL, NEUROSTIMULATOR, SPINAL CORD, SPINAL CORD STIMULATOR TRIAL-INTERNAL WIRES TO EXTERNAL BATTERY;  Surgeon: Shaq Silverio MD;  Location: [facility];  Service: Pain Management;  Laterality: N/A;  ABBOTT REP NOTIFIED         FAMILY HISTORY:                Family History   Problem Relation Age of Onset    Cataracts Mother     Glaucoma Mother     No Known Problems Father     No Known Problems Sister     No Known Problems Brother     No Known Problems Maternal Aunt     No Known Problems Maternal Uncle     No Known Problems Paternal Aunt     No Known Problems Paternal Uncle     No Known Problems Maternal Grandmother     No Known Problems Maternal Grandfather     No Known Problems Paternal Grandmother     No Known Problems Paternal Grandfather        SOCIAL HISTORY:          Tobacco:   Social History     Tobacco Use   Smoking Status Never   Smokeless Tobacco Never       alcohol use:    Social History     Substance and Sexual Activity   Alcohol Use No                   ALLERGIES:  Review of patient's allergies indicates:  No Known Allergies    CURRENT MEDICATIONS:    Current Outpatient  Medications   Medication Sig Dispense Refill    acetaminophen (TYLENOL) 500 MG tablet Take 1 tablet (500 mg total) by mouth every 6 (six) hours as needed for Temperature greater than or Pain. 20 tablet 0    albuterol (PROAIR HFA) 90 mcg/actuation inhaler Inhale 2 puffs into the lungs every 4 (four) hours as needed for Wheezing or Shortness of Breath. Rescue 8.5 g 1    albuterol (PROVENTIL/VENTOLIN HFA) 90 mcg/actuation inhaler Inhale 1-2 puffs into the lungs every 6 (six) hours as needed for Wheezing or Shortness of Breath. Rescue 8 g 0    albuterol sulfate 2.5 mg/0.5 mL Nebu Take 2.5 mg by nebulization every 4 (four) hours as needed (shortness of breath, wheezing, coughing). Rescue 30 each 1    albuterol sulfate 2.5 mg/0.5 mL Nebu Take 2.5 mg by nebulization every 4 (four) hours as needed (SOB/wheezing). Rescue 20 each 0    atenoloL (TENORMIN) 100 MG tablet Take 1 tablet (100 mg total) by mouth once daily. 90 tablet 3    benzonatate (TESSALON) 100 MG capsule Take 1 capsule (100 mg total) by mouth 3 (three) times daily as needed for Cough. 30 capsule 0    benzonatate (TESSALON) 200 MG capsule Take 1 capsule (200 mg total) by mouth 3 (three) times daily as needed for Cough. 30 capsule 1    budesonide-formoterol 160-4.5 mcg (SYMBICORT) 160-4.5 mcg/actuation HFAA Inhale 2 puffs into the lungs every 12 (twelve) hours. Controller (brand only) (stop breo) 10.2 g 11    EScitalopram oxalate (LEXAPRO) 20 MG tablet TAKE 1 TABLET EVERY DAY 90 tablet 3    fluticasone propionate (FLONASE) 50 mcg/actuation nasal spray 2 sprays (100 mcg total) by Each Nostril route once daily. 11.1 mL 1    fluticasone propionate (FLONASE) 50 mcg/actuation nasal spray 1 spray (50 mcg total) by Each Nostril route 2 (two) times daily as needed for Rhinitis or Allergies. 15 g 0    furosemide (LASIX) 20 MG tablet TAKE 1 TABLET EVERY DAY 90 tablet 3    glipiZIDE (GLUCOTROL) 10 MG tablet Take 1 tablet (10 mg total) by mouth daily with breakfast. 180  "tablet 3    guaiFENesin 100 mg/5 ml (ROBITUSSIN) 100 mg/5 mL syrup Take 5-10 mLs (100-200 mg total) by mouth every 4 (four) hours as needed for Cough or Congestion. 118 mL 0    hydrOXYzine pamoate (VISTARIL) 25 MG Cap TAKE 1 CAPSULE BY MOUTH ONCE DAILY AS NEEDED FOR ANXIETY 30 capsule 2    ketorolac (TORADOL) 10 mg tablet Take 1 tablet (10 mg total) by mouth every 6 (six) hours as needed for Pain. Take with food to prevent heartburn. 20 tablet 1    linaGLIPtin (TRADJENTA) 5 mg Tab tablet Take 1 tablet (5 mg total) by mouth once daily. 90 tablet 3    loratadine (CLARITIN) 10 mg tablet TAKE 1 TABLET EVERY DAY AS NEEDED FOR ALLERGIES 90 tablet 3    losartan (COZAAR) 50 MG tablet TAKE 1 TABLET EVERY DAY 90 tablet 3    ondansetron (ZOFRAN-ODT) 4 MG TbDL Take 1 tablet (4 mg total) by mouth every 6 (six) hours as needed (Nausea). 15 tablet 0    pantoprazole (PROTONIX) 20 MG tablet TAKE 1 TABLET TWICE DAILY  BEFORE  MEALS 180 tablet 3    promethazine-dextromethorphan (PROMETHAZINE-DM) 6.25-15 mg/5 mL Syrp Take 5 mLs by mouth every 8 (eight) hours as needed (cough). 240 mL 0    rosuvastatin (CRESTOR) 20 MG tablet Take 1 tablet (20 mg total) by mouth once daily. 30 tablet 11    tiZANidine (ZANAFLEX) 4 MG tablet TAKE 1 TABLET BY MOUTH EVERY 8 HOURS AS NEEDED FOR  MUSCLE  PAIN 90 tablet 0    topiramate (TOPAMAX) 50 MG tablet Take 1 tablet (50 mg total) by mouth 2 (two) times daily. 180 tablet 0    traZODone (DESYREL) 100 MG tablet Take 1 tablet (100 mg total) by mouth every evening. 90 tablet 3    blood-glucose meter kit Use as instructed 1 each 0    gabapentin (NEURONTIN) 800 MG tablet Take 1 tablet (800 mg total) by mouth 3 (three) times daily. 90 tablet 2     No current facility-administered medications for this visit.                       PHYSICAL EXAM:  Vitals:    01/13/23 0808 01/13/23 0830   BP: (!) 162/82 124/78   Pulse: 89    SpO2: 96%    Weight: 94.3 kg (207 lb 14.3 oz)    Height: 5' 4" (1.626 m)    PainSc: 0-No pain "          Body mass index is 35.68 kg/m².   Physical Exam   Constitutional: She is oriented to person, place, and time. She appears well-developed. She is obese.   HENT:   Head: Normocephalic.   Mouth/Throat: Oropharynx is clear and moist. Mallampati Score: II.   Cardiovascular: Normal rate, regular rhythm and normal heart sounds.   Pulmonary/Chest: Normal expansion, effort normal and breath sounds normal.   Musculoskeletal:         General: No edema.      Cervical back: Neck supple.   Neurological: She is alert and oriented to person, place, and time. Gait normal.   Skin: Skin is warm. She is not diaphoretic.   Psychiatric: She has a normal mood and affect. Her behavior is normal. Judgment and thought content normal.        ASSESSMENT    ICD-10-CM ICD-9-CM    1. DAHLIA on CPAP  G47.33 327.23     Z99.89 V46.8       2. Atherosclerosis of native coronary artery of native heart without angina pectoris  I25.10 414.01 Ambulatory referral/consult to Cardiology      3. Bronchiectasis without complication  J47.9 494.0       4. Localized pulmonary fibrosis  J84.10 515       5. Pulmonary granuloma  J84.10 515       6. Moderate persistent asthma without complication  J45.40 493.90               PLAN:    Problem List Items Addressed This Visit          Unprioritized    Atherosclerosis of native coronary artery of native heart without angina pectoris    Overview     Noted on ct chest, [place]ONS, f/u with cardiology         Relevant Orders    Ambulatory referral/consult to Cardiology    Bronchiectasis without complication    Overview     Noted ct chest 9/16/2022  Pt reports doing well on symbicort  May benefit from airway clearance         Localized pulmonary fibrosis    Overview     Noted on CT chest 9/16/2022 monitor for progression with pft         Moderate persistent asthma    Overview     Adult onset asthma 10-15 years with persistent symptoms, recommend maintenance ics/laba  Manage triggers-AR, GERD  Pt doing well on symbicort, not  needing rescue         DAHLIA on CPAP - Primary    Overview     Sleep study 9/12/2020:The overall AHI was 8.3 with an oxygen tiffany of 81.0%. The AHI in REM sleep was 16.6. The central apnea index was 0.0.  The supine AHI was 5.6.  Home sleep study 09/20/2022 AHI 29  Patient is using and benefiting from cpap. Residual predicted AHI optimal.          Pulmonary granuloma    Overview     Stable since 2010                Patient will Follow up in about 9 months (around 10/13/2023).

## 2023-01-13 NOTE — PROGRESS NOTES
Patient, Faby Luna (MRN #[id number]), presented with a recorded BMI of 36.37 kg/m^2 and a documented comorbidity(s):  - Obstructive Sleep Apnea  to which the severe obesity is a contributing factor. This is consistent with the definition of severe obesity (BMI 35.0-39.9) with comorbidity (ICD-10 E66.01, Z68.35). The patient's severe obesity was monitored, evaluated, addressed and/or treated. This addendum to the medical record is made on 01/13/2023.

## 2023-01-13 NOTE — PROGRESS NOTES
Patient, Faby Luna (MRN #[id number]), presented with a recorded BMI of 35.68 kg/m^2 and a documented comorbidity(s):  - Obstructive Sleep Apnea  to which the severe obesity is a contributing factor. This is consistent with the definition of severe obesity (BMI 35.0-39.9) with comorbidity (ICD-10 E66.01, Z68.35). The patient's severe obesity was monitored, evaluated, addressed and/or treated. This addendum to the medical record is made on 01/13/2023.

## 2023-02-06 ENCOUNTER — LAB VISIT (OUTPATIENT)
Dept: LAB | Facility: HOSPITAL | Age: 73
End: 2023-02-06
Attending: FAMILY MEDICINE
Payer: MEDICARE

## 2023-02-06 DIAGNOSIS — E11.22 TYPE 2 DIABETES MELLITUS WITH STAGE 3A CHRONIC KIDNEY DISEASE, WITHOUT LONG-TERM CURRENT USE OF INSULIN: ICD-10-CM

## 2023-02-06 DIAGNOSIS — N18.31 TYPE 2 DIABETES MELLITUS WITH STAGE 3A CHRONIC KIDNEY DISEASE, WITHOUT LONG-TERM CURRENT USE OF INSULIN: ICD-10-CM

## 2023-02-06 LAB
ALBUMIN/CREAT UR: 5.9 UG/MG (ref 0–30)
CREAT UR-MCNC: 135 MG/DL (ref 15–325)
MICROALBUMIN UR DL<=1MG/L-MCNC: 8 UG/ML

## 2023-02-06 PROCEDURE — 82570 ASSAY OF URINE CREATININE: CPT | Performed by: FAMILY MEDICINE

## 2023-02-07 ENCOUNTER — TELEPHONE (OUTPATIENT)
Dept: FAMILY MEDICINE | Facility: CLINIC | Age: 73
End: 2023-02-07
Payer: MEDICARE

## 2023-02-07 ENCOUNTER — OFFICE VISIT (OUTPATIENT)
Dept: CARDIOLOGY | Facility: CLINIC | Age: 73
End: 2023-02-07
Payer: MEDICARE

## 2023-02-07 ENCOUNTER — TELEPHONE (OUTPATIENT)
Dept: PAIN MEDICINE | Facility: CLINIC | Age: 73
End: 2023-02-07
Payer: MEDICARE

## 2023-02-07 VITALS
WEIGHT: 209.19 LBS | SYSTOLIC BLOOD PRESSURE: 154 MMHG | BODY MASS INDEX: 35.91 KG/M2 | DIASTOLIC BLOOD PRESSURE: 70 MMHG | RESPIRATION RATE: 18 BRPM | OXYGEN SATURATION: 95 % | HEART RATE: 70 BPM

## 2023-02-07 DIAGNOSIS — Z00.00 ENCOUNTER FOR MEDICARE ANNUAL WELLNESS EXAM: ICD-10-CM

## 2023-02-07 DIAGNOSIS — E78.5 HYPERLIPIDEMIA ASSOCIATED WITH TYPE 2 DIABETES MELLITUS: ICD-10-CM

## 2023-02-07 DIAGNOSIS — R06.02 SOBOE (SHORTNESS OF BREATH ON EXERTION): Primary | ICD-10-CM

## 2023-02-07 DIAGNOSIS — E11.69 HYPERLIPIDEMIA ASSOCIATED WITH TYPE 2 DIABETES MELLITUS: ICD-10-CM

## 2023-02-07 DIAGNOSIS — I10 ESSENTIAL HYPERTENSION: ICD-10-CM

## 2023-02-07 DIAGNOSIS — E66.2 CLASS 2 OBESITY WITH ALVEOLAR HYPOVENTILATION, SERIOUS COMORBIDITY, AND BODY MASS INDEX (BMI) OF 35.0 TO 35.9 IN ADULT: ICD-10-CM

## 2023-02-07 DIAGNOSIS — I10 PRIMARY HYPERTENSION: ICD-10-CM

## 2023-02-07 DIAGNOSIS — E11.22 TYPE 2 DIABETES MELLITUS WITH STAGE 3A CHRONIC KIDNEY DISEASE, WITHOUT LONG-TERM CURRENT USE OF INSULIN: ICD-10-CM

## 2023-02-07 DIAGNOSIS — I25.10 ATHEROSCLEROSIS OF NATIVE CORONARY ARTERY OF NATIVE HEART WITHOUT ANGINA PECTORIS: ICD-10-CM

## 2023-02-07 DIAGNOSIS — N18.31 TYPE 2 DIABETES MELLITUS WITH STAGE 3A CHRONIC KIDNEY DISEASE, WITHOUT LONG-TERM CURRENT USE OF INSULIN: ICD-10-CM

## 2023-02-07 PROCEDURE — 1126F AMNT PAIN NOTED NONE PRSNT: CPT | Mod: CPTII,S$GLB,, | Performed by: INTERNAL MEDICINE

## 2023-02-07 PROCEDURE — 3008F PR BODY MASS INDEX (BMI) DOCUMENTED: ICD-10-PCS | Mod: CPTII,S$GLB,, | Performed by: INTERNAL MEDICINE

## 2023-02-07 PROCEDURE — 3044F PR MOST RECENT HEMOGLOBIN A1C LEVEL <7.0%: ICD-10-PCS | Mod: CPTII,S$GLB,, | Performed by: INTERNAL MEDICINE

## 2023-02-07 PROCEDURE — 3077F SYST BP >= 140 MM HG: CPT | Mod: CPTII,S$GLB,, | Performed by: INTERNAL MEDICINE

## 2023-02-07 PROCEDURE — 3008F BODY MASS INDEX DOCD: CPT | Mod: CPTII,S$GLB,, | Performed by: INTERNAL MEDICINE

## 2023-02-07 PROCEDURE — 99204 PR OFFICE/OUTPT VISIT, NEW, LEVL IV, 45-59 MIN: ICD-10-PCS | Mod: S$GLB,,, | Performed by: INTERNAL MEDICINE

## 2023-02-07 PROCEDURE — 99999 PR PBB SHADOW E&M-EST. PATIENT-LVL V: ICD-10-PCS | Mod: PBBFAC,,, | Performed by: INTERNAL MEDICINE

## 2023-02-07 PROCEDURE — 99999 PR PBB SHADOW E&M-EST. PATIENT-LVL V: CPT | Mod: PBBFAC,,, | Performed by: INTERNAL MEDICINE

## 2023-02-07 PROCEDURE — 4010F ACE/ARB THERAPY RXD/TAKEN: CPT | Mod: CPTII,S$GLB,, | Performed by: INTERNAL MEDICINE

## 2023-02-07 PROCEDURE — 3066F NEPHROPATHY DOC TX: CPT | Mod: CPTII,S$GLB,, | Performed by: INTERNAL MEDICINE

## 2023-02-07 PROCEDURE — 3077F PR MOST RECENT SYSTOLIC BLOOD PRESSURE >= 140 MM HG: ICD-10-PCS | Mod: CPTII,S$GLB,, | Performed by: INTERNAL MEDICINE

## 2023-02-07 PROCEDURE — 99204 OFFICE O/P NEW MOD 45 MIN: CPT | Mod: S$GLB,,, | Performed by: INTERNAL MEDICINE

## 2023-02-07 PROCEDURE — 3078F PR MOST RECENT DIASTOLIC BLOOD PRESSURE < 80 MM HG: ICD-10-PCS | Mod: CPTII,S$GLB,, | Performed by: INTERNAL MEDICINE

## 2023-02-07 PROCEDURE — 93005 ELECTROCARDIOGRAM TRACING: CPT

## 2023-02-07 PROCEDURE — 3061F PR NEG MICROALBUMINURIA RESULT DOCUMENTED/REVIEW: ICD-10-PCS | Mod: CPTII,S$GLB,, | Performed by: INTERNAL MEDICINE

## 2023-02-07 PROCEDURE — 3061F NEG MICROALBUMINURIA REV: CPT | Mod: CPTII,S$GLB,, | Performed by: INTERNAL MEDICINE

## 2023-02-07 PROCEDURE — 1159F PR MEDICATION LIST DOCUMENTED IN MEDICAL RECORD: ICD-10-PCS | Mod: CPTII,S$GLB,, | Performed by: INTERNAL MEDICINE

## 2023-02-07 PROCEDURE — 4010F PR ACE/ARB THEARPY RXD/TAKEN: ICD-10-PCS | Mod: CPTII,S$GLB,, | Performed by: INTERNAL MEDICINE

## 2023-02-07 PROCEDURE — 3078F DIAST BP <80 MM HG: CPT | Mod: CPTII,S$GLB,, | Performed by: INTERNAL MEDICINE

## 2023-02-07 PROCEDURE — 3044F HG A1C LEVEL LT 7.0%: CPT | Mod: CPTII,S$GLB,, | Performed by: INTERNAL MEDICINE

## 2023-02-07 PROCEDURE — 1126F PR PAIN SEVERITY QUANTIFIED, NO PAIN PRESENT: ICD-10-PCS | Mod: CPTII,S$GLB,, | Performed by: INTERNAL MEDICINE

## 2023-02-07 PROCEDURE — 1159F MED LIST DOCD IN RCRD: CPT | Mod: CPTII,S$GLB,, | Performed by: INTERNAL MEDICINE

## 2023-02-07 PROCEDURE — 3066F PR DOCUMENTATION OF TREATMENT FOR NEPHROPATHY: ICD-10-PCS | Mod: CPTII,S$GLB,, | Performed by: INTERNAL MEDICINE

## 2023-02-07 RX ORDER — FUROSEMIDE 40 MG/1
40 TABLET ORAL DAILY
Qty: 90 TABLET | Refills: 3 | Status: ON HOLD | OUTPATIENT
Start: 2023-02-07 | End: 2023-05-06 | Stop reason: SDUPTHER

## 2023-02-07 RX ORDER — LOSARTAN POTASSIUM 100 MG/1
100 TABLET ORAL DAILY
Qty: 90 TABLET | Refills: 3 | Status: SHIPPED | OUTPATIENT
Start: 2023-02-07 | End: 2024-03-12

## 2023-02-07 NOTE — PROGRESS NOTES
CARDIOLOGY CONSULTATION    REASON FOR CONSULT:   Faby Luna is a 72 y.o. female who presents for cardiovascular evaluation.      HISTORY OF PRESENT ILLNESS:     Faby Luna presents for cardiovascular evaluation. Referred by pulmonary secondary to LVH. Complaints of shortness of breath. Exertional. Symptoms < one block. Diabetes. Hypertension. DAHLIA. Obesity. Echocardiogram from 2022 showed an EF of 55%. Grade 1 DD. No pulmonary hypertension.    CARDIOVASCULAR HISTORY:     None    PAST MEDICAL HISTORY:     Past Medical History:   Diagnosis Date    Anxiety with depression     Arthritis     CKD (chronic kidney disease) stage 2, GFR 60-89 ml/min     Diabetes mellitus     History of Clarisse-en-Y gastric bypass     Hypertension     Obesity, unspecified     DAHLIA (obstructive sleep apnea)     Spondylosis        PAST SURGICAL HISTORY:     Past Surgical History:   Procedure Laterality Date    CARPAL TUNNEL RELEASE Right 3/8/2019    Procedure: RELEASE, CARPAL TUNNEL right;  Surgeon: Pan Goodman MD;  Location: [facility];  Service: Orthopedics;  Laterality: Right;    CARPAL TUNNEL RELEASE Left 2019    Procedure: RELEASE, CARPAL TUNNEL- LEFT;  Surgeon: Pan Goodman MD;  Location: [address];  Service: Orthopedics;  Laterality: Left;    CATARACT EXTRACTION Bilateral      SECTION      CHOLECYSTECTOMY      EPIDURAL STEROID INJECTION INTO LUMBAR SPINE N/A 2018    Procedure: INJECTION, STEROID, SPINE, LUMBAR, EPIDURAL;  Surgeon: Shaq Silverio MD;  Location: [facility] PAIN T;  Service: Pain Management;  Laterality: N/A;  LUMBAR L4-L5 INTERLAMINAR ELISE'  [id number]  W/ SEDATION     HYSTERECTOMY      INJECTION OF ANESTHETIC AGENT AROUND NERVE Bilateral 2019    Procedure: BLOCK, NERVE, L3-L4-L5 MEDIAL BRANCH;  Surgeon: Shaq Silverio MD;  Location: [facility] PAIN MGT;  Service: Pain Management;  Laterality: Bilateral;    INJECTION OF ANESTHETIC AGENT AROUND NERVE Bilateral 10/17/2019     Procedure: BLOCK, NERVE;  Surgeon: Shaq Silverio MD;  Location: [facility]IN [facility]GT;  Service: Pain Management;  Laterality: Bilateral;  B/L MBB L3-L4-L5  REPEAT  CONSENT NEEDED    INJECTION OF FACET JOINT Bilateral 5/28/2018    Procedure: INJECTION-FACET;  Surgeon: Shaq Silverio MD;  Location: [facility]IN [facility]GT;  Service: Pain Management;  Laterality: Bilateral;  LUMBAR BILATERAL L4-L5 AND L5-S1 FACET STEROID INJECTION  [id number]    W/ SEDATION     RADIOFREQUENCY ABLATION Right 11/21/2019    Procedure: RADIOFREQUENCY ABLATION RIGHT L3, L4, L5;  Surgeon: Shaq Silverio MD;  Location: [facility]IN [facility]GT;  Service: Pain Management;  Laterality: Right;  Right RFA L3-L4-L5  1 of 2  Consent Needed    RADIOFREQUENCY ABLATION Left 12/5/2019    Procedure: RADIOFREQUENCY ABLATION LEFT L3-5;  Surgeon: Shaq Silverio MD;  Location: [facility];  Service: Pain Management;  Laterality: Left;  Left RFA L3-L4-L5  2 of 2  Consent Needed    RADIOFREQUENCY ABLATION Left 8/17/2020    Procedure: RADIOFREQUENCY ABLATION LEFT L3,4,5 1 of 2;  Surgeon: Shaq Silverio MD;  Location: [facility] PAIN MGT;  Service: Pain Management;  Laterality: Left;  Left RFA L3,4,5  1 of 2    RADIOFREQUENCY ABLATION Right 8/31/2020    Procedure: RADIOFREQUENCY ABLATION RIGHT L3,4,5 2 of 2;  Surgeon: Shaq Silverio MD;  Location: [facility] PAIN MGT;  Service: Pain Management;  Laterality: Right;  RADIOFREQUENCY ABLATION RIGHT L3,4,5  2 of 2    RADIOFREQUENCY ABLATION Left 9/9/2021    Procedure: RADIOFREQUENCY ABLATION, L3-L4 AND L5 MEDIAL BRANCH 1 OF 2;  Surgeon: Shaq Silverio MD;  Location: [facility] PAIN MGT;  Service: Pain Management;  Laterality: Left;    RADIOFREQUENCY ABLATION Right 9/20/2021    Procedure: RADIOFREQUENCY ABLATION, L3-L4 AND L5 MEDIAL BRANCH 2 OF 2;  Surgeon: Shaq Silverio MD;  Location: [facility] PAIN MGT;  Service: Pain Management;  Laterality: Right;    RADIOFREQUENCY ABLATION Right 7/7/2022    Procedure: RADIOFREQUENCY ABLATION, RIGHT L3-L4-L5 ONE OF TWO;  Surgeon: Shaq  MD Jean Claude;  Location: [facility] PAIN T;  Service: Pain Management;  Laterality: Right;    RADIOFREQUENCY ABLATION Left 7/21/2022    Procedure: RADIOFREQUENCY ABLATION, LEFT L3-L4-L5 TWO OF TWO *BRING SCS REMOTE*;  Surgeon: Shaq Silverio MD;  Location: [facility] PAIN [facility];  Service: Pain Management;  Laterality: Left;    TRIAL OF SPINAL CORD NERVE STIMULATOR N/A 9/24/2018    Procedure: TRIAL, NEUROSTIMULATOR, SPINAL CORD, SPINAL CORD STIMULATOR TRIAL-INTERNAL WIRES TO EXTERNAL BATTERY;  Surgeon: Shaq Silverio MD;  Location: [facility] PAIN [facility];  Service: Pain Management;  Laterality: N/A;  ABBOTT REP NOTIFIED       ALLERGIES AND MEDICATION:   Review of patient's allergies indicates:  No Known Allergies     Medication List            Accurate as of February 7, 2023 11:54 AM. If you have any questions, ask your nurse or doctor.                CHANGE how you take these medications      furosemide 40 MG tablet  Commonly known as: LASIX  Take 1 tablet (40 mg total) by mouth once daily.  What changed:   medication strength  how much to take  when to take this  Changed by: Tramaine Fraga MD     losartan 100 MG tablet  Commonly known as: COZAAR  Take 1 tablet (100 mg total) by mouth once daily.  What changed:   medication strength  how much to take  when to take this  Changed by: Tramaine Fraga MD            CONTINUE taking these medications      acetaminophen 500 MG tablet  Commonly known as: TYLENOL  Take 1 tablet (500 mg total) by mouth every 6 (six) hours as needed for Temperature greater than or Pain.     * albuterol 90 mcg/actuation inhaler  Commonly known as: PROAIR HFA  Inhale 2 puffs into the lungs every 4 (four) hours as needed for Wheezing or Shortness of Breath. Rescue     * albuterol sulfate 2.5 mg/0.5 mL Nebu  Take 2.5 mg by nebulization every 4 (four) hours as needed (shortness of breath, wheezing, coughing). Rescue     * albuterol 90 mcg/actuation inhaler  Commonly known as: PROVENTIL/VENTOLIN HFA  Inhale 1-2 puffs into  the lungs every 6 (six) hours as needed for Wheezing or Shortness of Breath. Rescue     * albuterol sulfate 2.5 mg/0.5 mL Nebu  Take 2.5 mg by nebulization every 4 (four) hours as needed (SOB/wheezing). Rescue     atenoloL 100 MG tablet  Commonly known as: TENORMIN  TAKE 1 TABLET EVERY DAY     * benzonatate 200 MG capsule  Commonly known as: TESSALON  Take 1 capsule (200 mg total) by mouth 3 (three) times daily as needed for Cough.     * benzonatate 100 MG capsule  Commonly known as: TESSALON  Take 1 capsule (100 mg total) by mouth 3 (three) times daily as needed for Cough.     blood-glucose meter kit  Use as instructed     budesonide-formoterol 160-4.5 mcg 160-4.5 mcg/actuation Hfaa  Commonly known as: SYMBICORT  Inhale 2 puffs into the lungs every 12 (twelve) hours. Controller (brand only) (stop breo)     EScitalopram oxalate 20 MG tablet  Commonly known as: LEXAPRO  TAKE 1 TABLET EVERY DAY     * fluticasone propionate 50 mcg/actuation nasal spray  Commonly known as: FLONASE  2 sprays (100 mcg total) by Each Nostril route once daily.     * fluticasone propionate 50 mcg/actuation nasal spray  Commonly known as: FLONASE  1 spray (50 mcg total) by Each Nostril route 2 (two) times daily as needed for Rhinitis or Allergies.     gabapentin 800 MG tablet  Commonly known as: NEURONTIN  Take 1 tablet (800 mg total) by mouth 3 (three) times daily.     glipiZIDE 10 MG tablet  Commonly known as: GLUCOTROL  Take 1 tablet (10 mg total) by mouth daily with breakfast.     guaiFENesin 100 mg/5 ml 100 mg/5 mL syrup  Commonly known as: ROBITUSSIN  Take 5-10 mLs (100-200 mg total) by mouth every 4 (four) hours as needed for Cough or Congestion.     hydrOXYzine pamoate 25 MG Cap  Commonly known as: VISTARIL  TAKE 1 CAPSULE BY MOUTH ONCE DAILY AS NEEDED FOR ANXIETY     ketorolac 10 mg tablet  Commonly known as: TORADOL  Take 1 tablet (10 mg total) by mouth every 6 (six) hours as needed for Pain. Take with food to prevent heartburn.      loratadine 10 mg tablet  Commonly known as: CLARITIN  TAKE 1 TABLET EVERY DAY AS NEEDED FOR ALLERGIES     ondansetron 4 MG Tbdl  Commonly known as: ZOFRAN-ODT  Take 1 tablet (4 mg total) by mouth every 6 (six) hours as needed (Nausea).     pantoprazole 20 MG tablet  Commonly known as: PROTONIX  TAKE 1 TABLET TWICE DAILY  BEFORE  MEALS     promethazine-dextromethorphan 6.25-15 mg/5 mL Syrp  Commonly known as: PROMETHAZINE-DM  Take 5 mLs by mouth every 8 (eight) hours as needed (cough).     rosuvastatin 20 MG tablet  Commonly known as: CRESTOR  Take 1 tablet (20 mg total) by mouth once daily.     tiZANidine 4 MG tablet  Commonly known as: ZANAFLEX  TAKE 1 TABLET BY MOUTH EVERY 8 HOURS AS NEEDED FOR  MUSCLE  PAIN     topiramate 50 MG tablet  Commonly known as: TOPAMAX  Take 1 tablet (50 mg total) by mouth 2 (two) times daily.     TRADJENTA 5 mg Tab tablet  Generic drug: linaGLIPtin  Take 1 tablet (5 mg total) by mouth once daily.     traZODone 100 MG tablet  Commonly known as: DESYREL  Take 1 tablet (100 mg total) by mouth every evening.           * This list has 8 medication(s) that are the same as other medications prescribed for you. Read the directions carefully, and ask your doctor or other care provider to review them with you.                   Where to Get Your Medications        These medications were sent to [facility] [address] [place], [place] [address] [address], [place] [address]      Phone: [phone]   furosemide 40 MG tablet  losartan 100 MG tablet         SOCIAL HISTORY:     Social History     Socioeconomic History    Marital status:    Tobacco Use    Smoking status: Never    Smokeless tobacco: Never   Substance and Sexual Activity    Alcohol use: No    Drug use: No       FAMILY HISTORY:     Family History   Problem Relation Age of Onset    Cataracts Mother     Glaucoma Mother     No Known Problems Father     No Known Problems Sister     No Known Problems Brother     No Known  Problems Maternal Aunt     No Known Problems Maternal Uncle     No Known Problems Paternal Aunt     No Known Problems Paternal Uncle     No Known Problems Maternal Grandmother     No Known Problems Maternal Grandfather     No Known Problems Paternal Grandmother     No Known Problems Paternal Grandfather        REVIEW OF SYSTEMS:   Review of Systems   Constitutional:  Negative for chills, diaphoresis, fever, malaise/fatigue and weight loss.   HENT:  Negative for congestion, hearing loss, sinus pain, sore throat and tinnitus.    Eyes:  Negative for blurred vision, double vision, photophobia and pain.   Respiratory:  Positive for shortness of breath. Negative for cough, hemoptysis, sputum production, wheezing and stridor.    Cardiovascular:  Negative for chest pain, palpitations, orthopnea, claudication, leg swelling and PND.   Gastrointestinal:  Negative for abdominal pain, blood in stool, heartburn, melena, nausea and vomiting.   Musculoskeletal:  Negative for back pain, falls, joint pain, myalgias and neck pain.   Neurological:  Negative for dizziness, tingling, tremors, sensory change, speech change, focal weakness, seizures, loss of consciousness, weakness and headaches.   Endo/Heme/Allergies:  Does not bruise/bleed easily.   Psychiatric/Behavioral:  Negative for depression, memory loss and substance abuse. The patient is not nervous/anxious.      PHYSICAL EXAM:     Vitals:    02/07/23 1120   BP: (!) 154/70   Pulse: 70   Resp: 18    Body mass index is 35.91 kg/m².  Weight: 94.9 kg (209 lb 3.5 oz)         Physical Exam  Constitutional:       General: She is not in acute distress.     Appearance: She is well-developed. She is not diaphoretic.   HENT:      Head: Normocephalic.   Neck:      Vascular: No carotid bruit or JVD.   Cardiovascular:      Rate and Rhythm: Normal rate and regular rhythm.      Pulses: Normal pulses.      Heart sounds: Normal heart sounds.   Pulmonary:      Effort: Pulmonary effort is normal.       Breath sounds: Normal breath sounds.   Abdominal:      General: Bowel sounds are normal.      Palpations: Abdomen is soft.      Tenderness: There is no abdominal tenderness.   Skin:     General: Skin is warm and dry.   Neurological:      Mental Status: She is alert and oriented to person, place, and time.   Psychiatric:         Speech: Speech normal.         Behavior: Behavior normal.         Thought Content: Thought content normal.       DATA:   EKG: (personally reviewed tracing)  11/25/2022 - NSR, non specific t wave abnormality  Laboratory:  CBC:  Recent Labs   Lab 12/30/21 2136 08/03/22 0930 02/06/23  1026   WBC 12.93 H 12.83 H 9.62   Hemoglobin 11.3 L 12.4 12.3   Hematocrit 37.4 40.3 41.2   Platelets 426 350 353       CHEMISTRIES:  Recent Labs   Lab 07/09/21  1219 10/11/21  1145 12/30/21 2136 08/03/22 0930 02/06/23  1026   Glucose 89 127 H 89 184 H 61 L   Sodium 143 140 142 140 140   Potassium 4.0 4.5 4.0 4.4 4.2   BUN 13 15 9 15 14   Creatinine 1.0 1.1 1.0 1.2 1.0   eGFR if African American >60.0 58.4 A >60  --   --    eGFR if non  56.8 A 50.6 A 56 A  --   --    Calcium 9.5 9.8 8.8 9.6 9.0       CARDIAC BIOMARKERS:  Recent Labs   Lab 12/30/21 2136      Troponin I <0.006       COAGS:        LIPIDS/LFTS:  Recent Labs   Lab 10/11/21  1145 12/30/21 2136 08/03/22 0930 02/06/23  1026   Cholesterol 200 H  --  168 156   Triglycerides 130  --  79 100   HDL 77 H  --  55 59   LDL Cholesterol 97.0  --  97.2 77.0   Non-HDL Cholesterol 123  --  113 97   AST 21 20 20 18   ALT 41 14 28 16       Cardiovascular Testing:    Echocardiogram 09/16/2022:    The estimated ejection fraction is 55%.  The left ventricle is normal in size with mild concentric hypertrophy and normal systolic function.  Grade I left ventricular diastolic dysfunction.  Normal right ventricular size with normal right ventricular systolic function.  Mild left atrial enlargement.  Normal central venous pressure (3 mmHg).  The  estimated PA systolic pressure is 17 mmHg.    ASSESSMENT:     SOBOE  Hypertension  Hyperlipidemia  Diabetes  LVH with grade I DD  Obstructive sleep apnea  Obesity    PLAN:     SOBOE: Nuclear MPS for ischemic evaluation.  Hypertension: increase losartan to 100 qd.  Hyperlipidemia: continue current management.  LVH with grade I DD: increase lasix to 40 qd.  Return to clinic one month.            Tramaine Fraga MD, MPH, FACC, [facility]

## 2023-02-07 NOTE — TELEPHONE ENCOUNTER
"This message is for patient in regards to his/her appointment 02/08/23 at 11:40a       [facility] Policy: For the safety of all patients and staff members.     Patient Visitor policy: Due to social distancing and limited seating staff are requesting patient to arrive to their schedule appointments on time.     Upon arriving to facility; patients are required to wear a face mask, if patient do not have a face mask one will be provided. Upon arriving patient we ask patients to contact clinic at this number [phone] to notify staff that they have arrived or they may do so by utilizing the Mobile [employer] in [place](if patient have patient portal; clinical staff will send a message through there letting them know it's okay to proceed to their visit). Staff will give the patient the "okay" to come up or wait inside their vehicle until clinic is ready for patient to be seen by Kimberly Conner NP. If you have any questions or concerns please contact [phone]    m   "

## 2023-02-07 NOTE — TELEPHONE ENCOUNTER
----- Message from Renae Sprague, DO sent at 2/7/2023  1:51 PM CST -----  Please contact the patient and let them know that their results were fine and do not require any change in treatment.

## 2023-02-08 ENCOUNTER — OFFICE VISIT (OUTPATIENT)
Dept: PAIN MEDICINE | Facility: CLINIC | Age: 73
End: 2023-02-08
Payer: MEDICARE

## 2023-02-08 ENCOUNTER — PATIENT MESSAGE (OUTPATIENT)
Dept: PAIN MEDICINE | Facility: OTHER | Age: 73
End: 2023-02-08
Payer: MEDICARE

## 2023-02-08 VITALS
BODY MASS INDEX: 35.68 KG/M2 | HEART RATE: 61 BPM | SYSTOLIC BLOOD PRESSURE: 131 MMHG | HEIGHT: 64 IN | RESPIRATION RATE: 18 BRPM | WEIGHT: 209 LBS | DIASTOLIC BLOOD PRESSURE: 65 MMHG

## 2023-02-08 DIAGNOSIS — G89.4 CHRONIC PAIN SYNDROME: ICD-10-CM

## 2023-02-08 DIAGNOSIS — M54.16 LUMBAR RADICULOPATHY: ICD-10-CM

## 2023-02-08 DIAGNOSIS — M51.36 DDD (DEGENERATIVE DISC DISEASE), LUMBAR: ICD-10-CM

## 2023-02-08 DIAGNOSIS — M47.816 LUMBAR SPONDYLOSIS: Primary | ICD-10-CM

## 2023-02-08 DIAGNOSIS — Z96.89 S/P INSERTION OF SPINAL CORD STIMULATOR: ICD-10-CM

## 2023-02-08 PROCEDURE — 3008F PR BODY MASS INDEX (BMI) DOCUMENTED: ICD-10-PCS | Mod: CPTII,S$GLB,, | Performed by: NURSE PRACTITIONER

## 2023-02-08 PROCEDURE — 99999 PR PBB SHADOW E&M-EST. PATIENT-LVL III: ICD-10-PCS | Mod: PBBFAC,,, | Performed by: NURSE PRACTITIONER

## 2023-02-08 PROCEDURE — 4010F PR ACE/ARB THEARPY RXD/TAKEN: ICD-10-PCS | Mod: CPTII,S$GLB,, | Performed by: NURSE PRACTITIONER

## 2023-02-08 PROCEDURE — 3061F PR NEG MICROALBUMINURIA RESULT DOCUMENTED/REVIEW: ICD-10-PCS | Mod: CPTII,S$GLB,, | Performed by: NURSE PRACTITIONER

## 2023-02-08 PROCEDURE — 3078F DIAST BP <80 MM HG: CPT | Mod: CPTII,S$GLB,, | Performed by: NURSE PRACTITIONER

## 2023-02-08 PROCEDURE — 1125F PR PAIN SEVERITY QUANTIFIED, PAIN PRESENT: ICD-10-PCS | Mod: CPTII,S$GLB,, | Performed by: NURSE PRACTITIONER

## 2023-02-08 PROCEDURE — 3066F PR DOCUMENTATION OF TREATMENT FOR NEPHROPATHY: ICD-10-PCS | Mod: CPTII,S$GLB,, | Performed by: NURSE PRACTITIONER

## 2023-02-08 PROCEDURE — 1101F PR PT FALLS ASSESS DOC 0-1 FALLS W/OUT INJ PAST YR: ICD-10-PCS | Mod: CPTII,S$GLB,, | Performed by: NURSE PRACTITIONER

## 2023-02-08 PROCEDURE — 3061F NEG MICROALBUMINURIA REV: CPT | Mod: CPTII,S$GLB,, | Performed by: NURSE PRACTITIONER

## 2023-02-08 PROCEDURE — 3044F PR MOST RECENT HEMOGLOBIN A1C LEVEL <7.0%: ICD-10-PCS | Mod: CPTII,S$GLB,, | Performed by: NURSE PRACTITIONER

## 2023-02-08 PROCEDURE — 3075F SYST BP GE 130 - 139MM HG: CPT | Mod: CPTII,S$GLB,, | Performed by: NURSE PRACTITIONER

## 2023-02-08 PROCEDURE — 1160F RVW MEDS BY RX/DR IN RCRD: CPT | Mod: CPTII,S$GLB,, | Performed by: NURSE PRACTITIONER

## 2023-02-08 PROCEDURE — 3066F NEPHROPATHY DOC TX: CPT | Mod: CPTII,S$GLB,, | Performed by: NURSE PRACTITIONER

## 2023-02-08 PROCEDURE — 1159F MED LIST DOCD IN RCRD: CPT | Mod: CPTII,S$GLB,, | Performed by: NURSE PRACTITIONER

## 2023-02-08 PROCEDURE — 3078F PR MOST RECENT DIASTOLIC BLOOD PRESSURE < 80 MM HG: ICD-10-PCS | Mod: CPTII,S$GLB,, | Performed by: NURSE PRACTITIONER

## 2023-02-08 PROCEDURE — 3288F PR FALLS RISK ASSESSMENT DOCUMENTED: ICD-10-PCS | Mod: CPTII,S$GLB,, | Performed by: NURSE PRACTITIONER

## 2023-02-08 PROCEDURE — 3075F PR MOST RECENT SYSTOLIC BLOOD PRESS GE 130-139MM HG: ICD-10-PCS | Mod: CPTII,S$GLB,, | Performed by: NURSE PRACTITIONER

## 2023-02-08 PROCEDURE — 1159F PR MEDICATION LIST DOCUMENTED IN MEDICAL RECORD: ICD-10-PCS | Mod: CPTII,S$GLB,, | Performed by: NURSE PRACTITIONER

## 2023-02-08 PROCEDURE — 1101F PT FALLS ASSESS-DOCD LE1/YR: CPT | Mod: CPTII,S$GLB,, | Performed by: NURSE PRACTITIONER

## 2023-02-08 PROCEDURE — 99213 PR OFFICE/OUTPT VISIT, EST, LEVL III, 20-29 MIN: ICD-10-PCS | Mod: S$GLB,,, | Performed by: NURSE PRACTITIONER

## 2023-02-08 PROCEDURE — 99213 OFFICE O/P EST LOW 20 MIN: CPT | Mod: S$GLB,,, | Performed by: NURSE PRACTITIONER

## 2023-02-08 PROCEDURE — 3044F HG A1C LEVEL LT 7.0%: CPT | Mod: CPTII,S$GLB,, | Performed by: NURSE PRACTITIONER

## 2023-02-08 PROCEDURE — 1125F AMNT PAIN NOTED PAIN PRSNT: CPT | Mod: CPTII,S$GLB,, | Performed by: NURSE PRACTITIONER

## 2023-02-08 PROCEDURE — 1160F PR REVIEW ALL MEDS BY PRESCRIBER/CLIN PHARMACIST DOCUMENTED: ICD-10-PCS | Mod: CPTII,S$GLB,, | Performed by: NURSE PRACTITIONER

## 2023-02-08 PROCEDURE — 4010F ACE/ARB THERAPY RXD/TAKEN: CPT | Mod: CPTII,S$GLB,, | Performed by: NURSE PRACTITIONER

## 2023-02-08 PROCEDURE — 3008F BODY MASS INDEX DOCD: CPT | Mod: CPTII,S$GLB,, | Performed by: NURSE PRACTITIONER

## 2023-02-08 PROCEDURE — 99999 PR PBB SHADOW E&M-EST. PATIENT-LVL III: CPT | Mod: PBBFAC,,, | Performed by: NURSE PRACTITIONER

## 2023-02-08 PROCEDURE — 3288F FALL RISK ASSESSMENT DOCD: CPT | Mod: CPTII,S$GLB,, | Performed by: NURSE PRACTITIONER

## 2023-02-08 RX ORDER — GABAPENTIN 800 MG/1
800 TABLET ORAL 3 TIMES DAILY
Qty: 90 TABLET | Refills: 2 | Status: SHIPPED | OUTPATIENT
Start: 2023-02-08 | End: 2023-08-18

## 2023-02-08 NOTE — PROGRESS NOTES
Chronic patient Established Note (Follow up visit)      SUBJECTIVE:    Interval History 2/8/2023:  The patient returns to clinic today for follow up of back pain. She reports worsened low back pain. She reports intermittent radiating pain into the posterior aspect of both legs to the ankles, left greater than right. Her pain is constant in nature. Her back pain is greater than her leg pain. She is reporting limited relief with SCS. She has not been able to meet with Roque or Ildefonso for programming. She is taking Gabapentin. She denies any other health changes. Her pain today is 9/10.    Interval History 11/4/2022:  Faby is here for follow up of back and leg pain. Unfortunately, she has been unable to meet up with Roque for SCS programming. She does report some improvement in symptoms since previous visit. She still has back pain with radiation into the legs. Recent XRAYs do not show any significant lead migration. She only takes Gabapentin intermittently because she says it makes her feet swell but it does help. Her pain today is 8/10.    Interval History 10/4/2022:  The patient is here for follow up of chronic back pain. She reports more pain over the past month. No injury or trauma. She does have a lumbar SCS but is unsure if it is even on at this time. She does not think that it is. It did previously help with her back and leg pain. She has not been in contact with [employer] recently. The back pain radiates down the back of both legs to the feet. It worsens with walking and standing. Her pain today is 10/10.    Interval History 8/23/2022:  Fbay is here for follow up of chronic lower back pain. She is now s/p right then left L3,4,5 RFAs completed on 7/21/22 with limited relief so far. She continues to report aching back pain with radiation into the legs and numbness. She has had her SCS off for a couple of months. She has not been in contact with [employer] rep for programming. She says that she turned it off due to limited  benefit. No new weakness to legs or falls. No bowel/bladder incontinence. Her pain today is 10/10.    Interval History 6/17/2022:  The patient is here today for follow up of back pain. She has had more aching pain across the lower back. She had benefit with lumbar RFAs in the past and would like to reschedule this. She would also like to repeat aquatic PT as this was helpful in the past. Her pain is aching and throbbing in nature without radiation currently. No new falls or trauma. Her pain today is 8/10.    Interval History 5/5/2022:  The patient is here for follow up of chronic lower back pain. She has radiation into the legs. No recent falls or trauma. She saw Dr. Silverio last month and was under the impression that an [employer] rep would be here today for programming. She is still having difficulty programming her device. She has mild benefit with Gabapentin 900 mg daily without side effects. She is also having muscle spasms intermittently. She is asking for a muscle relaxer. She has tried Robaxin in the past with mild benefit. She would like to try another medication. Her pain today is 8/10.    Interval History 4/20/2022: She presents today for follow up of low back pain. She states that she only had about 40% relief of LBP following RFAs in September that lasted a few weeks. Pain  continues to be in the low back and radiates into b/l LE. Pain encompasses the entire leg and does not follow a specific dermatomal pattern in the leg. She denies weakness, numbness or tingling in the lower extremities. She denies bowel or bladder incontinence. Pain is currently rated 8/10. She is currently on gabapentin 300mg tid with minimal relief. She has been unable to charge bret SCS for the last month and does not have the contact number for her rep.      Interval History 9/30/2021:  The patient returns to clinic today for follow up of low back pain. She is s/p left L3,4,5 RFA on 9/9/2021 and right L3,4,5 RFA on 9/20/2021. She is  unsure of relief at this time. She reports increased pain to the right side. She describes this pain as burning in nature. She denies any radiating leg pain. Her pain is worse with bending and standing. She continues to report benefit with [employer] SCS. She denies any weakness. She denies any other health changes. Her pain today is 9/10.    Interval History 8/3/2021:   Ms. Luna returns in f/u re: low back/leg pain. She reports about three months ago she noticed her low back started bothering her, worse with bending and prolonged standing. No inciting event, no trauma to back since last visit. Reports continued leg pain down the backs of her legs as well. She reports intermittent instability in her legs - on and off - over the last year. Last time she has met with [employer] rep for reprogramming of SCS was fall 2020. Denies any current issues with SCS function/battery/remote.     Interval History 9/28/2020:  The patient is here today for increased lower back pain.  She is status post right than left L3, 4, 5 radiofrequency ablation completed on 08/31/2020 with approximately 50% relief.  However, she is feeling tightness across the lower back without radiation at this time.  She would like an injection today.  Additionally, she states that she has not completed physical therapy for her back in some time and is not currently doing any exercises.  Her leg pain is currently well controlled with spinal cord stimulator and she had a recent adjustment.  Her pain today is 8/10.    Interval History 7/16/2020:  The patient is here for follow up of lower back pain.  She previously had benefit with lumbar RFAs for similar pain.  She is also having radiation into her legs with numbness, left greater than right.  Ildefonso is here today to meet with her for programming.  She previously was having significant benefit of leg pain with SCS implant.  She denies any recent trauma or falls. Her pain today is 9/10.    Interval History  1/9/2020:  The patient is here for follow up of lower back and leg pain.  She is s/p lumbar RFAs with about 50% relief.  Her leg pain is well controlled with implant.  She still has intermittent flare ups of back pain, like today.  When this happens, she has some benefit with rest.  She has tried Tylenol and OTC NSAIDs without benefit.  She denies any recent falls or weakness.  The patient denies any bowel or bladder incontinence or signs of saddle paresthesia.  The patient denies any major medical changes since last office visit.  Her pain today is 7/10.    Previous Encounter:  Faby Luna presents to the clinic for a follow-up appointment for lower back pain.  She previously had significant leg pain which has resolved with [employer] SCS implant.  She is here today to discuss increased lower back pain.  It is sharp and throbbing in nature.  It is significant in the morning and with prolonged standing and activity.  She has some benefit with stretching.  She denies any recent falls.  Since the last visit, Faby Luna states the pain has been worsening.  Current pain intensity is 8/10.    Pain Disability Index Review:  Last 3 PDI Scores 11/4/2022 10/4/2022 8/23/2022   Pain Disability Index (PDI) 40 50 42       Opioid Contract: no     report:  Not applicable    Pain Procedures:   5/2/18 Left L3 and L4 TF ELISE- 20% relief  5/21/18 Bilateral L4-5 and L5-S1 facet joint injections- no relief  9/24/18 Lumbar SCS trial ([employer])- 100% relief  10/26/18 Lumbar SCS implant ([employer])- 100% relief of leg pain  9/12/19 Bilateral L3,4,5 MBB- helpful  10/17/19 Bilateral L3,4,5 MBB- helpful  11/21/19 Right L3,4,5 RFA- 50% relief  12/5/19 Left L3,4,5 RFA- 50% relief  8/17/20 Left L3,4,5 RFA- 50% relief  8/31/20 Right L3,4,5 RFA- 50% relief  9/9/2021- Left L3,4,5 RFA - 60% relief  9/20/2021- Right L3,4,5 RFA -60% relief  7/7/22 Right L3,4,5 RFA   7/21/22 Left L3,4,5 RFA     Physical Therapy/Home Exercise:   yes  in the past with limited benefit    Imaging:     3/31/18 Lumbar MRI    Narrative     EXAMINATION:  MRI LUMBAR SPINE WITHOUT CONTRAST    CLINICAL HISTORY:  Low back pain, >6wks conservative tx, persistent-progressive sx, surgical candidate; Other spondylosis with radiculopathy, lumbar region    TECHNIQUE:  Multiplanar, multisequence MR images were acquired from the thoracolumbar junction to the sacrum without the administration of contrast.    COMPARISON:  Plain films from 03/27/2018    FINDINGS:  There is grade 1 spondylolisthesis of L4 on L5. The vertebral body heights are well maintained, with no fracture.  No marrow signal abnormality suspicious for an infiltrative process.    The conus medullaris terminates at approximately the mid body of L1.  There is a cyst associated with the upper pole of the right kidney.  There is disc desiccation noted throughout the lumbar spine with relative sparing of the L5-S1 disc.  Mild disc space narrowing present at the L4-5 level.    L1-L2: Mild diffuse disc bulge resulting in no significant central or neural foraminal canal narrowing.    L2-L3: No significant central canal narrowing.  There is mild narrowing of either neural foraminal canal secondary to disc material.    L3-L4: Disc bulging in the bilateral foraminal regions resulting in no significant central canal narrowing.  Mild-to-moderate bilateral facet arthropathy also noted.  The bilateral neural foraminal canals are moderately narrowed with some mild effacement of either exiting L3 nerve root in the extraforaminal regions bilaterally.    L4-L5:  Grade 1 spondylolisthesis along with moderate to severe bilateral facet arthropathy and ligamentum flavum hypertrophy resulting in at least moderate narrowing of the central canal.  The right neural foraminal canal is mildly to moderately narrowed.  Left neural foraminal canal is moderately to severely narrowed with mild effacement of the exiting L4 nerve root.    L5-S1:  No  significant central or neural foraminal canal narrowing noted.  Mild bilateral facet arthropathy noted.   Impression       1. Multilevel degenerative changes of the lumbar spine as detailed above     Lumbar XRAYs 3/27/18    Narrative     EXAMINATION:  XR LUMBAR SPINE AP AND LAT WITH FLEX/EXT    CLINICAL HISTORY:  Low back pain    TECHNIQUE:  AP and lateral views as well as lateral flexion and extension images are performed through the lumbar spine.    COMPARISON:  None    FINDINGS:  X-ray lumbar spine with flexion and extension demonstrates grade 1 spondylolisthesis at L4-5 measuring around 6 mm which does not change significantly with flexion and extension.  The L4-5 disc is narrowed and degenerated.  Other lumbar vertebral discs are maintained.  Vertebral body heights are maintained.  Small anterior spurs are seen, and there is small posterior osteophyte along the inferior endplate of L4.  There is lumbar facet arthropathy at L4-5 and L5-S1.   Impression       Degenerative changes as above.  Grade 1 anterolisthesis and degenerative disc disease at L4-5.         Allergies:   Review of patient's allergies indicates:   Allergen Reactions    Aspirin Other (See Comments)     Has been told not to take it due to bariatric surgery       Current Medications:   Current Outpatient Medications   Medication Sig Dispense Refill    acetaminophen (TYLENOL) 500 MG tablet Take 1 tablet (500 mg total) by mouth every 6 (six) hours as needed for Temperature greater than or Pain. 20 tablet 0    albuterol (PROAIR HFA) 90 mcg/actuation inhaler Inhale 2 puffs into the lungs every 4 (four) hours as needed for Wheezing or Shortness of Breath. Rescue 8.5 g 1    albuterol (PROVENTIL/VENTOLIN HFA) 90 mcg/actuation inhaler Inhale 1-2 puffs into the lungs every 6 (six) hours as needed for Wheezing or Shortness of Breath. Rescue 8 g 0    albuterol sulfate 2.5 mg/0.5 mL Nebu Take 2.5 mg by nebulization every 4 (four) hours as needed (shortness of  breath, wheezing, coughing). Rescue 30 each 1    albuterol sulfate 2.5 mg/0.5 mL Nebu Take 2.5 mg by nebulization every 4 (four) hours as needed (SOB/wheezing). Rescue 20 each 0    atenoloL (TENORMIN) 100 MG tablet TAKE 1 TABLET EVERY DAY 90 tablet 3    benzonatate (TESSALON) 100 MG capsule Take 1 capsule (100 mg total) by mouth 3 (three) times daily as needed for Cough. 30 capsule 0    benzonatate (TESSALON) 200 MG capsule Take 1 capsule (200 mg total) by mouth 3 (three) times daily as needed for Cough. 30 capsule 1    blood-glucose meter kit Use as instructed 1 each 0    budesonide-formoterol 160-4.5 mcg (SYMBICORT) 160-4.5 mcg/actuation HFAA Inhale 2 puffs into the lungs every 12 (twelve) hours. Controller (brand only) (stop breo) 10.2 g 11    EScitalopram oxalate (LEXAPRO) 20 MG tablet TAKE 1 TABLET EVERY DAY 90 tablet 3    fluticasone propionate (FLONASE) 50 mcg/actuation nasal spray 2 sprays (100 mcg total) by Each Nostril route once daily. 11.1 mL 1    fluticasone propionate (FLONASE) 50 mcg/actuation nasal spray 1 spray (50 mcg total) by Each Nostril route 2 (two) times daily as needed for Rhinitis or Allergies. 15 g 0    furosemide (LASIX) 40 MG tablet Take 1 tablet (40 mg total) by mouth once daily. 90 tablet 3    gabapentin (NEURONTIN) 800 MG tablet Take 1 tablet (800 mg total) by mouth 3 (three) times daily. 90 tablet 2    glipiZIDE (GLUCOTROL) 10 MG tablet Take 1 tablet (10 mg total) by mouth daily with breakfast. 180 tablet 3    guaiFENesin 100 mg/5 ml (ROBITUSSIN) 100 mg/5 mL syrup Take 5-10 mLs (100-200 mg total) by mouth every 4 (four) hours as needed for Cough or Congestion. 118 mL 0    hydrOXYzine pamoate (VISTARIL) 25 MG Cap TAKE 1 CAPSULE BY MOUTH ONCE DAILY AS NEEDED FOR ANXIETY 30 capsule 2    ketorolac (TORADOL) 10 mg tablet Take 1 tablet (10 mg total) by mouth every 6 (six) hours as needed for Pain. Take with food to prevent heartburn. 20 tablet 1    linaGLIPtin (TRADJENTA) 5 mg Tab tablet  Take 1 tablet (5 mg total) by mouth once daily. 90 tablet 3    loratadine (CLARITIN) 10 mg tablet TAKE 1 TABLET EVERY DAY AS NEEDED FOR ALLERGIES 90 tablet 3    losartan (COZAAR) 100 MG tablet Take 1 tablet (100 mg total) by mouth once daily. 90 tablet 3    ondansetron (ZOFRAN-ODT) 4 MG TbDL Take 1 tablet (4 mg total) by mouth every 6 (six) hours as needed (Nausea). 15 tablet 0    pantoprazole (PROTONIX) 20 MG tablet TAKE 1 TABLET TWICE DAILY  BEFORE  MEALS 180 tablet 3    promethazine-dextromethorphan (PROMETHAZINE-DM) 6.25-15 mg/5 mL Syrp Take 5 mLs by mouth every 8 (eight) hours as needed (cough). 240 mL 0    rosuvastatin (CRESTOR) 20 MG tablet Take 1 tablet (20 mg total) by mouth once daily. 30 tablet 11    tiZANidine (ZANAFLEX) 4 MG tablet TAKE 1 TABLET BY MOUTH EVERY 8 HOURS AS NEEDED FOR  MUSCLE  PAIN 90 tablet 0    topiramate (TOPAMAX) 50 MG tablet Take 1 tablet (50 mg total) by mouth 2 (two) times daily. 180 tablet 0    traZODone (DESYREL) 100 MG tablet Take 1 tablet (100 mg total) by mouth every evening. 90 tablet 3     No current facility-administered medications for this visit.       REVIEW OF SYSTEMS:    GENERAL:  No weight loss, malaise or fevers.  HEENT:  Negative for frequent or significant headaches.  NECK:  Negative for lumps, goiter, pain and significant neck swelling.  RESPIRATORY:  Negative for cough, wheezing or shortness of breath.  CARDIOVASCULAR:  Negative for chest pain, leg swelling or palpitations. Hypertension.  GI:  Negative for abdominal discomfort, blood in stools or black stools or change in bowel habits.  MUSCULOSKELETAL:  See HPI.  SKIN:  Negative for lesions, rash, and itching.  PSYCH:  Negative for sleep disturbance, mood disorder and recent psychosocial stressors.  HEMATOLOGY/LYMPHOLOGY:  Negative for prolonged bleeding, bruising easily or swollen nodes.  NEURO:   No history of headaches, syncope, paralysis, seizures or tremors.  ENDO: Diabetes.  All other reviewed and negative  other than HPI.    Past Medical History:  Past Medical History:   Diagnosis Date    Anxiety with depression     Arthritis     CKD (chronic kidney disease) stage 2, GFR 60-89 ml/min     Diabetes mellitus     History of Clarisse-en-Y gastric bypass     Hypertension     Obesity, unspecified     DAHLIA (obstructive sleep apnea)     Spondylosis        Past Surgical History:  Past Surgical History:   Procedure Laterality Date    CARPAL TUNNEL RELEASE Right 3/8/2019    Procedure: RELEASE, CARPAL TUNNEL right;  Surgeon: Pan Goodman MD;  Location: [facility] OR;  Service: Orthopedics;  Laterality: Right;    CARPAL TUNNEL RELEASE Left 2019    Procedure: RELEASE, CARPAL TUNNEL- LEFT;  Surgeon: Pan Goodman MD;  Location: [facility] OR 2ND FLR;  Service: Orthopedics;  Laterality: Left;    CATARACT EXTRACTION Bilateral      SECTION      CHOLECYSTECTOMY      EPIDURAL STEROID INJECTION INTO LUMBAR SPINE N/A 2018    Procedure: INJECTION, STEROID, SPINE, LUMBAR, EPIDURAL;  Surgeon: Shaq Silverio MD;  Location: [facility] PAIN MGT;  Service: Pain Management;  Laterality: N/A;  LUMBAR L4-L5 INTERLAMINAR ELISE'  [id number]  W/ SEDATION     HYSTERECTOMY      INJECTION OF ANESTHETIC AGENT AROUND NERVE Bilateral 2019    Procedure: BLOCK, NERVE, L3-L4-L5 MEDIAL BRANCH;  Surgeon: Shaq Silverio MD;  Location: [facility] PAIN MGT;  Service: Pain Management;  Laterality: Bilateral;    INJECTION OF ANESTHETIC AGENT AROUND NERVE Bilateral 10/17/2019    Procedure: BLOCK, NERVE;  Surgeon: Shaq Silverio MD;  Location: [facility] PAIN MGT;  Service: Pain Management;  Laterality: Bilateral;  B/L MBB L3-L4-L5  REPEAT  CONSENT NEEDED    INJECTION OF FACET JOINT Bilateral 2018    Procedure: INJECTION-FACET;  Surgeon: Shaq Silverio MD;  Location: [facility] PAIN MGT;  Service: Pain Management;  Laterality: Bilateral;  LUMBAR BILATERAL L4-L5 AND L5-S1 FACET STEROID INJECTION  [id number]    W/ SEDATION     RADIOFREQUENCY ABLATION Right 2019    Procedure:  RADIOFREQUENCY ABLATION RIGHT L3, L4, L5;  Surgeon: Shaq Silverio MD;  Location: [facility] PAIN MGT;  Service: Pain Management;  Laterality: Right;  Right RFA L3-L4-L5  1 of 2  Consent Needed    RADIOFREQUENCY ABLATION Left 12/5/2019    Procedure: RADIOFREQUENCY ABLATION LEFT L3-5;  Surgeon: Shaq Silverio MD;  Location: [facility] PAIN MGT;  Service: Pain Management;  Laterality: Left;  Left RFA L3-L4-L5  2 of 2  Consent Needed    RADIOFREQUENCY ABLATION Left 8/17/2020    Procedure: RADIOFREQUENCY ABLATION LEFT L3,4,5 1 of 2;  Surgeon: Shaq Silverio MD;  Location: [facility] PAIN MGT;  Service: Pain Management;  Laterality: Left;  Left RFA L3,4,5  1 of 2    RADIOFREQUENCY ABLATION Right 8/31/2020    Procedure: RADIOFREQUENCY ABLATION RIGHT L3,4,5 2 of 2;  Surgeon: Shaq Silverio MD;  Location: [facility] PAIN MGT;  Service: Pain Management;  Laterality: Right;  RADIOFREQUENCY ABLATION RIGHT L3,4,5  2 of 2    RADIOFREQUENCY ABLATION Left 9/9/2021    Procedure: RADIOFREQUENCY ABLATION, L3-L4 AND L5 MEDIAL BRANCH 1 OF 2;  Surgeon: Shaq Silverio MD;  Location: [facility] PAIN MGT;  Service: Pain Management;  Laterality: Left;    RADIOFREQUENCY ABLATION Right 9/20/2021    Procedure: RADIOFREQUENCY ABLATION, L3-L4 AND L5 MEDIAL BRANCH 2 OF 2;  Surgeon: Shaq Silverio MD;  Location: [facility] PAIN MGT;  Service: Pain Management;  Laterality: Right;    RADIOFREQUENCY ABLATION Right 7/7/2022    Procedure: RADIOFREQUENCY ABLATION, RIGHT L3-L4-L5 ONE OF TWO;  Surgeon: Shaq Silverio MD;  Location: [facility] PAIN MGT;  Service: Pain Management;  Laterality: Right;    RADIOFREQUENCY ABLATION Left 7/21/2022    Procedure: RADIOFREQUENCY ABLATION, LEFT L3-L4-L5 TWO OF TWO *BRING SCS REMOTE*;  Surgeon: Shaq Silverio MD;  Location: [facility] PAIN MGT;  Service: Pain Management;  Laterality: Left;    TRIAL OF SPINAL CORD NERVE STIMULATOR N/A 9/24/2018    Procedure: TRIAL, NEUROSTIMULATOR, SPINAL CORD, SPINAL CORD STIMULATOR TRIAL-INTERNAL WIRES TO EXTERNAL BATTERY;  Surgeon: Shaq Silverio  "MD;  Location: [facility] PAIN MGT;  Service: Pain Management;  Laterality: N/A;  [employer] REP NOTIFIED       Family History:  Family History   Problem Relation Age of Onset    Cataracts Mother     Glaucoma Mother     No Known Problems Father     No Known Problems Sister     No Known Problems Brother     No Known Problems Maternal Aunt     No Known Problems Maternal Uncle     No Known Problems Paternal Aunt     No Known Problems Paternal Uncle     No Known Problems Maternal Grandmother     No Known Problems Maternal Grandfather     No Known Problems Paternal Grandmother     No Known Problems Paternal Grandfather        Social History:  Social History     Socioeconomic History    Marital status:    Tobacco Use    Smoking status: Never    Smokeless tobacco: Never   Substance and Sexual Activity    Alcohol use: No    Drug use: No       OBJECTIVE:    /65 (BP Location: Right arm, Patient Position: Sitting, BP Method: Large (Automatic))   Pulse 61   Resp 18   Ht 5' 4" (1.626 m)   Wt 94.8 kg (209 lb)   BMI 35.87 kg/m²     PHYSICAL EXAMINATION:    General appearance: Well appearing, in no acute distress, alert and oriented x3.  Psych:  Mood and affect appropriate.  Skin: Skin color, texture, turgor normal, no rashes or lesions, in both upper and lower body.   Head/face:  Atraumatic, normocephalic. No palpable lymph nodes  Back: Straight leg raising in the sitting and supine positions is negative to radicular pain bilaterally. There is pain with palpation to lumbar paraspinals and facet joints bilaterally. Limited ROM with pain on extension. Positive facet loading bilaterally..There is pain with palpation to SI joints.   Extremities: No deformities, edema, or skin discoloration. Good capillary refill.  Musculoskeletal: Normal strength to BLE. No atrophy or tone abnormalities are noted.  Neuro: Decreased sensation to BLE.  Gait: Antalgic- ambulates without assistance.    ASSESSMENT: 72 y.o. year old female with back " pain, consistent with the following diagnoses:     1. Lumbar spondylosis  Procedure Order to Pain Management      2. Lumbar radiculopathy  gabapentin (NEURONTIN) 800 MG tablet      3. DDD (degenerative disc disease), lumbar        4. S/P insertion of spinal cord stimulator        5. Chronic pain syndrome                    PLAN:     - Previous imaging was reviewed and discussed with the patient today.    - Schedule for left then right L3,4,5 RFA, one side at a time, two weeks apart. Previous RFA provided 50% relief.     - If limited relief with above, consider updated lumbar imaging.     - I contacted Roque and Ildefonso and discussed meeting with the patient.     - Continue Gabapentin and Zanaflex.     - RTC 3 weeks after above procedure.       The above plan and management options were discussed at length with patient. Patient is in agreement with the above and verbalized understanding.    Kimberly Conner  02/08/2023

## 2023-02-09 DIAGNOSIS — Z00.00 ENCOUNTER FOR MEDICARE ANNUAL WELLNESS EXAM: ICD-10-CM

## 2023-03-07 ENCOUNTER — HOSPITAL ENCOUNTER (OUTPATIENT)
Dept: CARDIOLOGY | Facility: HOSPITAL | Age: 73
Discharge: HOME | End: 2023-03-07
Attending: INTERNAL MEDICINE
Payer: MEDICARE

## 2023-03-07 ENCOUNTER — HOSPITAL ENCOUNTER (OUTPATIENT)
Dept: RADIOLOGY | Facility: HOSPITAL | Age: 73
Discharge: HOME | End: 2023-03-07
Attending: INTERNAL MEDICINE
Payer: MEDICARE

## 2023-03-07 DIAGNOSIS — E78.5 HYPERLIPIDEMIA ASSOCIATED WITH TYPE 2 DIABETES MELLITUS: ICD-10-CM

## 2023-03-07 DIAGNOSIS — E11.22 TYPE 2 DIABETES MELLITUS WITH STAGE 3A CHRONIC KIDNEY DISEASE, WITHOUT LONG-TERM CURRENT USE OF INSULIN: ICD-10-CM

## 2023-03-07 DIAGNOSIS — E66.2 CLASS 2 OBESITY WITH ALVEOLAR HYPOVENTILATION, SERIOUS COMORBIDITY, AND BODY MASS INDEX (BMI) OF 35.0 TO 35.9 IN ADULT: ICD-10-CM

## 2023-03-07 DIAGNOSIS — E11.69 HYPERLIPIDEMIA ASSOCIATED WITH TYPE 2 DIABETES MELLITUS: ICD-10-CM

## 2023-03-07 DIAGNOSIS — R06.02 SOBOE (SHORTNESS OF BREATH ON EXERTION): ICD-10-CM

## 2023-03-07 DIAGNOSIS — I10 PRIMARY HYPERTENSION: ICD-10-CM

## 2023-03-07 DIAGNOSIS — N18.31 TYPE 2 DIABETES MELLITUS WITH STAGE 3A CHRONIC KIDNEY DISEASE, WITHOUT LONG-TERM CURRENT USE OF INSULIN: ICD-10-CM

## 2023-03-07 LAB
CV STRESS BASE HR: 63 BPM
DIASTOLIC BLOOD PRESSURE: 52 MMHG
NUC REST EJECTION FRACTION: 83
OHS CV CPX 85 PERCENT MAX PREDICTED HEART RATE MALE: 120
OHS CV CPX MAX PREDICTED HEART RATE: 142
OHS CV CPX PATIENT IS FEMALE: 1
OHS CV CPX PATIENT IS MALE: 0
OHS CV CPX PEAK DIASTOLIC BLOOD PRESSURE: 47 MMHG
OHS CV CPX PEAK HEAR RATE: 75 BPM
OHS CV CPX PEAK RATE PRESSURE PRODUCT: NORMAL
OHS CV CPX PEAK SYSTOLIC BLOOD PRESSURE: 134 MMHG
OHS CV CPX PERCENT MAX PREDICTED HEART RATE ACHIEVED: 53
OHS CV CPX RATE PRESSURE PRODUCT PRESENTING: 6741
SYSTOLIC BLOOD PRESSURE: 107 MMHG

## 2023-03-07 PROCEDURE — 93017 CV STRESS TEST TRACING ONLY: CPT

## 2023-03-07 PROCEDURE — 78452 HT MUSCLE IMAGE SPECT MULT: CPT

## 2023-03-07 PROCEDURE — 78452 HT MUSCLE IMAGE SPECT MULT: CPT | Mod: 26,,, | Performed by: INTERNAL MEDICINE

## 2023-03-07 PROCEDURE — 93018 NUCLEAR STRESS - CARDIOLOGY INTERPRETED (CUPID ONLY): ICD-10-PCS | Mod: ,,, | Performed by: INTERNAL MEDICINE

## 2023-03-07 PROCEDURE — 93016 NUCLEAR STRESS - CARDIOLOGY INTERPRETED (CUPID ONLY): ICD-10-PCS | Mod: ,,, | Performed by: INTERNAL MEDICINE

## 2023-03-07 PROCEDURE — 93016 CV STRESS TEST SUPVJ ONLY: CPT | Mod: ,,, | Performed by: INTERNAL MEDICINE

## 2023-03-07 PROCEDURE — 63600175 PHARM REV CODE 636 W HCPCS: Performed by: INTERNAL MEDICINE

## 2023-03-07 PROCEDURE — 78452 NUCLEAR STRESS - CARDIOLOGY INTERPRETED (CUPID ONLY): ICD-10-PCS | Mod: 26,,, | Performed by: INTERNAL MEDICINE

## 2023-03-07 PROCEDURE — 93018 CV STRESS TEST I&R ONLY: CPT | Mod: ,,, | Performed by: INTERNAL MEDICINE

## 2023-03-07 PROCEDURE — A9502 TC99M TETROFOSMIN: HCPCS

## 2023-03-07 RX ORDER — REGADENOSON 0.08 MG/ML
0.4 INJECTION, SOLUTION INTRAVENOUS ONCE
Status: COMPLETED | OUTPATIENT
Start: 2023-03-07 | End: 2023-03-07

## 2023-03-07 RX ADMIN — REGADENOSON 0.4 MG: 0.08 INJECTION, SOLUTION INTRAVENOUS at 08:03

## 2023-03-14 ENCOUNTER — OFFICE VISIT (OUTPATIENT)
Dept: CARDIOLOGY | Facility: CLINIC | Age: 73
End: 2023-03-14
Payer: MEDICARE

## 2023-03-14 VITALS
OXYGEN SATURATION: 100 % | HEART RATE: 68 BPM | BODY MASS INDEX: 34.74 KG/M2 | SYSTOLIC BLOOD PRESSURE: 140 MMHG | DIASTOLIC BLOOD PRESSURE: 82 MMHG | WEIGHT: 202.38 LBS | RESPIRATION RATE: 18 BRPM

## 2023-03-14 DIAGNOSIS — R06.02 SOBOE (SHORTNESS OF BREATH ON EXERTION): Primary | ICD-10-CM

## 2023-03-14 DIAGNOSIS — E78.5 HYPERLIPIDEMIA ASSOCIATED WITH TYPE 2 DIABETES MELLITUS: ICD-10-CM

## 2023-03-14 DIAGNOSIS — N18.31 TYPE 2 DIABETES MELLITUS WITH STAGE 3A CHRONIC KIDNEY DISEASE, WITHOUT LONG-TERM CURRENT USE OF INSULIN: ICD-10-CM

## 2023-03-14 DIAGNOSIS — G47.33 OSA (OBSTRUCTIVE SLEEP APNEA): ICD-10-CM

## 2023-03-14 DIAGNOSIS — I70.0 AORTIC ATHEROSCLEROSIS: ICD-10-CM

## 2023-03-14 DIAGNOSIS — E11.22 TYPE 2 DIABETES MELLITUS WITH STAGE 3A CHRONIC KIDNEY DISEASE, WITHOUT LONG-TERM CURRENT USE OF INSULIN: ICD-10-CM

## 2023-03-14 DIAGNOSIS — E66.2 CLASS 2 OBESITY WITH ALVEOLAR HYPOVENTILATION, SERIOUS COMORBIDITY, AND BODY MASS INDEX (BMI) OF 35.0 TO 35.9 IN ADULT: ICD-10-CM

## 2023-03-14 DIAGNOSIS — I10 PRIMARY HYPERTENSION: ICD-10-CM

## 2023-03-14 DIAGNOSIS — I25.10 ATHEROSCLEROSIS OF NATIVE CORONARY ARTERY OF NATIVE HEART WITHOUT ANGINA PECTORIS: ICD-10-CM

## 2023-03-14 DIAGNOSIS — E11.69 HYPERLIPIDEMIA ASSOCIATED WITH TYPE 2 DIABETES MELLITUS: ICD-10-CM

## 2023-03-14 PROCEDURE — 3079F PR MOST RECENT DIASTOLIC BLOOD PRESSURE 80-89 MM HG: ICD-10-PCS | Mod: CPTII,S$GLB,, | Performed by: INTERNAL MEDICINE

## 2023-03-14 PROCEDURE — 93000 ELECTROCARDIOGRAM COMPLETE: CPT | Mod: S$GLB,,, | Performed by: INTERNAL MEDICINE

## 2023-03-14 PROCEDURE — 3288F FALL RISK ASSESSMENT DOCD: CPT | Mod: CPTII,S$GLB,, | Performed by: INTERNAL MEDICINE

## 2023-03-14 PROCEDURE — 99999 PR PBB SHADOW E&M-EST. PATIENT-LVL III: CPT | Mod: PBBFAC,,, | Performed by: INTERNAL MEDICINE

## 2023-03-14 PROCEDURE — 3066F PR DOCUMENTATION OF TREATMENT FOR NEPHROPATHY: ICD-10-PCS | Mod: CPTII,S$GLB,, | Performed by: INTERNAL MEDICINE

## 2023-03-14 PROCEDURE — 99999 PR PBB SHADOW E&M-EST. PATIENT-LVL III: ICD-10-PCS | Mod: PBBFAC,,, | Performed by: INTERNAL MEDICINE

## 2023-03-14 PROCEDURE — 3288F PR FALLS RISK ASSESSMENT DOCUMENTED: ICD-10-PCS | Mod: CPTII,S$GLB,, | Performed by: INTERNAL MEDICINE

## 2023-03-14 PROCEDURE — 3077F PR MOST RECENT SYSTOLIC BLOOD PRESSURE >= 140 MM HG: ICD-10-PCS | Mod: CPTII,S$GLB,, | Performed by: INTERNAL MEDICINE

## 2023-03-14 PROCEDURE — 3079F DIAST BP 80-89 MM HG: CPT | Mod: CPTII,S$GLB,, | Performed by: INTERNAL MEDICINE

## 2023-03-14 PROCEDURE — 3044F PR MOST RECENT HEMOGLOBIN A1C LEVEL <7.0%: ICD-10-PCS | Mod: CPTII,S$GLB,, | Performed by: INTERNAL MEDICINE

## 2023-03-14 PROCEDURE — 93000 EKG 12-LEAD: ICD-10-PCS | Mod: S$GLB,,, | Performed by: INTERNAL MEDICINE

## 2023-03-14 PROCEDURE — 3066F NEPHROPATHY DOC TX: CPT | Mod: CPTII,S$GLB,, | Performed by: INTERNAL MEDICINE

## 2023-03-14 PROCEDURE — 4010F ACE/ARB THERAPY RXD/TAKEN: CPT | Mod: CPTII,S$GLB,, | Performed by: INTERNAL MEDICINE

## 2023-03-14 PROCEDURE — 3008F BODY MASS INDEX DOCD: CPT | Mod: CPTII,S$GLB,, | Performed by: INTERNAL MEDICINE

## 2023-03-14 PROCEDURE — 99214 OFFICE O/P EST MOD 30 MIN: CPT | Mod: 25,S$GLB,, | Performed by: INTERNAL MEDICINE

## 2023-03-14 PROCEDURE — 99214 PR OFFICE/OUTPT VISIT, EST, LEVL IV, 30-39 MIN: ICD-10-PCS | Mod: 25,S$GLB,, | Performed by: INTERNAL MEDICINE

## 2023-03-14 PROCEDURE — 3077F SYST BP >= 140 MM HG: CPT | Mod: CPTII,S$GLB,, | Performed by: INTERNAL MEDICINE

## 2023-03-14 PROCEDURE — 4010F PR ACE/ARB THEARPY RXD/TAKEN: ICD-10-PCS | Mod: CPTII,S$GLB,, | Performed by: INTERNAL MEDICINE

## 2023-03-14 PROCEDURE — 3008F PR BODY MASS INDEX (BMI) DOCUMENTED: ICD-10-PCS | Mod: CPTII,S$GLB,, | Performed by: INTERNAL MEDICINE

## 2023-03-14 PROCEDURE — 1125F PR PAIN SEVERITY QUANTIFIED, PAIN PRESENT: ICD-10-PCS | Mod: CPTII,S$GLB,, | Performed by: INTERNAL MEDICINE

## 2023-03-14 PROCEDURE — 1101F PT FALLS ASSESS-DOCD LE1/YR: CPT | Mod: CPTII,S$GLB,, | Performed by: INTERNAL MEDICINE

## 2023-03-14 PROCEDURE — 1101F PR PT FALLS ASSESS DOC 0-1 FALLS W/OUT INJ PAST YR: ICD-10-PCS | Mod: CPTII,S$GLB,, | Performed by: INTERNAL MEDICINE

## 2023-03-14 PROCEDURE — 1125F AMNT PAIN NOTED PAIN PRSNT: CPT | Mod: CPTII,S$GLB,, | Performed by: INTERNAL MEDICINE

## 2023-03-14 PROCEDURE — 3044F HG A1C LEVEL LT 7.0%: CPT | Mod: CPTII,S$GLB,, | Performed by: INTERNAL MEDICINE

## 2023-03-14 PROCEDURE — 3061F PR NEG MICROALBUMINURIA RESULT DOCUMENTED/REVIEW: ICD-10-PCS | Mod: CPTII,S$GLB,, | Performed by: INTERNAL MEDICINE

## 2023-03-14 PROCEDURE — 3061F NEG MICROALBUMINURIA REV: CPT | Mod: CPTII,S$GLB,, | Performed by: INTERNAL MEDICINE

## 2023-03-14 NOTE — PROGRESS NOTES
CARDIOLOGY CLINIC VISIT        HISTORY OF PRESENT ILLNESS:     2023: Faby Luna presents for cardiovascular evaluation. Referred by pulmonary secondary to LVH. Complaints of shortness of breath. Exertional. Symptoms < one block. Diabetes. Hypertension. DAHLIA. Obesity. Echocardiogram from 2022 showed an EF of 55%. Grade 1 DD. No pulmonary hypertension.    2023:  Last visit increased losartan and Lasix. Shortness of breath has improved.  Nuclear stress negative for ischemia.    CARDIOVASCULAR HISTORY:     None    PAST MEDICAL HISTORY:     Past Medical History:   Diagnosis Date    Anxiety with depression     Arthritis     CKD (chronic kidney disease) stage 2, GFR 60-89 ml/min     Diabetes mellitus     History of Clarisse-en-Y gastric bypass     Hypertension     Obesity, unspecified     DAHLIA (obstructive sleep apnea)     Spondylosis        PAST SURGICAL HISTORY:     Past Surgical History:   Procedure Laterality Date    CARPAL TUNNEL RELEASE Right 3/8/2019    Procedure: RELEASE, CARPAL TUNNEL right;  Surgeon: Pan Goodman MD;  Location: [facility];  Service: Orthopedics;  Laterality: Right;    CARPAL TUNNEL RELEASE Left 2019    Procedure: RELEASE, CARPAL TUNNEL- LEFT;  Surgeon: Pan Goodman MD;  Location: [address];  Service: Orthopedics;  Laterality: Left;    CATARACT EXTRACTION Bilateral      SECTION      CHOLECYSTECTOMY      EPIDURAL STEROID INJECTION INTO LUMBAR SPINE N/A 2018    Procedure: INJECTION, STEROID, SPINE, LUMBAR, EPIDURAL;  Surgeon: Shaq Silverio MD;  Location: [facility];  Service: Pain Management;  Laterality: N/A;  LUMBAR L4-L5 INTERLAMINAR ELISE'  [id number]  W/ SEDATION     HYSTERECTOMY      INJECTION OF ANESTHETIC AGENT AROUND NERVE Bilateral 2019    Procedure: BLOCK, NERVE, L3-L4-L5 MEDIAL BRANCH;  Surgeon: Shaq Silverio MD;  Location: [facility];  Service: Pain Management;  Laterality: Bilateral;    INJECTION OF ANESTHETIC AGENT AROUND NERVE  Bilateral 10/17/2019    Procedure: BLOCK, NERVE;  Surgeon: Shaq Silverio MD;  Location: [facility];  Service: Pain Management;  Laterality: Bilateral;  B/L MBB L3-L4-L5  REPEAT  CONSENT NEEDED    INJECTION OF FACET JOINT Bilateral 5/28/2018    Procedure: INJECTION-FACET;  Surgeon: Shaq Silverio MD;  Location: [facility];  Service: Pain Management;  Laterality: Bilateral;  LUMBAR BILATERAL L4-L5 AND L5-S1 FACET STEROID INJECTION  [id number]    W/ SEDATION     RADIOFREQUENCY ABLATION Right 11/21/2019    Procedure: RADIOFREQUENCY ABLATION RIGHT L3, L4, L5;  Surgeon: Shaq Silverio MD;  Location: [facility];  Service: Pain Management;  Laterality: Right;  Right RFA L3-L4-L5  1 of 2  Consent Needed    RADIOFREQUENCY ABLATION Left 12/5/2019    Procedure: RADIOFREQUENCY ABLATION LEFT L3-5;  Surgeon: Shaq Silverio MD;  Location: [facility];  Service: Pain Management;  Laterality: Left;  Left RFA L3-L4-L5  2 of 2  Consent Needed    RADIOFREQUENCY ABLATION Left 8/17/2020    Procedure: RADIOFREQUENCY ABLATION LEFT L3,4,5 1 of 2;  Surgeon: Shaq Silverio MD;  Location: [facility];  Service: Pain Management;  Laterality: Left;  Left RFA L3,4,5  1 of 2    RADIOFREQUENCY ABLATION Right 8/31/2020    Procedure: RADIOFREQUENCY ABLATION RIGHT L3,4,5 2 of 2;  Surgeon: Shaq Silverio MD;  Location: [facility];  Service: Pain Management;  Laterality: Right;  RADIOFREQUENCY ABLATION RIGHT L3,4,5  2 of 2    RADIOFREQUENCY ABLATION Left 9/9/2021    Procedure: RADIOFREQUENCY ABLATION, L3-L4 AND L5 MEDIAL BRANCH 1 OF 2;  Surgeon: Shaq Silverio MD;  Location: [facility];  Service: Pain Management;  Laterality: Left;    RADIOFREQUENCY ABLATION Right 9/20/2021    Procedure: RADIOFREQUENCY ABLATION, L3-L4 AND L5 MEDIAL BRANCH 2 OF 2;  Surgeon: Shaq Silverio MD;  Location: [facility];  Service: Pain Management;  Laterality: Right;    RADIOFREQUENCY ABLATION Right 7/7/2022    Procedure: RADIOFREQUENCY ABLATION, RIGHT L3-L4-L5 ONE OF  TWO;  Surgeon: Shaq Silverio MD;  Location: [facility];  Service: Pain Management;  Laterality: Right;    RADIOFREQUENCY ABLATION Left 7/21/2022    Procedure: RADIOFREQUENCY ABLATION, LEFT L3-L4-L5 TWO OF TWO *BRING SCS REMOTE*;  Surgeon: Shaq Silverio MD;  Location: [facility];  Service: Pain Management;  Laterality: Left;    TRIAL OF SPINAL CORD NERVE STIMULATOR N/A 9/24/2018    Procedure: TRIAL, NEUROSTIMULATOR, SPINAL CORD, SPINAL CORD STIMULATOR TRIAL-INTERNAL WIRES TO EXTERNAL BATTERY;  Surgeon: Shaq Silverio MD;  Location: [facility];  Service: Pain Management;  Laterality: N/A;  [facility] NOTIFIED       ALLERGIES AND MEDICATION:   Review of patient's allergies indicates:  No Known Allergies     Medication List            Accurate as of March 14, 2023  3:28 PM. If you have any questions, ask your nurse or doctor.                CONTINUE taking these medications      acetaminophen 500 MG tablet  Commonly known as: TYLENOL  Take 1 tablet (500 mg total) by mouth every 6 (six) hours as needed for Temperature greater than or Pain.     * albuterol 90 mcg/actuation inhaler  Commonly known as: PROAIR HFA  Inhale 2 puffs into the lungs every 4 (four) hours as needed for Wheezing or Shortness of Breath. Rescue     * albuterol sulfate 2.5 mg/0.5 mL Nebu  Take 2.5 mg by nebulization every 4 (four) hours as needed (shortness of breath, wheezing, coughing). Rescue     * albuterol 90 mcg/actuation inhaler  Commonly known as: PROVENTIL/VENTOLIN HFA  Inhale 1-2 puffs into the lungs every 6 (six) hours as needed for Wheezing or Shortness of Breath. Rescue     * albuterol sulfate 2.5 mg/0.5 mL Nebu  Take 2.5 mg by nebulization every 4 (four) hours as needed (SOB/wheezing). Rescue     atenoloL 100 MG tablet  Commonly known as: TENORMIN  TAKE 1 TABLET EVERY DAY     * benzonatate 200 MG capsule  Commonly known as: TESSALON  Take 1 capsule (200 mg total) by mouth 3 (three) times daily as needed for Cough.     * benzonatate  100 MG capsule  Commonly known as: TESSALON  Take 1 capsule (100 mg total) by mouth 3 (three) times daily as needed for Cough.     blood-glucose meter kit  Use as instructed     budesonide-formoterol 160-4.5 mcg 160-4.5 mcg/actuation Hfaa  Commonly known as: SYMBICORT  Inhale 2 puffs into the lungs every 12 (twelve) hours. Controller (brand only) (stop breo)     EScitalopram oxalate 20 MG tablet  Commonly known as: LEXAPRO  TAKE 1 TABLET EVERY DAY     * fluticasone propionate 50 mcg/actuation nasal spray  Commonly known as: FLONASE  2 sprays (100 mcg total) by Each Nostril route once daily.     * fluticasone propionate 50 mcg/actuation nasal spray  Commonly known as: FLONASE  1 spray (50 mcg total) by Each Nostril route 2 (two) times daily as needed for Rhinitis or Allergies.     furosemide 40 MG tablet  Commonly known as: LASIX  Take 1 tablet (40 mg total) by mouth once daily.     gabapentin 800 MG tablet  Commonly known as: NEURONTIN  Take 1 tablet (800 mg total) by mouth 3 (three) times daily.     glipiZIDE 10 MG tablet  Commonly known as: GLUCOTROL  Take 1 tablet (10 mg total) by mouth daily with breakfast.     guaiFENesin 100 mg/5 ml 100 mg/5 mL syrup  Commonly known as: ROBITUSSIN  Take 5-10 mLs (100-200 mg total) by mouth every 4 (four) hours as needed for Cough or Congestion.     hydrOXYzine pamoate 25 MG Cap  Commonly known as: VISTARIL  TAKE 1 CAPSULE BY MOUTH ONCE DAILY AS NEEDED FOR ANXIETY     ketorolac 10 mg tablet  Commonly known as: TORADOL  Take 1 tablet (10 mg total) by mouth every 6 (six) hours as needed for Pain. Take with food to prevent heartburn.     loratadine 10 mg tablet  Commonly known as: CLARITIN  TAKE 1 TABLET EVERY DAY AS NEEDED FOR ALLERGIES     losartan 100 MG tablet  Commonly known as: COZAAR  Take 1 tablet (100 mg total) by mouth once daily.     ondansetron 4 MG Tbdl  Commonly known as: ZOFRAN-ODT  Take 1 tablet (4 mg total) by mouth every 6 (six) hours as needed (Nausea).      pantoprazole 20 MG tablet  Commonly known as: PROTONIX  TAKE 1 TABLET TWICE DAILY  BEFORE  MEALS     promethazine-dextromethorphan 6.25-15 mg/5 mL Syrp  Commonly known as: PROMETHAZINE-DM  Take 5 mLs by mouth every 8 (eight) hours as needed (cough).     rosuvastatin 20 MG tablet  Commonly known as: CRESTOR  Take 1 tablet (20 mg total) by mouth once daily.     tiZANidine 4 MG tablet  Commonly known as: ZANAFLEX  TAKE 1 TABLET BY MOUTH EVERY 8 HOURS AS NEEDED FOR  MUSCLE  PAIN     topiramate 50 MG tablet  Commonly known as: TOPAMAX  Take 1 tablet (50 mg total) by mouth 2 (two) times daily.     TRADJENTA 5 mg Tab tablet  Generic drug: linaGLIPtin  Take 1 tablet (5 mg total) by mouth once daily.     traZODone 100 MG tablet  Commonly known as: DESYREL  Take 1 tablet (100 mg total) by mouth every evening.           * This list has 8 medication(s) that are the same as other medications prescribed for you. Read the directions carefully, and ask your doctor or other care provider to review them with you.                  SOCIAL HISTORY:     Social History     Socioeconomic History    Marital status:    Tobacco Use    Smoking status: Never    Smokeless tobacco: Never   Substance and Sexual Activity    Alcohol use: No    Drug use: No       FAMILY HISTORY:     Family History   Problem Relation Age of Onset    Cataracts Mother     Glaucoma Mother     No Known Problems Father     No Known Problems Sister     No Known Problems Brother     No Known Problems Maternal Aunt     No Known Problems Maternal Uncle     No Known Problems Paternal Aunt     No Known Problems Paternal Uncle     No Known Problems Maternal Grandmother     No Known Problems Maternal Grandfather     No Known Problems Paternal Grandmother     No Known Problems Paternal Grandfather        REVIEW OF SYSTEMS:   Review of Systems   Constitutional:  Negative for chills, diaphoresis, fever, malaise/fatigue and weight loss.   HENT:  Negative for congestion,  hearing loss, sinus pain, sore throat and tinnitus.    Eyes:  Negative for blurred vision, double vision, photophobia and pain.   Respiratory:  Positive for shortness of breath. Negative for cough, hemoptysis, sputum production, wheezing and stridor.    Cardiovascular:  Negative for chest pain, palpitations, orthopnea, claudication, leg swelling and PND.   Gastrointestinal:  Negative for abdominal pain, blood in stool, heartburn, melena, nausea and vomiting.   Musculoskeletal:  Negative for back pain, falls, joint pain, myalgias and neck pain.   Neurological:  Negative for dizziness, tingling, tremors, sensory change, speech change, focal weakness, seizures, loss of consciousness, weakness and headaches.   Endo/Heme/Allergies:  Does not bruise/bleed easily.   Psychiatric/Behavioral:  Negative for depression, memory loss and substance abuse. The patient is not nervous/anxious.      PHYSICAL EXAM:     Vitals:    03/14/23 1459   BP: (!) 140/82   Pulse: 68   Resp: 18    Body mass index is 34.74 kg/m².  Weight: 91.8 kg (202 lb 6.1 oz)         Physical Exam  Constitutional:       General: She is not in acute distress.     Appearance: She is well-developed. She is obese. She is not diaphoretic.   HENT:      Head: Normocephalic.   Neck:      Vascular: No carotid bruit or JVD.   Cardiovascular:      Rate and Rhythm: Normal rate and regular rhythm.      Pulses: Normal pulses.      Heart sounds: Normal heart sounds.   Pulmonary:      Effort: Pulmonary effort is normal.      Breath sounds: Normal breath sounds.   Abdominal:      General: Bowel sounds are normal.      Palpations: Abdomen is soft.      Tenderness: There is no abdominal tenderness.   Skin:     General: Skin is warm and dry.   Neurological:      Mental Status: She is alert and oriented to person, place, and time.   Psychiatric:         Speech: Speech normal.         Behavior: Behavior normal.         Thought Content: Thought content normal.       DATA:   EKG:  (personally reviewed tracing)  03/14/2023 - NSR, non specific t wave abnormality  11/25/2022 - NSR, non specific t wave abnormality  Laboratory:  CBC:  Recent Labs   Lab 12/30/21 2136 08/03/22 0930 02/06/23  1026   WBC 12.93 H 12.83 H 9.62   Hemoglobin 11.3 L 12.4 12.3   Hematocrit 37.4 40.3 41.2   Platelets 426 350 353       CHEMISTRIES:  Recent Labs   Lab 07/09/21  1219 10/11/21  1145 12/30/21 2136 08/03/22 0930 02/06/23  1026   Glucose 89 127 H 89 184 H 61 L   Sodium 143 140 142 140 140   Potassium 4.0 4.5 4.0 4.4 4.2   BUN 13 15 9 15 14   Creatinine 1.0 1.1 1.0 1.2 1.0   eGFR if African American >60.0 58.4 A >60  --   --    eGFR if non  56.8 A 50.6 A 56 A  --   --    Calcium 9.5 9.8 8.8 9.6 9.0       CARDIAC BIOMARKERS:  Recent Labs   Lab 12/30/21  2136      Troponin I <0.006       COAGS:        LIPIDS/LFTS:  Recent Labs   Lab 10/11/21  1145 12/30/21 2136 08/03/22 0930 02/06/23  1026   Cholesterol 200 H  --  168 156   Triglycerides 130  --  79 100   HDL 77 H  --  55 59   LDL Cholesterol 97.0  --  97.2 77.0   Non-HDL Cholesterol 123  --  113 97   AST 21 20 20 18   ALT 41 14 28 16       Cardiovascular Testing:    Nuclear stress 03/07/2023:      Normal myocardial perfusion scan. There is no evidence of myocardial ischemia or infarction.    There is a  mild intensity fixed perfusion abnormality in the inferior wall of the left ventricle secondary to diaphragm attenuation.    The gated perfusion images showed an ejection fraction of 83% at rest.    There is normal wall motion at rest and post stress.    The ECG portion of the study is negative for ischemia.    The patient reported no chest pain during the stress test.    There were no arrhythmias during stress.    Echocardiogram 09/16/2022:     The estimated ejection fraction is 55%.  The left ventricle is normal in size with mild concentric hypertrophy and normal systolic function.  Grade I left ventricular diastolic dysfunction.  Normal  right ventricular size with normal right ventricular systolic function.  Mild left atrial enlargement.  Normal central venous pressure (3 mmHg).  The estimated PA systolic pressure is 17 mmHg.    ASSESSMENT:     SOBOE: improved.  Hypertension  Hyperlipidemia  Diabetes  LVH with grade I DD  Obstructive sleep apnea  Obesity    PLAN:     SOBOE: Nuclear MPS negative for ischemia.  Hypertension: continue current management.  Hyperlipidemia: continue current management.  LVH with grade I DD: continue current management.   Return to clinic 6 months.           Tramaine Fraga MD, MPH, FACC, [facility]

## 2023-03-16 ENCOUNTER — HOSPITAL ENCOUNTER (OUTPATIENT)
Facility: OTHER | Age: 73
Discharge: HOME | End: 2023-03-16
Attending: ANESTHESIOLOGY | Admitting: ANESTHESIOLOGY
Payer: MEDICARE

## 2023-03-16 VITALS
RESPIRATION RATE: 16 BRPM | DIASTOLIC BLOOD PRESSURE: 52 MMHG | WEIGHT: 199 LBS | SYSTOLIC BLOOD PRESSURE: 130 MMHG | HEART RATE: 56 BPM | HEIGHT: 64 IN | BODY MASS INDEX: 33.97 KG/M2 | OXYGEN SATURATION: 95 % | TEMPERATURE: 98 F

## 2023-03-16 DIAGNOSIS — G89.29 CHRONIC PAIN: ICD-10-CM

## 2023-03-16 DIAGNOSIS — M47.817 SPONDYLOSIS OF LUMBOSACRAL REGION WITHOUT MYELOPATHY OR RADICULOPATHY: Primary | ICD-10-CM

## 2023-03-16 DIAGNOSIS — M47.26 OSTEOARTHRITIS OF SPINE WITH RADICULOPATHY, LUMBAR REGION: ICD-10-CM

## 2023-03-16 LAB — POCT GLUCOSE: 109 MG/DL (ref 70–110)

## 2023-03-16 PROCEDURE — 99152 MOD SED SAME PHYS/QHP 5/>YRS: CPT | Performed by: ANESTHESIOLOGY

## 2023-03-16 PROCEDURE — 25000003 PHARM REV CODE 250: Performed by: ANESTHESIOLOGY

## 2023-03-16 PROCEDURE — 82947 ASSAY GLUCOSE BLOOD QUANT: CPT | Performed by: ANESTHESIOLOGY

## 2023-03-16 PROCEDURE — 99152 MOD SED SAME PHYS/QHP 5/>YRS: CPT | Mod: ,,, | Performed by: ANESTHESIOLOGY

## 2023-03-16 PROCEDURE — 64636 DESTROY L/S FACET JNT ADDL: CPT | Mod: LT | Performed by: ANESTHESIOLOGY

## 2023-03-16 PROCEDURE — 64635 DESTROY LUMB/SAC FACET JNT: CPT | Mod: LT | Performed by: ANESTHESIOLOGY

## 2023-03-16 PROCEDURE — 25000003 PHARM REV CODE 250: Performed by: STUDENT IN AN ORGANIZED HEALTH CARE EDUCATION/TRAINING PROGRAM

## 2023-03-16 PROCEDURE — 99152 PR MOD CONSCIOUS SEDATION, SAME PHYS, 5+ YRS, FIRST 15 MIN: ICD-10-PCS | Mod: ,,, | Performed by: ANESTHESIOLOGY

## 2023-03-16 PROCEDURE — 63600175 PHARM REV CODE 636 W HCPCS: Performed by: ANESTHESIOLOGY

## 2023-03-16 PROCEDURE — 64636 DESTROY L/S FACET JNT ADDL: CPT | Mod: LT,,, | Performed by: ANESTHESIOLOGY

## 2023-03-16 PROCEDURE — 64635 DESTROY LUMB/SAC FACET JNT: CPT | Mod: LT,,, | Performed by: ANESTHESIOLOGY

## 2023-03-16 PROCEDURE — 64636 PR DESTROY L/S FACET JNT ADDL: ICD-10-PCS | Mod: LT,,, | Performed by: ANESTHESIOLOGY

## 2023-03-16 PROCEDURE — 64635 PR DESTROY LUMB/SAC FACET JNT: ICD-10-PCS | Mod: LT,,, | Performed by: ANESTHESIOLOGY

## 2023-03-16 RX ORDER — LIDOCAINE HYDROCHLORIDE 20 MG/ML
INJECTION, SOLUTION INFILTRATION; PERINEURAL
Status: DISCONTINUED | OUTPATIENT
Start: 2023-03-16 | End: 2023-03-16 | Stop reason: HOSPADM

## 2023-03-16 RX ORDER — MIDAZOLAM HYDROCHLORIDE 1 MG/ML
INJECTION INTRAMUSCULAR; INTRAVENOUS
Status: DISCONTINUED | OUTPATIENT
Start: 2023-03-16 | End: 2023-03-16 | Stop reason: HOSPADM

## 2023-03-16 RX ORDER — FENTANYL CITRATE 50 UG/ML
INJECTION, SOLUTION INTRAMUSCULAR; INTRAVENOUS
Status: DISCONTINUED | OUTPATIENT
Start: 2023-03-16 | End: 2023-03-16 | Stop reason: HOSPADM

## 2023-03-16 RX ORDER — DEXAMETHASONE SODIUM PHOSPHATE 10 MG/ML
INJECTION INTRAMUSCULAR; INTRAVENOUS
Status: DISCONTINUED | OUTPATIENT
Start: 2023-03-16 | End: 2023-03-16 | Stop reason: HOSPADM

## 2023-03-16 RX ORDER — BUPIVACAINE HYDROCHLORIDE 2.5 MG/ML
INJECTION, SOLUTION EPIDURAL; INFILTRATION; INTRACAUDAL
Status: DISCONTINUED | OUTPATIENT
Start: 2023-03-16 | End: 2023-03-16 | Stop reason: HOSPADM

## 2023-03-16 RX ORDER — SODIUM CHLORIDE 9 MG/ML
500 INJECTION, SOLUTION INTRAVENOUS CONTINUOUS
Status: DISCONTINUED | OUTPATIENT
Start: 2023-03-16 | End: 2023-03-16 | Stop reason: HOSPADM

## 2023-03-16 NOTE — DISCHARGE INSTRUCTIONS

## 2023-03-16 NOTE — H&P
HPI  Patient presenting for Procedure(s) (LRB):  RADIOFREQUENCY ABLATION LEFT L3,L4,L5 *BRING SCS REMOTE* (Left)     Patient on Anti-coagulation No    No health changes since previous encounter    Past Medical History:   Diagnosis Date    Anxiety with depression     Arthritis     CKD (chronic kidney disease) stage 2, GFR 60-89 ml/min     Diabetes mellitus     History of Clarisse-en-Y gastric bypass     Hypertension     Obesity, unspecified     DAHLIA (obstructive sleep apnea)     Spondylosis      Past Surgical History:   Procedure Laterality Date    CARPAL TUNNEL RELEASE Right 3/8/2019    Procedure: RELEASE, CARPAL TUNNEL right;  Surgeon: Pan Goodman MD;  Location: [facility] OR;  Service: Orthopedics;  Laterality: Right;    CARPAL TUNNEL RELEASE Left 2019    Procedure: RELEASE, CARPAL TUNNEL- LEFT;  Surgeon: Pan Goodman MD;  Location: [address];  Service: Orthopedics;  Laterality: Left;    CATARACT EXTRACTION Bilateral      SECTION      CHOLECYSTECTOMY      EPIDURAL STEROID INJECTION INTO LUMBAR SPINE N/A 2018    Procedure: INJECTION, STEROID, SPINE, LUMBAR, EPIDURAL;  Surgeon: Shaq Silverio MD;  Location: [facility];  Service: Pain Management;  Laterality: N/A;  LUMBAR L4-L5 INTERLAMINAR ELISE'  [id number]  W/ SEDATION     HYSTERECTOMY      INJECTION OF ANESTHETIC AGENT AROUND NERVE Bilateral 2019    Procedure: BLOCK, NERVE, L3-L4-L5 MEDIAL BRANCH;  Surgeon: Shaq Silverio MD;  Location: [facility];  Service: Pain Management;  Laterality: Bilateral;    INJECTION OF ANESTHETIC AGENT AROUND NERVE Bilateral 10/17/2019    Procedure: BLOCK, NERVE;  Surgeon: Shaq Silverio MD;  Location: [facility];  Service: Pain Management;  Laterality: Bilateral;  B/L MBB L3-L4-L5  REPEAT  CONSENT NEEDED    INJECTION OF FACET JOINT Bilateral 2018    Procedure: INJECTION-FACET;  Surgeon: Shaq Silverio MD;  Location: [facility];  Service: Pain Management;  Laterality: Bilateral;  LUMBAR BILATERAL L4-L5 AND  L5-S1 FACET STEROID INJECTION  [id number]    W/ SEDATION     RADIOFREQUENCY ABLATION Right 11/21/2019    Procedure: RADIOFREQUENCY ABLATION RIGHT L3, L4, L5;  Surgeon: Shaq Silverio MD;  Location: [facility];  Service: Pain Management;  Laterality: Right;  Right RFA L3-L4-L5  1 of 2  Consent Needed    RADIOFREQUENCY ABLATION Left 12/5/2019    Procedure: RADIOFREQUENCY ABLATION LEFT L3-5;  Surgeon: Shaq Silverio MD;  Location: [facility];  Service: Pain Management;  Laterality: Left;  Left RFA L3-L4-L5  2 of 2  Consent Needed    RADIOFREQUENCY ABLATION Left 8/17/2020    Procedure: RADIOFREQUENCY ABLATION LEFT L3,4,5 1 of 2;  Surgeon: Shaq Silverio MD;  Location: [facility];  Service: Pain Management;  Laterality: Left;  Left RFA L3,4,5  1 of 2    RADIOFREQUENCY ABLATION Right 8/31/2020    Procedure: RADIOFREQUENCY ABLATION RIGHT L3,4,5 2 of 2;  Surgeon: Shaq Silverio MD;  Location: [facility];  Service: Pain Management;  Laterality: Right;  RADIOFREQUENCY ABLATION RIGHT L3,4,5  2 of 2    RADIOFREQUENCY ABLATION Left 9/9/2021    Procedure: RADIOFREQUENCY ABLATION, L3-L4 AND L5 MEDIAL BRANCH 1 OF 2;  Surgeon: Shaq Silverio MD;  Location: [facility];  Service: Pain Management;  Laterality: Left;    RADIOFREQUENCY ABLATION Right 9/20/2021    Procedure: RADIOFREQUENCY ABLATION, L3-L4 AND L5 MEDIAL BRANCH 2 OF 2;  Surgeon: Shaq Silverio MD;  Location: [facility];  Service: Pain Management;  Laterality: Right;    RADIOFREQUENCY ABLATION Right 7/7/2022    Procedure: RADIOFREQUENCY ABLATION, RIGHT L3-L4-L5 ONE OF TWO;  Surgeon: Shaq Silverio MD;  Location: [facility];  Service: Pain Management;  Laterality: Right;    RADIOFREQUENCY ABLATION Left 7/21/2022    Procedure: RADIOFREQUENCY ABLATION, LEFT L3-L4-L5 TWO OF TWO *BRING SCS REMOTE*;  Surgeon: Shaq Silverio MD;  Location: [facility];  Service: Pain Management;  Laterality: Left;    TRIAL OF SPINAL CORD NERVE STIMULATOR N/A 9/24/2018    Procedure: TRIAL,  NEUROSTIMULATOR, SPINAL CORD, SPINAL CORD STIMULATOR TRIAL-INTERNAL WIRES TO EXTERNAL BATTERY;  Surgeon: Shaq Silverio MD;  Location: [facility];  Service: Pain Management;  Laterality: N/A;  [facility] NOTIFIED     Review of patient's allergies indicates:  No Known Allergies   No current facility-administered medications for this encounter.       PMHx, PSHx, Allergies, Medications reviewed in epic    ROS negative except pain complaints in HPI    OBJECTIVE:    There were no vitals taken for this visit.    PHYSICAL EXAMINATION:    GENERAL: Well appearing, in no acute distress, alert and oriented x3.  PSYCH:  Mood and affect appropriate.  SKIN: Skin color, texture, turgor normal, no rashes or lesions which will impact the procedure.  CV: RRR with palpation of the radial artery.  PULM: No evidence of respiratory difficulty, symmetric chest rise. Clear to auscultation.  NEURO: Cranial nerves grossly intact.    Plan:    Proceed with procedure as planned Procedure(s) (LRB):  RADIOFREQUENCY ABLATION LEFT L3,L4,L5 *BRING SCS REMOTE* (Left)    Suraj Pineda  03/16/2023

## 2023-03-16 NOTE — DISCHARGE SUMMARY
Discharge Note  Short Stay      SUMMARY     Admit Date: 3/16/2023    Attending Physician: Shaq Silverio      Discharge Physician: Shaq Silverio      Discharge Date: 3/16/2023 9:21 AM    Procedure(s) (LRB):  RADIOFREQUENCY ABLATION LEFT L3,L4,L5 *BRING SCS REMOTE* (Left)    Final Diagnosis: Lumbar spondylosis [M47.816]    Disposition: Home or self care    Patient Instructions:   Current Discharge Medication List        CONTINUE these medications which have NOT CHANGED    Details   acetaminophen (TYLENOL) 500 MG tablet Take 1 tablet (500 mg total) by mouth every 6 (six) hours as needed for Temperature greater than or Pain.  Qty: 20 tablet, Refills: 0      !! albuterol (PROAIR HFA) 90 mcg/actuation inhaler Inhale 2 puffs into the lungs every 4 (four) hours as needed for Wheezing or Shortness of Breath. Rescue  Qty: 8.5 g, Refills: 1    Associated Diagnoses: Obstructive sleep apnea      !! albuterol (PROVENTIL/VENTOLIN HFA) 90 mcg/actuation inhaler Inhale 1-2 puffs into the lungs every 6 (six) hours as needed for Wheezing or Shortness of Breath. Rescue  Qty: 8 g, Refills: 0      !! albuterol sulfate 2.5 mg/0.5 mL Nebu Take 2.5 mg by nebulization every 4 (four) hours as needed (shortness of breath, wheezing, coughing). Rescue  Qty: 30 each, Refills: 1    Associated Diagnoses: Seasonal allergic rhinitis, unspecified trigger      !! albuterol sulfate 2.5 mg/0.5 mL Nebu Take 2.5 mg by nebulization every 4 (four) hours as needed (SOB/wheezing). Rescue  Qty: 20 each, Refills: 0      atenoloL (TENORMIN) 100 MG tablet TAKE 1 TABLET EVERY DAY  Qty: 90 tablet, Refills: 3    Associated Diagnoses: Essential hypertension      benzonatate (TESSALON) 200 MG capsule Take 1 capsule (200 mg total) by mouth 3 (three) times daily as needed for Cough.  Qty: 30 capsule, Refills: 1    Associated Diagnoses: Cough      blood-glucose meter kit Use as instructed  Qty: 1 each, Refills: 0    Associated Diagnoses: Type 2 diabetes mellitus without  complication, without long-term current use of insulin      budesonide-formoterol 160-4.5 mcg (SYMBICORT) 160-4.5 mcg/actuation HFAA Inhale 2 puffs into the lungs every 12 (twelve) hours. Controller (brand only) (stop breo)  Qty: 10.2 g, Refills: 11    Associated Diagnoses: Moderate persistent asthma, unspecified whether complicated      EScitalopram oxalate (LEXAPRO) 20 MG tablet TAKE 1 TABLET EVERY DAY  Qty: 90 tablet, Refills: 3    Associated Diagnoses: Mild recurrent major depression; Anxiety      !! fluticasone propionate (FLONASE) 50 mcg/actuation nasal spray 2 sprays (100 mcg total) by Each Nostril route once daily.  Qty: 11.1 mL, Refills: 1    Associated Diagnoses: Seasonal allergic rhinitis, unspecified trigger      !! fluticasone propionate (FLONASE) 50 mcg/actuation nasal spray 1 spray (50 mcg total) by Each Nostril route 2 (two) times daily as needed for Rhinitis or Allergies.  Qty: 15 g, Refills: 0      furosemide (LASIX) 40 MG tablet Take 1 tablet (40 mg total) by mouth once daily.  Qty: 90 tablet, Refills: 3    Associated Diagnoses: Essential hypertension      gabapentin (NEURONTIN) 800 MG tablet Take 1 tablet (800 mg total) by mouth 3 (three) times daily.  Qty: 90 tablet, Refills: 2    Associated Diagnoses: Lumbar radiculopathy      glipiZIDE (GLUCOTROL) 10 MG tablet TAKE 1 TABLET EVERY DAY WITH BREAKFAST.  Qty: 90 tablet, Refills: 3    Associated Diagnoses: Type 2 diabetes mellitus with stage 3a chronic kidney disease, without long-term current use of insulin      guaiFENesin 100 mg/5 ml (ROBITUSSIN) 100 mg/5 mL syrup Take 5-10 mLs (100-200 mg total) by mouth every 4 (four) hours as needed for Cough or Congestion.  Qty: 118 mL, Refills: 0      hydrOXYzine pamoate (VISTARIL) 25 MG Cap TAKE 1 CAPSULE BY MOUTH ONCE DAILY AS NEEDED FOR ANXIETY  Qty: 90 capsule, Refills: 3    Associated Diagnoses: SANDRA (generalized anxiety disorder)      ketorolac (TORADOL) 10 mg tablet Take 1 tablet (10 mg total) by  mouth every 6 (six) hours as needed for Pain. Take with food to prevent heartburn.  Qty: 20 tablet, Refills: 1      linaGLIPtin (TRADJENTA) 5 mg Tab tablet Take 1 tablet (5 mg total) by mouth once daily.  Qty: 90 tablet, Refills: 3    Associated Diagnoses: Type 2 diabetes mellitus with stage 3a chronic kidney disease, without long-term current use of insulin      loratadine (CLARITIN) 10 mg tablet TAKE 1 TABLET EVERY DAY AS NEEDED FOR ALLERGIES  Qty: 90 tablet, Refills: 3    Associated Diagnoses: Seasonal allergies      losartan (COZAAR) 100 MG tablet Take 1 tablet (100 mg total) by mouth once daily.  Qty: 90 tablet, Refills: 3    Comments: .  Associated Diagnoses: Essential hypertension      ondansetron (ZOFRAN-ODT) 4 MG TbDL Take 1 tablet (4 mg total) by mouth every 6 (six) hours as needed (Nausea).  Qty: 15 tablet, Refills: 0      pantoprazole (PROTONIX) 20 MG tablet TAKE 1 TABLET TWICE DAILY  BEFORE  MEALS  Qty: 180 tablet, Refills: 3    Associated Diagnoses: Gastroesophageal reflux disease without esophagitis      promethazine-dextromethorphan (PROMETHAZINE-DM) 6.25-15 mg/5 mL Syrp Take 5 mLs by mouth every 8 (eight) hours as needed (cough).  Qty: 240 mL, Refills: 0    Associated Diagnoses: Seasonal allergic rhinitis, unspecified trigger      rosuvastatin (CRESTOR) 20 MG tablet Take 1 tablet (20 mg total) by mouth once daily.  Qty: 30 tablet, Refills: 11    Associated Diagnoses: Dyslipidemia      tiZANidine (ZANAFLEX) 4 MG tablet TAKE 1 TABLET BY MOUTH EVERY 8 HOURS AS NEEDED FOR  MUSCLE  PAIN  Qty: 90 tablet, Refills: 0    Associated Diagnoses: Chronic pain syndrome; Lumbar radiculopathy      topiramate (TOPAMAX) 50 MG tablet Take 1 tablet (50 mg total) by mouth 2 (two) times daily.  Qty: 180 tablet, Refills: 0      traZODone (DESYREL) 100 MG tablet Take 1 tablet (100 mg total) by mouth every evening.  Qty: 90 tablet, Refills: 3    Associated Diagnoses: Insomnia, unspecified type       !! - Potential duplicate  medications found. Please discuss with provider.              Discharge Diagnosis: Lumbar spondylosis [M47.816]  Condition on Discharge: Stable with no complications to procedure   Diet on Discharge: Same as before.  Activity: as per instruction sheet.  Discharge to: Home with a responsible adult.  Follow up: 2-4 weeks       Please call my office or pager at [phone] if experienced any weakness or loss of sensation, fever > 101.5, pain uncontrolled with oral medications, persistent nausea/vomiting/or diarrhea, redness or drainage from the incisions, or any other worrisome concerns. If physician on call was not reached or could not communicate with our office for any reason please go to the nearest emergency department

## 2023-03-16 NOTE — OP NOTE
Therapeutic Lumbar Medial Branch Radiofrequency Ablation under Fluoroscopy     The procedure, risks, benefits, and options were discussed with the patient. There are no contraindications to the procedure. The patent expressed understanding and agreed to the procedure. Informed written consent was obtained prior to the start of the procedure and can be found in the patient's chart.        PATIENT NAME: Faby Luna   MRN: [id number]     DATE OF PROCEDURE: 03/16/2023     PROCEDURE:  Left L3, L4, and L5 Lumbar Radiofrequency Ablation under Fluoroscopy    PRE-OP DIAGNOSIS: Lumbar spondylosis [M47.816] Lumbar spondylosis [M47.816]    POST-OP DIAGNOSIS: Same    PHYSICIAN: Shaq Silverio MD    ASSISTANTS: JAIRON Pineda MD  [employer] PM&R,, PGY2      MEDICATIONS INJECTED:  Preservative-free Decadron 10mg with 9cc of Bupivicaine 0.25%    LOCAL ANESTHETIC INJECTED:   Xylocaine 2%    SEDATION: Versed 2mg and Fentanyl 50mcg                                                                                                                                                                                     Conscious sedation ordered by M.D. Patient re-evaluation prior to administration of conscious sedation. No changes noted in patient's status from initial evaluation. The patient's vital signs were monitored by RN and patient remained hemodynamically stable throughout the procedure.    Event Time In   Sedation Start 0913   Sedation End 0929       ESTIMATED BLOOD LOSS:  None    COMPLICATIONS:  None     INTERVAL HISTORY: Patient has clinical and imaging findings suggestive of facet mediated pain. Patients has completed 2 previous diagnostic medial branch blocks at specified levels with at least 80% relief for the expected duration of the local anesthetic utilized.    TECHNIQUE: Time-out was performed to identify the patient and procedure to be performed. With the patient laying in a prone position, the surgical area was prepped and draped in  the usual sterile fashion using ChloraPrep and fenestrated drape. The levels were determined under fluoroscopic guidance. Skin anesthesia was achieved by injecting Lidocaine 2% over the injection sites. A 20 gauge 10mm curved active tip needle was introduced to the anatomic local of the medial branch at each of the above levels using AP, lateral and/or contralateral oblique fluoroscopic imaging. Then sensory and motor testing was performed to confirm that the needle tips were in the correct location. After negative aspiration for blood or CSF was confirmed, 1 mL of the lidocaine 2% listed above was injected slowly at each site. This was followed by thermal lesioning at 80 degrees celsius for 90 seconds. That was followed by slowly injecting 1.5 mL of the medication mixture listed above at each site. The needles were removed and bleeding was nil. A sterile dressing was applied. No specimens collected. The patient tolerated the procedure well and did not have any procedure related motor deficit at the conclusion of the procedure.  PAIN BEFORE THE PROCEDURE: 10/10    PAIN AFTER THE PROCEDURE: 0/10   The patient was monitored after the procedure in the recovery area. They were given post-procedure and discharge instructions to follow at home. The patient was discharged in a stable condition.        Shaq Silverio MD

## 2023-03-16 NOTE — H&P (VIEW-ONLY)
HPI  Patient presenting for Procedure(s) (LRB):  RADIOFREQUENCY ABLATION LEFT L3,L4,L5 *BRING SCS REMOTE* (Left)     Patient on Anti-coagulation No    No health changes since previous encounter    Past Medical History:   Diagnosis Date    Anxiety with depression     Arthritis     CKD (chronic kidney disease) stage 2, GFR 60-89 ml/min     Diabetes mellitus     History of Clarisse-en-Y gastric bypass     Hypertension     Obesity, unspecified     DAHLIA (obstructive sleep apnea)     Spondylosis      Past Surgical History:   Procedure Laterality Date    CARPAL TUNNEL RELEASE Right 3/8/2019    Procedure: RELEASE, CARPAL TUNNEL right;  Surgeon: Pan Goodman MD;  Location: [facility] OR;  Service: Orthopedics;  Laterality: Right;    CARPAL TUNNEL RELEASE Left 2019    Procedure: RELEASE, CARPAL TUNNEL- LEFT;  Surgeon: Pan Goodman MD;  Location: [address];  Service: Orthopedics;  Laterality: Left;    CATARACT EXTRACTION Bilateral      SECTION      CHOLECYSTECTOMY      EPIDURAL STEROID INJECTION INTO LUMBAR SPINE N/A 2018    Procedure: INJECTION, STEROID, SPINE, LUMBAR, EPIDURAL;  Surgeon: Shaq Silverio MD;  Location: [facility] PAIN MGT;  Service: Pain Management;  Laterality: N/A;  LUMBAR L4-L5 INTERLAMINAR ELISE'  [id number]  W/ SEDATION     HYSTERECTOMY      INJECTION OF ANESTHETIC AGENT AROUND NERVE Bilateral 2019    Procedure: BLOCK, NERVE, L3-L4-L5 MEDIAL BRANCH;  Surgeon: Shaq Silverio MD;  Location: [facility] PAIN MGT;  Service: Pain Management;  Laterality: Bilateral;    INJECTION OF ANESTHETIC AGENT AROUND NERVE Bilateral 10/17/2019    Procedure: BLOCK, NERVE;  Surgeon: Shaq Silverio MD;  Location: [facility] PAIN MGT;  Service: Pain Management;  Laterality: Bilateral;  B/L MBB L3-L4-L5  REPEAT  CONSENT NEEDED    INJECTION OF FACET JOINT Bilateral 2018    Procedure: INJECTION-FACET;  Surgeon: Shaq Silverio MD;  Location: [facility] PAIN MGT;  Service: Pain Management;  Laterality: Bilateral;  LUMBAR BILATERAL L4-L5 AND  L5-S1 FACET STEROID INJECTION  [id number]    W/ SEDATION     RADIOFREQUENCY ABLATION Right 11/21/2019    Procedure: RADIOFREQUENCY ABLATION RIGHT L3, L4, L5;  Surgeon: Shaq Silverio MD;  Location: [facility] PAIN [facility]GT;  Service: Pain Management;  Laterality: Right;  Right RFA L3-L4-L5  1 of 2  Consent Needed    RADIOFREQUENCY ABLATION Left 12/5/2019    Procedure: RADIOFREQUENCY ABLATION LEFT L3-5;  Surgeon: Shaq Silverio MD;  Location: [facility] PAIN MGT;  Service: Pain Management;  Laterality: Left;  Left RFA L3-L4-L5  2 of 2  Consent Needed    RADIOFREQUENCY ABLATION Left 8/17/2020    Procedure: RADIOFREQUENCY ABLATION LEFT L3,4,5 1 of 2;  Surgeon: Shaq Silverio MD;  Location: [facility]IN [facility]GT;  Service: Pain Management;  Laterality: Left;  Left RFA L3,4,5  1 of 2    RADIOFREQUENCY ABLATION Right 8/31/2020    Procedure: RADIOFREQUENCY ABLATION RIGHT L3,4,5 2 of 2;  Surgeon: Shaq Silverio MD;  Location: [facility] PAIN MGT;  Service: Pain Management;  Laterality: Right;  RADIOFREQUENCY ABLATION RIGHT L3,4,5  2 of 2    RADIOFREQUENCY ABLATION Left 9/9/2021    Procedure: RADIOFREQUENCY ABLATION, L3-L4 AND L5 MEDIAL BRANCH 1 OF 2;  Surgeon: Shaq Silverio MD;  Location: [facility] PAIN MGT;  Service: Pain Management;  Laterality: Left;    RADIOFREQUENCY ABLATION Right 9/20/2021    Procedure: RADIOFREQUENCY ABLATION, L3-L4 AND L5 MEDIAL BRANCH 2 OF 2;  Surgeon: Shaq Silverio MD;  Location: [facility] PAIN MGT;  Service: Pain Management;  Laterality: Right;    RADIOFREQUENCY ABLATION Right 7/7/2022    Procedure: RADIOFREQUENCY ABLATION, RIGHT L3-L4-L5 ONE OF TWO;  Surgeon: Shaq Silverio MD;  Location: [facility]IN [facility];  Service: Pain Management;  Laterality: Right;    RADIOFREQUENCY ABLATION Left 7/21/2022    Procedure: RADIOFREQUENCY ABLATION, LEFT L3-L4-L5 TWO OF TWO *BRING SCS REMOTE*;  Surgeon: Shaq Silverio MD;  Location: [facility] PAIN MGT;  Service: Pain Management;  Laterality: Left;    TRIAL OF SPINAL CORD NERVE STIMULATOR N/A 9/24/2018    Procedure: TRIAL,  NEUROSTIMULATOR, SPINAL CORD, SPINAL CORD STIMULATOR TRIAL-INTERNAL WIRES TO EXTERNAL BATTERY;  Surgeon: Shaq Silverio MD;  Location: [facility];  Service: Pain Management;  Laterality: N/A;  [facility] REP NOTIFIED     Review of patient's allergies indicates:  No Known Allergies   No current facility-administered medications for this encounter.       PMHx, PSHx, Allergies, Medications reviewed in epic    ROS negative except pain complaints in HPI    OBJECTIVE:    There were no vitals taken for this visit.    PHYSICAL EXAMINATION:    GENERAL: Well appearing, in no acute distress, alert and oriented x3.  PSYCH:  Mood and affect appropriate.  SKIN: Skin color, texture, turgor normal, no rashes or lesions which will impact the procedure.  CV: RRR with palpation of the radial artery.  PULM: No evidence of respiratory difficulty, symmetric chest rise. Clear to auscultation.  NEURO: Cranial nerves grossly intact.    Plan:    Proceed with procedure as planned Procedure(s) (LRB):  RADIOFREQUENCY ABLATION LEFT L3,L4,L5 *BRING SCS REMOTE* (Left)    Suraj Pineda  03/16/2023

## 2023-03-30 ENCOUNTER — HOSPITAL ENCOUNTER (OUTPATIENT)
Facility: OTHER | Age: 73
Discharge: HOME | End: 2023-03-30
Attending: ANESTHESIOLOGY | Admitting: ANESTHESIOLOGY
Payer: MEDICARE

## 2023-03-30 VITALS
SYSTOLIC BLOOD PRESSURE: 122 MMHG | WEIGHT: 199 LBS | BODY MASS INDEX: 33.97 KG/M2 | RESPIRATION RATE: 16 BRPM | HEART RATE: 60 BPM | TEMPERATURE: 98 F | OXYGEN SATURATION: 97 % | DIASTOLIC BLOOD PRESSURE: 80 MMHG | HEIGHT: 64 IN

## 2023-03-30 DIAGNOSIS — G89.29 CHRONIC PAIN: ICD-10-CM

## 2023-03-30 DIAGNOSIS — M47.819 SPONDYLOSIS WITHOUT MYELOPATHY: Primary | ICD-10-CM

## 2023-03-30 LAB — POCT GLUCOSE: 67 MG/DL (ref 70–110)

## 2023-03-30 PROCEDURE — 64636 DESTROY L/S FACET JNT ADDL: CPT | Mod: RT | Performed by: ANESTHESIOLOGY

## 2023-03-30 PROCEDURE — 64635 DESTROY LUMB/SAC FACET JNT: CPT | Mod: RT | Performed by: ANESTHESIOLOGY

## 2023-03-30 PROCEDURE — 99152 MOD SED SAME PHYS/QHP 5/>YRS: CPT | Performed by: ANESTHESIOLOGY

## 2023-03-30 PROCEDURE — 99152 PR MOD CONSCIOUS SEDATION, SAME PHYS, 5+ YRS, FIRST 15 MIN: ICD-10-PCS | Mod: ,,, | Performed by: ANESTHESIOLOGY

## 2023-03-30 PROCEDURE — 64635 PR DESTROY LUMB/SAC FACET JNT: ICD-10-PCS | Mod: RT,,, | Performed by: ANESTHESIOLOGY

## 2023-03-30 PROCEDURE — 64636 PR DESTROY L/S FACET JNT ADDL: ICD-10-PCS | Mod: RT,,, | Performed by: ANESTHESIOLOGY

## 2023-03-30 PROCEDURE — 64635 DESTROY LUMB/SAC FACET JNT: CPT | Mod: RT,,, | Performed by: ANESTHESIOLOGY

## 2023-03-30 PROCEDURE — 25000003 PHARM REV CODE 250: Performed by: ANESTHESIOLOGY

## 2023-03-30 PROCEDURE — 64636 DESTROY L/S FACET JNT ADDL: CPT | Mod: RT,,, | Performed by: ANESTHESIOLOGY

## 2023-03-30 PROCEDURE — 99152 MOD SED SAME PHYS/QHP 5/>YRS: CPT | Mod: ,,, | Performed by: ANESTHESIOLOGY

## 2023-03-30 PROCEDURE — 63600175 PHARM REV CODE 636 W HCPCS: Performed by: ANESTHESIOLOGY

## 2023-03-30 PROCEDURE — 25000003 PHARM REV CODE 250: Performed by: STUDENT IN AN ORGANIZED HEALTH CARE EDUCATION/TRAINING PROGRAM

## 2023-03-30 RX ORDER — BUPIVACAINE HYDROCHLORIDE 2.5 MG/ML
INJECTION, SOLUTION EPIDURAL; INFILTRATION; INTRACAUDAL
Status: DISCONTINUED | OUTPATIENT
Start: 2023-03-30 | End: 2023-03-30 | Stop reason: HOSPADM

## 2023-03-30 RX ORDER — SODIUM CHLORIDE 9 MG/ML
500 INJECTION, SOLUTION INTRAVENOUS CONTINUOUS
Status: DISCONTINUED | OUTPATIENT
Start: 2023-03-30 | End: 2023-03-30 | Stop reason: HOSPADM

## 2023-03-30 RX ORDER — MIDAZOLAM HYDROCHLORIDE 1 MG/ML
INJECTION INTRAMUSCULAR; INTRAVENOUS
Status: DISCONTINUED | OUTPATIENT
Start: 2023-03-30 | End: 2023-03-30 | Stop reason: HOSPADM

## 2023-03-30 RX ORDER — DEXAMETHASONE SODIUM PHOSPHATE 10 MG/ML
INJECTION INTRAMUSCULAR; INTRAVENOUS
Status: DISCONTINUED | OUTPATIENT
Start: 2023-03-30 | End: 2023-03-30 | Stop reason: HOSPADM

## 2023-03-30 RX ORDER — FENTANYL CITRATE 50 UG/ML
INJECTION, SOLUTION INTRAMUSCULAR; INTRAVENOUS
Status: DISCONTINUED | OUTPATIENT
Start: 2023-03-30 | End: 2023-03-30 | Stop reason: HOSPADM

## 2023-03-30 RX ORDER — LIDOCAINE HYDROCHLORIDE 20 MG/ML
INJECTION, SOLUTION INFILTRATION; PERINEURAL
Status: DISCONTINUED | OUTPATIENT
Start: 2023-03-30 | End: 2023-03-30 | Stop reason: HOSPADM

## 2023-03-30 NOTE — DISCHARGE INSTRUCTIONS

## 2023-03-30 NOTE — INTERVAL H&P NOTE
The patient was examined and no significant changes were noted from the updated H&P or last clinic note.    The risks and benefits of this procedure, including alternative therapies, were discussed with the patient.  The discussion of risks included infection, bleeding, need for additional procedures or surgery, nerve damage, paralysis, adverse medication reaction(s), stroke, and if appropriate for the procedure, death.  Questions regarding the procedure, risks, expected outcome, and possible side effects were solicited and answered to Faby's satisfaction.  Faby Jeremy wishes to proceed with the injection or procedure as confirmed by written consent.

## 2023-03-30 NOTE — DISCHARGE SUMMARY
Discharge Note  Short Stay      SUMMARY     Admit Date: 3/30/2023    Attending Physician: Shaq Silverio      Discharge Physician: Shaq Silverio      Discharge Date: 3/30/2023 8:42 AM    Procedure(s) (LRB):  RADIOFREQUENCY ABLATION RIGHT L3,L4,L5 *BRING SCS REMOTE* (Right)    Final Diagnosis: Lumbar spondylosis [M47.816]    Disposition: Home or self care    Patient Instructions:   Current Discharge Medication List        CONTINUE these medications which have NOT CHANGED    Details   acetaminophen (TYLENOL) 500 MG tablet Take 1 tablet (500 mg total) by mouth every 6 (six) hours as needed for Temperature greater than or Pain.  Qty: 20 tablet, Refills: 0      !! albuterol (PROAIR HFA) 90 mcg/actuation inhaler Inhale 2 puffs into the lungs every 4 (four) hours as needed for Wheezing or Shortness of Breath. Rescue  Qty: 8.5 g, Refills: 1    Associated Diagnoses: Obstructive sleep apnea      !! albuterol (PROVENTIL/VENTOLIN HFA) 90 mcg/actuation inhaler Inhale 1-2 puffs into the lungs every 6 (six) hours as needed for Wheezing or Shortness of Breath. Rescue  Qty: 8 g, Refills: 0      !! albuterol sulfate 2.5 mg/0.5 mL Nebu Take 2.5 mg by nebulization every 4 (four) hours as needed (shortness of breath, wheezing, coughing). Rescue  Qty: 30 each, Refills: 1    Associated Diagnoses: Seasonal allergic rhinitis, unspecified trigger      !! albuterol sulfate 2.5 mg/0.5 mL Nebu Take 2.5 mg by nebulization every 4 (four) hours as needed (SOB/wheezing). Rescue  Qty: 20 each, Refills: 0      atenoloL (TENORMIN) 100 MG tablet TAKE 1 TABLET EVERY DAY  Qty: 90 tablet, Refills: 3    Associated Diagnoses: Essential hypertension      benzonatate (TESSALON) 200 MG capsule Take 1 capsule (200 mg total) by mouth 3 (three) times daily as needed for Cough.  Qty: 30 capsule, Refills: 1    Associated Diagnoses: Cough      blood-glucose meter kit Use as instructed  Qty: 1 each, Refills: 0    Associated Diagnoses: Type 2 diabetes mellitus without  complication, without long-term current use of insulin      budesonide-formoterol 160-4.5 mcg (SYMBICORT) 160-4.5 mcg/actuation HFAA Inhale 2 puffs into the lungs every 12 (twelve) hours. Controller (brand only) (stop breo)  Qty: 10.2 g, Refills: 11    Associated Diagnoses: Moderate persistent asthma, unspecified whether complicated      EScitalopram oxalate (LEXAPRO) 20 MG tablet TAKE 1 TABLET EVERY DAY  Qty: 90 tablet, Refills: 3    Associated Diagnoses: Mild recurrent major depression; Anxiety      !! fluticasone propionate (FLONASE) 50 mcg/actuation nasal spray 2 sprays (100 mcg total) by Each Nostril route once daily.  Qty: 11.1 mL, Refills: 1    Associated Diagnoses: Seasonal allergic rhinitis, unspecified trigger      !! fluticasone propionate (FLONASE) 50 mcg/actuation nasal spray 1 spray (50 mcg total) by Each Nostril route 2 (two) times daily as needed for Rhinitis or Allergies.  Qty: 15 g, Refills: 0      furosemide (LASIX) 40 MG tablet Take 1 tablet (40 mg total) by mouth once daily.  Qty: 90 tablet, Refills: 3    Associated Diagnoses: Essential hypertension      gabapentin (NEURONTIN) 800 MG tablet Take 1 tablet (800 mg total) by mouth 3 (three) times daily.  Qty: 90 tablet, Refills: 2    Associated Diagnoses: Lumbar radiculopathy      glipiZIDE (GLUCOTROL) 10 MG tablet TAKE 1 TABLET EVERY DAY WITH BREAKFAST.  Qty: 90 tablet, Refills: 3    Associated Diagnoses: Type 2 diabetes mellitus with stage 3a chronic kidney disease, without long-term current use of insulin      guaiFENesin 100 mg/5 ml (ROBITUSSIN) 100 mg/5 mL syrup Take 5-10 mLs (100-200 mg total) by mouth every 4 (four) hours as needed for Cough or Congestion.  Qty: 118 mL, Refills: 0      hydrOXYzine pamoate (VISTARIL) 25 MG Cap TAKE 1 CAPSULE BY MOUTH ONCE DAILY AS NEEDED FOR ANXIETY  Qty: 90 capsule, Refills: 3    Associated Diagnoses: SANDRA (generalized anxiety disorder)      ketorolac (TORADOL) 10 mg tablet Take 1 tablet (10 mg total) by  mouth every 6 (six) hours as needed for Pain. Take with food to prevent heartburn.  Qty: 20 tablet, Refills: 1      linaGLIPtin (TRADJENTA) 5 mg Tab tablet Take 1 tablet (5 mg total) by mouth once daily.  Qty: 90 tablet, Refills: 3    Associated Diagnoses: Type 2 diabetes mellitus with stage 3a chronic kidney disease, without long-term current use of insulin      loratadine (CLARITIN) 10 mg tablet TAKE 1 TABLET EVERY DAY AS NEEDED FOR ALLERGIES  Qty: 90 tablet, Refills: 3    Associated Diagnoses: Seasonal allergies      losartan (COZAAR) 100 MG tablet Take 1 tablet (100 mg total) by mouth once daily.  Qty: 90 tablet, Refills: 3    Comments: .  Associated Diagnoses: Essential hypertension      ondansetron (ZOFRAN-ODT) 4 MG TbDL Take 1 tablet (4 mg total) by mouth every 6 (six) hours as needed (Nausea).  Qty: 15 tablet, Refills: 0      pantoprazole (PROTONIX) 20 MG tablet TAKE 1 TABLET TWICE DAILY  BEFORE  MEALS  Qty: 180 tablet, Refills: 3    Associated Diagnoses: Gastroesophageal reflux disease without esophagitis      promethazine-dextromethorphan (PROMETHAZINE-DM) 6.25-15 mg/5 mL Syrp Take 5 mLs by mouth every 8 (eight) hours as needed (cough).  Qty: 240 mL, Refills: 0    Associated Diagnoses: Seasonal allergic rhinitis, unspecified trigger      rosuvastatin (CRESTOR) 20 MG tablet Take 1 tablet (20 mg total) by mouth once daily.  Qty: 30 tablet, Refills: 11    Associated Diagnoses: Dyslipidemia      tiZANidine (ZANAFLEX) 4 MG tablet TAKE 1 TABLET BY MOUTH EVERY 8 HOURS AS NEEDED FOR  MUSCLE  PAIN  Qty: 90 tablet, Refills: 0    Associated Diagnoses: Chronic pain syndrome; Lumbar radiculopathy      topiramate (TOPAMAX) 50 MG tablet Take 1 tablet (50 mg total) by mouth 2 (two) times daily.  Qty: 180 tablet, Refills: 0      traZODone (DESYREL) 100 MG tablet Take 1 tablet (100 mg total) by mouth every evening.  Qty: 90 tablet, Refills: 3    Associated Diagnoses: Insomnia, unspecified type       !! - Potential duplicate  medications found. Please discuss with provider.              Discharge Diagnosis: Lumbar spondylosis [M47.816]  Condition on Discharge: Stable with no complications to procedure   Diet on Discharge: Same as before.  Activity: as per instruction sheet.  Discharge to: Home with a responsible adult.  Follow up: 2-4 weeks       Please call my office or pager at [phone] if experienced any weakness or loss of sensation, fever > 101.5, pain uncontrolled with oral medications, persistent nausea/vomiting/or diarrhea, redness or drainage from the incisions, or any other worrisome concerns. If physician on call was not reached or could not communicate with our office for any reason please go to the nearest emergency department

## 2023-03-30 NOTE — OP NOTE
Therapeutic Lumbar Medial Branch Radiofrequency Ablation under Fluoroscopy     The procedure, risks, benefits, and options were discussed with the patient. There are no contraindications to the procedure. The patent expressed understanding and agreed to the procedure. Informed written consent was obtained prior to the start of the procedure and can be found in the patient's chart.        PATIENT NAME: Faby Luna   MRN: [id number]     DATE OF PROCEDURE: 03/30/2023     PROCEDURE:  Right L3, L4, and L5 Lumbar Radiofrequency Ablation under Fluoroscopy    PRE-OP DIAGNOSIS: Lumbar spondylosis [M47.816] Lumbar spondylosis [M47.816]    POST-OP DIAGNOSIS: Same    PHYSICIAN: Shaq Silverio MD    ASSISTANTS: Eddie TOBIN MD [employer] [facility] Resident      MEDICATIONS INJECTED:  Preservative-free Decadron 10mg with 9cc of Bupivicaine 0.25%    LOCAL ANESTHETIC INJECTED:   Xylocaine 2%    SEDATION: Versed 2mg and Fentanyl 50mcg                                                                                                                                                                                     Conscious sedation ordered by M.D. Patient re-evaluation prior to administration of conscious sedation. No changes noted in patient's status from initial evaluation. The patient's vital signs were monitored by RN and patient remained hemodynamically stable throughout the procedure.    Event Time In   Sedation Start 0845   Sedation End 0904       ESTIMATED BLOOD LOSS:  None    COMPLICATIONS:  None     INTERVAL HISTORY: Patient has clinical and imaging findings suggestive of recurrent facet mediated pain. Patient has undergone a previous RFA at specified levels with at least 50% relief for at least 6 months. Successful diagnostic medial branch blocks have been completed within the past 2 years.    TECHNIQUE: Time-out was performed to identify the patient and procedure to be performed. With the patient laying in a prone position, the surgical area  was prepped and draped in the usual sterile fashion using ChloraPrep and fenestrated drape. The levels were determined under fluoroscopic guidance. Skin anesthesia was achieved by injecting Lidocaine 2% over the injection sites. A 20 gauge 10mm curved active tip needle was introduced to the anatomic local of the medial branch at each of the above levels using AP, lateral and/or contralateral oblique fluoroscopic imaging. Then sensory and motor testing was performed to confirm that the needle tips were in the correct location. After negative aspiration for blood or CSF was confirmed, 1 mL of the lidocaine 2% listed above was injected slowly at each site. This was followed by thermal lesioning at 80 degrees celsius for 90 seconds. That was followed by slowly injecting 1.5 mL of the medication mixture listed above at each site. The needles were removed and bleeding was nil. A sterile dressing was applied. No specimens collected. The patient tolerated the procedure well and did not have any procedure related motor deficit at the conclusion of the procedure.    PAIN BEFORE THE PROCEDURE: 10/10    PAIN AFTER THE PROCEDURE: 0/10     The patient was monitored after the procedure in the recovery area. They were given post-procedure and discharge instructions to follow at home. The patient was discharged in a stable condition.        Shaq Silverio MD

## 2023-04-06 ENCOUNTER — OFFICE VISIT (OUTPATIENT)
Dept: BARIATRICS | Facility: CLINIC | Age: 73
End: 2023-04-06
Payer: MEDICARE

## 2023-04-06 VITALS
DIASTOLIC BLOOD PRESSURE: 72 MMHG | WEIGHT: 209.63 LBS | BODY MASS INDEX: 35.79 KG/M2 | OXYGEN SATURATION: 98 % | SYSTOLIC BLOOD PRESSURE: 124 MMHG | HEIGHT: 64 IN | HEART RATE: 72 BPM

## 2023-04-06 DIAGNOSIS — E66.01 CLASS 2 SEVERE OBESITY WITH BODY MASS INDEX (BMI) OF 35 TO 39.9 WITH SERIOUS COMORBIDITY: Primary | ICD-10-CM

## 2023-04-06 DIAGNOSIS — Z98.84 HISTORY OF ROUX-EN-Y GASTRIC BYPASS: ICD-10-CM

## 2023-04-06 PROCEDURE — 4010F PR ACE/ARB THEARPY RXD/TAKEN: ICD-10-PCS | Mod: CPTII,S$GLB,, | Performed by: INTERNAL MEDICINE

## 2023-04-06 PROCEDURE — 99213 OFFICE O/P EST LOW 20 MIN: CPT | Mod: S$GLB,,, | Performed by: INTERNAL MEDICINE

## 2023-04-06 PROCEDURE — 3074F SYST BP LT 130 MM HG: CPT | Mod: CPTII,S$GLB,, | Performed by: INTERNAL MEDICINE

## 2023-04-06 PROCEDURE — 3044F PR MOST RECENT HEMOGLOBIN A1C LEVEL <7.0%: ICD-10-PCS | Mod: CPTII,S$GLB,, | Performed by: INTERNAL MEDICINE

## 2023-04-06 PROCEDURE — 1126F AMNT PAIN NOTED NONE PRSNT: CPT | Mod: CPTII,S$GLB,, | Performed by: INTERNAL MEDICINE

## 2023-04-06 PROCEDURE — 1101F PR PT FALLS ASSESS DOC 0-1 FALLS W/OUT INJ PAST YR: ICD-10-PCS | Mod: CPTII,S$GLB,, | Performed by: INTERNAL MEDICINE

## 2023-04-06 PROCEDURE — 3066F NEPHROPATHY DOC TX: CPT | Mod: CPTII,S$GLB,, | Performed by: INTERNAL MEDICINE

## 2023-04-06 PROCEDURE — 3288F PR FALLS RISK ASSESSMENT DOCUMENTED: ICD-10-PCS | Mod: CPTII,S$GLB,, | Performed by: INTERNAL MEDICINE

## 2023-04-06 PROCEDURE — 3074F PR MOST RECENT SYSTOLIC BLOOD PRESSURE < 130 MM HG: ICD-10-PCS | Mod: CPTII,S$GLB,, | Performed by: INTERNAL MEDICINE

## 2023-04-06 PROCEDURE — 1159F MED LIST DOCD IN RCRD: CPT | Mod: CPTII,S$GLB,, | Performed by: INTERNAL MEDICINE

## 2023-04-06 PROCEDURE — 3066F PR DOCUMENTATION OF TREATMENT FOR NEPHROPATHY: ICD-10-PCS | Mod: CPTII,S$GLB,, | Performed by: INTERNAL MEDICINE

## 2023-04-06 PROCEDURE — 3078F PR MOST RECENT DIASTOLIC BLOOD PRESSURE < 80 MM HG: ICD-10-PCS | Mod: CPTII,S$GLB,, | Performed by: INTERNAL MEDICINE

## 2023-04-06 PROCEDURE — 3044F HG A1C LEVEL LT 7.0%: CPT | Mod: CPTII,S$GLB,, | Performed by: INTERNAL MEDICINE

## 2023-04-06 PROCEDURE — 1159F PR MEDICATION LIST DOCUMENTED IN MEDICAL RECORD: ICD-10-PCS | Mod: CPTII,S$GLB,, | Performed by: INTERNAL MEDICINE

## 2023-04-06 PROCEDURE — 3288F FALL RISK ASSESSMENT DOCD: CPT | Mod: CPTII,S$GLB,, | Performed by: INTERNAL MEDICINE

## 2023-04-06 PROCEDURE — 3008F PR BODY MASS INDEX (BMI) DOCUMENTED: ICD-10-PCS | Mod: CPTII,S$GLB,, | Performed by: INTERNAL MEDICINE

## 2023-04-06 PROCEDURE — 99213 PR OFFICE/OUTPT VISIT, EST, LEVL III, 20-29 MIN: ICD-10-PCS | Mod: S$GLB,,, | Performed by: INTERNAL MEDICINE

## 2023-04-06 PROCEDURE — 3061F NEG MICROALBUMINURIA REV: CPT | Mod: CPTII,S$GLB,, | Performed by: INTERNAL MEDICINE

## 2023-04-06 PROCEDURE — 1101F PT FALLS ASSESS-DOCD LE1/YR: CPT | Mod: CPTII,S$GLB,, | Performed by: INTERNAL MEDICINE

## 2023-04-06 PROCEDURE — 3061F PR NEG MICROALBUMINURIA RESULT DOCUMENTED/REVIEW: ICD-10-PCS | Mod: CPTII,S$GLB,, | Performed by: INTERNAL MEDICINE

## 2023-04-06 PROCEDURE — 4010F ACE/ARB THERAPY RXD/TAKEN: CPT | Mod: CPTII,S$GLB,, | Performed by: INTERNAL MEDICINE

## 2023-04-06 PROCEDURE — 99999 PR PBB SHADOW E&M-EST. PATIENT-LVL V: ICD-10-PCS | Mod: PBBFAC,,, | Performed by: INTERNAL MEDICINE

## 2023-04-06 PROCEDURE — 99999 PR PBB SHADOW E&M-EST. PATIENT-LVL V: CPT | Mod: PBBFAC,,, | Performed by: INTERNAL MEDICINE

## 2023-04-06 PROCEDURE — 3008F BODY MASS INDEX DOCD: CPT | Mod: CPTII,S$GLB,, | Performed by: INTERNAL MEDICINE

## 2023-04-06 PROCEDURE — 3078F DIAST BP <80 MM HG: CPT | Mod: CPTII,S$GLB,, | Performed by: INTERNAL MEDICINE

## 2023-04-06 PROCEDURE — 1126F PR PAIN SEVERITY QUANTIFIED, NO PAIN PRESENT: ICD-10-PCS | Mod: CPTII,S$GLB,, | Performed by: INTERNAL MEDICINE

## 2023-04-06 RX ORDER — TOPIRAMATE 100 MG/1
100 TABLET, FILM COATED ORAL 2 TIMES DAILY
Qty: 180 TABLET | Refills: 1 | Status: SHIPPED | OUTPATIENT
Start: 2023-04-06 | End: 2023-07-31 | Stop reason: SDUPTHER

## 2023-04-06 NOTE — PATIENT INSTRUCTIONS
Patient was informed that topiramate is used for migraine prevention and seizures. Weight loss is a common side effect that is well documented. S/he understands this. S/he was informed of the potential side effects such as serious and possibly fatal rash in which case the medication should be discontinued immediately. Paresthesias, forgetfulness, fatigue, kidney stones, GI symptoms, and changes in lab values such as electrolytes, blood counts and kidney function.      Start topiramate 100  mg morning and evening.     - To lose weight you want to cut 100% starchy carbohydrates out of your diet (bread, rice, pasta, potatoes, granola, flour, corn, peas, oatmeal, grits, tortillas, crackers, chips) and get  grams of protein.  Aim for 100 grams of protein [id number] bianka  daily.        - No soda, sweet tea, juices, sports drinks or lemonade     -Exercise daily. Increase low impact activity as tolerated.  Avoid high impact activity, very heavy lifting or other exercise regimens that may cause discomfort.     Pt advised to check blood sugar regularly as medications may need to be adjusted with changes in diet and weight loss.       January 28, 2019  Hot Spinach and Artichoke Dip (Recipe)  BY JUAN STRICKLAND RD, CSSD    This creamy spinach dip can double as a protein-rich, gluten-free side dish, game day appetizer or parade route snack.  Calories 85 calories    Fat 3 grams saturated fat    Prep Time 5 minutes  Cook Time10 minutes  Yield Serves 5-10 people  Ingredients  1/2 cup onion, finely chopped  3 (10 ounce) packs of frozen chopped spinach, thawed and squeezed dry  1 (8 ounce) package reduced-fat cream cheese  1 (8 ounce) carton fat-free plain Greek yogurt  1/2 cup Parmesan cheese, grated  1 (14 ounce) can artichoke hearts  1/4 teaspoon salt (optional)  1/4 teaspoon black pepper  1/8 teaspoon crushed red pepper flakes  Instructions  Lightly coat a skillet with cooking spray.  Cook and stir onion over medium heat  until transparent (about 5 minutes).  Add spinach. Cook until thoroughly heated (about 1-2 minutes).  Reduce heat and add cream cheese. Stir until melted and smooth.  Stir in Greek yogurt, Parmesan cheese and artichokes. Remove from heat.  Season with salt (optional) and black and red pepper.  Serve with raw vegetables.                          January 31, 2019  Pizza Cups (Recipe)    Trying to eat better but craving pizza? These protein-packed pizza cups will do the trick.    Calories 65 calories per cup  Fat 2.7 grams per cup  Prep Time5 minutes  Cook Time 25 minutes  Yield 12 pizza cups  Ingredients  1 ½ cups liquid egg whites  2 large eggs  1 cup fat free or low moisture shredded mozzarella cheese  37 turkey pepperonis (25 chopped and 12 sliced)  ¼ cup Parmesan cheese  ¼ tsp oregano  ¼ tsp black pepper  Instructions  Preheat oven to 350 degrees Fahrenheit.  Chop up 25 turkey pepperonis into small pieces. In a bowl, combine all ingredients except the remaining 12 sliced turkey pepperoni.  Spray a muffin pan with nonstick spray.  Place a whole sliced turkey pepperoni in the bottom of each of the 12 muffin molds.  Pour or spoon in the mixture in your bowl into each muffin mold evenly ¾ full.  Bake in the oven for 20-25 minutes. Place a toothpick in the center of the pizza cup to ensure all liquid egg has cooked. For a crispier pizza cup, leave in the oven a little longer.  Let cool for a few minutes before serving. Enjoy!

## 2023-04-06 NOTE — PROGRESS NOTES
Subjective:       Patient ID: Faby Luna is a 72 y.o. female.    Chief Complaint: Follow-up      CC:    Pt here today for follow-up. Has lost 8.3 lbs (-0.9 lbs muscle. -6.1 lbs fat). WAs to get back on track with bariatric diet and started topiramate 50 mg BID. Denies SE. She states she is taking both of them together. Has been taking 50 mg qhs. States she is eating yogurt, cottage cheese, chicken, fish, pork.  A1c is improving. Walking up to 3 blocks.     New BMR: 1593    New PBF: 40.4%     Initial:  BMR:1617    PBF:  41.6%    From recent RD notes.   Back on track Bariatric Diet.  Diet Recall: 60-80 grams of protein/day; 32-48 oz of fluids/day     Current diet recall:  B: prot shake OR skips  L: 1/4 cup nuts or cheese + grapes or kiwi or small apple or banana  D: baked/smothered chicken or fish with variety of veg (asparagus, greens or spinach cooked down with olive oil, brussels, broccoli)  Sn: 1/4 cup nuts, piece of fruit     Diet includes:  Meal Pattern: 1 meal(s) + 1-2 snack(s) + 2 protein supplement(s)  Adequate protein supplement intake. Premier shakes.  Adequate dairy intake.  Adequate vegetable intake. Tolerates raw vegetables and lettuce.  Adequate fruit intake.  Starchy CHO: reduced lately, small amounts  Beverages: water, CL, Coke zero  Alcohol: twice/month white liquor + diet cranberry juice     EXERCISE:  None.  Considering joining the gym (it's free with her insurance)     Restrictions to Exercise: None. Back pain.     VITAMINS / MINERALS:  Multivitamins  B-Complex  Calcium Citrate + Vitamin D  Vitamin B12: Sublingual.     ASSESSMENT:  Doing well overall.  Weight loss.  Adequate calorie intake.  Adequate protein intake.  Inadequate fluid intake.  Following diet inappropriately.  Not exercising.  Adequate vitamins & minerals.     BARIATRIC DIET DISCUSSION:  Instructed and provided written materials on bariatric diet plan.  Reinforced post-op nutrition guidelines.     PLAN /  "RECOMMENDATIONS:  Continue Back on track with diet plan.  Maintain/Increase protein intake.  Maintain/Increase fluid intake.  Begin light exercise as mary.  Continue appropriate vitamins & minerals.     Return to clinic in 1 months with med wt loss.    Lab Results       Component                Value               Date                       HGBA1C                   6.1 (H)             02/06/2023                 HGBA1C                   6.6 (H)             08/03/2022                 HGBA1C                   6.5 (H)             10/11/2021            Lab Results       Component                Value               Date                       LDLCALC                  77.0                02/06/2023                 CREATININE               1.0                 02/06/2023                '    Review of Systems      Objective:    /72 (BP Location: Right arm, Patient Position: Sitting, BP Method: Large (Manual))   Pulse 72   Ht 5' 4" (1.626 m)   Wt 95.1 kg (209 lb 9.6 oz)   SpO2 98%   BMI 35.98 kg/m²    Physical Exam  Vitals reviewed.   Constitutional:       General: She is not in acute distress.     Appearance: She is well-developed.   HENT:      Head: Normocephalic and atraumatic.   Eyes:      General: No scleral icterus.     Pupils: Pupils are equal, round, and reactive to light.   Neck:      Thyroid: No thyromegaly.   Cardiovascular:      Rate and Rhythm: Normal rate.   Pulmonary:      Effort: Pulmonary effort is normal. No respiratory distress.   Musculoskeletal:         General: Normal range of motion.      Cervical back: Normal range of motion and neck supple.   Skin:     General: Skin is warm and dry.      Findings: No erythema.   Neurological:      Mental Status: She is alert and oriented to person, place, and time.      Cranial Nerves: No cranial nerve deficit.   Psychiatric:         Behavior: Behavior normal.         Judgment: Judgment normal.       Assessment:       Problem List Items Addressed This Visit  "   None  Visit Diagnoses       Class 2 severe obesity with body mass index (BMI) of 35 to 39.9 with serious comorbidity    -  Primary    History of Clarisse-en-Y gastric bypass                    Plan:           Faby was seen today for follow-up.    Diagnoses and all orders for this visit:    Class 2 severe obesity with body mass index (BMI) of 35 to 39.9 with serious comorbidity    History of Clarisse-en-Y gastric bypass    Other orders  -     topiramate (TOPAMAX) 100 MG tablet; Take 1 tablet (100 mg total) by mouth 2 (two) times daily.          Patient was informed that topiramate is used for migraine prevention and seizures. Weight loss is a common side effect that is well documented. S/he understands this. S/he was informed of the potential side effects such as serious and possibly fatal rash in which case the medication should be discontinued immediately. Paresthesias, forgetfulness, fatigue, kidney stones, GI symptoms, and changes in lab values such as electrolytes, blood counts and kidney function.      Start topiramate 100  mg morning and evening.     - To lose weight you want to cut 100% starchy carbohydrates out of your diet (bread, rice, pasta, potatoes, granola, flour, corn, peas, oatmeal, grits, tortillas, crackers, chips) and get  grams of protein.  Aim for 100 grams of protein [id number] bianka  daily.        - No soda, sweet tea, juices, sports drinks or lemonade     -Exercise daily. Increase low impact activity as tolerated.  Avoid high impact activity, very heavy lifting or other exercise regimens that may cause discomfort.     Pt advised to check blood sugar regularly as medications may need to be adjusted with changes in diet and weight loss.     Lupe padilla recipes given.

## 2023-04-12 ENCOUNTER — PATIENT MESSAGE (OUTPATIENT)
Dept: INFECTIOUS DISEASES | Facility: CLINIC | Age: 73
End: 2023-04-12
Payer: MEDICARE

## 2023-04-20 ENCOUNTER — OFFICE VISIT (OUTPATIENT)
Dept: PAIN MEDICINE | Facility: CLINIC | Age: 73
End: 2023-04-20
Payer: MEDICARE

## 2023-04-20 VITALS
HEIGHT: 64 IN | BODY MASS INDEX: 35.76 KG/M2 | HEART RATE: 69 BPM | DIASTOLIC BLOOD PRESSURE: 73 MMHG | WEIGHT: 209.44 LBS | TEMPERATURE: 99 F | SYSTOLIC BLOOD PRESSURE: 141 MMHG | RESPIRATION RATE: 18 BRPM

## 2023-04-20 DIAGNOSIS — M51.36 DDD (DEGENERATIVE DISC DISEASE), LUMBAR: ICD-10-CM

## 2023-04-20 DIAGNOSIS — G89.4 CHRONIC PAIN SYNDROME: ICD-10-CM

## 2023-04-20 DIAGNOSIS — M47.817 SPONDYLOSIS OF LUMBOSACRAL REGION WITHOUT MYELOPATHY OR RADICULOPATHY: Primary | ICD-10-CM

## 2023-04-20 DIAGNOSIS — Z96.89 S/P INSERTION OF SPINAL CORD STIMULATOR: ICD-10-CM

## 2023-04-20 PROCEDURE — 1101F PR PT FALLS ASSESS DOC 0-1 FALLS W/OUT INJ PAST YR: ICD-10-PCS | Mod: CPTII,S$GLB,, | Performed by: NURSE PRACTITIONER

## 2023-04-20 PROCEDURE — 3044F PR MOST RECENT HEMOGLOBIN A1C LEVEL <7.0%: ICD-10-PCS | Mod: CPTII,S$GLB,, | Performed by: NURSE PRACTITIONER

## 2023-04-20 PROCEDURE — 3066F NEPHROPATHY DOC TX: CPT | Mod: CPTII,S$GLB,, | Performed by: NURSE PRACTITIONER

## 2023-04-20 PROCEDURE — 3061F NEG MICROALBUMINURIA REV: CPT | Mod: CPTII,S$GLB,, | Performed by: NURSE PRACTITIONER

## 2023-04-20 PROCEDURE — 3078F PR MOST RECENT DIASTOLIC BLOOD PRESSURE < 80 MM HG: ICD-10-PCS | Mod: CPTII,S$GLB,, | Performed by: NURSE PRACTITIONER

## 2023-04-20 PROCEDURE — 3077F PR MOST RECENT SYSTOLIC BLOOD PRESSURE >= 140 MM HG: ICD-10-PCS | Mod: CPTII,S$GLB,, | Performed by: NURSE PRACTITIONER

## 2023-04-20 PROCEDURE — 3288F FALL RISK ASSESSMENT DOCD: CPT | Mod: CPTII,S$GLB,, | Performed by: NURSE PRACTITIONER

## 2023-04-20 PROCEDURE — 1160F PR REVIEW ALL MEDS BY PRESCRIBER/CLIN PHARMACIST DOCUMENTED: ICD-10-PCS | Mod: CPTII,S$GLB,, | Performed by: NURSE PRACTITIONER

## 2023-04-20 PROCEDURE — 1160F RVW MEDS BY RX/DR IN RCRD: CPT | Mod: CPTII,S$GLB,, | Performed by: NURSE PRACTITIONER

## 2023-04-20 PROCEDURE — 99999 PR PBB SHADOW E&M-EST. PATIENT-LVL III: ICD-10-PCS | Mod: PBBFAC,,, | Performed by: NURSE PRACTITIONER

## 2023-04-20 PROCEDURE — 3077F SYST BP >= 140 MM HG: CPT | Mod: CPTII,S$GLB,, | Performed by: NURSE PRACTITIONER

## 2023-04-20 PROCEDURE — 3061F PR NEG MICROALBUMINURIA RESULT DOCUMENTED/REVIEW: ICD-10-PCS | Mod: CPTII,S$GLB,, | Performed by: NURSE PRACTITIONER

## 2023-04-20 PROCEDURE — 3066F PR DOCUMENTATION OF TREATMENT FOR NEPHROPATHY: ICD-10-PCS | Mod: CPTII,S$GLB,, | Performed by: NURSE PRACTITIONER

## 2023-04-20 PROCEDURE — 3288F PR FALLS RISK ASSESSMENT DOCUMENTED: ICD-10-PCS | Mod: CPTII,S$GLB,, | Performed by: NURSE PRACTITIONER

## 2023-04-20 PROCEDURE — 4010F ACE/ARB THERAPY RXD/TAKEN: CPT | Mod: CPTII,S$GLB,, | Performed by: NURSE PRACTITIONER

## 2023-04-20 PROCEDURE — 3008F BODY MASS INDEX DOCD: CPT | Mod: CPTII,S$GLB,, | Performed by: NURSE PRACTITIONER

## 2023-04-20 PROCEDURE — 3008F PR BODY MASS INDEX (BMI) DOCUMENTED: ICD-10-PCS | Mod: CPTII,S$GLB,, | Performed by: NURSE PRACTITIONER

## 2023-04-20 PROCEDURE — 99213 PR OFFICE/OUTPT VISIT, EST, LEVL III, 20-29 MIN: ICD-10-PCS | Mod: S$GLB,,, | Performed by: NURSE PRACTITIONER

## 2023-04-20 PROCEDURE — 4010F PR ACE/ARB THEARPY RXD/TAKEN: ICD-10-PCS | Mod: CPTII,S$GLB,, | Performed by: NURSE PRACTITIONER

## 2023-04-20 PROCEDURE — 1101F PT FALLS ASSESS-DOCD LE1/YR: CPT | Mod: CPTII,S$GLB,, | Performed by: NURSE PRACTITIONER

## 2023-04-20 PROCEDURE — 3078F DIAST BP <80 MM HG: CPT | Mod: CPTII,S$GLB,, | Performed by: NURSE PRACTITIONER

## 2023-04-20 PROCEDURE — 1125F PR PAIN SEVERITY QUANTIFIED, PAIN PRESENT: ICD-10-PCS | Mod: CPTII,S$GLB,, | Performed by: NURSE PRACTITIONER

## 2023-04-20 PROCEDURE — 1159F PR MEDICATION LIST DOCUMENTED IN MEDICAL RECORD: ICD-10-PCS | Mod: CPTII,S$GLB,, | Performed by: NURSE PRACTITIONER

## 2023-04-20 PROCEDURE — 3044F HG A1C LEVEL LT 7.0%: CPT | Mod: CPTII,S$GLB,, | Performed by: NURSE PRACTITIONER

## 2023-04-20 PROCEDURE — 1159F MED LIST DOCD IN RCRD: CPT | Mod: CPTII,S$GLB,, | Performed by: NURSE PRACTITIONER

## 2023-04-20 PROCEDURE — 99213 OFFICE O/P EST LOW 20 MIN: CPT | Mod: S$GLB,,, | Performed by: NURSE PRACTITIONER

## 2023-04-20 PROCEDURE — 1125F AMNT PAIN NOTED PAIN PRSNT: CPT | Mod: CPTII,S$GLB,, | Performed by: NURSE PRACTITIONER

## 2023-04-20 PROCEDURE — 99999 PR PBB SHADOW E&M-EST. PATIENT-LVL III: CPT | Mod: PBBFAC,,, | Performed by: NURSE PRACTITIONER

## 2023-04-20 NOTE — PROGRESS NOTES
Chronic patient Established Note (Follow up visit)      SUBJECTIVE:    Interval History 4/20/2023:  The patient returns to clinic today for follow up of low back pain. She is s/p left L3,4,5 RFA on 3/16/2023 and right L3,4,5 RFA on 3/30/2023. She reports 50-60% relief of her pain. Her pain is tolerable at this time. She has good days and bad days. She denies any radicular leg pain at this time. She is not currently using her SCS. She has not contacted representatives. She reports that her mother passed away last week. She is dealing with a lot of stress due to this. She is taking Gabapentin, although not consistently. She also takes Zanaflex. She denies any other health changes. Her pain today is 6/10.     Interval History 2/8/2023:  The patient returns to clinic today for follow up of back pain. She reports worsened low back pain. She reports intermittent radiating pain into the posterior aspect of both legs to the ankles, left greater than right. Her pain is constant in nature. Her back pain is greater than her leg pain. She is reporting limited relief with SCS. She has not been able to meet with Roque or Ildefonso for programming. She is taking Gabapentin. She denies any other health changes. Her pain today is 9/10.    Interval History 11/4/2022:  Faby is here for follow up of back and leg pain. Unfortunately, she has been unable to meet up with Roque for SCS programming. She does report some improvement in symptoms since previous visit. She still has back pain with radiation into the legs. Recent XRAYs do not show any significant lead migration. She only takes Gabapentin intermittently because she says it makes her feet swell but it does help. Her pain today is 8/10.    Interval History 10/4/2022:  The patient is here for follow up of chronic back pain. She reports more pain over the past month. No injury or trauma. She does have a lumbar SCS but is unsure if it is even on at this time. She does not think that it is.  It did previously help with her back and leg pain. She has not been in contact with [employer] recently. The back pain radiates down the back of both legs to the feet. It worsens with walking and standing. Her pain today is 10/10.    Interval History 8/23/2022:  Faby is here for follow up of chronic lower back pain. She is now s/p right then left L3,4,5 RFAs completed on 7/21/22 with limited relief so far. She continues to report aching back pain with radiation into the legs and numbness. She has had her SCS off for a couple of months. She has not been in contact with [employer] rep for programming. She says that she turned it off due to limited benefit. No new weakness to legs or falls. No bowel/bladder incontinence. Her pain today is 10/10.    Interval History 6/17/2022:  The patient is here today for follow up of back pain. She has had more aching pain across the lower back. She had benefit with lumbar RFAs in the past and would like to reschedule this. She would also like to repeat aquatic PT as this was helpful in the past. Her pain is aching and throbbing in nature without radiation currently. No new falls or trauma. Her pain today is 8/10.    Interval History 5/5/2022:  The patient is here for follow up of chronic lower back pain. She has radiation into the legs. No recent falls or trauma. She saw Dr. Silverio last month and was under the impression that an [employer] rep would be here today for programming. She is still having difficulty programming her device. She has mild benefit with Gabapentin 900 mg daily without side effects. She is also having muscle spasms intermittently. She is asking for a muscle relaxer. She has tried Robaxin in the past with mild benefit. She would like to try another medication. Her pain today is 8/10.    Interval History 4/20/2022: She presents today for follow up of low back pain. She states that she only had about 40% relief of LBP following RFAs in September that lasted a few weeks. Pain   continues to be in the low back and radiates into b/l LE. Pain encompasses the entire leg and does not follow a specific dermatomal pattern in the leg. She denies weakness, numbness or tingling in the lower extremities. She denies bowel or bladder incontinence. Pain is currently rated 8/10. She is currently on gabapentin 300mg tid with minimal relief. She has been unable to charge bret SCS for the last month and does not have the contact number for her rep.      Interval History 9/30/2021:  The patient returns to clinic today for follow up of low back pain. She is s/p left L3,4,5 RFA on 9/9/2021 and right L3,4,5 RFA on 9/20/2021. She is unsure of relief at this time. She reports increased pain to the right side. She describes this pain as burning in nature. She denies any radiating leg pain. Her pain is worse with bending and standing. She continues to report benefit with [employer] SCS. She denies any weakness. She denies any other health changes. Her pain today is 9/10.    Interval History 8/3/2021:   Ms. Luna returns in f/u re: low back/leg pain. She reports about three months ago she noticed her low back started bothering her, worse with bending and prolonged standing. No inciting event, no trauma to back since last visit. Reports continued leg pain down the backs of her legs as well. She reports intermittent instability in her legs - on and off - over the last year. Last time she has met with [employer] rep for reprogramming of SCS was fall 2020. Denies any current issues with SCS function/battery/remote.     Interval History 9/28/2020:  The patient is here today for increased lower back pain.  She is status post right than left L3, 4, 5 radiofrequency ablation completed on 08/31/2020 with approximately 50% relief.  However, she is feeling tightness across the lower back without radiation at this time.  She would like an injection today.  Additionally, she states that she has not completed physical therapy for her back  in some time and is not currently doing any exercises.  Her leg pain is currently well controlled with spinal cord stimulator and she had a recent adjustment.  Her pain today is 8/10.    Interval History 7/16/2020:  The patient is here for follow up of lower back pain.  She previously had benefit with lumbar RFAs for similar pain.  She is also having radiation into her legs with numbness, left greater than right.  Ildefonso is here today to meet with her for programming.  She previously was having significant benefit of leg pain with SCS implant.  She denies any recent trauma or falls. Her pain today is 9/10.    Interval History 1/9/2020:  The patient is here for follow up of lower back and leg pain.  She is s/p lumbar RFAs with about 50% relief.  Her leg pain is well controlled with implant.  She still has intermittent flare ups of back pain, like today.  When this happens, she has some benefit with rest.  She has tried Tylenol and OTC NSAIDs without benefit.  She denies any recent falls or weakness.  The patient denies any bowel or bladder incontinence or signs of saddle paresthesia.  The patient denies any major medical changes since last office visit.  Her pain today is 7/10.    Previous Encounter:  Faby Luna presents to the clinic for a follow-up appointment for lower back pain.  She previously had significant leg pain which has resolved with [employer] SCS implant.  She is here today to discuss increased lower back pain.  It is sharp and throbbing in nature.  It is significant in the morning and with prolonged standing and activity.  She has some benefit with stretching.  She denies any recent falls.  Since the last visit, Faby Luna states the pain has been worsening.  Current pain intensity is 8/10.    Pain Disability Index Review:  Last 3 PDI Scores 4/20/2023 11/4/2022 10/4/2022   Pain Disability Index (PDI) 30 40 50       Opioid Contract: no     report:  Not applicable    Pain  Procedures:   5/2/18 Left L3 and L4 TF ELISE- 20% relief  5/21/18 Bilateral L4-5 and L5-S1 facet joint injections- no relief  9/24/18 Lumbar SCS trial ([employer])- 100% relief  10/26/18 Lumbar SCS implant ([employer])- 100% relief of leg pain  9/12/19 Bilateral L3,4,5 MBB- helpful  10/17/19 Bilateral L3,4,5 MBB- helpful  11/21/19 Right L3,4,5 RFA- 50% relief  12/5/19 Left L3,4,5 RFA- 50% relief  8/17/20 Left L3,4,5 RFA- 50% relief  8/31/20 Right L3,4,5 RFA- 50% relief  9/9/2021- Left L3,4,5 RFA - 60% relief  9/20/2021- Right L3,4,5 RFA -60% relief  7/7/22 Right L3,4,5 RFA   7/21/22 Left L3,4,5 RFA   3/16/2023- Left L3,4,5 RFA  3/30/2023- Right L3,4,5 RFA    Physical Therapy/Home Exercise:   yes in the past with limited benefit    Imaging:     3/31/18 Lumbar MRI    Narrative     EXAMINATION:  MRI LUMBAR SPINE WITHOUT CONTRAST    CLINICAL HISTORY:  Low back pain, >6wks conservative tx, persistent-progressive sx, surgical candidate; Other spondylosis with radiculopathy, lumbar region    TECHNIQUE:  Multiplanar, multisequence MR images were acquired from the thoracolumbar junction to the sacrum without the administration of contrast.    COMPARISON:  Plain films from 03/27/2018    FINDINGS:  There is grade 1 spondylolisthesis of L4 on L5. The vertebral body heights are well maintained, with no fracture.  No marrow signal abnormality suspicious for an infiltrative process.    The conus medullaris terminates at approximately the mid body of L1.  There is a cyst associated with the upper pole of the right kidney.  There is disc desiccation noted throughout the lumbar spine with relative sparing of the L5-S1 disc.  Mild disc space narrowing present at the L4-5 level.    L1-L2: Mild diffuse disc bulge resulting in no significant central or neural foraminal canal narrowing.    L2-L3: No significant central canal narrowing.  There is mild narrowing of either neural foraminal canal secondary to disc material.    L3-L4: Disc bulging in  the bilateral foraminal regions resulting in no significant central canal narrowing.  Mild-to-moderate bilateral facet arthropathy also noted.  The bilateral neural foraminal canals are moderately narrowed with some mild effacement of either exiting L3 nerve root in the extraforaminal regions bilaterally.    L4-L5:  Grade 1 spondylolisthesis along with moderate to severe bilateral facet arthropathy and ligamentum flavum hypertrophy resulting in at least moderate narrowing of the central canal.  The right neural foraminal canal is mildly to moderately narrowed.  Left neural foraminal canal is moderately to severely narrowed with mild effacement of the exiting L4 nerve root.    L5-S1:  No significant central or neural foraminal canal narrowing noted.  Mild bilateral facet arthropathy noted.   Impression       1. Multilevel degenerative changes of the lumbar spine as detailed above     Lumbar XRAYs 3/27/18    Narrative     EXAMINATION:  XR LUMBAR SPINE AP AND LAT WITH FLEX/EXT    CLINICAL HISTORY:  Low back pain    TECHNIQUE:  AP and lateral views as well as lateral flexion and extension images are performed through the lumbar spine.    COMPARISON:  None    FINDINGS:  X-ray lumbar spine with flexion and extension demonstrates grade 1 spondylolisthesis at L4-5 measuring around 6 mm which does not change significantly with flexion and extension.  The L4-5 disc is narrowed and degenerated.  Other lumbar vertebral discs are maintained.  Vertebral body heights are maintained.  Small anterior spurs are seen, and there is small posterior osteophyte along the inferior endplate of L4.  There is lumbar facet arthropathy at L4-5 and L5-S1.   Impression       Degenerative changes as above.  Grade 1 anterolisthesis and degenerative disc disease at L4-5.         Allergies:   Review of patient's allergies indicates:   Allergen Reactions    Aspirin Other (See Comments)     Has been told not to take it due to bariatric surgery        Current Medications:   Current Outpatient Medications   Medication Sig Dispense Refill    acetaminophen (TYLENOL) 500 MG tablet Take 1 tablet (500 mg total) by mouth every 6 (six) hours as needed for Temperature greater than or Pain. 20 tablet 0    albuterol (PROAIR HFA) 90 mcg/actuation inhaler Inhale 2 puffs into the lungs every 4 (four) hours as needed for Wheezing or Shortness of Breath. Rescue 8.5 g 1    albuterol (PROVENTIL/VENTOLIN HFA) 90 mcg/actuation inhaler Inhale 1-2 puffs into the lungs every 6 (six) hours as needed for Wheezing or Shortness of Breath. Rescue 8 g 0    albuterol sulfate 2.5 mg/0.5 mL Nebu Take 2.5 mg by nebulization every 4 (four) hours as needed (shortness of breath, wheezing, coughing). Rescue 30 each 1    albuterol sulfate 2.5 mg/0.5 mL Nebu Take 2.5 mg by nebulization every 4 (four) hours as needed (SOB/wheezing). Rescue 20 each 0    atenoloL (TENORMIN) 100 MG tablet TAKE 1 TABLET EVERY DAY 90 tablet 3    benzonatate (TESSALON) 200 MG capsule Take 1 capsule (200 mg total) by mouth 3 (three) times daily as needed for Cough. 30 capsule 1    budesonide-formoterol 160-4.5 mcg (SYMBICORT) 160-4.5 mcg/actuation HFAA Inhale 2 puffs into the lungs every 12 (twelve) hours. Controller (brand only) (stop breo) 10.2 g 11    EScitalopram oxalate (LEXAPRO) 20 MG tablet TAKE 1 TABLET EVERY DAY 90 tablet 3    fluticasone propionate (FLONASE) 50 mcg/actuation nasal spray 2 sprays (100 mcg total) by Each Nostril route once daily. 11.1 mL 1    fluticasone propionate (FLONASE) 50 mcg/actuation nasal spray 1 spray (50 mcg total) by Each Nostril route 2 (two) times daily as needed for Rhinitis or Allergies. 15 g 0    furosemide (LASIX) 40 MG tablet Take 1 tablet (40 mg total) by mouth once daily. 90 tablet 3    gabapentin (NEURONTIN) 800 MG tablet Take 1 tablet (800 mg total) by mouth 3 (three) times daily. 90 tablet 2    glipiZIDE (GLUCOTROL) 10 MG tablet TAKE 1 TABLET EVERY DAY WITH BREAKFAST. 90  tablet 3    guaiFENesin 100 mg/5 ml (ROBITUSSIN) 100 mg/5 mL syrup Take 5-10 mLs (100-200 mg total) by mouth every 4 (four) hours as needed for Cough or Congestion. 118 mL 0    hydrOXYzine pamoate (VISTARIL) 25 MG Cap TAKE 1 CAPSULE BY MOUTH ONCE DAILY AS NEEDED FOR ANXIETY 90 capsule 3    ketorolac (TORADOL) 10 mg tablet Take 1 tablet (10 mg total) by mouth every 6 (six) hours as needed for Pain. Take with food to prevent heartburn. 20 tablet 1    linaGLIPtin (TRADJENTA) 5 mg Tab tablet Take 1 tablet (5 mg total) by mouth once daily. 90 tablet 3    loratadine (CLARITIN) 10 mg tablet TAKE 1 TABLET EVERY DAY AS NEEDED FOR ALLERGIES 90 tablet 3    losartan (COZAAR) 100 MG tablet Take 1 tablet (100 mg total) by mouth once daily. 90 tablet 3    ondansetron (ZOFRAN-ODT) 4 MG TbDL Take 1 tablet (4 mg total) by mouth every 6 (six) hours as needed (Nausea). 15 tablet 0    pantoprazole (PROTONIX) 20 MG tablet TAKE 1 TABLET TWICE DAILY  BEFORE  MEALS 180 tablet 3    promethazine-dextromethorphan (PROMETHAZINE-DM) 6.25-15 mg/5 mL Syrp Take 5 mLs by mouth every 8 (eight) hours as needed (cough). 240 mL 0    rosuvastatin (CRESTOR) 20 MG tablet Take 1 tablet (20 mg total) by mouth once daily. 30 tablet 11    tiZANidine (ZANAFLEX) 4 MG tablet TAKE 1 TABLET BY MOUTH EVERY 8 HOURS AS NEEDED FOR  MUSCLE  PAIN 90 tablet 0    topiramate (TOPAMAX) 100 MG tablet Take 1 tablet (100 mg total) by mouth 2 (two) times daily. 180 tablet 1    traZODone (DESYREL) 100 MG tablet Take 1 tablet (100 mg total) by mouth every evening. 90 tablet 3    blood-glucose meter kit Use as instructed 1 each 0     No current facility-administered medications for this visit.       REVIEW OF SYSTEMS:    GENERAL:  No weight loss, malaise or fevers.  HEENT:  Negative for frequent or significant headaches.  NECK:  Negative for lumps, goiter, pain and significant neck swelling.  RESPIRATORY:  Negative for cough, wheezing or shortness of breath.  CARDIOVASCULAR:   Negative for chest pain, leg swelling or palpitations. Hypertension.  GI:  Negative for abdominal discomfort, blood in stools or black stools or change in bowel habits.  MUSCULOSKELETAL:  See HPI.  SKIN:  Negative for lesions, rash, and itching.  PSYCH:  Negative for sleep disturbance, mood disorder and recent psychosocial stressors.  HEMATOLOGY/LYMPHOLOGY:  Negative for prolonged bleeding, bruising easily or swollen nodes.  NEURO:   No history of headaches, syncope, paralysis, seizures or tremors.  ENDO: Diabetes.  All other reviewed and negative other than HPI.    Past Medical History:  Past Medical History:   Diagnosis Date    Anxiety with depression     Arthritis     CKD (chronic kidney disease) stage 2, GFR 60-89 ml/min     Diabetes mellitus     History of Clarisse-en-Y gastric bypass     Hypertension     Obesity, unspecified     DAHLIA (obstructive sleep apnea)     Spondylosis        Past Surgical History:  Past Surgical History:   Procedure Laterality Date    CARPAL TUNNEL RELEASE Right 3/8/2019    Procedure: RELEASE, CARPAL TUNNEL right;  Surgeon: Pan Goodman MD;  Location: [facility];  Service: Orthopedics;  Laterality: Right;    CARPAL TUNNEL RELEASE Left 2019    Procedure: RELEASE, CARPAL TUNNEL- LEFT;  Surgeon: Pan Goodman MD;  Location: [address];  Service: Orthopedics;  Laterality: Left;    CATARACT EXTRACTION Bilateral      SECTION      CHOLECYSTECTOMY      EPIDURAL STEROID INJECTION INTO LUMBAR SPINE N/A 2018    Procedure: INJECTION, STEROID, SPINE, LUMBAR, EPIDURAL;  Surgeon: Shaq Silverio MD;  Location: [facility], [place] [facility];  Service: Pain Management;  Laterality: N/A;  LUMBAR L4-L5 INTERLAMINAR ELISE'  [id number]  W/ SEDATION     HYSTERECTOMY      INJECTION OF ANESTHETIC AGENT AROUND NERVE Bilateral 2019    Procedure: BLOCK, NERVE, L3-L4-L5 MEDIAL BRANCH;  Surgeon: Shaq Silverio MD;  Location: [facility], [place] [facility];  Service: Pain Management;  Laterality: Bilateral;    INJECTION OF ANESTHETIC  AGENT AROUND NERVE Bilateral 10/17/2019    Procedure: BLOCK, NERVE;  Surgeon: Shaq Silverio MD;  Location: [facility];  Service: Pain Management;  Laterality: Bilateral;  B/L MBB L3-L4-L5  REPEAT  CONSENT NEEDED    INJECTION OF FACET JOINT Bilateral 5/28/2018    Procedure: INJECTION-FACET;  Surgeon: Shaq Silverio MD;  Location: [facility];  Service: Pain Management;  Laterality: Bilateral;  LUMBAR BILATERAL L4-L5 AND L5-S1 FACET STEROID INJECTION  [id number]    W/ SEDATION     RADIOFREQUENCY ABLATION Right 11/21/2019    Procedure: RADIOFREQUENCY ABLATION RIGHT L3, L4, L5;  Surgeon: Shaq Silverio MD;  Location: [facility];  Service: Pain Management;  Laterality: Right;  Right RFA L3-L4-L5  1 of 2  Consent Needed    RADIOFREQUENCY ABLATION Left 12/5/2019    Procedure: RADIOFREQUENCY ABLATION LEFT L3-5;  Surgeon: Shaq Silverio MD;  Location: [facility];  Service: Pain Management;  Laterality: Left;  Left RFA L3-L4-L5  2 of 2  Consent Needed    RADIOFREQUENCY ABLATION Left 8/17/2020    Procedure: RADIOFREQUENCY ABLATION LEFT L3,4,5 1 of 2;  Surgeon: Shaq Silverio MD;  Location: [facility];  Service: Pain Management;  Laterality: Left;  Left RFA L3,4,5  1 of 2    RADIOFREQUENCY ABLATION Right 8/31/2020    Procedure: RADIOFREQUENCY ABLATION RIGHT L3,4,5 2 of 2;  Surgeon: Shaq Silverio MD;  Location: [facility];  Service: Pain Management;  Laterality: Right;  RADIOFREQUENCY ABLATION RIGHT L3,4,5  2 of 2    RADIOFREQUENCY ABLATION Left 9/9/2021    Procedure: RADIOFREQUENCY ABLATION, L3-L4 AND L5 MEDIAL BRANCH 1 OF 2;  Surgeon: Shaq Silverio MD;  Location: [facility];  Service: Pain Management;  Laterality: Left;    RADIOFREQUENCY ABLATION Right 9/20/2021    Procedure: RADIOFREQUENCY ABLATION, L3-L4 AND L5 MEDIAL BRANCH 2 OF 2;  Surgeon: Shaq Silverio MD;  Location: [facility];  Service: Pain Management;  Laterality: Right;    RADIOFREQUENCY ABLATION Right 7/7/2022    Procedure: RADIOFREQUENCY ABLATION,  "RIGHT L3-L4-L5 ONE OF TWO;  Surgeon: Shaq Silverio MD;  Location: [facility];  Service: Pain Management;  Laterality: Right;    RADIOFREQUENCY ABLATION Left 7/21/2022    Procedure: RADIOFREQUENCY ABLATION, LEFT L3-L4-L5 TWO OF TWO *BRING SCS REMOTE*;  Surgeon: Shaq Silverio MD;  Location: [facility];  Service: Pain Management;  Laterality: Left;    RADIOFREQUENCY ABLATION Left 3/16/2023    Procedure: RADIOFREQUENCY ABLATION LEFT L3,L4,L5 *BRING SCS REMOTE*;  Surgeon: Shaq Silverio MD;  Location: [facility];  Service: Pain Management;  Laterality: Left;    RADIOFREQUENCY ABLATION Right 3/30/2023    Procedure: RADIOFREQUENCY ABLATION RIGHT L3,L4,L5 *BRING SCS REMOTE*;  Surgeon: Shaq Silverio MD;  Location: [facility];  Service: Pain Management;  Laterality: Right;    TRIAL OF SPINAL CORD NERVE STIMULATOR N/A 9/24/2018    Procedure: TRIAL, NEUROSTIMULATOR, SPINAL CORD, SPINAL CORD STIMULATOR TRIAL-INTERNAL WIRES TO EXTERNAL BATTERY;  Surgeon: Shaq Silverio MD;  Location: [facility];  Service: Pain Management;  Laterality: N/A;  [employer] REP NOTIFIED       Family History:  Family History   Problem Relation Age of Onset    Cataracts Mother     Glaucoma Mother     No Known Problems Father     No Known Problems Sister     No Known Problems Brother     No Known Problems Maternal Aunt     No Known Problems Maternal Uncle     No Known Problems Paternal Aunt     No Known Problems Paternal Uncle     No Known Problems Maternal Grandmother     No Known Problems Maternal Grandfather     No Known Problems Paternal Grandmother     No Known Problems Paternal Grandfather        Social History:  Social History     Socioeconomic History    Marital status:    Tobacco Use    Smoking status: Never    Smokeless tobacco: Never   Substance and Sexual Activity    Alcohol use: No    Drug use: No       OBJECTIVE:    BP (!) 141/73   Pulse 69   Temp 98.6 °F (37 °C)   Resp 18   Ht 5' 4" (1.626 m)   Wt 95 kg (209 lb 7 oz)   BMI " 35.95 kg/m²     PHYSICAL EXAMINATION:    General appearance: Well appearing, in no acute distress, alert and oriented x3.  Psych:  Mood and affect appropriate.  Skin: Skin color, texture, turgor normal, no rashes or lesions, in both upper and lower body.   Head/face:  Atraumatic, normocephalic. No palpable lymph nodes  Back: Straight leg raising in the sitting and supine positions is negative to radicular pain bilaterally. There is mild pain with palpation to lumbar paraspinals and facet joints bilaterally. Limited ROM with pain on extension. Positive facet loading bilaterally..There is pain with palpation to SI joints.   Extremities: No deformities, edema, or skin discoloration. Good capillary refill.  Musculoskeletal: Normal strength to BLE. No atrophy or tone abnormalities are noted.  Neuro: Decreased sensation to BLE.  Gait: Antalgic- ambulates without assistance.    ASSESSMENT: 72 y.o. year old female with back pain, consistent with the following diagnoses:     1. Spondylosis of lumbosacral region without myelopathy or radiculopathy        2. DDD (degenerative disc disease), lumbar        3. S/P insertion of spinal cord stimulator        4. Chronic pain syndrome                PLAN:     - Previous imaging was reviewed and discussed with the patient today.    - She is s/p lumbar RFA with benefit.    - We discussed contacting [employer] representatives. She verbalized understanding.      - Continue Zanaflex.     - Continue Gabapentin, discussed taking this at bedtime consistently.     - RTC in 1 month.       The above plan and management options were discussed at length with patient. Patient is in agreement with the above and verbalized understanding.    Kimberly Conner  04/20/2023

## 2023-05-03 ENCOUNTER — HOSPITAL ENCOUNTER (INPATIENT)
Facility: HOSPITAL | Age: 73
LOS: 1 days | Discharge: HOME | DRG: 390 | End: 2023-05-06
Attending: EMERGENCY MEDICINE | Admitting: STUDENT IN AN ORGANIZED HEALTH CARE EDUCATION/TRAINING PROGRAM
Payer: MEDICARE

## 2023-05-03 DIAGNOSIS — Z01.89 ENCOUNTER FOR IMAGING STUDY TO CONFIRM NASOGASTRIC (NG) TUBE PLACEMENT: ICD-10-CM

## 2023-05-03 DIAGNOSIS — I10 ESSENTIAL HYPERTENSION: ICD-10-CM

## 2023-05-03 DIAGNOSIS — K56.51 INTESTINAL ADHESIONS WITH PARTIAL OBSTRUCTION: ICD-10-CM

## 2023-05-03 DIAGNOSIS — F33.41 MAJOR DEPRESSIVE DISORDER, RECURRENT EPISODE, IN PARTIAL REMISSION: Chronic | ICD-10-CM

## 2023-05-03 DIAGNOSIS — K56.609 INTESTINAL OBSTRUCTION, UNSPECIFIED CAUSE, UNSPECIFIED WHETHER PARTIAL OR COMPLETE: Primary | ICD-10-CM

## 2023-05-03 DIAGNOSIS — K56.7 ILEUS: ICD-10-CM

## 2023-05-03 DIAGNOSIS — R10.9 ABDOMINAL PAIN: ICD-10-CM

## 2023-05-03 DIAGNOSIS — R07.9 CHEST PAIN: ICD-10-CM

## 2023-05-03 PROBLEM — T82.898A: Status: RESOLVED | Noted: 2022-04-26 | Resolved: 2023-05-03

## 2023-05-03 PROBLEM — N18.31 TYPE 2 DIABETES MELLITUS WITH STAGE 3A CHRONIC KIDNEY DISEASE, WITHOUT LONG-TERM CURRENT USE OF INSULIN: Chronic | Status: ACTIVE | Noted: 2018-05-30

## 2023-05-03 PROBLEM — M54.16 LUMBAR RADICULOPATHY: Chronic | Status: ACTIVE | Noted: 2018-10-05

## 2023-05-03 PROBLEM — J45.40 MODERATE PERSISTENT ASTHMA: Chronic | Status: ACTIVE | Noted: 2022-08-29

## 2023-05-03 PROBLEM — F41.9 ANXIETY: Chronic | Status: ACTIVE | Noted: 2018-05-28

## 2023-05-03 PROBLEM — M47.816 DEGENERATIVE JOINT DISEASE (DJD) OF LUMBAR SPINE: Chronic | Status: ACTIVE | Noted: 2018-09-24

## 2023-05-03 PROBLEM — E11.22 TYPE 2 DIABETES MELLITUS WITH STAGE 3A CHRONIC KIDNEY DISEASE, WITHOUT LONG-TERM CURRENT USE OF INSULIN: Chronic | Status: ACTIVE | Noted: 2018-05-30

## 2023-05-03 PROBLEM — N18.31 CHRONIC RENAL FAILURE, STAGE 3A: Chronic | Status: ACTIVE | Noted: 2021-02-15

## 2023-05-03 PROBLEM — G89.29 CHRONIC PAIN: Chronic | Status: ACTIVE | Noted: 2018-05-02

## 2023-05-03 PROBLEM — G89.4 CHRONIC PAIN SYNDROME: Chronic | Status: ACTIVE | Noted: 2018-10-26

## 2023-05-03 PROBLEM — R52 PAIN: Status: RESOLVED | Noted: 2018-06-14 | Resolved: 2023-05-03

## 2023-05-03 PROBLEM — G47.33 OSA (OBSTRUCTIVE SLEEP APNEA): Chronic | Status: ACTIVE | Noted: 2022-09-15

## 2023-05-03 PROBLEM — F43.23 ADJUSTMENT REACTION WITH ANXIETY AND DEPRESSION: Chronic | Status: ACTIVE | Noted: 2018-05-03

## 2023-05-03 LAB
ALBUMIN SERPL BCP-MCNC: 3.4 G/DL (ref 3.5–5.2)
ALBUMIN SERPL BCP-MCNC: 3.4 G/DL (ref 3.5–5.2)
ALP SERPL-CCNC: 101 U/L (ref 55–135)
ALP SERPL-CCNC: 101 U/L (ref 55–135)
ALT SERPL W/O P-5'-P-CCNC: 15 U/L (ref 10–44)
ALT SERPL W/O P-5'-P-CCNC: 15 U/L (ref 10–44)
ANION GAP SERPL CALC-SCNC: 15 MMOL/L (ref 8–16)
ANION GAP SERPL CALC-SCNC: 7 MMOL/L (ref 8–16)
AST SERPL-CCNC: 15 U/L (ref 10–40)
AST SERPL-CCNC: 15 U/L (ref 10–40)
BASOPHILS # BLD AUTO: 0.04 K/UL (ref 0–0.2)
BASOPHILS NFR BLD: 0.3 % (ref 0–1.9)
BILIRUB DIRECT SERPL-MCNC: 0.1 MG/DL (ref 0.1–0.3)
BILIRUB SERPL-MCNC: 0.3 MG/DL (ref 0.1–1)
BILIRUB SERPL-MCNC: 0.3 MG/DL (ref 0.1–1)
BUN SERPL-MCNC: 14 MG/DL (ref 8–23)
BUN SERPL-MCNC: 16 MG/DL (ref 6–30)
CALCIUM SERPL-MCNC: 8.5 MG/DL (ref 8.7–10.5)
CHLORIDE SERPL-SCNC: 109 MMOL/L (ref 95–110)
CHLORIDE SERPL-SCNC: 112 MMOL/L (ref 95–110)
CO2 SERPL-SCNC: 22 MMOL/L (ref 23–29)
CREAT SERPL-MCNC: 1 MG/DL (ref 0.5–1.4)
CREAT SERPL-MCNC: 1 MG/DL (ref 0.5–1.4)
DIFFERENTIAL METHOD: ABNORMAL
EOSINOPHIL # BLD AUTO: 0.3 K/UL (ref 0–0.5)
EOSINOPHIL NFR BLD: 2.9 % (ref 0–8)
ERYTHROCYTE [DISTWIDTH] IN BLOOD BY AUTOMATED COUNT: 16.5 % (ref 11.5–14.5)
EST. GFR  (NO RACE VARIABLE): 59 ML/MIN/1.73 M^2
GLUCOSE SERPL-MCNC: 147 MG/DL (ref 70–110)
GLUCOSE SERPL-MCNC: 148 MG/DL (ref 70–110)
HCT VFR BLD AUTO: 41.2 % (ref 37–48.5)
HCT VFR BLD CALC: 43 %PCV (ref 36–54)
HGB BLD-MCNC: 13.4 G/DL (ref 12–16)
IMM GRANULOCYTES # BLD AUTO: 0.03 K/UL (ref 0–0.04)
IMM GRANULOCYTES NFR BLD AUTO: 0.3 % (ref 0–0.5)
LACTATE SERPL-SCNC: 1.2 MMOL/L (ref 0.5–2.2)
LIPASE SERPL-CCNC: 27 U/L (ref 4–60)
LYMPHOCYTES # BLD AUTO: 3.2 K/UL (ref 1–4.8)
LYMPHOCYTES NFR BLD: 27 % (ref 18–48)
MCH RBC QN AUTO: 25.9 PG (ref 27–31)
MCHC RBC AUTO-ENTMCNC: 32.5 G/DL (ref 32–36)
MCV RBC AUTO: 80 FL (ref 82–98)
MONOCYTES # BLD AUTO: 0.7 K/UL (ref 0.3–1)
MONOCYTES NFR BLD: 6.1 % (ref 4–15)
NEUTROPHILS # BLD AUTO: 7.5 K/UL (ref 1.8–7.7)
NEUTROPHILS NFR BLD: 63.4 % (ref 38–73)
NRBC BLD-RTO: 0 /100 WBC
PLATELET # BLD AUTO: 303 K/UL (ref 150–450)
PMV BLD AUTO: 9.8 FL (ref 9.2–12.9)
POC IONIZED CALCIUM: 1.22 MMOL/L (ref 1.06–1.42)
POC TCO2 (MEASURED): 22 MMOL/L (ref 23–29)
POCT GLUCOSE: 178 MG/DL (ref 70–110)
POTASSIUM BLD-SCNC: 3.8 MMOL/L (ref 3.5–5.1)
POTASSIUM SERPL-SCNC: 3.9 MMOL/L (ref 3.5–5.1)
PROT SERPL-MCNC: 7.1 G/DL (ref 6–8.4)
PROT SERPL-MCNC: 7.1 G/DL (ref 6–8.4)
RBC # BLD AUTO: 5.18 M/UL (ref 4–5.4)
SAMPLE: ABNORMAL
SODIUM BLD-SCNC: 142 MMOL/L (ref 136–145)
SODIUM SERPL-SCNC: 141 MMOL/L (ref 136–145)
TROPONIN I SERPL DL<=0.01 NG/ML-MCNC: <0.006 NG/ML (ref 0–0.03)
TSH SERPL DL<=0.005 MIU/L-ACNC: 1.25 UIU/ML (ref 0.4–4)
WBC # BLD AUTO: 11.78 K/UL (ref 3.9–12.7)

## 2023-05-03 PROCEDURE — 80047 BASIC METABLC PNL IONIZED CA: CPT

## 2023-05-03 PROCEDURE — 84132 ASSAY OF SERUM POTASSIUM: CPT

## 2023-05-03 PROCEDURE — 84443 ASSAY THYROID STIM HORMONE: CPT | Performed by: EMERGENCY MEDICINE

## 2023-05-03 PROCEDURE — 84295 ASSAY OF SERUM SODIUM: CPT

## 2023-05-03 PROCEDURE — 82565 ASSAY OF CREATININE: CPT

## 2023-05-03 PROCEDURE — 93010 ELECTROCARDIOGRAM REPORT: CPT | Mod: ,,, | Performed by: INTERNAL MEDICINE

## 2023-05-03 PROCEDURE — 96361 HYDRATE IV INFUSION ADD-ON: CPT

## 2023-05-03 PROCEDURE — 99285 EMERGENCY DEPT VISIT HI MDM: CPT | Mod: 25

## 2023-05-03 PROCEDURE — 93010 EKG 12-LEAD: ICD-10-PCS | Mod: 76,S$GLB,, | Performed by: INTERNAL MEDICINE

## 2023-05-03 PROCEDURE — 96375 TX/PRO/DX INJ NEW DRUG ADDON: CPT

## 2023-05-03 PROCEDURE — 82330 ASSAY OF CALCIUM: CPT

## 2023-05-03 PROCEDURE — 99900035 HC TECH TIME PER 15 MIN (STAT)

## 2023-05-03 PROCEDURE — 93010 EKG 12-LEAD: ICD-10-PCS | Mod: ,,, | Performed by: INTERNAL MEDICINE

## 2023-05-03 PROCEDURE — 85025 COMPLETE CBC W/AUTO DIFF WBC: CPT | Performed by: EMERGENCY MEDICINE

## 2023-05-03 PROCEDURE — 82962 GLUCOSE BLOOD TEST: CPT

## 2023-05-03 PROCEDURE — 93005 ELECTROCARDIOGRAM TRACING: CPT

## 2023-05-03 PROCEDURE — G0378 HOSPITAL OBSERVATION PER HR: HCPCS

## 2023-05-03 PROCEDURE — 96376 TX/PRO/DX INJ SAME DRUG ADON: CPT

## 2023-05-03 PROCEDURE — 25000003 PHARM REV CODE 250: Performed by: EMERGENCY MEDICINE

## 2023-05-03 PROCEDURE — 80053 COMPREHEN METABOLIC PANEL: CPT | Performed by: EMERGENCY MEDICINE

## 2023-05-03 PROCEDURE — 93010 ELECTROCARDIOGRAM REPORT: CPT | Mod: 76,S$GLB,, | Performed by: INTERNAL MEDICINE

## 2023-05-03 PROCEDURE — 85014 HEMATOCRIT: CPT

## 2023-05-03 PROCEDURE — 83605 ASSAY OF LACTIC ACID: CPT | Performed by: EMERGENCY MEDICINE

## 2023-05-03 PROCEDURE — 84484 ASSAY OF TROPONIN QUANT: CPT | Performed by: EMERGENCY MEDICINE

## 2023-05-03 PROCEDURE — 83690 ASSAY OF LIPASE: CPT | Performed by: EMERGENCY MEDICINE

## 2023-05-03 PROCEDURE — 63600175 PHARM REV CODE 636 W HCPCS: Performed by: EMERGENCY MEDICINE

## 2023-05-03 RX ORDER — ONDANSETRON 2 MG/ML
8 INJECTION INTRAMUSCULAR; INTRAVENOUS
Status: COMPLETED | OUTPATIENT
Start: 2023-05-03 | End: 2023-05-03

## 2023-05-03 RX ORDER — HYDRALAZINE HYDROCHLORIDE 20 MG/ML
10 INJECTION INTRAMUSCULAR; INTRAVENOUS
Status: COMPLETED | OUTPATIENT
Start: 2023-05-03 | End: 2023-05-03

## 2023-05-03 RX ORDER — TALC
6 POWDER (GRAM) TOPICAL NIGHTLY PRN
Status: DISCONTINUED | OUTPATIENT
Start: 2023-05-03 | End: 2023-05-06 | Stop reason: HOSPADM

## 2023-05-03 RX ORDER — ENOXAPARIN SODIUM 100 MG/ML
40 INJECTION SUBCUTANEOUS EVERY 24 HOURS
Status: DISCONTINUED | OUTPATIENT
Start: 2023-05-03 | End: 2023-05-06 | Stop reason: HOSPADM

## 2023-05-03 RX ORDER — TOPIRAMATE 25 MG/1
100 TABLET ORAL 2 TIMES DAILY
Status: DISCONTINUED | OUTPATIENT
Start: 2023-05-03 | End: 2023-05-06 | Stop reason: HOSPADM

## 2023-05-03 RX ORDER — ATENOLOL 25 MG/1
100 TABLET ORAL DAILY
Status: DISCONTINUED | OUTPATIENT
Start: 2023-05-04 | End: 2023-05-06 | Stop reason: HOSPADM

## 2023-05-03 RX ORDER — ACETAMINOPHEN 325 MG/1
650 TABLET ORAL EVERY 6 HOURS PRN
Status: DISCONTINUED | OUTPATIENT
Start: 2023-05-03 | End: 2023-05-06 | Stop reason: HOSPADM

## 2023-05-03 RX ORDER — INSULIN ASPART 100 [IU]/ML
1-10 INJECTION, SOLUTION INTRAVENOUS; SUBCUTANEOUS EVERY 6 HOURS PRN
Status: DISCONTINUED | OUTPATIENT
Start: 2023-05-03 | End: 2023-05-06

## 2023-05-03 RX ORDER — LIDOCAINE HYDROCHLORIDE 20 MG/ML
JELLY TOPICAL
Status: COMPLETED | OUTPATIENT
Start: 2023-05-03 | End: 2023-05-03

## 2023-05-03 RX ORDER — ESCITALOPRAM OXALATE 10 MG/1
20 TABLET ORAL DAILY
Status: DISCONTINUED | OUTPATIENT
Start: 2023-05-04 | End: 2023-05-06 | Stop reason: HOSPADM

## 2023-05-03 RX ORDER — GLUCAGON 1 MG
1 KIT INJECTION
Status: DISCONTINUED | OUTPATIENT
Start: 2023-05-03 | End: 2023-05-06 | Stop reason: HOSPADM

## 2023-05-03 RX ORDER — LOSARTAN POTASSIUM 25 MG/1
100 TABLET ORAL DAILY
Status: DISCONTINUED | OUTPATIENT
Start: 2023-05-04 | End: 2023-05-04

## 2023-05-03 RX ORDER — MAG HYDROX/ALUMINUM HYD/SIMETH 200-200-20
30 SUSPENSION, ORAL (FINAL DOSE FORM) ORAL 4 TIMES DAILY PRN
Status: DISCONTINUED | OUTPATIENT
Start: 2023-05-03 | End: 2023-05-06 | Stop reason: HOSPADM

## 2023-05-03 RX ORDER — SIMETHICONE 80 MG
1 TABLET,CHEWABLE ORAL 4 TIMES DAILY PRN
Status: DISCONTINUED | OUTPATIENT
Start: 2023-05-03 | End: 2023-05-06 | Stop reason: HOSPADM

## 2023-05-03 RX ORDER — IBUPROFEN 200 MG
16 TABLET ORAL
Status: DISCONTINUED | OUTPATIENT
Start: 2023-05-03 | End: 2023-05-06 | Stop reason: HOSPADM

## 2023-05-03 RX ORDER — NALOXONE HCL 0.4 MG/ML
0.02 VIAL (ML) INJECTION
Status: DISCONTINUED | OUTPATIENT
Start: 2023-05-03 | End: 2023-05-06 | Stop reason: HOSPADM

## 2023-05-03 RX ORDER — GABAPENTIN 400 MG/1
800 CAPSULE ORAL 3 TIMES DAILY
Status: DISCONTINUED | OUTPATIENT
Start: 2023-05-04 | End: 2023-05-06 | Stop reason: HOSPADM

## 2023-05-03 RX ORDER — PROCHLORPERAZINE EDISYLATE 5 MG/ML
5 INJECTION INTRAMUSCULAR; INTRAVENOUS EVERY 6 HOURS PRN
Status: DISCONTINUED | OUTPATIENT
Start: 2023-05-03 | End: 2023-05-06 | Stop reason: HOSPADM

## 2023-05-03 RX ORDER — TIZANIDINE 4 MG/1
4 TABLET ORAL EVERY 8 HOURS PRN
Status: DISCONTINUED | OUTPATIENT
Start: 2023-05-03 | End: 2023-05-06 | Stop reason: HOSPADM

## 2023-05-03 RX ORDER — SODIUM CHLORIDE 0.9 % (FLUSH) 0.9 %
10 SYRINGE (ML) INJECTION EVERY 8 HOURS PRN
Status: DISCONTINUED | OUTPATIENT
Start: 2023-05-03 | End: 2023-05-06 | Stop reason: HOSPADM

## 2023-05-03 RX ORDER — HYDROMORPHONE HYDROCHLORIDE 1 MG/ML
1 INJECTION, SOLUTION INTRAMUSCULAR; INTRAVENOUS; SUBCUTANEOUS
Status: COMPLETED | OUTPATIENT
Start: 2023-05-03 | End: 2023-05-03

## 2023-05-03 RX ORDER — ONDANSETRON 2 MG/ML
4 INJECTION INTRAMUSCULAR; INTRAVENOUS EVERY 8 HOURS PRN
Status: DISCONTINUED | OUTPATIENT
Start: 2023-05-03 | End: 2023-05-06 | Stop reason: HOSPADM

## 2023-05-03 RX ORDER — ATORVASTATIN CALCIUM 40 MG/1
80 TABLET, FILM COATED ORAL DAILY
Status: DISCONTINUED | OUTPATIENT
Start: 2023-05-04 | End: 2023-05-06 | Stop reason: HOSPADM

## 2023-05-03 RX ORDER — TRAZODONE HYDROCHLORIDE 50 MG/1
100 TABLET ORAL NIGHTLY
Status: DISCONTINUED | OUTPATIENT
Start: 2023-05-03 | End: 2023-05-06 | Stop reason: HOSPADM

## 2023-05-03 RX ORDER — IBUPROFEN 200 MG
24 TABLET ORAL
Status: DISCONTINUED | OUTPATIENT
Start: 2023-05-03 | End: 2023-05-06 | Stop reason: HOSPADM

## 2023-05-03 RX ORDER — LABETALOL HYDROCHLORIDE 5 MG/ML
10 INJECTION, SOLUTION INTRAVENOUS
Status: COMPLETED | OUTPATIENT
Start: 2023-05-03 | End: 2023-05-03

## 2023-05-03 RX ADMIN — ONDANSETRON 8 MG: 2 INJECTION INTRAMUSCULAR; INTRAVENOUS at 06:05

## 2023-05-03 RX ADMIN — LABETALOL HYDROCHLORIDE 10 MG: 5 INJECTION INTRAVENOUS at 10:05

## 2023-05-03 RX ADMIN — HYDRALAZINE HYDROCHLORIDE 10 MG: 20 INJECTION INTRAMUSCULAR; INTRAVENOUS at 10:05

## 2023-05-03 RX ADMIN — SODIUM CHLORIDE 1000 ML: 9 INJECTION, SOLUTION INTRAVENOUS at 06:05

## 2023-05-03 RX ADMIN — HYDRALAZINE HYDROCHLORIDE 10 MG: 20 INJECTION INTRAMUSCULAR; INTRAVENOUS at 09:05

## 2023-05-03 RX ADMIN — HYDROMORPHONE HYDROCHLORIDE 1 MG: 1 INJECTION, SOLUTION INTRAMUSCULAR; INTRAVENOUS; SUBCUTANEOUS at 08:05

## 2023-05-03 RX ADMIN — LIDOCAINE HYDROCHLORIDE 10 ML: 20 JELLY TOPICAL at 08:05

## 2023-05-03 NOTE — ED NOTES
Patient c/o abdominal pain/ fullness since yesterday, states she started having intermittent nausea today.

## 2023-05-04 LAB
ALBUMIN SERPL BCP-MCNC: 3.2 G/DL (ref 3.5–5.2)
ALP SERPL-CCNC: 90 U/L (ref 55–135)
ALT SERPL W/O P-5'-P-CCNC: 14 U/L (ref 10–44)
ANION GAP SERPL CALC-SCNC: 8 MMOL/L (ref 8–16)
AST SERPL-CCNC: 12 U/L (ref 10–40)
BASOPHILS # BLD AUTO: 0.03 K/UL (ref 0–0.2)
BASOPHILS NFR BLD: 0.2 % (ref 0–1.9)
BILIRUB SERPL-MCNC: 0.5 MG/DL (ref 0.1–1)
BUN SERPL-MCNC: 14 MG/DL (ref 8–23)
CALCIUM SERPL-MCNC: 8.4 MG/DL (ref 8.7–10.5)
CHLORIDE SERPL-SCNC: 112 MMOL/L (ref 95–110)
CO2 SERPL-SCNC: 20 MMOL/L (ref 23–29)
CREAT SERPL-MCNC: 0.9 MG/DL (ref 0.5–1.4)
CRP SERPL-MCNC: 14.9 MG/L (ref 0–8.2)
DIFFERENTIAL METHOD: ABNORMAL
EOSINOPHIL # BLD AUTO: 0.1 K/UL (ref 0–0.5)
EOSINOPHIL NFR BLD: 0.4 % (ref 0–8)
ERYTHROCYTE [DISTWIDTH] IN BLOOD BY AUTOMATED COUNT: 16.4 % (ref 11.5–14.5)
EST. GFR  (NO RACE VARIABLE): >60 ML/MIN/1.73 M^2
GLUCOSE SERPL-MCNC: 179 MG/DL (ref 70–110)
HCT VFR BLD AUTO: 42.2 % (ref 37–48.5)
HGB BLD-MCNC: 13.4 G/DL (ref 12–16)
IMM GRANULOCYTES # BLD AUTO: 0.05 K/UL (ref 0–0.04)
IMM GRANULOCYTES NFR BLD AUTO: 0.4 % (ref 0–0.5)
LYMPHOCYTES # BLD AUTO: 2.9 K/UL (ref 1–4.8)
LYMPHOCYTES NFR BLD: 21.5 % (ref 18–48)
MCH RBC QN AUTO: 25.5 PG (ref 27–31)
MCHC RBC AUTO-ENTMCNC: 31.8 G/DL (ref 32–36)
MCV RBC AUTO: 80 FL (ref 82–98)
MONOCYTES # BLD AUTO: 0.7 K/UL (ref 0.3–1)
MONOCYTES NFR BLD: 5 % (ref 4–15)
NEUTROPHILS # BLD AUTO: 9.6 K/UL (ref 1.8–7.7)
NEUTROPHILS NFR BLD: 72.5 % (ref 38–73)
NRBC BLD-RTO: 0 /100 WBC
PLATELET # BLD AUTO: 318 K/UL (ref 150–450)
PMV BLD AUTO: 9.8 FL (ref 9.2–12.9)
POCT GLUCOSE: 149 MG/DL (ref 70–110)
POCT GLUCOSE: 152 MG/DL (ref 70–110)
POCT GLUCOSE: 154 MG/DL (ref 70–110)
POCT GLUCOSE: 164 MG/DL (ref 70–110)
POTASSIUM SERPL-SCNC: 3.7 MMOL/L (ref 3.5–5.1)
PROCALCITONIN SERPL IA-MCNC: 0.05 NG/ML
PROT SERPL-MCNC: 6.6 G/DL (ref 6–8.4)
RBC # BLD AUTO: 5.26 M/UL (ref 4–5.4)
SODIUM SERPL-SCNC: 140 MMOL/L (ref 136–145)
WBC # BLD AUTO: 13.28 K/UL (ref 3.9–12.7)

## 2023-05-04 PROCEDURE — G0378 HOSPITAL OBSERVATION PER HR: HCPCS

## 2023-05-04 PROCEDURE — 84145 PROCALCITONIN (PCT): CPT | Performed by: STUDENT IN AN ORGANIZED HEALTH CARE EDUCATION/TRAINING PROGRAM

## 2023-05-04 PROCEDURE — 63600175 PHARM REV CODE 636 W HCPCS

## 2023-05-04 PROCEDURE — 87040 BLOOD CULTURE FOR BACTERIA: CPT | Performed by: STUDENT IN AN ORGANIZED HEALTH CARE EDUCATION/TRAINING PROGRAM

## 2023-05-04 PROCEDURE — 80053 COMPREHEN METABOLIC PANEL: CPT | Performed by: HOSPITALIST

## 2023-05-04 PROCEDURE — 96375 TX/PRO/DX INJ NEW DRUG ADDON: CPT

## 2023-05-04 PROCEDURE — 86140 C-REACTIVE PROTEIN: CPT | Performed by: HOSPITALIST

## 2023-05-04 PROCEDURE — 85025 COMPLETE CBC W/AUTO DIFF WBC: CPT | Performed by: HOSPITALIST

## 2023-05-04 PROCEDURE — 96372 THER/PROPH/DIAG INJ SC/IM: CPT | Performed by: HOSPITALIST

## 2023-05-04 PROCEDURE — 96361 HYDRATE IV INFUSION ADD-ON: CPT

## 2023-05-04 PROCEDURE — 63600175 PHARM REV CODE 636 W HCPCS: Performed by: STUDENT IN AN ORGANIZED HEALTH CARE EDUCATION/TRAINING PROGRAM

## 2023-05-04 PROCEDURE — 99223 1ST HOSP IP/OBS HIGH 75: CPT | Mod: GC,,, | Performed by: SURGERY

## 2023-05-04 PROCEDURE — 25000003 PHARM REV CODE 250: Performed by: HOSPITALIST

## 2023-05-04 PROCEDURE — 25000003 PHARM REV CODE 250: Performed by: STUDENT IN AN ORGANIZED HEALTH CARE EDUCATION/TRAINING PROGRAM

## 2023-05-04 PROCEDURE — 96365 THER/PROPH/DIAG IV INF INIT: CPT

## 2023-05-04 PROCEDURE — 82962 GLUCOSE BLOOD TEST: CPT

## 2023-05-04 PROCEDURE — 25000003 PHARM REV CODE 250

## 2023-05-04 PROCEDURE — 99223 PR INITIAL HOSPITAL CARE,LEVL III: ICD-10-PCS | Mod: GC,,, | Performed by: SURGERY

## 2023-05-04 PROCEDURE — 63600175 PHARM REV CODE 636 W HCPCS: Performed by: HOSPITALIST

## 2023-05-04 RX ORDER — SODIUM CHLORIDE 9 MG/ML
INJECTION, SOLUTION INTRAVENOUS CONTINUOUS
Status: DISCONTINUED | OUTPATIENT
Start: 2023-05-04 | End: 2023-05-06 | Stop reason: HOSPADM

## 2023-05-04 RX ORDER — METOPROLOL TARTRATE 1 MG/ML
5 INJECTION, SOLUTION INTRAVENOUS ONCE
Status: COMPLETED | OUTPATIENT
Start: 2023-05-04 | End: 2023-05-04

## 2023-05-04 RX ORDER — HYDRALAZINE HYDROCHLORIDE 20 MG/ML
20 INJECTION INTRAMUSCULAR; INTRAVENOUS EVERY 6 HOURS PRN
Status: DISCONTINUED | OUTPATIENT
Start: 2023-05-04 | End: 2023-05-06 | Stop reason: HOSPADM

## 2023-05-04 RX ORDER — CEFTRIAXONE 2 G/50ML
2 INJECTION, SOLUTION INTRAVENOUS
Status: DISCONTINUED | OUTPATIENT
Start: 2023-05-04 | End: 2023-05-06 | Stop reason: HOSPADM

## 2023-05-04 RX ORDER — LOSARTAN POTASSIUM 25 MG/1
100 TABLET ORAL DAILY
Status: DISCONTINUED | OUTPATIENT
Start: 2023-05-04 | End: 2023-05-06 | Stop reason: HOSPADM

## 2023-05-04 RX ORDER — HYDROMORPHONE HYDROCHLORIDE 1 MG/ML
0.5 INJECTION, SOLUTION INTRAMUSCULAR; INTRAVENOUS; SUBCUTANEOUS ONCE
Status: COMPLETED | OUTPATIENT
Start: 2023-05-04 | End: 2023-05-04

## 2023-05-04 RX ADMIN — HYDROMORPHONE HYDROCHLORIDE 0.5 MG: 1 INJECTION, SOLUTION INTRAMUSCULAR; INTRAVENOUS; SUBCUTANEOUS at 07:05

## 2023-05-04 RX ADMIN — ESCITALOPRAM OXALATE 20 MG: 10 TABLET ORAL at 09:05

## 2023-05-04 RX ADMIN — SODIUM CHLORIDE: 9 INJECTION, SOLUTION INTRAVENOUS at 04:05

## 2023-05-04 RX ADMIN — GABAPENTIN 800 MG: 400 CAPSULE ORAL at 08:05

## 2023-05-04 RX ADMIN — TOPIRAMATE 100 MG: 100 TABLET, FILM COATED ORAL at 09:05

## 2023-05-04 RX ADMIN — GABAPENTIN 800 MG: 400 CAPSULE ORAL at 09:05

## 2023-05-04 RX ADMIN — ENOXAPARIN SODIUM 40 MG: 40 INJECTION SUBCUTANEOUS at 12:05

## 2023-05-04 RX ADMIN — SODIUM CHLORIDE: 9 INJECTION, SOLUTION INTRAVENOUS at 08:05

## 2023-05-04 RX ADMIN — LOSARTAN POTASSIUM 100 MG: 25 TABLET, FILM COATED ORAL at 09:05

## 2023-05-04 RX ADMIN — TOPIRAMATE 100 MG: 100 TABLET, FILM COATED ORAL at 08:05

## 2023-05-04 RX ADMIN — TRAZODONE HYDROCHLORIDE 100 MG: 50 TABLET ORAL at 08:05

## 2023-05-04 RX ADMIN — ATORVASTATIN CALCIUM 80 MG: 40 TABLET, FILM COATED ORAL at 09:05

## 2023-05-04 RX ADMIN — TOPIRAMATE 100 MG: 100 TABLET, FILM COATED ORAL at 12:05

## 2023-05-04 RX ADMIN — ATENOLOL 100 MG: 50 TABLET ORAL at 09:05

## 2023-05-04 RX ADMIN — CEFTRIAXONE 2 G: 2 INJECTION, SOLUTION INTRAVENOUS at 04:05

## 2023-05-04 RX ADMIN — METOROPROLOL TARTRATE 5 MG: 5 INJECTION, SOLUTION INTRAVENOUS at 12:05

## 2023-05-04 RX ADMIN — TRAZODONE HYDROCHLORIDE 100 MG: 50 TABLET ORAL at 12:05

## 2023-05-04 RX ADMIN — ENOXAPARIN SODIUM 40 MG: 40 INJECTION SUBCUTANEOUS at 04:05

## 2023-05-04 RX ADMIN — LOSARTAN POTASSIUM 100 MG: 25 TABLET, FILM COATED ORAL at 12:05

## 2023-05-04 NOTE — H&P
[facility] Medicine  History & Physical    Patient Name: Faby Luna  MRN: [id number]  Patient Class: OP- Observation  Admission Date: 5/3/2023  Attending Physician: Vaibhav Quijano III, MD   Primary Care Provider: Renae Sprague DO         Patient information was obtained from patient, past medical records and ER records.     Subjective:     Principal Problem:SBO (small bowel obstruction)    Chief Complaint:   Chief Complaint   Patient presents with    Abdominal Pain     Patient is 73yo female that is having general abdominal pain that started last night. Reports mild nausea but no vomiting, diarrhea, or constipation. Normal bowel movements and urination. No other symptoms reported. Bp elevated, did not take meds today        HPI: 72 y.o. female with chronic pain syndrome, adjustment reaction with anxiety and depression, DM2, HTN, GERD, lumbar radiculopathy presents with a complaint of abdominal pain.  Acute onset, duration 1 day, location generalized throughout the abdomen, does not radiate, no known exacerbating or alleviating factors.  Denies fever, chills, cough, SOB, chest pain, dizziness, syncope, n/v/d. In the ED, vitals and labs reassuring, CT shows possible SBO.  NGT was placed.  Placed in observation.      Past Medical History:   Diagnosis Date    Anxiety with depression     Arthritis     CKD (chronic kidney disease) stage 2, GFR 60-89 ml/min     Diabetes mellitus     History of Clarisse-en-Y gastric bypass     Hypertension     Obesity, unspecified     DAHLIA (obstructive sleep apnea)     Spondylosis        Past Surgical History:   Procedure Laterality Date    CARPAL TUNNEL RELEASE Right 3/8/2019    Procedure: RELEASE, CARPAL TUNNEL right;  Surgeon: Pan Goodman MD;  Location: [facility];  Service: Orthopedics;  Laterality: Right;    CARPAL TUNNEL RELEASE Left 7/18/2019    Procedure: RELEASE, CARPAL TUNNEL- LEFT;  Surgeon: Pan Goodman MD;  Location: [facility]  2ND FLR;  Service: Orthopedics;  Laterality: Left;    CATARACT EXTRACTION Bilateral      SECTION      CHOLECYSTECTOMY      EPIDURAL STEROID INJECTION INTO LUMBAR SPINE N/A 2018    Procedure: INJECTION, STEROID, SPINE, LUMBAR, EPIDURAL;  Surgeon: Shaq Silverio MD;  Location: [facility];  Service: Pain Management;  Laterality: N/A;  LUMBAR L4-L5 INTERLAMINAR ELISE'  [id number]  W/ SEDATION     HYSTERECTOMY      INJECTION OF ANESTHETIC AGENT AROUND NERVE Bilateral 2019    Procedure: BLOCK, NERVE, L3-L4-L5 MEDIAL BRANCH;  Surgeon: Shaq Silverio MD;  Location: [facility];  Service: Pain Management;  Laterality: Bilateral;    INJECTION OF ANESTHETIC AGENT AROUND NERVE Bilateral 10/17/2019    Procedure: BLOCK, NERVE;  Surgeon: Shaq Silverio MD;  Location: [facility];  Service: Pain Management;  Laterality: Bilateral;  B/L MBB L3-L4-L5  REPEAT  CONSENT NEEDED    INJECTION OF FACET JOINT Bilateral 2018    Procedure: INJECTION-FACET;  Surgeon: Shaq Silverio MD;  Location: [facility];  Service: Pain Management;  Laterality: Bilateral;  LUMBAR BILATERAL L4-L5 AND L5-S1 FACET STEROID INJECTION  [id number]    W/ SEDATION     RADIOFREQUENCY ABLATION Right 2019    Procedure: RADIOFREQUENCY ABLATION RIGHT L3, L4, L5;  Surgeon: Shaq Silverio MD;  Location: [facility];  Service: Pain Management;  Laterality: Right;  Right RFA L3-L4-L5  1 of 2  Consent Needed    RADIOFREQUENCY ABLATION Left 2019    Procedure: RADIOFREQUENCY ABLATION LEFT L3-5;  Surgeon: Shaq Silverio MD;  Location: [facility];  Service: Pain Management;  Laterality: Left;  Left RFA L3-L4-L5  2 of 2  Consent Needed    RADIOFREQUENCY ABLATION Left 2020    Procedure: RADIOFREQUENCY ABLATION LEFT L3,4,5 1 of 2;  Surgeon: Shaq Silverio MD;  Location: [facility];  Service: Pain Management;  Laterality: Left;  Left RFA L3,4,5  1 of 2    RADIOFREQUENCY ABLATION Right 2020    Procedure: RADIOFREQUENCY ABLATION  RIGHT L3,4,5 2 of 2;  Surgeon: Shaq Silverio MD;  Location: [facility];  Service: Pain Management;  Laterality: Right;  RADIOFREQUENCY ABLATION RIGHT L3,4,5  2 of 2    RADIOFREQUENCY ABLATION Left 9/9/2021    Procedure: RADIOFREQUENCY ABLATION, L3-L4 AND L5 MEDIAL BRANCH 1 OF 2;  Surgeon: Shaq Silverio MD;  Location: [facility];  Service: Pain Management;  Laterality: Left;    RADIOFREQUENCY ABLATION Right 9/20/2021    Procedure: RADIOFREQUENCY ABLATION, L3-L4 AND L5 MEDIAL BRANCH 2 OF 2;  Surgeon: Shaq Silverio MD;  Location: [facility];  Service: Pain Management;  Laterality: Right;    RADIOFREQUENCY ABLATION Right 7/7/2022    Procedure: RADIOFREQUENCY ABLATION, RIGHT L3-L4-L5 ONE OF TWO;  Surgeon: Shaq Silverio MD;  Location: [facility];  Service: Pain Management;  Laterality: Right;    RADIOFREQUENCY ABLATION Left 7/21/2022    Procedure: RADIOFREQUENCY ABLATION, LEFT L3-L4-L5 TWO OF TWO *BRING SCS REMOTE*;  Surgeon: Shaq Silverio MD;  Location: [facility];  Service: Pain Management;  Laterality: Left;    RADIOFREQUENCY ABLATION Left 3/16/2023    Procedure: RADIOFREQUENCY ABLATION LEFT L3,L4,L5 *BRING SCS REMOTE*;  Surgeon: Shaq Silverio MD;  Location: [facility];  Service: Pain Management;  Laterality: Left;    RADIOFREQUENCY ABLATION Right 3/30/2023    Procedure: RADIOFREQUENCY ABLATION RIGHT L3,L4,L5 *BRING SCS REMOTE*;  Surgeon: Shaq Silverio MD;  Location: [facility];  Service: Pain Management;  Laterality: Right;    TRIAL OF SPINAL CORD NERVE STIMULATOR N/A 9/24/2018    Procedure: TRIAL, NEUROSTIMULATOR, SPINAL CORD, SPINAL CORD STIMULATOR TRIAL-INTERNAL WIRES TO EXTERNAL BATTERY;  Surgeon: Shaq Silverio MD;  Location: [facility];  Service: Pain Management;  Laterality: N/A;  [facility] NOTIFIED       Review of patient's allergies indicates:  No Known Allergies    No current facility-administered medications on file prior to encounter.     Current Outpatient Medications on File Prior  to Encounter   Medication Sig    acetaminophen (TYLENOL) 500 MG tablet Take 1 tablet (500 mg total) by mouth every 6 (six) hours as needed for Temperature greater than or Pain.    albuterol (PROAIR HFA) 90 mcg/actuation inhaler Inhale 2 puffs into the lungs every 4 (four) hours as needed for Wheezing or Shortness of Breath. Rescue    albuterol (PROVENTIL/VENTOLIN HFA) 90 mcg/actuation inhaler Inhale 1-2 puffs into the lungs every 6 (six) hours as needed for Wheezing or Shortness of Breath. Rescue    albuterol sulfate 2.5 mg/0.5 mL Nebu Take 2.5 mg by nebulization every 4 (four) hours as needed (SOB/wheezing). Rescue    atenoloL (TENORMIN) 100 MG tablet TAKE 1 TABLET EVERY DAY    benzonatate (TESSALON) 200 MG capsule Take 1 capsule (200 mg total) by mouth 3 (three) times daily as needed for Cough.    budesonide-formoterol 160-4.5 mcg (SYMBICORT) 160-4.5 mcg/actuation HFAA Inhale 2 puffs into the lungs every 12 (twelve) hours. Controller (brand only) (stop breo)    EScitalopram oxalate (LEXAPRO) 20 MG tablet TAKE 1 TABLET EVERY DAY    fluticasone propionate (FLONASE) 50 mcg/actuation nasal spray 2 sprays (100 mcg total) by Each Nostril route once daily.    fluticasone propionate (FLONASE) 50 mcg/actuation nasal spray 1 spray (50 mcg total) by Each Nostril route 2 (two) times daily as needed for Rhinitis or Allergies.    furosemide (LASIX) 40 MG tablet Take 1 tablet (40 mg total) by mouth once daily. (Patient taking differently: Take 20 mg by mouth once daily.)    gabapentin (NEURONTIN) 800 MG tablet Take 1 tablet (800 mg total) by mouth 3 (three) times daily.    glipiZIDE (GLUCOTROL) 10 MG tablet TAKE 1 TABLET EVERY DAY WITH BREAKFAST.    guaiFENesin 100 mg/5 ml (ROBITUSSIN) 100 mg/5 mL syrup Take 5-10 mLs (100-200 mg total) by mouth every 4 (four) hours as needed for Cough or Congestion.    hydrOXYzine pamoate (VISTARIL) 25 MG Cap TAKE 1 CAPSULE BY MOUTH ONCE DAILY AS NEEDED FOR ANXIETY    ketorolac  (TORADOL) 10 mg tablet Take 1 tablet (10 mg total) by mouth every 6 (six) hours as needed for Pain. Take with food to prevent heartburn.    linaGLIPtin (TRADJENTA) 5 mg Tab tablet Take 1 tablet (5 mg total) by mouth once daily.    loratadine (CLARITIN) 10 mg tablet TAKE 1 TABLET EVERY DAY AS NEEDED FOR ALLERGIES    losartan (COZAAR) 100 MG tablet Take 1 tablet (100 mg total) by mouth once daily.    ondansetron (ZOFRAN-ODT) 4 MG TbDL Take 1 tablet (4 mg total) by mouth every 6 (six) hours as needed (Nausea).    pantoprazole (PROTONIX) 20 MG tablet TAKE 1 TABLET TWICE DAILY  BEFORE  MEALS    promethazine-dextromethorphan (PROMETHAZINE-DM) 6.25-15 mg/5 mL Syrp Take 5 mLs by mouth every 8 (eight) hours as needed (cough).    rosuvastatin (CRESTOR) 20 MG tablet Take 1 tablet (20 mg total) by mouth once daily.    tiZANidine (ZANAFLEX) 4 MG tablet TAKE 1 TABLET BY MOUTH EVERY 8 HOURS AS NEEDED FOR  MUSCLE  PAIN    topiramate (TOPAMAX) 100 MG tablet Take 1 tablet (100 mg total) by mouth 2 (two) times daily.    traZODone (DESYREL) 100 MG tablet Take 1 tablet (100 mg total) by mouth every evening.    [DISCONTINUED] blood-glucose meter kit Use as instructed     Family History       Problem Relation (Age of Onset)    Cataracts Mother    Glaucoma Mother    No Known Problems Father, Sister, Brother, Maternal Aunt, Maternal Uncle, Paternal Aunt, Paternal Uncle, Maternal Grandmother, Maternal Grandfather, Paternal Grandmother, Paternal Grandfather          Tobacco Use    Smoking status: Never    Smokeless tobacco: Never   Substance and Sexual Activity    Alcohol use: Yes     Comment: occ    Drug use: No    Sexual activity: Not on file     Review of Systems   Constitutional:  Negative for chills, fatigue and fever.   HENT:  Negative for congestion and rhinorrhea.    Eyes:  Negative for photophobia and visual disturbance.   Respiratory:  Negative for cough and shortness of breath.    Cardiovascular:  Negative for chest  pain, palpitations and leg swelling.   Gastrointestinal:  Positive for abdominal pain. Negative for diarrhea, nausea and vomiting.   Genitourinary:  Negative for dysuria, frequency and urgency.   Skin:  Negative for pallor, rash and wound.   Neurological:  Negative for light-headedness and headaches.   Psychiatric/Behavioral:  Negative for confusion and decreased concentration.    Objective:     Vital Signs (Most Recent):  Temp: 97.6 °F (36.4 °C) (05/03/23 1706)  Pulse: 70 (05/03/23 1706)  Resp: 15 (05/03/23 2142)  BP: (!) 203/94 (05/03/23 2202)  SpO2: 100 % (05/03/23 2142)   Vital Signs (24h Range):  Temp:  [97.6 °F (36.4 °C)] 97.6 °F (36.4 °C)  Pulse:  [70] 70  Resp:  [15-19] 15  SpO2:  [97 %-100 %] 100 %  BP: (190-230)/() 203/94     Weight: 90.3 kg (199 lb)  Body mass index is 34.16 kg/m².    Physical Exam  Vitals and nursing note reviewed.   Constitutional:       General: She is not in acute distress.     Appearance: She is well-developed.   HENT:      Head: Normocephalic and atraumatic.      Right Ear: External ear normal.      Left Ear: External ear normal.      Nose: Nose normal.   Eyes:      Conjunctiva/sclera: Conjunctivae normal.   Cardiovascular:      Rate and Rhythm: Normal rate and regular rhythm.   Pulmonary:      Effort: Pulmonary effort is normal. No respiratory distress.   Abdominal:      General: Bowel sounds are normal. There is distension.      Palpations: Abdomen is soft.      Tenderness: There is abdominal tenderness.   Musculoskeletal:         General: Normal range of motion.   Skin:     General: Skin is warm and dry.   Neurological:      Mental Status: She is alert and oriented to person, place, and time.   Psychiatric:         Thought Content: Thought content normal.           Significant Labs: All pertinent labs within the past 24 hours have been reviewed.    Significant Imaging: I have reviewed all pertinent imaging results/findings within the past 24 hours.    Assessment/Plan:     *  SBO (small bowel obstruction)  Labs and vital reassuring, continue NGT, NPO, as needed analgesics    Moderate persistent asthma  PRN nebs    Chronic renal failure, stage 3a  Stable, at baseline, maintain euvolemic state, strict I/O's, monitor renal function and electrolytes, avoid nephrotoxic agents.     Major depressive disorder, recurrent episode, in partial remission  Patient has recurrent depression which is moderate and is currently controlled. Will Continue anti-depressant medications. We will not consult psychiatry at this time. Patient does not display psychosis at this time. Continue to monitor closely and adjust plan of care as needed.    Essential hypertension  Poorly controlled, continue home medications and monitor blood pressure, adjust as needed.     Type 2 diabetes mellitus with stage 3a chronic kidney disease, without long-term current use of insulin  Patient's FSGs are controlled on current medication regimen.  Last A1c reviewed-   Lab Results   Component Value Date    HGBA1C 6.1 (H) 02/06/2023     Most recent fingerstick glucose reviewed- No results for input(s): POCTGLUCOSE in the last 24 hours.  Current correctional scale  Medium  Maintain anti-hyperglycemic dose as follows-   Antihyperglycemics (From admission, onward)    Start     Stop Route Frequency Ordered    05/03/23 2324  insulin aspart U-100 pen 1-10 Units         -- SubQ Every 6 hours PRN 05/03/23 2224        Hold Oral hypoglycemics while patient is in the hospital.      VTE Risk Mitigation (From admission, onward)         Ordered     enoxaparin injection 40 mg  Daily         05/03/23 2212     IP VTE HIGH RISK PATIENT  Once         05/03/23 2212     Place sequential compression device  Until discontinued         05/03/23 2212                     On 05/03/2023, patient should be placed in hospital observation services under my care in collaboration with Vaibhav Quijano MD.    Jarrett Maldonado Jr., APRN, [facility]  Hospitalist - Department  of Hospital Medicine [facility] [address] [place] [address]  Office #: [phone]; Pager #: [phone]

## 2023-05-04 NOTE — PLAN OF CARE
Case Management Assessment     PCP: Renae Sprague DO    Pharmacy:   [facility] [address] [place] [address] [address] [place] [address]  Phone: [phone] Fax: [phone]    [facility] - [facility] [address]  [address] [address]  Phone: [phone] Fax: [phone]        Patient Arrived From: home  Existing Help at Home: family    Barriers to Discharge: none  Discharge Plan:    A. Home with family with instructions to follow up            05/04/23 1012   Discharge Planning   Assessment Type Discharge Planning Brief Assessment   Resource/Environmental Concerns none   Support Systems Children   Equipment Currently Used at Home none   Current Living Arrangements home   Patient/Family Anticipates Transition to home   Patient/Family Anticipated Services at Transition none   DME Needed Upon Discharge  none   Discharge Plan A Home with family  (with instructions to follow up)       [facility] - [place], [place] - [address] [place] [address]  Phone: [phone] Fax: [phone]    [facility] Mail [facility] - [facility] [address]  [address] [address]  Phone: [phone] Fax: [phone]

## 2023-05-04 NOTE — HPI
72 y.o. female with chronic pain syndrome, adjustment reaction with anxiety and depression, DM2, HTN, GERD, lumbar radiculopathy presents with a complaint of abdominal pain.  Acute onset, duration 1 day, location generalized throughout the abdomen, does not radiate, no known exacerbating or alleviating factors.  Denies fever, chills, cough, SOB, chest pain, dizziness, syncope, n/v/d. In the ED, vitals and labs reassuring, CT shows possible SBO.  NGT was placed.  Placed in observation.

## 2023-05-04 NOTE — ASSESSMENT & PLAN NOTE
Patient's FSGs are controlled on current medication regimen.  Last A1c reviewed-   Lab Results   Component Value Date    HGBA1C 6.1 (H) 02/06/2023     Most recent fingerstick glucose reviewed- No results for input(s): POCTGLUCOSE in the last 24 hours.  Current correctional scale  Medium  Maintain anti-hyperglycemic dose as follows-   Antihyperglycemics (From admission, onward)    Start     Stop Route Frequency Ordered    05/03/23 2324  insulin aspart U-100 pen 1-10 Units         -- SubQ Every 6 hours PRN 05/03/23 2224        Hold Oral hypoglycemics while patient is in the hospital.

## 2023-05-04 NOTE — CONSULTS
[facility] - Emergency Dept  General Surgery  Consult Note    Inpatient consult to General Surgery  Consult performed by: Heather Dunn MD  Consult ordered by: Jarrett Maldonado Jr., NP  Reason for consult: pSBO  Assessment/Recommendations: 71yo F w/PMH DM (not on insulin), chronic back pain, HTN, asthma, RNY gastric bypass (2006) who presents to the ED with two days of abdominal pain    - patient with questionable partial small bowel obstruction, still passing gas; fecalization of small bowel suggests chronic partial obstruction, recommend continued NGT decompression  - will follow NGT output, likely gastrografin challenge tomorrow   - serial abdominal exams  - recommend strict NPO, strict Is and Os  - would avoid narcotic pain meds as this can worsen ileus picture, recommend keeping Mg >2   - patient's symptoms may be related to marginal ulcer, not on PPI or H2 blocker, would recommend GI consult after resolution of this partial obstructive episode to eval for marginal ulcer via EGD  - general surgery will continue to follow, please call with questions or changes to patient's clinical exam      Subjective:     Chief Complaint/Reason for Admission: abdominal pain    History of Present Illness: Ms. Luna is a 71yo F w/PMH DM (not on insulin), chronic back pain, HTN, asthma, RNY gastric bypass (2006) who presents to the ED with two days of abdominal pain. Patient states that Tuesday evening she developed sharp/aching abdominal pain that wrapped around her middle abdomen. The pain is constant without radiation. Nothing makes it better or worse. When the pain did not improve, she presented to the ED for evaluation. Two weeks ago she had a similar episode but this resolved spontaneously after a few hours. She denies fevers, chills, nausea, vomiting, changes to bowel or bladder habits. She is passing gas, last BM was Tuesday and this was normal for her. This morning she does endorse new onset mid-sternal chest pain.    No  history of MI or stroke, not on anticoagulation.    PSH: RNYGB (2006, Dr. Santos?), hysterectomy, lap cholecystectomy, back surgery (stimulator)  Social: never smoker, denies EtOH and drug use    No current facility-administered medications on file prior to encounter.     Current Outpatient Medications on File Prior to Encounter   Medication Sig    acetaminophen (TYLENOL) 500 MG tablet Take 1 tablet (500 mg total) by mouth every 6 (six) hours as needed for Temperature greater than or Pain.    albuterol (PROAIR HFA) 90 mcg/actuation inhaler Inhale 2 puffs into the lungs every 4 (four) hours as needed for Wheezing or Shortness of Breath. Rescue    albuterol (PROVENTIL/VENTOLIN HFA) 90 mcg/actuation inhaler Inhale 1-2 puffs into the lungs every 6 (six) hours as needed for Wheezing or Shortness of Breath. Rescue    albuterol sulfate 2.5 mg/0.5 mL Nebu Take 2.5 mg by nebulization every 4 (four) hours as needed (SOB/wheezing). Rescue    atenoloL (TENORMIN) 100 MG tablet TAKE 1 TABLET EVERY DAY    benzonatate (TESSALON) 200 MG capsule Take 1 capsule (200 mg total) by mouth 3 (three) times daily as needed for Cough.    budesonide-formoterol 160-4.5 mcg (SYMBICORT) 160-4.5 mcg/actuation HFAA Inhale 2 puffs into the lungs every 12 (twelve) hours. Controller (brand only) (stop breo)    EScitalopram oxalate (LEXAPRO) 20 MG tablet TAKE 1 TABLET EVERY DAY    fluticasone propionate (FLONASE) 50 mcg/actuation nasal spray 2 sprays (100 mcg total) by Each Nostril route once daily.    fluticasone propionate (FLONASE) 50 mcg/actuation nasal spray 1 spray (50 mcg total) by Each Nostril route 2 (two) times daily as needed for Rhinitis or Allergies.    furosemide (LASIX) 40 MG tablet Take 1 tablet (40 mg total) by mouth once daily. (Patient taking differently: Take 20 mg by mouth once daily.)    gabapentin (NEURONTIN) 800 MG tablet Take 1 tablet (800 mg total) by mouth 3 (three) times daily.    glipiZIDE (GLUCOTROL) 10 MG tablet TAKE 1  TABLET EVERY DAY WITH BREAKFAST.    guaiFENesin 100 mg/5 ml (ROBITUSSIN) 100 mg/5 mL syrup Take 5-10 mLs (100-200 mg total) by mouth every 4 (four) hours as needed for Cough or Congestion.    hydrOXYzine pamoate (VISTARIL) 25 MG Cap TAKE 1 CAPSULE BY MOUTH ONCE DAILY AS NEEDED FOR ANXIETY    ketorolac (TORADOL) 10 mg tablet Take 1 tablet (10 mg total) by mouth every 6 (six) hours as needed for Pain. Take with food to prevent heartburn.    linaGLIPtin (TRADJENTA) 5 mg Tab tablet Take 1 tablet (5 mg total) by mouth once daily.    loratadine (CLARITIN) 10 mg tablet TAKE 1 TABLET EVERY DAY AS NEEDED FOR ALLERGIES    losartan (COZAAR) 100 MG tablet Take 1 tablet (100 mg total) by mouth once daily.    ondansetron (ZOFRAN-ODT) 4 MG TbDL Take 1 tablet (4 mg total) by mouth every 6 (six) hours as needed (Nausea).    pantoprazole (PROTONIX) 20 MG tablet TAKE 1 TABLET TWICE DAILY  BEFORE  MEALS    promethazine-dextromethorphan (PROMETHAZINE-DM) 6.25-15 mg/5 mL Syrp Take 5 mLs by mouth every 8 (eight) hours as needed (cough).    rosuvastatin (CRESTOR) 20 MG tablet Take 1 tablet (20 mg total) by mouth once daily.    tiZANidine (ZANAFLEX) 4 MG tablet TAKE 1 TABLET BY MOUTH EVERY 8 HOURS AS NEEDED FOR  MUSCLE  PAIN    topiramate (TOPAMAX) 100 MG tablet Take 1 tablet (100 mg total) by mouth 2 (two) times daily.    traZODone (DESYREL) 100 MG tablet Take 1 tablet (100 mg total) by mouth every evening.       Review of patient's allergies indicates:  No Known Allergies    Past Medical History:   Diagnosis Date    Anxiety with depression     Arthritis     CKD (chronic kidney disease) stage 2, GFR 60-89 ml/min     Diabetes mellitus     History of Clarisse-en-Y gastric bypass     Hypertension     Obesity, unspecified     DAHLIA (obstructive sleep apnea)     Spondylosis      Past Surgical History:   Procedure Laterality Date    CARPAL TUNNEL RELEASE Right 3/8/2019    Procedure: RELEASE, CARPAL TUNNEL right;  Surgeon: Pan Goodman MD;   Location: [facility] OR;  Service: Orthopedics;  Laterality: Right;    CARPAL TUNNEL RELEASE Left 2019    Procedure: RELEASE, CARPAL TUNNEL- LEFT;  Surgeon: Pan Goodman MD;  Location: [facility] OR 2ND FLR;  Service: Orthopedics;  Laterality: Left;    CATARACT EXTRACTION Bilateral      SECTION      CHOLECYSTECTOMY      EPIDURAL STEROID INJECTION INTO LUMBAR SPINE N/A 2018    Procedure: INJECTION, STEROID, SPINE, LUMBAR, EPIDURAL;  Surgeon: Shaq Silverio MD;  Location: [facility] PAIN MGT;  Service: Pain Management;  Laterality: N/A;  LUMBAR L4-L5 INTERLAMINAR ELISE'  02511  W/ SEDATION     HYSTERECTOMY      INJECTION OF ANESTHETIC AGENT AROUND NERVE Bilateral 2019    Procedure: BLOCK, NERVE, L3-L4-L5 MEDIAL BRANCH;  Surgeon: Shaq Silverio MD;  Location: [facility] PAIN MGT;  Service: Pain Management;  Laterality: Bilateral;    INJECTION OF ANESTHETIC AGENT AROUND NERVE Bilateral 10/17/2019    Procedure: BLOCK, NERVE;  Surgeon: Shaq Silverio MD;  Location: [facility] PAIN MGT;  Service: Pain Management;  Laterality: Bilateral;  B/L MBB L3-L4-L5  REPEAT  CONSENT NEEDED    INJECTION OF FACET JOINT Bilateral 2018    Procedure: INJECTION-FACET;  Surgeon: Shaq Silverio MD;  Location: [facility] PAIN MGT;  Service: Pain Management;  Laterality: Bilateral;  LUMBAR BILATERAL L4-L5 AND L5-S1 FACET STEROID INJECTION  [id number]    W/ SEDATION     RADIOFREQUENCY ABLATION Right 2019    Procedure: RADIOFREQUENCY ABLATION RIGHT L3, L4, L5;  Surgeon: Shaq Silverio MD;  Location: [facility] PAIN MGT;  Service: Pain Management;  Laterality: Right;  Right RFA L3-L4-L5  1 of 2  Consent Needed    RADIOFREQUENCY ABLATION Left 2019    Procedure: RADIOFREQUENCY ABLATION LEFT L3-5;  Surgeon: Shaq Silverio MD;  Location: [facility] PAIN MGT;  Service: Pain Management;  Laterality: Left;  Left RFA L3-L4-L5  2 of 2  Consent Needed    RADIOFREQUENCY ABLATION Left 2020    Procedure: RADIOFREQUENCY ABLATION LEFT L3,4,5 1 of 2;  Surgeon: Shaq Silverio MD;   Location: [facility] PAIN MGT;  Service: Pain Management;  Laterality: Left;  Left RFA L3,4,5  1 of 2    RADIOFREQUENCY ABLATION Right 8/31/2020    Procedure: RADIOFREQUENCY ABLATION RIGHT L3,4,5 2 of 2;  Surgeon: Shaq Silverio MD;  Location: [facility] PAIN MGT;  Service: Pain Management;  Laterality: Right;  RADIOFREQUENCY ABLATION RIGHT L3,4,5  2 of 2    RADIOFREQUENCY ABLATION Left 9/9/2021    Procedure: RADIOFREQUENCY ABLATION, L3-L4 AND L5 MEDIAL BRANCH 1 OF 2;  Surgeon: Shaq Silverio MD;  Location: [facility] PAIN MGT;  Service: Pain Management;  Laterality: Left;    RADIOFREQUENCY ABLATION Right 9/20/2021    Procedure: RADIOFREQUENCY ABLATION, L3-L4 AND L5 MEDIAL BRANCH 2 OF 2;  Surgeon: Shaq Silverio MD;  Location: [facility] PAIN MGT;  Service: Pain Management;  Laterality: Right;    RADIOFREQUENCY ABLATION Right 7/7/2022    Procedure: RADIOFREQUENCY ABLATION, RIGHT L3-L4-L5 ONE OF TWO;  Surgeon: Shaq Silverio MD;  Location: [facility] PAIN MGT;  Service: Pain Management;  Laterality: Right;    RADIOFREQUENCY ABLATION Left 7/21/2022    Procedure: RADIOFREQUENCY ABLATION, LEFT L3-L4-L5 TWO OF TWO *BRING SCS REMOTE*;  Surgeon: Shaq Silverio MD;  Location: [facility] PAIN MGT;  Service: Pain Management;  Laterality: Left;    RADIOFREQUENCY ABLATION Left 3/16/2023    Procedure: RADIOFREQUENCY ABLATION LEFT L3,L4,L5 *BRING SCS REMOTE*;  Surgeon: Shaq Silverio MD;  Location: [facility] PAIN MGT;  Service: Pain Management;  Laterality: Left;    RADIOFREQUENCY ABLATION Right 3/30/2023    Procedure: RADIOFREQUENCY ABLATION RIGHT L3,L4,L5 *BRING SCS REMOTE*;  Surgeon: Shaq Silverio MD;  Location: [facility] PAIN MGT;  Service: Pain Management;  Laterality: Right;    TRIAL OF SPINAL CORD NERVE STIMULATOR N/A 9/24/2018    Procedure: TRIAL, NEUROSTIMULATOR, SPINAL CORD, SPINAL CORD STIMULATOR TRIAL-INTERNAL WIRES TO EXTERNAL BATTERY;  Surgeon: Shaq Silverio MD;  Location: [facility] PAIN MGT;  Service: Pain Management;  Laterality: N/A;  [facility] REP NOTIFIED     Family History        Problem Relation (Age of Onset)    Cataracts Mother    Glaucoma Mother    No Known Problems Father, Sister, Brother, Maternal Aunt, Maternal Uncle, Paternal Aunt, Paternal Uncle, Maternal Grandmother, Maternal Grandfather, Paternal Grandmother, Paternal Grandfather          Tobacco Use    Smoking status: Never    Smokeless tobacco: Never   Substance and Sexual Activity    Alcohol use: Yes     Comment: occ    Drug use: No    Sexual activity: Not on file     Review of Systems   Constitutional:  Negative for chills and fever.   Respiratory:  Positive for shortness of breath (intermittent). Negative for chest tightness.    Cardiovascular:  Positive for chest pain.   Gastrointestinal:  Positive for abdominal distention and abdominal pain. Negative for blood in stool, constipation, diarrhea, nausea and vomiting.   Genitourinary:  Negative for dysuria and hematuria.   Musculoskeletal:  Positive for back pain (chronic).   Skin:  Negative for color change.   Neurological:  Negative for dizziness and headaches.   Psychiatric/Behavioral:  Negative for agitation and confusion.    Objective:     Vital Signs (Most Recent):  Temp: 99.6 °F (37.6 °C) (05/04/23 0609)  Pulse: 70 (05/03/23 1706)  Resp: 18 (05/04/23 0726)  BP: (!) 157/67 (05/04/23 0609)  SpO2: 100 % (05/03/23 2142)   Vital Signs (24h Range):  Temp:  [97.6 °F (36.4 °C)-99.6 °F (37.6 °C)] 99.6 °F (37.6 °C)  Pulse:  [70] 70  Resp:  [15-20] 18  SpO2:  [97 %-100 %] 100 %  BP: (148-230)/() 157/67     Weight: 90.3 kg (199 lb)  Body mass index is 34.16 kg/m².      Intake/Output Summary (Last 24 hours) at 5/4/2023 0702  Last data filed at 5/4/2023 0662  Gross per 24 hour   Intake 1000 ml   Output 175 ml   Net 825 ml       Physical Exam  Constitutional:       Appearance: Normal appearance.   HENT:      Head: Normocephalic and atraumatic.   Cardiovascular:      Rate and Rhythm: Normal rate.   Pulmonary:      Effort: Pulmonary effort is normal. No respiratory distress.    Abdominal:      General: There is distension.      Palpations: Abdomen is soft.      Comments: Mildly tender to palpation throughout abdomen, worse in bilateral upper quadrants. Well healed surgical scars   Skin:     General: Skin is warm and dry.      Capillary Refill: Capillary refill takes less than 2 seconds.   Neurological:      General: No focal deficit present.      Mental Status: She is alert.       Significant Labs:  BMP:   Recent Labs   Lab 05/04/23  0333   *      K 3.7   *   CO2 20*   BUN 14   CREATININE 0.9   CALCIUM 8.4*     CBC:   Recent Labs   Lab 05/04/23  0333   WBC 13.28*   RBC 5.26   HGB 13.4   HCT 42.2      MCV 80*   MCH 25.5*   MCHC 31.8*       Significant Diagnostics:  CT: I have reviewed all pertinent results/findings within the past 24 hours. RNY gastric bypass, small bowel fecalization, focal dilated loop of small bowel with no appreciable transition point    Assessment/Plan:   71yo F w/PMH DM (not on insulin), chronic back pain, HTN, asthma, RNY gastric bypass (2006) who presents to the ED with two days of abdominal pain    - patient with questionable partial small bowel obstruction, still passing gas; fecalization of small bowel suggests chronic partial obstruction, recommend continued NGT decompression  - will follow NGT output, likely gastrografin challenge tomorrow   - serial abdominal exams  - recommend strict NPO, strict Is and Os  - would avoid narcotic pain meds as this can worsen ileus picture, recommend keeping Mg >2   - patient's symptoms may be related to marginal ulcer, not on PPI or H2 blocker, would recommend GI consult after resolution of this partial obstructive episode to eval for marginal ulcer via EGD  - general surgery will continue to follow, please call with questions or changes to patient's clinical exam      Thank you for your consult. I will follow-up with patient. Please contact us if you have any additional questions.    Heather GARCIA  MD [place]  General Surgery  [facility] - Emergency Dept

## 2023-05-04 NOTE — ED NOTES
[facility] [facility] [place] 4 [place]      5/4/2023       Skin assessed on: Q Shift      [x] No Pressure Injuries Present    []Prevention Measures Documented    [] Yes LDA  for Pressure Injury Previously documented     [] Yes New Pressure Injury Discovered   [] LDA for New Pressure Injury Added      Attending RN:  Lona Sprague RN     Second RN:  Marce Amador LPN

## 2023-05-04 NOTE — PHARMACY MED REC
"Admission Medication History     The home medication history was taken by Peggy Randolph.    You may go to "Admission" then "Reconcile Home Medications" tabs to review and/or act upon these items.     The home medication list has been updated by the Pharmacy department.   Please read ALL comments highlighted in yellow.   Please address this information as you see fit.    Feel free to contact us if you have any questions or require assistance.      Medications listed below were obtained from: Patient/family and Analytic software- [employer]  (Not in a hospital admission)          Peggy Randolph  [phone]                 .        "

## 2023-05-04 NOTE — PLAN OF CARE
Problem: Nausea and Vomiting (Intestinal Obstruction)  Goal: Nausea and Vomiting Relief  Intervention: Prevent and Manage Nausea and Vomiting  Flowsheets (Taken 5/4/2023 0337)  Aspiration Precautions: medication route adjusted  Nausea/Vomiting Interventions:   nausea triggers minimized   nasogastric tube to suction     Problem: Pain (Intestinal Obstruction)  Goal: Acceptable Pain Control  Intervention: Monitor and Manage Pain  Flowsheets (Taken 5/4/2023 0337)  Diversional Activities: television  Pain Management Interventions: pain management plan reviewed with patient/caregiver

## 2023-05-04 NOTE — SUBJECTIVE & OBJECTIVE
Past Medical History:   Diagnosis Date    Anxiety with depression     Arthritis     CKD (chronic kidney disease) stage 2, GFR 60-89 ml/min     Diabetes mellitus     History of Clarisse-en-Y gastric bypass     Hypertension     Obesity, unspecified     DAHLIA (obstructive sleep apnea)     Spondylosis        Past Surgical History:   Procedure Laterality Date    CARPAL TUNNEL RELEASE Right 3/8/2019    Procedure: RELEASE, CARPAL TUNNEL right;  Surgeon: Pan Goodman MD;  Location: [facility] OR;  Service: Orthopedics;  Laterality: Right;    CARPAL TUNNEL RELEASE Left 2019    Procedure: RELEASE, CARPAL TUNNEL- LEFT;  Surgeon: Pan Goodman MD;  [facility];  Service: Orthopedics;  Laterality: Left;    CATARACT EXTRACTION Bilateral      SECTION      CHOLECYSTECTOMY      EPIDURAL STEROID INJECTION INTO LUMBAR SPINE N/A 2018    Procedure: INJECTION, STEROID, SPINE, LUMBAR, EPIDURAL;  Surgeon: Shaq Silverio MD;  Location: [facility] PAIN MGT;  Service: Pain Management;  Laterality: N/A;  LUMBAR L4-L5 INTERLAMINAR ELISE'  [id number]  W/ SEDATION     HYSTERECTOMY      INJECTION OF ANESTHETIC AGENT AROUND NERVE Bilateral 2019    Procedure: BLOCK, NERVE, L3-L4-L5 MEDIAL BRANCH;  Surgeon: Shaq Silverio MD;  Location: [facility] PAIN MGT;  Service: Pain Management;  Laterality: Bilateral;    INJECTION OF ANESTHETIC AGENT AROUND NERVE Bilateral 10/17/2019    Procedure: BLOCK, NERVE;  Surgeon: Shaq Silverio MD;  Location: [facility] PAIN MGT;  Service: Pain Management;  Laterality: Bilateral;  B/L MBB L3-L4-L5  REPEAT  CONSENT NEEDED    INJECTION OF FACET JOINT Bilateral 2018    Procedure: INJECTION-FACET;  Surgeon: Shaq Silverio MD;  Location: [facility] PAIN MGT;  Service: Pain Management;  Laterality: Bilateral;  LUMBAR BILATERAL L4-L5 AND L5-S1 FACET STEROID INJECTION  [id number]    W/ SEDATION     RADIOFREQUENCY ABLATION Right 2019    Procedure: RADIOFREQUENCY ABLATION RIGHT L3, L4, L5;  Surgeon: Shaq Silverio MD;  Location:  [facility] PAIN MGT;  Service: Pain Management;  Laterality: Right;  Right RFA L3-L4-L5  1 of 2  Consent Needed    RADIOFREQUENCY ABLATION Left 12/5/2019    Procedure: RADIOFREQUENCY ABLATION LEFT L3-5;  Surgeon: Shaq Silverio MD;  Location: [facility] PAIN MGT;  Service: Pain Management;  Laterality: Left;  Left RFA L3-L4-L5  2 of 2  Consent Needed    RADIOFREQUENCY ABLATION Left 8/17/2020    Procedure: RADIOFREQUENCY ABLATION LEFT L3,4,5 1 of 2;  Surgeon: Shaq Silverio MD;  Location: [facility] PAIN MGT;  Service: Pain Management;  Laterality: Left;  Left RFA L3,4,5  1 of 2    RADIOFREQUENCY ABLATION Right 8/31/2020    Procedure: RADIOFREQUENCY ABLATION RIGHT L3,4,5 2 of 2;  Surgeon: Shaq Silverio MD;  Location: [facility] PAIN MGT;  Service: Pain Management;  Laterality: Right;  RADIOFREQUENCY ABLATION RIGHT L3,4,5  2 of 2    RADIOFREQUENCY ABLATION Left 9/9/2021    Procedure: RADIOFREQUENCY ABLATION, L3-L4 AND L5 MEDIAL BRANCH 1 OF 2;  Surgeon: Shaq Silverio MD;  Location: [facility] PAIN MGT;  Service: Pain Management;  Laterality: Left;    RADIOFREQUENCY ABLATION Right 9/20/2021    Procedure: RADIOFREQUENCY ABLATION, L3-L4 AND L5 MEDIAL BRANCH 2 OF 2;  Surgeon: Shaq Silverio MD;  Location: [facility] PAIN MGT;  Service: Pain Management;  Laterality: Right;    RADIOFREQUENCY ABLATION Right 7/7/2022    Procedure: RADIOFREQUENCY ABLATION, RIGHT L3-L4-L5 ONE OF TWO;  Surgeon: Shaq Silverio MD;  [facility];  Service: Pain Management;  Laterality: Right;    RADIOFREQUENCY ABLATION Left 7/21/2022    Procedure: RADIOFREQUENCY ABLATION, LEFT L3-L4-L5 TWO OF TWO *BRING SCS REMOTE*;  Surgeon: Shaq Silverio MD;  Location: [facility] PAIN MGT;  Service: Pain Management;  Laterality: Left;    RADIOFREQUENCY ABLATION Left 3/16/2023    Procedure: RADIOFREQUENCY ABLATION LEFT L3,L4,L5 *BRING SCS REMOTE*;  Surgeon: Shaq Silverio MD;  Location: [facility] PAIN MGT;  Service: Pain Management;  Laterality: Left;    RADIOFREQUENCY ABLATION Right 3/30/2023    Procedure:  RADIOFREQUENCY ABLATION RIGHT L3,L4,L5 *BRING SCS REMOTE*;  Surgeon: Shaq Silverio MD;  Location: [facility] PAIN MGT;  Service: Pain Management;  Laterality: Right;    TRIAL OF SPINAL CORD NERVE STIMULATOR N/A 9/24/2018    Procedure: TRIAL, NEUROSTIMULATOR, SPINAL CORD, SPINAL CORD STIMULATOR TRIAL-INTERNAL WIRES TO EXTERNAL BATTERY;  Surgeon: Shaq Silverio MD;  Location: [facility] PAIN MGT;  Service: Pain Management;  Laterality: N/A;  ABBOTT REP NOTIFIED       Review of patient's allergies indicates:  No Known Allergies    No current facility-administered medications on file prior to encounter.     Current Outpatient Medications on File Prior to Encounter   Medication Sig    acetaminophen (TYLENOL) 500 MG tablet Take 1 tablet (500 mg total) by mouth every 6 (six) hours as needed for Temperature greater than or Pain.    albuterol (PROAIR HFA) 90 mcg/actuation inhaler Inhale 2 puffs into the lungs every 4 (four) hours as needed for Wheezing or Shortness of Breath. Rescue    albuterol (PROVENTIL/VENTOLIN HFA) 90 mcg/actuation inhaler Inhale 1-2 puffs into the lungs every 6 (six) hours as needed for Wheezing or Shortness of Breath. Rescue    albuterol sulfate 2.5 mg/0.5 mL Nebu Take 2.5 mg by nebulization every 4 (four) hours as needed (SOB/wheezing). Rescue    atenoloL (TENORMIN) 100 MG tablet TAKE 1 TABLET EVERY DAY    benzonatate (TESSALON) 200 MG capsule Take 1 capsule (200 mg total) by mouth 3 (three) times daily as needed for Cough.    budesonide-formoterol 160-4.5 mcg (SYMBICORT) 160-4.5 mcg/actuation HFAA Inhale 2 puffs into the lungs every 12 (twelve) hours. Controller (brand only) (stop breo)    EScitalopram oxalate (LEXAPRO) 20 MG tablet TAKE 1 TABLET EVERY DAY    fluticasone propionate (FLONASE) 50 mcg/actuation nasal spray 2 sprays (100 mcg total) by Each Nostril route once daily.    fluticasone propionate (FLONASE) 50 mcg/actuation nasal spray 1 spray (50 mcg total) by Each Nostril route 2 (two) times daily as  needed for Rhinitis or Allergies.    furosemide (LASIX) 40 MG tablet Take 1 tablet (40 mg total) by mouth once daily. (Patient taking differently: Take 20 mg by mouth once daily.)    gabapentin (NEURONTIN) 800 MG tablet Take 1 tablet (800 mg total) by mouth 3 (three) times daily.    glipiZIDE (GLUCOTROL) 10 MG tablet TAKE 1 TABLET EVERY DAY WITH BREAKFAST.    guaiFENesin 100 mg/5 ml (ROBITUSSIN) 100 mg/5 mL syrup Take 5-10 mLs (100-200 mg total) by mouth every 4 (four) hours as needed for Cough or Congestion.    hydrOXYzine pamoate (VISTARIL) 25 MG Cap TAKE 1 CAPSULE BY MOUTH ONCE DAILY AS NEEDED FOR ANXIETY    ketorolac (TORADOL) 10 mg tablet Take 1 tablet (10 mg total) by mouth every 6 (six) hours as needed for Pain. Take with food to prevent heartburn.    linaGLIPtin (TRADJENTA) 5 mg Tab tablet Take 1 tablet (5 mg total) by mouth once daily.    loratadine (CLARITIN) 10 mg tablet TAKE 1 TABLET EVERY DAY AS NEEDED FOR ALLERGIES    losartan (COZAAR) 100 MG tablet Take 1 tablet (100 mg total) by mouth once daily.    ondansetron (ZOFRAN-ODT) 4 MG TbDL Take 1 tablet (4 mg total) by mouth every 6 (six) hours as needed (Nausea).    pantoprazole (PROTONIX) 20 MG tablet TAKE 1 TABLET TWICE DAILY  BEFORE  MEALS    promethazine-dextromethorphan (PROMETHAZINE-DM) 6.25-15 mg/5 mL Syrp Take 5 mLs by mouth every 8 (eight) hours as needed (cough).    rosuvastatin (CRESTOR) 20 MG tablet Take 1 tablet (20 mg total) by mouth once daily.    tiZANidine (ZANAFLEX) 4 MG tablet TAKE 1 TABLET BY MOUTH EVERY 8 HOURS AS NEEDED FOR  MUSCLE  PAIN    topiramate (TOPAMAX) 100 MG tablet Take 1 tablet (100 mg total) by mouth 2 (two) times daily.    traZODone (DESYREL) 100 MG tablet Take 1 tablet (100 mg total) by mouth every evening.    [DISCONTINUED] blood-glucose meter kit Use as instructed     Family History       Problem Relation (Age of Onset)    Cataracts Mother    Glaucoma Mother    No Known Problems Father, Sister, Brother, Maternal Aunt,  Maternal Uncle, Paternal Aunt, Paternal Uncle, Maternal Grandmother, Maternal Grandfather, Paternal Grandmother, Paternal Grandfather          Tobacco Use    Smoking status: Never    Smokeless tobacco: Never   Substance and Sexual Activity    Alcohol use: Yes     Comment: occ    Drug use: No    Sexual activity: Not on file     Review of Systems   Constitutional:  Negative for chills, fatigue and fever.   HENT:  Negative for congestion and rhinorrhea.    Eyes:  Negative for photophobia and visual disturbance.   Respiratory:  Negative for cough and shortness of breath.    Cardiovascular:  Negative for chest pain, palpitations and leg swelling.   Gastrointestinal:  Positive for abdominal pain. Negative for diarrhea, nausea and vomiting.   Genitourinary:  Negative for dysuria, frequency and urgency.   Skin:  Negative for pallor, rash and wound.   Neurological:  Negative for light-headedness and headaches.   Psychiatric/Behavioral:  Negative for confusion and decreased concentration.    Objective:     Vital Signs (Most Recent):  Temp: 97.6 °F (36.4 °C) (05/03/23 1706)  Pulse: 70 (05/03/23 1706)  Resp: 15 (05/03/23 2142)  BP: (!) 203/94 (05/03/23 2202)  SpO2: 100 % (05/03/23 2142)   Vital Signs (24h Range):  Temp:  [97.6 °F (36.4 °C)] 97.6 °F (36.4 °C)  Pulse:  [70] 70  Resp:  [15-19] 15  SpO2:  [97 %-100 %] 100 %  BP: (190-230)/() 203/94     Weight: 90.3 kg (199 lb)  Body mass index is 34.16 kg/m².    Physical Exam  Vitals and nursing note reviewed.   Constitutional:       General: She is not in acute distress.     Appearance: She is well-developed.   HENT:      Head: Normocephalic and atraumatic.      Right Ear: External ear normal.      Left Ear: External ear normal.      Nose: Nose normal.   Eyes:      Conjunctiva/sclera: Conjunctivae normal.   Cardiovascular:      Rate and Rhythm: Normal rate and regular rhythm.   Pulmonary:      Effort: Pulmonary effort is normal. No respiratory distress.   Abdominal:       General: Bowel sounds are normal. There is distension.      Palpations: Abdomen is soft.      Tenderness: There is abdominal tenderness.   Musculoskeletal:         General: Normal range of motion.   Skin:     General: Skin is warm and dry.   Neurological:      Mental Status: She is alert and oriented to person, place, and time.   Psychiatric:         Thought Content: Thought content normal.           Significant Labs: All pertinent labs within the past 24 hours have been reviewed.    Significant Imaging: I have reviewed all pertinent imaging results/findings within the past 24 hours.

## 2023-05-04 NOTE — NURSING
Received report from nurse, pt found resting on stretcher nadn, POC explained and all questions addressed, safety measures in place, did assist w/ambulating to RR and pt w/steady gait, bp elevated sbp 200's, informed provider and orders given, bp did trend down so Metoprolol held r/t order parameters, will monitor

## 2023-05-04 NOTE — ASSESSMENT & PLAN NOTE
Patient has recurrent depression which is moderate and is currently controlled. Will Continue anti-depressant medications. We will not consult psychiatry at this time. Patient does not display psychosis at this time. Continue to monitor closely and adjust plan of care as needed.

## 2023-05-05 PROBLEM — K56.600 PARTIAL BOWEL OBSTRUCTION: Status: ACTIVE | Noted: 2023-05-05

## 2023-05-05 PROBLEM — K56.609 SBO (SMALL BOWEL OBSTRUCTION): Status: RESOLVED | Noted: 2023-05-03 | Resolved: 2023-05-05

## 2023-05-05 PROBLEM — R10.9 ABDOMINAL PAIN: Status: ACTIVE | Noted: 2023-05-05

## 2023-05-05 PROBLEM — K56.7 ILEUS: Status: ACTIVE | Noted: 2023-05-05

## 2023-05-05 LAB
ALBUMIN SERPL BCP-MCNC: 3.2 G/DL (ref 3.5–5.2)
ALP SERPL-CCNC: 80 U/L (ref 55–135)
ALT SERPL W/O P-5'-P-CCNC: 11 U/L (ref 10–44)
ANION GAP SERPL CALC-SCNC: 7 MMOL/L (ref 8–16)
AST SERPL-CCNC: 11 U/L (ref 10–40)
BASOPHILS # BLD AUTO: 0.05 K/UL (ref 0–0.2)
BASOPHILS NFR BLD: 0.4 % (ref 0–1.9)
BILIRUB SERPL-MCNC: 0.5 MG/DL (ref 0.1–1)
BUN SERPL-MCNC: 14 MG/DL (ref 8–23)
CALCIUM SERPL-MCNC: 8.5 MG/DL (ref 8.7–10.5)
CHLORIDE SERPL-SCNC: 114 MMOL/L (ref 95–110)
CO2 SERPL-SCNC: 20 MMOL/L (ref 23–29)
CREAT SERPL-MCNC: 1 MG/DL (ref 0.5–1.4)
DIFFERENTIAL METHOD: ABNORMAL
EOSINOPHIL # BLD AUTO: 0.4 K/UL (ref 0–0.5)
EOSINOPHIL NFR BLD: 3.7 % (ref 0–8)
ERYTHROCYTE [DISTWIDTH] IN BLOOD BY AUTOMATED COUNT: 16.8 % (ref 11.5–14.5)
EST. GFR  (NO RACE VARIABLE): 59 ML/MIN/1.73 M^2
GLUCOSE SERPL-MCNC: 117 MG/DL (ref 70–110)
HCT VFR BLD AUTO: 39.4 % (ref 37–48.5)
HGB BLD-MCNC: 12 G/DL (ref 12–16)
IMM GRANULOCYTES # BLD AUTO: 0.04 K/UL (ref 0–0.04)
IMM GRANULOCYTES NFR BLD AUTO: 0.4 % (ref 0–0.5)
LYMPHOCYTES # BLD AUTO: 3.2 K/UL (ref 1–4.8)
LYMPHOCYTES NFR BLD: 28.2 % (ref 18–48)
MCH RBC QN AUTO: 25 PG (ref 27–31)
MCHC RBC AUTO-ENTMCNC: 30.5 G/DL (ref 32–36)
MCV RBC AUTO: 82 FL (ref 82–98)
MONOCYTES # BLD AUTO: 0.6 K/UL (ref 0.3–1)
MONOCYTES NFR BLD: 5.1 % (ref 4–15)
NEUTROPHILS # BLD AUTO: 7 K/UL (ref 1.8–7.7)
NEUTROPHILS NFR BLD: 62.2 % (ref 38–73)
NRBC BLD-RTO: 0 /100 WBC
PLATELET # BLD AUTO: 284 K/UL (ref 150–450)
PMV BLD AUTO: 10 FL (ref 9.2–12.9)
POCT GLUCOSE: 116 MG/DL (ref 70–110)
POTASSIUM SERPL-SCNC: 3.8 MMOL/L (ref 3.5–5.1)
PROT SERPL-MCNC: 6.7 G/DL (ref 6–8.4)
RBC # BLD AUTO: 4.8 M/UL (ref 4–5.4)
SODIUM SERPL-SCNC: 141 MMOL/L (ref 136–145)
WBC # BLD AUTO: 11.26 K/UL (ref 3.9–12.7)

## 2023-05-05 PROCEDURE — 85025 COMPLETE CBC W/AUTO DIFF WBC: CPT | Performed by: HOSPITALIST

## 2023-05-05 PROCEDURE — 25000003 PHARM REV CODE 250: Performed by: STUDENT IN AN ORGANIZED HEALTH CARE EDUCATION/TRAINING PROGRAM

## 2023-05-05 PROCEDURE — 80053 COMPREHEN METABOLIC PANEL: CPT | Performed by: HOSPITALIST

## 2023-05-05 PROCEDURE — 96361 HYDRATE IV INFUSION ADD-ON: CPT

## 2023-05-05 PROCEDURE — 96366 THER/PROPH/DIAG IV INF ADDON: CPT

## 2023-05-05 PROCEDURE — 25500020 PHARM REV CODE 255: Performed by: STUDENT IN AN ORGANIZED HEALTH CARE EDUCATION/TRAINING PROGRAM

## 2023-05-05 PROCEDURE — 25000003 PHARM REV CODE 250

## 2023-05-05 PROCEDURE — 99233 PR SUBSEQUENT HOSPITAL CARE,LEVL III: ICD-10-PCS | Mod: ,,, | Performed by: SURGERY

## 2023-05-05 PROCEDURE — 63600175 PHARM REV CODE 636 W HCPCS: Performed by: HOSPITALIST

## 2023-05-05 PROCEDURE — 63600175 PHARM REV CODE 636 W HCPCS: Performed by: STUDENT IN AN ORGANIZED HEALTH CARE EDUCATION/TRAINING PROGRAM

## 2023-05-05 PROCEDURE — 25000003 PHARM REV CODE 250: Performed by: HOSPITALIST

## 2023-05-05 PROCEDURE — 36415 COLL VENOUS BLD VENIPUNCTURE: CPT | Performed by: HOSPITALIST

## 2023-05-05 PROCEDURE — 11000001 HC ACUTE MED/SURG PRIVATE ROOM

## 2023-05-05 PROCEDURE — 99233 SBSQ HOSP IP/OBS HIGH 50: CPT | Mod: ,,, | Performed by: SURGERY

## 2023-05-05 RX ADMIN — TRAZODONE HYDROCHLORIDE 100 MG: 50 TABLET ORAL at 09:05

## 2023-05-05 RX ADMIN — ENOXAPARIN SODIUM 40 MG: 40 INJECTION SUBCUTANEOUS at 04:05

## 2023-05-05 RX ADMIN — DIATRIZOATE MEGLUMINE AND DIATRIZOATE SODIUM 240 ML: 660; 100 LIQUID ORAL; RECTAL at 10:05

## 2023-05-05 RX ADMIN — GABAPENTIN 800 MG: 400 CAPSULE ORAL at 09:05

## 2023-05-05 RX ADMIN — TOPIRAMATE 100 MG: 100 TABLET, FILM COATED ORAL at 09:05

## 2023-05-05 RX ADMIN — LOSARTAN POTASSIUM 100 MG: 25 TABLET, FILM COATED ORAL at 09:05

## 2023-05-05 RX ADMIN — ESCITALOPRAM OXALATE 20 MG: 10 TABLET ORAL at 09:05

## 2023-05-05 RX ADMIN — ATENOLOL 100 MG: 50 TABLET ORAL at 09:05

## 2023-05-05 RX ADMIN — SODIUM CHLORIDE: 9 INJECTION, SOLUTION INTRAVENOUS at 10:05

## 2023-05-05 RX ADMIN — CEFTRIAXONE 2 G: 2 INJECTION, SOLUTION INTRAVENOUS at 03:05

## 2023-05-05 RX ADMIN — ACETAMINOPHEN 650 MG: 325 TABLET ORAL at 08:05

## 2023-05-05 RX ADMIN — ATORVASTATIN CALCIUM 80 MG: 40 TABLET, FILM COATED ORAL at 09:05

## 2023-05-05 RX ADMIN — ACETAMINOPHEN 650 MG: 325 TABLET ORAL at 09:05

## 2023-05-05 RX ADMIN — SODIUM CHLORIDE: 9 INJECTION, SOLUTION INTRAVENOUS at 11:05

## 2023-05-05 RX ADMIN — GABAPENTIN 800 MG: 400 CAPSULE ORAL at 03:05

## 2023-05-05 NOTE — ASSESSMENT & PLAN NOTE
· Partial bowel obstruction with distention and pain  · Occasionally will pass gas  · NGT to LIWS placed  · WBC# rising, will initiated abx, as she may have translocation  · GenSx consulted  · Gastrografin challenge 5/5/23

## 2023-05-05 NOTE — HOSPITAL COURSE
Patient presented with abdominal pain, nausea, vomiting.   because unable to keep down blood pressure meds.  Found to have ileus/partial obstruction.  NG tube to suction placed.  Patient accidentally removed her NG tube on transfer.  NG tube replaced and reimage, ileus still apparent on KUB.  NG tube placed back to suction.  General surgery consulted.  Patient will undergo Gastrografin challenge.  WBC count increasing and temp increased to 99.7F, may have translocation, will start on antibiotics.  IV fluids being given.    Passed Challenge, advancing diet. Ok to d/c if able to keep meds/food/water down. F/U GI for scope outpt.

## 2023-05-05 NOTE — PLAN OF CARE
Problem: Adult Inpatient Plan of Care  Goal: Plan of Care Review  Outcome: Ongoing, Progressing     Problem: Adult Inpatient Plan of Care  Goal: Absence of Hospital-Acquired Illness or Injury  Outcome: Ongoing, Progressing     Problem: Adult Inpatient Plan of Care  Goal: Optimal Comfort and Wellbeing  Outcome: Ongoing, Progressing     Problem: Fluid Deficit (Intestinal Obstruction)  Goal: Fluid Balance  Outcome: Ongoing, Progressing     Problem: Nausea and Vomiting (Intestinal Obstruction)  Goal: Nausea and Vomiting Relief  Outcome: Ongoing, Progressing     Problem: Pain (Intestinal Obstruction)  Goal: Acceptable Pain Control  Outcome: Ongoing, Progressing     Problem: Diabetes Comorbidity  Goal: Blood Glucose Level Within Targeted Range  Outcome: Ongoing, Progressing

## 2023-05-05 NOTE — ASSESSMENT & PLAN NOTE
Patient has Non- operative ileus which is obstructive in etiology which is worsening.    Will treat conservatively with bowel rest, IV fluids, serial abdominal exams and avoidance of GI paralytics such as narcotics or anti-spasmodics. Monitor patient closely.   · See partial bowel obstruction note

## 2023-05-05 NOTE — SUBJECTIVE & OBJECTIVE
Interval History:   NPO  +nauseas   NGT in place, repositioned  Ileus still present on retake image  +pain still persists    Review of Systems   Constitutional:  Positive for activity change and appetite change.   Respiratory:  Positive for shortness of breath.    Cardiovascular:  Negative for palpitations and leg swelling.   Gastrointestinal:  Positive for abdominal distention, abdominal pain, nausea and vomiting.     Objective:     Vital Signs (Most Recent):  Temp: 97.6 °F (36.4 °C) (05/05/23 1138)  Pulse: 76 (05/05/23 1138)  Resp: 17 (05/05/23 1138)  BP: 139/78 (05/05/23 1138)  SpO2: 95 % (05/05/23 1138) Vital Signs (24h Range):  Temp:  [97.2 °F (36.2 °C)-99.7 °F (37.6 °C)] 97.6 °F (36.4 °C)  Pulse:  [64-76] 76  Resp:  [17-18] 17  SpO2:  [93 %-97 %] 95 %  BP: (139-175)/(68-78) 139/78     Weight: 95.4 kg (210 lb 5.1 oz)  Body mass index is 36.1 kg/m².    Intake/Output Summary (Last 24 hours) at 5/5/2023 1358  Last data filed at 5/5/2023 0815  Gross per 24 hour   Intake 1044.13 ml   Output 250 ml   Net 794.13 ml         Physical Exam  Vitals and nursing note reviewed.   Constitutional:       General: She is in acute distress.      Appearance: She is well-developed. She is ill-appearing and toxic-appearing. She is not diaphoretic.   HENT:      Head: Normocephalic and atraumatic.   Eyes:      General: No scleral icterus.     Pupils: Pupils are equal, round, and reactive to light.   Neck:      Thyroid: No thyromegaly.   Cardiovascular:      Rate and Rhythm: Normal rate and regular rhythm.      Heart sounds: No murmur heard.  Pulmonary:      Effort: Pulmonary effort is normal.      Breath sounds: Normal breath sounds. No stridor. No wheezing or rales.   Abdominal:      General: There is distension.      Palpations: Abdomen is soft. There is no mass.      Tenderness: There is abdominal tenderness. There is guarding.   Musculoskeletal:         General: No swelling or deformity. Normal range of motion.      Cervical back:  Normal range of motion and neck supple.   Skin:     General: Skin is warm and dry.      Capillary Refill: Capillary refill takes less than 2 seconds.      Coloration: Skin is not jaundiced.      Findings: No bruising.   Neurological:      Mental Status: She is alert and oriented to person, place, and time. Mental status is at baseline.      Cranial Nerves: No cranial nerve deficit.   Psychiatric:         Mood and Affect: Mood normal.         Behavior: Behavior normal.               Recent Results (from the past 24 hour(s))   POCT glucose    Collection Time: 05/04/23  6:28 PM   Result Value Ref Range    POCT Glucose 149 (H) 70 - 110 mg/dL   POCT glucose    Collection Time: 05/05/23  4:22 AM   Result Value Ref Range    POCT Glucose 116 (H) 70 - 110 mg/dL   Comprehensive Metabolic Panel (CMP)    Collection Time: 05/05/23  5:51 AM   Result Value Ref Range    Sodium 141 136 - 145 mmol/L    Potassium 3.8 3.5 - 5.1 mmol/L    Chloride 114 (H) 95 - 110 mmol/L    CO2 20 (L) 23 - 29 mmol/L    Glucose 117 (H) 70 - 110 mg/dL    BUN 14 8 - 23 mg/dL    Creatinine 1.0 0.5 - 1.4 mg/dL    Calcium 8.5 (L) 8.7 - 10.5 mg/dL    Total Protein 6.7 6.0 - 8.4 g/dL    Albumin 3.2 (L) 3.5 - 5.2 g/dL    Total Bilirubin 0.5 0.1 - 1.0 mg/dL    Alkaline Phosphatase 80 55 - 135 U/L    AST 11 10 - 40 U/L    ALT 11 10 - 44 U/L    Anion Gap 7 (L) 8 - 16 mmol/L    eGFR 59 (A) >60 mL/min/1.73 m^2   CBC with Automated Differential    Collection Time: 05/05/23  5:51 AM   Result Value Ref Range    WBC 11.26 3.90 - 12.70 K/uL    RBC 4.80 4.00 - 5.40 M/uL    Hemoglobin 12.0 12.0 - 16.0 g/dL    Hematocrit 39.4 37.0 - 48.5 %    MCV 82 82 - 98 fL    MCH 25.0 (L) 27.0 - 31.0 pg    MCHC 30.5 (L) 32.0 - 36.0 g/dL    RDW 16.8 (H) 11.5 - 14.5 %    Platelets 284 150 - 450 K/uL    MPV 10.0 9.2 - 12.9 fL    Immature Granulocytes 0.4 0.0 - 0.5 %    Gran # (ANC) 7.0 1.8 - 7.7 K/uL    Immature Grans (Abs) 0.04 0.00 - 0.04 K/uL    Lymph # 3.2 1.0 - 4.8 K/uL    Mono # 0.6  0.3 - 1.0 K/uL    Eos # 0.4 0.0 - 0.5 K/uL    Baso # 0.05 0.00 - 0.20 K/uL    nRBC 0 0 /100 WBC    Gran % 62.2 38.0 - 73.0 %    Lymph % 28.2 18.0 - 48.0 %    Mono % 5.1 4.0 - 15.0 %    Eosinophil % 3.7 0.0 - 8.0 %    Basophil % 0.4 0.0 - 1.9 %    Differential Method Automated        Microbiology Results (last 7 days)       Procedure Component Value Units Date/Time    Blood culture [[id number]] Collected: 05/04/23 1201    Order Status: Completed Specimen: Blood Updated: 05/05/23 1303     Blood Culture, Routine No Growth to date      No Growth to date    Blood culture [[id number]] Collected: 05/04/23 1150    Order Status: Completed Specimen: Blood Updated: 05/05/23 1303     Blood Culture, Routine No Growth to date      No Growth to date             Imaging Results              XR NG/OG tube placement check, non-radiologist performed (Edited Result - FINAL)  Result time 05/04/23 13:04:29   Procedure changed from X-Ray Chest 1 View     Addendum (preliminary) 1 of 1 by Terrell Malik III, MD (05/04/23 13:04:29)      NG tube-fundus of the stomach.      Electronically signed by: Terrell Malik MD  Date:    05/04/2023  Time:    13:04                   Final result by Terrell Malik III, MD (05/04/23 10:42:39)                   Narrative:    EXAMINATION:  XR NG/OG TUBE PLACEMENT CHECK, NON-RADIOLOGIST PERFORMED    CLINICAL HISTORY:  ng tube placement;  Encounter for other specified special examinations    FINDINGS:  There is a posterior column stimulator.  Lung bases are clear.  No obstruction or perforation seen.  There is mild ileus.      Electronically signed by: Terrell Malik MD  Date:    05/04/2023  Time:    10:42                                     X-Ray Abdomen AP 1 View (KUB) (Final result)  Result time 05/03/23 21:37:49      Final result by Arlne Fry MD (05/03/23 21:37:49)                   Impression:      See above.      Electronically signed by: Arlen Fry MD  Date:    05/03/2023  Time:    21:37                Narrative:    EXAMINATION:  XR ABDOMEN AP 1 VIEW    CLINICAL HISTORY:  Encounter for other specified special examinations    TECHNIQUE:  AP View(s) of the abdomen was performed.    COMPARISON:  CT abdomen and pelvis from the same date.    FINDINGS:  NG tube distal tip extends below the diaphragm likely into the proximal stomach.  See separate CT report for full intra-abdominal details.  Lungs are hypoinflated.  No consolidation, pneumothorax, or large pleural effusion seen.                                       CT Abdomen Pelvis  Without Contrast (Final result)  Result time 05/03/23 18:56:41      Final result by Arlen Fry MD (05/03/23 18:56:41)                   Impression:      1. Postsurgical changes of gastric bypass.  Focal dilated small bowel loops with small bowel feces sign measuring 4.5 cm in caliber seen at the level of suture lines in the right mid abdomen.  Uncertain if this may represent a chronic finding versus potential developing small bowel obstruction.  Small bowel loops do not appear dilated proximal or distal to this region.  2. No additional acute intra-abdominal abnormalities identified.  3. Nonobstructing right renal stone.  4. Postsurgical changes of cholecystectomy and hysterectomy.  5. Additional findings as detailed above.      Electronically signed by: Arlen Fry MD  Date:    05/03/2023  Time:    18:56               Narrative:    EXAMINATION:  CT ABDOMEN PELVIS WITHOUT CONTRAST    CLINICAL HISTORY:  Abdominal pain, acute, nonlocalized;    TECHNIQUE:  Low dose axial images, sagittal and coronal reformations were obtained from the lung bases to the pubic symphysis.  Oral contrast was not administered.    COMPARISON:  None    FINDINGS:  The visualized portion of the heart is unremarkable.  Minimal bibasilar atelectasis or scarring is seen.  No focal consolidation or pleural effusion.    No significant hepatic abnormality seen on this noncontrast exam.  There is no  intra-or extrahepatic biliary ductal dilatation.  Gallbladder has been removed.  The stomach, pancreas, spleen, and adrenal glands are unremarkable.    Kidneys show no evidence of hydronephrosis.  There is a single large 11 mm stone seen within the right kidney.  No abnormalities are seen along the ureteral courses.  Urinary bladder is nondistended.  Uterus has been removed.    Cecum and appendix terminate within the left lower quadrant.  Appendix is visualized and is otherwise unremarkable.  Postsurgical changes of gastric bypass are seen.  Focal dilated small bowel loops measuring upwards of 4.5 cm in caliber with small bowel feces sign is seen at the level of anastomosis and suture lines within the right mid abdomen.  No evidence of abnormal small bowel dilatation seen proximal or distal to this region.  No acute colonic abnormalities are seen.  No free air or free fluid.    Aorta tapers normally with mild atherosclerosis.    No acute osseous abnormality identified.  Degenerative changes are seen within the spine lower lumbar facet arthropathy.  Spinal stimulator device is seen within the subcutaneous soft tissues of the left lower back region which electrodes seen which extend superiorly into the lower visualized thoracic spine.

## 2023-05-05 NOTE — PROGRESS NOTES
[facility]  Progress Note    Patient Name: Faby Luna  MRN: [id number]  Patient Class: OP- Observation   Admission Date: 5/3/2023  Length of Stay: 0 days  Attending Physician: Lamonte Kirk MD  Primary Care Provider: Renae Sprague DO        Subjective:     Principal Problem:Partial bowel obstruction        HPI:  72 y.o. female with chronic pain syndrome, adjustment reaction with anxiety and depression, DM2, HTN, GERD, lumbar radiculopathy presents with a complaint of abdominal pain.  Acute onset, duration 1 day, location generalized throughout the abdomen, does not radiate, no known exacerbating or alleviating factors.  Denies fever, chills, cough, SOB, chest pain, dizziness, syncope, n/v/d. In the ED, vitals and labs reassuring, CT shows possible SBO.  NGT was placed.  Placed in observation.      Overview/Hospital Course:  Patient presented with abdominal pain, nausea, vomiting.   because unable to keep down blood pressure meds.  Found to have ileus/partial obstruction.  NG tube to suction placed.  Patient accidentally removed her NG tube on transfer.  NG tube replaced and reimage, ileus still apparent on KUB.  NG tube placed back to suction.  General surgery consulted.  Patient will undergo Gastrografin challenge.  WBC count increasing, may have translocation, will start on antibiotics.  IV fluids being given.    Interval History:   NPO  +nauseas   NGT in place, repositioned  Ileus still present on retake image  +pain still persists    Review of Systems   Constitutional:  Positive for activity change and appetite change.   Respiratory:  Positive for shortness of breath.    Cardiovascular:  Negative for palpitations and leg swelling.   Gastrointestinal:  Positive for abdominal distention, abdominal pain, nausea and vomiting.     Objective:     Vital Signs (Most Recent):  Temp: 97.6 °F (36.4 °C) (05/05/23 1138)  Pulse: 76 (05/05/23 1138)  Resp: 17 (05/05/23 1138)  BP:  139/78 (05/05/23 1138)  SpO2: 95 % (05/05/23 1138) Vital Signs (24h Range):  Temp:  [97.2 °F (36.2 °C)-99.7 °F (37.6 °C)] 97.6 °F (36.4 °C)  Pulse:  [64-76] 76  Resp:  [17-18] 17  SpO2:  [93 %-97 %] 95 %  BP: (139-175)/(68-78) 139/78     Weight: 95.4 kg (210 lb 5.1 oz)  Body mass index is 36.1 kg/m².    Intake/Output Summary (Last 24 hours) at 5/5/2023 1358  Last data filed at 5/5/2023 0815  Gross per 24 hour   Intake 1044.13 ml   Output 250 ml   Net 794.13 ml         Physical Exam  Vitals and nursing note reviewed.   Constitutional:       General: She is in acute distress.      Appearance: She is well-developed. She is ill-appearing and toxic-appearing. She is not diaphoretic.   HENT:      Head: Normocephalic and atraumatic.   Eyes:      General: No scleral icterus.     Pupils: Pupils are equal, round, and reactive to light.   Neck:      Thyroid: No thyromegaly.   Cardiovascular:      Rate and Rhythm: Normal rate and regular rhythm.      Heart sounds: No murmur heard.  Pulmonary:      Effort: Pulmonary effort is normal.      Breath sounds: Normal breath sounds. No stridor. No wheezing or rales.   Abdominal:      General: There is distension.      Palpations: Abdomen is soft. There is no mass.      Tenderness: There is abdominal tenderness. There is guarding.   Musculoskeletal:         General: No swelling or deformity. Normal range of motion.      Cervical back: Normal range of motion and neck supple.   Skin:     General: Skin is warm and dry.      Capillary Refill: Capillary refill takes less than 2 seconds.      Coloration: Skin is not jaundiced.      Findings: No bruising.   Neurological:      Mental Status: She is alert and oriented to person, place, and time. Mental status is at baseline.      Cranial Nerves: No cranial nerve deficit.   Psychiatric:         Mood and Affect: Mood normal.         Behavior: Behavior normal.               Recent Results (from the past 24 hour(s))   POCT glucose    Collection Time:  05/04/23  6:28 PM   Result Value Ref Range    POCT Glucose 149 (H) 70 - 110 mg/dL   POCT glucose    Collection Time: 05/05/23  4:22 AM   Result Value Ref Range    POCT Glucose 116 (H) 70 - 110 mg/dL   Comprehensive Metabolic Panel (CMP)    Collection Time: 05/05/23  5:51 AM   Result Value Ref Range    Sodium 141 136 - 145 mmol/L    Potassium 3.8 3.5 - 5.1 mmol/L    Chloride 114 (H) 95 - 110 mmol/L    CO2 20 (L) 23 - 29 mmol/L    Glucose 117 (H) 70 - 110 mg/dL    BUN 14 8 - 23 mg/dL    Creatinine 1.0 0.5 - 1.4 mg/dL    Calcium 8.5 (L) 8.7 - 10.5 mg/dL    Total Protein 6.7 6.0 - 8.4 g/dL    Albumin 3.2 (L) 3.5 - 5.2 g/dL    Total Bilirubin 0.5 0.1 - 1.0 mg/dL    Alkaline Phosphatase 80 55 - 135 U/L    AST 11 10 - 40 U/L    ALT 11 10 - 44 U/L    Anion Gap 7 (L) 8 - 16 mmol/L    eGFR 59 (A) >60 mL/min/1.73 m^2   CBC with Automated Differential    Collection Time: 05/05/23  5:51 AM   Result Value Ref Range    WBC 11.26 3.90 - 12.70 K/uL    RBC 4.80 4.00 - 5.40 M/uL    Hemoglobin 12.0 12.0 - 16.0 g/dL    Hematocrit 39.4 37.0 - 48.5 %    MCV 82 82 - 98 fL    MCH 25.0 (L) 27.0 - 31.0 pg    MCHC 30.5 (L) 32.0 - 36.0 g/dL    RDW 16.8 (H) 11.5 - 14.5 %    Platelets 284 150 - 450 K/uL    MPV 10.0 9.2 - 12.9 fL    Immature Granulocytes 0.4 0.0 - 0.5 %    Gran # (ANC) 7.0 1.8 - 7.7 K/uL    Immature Grans (Abs) 0.04 0.00 - 0.04 K/uL    Lymph # 3.2 1.0 - 4.8 K/uL    Mono # 0.6 0.3 - 1.0 K/uL    Eos # 0.4 0.0 - 0.5 K/uL    Baso # 0.05 0.00 - 0.20 K/uL    nRBC 0 0 /100 WBC    Gran % 62.2 38.0 - 73.0 %    Lymph % 28.2 18.0 - 48.0 %    Mono % 5.1 4.0 - 15.0 %    Eosinophil % 3.7 0.0 - 8.0 %    Basophil % 0.4 0.0 - 1.9 %    Differential Method Automated        Microbiology Results (last 7 days)       Procedure Component Value Units Date/Time    Blood culture [[id number]] Collected: 05/04/23 1201    Order Status: Completed Specimen: Blood Updated: 05/05/23 1303     Blood Culture, Routine No Growth to date      No Growth to date    Blood  culture [[id number]] Collected: 05/04/23 1150    Order Status: Completed Specimen: Blood Updated: 05/05/23 1303     Blood Culture, Routine No Growth to date      No Growth to date             Imaging Results              XR NG/OG tube placement check, non-radiologist performed (Edited Result - FINAL)  Result time 05/04/23 13:04:29   Procedure changed from X-Ray Chest 1 View     Addendum (preliminary) 1 of 1 by Terrell Malik III, MD (05/04/23 13:04:29)      NG tube-fundus of the stomach.      Electronically signed by: Terrell Malik MD  Date:    05/04/2023  Time:    13:04                   Final result by Terrell Malik III, MD (05/04/23 10:42:39)                   Narrative:    EXAMINATION:  XR NG/OG TUBE PLACEMENT CHECK, NON-RADIOLOGIST PERFORMED    CLINICAL HISTORY:  ng tube placement;  Encounter for other specified special examinations    FINDINGS:  There is a posterior column stimulator.  Lung bases are clear.  No obstruction or perforation seen.  There is mild ileus.      Electronically signed by: Terrell Malik MD  Date:    05/04/2023  Time:    10:42                                     X-Ray Abdomen AP 1 View (KUB) (Final result)  Result time 05/03/23 21:37:49      Final result by Arlen Fry MD (05/03/23 21:37:49)                   Impression:      See above.      Electronically signed by: Arlen Fry MD  Date:    05/03/2023  Time:    21:37               Narrative:    EXAMINATION:  XR ABDOMEN AP 1 VIEW    CLINICAL HISTORY:  Encounter for other specified special examinations    TECHNIQUE:  AP View(s) of the abdomen was performed.    COMPARISON:  CT abdomen and pelvis from the same date.    FINDINGS:  NG tube distal tip extends below the diaphragm likely into the proximal stomach.  See separate CT report for full intra-abdominal details.  Lungs are hypoinflated.  No consolidation, pneumothorax, or large pleural effusion seen.                                       CT Abdomen Pelvis  Without Contrast  (Final result)  Result time 05/03/23 18:56:41      Final result by Arlen Fry MD (05/03/23 18:56:41)                   Impression:      1. Postsurgical changes of gastric bypass.  Focal dilated small bowel loops with small bowel feces sign measuring 4.5 cm in caliber seen at the level of suture lines in the right mid abdomen.  Uncertain if this may represent a chronic finding versus potential developing small bowel obstruction.  Small bowel loops do not appear dilated proximal or distal to this region.  2. No additional acute intra-abdominal abnormalities identified.  3. Nonobstructing right renal stone.  4. Postsurgical changes of cholecystectomy and hysterectomy.  5. Additional findings as detailed above.      Electronically signed by: Arlen Fry MD  Date:    05/03/2023  Time:    18:56               Narrative:    EXAMINATION:  CT ABDOMEN PELVIS WITHOUT CONTRAST    CLINICAL HISTORY:  Abdominal pain, acute, nonlocalized;    TECHNIQUE:  Low dose axial images, sagittal and coronal reformations were obtained from the lung bases to the pubic symphysis.  Oral contrast was not administered.    COMPARISON:  None    FINDINGS:  The visualized portion of the heart is unremarkable.  Minimal bibasilar atelectasis or scarring is seen.  No focal consolidation or pleural effusion.    No significant hepatic abnormality seen on this noncontrast exam.  There is no intra-or extrahepatic biliary ductal dilatation.  Gallbladder has been removed.  The stomach, pancreas, spleen, and adrenal glands are unremarkable.    Kidneys show no evidence of hydronephrosis.  There is a single large 11 mm stone seen within the right kidney.  No abnormalities are seen along the ureteral courses.  Urinary bladder is nondistended.  Uterus has been removed.    Cecum and appendix terminate within the left lower quadrant.  Appendix is visualized and is otherwise unremarkable.  Postsurgical changes of gastric bypass are seen.  Focal dilated small  bowel loops measuring upwards of 4.5 cm in caliber with small bowel feces sign is seen at the level of anastomosis and suture lines within the right mid abdomen.  No evidence of abnormal small bowel dilatation seen proximal or distal to this region.  No acute colonic abnormalities are seen.  No free air or free fluid.    Aorta tapers normally with mild atherosclerosis.    No acute osseous abnormality identified.  Degenerative changes are seen within the spine lower lumbar facet arthropathy.  Spinal stimulator device is seen within the subcutaneous soft tissues of the left lower back region which electrodes seen which extend superiorly into the lower visualized thoracic spine.                                            Assessment/Plan:      * Partial bowel obstruction  Partial bowel obstruction with distention and pain  Occasionally will pass gas  NGT to LIWS placed  WBC# rising, will initiated abx, as she may have translocation  GenSx consulted  Gastrografin challenge 5/5/23       Ileus  Patient has Non- operative ileus which is obstructive in etiology which is worsening.    Will treat conservatively with bowel rest, IV fluids, serial abdominal exams and avoidance of GI paralytics such as narcotics or anti-spasmodics. Monitor patient closely.   See partial bowel obstruction note      Moderate persistent asthma  PRN nebs    Chronic renal failure, stage 3a  Stable, at baseline, maintain euvolemic state, strict I/O's, monitor renal function and electrolytes, avoid nephrotoxic agents.     Major depressive disorder, recurrent episode, in partial remission  Patient has recurrent depression which is moderate and is currently controlled. Will Continue anti-depressant medications. We will not consult psychiatry at this time. Patient does not display psychosis at this time. Continue to monitor closely and adjust plan of care as needed.    Essential hypertension  Poorly controlled, continue home medications and monitor blood  pressure, adjust as needed.     Type 2 diabetes mellitus with stage 3a chronic kidney disease, without long-term current use of insulin  Patient's FSGs are controlled on current medication regimen.  Last A1c reviewed-   Lab Results   Component Value Date    HGBA1C 6.1 (H) 02/06/2023     Most recent fingerstick glucose reviewed- No results for input(s): POCTGLUCOSE in the last 24 hours.  Current correctional scale  Medium  Maintain anti-hyperglycemic dose as follows-   Antihyperglycemics (From admission, onward)      Start     Stop Route Frequency Ordered    05/03/23 2324  insulin aspart U-100 pen 1-10 Units         -- SubQ Every 6 hours PRN 05/03/23 2224          Hold Oral hypoglycemics while patient is in the hospital.      VTE Risk Mitigation (From admission, onward)           Ordered     enoxaparin injection 40 mg  Daily         05/03/23 2212     IP VTE HIGH RISK PATIENT  Once         05/03/23 2212     Place sequential compression device  Until discontinued         05/03/23 2212                    Discharge Planning   BRYAN:      Code Status: Full Code   Is the patient medically ready for discharge?:     Reason for patient still in hospital (select all that apply): Patient trending condition and Treatment  Discharge Plan A: Home with family (with instructions to follow up)                  Lamonte Kirk MD  Department of Hospital Medicine   [facility]

## 2023-05-05 NOTE — NURSING
[facility], [facility]  Nurses Note -- 4 Eyes      5/5/2023       Skin assessed on: Q Shift      [x] No Pressure Injuries Present    [x]Prevention Measures Documented    [] Yes LDA  for Pressure Injury Previously documented     [] Yes New Pressure Injury Discovered   [] LDA for New Pressure Injury Added      Attending RN:  Charly Rocha LPN     Second RN:  Yessy Casey RN

## 2023-05-05 NOTE — NURSING
[facility], [facility]  Nurses Note -- 4 Eyes      5/5/2023       Skin assessed on: Q Shift      [x] No Pressure Injuries Present    []Prevention Measures Documented    [] Yes LDA  for Pressure Injury Previously documented     [] Yes New Pressure Injury Discovered   [] LDA for New Pressure Injury Added      Attending RN:  Charly Rocha LPN     Second RN:  Patrick Casey RN

## 2023-05-05 NOTE — PROGRESS NOTES
[facility] Medicine  Progress Note    Patient Name: Faby Luna  MRN: [id number]  Patient Class: OP- Observation   Admission Date: 5/3/2023  Length of Stay: 0 days  Attending Physician: Lamonte Kirk MD  Primary Care Provider: Renae Sprague DO        Subjective:     Principal Problem:Partial bowel obstruction        HPI:  72 y.o. female with chronic pain syndrome, adjustment reaction with anxiety and depression, DM2, HTN, GERD, lumbar radiculopathy presents with a complaint of abdominal pain.  Acute onset, duration 1 day, location generalized throughout the abdomen, does not radiate, no known exacerbating or alleviating factors.  Denies fever, chills, cough, SOB, chest pain, dizziness, syncope, n/v/d. In the ED, vitals and labs reassuring, CT shows possible SBO.  NGT was placed.  Placed in observation.      Overview/Hospital Course:  Patient presented with abdominal pain, nausea, vomiting.   because unable to keep down blood pressure meds.  Found to have ileus/partial obstruction.  NG tube to suction placed.  Patient accidentally removed her NG tube on transfer.  NG tube replaced and reimage, ileus still apparent on KUB.  NG tube placed back to suction.  General surgery consulted.  Patient will undergo Gastrografin challenge.  WBC count increasing, may have translocation, will start on antibiotics.  IV fluids being given.       Interval History:   NPO  +nauseas   NGT in place to LIWS  +pain still persists, worse with palpation     Review of Systems   Constitutional:  Positive for activity change, appetite change and fatigue.   Respiratory:  Negative for wheezing.    Cardiovascular:  Negative for chest pain, palpitations and leg swelling.   Gastrointestinal:  Positive for abdominal distention, abdominal pain and nausea. Negative for vomiting.     Objective:     Vital Signs (Most Recent):  Temp: 97.6 °F (36.4 °C) (05/05/23 1138)  Pulse: 76 (05/05/23 1138)  Resp: 17 (05/05/23  1138)  BP: 139/78 (05/05/23 1138)  SpO2: 95 % (05/05/23 1138) Vital Signs (24h Range):  Temp:  [97.2 °F (36.2 °C)-99.7 °F (37.6 °C)] 97.6 °F (36.4 °C)  Pulse:  [64-76] 76  Resp:  [17-18] 17  SpO2:  [93 %-97 %] 95 %  BP: (139-175)/(68-78) 139/78     Weight: 95.4 kg (210 lb 5.1 oz)  Body mass index is 36.1 kg/m².    Intake/Output Summary (Last 24 hours) at 5/5/2023 1402  Last data filed at 5/5/2023 0815  Gross per 24 hour   Intake 1044.13 ml   Output 250 ml   Net 794.13 ml           Physical Exam  Vitals and nursing note reviewed.   Constitutional:       General: She is in acute distress.      Appearance: She is well-developed. She is ill-appearing and toxic-appearing. She is not diaphoretic.   HENT:      Head: Normocephalic and atraumatic.   Eyes:      General: No scleral icterus.     Pupils: Pupils are equal, round, and reactive to light.   Neck:      Thyroid: No thyromegaly.   Cardiovascular:      Rate and Rhythm: Normal rate and regular rhythm.      Heart sounds: No murmur heard.  Pulmonary:      Effort: Pulmonary effort is normal.      Breath sounds: Normal breath sounds. No stridor. No wheezing or rales.   Abdominal:      General: There is distension.      Palpations: Abdomen is soft. There is no mass.      Tenderness: There is abdominal tenderness. There is guarding.   Musculoskeletal:         General: No swelling or deformity. Normal range of motion.      Cervical back: Normal range of motion and neck supple.   Skin:     General: Skin is warm and dry.      Capillary Refill: Capillary refill takes less than 2 seconds.      Coloration: Skin is not jaundiced.      Findings: No bruising.   Neurological:      Mental Status: She is alert and oriented to person, place, and time. Mental status is at baseline.      Cranial Nerves: No cranial nerve deficit.   Psychiatric:         Mood and Affect: Mood normal.         Behavior: Behavior normal.               Recent Results (from the past 24 hour(s))   POCT glucose     Collection Time: 05/04/23  6:28 PM   Result Value Ref Range    POCT Glucose 149 (H) 70 - 110 mg/dL   POCT glucose    Collection Time: 05/05/23  4:22 AM   Result Value Ref Range    POCT Glucose 116 (H) 70 - 110 mg/dL   Comprehensive Metabolic Panel (CMP)    Collection Time: 05/05/23  5:51 AM   Result Value Ref Range    Sodium 141 136 - 145 mmol/L    Potassium 3.8 3.5 - 5.1 mmol/L    Chloride 114 (H) 95 - 110 mmol/L    CO2 20 (L) 23 - 29 mmol/L    Glucose 117 (H) 70 - 110 mg/dL    BUN 14 8 - 23 mg/dL    Creatinine 1.0 0.5 - 1.4 mg/dL    Calcium 8.5 (L) 8.7 - 10.5 mg/dL    Total Protein 6.7 6.0 - 8.4 g/dL    Albumin 3.2 (L) 3.5 - 5.2 g/dL    Total Bilirubin 0.5 0.1 - 1.0 mg/dL    Alkaline Phosphatase 80 55 - 135 U/L    AST 11 10 - 40 U/L    ALT 11 10 - 44 U/L    Anion Gap 7 (L) 8 - 16 mmol/L    eGFR 59 (A) >60 mL/min/1.73 m^2   CBC with Automated Differential    Collection Time: 05/05/23  5:51 AM   Result Value Ref Range    WBC 11.26 3.90 - 12.70 K/uL    RBC 4.80 4.00 - 5.40 M/uL    Hemoglobin 12.0 12.0 - 16.0 g/dL    Hematocrit 39.4 37.0 - 48.5 %    MCV 82 82 - 98 fL    MCH 25.0 (L) 27.0 - 31.0 pg    MCHC 30.5 (L) 32.0 - 36.0 g/dL    RDW 16.8 (H) 11.5 - 14.5 %    Platelets 284 150 - 450 K/uL    MPV 10.0 9.2 - 12.9 fL    Immature Granulocytes 0.4 0.0 - 0.5 %    Gran # (ANC) 7.0 1.8 - 7.7 K/uL    Immature Grans (Abs) 0.04 0.00 - 0.04 K/uL    Lymph # 3.2 1.0 - 4.8 K/uL    Mono # 0.6 0.3 - 1.0 K/uL    Eos # 0.4 0.0 - 0.5 K/uL    Baso # 0.05 0.00 - 0.20 K/uL    nRBC 0 0 /100 WBC    Gran % 62.2 38.0 - 73.0 %    Lymph % 28.2 18.0 - 48.0 %    Mono % 5.1 4.0 - 15.0 %    Eosinophil % 3.7 0.0 - 8.0 %    Basophil % 0.4 0.0 - 1.9 %    Differential Method Automated        Microbiology Results (last 7 days)       Procedure Component Value Units Date/Time    Blood culture [[id number]] Collected: 05/04/23 1201    Order Status: Completed Specimen: Blood Updated: 05/05/23 1303     Blood Culture, Routine No Growth to date      No Growth  to date    Blood culture [[id number]] Collected: 05/04/23 1150    Order Status: Completed Specimen: Blood Updated: 05/05/23 1303     Blood Culture, Routine No Growth to date      No Growth to date             Imaging Results              XR NG/OG tube placement check, non-radiologist performed (Edited Result - FINAL)  Result time 05/04/23 13:04:29   Procedure changed from X-Ray Chest 1 View     Addendum (preliminary) 1 of 1 by Terrell Malik III, MD (05/04/23 13:04:29)      NG tube-fundus of the stomach.      Electronically signed by: Terrell Malik MD  Date:    05/04/2023  Time:    13:04                 Final result by Terrell Malik III, MD (05/04/23 10:42:39)                   Narrative:    EXAMINATION:  XR NG/OG TUBE PLACEMENT CHECK, NON-RADIOLOGIST PERFORMED    CLINICAL HISTORY:  ng tube placement;  Encounter for other specified special examinations    FINDINGS:  There is a posterior column stimulator.  Lung bases are clear.  No obstruction or perforation seen.  There is mild ileus.      Electronically signed by: Terrell Malik MD  Date:    05/04/2023  Time:    10:42                                     X-Ray Abdomen AP 1 View (KUB) (Final result)  Result time 05/03/23 21:37:49      Final result by Arlen Fry MD (05/03/23 21:37:49)                   Impression:      See above.      Electronically signed by: Arlen Fry MD  Date:    05/03/2023  Time:    21:37               Narrative:    EXAMINATION:  XR ABDOMEN AP 1 VIEW    CLINICAL HISTORY:  Encounter for other specified special examinations    TECHNIQUE:  AP View(s) of the abdomen was performed.    COMPARISON:  CT abdomen and pelvis from the same date.    FINDINGS:  NG tube distal tip extends below the diaphragm likely into the proximal stomach.  See separate CT report for full intra-abdominal details.  Lungs are hypoinflated.  No consolidation, pneumothorax, or large pleural effusion seen.                                       CT Abdomen Pelvis   Without Contrast (Final result)  Result time 05/03/23 18:56:41      Final result by Arlen Fry MD (05/03/23 18:56:41)                   Impression:      1. Postsurgical changes of gastric bypass.  Focal dilated small bowel loops with small bowel feces sign measuring 4.5 cm in caliber seen at the level of suture lines in the right mid abdomen.  Uncertain if this may represent a chronic finding versus potential developing small bowel obstruction.  Small bowel loops do not appear dilated proximal or distal to this region.  2. No additional acute intra-abdominal abnormalities identified.  3. Nonobstructing right renal stone.  4. Postsurgical changes of cholecystectomy and hysterectomy.  5. Additional findings as detailed above.      Electronically signed by: Arlen Fry MD  Date:    05/03/2023  Time:    18:56               Narrative:    EXAMINATION:  CT ABDOMEN PELVIS WITHOUT CONTRAST    CLINICAL HISTORY:  Abdominal pain, acute, nonlocalized;    TECHNIQUE:  Low dose axial images, sagittal and coronal reformations were obtained from the lung bases to the pubic symphysis.  Oral contrast was not administered.    COMPARISON:  None    FINDINGS:  The visualized portion of the heart is unremarkable.  Minimal bibasilar atelectasis or scarring is seen.  No focal consolidation or pleural effusion.    No significant hepatic abnormality seen on this noncontrast exam.  There is no intra-or extrahepatic biliary ductal dilatation.  Gallbladder has been removed.  The stomach, pancreas, spleen, and adrenal glands are unremarkable.    Kidneys show no evidence of hydronephrosis.  There is a single large 11 mm stone seen within the right kidney.  No abnormalities are seen along the ureteral courses.  Urinary bladder is nondistended.  Uterus has been removed.    Cecum and appendix terminate within the left lower quadrant.  Appendix is visualized and is otherwise unremarkable.  Postsurgical changes of gastric bypass are seen.   Focal dilated small bowel loops measuring upwards of 4.5 cm in caliber with small bowel feces sign is seen at the level of anastomosis and suture lines within the right mid abdomen.  No evidence of abnormal small bowel dilatation seen proximal or distal to this region.  No acute colonic abnormalities are seen.  No free air or free fluid.    Aorta tapers normally with mild atherosclerosis.    No acute osseous abnormality identified.  Degenerative changes are seen within the spine lower lumbar facet arthropathy.  Spinal stimulator device is seen within the subcutaneous soft tissues of the left lower back region which electrodes seen which extend superiorly into the lower visualized thoracic spine.                                            Assessment/Plan:      * Partial bowel obstruction  Partial bowel obstruction with distention and pain  Occasionally will pass gas  NGT to LIWS placed  WBC# rising, will initiated abx, as she may have translocation  GenSx consulted  Gastrografin challenge 5/5/23       Ileus  Patient has Non- operative ileus which is obstructive in etiology which is worsening.    Will treat conservatively with bowel rest, IV fluids, serial abdominal exams and avoidance of GI paralytics such as narcotics or anti-spasmodics. Monitor patient closely.   See partial bowel obstruction note      Moderate persistent asthma  PRN nebs    Chronic renal failure, stage 3a  Stable, at baseline, maintain euvolemic state, strict I/O's, monitor renal function and electrolytes, avoid nephrotoxic agents.     Major depressive disorder, recurrent episode, in partial remission  Patient has recurrent depression which is moderate and is currently controlled. Will Continue anti-depressant medications. We will not consult psychiatry at this time. Patient does not display psychosis at this time. Continue to monitor closely and adjust plan of care as needed.    Essential hypertension  Poorly controlled, continue home medications  and monitor blood pressure, adjust as needed.     Type 2 diabetes mellitus with stage 3a chronic kidney disease, without long-term current use of insulin  Patient's FSGs are controlled on current medication regimen.  Last A1c reviewed-   Lab Results   Component Value Date    HGBA1C 6.1 (H) 02/06/2023     Most recent fingerstick glucose reviewed- No results for input(s): POCTGLUCOSE in the last 24 hours.  Current correctional scale  Medium  Maintain anti-hyperglycemic dose as follows-   Antihyperglycemics (From admission, onward)      Start     Stop Route Frequency Ordered    05/03/23 2324  insulin aspart U-100 pen 1-10 Units         -- SubQ Every 6 hours PRN 05/03/23 2224          Hold Oral hypoglycemics while patient is in the hospital.      VTE Risk Mitigation (From admission, onward)           Ordered     enoxaparin injection 40 mg  Daily         05/03/23 2212     IP VTE HIGH RISK PATIENT  Once         05/03/23 2212     Place sequential compression device  Until discontinued         05/03/23 2212                    Discharge Planning   BRYAN:      Code Status: Full Code   Is the patient medically ready for discharge?:     Reason for patient still in hospital (select all that apply): Patient trending condition and Treatment  Discharge Plan A: Home with family (with instructions to follow up)                  Lamonte Kirk MD  Department of Hospital Medicine   [facility]

## 2023-05-05 NOTE — SUBJECTIVE & OBJECTIVE
Interval History: NAEON  NGT placed; minimal output overnight  No nausea, pain improved  Passing gas     Medications:  Continuous Infusions:   sodium chloride 0.9% 75 mL/hr at 05/04/23 2057     Scheduled Meds:   atenoloL  100 mg Oral Daily    atorvastatin  80 mg Oral Daily    cefTRIAXone (ROCEPHIN) IVPB  2 g Intravenous Q24H    enoxaparin  40 mg Subcutaneous Daily    EScitalopram oxalate  20 mg Oral Daily    gabapentin  800 mg Oral TID    losartan  100 mg Oral Daily    topiramate  100 mg Oral BID    traZODone  100 mg Oral QHS     PRN Meds:acetaminophen, aluminum-magnesium hydroxide-simethicone, dextrose 10%, dextrose 10%, dextrose 10%, dextrose 10%, glucagon (human recombinant), glucagon (human recombinant), glucose, glucose, hydrALAZINE, insulin aspart U-100, melatonin, naloxone, ondansetron, prochlorperazine, simethicone, sodium chloride 0.9%, tiZANidine     Review of patient's allergies indicates:  No Known Allergies  Objective:     Vital Signs (Most Recent):  Temp: 98.3 °F (36.8 °C) (05/05/23 0421)  Pulse: 70 (05/05/23 0421)  Resp: 18 (05/05/23 0421)  BP: (!) 144/68 (05/05/23 0421)  SpO2: (!) 93 % (05/05/23 0421)   Vital Signs (24h Range):  Temp:  [98.1 °F (36.7 °C)-99.7 °F (37.6 °C)] 98.3 °F (36.8 °C)  Pulse:  [64-94] 70  Resp:  [18] 18  SpO2:  [93 %-97 %] 93 %  BP: (139-175)/(66-77) 144/68     Weight: 95.4 kg (210 lb 5.1 oz)  Body mass index is 36.1 kg/m².    Intake/Output - Last 3 Shifts         05/03 0700  05/04 0659 05/04 0700  05/05 0659 05/05 0700  05/06 0659    P.O.  0     I.V. (mL/kg)  1044.1 (10.9)     IV Piggyback 1000      Total Intake(mL/kg) 1000 (11.1) 1044.1 (10.9)     Urine (mL/kg/hr)  0 (0)     Drains 175 100     Stool  0     Total Output 175 100     Net +825 +944.1            Urine Occurrence  1 x     Stool Occurrence  0 x              Physical Exam  Vitals and nursing note reviewed.   Constitutional:       Appearance: Normal appearance.   HENT:      Head: Normocephalic and atraumatic.       Nose:      Comments: NGT with thin gastric output  Cardiovascular:      Rate and Rhythm: Normal rate and regular rhythm.   Pulmonary:      Effort: Pulmonary effort is normal. No respiratory distress.   Abdominal:      General: Abdomen is flat. There is no distension.      Palpations: Abdomen is soft. There is no mass.      Hernia: No hernia is present.      Comments: Minimal tenderness to palpation   Musculoskeletal:      Right lower leg: No edema.      Left lower leg: No edema.   Skin:     General: Skin is warm and dry.   Neurological:      General: No focal deficit present.      Mental Status: She is alert and oriented to person, place, and time.   Psychiatric:         Mood and Affect: Mood normal.         Behavior: Behavior normal.        Significant Labs:  I have reviewed all pertinent lab results within the past 24 hours.  CBC:   Recent Labs   Lab 05/05/23  0551   WBC 11.26   RBC 4.80   HGB 12.0   HCT 39.4      MCV 82   MCH 25.0*   MCHC 30.5*     CMP:   Recent Labs   Lab 05/05/23  0551   *   CALCIUM 8.5*   ALBUMIN 3.2*   PROT 6.7      K 3.8   CO2 20*   *   BUN 14   CREATININE 1.0   ALKPHOS 80   ALT 11   AST 11   BILITOT 0.5       Significant Diagnostics:  I have reviewed all pertinent imaging results/findings within the past 24 hours.

## 2023-05-05 NOTE — PLAN OF CARE
Problem: Adult Inpatient Plan of Care  Goal: Plan of Care Review  Outcome: Ongoing, Progressing  Goal: Patient-Specific Goal (Individualized)  Outcome: Ongoing, Progressing  Goal: Absence of Hospital-Acquired Illness or Injury  Outcome: Ongoing, Progressing  Goal: Optimal Comfort and Wellbeing  Outcome: Ongoing, Progressing  Goal: Readiness for Transition of Care  Outcome: Ongoing, Progressing     Problem: Infection  Goal: Absence of Infection Signs and Symptoms  Outcome: Ongoing, Progressing     Problem: Electrolyte Imbalance  Goal: Electrolyte Balance  Outcome: Ongoing, Progressing     Problem: Fluid Deficit (Intestinal Obstruction)  Goal: Fluid Balance  Outcome: Ongoing, Progressing     Problem: Infection (Intestinal Obstruction)  Goal: Absence of Infection Signs and Symptoms  Outcome: Ongoing, Progressing     Problem: Nausea and Vomiting (Intestinal Obstruction)  Goal: Nausea and Vomiting Relief  Outcome: Ongoing, Progressing     Problem: Pain (Intestinal Obstruction)  Goal: Acceptable Pain Control  Outcome: Ongoing, Progressing     Problem: Diabetes Comorbidity  Goal: Blood Glucose Level Within Targeted Range  Outcome: Ongoing, Progressing

## 2023-05-05 NOTE — SUBJECTIVE & OBJECTIVE
Interval History:   NPO  +nauseas   NGT in place to LIWS  +pain still persists, worse with palpation     Review of Systems   Constitutional:  Positive for activity change, appetite change and fatigue.   Respiratory:  Negative for wheezing.    Cardiovascular:  Negative for chest pain, palpitations and leg swelling.   Gastrointestinal:  Positive for abdominal distention, abdominal pain and nausea. Negative for vomiting.     Objective:     Vital Signs (Most Recent):  Temp: 97.6 °F (36.4 °C) (05/05/23 1138)  Pulse: 76 (05/05/23 1138)  Resp: 17 (05/05/23 1138)  BP: 139/78 (05/05/23 1138)  SpO2: 95 % (05/05/23 1138) Vital Signs (24h Range):  Temp:  [97.2 °F (36.2 °C)-99.7 °F (37.6 °C)] 97.6 °F (36.4 °C)  Pulse:  [64-76] 76  Resp:  [17-18] 17  SpO2:  [93 %-97 %] 95 %  BP: (139-175)/(68-78) 139/78     Weight: 95.4 kg (210 lb 5.1 oz)  Body mass index is 36.1 kg/m².    Intake/Output Summary (Last 24 hours) at 5/5/2023 1402  Last data filed at 5/5/2023 0815  Gross per 24 hour   Intake 1044.13 ml   Output 250 ml   Net 794.13 ml           Physical Exam  Vitals and nursing note reviewed.   Constitutional:       General: She is in acute distress.      Appearance: She is well-developed. She is ill-appearing and toxic-appearing. She is not diaphoretic.   HENT:      Head: Normocephalic and atraumatic.   Eyes:      General: No scleral icterus.     Pupils: Pupils are equal, round, and reactive to light.   Neck:      Thyroid: No thyromegaly.   Cardiovascular:      Rate and Rhythm: Normal rate and regular rhythm.      Heart sounds: No murmur heard.  Pulmonary:      Effort: Pulmonary effort is normal.      Breath sounds: Normal breath sounds. No stridor. No wheezing or rales.   Abdominal:      General: There is distension.      Palpations: Abdomen is soft. There is no mass.      Tenderness: There is abdominal tenderness. There is guarding.   Musculoskeletal:         General: No swelling or deformity. Normal range of motion.      Cervical  back: Normal range of motion and neck supple.   Skin:     General: Skin is warm and dry.      Capillary Refill: Capillary refill takes less than 2 seconds.      Coloration: Skin is not jaundiced.      Findings: No bruising.   Neurological:      Mental Status: She is alert and oriented to person, place, and time. Mental status is at baseline.      Cranial Nerves: No cranial nerve deficit.   Psychiatric:         Mood and Affect: Mood normal.         Behavior: Behavior normal.               Recent Results (from the past 24 hour(s))   POCT glucose    Collection Time: 05/04/23  6:28 PM   Result Value Ref Range    POCT Glucose 149 (H) 70 - 110 mg/dL   POCT glucose    Collection Time: 05/05/23  4:22 AM   Result Value Ref Range    POCT Glucose 116 (H) 70 - 110 mg/dL   Comprehensive Metabolic Panel (CMP)    Collection Time: 05/05/23  5:51 AM   Result Value Ref Range    Sodium 141 136 - 145 mmol/L    Potassium 3.8 3.5 - 5.1 mmol/L    Chloride 114 (H) 95 - 110 mmol/L    CO2 20 (L) 23 - 29 mmol/L    Glucose 117 (H) 70 - 110 mg/dL    BUN 14 8 - 23 mg/dL    Creatinine 1.0 0.5 - 1.4 mg/dL    Calcium 8.5 (L) 8.7 - 10.5 mg/dL    Total Protein 6.7 6.0 - 8.4 g/dL    Albumin 3.2 (L) 3.5 - 5.2 g/dL    Total Bilirubin 0.5 0.1 - 1.0 mg/dL    Alkaline Phosphatase 80 55 - 135 U/L    AST 11 10 - 40 U/L    ALT 11 10 - 44 U/L    Anion Gap 7 (L) 8 - 16 mmol/L    eGFR 59 (A) >60 mL/min/1.73 m^2   CBC with Automated Differential    Collection Time: 05/05/23  5:51 AM   Result Value Ref Range    WBC 11.26 3.90 - 12.70 K/uL    RBC 4.80 4.00 - 5.40 M/uL    Hemoglobin 12.0 12.0 - 16.0 g/dL    Hematocrit 39.4 37.0 - 48.5 %    MCV 82 82 - 98 fL    MCH 25.0 (L) 27.0 - 31.0 pg    MCHC 30.5 (L) 32.0 - 36.0 g/dL    RDW 16.8 (H) 11.5 - 14.5 %    Platelets 284 150 - 450 K/uL    MPV 10.0 9.2 - 12.9 fL    Immature Granulocytes 0.4 0.0 - 0.5 %    Gran # (ANC) 7.0 1.8 - 7.7 K/uL    Immature Grans (Abs) 0.04 0.00 - 0.04 K/uL    Lymph # 3.2 1.0 - 4.8 K/uL    Mono #  0.6 0.3 - 1.0 K/uL    Eos # 0.4 0.0 - 0.5 K/uL    Baso # 0.05 0.00 - 0.20 K/uL    nRBC 0 0 /100 WBC    Gran % 62.2 38.0 - 73.0 %    Lymph % 28.2 18.0 - 48.0 %    Mono % 5.1 4.0 - 15.0 %    Eosinophil % 3.7 0.0 - 8.0 %    Basophil % 0.4 0.0 - 1.9 %    Differential Method Automated        Microbiology Results (last 7 days)       Procedure Component Value Units Date/Time    Blood culture [[id number]] Collected: 05/04/23 1201    Order Status: Completed Specimen: Blood Updated: 05/05/23 1303     Blood Culture, Routine No Growth to date      No Growth to date    Blood culture [[id number]] Collected: 05/04/23 1150    Order Status: Completed Specimen: Blood Updated: 05/05/23 1303     Blood Culture, Routine No Growth to date      No Growth to date             Imaging Results              XR NG/OG tube placement check, non-radiologist performed (Edited Result - FINAL)  Result time 05/04/23 13:04:29   Procedure changed from X-Ray Chest 1 View     Addendum (preliminary) 1 of 1 by Terrell Malik III, MD (05/04/23 13:04:29)      NG tube-fundus of the stomach.      Electronically signed by: Terrell Malik MD  Date:    05/04/2023  Time:    13:04                 Final result by Terrell Malik III, MD (05/04/23 10:42:39)                   Narrative:    EXAMINATION:  XR NG/OG TUBE PLACEMENT CHECK, NON-RADIOLOGIST PERFORMED    CLINICAL HISTORY:  ng tube placement;  Encounter for other specified special examinations    FINDINGS:  There is a posterior column stimulator.  Lung bases are clear.  No obstruction or perforation seen.  There is mild ileus.      Electronically signed by: Terrell Malik MD  Date:    05/04/2023  Time:    10:42                                     X-Ray Abdomen AP 1 View (KUB) (Final result)  Result time 05/03/23 21:37:49      Final result by Arlen Fry MD (05/03/23 21:37:49)                   Impression:      See above.      Electronically signed by: Arlen Fry MD  Date:    05/03/2023  Time:    21:37                Narrative:    EXAMINATION:  XR ABDOMEN AP 1 VIEW    CLINICAL HISTORY:  Encounter for other specified special examinations    TECHNIQUE:  AP View(s) of the abdomen was performed.    COMPARISON:  CT abdomen and pelvis from the same date.    FINDINGS:  NG tube distal tip extends below the diaphragm likely into the proximal stomach.  See separate CT report for full intra-abdominal details.  Lungs are hypoinflated.  No consolidation, pneumothorax, or large pleural effusion seen.                                       CT Abdomen Pelvis  Without Contrast (Final result)  Result time 05/03/23 18:56:41      Final result by Arlen Fry MD (05/03/23 18:56:41)                   Impression:      1. Postsurgical changes of gastric bypass.  Focal dilated small bowel loops with small bowel feces sign measuring 4.5 cm in caliber seen at the level of suture lines in the right mid abdomen.  Uncertain if this may represent a chronic finding versus potential developing small bowel obstruction.  Small bowel loops do not appear dilated proximal or distal to this region.  2. No additional acute intra-abdominal abnormalities identified.  3. Nonobstructing right renal stone.  4. Postsurgical changes of cholecystectomy and hysterectomy.  5. Additional findings as detailed above.      Electronically signed by: Arlen Fry MD  Date:    05/03/2023  Time:    18:56               Narrative:    EXAMINATION:  CT ABDOMEN PELVIS WITHOUT CONTRAST    CLINICAL HISTORY:  Abdominal pain, acute, nonlocalized;    TECHNIQUE:  Low dose axial images, sagittal and coronal reformations were obtained from the lung bases to the pubic symphysis.  Oral contrast was not administered.    COMPARISON:  None    FINDINGS:  The visualized portion of the heart is unremarkable.  Minimal bibasilar atelectasis or scarring is seen.  No focal consolidation or pleural effusion.    No significant hepatic abnormality seen on this noncontrast exam.  There is no  intra-or extrahepatic biliary ductal dilatation.  Gallbladder has been removed.  The stomach, pancreas, spleen, and adrenal glands are unremarkable.    Kidneys show no evidence of hydronephrosis.  There is a single large 11 mm stone seen within the right kidney.  No abnormalities are seen along the ureteral courses.  Urinary bladder is nondistended.  Uterus has been removed.    Cecum and appendix terminate within the left lower quadrant.  Appendix is visualized and is otherwise unremarkable.  Postsurgical changes of gastric bypass are seen.  Focal dilated small bowel loops measuring upwards of 4.5 cm in caliber with small bowel feces sign is seen at the level of anastomosis and suture lines within the right mid abdomen.  No evidence of abnormal small bowel dilatation seen proximal or distal to this region.  No acute colonic abnormalities are seen.  No free air or free fluid.    Aorta tapers normally with mild atherosclerosis.    No acute osseous abnormality identified.  Degenerative changes are seen within the spine lower lumbar facet arthropathy.  Spinal stimulator device is seen within the subcutaneous soft tissues of the left lower back region which electrodes seen which extend superiorly into the lower visualized thoracic spine.

## 2023-05-05 NOTE — ASSESSMENT & PLAN NOTE
73yo F w/PMH DM (not on insulin), chronic back pain, HTN, asthma, RNY gastric bypass (2006) who presents to the ED with two days of abdominal pain     - NGT output minimal, pain improved  - Gastrografin challenge   - serial abdominal exams  - recommend strict NPO, strict Is and Os  - would avoid narcotic pain meds as this can worsen ileus picture, recommend keeping Mg >2   - patient's symptoms may be related to marginal ulcer, not on PPI or H2 blocker, would recommend GI consult after resolution of this partial obstructive episode to eval for marginal ulcer via EGD  - general surgery will continue to follow, please call with questions or changes to patient's clinical exam

## 2023-05-05 NOTE — PROGRESS NOTES
[facility]  General Surgery  Progress Note    Subjective:     History of Present Illness:  No notes on file    Post-Op Info:  * No surgery found *         Interval History: NAEON  NGT placed; minimal output overnight  No nausea, pain improved  Passing gas     Medications:  Continuous Infusions:   sodium chloride 0.9% 75 mL/hr at 05/04/23 2057     Scheduled Meds:   atenoloL  100 mg Oral Daily    atorvastatin  80 mg Oral Daily    cefTRIAXone (ROCEPHIN) IVPB  2 g Intravenous Q24H    enoxaparin  40 mg Subcutaneous Daily    EScitalopram oxalate  20 mg Oral Daily    gabapentin  800 mg Oral TID    losartan  100 mg Oral Daily    topiramate  100 mg Oral BID    traZODone  100 mg Oral QHS     PRN Meds:acetaminophen, aluminum-magnesium hydroxide-simethicone, dextrose 10%, dextrose 10%, dextrose 10%, dextrose 10%, glucagon (human recombinant), glucagon (human recombinant), glucose, glucose, hydrALAZINE, insulin aspart U-100, melatonin, naloxone, ondansetron, prochlorperazine, simethicone, sodium chloride 0.9%, tiZANidine     Review of patient's allergies indicates:  No Known Allergies  Objective:     Vital Signs (Most Recent):  Temp: 98.3 °F (36.8 °C) (05/05/23 0421)  Pulse: 70 (05/05/23 0421)  Resp: 18 (05/05/23 0421)  BP: (!) 144/68 (05/05/23 0421)  SpO2: (!) 93 % (05/05/23 0421)   Vital Signs (24h Range):  Temp:  [98.1 °F (36.7 °C)-99.7 °F (37.6 °C)] 98.3 °F (36.8 °C)  Pulse:  [64-94] 70  Resp:  [18] 18  SpO2:  [93 %-97 %] 93 %  BP: (139-175)/(66-77) 144/68     Weight: 95.4 kg (210 lb 5.1 oz)  Body mass index is 36.1 kg/m².    Intake/Output - Last 3 Shifts         05/03 0700  05/04 0659 05/04 0700  05/05 0659 05/05 0700  05/06 0659    P.O.  0     I.V. (mL/kg)  1044.1 (10.9)     IV Piggyback 1000      Total Intake(mL/kg) 1000 (11.1) 1044.1 (10.9)     Urine (mL/kg/hr)  0 (0)     Drains 175 100     Stool  0     Total Output 175 100     Net +825 +944.1            Urine Occurrence  1 x     Stool Occurrence  0 x               Physical Exam  Vitals and nursing note reviewed.   Constitutional:       Appearance: Normal appearance.   HENT:      Head: Normocephalic and atraumatic.      Nose:      Comments: NGT with thin gastric output  Cardiovascular:      Rate and Rhythm: Normal rate and regular rhythm.   Pulmonary:      Effort: Pulmonary effort is normal. No respiratory distress.   Abdominal:      General: Abdomen is flat. There is no distension.      Palpations: Abdomen is soft. There is no mass.      Hernia: No hernia is present.      Comments: Minimal tenderness to palpation   Musculoskeletal:      Right lower leg: No edema.      Left lower leg: No edema.   Skin:     General: Skin is warm and dry.   Neurological:      General: No focal deficit present.      Mental Status: She is alert and oriented to person, place, and time.   Psychiatric:         Mood and Affect: Mood normal.         Behavior: Behavior normal.        Significant Labs:  I have reviewed all pertinent lab results within the past 24 hours.  CBC:   Recent Labs   Lab 05/05/23 0551   WBC 11.26   RBC 4.80   HGB 12.0   HCT 39.4      MCV 82   MCH 25.0*   MCHC 30.5*     CMP:   Recent Labs   Lab 05/05/23  0551   *   CALCIUM 8.5*   ALBUMIN 3.2*   PROT 6.7      K 3.8   CO2 20*   *   BUN 14   CREATININE 1.0   ALKPHOS 80   ALT 11   AST 11   BILITOT 0.5       Significant Diagnostics:  I have reviewed all pertinent imaging results/findings within the past 24 hours.    Assessment/Plan:     Abdominal pain  71yo F w/PMH DM (not on insulin), chronic back pain, HTN, asthma, RNY gastric bypass (2006) who presents to the ED with two days of abdominal pain     - NGT output minimal, pain improved  - Gastrografin challenge   - serial abdominal exams  - recommend strict NPO, strict Is and Os  - would avoid narcotic pain meds as this can worsen ileus picture, recommend keeping Mg >2   - patient's symptoms may be related to marginal ulcer, not on PPI or H2  blocker, would recommend GI consult after resolution of this partial obstructive episode to eval for marginal ulcer via EGD  - general surgery will continue to follow, please call with questions or changes to patient's clinical exam        Wanda Justice MD  General Surgery  [facility]

## 2023-05-06 VITALS
HEIGHT: 64 IN | OXYGEN SATURATION: 95 % | BODY MASS INDEX: 35.9 KG/M2 | HEART RATE: 73 BPM | SYSTOLIC BLOOD PRESSURE: 159 MMHG | DIASTOLIC BLOOD PRESSURE: 70 MMHG | WEIGHT: 210.31 LBS | RESPIRATION RATE: 20 BRPM | TEMPERATURE: 98 F

## 2023-05-06 LAB
ALBUMIN SERPL BCP-MCNC: 3.1 G/DL (ref 3.5–5.2)
ALP SERPL-CCNC: 77 U/L (ref 55–135)
ALT SERPL W/O P-5'-P-CCNC: 14 U/L (ref 10–44)
ANION GAP SERPL CALC-SCNC: 8 MMOL/L (ref 8–16)
AST SERPL-CCNC: 14 U/L (ref 10–40)
BASOPHILS # BLD AUTO: 0.06 K/UL (ref 0–0.2)
BASOPHILS NFR BLD: 0.6 % (ref 0–1.9)
BILIRUB SERPL-MCNC: 0.3 MG/DL (ref 0.1–1)
BUN SERPL-MCNC: 13 MG/DL (ref 8–23)
CALCIUM SERPL-MCNC: 8.3 MG/DL (ref 8.7–10.5)
CHLORIDE SERPL-SCNC: 116 MMOL/L (ref 95–110)
CO2 SERPL-SCNC: 19 MMOL/L (ref 23–29)
CREAT SERPL-MCNC: 0.9 MG/DL (ref 0.5–1.4)
DIFFERENTIAL METHOD: ABNORMAL
EOSINOPHIL # BLD AUTO: 0.5 K/UL (ref 0–0.5)
EOSINOPHIL NFR BLD: 5.1 % (ref 0–8)
ERYTHROCYTE [DISTWIDTH] IN BLOOD BY AUTOMATED COUNT: 16.8 % (ref 11.5–14.5)
EST. GFR  (NO RACE VARIABLE): >60 ML/MIN/1.73 M^2
GLUCOSE SERPL-MCNC: 113 MG/DL (ref 70–110)
HCT VFR BLD AUTO: 37.6 % (ref 37–48.5)
HGB BLD-MCNC: 11.8 G/DL (ref 12–16)
IMM GRANULOCYTES # BLD AUTO: 0.05 K/UL (ref 0–0.04)
IMM GRANULOCYTES NFR BLD AUTO: 0.5 % (ref 0–0.5)
LYMPHOCYTES # BLD AUTO: 2.7 K/UL (ref 1–4.8)
LYMPHOCYTES NFR BLD: 27.8 % (ref 18–48)
MCH RBC QN AUTO: 25.9 PG (ref 27–31)
MCHC RBC AUTO-ENTMCNC: 31.4 G/DL (ref 32–36)
MCV RBC AUTO: 83 FL (ref 82–98)
MONOCYTES # BLD AUTO: 0.5 K/UL (ref 0.3–1)
MONOCYTES NFR BLD: 5.5 % (ref 4–15)
NEUTROPHILS # BLD AUTO: 5.9 K/UL (ref 1.8–7.7)
NEUTROPHILS NFR BLD: 60.5 % (ref 38–73)
NRBC BLD-RTO: 0 /100 WBC
PLATELET # BLD AUTO: 289 K/UL (ref 150–450)
PMV BLD AUTO: 9.9 FL (ref 9.2–12.9)
POCT GLUCOSE: 120 MG/DL (ref 70–110)
POCT GLUCOSE: 86 MG/DL (ref 70–110)
POTASSIUM SERPL-SCNC: 3.5 MMOL/L (ref 3.5–5.1)
PROT SERPL-MCNC: 6.4 G/DL (ref 6–8.4)
RBC # BLD AUTO: 4.55 M/UL (ref 4–5.4)
SODIUM SERPL-SCNC: 143 MMOL/L (ref 136–145)
WBC # BLD AUTO: 9.7 K/UL (ref 3.9–12.7)

## 2023-05-06 PROCEDURE — 99231 SBSQ HOSP IP/OBS SF/LOW 25: CPT | Mod: ,,, | Performed by: SURGERY

## 2023-05-06 PROCEDURE — 25000003 PHARM REV CODE 250: Performed by: HOSPITALIST

## 2023-05-06 PROCEDURE — 85025 COMPLETE CBC W/AUTO DIFF WBC: CPT | Performed by: HOSPITALIST

## 2023-05-06 PROCEDURE — 36415 COLL VENOUS BLD VENIPUNCTURE: CPT | Performed by: HOSPITALIST

## 2023-05-06 PROCEDURE — 99231 PR SUBSEQUENT HOSPITAL CARE,LEVL I: ICD-10-PCS | Mod: ,,, | Performed by: SURGERY

## 2023-05-06 PROCEDURE — 80053 COMPREHEN METABOLIC PANEL: CPT | Performed by: HOSPITALIST

## 2023-05-06 PROCEDURE — 25000003 PHARM REV CODE 250

## 2023-05-06 PROCEDURE — 25000003 PHARM REV CODE 250: Performed by: STUDENT IN AN ORGANIZED HEALTH CARE EDUCATION/TRAINING PROGRAM

## 2023-05-06 RX ORDER — INSULIN ASPART 100 [IU]/ML
1-10 INJECTION, SOLUTION INTRAVENOUS; SUBCUTANEOUS
Status: DISCONTINUED | OUTPATIENT
Start: 2023-05-06 | End: 2023-05-06 | Stop reason: HOSPADM

## 2023-05-06 RX ORDER — HYDROCHLOROTHIAZIDE 25 MG/1
25 TABLET ORAL DAILY
Status: DISCONTINUED | OUTPATIENT
Start: 2023-05-06 | End: 2023-05-06 | Stop reason: HOSPADM

## 2023-05-06 RX ORDER — FUROSEMIDE 20 MG/1
20 TABLET ORAL DAILY
Qty: 90 TABLET | Refills: 3 | Status: SHIPPED | OUTPATIENT
Start: 2023-05-06 | End: 2024-02-29

## 2023-05-06 RX ORDER — HYDROCHLOROTHIAZIDE 25 MG/1
25 TABLET ORAL DAILY
Qty: 90 TABLET | Refills: 3 | Status: SHIPPED | OUTPATIENT
Start: 2023-05-06 | End: 2024-02-29

## 2023-05-06 RX ORDER — FUROSEMIDE 20 MG/1
20 TABLET ORAL DAILY
Status: DISCONTINUED | OUTPATIENT
Start: 2023-05-06 | End: 2023-05-06 | Stop reason: HOSPADM

## 2023-05-06 RX ADMIN — TOPIRAMATE 100 MG: 100 TABLET, FILM COATED ORAL at 08:05

## 2023-05-06 RX ADMIN — ATENOLOL 100 MG: 50 TABLET ORAL at 08:05

## 2023-05-06 RX ADMIN — LOSARTAN POTASSIUM 100 MG: 25 TABLET, FILM COATED ORAL at 08:05

## 2023-05-06 RX ADMIN — GABAPENTIN 800 MG: 400 CAPSULE ORAL at 08:05

## 2023-05-06 RX ADMIN — ATORVASTATIN CALCIUM 80 MG: 40 TABLET, FILM COATED ORAL at 08:05

## 2023-05-06 RX ADMIN — FUROSEMIDE 20 MG: 20 TABLET ORAL at 10:05

## 2023-05-06 RX ADMIN — HYDROCHLOROTHIAZIDE 25 MG: 25 TABLET ORAL at 10:05

## 2023-05-06 RX ADMIN — ESCITALOPRAM OXALATE 20 MG: 10 TABLET ORAL at 08:05

## 2023-05-06 NOTE — NURSING
Pt discharged per MD order. IV removed. Catheter tip intact. No distress noted. Discharge instructions reviewed with pt. Given the opportunity for questions. All questions answered. AVS given to pt and placed in blue folder. Patient verbalized understanding of all instructions. Vitals per chart. afebrile. No complaints of pain, N/V, diarrhea, or SOB. Awaiting physician to examine pt before discharge.

## 2023-05-06 NOTE — PLAN OF CARE
Problem: Adult Inpatient Plan of Care  Goal: Plan of Care Review  Outcome: Ongoing, Progressing  Goal: Optimal Comfort and Wellbeing  Outcome: Ongoing, Progressing     Problem: Diabetes Comorbidity  Goal: Blood Glucose Level Within Targeted Range  Outcome: Ongoing, Progressing     Pr free from falls or any further trauma through out the shift. Prescribed medication administered. No complaint of pain. Inj NS infusing at 75 ml/hr. Pt in no distress. Will continue to monitor.

## 2023-05-06 NOTE — NURSING
[facility], [facility] [place]  Nurses Note -- 4 Eyes      5/5/2023       Skin assessed on: Q Shift      [x] No Pressure Injuries Present    []Prevention Measures Documented    [] Yes LDA  for Pressure Injury Previously documented     [] Yes New Pressure Injury Discovered   [] LDA for New Pressure Injury Added      Attending RN:  Deshaun Early RN     Second RN:  Charly Krishnan LPN

## 2023-05-06 NOTE — PLAN OF CARE
NurseKathy notified that patient is clear for DC from a CM standpoint     05/06/23 0956   Final Note   Assessment Type Final Discharge Note   Anticipated Discharge Disposition Home   Hospital Resources/Appts/Education Provided Appointments scheduled and added to AVS   Post-Acute Status   Discharge Delays None known at this time

## 2023-05-06 NOTE — SUBJECTIVE & OBJECTIVE
Interval History: RK  Passed gastrografin challenge, multiple BMs and contrast to colon   No nausea, pain     Medications:  Continuous Infusions:   sodium chloride 0.9% Stopped (05/06/23 0414)     Scheduled Meds:   atenoloL  100 mg Oral Daily    atorvastatin  80 mg Oral Daily    cefTRIAXone (ROCEPHIN) IVPB  2 g Intravenous Q24H    enoxaparin  40 mg Subcutaneous Daily    EScitalopram oxalate  20 mg Oral Daily    gabapentin  800 mg Oral TID    losartan  100 mg Oral Daily    topiramate  100 mg Oral BID    traZODone  100 mg Oral QHS     PRN Meds:acetaminophen, aluminum-magnesium hydroxide-simethicone, dextrose 10%, dextrose 10%, dextrose 10%, dextrose 10%, glucagon (human recombinant), glucagon (human recombinant), glucose, glucose, hydrALAZINE, insulin aspart U-100, melatonin, naloxone, ondansetron, prochlorperazine, simethicone, sodium chloride 0.9%, tiZANidine     Review of patient's allergies indicates:  No Known Allergies  Objective:     Vital Signs (Most Recent):  Temp: 98.4 °F (36.9 °C) (05/06/23 0735)  Pulse: 73 (05/06/23 0735)  Resp: 20 (05/06/23 0735)  BP: (!) 159/70 (05/06/23 0735)  SpO2: 95 % (05/06/23 0735)   Vital Signs (24h Range):  Temp:  [97.1 °F (36.2 °C)-98.4 °F (36.9 °C)] 98.4 °F (36.9 °C)  Pulse:  [73-91] 73  Resp:  [17-24] 20  SpO2:  [93 %-95 %] 95 %  BP: (122-187)/(54-98) 159/70     Weight: 95.4 kg (210 lb 5.1 oz)  Body mass index is 36.1 kg/m².    Intake/Output - Last 3 Shifts         05/04 0700  05/05 0659 05/05 0700 05/06 0659 05/06 0700 05/07 0659    P.O. 0 0     I.V. (mL/kg) 1044.1 (10.9) 1522.4 (16)     IV Piggyback  72.7     Total Intake(mL/kg) 1044.1 (10.9) 1595.2 (16.7)     Urine (mL/kg/hr) 0 (0) 1 (0)     Drains 100 150     Stool 0 1     Total Output 100 152     Net +944.1 +1443.2            Urine Occurrence 1 x 4 x     Stool Occurrence 0 x 1 x              Physical Exam  Vitals and nursing note reviewed.   Constitutional:       Appearance: Normal appearance.   HENT:      Head:  Normocephalic and atraumatic.   Cardiovascular:      Rate and Rhythm: Normal rate and regular rhythm.   Pulmonary:      Effort: Pulmonary effort is normal. No respiratory distress.   Abdominal:      General: Abdomen is flat. There is no distension.      Palpations: Abdomen is soft. There is no mass.      Tenderness: There is no abdominal tenderness.      Hernia: No hernia is present.   Musculoskeletal:      Right lower leg: No edema.      Left lower leg: No edema.   Skin:     General: Skin is warm and dry.   Neurological:      General: No focal deficit present.      Mental Status: She is alert and oriented to person, place, and time.   Psychiatric:         Mood and Affect: Mood normal.         Behavior: Behavior normal.        Significant Labs:  I have reviewed all pertinent lab results within the past 24 hours.      Significant Diagnostics:  I have reviewed all pertinent imaging results/findings within the past 24 hours.

## 2023-05-06 NOTE — PROGRESS NOTES
[facility]  General Surgery  Progress Note    Subjective:     History of Present Illness:  No notes on file    Post-Op Info:  * No surgery found *         Interval History: NAEON  Passed gastrografin challenge, multiple BMs and contrast to colon   No nausea, pain     Medications:  Continuous Infusions:   sodium chloride 0.9% Stopped (05/06/23 0414)     Scheduled Meds:   atenoloL  100 mg Oral Daily    atorvastatin  80 mg Oral Daily    cefTRIAXone (ROCEPHIN) IVPB  2 g Intravenous Q24H    enoxaparin  40 mg Subcutaneous Daily    EScitalopram oxalate  20 mg Oral Daily    gabapentin  800 mg Oral TID    losartan  100 mg Oral Daily    topiramate  100 mg Oral BID    traZODone  100 mg Oral QHS     PRN Meds:acetaminophen, aluminum-magnesium hydroxide-simethicone, dextrose 10%, dextrose 10%, dextrose 10%, dextrose 10%, glucagon (human recombinant), glucagon (human recombinant), glucose, glucose, hydrALAZINE, insulin aspart U-100, melatonin, naloxone, ondansetron, prochlorperazine, simethicone, sodium chloride 0.9%, tiZANidine     Review of patient's allergies indicates:  No Known Allergies  Objective:     Vital Signs (Most Recent):  Temp: 98.4 °F (36.9 °C) (05/06/23 0735)  Pulse: 73 (05/06/23 0735)  Resp: 20 (05/06/23 0735)  BP: (!) 159/70 (05/06/23 0735)  SpO2: 95 % (05/06/23 0735)   Vital Signs (24h Range):  Temp:  [97.1 °F (36.2 °C)-98.4 °F (36.9 °C)] 98.4 °F (36.9 °C)  Pulse:  [73-91] 73  Resp:  [17-24] 20  SpO2:  [93 %-95 %] 95 %  BP: (122-187)/(54-98) 159/70     Weight: 95.4 kg (210 lb 5.1 oz)  Body mass index is 36.1 kg/m².    Intake/Output - Last 3 Shifts         05/04 0700  05/05 0659 05/05 0700  05/06 0659 05/06 0700  05/07 0659    P.O. 0 0     I.V. (mL/kg) 1044.1 (10.9) 1522.4 (16)     IV Piggyback  72.7     Total Intake(mL/kg) 1044.1 (10.9) 1595.2 (16.7)     Urine (mL/kg/hr) 0 (0) 1 (0)     Drains 100 150     Stool 0 1     Total Output 100 152     Net +944.1 +1443.2            Urine Occurrence 1  x 4 x     Stool Occurrence 0 x 1 x             Physical Exam  Vitals and nursing note reviewed.   Constitutional:       Appearance: Normal appearance.   HENT:      Head: Normocephalic and atraumatic.   Cardiovascular:      Rate and Rhythm: Normal rate and regular rhythm.   Pulmonary:      Effort: Pulmonary effort is normal. No respiratory distress.   Abdominal:      General: Abdomen is flat. There is no distension.      Palpations: Abdomen is soft. There is no mass.      Tenderness: There is no abdominal tenderness.      Hernia: No hernia is present.   Musculoskeletal:      Right lower leg: No edema.      Left lower leg: No edema.   Skin:     General: Skin is warm and dry.   Neurological:      General: No focal deficit present.      Mental Status: She is alert and oriented to person, place, and time.   Psychiatric:         Mood and Affect: Mood normal.         Behavior: Behavior normal.        Significant Labs:  I have reviewed all pertinent lab results within the past 24 hours.      Significant Diagnostics:  I have reviewed all pertinent imaging results/findings within the past 24 hours.    Assessment/Plan:     Abdominal pain  71yo F w/PMH DM (not on insulin), chronic back pain, HTN, asthma, RNY gastric bypass (2006) who presents to the ED with two days of abdominal pain     - Gastrografin challenge 5/5 with mltiple bowel movements, contrast to colon  - tolerated CLD, advance to regular  - Okay to DC from surgery perspective should she tolerate solid food   - Remainder of care per primary         Wanda Justice MD  General Surgery  [facility] - Med Surg

## 2023-05-06 NOTE — PLAN OF CARE
Problem: Adult Inpatient Plan of Care  Goal: Plan of Care Review  Outcome: Met  Goal: Patient-Specific Goal (Individualized)  Outcome: Met  Goal: Absence of Hospital-Acquired Illness or Injury  Outcome: Met  Goal: Optimal Comfort and Wellbeing  Outcome: Met  Goal: Readiness for Transition of Care  Outcome: Met     Problem: Infection  Goal: Absence of Infection Signs and Symptoms  Outcome: Met     Problem: Electrolyte Imbalance  Goal: Electrolyte Balance  Outcome: Met     Problem: Fluid Deficit (Intestinal Obstruction)  Goal: Fluid Balance  Outcome: Met     Problem: Infection (Intestinal Obstruction)  Goal: Absence of Infection Signs and Symptoms  Outcome: Met     Problem: Nausea and Vomiting (Intestinal Obstruction)  Goal: Nausea and Vomiting Relief  Outcome: Met     Problem: Pain (Intestinal Obstruction)  Goal: Acceptable Pain Control  Outcome: Met     Problem: Diabetes Comorbidity  Goal: Blood Glucose Level Within Targeted Range  Outcome: Met

## 2023-05-06 NOTE — NURSING
[facility], [facility]  Nurses Note -- 4 Eyes      5/6/2023       Skin assessed on: Q Shift      [x] No Pressure Injuries Present    []Prevention Measures Documented    [] Yes LDA  for Pressure Injury Previously documented     [] Yes New Pressure Injury Discovered   [] LDA for New Pressure Injury Added      Attending RN:  Mary Rogel LPN     Second RN:  Deshaun Early RN

## 2023-05-06 NOTE — NURSING
Order to remove Ng tube if pt had bowel movements and not nauseated as per Dr Justice. Pt stated she had multiple bowel movements this afternoon and is not nauseous. Dr Justice notified. Order for clear liquid diet.  NG tube removed. Will continue to monitor.

## 2023-05-06 NOTE — DISCHARGE SUMMARY
[facility] Medicine  Discharge Summary      Patient Name: Faby Luna  MRN: [id number]  MIRTHA: [id number]  Patient Class: IP- Inpatient  Admission Date: 5/3/2023  Hospital Length of Stay: 1 days  Discharge Date and Time:  05/06/2023 9:49 AM  Attending Physician: Lamonte Kirk MD   Discharging Provider: Lamonte Kirk MD  Primary Care Provider: Renae Sprague DO    Primary Care Team: Networked reference to record PCT     HPI:   72 y.o. female with chronic pain syndrome, adjustment reaction with anxiety and depression, DM2, HTN, GERD, lumbar radiculopathy presents with a complaint of abdominal pain.  Acute onset, duration 1 day, location generalized throughout the abdomen, does not radiate, no known exacerbating or alleviating factors.  Denies fever, chills, cough, SOB, chest pain, dizziness, syncope, n/v/d. In the ED, vitals and labs reassuring, CT shows possible SBO.  NGT was placed.  Placed in observation.      * No surgery found *      Hospital Course:   Patient presented with abdominal pain, nausea, vomiting.   because unable to keep down blood pressure meds.  Found to have ileus/partial obstruction.  NG tube to suction placed.  Patient accidentally removed her NG tube on transfer.  NG tube replaced and reimage, ileus still apparent on KUB.  NG tube placed back to suction.  General surgery consulted.  Patient will undergo Gastrografin challenge.  WBC count increasing and temp increased to 99.7F, may have translocation, will start on antibiotics.  IV fluids being given.    Passed Challenge, advancing diet. Ok to d/c if able to keep meds/food/water down. F/U GI for scope outpt.        Schedule an EGD, follow up with GI   In addition to amlodipine and losartan, start taking hydrochlorothiazide for BP   Come back to the hospital if you are unable to keep down food/water/medications   Follow-up with your primary care doctor to help coordinate your care    Thank you for involving  me with your care, let me know if there is anything more I can do!        Lamonte Kirk MD  Internal Medicine Staff    Goals of Care Treatment Preferences:  Code Status: Full Code      Consults:   Consults (From admission, onward)        Status Ordering Provider     Inpatient consult to General Surgery  Once        Provider:  Jg Burk MD    Completed CATHI CHAVES JR          No new Assessment & Plan notes have been filed under this hospital service since the last note was generated.  Service: Hospital Medicine    Final Active Diagnoses:    Diagnosis Date Noted POA    PRINCIPAL PROBLEM:  Partial bowel obstruction [K56.600] 05/05/2023 Yes    Ileus [K56.7] 05/05/2023 Yes    Abdominal pain [R10.9] 05/05/2023 Yes    Moderate persistent asthma [J45.40] 08/29/2022 Yes     Chronic    Chronic renal failure, stage 3a [N18.31] 02/15/2021 Yes     Chronic    Major depressive disorder, recurrent episode, in partial remission [F33.41] 05/13/2019 Yes     Chronic    Type 2 diabetes mellitus with stage 3a chronic kidney disease, without long-term current use of insulin [E11.22, N18.31] 05/30/2018 Yes     Chronic    Essential hypertension [I10] 05/30/2018 Yes     Chronic      Problems Resolved During this Admission:       Discharged Condition: good    Disposition: Home or Self Care    Follow Up:    Patient Instructions:      Ambulatory referral/consult to Internal Medicine   Standing Status: Future   Referral Priority: Routine Referral Type: Consultation   Referral Reason: Specialty Services Required   Requested Specialty: Internal Medicine   Number of Visits Requested: 1     Ambulatory referral/consult to Gastroenterology   Standing Status: Future   Referral Priority: Routine Referral Type: Consultation   Referral Reason: Specialty Services Required   Referred to Provider: JOCELYNE ALBERTO Requested Specialty: Gastroenterology   Number of Visits Requested: 1     Case Request Endoscopy: EGD  (ESOPHAGOGASTRODUODENOSCOPY)     Order Specific Question Answer Comments   Medical Necessity: Medically Urgent [101]    CPT Code: CO EGD, FLEX, DIAGNOSTIC [75586]    Is an on-site pathologist required for this procedure? N/A    Is the patient currently on anticoagulants and/or antiplatelets? No        Significant Diagnostic Studies:     Recent Results (from the past 100 hour(s))   CBC W/ AUTO DIFFERENTIAL    Collection Time: 05/03/23  5:15 PM   Result Value Ref Range    WBC 11.78 3.90 - 12.70 K/uL    RBC 5.18 4.00 - 5.40 M/uL    Hemoglobin 13.4 12.0 - 16.0 g/dL    Hematocrit 41.2 37.0 - 48.5 %    MCV 80 (L) 82 - 98 fL    MCH 25.9 (L) 27.0 - 31.0 pg    MCHC 32.5 32.0 - 36.0 g/dL    RDW 16.5 (H) 11.5 - 14.5 %    Platelets 303 150 - 450 K/uL    MPV 9.8 9.2 - 12.9 fL    Immature Granulocytes 0.3 0.0 - 0.5 %    Gran # (ANC) 7.5 1.8 - 7.7 K/uL    Immature Grans (Abs) 0.03 0.00 - 0.04 K/uL    Lymph # 3.2 1.0 - 4.8 K/uL    Mono # 0.7 0.3 - 1.0 K/uL    Eos # 0.3 0.0 - 0.5 K/uL    Baso # 0.04 0.00 - 0.20 K/uL    nRBC 0 0 /100 WBC    Gran % 63.4 38.0 - 73.0 %    Lymph % 27.0 18.0 - 48.0 %    Mono % 6.1 4.0 - 15.0 %    Eosinophil % 2.9 0.0 - 8.0 %    Basophil % 0.3 0.0 - 1.9 %    Differential Method Automated    Comp. Metabolic Panel    Collection Time: 05/03/23  5:15 PM   Result Value Ref Range    Sodium 141 136 - 145 mmol/L    Potassium 3.9 3.5 - 5.1 mmol/L    Chloride 112 (H) 95 - 110 mmol/L    CO2 22 (L) 23 - 29 mmol/L    Glucose 147 (H) 70 - 110 mg/dL    BUN 14 8 - 23 mg/dL    Creatinine 1.0 0.5 - 1.4 mg/dL    Calcium 8.5 (L) 8.7 - 10.5 mg/dL    Total Protein 7.1 6.0 - 8.4 g/dL    Albumin 3.4 (L) 3.5 - 5.2 g/dL    Total Bilirubin 0.3 0.1 - 1.0 mg/dL    Alkaline Phosphatase 101 55 - 135 U/L    AST 15 10 - 40 U/L    ALT 15 10 - 44 U/L    Anion Gap 7 (L) 8 - 16 mmol/L    eGFR 59 (A) >60 mL/min/1.73 m^2   Lipase    Collection Time: 05/03/23  5:15 PM   Result Value Ref Range    Lipase 27 4 - 60 U/L   Lactic acid, plasma     Collection Time: 05/03/23  5:15 PM   Result Value Ref Range    Lactate (Lactic Acid) 1.2 0.5 - 2.2 mmol/L   Hepatic function panel    Collection Time: 05/03/23  5:15 PM   Result Value Ref Range    Total Protein 7.1 6.0 - 8.4 g/dL    Albumin 3.4 (L) 3.5 - 5.2 g/dL    Total Bilirubin 0.3 0.1 - 1.0 mg/dL    Bilirubin, Direct 0.1 0.1 - 0.3 mg/dL    AST 15 10 - 40 U/L    ALT 15 10 - 44 U/L    Alkaline Phosphatase 101 55 - 135 U/L   Troponin I    Collection Time: 05/03/23  5:15 PM   Result Value Ref Range    Troponin I <0.006 0.000 - 0.026 ng/mL   TSH    Collection Time: 05/03/23  5:15 PM   Result Value Ref Range    TSH 1.251 0.400 - 4.000 uIU/mL   ISTAT PROCEDURE    Collection Time: 05/03/23  5:43 PM   Result Value Ref Range    POC Glucose 148 (H) 70 - 110 mg/dL    POC BUN 16 6 - 30 mg/dL    POC Creatinine 1.0 0.5 - 1.4 mg/dL    POC Sodium 142 136 - 145 mmol/L    POC Potassium 3.8 3.5 - 5.1 mmol/L    POC Chloride 109 95 - 110 mmol/L    POC TCO2 (MEASURED) 22 (L) 23 - 29 mmol/L    POC Anion Gap 15 8 - 16 mmol/L    POC Ionized Calcium 1.22 1.06 - 1.42 mmol/L    POC Hematocrit 43 36 - 54 %PCV    Sample VENOUS    POCT glucose    Collection Time: 05/03/23 11:56 PM   Result Value Ref Range    POCT Glucose 178 (H) 70 - 110 mg/dL   Comprehensive Metabolic Panel (CMP)    Collection Time: 05/04/23  3:33 AM   Result Value Ref Range    Sodium 140 136 - 145 mmol/L    Potassium 3.7 3.5 - 5.1 mmol/L    Chloride 112 (H) 95 - 110 mmol/L    CO2 20 (L) 23 - 29 mmol/L    Glucose 179 (H) 70 - 110 mg/dL    BUN 14 8 - 23 mg/dL    Creatinine 0.9 0.5 - 1.4 mg/dL    Calcium 8.4 (L) 8.7 - 10.5 mg/dL    Total Protein 6.6 6.0 - 8.4 g/dL    Albumin 3.2 (L) 3.5 - 5.2 g/dL    Total Bilirubin 0.5 0.1 - 1.0 mg/dL    Alkaline Phosphatase 90 55 - 135 U/L    AST 12 10 - 40 U/L    ALT 14 10 - 44 U/L    Anion Gap 8 8 - 16 mmol/L    eGFR >60 >60 mL/min/1.73 m^2   CBC with Automated Differential    Collection Time: 05/04/23  3:33 AM   Result Value Ref Range     WBC 13.28 (H) 3.90 - 12.70 K/uL    RBC 5.26 4.00 - 5.40 M/uL    Hemoglobin 13.4 12.0 - 16.0 g/dL    Hematocrit 42.2 37.0 - 48.5 %    MCV 80 (L) 82 - 98 fL    MCH 25.5 (L) 27.0 - 31.0 pg    MCHC 31.8 (L) 32.0 - 36.0 g/dL    RDW 16.4 (H) 11.5 - 14.5 %    Platelets 318 150 - 450 K/uL    MPV 9.8 9.2 - 12.9 fL    Immature Granulocytes 0.4 0.0 - 0.5 %    Gran # (ANC) 9.6 (H) 1.8 - 7.7 K/uL    Immature Grans (Abs) 0.05 (H) 0.00 - 0.04 K/uL    Lymph # 2.9 1.0 - 4.8 K/uL    Mono # 0.7 0.3 - 1.0 K/uL    Eos # 0.1 0.0 - 0.5 K/uL    Baso # 0.03 0.00 - 0.20 K/uL    nRBC 0 0 /100 WBC    Gran % 72.5 38.0 - 73.0 %    Lymph % 21.5 18.0 - 48.0 %    Mono % 5.0 4.0 - 15.0 %    Eosinophil % 0.4 0.0 - 8.0 %    Basophil % 0.2 0.0 - 1.9 %    Differential Method Automated    C-Reactive Protein    Collection Time: 05/04/23  3:33 AM   Result Value Ref Range    CRP 14.9 (H) 0.0 - 8.2 mg/L   POCT glucose    Collection Time: 05/04/23  6:37 AM   Result Value Ref Range    POCT Glucose 164 (H) 70 - 110 mg/dL   POCT glucose    Collection Time: 05/04/23  9:28 AM   Result Value Ref Range    POCT Glucose 152 (H) 70 - 110 mg/dL   Blood culture    Collection Time: 05/04/23 11:50 AM    Specimen: Blood   Result Value Ref Range    Blood Culture, Routine No Growth to date     Blood Culture, Routine No Growth to date    Blood culture    Collection Time: 05/04/23 12:01 PM    Specimen: Blood   Result Value Ref Range    Blood Culture, Routine No Growth to date     Blood Culture, Routine No Growth to date    POCT glucose    Collection Time: 05/04/23 12:08 PM   Result Value Ref Range    POCT Glucose 154 (H) 70 - 110 mg/dL   Procalcitonin    Collection Time: 05/04/23  1:32 PM   Result Value Ref Range    Procalcitonin 0.05 <0.25 ng/mL   POCT glucose    Collection Time: 05/04/23  6:28 PM   Result Value Ref Range    POCT Glucose 149 (H) 70 - 110 mg/dL   POCT glucose    Collection Time: 05/05/23  4:22 AM   Result Value Ref Range    POCT Glucose 116 (H) 70 - 110 mg/dL    Comprehensive Metabolic Panel (CMP)    Collection Time: 05/05/23  5:51 AM   Result Value Ref Range    Sodium 141 136 - 145 mmol/L    Potassium 3.8 3.5 - 5.1 mmol/L    Chloride 114 (H) 95 - 110 mmol/L    CO2 20 (L) 23 - 29 mmol/L    Glucose 117 (H) 70 - 110 mg/dL    BUN 14 8 - 23 mg/dL    Creatinine 1.0 0.5 - 1.4 mg/dL    Calcium 8.5 (L) 8.7 - 10.5 mg/dL    Total Protein 6.7 6.0 - 8.4 g/dL    Albumin 3.2 (L) 3.5 - 5.2 g/dL    Total Bilirubin 0.5 0.1 - 1.0 mg/dL    Alkaline Phosphatase 80 55 - 135 U/L    AST 11 10 - 40 U/L    ALT 11 10 - 44 U/L    Anion Gap 7 (L) 8 - 16 mmol/L    eGFR 59 (A) >60 mL/min/1.73 m^2   CBC with Automated Differential    Collection Time: 05/05/23  5:51 AM   Result Value Ref Range    WBC 11.26 3.90 - 12.70 K/uL    RBC 4.80 4.00 - 5.40 M/uL    Hemoglobin 12.0 12.0 - 16.0 g/dL    Hematocrit 39.4 37.0 - 48.5 %    MCV 82 82 - 98 fL    MCH 25.0 (L) 27.0 - 31.0 pg    MCHC 30.5 (L) 32.0 - 36.0 g/dL    RDW 16.8 (H) 11.5 - 14.5 %    Platelets 284 150 - 450 K/uL    MPV 10.0 9.2 - 12.9 fL    Immature Granulocytes 0.4 0.0 - 0.5 %    Gran # (ANC) 7.0 1.8 - 7.7 K/uL    Immature Grans (Abs) 0.04 0.00 - 0.04 K/uL    Lymph # 3.2 1.0 - 4.8 K/uL    Mono # 0.6 0.3 - 1.0 K/uL    Eos # 0.4 0.0 - 0.5 K/uL    Baso # 0.05 0.00 - 0.20 K/uL    nRBC 0 0 /100 WBC    Gran % 62.2 38.0 - 73.0 %    Lymph % 28.2 18.0 - 48.0 %    Mono % 5.1 4.0 - 15.0 %    Eosinophil % 3.7 0.0 - 8.0 %    Basophil % 0.4 0.0 - 1.9 %    Differential Method Automated    POCT glucose    Collection Time: 05/06/23 12:27 AM   Result Value Ref Range    POCT Glucose 86 70 - 110 mg/dL   POCT glucose    Collection Time: 05/06/23  5:51 AM   Result Value Ref Range    POCT Glucose 120 (H) 70 - 110 mg/dL   Comprehensive Metabolic Panel (CMP)    Collection Time: 05/06/23  5:54 AM   Result Value Ref Range    Sodium 143 136 - 145 mmol/L    Potassium 3.5 3.5 - 5.1 mmol/L    Chloride 116 (H) 95 - 110 mmol/L    CO2 19 (L) 23 - 29 mmol/L    Glucose 113 (H) 70 -  110 mg/dL    BUN 13 8 - 23 mg/dL    Creatinine 0.9 0.5 - 1.4 mg/dL    Calcium 8.3 (L) 8.7 - 10.5 mg/dL    Total Protein 6.4 6.0 - 8.4 g/dL    Albumin 3.1 (L) 3.5 - 5.2 g/dL    Total Bilirubin 0.3 0.1 - 1.0 mg/dL    Alkaline Phosphatase 77 55 - 135 U/L    AST 14 10 - 40 U/L    ALT 14 10 - 44 U/L    Anion Gap 8 8 - 16 mmol/L    eGFR >60 >60 mL/min/1.73 m^2   CBC with Automated Differential    Collection Time: 05/06/23  5:54 AM   Result Value Ref Range    WBC 9.70 3.90 - 12.70 K/uL    RBC 4.55 4.00 - 5.40 M/uL    Hemoglobin 11.8 (L) 12.0 - 16.0 g/dL    Hematocrit 37.6 37.0 - 48.5 %    MCV 83 82 - 98 fL    MCH 25.9 (L) 27.0 - 31.0 pg    MCHC 31.4 (L) 32.0 - 36.0 g/dL    RDW 16.8 (H) 11.5 - 14.5 %    Platelets 289 150 - 450 K/uL    MPV 9.9 9.2 - 12.9 fL    Immature Granulocytes 0.5 0.0 - 0.5 %    Gran # (ANC) 5.9 1.8 - 7.7 K/uL    Immature Grans (Abs) 0.05 (H) 0.00 - 0.04 K/uL    Lymph # 2.7 1.0 - 4.8 K/uL    Mono # 0.5 0.3 - 1.0 K/uL    Eos # 0.5 0.0 - 0.5 K/uL    Baso # 0.06 0.00 - 0.20 K/uL    nRBC 0 0 /100 WBC    Gran % 60.5 38.0 - 73.0 %    Lymph % 27.8 18.0 - 48.0 %    Mono % 5.5 4.0 - 15.0 %    Eosinophil % 5.1 0.0 - 8.0 %    Basophil % 0.6 0.0 - 1.9 %    Differential Method Automated        Microbiology Results (last 7 days)     Procedure Component Value Units Date/Time    Blood culture [[id number]] Collected: 05/04/23 1201    Order Status: Completed Specimen: Blood Updated: 05/05/23 1303     Blood Culture, Routine No Growth to date      No Growth to date    Blood culture [[id number]] Collected: 05/04/23 1150    Order Status: Completed Specimen: Blood Updated: 05/05/23 1303     Blood Culture, Routine No Growth to date      No Growth to date          Imaging Results          XR NG/OG tube placement check, non-radiologist performed (Edited Result - FINAL)  Result time 05/04/23 13:04:29   Procedure changed from X-Ray Chest 1 View     Addendum (preliminary) 1 of 1 by Terrell Malik III, MD (05/04/23 13:04:29)      NG  tube-fundus of the stomach.      Electronically signed by: Terrell Malik MD  Date:    05/04/2023  Time:    13:04               Final result by Terrell Malik III, MD (05/04/23 10:42:39)                 Narrative:    EXAMINATION:  XR NG/OG TUBE PLACEMENT CHECK, NON-RADIOLOGIST PERFORMED    CLINICAL HISTORY:  ng tube placement;  Encounter for other specified special examinations    FINDINGS:  There is a posterior column stimulator.  Lung bases are clear.  No obstruction or perforation seen.  There is mild ileus.      Electronically signed by: Terrell Malik MD  Date:    05/04/2023  Time:    10:42                             X-Ray Abdomen AP 1 View (KUB) (Final result)  Result time 05/03/23 21:37:49    Final result by Arlen Fry MD (05/03/23 21:37:49)                 Impression:      See above.      Electronically signed by: Arlen Fry MD  Date:    05/03/2023  Time:    21:37             Narrative:    EXAMINATION:  XR ABDOMEN AP 1 VIEW    CLINICAL HISTORY:  Encounter for other specified special examinations    TECHNIQUE:  AP View(s) of the abdomen was performed.    COMPARISON:  CT abdomen and pelvis from the same date.    FINDINGS:  NG tube distal tip extends below the diaphragm likely into the proximal stomach.  See separate CT report for full intra-abdominal details.  Lungs are hypoinflated.  No consolidation, pneumothorax, or large pleural effusion seen.                               CT Abdomen Pelvis  Without Contrast (Final result)  Result time 05/03/23 18:56:41    Final result by Arlen Fry MD (05/03/23 18:56:41)                 Impression:      1. Postsurgical changes of gastric bypass.  Focal dilated small bowel loops with small bowel feces sign measuring 4.5 cm in caliber seen at the level of suture lines in the right mid abdomen.  Uncertain if this may represent a chronic finding versus potential developing small bowel obstruction.  Small bowel loops do not appear dilated proximal or  distal to this region.  2. No additional acute intra-abdominal abnormalities identified.  3. Nonobstructing right renal stone.  4. Postsurgical changes of cholecystectomy and hysterectomy.  5. Additional findings as detailed above.      Electronically signed by: Arlen Fry MD  Date:    05/03/2023  Time:    18:56             Narrative:    EXAMINATION:  CT ABDOMEN PELVIS WITHOUT CONTRAST    CLINICAL HISTORY:  Abdominal pain, acute, nonlocalized;    TECHNIQUE:  Low dose axial images, sagittal and coronal reformations were obtained from the lung bases to the pubic symphysis.  Oral contrast was not administered.    COMPARISON:  None    FINDINGS:  The visualized portion of the heart is unremarkable.  Minimal bibasilar atelectasis or scarring is seen.  No focal consolidation or pleural effusion.    No significant hepatic abnormality seen on this noncontrast exam.  There is no intra-or extrahepatic biliary ductal dilatation.  Gallbladder has been removed.  The stomach, pancreas, spleen, and adrenal glands are unremarkable.    Kidneys show no evidence of hydronephrosis.  There is a single large 11 mm stone seen within the right kidney.  No abnormalities are seen along the ureteral courses.  Urinary bladder is nondistended.  Uterus has been removed.    Cecum and appendix terminate within the left lower quadrant.  Appendix is visualized and is otherwise unremarkable.  Postsurgical changes of gastric bypass are seen.  Focal dilated small bowel loops measuring upwards of 4.5 cm in caliber with small bowel feces sign is seen at the level of anastomosis and suture lines within the right mid abdomen.  No evidence of abnormal small bowel dilatation seen proximal or distal to this region.  No acute colonic abnormalities are seen.  No free air or free fluid.    Aorta tapers normally with mild atherosclerosis.    No acute osseous abnormality identified.  Degenerative changes are seen within the spine lower lumbar facet arthropathy.   Spinal stimulator device is seen within the subcutaneous soft tissues of the left lower back region which electrodes seen which extend superiorly into the lower visualized thoracic spine.                                    Pending Diagnostic Studies:     None         Medications:  Reconciled Home Medications:      Medication List      START taking these medications    hydroCHLOROthiazide 25 MG tablet  Commonly known as: HYDRODIURIL  Take 1 tablet (25 mg total) by mouth once daily.        CHANGE how you take these medications    fluticasone propionate 50 mcg/actuation nasal spray  Commonly known as: FLONASE  2 sprays (100 mcg total) by Each Nostril route once daily.  What changed: Another medication with the same name was removed. Continue taking this medication, and follow the directions you see here.     furosemide 20 MG tablet  Commonly known as: LASIX  Take 1 tablet (20 mg total) by mouth once daily.  What changed:   · medication strength  · how much to take        CONTINUE taking these medications    acetaminophen 500 MG tablet  Commonly known as: TYLENOL  Take 1 tablet (500 mg total) by mouth every 6 (six) hours as needed for Temperature greater than or Pain.     * albuterol 90 mcg/actuation inhaler  Commonly known as: PROAIR HFA  Inhale 2 puffs into the lungs every 4 (four) hours as needed for Wheezing or Shortness of Breath. Rescue     * albuterol 90 mcg/actuation inhaler  Commonly known as: PROVENTIL/VENTOLIN HFA  Inhale 1-2 puffs into the lungs every 6 (six) hours as needed for Wheezing or Shortness of Breath. Rescue     * albuterol sulfate 2.5 mg/0.5 mL Nebu  Take 2.5 mg by nebulization every 4 (four) hours as needed (SOB/wheezing). Rescue     atenoloL 100 MG tablet  Commonly known as: TENORMIN  TAKE 1 TABLET EVERY DAY     budesonide-formoterol 160-4.5 mcg 160-4.5 mcg/actuation Hfaa  Commonly known as: SYMBICORT  Inhale 2 puffs into the lungs every 12 (twelve) hours. Controller (brand only) (stop breo)      EScitalopram oxalate 20 MG tablet  Commonly known as: LEXAPRO  TAKE 1 TABLET EVERY DAY     gabapentin 800 MG tablet  Commonly known as: NEURONTIN  Take 1 tablet (800 mg total) by mouth 3 (three) times daily.     glipiZIDE 10 MG tablet  Commonly known as: GLUCOTROL  TAKE 1 TABLET EVERY DAY WITH BREAKFAST.     guaiFENesin 100 mg/5 ml 100 mg/5 mL syrup  Commonly known as: ROBITUSSIN  Take 5-10 mLs (100-200 mg total) by mouth every 4 (four) hours as needed for Cough or Congestion.     hydrOXYzine pamoate 25 MG Cap  Commonly known as: VISTARIL  TAKE 1 CAPSULE BY MOUTH ONCE DAILY AS NEEDED FOR ANXIETY     loratadine 10 mg tablet  Commonly known as: CLARITIN  TAKE 1 TABLET EVERY DAY AS NEEDED FOR ALLERGIES     losartan 100 MG tablet  Commonly known as: COZAAR  Take 1 tablet (100 mg total) by mouth once daily.     pantoprazole 20 MG tablet  Commonly known as: PROTONIX  TAKE 1 TABLET TWICE DAILY  BEFORE  MEALS     promethazine-dextromethorphan 6.25-15 mg/5 mL Syrp  Commonly known as: PROMETHAZINE-DM  Take 5 mLs by mouth every 8 (eight) hours as needed (cough).     rosuvastatin 20 MG tablet  Commonly known as: CRESTOR  Take 1 tablet (20 mg total) by mouth once daily.     tiZANidine 4 MG tablet  Commonly known as: ZANAFLEX  TAKE 1 TABLET BY MOUTH EVERY 8 HOURS AS NEEDED FOR  MUSCLE  PAIN     topiramate 100 MG tablet  Commonly known as: TOPAMAX  Take 1 tablet (100 mg total) by mouth 2 (two) times daily.     TRADJENTA 5 mg Tab tablet  Generic drug: linaGLIPtin  Take 1 tablet (5 mg total) by mouth once daily.     traZODone 100 MG tablet  Commonly known as: DESYREL  Take 1 tablet (100 mg total) by mouth every evening.         * This list has 3 medication(s) that are the same as other medications prescribed for you. Read the directions carefully, and ask your doctor or other care provider to review them with you.            STOP taking these medications    benzonatate 200 MG capsule  Commonly known as: TESSALON     ketorolac 10  mg tablet  Commonly known as: TORADOL     ondansetron 4 MG Tbdl  Commonly known as: ZOFRAN-ODT            Indwelling Lines/Drains at time of discharge:   Lines/Drains/Airways     None                 Time spent on the discharge of patient: 35 minutes         Lamonte Kirk MD  Department of Hospital Medicine  [facility]

## 2023-05-06 NOTE — DISCHARGE INSTRUCTIONS
Schedule an EGD, follow up with GI  In addition to amlodipine and losartan, start taking hydrochlorothiazide for BP  Come back to the hospital if you are unable to keep down food/water/medications  Follow-up with your primary care doctor to help coordinate your care    Thank you for involving me with your care, let me know if there is anything more I can do!        Lamonte Kirk MD  Internal Medicine Staff        PATIENT GENERAL DISCHARGE INFORMATION   Things that YOU are responsible for to Manage Your Care At Home:  1. Getting your prescriptions filled.  2. Taking you medications as directed. (DO NOT MISS ANY DOSES!)  3. Going to your follow-up doctor appointments.                 *This is important because it allows the doctor to monitor your progress and make changes.      If you are unable to make your follow up appointments, please call the number listed and reschedule this appointment.   After discharge, if you need assistance, you can call [facility] On Call Nurse Care Line for 24/7 assistance at [phone]  If you are experience any signs or symptoms, Call your doctor or Call 911 and come to your nearest Emergency Room.    You should receive a call from [facility] Discharge Department within 48-72 hours to help manage your care after discharge.   Please try to make sure that you answer your phone for this important phone call.

## 2023-05-06 NOTE — SUBJECTIVE & OBJECTIVE
Interval History:   Tolerated liquid diet, advance to reg today  Can go home if tolerates normal diet    Review of Systems   Constitutional:  Positive for activity change, appetite change and fatigue.   Respiratory:  Negative for wheezing.    Cardiovascular:  Negative for chest pain, palpitations and leg swelling.   Gastrointestinal:  Positive for abdominal distention, abdominal pain and nausea. Negative for vomiting.     Objective:     Vital Signs (Most Recent):  Temp: 98.4 °F (36.9 °C) (05/06/23 0735)  Pulse: 73 (05/06/23 0735)  Resp: 20 (05/06/23 0735)  BP: (!) 159/70 (05/06/23 0735)  SpO2: 95 % (05/06/23 0735) Vital Signs (24h Range):  Temp:  [97.1 °F (36.2 °C)-98.4 °F (36.9 °C)] 98.4 °F (36.9 °C)  Pulse:  [73-91] 73  Resp:  [17-24] 20  SpO2:  [93 %-95 %] 95 %  BP: (122-187)/(54-98) 159/70     Weight: 95.4 kg (210 lb 5.1 oz)  Body mass index is 36.1 kg/m².    Intake/Output Summary (Last 24 hours) at 5/6/2023 0938  Last data filed at 5/6/2023 0830  Gross per 24 hour   Intake 2215.17 ml   Output 2 ml   Net 2213.17 ml           Physical Exam  Vitals and nursing note reviewed.   Constitutional:       General: She is in acute distress.      Appearance: She is well-developed. She is ill-appearing and toxic-appearing. She is not diaphoretic.   HENT:      Head: Normocephalic and atraumatic.   Eyes:      General: No scleral icterus.     Pupils: Pupils are equal, round, and reactive to light.   Neck:      Thyroid: No thyromegaly.   Cardiovascular:      Rate and Rhythm: Normal rate and regular rhythm.      Heart sounds: No murmur heard.  Pulmonary:      Effort: Pulmonary effort is normal.      Breath sounds: Normal breath sounds. No stridor. No wheezing or rales.   Abdominal:      General: There is distension.      Palpations: Abdomen is soft. There is no mass.      Tenderness: There is abdominal tenderness. There is guarding.   Musculoskeletal:         General: No swelling or deformity. Normal range of motion.       Cervical back: Normal range of motion and neck supple.   Skin:     General: Skin is warm and dry.      Capillary Refill: Capillary refill takes less than 2 seconds.      Coloration: Skin is not jaundiced.      Findings: No bruising.   Neurological:      Mental Status: She is alert and oriented to person, place, and time. Mental status is at baseline.      Cranial Nerves: No cranial nerve deficit.   Psychiatric:         Mood and Affect: Mood normal.         Behavior: Behavior normal.               Recent Results (from the past 24 hour(s))   POCT glucose    Collection Time: 05/06/23 12:27 AM   Result Value Ref Range    POCT Glucose 86 70 - 110 mg/dL   POCT glucose    Collection Time: 05/06/23  5:51 AM   Result Value Ref Range    POCT Glucose 120 (H) 70 - 110 mg/dL   Comprehensive Metabolic Panel (CMP)    Collection Time: 05/06/23  5:54 AM   Result Value Ref Range    Sodium 143 136 - 145 mmol/L    Potassium 3.5 3.5 - 5.1 mmol/L    Chloride 116 (H) 95 - 110 mmol/L    CO2 19 (L) 23 - 29 mmol/L    Glucose 113 (H) 70 - 110 mg/dL    BUN 13 8 - 23 mg/dL    Creatinine 0.9 0.5 - 1.4 mg/dL    Calcium 8.3 (L) 8.7 - 10.5 mg/dL    Total Protein 6.4 6.0 - 8.4 g/dL    Albumin 3.1 (L) 3.5 - 5.2 g/dL    Total Bilirubin 0.3 0.1 - 1.0 mg/dL    Alkaline Phosphatase 77 55 - 135 U/L    AST 14 10 - 40 U/L    ALT 14 10 - 44 U/L    Anion Gap 8 8 - 16 mmol/L    eGFR >60 >60 mL/min/1.73 m^2   CBC with Automated Differential    Collection Time: 05/06/23  5:54 AM   Result Value Ref Range    WBC 9.70 3.90 - 12.70 K/uL    RBC 4.55 4.00 - 5.40 M/uL    Hemoglobin 11.8 (L) 12.0 - 16.0 g/dL    Hematocrit 37.6 37.0 - 48.5 %    MCV 83 82 - 98 fL    MCH 25.9 (L) 27.0 - 31.0 pg    MCHC 31.4 (L) 32.0 - 36.0 g/dL    RDW 16.8 (H) 11.5 - 14.5 %    Platelets 289 150 - 450 K/uL    MPV 9.9 9.2 - 12.9 fL    Immature Granulocytes 0.5 0.0 - 0.5 %    Gran # (ANC) 5.9 1.8 - 7.7 K/uL    Immature Grans (Abs) 0.05 (H) 0.00 - 0.04 K/uL    Lymph # 2.7 1.0 - 4.8 K/uL     Mono # 0.5 0.3 - 1.0 K/uL    Eos # 0.5 0.0 - 0.5 K/uL    Baso # 0.06 0.00 - 0.20 K/uL    nRBC 0 0 /100 WBC    Gran % 60.5 38.0 - 73.0 %    Lymph % 27.8 18.0 - 48.0 %    Mono % 5.5 4.0 - 15.0 %    Eosinophil % 5.1 0.0 - 8.0 %    Basophil % 0.6 0.0 - 1.9 %    Differential Method Automated        Microbiology Results (last 7 days)       Procedure Component Value Units Date/Time    Blood culture [[id number]] Collected: 05/04/23 1201    Order Status: Completed Specimen: Blood Updated: 05/05/23 1303     Blood Culture, Routine No Growth to date      No Growth to date    Blood culture [[id number]] Collected: 05/04/23 1150    Order Status: Completed Specimen: Blood Updated: 05/05/23 1303     Blood Culture, Routine No Growth to date      No Growth to date             Imaging Results              XR NG/OG tube placement check, non-radiologist performed (Edited Result - FINAL)  Result time 05/04/23 13:04:29   Procedure changed from X-Ray Chest 1 View     Addendum (preliminary) 1 of 1 by Terrell Malik III, MD (05/04/23 13:04:29)      NG tube-fundus of the stomach.      Electronically signed by: Terrell Malik MD  Date:    05/04/2023  Time:    13:04                 Final result by Terrell Malik III, MD (05/04/23 10:42:39)                   Narrative:    EXAMINATION:  XR NG/OG TUBE PLACEMENT CHECK, NON-RADIOLOGIST PERFORMED    CLINICAL HISTORY:  ng tube placement;  Encounter for other specified special examinations    FINDINGS:  There is a posterior column stimulator.  Lung bases are clear.  No obstruction or perforation seen.  There is mild ileus.      Electronically signed by: Terrell Malik MD  Date:    05/04/2023  Time:    10:42                                     X-Ray Abdomen AP 1 View (KUB) (Final result)  Result time 05/03/23 21:37:49      Final result by Arlen Fry MD (05/03/23 21:37:49)                   Impression:      See above.      Electronically signed by: Arlen Fry  MD  Date:    05/03/2023  Time:    21:37               Narrative:    EXAMINATION:  XR ABDOMEN AP 1 VIEW    CLINICAL HISTORY:  Encounter for other specified special examinations    TECHNIQUE:  AP View(s) of the abdomen was performed.    COMPARISON:  CT abdomen and pelvis from the same date.    FINDINGS:  NG tube distal tip extends below the diaphragm likely into the proximal stomach.  See separate CT report for full intra-abdominal details.  Lungs are hypoinflated.  No consolidation, pneumothorax, or large pleural effusion seen.                                       CT Abdomen Pelvis  Without Contrast (Final result)  Result time 05/03/23 18:56:41      Final result by Arlen Fry MD (05/03/23 18:56:41)                   Impression:      1. Postsurgical changes of gastric bypass.  Focal dilated small bowel loops with small bowel feces sign measuring 4.5 cm in caliber seen at the level of suture lines in the right mid abdomen.  Uncertain if this may represent a chronic finding versus potential developing small bowel obstruction.  Small bowel loops do not appear dilated proximal or distal to this region.  2. No additional acute intra-abdominal abnormalities identified.  3. Nonobstructing right renal stone.  4. Postsurgical changes of cholecystectomy and hysterectomy.  5. Additional findings as detailed above.      Electronically signed by: Arlen Fry MD  Date:    05/03/2023  Time:    18:56               Narrative:    EXAMINATION:  CT ABDOMEN PELVIS WITHOUT CONTRAST    CLINICAL HISTORY:  Abdominal pain, acute, nonlocalized;    TECHNIQUE:  Low dose axial images, sagittal and coronal reformations were obtained from the lung bases to the pubic symphysis.  Oral contrast was not administered.    COMPARISON:  None    FINDINGS:  The visualized portion of the heart is unremarkable.  Minimal bibasilar atelectasis or scarring is seen.  No focal consolidation or pleural effusion.    No significant hepatic abnormality seen  on this noncontrast exam.  There is no intra-or extrahepatic biliary ductal dilatation.  Gallbladder has been removed.  The stomach, pancreas, spleen, and adrenal glands are unremarkable.    Kidneys show no evidence of hydronephrosis.  There is a single large 11 mm stone seen within the right kidney.  No abnormalities are seen along the ureteral courses.  Urinary bladder is nondistended.  Uterus has been removed.    Cecum and appendix terminate within the left lower quadrant.  Appendix is visualized and is otherwise unremarkable.  Postsurgical changes of gastric bypass are seen.  Focal dilated small bowel loops measuring upwards of 4.5 cm in caliber with small bowel feces sign is seen at the level of anastomosis and suture lines within the right mid abdomen.  No evidence of abnormal small bowel dilatation seen proximal or distal to this region.  No acute colonic abnormalities are seen.  No free air or free fluid.    Aorta tapers normally with mild atherosclerosis.    No acute osseous abnormality identified.  Degenerative changes are seen within the spine lower lumbar facet arthropathy.  Spinal stimulator device is seen within the subcutaneous soft tissues of the left lower back region which electrodes seen which extend superiorly into the lower visualized thoracic spine.

## 2023-05-06 NOTE — PROGRESS NOTES
[facility] Medicine  Progress Note    Patient Name: Faby Luna  MRN: [id number]  Patient Class: IP- Inpatient   Admission Date: 5/3/2023  Length of Stay: 1 days  Attending Physician: Lamonte Kirk MD  Primary Care Provider: Renae Sprague DO        Subjective:     Principal Problem:Partial bowel obstruction        HPI:  72 y.o. female with chronic pain syndrome, adjustment reaction with anxiety and depression, DM2, HTN, GERD, lumbar radiculopathy presents with a complaint of abdominal pain.  Acute onset, duration 1 day, location generalized throughout the abdomen, does not radiate, no known exacerbating or alleviating factors.  Denies fever, chills, cough, SOB, chest pain, dizziness, syncope, n/v/d. In the ED, vitals and labs reassuring, CT shows possible SBO.  NGT was placed.  Placed in observation.      Overview/Hospital Course:  Patient presented with abdominal pain, nausea, vomiting.   because unable to keep down blood pressure meds.  Found to have ileus/partial obstruction.  NG tube to suction placed.  Patient accidentally removed her NG tube on transfer.  NG tube replaced and reimage, ileus still apparent on KUB.  NG tube placed back to suction.  General surgery consulted.  Patient will undergo Gastrografin challenge.  WBC count increasing, may have translocation, will start on antibiotics.  IV fluids being given.       Interval History:   Tolerated liquid diet, advance to reg today  Can go home if tolerates normal diet    Review of Systems   Constitutional:  Positive for activity change, appetite change and fatigue.   Respiratory:  Negative for wheezing.    Cardiovascular:  Negative for chest pain, palpitations and leg swelling.   Gastrointestinal:  Positive for abdominal distention, abdominal pain and nausea. Negative for vomiting.     Objective:     Vital Signs (Most Recent):  Temp: 98.4 °F (36.9 °C) (05/06/23 0735)  Pulse: 73 (05/06/23 0735)  Resp: 20 (05/06/23  0735)  BP: (!) 159/70 (05/06/23 0735)  SpO2: 95 % (05/06/23 0735) Vital Signs (24h Range):  Temp:  [97.1 °F (36.2 °C)-98.4 °F (36.9 °C)] 98.4 °F (36.9 °C)  Pulse:  [73-91] 73  Resp:  [17-24] 20  SpO2:  [93 %-95 %] 95 %  BP: (122-187)/(54-98) 159/70     Weight: 95.4 kg (210 lb 5.1 oz)  Body mass index is 36.1 kg/m².    Intake/Output Summary (Last 24 hours) at 5/6/2023 0949  Last data filed at 5/6/2023 0830  Gross per 24 hour   Intake 2215.17 ml   Output 2 ml   Net 2213.17 ml           Physical Exam  Vitals and nursing note reviewed.   Constitutional:       General: She is in acute distress.      Appearance: She is well-developed. She is ill-appearing and toxic-appearing. She is not diaphoretic.   HENT:      Head: Normocephalic and atraumatic.   Eyes:      General: No scleral icterus.     Pupils: Pupils are equal, round, and reactive to light.   Neck:      Thyroid: No thyromegaly.   Cardiovascular:      Rate and Rhythm: Normal rate and regular rhythm.      Heart sounds: No murmur heard.  Pulmonary:      Effort: Pulmonary effort is normal.      Breath sounds: Normal breath sounds. No stridor. No wheezing or rales.   Abdominal:      General: There is distension.      Palpations: Abdomen is soft. There is no mass.      Tenderness: There is abdominal tenderness. There is guarding.   Musculoskeletal:         General: No swelling or deformity. Normal range of motion.      Cervical back: Normal range of motion and neck supple.   Skin:     General: Skin is warm and dry.      Capillary Refill: Capillary refill takes less than 2 seconds.      Coloration: Skin is not jaundiced.      Findings: No bruising.   Neurological:      Mental Status: She is alert and oriented to person, place, and time. Mental status is at baseline.      Cranial Nerves: No cranial nerve deficit.   Psychiatric:         Mood and Affect: Mood normal.         Behavior: Behavior normal.               Recent Results (from the past 24 hour(s))   POCT glucose     Collection Time: 05/06/23 12:27 AM   Result Value Ref Range    POCT Glucose 86 70 - 110 mg/dL   POCT glucose    Collection Time: 05/06/23  5:51 AM   Result Value Ref Range    POCT Glucose 120 (H) 70 - 110 mg/dL   Comprehensive Metabolic Panel (CMP)    Collection Time: 05/06/23  5:54 AM   Result Value Ref Range    Sodium 143 136 - 145 mmol/L    Potassium 3.5 3.5 - 5.1 mmol/L    Chloride 116 (H) 95 - 110 mmol/L    CO2 19 (L) 23 - 29 mmol/L    Glucose 113 (H) 70 - 110 mg/dL    BUN 13 8 - 23 mg/dL    Creatinine 0.9 0.5 - 1.4 mg/dL    Calcium 8.3 (L) 8.7 - 10.5 mg/dL    Total Protein 6.4 6.0 - 8.4 g/dL    Albumin 3.1 (L) 3.5 - 5.2 g/dL    Total Bilirubin 0.3 0.1 - 1.0 mg/dL    Alkaline Phosphatase 77 55 - 135 U/L    AST 14 10 - 40 U/L    ALT 14 10 - 44 U/L    Anion Gap 8 8 - 16 mmol/L    eGFR >60 >60 mL/min/1.73 m^2   CBC with Automated Differential    Collection Time: 05/06/23  5:54 AM   Result Value Ref Range    WBC 9.70 3.90 - 12.70 K/uL    RBC 4.55 4.00 - 5.40 M/uL    Hemoglobin 11.8 (L) 12.0 - 16.0 g/dL    Hematocrit 37.6 37.0 - 48.5 %    MCV 83 82 - 98 fL    MCH 25.9 (L) 27.0 - 31.0 pg    MCHC 31.4 (L) 32.0 - 36.0 g/dL    RDW 16.8 (H) 11.5 - 14.5 %    Platelets 289 150 - 450 K/uL    MPV 9.9 9.2 - 12.9 fL    Immature Granulocytes 0.5 0.0 - 0.5 %    Gran # (ANC) 5.9 1.8 - 7.7 K/uL    Immature Grans (Abs) 0.05 (H) 0.00 - 0.04 K/uL    Lymph # 2.7 1.0 - 4.8 K/uL    Mono # 0.5 0.3 - 1.0 K/uL    Eos # 0.5 0.0 - 0.5 K/uL    Baso # 0.06 0.00 - 0.20 K/uL    nRBC 0 0 /100 WBC    Gran % 60.5 38.0 - 73.0 %    Lymph % 27.8 18.0 - 48.0 %    Mono % 5.5 4.0 - 15.0 %    Eosinophil % 5.1 0.0 - 8.0 %    Basophil % 0.6 0.0 - 1.9 %    Differential Method Automated        Microbiology Results (last 7 days)       Procedure Component Value Units Date/Time    Blood culture [[id number]] Collected: 05/04/23 1201    Order Status: Completed Specimen: Blood Updated: 05/05/23 1303     Blood Culture, Routine No Growth to date      No Growth to date     Blood culture [[id number]] Collected: 05/04/23 1150    Order Status: Completed Specimen: Blood Updated: 05/05/23 1303     Blood Culture, Routine No Growth to date      No Growth to date             Imaging Results              XR NG/OG tube placement check, non-radiologist performed (Edited Result - FINAL)  Result time 05/04/23 13:04:29   Procedure changed from X-Ray Chest 1 View     Addendum (preliminary) 1 of 1 by Terrell Malik III, MD (05/04/23 13:04:29)      NG tube-fundus of the stomach.      Electronically signed by: Terrell Malik MD  Date:    05/04/2023  Time:    13:04                 Final result by Terrell Malik III, MD (05/04/23 10:42:39)                   Narrative:    EXAMINATION:  XR NG/OG TUBE PLACEMENT CHECK, NON-RADIOLOGIST PERFORMED    CLINICAL HISTORY:  ng tube placement;  Encounter for other specified special examinations    FINDINGS:  There is a posterior column stimulator.  Lung bases are clear.  No obstruction or perforation seen.  There is mild ileus.      Electronically signed by: Terrell Malik MD  Date:    05/04/2023  Time:    10:42                                     X-Ray Abdomen AP 1 View (KUB) (Final result)  Result time 05/03/23 21:37:49      Final result by Arlen Fry MD (05/03/23 21:37:49)                   Impression:      See above.      Electronically signed by: Arlen Fry MD  Date:    05/03/2023  Time:    21:37               Narrative:    EXAMINATION:  XR ABDOMEN AP 1 VIEW    CLINICAL HISTORY:  Encounter for other specified special examinations    TECHNIQUE:  AP View(s) of the abdomen was performed.    COMPARISON:  CT abdomen and pelvis from the same date.    FINDINGS:  NG tube distal tip extends below the diaphragm likely into the proximal stomach.  See separate CT report for full intra-abdominal details.  Lungs are hypoinflated.  No consolidation, pneumothorax, or large pleural effusion seen.                                       CT Abdomen Pelvis  Without  Contrast (Final result)  Result time 05/03/23 18:56:41      Final result by Arlen Fry MD (05/03/23 18:56:41)                   Impression:      1. Postsurgical changes of gastric bypass.  Focal dilated small bowel loops with small bowel feces sign measuring 4.5 cm in caliber seen at the level of suture lines in the right mid abdomen.  Uncertain if this may represent a chronic finding versus potential developing small bowel obstruction.  Small bowel loops do not appear dilated proximal or distal to this region.  2. No additional acute intra-abdominal abnormalities identified.  3. Nonobstructing right renal stone.  4. Postsurgical changes of cholecystectomy and hysterectomy.  5. Additional findings as detailed above.      Electronically signed by: Arlen Fry MD  Date:    05/03/2023  Time:    18:56               Narrative:    EXAMINATION:  CT ABDOMEN PELVIS WITHOUT CONTRAST    CLINICAL HISTORY:  Abdominal pain, acute, nonlocalized;    TECHNIQUE:  Low dose axial images, sagittal and coronal reformations were obtained from the lung bases to the pubic symphysis.  Oral contrast was not administered.    COMPARISON:  None    FINDINGS:  The visualized portion of the heart is unremarkable.  Minimal bibasilar atelectasis or scarring is seen.  No focal consolidation or pleural effusion.    No significant hepatic abnormality seen on this noncontrast exam.  There is no intra-or extrahepatic biliary ductal dilatation.  Gallbladder has been removed.  The stomach, pancreas, spleen, and adrenal glands are unremarkable.    Kidneys show no evidence of hydronephrosis.  There is a single large 11 mm stone seen within the right kidney.  No abnormalities are seen along the ureteral courses.  Urinary bladder is nondistended.  Uterus has been removed.    Cecum and appendix terminate within the left lower quadrant.  Appendix is visualized and is otherwise unremarkable.  Postsurgical changes of gastric bypass are seen.  Focal  dilated small bowel loops measuring upwards of 4.5 cm in caliber with small bowel feces sign is seen at the level of anastomosis and suture lines within the right mid abdomen.  No evidence of abnormal small bowel dilatation seen proximal or distal to this region.  No acute colonic abnormalities are seen.  No free air or free fluid.    Aorta tapers normally with mild atherosclerosis.    No acute osseous abnormality identified.  Degenerative changes are seen within the spine lower lumbar facet arthropathy.  Spinal stimulator device is seen within the subcutaneous soft tissues of the left lower back region which electrodes seen which extend superiorly into the lower visualized thoracic spine.                                            Assessment/Plan:      * Partial bowel obstruction  Partial bowel obstruction with distention and pain  Occasionally will pass gas  NGT to LIWS placed  WBC# rising, will initiated abx, as she may have translocation  GenSx consulted  Gastrografin challenge 5/5/23       Ileus  Patient has Non- operative ileus which is obstructive in etiology which is worsening.    Will treat conservatively with bowel rest, IV fluids, serial abdominal exams and avoidance of GI paralytics such as narcotics or anti-spasmodics. Monitor patient closely.   See partial bowel obstruction note      Moderate persistent asthma  PRN nebs    Chronic renal failure, stage 3a  Stable, at baseline, maintain euvolemic state, strict I/O's, monitor renal function and electrolytes, avoid nephrotoxic agents.     Major depressive disorder, recurrent episode, in partial remission  Patient has recurrent depression which is moderate and is currently controlled. Will Continue anti-depressant medications. We will not consult psychiatry at this time. Patient does not display psychosis at this time. Continue to monitor closely and adjust plan of care as needed.    Essential hypertension  Poorly controlled, continue home medications and  monitor blood pressure, adjust as needed.     Type 2 diabetes mellitus with stage 3a chronic kidney disease, without long-term current use of insulin  Patient's FSGs are controlled on current medication regimen.  Last A1c reviewed-   Lab Results   Component Value Date    HGBA1C 6.1 (H) 02/06/2023     Most recent fingerstick glucose reviewed- No results for input(s): POCTGLUCOSE in the last 24 hours.  Current correctional scale  Medium  Maintain anti-hyperglycemic dose as follows-   Antihyperglycemics (From admission, onward)      Start     Stop Route Frequency Ordered    05/03/23 2324  insulin aspart U-100 pen 1-10 Units         -- SubQ Every 6 hours PRN 05/03/23 2224          Hold Oral hypoglycemics while patient is in the hospital.      VTE Risk Mitigation (From admission, onward)           Ordered     enoxaparin injection 40 mg  Daily         05/03/23 2212     IP VTE HIGH RISK PATIENT  Once         05/03/23 2212     Place sequential compression device  Until discontinued         05/03/23 2212                    Discharge Planning   BRYAN:      Code Status: Full Code   Is the patient medically ready for discharge?:     Reason for patient still in hospital (select all that apply): Patient trending condition and Treatment  Discharge Plan A: Home with family (with instructions to follow up)                  Lamonte Kirk MD  Department of Hospital Medicine   [facility]

## 2023-05-06 NOTE — ASSESSMENT & PLAN NOTE
73yo F w/PMH DM (not on insulin), chronic back pain, HTN, asthma, RNY gastric bypass (2006) who presents to the ED with two days of abdominal pain     - Gastrografin challenge 5/5 with mltiple bowel movements, contrast to colon  - tolerated CLD, advance to regular  - Okay to DC from surgery perspective should she tolerate solid food   - Remainder of care per primary

## 2023-05-08 ENCOUNTER — PATIENT OUTREACH (OUTPATIENT)
Dept: ADMINISTRATIVE | Facility: CLINIC | Age: 73
End: 2023-05-08
Payer: MEDICARE

## 2023-05-08 LAB
BACTERIA BLD CULT: NORMAL
BACTERIA BLD CULT: NORMAL

## 2023-05-08 NOTE — PROGRESS NOTES
C3 nurse spoke with Faby Luna for a TCC post hospital discharge follow up call. The patient has a scheduled HOSFU appointment with Renae Sprague DO on 5/19/23 @ 0900.

## 2023-05-09 ENCOUNTER — PATIENT MESSAGE (OUTPATIENT)
Dept: ENDOSCOPY | Facility: HOSPITAL | Age: 73
End: 2023-05-09
Payer: MEDICARE

## 2023-05-09 ENCOUNTER — TELEPHONE (OUTPATIENT)
Dept: ENDOSCOPY | Facility: HOSPITAL | Age: 73
End: 2023-05-09
Payer: MEDICARE

## 2023-05-12 ENCOUNTER — HOSPITAL ENCOUNTER (OUTPATIENT)
Facility: HOSPITAL | Age: 73
Discharge: HOME | End: 2023-05-12
Attending: STUDENT IN AN ORGANIZED HEALTH CARE EDUCATION/TRAINING PROGRAM | Admitting: STUDENT IN AN ORGANIZED HEALTH CARE EDUCATION/TRAINING PROGRAM
Payer: MEDICARE

## 2023-05-12 ENCOUNTER — ANESTHESIA (OUTPATIENT)
Dept: ENDOSCOPY | Facility: HOSPITAL | Age: 73
End: 2023-05-12
Payer: MEDICARE

## 2023-05-12 ENCOUNTER — ANESTHESIA EVENT (OUTPATIENT)
Dept: ENDOSCOPY | Facility: HOSPITAL | Age: 73
End: 2023-05-12
Payer: MEDICARE

## 2023-05-12 VITALS
TEMPERATURE: 98 F | RESPIRATION RATE: 19 BRPM | DIASTOLIC BLOOD PRESSURE: 59 MMHG | OXYGEN SATURATION: 95 % | HEART RATE: 61 BPM | SYSTOLIC BLOOD PRESSURE: 109 MMHG

## 2023-05-12 DIAGNOSIS — K56.51 INTESTINAL ADHESIONS WITH PARTIAL OBSTRUCTION: ICD-10-CM

## 2023-05-12 LAB — POCT GLUCOSE: 71 MG/DL (ref 70–110)

## 2023-05-12 PROCEDURE — D9220A PRA ANESTHESIA: ICD-10-PCS | Mod: ANES,,, | Performed by: ANESTHESIOLOGY

## 2023-05-12 PROCEDURE — 43239 EGD BIOPSY SINGLE/MULTIPLE: CPT | Mod: ,,, | Performed by: STUDENT IN AN ORGANIZED HEALTH CARE EDUCATION/TRAINING PROGRAM

## 2023-05-12 PROCEDURE — 82962 GLUCOSE BLOOD TEST: CPT | Performed by: STUDENT IN AN ORGANIZED HEALTH CARE EDUCATION/TRAINING PROGRAM

## 2023-05-12 PROCEDURE — 63600175 PHARM REV CODE 636 W HCPCS: Performed by: NURSE ANESTHETIST, CERTIFIED REGISTERED

## 2023-05-12 PROCEDURE — D9220A PRA ANESTHESIA: Mod: ANES,,, | Performed by: ANESTHESIOLOGY

## 2023-05-12 PROCEDURE — 43239 EGD BIOPSY SINGLE/MULTIPLE: CPT | Performed by: STUDENT IN AN ORGANIZED HEALTH CARE EDUCATION/TRAINING PROGRAM

## 2023-05-12 PROCEDURE — 27201012 HC FORCEPS, HOT/COLD, DISP: Performed by: STUDENT IN AN ORGANIZED HEALTH CARE EDUCATION/TRAINING PROGRAM

## 2023-05-12 PROCEDURE — 88305 TISSUE EXAM BY PATHOLOGIST: ICD-10-PCS | Mod: 26,,, | Performed by: PATHOLOGY

## 2023-05-12 PROCEDURE — 25000003 PHARM REV CODE 250: Performed by: NURSE ANESTHETIST, CERTIFIED REGISTERED

## 2023-05-12 PROCEDURE — D9220A PRA ANESTHESIA: Mod: CRNA,,, | Performed by: NURSE ANESTHETIST, CERTIFIED REGISTERED

## 2023-05-12 PROCEDURE — 88305 TISSUE EXAM BY PATHOLOGIST: CPT | Mod: 26,,, | Performed by: PATHOLOGY

## 2023-05-12 PROCEDURE — 43239 PR EGD, FLEX, W/BIOPSY, SGL/MULTI: ICD-10-PCS | Mod: ,,, | Performed by: STUDENT IN AN ORGANIZED HEALTH CARE EDUCATION/TRAINING PROGRAM

## 2023-05-12 PROCEDURE — D9220A PRA ANESTHESIA: ICD-10-PCS | Mod: CRNA,,, | Performed by: NURSE ANESTHETIST, CERTIFIED REGISTERED

## 2023-05-12 PROCEDURE — 88305 TISSUE EXAM BY PATHOLOGIST: CPT | Performed by: PATHOLOGY

## 2023-05-12 PROCEDURE — 37000008 HC ANESTHESIA 1ST 15 MINUTES: Performed by: STUDENT IN AN ORGANIZED HEALTH CARE EDUCATION/TRAINING PROGRAM

## 2023-05-12 PROCEDURE — 37000009 HC ANESTHESIA EA ADD 15 MINS: Performed by: STUDENT IN AN ORGANIZED HEALTH CARE EDUCATION/TRAINING PROGRAM

## 2023-05-12 RX ORDER — PROPOFOL 10 MG/ML
INJECTION, EMULSION INTRAVENOUS
Status: DISCONTINUED | OUTPATIENT
Start: 2023-05-12 | End: 2023-05-12

## 2023-05-12 RX ORDER — PHENYLEPHRINE HYDROCHLORIDE 10 MG/ML
INJECTION INTRAVENOUS
Status: DISCONTINUED | OUTPATIENT
Start: 2023-05-12 | End: 2023-05-12

## 2023-05-12 RX ORDER — LIDOCAINE HYDROCHLORIDE 20 MG/ML
INJECTION, SOLUTION EPIDURAL; INFILTRATION; INTRACAUDAL; PERINEURAL
Status: DISCONTINUED
Start: 2023-05-12 | End: 2023-05-12 | Stop reason: HOSPADM

## 2023-05-12 RX ORDER — SODIUM CHLORIDE 9 MG/ML
INJECTION, SOLUTION INTRAVENOUS CONTINUOUS
Status: DISCONTINUED | OUTPATIENT
Start: 2023-05-12 | End: 2023-05-12 | Stop reason: HOSPADM

## 2023-05-12 RX ORDER — LIDOCAINE HYDROCHLORIDE 20 MG/ML
INJECTION INTRAVENOUS
Status: DISCONTINUED | OUTPATIENT
Start: 2023-05-12 | End: 2023-05-12

## 2023-05-12 RX ORDER — PHENYLEPHRINE HCL IN 0.9% NACL 1 MG/10 ML
SYRINGE (ML) INTRAVENOUS
Status: DISCONTINUED
Start: 2023-05-12 | End: 2023-05-12 | Stop reason: HOSPADM

## 2023-05-12 RX ORDER — PROPOFOL 10 MG/ML
INJECTION, EMULSION INTRAVENOUS
Status: DISCONTINUED
Start: 2023-05-12 | End: 2023-05-12 | Stop reason: HOSPADM

## 2023-05-12 RX ADMIN — SODIUM CHLORIDE: 0.9 INJECTION, SOLUTION INTRAVENOUS at 10:05

## 2023-05-12 RX ADMIN — PHENYLEPHRINE HYDROCHLORIDE 50 MCG: 10 INJECTION INTRAVENOUS at 10:05

## 2023-05-12 RX ADMIN — PROPOFOL 50 MG: 10 INJECTION, EMULSION INTRAVENOUS at 10:05

## 2023-05-12 RX ADMIN — LIDOCAINE HYDROCHLORIDE 100 MG: 20 INJECTION, SOLUTION INTRAVENOUS at 10:05

## 2023-05-12 RX ADMIN — PROPOFOL 30 MG: 10 INJECTION, EMULSION INTRAVENOUS at 10:05

## 2023-05-12 NOTE — H&P
Short Stay Endoscopy History and Physical    PCP - Renae Sprague DO  Referring Physician - Deann Jimenez MD  [address] [place] [address]    Procedure - EGD  ASA - per anesthesia  Mallampati - per anesthesia  History of Anesthesia problems - no  Family history Anesthesia problems -  no   Plan of anesthesia - General    HPI  72 y.o. female  Reason for procedure:  Intestinal adhesions with partial obstruction [K56.51]  Ileus [K56.7]      ROS:  Constitutional: No fevers, chills, No weight loss  CV: No chest pain  Pulm: No cough, No shortness of breath  GI: see HPI    Medical History:  has a past medical history of Anxiety with depression, Arthritis, CKD (chronic kidney disease) stage 2, GFR 60-89 ml/min, Diabetes mellitus, History of Clarisse-en-Y gastric bypass, Hypertension, Obesity, unspecified, DAHLIA (obstructive sleep apnea), and Spondylosis.    Surgical History:  has a past surgical history that includes Cholecystectomy; Hysterectomy;  section; Injection of facet joint (Bilateral, 2018); Epidural steroid injection into lumbar spine (N/A, 2018); Trial of spinal cord nerve stimulator (N/A, 2018); Carpal tunnel release (Right, 3/8/2019); Carpal tunnel release (Left, 2019); Injection of anesthetic agent around nerve (Bilateral, 2019); Injection of anesthetic agent around nerve (Bilateral, 10/17/2019); Radiofrequency ablation (Right, 2019); Radiofrequency ablation (Left, 2019); Radiofrequency ablation (Left, 2020); Radiofrequency ablation (Right, 2020); Radiofrequency ablation (Left, 2021); Radiofrequency ablation (Right, 2021); Cataract extraction (Bilateral); Radiofrequency ablation (Right, 2022); Radiofrequency ablation (Left, 2022); Radiofrequency ablation (Left, 3/16/2023); and Radiofrequency ablation (Right, 3/30/2023).    Family History: family history includes Cataracts in her mother; Glaucoma in her mother; No Known Problems  in her brother, father, maternal aunt, maternal grandfather, maternal grandmother, maternal uncle, paternal aunt, paternal grandfather, paternal grandmother, paternal uncle, and sister..    Social History:  reports that she has never smoked. She has never used smokeless tobacco. She reports current alcohol use. She reports that she does not use drugs.    Review of patient's allergies indicates:  No Known Allergies    Medications:   Medications Prior to Admission   Medication Sig Dispense Refill Last Dose    acetaminophen (TYLENOL) 500 MG tablet Take 1 tablet (500 mg total) by mouth every 6 (six) hours as needed for Temperature greater than or Pain. 20 tablet 0     albuterol (PROAIR HFA) 90 mcg/actuation inhaler Inhale 2 puffs into the lungs every 4 (four) hours as needed for Wheezing or Shortness of Breath. Rescue 8.5 g 1     albuterol (PROVENTIL/VENTOLIN HFA) 90 mcg/actuation inhaler Inhale 1-2 puffs into the lungs every 6 (six) hours as needed for Wheezing or Shortness of Breath. Rescue 8 g 0     albuterol sulfate 2.5 mg/0.5 mL Nebu Take 2.5 mg by nebulization every 4 (four) hours as needed (SOB/wheezing). Rescue 20 each 0     atenoloL (TENORMIN) 100 MG tablet TAKE 1 TABLET EVERY DAY 90 tablet 3     budesonide-formoterol 160-4.5 mcg (SYMBICORT) 160-4.5 mcg/actuation HFAA Inhale 2 puffs into the lungs every 12 (twelve) hours. Controller (brand only) (stop breo) 10.2 g 11     EScitalopram oxalate (LEXAPRO) 20 MG tablet TAKE 1 TABLET EVERY DAY 90 tablet 3     fluticasone propionate (FLONASE) 50 mcg/actuation nasal spray 2 sprays (100 mcg total) by Each Nostril route once daily. (Patient not taking: Reported on 5/8/2023) 11.1 mL 1     furosemide (LASIX) 20 MG tablet Take 1 tablet (20 mg total) by mouth once daily. 90 tablet 3     gabapentin (NEURONTIN) 800 MG tablet Take 1 tablet (800 mg total) by mouth 3 (three) times daily. 90 tablet 2     glipiZIDE (GLUCOTROL) 10 MG tablet TAKE 1 TABLET EVERY DAY WITH BREAKFAST. 90  tablet 3     guaiFENesin 100 mg/5 ml (ROBITUSSIN) 100 mg/5 mL syrup Take 5-10 mLs (100-200 mg total) by mouth every 4 (four) hours as needed for Cough or Congestion. (Patient not taking: Reported on 5/8/2023) 118 mL 0     hydroCHLOROthiazide (HYDRODIURIL) 25 MG tablet Take 1 tablet (25 mg total) by mouth once daily. 90 tablet 3     hydrOXYzine pamoate (VISTARIL) 25 MG Cap TAKE 1 CAPSULE BY MOUTH ONCE DAILY AS NEEDED FOR ANXIETY 90 capsule 3     linaGLIPtin (TRADJENTA) 5 mg Tab tablet Take 1 tablet (5 mg total) by mouth once daily. 90 tablet 3     loratadine (CLARITIN) 10 mg tablet TAKE 1 TABLET EVERY DAY AS NEEDED FOR ALLERGIES 90 tablet 3     losartan (COZAAR) 100 MG tablet Take 1 tablet (100 mg total) by mouth once daily. 90 tablet 3     pantoprazole (PROTONIX) 20 MG tablet TAKE 1 TABLET TWICE DAILY  BEFORE  MEALS 180 tablet 3     promethazine-dextromethorphan (PROMETHAZINE-DM) 6.25-15 mg/5 mL Syrp Take 5 mLs by mouth every 8 (eight) hours as needed (cough). (Patient not taking: Reported on 5/8/2023) 240 mL 0     rosuvastatin (CRESTOR) 20 MG tablet Take 1 tablet (20 mg total) by mouth once daily. 30 tablet 11     tiZANidine (ZANAFLEX) 4 MG tablet TAKE 1 TABLET BY MOUTH EVERY 8 HOURS AS NEEDED FOR  MUSCLE  PAIN 90 tablet 0     topiramate (TOPAMAX) 100 MG tablet Take 1 tablet (100 mg total) by mouth 2 (two) times daily. 180 tablet 1     traZODone (DESYREL) 100 MG tablet Take 1 tablet (100 mg total) by mouth every evening. 90 tablet 3        Physical Exam:    Vital Signs: There were no vitals filed for this visit.    General Appearance: Well appearing in no acute distress  Abdomen: Soft, non tender, non distended with normal bowel sounds, no masses      Labs:  Lab Results   Component Value Date    WBC 9.70 05/06/2023    HGB 11.8 (L) 05/06/2023    HCT 37.6 05/06/2023     05/06/2023    CHOL 156 02/06/2023    TRIG 100 02/06/2023    HDL 59 02/06/2023    ALT 14 05/06/2023    AST 14 05/06/2023     05/06/2023     K 3.5 05/06/2023     (H) 05/06/2023    CREATININE 0.9 05/06/2023    BUN 13 05/06/2023    CO2 19 (L) 05/06/2023    TSH 1.251 05/03/2023    HGBA1C 6.1 (H) 02/06/2023       I have explained the risks and benefits of this endoscopic procedure to the patient including but not limited to bleeding, inflammation, infection, perforation, and death.    Assessment/Plan:     N/V   History of gastric bypass     - Proceed with EGD     Deann Jimenez MD  Gastroenterology   [facility]

## 2023-05-12 NOTE — TRANSFER OF CARE
Anesthesia Transfer of Care Note    Patient: Faby Luna    Procedure(s) Performed: Procedure(s) (LRB):  EGD (ESOPHAGOGASTRODUODENOSCOPY) (N/A)    Anesthesia PACU Handoff    Last vitals:   Visit Vitals  BP (!) 106/53 (BP Location: Left arm, Patient Position: Lying)   Pulse 63   Temp 36.6 °C (97.9 °F) (Oral)   Resp 16   SpO2 100%   Breastfeeding No

## 2023-05-12 NOTE — ANESTHESIA PREPROCEDURE EVALUATION
05/12/2023  Faby Luna is a 72 y.o., female.      Pre-op Assessment    I have reviewed the Patient Summary Reports.     I have reviewed the Nursing Notes. I have reviewed the NPO Status.   I have reviewed the Medications.     Review of Systems  Anesthesia Hx:  No problems with previous Anesthesia  Denies Family Hx of Anesthesia complications.   Denies Personal Hx of Anesthesia complications.   Social:  No Alcohol Use, Non-Smoker    Cardiovascular:   Hypertension CAD      Pulmonary:   Sleep Apnea    Renal/:   Chronic Renal Disease, CKD    Hepatic/GI:   GERD    Musculoskeletal:  Lumbar Spine Disorders, Lumbar Disc Disease, Radiculopathy   Endocrine:   Diabetes, type 2  Obesity / BMI > 30  Psych:   depression          Physical Exam  General: Well nourished, Cooperative, Alert and Oriented    Airway:  Mallampati: / II  Mouth Opening: Normal  TM Distance: Normal  Tongue: Normal  Neck ROM: Extension Decreased    Dental:  Dentures  Complete upper and lower denture      Anesthesia Plan  Type of Anesthesia, risks & benefits discussed:    Anesthesia Type: Gen Natural Airway  Intra-op Monitoring Plan: Standard ASA Monitors  Induction:  IV  Informed Consent: Patient consented to blood products? No  ASA Score: 3    Ready For Surgery From Anesthesia Perspective.     .

## 2023-05-12 NOTE — ANESTHESIA POSTPROCEDURE EVALUATION
Anesthesia Post Evaluation    Patient: Faby Luna    Procedure(s) Performed: Procedure(s) (LRB):  EGD (ESOPHAGOGASTRODUODENOSCOPY) (N/A)    Final Anesthesia Type: general      Patient location during evaluation: GI PACU  Patient participation: Yes- Able to Participate  Level of consciousness: awake and alert  Post-procedure vital signs: reviewed and stable  Airway patency: patent    PONV status at discharge: No PONV  Anesthetic complications: no      Cardiovascular status: blood pressure returned to baseline and hemodynamically stable  Respiratory status: unassisted, spontaneous ventilation and room air  Hydration status: euvolemic  Follow-up not needed.          Vitals Value Taken Time   BP 91/55 05/12/23 1107   Temp 36.6 °C (97.9 °F) 05/12/23 1052   Pulse 62 05/12/23 1111   Resp 20 05/12/23 1111   SpO2 94 % 05/12/23 1111   Vitals shown include unvalidated device data.      No case tracking events are documented in the log.      Pain/Ron Score: Ron Score: 9 (5/12/2023 11:04 AM)

## 2023-05-12 NOTE — TRANSFER OF CARE
Anesthesia Transfer of Care Note    Patient: Faby Luna    Procedure(s) Performed: Procedure(s) (LRB):  EGD (ESOPHAGOGASTRODUODENOSCOPY) (N/A)    Patient location: GI    Anesthesia Type: MAC    Transport from OR: Transported from OR on room air with adequate spontaneous ventilation    Post pain: adequate analgesia    Post assessment: no apparent anesthetic complications and tolerated procedure well    Post vital signs: stable    Level of consciousness: awake, alert and oriented    Nausea/Vomiting: no nausea/vomiting    Complications: none    Transfer of care protocol was followed      Last vitals:   Visit Vitals  BP (!) 106/53 (BP Location: Left arm, Patient Position: Lying)   Pulse 63   Temp 36.6 °C (97.9 °F) (Oral)   Resp 16   SpO2 100%   Breastfeeding No

## 2023-05-12 NOTE — PROVATION PATIENT INSTRUCTIONS
Discharge Summary/Instructions after an Endoscopic Procedure  Patient Name: Faby Luna  Patient MRN: [id number]  Patient YOB: 1950  Friday, May 12, 2023  Deann Jimenez MD  Dear patient,  As a result of recent federal legislation (The Federal Cures Act), you may   receive lab or pathology results from your procedure in your [facility]   account before your physician is able to contact you. Your physician or   their representative will relay the results to you with their   recommendations at their soonest availability.  Thank you,  RESTRICTIONS:  During your procedure today, you received medications for sedation.  These   medications may affect your judgment, balance and coordination.  Therefore,   for 24 hours, you have the following restrictions:   - DO NOT drive a car, operate machinery, make legal/financial decisions,   sign important papers or drink alcohol.    ACTIVITY:  Today: no heavy lifting, straining or running due to procedural   sedation/anesthesia.  The following day: return to full activity including work.  DIET:  Eat and drink normally unless instructed otherwise.     TREATMENT FOR COMMON SIDE EFFECTS:  - Mild abdominal pain, nausea, belching, bloating or excessive gas:  rest,   eat lightly and use a heating pad.  - Sore Throat: treat with throat lozenges and/or gargle with warm salt   water.  - Because air was used during the procedure, expelling large amounts of air   from your rectum or belching is normal.  - If a bowel prep was taken, you may not have a bowel movement for 1-3 days.    This is normal.  SYMPTOMS TO WATCH FOR AND REPORT TO YOUR PHYSICIAN:  1. Abdominal pain or bloating, other than gas cramps.  2. Chest pain.  3. Back pain.  4. Signs of infection such as: chills or fever occurring within 24 hours   after the procedure.  5. Rectal bleeding, which would show as bright red, maroon, or black stools.   (A tablespoon of blood from the rectum is not serious, especially if    hemorrhoids are present.)  6. Vomiting.  7. Weakness or dizziness.  GO DIRECTLY TO THE NEAREST EMERGENCY ROOM IF YOU HAVE ANY OF THE FOLLOWING:      Difficulty breathing              Chills and/or fever over 101 F   Persistent vomiting and/or vomiting blood   Severe abdominal pain   Severe chest pain   Black, tarry stools   Bleeding- more than one tablespoon   Any other symptom or condition that you feel may need urgent attention  Your doctor recommends these additional instructions:  If any biopsies were taken, your doctors clinic will contact you in 1 to 2   weeks with any results.  - Discharge patient to home (ambulatory).   - Resume regular diet.   - Continue present medications.   - Await pathology results.  For questions, problems or results please call your physician - Deann Jimenez MD at Work:  [phone].  [facility] can be reached at [phone]     IF A COMPLICATION OR EMERGENCY SITUATION ARISES AND YOU ARE UNABLE TO REACH   YOUR PHYSICIAN - GO DIRECTLY TO THE EMERGENCY ROOM.  Deann Jimenez MD  5/12/2023 10:48:49 AM  This report has been verified and signed electronically.  Dear patient,  As a result of recent federal legislation (The Federal Cures Act), you may   receive lab or pathology results from your procedure in your [facility]   account before your physician is able to contact you. Your physician or   their representative will relay the results to you with their   recommendations at their soonest availability.  Thank you,  PROVATION

## 2023-05-15 ENCOUNTER — PES CALL (OUTPATIENT)
Dept: ADMINISTRATIVE | Facility: CLINIC | Age: 73
End: 2023-05-15
Payer: MEDICARE

## 2023-05-19 ENCOUNTER — OFFICE VISIT (OUTPATIENT)
Dept: FAMILY MEDICINE | Facility: CLINIC | Age: 73
End: 2023-05-19
Payer: MEDICARE

## 2023-05-19 ENCOUNTER — TELEPHONE (OUTPATIENT)
Dept: FAMILY MEDICINE | Facility: CLINIC | Age: 73
End: 2023-05-19

## 2023-05-19 VITALS
WEIGHT: 201.25 LBS | TEMPERATURE: 98 F | HEART RATE: 74 BPM | DIASTOLIC BLOOD PRESSURE: 52 MMHG | OXYGEN SATURATION: 96 % | HEIGHT: 64 IN | SYSTOLIC BLOOD PRESSURE: 106 MMHG | BODY MASS INDEX: 34.36 KG/M2

## 2023-05-19 DIAGNOSIS — E78.5 DYSLIPIDEMIA ASSOCIATED WITH TYPE 2 DIABETES MELLITUS: ICD-10-CM

## 2023-05-19 DIAGNOSIS — M46.00 SPINAL ENTHESOPATHY, SITE UNSPECIFIED: ICD-10-CM

## 2023-05-19 DIAGNOSIS — I10 ESSENTIAL HYPERTENSION: ICD-10-CM

## 2023-05-19 DIAGNOSIS — N18.31 TYPE 2 DIABETES MELLITUS WITH STAGE 3A CHRONIC KIDNEY DISEASE, WITHOUT LONG-TERM CURRENT USE OF INSULIN: ICD-10-CM

## 2023-05-19 DIAGNOSIS — E11.22 TYPE 2 DIABETES MELLITUS WITH STAGE 3A CHRONIC KIDNEY DISEASE, WITHOUT LONG-TERM CURRENT USE OF INSULIN: ICD-10-CM

## 2023-05-19 DIAGNOSIS — Z87.19 HISTORY OF SMALL BOWEL OBSTRUCTION: ICD-10-CM

## 2023-05-19 DIAGNOSIS — E11.69 DYSLIPIDEMIA ASSOCIATED WITH TYPE 2 DIABETES MELLITUS: ICD-10-CM

## 2023-05-19 DIAGNOSIS — Z09 HOSPITAL DISCHARGE FOLLOW-UP: Primary | ICD-10-CM

## 2023-05-19 DIAGNOSIS — E66.09 CLASS 1 OBESITY DUE TO EXCESS CALORIES WITH SERIOUS COMORBIDITY AND BODY MASS INDEX (BMI) OF 34.0 TO 34.9 IN ADULT: ICD-10-CM

## 2023-05-19 LAB
FINAL PATHOLOGIC DIAGNOSIS: NORMAL
Lab: NORMAL

## 2023-05-19 PROCEDURE — 1159F MED LIST DOCD IN RCRD: CPT | Mod: CPTII,,, | Performed by: FAMILY MEDICINE

## 2023-05-19 PROCEDURE — 4010F ACE/ARB THERAPY RXD/TAKEN: CPT | Mod: CPTII,,, | Performed by: FAMILY MEDICINE

## 2023-05-19 PROCEDURE — 99495 TRANSJ CARE MGMT MOD F2F 14D: CPT | Mod: ,,, | Performed by: FAMILY MEDICINE

## 2023-05-19 PROCEDURE — 3044F PR MOST RECENT HEMOGLOBIN A1C LEVEL <7.0%: ICD-10-PCS | Mod: CPTII,,, | Performed by: FAMILY MEDICINE

## 2023-05-19 PROCEDURE — 99999 PR PBB SHADOW E&M-EST. PATIENT-LVL V: CPT | Mod: PBBFAC,,, | Performed by: FAMILY MEDICINE

## 2023-05-19 PROCEDURE — 3066F NEPHROPATHY DOC TX: CPT | Mod: CPTII,,, | Performed by: FAMILY MEDICINE

## 2023-05-19 PROCEDURE — 3288F PR FALLS RISK ASSESSMENT DOCUMENTED: ICD-10-PCS | Mod: CPTII,,, | Performed by: FAMILY MEDICINE

## 2023-05-19 PROCEDURE — 99495 TCM SERVICES (MODERATE COMPLEXITY): ICD-10-PCS | Mod: ,,, | Performed by: FAMILY MEDICINE

## 2023-05-19 PROCEDURE — 3044F HG A1C LEVEL LT 7.0%: CPT | Mod: CPTII,,, | Performed by: FAMILY MEDICINE

## 2023-05-19 PROCEDURE — 99999 PR PBB SHADOW E&M-EST. PATIENT-LVL V: ICD-10-PCS | Mod: PBBFAC,,, | Performed by: FAMILY MEDICINE

## 2023-05-19 PROCEDURE — 3074F PR MOST RECENT SYSTOLIC BLOOD PRESSURE < 130 MM HG: ICD-10-PCS | Mod: CPTII,,, | Performed by: FAMILY MEDICINE

## 2023-05-19 PROCEDURE — 1160F RVW MEDS BY RX/DR IN RCRD: CPT | Mod: CPTII,,, | Performed by: FAMILY MEDICINE

## 2023-05-19 PROCEDURE — 1126F PR PAIN SEVERITY QUANTIFIED, NO PAIN PRESENT: ICD-10-PCS | Mod: CPTII,,, | Performed by: FAMILY MEDICINE

## 2023-05-19 PROCEDURE — 3066F PR DOCUMENTATION OF TREATMENT FOR NEPHROPATHY: ICD-10-PCS | Mod: CPTII,,, | Performed by: FAMILY MEDICINE

## 2023-05-19 PROCEDURE — 1111F DSCHRG MED/CURRENT MED MERGE: CPT | Mod: CPTII,,, | Performed by: FAMILY MEDICINE

## 2023-05-19 PROCEDURE — 1126F AMNT PAIN NOTED NONE PRSNT: CPT | Mod: CPTII,,, | Performed by: FAMILY MEDICINE

## 2023-05-19 PROCEDURE — 3008F PR BODY MASS INDEX (BMI) DOCUMENTED: ICD-10-PCS | Mod: CPTII,,, | Performed by: FAMILY MEDICINE

## 2023-05-19 PROCEDURE — 1111F PR DISCHARGE MEDS RECONCILED W/ CURRENT OUTPATIENT MED LIST: ICD-10-PCS | Mod: CPTII,,, | Performed by: FAMILY MEDICINE

## 2023-05-19 PROCEDURE — 1159F PR MEDICATION LIST DOCUMENTED IN MEDICAL RECORD: ICD-10-PCS | Mod: CPTII,,, | Performed by: FAMILY MEDICINE

## 2023-05-19 PROCEDURE — 4010F PR ACE/ARB THEARPY RXD/TAKEN: ICD-10-PCS | Mod: CPTII,,, | Performed by: FAMILY MEDICINE

## 2023-05-19 PROCEDURE — 1101F PR PT FALLS ASSESS DOC 0-1 FALLS W/OUT INJ PAST YR: ICD-10-PCS | Mod: CPTII,,, | Performed by: FAMILY MEDICINE

## 2023-05-19 PROCEDURE — 3074F SYST BP LT 130 MM HG: CPT | Mod: CPTII,,, | Performed by: FAMILY MEDICINE

## 2023-05-19 PROCEDURE — 1101F PT FALLS ASSESS-DOCD LE1/YR: CPT | Mod: CPTII,,, | Performed by: FAMILY MEDICINE

## 2023-05-19 PROCEDURE — 3288F FALL RISK ASSESSMENT DOCD: CPT | Mod: CPTII,,, | Performed by: FAMILY MEDICINE

## 2023-05-19 PROCEDURE — 3078F PR MOST RECENT DIASTOLIC BLOOD PRESSURE < 80 MM HG: ICD-10-PCS | Mod: CPTII,,, | Performed by: FAMILY MEDICINE

## 2023-05-19 PROCEDURE — 3061F NEG MICROALBUMINURIA REV: CPT | Mod: CPTII,,, | Performed by: FAMILY MEDICINE

## 2023-05-19 PROCEDURE — 3078F DIAST BP <80 MM HG: CPT | Mod: CPTII,,, | Performed by: FAMILY MEDICINE

## 2023-05-19 PROCEDURE — 3061F PR NEG MICROALBUMINURIA RESULT DOCUMENTED/REVIEW: ICD-10-PCS | Mod: CPTII,,, | Performed by: FAMILY MEDICINE

## 2023-05-19 PROCEDURE — 99215 OFFICE O/P EST HI 40 MIN: CPT | Mod: PO | Performed by: FAMILY MEDICINE

## 2023-05-19 PROCEDURE — 1160F PR REVIEW ALL MEDS BY PRESCRIBER/CLIN PHARMACIST DOCUMENTED: ICD-10-PCS | Mod: CPTII,,, | Performed by: FAMILY MEDICINE

## 2023-05-19 PROCEDURE — 3008F BODY MASS INDEX DOCD: CPT | Mod: CPTII,,, | Performed by: FAMILY MEDICINE

## 2023-05-19 RX ORDER — FENTANYL CITRATE 50 UG/ML
INJECTION, SOLUTION INTRAMUSCULAR; INTRAVENOUS
COMMUNITY
Start: 2023-03-30 | End: 2024-02-29

## 2023-05-19 RX ORDER — ORAL SEMAGLUTIDE 3 MG/1
3 TABLET ORAL DAILY
Qty: 30 TABLET | Refills: 11 | Status: SHIPPED | OUTPATIENT
Start: 2023-05-19 | End: 2023-07-31

## 2023-05-19 RX ORDER — DEXAMETHASONE SODIUM PHOSPHATE 10 MG/ML
INJECTION INTRAMUSCULAR; INTRAVENOUS
COMMUNITY
Start: 2023-03-30 | End: 2024-02-29

## 2023-05-19 RX ORDER — REGADENOSON 0.08 MG/ML
INJECTION, SOLUTION INTRAVENOUS
COMMUNITY
Start: 2023-03-07 | End: 2023-08-20

## 2023-05-19 RX ORDER — GLIPIZIDE 10 MG/1
10 TABLET ORAL DAILY
COMMUNITY
End: 2023-08-18 | Stop reason: SDUPTHER

## 2023-05-19 RX ORDER — MIDAZOLAM HYDROCHLORIDE 1 MG/ML
INJECTION INTRAMUSCULAR; INTRAVENOUS
COMMUNITY
Start: 2023-03-30

## 2023-05-19 NOTE — TELEPHONE ENCOUNTER
----- Message from Jorge Keith sent at 5/19/2023 11:33 AM CDT -----  Type: Patient Call Back    Who called:pt     What is the request in detail:pt states that her medication is to expensive     Can the clinic reply by [facility]?    Would the patient rather a call back or a response via [facility]?    Best call back number: [phone]      Additional Information:

## 2023-05-19 NOTE — PROGRESS NOTES
"Transitional Care Note  Subjective:       Patient ID: Faby Luna is a 72 y.o. female.  Chief Complaint: Follow-up    Family and/or Caretaker present at visit?  No.  Diagnostic tests reviewed/disposition: biopsy for H. Pylori.  Disease/illness education: N/A  Home health/community services discussion/referrals: Patient does not have home health established from hospital visit.  They do not need home health.  If needed, we will set up home health for the patient.   Establishment or re-establishment of referral orders for community resources: No other necessary community resources.   Discussion with other health care providers: No discussion with other health care providers necessary.   Patient is a 72-year-old female who presents to the office today to follow-up on a recent hospitalization for a small bowel obstruction from 5/3 - 5/6. She arrived 18 minutes past her appointment time. Her HPI and hospital course for summarized as below:    "HPI:   72 y.o. female with chronic pain syndrome, adjustment reaction with anxiety and depression, DM2, HTN, GERD, lumbar radiculopathy presents with a complaint of abdominal pain.  Acute onset, duration 1 day, location generalized throughout the abdomen, does not radiate, no known exacerbating or alleviating factors.  Denies fever, chills, cough, SOB, chest pain, dizziness, syncope, n/v/d. In the ED, vitals and labs reassuring, CT shows possible SBO.  NGT was placed.  Placed in observation.     Hospital Course:   Patient presented with abdominal pain, nausea, vomiting.   because unable to keep down blood pressure meds.  Found to have ileus/partial obstruction.  NG tube to suction placed.  Patient accidentally removed her NG tube on transfer.  NG tube replaced and reimage, ileus still apparent on KUB.  NG tube placed back to suction.  General surgery consulted.  Patient will undergo Gastrografin challenge.  WBC count increasing and temp increased to 99.7F, may " "have translocation, will start on antibiotics.  IV fluids being given.     Passed Challenge, advancing diet. Ok to d/c if able to keep meds/food/water down. F/U GI for scope outpt.     · Schedule an EGD, follow up with GI  · In addition to amlodipine and losartan, start taking hydrochlorothiazide for BP  · Come back to the hospital if you are unable to keep down food/water/medications  · Follow-up with your primary care doctor to help coordinate your care     Thank you for involving me with your care, let me know if there is anything more I can do!"    Patient underwent an EGD on 5/12 that was largely unremarkable. Biopsy for H.pylori is in process.     "Impression:            - Normal esophagus.                          - Z-line regular, 34 cm from the incisors.                          - Gastric bypass with a pouch 5 cm in length.                          Gastrojejunal anastomosis characterized by healthy                          appearing mucosa. Biopsied.                          - Normal examined jejunum."     Today, states that she is recovering well from the recent hospitalization.  She denies abdominal pain, nausea, vomiting, or fever.  Her appetite is stable and bowel movements are consistent.  She is interested in starting a diabetes medication to help her lose weight.    Review of Systems   Constitutional:  Negative for activity change, appetite change, chills, fever and unexpected weight change.   Gastrointestinal:  Negative for abdominal pain, constipation, diarrhea, nausea and vomiting.   Neurological:  Negative for weakness and light-headedness.   Psychiatric/Behavioral:  Negative for agitation and confusion.      Objective:      Physical Exam  Constitutional:       General: She is not in acute distress.     Appearance: She is obese.   HENT:      Head: Normocephalic and atraumatic.   Cardiovascular:      Rate and Rhythm: Normal rate and regular rhythm.      Pulses: Normal pulses.      Heart sounds: " Normal heart sounds.   Pulmonary:      Effort: Pulmonary effort is normal. No respiratory distress.      Breath sounds: Normal breath sounds.   Abdominal:      General: Bowel sounds are normal. There is no distension.      Palpations: Abdomen is soft.      Tenderness: There is no abdominal tenderness.   Skin:     General: Skin is warm and dry.      Findings: No rash.   Neurological:      General: No focal deficit present.      Mental Status: She is alert and oriented to person, place, and time.   Psychiatric:         Mood and Affect: Mood normal.         Behavior: Behavior normal.         Thought Content: Thought content normal.       Assessment & Plan:   Hospital discharge follow-up:    History of small bowel obstruction  Resolved.    Type 2 diabetes mellitus with stage 3a chronic kidney disease, without long-term current use of insulin  -     semaglutide (RYBELSUS) 3 mg tablet; Take 1 tablet (3 mg total) by mouth once daily.  Dispense: 30 tablet; Refill: 11  -     CBC Auto Differential; Future; Expected date: 05/19/2023  -     Comprehensive Metabolic Panel; Future; Expected date: 05/19/2023  -     Hemoglobin A1C; Future; Expected date: 05/19/2023  -     Lipid Panel; Future; Expected date: 05/19/2023    Start Rybelsus. Informed of nausea/GI upset as the most common side effect. D/C Tradjenta. Continue glipizide.   We will cancel her 5/29 appointment and push this back to August. Fasting labs due before f/u.    Dyslipidemia associated with type 2 diabetes mellitus  -     Lipid Panel; Future; Expected date: 05/19/2023    Class 1 obesity due to excess calories with serious comorbidity and body mass index (BMI) of 34.0 to 34.9 in adult  -     semaglutide (RYBELSUS) 3 mg tablet; Take 1 tablet (3 mg total) by mouth once daily.  Dispense: 30 tablet; Refill: 11  -     Hemoglobin A1C; Future; Expected date: 05/19/2023  -     Lipid Panel; Future; Expected date: 05/19/2023    Essential hypertension  -     CBC Auto  Differential; Future; Expected date: 05/19/2023  -     Comprehensive Metabolic Panel; Future; Expected date: 05/19/2023  -     Hemoglobin A1C; Future; Expected date: 05/19/2023  -     Lipid Panel; Future; Expected date: 05/19/2023    Compliant with antihypertensives - continue therapy plan.    Spinal enthesopathy, site unspecified  Continue management per specialist care.    F/u in 3 months.

## 2023-06-27 DIAGNOSIS — I10 ESSENTIAL HYPERTENSION: ICD-10-CM

## 2023-06-27 DIAGNOSIS — J30.2 SEASONAL ALLERGIES: ICD-10-CM

## 2023-06-27 RX ORDER — ATENOLOL 100 MG/1
TABLET ORAL
Refills: 0 | OUTPATIENT
Start: 2023-06-27

## 2023-06-27 RX ORDER — LORATADINE 10 MG/1
TABLET ORAL
Refills: 0 | OUTPATIENT
Start: 2023-06-27

## 2023-06-27 RX ORDER — TOPIRAMATE 100 MG/1
TABLET, FILM COATED ORAL
Refills: 0 | OUTPATIENT
Start: 2023-06-27

## 2023-06-27 NOTE — TELEPHONE ENCOUNTER
Refill Decision Note   Faby Luna  is requesting a refill authorization.  Brief Assessment and Rationale for Refill:  Quick Discontinue     Medication Therapy Plan:  No Sig Pharmacy is requesting new scripts for the following medications without required information, (sig/ frequency/qty/etc)      Medication Reconciliation Completed: No     Comments: Pharmacies have been requesting medications for patients without required information, (sig, frequency, qty, etc.). In addition, requests are sent for medication(s) pt. are currently not taking, and medications patients have never taken.    We have spoken to the pharmacies about these request types and advised their teams previously that we are unable to assess these New Script requests and require all details for these requests. This is a known issue and has been reported.     Note composed:11:54 AM 06/27/2023

## 2023-06-27 NOTE — TELEPHONE ENCOUNTER
Care Due:                  Date            Visit Type   Department     Provider  --------------------------------------------------------------------------------                                             [facility]     MED/ INTERNAL  Last Visit: 05-      FOLLOW UP    MED/ PEDS      Renae Sprague                              EP -         [facility]      MED/ INTERNAL  Next Visit: 08-      CARE ([facility])   MED/ PEDS      Renae Sprague                                                            Last  Test          Frequency    Reason                     Performed    Due Date  --------------------------------------------------------------------------------    HBA1C.......  6 months...  semaglutide..............  02- 08-    [facility] Embedded Care Due Messages. Reference number: [id number].   6/27/2023 11:08:59 AM CDT

## 2023-06-27 NOTE — TELEPHONE ENCOUNTER
Refill Decision Note   Faby Luna  is requesting a refill authorization.  Brief Assessment and Rationale for Refill:  Quick Discontinue     Medication Therapy Plan:  No Sig Pharmacy is requesting new scripts for the following medications without required information, (sig/ frequency/qty/etc)      Medication Reconciliation Completed: No     Comments: Pharmacies have been requesting medications for patients without required information, (sig, frequency, qty, etc.). In addition, requests are sent for medication(s) pt. are currently not taking, and medications patients have never taken.    We have spoken to the pharmacies about these request types and advised their teams previously that we are unable to assess these New Script requests and require all details for these requests. This is a known issue and has been reported.     Note composed:2:22 PM 06/27/2023

## 2023-07-21 ENCOUNTER — PES CALL (OUTPATIENT)
Dept: ADMINISTRATIVE | Facility: CLINIC | Age: 73
End: 2023-07-21
Payer: MEDICARE

## 2023-07-31 ENCOUNTER — OFFICE VISIT (OUTPATIENT)
Dept: BARIATRICS | Facility: CLINIC | Age: 73
End: 2023-07-31
Payer: MEDICARE

## 2023-07-31 VITALS
DIASTOLIC BLOOD PRESSURE: 72 MMHG | BODY MASS INDEX: 33.75 KG/M2 | SYSTOLIC BLOOD PRESSURE: 110 MMHG | HEART RATE: 76 BPM | OXYGEN SATURATION: 97 % | HEIGHT: 64 IN | WEIGHT: 197.69 LBS

## 2023-07-31 DIAGNOSIS — E66.9 OBESITY, CLASS I, BMI 30.0-34.9 (SEE ACTUAL BMI): Primary | ICD-10-CM

## 2023-07-31 DIAGNOSIS — N18.31 TYPE 2 DIABETES MELLITUS WITH STAGE 3A CHRONIC KIDNEY DISEASE, WITHOUT LONG-TERM CURRENT USE OF INSULIN: ICD-10-CM

## 2023-07-31 DIAGNOSIS — E11.22 TYPE 2 DIABETES MELLITUS WITH STAGE 3A CHRONIC KIDNEY DISEASE, WITHOUT LONG-TERM CURRENT USE OF INSULIN: ICD-10-CM

## 2023-07-31 PROCEDURE — 3061F PR NEG MICROALBUMINURIA RESULT DOCUMENTED/REVIEW: ICD-10-PCS | Mod: CPTII,S$GLB,, | Performed by: INTERNAL MEDICINE

## 2023-07-31 PROCEDURE — 1101F PT FALLS ASSESS-DOCD LE1/YR: CPT | Mod: CPTII,S$GLB,, | Performed by: INTERNAL MEDICINE

## 2023-07-31 PROCEDURE — 1126F PR PAIN SEVERITY QUANTIFIED, NO PAIN PRESENT: ICD-10-PCS | Mod: CPTII,S$GLB,, | Performed by: INTERNAL MEDICINE

## 2023-07-31 PROCEDURE — 1159F PR MEDICATION LIST DOCUMENTED IN MEDICAL RECORD: ICD-10-PCS | Mod: CPTII,S$GLB,, | Performed by: INTERNAL MEDICINE

## 2023-07-31 PROCEDURE — 3074F SYST BP LT 130 MM HG: CPT | Mod: CPTII,S$GLB,, | Performed by: INTERNAL MEDICINE

## 2023-07-31 PROCEDURE — 99999 PR PBB SHADOW E&M-EST. PATIENT-LVL V: ICD-10-PCS | Mod: PBBFAC,,, | Performed by: INTERNAL MEDICINE

## 2023-07-31 PROCEDURE — 3061F NEG MICROALBUMINURIA REV: CPT | Mod: CPTII,S$GLB,, | Performed by: INTERNAL MEDICINE

## 2023-07-31 PROCEDURE — 1101F PR PT FALLS ASSESS DOC 0-1 FALLS W/OUT INJ PAST YR: ICD-10-PCS | Mod: CPTII,S$GLB,, | Performed by: INTERNAL MEDICINE

## 2023-07-31 PROCEDURE — 1160F RVW MEDS BY RX/DR IN RCRD: CPT | Mod: CPTII,S$GLB,, | Performed by: INTERNAL MEDICINE

## 2023-07-31 PROCEDURE — 3078F DIAST BP <80 MM HG: CPT | Mod: CPTII,S$GLB,, | Performed by: INTERNAL MEDICINE

## 2023-07-31 PROCEDURE — 3066F PR DOCUMENTATION OF TREATMENT FOR NEPHROPATHY: ICD-10-PCS | Mod: CPTII,S$GLB,, | Performed by: INTERNAL MEDICINE

## 2023-07-31 PROCEDURE — 1160F PR REVIEW ALL MEDS BY PRESCRIBER/CLIN PHARMACIST DOCUMENTED: ICD-10-PCS | Mod: CPTII,S$GLB,, | Performed by: INTERNAL MEDICINE

## 2023-07-31 PROCEDURE — 3288F PR FALLS RISK ASSESSMENT DOCUMENTED: ICD-10-PCS | Mod: CPTII,S$GLB,, | Performed by: INTERNAL MEDICINE

## 2023-07-31 PROCEDURE — 3078F PR MOST RECENT DIASTOLIC BLOOD PRESSURE < 80 MM HG: ICD-10-PCS | Mod: CPTII,S$GLB,, | Performed by: INTERNAL MEDICINE

## 2023-07-31 PROCEDURE — 99999 PR PBB SHADOW E&M-EST. PATIENT-LVL V: CPT | Mod: PBBFAC,,, | Performed by: INTERNAL MEDICINE

## 2023-07-31 PROCEDURE — 3074F PR MOST RECENT SYSTOLIC BLOOD PRESSURE < 130 MM HG: ICD-10-PCS | Mod: CPTII,S$GLB,, | Performed by: INTERNAL MEDICINE

## 2023-07-31 PROCEDURE — 3066F NEPHROPATHY DOC TX: CPT | Mod: CPTII,S$GLB,, | Performed by: INTERNAL MEDICINE

## 2023-07-31 PROCEDURE — 3008F PR BODY MASS INDEX (BMI) DOCUMENTED: ICD-10-PCS | Mod: CPTII,S$GLB,, | Performed by: INTERNAL MEDICINE

## 2023-07-31 PROCEDURE — 4010F PR ACE/ARB THEARPY RXD/TAKEN: ICD-10-PCS | Mod: CPTII,S$GLB,, | Performed by: INTERNAL MEDICINE

## 2023-07-31 PROCEDURE — 1159F MED LIST DOCD IN RCRD: CPT | Mod: CPTII,S$GLB,, | Performed by: INTERNAL MEDICINE

## 2023-07-31 PROCEDURE — 3008F BODY MASS INDEX DOCD: CPT | Mod: CPTII,S$GLB,, | Performed by: INTERNAL MEDICINE

## 2023-07-31 PROCEDURE — 99213 PR OFFICE/OUTPT VISIT, EST, LEVL III, 20-29 MIN: ICD-10-PCS | Mod: S$GLB,,, | Performed by: INTERNAL MEDICINE

## 2023-07-31 PROCEDURE — 99213 OFFICE O/P EST LOW 20 MIN: CPT | Mod: S$GLB,,, | Performed by: INTERNAL MEDICINE

## 2023-07-31 PROCEDURE — 3044F HG A1C LEVEL LT 7.0%: CPT | Mod: CPTII,S$GLB,, | Performed by: INTERNAL MEDICINE

## 2023-07-31 PROCEDURE — 3044F PR MOST RECENT HEMOGLOBIN A1C LEVEL <7.0%: ICD-10-PCS | Mod: CPTII,S$GLB,, | Performed by: INTERNAL MEDICINE

## 2023-07-31 PROCEDURE — 4010F ACE/ARB THERAPY RXD/TAKEN: CPT | Mod: CPTII,S$GLB,, | Performed by: INTERNAL MEDICINE

## 2023-07-31 PROCEDURE — 3288F FALL RISK ASSESSMENT DOCD: CPT | Mod: CPTII,S$GLB,, | Performed by: INTERNAL MEDICINE

## 2023-07-31 PROCEDURE — 1126F AMNT PAIN NOTED NONE PRSNT: CPT | Mod: CPTII,S$GLB,, | Performed by: INTERNAL MEDICINE

## 2023-07-31 RX ORDER — TOPIRAMATE 100 MG/1
100 TABLET, FILM COATED ORAL 2 TIMES DAILY
Qty: 180 TABLET | Refills: 1 | Status: SHIPPED | OUTPATIENT
Start: 2023-07-31 | End: 2023-10-25 | Stop reason: SDUPTHER

## 2023-07-31 RX ORDER — ORAL SEMAGLUTIDE 7 MG/1
7 TABLET ORAL DAILY
Qty: 30 TABLET | Refills: 4 | Status: SHIPPED | OUTPATIENT
Start: 2023-07-31 | End: 2023-10-25

## 2023-07-31 NOTE — PROGRESS NOTES
Subjective:       Patient ID: Faby Luna is a 72 y.o. female.    Chief Complaint: Follow-up      CC:    Pt here today for follow-up. Has lost 20.2 lbs (-4.4 lbs muscle. -12 lbs fat). WAs to get back on track with bariatric diet and started topiramate 100 mg BID. Denies SE. She states she is taking both of them together. States she is eating yogurt, cottage cheese, chicken, fish, pork. Not using protein shake.  A1c is improving. Walking up to 3 blocks. Has started at the gym.   Currently at 34% EWL from bypass    New BMR: 1534    New PBF: 39.9%     Initial:  BMR:1617    PBF:  41.6%    From recent RD notes.   Back on track Bariatric Diet.  Diet Recall: 60-80 grams of protein/day; 32-48 oz of fluids/day     Current diet recall:  B: prot shake OR skips  L: 1/4 cup nuts or cheese + grapes or kiwi or small apple or banana  D: baked/smothered chicken or fish with variety of veg (asparagus, greens or spinach cooked down with olive oil, brussels, broccoli)  Sn: 1/4 cup nuts, piece of fruit     Diet includes:  Meal Pattern: 1 meal(s) + 1-2 snack(s) + 2 protein supplement(s)  Adequate protein supplement intake. Premier shakes.  Adequate dairy intake.  Adequate vegetable intake. Tolerates raw vegetables and lettuce.  Adequate fruit intake.  Starchy CHO: reduced lately, small amounts  Beverages: water, CL, Coke zero  Alcohol: twice/month white liquor + diet cranberry juice     EXERCISE:  None.  Considering joining the gym (it's free with her insurance)     Restrictions to Exercise: None. Back pain.     VITAMINS / MINERALS:  Multivitamins  B-Complex  Calcium Citrate + Vitamin D  Vitamin B12: Sublingual.     ASSESSMENT:  Doing well overall.  Weight loss.  Adequate calorie intake.  Adequate protein intake.  Inadequate fluid intake.  Following diet inappropriately.  Not exercising.  Adequate vitamins & minerals.     BARIATRIC DIET DISCUSSION:  Instructed and provided written materials on bariatric diet  "plan.  Reinforced post-op nutrition guidelines.     PLAN / RECOMMENDATIONS:  Continue Back on track with diet plan.  Maintain/Increase protein intake.  Maintain/Increase fluid intake.  Begin light exercise as mary.  Continue appropriate vitamins & minerals.     Return to clinic in 1 months with med wt loss.    Lab Results       Component                Value               Date                       HGBA1C                   6.1 (H)             02/06/2023                 HGBA1C                   6.6 (H)             08/03/2022                 HGBA1C                   6.5 (H)             10/11/2021            Lab Results       Component                Value               Date                       LDLCALC                  77.0                02/06/2023                 CREATININE               0.9                 05/06/2023                  Review of Systems      Objective:    /72 (BP Location: Left arm, Patient Position: Sitting, BP Method: Large (Manual))   Pulse 76   Ht 5' 4" (1.626 m)   Wt 89.7 kg (197 lb 11.2 oz)   SpO2 97%   BMI 33.94 kg/m²    Physical Exam  Vitals reviewed.   Constitutional:       General: She is not in acute distress.     Appearance: She is well-developed.   HENT:      Head: Normocephalic and atraumatic.   Eyes:      General: No scleral icterus.     Pupils: Pupils are equal, round, and reactive to light.   Neck:      Thyroid: No thyromegaly.   Cardiovascular:      Rate and Rhythm: Normal rate.   Pulmonary:      Effort: Pulmonary effort is normal. No respiratory distress.   Musculoskeletal:         General: Normal range of motion.      Cervical back: Normal range of motion and neck supple.   Skin:     General: Skin is warm and dry.      Findings: No erythema.   Neurological:      Mental Status: She is alert and oriented to person, place, and time.      Cranial Nerves: No cranial nerve deficit.   Psychiatric:         Behavior: Behavior normal.         Judgment: Judgment normal.       "   Assessment:       Problem List Items Addressed This Visit       Type 2 diabetes mellitus with stage 3a chronic kidney disease, without long-term current use of insulin (Chronic)    Relevant Medications    semaglutide (RYBELSUS) 7 mg tablet     Other Visit Diagnoses       Obesity, Class I, BMI 30.0-34.9 (see actual BMI)    -  Primary                  Plan:           Faby was seen today for follow-up.    Diagnoses and all orders for this visit:    Obesity, Class I, BMI 30.0-34.9 (see actual BMI)    Type 2 diabetes mellitus with stage 3a chronic kidney disease, without long-term current use of insulin  -     semaglutide (RYBELSUS) 7 mg tablet; Take 1 tablet (7 mg total) by mouth once daily.    Other orders  -     topiramate (TOPAMAX) 100 MG tablet; Take 1 tablet (100 mg total) by mouth 2 (two) times daily.            Patient was informed that topiramate is used for migraine prevention and seizures. Weight loss is a common side effect that is well documented. S/he understands this. S/he was informed of the potential side effects such as serious and possibly fatal rash in which case the medication should be discontinued immediately. Paresthesias, forgetfulness, fatigue, kidney stones, GI symptoms, and changes in lab values such as electrolytes, blood counts and kidney function.      Start topiramate 100  mg morning and evening.     - To lose weight you want to cut 100% starchy carbohydrates out of your diet (bread, rice, pasta, potatoes, granola, flour, corn, peas, oatmeal, grits, tortillas, crackers, chips) and get  grams of protein.  Aim for 100 grams of protein [id number] bianka  daily.        - No soda, sweet tea, juices, sports drinks or lemonade     -Exercise daily. Increase low impact activity as tolerated.  Avoid high impact activity, very heavy lifting or other exercise regimens that may cause discomfort.     Pt advised to check blood sugar regularly as medications may need to be adjusted with changes in  diet and weight loss.     Hurricane tips and Protein list given.

## 2023-07-31 NOTE — PATIENT INSTRUCTIONS
At next refill start Rybelsus 7 mg in the morning with 4 oz plain water. Eat something 30 min later.     Decrease portions as soon as you start Rybelsus. Some nausea in the first 2 weeks is not unusual.     If you get pain across the upper abdomen and around to your back, please call the office.     Patient was informed that topiramate is used for migraine prevention and seizures. Weight loss is a common side effect that is well documented. S/he understands this. S/he was informed of the potential side effects such as serious and possibly fatal rash in which case the medication should be discontinued immediately. Paresthesias, forgetfulness, fatigue, kidney stones, GI symptoms, and changes in lab values such as electrolytes, blood counts and kidney function.      Start topiramate 100  mg morning and evening.     - To lose weight you want to cut 100% starchy carbohydrates out of your diet (bread, rice, pasta, potatoes, granola, flour, corn, peas, oatmeal, grits, tortillas, crackers, chips) and get  grams of protein.  Aim for 100 grams of protein [id number] bianka  daily.        - No soda, sweet tea, juices, sports drinks or lemonade     -Exercise daily. Increase low impact activity as tolerated.  Avoid high impact activity, very heavy lifting or other exercise regimens that may cause discomfort.     Pt advised to check blood sugar regularly as medications may need to be adjusted with changes in diet and weight loss.     Lactose-free & Artificial Sweetener-free Protein Powders    Remember, your protein shake should have less than 4 grams of sugar per serving. For whey powders, choose 100% whey pro isolate, not a blend. Blends add creatine in as a filler.    Natural Zero-calorie sweeteners  Stevia  Joseuvia  Swerve ([email or web address]     Lactose-free Protein Powders  % Egg Protein (Vanilla & Chocolate)  [employer] Pro Performance 100% Soy Isolate (Vanilla & Chocolate)  Bluebonnet Protein  Powder  Garden of Life ® raw organic protein  Isopure ® low carb protein powder  NutraBio ® 100% whey protein isolate  NutraBio ® organic plant protein - vegan  Now ® pea protein - vegan  Orgain ® Organic protein powder  Syntrax ® Nectar    Lactose-free Ready to drink Protein Shakes  Muscle Milk  [facility] Total Lean Lean Shake 25  Isopure ® zero carb   Protein 2 O  [facility] Nutrition Plan  Core Power      Artificial Sweetener-free Protein Powders  Pure Protein Natural Whey Protein Powder (Vanilla, Chocolate & Strawberry)  Tim Wade Whey Pro Isolate (sweetened w/ stevia) (Vanilla, Chocolate, Strawberry, Pina Coloda & Tropical Dreamsicle)  Nature's Best Natural Isopure - Unflavored (zero carb, found at [facility])    Lactose & Artificial-Sweetener-Free Protein Powders  Tim Wade Egg White Protein (sweetened w/ stevia)  Garden of Life ® raw organic protein  Syntrax ® Nectar      Tips for preparing for hurricane season:    If you are on weight loss medications, please take your medication with you in case of evacuation. Injectable medications should be transported with an ice pack if unopened or room temperature if you have started to use them.   Hurricane supplies do not necessarily need to be junk food or high in carbs or sugar. Shelf stable and healthy choices to include in your supplies could include:  Canned/packets of tuna or chicken  Apples, oranges, banana, pears  Beef or turkey jerky  Sugar free pudding  Pickle, olives, dill relish (mix with the tuna or chicken!)  Low sodium or no salt added canned vegetables  If you get bread, get lite bread (40 calories a slice)  0 sugar sports drinks  Water  String cheese will be okay for a few days without refrigeration or in an ice chest.   Peanut or other nut butter.   Parmesan crisps  Pork skins  Protein shakes (20-30g protein, less than 5 g sugar)  Protein bars (15 or more g protein, less than 5 g sugar)  Don't forget disposable forks, spoons, plates in your supplies  If evacuated,  manage stress by taking walks, reading or meditating.   If eating out make better choices when possible.   Salads with a lean protein and limited dressing, cheese or other toppings  Grilled chicken sandwich or burger without the bun.   Skip the fries!  Order water or unsweetened tea instead of soda  Grocery stores with a deli, salad/food bar can be a good quick and affordable option to replace fast food

## 2023-08-16 ENCOUNTER — LAB VISIT (OUTPATIENT)
Dept: LAB | Facility: HOSPITAL | Age: 73
End: 2023-08-16
Attending: FAMILY MEDICINE
Payer: MEDICARE

## 2023-08-16 DIAGNOSIS — I10 ESSENTIAL HYPERTENSION: ICD-10-CM

## 2023-08-16 DIAGNOSIS — E78.5 DYSLIPIDEMIA ASSOCIATED WITH TYPE 2 DIABETES MELLITUS: ICD-10-CM

## 2023-08-16 DIAGNOSIS — E11.69 DYSLIPIDEMIA ASSOCIATED WITH TYPE 2 DIABETES MELLITUS: ICD-10-CM

## 2023-08-16 DIAGNOSIS — E66.09 CLASS 1 OBESITY DUE TO EXCESS CALORIES WITH SERIOUS COMORBIDITY AND BODY MASS INDEX (BMI) OF 34.0 TO 34.9 IN ADULT: ICD-10-CM

## 2023-08-16 DIAGNOSIS — E11.22 TYPE 2 DIABETES MELLITUS WITH STAGE 3A CHRONIC KIDNEY DISEASE, WITHOUT LONG-TERM CURRENT USE OF INSULIN: ICD-10-CM

## 2023-08-16 DIAGNOSIS — N18.31 TYPE 2 DIABETES MELLITUS WITH STAGE 3A CHRONIC KIDNEY DISEASE, WITHOUT LONG-TERM CURRENT USE OF INSULIN: ICD-10-CM

## 2023-08-16 LAB
ALBUMIN SERPL BCP-MCNC: 3.7 G/DL (ref 3.5–5.2)
ALP SERPL-CCNC: 96 U/L (ref 55–135)
ALT SERPL W/O P-5'-P-CCNC: 26 U/L (ref 10–44)
ANION GAP SERPL CALC-SCNC: 13 MMOL/L (ref 8–16)
AST SERPL-CCNC: 25 U/L (ref 10–40)
BASOPHILS # BLD AUTO: 0.07 K/UL (ref 0–0.2)
BASOPHILS NFR BLD: 0.7 % (ref 0–1.9)
BILIRUB SERPL-MCNC: 0.4 MG/DL (ref 0.1–1)
BUN SERPL-MCNC: 15 MG/DL (ref 8–23)
CALCIUM SERPL-MCNC: 9.1 MG/DL (ref 8.7–10.5)
CHLORIDE SERPL-SCNC: 101 MMOL/L (ref 95–110)
CHOLEST SERPL-MCNC: 159 MG/DL (ref 120–199)
CHOLEST/HDLC SERPL: 3.2 {RATIO} (ref 2–5)
CO2 SERPL-SCNC: 27 MMOL/L (ref 23–29)
CREAT SERPL-MCNC: 1.1 MG/DL (ref 0.5–1.4)
DIFFERENTIAL METHOD: ABNORMAL
EOSINOPHIL # BLD AUTO: 0.5 K/UL (ref 0–0.5)
EOSINOPHIL NFR BLD: 5.1 % (ref 0–8)
ERYTHROCYTE [DISTWIDTH] IN BLOOD BY AUTOMATED COUNT: 15.2 % (ref 11.5–14.5)
EST. GFR  (NO RACE VARIABLE): 53.1 ML/MIN/1.73 M^2
ESTIMATED AVG GLUCOSE: 183 MG/DL (ref 68–131)
GLUCOSE SERPL-MCNC: 147 MG/DL (ref 70–110)
HBA1C MFR BLD: 8 % (ref 4–5.6)
HCT VFR BLD AUTO: 42.7 % (ref 37–48.5)
HDLC SERPL-MCNC: 49 MG/DL (ref 40–75)
HDLC SERPL: 30.8 % (ref 20–50)
HGB BLD-MCNC: 13.4 G/DL (ref 12–16)
IMM GRANULOCYTES # BLD AUTO: 0.04 K/UL (ref 0–0.04)
IMM GRANULOCYTES NFR BLD AUTO: 0.4 % (ref 0–0.5)
LDLC SERPL CALC-MCNC: 78.6 MG/DL (ref 63–159)
LYMPHOCYTES # BLD AUTO: 3.9 K/UL (ref 1–4.8)
LYMPHOCYTES NFR BLD: 38.9 % (ref 18–48)
MCH RBC QN AUTO: 26.5 PG (ref 27–31)
MCHC RBC AUTO-ENTMCNC: 31.4 G/DL (ref 32–36)
MCV RBC AUTO: 85 FL (ref 82–98)
MONOCYTES # BLD AUTO: 0.5 K/UL (ref 0.3–1)
MONOCYTES NFR BLD: 5 % (ref 4–15)
NEUTROPHILS # BLD AUTO: 5 K/UL (ref 1.8–7.7)
NEUTROPHILS NFR BLD: 49.9 % (ref 38–73)
NONHDLC SERPL-MCNC: 110 MG/DL
NRBC BLD-RTO: 0 /100 WBC
PLATELET # BLD AUTO: 337 K/UL (ref 150–450)
PMV BLD AUTO: 11.1 FL (ref 9.2–12.9)
POTASSIUM SERPL-SCNC: 3.9 MMOL/L (ref 3.5–5.1)
PROT SERPL-MCNC: 7.7 G/DL (ref 6–8.4)
RBC # BLD AUTO: 5.05 M/UL (ref 4–5.4)
SODIUM SERPL-SCNC: 141 MMOL/L (ref 136–145)
TRIGL SERPL-MCNC: 157 MG/DL (ref 30–150)
WBC # BLD AUTO: 10.07 K/UL (ref 3.9–12.7)

## 2023-08-16 PROCEDURE — 80053 COMPREHEN METABOLIC PANEL: CPT | Performed by: FAMILY MEDICINE

## 2023-08-16 PROCEDURE — 85025 COMPLETE CBC W/AUTO DIFF WBC: CPT | Performed by: FAMILY MEDICINE

## 2023-08-16 PROCEDURE — 80061 LIPID PANEL: CPT | Performed by: FAMILY MEDICINE

## 2023-08-16 PROCEDURE — 83036 HEMOGLOBIN GLYCOSYLATED A1C: CPT | Performed by: FAMILY MEDICINE

## 2023-08-16 PROCEDURE — 36415 COLL VENOUS BLD VENIPUNCTURE: CPT | Mod: PO | Performed by: FAMILY MEDICINE

## 2023-08-18 ENCOUNTER — OFFICE VISIT (OUTPATIENT)
Dept: FAMILY MEDICINE | Facility: CLINIC | Age: 73
End: 2023-08-18
Payer: MEDICARE

## 2023-08-18 VITALS
HEART RATE: 74 BPM | HEIGHT: 64 IN | BODY MASS INDEX: 34.01 KG/M2 | OXYGEN SATURATION: 93 % | SYSTOLIC BLOOD PRESSURE: 112 MMHG | TEMPERATURE: 98 F | WEIGHT: 199.19 LBS | DIASTOLIC BLOOD PRESSURE: 52 MMHG

## 2023-08-18 DIAGNOSIS — F41.1 GAD (GENERALIZED ANXIETY DISORDER): ICD-10-CM

## 2023-08-18 DIAGNOSIS — G89.4 CHRONIC PAIN SYNDROME: ICD-10-CM

## 2023-08-18 DIAGNOSIS — E11.69 DYSLIPIDEMIA ASSOCIATED WITH TYPE 2 DIABETES MELLITUS: ICD-10-CM

## 2023-08-18 DIAGNOSIS — Z12.31 ENCOUNTER FOR SCREENING MAMMOGRAM FOR MALIGNANT NEOPLASM OF BREAST: ICD-10-CM

## 2023-08-18 DIAGNOSIS — F33.0 MILD RECURRENT MAJOR DEPRESSION: ICD-10-CM

## 2023-08-18 DIAGNOSIS — N18.31 TYPE 2 DIABETES MELLITUS WITH STAGE 3A CHRONIC KIDNEY DISEASE, WITHOUT LONG-TERM CURRENT USE OF INSULIN: Primary | ICD-10-CM

## 2023-08-18 DIAGNOSIS — M54.16 LUMBAR RADICULOPATHY: ICD-10-CM

## 2023-08-18 DIAGNOSIS — E11.22 TYPE 2 DIABETES MELLITUS WITH STAGE 3A CHRONIC KIDNEY DISEASE, WITHOUT LONG-TERM CURRENT USE OF INSULIN: Primary | Chronic | ICD-10-CM

## 2023-08-18 DIAGNOSIS — E11.22 TYPE 2 DIABETES MELLITUS WITH STAGE 3A CHRONIC KIDNEY DISEASE, WITHOUT LONG-TERM CURRENT USE OF INSULIN: Primary | ICD-10-CM

## 2023-08-18 DIAGNOSIS — E78.5 DYSLIPIDEMIA ASSOCIATED WITH TYPE 2 DIABETES MELLITUS: ICD-10-CM

## 2023-08-18 DIAGNOSIS — N18.31 TYPE 2 DIABETES MELLITUS WITH STAGE 3A CHRONIC KIDNEY DISEASE, WITHOUT LONG-TERM CURRENT USE OF INSULIN: Primary | Chronic | ICD-10-CM

## 2023-08-18 DIAGNOSIS — E66.09 CLASS 1 OBESITY DUE TO EXCESS CALORIES WITH SERIOUS COMORBIDITY AND BODY MASS INDEX (BMI) OF 34.0 TO 34.9 IN ADULT: ICD-10-CM

## 2023-08-18 DIAGNOSIS — I10 ESSENTIAL HYPERTENSION: ICD-10-CM

## 2023-08-18 DIAGNOSIS — Z12.11 COLON CANCER SCREENING: ICD-10-CM

## 2023-08-18 PROCEDURE — 3008F BODY MASS INDEX DOCD: CPT | Mod: CPTII,S$GLB,, | Performed by: FAMILY MEDICINE

## 2023-08-18 PROCEDURE — 1160F RVW MEDS BY RX/DR IN RCRD: CPT | Mod: CPTII,S$GLB,, | Performed by: FAMILY MEDICINE

## 2023-08-18 PROCEDURE — 1101F PT FALLS ASSESS-DOCD LE1/YR: CPT | Mod: CPTII,S$GLB,, | Performed by: FAMILY MEDICINE

## 2023-08-18 PROCEDURE — 3066F NEPHROPATHY DOC TX: CPT | Mod: CPTII,S$GLB,, | Performed by: FAMILY MEDICINE

## 2023-08-18 PROCEDURE — 1101F PR PT FALLS ASSESS DOC 0-1 FALLS W/OUT INJ PAST YR: ICD-10-PCS | Mod: CPTII,S$GLB,, | Performed by: FAMILY MEDICINE

## 2023-08-18 PROCEDURE — 99999 PR PBB SHADOW E&M-EST. PATIENT-LVL V: ICD-10-PCS | Mod: PBBFAC,,, | Performed by: FAMILY MEDICINE

## 2023-08-18 PROCEDURE — 99214 OFFICE O/P EST MOD 30 MIN: CPT | Mod: S$GLB,,, | Performed by: FAMILY MEDICINE

## 2023-08-18 PROCEDURE — 1125F AMNT PAIN NOTED PAIN PRSNT: CPT | Mod: CPTII,S$GLB,, | Performed by: FAMILY MEDICINE

## 2023-08-18 PROCEDURE — 99999 PR PBB SHADOW E&M-EST. PATIENT-LVL V: CPT | Mod: PBBFAC,,, | Performed by: FAMILY MEDICINE

## 2023-08-18 PROCEDURE — 3008F PR BODY MASS INDEX (BMI) DOCUMENTED: ICD-10-PCS | Mod: CPTII,S$GLB,, | Performed by: FAMILY MEDICINE

## 2023-08-18 PROCEDURE — 1125F PR PAIN SEVERITY QUANTIFIED, PAIN PRESENT: ICD-10-PCS | Mod: CPTII,S$GLB,, | Performed by: FAMILY MEDICINE

## 2023-08-18 PROCEDURE — 3288F FALL RISK ASSESSMENT DOCD: CPT | Mod: CPTII,S$GLB,, | Performed by: FAMILY MEDICINE

## 2023-08-18 PROCEDURE — 1159F PR MEDICATION LIST DOCUMENTED IN MEDICAL RECORD: ICD-10-PCS | Mod: CPTII,S$GLB,, | Performed by: FAMILY MEDICINE

## 2023-08-18 PROCEDURE — 3288F PR FALLS RISK ASSESSMENT DOCUMENTED: ICD-10-PCS | Mod: CPTII,S$GLB,, | Performed by: FAMILY MEDICINE

## 2023-08-18 PROCEDURE — 3078F PR MOST RECENT DIASTOLIC BLOOD PRESSURE < 80 MM HG: ICD-10-PCS | Mod: CPTII,S$GLB,, | Performed by: FAMILY MEDICINE

## 2023-08-18 PROCEDURE — 3066F PR DOCUMENTATION OF TREATMENT FOR NEPHROPATHY: ICD-10-PCS | Mod: CPTII,S$GLB,, | Performed by: FAMILY MEDICINE

## 2023-08-18 PROCEDURE — 1159F MED LIST DOCD IN RCRD: CPT | Mod: CPTII,S$GLB,, | Performed by: FAMILY MEDICINE

## 2023-08-18 PROCEDURE — 1160F PR REVIEW ALL MEDS BY PRESCRIBER/CLIN PHARMACIST DOCUMENTED: ICD-10-PCS | Mod: CPTII,S$GLB,, | Performed by: FAMILY MEDICINE

## 2023-08-18 PROCEDURE — 3061F NEG MICROALBUMINURIA REV: CPT | Mod: CPTII,S$GLB,, | Performed by: FAMILY MEDICINE

## 2023-08-18 PROCEDURE — 3074F SYST BP LT 130 MM HG: CPT | Mod: CPTII,S$GLB,, | Performed by: FAMILY MEDICINE

## 2023-08-18 PROCEDURE — 99214 PR OFFICE/OUTPT VISIT, EST, LEVL IV, 30-39 MIN: ICD-10-PCS | Mod: S$GLB,,, | Performed by: FAMILY MEDICINE

## 2023-08-18 PROCEDURE — 4010F ACE/ARB THERAPY RXD/TAKEN: CPT | Mod: CPTII,S$GLB,, | Performed by: FAMILY MEDICINE

## 2023-08-18 PROCEDURE — 3078F DIAST BP <80 MM HG: CPT | Mod: CPTII,S$GLB,, | Performed by: FAMILY MEDICINE

## 2023-08-18 PROCEDURE — 3052F HG A1C>EQUAL 8.0%<EQUAL 9.0%: CPT | Mod: CPTII,S$GLB,, | Performed by: FAMILY MEDICINE

## 2023-08-18 PROCEDURE — 3061F PR NEG MICROALBUMINURIA RESULT DOCUMENTED/REVIEW: ICD-10-PCS | Mod: CPTII,S$GLB,, | Performed by: FAMILY MEDICINE

## 2023-08-18 PROCEDURE — 3052F PR MOST RECENT HEMOGLOBIN A1C LEVEL 8.0 - < 9.0%: ICD-10-PCS | Mod: CPTII,S$GLB,, | Performed by: FAMILY MEDICINE

## 2023-08-18 PROCEDURE — 3074F PR MOST RECENT SYSTOLIC BLOOD PRESSURE < 130 MM HG: ICD-10-PCS | Mod: CPTII,S$GLB,, | Performed by: FAMILY MEDICINE

## 2023-08-18 PROCEDURE — 4010F PR ACE/ARB THEARPY RXD/TAKEN: ICD-10-PCS | Mod: CPTII,S$GLB,, | Performed by: FAMILY MEDICINE

## 2023-08-18 RX ORDER — PREGABALIN 50 MG/1
50 CAPSULE ORAL 2 TIMES DAILY
Qty: 60 CAPSULE | Refills: 1 | Status: SHIPPED | OUTPATIENT
Start: 2023-08-18 | End: 2023-10-30

## 2023-08-18 RX ORDER — GABAPENTIN 800 MG/1
800 TABLET ORAL 3 TIMES DAILY
Qty: 270 TABLET | Refills: 3 | Status: CANCELLED | OUTPATIENT
Start: 2023-08-18 | End: 2024-08-17

## 2023-08-18 RX ORDER — TIZANIDINE 4 MG/1
4 TABLET ORAL DAILY PRN
Qty: 90 TABLET | Refills: 3 | Status: SHIPPED | OUTPATIENT
Start: 2023-08-18 | End: 2023-11-22 | Stop reason: SDUPTHER

## 2023-08-18 RX ORDER — FUROSEMIDE 20 MG/1
20 TABLET ORAL DAILY
Qty: 90 TABLET | Refills: 3 | Status: CANCELLED | OUTPATIENT
Start: 2023-08-18

## 2023-08-18 RX ORDER — GLIPIZIDE 10 MG/1
10 TABLET ORAL 2 TIMES DAILY WITH MEALS
Qty: 180 TABLET | Refills: 3 | Status: SHIPPED | OUTPATIENT
Start: 2023-08-18 | End: 2024-02-29 | Stop reason: DRUGHIGH

## 2023-08-18 RX ORDER — LOSARTAN POTASSIUM 100 MG/1
100 TABLET ORAL DAILY
Qty: 90 TABLET | Refills: 3 | Status: CANCELLED | OUTPATIENT
Start: 2023-08-18

## 2023-08-18 RX ORDER — ESCITALOPRAM OXALATE 20 MG/1
20 TABLET ORAL DAILY
Qty: 90 TABLET | Refills: 3 | Status: SHIPPED | OUTPATIENT
Start: 2023-08-18 | End: 2024-08-17

## 2023-08-18 NOTE — PROGRESS NOTES
Assessment & Plan:    Type 2 diabetes mellitus with stage 3a chronic kidney disease, without long-term current use of insulin  -     glipiZIDE (GLUCOTROL) 10 MG tablet; Take 1 tablet (10 mg total) by mouth 2 (two) times daily with meals.  Dispense: 180 tablet; Refill: 3  -     Ambulatory referral/consult to Podiatry; Future; Expected date: 08/25/2023    Rybelsus recently increased to 7 mg. Resume glipizide - refilled.  Reinforced importance of healthy diet to achieve glycemic control.   Referral to Podiatry for routine foot exam, as requested by patient.   Reminded patient to schedule eye exam with Optometrist.   Repeat A1c before f/u in 3 mo.    Class 1 obesity due to excess calories with serious comorbidity and body mass index (BMI) of 34.0 to 34.9 in adult  See above.    Dyslipidemia associated with type 2 diabetes mellitus  Trig mildly elevated. See above. Continue statin.     Essential hypertension  Controlled. Continue current therapy.     Lumbar radiculopathy  Chronic pain syndrome  -     tiZANidine (ZANAFLEX) 4 MG tablet; Take 1 tablet (4 mg total) by mouth daily as needed (pain).  Dispense: 90 tablet; Refill: 3  -     pregabalin (LYRICA) 50 MG capsule; Take 1 capsule (50 mg total) by mouth 2 (two) times daily.  Dispense: 60 capsule; Refill: 1    D/C gabaperntin. Start Lyrica.  Zanaflex refilled.    Mild recurrent major depression  SANDRA (generalized anxiety disorder)  -     EScitalopram oxalate (LEXAPRO) 20 MG tablet; Take 1 tablet (20 mg total) by mouth once daily.  Dispense: 90 tablet; Refill: 3    Controlled. Medication(s) refilled.     Colon cancer screening  -     Ambulatory referral/consult to Endo Procedure ; Future; Expected date: 08/19/2023    Encounter for screening mammogram for malignant neoplasm of breast  -     Mammo Digital Screening Bilat; Future; Expected date: 08/18/2023      Health maintenance reviewed & addressed above.    Follow-up: Follow up in about 3 months (around  11/18/2023).  ______________________________________________________________________    Chief Complaint  Chief Complaint   Patient presents with    Diabetes       HPI  Faby Luna is a 72 y.o. female with medical diagnoses as listed in the medical history and problem list that presents to the office to follow up on her chronic conditions. Today, she c/o chronic LBP, gabapentin has become less effective. In review of her labs, her A1c increased to 8.0. She did not realize that she was supposed to continue taking glipizide with Rybelsus, which was recently increased by her Bariatric physician for weight loss.    Most recent pertinent workup:     Last CBC Results:   Lab Results   Component Value Date    WBC 10.07 08/16/2023    HGB 13.4 08/16/2023    HCT 42.7 08/16/2023     08/16/2023       Last CMP Results  Lab Results   Component Value Date     08/16/2023    K 3.9 08/16/2023     08/16/2023    CO2 27 08/16/2023    BUN 15 08/16/2023    CREATININE 1.1 08/16/2023    CALCIUM 9.1 08/16/2023    ALBUMIN 3.7 08/16/2023    AST 25 08/16/2023    ALT 26 08/16/2023       Last Lipids  Lab Results   Component Value Date    CHOL 159 08/16/2023    TRIG 157 (H) 08/16/2023    HDL 49 08/16/2023    LDLCALC 78.6 08/16/2023       Last A1C  Lab Results   Component Value Date    HGBA1C 8.0 (H) 08/16/2023         Health Maintenance         Date Due Completion Date    Shingles Vaccine (1 of 2) Never done ---    Colorectal Cancer Screening 09/13/2022 9/13/2012    Foot Exam 10/11/2022 10/11/2021    Override on 7/5/2018: Done    Eye Exam 12/07/2022 12/7/2021    COVID-19 Vaccine (2 - Moderna series) 01/21/2023 9/21/2022    Mammogram 08/17/2023 8/17/2022    Influenza Vaccine (1) 09/01/2023 9/21/2022    Hemoglobin A1c 11/16/2023 8/16/2023    Diabetes Urine Screening 02/06/2024 2/6/2023    Lipid Panel 08/16/2024 8/16/2023    TETANUS VACCINE 09/21/2032 9/21/2022              PAST MEDICAL HISTORY:  Past Medical History:    Diagnosis Date    Anxiety with depression     Arthritis     CKD (chronic kidney disease) stage 2, GFR 60-89 ml/min     Diabetes mellitus     History of Clarisse-en-Y gastric bypass     Hypertension     Obesity, unspecified     DAHLIA (obstructive sleep apnea)     Spondylosis        PAST SURGICAL HISTORY:  Past Surgical History:   Procedure Laterality Date    CARPAL TUNNEL RELEASE Right 3/8/2019    Procedure: RELEASE, CARPAL TUNNEL right;  Surgeon: Pan Goodman MD;  Location: [facility] OR;  Service: Orthopedics;  Laterality: Right;    CARPAL TUNNEL RELEASE Left 2019    Procedure: RELEASE, CARPAL TUNNEL- LEFT;  Surgeon: Pan Goodman MD;  Location: [facility] OR 2ND FLR;  Service: Orthopedics;  Laterality: Left;    CATARACT EXTRACTION Bilateral      SECTION      CHOLECYSTECTOMY      EPIDURAL STEROID INJECTION INTO LUMBAR SPINE N/A 2018    Procedure: INJECTION, STEROID, SPINE, LUMBAR, EPIDURAL;  Surgeon: Shaq Silverio MD;  Location: [facility] PAIN MGT;  Service: Pain Management;  Laterality: N/A;  LUMBAR L4-L5 INTERLAMINAR ELISE'  [id number]  W/ SEDATION     ESOPHAGOGASTRODUODENOSCOPY N/A 2023    Procedure: EGD (ESOPHAGOGASTRODUODENOSCOPY);  Surgeon: Deann Jimenez MD;  Location: [facility] ENDO;  Service: Gastroenterology;  Laterality: N/A;    HYSTERECTOMY      INJECTION OF ANESTHETIC AGENT AROUND NERVE Bilateral 2019    Procedure: BLOCK, NERVE, L3-L4-L5 MEDIAL BRANCH;  Surgeon: Shaq Silverio MD;  Location: [facility] PAIN MGT;  Service: Pain Management;  Laterality: Bilateral;    INJECTION OF ANESTHETIC AGENT AROUND NERVE Bilateral 10/17/2019    Procedure: BLOCK, NERVE;  Surgeon: Shaq Silverio MD;  Location: [facility] PAIN MGT;  Service: Pain Management;  Laterality: Bilateral;  B/L MBB L3-L4-L5  REPEAT  CONSENT NEEDED    INJECTION OF FACET JOINT Bilateral 2018    Procedure: INJECTION-FACET;  Surgeon: Shaq Silverio MD;  Location: [facility] PAIN MGT;  Service: Pain Management;  Laterality: Bilateral;  LUMBAR BILATERAL L4-L5 AND  L5-S1 FACET STEROID INJECTION  [id number]    W/ SEDATION     RADIOFREQUENCY ABLATION Right 11/21/2019    Procedure: RADIOFREQUENCY ABLATION RIGHT L3, L4, L5;  Surgeon: Shaq Silverio MD;  Location: [facility] PAIN MGT;  Service: Pain Management;  Laterality: Right;  Right RFA L3-L4-L5  1 of 2  Consent Needed    RADIOFREQUENCY ABLATION Left 12/5/2019    Procedure: RADIOFREQUENCY ABLATION LEFT L3-5;  Surgeon: Shaq Silverio MD;  Location: [facility] PAIN MGT;  Service: Pain Management;  Laterality: Left;  Left RFA L3-L4-L5  2 of 2  Consent Needed    RADIOFREQUENCY ABLATION Left 8/17/2020    Procedure: RADIOFREQUENCY ABLATION LEFT L3,4,5 1 of 2;  Surgeon: Shaq Silverio MD;  Location: [facility] PAIN MGT;  Service: Pain Management;  Laterality: Left;  Left RFA L3,4,5  1 of 2    RADIOFREQUENCY ABLATION Right 8/31/2020    Procedure: RADIOFREQUENCY ABLATION RIGHT L3,4,5 2 of 2;  Surgeon: Shaq Silverio MD;  Location: [facility] PAIN MGT;  Service: Pain Management;  Laterality: Right;  RADIOFREQUENCY ABLATION RIGHT L3,4,5  2 of 2    RADIOFREQUENCY ABLATION Left 9/9/2021    Procedure: RADIOFREQUENCY ABLATION, L3-L4 AND L5 MEDIAL BRANCH 1 OF 2;  Surgeon: Shaq Silverio MD;  Location: [facility] PAIN MGT;  Service: Pain Management;  Laterality: Left;    RADIOFREQUENCY ABLATION Right 9/20/2021    Procedure: RADIOFREQUENCY ABLATION, L3-L4 AND L5 MEDIAL BRANCH 2 OF 2;  Surgeon: Shaq Silverio MD;  Location: [facility] PAIN MGT;  Service: Pain Management;  Laterality: Right;    RADIOFREQUENCY ABLATION Right 7/7/2022    Procedure: RADIOFREQUENCY ABLATION, RIGHT L3-L4-L5 ONE OF TWO;  Surgeon: Shaq Silverio MD;  Location: [facility] PAIN MGT;  Service: Pain Management;  Laterality: Right;    RADIOFREQUENCY ABLATION Left 7/21/2022    Procedure: RADIOFREQUENCY ABLATION, LEFT L3-L4-L5 TWO OF TWO *BRING SCS REMOTE*;  Surgeon: Shaq Silverio MD;  Location: [facility] PAIN MGT;  Service: Pain Management;  Laterality: Left;    RADIOFREQUENCY ABLATION Left 3/16/2023    Procedure: RADIOFREQUENCY  ABLATION LEFT L3,L4,L5 *BRING SCS REMOTE*;  Surgeon: Shaq Silverio MD;  Location: [facility] PAIN MGT;  Service: Pain Management;  Laterality: Left;    RADIOFREQUENCY ABLATION Right 3/30/2023    Procedure: RADIOFREQUENCY ABLATION RIGHT L3,L4,L5 *BRING SCS REMOTE*;  Surgeon: Shaq Silverio MD;  Location: [facility] PAIN MGT;  Service: Pain Management;  Laterality: Right;    TRIAL OF SPINAL CORD NERVE STIMULATOR N/A 9/24/2018    Procedure: TRIAL, NEUROSTIMULATOR, SPINAL CORD, SPINAL CORD STIMULATOR TRIAL-INTERNAL WIRES TO EXTERNAL BATTERY;  Surgeon: Shaq Silverio MD;  Location: [facility] PAIN MGT;  Service: Pain Management;  Laterality: N/A;  [facility]BOTT REP NOTIFIED       SOCIAL HISTORY:  Social History     Socioeconomic History    Marital status:    Tobacco Use    Smoking status: Never    Smokeless tobacco: Never   Substance and Sexual Activity    Alcohol use: Yes     Comment: occ    Drug use: No       FAMILY HISTORY:  Family History   Problem Relation Age of Onset    Cataracts Mother     Glaucoma Mother     No Known Problems Father     No Known Problems Sister     No Known Problems Brother     No Known Problems Maternal Aunt     No Known Problems Maternal Uncle     No Known Problems Paternal Aunt     No Known Problems Paternal Uncle     No Known Problems Maternal Grandmother     No Known Problems Maternal Grandfather     No Known Problems Paternal Grandmother     No Known Problems Paternal Grandfather        ALLERGIES AND MEDICATIONS: updated and reviewed.  Review of patient's allergies indicates:  No Known Allergies  Current Outpatient Medications   Medication Sig Dispense Refill    acetaminophen (TYLENOL) 500 MG tablet Take 1 tablet (500 mg total) by mouth every 6 (six) hours as needed for Temperature greater than or Pain. 20 tablet 0    albuterol (PROAIR HFA) 90 mcg/actuation inhaler Inhale 2 puffs into the lungs every 4 (four) hours as needed for Wheezing or Shortness of Breath. Rescue 8.5 g 1    albuterol (PROVENTIL/VENTOLIN  HFA) 90 mcg/actuation inhaler Inhale 1-2 puffs into the lungs every 6 (six) hours as needed for Wheezing or Shortness of Breath. Rescue 8 g 0    albuterol sulfate 2.5 mg/0.5 mL Nebu Take 2.5 mg by nebulization every 4 (four) hours as needed (SOB/wheezing). Rescue 20 each 0    atenoloL (TENORMIN) 100 MG tablet TAKE 1 TABLET EVERY DAY 90 tablet 3    budesonide-formoterol 160-4.5 mcg (SYMBICORT) 160-4.5 mcg/actuation HFAA Inhale 2 puffs into the lungs every 12 (twelve) hours. Controller (brand only) (stop breo) 10.2 g 11    dexamethasone sodium phosp/PF (DEXAMETHASONE SODIUM PHOS, PF,) 10 mg/mL Soln       fentaNYL (SUBLIMAZE) 50 mcg/mL injection       fluticasone propionate (FLONASE) 50 mcg/actuation nasal spray 2 sprays (100 mcg total) by Each Nostril route once daily. 11.1 mL 1    furosemide (LASIX) 20 MG tablet Take 1 tablet (20 mg total) by mouth once daily. 90 tablet 3    guaiFENesin 100 mg/5 ml (ROBITUSSIN) 100 mg/5 mL syrup Take 5-10 mLs (100-200 mg total) by mouth every 4 (four) hours as needed for Cough or Congestion. 118 mL 0    hydroCHLOROthiazide (HYDRODIURIL) 25 MG tablet Take 1 tablet (25 mg total) by mouth once daily. 90 tablet 3    hydrOXYzine pamoate (VISTARIL) 25 MG Cap TAKE 1 CAPSULE BY MOUTH ONCE DAILY AS NEEDED FOR ANXIETY 90 capsule 3    loratadine (CLARITIN) 10 mg tablet TAKE 1 TABLET EVERY DAY AS NEEDED FOR ALLERGIES 90 tablet 3    losartan (COZAAR) 100 MG tablet Take 1 tablet (100 mg total) by mouth once daily. 90 tablet 3    midazolam, PF, (VERSED) 1 mg/mL Soln injection       rosuvastatin (CRESTOR) 20 MG tablet Take 1 tablet (20 mg total) by mouth once daily. 30 tablet 11    semaglutide (RYBELSUS) 7 mg tablet Take 1 tablet (7 mg total) by mouth once daily. 30 tablet 4    topiramate (TOPAMAX) 100 MG tablet Take 1 tablet (100 mg total) by mouth 2 (two) times daily. 180 tablet 1    traZODone (DESYREL) 100 MG tablet Take 1 tablet (100 mg total) by mouth every evening. 90 tablet 3    EScitalopram  "oxalate (LEXAPRO) 20 MG tablet Take 1 tablet (20 mg total) by mouth once daily. 90 tablet 3    glipiZIDE (GLUCOTROL) 10 MG tablet Take 1 tablet (10 mg total) by mouth 2 (two) times daily with meals. 180 tablet 3    pantoprazole (PROTONIX) 20 MG tablet TAKE 1 TABLET TWICE DAILY  BEFORE  MEALS (Patient not taking: Reported on 8/18/2023) 180 tablet 3    pregabalin (LYRICA) 50 MG capsule Take 1 capsule (50 mg total) by mouth 2 (two) times daily. 60 capsule 1    promethazine-dextromethorphan (PROMETHAZINE-DM) 6.25-15 mg/5 mL Syrp Take 5 mLs by mouth every 8 (eight) hours as needed (cough). (Patient not taking: Reported on 8/18/2023) 240 mL 0    tiZANidine (ZANAFLEX) 4 MG tablet Take 1 tablet (4 mg total) by mouth daily as needed (pain). 90 tablet 3     No current facility-administered medications for this visit.         ROS  Review of Systems   Constitutional:  Negative for activity change and unexpected weight change.   Musculoskeletal:  Positive for back pain.           Physical Exam  Vitals:    08/18/23 1429   BP: (!) 112/52   BP Location: Right arm   Patient Position: Sitting   BP Method: Large (Manual)   Pulse: 74   Temp: 98.3 °F (36.8 °C)   TempSrc: Oral   SpO2: (!) 93%   Weight: 90.4 kg (199 lb 3 oz)   Height: 5' 4" (1.626 m)    Body mass index is 34.19 kg/m².  Weight: 90.4 kg (199 lb 3 oz)   Height: 5' 4" (162.6 cm)   Physical Exam  Constitutional:       General: She is not in acute distress.     Appearance: She is obese.   HENT:      Head: Normocephalic and atraumatic.   Neck:      Thyroid: No thyromegaly.      Vascular: No carotid bruit.   Cardiovascular:      Rate and Rhythm: Normal rate and regular rhythm.      Pulses: Normal pulses.      Heart sounds: Normal heart sounds.   Pulmonary:      Effort: Pulmonary effort is normal. No respiratory distress.      Breath sounds: Normal breath sounds.   Musculoskeletal:      Cervical back: Neck supple.      Right lower leg: No edema.      Left lower leg: No edema. "   Lymphadenopathy:      Cervical: No cervical adenopathy.   Skin:     General: Skin is warm and dry.      Findings: No rash.   Neurological:      General: No focal deficit present.      Mental Status: She is alert and oriented to person, place, and time.   Psychiatric:         Mood and Affect: Mood normal.         Behavior: Behavior normal.         Thought Content: Thought content normal.

## 2023-08-24 ENCOUNTER — HOSPITAL ENCOUNTER (OUTPATIENT)
Dept: RADIOLOGY | Facility: HOSPITAL | Age: 73
Discharge: HOME | End: 2023-08-24
Attending: FAMILY MEDICINE
Payer: MEDICARE

## 2023-08-24 DIAGNOSIS — Z12.31 ENCOUNTER FOR SCREENING MAMMOGRAM FOR MALIGNANT NEOPLASM OF BREAST: ICD-10-CM

## 2023-08-24 PROCEDURE — 77067 MAMMO DIGITAL SCREENING BILAT WITH TOMO: ICD-10-PCS | Mod: 26,,, | Performed by: RADIOLOGY

## 2023-08-24 PROCEDURE — 77067 SCR MAMMO BI INCL CAD: CPT | Mod: 26,,, | Performed by: RADIOLOGY

## 2023-08-24 PROCEDURE — 77067 SCR MAMMO BI INCL CAD: CPT | Mod: TC,PO

## 2023-08-24 PROCEDURE — 77063 BREAST TOMOSYNTHESIS BI: CPT | Mod: 26,,, | Performed by: RADIOLOGY

## 2023-08-24 PROCEDURE — 77063 MAMMO DIGITAL SCREENING BILAT WITH TOMO: ICD-10-PCS | Mod: 26,,, | Performed by: RADIOLOGY

## 2023-08-28 ENCOUNTER — TELEPHONE (OUTPATIENT)
Dept: FAMILY MEDICINE | Facility: CLINIC | Age: 73
End: 2023-08-28
Payer: MEDICARE

## 2023-08-28 NOTE — TELEPHONE ENCOUNTER
----- Message from Renae Sprague DO sent at 8/28/2023  7:12 AM CDT -----  Please inform patient of normal mammogram. Repeat in 1 year.

## 2023-08-29 ENCOUNTER — OFFICE VISIT (OUTPATIENT)
Dept: PODIATRY | Facility: CLINIC | Age: 73
End: 2023-08-29
Payer: MEDICARE

## 2023-08-29 VITALS — BODY MASS INDEX: 34.03 KG/M2 | WEIGHT: 199.31 LBS | HEIGHT: 64 IN

## 2023-08-29 DIAGNOSIS — N18.31 TYPE 2 DIABETES MELLITUS WITH STAGE 3A CHRONIC KIDNEY DISEASE, WITHOUT LONG-TERM CURRENT USE OF INSULIN: Primary | ICD-10-CM

## 2023-08-29 DIAGNOSIS — E11.22 TYPE 2 DIABETES MELLITUS WITH STAGE 3A CHRONIC KIDNEY DISEASE, WITHOUT LONG-TERM CURRENT USE OF INSULIN: Primary | ICD-10-CM

## 2023-08-29 DIAGNOSIS — M20.40 HAMMER TOE, UNSPECIFIED LATERALITY: ICD-10-CM

## 2023-08-29 PROCEDURE — 3061F NEG MICROALBUMINURIA REV: CPT | Mod: CPTII,S$GLB,, | Performed by: PODIATRIST

## 2023-08-29 PROCEDURE — 4010F ACE/ARB THERAPY RXD/TAKEN: CPT | Mod: CPTII,S$GLB,, | Performed by: PODIATRIST

## 2023-08-29 PROCEDURE — 3288F FALL RISK ASSESSMENT DOCD: CPT | Mod: CPTII,S$GLB,, | Performed by: PODIATRIST

## 2023-08-29 PROCEDURE — 3008F BODY MASS INDEX DOCD: CPT | Mod: CPTII,S$GLB,, | Performed by: PODIATRIST

## 2023-08-29 PROCEDURE — 3008F PR BODY MASS INDEX (BMI) DOCUMENTED: ICD-10-PCS | Mod: CPTII,S$GLB,, | Performed by: PODIATRIST

## 2023-08-29 PROCEDURE — 99999 PR PBB SHADOW E&M-EST. PATIENT-LVL IV: CPT | Mod: PBBFAC,,, | Performed by: PODIATRIST

## 2023-08-29 PROCEDURE — 1101F PT FALLS ASSESS-DOCD LE1/YR: CPT | Mod: CPTII,S$GLB,, | Performed by: PODIATRIST

## 2023-08-29 PROCEDURE — 99999 PR PBB SHADOW E&M-EST. PATIENT-LVL IV: ICD-10-PCS | Mod: PBBFAC,,, | Performed by: PODIATRIST

## 2023-08-29 PROCEDURE — 3061F PR NEG MICROALBUMINURIA RESULT DOCUMENTED/REVIEW: ICD-10-PCS | Mod: CPTII,S$GLB,, | Performed by: PODIATRIST

## 2023-08-29 PROCEDURE — 3052F HG A1C>EQUAL 8.0%<EQUAL 9.0%: CPT | Mod: CPTII,S$GLB,, | Performed by: PODIATRIST

## 2023-08-29 PROCEDURE — 1101F PR PT FALLS ASSESS DOC 0-1 FALLS W/OUT INJ PAST YR: ICD-10-PCS | Mod: CPTII,S$GLB,, | Performed by: PODIATRIST

## 2023-08-29 PROCEDURE — 4010F PR ACE/ARB THEARPY RXD/TAKEN: ICD-10-PCS | Mod: CPTII,S$GLB,, | Performed by: PODIATRIST

## 2023-08-29 PROCEDURE — 3288F PR FALLS RISK ASSESSMENT DOCUMENTED: ICD-10-PCS | Mod: CPTII,S$GLB,, | Performed by: PODIATRIST

## 2023-08-29 PROCEDURE — 99203 OFFICE O/P NEW LOW 30 MIN: CPT | Mod: S$GLB,,, | Performed by: PODIATRIST

## 2023-08-29 PROCEDURE — 99203 PR OFFICE/OUTPT VISIT, NEW, LEVL III, 30-44 MIN: ICD-10-PCS | Mod: S$GLB,,, | Performed by: PODIATRIST

## 2023-08-29 PROCEDURE — 1159F MED LIST DOCD IN RCRD: CPT | Mod: CPTII,S$GLB,, | Performed by: PODIATRIST

## 2023-08-29 PROCEDURE — 3052F PR MOST RECENT HEMOGLOBIN A1C LEVEL 8.0 - < 9.0%: ICD-10-PCS | Mod: CPTII,S$GLB,, | Performed by: PODIATRIST

## 2023-08-29 PROCEDURE — 3066F PR DOCUMENTATION OF TREATMENT FOR NEPHROPATHY: ICD-10-PCS | Mod: CPTII,S$GLB,, | Performed by: PODIATRIST

## 2023-08-29 PROCEDURE — 3066F NEPHROPATHY DOC TX: CPT | Mod: CPTII,S$GLB,, | Performed by: PODIATRIST

## 2023-08-29 PROCEDURE — 1159F PR MEDICATION LIST DOCUMENTED IN MEDICAL RECORD: ICD-10-PCS | Mod: CPTII,S$GLB,, | Performed by: PODIATRIST

## 2023-08-29 NOTE — PROGRESS NOTES
Subjective:     Patient ID: Faby Luna is a 72 y.o. female.    Chief Complaint: Diabetes Mellitus and Diabetic Foot Exam (8/18/23 Dr Sprague PCP)    Faby is a 72 y.o. female who presents to the clinic upon referral from Dr. Sprague  for evaluation and treatment of diabetic feet. Faby has a past medical history of Anxiety with depression, Arthritis, CKD (chronic kidney disease) stage 2, GFR 60-89 ml/min, Diabetes mellitus, History of Clarisse-en-Y gastric bypass, Hypertension, Obesity, unspecified, DAHLIA (obstructive sleep apnea), and Spondylosis. Presents for diabetic foot risk assessment.     PCP: Reane Sprague, DO    Date Last Seen by PCP: per above    Current shoe gear: Tennis shoes    Hemoglobin A1C   Date Value Ref Range Status   08/16/2023 8.0 (H) 4.0 - 5.6 % Final     Comment:     ADA Screening Guidelines:  5.7-6.4%  Consistent with prediabetes  >or=6.5%  Consistent with diabetes    High levels of fetal hemoglobin interfere with the HbA1C  assay. Heterozygous hemoglobin variants (HbS, HgC, etc)do  not significantly interfere with this assay.   However, presence of multiple variants may affect accuracy.     02/06/2023 6.1 (H) 4.0 - 5.6 % Final     Comment:     ADA Screening Guidelines:  5.7-6.4%  Consistent with prediabetes  >or=6.5%  Consistent with diabetes    High levels of fetal hemoglobin interfere with the HbA1C  assay. Heterozygous hemoglobin variants (HbS, HgC, etc)do  not significantly interfere with this assay.   However, presence of multiple variants may affect accuracy.     08/03/2022 6.6 (H) 4.0 - 5.6 % Final     Comment:     ADA Screening Guidelines:  5.7-6.4%  Consistent with prediabetes  >or=6.5%  Consistent with diabetes    High levels of fetal hemoglobin interfere with the HbA1C  assay. Heterozygous hemoglobin variants (HbS, HgC, etc)do  not significantly interfere with this assay.   However, presence of multiple variants may affect accuracy.           Review of Systems    Constitutional: Negative for chills.   Cardiovascular:  Negative for chest pain and claudication.   Respiratory:  Negative for cough.    Skin:  Positive for color change, dry skin and nail changes.   Musculoskeletal:  Positive for joint pain.   Gastrointestinal:  Negative for nausea.   Neurological:  Positive for paresthesias. Negative for numbness.   Psychiatric/Behavioral:  The patient is not nervous/anxious.         Objective:     Physical Exam  Constitutional:       Appearance: She is well-developed.      Comments: Oriented to time, place, and person.   Cardiovascular:      Comments: DP and PT pulses are palpable bilaterally. 3 sec capillary refill time and toes and feet are warm to touch proximally .  There is  hair growth on the feet and toes b/l. There is no edema b/l. No spider veins or varicosities present b/l.     Musculoskeletal:      Comments: Equinus noted b/l ankles with < 10 deg DF noted. MMT 5/5 in DF/PF/Inv/Ev resistance with no reproduction of pain in any direction. Passive range of motion of ankle and pedal joints is painless b/l.         Feet:      Right foot:      Skin integrity: No callus or dry skin.      Left foot:      Skin integrity: No callus or dry skin.   Lymphadenopathy:      Comments: Negative lymphadenopathy bilateral popliteal fossa and tarsal tunnel.   Skin:     Comments: No open lesions, lacerations or wounds noted.Interdigital spaces clean, dry and intact b/l. No erythema noted to b/l foot.  Nails normal color and trophic qualities.     Neurological:      Mental Status: She is alert.      Comments: Light touch, proprioception, and sharp/dull sensation are all intact bilaterally. Protective threshold with the [employer] monofilament is intact bilaterally.    Psychiatric:         Behavior: Behavior is cooperative.           Assessment:      Encounter Diagnoses   Name Primary?    Type 2 diabetes mellitus with stage 3a chronic kidney disease, without long-term current use of  insulin Yes    Hammer toe, unspecified laterality      Plan:     Faby was seen today for diabetes mellitus and diabetic foot exam.    Diagnoses and all orders for this visit:    Type 2 diabetes mellitus with stage 3a chronic kidney disease, without long-term current use of insulin  -     Ambulatory referral/consult to Podiatry  -     DIABETIC SHOES FOR HOME USE    Hammer toe, unspecified laterality  -     DIABETIC SHOES FOR HOME USE      I counseled the patient on her conditions, their implications and medical management.      - Shoe inspection. Diabetic Foot Education. Patient reminded of the importance of good nutrition and blood sugar control to help prevent podiatric complications of diabetes. Patient instructed on proper foot hygeine. We discussed wearing proper shoe gear, daily foot inspections, never walking without protective shoe gear, caution putting sharp instruments to feet     - Discussed DM foot care:  Wear comfortable, proper fitting shoes. Wash feet daily. Dry well. After drying, apply moisturizer to feet (no lotion to webspaces). Inspect feet daily for skin breaks, blisters, swelling, or redness. Wear cotton socks (preferably white)  Change socks every day. Do NOT walk barefoot. Do NOT use heating pads or warm/hot water soaks     Rx diabetic shoes with custom molded inserts to be worn at all times while ambulating. Prescription provided with list of local retailers.     F/u one year DM foot exam sooner PRN.

## 2023-09-18 ENCOUNTER — TELEPHONE (OUTPATIENT)
Dept: FAMILY MEDICINE | Facility: CLINIC | Age: 73
End: 2023-09-18
Payer: MEDICARE

## 2023-09-18 NOTE — TELEPHONE ENCOUNTER
----- Message from Goldy Jiménez MA sent at 9/18/2023 12:05 PM CDT -----  Type: Patient Call Back    Who called: Self    What is the request in detail: pt. Is asking for the provider to call her regarding her diabetes medication ..     Can the clinic reply by [facility]?NO    Would the patient rather a call back or a response via [facility]? Yes, call     Best call back number: [phone] (home)

## 2023-09-26 DIAGNOSIS — E78.5 DYSLIPIDEMIA: ICD-10-CM

## 2023-09-26 DIAGNOSIS — E11.22 TYPE 2 DIABETES MELLITUS WITH STAGE 3A CHRONIC KIDNEY DISEASE, WITHOUT LONG-TERM CURRENT USE OF INSULIN: ICD-10-CM

## 2023-09-26 DIAGNOSIS — F33.0 MILD RECURRENT MAJOR DEPRESSION: ICD-10-CM

## 2023-09-26 DIAGNOSIS — M54.16 LUMBAR RADICULOPATHY: ICD-10-CM

## 2023-09-26 DIAGNOSIS — G47.00 INSOMNIA, UNSPECIFIED TYPE: ICD-10-CM

## 2023-09-26 DIAGNOSIS — G89.4 CHRONIC PAIN SYNDROME: ICD-10-CM

## 2023-09-26 DIAGNOSIS — F41.1 GAD (GENERALIZED ANXIETY DISORDER): ICD-10-CM

## 2023-09-26 DIAGNOSIS — I10 ESSENTIAL HYPERTENSION: ICD-10-CM

## 2023-09-26 DIAGNOSIS — N18.31 TYPE 2 DIABETES MELLITUS WITH STAGE 3A CHRONIC KIDNEY DISEASE, WITHOUT LONG-TERM CURRENT USE OF INSULIN: ICD-10-CM

## 2023-09-26 RX ORDER — DEXTROSE 4 G
TABLET,CHEWABLE ORAL
Refills: 0 | OUTPATIENT
Start: 2023-09-26

## 2023-09-26 RX ORDER — ESCITALOPRAM OXALATE 20 MG/1
TABLET ORAL
Qty: 90 TABLET | Refills: 0 | OUTPATIENT
Start: 2023-09-26

## 2023-09-26 RX ORDER — TIZANIDINE 4 MG/1
TABLET ORAL
Qty: 90 TABLET | Refills: 0 | OUTPATIENT
Start: 2023-09-26

## 2023-09-26 RX ORDER — ROSUVASTATIN CALCIUM 20 MG/1
TABLET, COATED ORAL
Qty: 90 TABLET | Refills: 0 | OUTPATIENT
Start: 2023-09-26

## 2023-09-26 RX ORDER — HYDROCHLOROTHIAZIDE 25 MG/1
TABLET ORAL
Qty: 90 TABLET | Refills: 0 | OUTPATIENT
Start: 2023-09-26

## 2023-09-26 RX ORDER — TOPIRAMATE 100 MG/1
TABLET, FILM COATED ORAL
Qty: 180 TABLET | Refills: 0 | OUTPATIENT
Start: 2023-09-26

## 2023-09-26 RX ORDER — TRAZODONE HYDROCHLORIDE 100 MG/1
TABLET ORAL
Qty: 90 TABLET | Refills: 0 | OUTPATIENT
Start: 2023-09-26

## 2023-09-26 RX ORDER — BLOOD-GLUCOSE CONTROL, NORMAL
EACH MISCELLANEOUS
Qty: 200 EACH | Refills: 0 | OUTPATIENT
Start: 2023-09-26

## 2023-09-26 RX ORDER — GLIPIZIDE 10 MG/1
TABLET ORAL
Qty: 180 TABLET | Refills: 0 | OUTPATIENT
Start: 2023-09-26

## 2023-09-26 RX ORDER — PREGABALIN 50 MG/1
CAPSULE ORAL
Qty: 180 CAPSULE | Refills: 0 | OUTPATIENT
Start: 2023-09-26

## 2023-09-26 RX ORDER — LOSARTAN POTASSIUM 100 MG/1
TABLET ORAL
Qty: 90 TABLET | Refills: 0 | OUTPATIENT
Start: 2023-09-26

## 2023-09-26 RX ORDER — GABAPENTIN 800 MG/1
TABLET ORAL
Qty: 270 TABLET | Refills: 0 | OUTPATIENT
Start: 2023-09-26

## 2023-09-26 RX ORDER — ISOPROPYL ALCOHOL 70 ML/100ML
SWAB TOPICAL
Refills: 0 | OUTPATIENT
Start: 2023-09-26

## 2023-09-26 NOTE — TELEPHONE ENCOUNTER
Refill Routing Note   Medication(s) are not appropriate for processing by [facility] for the following reason(s):      Medication outside of protocol    [facility] action(s):  Route  Quick Discontinue Care Due:  None identified            Appointments  past 12m or future 3m with PCP    Date Provider   Last Visit   8/18/2023 Renae Sprague, DO   Next Visit   11/27/2023 Renae Sprague, DO   ED visits in past 90 days: 0        Note composed:4:10 PM 09/26/2023

## 2023-09-26 NOTE — TELEPHONE ENCOUNTER
Refill Routing Note   Medication(s) are not appropriate for processing by [facility] for the following reason(s):      Medication outside of protocol    [facility] action(s):  Route  Quick Discontinue Care Due:  None identified            Appointments  past 12m or future 3m with PCP    Date Provider   Last Visit   8/18/2023 Renae Sprague, DO   Next Visit   11/27/2023 Renae Sprague, DO   ED visits in past 90 days: 0        Note composed:4:19 PM 09/26/2023

## 2023-09-26 NOTE — TELEPHONE ENCOUNTER
No care due was identified.  [facility] Embedded Care Due Messages. Reference number: [id number].   9/26/2023 2:47:04 PM CDT

## 2023-10-02 NOTE — TELEPHONE ENCOUNTER
Pt states she is taking Lyrica and not Gabapentin. Pt states she does not need a refill at present time.

## 2023-10-13 ENCOUNTER — TELEPHONE (OUTPATIENT)
Dept: ENDOSCOPY | Facility: HOSPITAL | Age: 73
End: 2023-10-13

## 2023-10-13 ENCOUNTER — PATIENT MESSAGE (OUTPATIENT)
Dept: ENDOSCOPY | Facility: HOSPITAL | Age: 73
End: 2023-10-13

## 2023-10-13 NOTE — TELEPHONE ENCOUNTER
Tried contacting patient to schedule colonoscopy.  Call not connecting. Spoke with  her son (Ariel) who states he will ask her to call us at [phone] to get procedure scheduled.

## 2023-10-25 ENCOUNTER — OFFICE VISIT (OUTPATIENT)
Dept: BARIATRICS | Facility: CLINIC | Age: 73
End: 2023-10-25
Payer: MEDICARE

## 2023-10-25 VITALS
HEART RATE: 78 BPM | SYSTOLIC BLOOD PRESSURE: 104 MMHG | BODY MASS INDEX: 32.71 KG/M2 | DIASTOLIC BLOOD PRESSURE: 60 MMHG | HEIGHT: 64 IN | WEIGHT: 191.63 LBS | OXYGEN SATURATION: 96 %

## 2023-10-25 DIAGNOSIS — E11.22 TYPE 2 DIABETES MELLITUS WITH STAGE 3A CHRONIC KIDNEY DISEASE, WITHOUT LONG-TERM CURRENT USE OF INSULIN: ICD-10-CM

## 2023-10-25 DIAGNOSIS — N18.31 TYPE 2 DIABETES MELLITUS WITH STAGE 3A CHRONIC KIDNEY DISEASE, WITHOUT LONG-TERM CURRENT USE OF INSULIN: ICD-10-CM

## 2023-10-25 DIAGNOSIS — E66.9 OBESITY, CLASS I, BMI 30.0-34.9 (SEE ACTUAL BMI): Primary | ICD-10-CM

## 2023-10-25 PROCEDURE — 3288F PR FALLS RISK ASSESSMENT DOCUMENTED: ICD-10-PCS | Mod: CPTII,S$GLB,, | Performed by: INTERNAL MEDICINE

## 2023-10-25 PROCEDURE — 1125F AMNT PAIN NOTED PAIN PRSNT: CPT | Mod: CPTII,S$GLB,, | Performed by: INTERNAL MEDICINE

## 2023-10-25 PROCEDURE — 3066F NEPHROPATHY DOC TX: CPT | Mod: CPTII,S$GLB,, | Performed by: INTERNAL MEDICINE

## 2023-10-25 PROCEDURE — 1125F PR PAIN SEVERITY QUANTIFIED, PAIN PRESENT: ICD-10-PCS | Mod: CPTII,S$GLB,, | Performed by: INTERNAL MEDICINE

## 2023-10-25 PROCEDURE — 3074F SYST BP LT 130 MM HG: CPT | Mod: CPTII,S$GLB,, | Performed by: INTERNAL MEDICINE

## 2023-10-25 PROCEDURE — 3061F NEG MICROALBUMINURIA REV: CPT | Mod: CPTII,S$GLB,, | Performed by: INTERNAL MEDICINE

## 2023-10-25 PROCEDURE — 3008F BODY MASS INDEX DOCD: CPT | Mod: CPTII,S$GLB,, | Performed by: INTERNAL MEDICINE

## 2023-10-25 PROCEDURE — 1101F PT FALLS ASSESS-DOCD LE1/YR: CPT | Mod: CPTII,S$GLB,, | Performed by: INTERNAL MEDICINE

## 2023-10-25 PROCEDURE — 3074F PR MOST RECENT SYSTOLIC BLOOD PRESSURE < 130 MM HG: ICD-10-PCS | Mod: CPTII,S$GLB,, | Performed by: INTERNAL MEDICINE

## 2023-10-25 PROCEDURE — 3288F FALL RISK ASSESSMENT DOCD: CPT | Mod: CPTII,S$GLB,, | Performed by: INTERNAL MEDICINE

## 2023-10-25 PROCEDURE — 1101F PR PT FALLS ASSESS DOC 0-1 FALLS W/OUT INJ PAST YR: ICD-10-PCS | Mod: CPTII,S$GLB,, | Performed by: INTERNAL MEDICINE

## 2023-10-25 PROCEDURE — 99999 PR PBB SHADOW E&M-EST. PATIENT-LVL V: CPT | Mod: PBBFAC,,, | Performed by: INTERNAL MEDICINE

## 2023-10-25 PROCEDURE — 99214 PR OFFICE/OUTPT VISIT, EST, LEVL IV, 30-39 MIN: ICD-10-PCS | Mod: S$GLB,,, | Performed by: INTERNAL MEDICINE

## 2023-10-25 PROCEDURE — 99214 OFFICE O/P EST MOD 30 MIN: CPT | Mod: S$GLB,,, | Performed by: INTERNAL MEDICINE

## 2023-10-25 PROCEDURE — 3052F HG A1C>EQUAL 8.0%<EQUAL 9.0%: CPT | Mod: CPTII,S$GLB,, | Performed by: INTERNAL MEDICINE

## 2023-10-25 PROCEDURE — 3078F PR MOST RECENT DIASTOLIC BLOOD PRESSURE < 80 MM HG: ICD-10-PCS | Mod: CPTII,S$GLB,, | Performed by: INTERNAL MEDICINE

## 2023-10-25 PROCEDURE — 4010F PR ACE/ARB THEARPY RXD/TAKEN: ICD-10-PCS | Mod: CPTII,S$GLB,, | Performed by: INTERNAL MEDICINE

## 2023-10-25 PROCEDURE — 4010F ACE/ARB THERAPY RXD/TAKEN: CPT | Mod: CPTII,S$GLB,, | Performed by: INTERNAL MEDICINE

## 2023-10-25 PROCEDURE — 3061F PR NEG MICROALBUMINURIA RESULT DOCUMENTED/REVIEW: ICD-10-PCS | Mod: CPTII,S$GLB,, | Performed by: INTERNAL MEDICINE

## 2023-10-25 PROCEDURE — 3052F PR MOST RECENT HEMOGLOBIN A1C LEVEL 8.0 - < 9.0%: ICD-10-PCS | Mod: CPTII,S$GLB,, | Performed by: INTERNAL MEDICINE

## 2023-10-25 PROCEDURE — 3078F DIAST BP <80 MM HG: CPT | Mod: CPTII,S$GLB,, | Performed by: INTERNAL MEDICINE

## 2023-10-25 PROCEDURE — 99999 PR PBB SHADOW E&M-EST. PATIENT-LVL V: ICD-10-PCS | Mod: PBBFAC,,, | Performed by: INTERNAL MEDICINE

## 2023-10-25 PROCEDURE — 3066F PR DOCUMENTATION OF TREATMENT FOR NEPHROPATHY: ICD-10-PCS | Mod: CPTII,S$GLB,, | Performed by: INTERNAL MEDICINE

## 2023-10-25 PROCEDURE — 3008F PR BODY MASS INDEX (BMI) DOCUMENTED: ICD-10-PCS | Mod: CPTII,S$GLB,, | Performed by: INTERNAL MEDICINE

## 2023-10-25 RX ORDER — TOPIRAMATE 100 MG/1
100 TABLET, FILM COATED ORAL 2 TIMES DAILY
Qty: 180 TABLET | Refills: 1 | Status: SHIPPED | OUTPATIENT
Start: 2023-10-25 | End: 2023-12-23

## 2023-10-25 RX ORDER — SEMAGLUTIDE 0.68 MG/ML
0.25 INJECTION, SOLUTION SUBCUTANEOUS
COMMUNITY

## 2023-10-25 RX ORDER — PEN NEEDLE, DIABETIC 31 GX5/16"
NEEDLE, DISPOSABLE MISCELLANEOUS
COMMUNITY
Start: 2023-10-03

## 2023-10-25 NOTE — PATIENT INSTRUCTIONS
Continue Ozempic    Patient was informed that topiramate is used for migraine prevention and seizures. Weight loss is a common side effect that is well documented. S/he understands this. S/he was informed of the potential side effects such as serious and possibly fatal rash in which case the medication should be discontinued immediately. Paresthesias, forgetfulness, fatigue, kidney stones, GI symptoms, and changes in lab values such as electrolytes, blood counts and kidney function.      topiramate 100  mg morning and evening.     - To lose weight you want to cut 100% starchy carbohydrates out of your diet (bread, rice, pasta, potatoes, granola, flour, corn, peas, oatmeal, grits, tortillas, crackers, chips) and get  grams of protein.  Aim for 100 grams of protein [id number] bianka  daily.        - No soda, sweet tea, juices, sports drinks or lemonade     -Exercise daily. Increase low impact activity as tolerated.  Avoid high impact activity, very heavy lifting or other exercise regimens that may cause discomfort.     Pt advised to check blood sugar regularly as medications may need to be adjusted with changes in diet and weight loss.       Dinner in Under 30 minutes and 400 calories.     Baked Chicken breast with Broccoli Cheese Casserole  2-3 chicken breast (approximately 6-8 oz each)    2 tsp each garlic and herb Mrs. Dash seasoning   2 tsp your favorite creole seasoning  2 tablespoons of balsamic vinegar  2 - 10 oz packages of frozen broccoli  1 egg   ½ cup skim milk  ½ tsp paprika   ½ tsp cayenne pepper   1 can fat free cream of mushroom soup.   1 .5 cups of shredded cheddar cheese    Pre-heat oven to 450 degrees. Toss 2-3 chicken breast (approximately 6-8 oz each) in 2 tsp each garlic and herb Mrs. Dash seasoning, 2 tsp your favorite creole seasoning, and 2 tablespoons of balsamic vinegar. This can also be done up to 24 hours ahead of time, and the chicken can be left in the refrigerator to marinate. Place  on a baking sheet sprayed with non-stick cooking spray and bake on 450 degrees for 10 min, then reduce heat to 350 degrees and cook an addition 10-15 minutes until chicken is cooked through.   While chicken is baking, microwave safe dish, thaw 2 - 10 oz packages of frozen broccoli. Drain any excess water, season with salt, pepper, all-purpose seasoning of your choice. In another bowl, whisk together 1 egg, ½ cup skim milk, ½ tsp paprika, ½ tsp cayenne pepper and 1 can fat free cream of mushroom soup. Combine broccoli, soup mixture, 1 cup of shredded cheddar cheese in baking dish sprayed with cooking spray. Top with remaining cheese. Bake in the oven with chicken for about 20 min or until set cheese is melted and saxena.     Makes 4 servings. 389 calories per serving.10 g fat, 13 g carbs, 54g protein     Sheet Pan [place] Shrimp  1 pound large shrimp, shelled, peeled and deveined.   2 tablespoon olive oil  The zest and the juice of 1 lime  ½ tsp chili powder  ½-1 tsp cayenne pepper  1 tsp cumin  ½ tsp dry oregano  1 red bell pepper  1 green bell pepper  1 red onion  2 cups mushrooms, quartered  1 avocado  Whole amanda lettuce leaves  Preheat oven to 375 degrees.  In a bowl combine shrimp, lime juice, lime zest, cumin, cayenne pepper, chili powder, and a pinch of salt. Set aside.   Slice peppers and onions into thin strips. Toss in 1 tablespoon of olive oil. Sprinkle with salt and pepper and arrange in a single layer over 1/3 of a large sheet pan  Toss mushrooms with remaining olive oil, oregano, salt and pepper. Arrange in single layer on sheet pan. Place sheet pan in oven for about 15-20 minutes until vegetables are softened, cooked about ¾ of the way through. Remove the sheet pan from oven.  Change setting on oven to low broil.  If needed, rearrange vegetables to make room for shrimp. Arrange the shrimp in a single layer on the sheet pan and return pan to oven. After 5 minutes, flip shrimp. Cook 3-5 additional  minutes, or until  Shrimp are opaque.   Serve shrimp and cooked vegetables on lettuce leaves with sliced avocado.   Makes 4 servings. Calories per servin; 23g fat, 19 g carbohydrates, 27g protein.    Zoodles Primavera with Seasoned Ricotta  8 cups zucchini noodles. These can be purchased already prepared, or you can make them on your own with whole zucchini and a vegetable spiralizer.   1.5 cups cherry tomatoes, quartered  2 cups sliced mushrooms  1 cup chopped fresh broccoli  1 cup frozen peas and carrots  2 cloves garlic, finely chopped  16 oz part skim ricotta cheese  2 oz Neufchatel cream cream cheese, cut into small cubes  Juice and zest of 1 lemon  1 pinch red pepper flakes    2 tsp Italian seasoning  4-5 leaves fresh basil, thinly sliced  1 tablespoon of olive oil  Parmesan cheese for topping (optional)     In a bowl, combine ricotta cheese, 1 tsp Italian seasoning, ½ teaspoon lemon zest. Season with salt and pepper to taste. Mix well. Fold in half of fresh basil. Set aside.   Heat a large skillet to medium high. Add olive oil. Sautee mushrooms until starting to become tender. Season them with salt and pepper while cooking.  Add broccoli and peas and carrots. Reduce heat to medium. Cover pan and cook for 4-5 minutes until broccoli is tender and peas and carrots are warmed through. Add Neufchatel cheese, Italian seasoning, red pepper flakes, 1 tsp lemon zest and lemon juice. Stir until a smooth sauce is formed. Add zucchini noodles (zoodles) to skillet and toss in sauce, then add cherry tomatoes.  Separate zoodle and vegetables into 4 equal portions. Serve each with a ¼ cup serving of seasoned ricotta cheese. Sprinkle with grated parmesan cheese or fresh basil,  if desired.   Makes 4 servings. Calories per servin calories, 32 g carbs, 33 g protein, 18 g fat

## 2023-10-25 NOTE — PROGRESS NOTES
Subjective:       Patient ID: Faby Luna is a 73 y.o. female.    Chief Complaint: Follow-up      CC:    Pt here today for follow-up. Has lost 26.3 lbs (-5.1 lbs muscle. -15.9 lbs fat). WAs to get back on track with bariatric diet and started topiramate 100 mg BID. Denies SE. She states she is taking both of them together. States she is eating yogurt, cottage cheese, chicken, fish, pork. Not using protein shake. Walking up to 3 blocks. Has started at the gym.   A1c has increased. She had been off rybelsus 2/2 to cost. Has started Ozempic 4 weeks ago. Getting for $35/month. She does feel appetite is down a little. BS improving.     Currently at 40% EWL from bypass    New BMR: 1534    New PBF: 39.9%     Initial:  BMR:1617    PBF:  41.6%    From recent RD notes.   Back on track Bariatric Diet.  Diet Recall: 60-80 grams of protein/day; 32-48 oz of fluids/day     Current diet recall:  B: prot shake OR skips  L: 1/4 cup nuts or cheese + grapes or kiwi or small apple or banana  D: baked/smothered chicken or fish with variety of veg (asparagus, greens or spinach cooked down with olive oil, brussels, broccoli)  Sn: 1/4 cup nuts, piece of fruit     Diet includes:  Meal Pattern: 1 meal(s) + 1-2 snack(s) + 2 protein supplement(s)  Adequate protein supplement intake. Premier shakes.  Adequate dairy intake.  Adequate vegetable intake. Tolerates raw vegetables and lettuce.  Adequate fruit intake.  Starchy CHO: reduced lately, small amounts  Beverages: water, CL, Coke zero  Alcohol: twice/month white liquor + diet cranberry juice     EXERCISE:  None.  Considering joining the gym (it's free with her insurance)     Restrictions to Exercise: None. Back pain.     VITAMINS / MINERALS:  Multivitamins  B-Complex  Calcium Citrate + Vitamin D  Vitamin B12: Sublingual.     ASSESSMENT:  Doing well overall.  Weight loss.  Adequate calorie intake.  Adequate protein intake.  Inadequate fluid intake.  Following diet  "inappropriately.  Not exercising.  Adequate vitamins & minerals.     BARIATRIC DIET DISCUSSION:  Instructed and provided written materials on bariatric diet plan.  Reinforced post-op nutrition guidelines.     PLAN / RECOMMENDATIONS:  Continue Back on track with diet plan.  Maintain/Increase protein intake.  Maintain/Increase fluid intake.  Begin light exercise as mary.  Continue appropriate vitamins & minerals.     Return to clinic in 1 months with med wt loss.      Lab Results       Component                Value               Date                       HGBA1C                   8.0 (H)             08/16/2023                 HGBA1C                   6.1 (H)             02/06/2023                 HGBA1C                   6.6 (H)             08/03/2022            Lab Results       Component                Value               Date                       LDLCALC                  78.6                08/16/2023                 CREATININE               1.1                 08/16/2023                Review of Systems      Objective:    /60 (BP Location: Left arm, Patient Position: Sitting, BP Method: Large (Manual))   Pulse 78   Ht 5' 4" (1.626 m)   Wt 86.9 kg (191 lb 9.6 oz)   SpO2 96%   BMI 32.89 kg/m²    Physical Exam  Vitals reviewed.   Constitutional:       General: She is not in acute distress.     Appearance: She is well-developed.   HENT:      Head: Normocephalic and atraumatic.   Eyes:      General: No scleral icterus.     Pupils: Pupils are equal, round, and reactive to light.   Neck:      Thyroid: No thyromegaly.   Cardiovascular:      Rate and Rhythm: Normal rate.   Pulmonary:      Effort: Pulmonary effort is normal. No respiratory distress.   Musculoskeletal:         General: Normal range of motion.      Cervical back: Normal range of motion and neck supple.   Skin:     General: Skin is warm and dry.      Findings: No erythema.   Neurological:      Mental Status: She is alert and oriented to person, " place, and time.      Cranial Nerves: No cranial nerve deficit.   Psychiatric:         Behavior: Behavior normal.         Judgment: Judgment normal.       Assessment:       Problem List Items Addressed This Visit       Type 2 diabetes mellitus with stage 3a chronic kidney disease, without long-term current use of insulin (Chronic)    Relevant Medications    semaglutide (OZEMPIC) 0.25 mg or 0.5 mg (2 mg/3 mL) pen injector     Other Visit Diagnoses       Obesity, Class I, BMI 30.0-34.9 (see actual BMI)    -  Primary                    Plan:           Faby was seen today for follow-up.    Diagnoses and all orders for this visit:    Obesity, Class I, BMI 30.0-34.9 (see actual BMI)    Type 2 diabetes mellitus with stage 3a chronic kidney disease, without long-term current use of insulin    Other orders  -     topiramate (TOPAMAX) 100 MG tablet; Take 1 tablet (100 mg total) by mouth 2 (two) times daily.          Continue Ozempic    Patient was informed that topiramate is used for migraine prevention and seizures. Weight loss is a common side effect that is well documented. S/he understands this. S/he was informed of the potential side effects such as serious and possibly fatal rash in which case the medication should be discontinued immediately. Paresthesias, forgetfulness, fatigue, kidney stones, GI symptoms, and changes in lab values such as electrolytes, blood counts and kidney function.      topiramate 100  mg morning and evening.     - To lose weight you want to cut 100% starchy carbohydrates out of your diet (bread, rice, pasta, potatoes, granola, flour, corn, peas, oatmeal, grits, tortillas, crackers, chips) and get  grams of protein.  Aim for 100 grams of protein [id number] bianka  daily.        - No soda, sweet tea, juices, sports drinks or lemonade     -Exercise daily. Increase low impact activity as tolerated.  Avoid high impact activity, very heavy lifting or other exercise regimens that may cause  discomfort.     Pt advised to check blood sugar regularly as medications may need to be adjusted with changes in diet and weight loss.     30 min recipes given.

## 2023-10-26 ENCOUNTER — TELEPHONE (OUTPATIENT)
Dept: ENDOSCOPY | Facility: HOSPITAL | Age: 73
End: 2023-10-26
Payer: MEDICARE

## 2023-10-26 VITALS — HEIGHT: 64 IN | WEIGHT: 189 LBS | BODY MASS INDEX: 32.27 KG/M2

## 2023-10-26 DIAGNOSIS — Z12.11 COLON CANCER SCREENING: Primary | ICD-10-CM

## 2023-10-26 DIAGNOSIS — Z12.11 SPECIAL SCREENING FOR MALIGNANT NEOPLASMS, COLON: Primary | ICD-10-CM

## 2023-10-26 NOTE — TELEPHONE ENCOUNTER
----- Message from Dee Keith sent at 10/19/2023  2:16 PM CDT -----  Contact: [facility] Staff    ----- Message -----  From: Mary Jo Catalan  Sent: 10/19/2023   2:07 PM CDT  To: [facility] Schedulers    10/19/23    Fax recvd from [facility] office, DX colon Screening-requesting pt be called to schedule appt within 2 weeks, scanned into media mgr.    Thanks    Mary Jo    CC

## 2023-10-26 NOTE — TELEPHONE ENCOUNTER
Spoke to Faby to schedule procedure(s) Colonoscopy       Physician to perform procedure(s) Dr. CHRISS Man  Date of Procedure (s) 02/20/24  Arrival Time 8:30 AM  Time of Procedure(s) 9:30 AM   Location of Procedure(s) [address]  Type of Rx Prep sent to patient: PEG  Instructions provided to patient via [facility]    Patient was informed on the following information and verbalized understanding. Screening questionnaire reviewed with patient and complete. If procedure requires anesthesia, a responsible adult needs to be present to accompany the patient home, patient cannot drive after receiving anesthesia. Appointment details are tentative, especially check-in time. Patient will receive a prep-op call 4 days prior to confirm check-in time for procedure. If applicable the patient should contact their pharmacy to verify Rx for procedure prep is ready for pick-up. Patient was advised to call the scheduling department at [phone] if pharmacy states no Rx is available. Patient was advised to call the endoscopy scheduling department if any questions or concerns arise.      [facility] Scheduling Department

## 2023-10-28 DIAGNOSIS — M54.16 LUMBAR RADICULOPATHY: ICD-10-CM

## 2023-10-28 NOTE — TELEPHONE ENCOUNTER
No care due was identified.  [facility] Embedded Care Due Messages. Reference number: [id number].   10/28/2023 9:36:01 AM CDT

## 2023-10-30 RX ORDER — PREGABALIN 50 MG/1
50 CAPSULE ORAL 2 TIMES DAILY
Qty: 60 CAPSULE | Refills: 5 | Status: SHIPPED | OUTPATIENT
Start: 2023-10-30 | End: 2023-11-22

## 2023-11-06 ENCOUNTER — PATIENT MESSAGE (OUTPATIENT)
Dept: ADMINISTRATIVE | Facility: HOSPITAL | Age: 73
End: 2023-11-06
Payer: MEDICARE

## 2023-11-08 ENCOUNTER — TELEPHONE (OUTPATIENT)
Dept: PAIN MEDICINE | Facility: CLINIC | Age: 73
End: 2023-11-08
Payer: MEDICARE

## 2023-11-08 DIAGNOSIS — N18.31 TYPE 2 DIABETES MELLITUS WITH STAGE 3A CHRONIC KIDNEY DISEASE, WITHOUT LONG-TERM CURRENT USE OF INSULIN: Primary | Chronic | ICD-10-CM

## 2023-11-08 DIAGNOSIS — E11.22 TYPE 2 DIABETES MELLITUS WITH STAGE 3A CHRONIC KIDNEY DISEASE, WITHOUT LONG-TERM CURRENT USE OF INSULIN: Primary | Chronic | ICD-10-CM

## 2023-11-08 RX ORDER — LANCETS
EACH MISCELLANEOUS
Qty: 100 EACH | Refills: 11 | Status: SHIPPED | OUTPATIENT
Start: 2023-11-08

## 2023-11-08 NOTE — TELEPHONE ENCOUNTER
No care due was identified.  Health [facility] Embedded Care Due Messages. Reference number: [id number].   11/08/2023 3:17:08 PM CST

## 2023-11-08 NOTE — TELEPHONE ENCOUNTER
----- Message from Efra Aldana sent at 11/8/2023  2:52 PM CST -----  Regarding: self  Type:  Sooner Appointment Request    Patient is requesting a sooner appointment.  Patient declined first available appointment listed as well as another facility and provider .  Patient will not accept being placed on the waitlist and is requesting a message be sent to doctor.    Name of Caller: self    When is the first available appointment? Dec 13th    Symptoms: Lower Back Pain    Would the patient rather a call back or a response via [facility]? callback    Best Call Back Number: [phone]    Additional Information:

## 2023-11-08 NOTE — TELEPHONE ENCOUNTER
Pt states she has began checking her BS again TID. Pt is requesting a refill of strips and lancets.

## 2023-11-20 ENCOUNTER — LAB VISIT (OUTPATIENT)
Dept: LAB | Facility: HOSPITAL | Age: 73
End: 2023-11-20
Attending: FAMILY MEDICINE
Payer: MEDICARE

## 2023-11-20 DIAGNOSIS — E11.22 TYPE 2 DIABETES MELLITUS WITH STAGE 3A CHRONIC KIDNEY DISEASE, WITHOUT LONG-TERM CURRENT USE OF INSULIN: Chronic | ICD-10-CM

## 2023-11-20 DIAGNOSIS — N18.31 TYPE 2 DIABETES MELLITUS WITH STAGE 3A CHRONIC KIDNEY DISEASE, WITHOUT LONG-TERM CURRENT USE OF INSULIN: Chronic | ICD-10-CM

## 2023-11-20 LAB
ESTIMATED AVG GLUCOSE: 140 MG/DL (ref 68–131)
HBA1C MFR BLD: 6.5 % (ref 4–5.6)

## 2023-11-20 PROCEDURE — 83036 HEMOGLOBIN GLYCOSYLATED A1C: CPT | Performed by: FAMILY MEDICINE

## 2023-11-20 PROCEDURE — 36415 COLL VENOUS BLD VENIPUNCTURE: CPT | Mod: PO | Performed by: FAMILY MEDICINE

## 2023-11-22 ENCOUNTER — OFFICE VISIT (OUTPATIENT)
Dept: PAIN MEDICINE | Facility: CLINIC | Age: 73
End: 2023-11-22
Payer: MEDICARE

## 2023-11-22 VITALS
SYSTOLIC BLOOD PRESSURE: 108 MMHG | OXYGEN SATURATION: 100 % | RESPIRATION RATE: 18 BRPM | HEART RATE: 66 BPM | BODY MASS INDEX: 32.93 KG/M2 | WEIGHT: 192.88 LBS | HEIGHT: 64 IN | TEMPERATURE: 98 F | DIASTOLIC BLOOD PRESSURE: 67 MMHG

## 2023-11-22 DIAGNOSIS — G89.4 CHRONIC PAIN SYNDROME: ICD-10-CM

## 2023-11-22 DIAGNOSIS — M47.816 LUMBAR SPONDYLOSIS: Primary | ICD-10-CM

## 2023-11-22 DIAGNOSIS — M47.819 SPONDYLOSIS WITHOUT MYELOPATHY: ICD-10-CM

## 2023-11-22 DIAGNOSIS — M54.16 LUMBAR RADICULOPATHY: ICD-10-CM

## 2023-11-22 DIAGNOSIS — M79.18 MYOFASCIAL PAIN: ICD-10-CM

## 2023-11-22 PROCEDURE — 3074F SYST BP LT 130 MM HG: CPT | Mod: CPTII,S$GLB,, | Performed by: NURSE PRACTITIONER

## 2023-11-22 PROCEDURE — 3008F PR BODY MASS INDEX (BMI) DOCUMENTED: ICD-10-PCS | Mod: CPTII,S$GLB,, | Performed by: NURSE PRACTITIONER

## 2023-11-22 PROCEDURE — 3288F FALL RISK ASSESSMENT DOCD: CPT | Mod: CPTII,S$GLB,, | Performed by: NURSE PRACTITIONER

## 2023-11-22 PROCEDURE — 3061F NEG MICROALBUMINURIA REV: CPT | Mod: CPTII,S$GLB,, | Performed by: NURSE PRACTITIONER

## 2023-11-22 PROCEDURE — 99999 PR PBB SHADOW E&M-EST. PATIENT-LVL V: ICD-10-PCS | Mod: PBBFAC,,, | Performed by: NURSE PRACTITIONER

## 2023-11-22 PROCEDURE — 3044F PR MOST RECENT HEMOGLOBIN A1C LEVEL <7.0%: ICD-10-PCS | Mod: CPTII,S$GLB,, | Performed by: NURSE PRACTITIONER

## 2023-11-22 PROCEDURE — 1159F MED LIST DOCD IN RCRD: CPT | Mod: CPTII,S$GLB,, | Performed by: NURSE PRACTITIONER

## 2023-11-22 PROCEDURE — 3288F PR FALLS RISK ASSESSMENT DOCUMENTED: ICD-10-PCS | Mod: CPTII,S$GLB,, | Performed by: NURSE PRACTITIONER

## 2023-11-22 PROCEDURE — 4010F PR ACE/ARB THEARPY RXD/TAKEN: ICD-10-PCS | Mod: CPTII,S$GLB,, | Performed by: NURSE PRACTITIONER

## 2023-11-22 PROCEDURE — 1160F RVW MEDS BY RX/DR IN RCRD: CPT | Mod: CPTII,S$GLB,, | Performed by: NURSE PRACTITIONER

## 2023-11-22 PROCEDURE — 99214 PR OFFICE/OUTPT VISIT, EST, LEVL IV, 30-39 MIN: ICD-10-PCS | Mod: S$GLB,,, | Performed by: NURSE PRACTITIONER

## 2023-11-22 PROCEDURE — 3061F PR NEG MICROALBUMINURIA RESULT DOCUMENTED/REVIEW: ICD-10-PCS | Mod: CPTII,S$GLB,, | Performed by: NURSE PRACTITIONER

## 2023-11-22 PROCEDURE — 3008F BODY MASS INDEX DOCD: CPT | Mod: CPTII,S$GLB,, | Performed by: NURSE PRACTITIONER

## 2023-11-22 PROCEDURE — 1101F PR PT FALLS ASSESS DOC 0-1 FALLS W/OUT INJ PAST YR: ICD-10-PCS | Mod: CPTII,S$GLB,, | Performed by: NURSE PRACTITIONER

## 2023-11-22 PROCEDURE — 1125F PR PAIN SEVERITY QUANTIFIED, PAIN PRESENT: ICD-10-PCS | Mod: CPTII,S$GLB,, | Performed by: NURSE PRACTITIONER

## 2023-11-22 PROCEDURE — 3074F PR MOST RECENT SYSTOLIC BLOOD PRESSURE < 130 MM HG: ICD-10-PCS | Mod: CPTII,S$GLB,, | Performed by: NURSE PRACTITIONER

## 2023-11-22 PROCEDURE — 99999 PR PBB SHADOW E&M-EST. PATIENT-LVL V: CPT | Mod: PBBFAC,,, | Performed by: NURSE PRACTITIONER

## 2023-11-22 PROCEDURE — 4010F ACE/ARB THERAPY RXD/TAKEN: CPT | Mod: CPTII,S$GLB,, | Performed by: NURSE PRACTITIONER

## 2023-11-22 PROCEDURE — 1159F PR MEDICATION LIST DOCUMENTED IN MEDICAL RECORD: ICD-10-PCS | Mod: CPTII,S$GLB,, | Performed by: NURSE PRACTITIONER

## 2023-11-22 PROCEDURE — 3078F PR MOST RECENT DIASTOLIC BLOOD PRESSURE < 80 MM HG: ICD-10-PCS | Mod: CPTII,S$GLB,, | Performed by: NURSE PRACTITIONER

## 2023-11-22 PROCEDURE — 1125F AMNT PAIN NOTED PAIN PRSNT: CPT | Mod: CPTII,S$GLB,, | Performed by: NURSE PRACTITIONER

## 2023-11-22 PROCEDURE — 3066F PR DOCUMENTATION OF TREATMENT FOR NEPHROPATHY: ICD-10-PCS | Mod: CPTII,S$GLB,, | Performed by: NURSE PRACTITIONER

## 2023-11-22 PROCEDURE — 3044F HG A1C LEVEL LT 7.0%: CPT | Mod: CPTII,S$GLB,, | Performed by: NURSE PRACTITIONER

## 2023-11-22 PROCEDURE — 1101F PT FALLS ASSESS-DOCD LE1/YR: CPT | Mod: CPTII,S$GLB,, | Performed by: NURSE PRACTITIONER

## 2023-11-22 PROCEDURE — 3078F DIAST BP <80 MM HG: CPT | Mod: CPTII,S$GLB,, | Performed by: NURSE PRACTITIONER

## 2023-11-22 PROCEDURE — 1160F PR REVIEW ALL MEDS BY PRESCRIBER/CLIN PHARMACIST DOCUMENTED: ICD-10-PCS | Mod: CPTII,S$GLB,, | Performed by: NURSE PRACTITIONER

## 2023-11-22 PROCEDURE — 99214 OFFICE O/P EST MOD 30 MIN: CPT | Mod: S$GLB,,, | Performed by: NURSE PRACTITIONER

## 2023-11-22 PROCEDURE — 3066F NEPHROPATHY DOC TX: CPT | Mod: CPTII,S$GLB,, | Performed by: NURSE PRACTITIONER

## 2023-11-22 RX ORDER — GABAPENTIN 800 MG/1
800 TABLET ORAL 3 TIMES DAILY
Qty: 90 TABLET | Refills: 2 | Status: SHIPPED | OUTPATIENT
Start: 2023-11-22 | End: 2024-03-05

## 2023-11-22 RX ORDER — TIZANIDINE 4 MG/1
4 TABLET ORAL DAILY PRN
Qty: 90 TABLET | Refills: 3 | Status: SHIPPED | OUTPATIENT
Start: 2023-11-22

## 2023-11-22 NOTE — PROGRESS NOTES
Chronic patient Established Note (Follow up visit)      SUBJECTIVE:  Interval history 11/22/2022:  73-year-old female that presents today with axial low back pain.  Not been seen for approximately 7 months set of pain 1-2 weeks, is located in a bandlike distribution of low back she did not anesthesia like symptoms.  She is known to this clinic and was previously provided a or RFA targeting L3, L4 and L5 that provided her with 50-60% relief.  Like to be considered for repeat lumbar RFA she denies any recent incident or trauma denies any bowel bladder dysfunction anesthesia denies any profound weakness.    Interval History 4/20/2023:  The patient returns to clinic today for follow up of low back pain. She is s/p left L3,4,5 RFA on 3/16/2023 and right L3,4,5 RFA on 3/30/2023. She reports 50-60% relief of her pain. Her pain is tolerable at this time. She has good days and bad days. She denies any radicular leg pain at this time. She is not currently using her SCS. She has not contacted representatives. She reports that her mother passed away last week. She is dealing with a lot of stress due to this. She is taking Gabapentin, although not consistently. She also takes Zanaflex. She denies any other health changes. Her pain today is 6/10.     Interval History 2/8/2023:  The patient returns to clinic today for follow up of back pain. She reports worsened low back pain. She reports intermittent radiating pain into the posterior aspect of both legs to the ankles, left greater than right. Her pain is constant in nature. Her back pain is greater than her leg pain. She is reporting limited relief with SCS. She has not been able to meet with Roque or Ildefonso for programming. She is taking Gabapentin. She denies any other health changes. Her pain today is 9/10.    Interval History 11/4/2022:  Faby is here for follow up of back and leg pain. Unfortunately, she has been unable to meet up with Roque for SCS programming. She does report  some improvement in symptoms since previous visit. She still has back pain with radiation into the legs. Recent XRAYs do not show any significant lead migration. She only takes Gabapentin intermittently because she says it makes her feet swell but it does help. Her pain today is 8/10.    Interval History 10/4/2022:  The patient is here for follow up of chronic back pain. She reports more pain over the past month. No injury or trauma. She does have a lumbar SCS but is unsure if it is even on at this time. She does not think that it is. It did previously help with her back and leg pain. She has not been in contact with [facility] recently. The back pain radiates down the back of both legs to the feet. It worsens with walking and standing. Her pain today is 10/10.    Interval History 8/23/2022:  Faby is here for follow up of chronic lower back pain. She is now s/p right then left L3,4,5 RFAs completed on 7/21/22 with limited relief so far. She continues to report aching back pain with radiation into the legs and numbness. She has had her SCS off for a couple of months. She has not been in contact with [employer] rep for programming. She says that she turned it off due to limited benefit. No new weakness to legs or falls. No bowel/bladder incontinence. Her pain today is 10/10.    Interval History 6/17/2022:  The patient is here today for follow up of back pain. She has had more aching pain across the lower back. She had benefit with lumbar RFAs in the past and would like to reschedule this. She would also like to repeat aquatic PT as this was helpful in the past. Her pain is aching and throbbing in nature without radiation currently. No new falls or trauma. Her pain today is 8/10.    Interval History 5/5/2022:  The patient is here for follow up of chronic lower back pain. She has radiation into the legs. No recent falls or trauma. She saw Dr. Silverio last month and was under the impression that an [employer] rep would be here today  for programming. She is still having difficulty programming her device. She has mild benefit with Gabapentin 900 mg daily without side effects. She is also having muscle spasms intermittently. She is asking for a muscle relaxer. She has tried Robaxin in the past with mild benefit. She would like to try another medication. Her pain today is 8/10.    Interval History 4/20/2022: She presents today for follow up of low back pain. She states that she only had about 40% relief of LBP following RFAs in September that lasted a few weeks. Pain  continues to be in the low back and radiates into b/l LE. Pain encompasses the entire leg and does not follow a specific dermatomal pattern in the leg. She denies weakness, numbness or tingling in the lower extremities. She denies bowel or bladder incontinence. Pain is currently rated 8/10. She is currently on gabapentin 300mg tid with minimal relief. She has been unable to charge bret SCS for the last month and does not have the contact number for her rep.      Interval History 9/30/2021:  The patient returns to clinic today for follow up of low back pain. She is s/p left L3,4,5 RFA on 9/9/2021 and right L3,4,5 RFA on 9/20/2021. She is unsure of relief at this time. She reports increased pain to the right side. She describes this pain as burning in nature. She denies any radiating leg pain. Her pain is worse with bending and standing. She continues to report benefit with [employer] SCS. She denies any weakness. She denies any other health changes. Her pain today is 9/10.    Interval History 8/3/2021:   Ms. Luna returns in f/u re: low back/leg pain. She reports about three months ago she noticed her low back started bothering her, worse with bending and prolonged standing. No inciting event, no trauma to back since last visit. Reports continued leg pain down the backs of her legs as well. She reports intermittent instability in her legs - on and off - over the last year. Last time she has  met with [facility] for reprogramming of SCS was fall 2020. Denies any current issues with SCS function/battery/remote.     Interval History 9/28/2020:  The patient is here today for increased lower back pain.  She is status post right than left L3, 4, 5 radiofrequency ablation completed on 08/31/2020 with approximately 50% relief.  However, she is feeling tightness across the lower back without radiation at this time.  She would like an injection today.  Additionally, she states that she has not completed physical therapy for her back in some time and is not currently doing any exercises.  Her leg pain is currently well controlled with spinal cord stimulator and she had a recent adjustment.  Her pain today is 8/10.    Interval History 7/16/2020:  The patient is here for follow up of lower back pain.  She previously had benefit with lumbar RFAs for similar pain.  She is also having radiation into her legs with numbness, left greater than right.  Ildefonso is here today to meet with her for programming.  She previously was having significant benefit of leg pain with SCS implant.  She denies any recent trauma or falls. Her pain today is 9/10.    Interval History 1/9/2020:  The patient is here for follow up of lower back and leg pain.  She is s/p lumbar RFAs with about 50% relief.  Her leg pain is well controlled with implant.  She still has intermittent flare ups of back pain, like today.  When this happens, she has some benefit with rest.  She has tried Tylenol and OTC NSAIDs without benefit.  She denies any recent falls or weakness.  The patient denies any bowel or bladder incontinence or signs of saddle paresthesia.  The patient denies any major medical changes since last office visit.  Her pain today is 7/10.    Previous Encounter:  Faby Luna presents to the clinic for a follow-up appointment for lower back pain.  She previously had significant leg pain which has resolved with [employer] SCS implant.  She is  here today to discuss increased lower back pain.  It is sharp and throbbing in nature.  It is significant in the morning and with prolonged standing and activity.  She has some benefit with stretching.  She denies any recent falls.  Since the last visit, Faby Dejesus Manoj Luna states the pain has been worsening.  Current pain intensity is 8/10.    Pain Disability Index Review:      11/22/2023     3:15 PM 4/20/2023     2:14 PM 11/4/2022     1:56 PM   Last 3 PDI Scores   Pain Disability Index (PDI) 30 30 40       Opioid Contract: no     report:  Not applicable    Pain Procedures:   5/2/18 Left L3 and L4 TF ELISE- 20% relief  5/21/18 Bilateral L4-5 and L5-S1 facet joint injections- no relief  9/24/18 Lumbar SCS trial ([employer])- 100% relief  10/26/18 Lumbar SCS implant ([employer])- 100% relief of leg pain  9/12/19 Bilateral L3,4,5 MBB- helpful  10/17/19 Bilateral L3,4,5 MBB- helpful  11/21/19 Right L3,4,5 RFA- 50% relief  12/5/19 Left L3,4,5 RFA- 50% relief  8/17/20 Left L3,4,5 RFA- 50% relief  8/31/20 Right L3,4,5 RFA- 50% relief  9/9/2021- Left L3,4,5 RFA - 60% relief  9/20/2021- Right L3,4,5 RFA -60% relief  7/7/22 Right L3,4,5 RFA   7/21/22 Left L3,4,5 RFA   3/16/2023- Left L3,4,5 RFA  3/30/2023- Right L3,4,5 RFA    Physical Therapy/Home Exercise:   yes in the past with limited benefit    Imaging:     3/31/18 Lumbar MRI    Narrative     EXAMINATION:  MRI LUMBAR SPINE WITHOUT CONTRAST    CLINICAL HISTORY:  Low back pain, >6wks conservative tx, persistent-progressive sx, surgical candidate; Other spondylosis with radiculopathy, lumbar region    TECHNIQUE:  Multiplanar, multisequence MR images were acquired from the thoracolumbar junction to the sacrum without the administration of contrast.    COMPARISON:  Plain films from 03/27/2018    FINDINGS:  There is grade 1 spondylolisthesis of L4 on L5. The vertebral body heights are well maintained, with no fracture.  No marrow signal abnormality suspicious for an infiltrative  process.    The conus medullaris terminates at approximately the mid body of L1.  There is a cyst associated with the upper pole of the right kidney.  There is disc desiccation noted throughout the lumbar spine with relative sparing of the L5-S1 disc.  Mild disc space narrowing present at the L4-5 level.    L1-L2: Mild diffuse disc bulge resulting in no significant central or neural foraminal canal narrowing.    L2-L3: No significant central canal narrowing.  There is mild narrowing of either neural foraminal canal secondary to disc material.    L3-L4: Disc bulging in the bilateral foraminal regions resulting in no significant central canal narrowing.  Mild-to-moderate bilateral facet arthropathy also noted.  The bilateral neural foraminal canals are moderately narrowed with some mild effacement of either exiting L3 nerve root in the extraforaminal regions bilaterally.    L4-L5:  Grade 1 spondylolisthesis along with moderate to severe bilateral facet arthropathy and ligamentum flavum hypertrophy resulting in at least moderate narrowing of the central canal.  The right neural foraminal canal is mildly to moderately narrowed.  Left neural foraminal canal is moderately to severely narrowed with mild effacement of the exiting L4 nerve root.    L5-S1:  No significant central or neural foraminal canal narrowing noted.  Mild bilateral facet arthropathy noted.   Impression       1. Multilevel degenerative changes of the lumbar spine as detailed above     Lumbar XRAYs 3/27/18    Narrative     EXAMINATION:  XR LUMBAR SPINE AP AND LAT WITH FLEX/EXT    CLINICAL HISTORY:  Low back pain    TECHNIQUE:  AP and lateral views as well as lateral flexion and extension images are performed through the lumbar spine.    COMPARISON:  None    FINDINGS:  X-ray lumbar spine with flexion and extension demonstrates grade 1 spondylolisthesis at L4-5 measuring around 6 mm which does not change significantly with flexion and extension.  The L4-5  disc is narrowed and degenerated.  Other lumbar vertebral discs are maintained.  Vertebral body heights are maintained.  Small anterior spurs are seen, and there is small posterior osteophyte along the inferior endplate of L4.  There is lumbar facet arthropathy at L4-5 and L5-S1.   Impression       Degenerative changes as above.  Grade 1 anterolisthesis and degenerative disc disease at L4-5.         Allergies:   Review of patient's allergies indicates:   Allergen Reactions    Aspirin Other (See Comments)     Has been told not to take it due to bariatric surgery       Current Medications:   Current Outpatient Medications   Medication Sig Dispense Refill    atenoloL (TENORMIN) 100 MG tablet TAKE 1 TABLET EVERY DAY 90 tablet 3    blood sugar diagnostic Strp To check BG 3 times daily, to use with insurance preferred meter 100 strip 11    EScitalopram oxalate (LEXAPRO) 20 MG tablet Take 1 tablet (20 mg total) by mouth once daily. 90 tablet 3    furosemide (LASIX) 20 MG tablet Take 1 tablet (20 mg total) by mouth once daily. 90 tablet 3    hydroCHLOROthiazide (HYDRODIURIL) 25 MG tablet Take 1 tablet (25 mg total) by mouth once daily. 90 tablet 3    lancets Misc To check BG 3 times daily, to use with insurance preferred meter 100 each 11    loratadine (CLARITIN) 10 mg tablet TAKE 1 TABLET EVERY DAY AS NEEDED FOR ALLERGIES 90 tablet 3    losartan (COZAAR) 100 MG tablet Take 1 tablet (100 mg total) by mouth once daily. 90 tablet 3    pantoprazole (PROTONIX) 20 MG tablet TAKE 1 TABLET TWICE DAILY  BEFORE  MEALS 180 tablet 3    promethazine-dextromethorphan (PROMETHAZINE-DM) 6.25-15 mg/5 mL Syrp Take 5 mLs by mouth every 8 (eight) hours as needed (cough). 240 mL 0    rosuvastatin (CRESTOR) 20 MG tablet Take 1 tablet (20 mg total) by mouth once daily. 30 tablet 11    semaglutide (OZEMPIC) 0.25 mg or 0.5 mg (2 mg/3 mL) pen injector Inject 0.25 mg into the skin every 7 days.      topiramate (TOPAMAX) 100 MG tablet Take 1 tablet  "(100 mg total) by mouth 2 (two) times daily. 180 tablet 1    traZODone (DESYREL) 100 MG tablet Take 1 tablet (100 mg total) by mouth every evening. 90 tablet 3    acetaminophen (TYLENOL) 500 MG tablet Take 1 tablet (500 mg total) by mouth every 6 (six) hours as needed for Temperature greater than or Pain. (Patient not taking: Reported on 11/22/2023) 20 tablet 0    albuterol (PROAIR HFA) 90 mcg/actuation inhaler Inhale 2 puffs into the lungs every 4 (four) hours as needed for Wheezing or Shortness of Breath. Rescue (Patient not taking: Reported on 11/22/2023) 8.5 g 1    albuterol (PROVENTIL/VENTOLIN HFA) 90 mcg/actuation inhaler Inhale 1-2 puffs into the lungs every 6 (six) hours as needed for Wheezing or Shortness of Breath. Rescue (Patient not taking: Reported on 11/22/2023) 8 g 0    albuterol sulfate 2.5 mg/0.5 mL Nebu Take 2.5 mg by nebulization every 4 (four) hours as needed (SOB/wheezing). Rescue (Patient not taking: Reported on 11/22/2023) 20 each 0    BD ULTRA-FINE MINI PEN NEEDLE 31 gauge x 3/16" Ndle       budesonide-formoterol 160-4.5 mcg (SYMBICORT) 160-4.5 mcg/actuation HFAA Inhale 2 puffs into the lungs every 12 (twelve) hours. Controller (brand only) (stop breo) (Patient not taking: Reported on 11/22/2023) 10.2 g 11    dexamethasone sodium phosp/PF (DEXAMETHASONE SODIUM PHOS, PF,) 10 mg/mL Soln       fentaNYL (SUBLIMAZE) 50 mcg/mL injection       fluticasone propionate (FLONASE) 50 mcg/actuation nasal spray 2 sprays (100 mcg total) by Each Nostril route once daily. (Patient not taking: Reported on 11/22/2023) 11.1 mL 1    gabapentin (NEURONTIN) 800 MG tablet Take 1 tablet (800 mg total) by mouth 3 (three) times daily. 90 tablet 2    glipiZIDE (GLUCOTROL) 10 MG tablet Take 1 tablet (10 mg total) by mouth 2 (two) times daily with meals. (Patient not taking: Reported on 11/22/2023) 180 tablet 3    guaiFENesin 100 mg/5 ml (ROBITUSSIN) 100 mg/5 mL syrup Take 5-10 mLs (100-200 mg total) by mouth every 4 " (four) hours as needed for Cough or Congestion. (Patient not taking: Reported on 11/22/2023) 118 mL 0    hydrOXYzine pamoate (VISTARIL) 25 MG Cap TAKE 1 CAPSULE BY MOUTH ONCE DAILY AS NEEDED FOR ANXIETY (Patient not taking: Reported on 11/22/2023) 90 capsule 3    midazolam, PF, (VERSED) 1 mg/mL Soln injection       tiZANidine (ZANAFLEX) 4 MG tablet Take 1 tablet (4 mg total) by mouth daily as needed (pain). 90 tablet 3     No current facility-administered medications for this visit.       REVIEW OF SYSTEMS:    GENERAL:  No weight loss, malaise or fevers.  HEENT:  Negative for frequent or significant headaches.  NECK:  Negative for lumps, goiter, pain and significant neck swelling.  RESPIRATORY:  Negative for cough, wheezing or shortness of breath.  CARDIOVASCULAR:  Negative for chest pain, leg swelling or palpitations. Hypertension.  GI:  Negative for abdominal discomfort, blood in stools or black stools or change in bowel habits.  MUSCULOSKELETAL:  See HPI.  SKIN:  Negative for lesions, rash, and itching.  PSYCH:  Negative for sleep disturbance, mood disorder and recent psychosocial stressors.  HEMATOLOGY/LYMPHOLOGY:  Negative for prolonged bleeding, bruising easily or swollen nodes.  NEURO:   No history of headaches, syncope, paralysis, seizures or tremors.  ENDO: Diabetes.  All other reviewed and negative other than HPI.    Past Medical History:  Past Medical History:   Diagnosis Date    Anxiety with depression     Arthritis     CKD (chronic kidney disease) stage 2, GFR 60-89 ml/min     Diabetes mellitus     History of Clarisse-en-Y gastric bypass     Hypertension     Obesity, unspecified     DAHLIA (obstructive sleep apnea)     Spondylosis        Past Surgical History:  Past Surgical History:   Procedure Laterality Date    CARPAL TUNNEL RELEASE Right 3/8/2019    Procedure: RELEASE, CARPAL TUNNEL right;  Surgeon: Pan Goodman MD;  Location: [facility];  Service: Orthopedics;  Laterality: Right;    CARPAL TUNNEL RELEASE  Left 2019    Procedure: RELEASE, CARPAL TUNNEL- LEFT;  Surgeon: Pan Goodman MD;  [facility];  Service: Orthopedics;  Laterality: Left;    CATARACT EXTRACTION Bilateral      SECTION      CHOLECYSTECTOMY      EPIDURAL STEROID INJECTION INTO LUMBAR SPINE N/A 2018    Procedure: INJECTION, STEROID, SPINE, LUMBAR, EPIDURAL;  Surgeon: Shaq Silverio MD;  Location: [facility];  Service: Pain Management;  Laterality: N/A;  LUMBAR L4-L5 INTERLAMINAR ELISE'  [id number]  W/ SEDATION     ESOPHAGOGASTRODUODENOSCOPY N/A 2023    Procedure: EGD (ESOPHAGOGASTRODUODENOSCOPY);  Surgeon: Deann Jimenez MD;  Location: [facility];  Service: Gastroenterology;  Laterality: N/A;    HYSTERECTOMY      INJECTION OF ANESTHETIC AGENT AROUND NERVE Bilateral 2019    Procedure: BLOCK, NERVE, L3-L4-L5 MEDIAL BRANCH;  Surgeon: Shaq Silverio MD;  Location: [facility];  Service: Pain Management;  Laterality: Bilateral;    INJECTION OF ANESTHETIC AGENT AROUND NERVE Bilateral 10/17/2019    Procedure: BLOCK, NERVE;  Surgeon: Shaq Silverio MD;  Location: [facility];  Service: Pain Management;  Laterality: Bilateral;  B/L MBB L3-L4-L5  REPEAT  CONSENT NEEDED    INJECTION OF FACET JOINT Bilateral 2018    Procedure: INJECTION-FACET;  Surgeon: Shaq Silverio MD;  Location: [facility];  Service: Pain Management;  Laterality: Bilateral;  LUMBAR BILATERAL L4-L5 AND L5-S1 FACET STEROID INJECTION  [id number]    W/ SEDATION     RADIOFREQUENCY ABLATION Right 2019    Procedure: RADIOFREQUENCY ABLATION RIGHT L3, L4, L5;  Surgeon: Shaq Silverio MD;  Location: [facility];  Service: Pain Management;  Laterality: Right;  Right RFA L3-L4-L5  1 of 2  Consent Needed    RADIOFREQUENCY ABLATION Left 2019    Procedure: RADIOFREQUENCY ABLATION LEFT L3-5;  Surgeon: Shaq Silverio MD;  Location: [facility];  Service: Pain Management;  Laterality: Left;  Left RFA L3-L4-L5  2 of 2  Consent Needed    RADIOFREQUENCY  ABLATION Left 8/17/2020    Procedure: RADIOFREQUENCY ABLATION LEFT L3,4,5 1 of 2;  Surgeon: Shaq Silverio MD;  Location: [facility];  Service: Pain Management;  Laterality: Left;  Left RFA L3,4,5  1 of 2    RADIOFREQUENCY ABLATION Right 8/31/2020    Procedure: RADIOFREQUENCY ABLATION RIGHT L3,4,5 2 of 2;  Surgeon: Shaq Silverio MD;  Location: [facility];  Service: Pain Management;  Laterality: Right;  RADIOFREQUENCY ABLATION RIGHT L3,4,5  2 of 2    RADIOFREQUENCY ABLATION Left 9/9/2021    Procedure: RADIOFREQUENCY ABLATION, L3-L4 AND L5 MEDIAL BRANCH 1 OF 2;  Surgeon: Shaq Silverio MD;  Location: [facility];  Service: Pain Management;  Laterality: Left;    RADIOFREQUENCY ABLATION Right 9/20/2021    Procedure: RADIOFREQUENCY ABLATION, L3-L4 AND L5 MEDIAL BRANCH 2 OF 2;  Surgeon: Shaq Silverio MD;  Location: [facility];  Service: Pain Management;  Laterality: Right;    RADIOFREQUENCY ABLATION Right 7/7/2022    Procedure: RADIOFREQUENCY ABLATION, RIGHT L3-L4-L5 ONE OF TWO;  Surgeon: Shaq Silverio MD;  Location: [facility];  Service: Pain Management;  Laterality: Right;    RADIOFREQUENCY ABLATION Left 7/21/2022    Procedure: RADIOFREQUENCY ABLATION, LEFT L3-L4-L5 TWO OF TWO *BRING SCS REMOTE*;  Surgeon: Shaq Silverio MD;  Location: [facility];  Service: Pain Management;  Laterality: Left;    RADIOFREQUENCY ABLATION Left 3/16/2023    Procedure: RADIOFREQUENCY ABLATION LEFT L3,L4,L5 *BRING SCS REMOTE*;  Surgeon: Shaq Silverio MD;  Location: [facility];  Service: Pain Management;  Laterality: Left;    RADIOFREQUENCY ABLATION Right 3/30/2023    Procedure: RADIOFREQUENCY ABLATION RIGHT L3,L4,L5 *BRING SCS REMOTE*;  Surgeon: Shaq Silverio MD;  Location: [facility];  Service: Pain Management;  Laterality: Right;    TRIAL OF SPINAL CORD NERVE STIMULATOR N/A 9/24/2018    Procedure: TRIAL, NEUROSTIMULATOR, SPINAL CORD, SPINAL CORD STIMULATOR TRIAL-INTERNAL WIRES TO EXTERNAL BATTERY;  Surgeon: Shaq Sliverio MD;   [facility];  Service: Pain Management;  Laterality: N/A;  [facility] REP NOTIFIED       Family History:  Family History   Problem Relation Age of Onset    Cataracts Mother     Glaucoma Mother     No Known Problems Father     No Known Problems Sister     No Known Problems Brother     No Known Problems Maternal Aunt     No Known Problems Maternal Uncle     No Known Problems Paternal Aunt     No Known Problems Paternal Uncle     No Known Problems Maternal Grandmother     No Known Problems Maternal Grandfather     No Known Problems Paternal Grandmother     No Known Problems Paternal Grandfather        Social History:  Social History     Socioeconomic History    Marital status:    Tobacco Use    Smoking status: Never    Smokeless tobacco: Never   Substance and Sexual Activity    Alcohol use: Yes     Comment: occ    Drug use: No       OBJECTIVE:    /67   Pulse 66   Temp 98 °F (36.7 °C)   Resp 18   Ht 5' 4" (1.626 m)   Wt 87.5 kg (192 lb 14.4 oz)   SpO2 100%   BMI 33.11 kg/m²     PHYSICAL EXAMINATION:    General appearance: Well appearing, in no acute distress, alert and oriented x3.  Psych:  Mood and affect appropriate.  Skin: Skin color, texture, turgor normal, no rashes or lesions, in both upper and lower body.   Head/face:  Atraumatic, normocephalic. No palpable lymph nodes  Back: Straight leg raising in the sitting and supine positions is negative to radicular pain bilaterally. There is mild pain with palpation to lumbar paraspinals and facet joints bilaterally. Limited ROM with pain on extension. Positive facet loading bilaterally..There is pain with palpation to SI joints.   Extremities: No deformities, edema, or skin discoloration. Good capillary refill.  Musculoskeletal: Normal strength to BLE. No atrophy or tone abnormalities are noted.  Neuro: Decreased sensation to BLE.  Gait: Antalgic- ambulates without assistance.    ASSESSMENT: 73 y.o. year old female with back pain, consistent " with the following diagnoses:     1. Lumbar spondylosis        2. Lumbar radiculopathy  tiZANidine (ZANAFLEX) 4 MG tablet    gabapentin (NEURONTIN) 800 MG tablet      3. Chronic pain syndrome  tiZANidine (ZANAFLEX) 4 MG tablet    gabapentin (NEURONTIN) 800 MG tablet      4. Spondylosis without myelopathy        5. Myofascial pain                PLAN:     - Previous imaging was reviewed and discussed with the patient today.    -scheduled for a lumbar RFA targeting L3, 4, and L5 right then left     - restart gabapentin 800 mg  t.i.d.    - Restart  Zanaflex. 4 mg daily PRN       - RTC 2-3 weeks       The above plan and management options were discussed at length with patient. Patient is in agreement with the above and verbalized understanding.    LUBNA Kennedy  Interventional Pain Management    11/22/2023

## 2023-11-22 NOTE — H&P (VIEW-ONLY)
Chronic patient Established Note (Follow up visit)      SUBJECTIVE:  Interval history 11/22/2022:  73-year-old female that presents today with axial low back pain.  Not been seen for approximately 7 months set of pain 1-2 weeks, is located in a bandlike distribution of low back she did not anesthesia like symptoms.  She is known to this clinic and was previously provided a or RFA targeting L3, L4 and L5 that provided her with 50-60% relief.  Like to be considered for repeat lumbar RFA she denies any recent incident or trauma denies any bowel bladder dysfunction anesthesia denies any profound weakness.    Interval History 4/20/2023:  The patient returns to clinic today for follow up of low back pain. She is s/p left L3,4,5 RFA on 3/16/2023 and right L3,4,5 RFA on 3/30/2023. She reports 50-60% relief of her pain. Her pain is tolerable at this time. She has good days and bad days. She denies any radicular leg pain at this time. She is not currently using her SCS. She has not contacted representatives. She reports that her mother passed away last week. She is dealing with a lot of stress due to this. She is taking Gabapentin, although not consistently. She also takes Zanaflex. She denies any other health changes. Her pain today is 6/10.     Interval History 2/8/2023:  The patient returns to clinic today for follow up of back pain. She reports worsened low back pain. She reports intermittent radiating pain into the posterior aspect of both legs to the ankles, left greater than right. Her pain is constant in nature. Her back pain is greater than her leg pain. She is reporting limited relief with SCS. She has not been able to meet with Roque or Ildefonso for programming. She is taking Gabapentin. She denies any other health changes. Her pain today is 9/10.    Interval History 11/4/2022:  Faby is here for follow up of back and leg pain. Unfortunately, she has been unable to meet up with Roque for SCS programming. She does report  some improvement in symptoms since previous visit. She still has back pain with radiation into the legs. Recent XRAYs do not show any significant lead migration. She only takes Gabapentin intermittently because she says it makes her feet swell but it does help. Her pain today is 8/10.    Interval History 10/4/2022:  The patient is here for follow up of chronic back pain. She reports more pain over the past month. No injury or trauma. She does have a lumbar SCS but is unsure if it is even on at this time. She does not think that it is. It did previously help with her back and leg pain. She has not been in contact with [facility] recently. The back pain radiates down the back of both legs to the feet. It worsens with walking and standing. Her pain today is 10/10.    Interval History 8/23/2022:  Faby is here for follow up of chronic lower back pain. She is now s/p right then left L3,4,5 RFAs completed on 7/21/22 with limited relief so far. She continues to report aching back pain with radiation into the legs and numbness. She has had her SCS off for a couple of months. She has not been in contact with [employer] rep for programming. She says that she turned it off due to limited benefit. No new weakness to legs or falls. No bowel/bladder incontinence. Her pain today is 10/10.    Interval History 6/17/2022:  The patient is here today for follow up of back pain. She has had more aching pain across the lower back. She had benefit with lumbar RFAs in the past and would like to reschedule this. She would also like to repeat aquatic PT as this was helpful in the past. Her pain is aching and throbbing in nature without radiation currently. No new falls or trauma. Her pain today is 8/10.    Interval History 5/5/2022:  The patient is here for follow up of chronic lower back pain. She has radiation into the legs. No recent falls or trauma. She saw Dr. Silverio last month and was under the impression that an [employer] rep would be here today  for programming. She is still having difficulty programming her device. She has mild benefit with Gabapentin 900 mg daily without side effects. She is also having muscle spasms intermittently. She is asking for a muscle relaxer. She has tried Robaxin in the past with mild benefit. She would like to try another medication. Her pain today is 8/10.    Interval History 4/20/2022: She presents today for follow up of low back pain. She states that she only had about 40% relief of LBP following RFAs in September that lasted a few weeks. Pain  continues to be in the low back and radiates into b/l LE. Pain encompasses the entire leg and does not follow a specific dermatomal pattern in the leg. She denies weakness, numbness or tingling in the lower extremities. She denies bowel or bladder incontinence. Pain is currently rated 8/10. She is currently on gabapentin 300mg tid with minimal relief. She has been unable to charge bret SCS for the last month and does not have the contact number for her rep.      Interval History 9/30/2021:  The patient returns to clinic today for follow up of low back pain. She is s/p left L3,4,5 RFA on 9/9/2021 and right L3,4,5 RFA on 9/20/2021. She is unsure of relief at this time. She reports increased pain to the right side. She describes this pain as burning in nature. She denies any radiating leg pain. Her pain is worse with bending and standing. She continues to report benefit with [facility] SCS. She denies any weakness. She denies any other health changes. Her pain today is 9/10.    Interval History 8/3/2021:   Ms. Luna returns in f/u re: low back/leg pain. She reports about three months ago she noticed her low back started bothering her, worse with bending and prolonged standing. No inciting event, no trauma to back since last visit. Reports continued leg pain down the backs of her legs as well. She reports intermittent instability in her legs - on and off - over the last year. Last time she has  met with [facility] for reprogramming of SCS was fall 2020. Denies any current issues with SCS function/battery/remote.     Interval History 9/28/2020:  The patient is here today for increased lower back pain.  She is status post right than left L3, 4, 5 radiofrequency ablation completed on 08/31/2020 with approximately 50% relief.  However, she is feeling tightness across the lower back without radiation at this time.  She would like an injection today.  Additionally, she states that she has not completed physical therapy for her back in some time and is not currently doing any exercises.  Her leg pain is currently well controlled with spinal cord stimulator and she had a recent adjustment.  Her pain today is 8/10.    Interval History 7/16/2020:  The patient is here for follow up of lower back pain.  She previously had benefit with lumbar RFAs for similar pain.  She is also having radiation into her legs with numbness, left greater than right.  Ildefonso is here today to meet with her for programming.  She previously was having significant benefit of leg pain with SCS implant.  She denies any recent trauma or falls. Her pain today is 9/10.    Interval History 1/9/2020:  The patient is here for follow up of lower back and leg pain.  She is s/p lumbar RFAs with about 50% relief.  Her leg pain is well controlled with implant.  She still has intermittent flare ups of back pain, like today.  When this happens, she has some benefit with rest.  She has tried Tylenol and OTC NSAIDs without benefit.  She denies any recent falls or weakness.  The patient denies any bowel or bladder incontinence or signs of saddle paresthesia.  The patient denies any major medical changes since last office visit.  Her pain today is 7/10.    Previous Encounter:  Faby Luna presents to the clinic for a follow-up appointment for lower back pain.  She previously had significant leg pain which has resolved with [employer] SCS implant.  She is  here today to discuss increased lower back pain.  It is sharp and throbbing in nature.  It is significant in the morning and with prolonged standing and activity.  She has some benefit with stretching.  She denies any recent falls.  Since the last visit, Faby Dejesus Manoj Luna states the pain has been worsening.  Current pain intensity is 8/10.    Pain Disability Index Review:      11/22/2023     3:15 PM 4/20/2023     2:14 PM 11/4/2022     1:56 PM   Last 3 PDI Scores   Pain Disability Index (PDI) 30 30 40       Opioid Contract: no     report:  Not applicable    Pain Procedures:   5/2/18 Left L3 and L4 TF ELISE- 20% relief  5/21/18 Bilateral L4-5 and L5-S1 facet joint injections- no relief  9/24/18 Lumbar SCS trial ([facility])- 100% relief  10/26/18 Lumbar SCS implant ([facility])- 100% relief of leg pain  9/12/19 Bilateral L3,4,5 MBB- helpful  10/17/19 Bilateral L3,4,5 MBB- helpful  11/21/19 Right L3,4,5 RFA- 50% relief  12/5/19 Left L3,4,5 RFA- 50% relief  8/17/20 Left L3,4,5 RFA- 50% relief  8/31/20 Right L3,4,5 RFA- 50% relief  9/9/2021- Left L3,4,5 RFA - 60% relief  9/20/2021- Right L3,4,5 RFA -60% relief  7/7/22 Right L3,4,5 RFA   7/21/22 Left L3,4,5 RFA   3/16/2023- Left L3,4,5 RFA  3/30/2023- Right L3,4,5 RFA    Physical Therapy/Home Exercise:   yes in the past with limited benefit    Imaging:     3/31/18 Lumbar MRI    Narrative     EXAMINATION:  MRI LUMBAR SPINE WITHOUT CONTRAST    CLINICAL HISTORY:  Low back pain, >6wks conservative tx, persistent-progressive sx, surgical candidate; Other spondylosis with radiculopathy, lumbar region    TECHNIQUE:  Multiplanar, multisequence MR images were acquired from the thoracolumbar junction to the sacrum without the administration of contrast.    COMPARISON:  Plain films from 03/27/2018    FINDINGS:  There is grade 1 spondylolisthesis of L4 on L5. The vertebral body heights are well maintained, with no fracture.  No marrow signal abnormality suspicious for an infiltrative  process.    The conus medullaris terminates at approximately the mid body of L1.  There is a cyst associated with the upper pole of the right kidney.  There is disc desiccation noted throughout the lumbar spine with relative sparing of the L5-S1 disc.  Mild disc space narrowing present at the L4-5 level.    L1-L2: Mild diffuse disc bulge resulting in no significant central or neural foraminal canal narrowing.    L2-L3: No significant central canal narrowing.  There is mild narrowing of either neural foraminal canal secondary to disc material.    L3-L4: Disc bulging in the bilateral foraminal regions resulting in no significant central canal narrowing.  Mild-to-moderate bilateral facet arthropathy also noted.  The bilateral neural foraminal canals are moderately narrowed with some mild effacement of either exiting L3 nerve root in the extraforaminal regions bilaterally.    L4-L5:  Grade 1 spondylolisthesis along with moderate to severe bilateral facet arthropathy and ligamentum flavum hypertrophy resulting in at least moderate narrowing of the central canal.  The right neural foraminal canal is mildly to moderately narrowed.  Left neural foraminal canal is moderately to severely narrowed with mild effacement of the exiting L4 nerve root.    L5-S1:  No significant central or neural foraminal canal narrowing noted.  Mild bilateral facet arthropathy noted.   Impression       1. Multilevel degenerative changes of the lumbar spine as detailed above     Lumbar XRAYs 3/27/18    Narrative     EXAMINATION:  XR LUMBAR SPINE AP AND LAT WITH FLEX/EXT    CLINICAL HISTORY:  Low back pain    TECHNIQUE:  AP and lateral views as well as lateral flexion and extension images are performed through the lumbar spine.    COMPARISON:  None    FINDINGS:  X-ray lumbar spine with flexion and extension demonstrates grade 1 spondylolisthesis at L4-5 measuring around 6 mm which does not change significantly with flexion and extension.  The L4-5  disc is narrowed and degenerated.  Other lumbar vertebral discs are maintained.  Vertebral body heights are maintained.  Small anterior spurs are seen, and there is small posterior osteophyte along the inferior endplate of L4.  There is lumbar facet arthropathy at L4-5 and L5-S1.   Impression       Degenerative changes as above.  Grade 1 anterolisthesis and degenerative disc disease at L4-5.         Allergies:   Review of patient's allergies indicates:   Allergen Reactions    Aspirin Other (See Comments)     Has been told not to take it due to bariatric surgery       Current Medications:   Current Outpatient Medications   Medication Sig Dispense Refill    atenoloL (TENORMIN) 100 MG tablet TAKE 1 TABLET EVERY DAY 90 tablet 3    blood sugar diagnostic Strp To check BG 3 times daily, to use with insurance preferred meter 100 strip 11    EScitalopram oxalate (LEXAPRO) 20 MG tablet Take 1 tablet (20 mg total) by mouth once daily. 90 tablet 3    furosemide (LASIX) 20 MG tablet Take 1 tablet (20 mg total) by mouth once daily. 90 tablet 3    hydroCHLOROthiazide (HYDRODIURIL) 25 MG tablet Take 1 tablet (25 mg total) by mouth once daily. 90 tablet 3    lancets Misc To check BG 3 times daily, to use with insurance preferred meter 100 each 11    loratadine (CLARITIN) 10 mg tablet TAKE 1 TABLET EVERY DAY AS NEEDED FOR ALLERGIES 90 tablet 3    losartan (COZAAR) 100 MG tablet Take 1 tablet (100 mg total) by mouth once daily. 90 tablet 3    pantoprazole (PROTONIX) 20 MG tablet TAKE 1 TABLET TWICE DAILY  BEFORE  MEALS 180 tablet 3    promethazine-dextromethorphan (PROMETHAZINE-DM) 6.25-15 mg/5 mL Syrp Take 5 mLs by mouth every 8 (eight) hours as needed (cough). 240 mL 0    rosuvastatin (CRESTOR) 20 MG tablet Take 1 tablet (20 mg total) by mouth once daily. 30 tablet 11    semaglutide (OZEMPIC) 0.25 mg or 0.5 mg (2 mg/3 mL) pen injector Inject 0.25 mg into the skin every 7 days.      topiramate (TOPAMAX) 100 MG tablet Take 1 tablet  "(100 mg total) by mouth 2 (two) times daily. 180 tablet 1    traZODone (DESYREL) 100 MG tablet Take 1 tablet (100 mg total) by mouth every evening. 90 tablet 3    acetaminophen (TYLENOL) 500 MG tablet Take 1 tablet (500 mg total) by mouth every 6 (six) hours as needed for Temperature greater than or Pain. (Patient not taking: Reported on 11/22/2023) 20 tablet 0    albuterol (PROAIR HFA) 90 mcg/actuation inhaler Inhale 2 puffs into the lungs every 4 (four) hours as needed for Wheezing or Shortness of Breath. Rescue (Patient not taking: Reported on 11/22/2023) 8.5 g 1    albuterol (PROVENTIL/VENTOLIN HFA) 90 mcg/actuation inhaler Inhale 1-2 puffs into the lungs every 6 (six) hours as needed for Wheezing or Shortness of Breath. Rescue (Patient not taking: Reported on 11/22/2023) 8 g 0    albuterol sulfate 2.5 mg/0.5 mL Nebu Take 2.5 mg by nebulization every 4 (four) hours as needed (SOB/wheezing). Rescue (Patient not taking: Reported on 11/22/2023) 20 each 0    BD ULTRA-FINE MINI PEN NEEDLE 31 gauge x 3/16" Ndle       budesonide-formoterol 160-4.5 mcg (SYMBICORT) 160-4.5 mcg/actuation HFAA Inhale 2 puffs into the lungs every 12 (twelve) hours. Controller (brand only) (stop breo) (Patient not taking: Reported on 11/22/2023) 10.2 g 11    dexamethasone sodium phosp/PF (DEXAMETHASONE SODIUM PHOS, PF,) 10 mg/mL Soln       fentaNYL (SUBLIMAZE) 50 mcg/mL injection       fluticasone propionate (FLONASE) 50 mcg/actuation nasal spray 2 sprays (100 mcg total) by Each Nostril route once daily. (Patient not taking: Reported on 11/22/2023) 11.1 mL 1    gabapentin (NEURONTIN) 800 MG tablet Take 1 tablet (800 mg total) by mouth 3 (three) times daily. 90 tablet 2    glipiZIDE (GLUCOTROL) 10 MG tablet Take 1 tablet (10 mg total) by mouth 2 (two) times daily with meals. (Patient not taking: Reported on 11/22/2023) 180 tablet 3    guaiFENesin 100 mg/5 ml (ROBITUSSIN) 100 mg/5 mL syrup Take 5-10 mLs (100-200 mg total) by mouth every 4 " (four) hours as needed for Cough or Congestion. (Patient not taking: Reported on 11/22/2023) 118 mL 0    hydrOXYzine pamoate (VISTARIL) 25 MG Cap TAKE 1 CAPSULE BY MOUTH ONCE DAILY AS NEEDED FOR ANXIETY (Patient not taking: Reported on 11/22/2023) 90 capsule 3    midazolam, PF, (VERSED) 1 mg/mL Soln injection       tiZANidine (ZANAFLEX) 4 MG tablet Take 1 tablet (4 mg total) by mouth daily as needed (pain). 90 tablet 3     No current facility-administered medications for this visit.       REVIEW OF SYSTEMS:    GENERAL:  No weight loss, malaise or fevers.  HEENT:  Negative for frequent or significant headaches.  NECK:  Negative for lumps, goiter, pain and significant neck swelling.  RESPIRATORY:  Negative for cough, wheezing or shortness of breath.  CARDIOVASCULAR:  Negative for chest pain, leg swelling or palpitations. Hypertension.  GI:  Negative for abdominal discomfort, blood in stools or black stools or change in bowel habits.  MUSCULOSKELETAL:  See HPI.  SKIN:  Negative for lesions, rash, and itching.  PSYCH:  Negative for sleep disturbance, mood disorder and recent psychosocial stressors.  HEMATOLOGY/LYMPHOLOGY:  Negative for prolonged bleeding, bruising easily or swollen nodes.  NEURO:   No history of headaches, syncope, paralysis, seizures or tremors.  ENDO: Diabetes.  All other reviewed and negative other than HPI.    Past Medical History:  Past Medical History:   Diagnosis Date    Anxiety with depression     Arthritis     CKD (chronic kidney disease) stage 2, GFR 60-89 ml/min     Diabetes mellitus     History of Clarisse-en-Y gastric bypass     Hypertension     Obesity, unspecified     DAHLIA (obstructive sleep apnea)     Spondylosis        Past Surgical History:  Past Surgical History:   Procedure Laterality Date    CARPAL TUNNEL RELEASE Right 3/8/2019    Procedure: RELEASE, CARPAL TUNNEL right;  Surgeon: Pan Goodman MD;  Location: [facility];  Service: Orthopedics;  Laterality: Right;    CARPAL TUNNEL RELEASE  Left 2019    Procedure: RELEASE, CARPAL TUNNEL- LEFT;  Surgeon: Pan Goodman MD;  [facility];  Service: Orthopedics;  Laterality: Left;    CATARACT EXTRACTION Bilateral      SECTION      CHOLECYSTECTOMY      EPIDURAL STEROID INJECTION INTO LUMBAR SPINE N/A 2018    Procedure: INJECTION, STEROID, SPINE, LUMBAR, EPIDURAL;  Surgeon: Shaq Silverio MD;  Location: [facility];  Service: Pain Management;  Laterality: N/A;  LUMBAR L4-L5 INTERLAMINAR ELISE'  44010  W/ SEDATION     ESOPHAGOGASTRODUODENOSCOPY N/A 2023    Procedure: EGD (ESOPHAGOGASTRODUODENOSCOPY);  Surgeon: Deann Jimenez MD;  Location: [facility];  Service: Gastroenterology;  Laterality: N/A;    HYSTERECTOMY      INJECTION OF ANESTHETIC AGENT AROUND NERVE Bilateral 2019    Procedure: BLOCK, NERVE, L3-L4-L5 MEDIAL BRANCH;  Surgeon: Shaq Silverio MD;  Location: [facility];  Service: Pain Management;  Laterality: Bilateral;    INJECTION OF ANESTHETIC AGENT AROUND NERVE Bilateral 10/17/2019    Procedure: BLOCK, NERVE;  Surgeon: Shaq Silverio MD;  Location: [facility];  Service: Pain Management;  Laterality: Bilateral;  B/L MBB L3-L4-L5  REPEAT  CONSENT NEEDED    INJECTION OF FACET JOINT Bilateral 2018    Procedure: INJECTION-FACET;  Surgeon: Shaq Silverio MD;  Location: [facility];  Service: Pain Management;  Laterality: Bilateral;  LUMBAR BILATERAL L4-L5 AND L5-S1 FACET STEROID INJECTION  [id number]    W/ SEDATION     RADIOFREQUENCY ABLATION Right 2019    Procedure: RADIOFREQUENCY ABLATION RIGHT L3, L4, L5;  Surgeon: Shaq Silverio MD;  Location: [facility];  Service: Pain Management;  Laterality: Right;  Right RFA L3-L4-L5  1 of 2  Consent Needed    RADIOFREQUENCY ABLATION Left 2019    Procedure: RADIOFREQUENCY ABLATION LEFT L3-5;  Surgeon: Shaq Silverio MD;  Location: [facility];  Service: Pain Management;  Laterality: Left;  Left RFA L3-L4-L5  2 of 2  Consent Needed    RADIOFREQUENCY  ABLATION Left 8/17/2020    Procedure: RADIOFREQUENCY ABLATION LEFT L3,4,5 1 of 2;  Surgeon: Shaq Silverio MD;  Location: [facility];  Service: Pain Management;  Laterality: Left;  Left RFA L3,4,5  1 of 2    RADIOFREQUENCY ABLATION Right 8/31/2020    Procedure: RADIOFREQUENCY ABLATION RIGHT L3,4,5 2 of 2;  Surgeon: Shaq Silverio MD;  Location: [facility];  Service: Pain Management;  Laterality: Right;  RADIOFREQUENCY ABLATION RIGHT L3,4,5  2 of 2    RADIOFREQUENCY ABLATION Left 9/9/2021    Procedure: RADIOFREQUENCY ABLATION, L3-L4 AND L5 MEDIAL BRANCH 1 OF 2;  Surgeon: Shaq Silverio MD;  Location: [facility];  Service: Pain Management;  Laterality: Left;    RADIOFREQUENCY ABLATION Right 9/20/2021    Procedure: RADIOFREQUENCY ABLATION, L3-L4 AND L5 MEDIAL BRANCH 2 OF 2;  Surgeon: Shaq Silverio MD;  Location: [facility];  Service: Pain Management;  Laterality: Right;    RADIOFREQUENCY ABLATION Right 7/7/2022    Procedure: RADIOFREQUENCY ABLATION, RIGHT L3-L4-L5 ONE OF TWO;  Surgeon: Shaq Silverio MD;  Location: [facility];  Service: Pain Management;  Laterality: Right;    RADIOFREQUENCY ABLATION Left 7/21/2022    Procedure: RADIOFREQUENCY ABLATION, LEFT L3-L4-L5 TWO OF TWO *BRING SCS REMOTE*;  Surgeon: Shaq Silverio MD;  Location: [facility];  Service: Pain Management;  Laterality: Left;    RADIOFREQUENCY ABLATION Left 3/16/2023    Procedure: RADIOFREQUENCY ABLATION LEFT L3,L4,L5 *BRING SCS REMOTE*;  Surgeon: Shaq Silverio MD;  Location: [facility];  Service: Pain Management;  Laterality: Left;    RADIOFREQUENCY ABLATION Right 3/30/2023    Procedure: RADIOFREQUENCY ABLATION RIGHT L3,L4,L5 *BRING SCS REMOTE*;  Surgeon: Shaq Silverio MD;  Location: [facility];  Service: Pain Management;  Laterality: Right;    TRIAL OF SPINAL CORD NERVE STIMULATOR N/A 9/24/2018    Procedure: TRIAL, NEUROSTIMULATOR, SPINAL CORD, SPINAL CORD STIMULATOR TRIAL-INTERNAL WIRES TO EXTERNAL BATTERY;  Surgeon: Shaq Silverio MD;   [facility];  Service: Pain Management;  Laterality: N/A;  [facility] REP NOTIFIED       Family History:  Family History   Problem Relation Age of Onset    Cataracts Mother     Glaucoma Mother     No Known Problems Father     No Known Problems Sister     No Known Problems Brother     No Known Problems Maternal Aunt     No Known Problems Maternal Uncle     No Known Problems Paternal Aunt     No Known Problems Paternal Uncle     No Known Problems Maternal Grandmother     No Known Problems Maternal Grandfather     No Known Problems Paternal Grandmother     No Known Problems Paternal Grandfather        Social History:  Social History     Socioeconomic History    Marital status:    Tobacco Use    Smoking status: Never    Smokeless tobacco: Never   Substance and Sexual Activity    Alcohol use: Yes     Comment: occ    Drug use: No       OBJECTIVE:    /67   Pulse 66   Temp 98 °F (36.7 °C)   Resp 18   Ht 5' 4" (1.626 m)   Wt 87.5 kg (192 lb 14.4 oz)   SpO2 100%   BMI 33.11 kg/m²     PHYSICAL EXAMINATION:    General appearance: Well appearing, in no acute distress, alert and oriented x3.  Psych:  Mood and affect appropriate.  Skin: Skin color, texture, turgor normal, no rashes or lesions, in both upper and lower body.   Head/face:  Atraumatic, normocephalic. No palpable lymph nodes  Back: Straight leg raising in the sitting and supine positions is negative to radicular pain bilaterally. There is mild pain with palpation to lumbar paraspinals and facet joints bilaterally. Limited ROM with pain on extension. Positive facet loading bilaterally..There is pain with palpation to SI joints.   Extremities: No deformities, edema, or skin discoloration. Good capillary refill.  Musculoskeletal: Normal strength to BLE. No atrophy or tone abnormalities are noted.  Neuro: Decreased sensation to BLE.  Gait: Antalgic- ambulates without assistance.    ASSESSMENT: 73 y.o. year old female with back pain, consistent " with the following diagnoses:     1. Lumbar spondylosis        2. Lumbar radiculopathy  tiZANidine (ZANAFLEX) 4 MG tablet    gabapentin (NEURONTIN) 800 MG tablet      3. Chronic pain syndrome  tiZANidine (ZANAFLEX) 4 MG tablet    gabapentin (NEURONTIN) 800 MG tablet      4. Spondylosis without myelopathy        5. Myofascial pain                PLAN:     - Previous imaging was reviewed and discussed with the patient today.    -scheduled for a lumbar RFA targeting L3, 4, and L5 right then left     - restart gabapentin 800 mg  t.i.d.    - Restart  Zanaflex. 4 mg daily PRN       - RTC 2-3 weeks       The above plan and management options were discussed at length with patient. Patient is in agreement with the above and verbalized understanding.    LUBNA Kennedy  Interventional Pain Management    11/22/2023

## 2023-11-27 ENCOUNTER — OFFICE VISIT (OUTPATIENT)
Dept: FAMILY MEDICINE | Facility: CLINIC | Age: 73
End: 2023-11-27
Payer: MEDICARE

## 2023-11-27 ENCOUNTER — LAB VISIT (OUTPATIENT)
Dept: LAB | Facility: HOSPITAL | Age: 73
End: 2023-11-27
Attending: FAMILY MEDICINE
Payer: MEDICARE

## 2023-11-27 VITALS
HEIGHT: 64 IN | WEIGHT: 192.44 LBS | SYSTOLIC BLOOD PRESSURE: 106 MMHG | BODY MASS INDEX: 32.85 KG/M2 | OXYGEN SATURATION: 94 % | DIASTOLIC BLOOD PRESSURE: 58 MMHG | HEART RATE: 71 BPM | TEMPERATURE: 98 F

## 2023-11-27 DIAGNOSIS — F33.0 MILD RECURRENT MAJOR DEPRESSION: ICD-10-CM

## 2023-11-27 DIAGNOSIS — G89.4 CHRONIC PAIN SYNDROME: Primary | ICD-10-CM

## 2023-11-27 DIAGNOSIS — E78.5 DYSLIPIDEMIA ASSOCIATED WITH TYPE 2 DIABETES MELLITUS: ICD-10-CM

## 2023-11-27 DIAGNOSIS — R41.3 MEMORY LOSS: ICD-10-CM

## 2023-11-27 DIAGNOSIS — N18.31 TYPE 2 DIABETES MELLITUS WITH STAGE 3A CHRONIC KIDNEY DISEASE, WITHOUT LONG-TERM CURRENT USE OF INSULIN: ICD-10-CM

## 2023-11-27 DIAGNOSIS — M47.816 LUMBAR SPONDYLOSIS: ICD-10-CM

## 2023-11-27 DIAGNOSIS — E66.09 CLASS 1 OBESITY DUE TO EXCESS CALORIES WITH BODY MASS INDEX (BMI) OF 33.0 TO 33.9 IN ADULT, UNSPECIFIED WHETHER SERIOUS COMORBIDITY PRESENT: ICD-10-CM

## 2023-11-27 DIAGNOSIS — I10 ESSENTIAL HYPERTENSION: ICD-10-CM

## 2023-11-27 DIAGNOSIS — E11.22 TYPE 2 DIABETES MELLITUS WITH STAGE 3A CHRONIC KIDNEY DISEASE, WITHOUT LONG-TERM CURRENT USE OF INSULIN: ICD-10-CM

## 2023-11-27 DIAGNOSIS — E11.69 DYSLIPIDEMIA ASSOCIATED WITH TYPE 2 DIABETES MELLITUS: ICD-10-CM

## 2023-11-27 DIAGNOSIS — J45.40 MODERATE PERSISTENT ASTHMA, UNSPECIFIED WHETHER COMPLICATED: ICD-10-CM

## 2023-11-27 DIAGNOSIS — R32 URINARY INCONTINENCE, UNSPECIFIED TYPE: ICD-10-CM

## 2023-11-27 DIAGNOSIS — R32 URINARY INCONTINENCE, UNSPECIFIED TYPE: Primary | ICD-10-CM

## 2023-11-27 DIAGNOSIS — M47.816 LUMBAR SPONDYLOSIS: Primary | ICD-10-CM

## 2023-11-27 DIAGNOSIS — F41.1 GAD (GENERALIZED ANXIETY DISORDER): ICD-10-CM

## 2023-11-27 PROCEDURE — 4010F PR ACE/ARB THEARPY RXD/TAKEN: ICD-10-PCS | Mod: CPTII,S$GLB,, | Performed by: FAMILY MEDICINE

## 2023-11-27 PROCEDURE — 4010F ACE/ARB THERAPY RXD/TAKEN: CPT | Mod: CPTII,S$GLB,, | Performed by: FAMILY MEDICINE

## 2023-11-27 PROCEDURE — 3044F PR MOST RECENT HEMOGLOBIN A1C LEVEL <7.0%: ICD-10-PCS | Mod: CPTII,S$GLB,, | Performed by: FAMILY MEDICINE

## 2023-11-27 PROCEDURE — 3074F SYST BP LT 130 MM HG: CPT | Mod: CPTII,S$GLB,, | Performed by: FAMILY MEDICINE

## 2023-11-27 PROCEDURE — 3074F PR MOST RECENT SYSTOLIC BLOOD PRESSURE < 130 MM HG: ICD-10-PCS | Mod: CPTII,S$GLB,, | Performed by: FAMILY MEDICINE

## 2023-11-27 PROCEDURE — 3078F DIAST BP <80 MM HG: CPT | Mod: CPTII,S$GLB,, | Performed by: FAMILY MEDICINE

## 2023-11-27 PROCEDURE — 3044F HG A1C LEVEL LT 7.0%: CPT | Mod: CPTII,S$GLB,, | Performed by: FAMILY MEDICINE

## 2023-11-27 PROCEDURE — 99999 PR PBB SHADOW E&M-EST. PATIENT-LVL V: CPT | Mod: PBBFAC,,, | Performed by: FAMILY MEDICINE

## 2023-11-27 PROCEDURE — 99999 PR PBB SHADOW E&M-EST. PATIENT-LVL V: ICD-10-PCS | Mod: PBBFAC,,, | Performed by: FAMILY MEDICINE

## 2023-11-27 PROCEDURE — 87086 URINE CULTURE/COLONY COUNT: CPT | Performed by: FAMILY MEDICINE

## 2023-11-27 PROCEDURE — 1159F PR MEDICATION LIST DOCUMENTED IN MEDICAL RECORD: ICD-10-PCS | Mod: CPTII,S$GLB,, | Performed by: FAMILY MEDICINE

## 2023-11-27 PROCEDURE — 3288F FALL RISK ASSESSMENT DOCD: CPT | Mod: CPTII,S$GLB,, | Performed by: FAMILY MEDICINE

## 2023-11-27 PROCEDURE — 1160F RVW MEDS BY RX/DR IN RCRD: CPT | Mod: CPTII,S$GLB,, | Performed by: FAMILY MEDICINE

## 2023-11-27 PROCEDURE — 1126F PR PAIN SEVERITY QUANTIFIED, NO PAIN PRESENT: ICD-10-PCS | Mod: CPTII,S$GLB,, | Performed by: FAMILY MEDICINE

## 2023-11-27 PROCEDURE — 3066F NEPHROPATHY DOC TX: CPT | Mod: CPTII,S$GLB,, | Performed by: FAMILY MEDICINE

## 2023-11-27 PROCEDURE — 81001 URINALYSIS AUTO W/SCOPE: CPT | Performed by: FAMILY MEDICINE

## 2023-11-27 PROCEDURE — 3008F BODY MASS INDEX DOCD: CPT | Mod: CPTII,S$GLB,, | Performed by: FAMILY MEDICINE

## 2023-11-27 PROCEDURE — 1126F AMNT PAIN NOTED NONE PRSNT: CPT | Mod: CPTII,S$GLB,, | Performed by: FAMILY MEDICINE

## 2023-11-27 PROCEDURE — 1160F PR REVIEW ALL MEDS BY PRESCRIBER/CLIN PHARMACIST DOCUMENTED: ICD-10-PCS | Mod: CPTII,S$GLB,, | Performed by: FAMILY MEDICINE

## 2023-11-27 PROCEDURE — 3061F PR NEG MICROALBUMINURIA RESULT DOCUMENTED/REVIEW: ICD-10-PCS | Mod: CPTII,S$GLB,, | Performed by: FAMILY MEDICINE

## 2023-11-27 PROCEDURE — 3066F PR DOCUMENTATION OF TREATMENT FOR NEPHROPATHY: ICD-10-PCS | Mod: CPTII,S$GLB,, | Performed by: FAMILY MEDICINE

## 2023-11-27 PROCEDURE — 3078F PR MOST RECENT DIASTOLIC BLOOD PRESSURE < 80 MM HG: ICD-10-PCS | Mod: CPTII,S$GLB,, | Performed by: FAMILY MEDICINE

## 2023-11-27 PROCEDURE — 99214 PR OFFICE/OUTPT VISIT, EST, LEVL IV, 30-39 MIN: ICD-10-PCS | Mod: S$GLB,,, | Performed by: FAMILY MEDICINE

## 2023-11-27 PROCEDURE — 3061F NEG MICROALBUMINURIA REV: CPT | Mod: CPTII,S$GLB,, | Performed by: FAMILY MEDICINE

## 2023-11-27 PROCEDURE — 99214 OFFICE O/P EST MOD 30 MIN: CPT | Mod: S$GLB,,, | Performed by: FAMILY MEDICINE

## 2023-11-27 PROCEDURE — 1101F PT FALLS ASSESS-DOCD LE1/YR: CPT | Mod: CPTII,S$GLB,, | Performed by: FAMILY MEDICINE

## 2023-11-27 PROCEDURE — 3008F PR BODY MASS INDEX (BMI) DOCUMENTED: ICD-10-PCS | Mod: CPTII,S$GLB,, | Performed by: FAMILY MEDICINE

## 2023-11-27 PROCEDURE — 87088 URINE BACTERIA CULTURE: CPT | Performed by: FAMILY MEDICINE

## 2023-11-27 PROCEDURE — 87077 CULTURE AEROBIC IDENTIFY: CPT | Performed by: FAMILY MEDICINE

## 2023-11-27 PROCEDURE — 3288F PR FALLS RISK ASSESSMENT DOCUMENTED: ICD-10-PCS | Mod: CPTII,S$GLB,, | Performed by: FAMILY MEDICINE

## 2023-11-27 PROCEDURE — 1159F MED LIST DOCD IN RCRD: CPT | Mod: CPTII,S$GLB,, | Performed by: FAMILY MEDICINE

## 2023-11-27 PROCEDURE — 1101F PR PT FALLS ASSESS DOC 0-1 FALLS W/OUT INJ PAST YR: ICD-10-PCS | Mod: CPTII,S$GLB,, | Performed by: FAMILY MEDICINE

## 2023-11-27 PROCEDURE — 87186 SC STD MICRODIL/AGAR DIL: CPT | Performed by: FAMILY MEDICINE

## 2023-11-27 RX ORDER — POLYETHYLENE GLYCOL-3350 AND ELECTROLYTES WITH FLAVOR PACK 240; 5.84; 2.98; 6.72; 22.72 G/278.26G; G/278.26G; G/278.26G; G/278.26G; G/278.26G
4000 POWDER, FOR SOLUTION ORAL ONCE
COMMUNITY
Start: 2023-10-27 | End: 2024-02-29

## 2023-11-27 RX ORDER — GLIPIZIDE 5 MG/1
TABLET ORAL
COMMUNITY
Start: 2023-10-03

## 2023-11-27 NOTE — PROGRESS NOTES
Assessment & Plan:    Urinary incontinence, unspecified type  -     Urinalysis; Future; Expected date: 11/27/2023  -     Urine culture; Future; Expected date: 11/27/2023    Etiology is most likely urge incontinence. Will check urine studies for a UTI.    Memory loss  -     Ambulatory referral/consult to Adult Neuropsychology; Future; Expected date: 12/04/2023    Most likely age-related mild cognitive decline. Referral to memory clinic for further evaluation.    Type 2 diabetes mellitus with stage 3a chronic kidney disease, without long-term current use of insulin  -     CBC Auto Differential; Future; Expected date: 11/27/2023  -     Comprehensive Metabolic Panel; Future; Expected date: 11/27/2023  -     Hemoglobin A1C; Future; Expected date: 11/27/2023  -     Lipid Panel; Future; Expected date: 11/27/2023  -     Microalbumin/Creatinine Ratio, Urine; Future; Expected date: 11/27/2023    A1c is at goal range. Continue Ozempic.   Eye exam scheduled in January.   Repeat labs in 3 mo - may graduate to 6 month follow ups if A1c is at goal.    Class 1 obesity due to excess calories with body mass index (BMI) of 33.0 to 33.9 in adult, unspecified whether serious comorbidity present  Losing weight effectively with GLP-1 agonist. See above.    Dyslipidemia associated with type 2 diabetes mellitus  -     Lipid Panel; Future; Expected date: 11/27/2023    Essential hypertension  -     CBC Auto Differential; Future; Expected date: 11/27/2023  -     Comprehensive Metabolic Panel; Future; Expected date: 11/27/2023  -     Hemoglobin A1C; Future; Expected date: 11/27/2023  -     Lipid Panel; Future; Expected date: 11/27/2023    Controlled. Continue current therapy.     Mild recurrent major depression  Controlled. Continue current therapy.     SANDRA (generalized anxiety disorder)  Controlled. Continue current therapy.     Moderate persistent asthma, unspecified whether complicated  Controlled. Continue current therapy.         Health  maintenance reviewed & addressed above.    Recommended to have shingles vaccine, RSV vaccine, and COVID booster at pharmacy.      Follow-up: Follow up in about 3 months (around 2/27/2024).  ______________________________________________________________________    Chief Complaint  Chief Complaint   Patient presents with    Diabetes       HPI  Faby Luna is a 73 y.o. female with medical diagnoses as listed in the medical history and problem list that presents to the office to follow up on her chronic conditions. She has been taking Ozempic through a physician in the [facility]. She reports normal BG at home, ranging in the 90s. She has also lost some weight. Today, she c/o chronic urinary incontinence with coughing and urinary frequency. Denies dysuria, discharge, pelvic pain, n/v. She is also concerned about memory loss. She reports a delay in recalling important information. She has been forgetting the names of people she knows. She does not report any difficulty taking care of herself or driving.     Most recent pertinent workup:     Last CBC Results:   Lab Results   Component Value Date    WBC 10.07 08/16/2023    HGB 13.4 08/16/2023    HCT 42.7 08/16/2023     08/16/2023       Last CMP Results  Lab Results   Component Value Date     08/16/2023    K 3.9 08/16/2023     08/16/2023    CO2 27 08/16/2023    BUN 15 08/16/2023    CREATININE 1.1 08/16/2023    CALCIUM 9.1 08/16/2023    ALBUMIN 3.7 08/16/2023    AST 25 08/16/2023    ALT 26 08/16/2023       Last Lipids  Lab Results   Component Value Date    CHOL 159 08/16/2023    TRIG 157 (H) 08/16/2023    HDL 49 08/16/2023    LDLCALC 78.6 08/16/2023       Last A1C  Lab Results   Component Value Date    HGBA1C 6.5 (H) 11/20/2023         Health Maintenance         Date Due Completion Date    Shingles Vaccine (1 of 2) Never done ---    RSV Vaccine (Age 60+ and Pregnant patients) (1 - 1-dose 60+ series) Never done ---    Colorectal Cancer Screening  2022    Eye Exam 2022    COVID-19 Vaccine ( season) 2023    Diabetes Urine Screening 2024    Hemoglobin A1c 2024    Lipid Panel 2024    Mammogram 2024    Foot Exam 2024 (Done)    Override on 2023: Done    Override on 2018: Done    TETANUS VACCINE 2032              PAST MEDICAL HISTORY:  Past Medical History:   Diagnosis Date    Anxiety with depression     Arthritis     CKD (chronic kidney disease) stage 2, GFR 60-89 ml/min     Diabetes mellitus     History of Clarisse-en-Y gastric bypass     Hypertension     Obesity, unspecified     DAHLIA (obstructive sleep apnea)     Spondylosis        PAST SURGICAL HISTORY:  Past Surgical History:   Procedure Laterality Date    CARPAL TUNNEL RELEASE Right 3/8/2019    Procedure: RELEASE, CARPAL TUNNEL right;  Surgeon: Pan Goodman MD;  Location: [facility];  Service: Orthopedics;  Laterality: Right;    CARPAL TUNNEL RELEASE Left 2019    Procedure: RELEASE, CARPAL TUNNEL- LEFT;  Surgeon: Pan Goodman MD;  Location: [address];  Service: Orthopedics;  Laterality: Left;    CATARACT EXTRACTION Bilateral      SECTION      CHOLECYSTECTOMY      EPIDURAL STEROID INJECTION INTO LUMBAR SPINE N/A 2018    Procedure: INJECTION, STEROID, SPINE, LUMBAR, EPIDURAL;  Surgeon: Shaq Silverio MD;  Location: [facility];  Service: Pain Management;  Laterality: N/A;  LUMBAR L4-L5 INTERLAMINAR ELISE'  [id number]  W/ SEDATION     ESOPHAGOGASTRODUODENOSCOPY N/A 2023    Procedure: EGD (ESOPHAGOGASTRODUODENOSCOPY);  Surgeon: Deann Jimenez MD;  Location: [facility];  Service: Gastroenterology;  Laterality: N/A;    HYSTERECTOMY      INJECTION OF ANESTHETIC AGENT AROUND NERVE Bilateral 2019    Procedure: BLOCK, NERVE, L3-L4-L5 MEDIAL BRANCH;  Surgeon: Shaq Silverio MD;  Location: [facility];  Service: Pain Management;   Laterality: Bilateral;    INJECTION OF ANESTHETIC AGENT AROUND NERVE Bilateral 10/17/2019    Procedure: BLOCK, NERVE;  Surgeon: Shaq Silverio MD;  Location: [facility];  Service: Pain Management;  Laterality: Bilateral;  B/L MBB L3-L4-L5  REPEAT  CONSENT NEEDED    INJECTION OF FACET JOINT Bilateral 5/28/2018    Procedure: INJECTION-FACET;  Surgeon: Shaq Silverio MD;  Location: [facility];  Service: Pain Management;  Laterality: Bilateral;  LUMBAR BILATERAL L4-L5 AND L5-S1 FACET STEROID INJECTION  [id number]    W/ SEDATION     RADIOFREQUENCY ABLATION Right 11/21/2019    Procedure: RADIOFREQUENCY ABLATION RIGHT L3, L4, L5;  Surgeon: Shaq Silverio MD;  Location: [facility];  Service: Pain Management;  Laterality: Right;  Right RFA L3-L4-L5  1 of 2  Consent Needed    RADIOFREQUENCY ABLATION Left 12/5/2019    Procedure: RADIOFREQUENCY ABLATION LEFT L3-5;  Surgeon: Shaq Silverio MD;  Location: [facility];  Service: Pain Management;  Laterality: Left;  Left RFA L3-L4-L5  2 of 2  Consent Needed    RADIOFREQUENCY ABLATION Left 8/17/2020    Procedure: RADIOFREQUENCY ABLATION LEFT L3,4,5 1 of 2;  Surgeon: Shaq Silverio MD;  Location: [facility];  Service: Pain Management;  Laterality: Left;  Left RFA L3,4,5  1 of 2    RADIOFREQUENCY ABLATION Right 8/31/2020    Procedure: RADIOFREQUENCY ABLATION RIGHT L3,4,5 2 of 2;  Surgeon: Shaq Silverio MD;  Location: [facility];  Service: Pain Management;  Laterality: Right;  RADIOFREQUENCY ABLATION RIGHT L3,4,5  2 of 2    RADIOFREQUENCY ABLATION Left 9/9/2021    Procedure: RADIOFREQUENCY ABLATION, L3-L4 AND L5 MEDIAL BRANCH 1 OF 2;  Surgeon: Shaq Silverio MD;  Location: [facility];  Service: Pain Management;  Laterality: Left;    RADIOFREQUENCY ABLATION Right 9/20/2021    Procedure: RADIOFREQUENCY ABLATION, L3-L4 AND L5 MEDIAL BRANCH 2 OF 2;  Surgeon: Shaq Silverio MD;  Location: [facility];  Service: Pain Management;  Laterality: Right;    RADIOFREQUENCY ABLATION Right  7/7/2022    Procedure: RADIOFREQUENCY ABLATION, RIGHT L3-L4-L5 ONE OF TWO;  Surgeon: Shaq Silverio MD;  Location: [facility] [place];  Service: Pain Management;  Laterality: Right;    RADIOFREQUENCY ABLATION Left 7/21/2022    Procedure: RADIOFREQUENCY ABLATION, LEFT L3-L4-L5 TWO OF TWO *BRING SCS REMOTE*;  Surgeon: Shaq Silverio MD;  Location: [facility];  Service: Pain Management;  Laterality: Left;    RADIOFREQUENCY ABLATION Left 3/16/2023    Procedure: RADIOFREQUENCY ABLATION LEFT L3,L4,L5 *BRING SCS REMOTE*;  Surgeon: Shaq Silverio MD;  Location: [facility] [place];  Service: Pain Management;  Laterality: Left;    RADIOFREQUENCY ABLATION Right 3/30/2023    Procedure: RADIOFREQUENCY ABLATION RIGHT L3,L4,L5 *BRING SCS REMOTE*;  Surgeon: Shaq Silverio MD;  Location: [facility] [place];  Service: Pain Management;  Laterality: Right;    TRIAL OF SPINAL CORD NERVE STIMULATOR N/A 9/24/2018    Procedure: TRIAL, NEUROSTIMULATOR, SPINAL CORD, SPINAL CORD STIMULATOR TRIAL-INTERNAL WIRES TO EXTERNAL BATTERY;  Surgeon: Shaq Silverio MD;  Location: [facility] [place];  Service: Pain Management;  Laterality: N/A;  [facility]BOTT REP NOTIFIED       SOCIAL HISTORY:  Social History     Socioeconomic History    Marital status:    Tobacco Use    Smoking status: Never    Smokeless tobacco: Never   Substance and Sexual Activity    Alcohol use: Yes     Comment: occ    Drug use: No       FAMILY HISTORY:  Family History   Problem Relation Age of Onset    Cataracts Mother     Glaucoma Mother     No Known Problems Father     No Known Problems Sister     No Known Problems Brother     No Known Problems Maternal Aunt     No Known Problems Maternal Uncle     No Known Problems Paternal Aunt     No Known Problems Paternal Uncle     No Known Problems Maternal Grandmother     No Known Problems Maternal Grandfather     No Known Problems Paternal Grandmother     No Known Problems Paternal Grandfather        ALLERGIES AND MEDICATIONS: updated and reviewed.  Review of  "patient's allergies indicates:  No Known Allergies  Current Outpatient Medications   Medication Sig Dispense Refill    atenoloL (TENORMIN) 100 MG tablet TAKE 1 TABLET EVERY DAY 90 tablet 3    BD ULTRA-FINE MINI PEN NEEDLE 31 gauge x 3/16" Ndle       blood sugar diagnostic Strp To check BG 3 times daily, to use with insurance preferred meter 100 strip 11    dexamethasone sodium phosp/PF (DEXAMETHASONE SODIUM PHOS, PF,) 10 mg/mL Soln       EScitalopram oxalate (LEXAPRO) 20 MG tablet Take 1 tablet (20 mg total) by mouth once daily. 90 tablet 3    fentaNYL (SUBLIMAZE) 50 mcg/mL injection       furosemide (LASIX) 20 MG tablet Take 1 tablet (20 mg total) by mouth once daily. 90 tablet 3    gabapentin (NEURONTIN) 800 MG tablet Take 1 tablet (800 mg total) by mouth 3 (three) times daily. 90 tablet 2    GAVILYTE-C 240-22.72-6.72 -5.84 gram SolR Take 4,000 mLs by mouth once.      glipiZIDE (GLUCOTROL) 10 MG tablet Take 1 tablet (10 mg total) by mouth 2 (two) times daily with meals. 180 tablet 3    glipiZIDE (GLUCOTROL) 5 MG tablet       hydroCHLOROthiazide (HYDRODIURIL) 25 MG tablet Take 1 tablet (25 mg total) by mouth once daily. 90 tablet 3    lancets Misc To check BG 3 times daily, to use with insurance preferred meter 100 each 11    loratadine (CLARITIN) 10 mg tablet TAKE 1 TABLET EVERY DAY AS NEEDED FOR ALLERGIES 90 tablet 3    losartan (COZAAR) 100 MG tablet Take 1 tablet (100 mg total) by mouth once daily. 90 tablet 3    midazolam, PF, (VERSED) 1 mg/mL Soln injection       pantoprazole (PROTONIX) 20 MG tablet TAKE 1 TABLET TWICE DAILY  BEFORE  MEALS 180 tablet 3    promethazine-dextromethorphan (PROMETHAZINE-DM) 6.25-15 mg/5 mL Syrp Take 5 mLs by mouth every 8 (eight) hours as needed (cough). 240 mL 0    rosuvastatin (CRESTOR) 20 MG tablet Take 1 tablet (20 mg total) by mouth once daily. 30 tablet 11    semaglutide (OZEMPIC) 0.25 mg or 0.5 mg (2 mg/3 mL) pen injector Inject 0.25 mg into the skin every 7 days.      " "tiZANidine (ZANAFLEX) 4 MG tablet Take 1 tablet (4 mg total) by mouth daily as needed (pain). 90 tablet 3    topiramate (TOPAMAX) 100 MG tablet Take 1 tablet (100 mg total) by mouth 2 (two) times daily. 180 tablet 1    traZODone (DESYREL) 100 MG tablet Take 1 tablet (100 mg total) by mouth every evening. 90 tablet 3    budesonide-formoterol 160-4.5 mcg (SYMBICORT) 160-4.5 mcg/actuation HFAA Inhale 2 puffs into the lungs every 12 (twelve) hours. Controller (brand only) (stop breo) (Patient not taking: Reported on 11/22/2023) 10.2 g 11    fluticasone propionate (FLONASE) 50 mcg/actuation nasal spray 2 sprays (100 mcg total) by Each Nostril route once daily. (Patient not taking: Reported on 11/22/2023) 11.1 mL 1    guaiFENesin 100 mg/5 ml (ROBITUSSIN) 100 mg/5 mL syrup Take 5-10 mLs (100-200 mg total) by mouth every 4 (four) hours as needed for Cough or Congestion. (Patient not taking: Reported on 11/22/2023) 118 mL 0    hydrOXYzine pamoate (VISTARIL) 25 MG Cap TAKE 1 CAPSULE BY MOUTH ONCE DAILY AS NEEDED FOR ANXIETY (Patient not taking: Reported on 11/22/2023) 90 capsule 3     No current facility-administered medications for this visit.         ROS  Review of Systems   Constitutional:  Negative for activity change and unexpected weight change.   Gastrointestinal:  Negative for nausea and vomiting.   Genitourinary:  Positive for urgency. Negative for difficulty urinating, dysuria, frequency and vaginal pain.   Psychiatric/Behavioral:          Memory loss           Physical Exam  Vitals:    11/27/23 1404   BP: (!) 106/58   BP Location: Right arm   Patient Position: Sitting   BP Method: Large (Manual)   Pulse: 71   Temp: 97.7 °F (36.5 °C)   SpO2: (!) 94%   Weight: 87.3 kg (192 lb 7.4 oz)   Height: 5' 4" (1.626 m)    Body mass index is 33.04 kg/m².  Weight: 87.3 kg (192 lb 7.4 oz)   Height: 5' 4" (162.6 cm)   Physical Exam  Constitutional:       General: She is not in acute distress.     Appearance: She is obese.   HENT: "      Head: Normocephalic and atraumatic.   Neck:      Thyroid: No thyromegaly.      Vascular: No carotid bruit.   Cardiovascular:      Rate and Rhythm: Normal rate and regular rhythm.      Pulses: Normal pulses.      Heart sounds: Normal heart sounds.   Pulmonary:      Effort: Pulmonary effort is normal. No respiratory distress.      Breath sounds: Normal breath sounds.   Musculoskeletal:      Cervical back: Neck supple.      Right lower leg: No edema.      Left lower leg: No edema.   Lymphadenopathy:      Cervical: No cervical adenopathy.   Skin:     General: Skin is warm and dry.      Findings: No rash.   Neurological:      Mental Status: She is alert. Mental status is at baseline.   Psychiatric:         Mood and Affect: Mood normal.         Behavior: Behavior normal.         Thought Content: Thought content normal.

## 2023-11-28 ENCOUNTER — TELEPHONE (OUTPATIENT)
Dept: FAMILY MEDICINE | Facility: CLINIC | Age: 73
End: 2023-11-28
Payer: MEDICARE

## 2023-11-28 LAB
BACTERIA #/AREA URNS AUTO: ABNORMAL /HPF
BILIRUB UR QL STRIP: NEGATIVE
CLARITY UR REFRACT.AUTO: ABNORMAL
COLOR UR AUTO: YELLOW
GLUCOSE UR QL STRIP: NEGATIVE
HGB UR QL STRIP: NEGATIVE
KETONES UR QL STRIP: NEGATIVE
LEUKOCYTE ESTERASE UR QL STRIP: ABNORMAL
MICROSCOPIC COMMENT: ABNORMAL
NITRITE UR QL STRIP: NEGATIVE
PH UR STRIP: 5 [PH] (ref 5–8)
PROT UR QL STRIP: NEGATIVE
RBC #/AREA URNS AUTO: 5 /HPF (ref 0–4)
SP GR UR STRIP: 1.02 (ref 1–1.03)
SQUAMOUS #/AREA URNS AUTO: 1 /HPF
URN SPEC COLLECT METH UR: ABNORMAL
WBC #/AREA URNS AUTO: 67 /HPF (ref 0–5)
WBC CLUMPS UR QL AUTO: ABNORMAL

## 2023-11-28 NOTE — TELEPHONE ENCOUNTER
----- Message from Renae Sprague, DO sent at 11/28/2023  3:12 PM CST -----  Please contact the patient and let her know that her urine is showing some white blood cells but very few bacteria. Let's see what her culture shows tomorrow. Holding off on antibiotic for now.

## 2023-11-28 NOTE — PROGRESS NOTES
Please contact the patient and let her know that her urine is showing some white blood cells but very few bacteria. Let's see what her culture shows tomorrow. Holding off on antibiotic for now.

## 2023-11-29 ENCOUNTER — PATIENT MESSAGE (OUTPATIENT)
Dept: FAMILY MEDICINE | Facility: CLINIC | Age: 73
End: 2023-11-29
Payer: MEDICARE

## 2023-11-29 DIAGNOSIS — N30.00 ACUTE CYSTITIS WITHOUT HEMATURIA: Primary | ICD-10-CM

## 2023-11-29 RX ORDER — AMOXICILLIN AND CLAVULANATE POTASSIUM 500; 125 MG/1; MG/1
1 TABLET, FILM COATED ORAL 2 TIMES DAILY
Qty: 10 TABLET | Refills: 0 | Status: SHIPPED | OUTPATIENT
Start: 2023-11-29 | End: 2023-12-04

## 2023-11-29 NOTE — TELEPHONE ENCOUNTER
Please inform patient her urine culture is growing E.coli UTI. Recommend starting Augmentin. We will have her culture back sometime tomorrow.

## 2023-11-30 ENCOUNTER — TELEPHONE (OUTPATIENT)
Dept: FAMILY MEDICINE | Facility: CLINIC | Age: 73
End: 2023-11-30
Payer: MEDICARE

## 2023-11-30 LAB — BACTERIA UR CULT: ABNORMAL

## 2023-11-30 NOTE — TELEPHONE ENCOUNTER
----- Message from Renae Sprague, DO sent at 11/30/2023  7:43 AM CST -----  Please contact the patient and let her know that her urine culture shows sensitivity to Augmentin so please continue this until completed. F/u if she continues to experience urinary issues.

## 2023-11-30 NOTE — PROGRESS NOTES
Please contact the patient and let her know that her urine culture shows sensitivity to Augmentin so please continue this until completed. F/u if she continues to experience urinary issues.

## 2023-12-18 ENCOUNTER — HOSPITAL ENCOUNTER (OUTPATIENT)
Facility: OTHER | Age: 73
Discharge: HOME | End: 2023-12-18
Attending: ANESTHESIOLOGY | Admitting: ANESTHESIOLOGY
Payer: MEDICARE

## 2023-12-18 VITALS
WEIGHT: 185 LBS | HEIGHT: 64 IN | OXYGEN SATURATION: 98 % | TEMPERATURE: 98 F | BODY MASS INDEX: 31.58 KG/M2 | RESPIRATION RATE: 16 BRPM | SYSTOLIC BLOOD PRESSURE: 100 MMHG | HEART RATE: 60 BPM | DIASTOLIC BLOOD PRESSURE: 68 MMHG

## 2023-12-18 DIAGNOSIS — G89.29 CHRONIC PAIN: ICD-10-CM

## 2023-12-18 DIAGNOSIS — M43.06 LUMBAR SPONDYLOLYSIS: Primary | ICD-10-CM

## 2023-12-18 DIAGNOSIS — M47.816 OSTEOARTHRITIS OF FACET JOINT OF LUMBAR SPINE: ICD-10-CM

## 2023-12-18 LAB — POCT GLUCOSE: 60 MG/DL (ref 70–110)

## 2023-12-18 PROCEDURE — 64635 DESTROY LUMB/SAC FACET JNT: CPT | Mod: RT | Performed by: ANESTHESIOLOGY

## 2023-12-18 PROCEDURE — 99152 MOD SED SAME PHYS/QHP 5/>YRS: CPT | Performed by: ANESTHESIOLOGY

## 2023-12-18 PROCEDURE — 64636 DESTROY L/S FACET JNT ADDL: CPT | Mod: RT | Performed by: ANESTHESIOLOGY

## 2023-12-18 PROCEDURE — 99152 MOD SED SAME PHYS/QHP 5/>YRS: CPT | Mod: ,,, | Performed by: ANESTHESIOLOGY

## 2023-12-18 PROCEDURE — 64636 DESTROY L/S FACET JNT ADDL: CPT | Mod: RT,,, | Performed by: ANESTHESIOLOGY

## 2023-12-18 PROCEDURE — 63600175 PHARM REV CODE 636 W HCPCS: Performed by: ANESTHESIOLOGY

## 2023-12-18 PROCEDURE — 64635 PR DESTROY LUMB/SAC FACET JNT: ICD-10-PCS | Mod: RT,,, | Performed by: ANESTHESIOLOGY

## 2023-12-18 PROCEDURE — 99152 PR MOD CONSCIOUS SEDATION, SAME PHYS, 5+ YRS, FIRST 15 MIN: ICD-10-PCS | Mod: ,,, | Performed by: ANESTHESIOLOGY

## 2023-12-18 PROCEDURE — 64635 DESTROY LUMB/SAC FACET JNT: CPT | Mod: RT,,, | Performed by: ANESTHESIOLOGY

## 2023-12-18 PROCEDURE — 64636 PR DESTROY L/S FACET JNT ADDL: ICD-10-PCS | Mod: RT,,, | Performed by: ANESTHESIOLOGY

## 2023-12-18 PROCEDURE — 25000003 PHARM REV CODE 250: Performed by: ANESTHESIOLOGY

## 2023-12-18 RX ORDER — DEXAMETHASONE SODIUM PHOSPHATE 10 MG/ML
INJECTION INTRAMUSCULAR; INTRAVENOUS
Status: DISCONTINUED | OUTPATIENT
Start: 2023-12-18 | End: 2023-12-18 | Stop reason: HOSPADM

## 2023-12-18 RX ORDER — MIDAZOLAM HYDROCHLORIDE 1 MG/ML
INJECTION INTRAMUSCULAR; INTRAVENOUS
Status: DISCONTINUED | OUTPATIENT
Start: 2023-12-18 | End: 2023-12-18 | Stop reason: HOSPADM

## 2023-12-18 RX ORDER — LIDOCAINE HYDROCHLORIDE 20 MG/ML
INJECTION, SOLUTION INFILTRATION; PERINEURAL
Status: DISCONTINUED | OUTPATIENT
Start: 2023-12-18 | End: 2023-12-18 | Stop reason: HOSPADM

## 2023-12-18 RX ORDER — FENTANYL CITRATE 50 UG/ML
INJECTION, SOLUTION INTRAMUSCULAR; INTRAVENOUS
Status: DISCONTINUED | OUTPATIENT
Start: 2023-12-18 | End: 2023-12-18 | Stop reason: HOSPADM

## 2023-12-18 RX ORDER — SODIUM CHLORIDE 9 MG/ML
INJECTION, SOLUTION INTRAVENOUS CONTINUOUS
Status: ACTIVE | OUTPATIENT
Start: 2023-12-18

## 2023-12-18 RX ORDER — BUPIVACAINE HYDROCHLORIDE 2.5 MG/ML
INJECTION, SOLUTION EPIDURAL; INFILTRATION; INTRACAUDAL
Status: DISCONTINUED | OUTPATIENT
Start: 2023-12-18 | End: 2023-12-18 | Stop reason: HOSPADM

## 2023-12-18 NOTE — DISCHARGE SUMMARY
"Discharge Note  Short Stay      SUMMARY     Admit Date: 12/18/2023    Attending Physician: Shaq Silverio      Discharge Physician: Shaq Silverio      Discharge Date: 12/18/2023 11:22 AM    Procedure(s) (LRB):  RADIOFREQUENCY ABLATION RIGHT L3, 4, 5 1 OF 2 (Right)    Final Diagnosis: Chronic pain syndrome [G89.4]  Lumbar spondylosis [M47.816]    Disposition: Home or self care    Patient Instructions:   Current Discharge Medication List        CONTINUE these medications which have NOT CHANGED    Details   atenoloL (TENORMIN) 100 MG tablet TAKE 1 TABLET EVERY DAY  Qty: 90 tablet, Refills: 3    Associated Diagnoses: Essential hypertension      BD ULTRA-FINE MINI PEN NEEDLE 31 gauge x 3/16" Ndle       blood sugar diagnostic Strp To check BG 3 times daily, to use with insurance preferred meter  Qty: 100 strip, Refills: 11    Associated Diagnoses: Type 2 diabetes mellitus with stage 3a chronic kidney disease, without long-term current use of insulin      budesonide-formoterol 160-4.5 mcg (SYMBICORT) 160-4.5 mcg/actuation HFAA Inhale 2 puffs into the lungs every 12 (twelve) hours. Controller (brand only) (stop breo)  Qty: 10.2 g, Refills: 11    Associated Diagnoses: Moderate persistent asthma, unspecified whether complicated      dexamethasone sodium phosp/PF (DEXAMETHASONE SODIUM PHOS, PF,) 10 mg/mL Soln       EScitalopram oxalate (LEXAPRO) 20 MG tablet Take 1 tablet (20 mg total) by mouth once daily.  Qty: 90 tablet, Refills: 3    Associated Diagnoses: Mild recurrent major depression; SANDRA (generalized anxiety disorder)      fentaNYL (SUBLIMAZE) 50 mcg/mL injection       fluticasone propionate (FLONASE) 50 mcg/actuation nasal spray 2 sprays (100 mcg total) by Each Nostril route once daily.  Qty: 11.1 mL, Refills: 1    Associated Diagnoses: Seasonal allergic rhinitis, unspecified trigger      furosemide (LASIX) 20 MG tablet Take 1 tablet (20 mg total) by mouth once daily.  Qty: 90 tablet, Refills: 3    Associated Diagnoses: " Essential hypertension      gabapentin (NEURONTIN) 800 MG tablet Take 1 tablet (800 mg total) by mouth 3 (three) times daily.  Qty: 90 tablet, Refills: 2    Associated Diagnoses: Lumbar radiculopathy; Chronic pain syndrome      GAVILYTE-C 240-22.72-6.72 -5.84 gram SolR Take 4,000 mLs by mouth once.      !! glipiZIDE (GLUCOTROL) 10 MG tablet Take 1 tablet (10 mg total) by mouth 2 (two) times daily with meals.  Qty: 180 tablet, Refills: 3    Associated Diagnoses: Type 2 diabetes mellitus with stage 3a chronic kidney disease, without long-term current use of insulin      !! glipiZIDE (GLUCOTROL) 5 MG tablet       guaiFENesin 100 mg/5 ml (ROBITUSSIN) 100 mg/5 mL syrup Take 5-10 mLs (100-200 mg total) by mouth every 4 (four) hours as needed for Cough or Congestion.  Qty: 118 mL, Refills: 0      hydroCHLOROthiazide (HYDRODIURIL) 25 MG tablet Take 1 tablet (25 mg total) by mouth once daily.  Qty: 90 tablet, Refills: 3    Comments: .      hydrOXYzine pamoate (VISTARIL) 25 MG Cap TAKE 1 CAPSULE BY MOUTH ONCE DAILY AS NEEDED FOR ANXIETY  Qty: 90 capsule, Refills: 3    Associated Diagnoses: SANDRA (generalized anxiety disorder)      lancets Misc To check BG 3 times daily, to use with insurance preferred meter  Qty: 100 each, Refills: 11    Associated Diagnoses: Type 2 diabetes mellitus with stage 3a chronic kidney disease, without long-term current use of insulin      loratadine (CLARITIN) 10 mg tablet TAKE 1 TABLET EVERY DAY AS NEEDED FOR ALLERGIES  Qty: 90 tablet, Refills: 3    Associated Diagnoses: Seasonal allergies      losartan (COZAAR) 100 MG tablet Take 1 tablet (100 mg total) by mouth once daily.  Qty: 90 tablet, Refills: 3    Comments: .  Associated Diagnoses: Essential hypertension      midazolam, PF, (VERSED) 1 mg/mL Soln injection       pantoprazole (PROTONIX) 20 MG tablet TAKE 1 TABLET TWICE DAILY  BEFORE  MEALS  Qty: 180 tablet, Refills: 3    Associated Diagnoses: Gastroesophageal reflux disease without esophagitis       promethazine-dextromethorphan (PROMETHAZINE-DM) 6.25-15 mg/5 mL Syrp Take 5 mLs by mouth every 8 (eight) hours as needed (cough).  Qty: 240 mL, Refills: 0    Associated Diagnoses: Seasonal allergic rhinitis, unspecified trigger      rosuvastatin (CRESTOR) 20 MG tablet Take 1 tablet (20 mg total) by mouth once daily.  Qty: 30 tablet, Refills: 11    Associated Diagnoses: Dyslipidemia      semaglutide (OZEMPIC) 0.25 mg or 0.5 mg (2 mg/3 mL) pen injector Inject 0.25 mg into the skin every 7 days.      tiZANidine (ZANAFLEX) 4 MG tablet Take 1 tablet (4 mg total) by mouth daily as needed (pain).  Qty: 90 tablet, Refills: 3    Associated Diagnoses: Lumbar radiculopathy; Chronic pain syndrome      topiramate (TOPAMAX) 100 MG tablet Take 1 tablet (100 mg total) by mouth 2 (two) times daily.  Qty: 180 tablet, Refills: 1      traZODone (DESYREL) 100 MG tablet Take 1 tablet (100 mg total) by mouth every evening.  Qty: 90 tablet, Refills: 3    Associated Diagnoses: Insomnia, unspecified type       !! - Potential duplicate medications found. Please discuss with provider.              Discharge Diagnosis: Chronic pain syndrome [G89.4]  Lumbar spondylosis [M47.816]  Condition on Discharge: Stable with no complications to procedure   Diet on Discharge: Same as before.  Activity: as per instruction sheet.  Discharge to: Home with a responsible adult.  Follow up: 2-4 weeks       Please call my office or pager at [phone] if experienced any weakness or loss of sensation, fever > 101.5, pain uncontrolled with oral medications, persistent nausea/vomiting/or diarrhea, redness or drainage from the incisions, or any other worrisome concerns. If physician on call was not reached or could not communicate with our office for any reason please go to the nearest emergency department

## 2023-12-18 NOTE — INTERVAL H&P NOTE
The patient has been examined and the H&P has been reviewed:      I concur with the findings and changes have been noted since the H&P was written: Patient mentioned she had a UTI, but states she finish her antibiotics more than 2 weeks ago. Emphasize that there is an increase risk of infection if she has an active infection. She states she does not have symptoms and her time line is correct     Procedure risks, benefits and alternative options discussed and understood by patient/family.          There are no hospital problems to display for this patient.

## 2023-12-18 NOTE — OP NOTE
Therapeutic Lumbar Medial Branch Radiofrequency Ablation under Fluoroscopy     The procedure, risks, benefits, and options were discussed with the patient. There are no contraindications to the procedure. The patent expressed understanding and agreed to the procedure. Informed written consent was obtained prior to the start of the procedure and can be found in the patient's chart.        PATIENT NAME: Faby Luna   MRN: [id number]     DATE OF PROCEDURE: 12/18/2023     PROCEDURE:  Right L3, L4, and L5 Lumbar Radiofrequency Ablation under Fluoroscopy    PRE-OP DIAGNOSIS: Chronic pain syndrome [G89.4]  Lumbar spondylosis [M47.816] Lumbar spondylosis [M47.816]    POST-OP DIAGNOSIS: Same    PHYSICIAN: Shaq Silverio MD    ASSISTANTS: Uriel Aranda MD  [employer] pain fellow      MEDICATIONS INJECTED:  Preservative-free Decadron 10mg with 9cc of Bupivicaine 0.25%    LOCAL ANESTHETIC INJECTED:   Xylocaine 2%    SEDATION: Versed 2mg and Fentanyl 50mcg                                                                                                                                                                                     Conscious sedation ordered by M.D. Patient re-evaluation prior to administration of conscious sedation. No changes noted in patient's status from initial evaluation. The patient's vital signs were monitored by RN and patient remained hemodynamically stable throughout the procedure.    Event Time In   Sedation Start 1118   Sedation End 1133       ESTIMATED BLOOD LOSS:  None    COMPLICATIONS:  None     INTERVAL HISTORY: Patient has clinical and imaging findings suggestive of facet mediated pain. Patients has completed 2 previous diagnostic medial branch blocks at specified levels with at least 80% relief for the expected duration of the local anesthetic utilized.    TECHNIQUE: Time-out was performed to identify the patient and procedure to be performed. With the patient laying in a prone position, the surgical  area was prepped and draped in the usual sterile fashion using ChloraPrep and fenestrated drape. The levels were determined under fluoroscopic guidance. Skin anesthesia was achieved by injecting Lidocaine 2% over the injection sites. A 20 gauge 10mm curved active tip needle was introduced to the anatomic local of the medial branch at each of the above levels using AP, lateral and/or contralateral oblique fluoroscopic imaging. Then sensory and motor testing was performed to confirm that the needle tips were in the correct location. After negative aspiration for blood or CSF was confirmed, 1 mL of the lidocaine 2% listed above was injected slowly at each site. This was followed by thermal lesioning at 80 degrees celsius for 90 seconds. That was followed by slowly injecting 1.5 mL of the medication mixture listed above at each site. The needles were removed and bleeding was nil. A sterile dressing was applied. No specimens collected. The patient tolerated the procedure well and did not have any procedure related motor deficit at the conclusion of the procedure.    PRE-PROCEDURE PAIN SCORE: 10/10    POST-PROCEDURE PAIN SCORE: 0/10    The patient was monitored after the procedure in the recovery area. They were given post-procedure and discharge instructions to follow at home. The patient was discharged in a stable condition.        Shaq Silverio MD

## 2023-12-18 NOTE — DISCHARGE INSTRUCTIONS

## 2023-12-20 DIAGNOSIS — G47.00 INSOMNIA, UNSPECIFIED TYPE: ICD-10-CM

## 2023-12-20 DIAGNOSIS — E78.5 DYSLIPIDEMIA: ICD-10-CM

## 2023-12-20 DIAGNOSIS — J30.2 SEASONAL ALLERGIES: ICD-10-CM

## 2023-12-20 DIAGNOSIS — F33.0 MILD RECURRENT MAJOR DEPRESSION: ICD-10-CM

## 2023-12-20 DIAGNOSIS — F41.1 GAD (GENERALIZED ANXIETY DISORDER): ICD-10-CM

## 2023-12-20 RX ORDER — HYDROCHLOROTHIAZIDE 25 MG/1
TABLET ORAL
Qty: 90 TABLET | Refills: 0 | OUTPATIENT
Start: 2023-12-20

## 2023-12-20 RX ORDER — TRAZODONE HYDROCHLORIDE 100 MG/1
TABLET ORAL
Qty: 90 TABLET | Refills: 0 | OUTPATIENT
Start: 2023-12-20

## 2023-12-20 RX ORDER — ESCITALOPRAM OXALATE 20 MG/1
TABLET ORAL
Qty: 90 TABLET | Refills: 0 | OUTPATIENT
Start: 2023-12-20

## 2023-12-20 RX ORDER — PREGABALIN 50 MG/1
CAPSULE ORAL
Qty: 180 CAPSULE | Refills: 0 | OUTPATIENT
Start: 2023-12-20

## 2023-12-20 RX ORDER — ROSUVASTATIN CALCIUM 20 MG/1
TABLET, COATED ORAL
Qty: 90 TABLET | Refills: 0 | OUTPATIENT
Start: 2023-12-20

## 2023-12-20 NOTE — TELEPHONE ENCOUNTER
No care due was identified.  [facility] Embedded Care Due Messages. Reference number: [id number].   12/20/2023 5:59:35 PM CST

## 2023-12-20 NOTE — TELEPHONE ENCOUNTER
No care due was identified.  [facility] Embedded Care Due Messages. Reference number: [id number].   12/20/2023 5:59:00 PM CST

## 2023-12-21 RX ORDER — LORATADINE 10 MG/1
10 TABLET ORAL DAILY PRN
Qty: 90 TABLET | Refills: 3 | Status: SHIPPED | OUTPATIENT
Start: 2023-12-21

## 2023-12-21 NOTE — TELEPHONE ENCOUNTER
Refill Decision Note   Faby Luna  is requesting a refill authorization.  Brief Assessment and Rationale for Refill:  Quick Discontinue     Medication Therapy Plan: Pharmacy is requesting new scripts for the following medications without required information, (sig/ frequency/qty/etc)     Medication Reconciliation Completed: No   Comments:     No Care Gaps recommended.     Note composed:6:12 PM 12/20/2023

## 2023-12-21 NOTE — TELEPHONE ENCOUNTER
Refill Decision Note   Faby Luna  is requesting a refill authorization.  Brief Assessment and Rationale for Refill:  Quick Discontinue     Medication Therapy Plan: Pharmacy is requesting new scripts for the following medications without required information, (sig/ frequency/qty/etc)     Medication Reconciliation Completed: No   Comments:     No Care Gaps recommended.     Note composed:6:11 PM 12/20/2023

## 2023-12-21 NOTE — TELEPHONE ENCOUNTER
No care due was identified.  [facility] Embedded Care Due Messages. Reference number: [id number].   12/20/2023 10:15:44 PM CST

## 2023-12-21 NOTE — TELEPHONE ENCOUNTER
Refill Routing Note   Medication(s) are not appropriate for processing by [facility] for the following reason(s):        Drug-drug interaction: Duplicate Therapy: hydrOXYzine pamoate, loratadine     [facility] action(s):  Defer      Medication Therapy Plan:       Pharmacist review requested: Yes     Appointments  past 12m or future 3m with PCP    Date Provider   Last Visit   11/27/2023 Renae Sprague, DO   Next Visit   2/29/2024 Renae Sprague, DO   ED visits in past 90 days: 0        Note composed:12:03 PM 12/21/2023

## 2023-12-21 NOTE — TELEPHONE ENCOUNTER
Refill Decision Note   Faby Luna  is requesting a refill authorization.  Brief Assessment and Rationale for Refill:  Approve     Medication Therapy Plan:         Pharmacist review requested: Yes   Extended chart review required: Yes   Comments:     Note composed:3:42 PM 12/21/2023

## 2023-12-23 RX ORDER — TOPIRAMATE 100 MG/1
100 TABLET, FILM COATED ORAL 2 TIMES DAILY
Qty: 180 TABLET | Refills: 1 | Status: SHIPPED | OUTPATIENT
Start: 2023-12-23

## 2024-01-02 ENCOUNTER — PATIENT MESSAGE (OUTPATIENT)
Dept: PAIN MEDICINE | Facility: OTHER | Age: 74
End: 2024-01-02
Payer: MEDICARE

## 2024-01-04 ENCOUNTER — HOSPITAL ENCOUNTER (OUTPATIENT)
Facility: OTHER | Age: 74
Discharge: HOME | End: 2024-01-04
Attending: ANESTHESIOLOGY | Admitting: ANESTHESIOLOGY
Payer: MEDICARE

## 2024-01-04 VITALS
HEIGHT: 64 IN | RESPIRATION RATE: 16 BRPM | BODY MASS INDEX: 31.41 KG/M2 | WEIGHT: 184 LBS | DIASTOLIC BLOOD PRESSURE: 58 MMHG | OXYGEN SATURATION: 97 % | SYSTOLIC BLOOD PRESSURE: 119 MMHG | HEART RATE: 74 BPM | TEMPERATURE: 98 F

## 2024-01-04 DIAGNOSIS — M47.816 LUMBAR SPONDYLOSIS: Primary | ICD-10-CM

## 2024-01-04 LAB — POCT GLUCOSE: 114 MG/DL (ref 70–110)

## 2024-01-04 PROCEDURE — 99152 MOD SED SAME PHYS/QHP 5/>YRS: CPT | Mod: ,,, | Performed by: ANESTHESIOLOGY

## 2024-01-04 PROCEDURE — 64636 DESTROY L/S FACET JNT ADDL: CPT | Mod: LT | Performed by: ANESTHESIOLOGY

## 2024-01-04 PROCEDURE — 64635 DESTROY LUMB/SAC FACET JNT: CPT | Mod: LT,,, | Performed by: ANESTHESIOLOGY

## 2024-01-04 PROCEDURE — 64635 DESTROY LUMB/SAC FACET JNT: CPT | Mod: LT | Performed by: ANESTHESIOLOGY

## 2024-01-04 PROCEDURE — 25000003 PHARM REV CODE 250: Performed by: ANESTHESIOLOGY

## 2024-01-04 PROCEDURE — 64636 DESTROY L/S FACET JNT ADDL: CPT | Mod: LT,,, | Performed by: ANESTHESIOLOGY

## 2024-01-04 PROCEDURE — 63600175 PHARM REV CODE 636 W HCPCS: Performed by: ANESTHESIOLOGY

## 2024-01-04 PROCEDURE — 99153 MOD SED SAME PHYS/QHP EA: CPT | Performed by: ANESTHESIOLOGY

## 2024-01-04 PROCEDURE — 99152 MOD SED SAME PHYS/QHP 5/>YRS: CPT | Performed by: ANESTHESIOLOGY

## 2024-01-04 RX ORDER — SODIUM CHLORIDE 9 MG/ML
INJECTION, SOLUTION INTRAVENOUS CONTINUOUS
Status: DISCONTINUED | OUTPATIENT
Start: 2024-01-04 | End: 2024-01-04 | Stop reason: HOSPADM

## 2024-01-04 RX ORDER — LIDOCAINE HYDROCHLORIDE 20 MG/ML
INJECTION, SOLUTION INFILTRATION; PERINEURAL
Status: DISCONTINUED | OUTPATIENT
Start: 2024-01-04 | End: 2024-01-04 | Stop reason: HOSPADM

## 2024-01-04 RX ORDER — DEXAMETHASONE SODIUM PHOSPHATE 10 MG/ML
INJECTION INTRAMUSCULAR; INTRAVENOUS
Status: DISCONTINUED | OUTPATIENT
Start: 2024-01-04 | End: 2024-01-04 | Stop reason: HOSPADM

## 2024-01-04 RX ORDER — BUPIVACAINE HYDROCHLORIDE 2.5 MG/ML
INJECTION, SOLUTION EPIDURAL; INFILTRATION; INTRACAUDAL
Status: DISCONTINUED | OUTPATIENT
Start: 2024-01-04 | End: 2024-01-04 | Stop reason: HOSPADM

## 2024-01-04 RX ORDER — FENTANYL CITRATE 50 UG/ML
INJECTION, SOLUTION INTRAMUSCULAR; INTRAVENOUS
Status: DISCONTINUED | OUTPATIENT
Start: 2024-01-04 | End: 2024-01-04 | Stop reason: HOSPADM

## 2024-01-04 RX ORDER — MIDAZOLAM HYDROCHLORIDE 1 MG/ML
INJECTION INTRAMUSCULAR; INTRAVENOUS
Status: DISCONTINUED | OUTPATIENT
Start: 2024-01-04 | End: 2024-01-04 | Stop reason: HOSPADM

## 2024-01-04 NOTE — DISCHARGE INSTRUCTIONS

## 2024-01-04 NOTE — DISCHARGE SUMMARY
"Discharge Note  Short Stay      SUMMARY     Admit Date: 1/4/2024    Attending Physician: Shaq Silverio      Discharge Physician: Shaq Silverio      Discharge Date: 1/4/2024 10:02 AM    Procedure(s) (LRB):  RADIOFREQUENCY ABLATION LEFT L3, 4, 5 2 OF 2 (Left)    Final Diagnosis: Lumbar spondylosis [M47.816]    Disposition: Home or self care    Patient Instructions:   Current Discharge Medication List        CONTINUE these medications which have NOT CHANGED    Details   atenoloL (TENORMIN) 100 MG tablet TAKE 1 TABLET EVERY DAY  Qty: 90 tablet, Refills: 3    Associated Diagnoses: Essential hypertension      BD ULTRA-FINE MINI PEN NEEDLE 31 gauge x 3/16" Ndle       blood sugar diagnostic Strp To check BG 3 times daily, to use with insurance preferred meter  Qty: 100 strip, Refills: 11    Associated Diagnoses: Type 2 diabetes mellitus with stage 3a chronic kidney disease, without long-term current use of insulin      budesonide-formoterol 160-4.5 mcg (SYMBICORT) 160-4.5 mcg/actuation HFAA Inhale 2 puffs into the lungs every 12 (twelve) hours. Controller (brand only) (stop breo)  Qty: 10.2 g, Refills: 11    Associated Diagnoses: Moderate persistent asthma, unspecified whether complicated      dexamethasone sodium phosp/PF (DEXAMETHASONE SODIUM PHOS, PF,) 10 mg/mL Soln       EScitalopram oxalate (LEXAPRO) 20 MG tablet Take 1 tablet (20 mg total) by mouth once daily.  Qty: 90 tablet, Refills: 3    Associated Diagnoses: Mild recurrent major depression; SANDRA (generalized anxiety disorder)      fentaNYL (SUBLIMAZE) 50 mcg/mL injection       fluticasone propionate (FLONASE) 50 mcg/actuation nasal spray 2 sprays (100 mcg total) by Each Nostril route once daily.  Qty: 11.1 mL, Refills: 1    Associated Diagnoses: Seasonal allergic rhinitis, unspecified trigger      furosemide (LASIX) 20 MG tablet Take 1 tablet (20 mg total) by mouth once daily.  Qty: 90 tablet, Refills: 3    Associated Diagnoses: Essential hypertension      gabapentin " (NEURONTIN) 800 MG tablet Take 1 tablet (800 mg total) by mouth 3 (three) times daily.  Qty: 90 tablet, Refills: 2    Associated Diagnoses: Lumbar radiculopathy; Chronic pain syndrome      GAVILYTE-C 240-22.72-6.72 -5.84 gram SolR Take 4,000 mLs by mouth once.      !! glipiZIDE (GLUCOTROL) 10 MG tablet Take 1 tablet (10 mg total) by mouth 2 (two) times daily with meals.  Qty: 180 tablet, Refills: 3    Associated Diagnoses: Type 2 diabetes mellitus with stage 3a chronic kidney disease, without long-term current use of insulin      !! glipiZIDE (GLUCOTROL) 5 MG tablet       guaiFENesin 100 mg/5 ml (ROBITUSSIN) 100 mg/5 mL syrup Take 5-10 mLs (100-200 mg total) by mouth every 4 (four) hours as needed for Cough or Congestion.  Qty: 118 mL, Refills: 0      hydroCHLOROthiazide (HYDRODIURIL) 25 MG tablet Take 1 tablet (25 mg total) by mouth once daily.  Qty: 90 tablet, Refills: 3    Comments: .      hydrOXYzine pamoate (VISTARIL) 25 MG Cap TAKE 1 CAPSULE BY MOUTH ONCE DAILY AS NEEDED FOR ANXIETY  Qty: 90 capsule, Refills: 3    Associated Diagnoses: SANDRA (generalized anxiety disorder)      lancets Misc To check BG 3 times daily, to use with insurance preferred meter  Qty: 100 each, Refills: 11    Associated Diagnoses: Type 2 diabetes mellitus with stage 3a chronic kidney disease, without long-term current use of insulin      loratadine (CLARITIN) 10 mg tablet Take 1 tablet (10 mg total) by mouth daily as needed for Allergies.  Qty: 90 tablet, Refills: 3    Associated Diagnoses: Seasonal allergies      losartan (COZAAR) 100 MG tablet Take 1 tablet (100 mg total) by mouth once daily.  Qty: 90 tablet, Refills: 3    Comments: .  Associated Diagnoses: Essential hypertension      midazolam, PF, (VERSED) 1 mg/mL Soln injection       pantoprazole (PROTONIX) 20 MG tablet TAKE 1 TABLET TWICE DAILY  BEFORE  MEALS  Qty: 180 tablet, Refills: 3    Associated Diagnoses: Gastroesophageal reflux disease without esophagitis       promethazine-dextromethorphan (PROMETHAZINE-DM) 6.25-15 mg/5 mL Syrp Take 5 mLs by mouth every 8 (eight) hours as needed (cough).  Qty: 240 mL, Refills: 0    Associated Diagnoses: Seasonal allergic rhinitis, unspecified trigger      rosuvastatin (CRESTOR) 20 MG tablet Take 1 tablet (20 mg total) by mouth once daily.  Qty: 30 tablet, Refills: 11    Associated Diagnoses: Dyslipidemia      semaglutide (OZEMPIC) 0.25 mg or 0.5 mg (2 mg/3 mL) pen injector Inject 0.25 mg into the skin every 7 days.      tiZANidine (ZANAFLEX) 4 MG tablet Take 1 tablet (4 mg total) by mouth daily as needed (pain).  Qty: 90 tablet, Refills: 3    Associated Diagnoses: Lumbar radiculopathy; Chronic pain syndrome      topiramate (TOPAMAX) 100 MG tablet Take 1 tablet (100 mg total) by mouth 2 (two) times daily.  Qty: 180 tablet, Refills: 1    Comments: NEW RX REQUEST FOR PATIENT DUE TO USING NEW MAIL ORDER PHARMACY-[facility]      traZODone (DESYREL) 100 MG tablet Take 1 tablet (100 mg total) by mouth every evening.  Qty: 90 tablet, Refills: 3    Associated Diagnoses: Insomnia, unspecified type       !! - Potential duplicate medications found. Please discuss with provider.              Discharge Diagnosis: Lumbar spondylosis [M47.816]  Condition on Discharge: Stable with no complications to procedure   Diet on Discharge: Same as before.  Activity: as per instruction sheet.  Discharge to: Home with a responsible adult.  Follow up: 2-4 weeks       Please call my office or pager at [phone] if experienced any weakness or loss of sensation, fever > 101.5, pain uncontrolled with oral medications, persistent nausea/vomiting/or diarrhea, redness or drainage from the incisions, or any other worrisome concerns. If physician on call was not reached or could not communicate with our office for any reason please go to the nearest emergency department

## 2024-01-04 NOTE — OP NOTE
Therapeutic Lumbar Medial Branch Radiofrequency Ablation under Fluoroscopy     The procedure, risks, benefits, and options were discussed with the patient. There are no contraindications to the procedure. The patent expressed understanding and agreed to the procedure. Informed written consent was obtained prior to the start of the procedure and can be found in the patient's chart.        PATIENT NAME: Faby Luna   MRN: [id number]     DATE OF PROCEDURE: 01/04/2024     PROCEDURE:  Left L3, L4, and L5 Lumbar Radiofrequency Ablation under Fluoroscopy    PRE-OP DIAGNOSIS: Lumbar spondylosis [M47.816] Lumbar spondylosis [M47.816]    POST-OP DIAGNOSIS: Same    PHYSICIAN: Shaq Silverio MD    ASSISTANTS: Uriel Louie MD  [employer] Pain Fellow      MEDICATIONS INJECTED:  Preservative-free Decadron 10mg with 9cc of Bupivicaine 0.25%    LOCAL ANESTHETIC INJECTED:   Xylocaine 2%    SEDATION: Versed 2mg and Fentanyl 50mcg                                                                                                                                                                                     Conscious sedation ordered by M.D. Patient re-evaluation prior to administration of conscious sedation. No changes noted in patient's status from initial evaluation. The patient's vital signs were monitored by RN and patient remained hemodynamically stable throughout the procedure.    Event Time In   Sedation Start 0946   Sedation End 1010       ESTIMATED BLOOD LOSS:  None    COMPLICATIONS:  None     INTERVAL HISTORY: Patient has clinical and imaging findings suggestive of facet mediated pain. Patients has completed 2 previous diagnostic medial branch blocks at specified levels with at least 80% relief for the expected duration of the local anesthetic utilized.    TECHNIQUE: Time-out was performed to identify the patient and procedure to be performed. With the patient laying in a prone position, the surgical area was prepped and draped in the  usual sterile fashion using ChloraPrep and fenestrated drape. The levels were determined under fluoroscopic guidance. Skin anesthesia was achieved by injecting Lidocaine 2% over the injection sites. A 20 gauge 10mm curved active tip needle was introduced to the anatomic local of the medial branch at each of the above levels using AP, lateral and/or contralateral oblique fluoroscopic imaging. Then sensory and motor testing was performed to confirm that the needle tips were in the correct location. After negative aspiration for blood or CSF was confirmed, 1 mL of the lidocaine 2% listed above was injected slowly at each site. This was followed by thermal lesioning at 80 degrees celsius for 90 seconds. That was followed by slowly injecting 1.5 mL of the medication mixture listed above at each site. The needles were removed and bleeding was nil. A sterile dressing was applied. No specimens collected. The patient tolerated the procedure well and did not have any procedure related motor deficit at the conclusion of the procedure.    PRE-PROCEDURE PAIN SCORE: 7/10    POST-PROCEDURE PAIN SCORE: 0/10    The patient was monitored after the procedure in the recovery area. They were given post-procedure and discharge instructions to follow at home. The patient was discharged in a stable condition.        Shaq Silveroi MD

## 2024-01-04 NOTE — H&P
HPI  Patient presenting for Procedure(s) (LRB):  RADIOFREQUENCY ABLATION LEFT L3, 4, 5 2 OF 2 (Left)     Patient on Anti-coagulation No and denied    No health changes since previous encounter    Past Medical History:   Diagnosis Date    Anxiety with depression     Arthritis     CKD (chronic kidney disease) stage 2, GFR 60-89 ml/min     Diabetes mellitus     History of Clarisse-en-Y gastric bypass     Hypertension     Obesity, unspecified     DAHLIA (obstructive sleep apnea)     Spondylosis      Past Surgical History:   Procedure Laterality Date    CARPAL TUNNEL RELEASE Right 3/8/2019    Procedure: RELEASE, CARPAL TUNNEL right;  Surgeon: Pan Goodman MD;  Location: [facility] OR;  Service: Orthopedics;  Laterality: Right;    CARPAL TUNNEL RELEASE Left 2019    Procedure: RELEASE, CARPAL TUNNEL- LEFT;  Surgeon: Pan Goodman MD;  Location: [address];  Service: Orthopedics;  Laterality: Left;    CATARACT EXTRACTION Bilateral      SECTION      CHOLECYSTECTOMY      EPIDURAL STEROID INJECTION INTO LUMBAR SPINE N/A 2018    Procedure: INJECTION, STEROID, SPINE, LUMBAR, EPIDURAL;  Surgeon: Shaq Silverio MD;  Location: [facility] PAIN MGT;  Service: Pain Management;  Laterality: N/A;  LUMBAR L4-L5 INTERLAMINAR ELISE'  [id number]  W/ SEDATION     ESOPHAGOGASTRODUODENOSCOPY N/A 2023    Procedure: EGD (ESOPHAGOGASTRODUODENOSCOPY);  Surgeon: Deann Jimenez MD;  Location: [facility];  Service: Gastroenterology;  Laterality: N/A;    HYSTERECTOMY      INJECTION OF ANESTHETIC AGENT AROUND NERVE Bilateral 2019    Procedure: BLOCK, NERVE, L3-L4-L5 MEDIAL BRANCH;  Surgeon: Shaq Silverio MD;  Location: [facility] PAIN MGT;  Service: Pain Management;  Laterality: Bilateral;    INJECTION OF ANESTHETIC AGENT AROUND NERVE Bilateral 10/17/2019    Procedure: BLOCK, NERVE;  Surgeon: Shaq Silverio MD;  Location: [facility] PAIN MGT;  Service: Pain Management;  Laterality: Bilateral;  B/L MBB L3-L4-L5  REPEAT  CONSENT NEEDED    INJECTION OF FACET  JOINT Bilateral 5/28/2018    Procedure: INJECTION-FACET;  Surgeon: Shaq Silverio MD;  Location: [facility] PAIN MGT;  Service: Pain Management;  Laterality: Bilateral;  LUMBAR BILATERAL L4-L5 AND L5-S1 FACET STEROID INJECTION  [id number]    W/ SEDATION     RADIOFREQUENCY ABLATION Right 11/21/2019    Procedure: RADIOFREQUENCY ABLATION RIGHT L3, L4, L5;  Surgeon: Shaq Silverio MD;  Location: [facility] PAIN MGT;  Service: Pain Management;  Laterality: Right;  Right RFA L3-L4-L5  1 of 2  Consent Needed    RADIOFREQUENCY ABLATION Left 12/5/2019    Procedure: RADIOFREQUENCY ABLATION LEFT L3-5;  Surgeon: Shaq Silverio MD;  Location: [facility] PAIN [facility]GT;  Service: Pain Management;  Laterality: Left;  Left RFA L3-L4-L5  2 of 2  Consent Needed    RADIOFREQUENCY ABLATION Left 8/17/2020    Procedure: RADIOFREQUENCY ABLATION LEFT L3,4,5 1 of 2;  Surgeon: Shaq Silverio MD;  Location: [facility] PAIN [facility]GT;  Service: Pain Management;  Laterality: Left;  Left RFA L3,4,5  1 of 2    RADIOFREQUENCY ABLATION Right 8/31/2020    Procedure: RADIOFREQUENCY ABLATION RIGHT L3,4,5 2 of 2;  Surgeon: Shaq Silverio MD;  Location: [facility] PAIN [facility]GT;  Service: Pain Management;  Laterality: Right;  RADIOFREQUENCY ABLATION RIGHT L3,4,5  2 of 2    RADIOFREQUENCY ABLATION Left 9/9/2021    Procedure: RADIOFREQUENCY ABLATION, L3-L4 AND L5 MEDIAL BRANCH 1 OF 2;  Surgeon: Shaq Silverio MD;  Location: [facility] PAIN [facility]GT;  Service: Pain Management;  Laterality: Left;    RADIOFREQUENCY ABLATION Right 9/20/2021    Procedure: RADIOFREQUENCY ABLATION, L3-L4 AND L5 MEDIAL BRANCH 2 OF 2;  Surgeon: Shaq Silverio MD;  Location: [facility] PAIN [facility]GT;  Service: Pain Management;  Laterality: Right;    RADIOFREQUENCY ABLATION Right 7/7/2022    Procedure: RADIOFREQUENCY ABLATION, RIGHT L3-L4-L5 ONE OF TWO;  Surgeon: Shaq Silverio MD;  Location: [facility] PAIN [facility]GT;  Service: Pain Management;  Laterality: Right;    RADIOFREQUENCY ABLATION Left 7/21/2022    Procedure: RADIOFREQUENCY ABLATION, LEFT L3-L4-L5 TWO OF TWO *BRING  "SCS REMOTE*;  Surgeon: Shaq Silverio MD;  Location: [facility] PAIN MGT;  Service: Pain Management;  Laterality: Left;    RADIOFREQUENCY ABLATION Left 3/16/2023    Procedure: RADIOFREQUENCY ABLATION LEFT L3,L4,L5 *BRING SCS REMOTE*;  Surgeon: Shaq Silverio MD;  Location: [facility] PAIN MGT;  Service: Pain Management;  Laterality: Left;    RADIOFREQUENCY ABLATION Right 3/30/2023    Procedure: RADIOFREQUENCY ABLATION RIGHT L3,L4,L5 *BRING SCS REMOTE*;  Surgeon: Shaq Silverio MD;  Location: [facility] PAIN MGT;  Service: Pain Management;  Laterality: Right;    RADIOFREQUENCY ABLATION Right 12/18/2023    Procedure: RADIOFREQUENCY ABLATION RIGHT L3, 4, 5 1 OF 2;  Surgeon: Shaq Silverio MD;  Location: [facility] PAIN MGT;  Service: Pain Management;  Laterality: Right;  [phone]    TRIAL OF SPINAL CORD NERVE STIMULATOR N/A 9/24/2018    Procedure: TRIAL, NEUROSTIMULATOR, SPINAL CORD, SPINAL CORD STIMULATOR TRIAL-INTERNAL WIRES TO EXTERNAL BATTERY;  Surgeon: Shaq Silverio MD;  Location: [facility] PAIN MGT;  Service: Pain Management;  Laterality: N/A;  [facility] NOTIFIED     Review of patient's allergies indicates:  No Known Allergies   Current Facility-Administered Medications   Medication    0.9%  NaCl infusion     Facility-Administered Medications Ordered in Other Encounters   Medication    0.9%  NaCl infusion       PMHx, PSHx, Allergies, Medications reviewed in epic    ROS negative except pain complaints in HPI    OBJECTIVE:    /61   Pulse 79   Temp 98.2 °F (36.8 °C) (Oral)   Resp 16   Ht 5' 4" (1.626 m)   Wt 83.5 kg (184 lb)   SpO2 98%   Breastfeeding No   BMI 31.58 kg/m²     PHYSICAL EXAMINATION:    GENERAL: Well appearing, in no acute distress, alert and oriented x3.  PSYCH:  Mood and affect appropriate.  SKIN: Skin color, texture, turgor normal, no rashes or lesions which will impact the procedure.  CV: Regular Rate  PULM: No evidence of respiratory difficulty, symmetric chest rise.   NEURO: Cranial nerves grossly " intact.    Plan:    Proceed with procedure as planned Procedure(s) (LRB):  RADIOFREQUENCY ABLATION LEFT L3, 4, 5 2 OF 2 (Left)    Jer Hickey  01/04/2024

## 2024-01-19 ENCOUNTER — OFFICE VISIT (OUTPATIENT)
Dept: OPTOMETRY | Facility: CLINIC | Age: 74
End: 2024-01-19
Payer: MEDICARE

## 2024-01-19 DIAGNOSIS — H52.7 REFRACTIVE ERROR: ICD-10-CM

## 2024-01-19 DIAGNOSIS — Z01.00 DIABETIC EYE EXAM: Primary | ICD-10-CM

## 2024-01-19 DIAGNOSIS — E11.9 DIABETIC EYE EXAM: Primary | ICD-10-CM

## 2024-01-19 DIAGNOSIS — Z96.1 PSEUDOPHAKIA: ICD-10-CM

## 2024-01-19 PROCEDURE — 1159F MED LIST DOCD IN RCRD: CPT | Mod: CPTII,S$GLB,, | Performed by: OPTOMETRIST

## 2024-01-19 PROCEDURE — 1101F PT FALLS ASSESS-DOCD LE1/YR: CPT | Mod: CPTII,S$GLB,, | Performed by: OPTOMETRIST

## 2024-01-19 PROCEDURE — 3288F FALL RISK ASSESSMENT DOCD: CPT | Mod: CPTII,S$GLB,, | Performed by: OPTOMETRIST

## 2024-01-19 PROCEDURE — 92015 DETERMINE REFRACTIVE STATE: CPT | Mod: S$GLB,,, | Performed by: OPTOMETRIST

## 2024-01-19 PROCEDURE — 1126F AMNT PAIN NOTED NONE PRSNT: CPT | Mod: CPTII,S$GLB,, | Performed by: OPTOMETRIST

## 2024-01-19 PROCEDURE — 99999 PR PBB SHADOW E&M-EST. PATIENT-LVL III: CPT | Mod: PBBFAC,,, | Performed by: OPTOMETRIST

## 2024-01-19 PROCEDURE — 1160F RVW MEDS BY RX/DR IN RCRD: CPT | Mod: CPTII,S$GLB,, | Performed by: OPTOMETRIST

## 2024-01-19 PROCEDURE — 92014 COMPRE OPH EXAM EST PT 1/>: CPT | Mod: S$GLB,,, | Performed by: OPTOMETRIST

## 2024-01-19 PROCEDURE — 2023F DILAT RTA XM W/O RTNOPTHY: CPT | Mod: CPTII,S$GLB,, | Performed by: OPTOMETRIST

## 2024-01-19 NOTE — PROGRESS NOTES
Subjective:       Patient ID: Faby Luna is a 73 y.o. female      Chief Complaint   Patient presents with    Concerns About Ocular Health    Diabetic Eye Exam     History of Present Illness  Dls 12/7/21 Dr. Jimenez     72 y/o female presents today for diabetic eye exam.   Pt c/o blurry vision at distance and near ou.   Pt wears pal's.     LBS 98 x 2 days     + od  tearing  + ou itching  No burning  No pain  No ha's  + ou floaters  No flashes    Eye meds  None    Pohx:   S/p CE Bilateral     Fohx:  Glaucoma -mother   Cat - mother     Hemoglobin A1C       Date                     Value               Ref Range             Status                11/20/2023               6.5 (H)             4.0 - 5.6 %           Final                  08/16/2023               8.0 (H)             4.0 - 5.6 %           Final                   02/06/2023               6.1 (H)             4.0 - 5.6 %           Final               Assessment/Plan:     1. Diabetic eye exam  [facility]    No diabetic retinopathy. Discussed with pt the effects of diabetes on vision, importance of good blood sugar control, compliance with meds, and follow up care with PCP. Return in 1 year for dilated eye exam, sooner PRN.    2. Refractive error  Educated patient on refractive error and discussed lens options. Dispensed updated spectacle Rx. Educated about adaptation period to new specs.    Eyeglass Final Rx       Eyeglass Final Rx         Sphere Cylinder Axis Add    Right +0.25 +0.50 105 +2.50    Left -0.50 +0.75 105 +2.50      Expiration Date: 1/19/2025    Comments: ANTIMETROPIA                        3. Pseudophakia  Well-centered. Clear.       Follow up in about 1 year (around 1/19/2025) for Diabetic Eye Exam.

## 2024-01-25 DIAGNOSIS — I10 ESSENTIAL HYPERTENSION: ICD-10-CM

## 2024-01-25 DIAGNOSIS — K21.9 GASTROESOPHAGEAL REFLUX DISEASE WITHOUT ESOPHAGITIS: ICD-10-CM

## 2024-01-26 RX ORDER — PANTOPRAZOLE SODIUM 20 MG/1
TABLET, DELAYED RELEASE ORAL
Qty: 180 TABLET | Refills: 3 | Status: SHIPPED | OUTPATIENT
Start: 2024-01-26

## 2024-01-26 RX ORDER — ATENOLOL 100 MG/1
TABLET ORAL
Qty: 90 TABLET | Refills: 3 | Status: SHIPPED | OUTPATIENT
Start: 2024-01-26

## 2024-01-26 NOTE — TELEPHONE ENCOUNTER
Refill Decision Note   Faby Luna  is requesting a refill authorization.  Brief Assessment and Rationale for Refill:  Approve     Medication Therapy Plan:         Comments:     Note composed:2:53 PM 01/26/2024

## 2024-01-26 NOTE — TELEPHONE ENCOUNTER
No care due was identified.  [facility] Embedded Care Due Messages. Reference number: [id number].   1/26/2024 12:00:22 AM CST

## 2024-02-14 ENCOUNTER — TELEPHONE (OUTPATIENT)
Dept: ENDOSCOPY | Facility: HOSPITAL | Age: 74
End: 2024-02-14
Payer: MEDICARE

## 2024-02-14 NOTE — TELEPHONE ENCOUNTER
Left voicemail and sent portal message for patient to call Endoscopy Scheduling to review instructions and confirm appointment for Colonoscopy on 2/20/24.

## 2024-02-19 ENCOUNTER — TELEPHONE (OUTPATIENT)
Dept: ENDOSCOPY | Facility: HOSPITAL | Age: 74
End: 2024-02-19
Payer: MEDICARE

## 2024-02-19 NOTE — TELEPHONE ENCOUNTER
Left 2nd voicemail for patient to call Endoscopy Scheduling to review instructions and confirm appointment for Colonoscopy on 2/20/24.

## 2024-02-20 ENCOUNTER — ANESTHESIA (OUTPATIENT)
Dept: ENDOSCOPY | Facility: HOSPITAL | Age: 74
End: 2024-02-20
Payer: MEDICARE

## 2024-02-20 ENCOUNTER — ANESTHESIA EVENT (OUTPATIENT)
Dept: ENDOSCOPY | Facility: HOSPITAL | Age: 74
End: 2024-02-20
Payer: MEDICARE

## 2024-02-20 ENCOUNTER — HOSPITAL ENCOUNTER (OUTPATIENT)
Facility: HOSPITAL | Age: 74
Discharge: HOME | End: 2024-02-20
Attending: INTERNAL MEDICINE | Admitting: INTERNAL MEDICINE
Payer: MEDICARE

## 2024-02-20 VITALS
DIASTOLIC BLOOD PRESSURE: 55 MMHG | OXYGEN SATURATION: 97 % | HEART RATE: 60 BPM | TEMPERATURE: 98 F | RESPIRATION RATE: 20 BRPM | SYSTOLIC BLOOD PRESSURE: 114 MMHG

## 2024-02-20 DIAGNOSIS — Z12.11 COLON CANCER SCREENING: Primary | ICD-10-CM

## 2024-02-20 LAB — POCT GLUCOSE: 118 MG/DL (ref 70–110)

## 2024-02-20 PROCEDURE — 27201012 HC FORCEPS, HOT/COLD, DISP: Performed by: INTERNAL MEDICINE

## 2024-02-20 PROCEDURE — 25000003 PHARM REV CODE 250: Performed by: INTERNAL MEDICINE

## 2024-02-20 PROCEDURE — 45385 COLONOSCOPY W/LESION REMOVAL: CPT | Mod: PT | Performed by: INTERNAL MEDICINE

## 2024-02-20 PROCEDURE — 37000009 HC ANESTHESIA EA ADD 15 MINS: Performed by: INTERNAL MEDICINE

## 2024-02-20 PROCEDURE — D9220A PRA ANESTHESIA: Mod: PT,CRNA,, | Performed by: STUDENT IN AN ORGANIZED HEALTH CARE EDUCATION/TRAINING PROGRAM

## 2024-02-20 PROCEDURE — 27200997: Performed by: INTERNAL MEDICINE

## 2024-02-20 PROCEDURE — 88305 TISSUE EXAM BY PATHOLOGIST: CPT | Mod: 26,,, | Performed by: PATHOLOGY

## 2024-02-20 PROCEDURE — 37000008 HC ANESTHESIA 1ST 15 MINUTES: Performed by: INTERNAL MEDICINE

## 2024-02-20 PROCEDURE — D9220A PRA ANESTHESIA: Mod: PT,ANES,, | Performed by: ANESTHESIOLOGY

## 2024-02-20 PROCEDURE — 25000003 PHARM REV CODE 250: Performed by: STUDENT IN AN ORGANIZED HEALTH CARE EDUCATION/TRAINING PROGRAM

## 2024-02-20 PROCEDURE — 45380 COLONOSCOPY AND BIOPSY: CPT | Mod: 59,PT,, | Performed by: INTERNAL MEDICINE

## 2024-02-20 PROCEDURE — 88305 TISSUE EXAM BY PATHOLOGIST: CPT | Mod: 59 | Performed by: PATHOLOGY

## 2024-02-20 PROCEDURE — 27201089 HC SNARE, DISP (ANY): Performed by: INTERNAL MEDICINE

## 2024-02-20 PROCEDURE — 45385 COLONOSCOPY W/LESION REMOVAL: CPT | Mod: PT,,, | Performed by: INTERNAL MEDICINE

## 2024-02-20 PROCEDURE — 45380 COLONOSCOPY AND BIOPSY: CPT | Mod: 59,PT | Performed by: INTERNAL MEDICINE

## 2024-02-20 PROCEDURE — 63600175 PHARM REV CODE 636 W HCPCS: Performed by: STUDENT IN AN ORGANIZED HEALTH CARE EDUCATION/TRAINING PROGRAM

## 2024-02-20 RX ORDER — PROPOFOL 10 MG/ML
VIAL (ML) INTRAVENOUS
Status: DISCONTINUED | OUTPATIENT
Start: 2024-02-20 | End: 2024-02-20

## 2024-02-20 RX ORDER — LIDOCAINE HYDROCHLORIDE 20 MG/ML
INJECTION INTRAVENOUS
Status: DISCONTINUED | OUTPATIENT
Start: 2024-02-20 | End: 2024-02-20

## 2024-02-20 RX ORDER — PROPOFOL 10 MG/ML
INJECTION, EMULSION INTRAVENOUS
Status: DISCONTINUED
Start: 2024-02-20 | End: 2024-02-20 | Stop reason: HOSPADM

## 2024-02-20 RX ORDER — SODIUM CHLORIDE 9 MG/ML
INJECTION, SOLUTION INTRAVENOUS CONTINUOUS
Status: DISCONTINUED | OUTPATIENT
Start: 2024-02-20 | End: 2024-02-20 | Stop reason: HOSPADM

## 2024-02-20 RX ORDER — LIDOCAINE HYDROCHLORIDE 20 MG/ML
INJECTION, SOLUTION EPIDURAL; INFILTRATION; INTRACAUDAL; PERINEURAL
Status: DISCONTINUED
Start: 2024-02-20 | End: 2024-02-20 | Stop reason: HOSPADM

## 2024-02-20 RX ADMIN — LIDOCAINE HYDROCHLORIDE 100 MG: 20 INJECTION, SOLUTION INTRAVENOUS at 09:02

## 2024-02-20 RX ADMIN — PROPOFOL 40 MG: 10 INJECTION, EMULSION INTRAVENOUS at 10:02

## 2024-02-20 RX ADMIN — PROPOFOL 40 MG: 10 INJECTION, EMULSION INTRAVENOUS at 09:02

## 2024-02-20 RX ADMIN — PROPOFOL 50 MG: 10 INJECTION, EMULSION INTRAVENOUS at 10:02

## 2024-02-20 RX ADMIN — PROPOFOL 20 MG: 10 INJECTION, EMULSION INTRAVENOUS at 09:02

## 2024-02-20 RX ADMIN — PROPOFOL 20 MG: 10 INJECTION, EMULSION INTRAVENOUS at 10:02

## 2024-02-20 RX ADMIN — SODIUM CHLORIDE: 0.9 INJECTION, SOLUTION INTRAVENOUS at 09:02

## 2024-02-20 NOTE — ANESTHESIA POSTPROCEDURE EVALUATION
Anesthesia Post Evaluation    Patient: Faby Luna    Procedure(s) Performed: Procedure(s) (LRB):  COLONOSCOPY (N/A)    Final Anesthesia Type: general      Patient location during evaluation: GI PACU  Patient participation: Yes- Able to Participate  Level of consciousness: awake and alert and oriented  Post-procedure vital signs: reviewed and stable  Pain management: adequate  Airway patency: patent    PONV status at discharge: No PONV  Anesthetic complications: no      Cardiovascular status: blood pressure returned to baseline and hemodynamically stable  Respiratory status: unassisted, spontaneous ventilation and room air  Hydration status: euvolemic  Follow-up not needed.              Vitals Value Taken Time   /55 02/20/24 1103   Temp 36.7 °C (98.1 °F) 02/20/24 1033   Pulse 60 02/20/24 1103   Resp 20 02/20/24 1103   SpO2 97 % 02/20/24 1103         No case tracking events are documented in the log.      Pain/Ron Score: Ron Score: 10 (2/20/2024 11:03 AM)

## 2024-02-20 NOTE — PROVATION PATIENT INSTRUCTIONS
Discharge Summary/Instructions after an Endoscopic Procedure  Patient Name: Faby Luna  Patient MRN: [id number]  Patient YOB: 1950  Tuesday, February 20, 2024  Otilio Man MD  Dear patient,  As a result of recent federal legislation (The Federal Cures Act), you may   receive lab or pathology results from your procedure in your [facility]   account before your physician is able to contact you. Your physician or   their representative will relay the results to you with their   recommendations at their soonest availability.  Thank you,  RESTRICTIONS:  During your procedure today, you received medications for sedation.  These   medications may affect your judgment, balance and coordination.  Therefore,   for 24 hours, you have the following restrictions:   - DO NOT drive a car, operate machinery, make legal/financial decisions,   sign important papers or drink alcohol.    ACTIVITY:  Today: no heavy lifting, straining or running due to procedural   sedation/anesthesia.  The following day: return to full activity including work.  DIET:  Eat and drink normally unless instructed otherwise.     TREATMENT FOR COMMON SIDE EFFECTS:  - Mild abdominal pain, nausea, belching, bloating or excessive gas:  rest,   eat lightly and use a heating pad.  - Sore Throat: treat with throat lozenges and/or gargle with warm salt   water.  - Because air was used during the procedure, expelling large amounts of air   from your rectum or belching is normal.  - If a bowel prep was taken, you may not have a bowel movement for 1-3 days.    This is normal.  SYMPTOMS TO WATCH FOR AND REPORT TO YOUR PHYSICIAN:  1. Abdominal pain or bloating, other than gas cramps.  2. Chest pain.  3. Back pain.  4. Signs of infection such as: chills or fever occurring within 24 hours   after the procedure.  5. Rectal bleeding, which would show as bright red, maroon, or black stools.   (A tablespoon of blood from the rectum is not serious, especially if    hemorrhoids are present.)  6. Vomiting.  7. Weakness or dizziness.  GO DIRECTLY TO THE NEAREST EMERGENCY ROOM IF YOU HAVE ANY OF THE FOLLOWING:      Difficulty breathing              Chills and/or fever over 101 F   Persistent vomiting and/or vomiting blood   Severe abdominal pain   Severe chest pain   Black, tarry stools   Bleeding- more than one tablespoon   Any other symptom or condition that you feel may need urgent attention  Your doctor recommends these additional instructions:  If any biopsies were taken, your doctors clinic will contact you in 1 to 2   weeks with any results.  - Discharge patient to home.   - Resume previous diet.   - Continue present medications.   - Await pathology results.   - Repeat colonoscopy in 3 months for surveillance due to quality of prep.  For questions, problems or results please call your physician - Otilio Man MD at Work:  [phone].  [facility] can be reached at [phone]     IF A COMPLICATION OR EMERGENCY SITUATION ARISES AND YOU ARE UNABLE TO REACH   YOUR PHYSICIAN - GO DIRECTLY TO THE EMERGENCY ROOM.  Otilio Man MD  2/20/2024 10:34:38 AM  This report has been verified and signed electronically.  Dear patient,  As a result of recent federal legislation (The Federal Cures Act), you may   receive lab or pathology results from your procedure in your [facility]   account before your physician is able to contact you. Your physician or   their representative will relay the results to you with their   recommendations at their soonest availability.  Thank you,  PROVATION

## 2024-02-20 NOTE — PLAN OF CARE
Procedure and recovery complete. Pt awake and alert. MD at bedside, procedure findings and suggestions discussed. Discharge instructions given, pt verbalized understanding of instruction. Gait steady, able to ambulate without assistance. Pt walked out to sons vehicle, accompanied by tech.

## 2024-02-20 NOTE — H&P
Short Stay Endoscopy History and Physical    PCP - Renae Sprague, DO    Procedure - Colonoscopy  ASA - per anesthesia  Mallampati - per anesthesia  History of Anesthesia problems - no  Family history Anesthesia problems - no   Plan of anesthesia - General, MAC    HPI:  This is a 73 y.o. female here for evaluation of : asymptomatic screening exam      ROS:  Constitutional: No fevers, chills, No weight loss  CV: No chest pain  Pulm: No cough, No shortness of breath  GI: see HPI  Derm: No rash    Medical History:  has a past medical history of Anxiety with depression, Arthritis, CKD (chronic kidney disease) stage 2, GFR 60-89 ml/min, Diabetes mellitus, History of Clarisse-en-Y gastric bypass, Hypertension, Obesity, unspecified, DAHLIA (obstructive sleep apnea), and Spondylosis.    Surgical History:  has a past surgical history that includes Cholecystectomy; Hysterectomy;  section; Injection of facet joint (Bilateral, 2018); Epidural steroid injection into lumbar spine (N/A, 2018); Trial of spinal cord nerve stimulator (N/A, 2018); Carpal tunnel release (Right, 3/8/2019); Carpal tunnel release (Left, 2019); Injection of anesthetic agent around nerve (Bilateral, 2019); Injection of anesthetic agent around nerve (Bilateral, 10/17/2019); Radiofrequency ablation (Right, 2019); Radiofrequency ablation (Left, 2019); Radiofrequency ablation (Left, 2020); Radiofrequency ablation (Right, 2020); Radiofrequency ablation (Left, 2021); Radiofrequency ablation (Right, 2021); Cataract extraction (Bilateral); Radiofrequency ablation (Right, 2022); Radiofrequency ablation (Left, 2022); Radiofrequency ablation (Left, 3/16/2023); Radiofrequency ablation (Right, 3/30/2023); Esophagogastroduodenoscopy (N/A, 2023); Radiofrequency ablation (Right, 2023); and Radiofrequency ablation (Left, 2024).    Family History: family history includes Cataracts in her  "mother; Glaucoma in her mother; No Known Problems in her brother, father, maternal aunt, maternal grandfather, maternal grandmother, maternal uncle, paternal aunt, paternal grandfather, paternal grandmother, paternal uncle, and sister.. Otherwise no colon cancer, inflammatory bowel disease, or GI malignancies.    Social History:  reports that she has never smoked. She has never used smokeless tobacco. She reports current alcohol use. She reports that she does not use drugs.    Review of patient's allergies indicates:  No Known Allergies    Medications:   Medications Prior to Admission   Medication Sig Dispense Refill Last Dose    atenoloL (TENORMIN) 100 MG tablet TAKE 1 TABLET EVERY DAY 90 tablet 3     BD ULTRA-FINE MINI PEN NEEDLE 31 gauge x 3/16" Ndle        blood sugar diagnostic Strp To check BG 3 times daily, to use with insurance preferred meter 100 strip 11     budesonide-formoterol 160-4.5 mcg (SYMBICORT) 160-4.5 mcg/actuation HFAA Inhale 2 puffs into the lungs every 12 (twelve) hours. Controller (brand only) (stop breo) (Patient not taking: Reported on 11/22/2023) 10.2 g 11     dexamethasone sodium phosp/PF (DEXAMETHASONE SODIUM PHOS, PF,) 10 mg/mL Soln        EScitalopram oxalate (LEXAPRO) 20 MG tablet Take 1 tablet (20 mg total) by mouth once daily. 90 tablet 3     fentaNYL (SUBLIMAZE) 50 mcg/mL injection        fluticasone propionate (FLONASE) 50 mcg/actuation nasal spray 2 sprays (100 mcg total) by Each Nostril route once daily. 11.1 mL 1     furosemide (LASIX) 20 MG tablet Take 1 tablet (20 mg total) by mouth once daily. 90 tablet 3     gabapentin (NEURONTIN) 800 MG tablet Take 1 tablet (800 mg total) by mouth 3 (three) times daily. 90 tablet 2     GAVILYTE-C 240-22.72-6.72 -5.84 gram SolR Take 4,000 mLs by mouth once.       glipiZIDE (GLUCOTROL) 10 MG tablet Take 1 tablet (10 mg total) by mouth 2 (two) times daily with meals. 180 tablet 3     glipiZIDE (GLUCOTROL) 5 MG tablet        guaiFENesin 100 " mg/5 ml (ROBITUSSIN) 100 mg/5 mL syrup Take 5-10 mLs (100-200 mg total) by mouth every 4 (four) hours as needed for Cough or Congestion. 118 mL 0     hydroCHLOROthiazide (HYDRODIURIL) 25 MG tablet Take 1 tablet (25 mg total) by mouth once daily. 90 tablet 3     hydrOXYzine pamoate (VISTARIL) 25 MG Cap TAKE 1 CAPSULE BY MOUTH ONCE DAILY AS NEEDED FOR ANXIETY 90 capsule 3     lancets Misc To check BG 3 times daily, to use with insurance preferred meter 100 each 11     loratadine (CLARITIN) 10 mg tablet Take 1 tablet (10 mg total) by mouth daily as needed for Allergies. 90 tablet 3     losartan (COZAAR) 100 MG tablet Take 1 tablet (100 mg total) by mouth once daily. 90 tablet 3     midazolam, PF, (VERSED) 1 mg/mL Soln injection        pantoprazole (PROTONIX) 20 MG tablet TAKE 1 TABLET TWICE DAILY BEFORE MEALS 180 tablet 3     promethazine-dextromethorphan (PROMETHAZINE-DM) 6.25-15 mg/5 mL Syrp Take 5 mLs by mouth every 8 (eight) hours as needed (cough). 240 mL 0     rosuvastatin (CRESTOR) 20 MG tablet Take 1 tablet by mouth once daily 90 tablet 1     semaglutide (OZEMPIC) 0.25 mg or 0.5 mg (2 mg/3 mL) pen injector Inject 0.25 mg into the skin every 7 days.       tiZANidine (ZANAFLEX) 4 MG tablet Take 1 tablet (4 mg total) by mouth daily as needed (pain). 90 tablet 3     topiramate (TOPAMAX) 100 MG tablet Take 1 tablet (100 mg total) by mouth 2 (two) times daily. 180 tablet 1     traZODone (DESYREL) 100 MG tablet Take 1 tablet by mouth in the evening 90 tablet 3          Physical Exam:    Vital Signs: There were no vitals filed for this visit.    Gen: NAD, lying comfortably  HENT: NCAT, oropharynx clear  Eyes: anicteric sclerae, EOMI grossly  Neck: supple, no visible masses/goiter  Cardiac: RRR  Lungs: non-labored breathing  Abd: soft, NT/ND, normoactive BS  Ext: no LE edema, warm, well perfused  Skin: skin intact on exposed body parts, no visible rashes, lesions  Neuro: A&Ox4, neuro exam grossly intact, moves all  extremities  Psych: appropriate mood, affect        Labs:  Lab Results   Component Value Date    WBC 10.07 08/16/2023    HGB 13.4 08/16/2023    HCT 42.7 08/16/2023     08/16/2023    CHOL 159 08/16/2023    TRIG 157 (H) 08/16/2023    HDL 49 08/16/2023    ALT 26 08/16/2023    AST 25 08/16/2023     08/16/2023    K 3.9 08/16/2023     08/16/2023    CREATININE 1.1 08/16/2023    BUN 15 08/16/2023    CO2 27 08/16/2023    TSH 1.251 05/03/2023    HGBA1C 6.5 (H) 11/20/2023       Plan:  Colonoscopy     I have explained the risks and benefits of endoscopy procedures to the patient including but not limited to bleeding, perforation, infection, and death.  The patient was asked if they understand and allowed to ask any further questions to their satisfaction.    Otilio Man MD

## 2024-02-20 NOTE — ANESTHESIA PREPROCEDURE EVALUATION
"                                                                                                             02/20/2024    Pre-operative evaluation for Procedure(s) (LRB):  COLONOSCOPY (N/A)    Faby Luna is a 73 y.o. female     Patient Active Problem List   Diagnosis    Chronic pain    Adjustment reaction with anxiety and depression    Type 2 diabetes mellitus with stage 3a chronic kidney disease, without long-term current use of insulin    Essential hypertension    Gastroesophageal reflux disease without esophagitis    Degenerative joint disease (DJD) of lumbar spine    Lumbar radiculopathy    Pre-op testing    Failed back surgical syndrome    Chronic pain syndrome    Bilateral carpal tunnel syndrome    Right carpal tunnel syndrome    Cubital tunnel syndrome on right    Pain, hand    Hand weakness    Anxiety    Major depressive disorder, recurrent episode, in partial remission    Carpal tunnel syndrome    Osteoarthritis of spine with radiculopathy, lumbar region    Spondylosis without myelopathy    Hypertrophy of ligamentum flavum    Chronic renal failure, stage 3a    Osteoarthritis of lumbar spine    Refractive error    Pseudophakia    Morbid (severe) obesity due to excess calories    Spinal enthesopathy, site unspecified    Seasonal allergic rhinitis    Moderate persistent asthma    Dyspnea    DAHLIA (obstructive sleep apnea)    Pulmonary granuloma    Localized pulmonary fibrosis    Bronchiectasis without complication    Atherosclerosis of native coronary artery of native heart without angina pectoris    Aortic atherosclerosis    Abdominal pain    Ileus    Partial bowel obstruction       Review of patient's allergies indicates:  No Known Allergies    No current facility-administered medications on file prior to encounter.     Current Outpatient Medications on File Prior to Encounter   Medication Sig Dispense Refill    BD ULTRA-FINE MINI PEN NEEDLE 31 gauge x 3/16" Ndle       budesonide-formoterol " 160-4.5 mcg (SYMBICORT) 160-4.5 mcg/actuation HFAA Inhale 2 puffs into the lungs every 12 (twelve) hours. Controller (brand only) (stop breo) (Patient not taking: Reported on 11/22/2023) 10.2 g 11    dexamethasone sodium phosp/PF (DEXAMETHASONE SODIUM PHOS, PF,) 10 mg/mL Soln       EScitalopram oxalate (LEXAPRO) 20 MG tablet Take 1 tablet (20 mg total) by mouth once daily. 90 tablet 3    fentaNYL (SUBLIMAZE) 50 mcg/mL injection       fluticasone propionate (FLONASE) 50 mcg/actuation nasal spray 2 sprays (100 mcg total) by Each Nostril route once daily. 11.1 mL 1    furosemide (LASIX) 20 MG tablet Take 1 tablet (20 mg total) by mouth once daily. 90 tablet 3    glipiZIDE (GLUCOTROL) 10 MG tablet Take 1 tablet (10 mg total) by mouth 2 (two) times daily with meals. 180 tablet 3    guaiFENesin 100 mg/5 ml (ROBITUSSIN) 100 mg/5 mL syrup Take 5-10 mLs (100-200 mg total) by mouth every 4 (four) hours as needed for Cough or Congestion. 118 mL 0    hydroCHLOROthiazide (HYDRODIURIL) 25 MG tablet Take 1 tablet (25 mg total) by mouth once daily. 90 tablet 3    hydrOXYzine pamoate (VISTARIL) 25 MG Cap TAKE 1 CAPSULE BY MOUTH ONCE DAILY AS NEEDED FOR ANXIETY 90 capsule 3    losartan (COZAAR) 100 MG tablet Take 1 tablet (100 mg total) by mouth once daily. 90 tablet 3    midazolam, PF, (VERSED) 1 mg/mL Soln injection       promethazine-dextromethorphan (PROMETHAZINE-DM) 6.25-15 mg/5 mL Syrp Take 5 mLs by mouth every 8 (eight) hours as needed (cough). 240 mL 0    semaglutide (OZEMPIC) 0.25 mg or 0.5 mg (2 mg/3 mL) pen injector Inject 0.25 mg into the skin every 7 days.           Pre-op Assessment    I have reviewed the Patient Summary Reports.     I have reviewed the Nursing Notes. I have reviewed the NPO Status.   I have reviewed the Medications.     Review of Systems  Anesthesia Hx:  No problems with previous Anesthesia             Denies Family Hx of Anesthesia complications.    Denies Personal Hx of Anesthesia complications.                     Social:  No Alcohol Use, Non-Smoker       Cardiovascular:     Hypertension   CAD                                        Pulmonary:        Sleep Apnea                Renal/:  Chronic Renal Disease, CKD                Hepatic/GI:  Bowel Prep.   GERD             Musculoskeletal:  Arthritis             Lumbar Spine Disorders, Lumbar Disc Disease, Radiculopathy   Endocrine:  Diabetes, type 2         Obesity / BMI > 30  Psych:  Psychiatric History anxiety depression                Physical Exam  General: Well nourished, Cooperative, Alert and Oriented    Airway:  Mallampati: / II  Mouth Opening: Normal  TM Distance: Normal  Tongue: Normal  Neck ROM: Extension Decreased    Dental:  Dentures  Complete upper and lower denture      Anesthesia Plan  Type of Anesthesia, risks & benefits discussed:    Anesthesia Type: Gen Natural Airway  Intra-op Monitoring Plan: Standard ASA Monitors  Induction:  IV  Informed Consent: Informed consent signed with the Patient and all parties understand the risks and agree with anesthesia plan.  All questions answered. Patient consented to blood products? No  ASA Score: 3    Ready For Surgery From Anesthesia Perspective.     .

## 2024-02-20 NOTE — TRANSFER OF CARE
Anesthesia Transfer of Care Note    Patient: Faby Luna    Procedure(s) Performed: Procedure(s) (LRB):  COLONOSCOPY (N/A)    Patient location: GI    Anesthesia Type: general    Transport from OR: Transported from OR on room air with adequate spontaneous ventilation    Post pain: adequate analgesia    Post assessment: no apparent anesthetic complications and tolerated procedure well    Post vital signs: stable    Level of consciousness: lethargic    Nausea/Vomiting: no nausea/vomiting    Complications: none    Transfer of care protocol was followed      Last vitals: Visit Vitals  /60 (BP Location: Left arm, Patient Position: Lying)   Pulse 64   Temp 36.7 °C (98.1 °F) (Oral)   Resp 18   SpO2 99%   Breastfeeding No

## 2024-02-21 ENCOUNTER — TELEPHONE (OUTPATIENT)
Dept: ENDOSCOPY | Facility: HOSPITAL | Age: 74
End: 2024-02-21
Payer: MEDICARE

## 2024-02-21 LAB
FINAL PATHOLOGIC DIAGNOSIS: NORMAL
GROSS: NORMAL
Lab: NORMAL

## 2024-02-21 NOTE — TELEPHONE ENCOUNTER
"----- Message from Otilio Man MD sent at 2024 10:34 AM CST -----  Duong zafar  Can we schedule this patient for colonoscopy in 3-4 months? Prep was fair on today's scope.    Procedure: Colonoscopy    Diagnosis: Surveillance colonoscopy - Hx of colon polyps    Procedure Timin-12 weeks    #If within 4 weeks selected, please gopal as high priority#    #If greater than 12 weeks, please select "5-12 weeks" and delay sending until 3 months prior to requested date#     Provider: Any GI provider    Location: [facility]    Additional Scheduling Information: Diabetes    Prep Specifications:Standard prep    Is the patient taking a GLP-1 Agonist:yes    Have you attached a patient to this message: yes      "

## 2024-02-23 ENCOUNTER — LAB VISIT (OUTPATIENT)
Dept: LAB | Facility: HOSPITAL | Age: 74
End: 2024-02-23
Attending: FAMILY MEDICINE
Payer: MEDICARE

## 2024-02-23 DIAGNOSIS — E11.22 TYPE 2 DIABETES MELLITUS WITH STAGE 3A CHRONIC KIDNEY DISEASE, WITHOUT LONG-TERM CURRENT USE OF INSULIN: ICD-10-CM

## 2024-02-23 DIAGNOSIS — E11.69 DYSLIPIDEMIA ASSOCIATED WITH TYPE 2 DIABETES MELLITUS: ICD-10-CM

## 2024-02-23 DIAGNOSIS — N18.31 TYPE 2 DIABETES MELLITUS WITH STAGE 3A CHRONIC KIDNEY DISEASE, WITHOUT LONG-TERM CURRENT USE OF INSULIN: ICD-10-CM

## 2024-02-23 DIAGNOSIS — I10 ESSENTIAL HYPERTENSION: ICD-10-CM

## 2024-02-23 DIAGNOSIS — E78.5 DYSLIPIDEMIA ASSOCIATED WITH TYPE 2 DIABETES MELLITUS: ICD-10-CM

## 2024-02-23 LAB
ALBUMIN SERPL BCP-MCNC: 3.6 G/DL (ref 3.5–5.2)
ALP SERPL-CCNC: 84 U/L (ref 55–135)
ALT SERPL W/O P-5'-P-CCNC: 39 U/L (ref 10–44)
ANION GAP SERPL CALC-SCNC: 8 MMOL/L (ref 8–16)
AST SERPL-CCNC: 28 U/L (ref 10–40)
BASOPHILS # BLD AUTO: 0.07 K/UL (ref 0–0.2)
BASOPHILS NFR BLD: 0.7 % (ref 0–1.9)
BILIRUB SERPL-MCNC: 0.3 MG/DL (ref 0.1–1)
BUN SERPL-MCNC: 12 MG/DL (ref 8–23)
CALCIUM SERPL-MCNC: 9.5 MG/DL (ref 8.7–10.5)
CHLORIDE SERPL-SCNC: 106 MMOL/L (ref 95–110)
CHOLEST SERPL-MCNC: 154 MG/DL (ref 120–199)
CHOLEST/HDLC SERPL: 3 {RATIO} (ref 2–5)
CO2 SERPL-SCNC: 27 MMOL/L (ref 23–29)
CREAT SERPL-MCNC: 0.9 MG/DL (ref 0.5–1.4)
DIFFERENTIAL METHOD BLD: ABNORMAL
EOSINOPHIL # BLD AUTO: 0.5 K/UL (ref 0–0.5)
EOSINOPHIL NFR BLD: 5.2 % (ref 0–8)
ERYTHROCYTE [DISTWIDTH] IN BLOOD BY AUTOMATED COUNT: 14.9 % (ref 11.5–14.5)
EST. GFR  (NO RACE VARIABLE): >60 ML/MIN/1.73 M^2
ESTIMATED AVG GLUCOSE: 146 MG/DL (ref 68–131)
GLUCOSE SERPL-MCNC: 107 MG/DL (ref 70–110)
HBA1C MFR BLD: 6.7 % (ref 4–5.6)
HCT VFR BLD AUTO: 41.1 % (ref 37–48.5)
HDLC SERPL-MCNC: 51 MG/DL (ref 40–75)
HDLC SERPL: 33.1 % (ref 20–50)
HGB BLD-MCNC: 12.8 G/DL (ref 12–16)
IMM GRANULOCYTES # BLD AUTO: 0.03 K/UL (ref 0–0.04)
IMM GRANULOCYTES NFR BLD AUTO: 0.3 % (ref 0–0.5)
LDLC SERPL CALC-MCNC: 76.4 MG/DL (ref 63–159)
LYMPHOCYTES # BLD AUTO: 4.1 K/UL (ref 1–4.8)
LYMPHOCYTES NFR BLD: 43.1 % (ref 18–48)
MCH RBC QN AUTO: 26.9 PG (ref 27–31)
MCHC RBC AUTO-ENTMCNC: 31.1 G/DL (ref 32–36)
MCV RBC AUTO: 86 FL (ref 82–98)
MONOCYTES # BLD AUTO: 0.5 K/UL (ref 0.3–1)
MONOCYTES NFR BLD: 5.1 % (ref 4–15)
NEUTROPHILS # BLD AUTO: 4.3 K/UL (ref 1.8–7.7)
NEUTROPHILS NFR BLD: 45.6 % (ref 38–73)
NONHDLC SERPL-MCNC: 103 MG/DL
NRBC BLD-RTO: 0 /100 WBC
PLATELET # BLD AUTO: 314 K/UL (ref 150–450)
PMV BLD AUTO: 11.2 FL (ref 9.2–12.9)
POTASSIUM SERPL-SCNC: 3.5 MMOL/L (ref 3.5–5.1)
PROT SERPL-MCNC: 7.5 G/DL (ref 6–8.4)
RBC # BLD AUTO: 4.76 M/UL (ref 4–5.4)
SODIUM SERPL-SCNC: 141 MMOL/L (ref 136–145)
TRIGL SERPL-MCNC: 133 MG/DL (ref 30–150)
WBC # BLD AUTO: 9.53 K/UL (ref 3.9–12.7)

## 2024-02-23 PROCEDURE — 80061 LIPID PANEL: CPT | Performed by: FAMILY MEDICINE

## 2024-02-23 PROCEDURE — 83036 HEMOGLOBIN GLYCOSYLATED A1C: CPT | Performed by: FAMILY MEDICINE

## 2024-02-23 PROCEDURE — 36415 COLL VENOUS BLD VENIPUNCTURE: CPT | Mod: PO | Performed by: FAMILY MEDICINE

## 2024-02-23 PROCEDURE — 85025 COMPLETE CBC W/AUTO DIFF WBC: CPT | Performed by: FAMILY MEDICINE

## 2024-02-23 PROCEDURE — 80053 COMPREHEN METABOLIC PANEL: CPT | Performed by: FAMILY MEDICINE

## 2024-02-29 ENCOUNTER — TELEPHONE (OUTPATIENT)
Dept: GASTROENTEROLOGY | Facility: CLINIC | Age: 74
End: 2024-02-29
Payer: MEDICARE

## 2024-02-29 ENCOUNTER — OFFICE VISIT (OUTPATIENT)
Dept: FAMILY MEDICINE | Facility: CLINIC | Age: 74
End: 2024-02-29
Payer: MEDICARE

## 2024-02-29 VITALS
TEMPERATURE: 98 F | WEIGHT: 184.5 LBS | OXYGEN SATURATION: 95 % | HEART RATE: 76 BPM | BODY MASS INDEX: 31.5 KG/M2 | SYSTOLIC BLOOD PRESSURE: 98 MMHG | HEIGHT: 64 IN | DIASTOLIC BLOOD PRESSURE: 54 MMHG

## 2024-02-29 DIAGNOSIS — F33.0 MILD RECURRENT MAJOR DEPRESSION: ICD-10-CM

## 2024-02-29 DIAGNOSIS — J84.10 PULMONARY GRANULOMA: ICD-10-CM

## 2024-02-29 DIAGNOSIS — J45.40 MODERATE PERSISTENT ASTHMA, UNSPECIFIED WHETHER COMPLICATED: ICD-10-CM

## 2024-02-29 DIAGNOSIS — E66.09 CLASS 1 OBESITY DUE TO EXCESS CALORIES WITH SERIOUS COMORBIDITY AND BODY MASS INDEX (BMI) OF 31.0 TO 31.9 IN ADULT: ICD-10-CM

## 2024-02-29 DIAGNOSIS — E11.69 DYSLIPIDEMIA ASSOCIATED WITH TYPE 2 DIABETES MELLITUS: ICD-10-CM

## 2024-02-29 DIAGNOSIS — I10 ESSENTIAL HYPERTENSION: ICD-10-CM

## 2024-02-29 DIAGNOSIS — N18.31 TYPE 2 DIABETES MELLITUS WITH STAGE 3A CHRONIC KIDNEY DISEASE, WITHOUT LONG-TERM CURRENT USE OF INSULIN: Primary | ICD-10-CM

## 2024-02-29 DIAGNOSIS — E11.22 TYPE 2 DIABETES MELLITUS WITH STAGE 3A CHRONIC KIDNEY DISEASE, WITHOUT LONG-TERM CURRENT USE OF INSULIN: Primary | ICD-10-CM

## 2024-02-29 DIAGNOSIS — E78.5 DYSLIPIDEMIA ASSOCIATED WITH TYPE 2 DIABETES MELLITUS: ICD-10-CM

## 2024-02-29 DIAGNOSIS — M46.00 SPINAL ENTHESOPATHY, SITE UNSPECIFIED: ICD-10-CM

## 2024-02-29 DIAGNOSIS — I70.0 AORTIC ATHEROSCLEROSIS: ICD-10-CM

## 2024-02-29 DIAGNOSIS — R32 URINARY INCONTINENCE, UNSPECIFIED TYPE: ICD-10-CM

## 2024-02-29 DIAGNOSIS — F41.1 GAD (GENERALIZED ANXIETY DISORDER): ICD-10-CM

## 2024-02-29 PROBLEM — E66.01 MORBID (SEVERE) OBESITY DUE TO EXCESS CALORIES: Status: RESOLVED | Noted: 2022-04-26 | Resolved: 2024-02-29

## 2024-02-29 LAB
BILIRUB SERPL-MCNC: NEGATIVE MG/DL
BLOOD URINE, POC: ABNORMAL
COLOR, POC UA: YELLOW
GLUCOSE UR QL STRIP: NORMAL
KETONES UR QL STRIP: NEGATIVE
LEUKOCYTE ESTERASE URINE, POC: ABNORMAL
NITRITE, POC UA: NEGATIVE
PH, POC UA: 5
PROTEIN, POC: ABNORMAL
SPECIFIC GRAVITY, POC UA: 1.02
UROBILINOGEN, POC UA: NORMAL

## 2024-02-29 PROCEDURE — 1159F MED LIST DOCD IN RCRD: CPT | Mod: CPTII,S$GLB,, | Performed by: FAMILY MEDICINE

## 2024-02-29 PROCEDURE — 1126F AMNT PAIN NOTED NONE PRSNT: CPT | Mod: CPTII,S$GLB,, | Performed by: FAMILY MEDICINE

## 2024-02-29 PROCEDURE — 3061F NEG MICROALBUMINURIA REV: CPT | Mod: CPTII,S$GLB,, | Performed by: FAMILY MEDICINE

## 2024-02-29 PROCEDURE — 1160F RVW MEDS BY RX/DR IN RCRD: CPT | Mod: CPTII,S$GLB,, | Performed by: FAMILY MEDICINE

## 2024-02-29 PROCEDURE — 3288F FALL RISK ASSESSMENT DOCD: CPT | Mod: CPTII,S$GLB,, | Performed by: FAMILY MEDICINE

## 2024-02-29 PROCEDURE — 99999 PR PBB SHADOW E&M-EST. PATIENT-LVL V: CPT | Mod: PBBFAC,,, | Performed by: FAMILY MEDICINE

## 2024-02-29 PROCEDURE — 3044F HG A1C LEVEL LT 7.0%: CPT | Mod: CPTII,S$GLB,, | Performed by: FAMILY MEDICINE

## 2024-02-29 PROCEDURE — 3008F BODY MASS INDEX DOCD: CPT | Mod: CPTII,S$GLB,, | Performed by: FAMILY MEDICINE

## 2024-02-29 PROCEDURE — 87186 SC STD MICRODIL/AGAR DIL: CPT | Performed by: FAMILY MEDICINE

## 2024-02-29 PROCEDURE — 87088 URINE BACTERIA CULTURE: CPT | Performed by: FAMILY MEDICINE

## 2024-02-29 PROCEDURE — 3074F SYST BP LT 130 MM HG: CPT | Mod: CPTII,S$GLB,, | Performed by: FAMILY MEDICINE

## 2024-02-29 PROCEDURE — 3078F DIAST BP <80 MM HG: CPT | Mod: CPTII,S$GLB,, | Performed by: FAMILY MEDICINE

## 2024-02-29 PROCEDURE — 81001 URINALYSIS AUTO W/SCOPE: CPT | Mod: S$GLB,,, | Performed by: FAMILY MEDICINE

## 2024-02-29 PROCEDURE — 99214 OFFICE O/P EST MOD 30 MIN: CPT | Mod: S$GLB,,, | Performed by: FAMILY MEDICINE

## 2024-02-29 PROCEDURE — 1101F PT FALLS ASSESS-DOCD LE1/YR: CPT | Mod: CPTII,S$GLB,, | Performed by: FAMILY MEDICINE

## 2024-02-29 PROCEDURE — 87086 URINE CULTURE/COLONY COUNT: CPT | Performed by: FAMILY MEDICINE

## 2024-02-29 PROCEDURE — 87077 CULTURE AEROBIC IDENTIFY: CPT | Performed by: FAMILY MEDICINE

## 2024-02-29 PROCEDURE — 3066F NEPHROPATHY DOC TX: CPT | Mod: CPTII,S$GLB,, | Performed by: FAMILY MEDICINE

## 2024-02-29 RX ORDER — PEN NEEDLE, DIABETIC 31 GX5/16"
NEEDLE, DISPOSABLE MISCELLANEOUS
COMMUNITY
Start: 2024-02-14

## 2024-02-29 NOTE — PROGRESS NOTES
Assessment & Plan:    Type 2 diabetes mellitus with stage 3a chronic kidney disease, without long-term current use of insulin  Labs reviewed. Controlled. Continue current therapy.     Aortic atherosclerosis  Dyslipidemia associated with type 2 diabetes mellitus  Controlled. Continue current therapy.     Class 1 obesity due to excess calories with serious comorbidity and body mass index (BMI) of 31.0 to 31.9 in adult  Patient is losing weight effectively with GLP-1 agonist.    Essential hypertension  Controlled. Continue current therapy.     Pulmonary granuloma  Chronic, stable.    Moderate persistent asthma, unspecified whether complicated  Controlled. Continue current therapy.     Spinal enthesopathy, site unspecified  Controlled. Continue current therapy.     Mild recurrent major depression  SANDRA (generalized anxiety disorder)  Controlled. Continue current therapy.     Urinary incontinence, unspecified type  -     POCT urinalysis, dipstick or tablet reag  -     Urine culture    POC UA: +LE  Urine culture pending - tx for UTI if indicated.  Otherwise, will discuss therapy for stress and urge incontinence.     Encouraged RSV vaccine.     Follow-up: Follow up in about 6 months (around 8/29/2024).  ______________________________________________________________________    Chief Complaint  Chief Complaint   Patient presents with    Diabetes       HPI  Faby Luna is a 73 y.o. female with medical diagnoses as listed in the medical history and problem list that presents to the office to follow up on her chronic conditions. She states that her BG ranges 90-100s. She continues to experience urinary urgency and frequency, particularly in the morning. She is no longer taking diuretics.     Most recent pertinent workup:     Last CBC Results:   Lab Results   Component Value Date    WBC 9.53 02/23/2024    HGB 12.8 02/23/2024    HCT 41.1 02/23/2024     02/23/2024       Last CMP Results  Lab Results   Component  Value Date     2024    K 3.5 2024     2024    CO2 27 2024    BUN 12 2024    CREATININE 0.9 2024    CALCIUM 9.5 2024    ALBUMIN 3.6 2024    AST 28 2024    ALT 39 2024       Last Lipids  Lab Results   Component Value Date    CHOL 154 2024    TRIG 133 2024    HDL 51 2024    LDLCALC 76.4 2024       Last A1C  Lab Results   Component Value Date    HGBA1C 6.7 (H) 2024         Health Maintenance         Date Due Completion Date    Shingles Vaccine (1 of 2) Never done ---    RSV Vaccine (Age 60+ and Pregnant patients) (1 - 1-dose 60+ series) Never done ---    COVID-19 Vaccine ( season) 2023    Hemoglobin A1c 2024    Mammogram 2024    Foot Exam 2024 (Done)    Override on 2023: Done    Override on 2018: Done    Eye Exam 2025    Diabetes Urine Screening 2025    Lipid Panel 2025    TETANUS VACCINE 2032    Colorectal Cancer Screening 2034              PAST MEDICAL HISTORY:  Past Medical History:   Diagnosis Date    Anxiety with depression     Arthritis     CKD (chronic kidney disease) stage 2, GFR 60-89 ml/min     Diabetes mellitus     History of Clarisse-en-Y gastric bypass     Hypertension     Obesity, unspecified     DAHLIA (obstructive sleep apnea)     Spondylosis        PAST SURGICAL HISTORY:  Past Surgical History:   Procedure Laterality Date    CARPAL TUNNEL RELEASE Right 3/8/2019    Procedure: RELEASE, CARPAL TUNNEL right;  Surgeon: Pan Goodman MD;  Location: [facility];  Service: Orthopedics;  Laterality: Right;    CARPAL TUNNEL RELEASE Left 2019    Procedure: RELEASE, CARPAL TUNNEL- LEFT;  Surgeon: Pan Goodman MD;  [address];  Service: Orthopedics;  Laterality: Left;    CATARACT EXTRACTION Bilateral      SECTION      CHOLECYSTECTOMY       COLONOSCOPY N/A 2/20/2024    Procedure: COLONOSCOPY;  Surgeon: Otilio Man MD;  Location: [facility] ENDO;  Service: Endoscopy;  Laterality: N/A;  Referred by: MYNOR Sprague / CECILIA Meds: ozempic / diabetic / Prep: peg / Route instructions sent: portal  2/14- lvm and portal msg for pc. DBM  2/19- left 2nd vm for pc. DBM    EPIDURAL STEROID INJECTION INTO LUMBAR SPINE N/A 6/14/2018    Procedure: INJECTION, STEROID, SPINE, LUMBAR, EPIDURAL;  Surgeon: Shaq Silverio MD;  Location: [facility] PAIN MGT;  Service: Pain Management;  Laterality: N/A;  LUMBAR L4-L5 INTERLAMINAR ELISE'  [id number]  W/ SEDATION     ESOPHAGOGASTRODUODENOSCOPY N/A 5/12/2023    Procedure: EGD (ESOPHAGOGASTRODUODENOSCOPY);  Surgeon: Deann Jimenez MD;  Location: [facility] ENDO;  Service: Gastroenterology;  Laterality: N/A;    HYSTERECTOMY      INJECTION OF ANESTHETIC AGENT AROUND NERVE Bilateral 9/12/2019    Procedure: BLOCK, NERVE, L3-L4-L5 MEDIAL BRANCH;  Surgeon: Shaq Silverio MD;  Location: [facility] PAIN MGT;  Service: Pain Management;  Laterality: Bilateral;    INJECTION OF ANESTHETIC AGENT AROUND NERVE Bilateral 10/17/2019    Procedure: BLOCK, NERVE;  Surgeon: Shaq Silverio MD;  Location: [facility] PAIN MGT;  Service: Pain Management;  Laterality: Bilateral;  B/L MBB L3-L4-L5  REPEAT  CONSENT NEEDED    INJECTION OF FACET JOINT Bilateral 5/28/2018    Procedure: INJECTION-FACET;  Surgeon: Shaq Silverio MD;  Location: [facility] PAIN MGT;  Service: Pain Management;  Laterality: Bilateral;  LUMBAR BILATERAL L4-L5 AND L5-S1 FACET STEROID INJECTION  [id number]    W/ SEDATION     RADIOFREQUENCY ABLATION Right 11/21/2019    Procedure: RADIOFREQUENCY ABLATION RIGHT L3, L4, L5;  Surgeon: Shaq Silverio MD;  Location: [facility] PAIN MGT;  Service: Pain Management;  Laterality: Right;  Right RFA L3-L4-L5  1 of 2  Consent Needed    RADIOFREQUENCY ABLATION Left 12/5/2019    Procedure: RADIOFREQUENCY ABLATION LEFT L3-5;  Surgeon: Shaq Silverio MD;  Location: [facility] PAIN MGT;  Service: Pain Management;   Laterality: Left;  Left RFA L3-L4-L5  2 of 2  Consent Needed    RADIOFREQUENCY ABLATION Left 8/17/2020    Procedure: RADIOFREQUENCY ABLATION LEFT L3,4,5 1 of 2;  Surgeon: Shaq Silverio MD;  Location: [facility] PAIN MGT;  Service: Pain Management;  Laterality: Left;  Left RFA L3,4,5  1 of 2    RADIOFREQUENCY ABLATION Right 8/31/2020    Procedure: RADIOFREQUENCY ABLATION RIGHT L3,4,5 2 of 2;  Surgeon: Shaq Silverio MD;  Location: [facility] PAIN MGT;  Service: Pain Management;  Laterality: Right;  RADIOFREQUENCY ABLATION RIGHT L3,4,5  2 of 2    RADIOFREQUENCY ABLATION Left 9/9/2021    Procedure: RADIOFREQUENCY ABLATION, L3-L4 AND L5 MEDIAL BRANCH 1 OF 2;  Surgeon: Shaq Silverio MD;  Location: [facility] PAIN MGT;  Service: Pain Management;  Laterality: Left;    RADIOFREQUENCY ABLATION Right 9/20/2021    Procedure: RADIOFREQUENCY ABLATION, L3-L4 AND L5 MEDIAL BRANCH 2 OF 2;  Surgeon: Shaq Silverio MD;  Location: [facility] PAIN MGT;  Service: Pain Management;  Laterality: Right;    RADIOFREQUENCY ABLATION Right 7/7/2022    Procedure: RADIOFREQUENCY ABLATION, RIGHT L3-L4-L5 ONE OF TWO;  Surgeon: Shaq Silverio MD;  Location: [facility] PAIN MGT;  Service: Pain Management;  Laterality: Right;    RADIOFREQUENCY ABLATION Left 7/21/2022    Procedure: RADIOFREQUENCY ABLATION, LEFT L3-L4-L5 TWO OF TWO *BRING SCS REMOTE*;  Surgeon: Shaq Silverio MD;  Location: [facility] PAIN MGT;  Service: Pain Management;  Laterality: Left;    RADIOFREQUENCY ABLATION Left 3/16/2023    Procedure: RADIOFREQUENCY ABLATION LEFT L3,L4,L5 *BRING SCS REMOTE*;  Surgeon: Shaq Silverio MD;  Location: [facility] PAIN MGT;  Service: Pain Management;  Laterality: Left;    RADIOFREQUENCY ABLATION Right 3/30/2023    Procedure: RADIOFREQUENCY ABLATION RIGHT L3,L4,L5 *BRING SCS REMOTE*;  Surgeon: Shaq Silverio MD;  Location: [facility] PAIN MGT;  Service: Pain Management;  Laterality: Right;    RADIOFREQUENCY ABLATION Right 12/18/2023    Procedure: RADIOFREQUENCY ABLATION RIGHT L3, 4, 5 1 OF 2;  Surgeon: Jean Claude  "MD Shaq;  Location: [facility] PAIN MGT;  Service: Pain Management;  Laterality: Right;  [phone]    RADIOFREQUENCY ABLATION Left 1/4/2024    Procedure: RADIOFREQUENCY ABLATION LEFT L3, 4, 5 2 OF 2;  Surgeon: Shaq Silverio MD;  Location: [facility] PAIN MGT;  Service: Pain Management;  Laterality: Left;  [phone]  2 WK F/U WARREN    TRIAL OF SPINAL CORD NERVE STIMULATOR N/A 9/24/2018    Procedure: TRIAL, NEUROSTIMULATOR, SPINAL CORD, SPINAL CORD STIMULATOR TRIAL-INTERNAL WIRES TO EXTERNAL BATTERY;  Surgeon: Shaq Silverio MD;  Location: [facility] PAIN MGT;  Service: Pain Management;  Laterality: N/A;  [facility]BOTT REP NOTIFIED       SOCIAL HISTORY:  Social History     Socioeconomic History    Marital status:    Tobacco Use    Smoking status: Never    Smokeless tobacco: Never   Substance and Sexual Activity    Alcohol use: Yes     Comment: occ    Drug use: No       FAMILY HISTORY:  Family History   Problem Relation Age of Onset    Cataracts Mother     Glaucoma Mother     No Known Problems Father     No Known Problems Sister     No Known Problems Brother     No Known Problems Maternal Aunt     No Known Problems Maternal Uncle     No Known Problems Paternal Aunt     No Known Problems Paternal Uncle     No Known Problems Maternal Grandmother     No Known Problems Maternal Grandfather     No Known Problems Paternal Grandmother     No Known Problems Paternal Grandfather        ALLERGIES AND MEDICATIONS: updated and reviewed.  Review of patient's allergies indicates:  No Known Allergies  Current Outpatient Medications   Medication Sig Dispense Refill    atenoloL (TENORMIN) 100 MG tablet TAKE 1 TABLET EVERY DAY 90 tablet 3    BD ULTRA-FINE MINI PEN NEEDLE 31 gauge x 3/16" Ndle       BD ULTRA-FINE FELICIA PEN NEEDLE 32 gauge x 5/32" Ndle       blood sugar diagnostic Strp To check BG 3 times daily, to use with insurance preferred meter 100 strip 11    EScitalopram oxalate (LEXAPRO) 20 MG tablet Take 1 tablet (20 mg total) by mouth once " daily. 90 tablet 3    fluticasone propionate (FLONASE) 50 mcg/actuation nasal spray 2 sprays (100 mcg total) by Each Nostril route once daily. 11.1 mL 1    glipiZIDE (GLUCOTROL) 5 MG tablet       hydrOXYzine pamoate (VISTARIL) 25 MG Cap TAKE 1 CAPSULE BY MOUTH ONCE DAILY AS NEEDED FOR ANXIETY 90 capsule 3    lancets Misc To check BG 3 times daily, to use with insurance preferred meter 100 each 11    loratadine (CLARITIN) 10 mg tablet Take 1 tablet (10 mg total) by mouth daily as needed for Allergies. 90 tablet 3    losartan (COZAAR) 100 MG tablet Take 1 tablet (100 mg total) by mouth once daily. 90 tablet 3    pantoprazole (PROTONIX) 20 MG tablet TAKE 1 TABLET TWICE DAILY BEFORE MEALS 180 tablet 3    rosuvastatin (CRESTOR) 20 MG tablet Take 1 tablet by mouth once daily 90 tablet 1    semaglutide (OZEMPIC) 0.25 mg or 0.5 mg (2 mg/3 mL) pen injector Inject 0.25 mg into the skin every 7 days.      tiZANidine (ZANAFLEX) 4 MG tablet Take 1 tablet (4 mg total) by mouth daily as needed (pain). 90 tablet 3    topiramate (TOPAMAX) 100 MG tablet Take 1 tablet (100 mg total) by mouth 2 (two) times daily. 180 tablet 1    traZODone (DESYREL) 100 MG tablet Take 1 tablet by mouth in the evening 90 tablet 3    gabapentin (NEURONTIN) 800 MG tablet Take 1 tablet (800 mg total) by mouth 3 (three) times daily. 90 tablet 2    midazolam, PF, (VERSED) 1 mg/mL Soln injection        No current facility-administered medications for this visit.     Facility-Administered Medications Ordered in Other Visits   Medication Dose Route Frequency Provider Last Rate Last Admin    0.9%  NaCl infusion   Intravenous Continuous Uriel Aranda MD ROS  Review of Systems   Constitutional:  Negative for activity change and fever.   Genitourinary:  Positive for frequency and urgency. Negative for difficulty urinating, dysuria and pelvic pain.           Physical Exam  Vitals:    02/29/24 1405   BP: (!) 98/54   BP Location: Right arm   Patient  "Position: Sitting   BP Method: Medium (Manual)   Pulse: 76   Temp: 97.7 °F (36.5 °C)   TempSrc: Oral   SpO2: 95%   Weight: 83.7 kg (184 lb 8.4 oz)   Height: 5' 4" (1.626 m)    Body mass index is 31.67 kg/m².  Weight: 83.7 kg (184 lb 8.4 oz)   Height: 5' 4" (162.6 cm)   Physical Exam  Constitutional:       General: She is not in acute distress.     Appearance: She is obese.   HENT:      Head: Normocephalic and atraumatic.   Neck:      Thyroid: No thyromegaly.      Vascular: No carotid bruit.   Cardiovascular:      Rate and Rhythm: Normal rate and regular rhythm.      Pulses: Normal pulses.      Heart sounds: Normal heart sounds.   Pulmonary:      Effort: Pulmonary effort is normal. No respiratory distress.      Breath sounds: Normal breath sounds.   Musculoskeletal:      Cervical back: Neck supple.      Right lower leg: No edema.      Left lower leg: No edema.   Lymphadenopathy:      Cervical: No cervical adenopathy.   Skin:     General: Skin is warm and dry.      Findings: No rash.   Neurological:      General: No focal deficit present.      Mental Status: She is alert and oriented to person, place, and time.   Psychiatric:         Mood and Affect: Mood normal.         Behavior: Behavior normal.         Thought Content: Thought content normal.             "

## 2024-03-03 ENCOUNTER — TELEPHONE (OUTPATIENT)
Dept: FAMILY MEDICINE | Facility: CLINIC | Age: 74
End: 2024-03-03
Payer: MEDICARE

## 2024-03-03 DIAGNOSIS — N30.01 ACUTE CYSTITIS WITH HEMATURIA: Primary | ICD-10-CM

## 2024-03-03 LAB — BACTERIA UR CULT: ABNORMAL

## 2024-03-03 RX ORDER — CIPROFLOXACIN 250 MG/1
250 TABLET, FILM COATED ORAL 2 TIMES DAILY
Qty: 6 TABLET | Refills: 0 | Status: SHIPPED | OUTPATIENT
Start: 2024-03-03 | End: 2024-03-07

## 2024-03-04 DIAGNOSIS — G89.4 CHRONIC PAIN SYNDROME: ICD-10-CM

## 2024-03-04 DIAGNOSIS — M54.16 LUMBAR RADICULOPATHY: ICD-10-CM

## 2024-03-05 RX ORDER — GABAPENTIN 800 MG/1
800 TABLET ORAL 3 TIMES DAILY
Qty: 90 TABLET | Refills: 0 | Status: SHIPPED | OUTPATIENT
Start: 2024-03-05 | End: 2024-04-01

## 2024-03-07 RX ORDER — CEPHALEXIN 500 MG/1
1000 TABLET ORAL 2 TIMES DAILY
Qty: 28 TABLET | Refills: 0 | Status: SHIPPED | OUTPATIENT
Start: 2024-03-07 | End: 2024-03-14

## 2024-03-07 NOTE — TELEPHONE ENCOUNTER
I reviewed her urine culture again and sent in a different antibiotic called Keflex. Cancelled the Cipro.

## 2024-03-12 DIAGNOSIS — I10 ESSENTIAL HYPERTENSION: ICD-10-CM

## 2024-03-12 RX ORDER — LOSARTAN POTASSIUM 100 MG/1
100 TABLET ORAL
Qty: 90 TABLET | Refills: 3 | Status: SHIPPED | OUTPATIENT
Start: 2024-03-12 | End: 2024-04-03

## 2024-03-19 ENCOUNTER — TELEPHONE (OUTPATIENT)
Dept: FAMILY MEDICINE | Facility: CLINIC | Age: 74
End: 2024-03-19
Payer: MEDICARE

## 2024-03-19 DIAGNOSIS — N39.0 RECURRENT UTI: Primary | ICD-10-CM

## 2024-03-19 NOTE — TELEPHONE ENCOUNTER
----- Message from Irena Sprague sent at 3/19/2024  9:57 AM CDT -----  .Type: Patient Call Back    Who called: Self     What is the request in detail: Stated that her condition is still the same     Can the clinic reply by [facility]? No     Would the patient rather a call back or a response via [facility]? Call Back     Best call back number: .[phone] (home)       Additional Information:

## 2024-03-19 NOTE — TELEPHONE ENCOUNTER
When did patient finish her antibiotic? She had a 7 day course of Keflex prescribed on 3/3 which was over 2 weeks ago. If she keeps having symptoms, then we need to check her urine again. She may need follow up with ID for recurrent UTIs.

## 2024-03-19 NOTE — TELEPHONE ENCOUNTER
Returned call to pt, pt states she has completed her antibiotics and there has been no improvement of her UTI symptoms at all. Pt informed that PCP will be notified.

## 2024-04-01 ENCOUNTER — TELEPHONE (OUTPATIENT)
Dept: FAMILY MEDICINE | Facility: CLINIC | Age: 74
End: 2024-04-01
Payer: MEDICARE

## 2024-04-01 DIAGNOSIS — M54.16 LUMBAR RADICULOPATHY: ICD-10-CM

## 2024-04-01 DIAGNOSIS — G89.4 CHRONIC PAIN SYNDROME: ICD-10-CM

## 2024-04-01 RX ORDER — GABAPENTIN 800 MG/1
800 TABLET ORAL 3 TIMES DAILY
Qty: 90 TABLET | Refills: 0 | Status: SHIPPED | OUTPATIENT
Start: 2024-04-01 | End: 2024-05-06

## 2024-04-01 NOTE — TELEPHONE ENCOUNTER
----- Message from Irena Sprague sent at 4/1/2024  9:34 AM CDT -----  .Type: Patient Call Back    Who called: Self     What is the request in detail: Having a reoccurring of frequent urination and would like to speak with someone in reference to treatment     Can the clinic reply by [facility]? No     Would the patient rather a call back or a response via [facility]? Call Back     Best call back number: .[phone] (home)       Additional Information:

## 2024-04-02 ENCOUNTER — TELEPHONE (OUTPATIENT)
Dept: FAMILY MEDICINE | Facility: CLINIC | Age: 74
End: 2024-04-02
Payer: MEDICARE

## 2024-04-02 NOTE — TELEPHONE ENCOUNTER
----- Message from Claribel Mancini NP sent at 4/2/2024  9:06 AM CDT -----  Regarding: RE: self [phone]  Please contact pt. Appt was at 1230 yesterday. She can reschedule.   ----- Message -----  From: Anita Nguyen  Sent: 4/1/2024   2:35 PM CDT  To: [facility] Staff  Subject: self [phone]                                .Type: Patient Call Back    Who called:self    What is the request in detail: patient requesting a call back regarding her virtual mike today. Patient stated she waited from 1:15pm until 2pm and no one has came onto the screen.     Can the clinic reply by [facility]?no    Would the patient rather a call back or a response via [facility]?call back    Best call back number [phone]

## 2024-04-03 ENCOUNTER — OFFICE VISIT (OUTPATIENT)
Dept: FAMILY MEDICINE | Facility: CLINIC | Age: 74
End: 2024-04-03
Payer: MEDICARE

## 2024-04-03 ENCOUNTER — HOSPITAL ENCOUNTER (OUTPATIENT)
Dept: RADIOLOGY | Facility: HOSPITAL | Age: 74
Discharge: HOME | End: 2024-04-03
Attending: NURSE PRACTITIONER
Payer: MEDICARE

## 2024-04-03 VITALS
OXYGEN SATURATION: 92 % | WEIGHT: 181.56 LBS | HEART RATE: 78 BPM | SYSTOLIC BLOOD PRESSURE: 80 MMHG | BODY MASS INDEX: 31.16 KG/M2 | DIASTOLIC BLOOD PRESSURE: 64 MMHG | TEMPERATURE: 99 F

## 2024-04-03 DIAGNOSIS — G47.33 OSA (OBSTRUCTIVE SLEEP APNEA): Chronic | ICD-10-CM

## 2024-04-03 DIAGNOSIS — E11.22 TYPE 2 DIABETES MELLITUS WITH STAGE 3A CHRONIC KIDNEY DISEASE, WITHOUT LONG-TERM CURRENT USE OF INSULIN: Chronic | ICD-10-CM

## 2024-04-03 DIAGNOSIS — N39.0 RECURRENT UTI: ICD-10-CM

## 2024-04-03 DIAGNOSIS — G47.33 OBSTRUCTIVE SLEEP APNEA: ICD-10-CM

## 2024-04-03 DIAGNOSIS — R05.9 COUGH IN ADULT: ICD-10-CM

## 2024-04-03 DIAGNOSIS — J45.40 MODERATE PERSISTENT ASTHMA WITHOUT COMPLICATION: Chronic | ICD-10-CM

## 2024-04-03 DIAGNOSIS — I10 ESSENTIAL HYPERTENSION: ICD-10-CM

## 2024-04-03 DIAGNOSIS — F33.41 MAJOR DEPRESSIVE DISORDER, RECURRENT EPISODE, IN PARTIAL REMISSION: Chronic | ICD-10-CM

## 2024-04-03 DIAGNOSIS — F41.9 ANXIETY: Chronic | ICD-10-CM

## 2024-04-03 DIAGNOSIS — J47.9 BRONCHIECTASIS WITHOUT COMPLICATION: ICD-10-CM

## 2024-04-03 DIAGNOSIS — R35.0 URINARY FREQUENCY: ICD-10-CM

## 2024-04-03 DIAGNOSIS — R32 URINARY INCONTINENCE, UNSPECIFIED TYPE: ICD-10-CM

## 2024-04-03 DIAGNOSIS — N18.31 TYPE 2 DIABETES MELLITUS WITH STAGE 3A CHRONIC KIDNEY DISEASE, WITHOUT LONG-TERM CURRENT USE OF INSULIN: Chronic | ICD-10-CM

## 2024-04-03 DIAGNOSIS — R05.9 COUGH IN ADULT: Primary | ICD-10-CM

## 2024-04-03 DIAGNOSIS — I70.0 AORTIC ATHEROSCLEROSIS: ICD-10-CM

## 2024-04-03 DIAGNOSIS — J84.10: ICD-10-CM

## 2024-04-03 PROCEDURE — 3044F HG A1C LEVEL LT 7.0%: CPT | Mod: CPTII,S$GLB,, | Performed by: NURSE PRACTITIONER

## 2024-04-03 PROCEDURE — 99214 OFFICE O/P EST MOD 30 MIN: CPT | Mod: 25,S$GLB,, | Performed by: NURSE PRACTITIONER

## 2024-04-03 PROCEDURE — 94640 AIRWAY INHALATION TREATMENT: CPT | Mod: S$GLB,,, | Performed by: NURSE PRACTITIONER

## 2024-04-03 PROCEDURE — 99999 PR PBB SHADOW E&M-EST. PATIENT-LVL V: CPT | Mod: PBBFAC,,, | Performed by: NURSE PRACTITIONER

## 2024-04-03 PROCEDURE — 71046 X-RAY EXAM CHEST 2 VIEWS: CPT | Mod: 26,,, | Performed by: RADIOLOGY

## 2024-04-03 PROCEDURE — 3078F DIAST BP <80 MM HG: CPT | Mod: CPTII,S$GLB,, | Performed by: NURSE PRACTITIONER

## 2024-04-03 PROCEDURE — 1159F MED LIST DOCD IN RCRD: CPT | Mod: CPTII,S$GLB,, | Performed by: NURSE PRACTITIONER

## 2024-04-03 PROCEDURE — 3066F NEPHROPATHY DOC TX: CPT | Mod: CPTII,S$GLB,, | Performed by: NURSE PRACTITIONER

## 2024-04-03 PROCEDURE — 1125F AMNT PAIN NOTED PAIN PRSNT: CPT | Mod: CPTII,S$GLB,, | Performed by: NURSE PRACTITIONER

## 2024-04-03 PROCEDURE — 3288F FALL RISK ASSESSMENT DOCD: CPT | Mod: CPTII,S$GLB,, | Performed by: NURSE PRACTITIONER

## 2024-04-03 PROCEDURE — 71046 X-RAY EXAM CHEST 2 VIEWS: CPT | Mod: TC,FY,PO

## 2024-04-03 PROCEDURE — 3061F NEG MICROALBUMINURIA REV: CPT | Mod: CPTII,S$GLB,, | Performed by: NURSE PRACTITIONER

## 2024-04-03 PROCEDURE — 4010F ACE/ARB THERAPY RXD/TAKEN: CPT | Mod: CPTII,S$GLB,, | Performed by: NURSE PRACTITIONER

## 2024-04-03 PROCEDURE — 1101F PT FALLS ASSESS-DOCD LE1/YR: CPT | Mod: CPTII,S$GLB,, | Performed by: NURSE PRACTITIONER

## 2024-04-03 PROCEDURE — 3074F SYST BP LT 130 MM HG: CPT | Mod: CPTII,S$GLB,, | Performed by: NURSE PRACTITIONER

## 2024-04-03 PROCEDURE — 3008F BODY MASS INDEX DOCD: CPT | Mod: CPTII,S$GLB,, | Performed by: NURSE PRACTITIONER

## 2024-04-03 RX ORDER — ALBUTEROL SULFATE 90 UG/1
2 AEROSOL, METERED RESPIRATORY (INHALATION) EVERY 4 HOURS PRN
Qty: 8.5 G | Refills: 1 | Status: SHIPPED | OUTPATIENT
Start: 2024-04-03

## 2024-04-03 RX ORDER — LOSARTAN POTASSIUM 50 MG/1
50 TABLET ORAL DAILY
Qty: 30 TABLET | Refills: 3 | Status: SHIPPED | OUTPATIENT
Start: 2024-04-03

## 2024-04-03 RX ORDER — IPRATROPIUM BROMIDE AND ALBUTEROL SULFATE 2.5; .5 MG/3ML; MG/3ML
3 SOLUTION RESPIRATORY (INHALATION)
Status: COMPLETED | OUTPATIENT
Start: 2024-04-03 | End: 2024-04-03

## 2024-04-03 RX ADMIN — IPRATROPIUM BROMIDE AND ALBUTEROL SULFATE 3 ML: 2.5; .5 SOLUTION RESPIRATORY (INHALATION) at 11:04

## 2024-04-03 NOTE — PROGRESS NOTES
HPI     Chief Complaint:  Chief Complaint   Patient presents with    Urinary Frequency    Sinus Problem    Headache       Faby Luna is a 73 y.o. female with multiple medical diagnoses as listed in the medical history and problem list that presents for urinary frequency and sinus congestion.  Pt is known to me with his her last appointment 2/29/2024.      Urinary Frequency   This is a chronic problem. The current episode started more than 1 month ago. The problem occurs every urination. The problem has been waxing and waning. Quality: no pain with urination. The pain is at a severity of 0/10. The patient is experiencing no pain. There has been no fever. Associated symptoms include frequency. Pertinent negatives include no chills, flank pain, hematuria, nausea, vomiting or constipation. Associated symptoms comments: Urinary incontinence . She has tried antibiotics and increased fluids for the symptoms. The treatment provided moderate relief. pulmonary fibrosis   Cough  This is a new problem. The current episode started in the past 7 days. The problem occurs constantly. The cough is Productive of purulent sputum. Associated symptoms include nasal congestion, postnasal drip, a sore throat and wheezing. Pertinent negatives include no chest pain, chills, fever or shortness of breath. The symptoms are aggravated by exercise. She has tried rest (cough syrup) for the symptoms. The treatment provided mild relief. Her past medical history is significant for asthma, bronchiectasis and pneumonia. There is no history of COPD. pulmonary fibrosis     Hypertension:  Pt is compliant with atenolol 100 mg qd and losartan 100 mg qd. Pt reports her BP has been low at home <100/70. She occasionally feels light headed. The last several BP readings in clinic have also been low.       Assessment & Plan     Problem List Items Addressed This Visit          Psychiatric    Anxiety (Chronic)    Encouraged the patient to perform  self-calming techniques, such as deep breathing/relaxation techniques and exercise.      Major depressive disorder, recurrent episode, in partial remission (Chronic)    Reports mood is stable. Denies thoughts of self harm.         Pulmonary    Moderate persistent asthma (Chronic)    Reports wheezing at home. New onset cough. She has not been using albuterol.     Relevant Medications   albuterol (PROAIR HFA) 90 mcg/actuation inhaler       Overview     Adult onset asthma 10-15 years with persistent symptoms, recommend maintenance ics/laba  Manage triggers-AR, GERD  Pt doing well on symbicort, not needing rescue         Bronchiectasis without complication    As below    Overview     Noted ct chest 9/16/2022  Pt reports doing well on symbicort  May benefit from airway clearance         Localized pulmonary fibrosis    Obtain cxray    Overview     Noted on CT chest 9/16/2022 monitor for progression with pft            Cardiac/Vascular    Essential hypertension (Chronic)    BP Readings from Last 3 Encounters:   04/03/24 (!) 80/64   02/29/24 (!) 98/54   02/20/24 (!) 114/55     -several low BP readings. Pt reports BP has also been low at home. Occasional associated with light headedness.  -Continue atenolol 100 mg qd. Decrease losartan from 100 mg to 50 mg qd.   -DASH diet, regular cardiovascular exercises, portion control  - ?weight loss  -f/u with BP logs in 2 weeks       Relevant Medications    losartan (COZAAR) 50 MG tablet    Aortic atherosclerosis    -assessed and addressed all modifiable risk factors.  Continue with appropriate management to prevent complications with regards to lipid and BP monitoring.         Endocrine    Type 2 diabetes mellitus with stage 3a chronic kidney disease, without long-term current use of insulin (Chronic)    -discussed with patient about routine diabetic care that includes but are not limited to regular eye exams, skin care, daily foot exam, proper nutrition, regular BG monitoring at  home (fasting and mealtime) and medication compliance in a diabetic.  Target morning BS  and meal-time BS <180    Enrolled in digital medicine program for this diagnosis      Relevant Orders    Diabetes Digital Medicine (DDMP) Enrollment Order (Completed)       Other    DAHLIA (obstructive sleep apnea) (Chronic)    CPAP compliance: no    We discussed the potential ramifications of untreated sleep apnea, which could include daytime sleepiness, hypertension, heart disease including CHF, sudden death while sleeping and/or stroke. The patient was advised to abstain from driving should they feel sleepy or drowsy.  We discussed potential treatment options, which could include weight loss, body positioning, continuous positive airway pressure (CPAP), or referral for surgical consideration.       Overview     Sleep study 9/12/2020:The overall AHI was 8.3 with an oxygen tiffany of 81.0%. The AHI in REM sleep was 16.6. The central apnea index was 0.0.  The supine AHI was 5.6.  Home sleep study 09/20/2022 AHI 29  Patient is using and benefiting from cpap. Residual predicted AHI optimal.           Other Visit Diagnoses       Cough in adult    -  Primary    Associated with wheezing. Denies angina or fever.   Hx of asthma, pulmonary fibrosis, pneumonia, and bronchiectasis. She does not use her albuterol inhaler.     Coarse lung sounds bilaterally.   Afebrile.   HR stable  BP is low  Spo2 92% on RA.    Decrease losartan  Obtain cxray  Duo-Neb administered in clinic  Increase hydration  Rest  Monitor vital signs at home including temperature.  Report abnormal values to provider    Will await xray results before ordering abx.    Discussed DDx, condition, and treatment.   Education sent to patient portal/included in after visit summary.  ED precautions given.   Notify provider if symptoms do not resolve or increase in severity.   Patient verbalizes understanding and agrees with plan of care.      Relevant Orders    X-Ray Chest PA  And Lateral  Relevant Medications   albuterol-ipratropium 2.5 mg-0.5 mg/3 mL nebulizer solution 3 mL (Completed)     Relevant Medications   albuterol (PROAIR HFA) 90 mcg/actuation inhaler       Urinary frequency        Chronic. Relevant hx of recurrent UTIs treated with abx and urinary incontinence.     Refer to urology  Obtain repeat urinalysis with cx.    Discussed DDx, condition, and treatment.   Education sent to patient portal/included in after visit summary.  ED precautions given.   Notify provider if symptoms do not resolve or increase in severity.   Patient verbalizes understanding and agrees with plan of care.      Relevant Orders    Ambulatory referral/consult to Urology    Urinalysis    Urine culture    Recurrent UTI        As above    Relevant Orders    Ambulatory referral/consult to Urology    Urinalysis    Urine culture    Urinary incontinence, unspecified type        As above    Relevant Orders    Ambulatory referral/consult to Urology    Urinalysis    Urine culture    Obstructive sleep apnea        As above                        --------------------------------------------      Health Maintenance:  Health Maintenance         Date Due Completion Date    Shingles Vaccine (1 of 2) Never done ---    RSV Vaccine (Age 60+ and Pregnant patients) (1 - 1-dose 60+ series) Never done ---    COVID-19 Vaccine (7 - 2023-24 season) 09/01/2023 9/21/2022    Hemoglobin A1c 08/23/2024 2/23/2024    Mammogram 08/24/2024 8/24/2023    Foot Exam 08/29/2024 8/29/2023 (Done)    Override on 8/29/2023: Done    Override on 7/5/2018: Done    Eye Exam 01/19/2025 1/19/2024    Diabetes Urine Screening 02/23/2025 2/23/2024    Lipid Panel 02/23/2025 2/23/2024    TETANUS VACCINE 09/21/2032 9/21/2022    Colorectal Cancer Screening 02/20/2034 2/20/2024            Advised patient on the importance of completing overdue health maintenance items    Follow Up:  Follow up in about 2 weeks (around 4/17/2024), or if symptoms worsen or fail to  improve.    Exam     Review of Systems:  (as noted above)  Review of Systems   Constitutional:  Negative for chills and fever.   HENT:  Positive for postnasal drip and sore throat. Negative for trouble swallowing.    Eyes:  Negative for visual disturbance.   Respiratory:  Positive for cough and wheezing. Negative for chest tightness and shortness of breath.    Cardiovascular:  Negative for chest pain.   Gastrointestinal:  Negative for blood in stool, constipation, nausea and vomiting.   Genitourinary:  Positive for frequency. Negative for flank pain and hematuria.        Urinary incontinence       Physical Exam:   Physical Exam  Constitutional:       General: She is not in acute distress.     Appearance: She is not ill-appearing or diaphoretic.   HENT:      Head: Normocephalic and atraumatic.   Cardiovascular:      Rate and Rhythm: Normal rate and regular rhythm.      Heart sounds: No murmur heard.     No friction rub. No gallop.   Pulmonary:      Effort: No respiratory distress.      Comments: Coarse lung sounds bilaterally.   Chest:      Chest wall: No tenderness.   Musculoskeletal:      Cervical back: No rigidity.   Skin:     Capillary Refill: Capillary refill takes 2 to 3 seconds.   Neurological:      General: No focal deficit present.      Mental Status: She is alert and oriented to person, place, and time.       Vitals:    04/03/24 1053   BP: (!) 80/64   BP Location: Left arm   Patient Position: Sitting   BP Method: Medium (Manual)   Pulse: 78   Temp: 99 °F (37.2 °C)   TempSrc: Oral   SpO2: (!) 92%   Weight: 82.3 kg (181 lb 8.8 oz)      Body mass index is 31.16 kg/m².        History     Past Medical History:  Past Medical History:   Diagnosis Date    Anxiety with depression     Arthritis     CKD (chronic kidney disease) stage 2, GFR 60-89 ml/min     Diabetes mellitus     History of Clarisse-en-Y gastric bypass     Hypertension     Obesity, unspecified     ADHLIA (obstructive sleep apnea)     Spondylosis        Past  Surgical History:  Past Surgical History:   Procedure Laterality Date    CARPAL TUNNEL RELEASE Right 3/8/2019    Procedure: RELEASE, CARPAL TUNNEL right;  Surgeon: Pan Goodman MD;  Location: [facility] OR;  Service: Orthopedics;  Laterality: Right;    CARPAL TUNNEL RELEASE Left 2019    Procedure: RELEASE, CARPAL TUNNEL- LEFT;  Surgeon: Pan Goodman MD;  Location: [facility] OR 2ND FLR;  Service: Orthopedics;  Laterality: Left;    CATARACT EXTRACTION Bilateral      SECTION      CHOLECYSTECTOMY      COLONOSCOPY N/A 2024    Procedure: COLONOSCOPY;  Surgeon: Otilio Man MD;  Location: [facility];  Service: Endoscopy;  Laterality: N/A;  Referred by: MYNOR Sprauge / CECILIA Meds: ozempic / diabetic / Prep: peg / Route instructions sent: portal  - lvm and portal msg for pc. DBM  - left 2nd vm for pc. DBM    EPIDURAL STEROID INJECTION INTO LUMBAR SPINE N/A 2018    Procedure: INJECTION, STEROID, SPINE, LUMBAR, EPIDURAL;  Surgeon: Shaq Silverio MD;  Location: [facility] PAIN MGT;  Service: Pain Management;  Laterality: N/A;  LUMBAR L4-L5 INTERLAMINAR ELISE'  [id number]  W/ SEDATION     ESOPHAGOGASTRODUODENOSCOPY N/A 2023    Procedure: EGD (ESOPHAGOGASTRODUODENOSCOPY);  Surgeon: Deann Jimenez MD;  Location: [facility];  Service: Gastroenterology;  Laterality: N/A;    HYSTERECTOMY      INJECTION OF ANESTHETIC AGENT AROUND NERVE Bilateral 2019    Procedure: BLOCK, NERVE, L3-L4-L5 MEDIAL BRANCH;  Surgeon: Shaq Silverio MD;  Location: [facility] PAIN MGT;  Service: Pain Management;  Laterality: Bilateral;    INJECTION OF ANESTHETIC AGENT AROUND NERVE Bilateral 10/17/2019    Procedure: BLOCK, NERVE;  Surgeon: Shaq Silverio MD;  Location: [facility] PAIN MGT;  Service: Pain Management;  Laterality: Bilateral;  B/L MBB L3-L4-L5  REPEAT  CONSENT NEEDED    INJECTION OF FACET JOINT Bilateral 2018    Procedure: INJECTION-FACET;  Surgeon: Shaq Silverio MD;  Location: [facility] PAIN MGT;  Service: Pain Management;  Laterality: Bilateral;   LUMBAR BILATERAL L4-L5 AND L5-S1 FACET STEROID INJECTION  [id number]    W/ SEDATION     RADIOFREQUENCY ABLATION Right 11/21/2019    Procedure: RADIOFREQUENCY ABLATION RIGHT L3, L4, L5;  Surgeon: Shaq Silverio MD;  Location: [facility] PAIN MGT;  Service: Pain Management;  Laterality: Right;  Right RFA L3-L4-L5  1 of 2  Consent Needed    RADIOFREQUENCY ABLATION Left 12/5/2019    Procedure: RADIOFREQUENCY ABLATION LEFT L3-5;  Surgeon: Shaq Silverio MD;  Location: [facility] PAIN MGT;  Service: Pain Management;  Laterality: Left;  Left RFA L3-L4-L5  2 of 2  Consent Needed    RADIOFREQUENCY ABLATION Left 8/17/2020    Procedure: RADIOFREQUENCY ABLATION LEFT L3,4,5 1 of 2;  Surgeon: Shaq Silverio MD;  Location: [facility] PAIN MGT;  Service: Pain Management;  Laterality: Left;  Left RFA L3,4,5  1 of 2    RADIOFREQUENCY ABLATION Right 8/31/2020    Procedure: RADIOFREQUENCY ABLATION RIGHT L3,4,5 2 of 2;  Surgeon: Shaq Silverio MD;  Location: [facility] PAIN MGT;  Service: Pain Management;  Laterality: Right;  RADIOFREQUENCY ABLATION RIGHT L3,4,5  2 of 2    RADIOFREQUENCY ABLATION Left 9/9/2021    Procedure: RADIOFREQUENCY ABLATION, L3-L4 AND L5 MEDIAL BRANCH 1 OF 2;  Surgeon: Shaq Silverio MD;  Location: [facility] PAIN MGT;  Service: Pain Management;  Laterality: Left;    RADIOFREQUENCY ABLATION Right 9/20/2021    Procedure: RADIOFREQUENCY ABLATION, L3-L4 AND L5 MEDIAL BRANCH 2 OF 2;  Surgeon: Shaq Silverio MD;  Location: [facility] PAIN MGT;  Service: Pain Management;  Laterality: Right;    RADIOFREQUENCY ABLATION Right 7/7/2022    Procedure: RADIOFREQUENCY ABLATION, RIGHT L3-L4-L5 ONE OF TWO;  Surgeon: Shaq Silverio MD;  Location: [facility] PAIN MGT;  Service: Pain Management;  Laterality: Right;    RADIOFREQUENCY ABLATION Left 7/21/2022    Procedure: RADIOFREQUENCY ABLATION, LEFT L3-L4-L5 TWO OF TWO *BRING SCS REMOTE*;  Surgeon: Shaq Silverio MD;  Location: [facility] PAIN MGT;  Service: Pain Management;  Laterality: Left;    RADIOFREQUENCY ABLATION Left 3/16/2023     Procedure: RADIOFREQUENCY ABLATION LEFT L3,L4,L5 *BRING SCS REMOTE*;  Surgeon: Shaq Silverio MD;  Location: [facility] PAIN MGT;  Service: Pain Management;  Laterality: Left;    RADIOFREQUENCY ABLATION Right 3/30/2023    Procedure: RADIOFREQUENCY ABLATION RIGHT L3,L4,L5 *BRING SCS REMOTE*;  Surgeon: Shaq Silverio MD;  Location: [facility] PAIN MGT;  Service: Pain Management;  Laterality: Right;    RADIOFREQUENCY ABLATION Right 12/18/2023    Procedure: RADIOFREQUENCY ABLATION RIGHT L3, 4, 5 1 OF 2;  Surgeon: Shaq Silverio MD;  Location: [facility] PAIN MGT;  Service: Pain Management;  Laterality: Right;  [phone]    RADIOFREQUENCY ABLATION Left 1/4/2024    Procedure: RADIOFREQUENCY ABLATION LEFT L3, 4, 5 2 OF 2;  Surgeon: Shaq Silverio MD;  Location: [facility] PAIN MGT;  Service: Pain Management;  Laterality: Left;  [phone]  2 WK F/U WARREN    TRIAL OF SPINAL CORD NERVE STIMULATOR N/A 9/24/2018    Procedure: TRIAL, NEUROSTIMULATOR, SPINAL CORD, SPINAL CORD STIMULATOR TRIAL-INTERNAL WIRES TO EXTERNAL BATTERY;  Surgeon: Shaq Silverio MD;  Location: [facility] PAIN MGT;  Service: Pain Management;  Laterality: N/A;  ABBOTT REP NOTIFIED       Social History:  Social History     Socioeconomic History    Marital status:    Tobacco Use    Smoking status: Never    Smokeless tobacco: Never   Substance and Sexual Activity    Alcohol use: Yes     Comment: occ    Drug use: No       Family History:  Family History   Problem Relation Age of Onset    Cataracts Mother     Glaucoma Mother     No Known Problems Father     No Known Problems Sister     No Known Problems Brother     No Known Problems Maternal Aunt     No Known Problems Maternal Uncle     No Known Problems Paternal Aunt     No Known Problems Paternal Uncle     No Known Problems Maternal Grandmother     No Known Problems Maternal Grandfather     No Known Problems Paternal Grandmother     No Known Problems Paternal Grandfather        Allergies and Medications: (updated and reviewed)  Review of  "patient's allergies indicates:  No Known Allergies  Current Outpatient Medications   Medication Sig Dispense Refill    atenoloL (TENORMIN) 100 MG tablet TAKE 1 TABLET EVERY DAY 90 tablet 3    BD ULTRA-FINE MINI PEN NEEDLE 31 gauge x 3/16" Ndle       BD ULTRA-FINE FELICIA PEN NEEDLE 32 gauge x 5/32" Ndle       blood sugar diagnostic Strp To check BG 3 times daily, to use with insurance preferred meter 100 strip 11    EScitalopram oxalate (LEXAPRO) 20 MG tablet Take 1 tablet (20 mg total) by mouth once daily. 90 tablet 3    fluticasone propionate (FLONASE) 50 mcg/actuation nasal spray 2 sprays (100 mcg total) by Each Nostril route once daily. 11.1 mL 1    gabapentin (NEURONTIN) 800 MG tablet TAKE 1 TABLET BY MOUTH THREE TIMES DAILY 90 tablet 0    glipiZIDE (GLUCOTROL) 5 MG tablet       hydrOXYzine pamoate (VISTARIL) 25 MG Cap TAKE 1 CAPSULE BY MOUTH ONCE DAILY AS NEEDED FOR ANXIETY 90 capsule 3    lancets Misc To check BG 3 times daily, to use with insurance preferred meter 100 each 11    loratadine (CLARITIN) 10 mg tablet Take 1 tablet (10 mg total) by mouth daily as needed for Allergies. 90 tablet 3    midazolam, PF, (VERSED) 1 mg/mL Soln injection       pantoprazole (PROTONIX) 20 MG tablet TAKE 1 TABLET TWICE DAILY BEFORE MEALS 180 tablet 3    rosuvastatin (CRESTOR) 20 MG tablet Take 1 tablet by mouth once daily 90 tablet 1    semaglutide (OZEMPIC) 0.25 mg or 0.5 mg (2 mg/3 mL) pen injector Inject 0.25 mg into the skin every 7 days.      tiZANidine (ZANAFLEX) 4 MG tablet Take 1 tablet (4 mg total) by mouth daily as needed (pain). 90 tablet 3    topiramate (TOPAMAX) 100 MG tablet Take 1 tablet (100 mg total) by mouth 2 (two) times daily. 180 tablet 1    traZODone (DESYREL) 100 MG tablet Take 1 tablet by mouth in the evening 90 tablet 3    albuterol (PROAIR HFA) 90 mcg/actuation inhaler Inhale 2 puffs into the lungs every 4 (four) hours as needed for Wheezing or Shortness of Breath. Rescue 8.5 g 1    losartan (COZAAR) " 50 MG tablet Take 1 tablet (50 mg total) by mouth once daily. 30 tablet 3     No current facility-administered medications for this visit.     Facility-Administered Medications Ordered in Other Visits   Medication Dose Route Frequency Provider Last Rate Last Admin    0.9%  NaCl infusion   Intravenous Continuous Uriel Aranda MD           Patient Care Team:  Renae Sprague DO as PCP - General (Family Medicine)  Babar Garcia MD as Consulting Physician (Ophthalmology)  Records, [facility]  Wilian Colón MD as Consulting Physician (Gastroenterology)  Nina Carter LPN as Care Coordinator         - The patient is given an After Visit Summary that lists all medications with directions, allergies, education, orders placed during this encounter and follow-up instructions.      - I have reviewed the patient's medical information including past medical, family, and social history sections including the medications and allergies.      - We discussed the patient's current medications.     This note was created by combination of typed  and MModal dictation.  Transcription errors may be present.  If there are any questions, please contact me.       Marcos Santos NP

## 2024-04-03 NOTE — PATIENT INSTRUCTIONS
"Medical Fitness--[phone]  Imaging, Xray, CT, MRI, Ultrasound---[phone]  Bariatrics---[phone]  Breast Surgery---[phone]  Case Management---[phone]  Colonoscopy---[phone]  DME---[phone]  Infectious Disease---[phone]  Interventional Radiology---[phone]  Medical Records---[phone]  [facility]---[phone]  Optometry/Ophthalmology---[phone]  [facility]---[phone]  Physical Therapy---[phone]  Psychiatry---[phone] or [phone]  Plastic Surgery---[phone]  Recovery--[phone] option 2, or [phone].  Sleep Study---[phone]  Smoking Cessation---[phone]  Wound Care---[phone]  Referral Desk---[phone]      Patient Education       Weight Loss Diet   About this topic   There are many "trendy" weight loss diets that are popular today. Many of these diets can end up being more harmful than helpful. The healthiest way to lose weight is to burn more calories than you eat.  A weight loss diet should help you have a healthy view of eating. It is NOT healthy to stop eating to try and lose weight. A good diet plan will help you cut down your food intake and make healthy choices.  A healthy weight loss goal is 1 to 2 pounds (0.5 to 1 kg) per week. Reducing calories in your diet, burning calories through exercise, or both can help you lose weight. Combining a healthy diet with regular physical activity can help you get the best results.  To cut calories in your diet you can:  Switch from whole milk to 1% or skim milk.  Switch from regular cheese to low-fat or fat-free cheese.  Use healthier condiment choices:  Fat-free or low-fat sour cream or salad dressings  Spray butter  Diet syrups or jellies over regular  Try frozen yogurt as a dessert rather than eating ice cream.  Skip the chips. Snack on carrots, vegetables, or fruit. If chips are a favorite of yours, try the baked style and watch portion size.  Eat grilled, roasted, boiled, broiled, " or baked meats. Avoid deep-frying. Choose skinless poultry, lean red meat, lean cuts of pork, and fish for good protein sources.  Try flavored no-calorie mackey. Do not drink soda and juices that have many calories.  Choose fruit instead of sweets.  General   Eating smaller meals more often may be helpful. This will keep you from overeating at your next meal. Also, eating meals slowly helps you feel full faster.  If eating 3 meals is a part of your lifestyle, choose more lean proteins and higher fiber foods to fill you up at each meal.  Do not skip meals. Most often if you skip a meal, you eat too much at the next meal.  Eat smaller portions. Use a smaller plate or bowl for meals, and when you are eating out, eat half and take the rest home.  Plan ahead. Plan your meals and grocery list before going to the store. Planning will keep you from getting meals from restaurants.  Do not go to the grocery store hungry. You are more likely to buy snacks that are not good for you.  Portion out snacks. When you are having a snack, instead of grabbing the whole bag, portion a small amount out to give yourself a stopping point.  Drink water before and after your meals to help fill you up without the calories.  When eating starchy foods, choose whole-grain products. These have a lot of fiber which will make you feel full. Fiber also helps lower cholesterol and helps with bowel function.  If you need a helpful start, ask your doctor to send you to a dietitian for weight loss help.         What will the results be?   Losing excess weight will make your whole body healthier. You will have more energy for your daily activities and lower your risk for health problems.  What lifestyle changes are needed?   Stay active. Eating healthy is not always enough to lose weight. Burning calories by exercising is a big part of weight loss.  What foods are good to eat?   The key is to watch your portion sizes. It is best to choose foods that are  lower in fat and calories.  Choose lean meats:  Boneless, skinless chicken breast  Pork loin  90% lean beef  Lean turkey meat  Fresh fish (not fried)  Choose low-fat dairy products:  1% or skim milk  Spray butter or margarine  Low-fat or fat-free cheese  Frozen yogurt or low-calorie ice cream  Choose fresh fruits, vegetables, beans and lentils, and whole wheat products more often.  Choose water to drink more often. Drink diet or no-calorie beverages when you want something other than water. Aim to get your calories from the foods you eat.  Choose smart snacks:  Fruits  Vegetables  Low-fat or nonfat yogurt  Low-fat or no-fat cheese, such as cottage cheese  Unsalted nuts  Hard-boiled egg  Hummus  Guacamole  Natural peanut butter  Popcorn with no butter ? use pepper, garlic, or another spice to taste  Whole grain crackers  What foods should be limited or avoided?   Limit high-fat, high-sodium, and high-calorie foods like:  Fried foods  Processed meats  Whole-fat dairy products  Candy, cookies, chips, pastries  Sausage, lugo, any full-fat meats  Soda, juice  Beer, wine, and mixed drinks (alcohol)  Will there be any other care needed?   What do I do first before trying to lose weight?  Talk to your doctor and dietitian to see if you need to lose weight. Work with them to set your weight loss goals.  If you have a chronic illness, such as high blood sugar or high blood pressure, ask a doctor or dietitian what diet and exercise is right for you.  Ask your doctor about how much you are able to exercise and what type of exercise is good for you.  Helpful tips   Keep a food journal to help keep you on track.  Join a support group.  Tips for burning calories:  If your workplace is near your house, choose to walk or bike to work instead of driving.  Take 20-minute walks each day. Walk around during your lunch break. You will not only burn calories, but will raise your energy for the rest of the day.  Take the stairs over the  elevator.  Join a gym or exercise class with a friend.  Try to exercise 30 minutes a day for overall health. Three 10-minute sessions work too. Aim for 60 to 90 minutes a day to lose weight.  Drink lots of water before, during, and after exercise.  Where can I learn more?   [employer]y [employer] Dietetics  https://www.eatright.org/health/weight-loss/your-health-and-your-weight/back-to-basics-for-healthy-weight-loss   Centers for Disease Control and Prevention  https://www.cdc.gov/healthyweight/healthy_eating/index.html   Familydoctor.org  https://familydoctor.org/nutrition-weight-loss-need-know-fad-diets/   [facility]HS  https://www.nhs.uk/live-well/healthy-weight/12-tips-to-help-you-lose-weight/?tabname=you-and-your-weight   Last Reviewed Date   2021-06-24  Consumer Information Use and Disclaimer   This information is not specific medical advice and does not replace information you receive from your health care provider. This is only a brief summary of general information. It does NOT include all information about conditions, illnesses, injuries, tests, procedures, treatments, therapies, discharge instructions or life-style choices that may apply to you. You must talk with your health care provider for complete information about your health and treatment options. This information should not be used to decide whether or not to accept your health care providers advice, instructions or recommendations. Only your health care provider has the knowledge and training to provide advice that is right for you.  Copyright   Copyright © 2021 [employer] and its affiliates and/or licensors. All rights reserved.  Patient Education       Obesity Discharge Instructions, Adult   About this topic   Obesity is a health problem where your weight is higher than it should be. Doctors use a method called body mass index or BMI to measure whether you are at a healthy weight for your height. The doctor uses your weight and your height to  determine your BMI. A high BMI can be an indicator of high body fat. Obesity is different from being overweight. Being overweight means your BMI is 25 or higher. Being obese means your BMI is 30 or higher. If your BMI is 40 or higher, you are considered severely or morbidly obese. Being obese may lead to many health problems. It may make it hard for you to breathe and move easily. It may raise your risk for illnesses like high blood sugar and heart disease.  What care is needed at home?   Ask your doctor what you need to do when you go home. Make sure you understand everything the doctor says. This way you will know what you need to do.  Change your diet. Lower the calories in your diet. Ask your dietitian to help you set an eating plan that is right for you.  Get enough exercise. Talk to your doctor about the right amount of exercise for you.  Do not smoke.  Limit the amount of beer, wine, and mixed drinks (alcohol) you drink.  Keep a record of your weight. Write down what you eat and drink each day. This may help you be more aware of the choices you are making.  What drugs may be needed?   The doctor may order drugs to:  Treat health problems that may cause weight gain  Lower your appetite  Help you lose weight  Will physical activity be limited?   Talk to your doctor about the right amount of exercise for you. This is especially important if you have heart problems, other illnesses, or if you recently had surgery.  Start slowly by doing more everyday activities like yard work or household chores.  Choose activities that you like to do.  What changes to diet are needed?   Whole grains are a good source of carbohydrates and fiber. Try to eat 3 to 5 servings of whole grain, high fiber foods each day. These are things like whole grain bread, bran cereals, brown rice, and whole wheat pasta.  Fruits and vegetables are good sources of fiber, vitamins, and minerals. Try to pick many kinds and colors. This will help you  get different nutrients in your diet. Choose fresh, frozen, or canned fruits and vegetables. Buy plain, frozen fruits without added sugar. Buy plain, frozen vegetables without added salt and fat. Buy canned fruit in 100% juice or water. Avoid canned fruit in syrups. Buy canned vegetables with no salt added.  Milk is a good source of protein and some vitamins and minerals. Choose low-fat (1%) or fat-free milk. Eat nonfat or low-fat cheeses, ice creams, yogurt, and other dairy products.  Meats and beans are good sources of protein and iron. Eat more low-fat or lean meats like chicken without the skin, turkey without the skin, and fish. Eggs and peanut butter are good sources of protein as well. Dried peas, beans, and lentils are also good and contain fiber. Fatty fish, like salmon, tuna, mackerel, herring, and trout, are good to eat and have healthy omega-3 fats.  Good fats can give you long-term energy. These are found in fish, nuts, seeds, and avocados. Try using olive oil, safflower oil, and low-sodium, low-fat salad dressing as a topping on foods. Use canola, olive, or peanut oil for cooking. Other healthy oils include corn, sunflower, and soybean oils.  Limit sweets such as candy and sugary drinks. Try to drink water instead. Drink diet or no calorie beverages when you want something other than water.  Cut back on solid fats, like butter, lard, and coconut oil.  Limit fatty foods such as desserts, fried foods, and chips.  Trans fats should be avoided. Most trans fats are found in processed foods, commercially baked goods, and fried foods and are very unhealthy. Saturated fat, which is different from trans fat, should be watched and limited if portions are too large. Saturated fat is found mainly in animal sources, such as meat and dairy products. Saturated fat is also found in coconut and palm oils.  Limit processed meats and most processed foods.  Limit eating out. If you choose to visit a restaurant, ask for  the nutritional facts. You may also be able to find nutritional facts online. Then, you can make a plan and choose healthy items. Watch the portion size. Have large portions split and take part home for some other meal.  What problems could happen?   Low mood or self-esteem  Anxiety  Muscle pain, joint pain, or arthritis  Sleep apnea  Diabetes  High blood pressure  High cholesterol  Heart, lung, or breathing problems  Kidney or liver problems  Cancer  Gallstones  Increased sweating  Reduced fertility  Pregnancy complications  Gastroesophageal reflux disease  When do I need to call the doctor?   Signs of high or low blood sugar  Thoughts of hurting yourself  Teach Back: Helping You Understand   The Teach Back Method helps you understand the information we are giving you. After you talk with the staff, tell them in your own words what you learned. This helps to make sure the staff has described each thing clearly. It also helps to explain things that may have been confusing. Before going home, make sure you can do these:  I can tell you about my condition.  I can tell you what changes I need to make with my diet or drugs.  I can tell you what I will do if I have signs of a heart attack or stroke.  Where can I learn more?   Centers for Disease Control and Prevention  http://www.cdc.gov/obesity/adult/defining.html   NHS  http://www.nhs.uk/Conditions/Obesity/Pages/obesityprevention.aspx   Last Reviewed Date   2021-06-23  Consumer Information Use and Disclaimer   This information is not specific medical advice and does not replace information you receive from your health care provider. This is only a brief summary of general information. It does NOT include all information about conditions, illnesses, injuries, tests, procedures, treatments, therapies, discharge instructions or life-style choices that may apply to you. You must talk with your health care provider for complete information about your health and treatment  options. This information should not be used to decide whether or not to accept your health care providers advice, instructions or recommendations. Only your health care provider has the knowledge and training to provide advice that is right for you.  Copyright   Copyright © 2021 [employer] and its affiliates and/or licensors. All rights reserved.

## 2024-04-09 RX ORDER — TOPIRAMATE 100 MG/1
100 TABLET, FILM COATED ORAL 2 TIMES DAILY
Qty: 180 TABLET | Refills: 3 | Status: SHIPPED | OUTPATIENT
Start: 2024-04-09

## 2024-04-17 ENCOUNTER — OFFICE VISIT (OUTPATIENT)
Dept: UROLOGY | Facility: CLINIC | Age: 74
End: 2024-04-17
Payer: MEDICARE

## 2024-04-17 VITALS — WEIGHT: 181.31 LBS | BODY MASS INDEX: 31.12 KG/M2

## 2024-04-17 DIAGNOSIS — R32 URINARY INCONTINENCE, UNSPECIFIED TYPE: ICD-10-CM

## 2024-04-17 DIAGNOSIS — N39.0 RECURRENT UTI: ICD-10-CM

## 2024-04-17 DIAGNOSIS — N39.41 URGE INCONTINENCE OF URINE: ICD-10-CM

## 2024-04-17 DIAGNOSIS — N20.0 RIGHT RENAL STONE: Primary | ICD-10-CM

## 2024-04-17 PROCEDURE — 3044F HG A1C LEVEL LT 7.0%: CPT | Mod: CPTII,S$GLB,, | Performed by: STUDENT IN AN ORGANIZED HEALTH CARE EDUCATION/TRAINING PROGRAM

## 2024-04-17 PROCEDURE — 1160F RVW MEDS BY RX/DR IN RCRD: CPT | Mod: CPTII,S$GLB,, | Performed by: STUDENT IN AN ORGANIZED HEALTH CARE EDUCATION/TRAINING PROGRAM

## 2024-04-17 PROCEDURE — 1126F AMNT PAIN NOTED NONE PRSNT: CPT | Mod: CPTII,S$GLB,, | Performed by: STUDENT IN AN ORGANIZED HEALTH CARE EDUCATION/TRAINING PROGRAM

## 2024-04-17 PROCEDURE — 3061F NEG MICROALBUMINURIA REV: CPT | Mod: CPTII,S$GLB,, | Performed by: STUDENT IN AN ORGANIZED HEALTH CARE EDUCATION/TRAINING PROGRAM

## 2024-04-17 PROCEDURE — 1101F PT FALLS ASSESS-DOCD LE1/YR: CPT | Mod: CPTII,S$GLB,, | Performed by: STUDENT IN AN ORGANIZED HEALTH CARE EDUCATION/TRAINING PROGRAM

## 2024-04-17 PROCEDURE — 3288F FALL RISK ASSESSMENT DOCD: CPT | Mod: CPTII,S$GLB,, | Performed by: STUDENT IN AN ORGANIZED HEALTH CARE EDUCATION/TRAINING PROGRAM

## 2024-04-17 PROCEDURE — 4010F ACE/ARB THERAPY RXD/TAKEN: CPT | Mod: CPTII,S$GLB,, | Performed by: STUDENT IN AN ORGANIZED HEALTH CARE EDUCATION/TRAINING PROGRAM

## 2024-04-17 PROCEDURE — 1159F MED LIST DOCD IN RCRD: CPT | Mod: CPTII,S$GLB,, | Performed by: STUDENT IN AN ORGANIZED HEALTH CARE EDUCATION/TRAINING PROGRAM

## 2024-04-17 PROCEDURE — 99204 OFFICE O/P NEW MOD 45 MIN: CPT | Mod: S$GLB,,, | Performed by: STUDENT IN AN ORGANIZED HEALTH CARE EDUCATION/TRAINING PROGRAM

## 2024-04-17 PROCEDURE — 3066F NEPHROPATHY DOC TX: CPT | Mod: CPTII,S$GLB,, | Performed by: STUDENT IN AN ORGANIZED HEALTH CARE EDUCATION/TRAINING PROGRAM

## 2024-04-17 PROCEDURE — 99999 PR PBB SHADOW E&M-EST. PATIENT-LVL IV: CPT | Mod: PBBFAC,,, | Performed by: STUDENT IN AN ORGANIZED HEALTH CARE EDUCATION/TRAINING PROGRAM

## 2024-04-17 NOTE — PROGRESS NOTES
Patient ID: Faby Luna is a 73 y.o. female.    Chief Complaint: Incontinence    Referral: Marcos Santos, NP  [address] [facility]  [place] [address]m [id number]     HPI  73 y.o. who presents to the Urology clinic for evaluation of incontinence. Patient w/ 1-2 years of incontinence. Patient notes that she does not have to wear depends at home or overnight, when she goes out to social events she notes embarassment due to leakage when coughing, sneezing, laughing, lifting heavy objects as well as with urgency. Patient without prior management. Has notable hx of HTN on medication.     Denies constipation  Drinks 3-4 bottles of water daily  Denies bladder irritant intake.       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    Chronic pain syndrome 10/26/2018    Bilateral carpal tunnel syndrome 12/03/2018    Right carpal tunnel syndrome 03/08/2019    Cubital tunnel syndrome on right 03/08/2019    Pain, hand 05/06/2019    Hand weakness 05/06/2019    Anxiety 05/28/2018    Major depressive disorder, recurrent episode, in partial remission 05/13/2019    Carpal tunnel syndrome 06/28/2019    Osteoarthritis of spine with radiculopathy, lumbar region 10/17/2019    Spondylosis without myelopathy 08/17/2020    Hypertrophy of ligamentum flavum 02/15/2021    Chronic renal failure, stage 3a 02/15/2021    Osteoarthritis of lumbar spine 09/09/2021    Refractive error 12/07/2021     Pseudophakia 2021    Spinal enthesopathy, site unspecified 2022    Seasonal allergic rhinitis 2022    Moderate persistent asthma 2022    Dyspnea 09/15/2022    DAHLIA (obstructive sleep apnea) 09/15/2022    Pulmonary granuloma 10/03/2022    Localized pulmonary fibrosis 10/03/2022    Bronchiectasis without complication 10/03/2022    Atherosclerosis of native coronary artery of native heart without angina pectoris 2023    Aortic atherosclerosis 2023    Abdominal pain 2023    Ileus 2023    Partial bowel obstruction 2023     Resolved Ambulatory Problems     Diagnosis Date Noted    Pain 2018    Bacteremia due to Escherichia coli 2021    Pyelonephritis 2021    Complicated UTI (urinary tract infection) 2021    Morbid (severe) obesity due to excess calories 2022    Occlusion of peripherally inserted central catheter (PICC) line, initial encounter 2022     Past Medical History:   Diagnosis Date    Anxiety with depression     Arthritis     CKD (chronic kidney disease) stage 2, GFR 60-89 ml/min     Diabetes mellitus     History of Clarisse-en-Y gastric bypass     Hypertension     Obesity, unspecified     Spondylosis          Past Surgical History  Past Surgical History:   Procedure Laterality Date    CARPAL TUNNEL RELEASE Right 3/8/2019    Procedure: RELEASE, CARPAL TUNNEL right;  Surgeon: Pan Goodman MD;  Location: [facility];  Service: Orthopedics;  Laterality: Right;    CARPAL TUNNEL RELEASE Left 2019    Procedure: RELEASE, CARPAL TUNNEL- LEFT;  Surgeon: Pan Goodman MD;  Location: [address];  Service: Orthopedics;  Laterality: Left;    CATARACT EXTRACTION Bilateral      SECTION      CHOLECYSTECTOMY      COLONOSCOPY N/A 2024    Procedure: COLONOSCOPY;  Surgeon: Otilio Man MD;  Location: [facility];  Service: Endoscopy;  Laterality: N/A;  Referred by: MYNOR Sprague / CECILIA Meds: ozempic / diabetic / Prep: peg / Route  instructions sent: portal  2/14- lvm and portal msg for pc. DBM  2/19- left 2nd vm for pc. DBM    EPIDURAL STEROID INJECTION INTO LUMBAR SPINE N/A 6/14/2018    Procedure: INJECTION, STEROID, SPINE, LUMBAR, EPIDURAL;  Surgeon: Shaq Silverio MD;  Location: [facility] PAIN MGT;  Service: Pain Management;  Laterality: N/A;  LUMBAR L4-L5 INTERLAMINAR ELISE'  [id number]  W/ SEDATION     ESOPHAGOGASTRODUODENOSCOPY N/A 5/12/2023    Procedure: EGD (ESOPHAGOGASTRODUODENOSCOPY);  Surgeon: Deann Jimenez MD;  Location: [facility] ENDO;  Service: Gastroenterology;  Laterality: N/A;    HYSTERECTOMY      INJECTION OF ANESTHETIC AGENT AROUND NERVE Bilateral 9/12/2019    Procedure: BLOCK, NERVE, L3-L4-L5 MEDIAL BRANCH;  Surgeon: Shaq Silverio MD;  Location: [facility] PAIN [facility]GT;  Service: Pain Management;  Laterality: Bilateral;    INJECTION OF ANESTHETIC AGENT AROUND NERVE Bilateral 10/17/2019    Procedure: BLOCK, NERVE;  Surgeon: Shaq Silverio MD;  Location: [facility] PAIN [facility]GT;  Service: Pain Management;  Laterality: Bilateral;  B/L MBB L3-L4-L5  REPEAT  CONSENT NEEDED    INJECTION OF FACET JOINT Bilateral 5/28/2018    Procedure: INJECTION-FACET;  Surgeon: Shaq Silverio MD;  Location: [facility] PAIN [facility]GT;  Service: Pain Management;  Laterality: Bilateral;  LUMBAR BILATERAL L4-L5 AND L5-S1 FACET STEROID INJECTION  [id number]    W/ SEDATION     RADIOFREQUENCY ABLATION Right 11/21/2019    Procedure: RADIOFREQUENCY ABLATION RIGHT L3, L4, L5;  Surgeon: Shaq Silverio MD;  Location: [facility] PAIN [facility]GT;  Service: Pain Management;  Laterality: Right;  Right RFA L3-L4-L5  1 of 2  Consent Needed    RADIOFREQUENCY ABLATION Left 12/5/2019    Procedure: RADIOFREQUENCY ABLATION LEFT L3-5;  Surgeon: Shaq Silverio MD;  Location: [facility] PAIN [facility]GT;  Service: Pain Management;  Laterality: Left;  Left RFA L3-L4-L5  2 of 2  Consent Needed    RADIOFREQUENCY ABLATION Left 8/17/2020    Procedure: RADIOFREQUENCY ABLATION LEFT L3,4,5 1 of 2;  Surgeon: Shaq Silverio MD;  Location: [facility] PAIN [facility]GT;  Service:  Pain Management;  Laterality: Left;  Left RFA L3,4,5  1 of 2    RADIOFREQUENCY ABLATION Right 8/31/2020    Procedure: RADIOFREQUENCY ABLATION RIGHT L3,4,5 2 of 2;  Surgeon: Shaq Silverio MD;  Location: [facility] PAIN MGT;  Service: Pain Management;  Laterality: Right;  RADIOFREQUENCY ABLATION RIGHT L3,4,5  2 of 2    RADIOFREQUENCY ABLATION Left 9/9/2021    Procedure: RADIOFREQUENCY ABLATION, L3-L4 AND L5 MEDIAL BRANCH 1 OF 2;  Surgeon: Shaq Silverio MD;  Location: [facility];  Service: Pain Management;  Laterality: Left;    RADIOFREQUENCY ABLATION Right 9/20/2021    Procedure: RADIOFREQUENCY ABLATION, L3-L4 AND L5 MEDIAL BRANCH 2 OF 2;  Surgeon: Shaq Silverio MD;  Location: [facility] PAIN MGT;  Service: Pain Management;  Laterality: Right;    RADIOFREQUENCY ABLATION Right 7/7/2022    Procedure: RADIOFREQUENCY ABLATION, RIGHT L3-L4-L5 ONE OF TWO;  Surgeon: Shaq Silverio MD;  Location: [facility] PAIN MGT;  Service: Pain Management;  Laterality: Right;    RADIOFREQUENCY ABLATION Left 7/21/2022    Procedure: RADIOFREQUENCY ABLATION, LEFT L3-L4-L5 TWO OF TWO *BRING SCS REMOTE*;  Surgeon: Shaq Silverio MD;  Location: [facility] PAIN MGT;  Service: Pain Management;  Laterality: Left;    RADIOFREQUENCY ABLATION Left 3/16/2023    Procedure: RADIOFREQUENCY ABLATION LEFT L3,L4,L5 *BRING SCS REMOTE*;  Surgeon: Shaq Silverio MD;  Location: [facility] PAIN MGT;  Service: Pain Management;  Laterality: Left;    RADIOFREQUENCY ABLATION Right 3/30/2023    Procedure: RADIOFREQUENCY ABLATION RIGHT L3,L4,L5 *BRING SCS REMOTE*;  Surgeon: Shaq Silverio MD;  Location: [facility] PAIN MGT;  Service: Pain Management;  Laterality: Right;    RADIOFREQUENCY ABLATION Right 12/18/2023    Procedure: RADIOFREQUENCY ABLATION RIGHT L3, 4, 5 1 OF 2;  Surgeon: Shaq Silverio MD;  Location: [facility] PAIN MGT;  Service: Pain Management;  Laterality: Right;  [phone]    RADIOFREQUENCY ABLATION Left 1/4/2024    Procedure: RADIOFREQUENCY ABLATION LEFT L3, 4, 5 2 OF 2;  Surgeon: Shaq Silverio  "MD;  Location: [facility] PAIN MGT;  Service: Pain Management;  Laterality: Left;  [phone]  2 WK F/U WARREN    TRIAL OF SPINAL CORD NERVE STIMULATOR N/A 9/24/2018    Procedure: TRIAL, NEUROSTIMULATOR, SPINAL CORD, SPINAL CORD STIMULATOR TRIAL-INTERNAL WIRES TO EXTERNAL BATTERY;  Surgeon: Shaq Silverio MD;  Location: [facility] PAIN MGT;  Service: Pain Management;  Laterality: N/A;  [facility]OTT REP NOTIFIED       Social History  Social Connections: Not on file       Medications    Current Outpatient Medications:     albuterol (PROAIR HFA) 90 mcg/actuation inhaler, Inhale 2 puffs into the lungs every 4 (four) hours as needed for Wheezing or Shortness of Breath. Rescue, Disp: 8.5 g, Rfl: 1    atenoloL (TENORMIN) 100 MG tablet, TAKE 1 TABLET EVERY DAY, Disp: 90 tablet, Rfl: 3    BD ULTRA-FINE MINI PEN NEEDLE 31 gauge x 3/16" Ndle, , Disp: , Rfl:     BD ULTRA-FINE FELICIA PEN NEEDLE 32 gauge x 5/32" Ndle, , Disp: , Rfl:     blood sugar diagnostic Strp, To check BG 3 times daily, to use with insurance preferred meter, Disp: 100 strip, Rfl: 11    EScitalopram oxalate (LEXAPRO) 20 MG tablet, Take 1 tablet (20 mg total) by mouth once daily., Disp: 90 tablet, Rfl: 3    fluticasone propionate (FLONASE) 50 mcg/actuation nasal spray, 2 sprays (100 mcg total) by Each Nostril route once daily., Disp: 11.1 mL, Rfl: 1    gabapentin (NEURONTIN) 800 MG tablet, TAKE 1 TABLET BY MOUTH THREE TIMES DAILY, Disp: 90 tablet, Rfl: 0    glipiZIDE (GLUCOTROL) 5 MG tablet, , Disp: , Rfl:     hydrOXYzine pamoate (VISTARIL) 25 MG Cap, TAKE 1 CAPSULE BY MOUTH ONCE DAILY AS NEEDED FOR ANXIETY, Disp: 90 capsule, Rfl: 3    lancets Misc, To check BG 3 times daily, to use with insurance preferred meter, Disp: 100 each, Rfl: 11    loratadine (CLARITIN) 10 mg tablet, Take 1 tablet (10 mg total) by mouth daily as needed for Allergies., Disp: 90 tablet, Rfl: 3    losartan (COZAAR) 50 MG tablet, Take 1 tablet (50 mg total) by mouth once daily., Disp: 30 tablet, Rfl: 3    " midazolam, PF, (VERSED) 1 mg/mL Soln injection, , Disp: , Rfl:     pantoprazole (PROTONIX) 20 MG tablet, TAKE 1 TABLET TWICE DAILY BEFORE MEALS, Disp: 180 tablet, Rfl: 3    rosuvastatin (CRESTOR) 20 MG tablet, Take 1 tablet by mouth once daily, Disp: 90 tablet, Rfl: 1    semaglutide (OZEMPIC) 0.25 mg or 0.5 mg (2 mg/3 mL) pen injector, Inject 0.25 mg into the skin every 7 days., Disp: , Rfl:     tiZANidine (ZANAFLEX) 4 MG tablet, Take 1 tablet (4 mg total) by mouth daily as needed (pain)., Disp: 90 tablet, Rfl: 3    topiramate (TOPAMAX) 100 MG tablet, TAKE 1 TABLET TWICE DAILY, Disp: 180 tablet, Rfl: 3    traZODone (DESYREL) 100 MG tablet, Take 1 tablet by mouth in the evening, Disp: 90 tablet, Rfl: 3  No current facility-administered medications for this visit.    Facility-Administered Medications Ordered in Other Visits:     0.9%  NaCl infusion, , Intravenous, Continuous, Uriel Aranda MD    Allergies  Review of patient's allergies indicates:  No Known Allergies    Patient's PMH, FH, Social hx, Medications, allergies reviewed and updated as pertinent to today's visit    Objective:      Physical Exam  Constitutional:       Appearance: She is well-developed.   HENT:      Head: Normocephalic and atraumatic.   Eyes:      Conjunctiva/sclera: Conjunctivae normal.   Pulmonary:      Effort: Pulmonary effort is normal. No respiratory distress.   Abdominal:      General: Abdomen is flat. There is no distension.      Palpations: Abdomen is soft.      Tenderness: There is no right CVA tenderness or left CVA tenderness.   Skin:     General: Skin is warm.      Findings: No rash.   Neurological:      Mental Status: She is alert and oriented to person, place, and time.   Psychiatric:         Behavior: Behavior normal.             Assessment:       1. Right renal stone    2. Urinary frequency    3. Recurrent UTI    4. Urinary incontinence, unspecified type        Plan:       RBUS to be ordered for hx of stone  PVR without  retention  POCT UA without infection  What is known about OAB was discussed with the patient including the benefits versus risks/burdens of the available treatment alternatives and the fact that acceptable symptom control may require trials of multiple therapeutic options before it is achieved. Discussed with patient OAB is a symptom complex that is not a life-threatening condition, acknowledged it can impair quality of life for many patients. OAB treatment plan reviewed with patient    Discussed and recommended first Line therapy:   -Behavioral modification- pelvic floor retraining exercises ( which can be successful in up to 75% of patients) , limit known bladder irritants such as caffeine, soda, alcohol, use of a voiding diary to determine frequency of events.     .    Discussed Second-Line Treatments: Pharmacologic Management  Discussed use of oral anti-muscarinics and oral ?3-adrenoceptor agonists. Antimuscarinics are associated with increased risk of dementia with long term usage, contraindicated in patients with history of narrow angle glaucoma, delayed gastric emptying. Discussed mirabegron can be associated with increased BP, monitoring at home is advised. Discussed virbegron may be costly compared to other agents, but does not have the same risk of elevated BP, need for BP monitoring as mirabegron    Discussed if conservative and pharmacotherapy should fail, plan to proceed to third-Line treatments:  - Botox (must be willing to accept self catheterization risk, PVR checks)  - PTNS ( non surgical intervention to stimulate the nerves that supply the bladder for relaxation)  - Sacral Neuromodulation- surgical intervention, spinal cord stimulation. Must be willing to accept trial period prior to complete implantation    Rx provided for [facility] 6-8 weeks for cystoscopy for TENZIN testing    Discussed bulking, sling etc for TENZIN management if still bothersome after med management for OAB

## 2024-04-17 NOTE — PATIENT INSTRUCTIONS
What is known about OAB was discussed with the patient including the benefits versus risks/burdens of the available treatment alternatives and the fact that acceptable symptom control may require trials of multiple therapeutic options before it is achieved. Discussed with patient OAB is a symptom complex that is not a life-threatening condition, acknowledged it can impair quality of life for many patients. OAB treatment plan reviewed with patient    Discussed and recommended first Line therapy:   -Behavioral modification- pelvic floor retraining exercises ( which can be successful in up to 75% of patients) , limit known bladder irritants such as caffeine, soda, alcohol, use of a voiding diary to determine frequency of events.   -recommend completion of voiding diary, 3 days to better characterize patient's symptoms and optimize treatment plan efficacy  -Treatment of any underlying constipation with increased fiber, water intake.  - Timed voiding every 4-6 hours, if patient unable to hold urine every 4 hours, engaging in bladder retraining to reach this time frame in 0.5-1 hr interval, with anticipated increase in bladder storage habits.  - Drinking at least 8-10 glasses of water daily in addition to other beverages.  Limit intake of fluids to 6pm.  -*Discussed and recommended pelvic floor muscle therapy to strengthen weak pelvic floor muscles.    Discussed Second-Line Treatments: Pharmacologic Management  Discussed use of oral anti-muscarinics and oral ?3-adrenoceptor agonists. Antimuscarinics are associated with increased risk of dementia with long term usage, contraindicated in patients with history of narrow angle glaucoma, delayed gastric emptying. Discussed mirabegron can be associated with increased BP, monitoring at home is advised. Discussed virbegron may be costly compared to other agents, but does not have the same risk of elevated BP, need for BP monitoring as mirabegron    Discussed if conservative and  pharmacotherapy should fail, plan to proceed to third-Line treatments:  - Botox (must be willing to accept self catheterization risk, PVR checks)  - PTNS ( non surgical intervention to stimulate the nerves that supply the bladder for relaxation)  - Sacral Neuromodulation- surgical intervention, spinal cord stimulation. Must be willing to accept trial period prior to complete implantation

## 2024-04-18 ENCOUNTER — TELEPHONE (OUTPATIENT)
Dept: UROLOGY | Facility: CLINIC | Age: 74
End: 2024-04-18
Payer: MEDICARE

## 2024-04-18 NOTE — TELEPHONE ENCOUNTER
Pt was contacted unable to lvm  ----- Message from Lucho Farrar MD sent at 4/18/2024 12:56 PM CDT -----  Regarding: RE: self  Please alert patient she needs to call her insurance provider and ask for a tier exemption, we can help her get it for cheaper. There is no safer alternative unless she is okay with risk of dry eye, dry mouth, constipation, falls, etc  ----- Message -----  From: Janet Vergara MA  Sent: 4/18/2024  12:21 PM CDT  To: Lucho Farrar MD  Subject: FW: self                                         Pt states meds is too expensive. Would like another option.  ----- Message -----  From: Daja Lawrence  Sent: 4/18/2024  12:16 PM CDT  To: Charan Yepez Staff  Subject: self                                             Who called: self        What is the request in detail: pt stated she was told to that if med was to high in price, its almost $300.00       Can the clinic reply by [facility]? No        Would the patient rather a call back or a response via [facility]? Call back        Best call back number:[phone]

## 2024-04-19 ENCOUNTER — TELEPHONE (OUTPATIENT)
Dept: UROLOGY | Facility: CLINIC | Age: 74
End: 2024-04-19
Payer: MEDICARE

## 2024-04-19 NOTE — TELEPHONE ENCOUNTER
----- Message from Lucho Farrar MD sent at 4/19/2024  3:15 PM CDT -----  Regarding: RE: Self [phone]  She needs to request a tier exemption from her insurance company, no other safe meds unless she is okay with side effects of dry eye, dry mouth, constipation  ----- Message -----  From: Kalpana Bennett MA  Sent: 4/19/2024   3:04 PM CDT  To: Lucho Farrar MD  Subject: FW: Self [phone]                            Pt requesting a different medication because of cost  ----- Message -----  From: Meg Pate  Sent: 4/19/2024   2:14 PM CDT  To: Charan Yepez Staff  Subject: Self [phone]                                Type: Patient Call Back     Who called: Self     What is the request in detail: Pt states the med Gemtesa is too expensive to get. Please provide a different med.       [facility] [place], [place] - [address] [place] [address]  Phone: [phone] Fax: [phone]       Can the clinic reply by [facility]? No     Would the patient rather a call back or a response via [facility]? Call     Best call back number: [phone]      Additional Information:

## 2024-04-26 ENCOUNTER — OFFICE VISIT (OUTPATIENT)
Dept: CARDIOLOGY | Facility: CLINIC | Age: 74
End: 2024-04-26
Payer: MEDICARE

## 2024-04-26 VITALS
WEIGHT: 181 LBS | RESPIRATION RATE: 15 BRPM | DIASTOLIC BLOOD PRESSURE: 60 MMHG | OXYGEN SATURATION: 95 % | SYSTOLIC BLOOD PRESSURE: 98 MMHG | HEIGHT: 64 IN | BODY MASS INDEX: 30.9 KG/M2 | HEART RATE: 80 BPM

## 2024-04-26 DIAGNOSIS — I70.0 AORTIC ATHEROSCLEROSIS: ICD-10-CM

## 2024-04-26 DIAGNOSIS — E78.5 HYPERLIPIDEMIA ASSOCIATED WITH TYPE 2 DIABETES MELLITUS: ICD-10-CM

## 2024-04-26 DIAGNOSIS — E66.2 CLASS 1 OBESITY WITH ALVEOLAR HYPOVENTILATION, SERIOUS COMORBIDITY, AND BODY MASS INDEX (BMI) OF 31.0 TO 31.9 IN ADULT: ICD-10-CM

## 2024-04-26 DIAGNOSIS — E11.69 HYPERLIPIDEMIA ASSOCIATED WITH TYPE 2 DIABETES MELLITUS: ICD-10-CM

## 2024-04-26 DIAGNOSIS — N18.31 HYPERTENSION ASSOCIATED WITH STAGE 3A CHRONIC KIDNEY DISEASE DUE TO TYPE 2 DIABETES MELLITUS: Chronic | ICD-10-CM

## 2024-04-26 DIAGNOSIS — I50.32 CHRONIC DIASTOLIC HEART FAILURE: ICD-10-CM

## 2024-04-26 DIAGNOSIS — I12.9 HYPERTENSION ASSOCIATED WITH STAGE 3A CHRONIC KIDNEY DISEASE DUE TO TYPE 2 DIABETES MELLITUS: Chronic | ICD-10-CM

## 2024-04-26 DIAGNOSIS — E11.22 HYPERTENSION ASSOCIATED WITH STAGE 3A CHRONIC KIDNEY DISEASE DUE TO TYPE 2 DIABETES MELLITUS: Chronic | ICD-10-CM

## 2024-04-26 DIAGNOSIS — I25.10 ATHEROSCLEROSIS OF NATIVE CORONARY ARTERY OF NATIVE HEART WITHOUT ANGINA PECTORIS: Primary | ICD-10-CM

## 2024-04-26 PROCEDURE — 1160F RVW MEDS BY RX/DR IN RCRD: CPT | Mod: CPTII,S$GLB,, | Performed by: INTERNAL MEDICINE

## 2024-04-26 PROCEDURE — 93000 ELECTROCARDIOGRAM COMPLETE: CPT | Mod: S$GLB,,, | Performed by: INTERNAL MEDICINE

## 2024-04-26 PROCEDURE — 1126F AMNT PAIN NOTED NONE PRSNT: CPT | Mod: CPTII,S$GLB,, | Performed by: INTERNAL MEDICINE

## 2024-04-26 PROCEDURE — 3288F FALL RISK ASSESSMENT DOCD: CPT | Mod: CPTII,S$GLB,, | Performed by: INTERNAL MEDICINE

## 2024-04-26 PROCEDURE — 99213 OFFICE O/P EST LOW 20 MIN: CPT | Mod: S$GLB,,, | Performed by: INTERNAL MEDICINE

## 2024-04-26 PROCEDURE — 1101F PT FALLS ASSESS-DOCD LE1/YR: CPT | Mod: CPTII,S$GLB,, | Performed by: INTERNAL MEDICINE

## 2024-04-26 PROCEDURE — 3066F NEPHROPATHY DOC TX: CPT | Mod: CPTII,S$GLB,, | Performed by: INTERNAL MEDICINE

## 2024-04-26 PROCEDURE — 3074F SYST BP LT 130 MM HG: CPT | Mod: CPTII,S$GLB,, | Performed by: INTERNAL MEDICINE

## 2024-04-26 PROCEDURE — 3078F DIAST BP <80 MM HG: CPT | Mod: CPTII,S$GLB,, | Performed by: INTERNAL MEDICINE

## 2024-04-26 PROCEDURE — 3061F NEG MICROALBUMINURIA REV: CPT | Mod: CPTII,S$GLB,, | Performed by: INTERNAL MEDICINE

## 2024-04-26 PROCEDURE — 4010F ACE/ARB THERAPY RXD/TAKEN: CPT | Mod: CPTII,S$GLB,, | Performed by: INTERNAL MEDICINE

## 2024-04-26 PROCEDURE — 3044F HG A1C LEVEL LT 7.0%: CPT | Mod: CPTII,S$GLB,, | Performed by: INTERNAL MEDICINE

## 2024-04-26 PROCEDURE — 99999 PR PBB SHADOW E&M-EST. PATIENT-LVL V: CPT | Mod: PBBFAC,,, | Performed by: INTERNAL MEDICINE

## 2024-04-26 PROCEDURE — 1159F MED LIST DOCD IN RCRD: CPT | Mod: CPTII,S$GLB,, | Performed by: INTERNAL MEDICINE

## 2024-04-26 NOTE — PROGRESS NOTES
CARDIOLOGY CLINIC VISIT        HISTORY OF PRESENT ILLNESS:     2023: Fbay Luna presents for cardiovascular evaluation. Referred by pulmonary secondary to LVH. Complaints of shortness of breath. Exertional. Symptoms < one block. Diabetes. Hypertension. DAHLIA. Obesity. Echocardiogram from 2022 showed an EF of 55%. Grade 1 DD. No pulmonary hypertension.    2023:  Last visit increased losartan and Lasix. Shortness of breath has improved.  Nuclear stress negative for ischemia.    2024:  Recently had her losartan decreased to 50 mg p.o. q.d..  Was experiencing some dizziness.  Hypotension.  States that she feels good.  Trying to lose weight.  EKG today shows normal sinus rhythm, nonspecific T-wave abnormality.    CARDIOVASCULAR HISTORY:     None    PAST MEDICAL HISTORY:     Past Medical History:   Diagnosis Date    Anxiety with depression     Arthritis     CKD (chronic kidney disease) stage 2, GFR 60-89 ml/min     Diabetes mellitus     History of Clarisse-en-Y gastric bypass     Hypertension     Obesity, unspecified     DAHLIA (obstructive sleep apnea)     Spondylosis        PAST SURGICAL HISTORY:     Past Surgical History:   Procedure Laterality Date    CARPAL TUNNEL RELEASE Right 3/8/2019    Procedure: RELEASE, CARPAL TUNNEL right;  Surgeon: Pan Goodman MD;  Location: [facility];  Service: Orthopedics;  Laterality: Right;    CARPAL TUNNEL RELEASE Left 2019    Procedure: RELEASE, CARPAL TUNNEL- LEFT;  Surgeon: Pan Goodman MD;  Location: [address];  Service: Orthopedics;  Laterality: Left;    CATARACT EXTRACTION Bilateral      SECTION      CHOLECYSTECTOMY      COLONOSCOPY N/A 2024    Procedure: COLONOSCOPY;  Surgeon: Otilio Man MD;  Location: [facility];  Service: Endoscopy;  Laterality: N/A;  Referred by: MYNOR Sprague / CECILIA Meds: ozempic / diabetic / Prep: peg / Route instructions sent: portal  - lvm and portal msg for pc. DBM  - left 2nd vm for pc. DBM     EPIDURAL STEROID INJECTION INTO LUMBAR SPINE N/A 6/14/2018    Procedure: INJECTION, STEROID, SPINE, LUMBAR, EPIDURAL;  Surgeon: Shaq Silverio MD;  Location: [facility]IN [facility]GT;  Service: Pain Management;  Laterality: N/A;  LUMBAR L4-L5 INTERLAMINAR ELISE'  [id number]  W/ SEDATION     ESOPHAGOGASTRODUODENOSCOPY N/A 5/12/2023    Procedure: EGD (ESOPHAGOGASTRODUODENOSCOPY);  Surgeon: Deann Jimenez MD;  Location: [facility] ENDO;  Service: Gastroenterology;  Laterality: N/A;    HYSTERECTOMY      INJECTION OF ANESTHETIC AGENT AROUND NERVE Bilateral 9/12/2019    Procedure: BLOCK, NERVE, L3-L4-L5 MEDIAL BRANCH;  Surgeon: Shaq Silverio MD;  Location: [facility]GT;  Service: Pain Management;  Laterality: Bilateral;    INJECTION OF ANESTHETIC AGENT AROUND NERVE Bilateral 10/17/2019    Procedure: BLOCK, NERVE;  Surgeon: Shaq Silverio MD;  Location: [facility];  Service: Pain Management;  Laterality: Bilateral;  B/L MBB L3-L4-L5  REPEAT  CONSENT NEEDED    INJECTION OF FACET JOINT Bilateral 5/28/2018    Procedure: INJECTION-FACET;  Surgeon: Shaq Silverio MD;  Location: [facility];  Service: Pain Management;  Laterality: Bilateral;  LUMBAR BILATERAL L4-L5 AND L5-S1 FACET STEROID INJECTION  [id number]    W/ SEDATION     RADIOFREQUENCY ABLATION Right 11/21/2019    Procedure: RADIOFREQUENCY ABLATION RIGHT L3, L4, L5;  Surgeon: Shaq Silverio MD;  Location: [facility];  Service: Pain Management;  Laterality: Right;  Right RFA L3-L4-L5  1 of 2  Consent Needed    RADIOFREQUENCY ABLATION Left 12/5/2019    Procedure: RADIOFREQUENCY ABLATION LEFT L3-5;  Surgeon: Shaq Silverio MD;  Location: [facility];  Service: Pain Management;  Laterality: Left;  Left RFA L3-L4-L5  2 of 2  Consent Needed    RADIOFREQUENCY ABLATION Left 8/17/2020    Procedure: RADIOFREQUENCY ABLATION LEFT L3,4,5 1 of 2;  Surgeon: Shaq Silverio MD;  Location: [facility];  Service: Pain Management;  Laterality: Left;  Left RFA L3,4,5  1 of 2    RADIOFREQUENCY ABLATION Right  8/31/2020    Procedure: RADIOFREQUENCY ABLATION RIGHT L3,4,5 2 of 2;  Surgeon: Shaq Silverio MD;  Location: [facility];  Service: Pain Management;  Laterality: Right;  RADIOFREQUENCY ABLATION RIGHT L3,4,5  2 of 2    RADIOFREQUENCY ABLATION Left 9/9/2021    Procedure: RADIOFREQUENCY ABLATION, L3-L4 AND L5 MEDIAL BRANCH 1 OF 2;  Surgeon: Shaq Silverio MD;  Location: [facility];  Service: Pain Management;  Laterality: Left;    RADIOFREQUENCY ABLATION Right 9/20/2021    Procedure: RADIOFREQUENCY ABLATION, L3-L4 AND L5 MEDIAL BRANCH 2 OF 2;  Surgeon: Shaq Silverio MD;  Location: [facility];  Service: Pain Management;  Laterality: Right;    RADIOFREQUENCY ABLATION Right 7/7/2022    Procedure: RADIOFREQUENCY ABLATION, RIGHT L3-L4-L5 ONE OF TWO;  Surgeon: Shaq Silverio MD;  Location: [facility];  Service: Pain Management;  Laterality: Right;    RADIOFREQUENCY ABLATION Left 7/21/2022    Procedure: RADIOFREQUENCY ABLATION, LEFT L3-L4-L5 TWO OF TWO *BRING SCS REMOTE*;  Surgeon: Shaq Silverio MD;  Location: [facility];  Service: Pain Management;  Laterality: Left;    RADIOFREQUENCY ABLATION Left 3/16/2023    Procedure: RADIOFREQUENCY ABLATION LEFT L3,L4,L5 *BRING SCS REMOTE*;  Surgeon: Shaq Silverio MD;  Location: [facility];  Service: Pain Management;  Laterality: Left;    RADIOFREQUENCY ABLATION Right 3/30/2023    Procedure: RADIOFREQUENCY ABLATION RIGHT L3,L4,L5 *BRING SCS REMOTE*;  Surgeon: Shaq Silverio MD;  Location: [facility];  Service: Pain Management;  Laterality: Right;    RADIOFREQUENCY ABLATION Right 12/18/2023    Procedure: RADIOFREQUENCY ABLATION RIGHT L3, 4, 5 1 OF 2;  Surgeon: Shaq Silverio MD;  Location: [facility];  Service: Pain Management;  Laterality: Right;  [phone]    RADIOFREQUENCY ABLATION Left 1/4/2024    Procedure: RADIOFREQUENCY ABLATION LEFT L3, 4, 5 2 OF 2;  Surgeon: Shaq Silverio MD;  Location: [facility];  Service: Pain Management;  Laterality: Left;  [phone]  2 WK  "F/U WARREN    TRIAL OF SPINAL CORD NERVE STIMULATOR N/A 9/24/2018    Procedure: TRIAL, NEUROSTIMULATOR, SPINAL CORD, SPINAL CORD STIMULATOR TRIAL-INTERNAL WIRES TO EXTERNAL BATTERY;  Surgeon: Shaq Silverio MD;  Location: [facility];  Service: Pain Management;  Laterality: N/A;  ABBOTT REP NOTIFIED       ALLERGIES AND MEDICATION:   Review of patient's allergies indicates:  No Known Allergies     Medication List            Accurate as of April 26, 2024  9:20 AM. If you have any questions, ask your nurse or doctor.                CONTINUE taking these medications      albuterol 90 mcg/actuation inhaler  Commonly known as: PROAIR HFA  Inhale 2 puffs into the lungs every 4 (four) hours as needed for Wheezing or Shortness of Breath. Rescue     atenoloL 100 MG tablet  Commonly known as: TENORMIN  TAKE 1 TABLET EVERY DAY     * BD ULTRA-FINE MINI PEN NEEDLE 31 gauge x 3/16" Ndle  Generic drug: pen needle, diabetic     * BD ULTRA-FINE FELICIA PEN NEEDLE 32 gauge x 5/32" Ndle  Generic drug: pen needle, diabetic     blood sugar diagnostic Strp  To check BG 3 times daily, to use with insurance preferred meter     EScitalopram oxalate 20 MG tablet  Commonly known as: LEXAPRO  Take 1 tablet (20 mg total) by mouth once daily.     fluticasone propionate 50 mcg/actuation nasal spray  Commonly known as: FLONASE  2 sprays (100 mcg total) by Each Nostril route once daily.     gabapentin 800 MG tablet  Commonly known as: NEURONTIN  TAKE 1 TABLET BY MOUTH THREE TIMES DAILY     glipiZIDE 5 MG tablet  Commonly known as: GLUCOTROL     hydrOXYzine pamoate 25 MG Cap  Commonly known as: VISTARIL  TAKE 1 CAPSULE BY MOUTH ONCE DAILY AS NEEDED FOR ANXIETY     lancets Misc  To check BG 3 times daily, to use with insurance preferred meter     loratadine 10 mg tablet  Commonly known as: CLARITIN  Take 1 tablet (10 mg total) by mouth daily as needed for Allergies.     losartan 50 MG tablet  Commonly known as: COZAAR  Take 1 tablet (50 mg total) by mouth " once daily.     midazolam (PF) 1 mg/mL Soln injection  Commonly known as: VERSED     OZEMPIC 0.25 mg or 0.5 mg (2 mg/3 mL) pen injector  Generic drug: semaglutide     pantoprazole 20 MG tablet  Commonly known as: PROTONIX  TAKE 1 TABLET TWICE DAILY BEFORE MEALS     rosuvastatin 20 MG tablet  Commonly known as: CRESTOR  Take 1 tablet by mouth once daily     tiZANidine 4 MG tablet  Commonly known as: ZANAFLEX  Take 1 tablet (4 mg total) by mouth daily as needed (pain).     topiramate 100 MG tablet  Commonly known as: TOPAMAX  TAKE 1 TABLET TWICE DAILY     traZODone 100 MG tablet  Commonly known as: DESYREL  Take 1 tablet by mouth in the evening     vibegron 75 mg Tab  Take 1 tablet (75 mg total) by mouth once daily.           * This list has 2 medication(s) that are the same as other medications prescribed for you. Read the directions carefully, and ask your doctor or other care provider to review them with you.                  SOCIAL HISTORY:     Social History     Socioeconomic History    Marital status:    Tobacco Use    Smoking status: Never    Smokeless tobacco: Never   Substance and Sexual Activity    Alcohol use: Yes     Comment: occ    Drug use: No       FAMILY HISTORY:     Family History   Problem Relation Name Age of Onset    Cataracts Mother      Glaucoma Mother      No Known Problems Father      No Known Problems Sister      No Known Problems Brother      No Known Problems Maternal Aunt      No Known Problems Maternal Uncle      No Known Problems Paternal Aunt      No Known Problems Paternal Uncle      No Known Problems Maternal Grandmother      No Known Problems Maternal Grandfather      No Known Problems Paternal Grandmother      No Known Problems Paternal Grandfather         REVIEW OF SYSTEMS:   Review of Systems   Constitutional:  Negative for chills, diaphoresis, fever, malaise/fatigue and weight loss.   HENT:  Negative for congestion, hearing loss, sinus pain, sore throat and tinnitus.   "  Eyes:  Negative for blurred vision, double vision, photophobia and pain.   Respiratory:  Negative for cough, hemoptysis, sputum production, shortness of breath, wheezing and stridor.    Cardiovascular:  Negative for chest pain, palpitations, orthopnea, claudication, leg swelling and PND.   Gastrointestinal:  Negative for abdominal pain, blood in stool, heartburn, melena, nausea and vomiting.   Musculoskeletal:  Negative for back pain, falls, joint pain, myalgias and neck pain.   Neurological:  Negative for dizziness, tingling, tremors, sensory change, speech change, focal weakness, seizures, loss of consciousness, weakness and headaches.   Endo/Heme/Allergies:  Does not bruise/bleed easily.   Psychiatric/Behavioral:  Negative for depression, memory loss and substance abuse. The patient is not nervous/anxious.        PHYSICAL EXAM:     Vitals:    04/26/24 0903   BP: 98/60   Pulse: 80   Resp: 15      Body mass index is 31.07 kg/m².  Weight: 82.1 kg (181 lb)   Height: 5' 4" (162.6 cm)     Physical Exam  Vitals reviewed.   Constitutional:       General: She is not in acute distress.     Appearance: She is well-developed. She is obese. She is not diaphoretic.   HENT:      Head: Normocephalic.   Neck:      Vascular: No carotid bruit or JVD.   Cardiovascular:      Rate and Rhythm: Normal rate and regular rhythm.      Pulses: Normal pulses.      Heart sounds: Normal heart sounds.   Pulmonary:      Effort: Pulmonary effort is normal.      Breath sounds: Normal breath sounds.   Abdominal:      General: Bowel sounds are normal.      Palpations: Abdomen is soft.      Tenderness: There is no abdominal tenderness.   Skin:     General: Skin is warm and dry.   Neurological:      Mental Status: She is alert and oriented to person, place, and time.   Psychiatric:         Speech: Speech normal.         Behavior: Behavior normal.         Thought Content: Thought content normal.         DATA:   EKG: (personally reviewed " tracing)  04/26/2024-normal sinus rhythm, nonspecific T-wave abnormality  03/14/2023 - NSR, non specific t wave abnormality  11/25/2022 - NSR, non specific t wave abnormality  Laboratory:  CBC:  Recent Labs   Lab 05/06/23  0554 08/16/23  0820 02/23/24  0910   WBC 9.70 10.07 9.53   Hemoglobin 11.8 L 13.4 12.8   Hematocrit 37.6 42.7 41.1   Platelets 289 337 314       CHEMISTRIES:  Recent Labs   Lab 07/09/21  1219 10/11/21  1145 12/30/21 2136 08/03/22  0930 05/06/23  0554 08/16/23  0820 02/23/24  0910   Glucose 89 127 H 89   < > 113 H 147 H 107   Sodium 143 140 142   < > 143 141 141   Potassium 4.0 4.5 4.0   < > 3.5 3.9 3.5   BUN 13 15 9   < > 13 15 12   Creatinine 1.0 1.1 1.0   < > 0.9 1.1 0.9   eGFR if  >60.0 58.4 A >60  --   --   --   --    eGFR if non  56.8 A 50.6 A 56 A  --   --   --   --    Calcium 9.5 9.8 8.8   < > 8.3 L 9.1 9.5    < > = values in this interval not displayed.       CARDIAC BIOMARKERS:  Recent Labs   Lab 12/30/21 2136 05/03/23  1715     --    Troponin I <0.006 <0.006       COAGS:        LIPIDS/LFTS:  Recent Labs   Lab 02/06/23  1026 05/03/23  1715 05/06/23  0554 08/16/23  0820 02/23/24  0910   Cholesterol 156  --   --  159 154   Triglycerides 100  --   --  157 H 133   HDL 59  --   --  49 51   LDL Cholesterol 77.0  --   --  78.6 76.4   Non-HDL Cholesterol 97  --   --  110 103   AST 18   < > 14 25 28   ALT 16   < > 14 26 39    < > = values in this interval not displayed.       Cardiovascular Testing:    Nuclear stress 03/07/2023:      Normal myocardial perfusion scan. There is no evidence of myocardial ischemia or infarction.    There is a  mild intensity fixed perfusion abnormality in the inferior wall of the left ventricle secondary to diaphragm attenuation.    The gated perfusion images showed an ejection fraction of 83% at rest.    There is normal wall motion at rest and post stress.    The ECG portion of the study is negative for ischemia.    The  patient reported no chest pain during the stress test.    There were no arrhythmias during stress.    Echocardiogram 09/16/2022:     The estimated ejection fraction is 55%.  The left ventricle is normal in size with mild concentric hypertrophy and normal systolic function.  Grade I left ventricular diastolic dysfunction.  Normal right ventricular size with normal right ventricular systolic function.  Mild left atrial enlargement.  Normal central venous pressure (3 mmHg).  The estimated PA systolic pressure is 17 mmHg.    ASSESSMENT:     Hypertension  Hyperlipidemia  Diabetes  LVH with grade I DD  Obstructive sleep apnea  Obesity      PLAN:     Hypertension:  Continue current management.  Chronic diastolic heart failure:  Compensated.  Hyperlipidemia:  LDL 76.  Continue current management.  Return to clinic 6 months.           Tramaine Fraga MD, MPH, FACC, [facility]

## 2024-04-28 LAB
OHS QRS DURATION: 76 MS
OHS QTC CALCULATION: 442 MS

## 2024-05-02 RX ORDER — HYDROCHLOROTHIAZIDE 25 MG/1
25 TABLET ORAL
Qty: 90 TABLET | Refills: 0 | OUTPATIENT
Start: 2024-05-02

## 2024-05-06 DIAGNOSIS — G89.4 CHRONIC PAIN SYNDROME: ICD-10-CM

## 2024-05-06 DIAGNOSIS — M54.16 LUMBAR RADICULOPATHY: ICD-10-CM

## 2024-05-06 RX ORDER — GABAPENTIN 800 MG/1
800 TABLET ORAL 3 TIMES DAILY
Qty: 90 TABLET | Refills: 0 | Status: SHIPPED | OUTPATIENT
Start: 2024-05-06 | End: 2024-06-12

## 2024-05-21 ENCOUNTER — TELEPHONE (OUTPATIENT)
Dept: UROLOGY | Facility: CLINIC | Age: 74
End: 2024-05-21
Payer: MEDICARE

## 2024-05-21 NOTE — TELEPHONE ENCOUNTER
"Pt was contacted [facility]   ----- Message from Kaitlynn John sent at 5/21/2024  2:33 PM CDT -----  Regarding: Patient Advice                  Name of Who is Calling:  Faby Luna    Who Left The Message:  Faby Luna      What is the request in detail:          Patient called stating,  "she's returning the Office's call and would like you to please call again."    Please further advise. Thank you      Reply by [facility]: NO      Preferred Call Back :  [phone]  "

## 2024-06-12 DIAGNOSIS — M54.16 LUMBAR RADICULOPATHY: ICD-10-CM

## 2024-06-12 DIAGNOSIS — G89.4 CHRONIC PAIN SYNDROME: ICD-10-CM

## 2024-06-12 RX ORDER — GABAPENTIN 800 MG/1
800 TABLET ORAL 3 TIMES DAILY
Qty: 90 TABLET | Refills: 0 | Status: SHIPPED | OUTPATIENT
Start: 2024-06-12 | End: 2024-07-12

## 2024-07-02 ENCOUNTER — OFFICE VISIT (OUTPATIENT)
Dept: BARIATRICS | Facility: CLINIC | Age: 74
End: 2024-07-02
Payer: MEDICARE

## 2024-07-02 VITALS
DIASTOLIC BLOOD PRESSURE: 67 MMHG | BODY MASS INDEX: 30.91 KG/M2 | OXYGEN SATURATION: 97 % | SYSTOLIC BLOOD PRESSURE: 138 MMHG | WEIGHT: 180.13 LBS | HEART RATE: 69 BPM

## 2024-07-02 DIAGNOSIS — Z98.84 HISTORY OF ROUX-EN-Y GASTRIC BYPASS: ICD-10-CM

## 2024-07-02 DIAGNOSIS — N18.31 TYPE 2 DIABETES MELLITUS WITH STAGE 3A CHRONIC KIDNEY DISEASE, WITHOUT LONG-TERM CURRENT USE OF INSULIN: ICD-10-CM

## 2024-07-02 DIAGNOSIS — E11.22 TYPE 2 DIABETES MELLITUS WITH STAGE 3A CHRONIC KIDNEY DISEASE, WITHOUT LONG-TERM CURRENT USE OF INSULIN: ICD-10-CM

## 2024-07-02 DIAGNOSIS — E66.9 OBESITY, CLASS I, BMI 30.0-34.9 (SEE ACTUAL BMI): Primary | ICD-10-CM

## 2024-07-02 PROCEDURE — 1101F PT FALLS ASSESS-DOCD LE1/YR: CPT | Mod: HCNC,CPTII,S$GLB, | Performed by: INTERNAL MEDICINE

## 2024-07-02 PROCEDURE — 3066F NEPHROPATHY DOC TX: CPT | Mod: HCNC,CPTII,S$GLB, | Performed by: INTERNAL MEDICINE

## 2024-07-02 PROCEDURE — 1126F AMNT PAIN NOTED NONE PRSNT: CPT | Mod: HCNC,CPTII,S$GLB, | Performed by: INTERNAL MEDICINE

## 2024-07-02 PROCEDURE — 3075F SYST BP GE 130 - 139MM HG: CPT | Mod: HCNC,CPTII,S$GLB, | Performed by: INTERNAL MEDICINE

## 2024-07-02 PROCEDURE — 3008F BODY MASS INDEX DOCD: CPT | Mod: HCNC,CPTII,S$GLB, | Performed by: INTERNAL MEDICINE

## 2024-07-02 PROCEDURE — 3288F FALL RISK ASSESSMENT DOCD: CPT | Mod: HCNC,CPTII,S$GLB, | Performed by: INTERNAL MEDICINE

## 2024-07-02 PROCEDURE — 3061F NEG MICROALBUMINURIA REV: CPT | Mod: HCNC,CPTII,S$GLB, | Performed by: INTERNAL MEDICINE

## 2024-07-02 PROCEDURE — 99999 PR PBB SHADOW E&M-EST. PATIENT-LVL IV: CPT | Mod: PBBFAC,HCNC,, | Performed by: INTERNAL MEDICINE

## 2024-07-02 PROCEDURE — 4010F ACE/ARB THERAPY RXD/TAKEN: CPT | Mod: HCNC,CPTII,S$GLB, | Performed by: INTERNAL MEDICINE

## 2024-07-02 PROCEDURE — 3044F HG A1C LEVEL LT 7.0%: CPT | Mod: HCNC,CPTII,S$GLB, | Performed by: INTERNAL MEDICINE

## 2024-07-02 PROCEDURE — 99213 OFFICE O/P EST LOW 20 MIN: CPT | Mod: HCNC,S$GLB,, | Performed by: INTERNAL MEDICINE

## 2024-07-02 PROCEDURE — 1159F MED LIST DOCD IN RCRD: CPT | Mod: HCNC,CPTII,S$GLB, | Performed by: INTERNAL MEDICINE

## 2024-07-02 PROCEDURE — 3078F DIAST BP <80 MM HG: CPT | Mod: HCNC,CPTII,S$GLB, | Performed by: INTERNAL MEDICINE

## 2024-07-02 RX ORDER — TOPIRAMATE 100 MG/1
100 TABLET, FILM COATED ORAL NIGHTLY
Qty: 90 TABLET | Refills: 1 | Status: SHIPPED | OUTPATIENT
Start: 2024-07-02

## 2024-07-02 NOTE — PROGRESS NOTES
Subjective:       Patient ID: Faby Luna is a 73 y.o. female.    Chief Complaint: Follow-up      CC:    Pt here today for follow-up. Has lost 37.8 lbs (-6.4 lbs muscle. -27 lbs fat). WAs to get back on track with bariatric diet and started topiramate 100 mg BID. Denies SE. She states she is taking both of them together. States she is eating yogurt, cottage cheese, chicken, fish, pork. Not using protein shake. Walking up to 3 blocks. Has started at the gym.      She had been off rybelsus 2/2 to cost. Has started Ozempic 0.5 mg. Getting for $35/month. She does feel appetite is down a little. BS improving.     Currently at 52% EWL from bypass    New BMR: 1534    New PBF: 39.9%     Initial:  BMR:1617    PBF:  41.6%    Lab Results       Component                Value               Date                       HGBA1C                   6.7 (H)             02/23/2024                 HGBA1C                   6.5 (H)             11/20/2023                 HGBA1C                   8.0 (H)             08/16/2023            Lab Results       Component                Value               Date                       LDLCALC                  76.4                02/23/2024                 CREATININE               0.9                 02/23/2024               From recent RD notes.   Back on track Bariatric Diet.  Diet Recall: 60-80 grams of protein/day; 32-48 oz of fluids/day     Current diet recall:  B: prot shake OR skips  L: 1/4 cup nuts or cheese + grapes or kiwi or small apple or banana  D: baked/smothered chicken or fish with variety of veg (asparagus, greens or spinach cooked down with olive oil, brussels, broccoli)  Sn: 1/4 cup nuts, piece of fruit     Diet includes:  Meal Pattern: 1 meal(s) + 1-2 snack(s) + 2 protein supplement(s)  Adequate protein supplement intake. Premier shakes.  Adequate dairy intake.  Adequate vegetable intake. Tolerates raw vegetables and lettuce.  Adequate fruit intake.  Starchy CHO: reduced  lately, small amounts  Beverages: water, CL, Coke zero  Alcohol: twice/month white liquor + diet cranberry juice     EXERCISE:  None.  Considering joining the gym (it's free with her insurance)     Restrictions to Exercise: None. Back pain.     VITAMINS / MINERALS:  Multivitamins  B-Complex  Calcium Citrate + Vitamin D  Vitamin B12: Sublingual.     ASSESSMENT:  Doing well overall.  Weight loss.  Adequate calorie intake.  Adequate protein intake.  Inadequate fluid intake.  Following diet inappropriately.  Not exercising.  Adequate vitamins & minerals.     BARIATRIC DIET DISCUSSION:  Instructed and provided written materials on bariatric diet plan.  Reinforced post-op nutrition guidelines.     PLAN / RECOMMENDATIONS:  Continue Back on track with diet plan.  Maintain/Increase protein intake.  Maintain/Increase fluid intake.  Begin light exercise as mary.  Continue appropriate vitamins & minerals.     Return to clinic in 1 months with med wt loss.      Review of Systems      Objective:    /67 (BP Location: Right arm, Patient Position: Sitting)   Pulse 69   Wt 81.7 kg (180 lb 1.6 oz)   SpO2 97%   BMI 30.91 kg/m²    Physical Exam  Vitals reviewed.   Constitutional:       General: She is not in acute distress.     Appearance: She is well-developed.   HENT:      Head: Normocephalic and atraumatic.   Eyes:      General: No scleral icterus.     Pupils: Pupils are equal, round, and reactive to light.   Neck:      Thyroid: No thyromegaly.   Cardiovascular:      Rate and Rhythm: Normal rate.   Pulmonary:      Effort: Pulmonary effort is normal. No respiratory distress.   Musculoskeletal:         General: Normal range of motion.      Cervical back: Normal range of motion and neck supple.   Skin:     General: Skin is warm and dry.      Findings: No erythema.   Neurological:      Mental Status: She is alert and oriented to person, place, and time.      Cranial Nerves: No cranial nerve deficit.   Psychiatric:          Behavior: Behavior normal.         Judgment: Judgment normal.         Assessment:       Problem List Items Addressed This Visit       Type 2 diabetes mellitus with stage 3a chronic kidney disease, without long-term current use of insulin (Chronic)    Relevant Medications    semaglutide (OZEMPIC) 1 mg/dose (4 mg/3 mL)     Other Visit Diagnoses       Obesity, Class I, BMI 30.0-34.9 (see actual BMI)    -  Primary    History of Clarisse-en-Y gastric bypass                          Plan:           Faby was seen today for follow-up.    Diagnoses and all orders for this visit:    Obesity, Class I, BMI 30.0-34.9 (see actual BMI)    Type 2 diabetes mellitus with stage 3a chronic kidney disease, without long-term current use of insulin  -     semaglutide (OZEMPIC) 1 mg/dose (4 mg/3 mL); Inject 1 mg into the skin every 7 days.    History of Clarisse-en-Y gastric bypass    Other orders  -     topiramate (TOPAMAX) 100 MG tablet; Take 1 tablet (100 mg total) by mouth every evening.            Start Ozempic 1 mg once a week.     Decrease portions as soon as you start Ozempic. Avoid fried, greasy, fatty foods.     Some nausea in the first 2 weeks is not unusual.     If you get pain across the upper abdomen and around to your back, please call the office.       [email or web address] for coupon/videos.       Patient was informed that topiramate is used for migraine prevention and seizures. Weight loss is a common side effect that is well documented. S/he understands this. S/he was informed of the potential side effects such as serious and possibly fatal rash in which case the medication should be discontinued immediately. Paresthesias, forgetfulness, fatigue, kidney stones, GI symptoms, and changes in lab values such as electrolytes, blood counts and kidney function.      topiramate 100  mg evening only.     - To lose weight you want to cut 100% starchy carbohydrates out of your diet (bread, rice, pasta, potatoes, granola, flour, corn,  peas, oatmeal, grits, tortillas, crackers, chips) and get  grams of protein.  Aim for 100 grams of protein [id number] bianka  daily.        - No soda, sweet tea, juices, sports drinks or lemonade     -Exercise daily. Increase low impact activity as tolerated.  Avoid high impact activity, very heavy lifting or other exercise regimens that may cause discomfort.     Pt advised to check blood sugar regularly as medications may need to be adjusted with changes in diet and weight loss.     Hurricane tips given.

## 2024-07-02 NOTE — PATIENT INSTRUCTIONS
Start Ozempic 1 mg once a week.     Decrease portions as soon as you start Ozempic. Avoid fried, greasy, fatty foods.     Some nausea in the first 2 weeks is not unusual.     If you get pain across the upper abdomen and around to your back, please call the office.       [email or web address] for coupon/videos.       Patient was informed that topiramate is used for migraine prevention and seizures. Weight loss is a common side effect that is well documented. S/he understands this. S/he was informed of the potential side effects such as serious and possibly fatal rash in which case the medication should be discontinued immediately. Paresthesias, forgetfulness, fatigue, kidney stones, GI symptoms, and changes in lab values such as electrolytes, blood counts and kidney function.      topiramate 100  mg evening only.     - To lose weight you want to cut 100% starchy carbohydrates out of your diet (bread, rice, pasta, potatoes, granola, flour, corn, peas, oatmeal, grits, tortillas, crackers, chips) and get  grams of protein.  Aim for 100 grams of protein [id number] bianka  daily.        - No soda, sweet tea, juices, sports drinks or lemonade     -Exercise daily. Increase low impact activity as tolerated.  Avoid high impact activity, very heavy lifting or other exercise regimens that may cause discomfort.     Pt advised to check blood sugar regularly as medications may need to be adjusted with changes in diet and weight loss.       Tips for preparing for hurricane season:    If you are on weight loss medications, please take your medication with you in case of evacuation. Injectable medications should be transported with an ice pack if unopened or room temperature if you have started to use them.   Hurricane supplies do not necessarily need to be junk food or high in carbs or sugar. Shelf stable and healthy choices to include in your supplies could include:  Canned/packets of tuna or chicken  Apples, oranges, banana,  pears  Beef or turkey jerky  Sugar free pudding  Pickle, olives, dill relish (mix with the tuna or chicken!)  Low sodium or no salt added canned vegetables  If you get bread, get lite bread (40 calories a slice)  0 sugar sports drinks  Water  String cheese will be okay for a few days without refrigeration or in an ice chest.   Peanut or other nut butter.   Parmesan crisps  Pork skins  Protein shakes (20-30g protein, less than 5 g sugar)  Protein bars (15 or more g protein, less than 5 g sugar)  Don't forget disposable forks, spoons, plates in your supplies  If evacuated, manage stress by taking walks, reading or meditating.   If eating out make better choices when possible.   Salads with a lean protein and limited dressing, cheese or other toppings  Grilled chicken sandwich or burger without the bun.   Skip the fries!  Order water or unsweetened tea instead of soda  Grocery stores with a deli, salad/food bar can be a good quick and affordable option to replace fast food

## 2024-07-13 DIAGNOSIS — G89.4 CHRONIC PAIN SYNDROME: ICD-10-CM

## 2024-07-13 DIAGNOSIS — M54.16 LUMBAR RADICULOPATHY: ICD-10-CM

## 2024-07-15 ENCOUNTER — OFFICE VISIT (OUTPATIENT)
Dept: PAIN MEDICINE | Facility: CLINIC | Age: 74
End: 2024-07-15
Payer: MEDICARE

## 2024-07-15 VITALS
BODY MASS INDEX: 30.31 KG/M2 | RESPIRATION RATE: 18 BRPM | HEART RATE: 83 BPM | SYSTOLIC BLOOD PRESSURE: 117 MMHG | DIASTOLIC BLOOD PRESSURE: 67 MMHG | TEMPERATURE: 98 F | WEIGHT: 176.56 LBS | OXYGEN SATURATION: 98 %

## 2024-07-15 DIAGNOSIS — M47.816 LUMBAR SPONDYLOSIS: ICD-10-CM

## 2024-07-15 DIAGNOSIS — G89.4 CHRONIC PAIN SYNDROME: Primary | ICD-10-CM

## 2024-07-15 PROCEDURE — 1125F AMNT PAIN NOTED PAIN PRSNT: CPT | Mod: HCNC,CPTII,S$GLB, | Performed by: NURSE PRACTITIONER

## 2024-07-15 PROCEDURE — 3288F FALL RISK ASSESSMENT DOCD: CPT | Mod: HCNC,CPTII,S$GLB, | Performed by: NURSE PRACTITIONER

## 2024-07-15 PROCEDURE — 99999 PR PBB SHADOW E&M-EST. PATIENT-LVL IV: CPT | Mod: PBBFAC,HCNC,, | Performed by: NURSE PRACTITIONER

## 2024-07-15 PROCEDURE — 3078F DIAST BP <80 MM HG: CPT | Mod: HCNC,CPTII,S$GLB, | Performed by: NURSE PRACTITIONER

## 2024-07-15 PROCEDURE — 3061F NEG MICROALBUMINURIA REV: CPT | Mod: HCNC,CPTII,S$GLB, | Performed by: NURSE PRACTITIONER

## 2024-07-15 PROCEDURE — 99213 OFFICE O/P EST LOW 20 MIN: CPT | Mod: HCNC,S$GLB,, | Performed by: NURSE PRACTITIONER

## 2024-07-15 PROCEDURE — 3044F HG A1C LEVEL LT 7.0%: CPT | Mod: HCNC,CPTII,S$GLB, | Performed by: NURSE PRACTITIONER

## 2024-07-15 PROCEDURE — 3066F NEPHROPATHY DOC TX: CPT | Mod: HCNC,CPTII,S$GLB, | Performed by: NURSE PRACTITIONER

## 2024-07-15 PROCEDURE — 1100F PTFALLS ASSESS-DOCD GE2>/YR: CPT | Mod: HCNC,CPTII,S$GLB, | Performed by: NURSE PRACTITIONER

## 2024-07-15 PROCEDURE — 4010F ACE/ARB THERAPY RXD/TAKEN: CPT | Mod: HCNC,CPTII,S$GLB, | Performed by: NURSE PRACTITIONER

## 2024-07-15 PROCEDURE — 1159F MED LIST DOCD IN RCRD: CPT | Mod: HCNC,CPTII,S$GLB, | Performed by: NURSE PRACTITIONER

## 2024-07-15 PROCEDURE — 3008F BODY MASS INDEX DOCD: CPT | Mod: HCNC,CPTII,S$GLB, | Performed by: NURSE PRACTITIONER

## 2024-07-15 PROCEDURE — 3074F SYST BP LT 130 MM HG: CPT | Mod: HCNC,CPTII,S$GLB, | Performed by: NURSE PRACTITIONER

## 2024-07-15 RX ORDER — GABAPENTIN 800 MG/1
800 TABLET ORAL 3 TIMES DAILY
Qty: 90 TABLET | Refills: 2 | Status: SHIPPED | OUTPATIENT
Start: 2024-07-15 | End: 2024-07-15

## 2024-07-15 RX ORDER — PREGABALIN 100 MG/1
100 CAPSULE ORAL 3 TIMES DAILY
Qty: 90 CAPSULE | Refills: 2 | Status: SHIPPED | OUTPATIENT
Start: 2024-07-15 | End: 2024-10-13

## 2024-07-15 NOTE — PROGRESS NOTES
Chronic patient Established Note (Follow up visit)      SUBJECTIVE:    Interval History 7/15/2024:  The patient is here today to discuss lower back pain. Since previous visit, she did undergo right then left L3,4,5 RFAs completed with 80% relief for about 7 months. Her pain has now returned and she wishes to repeat the RFAs. The pain worsens with prolonged standing and walking. She continues with home PT exercises. Her pain today is 10/10.    Interval history 11/22/2023:  73-year-old female that presents today with axial low back pain.  Not been seen for approximately 7 months set of pain 1-2 weeks, is located in a bandlike distribution of low back she did not anesthesia like symptoms.  She is known to this clinic and was previously provided a or RFA targeting L3, L4 and L5 that provided her with 50-60% relief.  Like to be considered for repeat lumbar RFA she denies any recent incident or trauma denies any bowel bladder dysfunction anesthesia denies any profound weakness.    Interval History 4/20/2023:  The patient returns to clinic today for follow up of low back pain. She is s/p left L3,4,5 RFA on 3/16/2023 and right L3,4,5 RFA on 3/30/2023. She reports 50-60% relief of her pain. Her pain is tolerable at this time. She has good days and bad days. She denies any radicular leg pain at this time. She is not currently using her SCS. She has not contacted representatives. She reports that her mother passed away last week. She is dealing with a lot of stress due to this. She is taking Gabapentin, although not consistently. She also takes Zanaflex. She denies any other health changes. Her pain today is 6/10.     Interval History 2/8/2023:  The patient returns to clinic today for follow up of back pain. She reports worsened low back pain. She reports intermittent radiating pain into the posterior aspect of both legs to the ankles, left greater than right. Her pain is constant in nature. Her back pain is greater than  her leg pain. She is reporting limited relief with SCS. She has not been able to meet with [facility]que or Ildef[facility] for programming. She is taking Gabapentin. She denies any other health changes. Her pain today is 9/10.    Interval History 11/4/2022:  Faby is here for follow up of back and leg pain. Unfortunately, she has been unable to meet up with Roque for SCS programming. She does report some improvement in symptoms since previous visit. She still has back pain with radiation into the legs. Recent XRAYs do not show any significant lead migration. She only takes Gabapentin intermittently because she says it makes her feet swell but it does help. Her pain today is 8/10.    Interval History 10/4/2022:  The patient is here for follow up of chronic back pain. She reports more pain over the past month. No injury or trauma. She does have a lumbar SCS but is unsure if it is even on at this time. She does not think that it is. It did previously help with her back and leg pain. She has not been in contact with [employer] recently. The back pain radiates down the back of both legs to the feet. It worsens with walking and standing. Her pain today is 10/10.    Interval History 8/23/2022:  Faby is here for follow up of chronic lower back pain. She is now s/p right then left L3,4,5 RFAs completed on 7/21/22 with limited relief so far. She continues to report aching back pain with radiation into the legs and numbness. She has had her SCS off for a couple of months. She has not been in contact with [employer] rep for programming. She says that she turned it off due to limited benefit. No new weakness to legs or falls. No bowel/bladder incontinence. Her pain today is 10/10.    Interval History 6/17/2022:  The patient is here today for follow up of back pain. She has had more aching pain across the lower back. She had benefit with lumbar RFAs in the past and would like to reschedule this. She would also like to repeat aquatic PT as this was helpful in  the past. Her pain is aching and throbbing in nature without radiation currently. No new falls or trauma. Her pain today is 8/10.    Interval History 5/5/2022:  The patient is here for follow up of chronic lower back pain. She has radiation into the legs. No recent falls or trauma. She saw Dr. Silverio last month and was under the impression that an [employer] rep would be here today for programming. She is still having difficulty programming her device. She has mild benefit with Gabapentin 900 mg daily without side effects. She is also having muscle spasms intermittently. She is asking for a muscle relaxer. She has tried Robaxin in the past with mild benefit. She would like to try another medication. Her pain today is 8/10.    Interval History 4/20/2022: She presents today for follow up of low back pain. She states that she only had about 40% relief of LBP following RFAs in September that lasted a few weeks. Pain  continues to be in the low back and radiates into b/l LE. Pain encompasses the entire leg and does not follow a specific dermatomal pattern in the leg. She denies weakness, numbness or tingling in the lower extremities. She denies bowel or bladder incontinence. Pain is currently rated 8/10. She is currently on gabapentin 300mg tid with minimal relief. She has been unable to charge bret SCS for the last month and does not have the contact number for her rep.      Interval History 9/30/2021:  The patient returns to clinic today for follow up of low back pain. She is s/p left L3,4,5 RFA on 9/9/2021 and right L3,4,5 RFA on 9/20/2021. She is unsure of relief at this time. She reports increased pain to the right side. She describes this pain as burning in nature. She denies any radiating leg pain. Her pain is worse with bending and standing. She continues to report benefit with [employer] SCS. She denies any weakness. She denies any other health changes. Her pain today is 9/10.    Interval History 8/3/2021:   Ms. Luna  returns in f/u re: low back/leg pain. She reports about three months ago she noticed her low back started bothering her, worse with bending and prolonged standing. No inciting event, no trauma to back since last visit. Reports continued leg pain down the backs of her legs as well. She reports intermittent instability in her legs - on and off - over the last year. Last time she has met with [facility] for reprogramming of SCS was fall 2020. Denies any current issues with SCS function/battery/remote.     Interval History 9/28/2020:  The patient is here today for increased lower back pain.  She is status post right than left L3, 4, 5 radiofrequency ablation completed on 08/31/2020 with approximately 50% relief.  However, she is feeling tightness across the lower back without radiation at this time.  She would like an injection today.  Additionally, she states that she has not completed physical therapy for her back in some time and is not currently doing any exercises.  Her leg pain is currently well controlled with spinal cord stimulator and she had a recent adjustment.  Her pain today is 8/10.    Interval History 7/16/2020:  The patient is here for follow up of lower back pain.  She previously had benefit with lumbar RFAs for similar pain.  She is also having radiation into her legs with numbness, left greater than right.  Ildefonso is here today to meet with her for programming.  She previously was having significant benefit of leg pain with SCS implant.  She denies any recent trauma or falls. Her pain today is 9/10.    Interval History 1/9/2020:  The patient is here for follow up of lower back and leg pain.  She is s/p lumbar RFAs with about 50% relief.  Her leg pain is well controlled with implant.  She still has intermittent flare ups of back pain, like today.  When this happens, she has some benefit with rest.  She has tried Tylenol and OTC NSAIDs without benefit.  She denies any recent falls or weakness.  The patient  denies any bowel or bladder incontinence or signs of saddle paresthesia.  The patient denies any major medical changes since last office visit.  Her pain today is 7/10.    Previous Encounter:  Faby Luna presents to the clinic for a follow-up appointment for lower back pain.  She previously had significant leg pain which has resolved with [employer] SCS implant.  She is here today to discuss increased lower back pain.  It is sharp and throbbing in nature.  It is significant in the morning and with prolonged standing and activity.  She has some benefit with stretching.  She denies any recent falls.  Since the last visit, Faby Luna states the pain has been worsening.  Current pain intensity is 8/10.    Pain Disability Index Review:      11/22/2023     3:15 PM 4/20/2023     2:14 PM 11/4/2022     1:56 PM   Last 3 PDI Scores   Pain Disability Index (PDI) 30 30 40       Opioid Contract: no     report:  Not applicable    Pain Procedures:   5/2/18 Left L3 and L4 TF ELISE- 20% relief  5/21/18 Bilateral L4-5 and L5-S1 facet joint injections- no relief  9/24/18 Lumbar SCS trial ([employer])- 100% relief  10/26/18 Lumbar SCS implant ([employer])- 100% relief of leg pain  9/12/19 Bilateral L3,4,5 MBB- helpful  10/17/19 Bilateral L3,4,5 MBB- helpful  11/21/19 Right L3,4,5 RFA- 50% relief  12/5/19 Left L3,4,5 RFA- 50% relief  8/17/20 Left L3,4,5 RFA- 50% relief  8/31/20 Right L3,4,5 RFA- 50% relief  9/9/2021- Left L3,4,5 RFA - 60% relief  9/20/2021- Right L3,4,5 RFA -60% relief  7/7/22 Right L3,4,5 RFA   7/21/22 Left L3,4,5 RFA   3/16/2023- Left L3,4,5 RFA  3/30/2023- Right L3,4,5 RFA  12/18/24 Right L3,4,5 RFA  1/4/24 Left L3,4,5 RFA    Physical Therapy/Home Exercise:   yes in the past with limited benefit    Imaging:     3/31/18 Lumbar MRI    Narrative     EXAMINATION:  MRI LUMBAR SPINE WITHOUT CONTRAST    CLINICAL HISTORY:  Low back pain, >6wks conservative tx, persistent-progressive sx, surgical candidate;  Other spondylosis with radiculopathy, lumbar region    TECHNIQUE:  Multiplanar, multisequence MR images were acquired from the thoracolumbar junction to the sacrum without the administration of contrast.    COMPARISON:  Plain films from 03/27/2018    FINDINGS:  There is grade 1 spondylolisthesis of L4 on L5. The vertebral body heights are well maintained, with no fracture.  No marrow signal abnormality suspicious for an infiltrative process.    The conus medullaris terminates at approximately the mid body of L1.  There is a cyst associated with the upper pole of the right kidney.  There is disc desiccation noted throughout the lumbar spine with relative sparing of the L5-S1 disc.  Mild disc space narrowing present at the L4-5 level.    L1-L2: Mild diffuse disc bulge resulting in no significant central or neural foraminal canal narrowing.    L2-L3: No significant central canal narrowing.  There is mild narrowing of either neural foraminal canal secondary to disc material.    L3-L4: Disc bulging in the bilateral foraminal regions resulting in no significant central canal narrowing.  Mild-to-moderate bilateral facet arthropathy also noted.  The bilateral neural foraminal canals are moderately narrowed with some mild effacement of either exiting L3 nerve root in the extraforaminal regions bilaterally.    L4-L5:  Grade 1 spondylolisthesis along with moderate to severe bilateral facet arthropathy and ligamentum flavum hypertrophy resulting in at least moderate narrowing of the central canal.  The right neural foraminal canal is mildly to moderately narrowed.  Left neural foraminal canal is moderately to severely narrowed with mild effacement of the exiting L4 nerve root.    L5-S1:  No significant central or neural foraminal canal narrowing noted.  Mild bilateral facet arthropathy noted.   Impression       1. Multilevel degenerative changes of the lumbar spine as detailed above     Lumbar XRAYs 3/27/18    Narrative  "    EXAMINATION:  XR LUMBAR SPINE AP AND LAT WITH FLEX/EXT    CLINICAL HISTORY:  Low back pain    TECHNIQUE:  AP and lateral views as well as lateral flexion and extension images are performed through the lumbar spine.    COMPARISON:  None    FINDINGS:  X-ray lumbar spine with flexion and extension demonstrates grade 1 spondylolisthesis at L4-5 measuring around 6 mm which does not change significantly with flexion and extension.  The L4-5 disc is narrowed and degenerated.  Other lumbar vertebral discs are maintained.  Vertebral body heights are maintained.  Small anterior spurs are seen, and there is small posterior osteophyte along the inferior endplate of L4.  There is lumbar facet arthropathy at L4-5 and L5-S1.   Impression       Degenerative changes as above.  Grade 1 anterolisthesis and degenerative disc disease at L4-5.         Allergies:   Review of patient's allergies indicates:   Allergen Reactions    Aspirin Other (See Comments)     Has been told not to take it due to bariatric surgery       Current Medications:   Current Outpatient Medications   Medication Sig Dispense Refill    albuterol (PROAIR HFA) 90 mcg/actuation inhaler Inhale 2 puffs into the lungs every 4 (four) hours as needed for Wheezing or Shortness of Breath. Rescue 8.5 g 1    atenoloL (TENORMIN) 100 MG tablet TAKE 1 TABLET EVERY DAY 90 tablet 3    BD ULTRA-FINE MINI PEN NEEDLE 31 gauge x 3/16" Ndle       BD ULTRA-FINE FELICIA PEN NEEDLE 32 gauge x 5/32" Ndle       blood sugar diagnostic Strp To check BG 3 times daily, to use with insurance preferred meter 100 strip 11    EScitalopram oxalate (LEXAPRO) 20 MG tablet Take 1 tablet (20 mg total) by mouth once daily. 90 tablet 3    fluticasone propionate (FLONASE) 50 mcg/actuation nasal spray 2 sprays (100 mcg total) by Each Nostril route once daily. 11.1 mL 1    gabapentin (NEURONTIN) 800 MG tablet TAKE 1 TABLET BY MOUTH THREE TIMES DAILY 90 tablet 2    glipiZIDE (GLUCOTROL) 5 MG tablet       " hydrOXYzine pamoate (VISTARIL) 25 MG Cap TAKE 1 CAPSULE BY MOUTH ONCE DAILY AS NEEDED FOR ANXIETY 90 capsule 3    lancets Misc To check BG 3 times daily, to use with insurance preferred meter 100 each 11    loratadine (CLARITIN) 10 mg tablet Take 1 tablet (10 mg total) by mouth daily as needed for Allergies. 90 tablet 3    losartan (COZAAR) 50 MG tablet Take 1 tablet (50 mg total) by mouth once daily. 30 tablet 3    midazolam, PF, (VERSED) 1 mg/mL Soln injection       pantoprazole (PROTONIX) 20 MG tablet TAKE 1 TABLET TWICE DAILY BEFORE MEALS 180 tablet 3    rosuvastatin (CRESTOR) 20 MG tablet Take 1 tablet by mouth once daily 90 tablet 1    semaglutide (OZEMPIC) 1 mg/dose (4 mg/3 mL) Inject 1 mg into the skin every 7 days. 9 mL 1    tiZANidine (ZANAFLEX) 4 MG tablet Take 1 tablet (4 mg total) by mouth daily as needed (pain). 90 tablet 3    topiramate (TOPAMAX) 100 MG tablet Take 1 tablet (100 mg total) by mouth every evening. 90 tablet 1    traZODone (DESYREL) 100 MG tablet Take 1 tablet by mouth in the evening 90 tablet 3    vibegron 75 mg Tab Take 1 tablet (75 mg total) by mouth once daily. 30 tablet 2     No current facility-administered medications for this visit.     Facility-Administered Medications Ordered in Other Visits   Medication Dose Route Frequency Provider Last Rate Last Admin    0.9%  NaCl infusion   Intravenous Continuous Uriel Aranda MD           REVIEW OF SYSTEMS:    GENERAL:  No weight loss, malaise or fevers.  HEENT:  Negative for frequent or significant headaches.  NECK:  Negative for lumps, goiter, pain and significant neck swelling.  RESPIRATORY:  Negative for cough, wheezing or shortness of breath.  CARDIOVASCULAR:  Negative for chest pain, leg swelling or palpitations. Hypertension.  GI:  Negative for abdominal discomfort, blood in stools or black stools or change in bowel habits.  MUSCULOSKELETAL:  See HPI.  SKIN:  Negative for lesions, rash, and itching.  PSYCH:  Negative for sleep  disturbance, mood disorder and recent psychosocial stressors.  HEMATOLOGY/LYMPHOLOGY:  Negative for prolonged bleeding, bruising easily or swollen nodes.  NEURO:   No history of headaches, syncope, paralysis, seizures or tremors.  ENDO: Diabetes.  All other reviewed and negative other than HPI.    Past Medical History:  Past Medical History:   Diagnosis Date    Anxiety with depression     Arthritis     CKD (chronic kidney disease) stage 2, GFR 60-89 ml/min     Diabetes mellitus     History of Clarisse-en-Y gastric bypass     Hypertension     Obesity, unspecified     DAHLIA (obstructive sleep apnea)     Spondylosis        Past Surgical History:  Past Surgical History:   Procedure Laterality Date    CARPAL TUNNEL RELEASE Right 3/8/2019    Procedure: RELEASE, CARPAL TUNNEL right;  Surgeon: Pan Goodman MD;  Location: [facility] OR;  Service: Orthopedics;  Laterality: Right;    CARPAL TUNNEL RELEASE Left 2019    Procedure: RELEASE, CARPAL TUNNEL- LEFT;  Surgeon: Pan Goodmna MD;  [facility];  Service: Orthopedics;  Laterality: Left;    CATARACT EXTRACTION Bilateral      SECTION      CHOLECYSTECTOMY      COLONOSCOPY N/A 2024    Procedure: COLONOSCOPY;  Surgeon: Otilio Man MD;  Location: [facility];  Service: Endoscopy;  Laterality: N/A;  Referred by: MYNOR Sprague / CECILIA Meds: ozempic / diabetic / Prep: peg / Route instructions sent: portal  - lvm and portal msg for pc. DBM  - left 2nd vm for pc. DBM    EPIDURAL STEROID INJECTION INTO LUMBAR SPINE N/A 2018    Procedure: INJECTION, STEROID, SPINE, LUMBAR, EPIDURAL;  Surgeon: Shaq Silverio MD;  Location: [facility] PAIN MGT;  Service: Pain Management;  Laterality: N/A;  LUMBAR L4-L5 INTERLAMINAR ELISE'  [id number]  W/ SEDATION     ESOPHAGOGASTRODUODENOSCOPY N/A 2023    Procedure: EGD (ESOPHAGOGASTRODUODENOSCOPY);  Surgeon: Deann Jimenez MD;  Location: [facility];  Service: Gastroenterology;  Laterality: N/A;    HYSTERECTOMY      INJECTION OF  ANESTHETIC AGENT AROUND NERVE Bilateral 9/12/2019    Procedure: BLOCK, NERVE, L3-L4-L5 MEDIAL BRANCH;  Surgeon: Shaq Silverio MD;  Location: [facility] PAIN MGT;  Service: Pain Management;  Laterality: Bilateral;    INJECTION OF ANESTHETIC AGENT AROUND NERVE Bilateral 10/17/2019    Procedure: BLOCK, NERVE;  Surgeon: Shaq Silverio MD;  Location: [facility] PAIN MGT;  Service: Pain Management;  Laterality: Bilateral;  B/L MBB L3-L4-L5  REPEAT  CONSENT NEEDED    INJECTION OF FACET JOINT Bilateral 5/28/2018    Procedure: INJECTION-FACET;  Surgeon: Shaq Silverio MD;  Location: [facility] PAIN MGT;  Service: Pain Management;  Laterality: Bilateral;  LUMBAR BILATERAL L4-L5 AND L5-S1 FACET STEROID INJECTION  [id number]    W/ SEDATION     RADIOFREQUENCY ABLATION Right 11/21/2019    Procedure: RADIOFREQUENCY ABLATION RIGHT L3, L4, L5;  Surgeon: Shaq Silverio MD;  Location: [facility] PAIN MGT;  Service: Pain Management;  Laterality: Right;  Right RFA L3-L4-L5  1 of 2  Consent Needed    RADIOFREQUENCY ABLATION Left 12/5/2019    Procedure: RADIOFREQUENCY ABLATION LEFT L3-5;  Surgeon: Shaq Silverio MD;  Location: [facility] PAIN MGT;  Service: Pain Management;  Laterality: Left;  Left RFA L3-L4-L5  2 of 2  Consent Needed    RADIOFREQUENCY ABLATION Left 8/17/2020    Procedure: RADIOFREQUENCY ABLATION LEFT L3,4,5 1 of 2;  Surgeon: Shaq Silverio MD;  Location: [facility] PAIN MGT;  Service: Pain Management;  Laterality: Left;  Left RFA L3,4,5  1 of 2    RADIOFREQUENCY ABLATION Right 8/31/2020    Procedure: RADIOFREQUENCY ABLATION RIGHT L3,4,5 2 of 2;  Surgeon: Shaq Silverio MD;  Location: [facility] PAIN MGT;  Service: Pain Management;  Laterality: Right;  RADIOFREQUENCY ABLATION RIGHT L3,4,5  2 of 2    RADIOFREQUENCY ABLATION Left 9/9/2021    Procedure: RADIOFREQUENCY ABLATION, L3-L4 AND L5 MEDIAL BRANCH 1 OF 2;  Surgeon: Shaq Silverio MD;  Location: [facility] PAIN MGT;  Service: Pain Management;  Laterality: Left;    RADIOFREQUENCY ABLATION Right 9/20/2021    Procedure:  RADIOFREQUENCY ABLATION, L3-L4 AND L5 MEDIAL BRANCH 2 OF 2;  Surgeon: Shaq Silverio MD;  Location: [facility] PAIN MGT;  Service: Pain Management;  Laterality: Right;    RADIOFREQUENCY ABLATION Right 7/7/2022    Procedure: RADIOFREQUENCY ABLATION, RIGHT L3-L4-L5 ONE OF TWO;  Surgeon: Shaq Silverio MD;  Location: [facility] PAIN MGT;  Service: Pain Management;  Laterality: Right;    RADIOFREQUENCY ABLATION Left 7/21/2022    Procedure: RADIOFREQUENCY ABLATION, LEFT L3-L4-L5 TWO OF TWO *BRING SCS REMOTE*;  Surgeon: Shaq Silverio MD;  Location: [facility] PAIN MGT;  Service: Pain Management;  Laterality: Left;    RADIOFREQUENCY ABLATION Left 3/16/2023    Procedure: RADIOFREQUENCY ABLATION LEFT L3,L4,L5 *BRING SCS REMOTE*;  Surgeon: Shaq Silverio MD;  Location: [facility] PAIN MGT;  Service: Pain Management;  Laterality: Left;    RADIOFREQUENCY ABLATION Right 3/30/2023    Procedure: RADIOFREQUENCY ABLATION RIGHT L3,L4,L5 *BRING SCS REMOTE*;  Surgeon: Shaq Silverio MD;  Location: [facility] PAIN [facility]GT;  Service: Pain Management;  Laterality: Right;    RADIOFREQUENCY ABLATION Right 12/18/2023    Procedure: RADIOFREQUENCY ABLATION RIGHT L3, 4, 5 1 OF 2;  Surgeon: Shaq Silverio MD;  Location: [facility] [place] PAIN MGT;  Service: Pain Management;  Laterality: Right;  [phone]    RADIOFREQUENCY ABLATION Left 1/4/2024    Procedure: RADIOFREQUENCY ABLATION LEFT L3, 4, 5 2 OF 2;  Surgeon: Shaq Silverio MD;  Location: [facility] [place] PAIN [facility]GT;  Service: Pain Management;  Laterality: Left;  [phone]  2 WK F/U WARREN    TRIAL OF SPINAL CORD NERVE STIMULATOR N/A 9/24/2018    Procedure: TRIAL, NEUROSTIMULATOR, SPINAL CORD, SPINAL CORD STIMULATOR TRIAL-INTERNAL WIRES TO EXTERNAL BATTERY;  Surgeon: Shaq Silverio MD;  Location: [facility] [place] PAIN [facility]GT;  Service: Pain Management;  Laterality: N/A;  [employer] REP NOTIFIED       Family History:  Family History   Problem Relation Name Age of Onset    Cataracts Mother      Glaucoma Mother      No Known Problems Father      No Known Problems Sister      No  Known Problems Brother      No Known Problems Maternal Aunt      No Known Problems Maternal Uncle      No Known Problems Paternal Aunt      No Known Problems Paternal Uncle      No Known Problems Maternal Grandmother      No Known Problems Maternal Grandfather      No Known Problems Paternal Grandmother      No Known Problems Paternal Grandfather         Social History:  Social History     Socioeconomic History    Marital status:    Tobacco Use    Smoking status: Never    Smokeless tobacco: Never   Substance and Sexual Activity    Alcohol use: Yes     Comment: occ    Drug use: No       OBJECTIVE:    /67   Pulse 83   Temp 97.6 °F (36.4 °C)   Resp 18   Wt 80.1 kg (176 lb 9.4 oz)   SpO2 98%   BMI 30.31 kg/m²     PHYSICAL EXAMINATION:    General appearance: Well appearing, in no acute distress, alert and oriented x3.  Psych:  Mood and affect appropriate.  Skin: Skin color, texture, turgor normal, no rashes or lesions, in both upper and lower body.   Head/face:  Atraumatic, normocephalic. No palpable lymph nodes  Back: Straight leg raising in the sitting and supine positions is negative to radicular pain bilaterally. There is pain with palpation to lumbar facet joints bilaterally. Limited ROM with pain on extension. Positive facet loading bilaterally.  Extremities: No deformities, edema, or skin discoloration. Good capillary refill.  Musculoskeletal: 5/5 strength in right ankle with plantar and dorsiflexion. 5/5 strength in left ankle with plantar and dorsiflexion. 5/5 strength with right knee flexion and extension. 5/5 strength with left knee flexion and extension.   Neuro: Decreased sensation to BLE.  Gait: Antalgic- ambulates without assistance.    ASSESSMENT: 73 y.o. year old female with back pain, consistent with the following diagnoses:     1. Chronic pain syndrome        2. Lumbar spondylosis  Procedure Order to Pain Management          PLAN:     - Previous imaging was reviewed and discussed  with the patient today.    - Orders placed for bilateral L3,4,5 RFA. The patient did previously have bilateral RFAs from L3 to L5 in the past with 80% relief for 7 month(s). During that time, the patients functional ability improved and was able to be more active without significant limitation by pain. Current pain is axial and non-radiating. The patient also previously completed over 12 weeks of PT exercises in the past 6 months.     - D/C Gabapentin and start Lyrica 100 mg TID. Gabapentin is no longer helping.    - The patient will continue a home exercise routine to help with pain and strengthening.      - RTC 4 weeks after RFA.      The above plan and management options were discussed at length with patient. Patient is in agreement with the above and verbalized understanding.    Renetta Steele, LORNA  07/15/2024

## 2024-07-16 ENCOUNTER — PATIENT MESSAGE (OUTPATIENT)
Dept: PAIN MEDICINE | Facility: OTHER | Age: 74
End: 2024-07-16
Payer: MEDICARE

## 2024-07-16 DIAGNOSIS — M47.816 LUMBAR SPONDYLOSIS: Primary | ICD-10-CM

## 2024-07-17 ENCOUNTER — PATIENT MESSAGE (OUTPATIENT)
Dept: PAIN MEDICINE | Facility: OTHER | Age: 74
End: 2024-07-17
Payer: MEDICARE

## 2024-07-29 ENCOUNTER — TELEPHONE (OUTPATIENT)
Dept: PAIN MEDICINE | Facility: CLINIC | Age: 74
End: 2024-07-29
Payer: MEDICARE

## 2024-07-29 DIAGNOSIS — E78.5 DYSLIPIDEMIA: ICD-10-CM

## 2024-07-29 NOTE — TELEPHONE ENCOUNTER
Staff attempted to contact patient regarding her procedure. INS denied RFA. She can schedule an OV to discuss other treatment options.

## 2024-07-29 NOTE — TELEPHONE ENCOUNTER
No care due was identified.  [facility] Embedded Care Due Messages. Reference number: [id number].   7/29/2024 9:46:21 AM CDT

## 2024-07-30 RX ORDER — ROSUVASTATIN CALCIUM 20 MG/1
20 TABLET, COATED ORAL
Qty: 90 TABLET | Refills: 1 | Status: SHIPPED | OUTPATIENT
Start: 2024-07-30

## 2024-07-30 NOTE — TELEPHONE ENCOUNTER
Refill Decision Note   Faby Luna  is requesting a refill authorization.  Brief Assessment and Rationale for Refill:  Approve     Medication Therapy Plan:         Comments:     Note composed:3:52 AM 07/30/2024

## 2024-08-07 ENCOUNTER — TELEPHONE (OUTPATIENT)
Dept: PAIN MEDICINE | Facility: CLINIC | Age: 74
End: 2024-08-07
Payer: MEDICARE

## 2024-08-12 ENCOUNTER — TELEPHONE (OUTPATIENT)
Dept: PAIN MEDICINE | Facility: CLINIC | Age: 74
End: 2024-08-12
Payer: MEDICARE

## 2024-08-12 NOTE — TELEPHONE ENCOUNTER
----- Message from Mick Crook sent at 8/12/2024  3:21 PM CDT -----  Name of Who is Calling:CLAUDIA MARTINEZ [[id number]]                   What is the request in detail:PT said insurance denied her surgery and she wants a call back now to know what to do now please assist                    Can the clinic reply by [facility]: No                   What Number to Call Back if not in [facility]: [phone]

## 2024-08-23 ENCOUNTER — LAB VISIT (OUTPATIENT)
Dept: LAB | Facility: HOSPITAL | Age: 74
End: 2024-08-23
Attending: FAMILY MEDICINE
Payer: MEDICARE

## 2024-08-23 DIAGNOSIS — I10 ESSENTIAL HYPERTENSION: ICD-10-CM

## 2024-08-23 DIAGNOSIS — E78.5 DYSLIPIDEMIA ASSOCIATED WITH TYPE 2 DIABETES MELLITUS: ICD-10-CM

## 2024-08-23 DIAGNOSIS — N18.31 TYPE 2 DIABETES MELLITUS WITH STAGE 3A CHRONIC KIDNEY DISEASE, WITHOUT LONG-TERM CURRENT USE OF INSULIN: ICD-10-CM

## 2024-08-23 DIAGNOSIS — E11.69 DYSLIPIDEMIA ASSOCIATED WITH TYPE 2 DIABETES MELLITUS: ICD-10-CM

## 2024-08-23 DIAGNOSIS — E11.22 TYPE 2 DIABETES MELLITUS WITH STAGE 3A CHRONIC KIDNEY DISEASE, WITHOUT LONG-TERM CURRENT USE OF INSULIN: ICD-10-CM

## 2024-08-23 DIAGNOSIS — I70.0 AORTIC ATHEROSCLEROSIS: ICD-10-CM

## 2024-08-23 LAB
ALBUMIN SERPL BCP-MCNC: 3.5 G/DL (ref 3.5–5.2)
ALP SERPL-CCNC: 94 U/L (ref 55–135)
ALT SERPL W/O P-5'-P-CCNC: 38 U/L (ref 10–44)
ANION GAP SERPL CALC-SCNC: 6 MMOL/L (ref 8–16)
AST SERPL-CCNC: 32 U/L (ref 10–40)
BASOPHILS # BLD AUTO: 0.05 K/UL (ref 0–0.2)
BASOPHILS NFR BLD: 0.6 % (ref 0–1.9)
BILIRUB SERPL-MCNC: 0.3 MG/DL (ref 0.1–1)
BUN SERPL-MCNC: 15 MG/DL (ref 8–23)
CALCIUM SERPL-MCNC: 8.8 MG/DL (ref 8.7–10.5)
CHLORIDE SERPL-SCNC: 112 MMOL/L (ref 95–110)
CHOLEST SERPL-MCNC: 145 MG/DL (ref 120–199)
CHOLEST/HDLC SERPL: 2.8 {RATIO} (ref 2–5)
CO2 SERPL-SCNC: 23 MMOL/L (ref 23–29)
CREAT SERPL-MCNC: 1 MG/DL (ref 0.5–1.4)
DIFFERENTIAL METHOD BLD: ABNORMAL
EOSINOPHIL # BLD AUTO: 0.4 K/UL (ref 0–0.5)
EOSINOPHIL NFR BLD: 4.5 % (ref 0–8)
ERYTHROCYTE [DISTWIDTH] IN BLOOD BY AUTOMATED COUNT: 15.4 % (ref 11.5–14.5)
EST. GFR  (NO RACE VARIABLE): 59.1 ML/MIN/1.73 M^2
ESTIMATED AVG GLUCOSE: 126 MG/DL (ref 68–131)
GLUCOSE SERPL-MCNC: 98 MG/DL (ref 70–110)
HBA1C MFR BLD: 6 % (ref 4–5.6)
HCT VFR BLD AUTO: 41.1 % (ref 37–48.5)
HDLC SERPL-MCNC: 51 MG/DL (ref 40–75)
HDLC SERPL: 35.2 % (ref 20–50)
HGB BLD-MCNC: 13.1 G/DL (ref 12–16)
IMM GRANULOCYTES # BLD AUTO: 0.02 K/UL (ref 0–0.04)
IMM GRANULOCYTES NFR BLD AUTO: 0.2 % (ref 0–0.5)
LDLC SERPL CALC-MCNC: 76.2 MG/DL (ref 63–159)
LYMPHOCYTES # BLD AUTO: 3.6 K/UL (ref 1–4.8)
LYMPHOCYTES NFR BLD: 41.9 % (ref 18–48)
MCH RBC QN AUTO: 27.2 PG (ref 27–31)
MCHC RBC AUTO-ENTMCNC: 31.9 G/DL (ref 32–36)
MCV RBC AUTO: 85 FL (ref 82–98)
MONOCYTES # BLD AUTO: 0.5 K/UL (ref 0.3–1)
MONOCYTES NFR BLD: 6.1 % (ref 4–15)
NEUTROPHILS # BLD AUTO: 4.1 K/UL (ref 1.8–7.7)
NEUTROPHILS NFR BLD: 46.7 % (ref 38–73)
NONHDLC SERPL-MCNC: 94 MG/DL
NRBC BLD-RTO: 0 /100 WBC
PLATELET # BLD AUTO: 293 K/UL (ref 150–450)
PMV BLD AUTO: 11.1 FL (ref 9.2–12.9)
POTASSIUM SERPL-SCNC: 4.1 MMOL/L (ref 3.5–5.1)
PROT SERPL-MCNC: 7.1 G/DL (ref 6–8.4)
RBC # BLD AUTO: 4.81 M/UL (ref 4–5.4)
SODIUM SERPL-SCNC: 141 MMOL/L (ref 136–145)
TRIGL SERPL-MCNC: 89 MG/DL (ref 30–150)
WBC # BLD AUTO: 8.68 K/UL (ref 3.9–12.7)

## 2024-08-23 PROCEDURE — 80061 LIPID PANEL: CPT | Mod: HCNC | Performed by: FAMILY MEDICINE

## 2024-08-23 PROCEDURE — 80053 COMPREHEN METABOLIC PANEL: CPT | Mod: HCNC | Performed by: FAMILY MEDICINE

## 2024-08-23 PROCEDURE — 36415 COLL VENOUS BLD VENIPUNCTURE: CPT | Mod: HCNC,PO | Performed by: FAMILY MEDICINE

## 2024-08-23 PROCEDURE — 83036 HEMOGLOBIN GLYCOSYLATED A1C: CPT | Mod: HCNC | Performed by: FAMILY MEDICINE

## 2024-08-23 PROCEDURE — 85025 COMPLETE CBC W/AUTO DIFF WBC: CPT | Mod: HCNC | Performed by: FAMILY MEDICINE

## 2024-08-30 ENCOUNTER — OFFICE VISIT (OUTPATIENT)
Dept: FAMILY MEDICINE | Facility: CLINIC | Age: 74
End: 2024-08-30
Payer: MEDICARE

## 2024-08-30 VITALS
TEMPERATURE: 98 F | OXYGEN SATURATION: 98 % | HEIGHT: 64 IN | DIASTOLIC BLOOD PRESSURE: 60 MMHG | SYSTOLIC BLOOD PRESSURE: 120 MMHG | HEART RATE: 83 BPM | RESPIRATION RATE: 18 BRPM | WEIGHT: 183.06 LBS | BODY MASS INDEX: 31.25 KG/M2

## 2024-08-30 DIAGNOSIS — E11.69 DYSLIPIDEMIA ASSOCIATED WITH TYPE 2 DIABETES MELLITUS: ICD-10-CM

## 2024-08-30 DIAGNOSIS — E78.5 DYSLIPIDEMIA ASSOCIATED WITH TYPE 2 DIABETES MELLITUS: ICD-10-CM

## 2024-08-30 DIAGNOSIS — N18.31 TYPE 2 DIABETES MELLITUS WITH STAGE 3A CHRONIC KIDNEY DISEASE, WITHOUT LONG-TERM CURRENT USE OF INSULIN: Primary | ICD-10-CM

## 2024-08-30 DIAGNOSIS — I10 ESSENTIAL HYPERTENSION: ICD-10-CM

## 2024-08-30 DIAGNOSIS — K21.9 GASTROESOPHAGEAL REFLUX DISEASE WITHOUT ESOPHAGITIS: ICD-10-CM

## 2024-08-30 DIAGNOSIS — E11.22 TYPE 2 DIABETES MELLITUS WITH STAGE 3A CHRONIC KIDNEY DISEASE, WITHOUT LONG-TERM CURRENT USE OF INSULIN: Primary | ICD-10-CM

## 2024-08-30 DIAGNOSIS — F33.0 MILD RECURRENT MAJOR DEPRESSION: ICD-10-CM

## 2024-08-30 DIAGNOSIS — F41.1 GAD (GENERALIZED ANXIETY DISORDER): ICD-10-CM

## 2024-08-30 DIAGNOSIS — I70.0 AORTIC ATHEROSCLEROSIS: ICD-10-CM

## 2024-08-30 DIAGNOSIS — Z12.31 ENCOUNTER FOR SCREENING MAMMOGRAM FOR MALIGNANT NEOPLASM OF BREAST: ICD-10-CM

## 2024-08-30 DIAGNOSIS — J45.20 MILD INTERMITTENT ASTHMA WITHOUT COMPLICATION: ICD-10-CM

## 2024-08-30 PROCEDURE — 99999 PR PBB SHADOW E&M-EST. PATIENT-LVL V: CPT | Mod: PBBFAC,HCNC,, | Performed by: FAMILY MEDICINE

## 2024-08-30 RX ORDER — TRAZODONE HYDROCHLORIDE 100 MG/1
100 TABLET ORAL NIGHTLY
Qty: 90 TABLET | Refills: 3 | Status: CANCELLED | OUTPATIENT
Start: 2024-08-30

## 2024-08-30 RX ORDER — TIZANIDINE 4 MG/1
4 TABLET ORAL DAILY PRN
Qty: 90 TABLET | Refills: 3 | Status: CANCELLED | OUTPATIENT
Start: 2024-08-30

## 2024-08-30 RX ORDER — LOSARTAN POTASSIUM 50 MG/1
50 TABLET ORAL DAILY
Qty: 30 TABLET | Refills: 3 | Status: CANCELLED | OUTPATIENT
Start: 2024-08-30

## 2024-08-30 RX ORDER — ALBUTEROL SULFATE 90 UG/1
2 INHALANT RESPIRATORY (INHALATION) EVERY 4 HOURS PRN
Qty: 18 G | Refills: 11 | Status: SHIPPED | OUTPATIENT
Start: 2024-08-30

## 2024-08-30 RX ORDER — HYDROXYZINE PAMOATE 25 MG/1
CAPSULE ORAL
Qty: 90 CAPSULE | Refills: 3 | Status: CANCELLED | OUTPATIENT
Start: 2024-08-30

## 2024-08-30 RX ORDER — ESCITALOPRAM OXALATE 20 MG/1
20 TABLET ORAL DAILY
Qty: 90 TABLET | Refills: 3 | Status: CANCELLED | OUTPATIENT
Start: 2024-08-30 | End: 2025-08-30

## 2024-08-30 RX ORDER — TOPIRAMATE 100 MG/1
100 TABLET, FILM COATED ORAL NIGHTLY
Qty: 90 TABLET | Refills: 1 | Status: CANCELLED | OUTPATIENT
Start: 2024-08-30

## 2024-08-30 RX ORDER — GLIPIZIDE 5 MG/1
TABLET ORAL
Status: CANCELLED | OUTPATIENT
Start: 2024-08-30

## 2024-08-30 RX ORDER — ROSUVASTATIN CALCIUM 20 MG/1
20 TABLET, COATED ORAL
Qty: 90 TABLET | Refills: 1 | Status: CANCELLED | OUTPATIENT
Start: 2024-08-30

## 2024-08-30 RX ORDER — ESCITALOPRAM OXALATE 20 MG/1
20 TABLET ORAL DAILY
Qty: 90 TABLET | Refills: 3 | Status: SHIPPED | OUTPATIENT
Start: 2024-08-30 | End: 2025-08-30

## 2024-08-30 RX ORDER — LOSARTAN POTASSIUM 50 MG/1
50 TABLET ORAL DAILY
Qty: 90 TABLET | Refills: 3 | Status: SHIPPED | OUTPATIENT
Start: 2024-08-30

## 2024-08-30 RX ORDER — PANTOPRAZOLE SODIUM 20 MG/1
20 TABLET, DELAYED RELEASE ORAL
Qty: 180 TABLET | Refills: 3 | Status: CANCELLED | OUTPATIENT
Start: 2024-08-30

## 2024-08-30 RX ORDER — ROSUVASTATIN CALCIUM 20 MG/1
20 TABLET, COATED ORAL NIGHTLY
Qty: 90 TABLET | Refills: 3 | Status: SHIPPED | OUTPATIENT
Start: 2024-08-30

## 2024-08-30 NOTE — PROGRESS NOTES
Assessment & Plan:    Type 2 diabetes mellitus with stage 3a chronic kidney disease, without long-term current use of insulin  -     CBC Auto Differential; Future; Expected date: 08/30/2024  -     Comprehensive Metabolic Panel; Future; Expected date: 08/30/2024  -     Hemoglobin A1C; Future; Expected date: 08/30/2024  -     Lipid Panel; Future; Expected date: 08/30/2024  -     Microalbumin/Creatinine Ratio, Urine; Future; Expected date: 08/30/2024    Controlled. Continue current therapy.   Foot exam performed.  Repeat labs in 6 mo.    Dyslipidemia associated with type 2 diabetes mellitus  -     rosuvastatin (CRESTOR) 20 MG tablet; Take 1 tablet (20 mg total) by mouth every evening.  Dispense: 90 tablet; Refill: 3  -     Lipid Panel; Future; Expected date: 08/30/2024    Aortic atherosclerosis  -     rosuvastatin (CRESTOR) 20 MG tablet; Take 1 tablet (20 mg total) by mouth every evening.  Dispense: 90 tablet; Refill: 3  -     Lipid Panel; Future; Expected date: 08/30/2024    Controlled. Medication(s) refilled.     Essential hypertension  -     losartan (COZAAR) 50 MG tablet; Take 1 tablet (50 mg total) by mouth once daily.  Dispense: 90 tablet; Refill: 3  -     CBC Auto Differential; Future; Expected date: 08/30/2024  -     Comprehensive Metabolic Panel; Future; Expected date: 08/30/2024  -     Hemoglobin A1C; Future; Expected date: 08/30/2024  -     Lipid Panel; Future; Expected date: 08/30/2024    Controlled. Medication(s) refilled.     Mild recurrent major depression  -     EScitalopram oxalate (LEXAPRO) 20 MG tablet; Take 1 tablet (20 mg total) by mouth once daily.  Dispense: 90 tablet; Refill: 3    SANDRA (generalized anxiety disorder)  -     EScitalopram oxalate (LEXAPRO) 20 MG tablet; Take 1 tablet (20 mg total) by mouth once daily.  Dispense: 90 tablet; Refill: 3    Controlled. Medication(s) refilled.     Gastroesophageal reflux disease without esophagitis  Controlled. Continue current therapy.     Mild  intermittent asthma without complication  -     albuterol (PROAIR HFA) 90 mcg/actuation inhaler; Inhale 2 puffs into the lungs every 4 (four) hours as needed for Wheezing or Shortness of Breath. Rescue  Dispense: 18 g; Refill: 11    Controlled. Continue current therapy.     Encounter for screening mammogram for malignant neoplasm of breast  -     Mammo Digital Screening Bilat; Future; Expected date: 08/30/2024      Health maintenance reviewed & addressed above.    Encouraged influenza, RSV, and shingles vaccines.     Follow-up: Follow up in about 6 months (around 2/28/2025).  ______________________________________________________________________    Chief Complaint  Chief Complaint   Patient presents with    Health Maintenance       HPI  Faby Luna is a 73 y.o. female with medical diagnoses as listed in the medical history and problem list that presents to the office to follow up on her chronic conditions. She is in her usual state of health today.     Most recent pertinent workup:     Last CBC Results:   Lab Results   Component Value Date    WBC 8.68 08/23/2024    HGB 13.1 08/23/2024    HCT 41.1 08/23/2024     08/23/2024       Last CMP Results  Lab Results   Component Value Date     08/23/2024    K 4.1 08/23/2024     (H) 08/23/2024    CO2 23 08/23/2024    BUN 15 08/23/2024    CREATININE 1.0 08/23/2024    CALCIUM 8.8 08/23/2024    ALBUMIN 3.5 08/23/2024    AST 32 08/23/2024    ALT 38 08/23/2024       Last Lipids  Lab Results   Component Value Date    CHOL 145 08/23/2024    TRIG 89 08/23/2024    HDL 51 08/23/2024    LDLCALC 76.2 08/23/2024       Last A1C  Lab Results   Component Value Date    HGBA1C 6.0 (H) 08/23/2024         Health Maintenance         Date Due Completion Date    Aspirin/Antiplatelet Therapy Never done ---    Shingles Vaccine (1 of 2) Never done ---    RSV Vaccine (Age 60+ and Pregnant patients) (1 - 1-dose 60+ series) Never done ---    COVID-19 Vaccine (7 - 2023-24  season) 2023    Mammogram 2024    Influenza Vaccine (1) 2024 10/31/2023    Eye Exam 2025    Diabetes Urine Screening 2025    Hemoglobin A1c 2025    Lipid Panel 2025    Foot Exam 2025 (Done)    Override on 2024: Done    Override on 2023: Done    Override on 2018: Done    TETANUS VACCINE 2032    Colorectal Cancer Screening 2034              PAST MEDICAL HISTORY:  Past Medical History:   Diagnosis Date    Anxiety with depression     Arthritis     CKD (chronic kidney disease) stage 2, GFR 60-89 ml/min     Diabetes mellitus     History of Clarisse-en-Y gastric bypass     Hypertension     Obesity, unspecified     DAHLIA (obstructive sleep apnea)     Spondylosis        PAST SURGICAL HISTORY:  Past Surgical History:   Procedure Laterality Date    CARPAL TUNNEL RELEASE Right 3/8/2019    Procedure: RELEASE, CARPAL TUNNEL right;  Surgeon: Pan Goodman MD;  Location: [facility] OR;  Service: Orthopedics;  Laterality: Right;    CARPAL TUNNEL RELEASE Left 2019    Procedure: RELEASE, CARPAL TUNNEL- LEFT;  Surgeon: Pan Goodman MD;  Location: [address];  Service: Orthopedics;  Laterality: Left;    CATARACT EXTRACTION Bilateral      SECTION      CHOLECYSTECTOMY      COLONOSCOPY N/A 2024    Procedure: COLONOSCOPY;  Surgeon: Otilio Man MD;  Location: [facility] ENDO;  Service: Endoscopy;  Laterality: N/A;  Referred by: MYNOR Sprague / CECILIA Meds: ozempic / diabetic / Prep: peg / Route instructions sent: portal  - lvm and portal msg for pc. DBM  - left 2nd vm for pc. DBM    EPIDURAL STEROID INJECTION INTO LUMBAR SPINE N/A 2018    Procedure: INJECTION, STEROID, SPINE, LUMBAR, EPIDURAL;  Surgeon: Shaq Silverio MD;  Location: [facility] PAIN MGT;  Service: Pain Management;  Laterality: N/A;  LUMBAR L4-L5 INTERLAMINAR ELISE'  [address]  W/ SEDATION      ESOPHAGOGASTRODUODENOSCOPY N/A 5/12/2023    Procedure: EGD (ESOPHAGOGASTRODUODENOSCOPY);  Surgeon: Deann Jimenez MD;  Location: [facility];  Service: Gastroenterology;  Laterality: N/A;    HYSTERECTOMY      INJECTION OF ANESTHETIC AGENT AROUND NERVE Bilateral 9/12/2019    Procedure: BLOCK, NERVE, L3-L4-L5 MEDIAL BRANCH;  Surgeon: Shaq Silverio MD;  Location: [facility] PAIN MGT;  Service: Pain Management;  Laterality: Bilateral;    INJECTION OF ANESTHETIC AGENT AROUND NERVE Bilateral 10/17/2019    Procedure: BLOCK, NERVE;  Surgeon: Shaq Silverio MD;  Location: [facility] PAIN MGT;  Service: Pain Management;  Laterality: Bilateral;  B/L MBB L3-L4-L5  REPEAT  CONSENT NEEDED    INJECTION OF FACET JOINT Bilateral 5/28/2018    Procedure: INJECTION-FACET;  Surgeon: Shaq Silverio MD;  Location: [facility] PAIN MGT;  Service: Pain Management;  Laterality: Bilateral;  LUMBAR BILATERAL L4-L5 AND L5-S1 FACET STEROID INJECTION  [id number]    W/ SEDATION     RADIOFREQUENCY ABLATION Right 11/21/2019    Procedure: RADIOFREQUENCY ABLATION RIGHT L3, L4, L5;  Surgeon: Shaq Silverio MD;  Location: [facility] PAIN MGT;  Service: Pain Management;  Laterality: Right;  Right RFA L3-L4-L5  1 of 2  Consent Needed    RADIOFREQUENCY ABLATION Left 12/5/2019    Procedure: RADIOFREQUENCY ABLATION LEFT L3-5;  Surgeon: Shaq Silverio MD;  Location: [facility] PAIN MGT;  Service: Pain Management;  Laterality: Left;  Left RFA L3-L4-L5  2 of 2  Consent Needed    RADIOFREQUENCY ABLATION Left 8/17/2020    Procedure: RADIOFREQUENCY ABLATION LEFT L3,4,5 1 of 2;  Surgeon: Shaq Silverio MD;  Location: [facility] PAIN MGT;  Service: Pain Management;  Laterality: Left;  Left RFA L3,4,5  1 of 2    RADIOFREQUENCY ABLATION Right 8/31/2020    Procedure: RADIOFREQUENCY ABLATION RIGHT L3,4,5 2 of 2;  Surgeon: Shaq Silverio MD;  Location: [facility] PAIN MGT;  Service: Pain Management;  Laterality: Right;  RADIOFREQUENCY ABLATION RIGHT L3,4,5  2 of 2    RADIOFREQUENCY ABLATION Left 9/9/2021    Procedure:  RADIOFREQUENCY ABLATION, L3-L4 AND L5 MEDIAL BRANCH 1 OF 2;  Surgeon: Shaq Silverio MD;  Location: [facility] PAIN MGT;  Service: Pain Management;  Laterality: Left;    RADIOFREQUENCY ABLATION Right 9/20/2021    Procedure: RADIOFREQUENCY ABLATION, L3-L4 AND L5 MEDIAL BRANCH 2 OF 2;  Surgeon: Shaq Silverio MD;  Location: [facility] PAIN MGT;  Service: Pain Management;  Laterality: Right;    RADIOFREQUENCY ABLATION Right 7/7/2022    Procedure: RADIOFREQUENCY ABLATION, RIGHT L3-L4-L5 ONE OF TWO;  Surgeon: Shaq Silverio MD;  Location: [facility] PAIN MGT;  Service: Pain Management;  Laterality: Right;    RADIOFREQUENCY ABLATION Left 7/21/2022    Procedure: RADIOFREQUENCY ABLATION, LEFT L3-L4-L5 TWO OF TWO *BRING SCS REMOTE*;  Surgeon: Shaq Silverio MD;  Location: [facility] PAIN MGT;  Service: Pain Management;  Laterality: Left;    RADIOFREQUENCY ABLATION Left 3/16/2023    Procedure: RADIOFREQUENCY ABLATION LEFT L3,L4,L5 *BRING SCS REMOTE*;  Surgeon: Shaq Silverio MD;  Location: [facility] PAIN MGT;  Service: Pain Management;  Laterality: Left;    RADIOFREQUENCY ABLATION Right 3/30/2023    Procedure: RADIOFREQUENCY ABLATION RIGHT L3,L4,L5 *BRING SCS REMOTE*;  Surgeon: Shaq Silverio MD;  Location: [facility] PAIN MGT;  Service: Pain Management;  Laterality: Right;    RADIOFREQUENCY ABLATION Right 12/18/2023    Procedure: RADIOFREQUENCY ABLATION RIGHT L3, 4, 5 1 OF 2;  Surgeon: Shaq Silverio MD;  Location: [facility] PAIN MGT;  Service: Pain Management;  Laterality: Right;  [phone]    RADIOFREQUENCY ABLATION Left 1/4/2024    Procedure: RADIOFREQUENCY ABLATION LEFT L3, 4, 5 2 OF 2;  Surgeon: Shaq Silverio MD;  Location: [facility] PAIN MGT;  Service: Pain Management;  Laterality: Left;  [phone]  2 WK F/U WARREN    TRIAL OF SPINAL CORD NERVE STIMULATOR N/A 9/24/2018    Procedure: TRIAL, NEUROSTIMULATOR, SPINAL CORD, SPINAL CORD STIMULATOR TRIAL-INTERNAL WIRES TO EXTERNAL BATTERY;  Surgeon: Shaq Silverio MD;  Location: [facility] PAIN MGT;  Service: Pain Management;   "Laterality: N/A;  COPE REP NOTIFIED       SOCIAL HISTORY:  Social History     Socioeconomic History    Marital status:    Tobacco Use    Smoking status: Never     Passive exposure: Never    Smokeless tobacco: Never   Substance and Sexual Activity    Alcohol use: Not Currently     Alcohol/week: 1.0 standard drink of alcohol     Types: 1 Glasses of wine per week     Comment: occ    Drug use: No    Sexual activity: Yes     Partners: Male       FAMILY HISTORY:  Family History   Problem Relation Name Age of Onset    Cataracts Mother      Glaucoma Mother      No Known Problems Father      No Known Problems Sister      No Known Problems Brother      No Known Problems Maternal Aunt      No Known Problems Maternal Uncle      No Known Problems Paternal Aunt      No Known Problems Paternal Uncle      No Known Problems Maternal Grandmother      No Known Problems Maternal Grandfather      No Known Problems Paternal Grandmother      No Known Problems Paternal Grandfather         ALLERGIES AND MEDICATIONS: updated and reviewed.  Review of patient's allergies indicates:  No Known Allergies  Current Outpatient Medications   Medication Sig Dispense Refill    atenoloL (TENORMIN) 100 MG tablet TAKE 1 TABLET EVERY DAY 90 tablet 3    BD ULTRA-FINE MINI PEN NEEDLE 31 gauge x 3/16" Ndle       BD ULTRA-FINE FELICIA PEN NEEDLE 32 gauge x 5/32" Ndle       blood sugar diagnostic Strp To check BG 3 times daily, to use with insurance preferred meter 100 strip 11    fluticasone propionate (FLONASE) 50 mcg/actuation nasal spray 2 sprays (100 mcg total) by Each Nostril route once daily. 11.1 mL 1    hydrOXYzine pamoate (VISTARIL) 25 MG Cap TAKE 1 CAPSULE BY MOUTH ONCE DAILY AS NEEDED FOR ANXIETY 90 capsule 3    lancets Misc To check BG 3 times daily, to use with insurance preferred meter 100 each 11    loratadine (CLARITIN) 10 mg tablet Take 1 tablet (10 mg total) by mouth daily as needed for Allergies. 90 tablet 3    midazolam, PF, (VERSED) " "1 mg/mL Soln injection       pantoprazole (PROTONIX) 20 MG tablet TAKE 1 TABLET TWICE DAILY BEFORE MEALS 180 tablet 3    pregabalin (LYRICA) 100 MG capsule Take 1 capsule (100 mg total) by mouth 3 (three) times daily. 90 capsule 2    semaglutide (OZEMPIC) 1 mg/dose (4 mg/3 mL) Inject 1 mg into the skin every 7 days. 9 mL 1    tiZANidine (ZANAFLEX) 4 MG tablet Take 1 tablet (4 mg total) by mouth daily as needed (pain). 90 tablet 3    topiramate (TOPAMAX) 100 MG tablet Take 1 tablet (100 mg total) by mouth every evening. 90 tablet 1    traZODone (DESYREL) 100 MG tablet Take 1 tablet by mouth in the evening 90 tablet 3    albuterol (PROAIR HFA) 90 mcg/actuation inhaler Inhale 2 puffs into the lungs every 4 (four) hours as needed for Wheezing or Shortness of Breath. Rescue 18 g 11    EScitalopram oxalate (LEXAPRO) 20 MG tablet Take 1 tablet (20 mg total) by mouth once daily. 90 tablet 3    losartan (COZAAR) 50 MG tablet Take 1 tablet (50 mg total) by mouth once daily. 90 tablet 3    rosuvastatin (CRESTOR) 20 MG tablet Take 1 tablet (20 mg total) by mouth every evening. 90 tablet 3     No current facility-administered medications for this visit.     Facility-Administered Medications Ordered in Other Visits   Medication Dose Route Frequency Provider Last Rate Last Admin    0.9%  NaCl infusion   Intravenous Continuous Uriel Aranda MD ROS  Review of Systems   Musculoskeletal:  Positive for back pain (chronic).           Physical Exam  Vitals:    08/30/24 1427   BP: 120/60   Pulse: 83   Resp: 18   Temp: 98.1 °F (36.7 °C)   TempSrc: Oral   SpO2: 98%   Weight: 83 kg (183 lb 1.5 oz)   Height: 5' 4" (1.626 m)    Body mass index is 31.43 kg/m².  Weight: 83 kg (183 lb 1.5 oz)   Height: 5' 4" (162.6 cm)   Physical Exam  Constitutional:       General: She is not in acute distress.     Appearance: She is obese.   HENT:      Head: Normocephalic and atraumatic.   Neck:      Vascular: No carotid bruit.   Cardiovascular: "      Rate and Rhythm: Normal rate and regular rhythm.      Pulses: Normal pulses.      Heart sounds: Normal heart sounds.   Pulmonary:      Effort: Pulmonary effort is normal. No respiratory distress.      Breath sounds: Normal breath sounds.   Musculoskeletal:      Cervical back: Neck supple.      Right lower leg: No edema.      Left lower leg: No edema.   Feet:      Comments: Protective Sensation (w/ 10 gram monofilament):  Right: Intact  Left: Intact    Visual Inspection:  Normal -  Bilateral    Pedal Pulses:   Right: Present  Left: Present    Posterior Tibialis Pulses:   Right:Present  Left: Present        Skin:     General: Skin is warm and dry.      Findings: No rash.   Neurological:      General: No focal deficit present.      Mental Status: She is alert and oriented to person, place, and time.   Psychiatric:         Mood and Affect: Mood normal.         Behavior: Behavior normal.         Thought Content: Thought content normal.

## 2024-09-24 DIAGNOSIS — Z12.11 SCREENING FOR COLON CANCER: Primary | ICD-10-CM

## 2024-09-30 ENCOUNTER — CLINICAL SUPPORT (OUTPATIENT)
Dept: ENDOSCOPY | Facility: HOSPITAL | Age: 74
End: 2024-09-30
Attending: INTERNAL MEDICINE
Payer: MEDICARE

## 2024-09-30 VITALS — HEIGHT: 64 IN | BODY MASS INDEX: 31.24 KG/M2 | WEIGHT: 183 LBS

## 2024-09-30 DIAGNOSIS — Z12.11 SCREENING FOR COLON CANCER: ICD-10-CM

## 2024-10-01 ENCOUNTER — TELEPHONE (OUTPATIENT)
Dept: ENDOSCOPY | Facility: HOSPITAL | Age: 74
End: 2024-10-01
Payer: MEDICARE

## 2024-10-01 RX ORDER — SODIUM, POTASSIUM,MAG SULFATES 17.5-3.13G
1 SOLUTION, RECONSTITUTED, ORAL ORAL DAILY
Qty: 2 KIT | Refills: 0 | Status: SHIPPED | OUTPATIENT
Start: 2024-10-01 | End: 2024-10-03

## 2024-10-01 NOTE — TELEPHONE ENCOUNTER
Spoke to pt to schedule procedure(s) Colonoscopy       Physician to perform procedure(s) Dr. CHARLOTTE Traylor  Date of Procedure (s) 10/7/24  Arrival Time 12:45 PM  Time of Procedure(s) 1:45 PM   Location of Procedure(s) [address]   Type of Rx Prep sent to patient: Suprep extended  Instructions provided to patient via [facility]    Patient was informed on the following information and verbalized understanding. Screening questionnaire reviewed with patient and complete. If procedure requires anesthesia, a responsible adult needs to be present to accompany the patient home, patient cannot drive after receiving anesthesia. Appointment details are tentative, especially check-in time. Patient will receive a prep-op call 7 days prior to confirm check-in time for procedure. If applicable the patient should contact their pharmacy to verify Rx for procedure prep is ready for pick-up. Patient was advised to call the scheduling department at [phone] if pharmacy states no Rx is available. Patient was advised to call the endoscopy scheduling department if any questions or concerns arise.      [facility] Scheduling Department

## 2024-10-02 ENCOUNTER — HOSPITAL ENCOUNTER (OUTPATIENT)
Dept: RADIOLOGY | Facility: HOSPITAL | Age: 74
Discharge: HOME | End: 2024-10-02
Attending: FAMILY MEDICINE
Payer: MEDICARE

## 2024-10-02 ENCOUNTER — PATIENT MESSAGE (OUTPATIENT)
Dept: RESEARCH | Facility: CLINIC | Age: 74
End: 2024-10-02
Payer: MEDICARE

## 2024-10-02 ENCOUNTER — TELEPHONE (OUTPATIENT)
Dept: ENDOSCOPY | Facility: HOSPITAL | Age: 74
End: 2024-10-02
Payer: MEDICARE

## 2024-10-02 DIAGNOSIS — Z12.31 ENCOUNTER FOR SCREENING MAMMOGRAM FOR MALIGNANT NEOPLASM OF BREAST: ICD-10-CM

## 2024-10-02 PROCEDURE — 77067 SCR MAMMO BI INCL CAD: CPT | Mod: TC,HCNC,PO

## 2024-10-04 RX ORDER — LIDOCAINE HYDROCHLORIDE 10 MG/ML
1 INJECTION, SOLUTION EPIDURAL; INFILTRATION; INTRACAUDAL; PERINEURAL ONCE
OUTPATIENT
Start: 2024-10-04 | End: 2024-10-04

## 2024-10-04 RX ORDER — SODIUM CHLORIDE 9 MG/ML
INJECTION, SOLUTION INTRAVENOUS CONTINUOUS
OUTPATIENT
Start: 2024-10-04

## 2024-10-07 ENCOUNTER — TELEPHONE (OUTPATIENT)
Dept: ENDOSCOPY | Facility: HOSPITAL | Age: 74
End: 2024-10-07
Payer: MEDICARE

## 2024-10-10 ENCOUNTER — PATIENT MESSAGE (OUTPATIENT)
Dept: PAIN MEDICINE | Facility: CLINIC | Age: 74
End: 2024-10-10
Payer: MEDICARE

## 2024-10-11 ENCOUNTER — OFFICE VISIT (OUTPATIENT)
Dept: PAIN MEDICINE | Facility: CLINIC | Age: 74
End: 2024-10-11
Payer: MEDICARE

## 2024-10-11 VITALS
WEIGHT: 183 LBS | HEART RATE: 71 BPM | DIASTOLIC BLOOD PRESSURE: 65 MMHG | BODY MASS INDEX: 31.41 KG/M2 | SYSTOLIC BLOOD PRESSURE: 113 MMHG | TEMPERATURE: 98 F

## 2024-10-11 DIAGNOSIS — M79.18 MYOFASCIAL PAIN: ICD-10-CM

## 2024-10-11 DIAGNOSIS — Z96.89 S/P INSERTION OF SPINAL CORD STIMULATOR: ICD-10-CM

## 2024-10-11 DIAGNOSIS — M47.817 SPONDYLOSIS OF LUMBOSACRAL REGION WITHOUT MYELOPATHY OR RADICULOPATHY: ICD-10-CM

## 2024-10-11 DIAGNOSIS — M51.360 DEGENERATION OF INTERVERTEBRAL DISC OF LUMBAR REGION WITH DISCOGENIC BACK PAIN: ICD-10-CM

## 2024-10-11 DIAGNOSIS — G89.4 CHRONIC PAIN SYNDROME: Primary | ICD-10-CM

## 2024-10-11 PROCEDURE — 99999 PR PBB SHADOW E&M-EST. PATIENT-LVL IV: CPT | Mod: PBBFAC,HCNC,, | Performed by: NURSE PRACTITIONER

## 2024-10-11 RX ORDER — TIZANIDINE 4 MG/1
4 TABLET ORAL DAILY PRN
Qty: 90 TABLET | Refills: 3 | Status: SHIPPED | OUTPATIENT
Start: 2024-10-11

## 2024-10-11 RX ORDER — METHYLPREDNISOLONE 4 MG/1
TABLET ORAL
Qty: 1 EACH | Refills: 0 | Status: SHIPPED | OUTPATIENT
Start: 2024-10-11 | End: 2024-11-01

## 2024-10-11 NOTE — PROGRESS NOTES
Chronic patient Established Note (Follow up visit)      SUBJECTIVE:    Interval History 10/11/2024:  The patient returns to clinic today for follow up of back pain. Since last visit, her insurance denied her procedure due to timing. She reports increased back pain at this time. She is having radiating pain into her legs to her ankles. Her SCS is currently not on. She is unable to find her controller. She has not reached out to representatives. Her pain is worse with standing and walking. She denies any other health changes. Her pain today is 10/10.    Interval History 7/15/2024:  The patient is here today to discuss lower back pain. Since previous visit, she did undergo right then left L3,4,5 RFAs completed with 80% relief for about 7 months. Her pain has now returned and she wishes to repeat the RFAs. The pain worsens with prolonged standing and walking. She continues with home PT exercises. Her pain today is 10/10.    Interval history 11/22/2023:  73-year-old female that presents today with axial low back pain.  Not been seen for approximately 7 months set of pain 1-2 weeks, is located in a bandlike distribution of low back she did not anesthesia like symptoms.  She is known to this clinic and was previously provided a or RFA targeting L3, L4 and L5 that provided her with 50-60% relief.  Like to be considered for repeat lumbar RFA she denies any recent incident or trauma denies any bowel bladder dysfunction anesthesia denies any profound weakness.    Interval History 4/20/2023:  The patient returns to clinic today for follow up of low back pain. She is s/p left L3,4,5 RFA on 3/16/2023 and right L3,4,5 RFA on 3/30/2023. She reports 50-60% relief of her pain. Her pain is tolerable at this time. She has good days and bad days. She denies any radicular leg pain at this time. She is not currently using her SCS. She has not contacted representatives. She reports that her mother passed away last week. She is dealing with  a lot of stress due to this. She is taking Gabapentin, although not consistently. She also takes Zanaflex. She denies any other health changes. Her pain today is 6/10.     Interval History 2/8/2023:  The patient returns to clinic today for follow up of back pain. She reports worsened low back pain. She reports intermittent radiating pain into the posterior aspect of both legs to the ankles, left greater than right. Her pain is constant in nature. Her back pain is greater than her leg pain. She is reporting limited relief with SCS. She has not been able to meet with Roque or Ildefonso for programming. She is taking Gabapentin. She denies any other health changes. Her pain today is 9/10.    Interval History 11/4/2022:  Faby is here for follow up of back and leg pain. Unfortunately, she has been unable to meet up with Roque for SCS programming. She does report some improvement in symptoms since previous visit. She still has back pain with radiation into the legs. Recent XRAYs do not show any significant lead migration. She only takes Gabapentin intermittently because she says it makes her feet swell but it does help. Her pain today is 8/10.    Interval History 10/4/2022:  The patient is here for follow up of chronic back pain. She reports more pain over the past month. No injury or trauma. She does have a lumbar SCS but is unsure if it is even on at this time. She does not think that it is. It did previously help with her back and leg pain. She has not been in contact with [facility] recently. The back pain radiates down the back of both legs to the feet. It worsens with walking and standing. Her pain today is 10/10.    Interval History 8/23/2022:  Faby is here for follow up of chronic lower back pain. She is now s/p right then left L3,4,5 RFAs completed on 7/21/22 with limited relief so far. She continues to report aching back pain with radiation into the legs and numbness. She has had her SCS off for a couple of months. She has  not been in contact with [employer] rep for programming. She says that she turned it off due to limited benefit. No new weakness to legs or falls. No bowel/bladder incontinence. Her pain today is 10/10.    Interval History 6/17/2022:  The patient is here today for follow up of back pain. She has had more aching pain across the lower back. She had benefit with lumbar RFAs in the past and would like to reschedule this. She would also like to repeat aquatic PT as this was helpful in the past. Her pain is aching and throbbing in nature without radiation currently. No new falls or trauma. Her pain today is 8/10.    Interval History 5/5/2022:  The patient is here for follow up of chronic lower back pain. She has radiation into the legs. No recent falls or trauma. She saw Dr. Silverio last month and was under the impression that an [employer] rep would be here today for programming. She is still having difficulty programming her device. She has mild benefit with Gabapentin 900 mg daily without side effects. She is also having muscle spasms intermittently. She is asking for a muscle relaxer. She has tried Robaxin in the past with mild benefit. She would like to try another medication. Her pain today is 8/10.    Interval History 4/20/2022: She presents today for follow up of low back pain. She states that she only had about 40% relief of LBP following RFAs in September that lasted a few weeks. Pain  continues to be in the low back and radiates into b/l LE. Pain encompasses the entire leg and does not follow a specific dermatomal pattern in the leg. She denies weakness, numbness or tingling in the lower extremities. She denies bowel or bladder incontinence. Pain is currently rated 8/10. She is currently on gabapentin 300mg tid with minimal relief. She has been unable to charge bret SCS for the last month and does not have the contact number for her rep.      Interval History 9/30/2021:  The patient returns to clinic today for follow up  of low back pain. She is s/p left L3,4,5 RFA on 9/9/2021 and right L3,4,5 RFA on 9/20/2021. She is unsure of relief at this time. She reports increased pain to the right side. She describes this pain as burning in nature. She denies any radiating leg pain. Her pain is worse with bending and standing. She continues to report benefit with [employer] SCS. She denies any weakness. She denies any other health changes. Her pain today is 9/10.    Interval History 8/3/2021:   Ms. Luna returns in f/u re: low back/leg pain. She reports about three months ago she noticed her low back started bothering her, worse with bending and prolonged standing. No inciting event, no trauma to back since last visit. Reports continued leg pain down the backs of her legs as well. She reports intermittent instability in her legs - on and off - over the last year. Last time she has met with [employer] rep for reprogramming of SCS was fall 2020. Denies any current issues with SCS function/battery/remote.     Interval History 9/28/2020:  The patient is here today for increased lower back pain.  She is status post right than left L3, 4, 5 radiofrequency ablation completed on 08/31/2020 with approximately 50% relief.  However, she is feeling tightness across the lower back without radiation at this time.  She would like an injection today.  Additionally, she states that she has not completed physical therapy for her back in some time and is not currently doing any exercises.  Her leg pain is currently well controlled with spinal cord stimulator and she had a recent adjustment.  Her pain today is 8/10.    Interval History 7/16/2020:  The patient is here for follow up of lower back pain.  She previously had benefit with lumbar RFAs for similar pain.  She is also having radiation into her legs with numbness, left greater than right.  Ildefonso is here today to meet with her for programming.  She previously was having significant benefit of leg pain with SCS  implant.  She denies any recent trauma or falls. Her pain today is 9/10.    Interval History 1/9/2020:  The patient is here for follow up of lower back and leg pain.  She is s/p lumbar RFAs with about 50% relief.  Her leg pain is well controlled with implant.  She still has intermittent flare ups of back pain, like today.  When this happens, she has some benefit with rest.  She has tried Tylenol and OTC NSAIDs without benefit.  She denies any recent falls or weakness.  The patient denies any bowel or bladder incontinence or signs of saddle paresthesia.  The patient denies any major medical changes since last office visit.  Her pain today is 7/10.    Previous Encounter:  Faby Luna presents to the clinic for a follow-up appointment for lower back pain.  She previously had significant leg pain which has resolved with [employer] SCS implant.  She is here today to discuss increased lower back pain.  It is sharp and throbbing in nature.  It is significant in the morning and with prolonged standing and activity.  She has some benefit with stretching.  She denies any recent falls.  Since the last visit, Faby Luna states the pain has been worsening.  Current pain intensity is 8/10.    Pain Disability Index Review:      10/11/2024     2:00 PM 7/15/2024     2:59 PM 11/22/2023     3:15 PM   Last 3 PDI Scores   Pain Disability Index (PDI) 60 57 30       Opioid Contract: no     report:  Not applicable    Pain Procedures:   5/2/18 Left L3 and L4 TF ELISE- 20% relief  5/21/18 Bilateral L4-5 and L5-S1 facet joint injections- no relief  9/24/18 Lumbar SCS trial ([employer])- 100% relief  10/26/18 Lumbar SCS implant ([employer])- 100% relief of leg pain  9/12/19 Bilateral L3,4,5 MBB- helpful  10/17/19 Bilateral L3,4,5 MBB- helpful  11/21/19 Right L3,4,5 RFA- 50% relief  12/5/19 Left L3,4,5 RFA- 50% relief  8/17/20 Left L3,4,5 RFA- 50% relief  8/31/20 Right L3,4,5 RFA- 50% relief  9/9/2021- Left L3,4,5 RFA - 60%  relief  9/20/2021- Right L3,4,5 RFA -60% relief  7/7/22 Right L3,4,5 RFA   7/21/22 Left L3,4,5 RFA   3/16/2023- Left L3,4,5 RFA  3/30/2023- Right L3,4,5 RFA  12/18/24 Right L3,4,5 RFA  1/4/24 Left L3,4,5 RFA    Physical Therapy/Home Exercise:   yes in the past with limited benefit    Imaging:     3/31/18 Lumbar MRI    Narrative     EXAMINATION:  MRI LUMBAR SPINE WITHOUT CONTRAST    CLINICAL HISTORY:  Low back pain, >6wks conservative tx, persistent-progressive sx, surgical candidate; Other spondylosis with radiculopathy, lumbar region    TECHNIQUE:  Multiplanar, multisequence MR images were acquired from the thoracolumbar junction to the sacrum without the administration of contrast.    COMPARISON:  Plain films from 03/27/2018    FINDINGS:  There is grade 1 spondylolisthesis of L4 on L5. The vertebral body heights are well maintained, with no fracture.  No marrow signal abnormality suspicious for an infiltrative process.    The conus medullaris terminates at approximately the mid body of L1.  There is a cyst associated with the upper pole of the right kidney.  There is disc desiccation noted throughout the lumbar spine with relative sparing of the L5-S1 disc.  Mild disc space narrowing present at the L4-5 level.    L1-L2: Mild diffuse disc bulge resulting in no significant central or neural foraminal canal narrowing.    L2-L3: No significant central canal narrowing.  There is mild narrowing of either neural foraminal canal secondary to disc material.    L3-L4: Disc bulging in the bilateral foraminal regions resulting in no significant central canal narrowing.  Mild-to-moderate bilateral facet arthropathy also noted.  The bilateral neural foraminal canals are moderately narrowed with some mild effacement of either exiting L3 nerve root in the extraforaminal regions bilaterally.    L4-L5:  Grade 1 spondylolisthesis along with moderate to severe bilateral facet arthropathy and ligamentum flavum hypertrophy resulting in  "at least moderate narrowing of the central canal.  The right neural foraminal canal is mildly to moderately narrowed.  Left neural foraminal canal is moderately to severely narrowed with mild effacement of the exiting L4 nerve root.    L5-S1:  No significant central or neural foraminal canal narrowing noted.  Mild bilateral facet arthropathy noted.   Impression       1. Multilevel degenerative changes of the lumbar spine as detailed above     Lumbar XRAYs 3/27/18    Narrative     EXAMINATION:  XR LUMBAR SPINE AP AND LAT WITH FLEX/EXT    CLINICAL HISTORY:  Low back pain    TECHNIQUE:  AP and lateral views as well as lateral flexion and extension images are performed through the lumbar spine.    COMPARISON:  None    FINDINGS:  X-ray lumbar spine with flexion and extension demonstrates grade 1 spondylolisthesis at L4-5 measuring around 6 mm which does not change significantly with flexion and extension.  The L4-5 disc is narrowed and degenerated.  Other lumbar vertebral discs are maintained.  Vertebral body heights are maintained.  Small anterior spurs are seen, and there is small posterior osteophyte along the inferior endplate of L4.  There is lumbar facet arthropathy at L4-5 and L5-S1.   Impression       Degenerative changes as above.  Grade 1 anterolisthesis and degenerative disc disease at L4-5.         Allergies:   Review of patient's allergies indicates:   Allergen Reactions    Aspirin Other (See Comments)     Has been told not to take it due to bariatric surgery       Current Medications:   Current Outpatient Medications   Medication Sig Dispense Refill    atenoloL (TENORMIN) 100 MG tablet TAKE 1 TABLET EVERY DAY 90 tablet 3    BD ULTRA-FINE MINI PEN NEEDLE 31 gauge x 3/16" Ndle       BD ULTRA-FINE FELICIA PEN NEEDLE 32 gauge x 5/32" Ndle       blood sugar diagnostic Strp To check BG 3 times daily, to use with insurance preferred meter 100 strip 11    EScitalopram oxalate (LEXAPRO) 20 MG tablet Take 1 tablet (20 " mg total) by mouth once daily. 90 tablet 3    lancets Misc To check BG 3 times daily, to use with insurance preferred meter 100 each 11    loratadine (CLARITIN) 10 mg tablet Take 1 tablet (10 mg total) by mouth daily as needed for Allergies. 90 tablet 3    losartan (COZAAR) 50 MG tablet Take 1 tablet (50 mg total) by mouth once daily. 90 tablet 3    midazolam, PF, (VERSED) 1 mg/mL Soln injection       pantoprazole (PROTONIX) 20 MG tablet TAKE 1 TABLET TWICE DAILY BEFORE MEALS 180 tablet 3    pregabalin (LYRICA) 100 MG capsule Take 1 capsule (100 mg total) by mouth 3 (three) times daily. 90 capsule 2    rosuvastatin (CRESTOR) 20 MG tablet Take 1 tablet (20 mg total) by mouth every evening. 90 tablet 3    semaglutide (OZEMPIC) 1 mg/dose (4 mg/3 mL) Inject 1 mg into the skin every 7 days. 9 mL 1    tiZANidine (ZANAFLEX) 4 MG tablet Take 1 tablet (4 mg total) by mouth daily as needed (pain). 90 tablet 3    topiramate (TOPAMAX) 100 MG tablet Take 1 tablet (100 mg total) by mouth every evening. 90 tablet 1    traZODone (DESYREL) 100 MG tablet Take 1 tablet by mouth in the evening 90 tablet 3    albuterol (PROAIR HFA) 90 mcg/actuation inhaler Inhale 2 puffs into the lungs every 4 (four) hours as needed for Wheezing or Shortness of Breath. Rescue (Patient not taking: Reported on 10/11/2024) 18 g 11    fluticasone propionate (FLONASE) 50 mcg/actuation nasal spray 2 sprays (100 mcg total) by Each Nostril route once daily. (Patient not taking: Reported on 10/11/2024) 11.1 mL 1    hydrOXYzine pamoate (VISTARIL) 25 MG Cap TAKE 1 CAPSULE BY MOUTH ONCE DAILY AS NEEDED FOR ANXIETY 90 capsule 3     No current facility-administered medications for this visit.     Facility-Administered Medications Ordered in Other Visits   Medication Dose Route Frequency Provider Last Rate Last Admin    0.9%  NaCl infusion   Intravenous Continuous Uriel Aranda MD           REVIEW OF SYSTEMS:    GENERAL:  No weight loss, malaise or fevers.  HEENT:   Negative for frequent or significant headaches.  NECK:  Negative for lumps, goiter, pain and significant neck swelling.  RESPIRATORY:  Negative for cough, wheezing or shortness of breath.  CARDIOVASCULAR:  Negative for chest pain, leg swelling or palpitations. Hypertension.  GI:  Negative for abdominal discomfort, blood in stools or black stools or change in bowel habits.  MUSCULOSKELETAL:  See HPI.  SKIN:  Negative for lesions, rash, and itching.  PSYCH:  Negative for sleep disturbance, mood disorder and recent psychosocial stressors.  HEMATOLOGY/LYMPHOLOGY:  Negative for prolonged bleeding, bruising easily or swollen nodes.  NEURO:   No history of headaches, syncope, paralysis, seizures or tremors.  ENDO: Diabetes.  All other reviewed and negative other than HPI.    Past Medical History:  Past Medical History:   Diagnosis Date    Anxiety with depression     Arthritis     CKD (chronic kidney disease) stage 2, GFR 60-89 ml/min     Diabetes mellitus     History of Clarisse-en-Y gastric bypass     Hypertension     Obesity, unspecified     DAHLIA (obstructive sleep apnea)     Spondylosis        Past Surgical History:  Past Surgical History:   Procedure Laterality Date    CARPAL TUNNEL RELEASE Right 3/8/2019    Procedure: RELEASE, CARPAL TUNNEL right;  Surgeon: Pan Goodman MD;  Location: [facility];  Service: Orthopedics;  Laterality: Right;    CARPAL TUNNEL RELEASE Left 2019    Procedure: RELEASE, CARPAL TUNNEL- LEFT;  Surgeon: Pan Goodman MD;  Location: [address];  Service: Orthopedics;  Laterality: Left;    CATARACT EXTRACTION Bilateral      SECTION      CHOLECYSTECTOMY      COLONOSCOPY N/A 2024    Procedure: COLONOSCOPY;  Surgeon: Otilio Man MD;  Location: [facility];  Service: Endoscopy;  Laterality: N/A;  Referred by: MYNOR Sprague / CECILIA Meds: ozempic / diabetic / Prep: peg / Route instructions sent: portal  - lvm and portal msg for pc. DBM  - left 2nd vm for pc. DBM    EPIDURAL STEROID  INJECTION INTO LUMBAR SPINE N/A 6/14/2018    Procedure: INJECTION, STEROID, SPINE, LUMBAR, EPIDURAL;  Surgeon: Shaq Silverio MD;  Location: [facility]IN [facility]GT;  Service: Pain Management;  Laterality: N/A;  LUMBAR L4-L5 INTERLAMINAR ELISE'  [id number]  W/ SEDATION     ESOPHAGOGASTRODUODENOSCOPY N/A 5/12/2023    Procedure: EGD (ESOPHAGOGASTRODUODENOSCOPY);  Surgeon: Deann Jimenez MD;  Location: [facility] ENDO;  Service: Gastroenterology;  Laterality: N/A;    HYSTERECTOMY      INJECTION OF ANESTHETIC AGENT AROUND NERVE Bilateral 9/12/2019    Procedure: BLOCK, NERVE, L3-L4-L5 MEDIAL BRANCH;  Surgeon: Shaq Silverio MD;  Location: [facility];  Service: Pain Management;  Laterality: Bilateral;    INJECTION OF ANESTHETIC AGENT AROUND NERVE Bilateral 10/17/2019    Procedure: BLOCK, NERVE;  Surgeon: Shaq Silverio MD;  Location: [facility];  Service: Pain Management;  Laterality: Bilateral;  B/L MBB L3-L4-L5  REPEAT  CONSENT NEEDED    INJECTION OF FACET JOINT Bilateral 5/28/2018    Procedure: INJECTION-FACET;  Surgeon: Shaq Silverio MD;  Location: [facility];  Service: Pain Management;  Laterality: Bilateral;  LUMBAR BILATERAL L4-L5 AND L5-S1 FACET STEROID INJECTION  [id number]    W/ SEDATION     RADIOFREQUENCY ABLATION Right 11/21/2019    Procedure: RADIOFREQUENCY ABLATION RIGHT L3, L4, L5;  Surgeon: Shaq Silverio MD;  Location: [facility];  Service: Pain Management;  Laterality: Right;  Right RFA L3-L4-L5  1 of 2  Consent Needed    RADIOFREQUENCY ABLATION Left 12/5/2019    Procedure: RADIOFREQUENCY ABLATION LEFT L3-5;  Surgeon: Shaq Silverio MD;  Location: [facility];  Service: Pain Management;  Laterality: Left;  Left RFA L3-L4-L5  2 of 2  Consent Needed    RADIOFREQUENCY ABLATION Left 8/17/2020    Procedure: RADIOFREQUENCY ABLATION LEFT L3,4,5 1 of 2;  Surgeon: Shaq Silverio MD;  Location: [facility];  Service: Pain Management;  Laterality: Left;  Left RFA L3,4,5  1 of 2    RADIOFREQUENCY ABLATION Right 8/31/2020     Procedure: RADIOFREQUENCY ABLATION RIGHT L3,4,5 2 of 2;  Surgeon: Shaq Silverio MD;  Location: [facility];  Service: Pain Management;  Laterality: Right;  RADIOFREQUENCY ABLATION RIGHT L3,4,5  2 of 2    RADIOFREQUENCY ABLATION Left 9/9/2021    Procedure: RADIOFREQUENCY ABLATION, L3-L4 AND L5 MEDIAL BRANCH 1 OF 2;  Surgeon: Shaq Silverio MD;  Location: [facility];  Service: Pain Management;  Laterality: Left;    RADIOFREQUENCY ABLATION Right 9/20/2021    Procedure: RADIOFREQUENCY ABLATION, L3-L4 AND L5 MEDIAL BRANCH 2 OF 2;  Surgeon: Shaq Silverio MD;  Location: [facility];  Service: Pain Management;  Laterality: Right;    RADIOFREQUENCY ABLATION Right 7/7/2022    Procedure: RADIOFREQUENCY ABLATION, RIGHT L3-L4-L5 ONE OF TWO;  Surgeon: Shaq Silverio MD;  Location: [facility];  Service: Pain Management;  Laterality: Right;    RADIOFREQUENCY ABLATION Left 7/21/2022    Procedure: RADIOFREQUENCY ABLATION, LEFT L3-L4-L5 TWO OF TWO *BRING SCS REMOTE*;  Surgeon: Shaq Silverio MD;  Location: [facility];  Service: Pain Management;  Laterality: Left;    RADIOFREQUENCY ABLATION Left 3/16/2023    Procedure: RADIOFREQUENCY ABLATION LEFT L3,L4,L5 *BRING SCS REMOTE*;  Surgeon: Shaq Silverio MD;  Location: [facility];  Service: Pain Management;  Laterality: Left;    RADIOFREQUENCY ABLATION Right 3/30/2023    Procedure: RADIOFREQUENCY ABLATION RIGHT L3,L4,L5 *BRING SCS REMOTE*;  Surgeon: Shaq Silverio MD;  Location: [facility];  Service: Pain Management;  Laterality: Right;    RADIOFREQUENCY ABLATION Right 12/18/2023    Procedure: RADIOFREQUENCY ABLATION RIGHT L3, 4, 5 1 OF 2;  Surgeon: Shaq Silverio MD;  Location: [facility];  Service: Pain Management;  Laterality: Right;  [phone]    RADIOFREQUENCY ABLATION Left 1/4/2024    Procedure: RADIOFREQUENCY ABLATION LEFT L3, 4, 5 2 OF 2;  Surgeon: Shaq Silverio MD;  Location: [facility];  Service: Pain Management;  Laterality: Left;  [phone]  2 WK F/U WARREN     TRIAL OF SPINAL CORD NERVE STIMULATOR N/A 9/24/2018    Procedure: TRIAL, NEUROSTIMULATOR, SPINAL CORD, SPINAL CORD STIMULATOR TRIAL-INTERNAL WIRES TO EXTERNAL BATTERY;  Surgeon: Shaq Silverio MD;  Location: [facility];  Service: Pain Management;  Laterality: N/A;  [employer] REP NOTIFIED       Family History:  Family History   Problem Relation Name Age of Onset    Cataracts Mother      Glaucoma Mother      No Known Problems Father      No Known Problems Sister      No Known Problems Brother      No Known Problems Maternal Aunt      No Known Problems Maternal Uncle      No Known Problems Paternal Aunt      No Known Problems Paternal Uncle      No Known Problems Maternal Grandmother      No Known Problems Maternal Grandfather      No Known Problems Paternal Grandmother      No Known Problems Paternal Grandfather         Social History:  Social History     Socioeconomic History    Marital status:    Tobacco Use    Smoking status: Never     Passive exposure: Never    Smokeless tobacco: Never   Substance and Sexual Activity    Alcohol use: Not Currently     Alcohol/week: 1.0 standard drink of alcohol     Types: 1 Glasses of wine per week     Comment: occ    Drug use: No    Sexual activity: Yes     Partners: Male       OBJECTIVE:    /65   Pulse 71   Temp 97.6 °F (36.4 °C)   Wt 83 kg (182 lb 15.7 oz)   BMI 31.41 kg/m²     PHYSICAL EXAMINATION:    General appearance: Well appearing, in no acute distress, alert and oriented x3.  Psych:  Mood and affect appropriate.  Skin: Skin color, texture, turgor normal, no rashes or lesions, in both upper and lower body.   Head/face:  Atraumatic, normocephalic. No palpable lymph nodes  Back: Straight leg raising in the sitting and supine positions is negative to radicular pain bilaterally. There is pain with palpation to lumbar facet joints bilaterally. Limited ROM with pain on extension. Positive facet loading bilaterally.  Extremities: No deformities, edema, or skin  discoloration. Good capillary refill.  Musculoskeletal: 5/5 strength in right ankle with plantar and dorsiflexion. 5/5 strength in left ankle with plantar and dorsiflexion. 5/5 strength with right knee flexion and extension. 5/5 strength with left knee flexion and extension.   Neuro: Decreased sensation to BLE.  Gait: Antalgic- ambulates without assistance.    ASSESSMENT: 73 y.o. year old female with back pain, consistent with the following diagnoses:     1. Chronic pain syndrome  tiZANidine (ZANAFLEX) 4 MG tablet      2. Spondylosis of lumbosacral region without myelopathy or radiculopathy  methylPREDNISolone (MEDROL DOSEPACK) 4 mg tablet      3. S/P insertion of spinal cord stimulator        4. Degeneration of intervertebral disc of lumbar region with discogenic back pain        5. Myofascial pain              PLAN:     - Previous imaging was reviewed and discussed with the patient today. Labs reviewed.     - We can repeat lumbar RFA in the future.     - Continue Lyrica 100 mg TID.     - Continue Zanaflex 4 mg daily PRN, refill provided.     - Spoke with Haley from [employer] regarding controller. She will work with getting the patient a new one.     - The patient will continue a home exercise routine to help with pain and strengthening.      - RTC in 1 month.     The above plan and management options were discussed at length with patient. Patient is in agreement with the above and verbalized understanding.    Kimberly Conner NP  10/11/2024

## 2024-10-23 ENCOUNTER — TELEPHONE (OUTPATIENT)
Dept: ENDOSCOPY | Facility: HOSPITAL | Age: 74
End: 2024-10-23
Payer: MEDICARE

## 2024-10-23 NOTE — TELEPHONE ENCOUNTER
Referral for procedure from  [facility] referral (see Appts tab)      Spoke to pt to schedule procedure(s) Colonoscopy       Physician to perform procedure(s) Dr. CHARLOTTE Traylor  Date of Procedure (s) 10/30/24  Arrival Time 10:15 AM  Time of Procedure(s) 11:15 AM   Location of Procedure(s) [facility] [place] [facility]   Type of Rx Prep sent to patient: Suprep  Instructions provided to patient via [facility]    Patient was informed on the following information and verbalized understanding. Screening questionnaire reviewed with patient and complete. If procedure requires anesthesia, a responsible adult needs to be present to accompany the patient home, patient cannot drive after receiving anesthesia. Appointment details are tentative, especially check-in time. Patient will receive a prep-op call 7 days prior to confirm check-in time for procedure. If applicable the patient should contact their pharmacy to verify Rx for procedure prep is ready for pick-up. Patient was advised to call the scheduling department at [phone] if pharmacy states no Rx is available. Patient was advised to call the endoscopy scheduling department if any questions or concerns arise.      [facility] Scheduling Department        Procedure Information    Service Details  Procedure Modifiers Provider Requested Approved    - COLORECTAL SCRN; HI RISK IND none  1 1    - COLORECTAL SCRN; HI RISK IND none  1 1   Free Text Procedure Description    COLONOSCOPY, SCREENING, HIGH RISK PATIENT              Scheduling    None     Diagnosis Information    Diagnosis   Z12.11 (ICD-10-CM) - Screening for colon cancer   Z86.0100 (ICD-10-CM) - History of colon polyps     Free Text Diagnosis   Screening for colon cancer [Z12.11] History of colon polyps [Z86.0100]     Referral Notes

## 2024-10-28 ENCOUNTER — TELEPHONE (OUTPATIENT)
Dept: ENDOSCOPY | Facility: HOSPITAL | Age: 74
End: 2024-10-28
Payer: MEDICARE

## 2024-10-29 ENCOUNTER — ANESTHESIA EVENT (OUTPATIENT)
Dept: ENDOSCOPY | Facility: HOSPITAL | Age: 74
End: 2024-10-29
Payer: MEDICARE

## 2024-10-30 ENCOUNTER — ANESTHESIA (OUTPATIENT)
Dept: ENDOSCOPY | Facility: HOSPITAL | Age: 74
End: 2024-10-30
Payer: MEDICARE

## 2024-10-30 ENCOUNTER — HOSPITAL ENCOUNTER (OUTPATIENT)
Facility: HOSPITAL | Age: 74
Discharge: HOME | End: 2024-10-30
Attending: STUDENT IN AN ORGANIZED HEALTH CARE EDUCATION/TRAINING PROGRAM | Admitting: STUDENT IN AN ORGANIZED HEALTH CARE EDUCATION/TRAINING PROGRAM
Payer: MEDICARE

## 2024-10-30 VITALS
OXYGEN SATURATION: 94 % | RESPIRATION RATE: 22 BRPM | SYSTOLIC BLOOD PRESSURE: 106 MMHG | TEMPERATURE: 98 F | HEART RATE: 58 BPM | DIASTOLIC BLOOD PRESSURE: 53 MMHG

## 2024-10-30 DIAGNOSIS — Z86.0100 HISTORY OF COLON POLYPS: ICD-10-CM

## 2024-10-30 LAB — POCT GLUCOSE: 106 MG/DL (ref 70–110)

## 2024-10-30 PROCEDURE — 63600175 PHARM REV CODE 636 W HCPCS: Mod: HCNC | Performed by: STUDENT IN AN ORGANIZED HEALTH CARE EDUCATION/TRAINING PROGRAM

## 2024-10-30 PROCEDURE — 37000008 HC ANESTHESIA 1ST 15 MINUTES: Mod: HCNC | Performed by: STUDENT IN AN ORGANIZED HEALTH CARE EDUCATION/TRAINING PROGRAM

## 2024-10-30 PROCEDURE — 45385 COLONOSCOPY W/LESION REMOVAL: CPT | Mod: PT,HCNC | Performed by: STUDENT IN AN ORGANIZED HEALTH CARE EDUCATION/TRAINING PROGRAM

## 2024-10-30 PROCEDURE — 27201089 HC SNARE, DISP (ANY): Mod: HCNC | Performed by: STUDENT IN AN ORGANIZED HEALTH CARE EDUCATION/TRAINING PROGRAM

## 2024-10-30 PROCEDURE — 45385 COLONOSCOPY W/LESION REMOVAL: CPT | Mod: PT,HCNC,, | Performed by: STUDENT IN AN ORGANIZED HEALTH CARE EDUCATION/TRAINING PROGRAM

## 2024-10-30 PROCEDURE — 37000009 HC ANESTHESIA EA ADD 15 MINS: Mod: HCNC | Performed by: STUDENT IN AN ORGANIZED HEALTH CARE EDUCATION/TRAINING PROGRAM

## 2024-10-30 PROCEDURE — 88305 TISSUE EXAM BY PATHOLOGIST: CPT | Mod: 26,HCNC,, | Performed by: PATHOLOGY

## 2024-10-30 PROCEDURE — 25000003 PHARM REV CODE 250: Mod: HCNC | Performed by: STUDENT IN AN ORGANIZED HEALTH CARE EDUCATION/TRAINING PROGRAM

## 2024-10-30 PROCEDURE — 88305 TISSUE EXAM BY PATHOLOGIST: CPT | Mod: HCNC | Performed by: PATHOLOGY

## 2024-10-30 RX ORDER — PROPOFOL 10 MG/ML
VIAL (ML) INTRAVENOUS
Status: DISCONTINUED | OUTPATIENT
Start: 2024-10-30 | End: 2024-10-30

## 2024-10-30 RX ORDER — LIDOCAINE HYDROCHLORIDE 20 MG/ML
INJECTION, SOLUTION EPIDURAL; INFILTRATION; INTRACAUDAL; PERINEURAL
Status: DISCONTINUED
Start: 2024-10-30 | End: 2024-10-30 | Stop reason: HOSPADM

## 2024-10-30 RX ORDER — PROPOFOL 10 MG/ML
VIAL (ML) INTRAVENOUS
Status: DISCONTINUED
Start: 2024-10-30 | End: 2024-10-30 | Stop reason: HOSPADM

## 2024-10-30 RX ORDER — LIDOCAINE HYDROCHLORIDE 20 MG/ML
INJECTION INTRAVENOUS
Status: DISCONTINUED | OUTPATIENT
Start: 2024-10-30 | End: 2024-10-30

## 2024-10-30 RX ORDER — PHENYLEPHRINE HCL IN 0.9% NACL 1 MG/10 ML
SYRINGE (ML) INTRAVENOUS
Status: DISCONTINUED
Start: 2024-10-30 | End: 2024-10-30 | Stop reason: HOSPADM

## 2024-10-30 RX ORDER — PHENYLEPHRINE HYDROCHLORIDE 10 MG/ML
INJECTION INTRAVENOUS
Status: DISCONTINUED | OUTPATIENT
Start: 2024-10-30 | End: 2024-10-30

## 2024-10-30 RX ADMIN — PROPOFOL 60 MG: 10 INJECTION, EMULSION INTRAVENOUS at 11:10

## 2024-10-30 RX ADMIN — PHENYLEPHRINE HYDROCHLORIDE 100 MCG: 10 INJECTION INTRAVENOUS at 11:10

## 2024-10-30 RX ADMIN — PROPOFOL 120 MCG/KG/MIN: 10 INJECTION, EMULSION INTRAVENOUS at 11:10

## 2024-10-30 RX ADMIN — PROPOFOL 30 MG: 10 INJECTION, EMULSION INTRAVENOUS at 11:10

## 2024-10-30 RX ADMIN — SODIUM CHLORIDE: 0.9 INJECTION, SOLUTION INTRAVENOUS at 11:10

## 2024-10-30 RX ADMIN — LIDOCAINE HYDROCHLORIDE 100 MG: 20 INJECTION, SOLUTION INTRAVENOUS at 11:10

## 2024-10-30 NOTE — PLAN OF CARE
Procedure and recovery complete. Pt awake and alert. MD Traylor at bedside, procedure findings and suggestions discussed. Discharge instructions given, pt verbalized understanding of instruction. Iv was D/c'd, inner cannula intact. No distress noted. No c/o pain. Gait steady, able to ambulate without assistance. Pt walked out accompanied by nurse. Patient's family waiting outside of the hospital.

## 2024-10-30 NOTE — [employer] PATIENT INSTRUCTIONS
Discharge Summary/Instructions after an Endoscopic Procedure  Patient Name: Faby Luna  Patient MRN: [id number]  Patient YOB: 1950  Wednesday, October 30, 2024  Corinne Traylor MD  Dear patient,  As a result of recent federal legislation (The Federal Cures Act), you may   receive lab or pathology results from your procedure in your [facility]   account before your physician is able to contact you. Your physician or   their representative will relay the results to you with their   recommendations at their soonest availability.  Thank you,  RESTRICTIONS:  During your procedure today, you received medications for sedation.  These   medications may affect your judgment, balance and coordination.  Therefore,   for 24 hours, you have the following restrictions:   - DO NOT drive a car, operate machinery, make legal/financial decisions,   sign important papers or drink alcohol.    ACTIVITY:  Today: no heavy lifting, straining or running due to procedural   sedation/anesthesia.  The following day: return to full activity including work.  DIET:  Eat and drink normally unless instructed otherwise.     TREATMENT FOR COMMON SIDE EFFECTS:  - Mild abdominal pain, nausea, belching, bloating or excessive gas:  rest,   eat lightly and use a heating pad.  - Sore Throat: treat with throat lozenges and/or gargle with warm salt   water.  - Because air was used during the procedure, expelling large amounts of air   from your rectum or belching is normal.  - If a bowel prep was taken, you may not have a bowel movement for 1-3 days.    This is normal.  SYMPTOMS TO WATCH FOR AND REPORT TO YOUR PHYSICIAN:  1. Abdominal pain or bloating, other than gas cramps.  2. Chest pain.  3. Back pain.  4. Signs of infection such as: chills or fever occurring within 24 hours   after the procedure.  5. Rectal bleeding, which would show as bright red, maroon, or black stools.   (A tablespoon of blood from the rectum is not serious, especially  if   hemorrhoids are present.)  6. Vomiting.  7. Weakness or dizziness.  GO DIRECTLY TO THE NEAREST EMERGENCY ROOM IF YOU HAVE ANY OF THE FOLLOWING:      Difficulty breathing              Chills and/or fever over 101 F   Persistent vomiting and/or vomiting blood   Severe abdominal pain   Severe chest pain   Black, tarry stools   Bleeding- more than one tablespoon   Any other symptom or condition that you feel may need urgent attention  Your doctor recommends these additional instructions:  If any biopsies were taken, your doctors clinic will contact you in 1 to 2   weeks with any results.  - Patient has a contact number available for emergencies.  The signs and   symptoms of potential delayed complications were discussed with the   patient.  Return to normal activities tomorrow.  Written discharge   instructions were provided to the patient.   - Discharge patient to home.   - Resume previous diet.   - Continue present medications.   - Await pathology results.   - Repeat colonoscopy in 5 years for surveillance.  For questions, problems or results please call your physician - Corinne Traylor MD at Work:  [phone].  [facility] can be reached at [phone]     IF A COMPLICATION OR EMERGENCY SITUATION ARISES AND YOU ARE UNABLE TO REACH   YOUR PHYSICIAN - GO DIRECTLY TO THE EMERGENCY ROOM.  MD Corinne Diaz MD  10/30/2024 11:39:09 AM  This report has been verified and signed electronically.  Dear patient,  As a result of recent federal legislation (The Federal Cures Act), you may   receive lab or pathology results from your procedure in your [facility]   account before your physician is able to contact you. Your physician or   their representative will relay the results to you with their   recommendations at their soonest availability.  Thank you,  [employer]ATION

## 2024-10-30 NOTE — H&P
Short Stay Endoscopy History and Physical    PCP - Renae Sprague, DO    Procedure - Colonoscopy  ASA - per anesthesia  Mallampati - per anesthesia  Plan of anesthesia - MAC    HPI:  This is a 74 y.o. female here for evaluation of : history of colon polyps, prior inadequate bowel prep    ROS:  Constitutional: No fevers, chills  CV: No chest pain  Pulm: No cough  Ophtho: No vision changes  GI: see HPI  Derm: No rash    Medical History:  has a past medical history of Anxiety with depression, Arthritis, CKD (chronic kidney disease) stage 2, GFR 60-89 ml/min, Diabetes mellitus, History of Clarisse-en-Y gastric bypass, Hypertension, Obesity, unspecified, DAHLIA (obstructive sleep apnea), and Spondylosis.    Surgical History:  has a past surgical history that includes Cholecystectomy; Hysterectomy;  section; Injection of facet joint (Bilateral, 2018); Epidural steroid injection into lumbar spine (N/A, 2018); Trial of spinal cord nerve stimulator (N/A, 2018); Carpal tunnel release (Right, 3/8/2019); Carpal tunnel release (Left, 2019); Injection of anesthetic agent around nerve (Bilateral, 2019); Injection of anesthetic agent around nerve (Bilateral, 10/17/2019); Radiofrequency ablation (Right, 2019); Radiofrequency ablation (Left, 2019); Radiofrequency ablation (Left, 2020); Radiofrequency ablation (Right, 2020); Radiofrequency ablation (Left, 2021); Radiofrequency ablation (Right, 2021); Cataract extraction (Bilateral); Radiofrequency ablation (Right, 2022); Radiofrequency ablation (Left, 2022); Radiofrequency ablation (Left, 3/16/2023); Radiofrequency ablation (Right, 3/30/2023); Esophagogastroduodenoscopy (N/A, 2023); Radiofrequency ablation (Right, 2023); Radiofrequency ablation (Left, 2024); and Colonoscopy (N/A, 2024).    Family History: family history includes Cataracts in her mother; Glaucoma in her mother; No Known Problems  "in her brother, father, maternal aunt, maternal grandfather, maternal grandmother, maternal uncle, paternal aunt, paternal grandfather, paternal grandmother, paternal uncle, and sister.. Otherwise no colon cancer, inflammatory bowel disease, or GI malignancies.    Social History:  reports that she has never smoked. She has never been exposed to tobacco smoke. She has never used smokeless tobacco. She reports that she does not currently use alcohol after a past usage of about 1.0 standard drink of alcohol per week. She reports that she does not use drugs.    Review of patient's allergies indicates:  No Known Allergies    Medications:   Medications Prior to Admission   Medication Sig Dispense Refill Last Dose/Taking    albuterol (PROAIR HFA) 90 mcg/actuation inhaler Inhale 2 puffs into the lungs every 4 (four) hours as needed for Wheezing or Shortness of Breath. Rescue (Patient not taking: Reported on 10/11/2024) 18 g 11     atenoloL (TENORMIN) 100 MG tablet TAKE 1 TABLET EVERY DAY 90 tablet 3     BD ULTRA-FINE MINI PEN NEEDLE 31 gauge x 3/16" Ndle        BD ULTRA-FINE FELICIA PEN NEEDLE 32 gauge x 5/32" Ndle        blood sugar diagnostic Strp To check BG 3 times daily, to use with insurance preferred meter 100 strip 11     EScitalopram oxalate (LEXAPRO) 20 MG tablet Take 1 tablet (20 mg total) by mouth once daily. 90 tablet 3     fluticasone propionate (FLONASE) 50 mcg/actuation nasal spray 2 sprays (100 mcg total) by Each Nostril route once daily. (Patient not taking: Reported on 10/11/2024) 11.1 mL 1     hydrOXYzine pamoate (VISTARIL) 25 MG Cap TAKE 1 CAPSULE BY MOUTH ONCE DAILY AS NEEDED FOR ANXIETY 90 capsule 3     lancets Misc To check BG 3 times daily, to use with insurance preferred meter 100 each 11     loratadine (CLARITIN) 10 mg tablet Take 1 tablet (10 mg total) by mouth daily as needed for Allergies. 90 tablet 3     losartan (COZAAR) 50 MG tablet Take 1 tablet (50 mg total) by mouth once daily. 90 tablet 3  "    methylPREDNISolone (MEDROL DOSEPACK) 4 mg tablet use as directed 1 each 0     midazolam, PF, (VERSED) 1 mg/mL Soln injection        pantoprazole (PROTONIX) 20 MG tablet TAKE 1 TABLET TWICE DAILY BEFORE MEALS 180 tablet 3     pregabalin (LYRICA) 100 MG capsule Take 1 capsule (100 mg total) by mouth 3 (three) times daily. 90 capsule 2     rosuvastatin (CRESTOR) 20 MG tablet Take 1 tablet (20 mg total) by mouth every evening. 90 tablet 3     semaglutide (OZEMPIC) 1 mg/dose (4 mg/3 mL) Inject 1 mg into the skin every 7 days. 9 mL 1     tiZANidine (ZANAFLEX) 4 MG tablet Take 1 tablet (4 mg total) by mouth daily as needed (pain). 90 tablet 3     topiramate (TOPAMAX) 100 MG tablet Take 1 tablet (100 mg total) by mouth every evening. 90 tablet 1     traZODone (DESYREL) 100 MG tablet Take 1 tablet by mouth in the evening 90 tablet 3          Vital Signs:   Vitals:    10/30/24 1047   BP: (!) 81/42   Pulse: 68   Resp: 14   Temp: 97.6 °F (36.4 °C)       General Appearance: Well appearing in no acute distress  Eyes:    No scleral icterus  ENT: atraumatic  Abdomen: Soft, nondistended  Extremities: no tenderness  Skin: normal color    Labs:  Lab Results   Component Value Date    WBC 8.68 08/23/2024    HGB 13.1 08/23/2024    HCT 41.1 08/23/2024     08/23/2024    CHOL 145 08/23/2024    TRIG 89 08/23/2024    HDL 51 08/23/2024    ALT 38 08/23/2024    AST 32 08/23/2024     08/23/2024    K 4.1 08/23/2024     (H) 08/23/2024    CREATININE 1.0 08/23/2024    BUN 15 08/23/2024    CO2 23 08/23/2024    TSH 1.251 05/03/2023    HGBA1C 6.0 (H) 08/23/2024       I have explained the risks and benefits of endoscopy procedures to the patient/their POA including but not limited to bleeding, perforation, infection, and death.  The patient/their POA was asked if they understand and allowed to ask any further questions to their satisfaction.    Corinne Traylor MD

## 2024-11-01 LAB
FINAL PATHOLOGIC DIAGNOSIS: NORMAL
GROSS: NORMAL
Lab: NORMAL

## 2024-11-09 DIAGNOSIS — K21.9 GASTROESOPHAGEAL REFLUX DISEASE WITHOUT ESOPHAGITIS: ICD-10-CM

## 2024-11-09 DIAGNOSIS — I10 ESSENTIAL HYPERTENSION: ICD-10-CM

## 2024-11-09 RX ORDER — ATENOLOL 100 MG/1
TABLET ORAL
Qty: 90 TABLET | Refills: 3 | Status: SHIPPED | OUTPATIENT
Start: 2024-11-09

## 2024-11-09 RX ORDER — PANTOPRAZOLE SODIUM 20 MG/1
TABLET, DELAYED RELEASE ORAL
Qty: 180 TABLET | Refills: 3 | Status: SHIPPED | OUTPATIENT
Start: 2024-11-09

## 2024-11-09 NOTE — TELEPHONE ENCOUNTER
No care due was identified.  [facility] Embedded Care Due Messages. Reference number: [id number].   11/09/2024 2:44:06 AM CST

## 2024-11-09 NOTE — TELEPHONE ENCOUNTER
Refill Decision Note   Faby Luna  is requesting a refill authorization.  Brief Assessment and Rationale for Refill:  Approve     Medication Therapy Plan:         Comments:     Note composed:11:03 AM 11/09/2024

## 2024-12-03 RX ORDER — PREGABALIN 100 MG/1
100 CAPSULE ORAL 3 TIMES DAILY
Qty: 90 CAPSULE | Refills: 0 | Status: SHIPPED | OUTPATIENT
Start: 2024-12-03

## 2024-12-13 ENCOUNTER — OFFICE VISIT (OUTPATIENT)
Dept: PAIN MEDICINE | Facility: CLINIC | Age: 74
End: 2024-12-13
Payer: MEDICARE

## 2024-12-13 VITALS
HEART RATE: 78 BPM | DIASTOLIC BLOOD PRESSURE: 55 MMHG | SYSTOLIC BLOOD PRESSURE: 111 MMHG | BODY MASS INDEX: 31.41 KG/M2 | OXYGEN SATURATION: 100 % | TEMPERATURE: 98 F | WEIGHT: 183 LBS | RESPIRATION RATE: 17 BRPM

## 2024-12-13 DIAGNOSIS — G89.4 CHRONIC PAIN SYNDROME: Primary | ICD-10-CM

## 2024-12-13 DIAGNOSIS — M47.816 LUMBAR SPONDYLOSIS: ICD-10-CM

## 2024-12-13 DIAGNOSIS — M51.360 DEGENERATION OF INTERVERTEBRAL DISC OF LUMBAR REGION WITH DISCOGENIC BACK PAIN: ICD-10-CM

## 2024-12-13 DIAGNOSIS — M47.817 SPONDYLOSIS OF LUMBOSACRAL REGION WITHOUT MYELOPATHY OR RADICULOPATHY: ICD-10-CM

## 2024-12-13 DIAGNOSIS — Z96.89 S/P INSERTION OF SPINAL CORD STIMULATOR: ICD-10-CM

## 2024-12-13 PROCEDURE — 99999 PR PBB SHADOW E&M-EST. PATIENT-LVL IV: CPT | Mod: PBBFAC,HCNC,, | Performed by: NURSE PRACTITIONER

## 2024-12-13 NOTE — PROGRESS NOTES
Chronic patient Established Note (Follow up visit)      SUBJECTIVE:    Interval History 12/13/2024:  The patient returns to clinic today for follow up of back pain. She reports increased low back pain. She denies any radicular leg pain at this time. She does have benefit with SCS. She does need a new controller. Her pain is worse with standing and walking. She is taking Lyrica and Zanaflex. She is performing a home exercise routine. She denies any other health changes. Her pain today is 8/10.    Interval History 10/11/2024:  The patient returns to clinic today for follow up of back pain. Since last visit, her insurance denied her procedure due to timing. She reports increased back pain at this time. She is having radiating pain into her legs to her ankles. Her SCS is currently not on. She is unable to find her controller. She has not reached out to representatives. Her pain is worse with standing and walking. She denies any other health changes. Her pain today is 10/10.    Interval History 7/15/2024:  The patient is here today to discuss lower back pain. Since previous visit, she did undergo right then left L3,4,5 RFAs completed with 80% relief for about 7 months. Her pain has now returned and she wishes to repeat the RFAs. The pain worsens with prolonged standing and walking. She continues with home PT exercises. Her pain today is 10/10.    Interval history 11/22/2023:  73-year-old female that presents today with axial low back pain.  Not been seen for approximately 7 months set of pain 1-2 weeks, is located in a bandlike distribution of low back she did not anesthesia like symptoms.  She is known to this clinic and was previously provided a or RFA targeting L3, L4 and L5 that provided her with 50-60% relief.  Like to be considered for repeat lumbar RFA she denies any recent incident or trauma denies any bowel bladder dysfunction anesthesia denies any profound weakness.    Interval History 4/20/2023:  The patient  returns to clinic today for follow up of low back pain. She is s/p left L3,4,5 RFA on 3/16/2023 and right L3,4,5 RFA on 3/30/2023. She reports 50-60% relief of her pain. Her pain is tolerable at this time. She has good days and bad days. She denies any radicular leg pain at this time. She is not currently using her SCS. She has not contacted representatives. She reports that her mother passed away last week. She is dealing with a lot of stress due to this. She is taking Gabapentin, although not consistently. She also takes Zanaflex. She denies any other health changes. Her pain today is 6/10.     Interval History 2/8/2023:  The patient returns to clinic today for follow up of back pain. She reports worsened low back pain. She reports intermittent radiating pain into the posterior aspect of both legs to the ankles, left greater than right. Her pain is constant in nature. Her back pain is greater than her leg pain. She is reporting limited relief with SCS. She has not been able to meet with Roque or Ildefonso for programming. She is taking Gabapentin. She denies any other health changes. Her pain today is 9/10.    Interval History 11/4/2022:  Faby is here for follow up of back and leg pain. Unfortunately, she has been unable to meet up with Roque for SCS programming. She does report some improvement in symptoms since previous visit. She still has back pain with radiation into the legs. Recent XRAYs do not show any significant lead migration. She only takes Gabapentin intermittently because she says it makes her feet swell but it does help. Her pain today is 8/10.    Interval History 10/4/2022:  The patient is here for follow up of chronic back pain. She reports more pain over the past month. No injury or trauma. She does have a lumbar SCS but is unsure if it is even on at this time. She does not think that it is. It did previously help with her back and leg pain. She has not been in contact with [employer] recently. The back pain  radiates down the back of both legs to the feet. It worsens with walking and standing. Her pain today is 10/10.    Interval History 8/23/2022:  Faby is here for follow up of chronic lower back pain. She is now s/p right then left L3,4,5 RFAs completed on 7/21/22 with limited relief so far. She continues to report aching back pain with radiation into the legs and numbness. She has had her SCS off for a couple of months. She has not been in contact with [employer] rep for programming. She says that she turned it off due to limited benefit. No new weakness to legs or falls. No bowel/bladder incontinence. Her pain today is 10/10.    Interval History 6/17/2022:  The patient is here today for follow up of back pain. She has had more aching pain across the lower back. She had benefit with lumbar RFAs in the past and would like to reschedule this. She would also like to repeat aquatic PT as this was helpful in the past. Her pain is aching and throbbing in nature without radiation currently. No new falls or trauma. Her pain today is 8/10.    Interval History 5/5/2022:  The patient is here for follow up of chronic lower back pain. She has radiation into the legs. No recent falls or trauma. She saw Dr. Silverio last month and was under the impression that an [employer] rep would be here today for programming. She is still having difficulty programming her device. She has mild benefit with Gabapentin 900 mg daily without side effects. She is also having muscle spasms intermittently. She is asking for a muscle relaxer. She has tried Robaxin in the past with mild benefit. She would like to try another medication. Her pain today is 8/10.    Interval History 4/20/2022: She presents today for follow up of low back pain. She states that she only had about 40% relief of LBP following RFAs in September that lasted a few weeks. Pain  continues to be in the low back and radiates into b/l LE. Pain encompasses the entire leg and does not follow a  specific dermatomal pattern in the leg. She denies weakness, numbness or tingling in the lower extremities. She denies bowel or bladder incontinence. Pain is currently rated 8/10. She is currently on gabapentin 300mg tid with minimal relief. She has been unable to charge bret SCS for the last month and does not have the contact number for her rep.      Interval History 9/30/2021:  The patient returns to clinic today for follow up of low back pain. She is s/p left L3,4,5 RFA on 9/9/2021 and right L3,4,5 RFA on 9/20/2021. She is unsure of relief at this time. She reports increased pain to the right side. She describes this pain as burning in nature. She denies any radiating leg pain. Her pain is worse with bending and standing. She continues to report benefit with [employer] SCS. She denies any weakness. She denies any other health changes. Her pain today is 9/10.    Interval History 8/3/2021:   Ms. Luna returns in f/u re: low back/leg pain. She reports about three months ago she noticed her low back started bothering her, worse with bending and prolonged standing. No inciting event, no trauma to back since last visit. Reports continued leg pain down the backs of her legs as well. She reports intermittent instability in her legs - on and off - over the last year. Last time she has met with [employer] rep for reprogramming of SCS was fall 2020. Denies any current issues with SCS function/battery/remote.     Interval History 9/28/2020:  The patient is here today for increased lower back pain.  She is status post right than left L3, 4, 5 radiofrequency ablation completed on 08/31/2020 with approximately 50% relief.  However, she is feeling tightness across the lower back without radiation at this time.  She would like an injection today.  Additionally, she states that she has not completed physical therapy for her back in some time and is not currently doing any exercises.  Her leg pain is currently well controlled with spinal  cord stimulator and she had a recent adjustment.  Her pain today is 8/10.    Interval History 7/16/2020:  The patient is here for follow up of lower back pain.  She previously had benefit with lumbar RFAs for similar pain.  She is also having radiation into her legs with numbness, left greater than right.  Ildefonso is here today to meet with her for programming.  She previously was having significant benefit of leg pain with SCS implant.  She denies any recent trauma or falls. Her pain today is 9/10.    Interval History 1/9/2020:  The patient is here for follow up of lower back and leg pain.  She is s/p lumbar RFAs with about 50% relief.  Her leg pain is well controlled with implant.  She still has intermittent flare ups of back pain, like today.  When this happens, she has some benefit with rest.  She has tried Tylenol and OTC NSAIDs without benefit.  She denies any recent falls or weakness.  The patient denies any bowel or bladder incontinence or signs of saddle paresthesia.  The patient denies any major medical changes since last office visit.  Her pain today is 7/10.    Previous Encounter:  Faby Luna presents to the clinic for a follow-up appointment for lower back pain.  She previously had significant leg pain which has resolved with [employer] SCS implant.  She is here today to discuss increased lower back pain.  It is sharp and throbbing in nature.  It is significant in the morning and with prolonged standing and activity.  She has some benefit with stretching.  She denies any recent falls.  Since the last visit, Faby Luna states the pain has been worsening.  Current pain intensity is 8/10.    Pain Disability Index Review:      12/13/2024     2:40 PM 10/11/2024     2:00 PM 7/15/2024     2:59 PM   Last 3 PDI Scores   Pain Disability Index (PDI) 56 60 57       Opioid Contract: no     report:  Not applicable    Pain Procedures:   5/2/18 Left L3 and L4 TF ELISE- 20% relief  5/21/18 Bilateral  L4-5 and L5-S1 facet joint injections- no relief  9/24/18 Lumbar SCS trial ([employer])- 100% relief  10/26/18 Lumbar SCS implant ([employer])- 100% relief of leg pain  9/12/19 Bilateral L3,4,5 MBB- helpful  10/17/19 Bilateral L3,4,5 MBB- helpful  11/21/19 Right L3,4,5 RFA- 50% relief  12/5/19 Left L3,4,5 RFA- 50% relief  8/17/20 Left L3,4,5 RFA- 50% relief  8/31/20 Right L3,4,5 RFA- 50% relief  9/9/2021- Left L3,4,5 RFA - 60% relief  9/20/2021- Right L3,4,5 RFA -60% relief  7/7/22 Right L3,4,5 RFA   7/21/22 Left L3,4,5 RFA   3/16/2023- Left L3,4,5 RFA  3/30/2023- Right L3,4,5 RFA  12/18/24 Right L3,4,5 RFA  1/4/24 Left L3,4,5 RFA    Physical Therapy/Home Exercise:   yes in the past with limited benefit    Imaging:     3/31/18 Lumbar MRI    Narrative     EXAMINATION:  MRI LUMBAR SPINE WITHOUT CONTRAST    CLINICAL HISTORY:  Low back pain, >6wks conservative tx, persistent-progressive sx, surgical candidate; Other spondylosis with radiculopathy, lumbar region    TECHNIQUE:  Multiplanar, multisequence MR images were acquired from the thoracolumbar junction to the sacrum without the administration of contrast.    COMPARISON:  Plain films from 03/27/2018    FINDINGS:  There is grade 1 spondylolisthesis of L4 on L5. The vertebral body heights are well maintained, with no fracture.  No marrow signal abnormality suspicious for an infiltrative process.    The conus medullaris terminates at approximately the mid body of L1.  There is a cyst associated with the upper pole of the right kidney.  There is disc desiccation noted throughout the lumbar spine with relative sparing of the L5-S1 disc.  Mild disc space narrowing present at the L4-5 level.    L1-L2: Mild diffuse disc bulge resulting in no significant central or neural foraminal canal narrowing.    L2-L3: No significant central canal narrowing.  There is mild narrowing of either neural foraminal canal secondary to disc material.    L3-L4: Disc bulging in the bilateral foraminal  regions resulting in no significant central canal narrowing.  Mild-to-moderate bilateral facet arthropathy also noted.  The bilateral neural foraminal canals are moderately narrowed with some mild effacement of either exiting L3 nerve root in the extraforaminal regions bilaterally.    L4-L5:  Grade 1 spondylolisthesis along with moderate to severe bilateral facet arthropathy and ligamentum flavum hypertrophy resulting in at least moderate narrowing of the central canal.  The right neural foraminal canal is mildly to moderately narrowed.  Left neural foraminal canal is moderately to severely narrowed with mild effacement of the exiting L4 nerve root.    L5-S1:  No significant central or neural foraminal canal narrowing noted.  Mild bilateral facet arthropathy noted.   Impression       1. Multilevel degenerative changes of the lumbar spine as detailed above     Lumbar XRAYs 3/27/18    Narrative     EXAMINATION:  XR LUMBAR SPINE AP AND LAT WITH FLEX/EXT    CLINICAL HISTORY:  Low back pain    TECHNIQUE:  AP and lateral views as well as lateral flexion and extension images are performed through the lumbar spine.    COMPARISON:  None    FINDINGS:  X-ray lumbar spine with flexion and extension demonstrates grade 1 spondylolisthesis at L4-5 measuring around 6 mm which does not change significantly with flexion and extension.  The L4-5 disc is narrowed and degenerated.  Other lumbar vertebral discs are maintained.  Vertebral body heights are maintained.  Small anterior spurs are seen, and there is small posterior osteophyte along the inferior endplate of L4.  There is lumbar facet arthropathy at L4-5 and L5-S1.   Impression       Degenerative changes as above.  Grade 1 anterolisthesis and degenerative disc disease at L4-5.         Allergies:   Review of patient's allergies indicates:   Allergen Reactions    Aspirin Other (See Comments)     Has been told not to take it due to bariatric surgery       Current Medications:  "  Current Outpatient Medications   Medication Sig Dispense Refill    albuterol (PROAIR HFA) 90 mcg/actuation inhaler Inhale 2 puffs into the lungs every 4 (four) hours as needed for Wheezing or Shortness of Breath. Rescue 18 g 11    atenoloL (TENORMIN) 100 MG tablet TAKE 1 TABLET EVERY DAY 90 tablet 3    BD ULTRA-FINE MINI PEN NEEDLE 31 gauge x 3/16" Ndle       BD ULTRA-FINE FELICIA PEN NEEDLE 32 gauge x 5/32" Ndle       blood sugar diagnostic Strp To check BG 3 times daily, to use with insurance preferred meter 100 strip 11    EScitalopram oxalate (LEXAPRO) 20 MG tablet Take 1 tablet (20 mg total) by mouth once daily. 90 tablet 3    fluticasone propionate (FLONASE) 50 mcg/actuation nasal spray 2 sprays (100 mcg total) by Each Nostril route once daily. 11.1 mL 1    lancets Misc To check BG 3 times daily, to use with insurance preferred meter 100 each 11    loratadine (CLARITIN) 10 mg tablet Take 1 tablet (10 mg total) by mouth daily as needed for Allergies. 90 tablet 3    losartan (COZAAR) 50 MG tablet Take 1 tablet (50 mg total) by mouth once daily. 90 tablet 3    pantoprazole (PROTONIX) 20 MG tablet TAKE 1 TABLET TWICE DAILY BEFORE MEALS 180 tablet 3    pregabalin (LYRICA) 100 MG capsule TAKE 1 CAPSULE BY MOUTH THREE TIMES DAILY 90 capsule 0    rosuvastatin (CRESTOR) 20 MG tablet Take 1 tablet (20 mg total) by mouth every evening. 90 tablet 3    semaglutide (OZEMPIC) 1 mg/dose (4 mg/3 mL) Inject 1 mg into the skin every 7 days. 9 mL 1    tiZANidine (ZANAFLEX) 4 MG tablet Take 1 tablet (4 mg total) by mouth daily as needed (pain). 90 tablet 3    topiramate (TOPAMAX) 100 MG tablet Take 1 tablet (100 mg total) by mouth every evening. 90 tablet 1    traZODone (DESYREL) 100 MG tablet Take 1 tablet by mouth in the evening 90 tablet 3     No current facility-administered medications for this visit.     Facility-Administered Medications Ordered in Other Visits   Medication Dose Route Frequency Provider Last Rate Last Admin "    0.9%  NaCl infusion   Intravenous Continuous Uriel Aranda MD   New Bag at 10/30/24 2637       REVIEW OF SYSTEMS:    GENERAL:  No weight loss, malaise or fevers.  HEENT:  Negative for frequent or significant headaches.  NECK:  Negative for lumps, goiter, pain and significant neck swelling.  RESPIRATORY:  Negative for cough, wheezing or shortness of breath.  CARDIOVASCULAR:  Negative for chest pain, leg swelling or palpitations. Hypertension.  GI:  Negative for abdominal discomfort, blood in stools or black stools or change in bowel habits.  MUSCULOSKELETAL:  See HPI.  SKIN:  Negative for lesions, rash, and itching.  PSYCH:  Negative for sleep disturbance, mood disorder and recent psychosocial stressors.  HEMATOLOGY/LYMPHOLOGY:  Negative for prolonged bleeding, bruising easily or swollen nodes.  NEURO:   No history of headaches, syncope, paralysis, seizures or tremors.  ENDO: Diabetes.  All other reviewed and negative other than HPI.    Past Medical History:  Past Medical History:   Diagnosis Date    Anxiety with depression     Arthritis     CKD (chronic kidney disease) stage 2, GFR 60-89 ml/min     Diabetes mellitus     History of Clarisse-en-Y gastric bypass     Hypertension     Obesity, unspecified     DAHLIA (obstructive sleep apnea)     Spondylosis        Past Surgical History:  Past Surgical History:   Procedure Laterality Date    CARPAL TUNNEL RELEASE Right 3/8/2019    Procedure: RELEASE, CARPAL TUNNEL right;  Surgeon: Pan Goodman MD;  Location: [facility];  Service: Orthopedics;  Laterality: Right;    CARPAL TUNNEL RELEASE Left 2019    Procedure: RELEASE, CARPAL TUNNEL- LEFT;  Surgeon: Pan Goodman MD;  Location: [address];  Service: Orthopedics;  Laterality: Left;    CATARACT EXTRACTION Bilateral      SECTION      CHOLECYSTECTOMY      COLONOSCOPY N/A 2024    Procedure: COLONOSCOPY;  Surgeon: Otilio Man MD;  Location: [facility] ENDO;  Service: Endoscopy;  Laterality: N/A;  Referred by:  L. Sprague / CECILIA Meds: ozempic / diabetic / Prep: peg / Route instructions sent: portal  2/14- lvm and portal msg for pc. DBM  2/19- left 2nd vm for pc. DBM    COLONOSCOPY, SCREENING, HIGH RISK PATIENT N/A 10/30/2024    Procedure: COLONOSCOPY, SCREENING, HIGH RISK PATIENT;  Surgeon: Corinne Traylor MD;  Location: [facility] ENDO;  Service: Endoscopy;  Laterality: N/A;  WLMED: Ozempic Pt states she hasn't taken for several weeks  Outside referral from [facility]  ext suprep  inst portal  LW  10/23 r/s ext suprep to portal, holding ozempic since 10/16 cf    EPIDURAL STEROID INJECTION INTO LUMBAR SPINE N/A 6/14/2018    Procedure: INJECTION, STEROID, SPINE, LUMBAR, EPIDURAL;  Surgeon: Shaq Silverio MD;  Location: [facility] PAIN MGT;  Service: Pain Management;  Laterality: N/A;  LUMBAR L4-L5 INTERLAMINAR ELISE'  [id number]  W/ SEDATION     ESOPHAGOGASTRODUODENOSCOPY N/A 5/12/2023    Procedure: EGD (ESOPHAGOGASTRODUODENOSCOPY);  Surgeon: Deann Jimenez MD;  Location: [facility] ENDO;  Service: Gastroenterology;  Laterality: N/A;    HYSTERECTOMY      INJECTION OF ANESTHETIC AGENT AROUND NERVE Bilateral 9/12/2019    Procedure: BLOCK, NERVE, L3-L4-L5 MEDIAL BRANCH;  Surgeon: Shaq Silverio MD;  Location: [facility] PAIN MGT;  Service: Pain Management;  Laterality: Bilateral;    INJECTION OF ANESTHETIC AGENT AROUND NERVE Bilateral 10/17/2019    Procedure: BLOCK, NERVE;  Surgeon: Shaq Silverio MD;  Location: [facility] PAIN MGT;  Service: Pain Management;  Laterality: Bilateral;  B/L MBB L3-L4-L5  REPEAT  CONSENT NEEDED    INJECTION OF FACET JOINT Bilateral 5/28/2018    Procedure: INJECTION-FACET;  Surgeon: Shaq Silverio MD;  Location: [facility] PAIN MGT;  Service: Pain Management;  Laterality: Bilateral;  LUMBAR BILATERAL L4-L5 AND L5-S1 FACET STEROID INJECTION  [id number]    W/ SEDATION     RADIOFREQUENCY ABLATION Right 11/21/2019    Procedure: RADIOFREQUENCY ABLATION RIGHT L3, L4, L5;  Surgeon: Shaq Silverio MD;  Location: [facility] PAIN MGT;  Service: Pain  Management;  Laterality: Right;  Right RFA L3-L4-L5  1 of 2  Consent Needed    RADIOFREQUENCY ABLATION Left 12/5/2019    Procedure: RADIOFREQUENCY ABLATION LEFT L3-5;  Surgeon: Shaq Silverio MD;  Location: [facility] PAIN MGT;  Service: Pain Management;  Laterality: Left;  Left RFA L3-L4-L5  2 of 2  Consent Needed    RADIOFREQUENCY ABLATION Left 8/17/2020    Procedure: RADIOFREQUENCY ABLATION LEFT L3,4,5 1 of 2;  Surgeon: Shaq Silverio MD;  Location: [facility] PAIN MGT;  Service: Pain Management;  Laterality: Left;  Left RFA L3,4,5  1 of 2    RADIOFREQUENCY ABLATION Right 8/31/2020    Procedure: RADIOFREQUENCY ABLATION RIGHT L3,4,5 2 of 2;  Surgeon: Shaq Silverio MD;  Location: [facility] PAIN MGT;  Service: Pain Management;  Laterality: Right;  RADIOFREQUENCY ABLATION RIGHT L3,4,5  2 of 2    RADIOFREQUENCY ABLATION Left 9/9/2021    Procedure: RADIOFREQUENCY ABLATION, L3-L4 AND L5 MEDIAL BRANCH 1 OF 2;  Surgeon: Shaq Silverio MD;  Location: [facility] PAIN MGT;  Service: Pain Management;  Laterality: Left;    RADIOFREQUENCY ABLATION Right 9/20/2021    Procedure: RADIOFREQUENCY ABLATION, L3-L4 AND L5 MEDIAL BRANCH 2 OF 2;  Surgeon: Shaq Silverio MD;  Location: [facility] PAIN MGT;  Service: Pain Management;  Laterality: Right;    RADIOFREQUENCY ABLATION Right 7/7/2022    Procedure: RADIOFREQUENCY ABLATION, RIGHT L3-L4-L5 ONE OF TWO;  Surgeon: Shaq Silverio MD;  Location: [facility] PAIN MGT;  Service: Pain Management;  Laterality: Right;    RADIOFREQUENCY ABLATION Left 7/21/2022    Procedure: RADIOFREQUENCY ABLATION, LEFT L3-L4-L5 TWO OF TWO *BRING SCS REMOTE*;  Surgeon: Shaq Silverio MD;  Location: [facility] PAIN MGT;  Service: Pain Management;  Laterality: Left;    RADIOFREQUENCY ABLATION Left 3/16/2023    Procedure: RADIOFREQUENCY ABLATION LEFT L3,L4,L5 *BRING SCS REMOTE*;  Surgeon: Shaq Silverio MD;  Location: [facility] PAIN MGT;  Service: Pain Management;  Laterality: Left;    RADIOFREQUENCY ABLATION Right 3/30/2023    Procedure: RADIOFREQUENCY ABLATION RIGHT  L3,L4,L5 *BRING SCS REMOTE*;  Surgeon: Shaq Silverio MD;  Location: [facility] PAIN MGT;  Service: Pain Management;  Laterality: Right;    RADIOFREQUENCY ABLATION Right 12/18/2023    Procedure: RADIOFREQUENCY ABLATION RIGHT L3, 4, 5 1 OF 2;  Surgeon: Shaq Silverio MD;  Location: [facility] PAIN MGT;  Service: Pain Management;  Laterality: Right;  [phone]    RADIOFREQUENCY ABLATION Left 1/4/2024    Procedure: RADIOFREQUENCY ABLATION LEFT L3, 4, 5 2 OF 2;  Surgeon: Shaq Silverio MD;  Location: [facility] PAIN MGT;  Service: Pain Management;  Laterality: Left;  [phone]  2 WK F/U WARREN    TRIAL OF SPINAL CORD NERVE STIMULATOR N/A 9/24/2018    Procedure: TRIAL, NEUROSTIMULATOR, SPINAL CORD, SPINAL CORD STIMULATOR TRIAL-INTERNAL WIRES TO EXTERNAL BATTERY;  Surgeon: Shaq Silverio MD;  Location: [facility] PAIN MGT;  Service: Pain Management;  Laterality: N/A;  [employer] REP NOTIFIED       Family History:  Family History   Problem Relation Name Age of Onset    Cataracts Mother      Glaucoma Mother      No Known Problems Father      No Known Problems Sister      No Known Problems Brother      No Known Problems Maternal Aunt      No Known Problems Maternal Uncle      No Known Problems Paternal Aunt      No Known Problems Paternal Uncle      No Known Problems Maternal Grandmother      No Known Problems Maternal Grandfather      No Known Problems Paternal Grandmother      No Known Problems Paternal Grandfather         Social History:  Social History     Socioeconomic History    Marital status:    Tobacco Use    Smoking status: Never     Passive exposure: Never    Smokeless tobacco: Never   Substance and Sexual Activity    Alcohol use: Not Currently     Alcohol/week: 1.0 standard drink of alcohol     Types: 1 Glasses of wine per week     Comment: occ    Drug use: No    Sexual activity: Yes     Partners: Male       OBJECTIVE:    BP (!) 111/55   Pulse 78   Temp 98 °F (36.7 °C)   Resp 17   Wt 83 kg (182 lb 15.7 oz)   SpO2 100%   BMI 31.41  kg/m²     PHYSICAL EXAMINATION:    General appearance: Well appearing, in no acute distress, alert and oriented x3.  Psych:  Mood and affect appropriate.  Skin: Skin color, texture, turgor normal, no rashes or lesions, in both upper and lower body.   Head/face:  Atraumatic, normocephalic. No palpable lymph nodes  Back: Straight leg raising in the sitting and supine positions is negative to radicular pain bilaterally. There is pain with palpation to lumbar facet joints bilaterally. Limited ROM with pain on extension. Positive facet loading bilaterally.  Extremities: No deformities, edema, or skin discoloration. Good capillary refill.  Musculoskeletal: 5/5 strength in right ankle with plantar and dorsiflexion. 5/5 strength in left ankle with plantar and dorsiflexion. 5/5 strength with right knee flexion and extension. 5/5 strength with left knee flexion and extension.   Neuro: Decreased sensation to BLE.  Gait: Antalgic- ambulates without assistance.    ASSESSMENT: 74 y.o. year old female with back pain, consistent with the following diagnoses:     1. Chronic pain syndrome        2. Spondylosis of lumbosacral region without myelopathy or radiculopathy  Procedure Order to Pain Management      3. Lumbar spondylosis        4. Degeneration of intervertebral disc of lumbar region with discogenic back pain        5. S/P insertion of spinal cord stimulator                PLAN:     - Previous imaging was reviewed and discussed with the patient today. Labs reviewed.     - Schedule for bilateral L3,4,5 RFA.   The patient did previously have bilateral RFAs from L3 to L5 in the past with 80% relief for 7 month(s). During that time, the patients functional ability improved and was able to be more active without significant limitation by pain. Current pain is axial and non-radiating.     - Continue Lyrica 100 mg TID.     - Continue Zanaflex 4 mg daily PRN.    - The patient will continue a home exercise routine to help with pain  and strengthening.      - RTC 3 weeks after above procedure.     The above plan and management options were discussed at length with patient. Patient is in agreement with the above and verbalized understanding.    Kimberly Conner NP  12/13/2024

## 2024-12-13 NOTE — H&P (VIEW-ONLY)
Chronic patient Established Note (Follow up visit)      SUBJECTIVE:    Interval History 12/13/2024:  The patient returns to clinic today for follow up of back pain. She reports increased low back pain. She denies any radicular leg pain at this time. She does have benefit with SCS. She does need a new controller. Her pain is worse with standing and walking. She is taking Lyrica and Zanaflex. She is performing a home exercise routine. She denies any other health changes. Her pain today is 8/10.    Interval History 10/11/2024:  The patient returns to clinic today for follow up of back pain. Since last visit, her insurance denied her procedure due to timing. She reports increased back pain at this time. She is having radiating pain into her legs to her ankles. Her SCS is currently not on. She is unable to find her controller. She has not reached out to representatives. Her pain is worse with standing and walking. She denies any other health changes. Her pain today is 10/10.    Interval History 7/15/2024:  The patient is here today to discuss lower back pain. Since previous visit, she did undergo right then left L3,4,5 RFAs completed with 80% relief for about 7 months. Her pain has now returned and she wishes to repeat the RFAs. The pain worsens with prolonged standing and walking. She continues with home PT exercises. Her pain today is 10/10.    Interval history 11/22/2023:  73-year-old female that presents today with axial low back pain.  Not been seen for approximately 7 months set of pain 1-2 weeks, is located in a bandlike distribution of low back she did not anesthesia like symptoms.  She is known to this clinic and was previously provided a or RFA targeting L3, L4 and L5 that provided her with 50-60% relief.  Like to be considered for repeat lumbar RFA she denies any recent incident or trauma denies any bowel bladder dysfunction anesthesia denies any profound weakness.    Interval History 4/20/2023:  The patient  returns to clinic today for follow up of low back pain. She is s/p left L3,4,5 RFA on 3/16/2023 and right L3,4,5 RFA on 3/30/2023. She reports 50-60% relief of her pain. Her pain is tolerable at this time. She has good days and bad days. She denies any radicular leg pain at this time. She is not currently using her SCS. She has not contacted representatives. She reports that her mother passed away last week. She is dealing with a lot of stress due to this. She is taking Gabapentin, although not consistently. She also takes Zanaflex. She denies any other health changes. Her pain today is 6/10.     Interval History 2/8/2023:  The patient returns to clinic today for follow up of back pain. She reports worsened low back pain. She reports intermittent radiating pain into the posterior aspect of both legs to the ankles, left greater than right. Her pain is constant in nature. Her back pain is greater than her leg pain. She is reporting limited relief with SCS. She has not been able to meet with Roque or Ildefonso for programming. She is taking Gabapentin. She denies any other health changes. Her pain today is 9/10.    Interval History 11/4/2022:  Faby is here for follow up of back and leg pain. Unfortunately, she has been unable to meet up with Roque for SCS programming. She does report some improvement in symptoms since previous visit. She still has back pain with radiation into the legs. Recent XRAYs do not show any significant lead migration. She only takes Gabapentin intermittently because she says it makes her feet swell but it does help. Her pain today is 8/10.    Interval History 10/4/2022:  The patient is here for follow up of chronic back pain. She reports more pain over the past month. No injury or trauma. She does have a lumbar SCS but is unsure if it is even on at this time. She does not think that it is. It did previously help with her back and leg pain. She has not been in contact with Tomveyi Bidamon recently. The back pain  radiates down the back of both legs to the feet. It worsens with walking and standing. Her pain today is 10/10.    Interval History 8/23/2022:  Faby is here for follow up of chronic lower back pain. She is now s/p right then left L3,4,5 RFAs completed on 7/21/22 with limited relief so far. She continues to report aching back pain with radiation into the legs and numbness. She has had her SCS off for a couple of months. She has not been in contact with [employer] rep for programming. She says that she turned it off due to limited benefit. No new weakness to legs or falls. No bowel/bladder incontinence. Her pain today is 10/10.    Interval History 6/17/2022:  The patient is here today for follow up of back pain. She has had more aching pain across the lower back. She had benefit with lumbar RFAs in the past and would like to reschedule this. She would also like to repeat aquatic PT as this was helpful in the past. Her pain is aching and throbbing in nature without radiation currently. No new falls or trauma. Her pain today is 8/10.    Interval History 5/5/2022:  The patient is here for follow up of chronic lower back pain. She has radiation into the legs. No recent falls or trauma. She saw Dr. Silverio last month and was under the impression that an [employer] rep would be here today for programming. She is still having difficulty programming her device. She has mild benefit with Gabapentin 900 mg daily without side effects. She is also having muscle spasms intermittently. She is asking for a muscle relaxer. She has tried Robaxin in the past with mild benefit. She would like to try another medication. Her pain today is 8/10.    Interval History 4/20/2022: She presents today for follow up of low back pain. She states that she only had about 40% relief of LBP following RFAs in September that lasted a few weeks. Pain  continues to be in the low back and radiates into b/l LE. Pain encompasses the entire leg and does not follow a  specific dermatomal pattern in the leg. She denies weakness, numbness or tingling in the lower extremities. She denies bowel or bladder incontinence. Pain is currently rated 8/10. She is currently on gabapentin 300mg tid with minimal relief. She has been unable to charge bret SCS for the last month and does not have the contact number for her rep.      Interval History 9/30/2021:  The patient returns to clinic today for follow up of low back pain. She is s/p left L3,4,5 RFA on 9/9/2021 and right L3,4,5 RFA on 9/20/2021. She is unsure of relief at this time. She reports increased pain to the right side. She describes this pain as burning in nature. She denies any radiating leg pain. Her pain is worse with bending and standing. She continues to report benefit with [employer] SCS. She denies any weakness. She denies any other health changes. Her pain today is 9/10.    Interval History 8/3/2021:   Ms. Luna returns in f/u re: low back/leg pain. She reports about three months ago she noticed her low back started bothering her, worse with bending and prolonged standing. No inciting event, no trauma to back since last visit. Reports continued leg pain down the backs of her legs as well. She reports intermittent instability in her legs - on and off - over the last year. Last time she has met with [employer] rep for reprogramming of SCS was fall 2020. Denies any current issues with SCS function/battery/remote.     Interval History 9/28/2020:  The patient is here today for increased lower back pain.  She is status post right than left L3, 4, 5 radiofrequency ablation completed on 08/31/2020 with approximately 50% relief.  However, she is feeling tightness across the lower back without radiation at this time.  She would like an injection today.  Additionally, she states that she has not completed physical therapy for her back in some time and is not currently doing any exercises.  Her leg pain is currently well controlled with spinal  cord stimulator and she had a recent adjustment.  Her pain today is 8/10.    Interval History 7/16/2020:  The patient is here for follow up of lower back pain.  She previously had benefit with lumbar RFAs for similar pain.  She is also having radiation into her legs with numbness, left greater than right.  Ildefonso is here today to meet with her for programming.  She previously was having significant benefit of leg pain with SCS implant.  She denies any recent trauma or falls. Her pain today is 9/10.    Interval History 1/9/2020:  The patient is here for follow up of lower back and leg pain.  She is s/p lumbar RFAs with about 50% relief.  Her leg pain is well controlled with implant.  She still has intermittent flare ups of back pain, like today.  When this happens, she has some benefit with rest.  She has tried Tylenol and OTC NSAIDs without benefit.  She denies any recent falls or weakness.  The patient denies any bowel or bladder incontinence or signs of saddle paresthesia.  The patient denies any major medical changes since last office visit.  Her pain today is 7/10.    Previous Encounter:  Faby Luna presents to the clinic for a follow-up appointment for lower back pain.  She previously had significant leg pain which has resolved with [employer] SCS implant.  She is here today to discuss increased lower back pain.  It is sharp and throbbing in nature.  It is significant in the morning and with prolonged standing and activity.  She has some benefit with stretching.  She denies any recent falls.  Since the last visit, Faby Luna states the pain has been worsening.  Current pain intensity is 8/10.    Pain Disability Index Review:      12/13/2024     2:40 PM 10/11/2024     2:00 PM 7/15/2024     2:59 PM   Last 3 PDI Scores   Pain Disability Index (PDI) 56 60 57       Opioid Contract: no     report:  Not applicable    Pain Procedures:   5/2/18 Left L3 and L4 TF ELISE- 20% relief  5/21/18 Bilateral  L4-5 and L5-S1 facet joint injections- no relief  9/24/18 Lumbar SCS trial ([employer])- 100% relief  10/26/18 Lumbar SCS implant ([employer])- 100% relief of leg pain  9/12/19 Bilateral L3,4,5 MBB- helpful  10/17/19 Bilateral L3,4,5 MBB- helpful  11/21/19 Right L3,4,5 RFA- 50% relief  12/5/19 Left L3,4,5 RFA- 50% relief  8/17/20 Left L3,4,5 RFA- 50% relief  8/31/20 Right L3,4,5 RFA- 50% relief  9/9/2021- Left L3,4,5 RFA - 60% relief  9/20/2021- Right L3,4,5 RFA -60% relief  7/7/22 Right L3,4,5 RFA   7/21/22 Left L3,4,5 RFA   3/16/2023- Left L3,4,5 RFA  3/30/2023- Right L3,4,5 RFA  12/18/24 Right L3,4,5 RFA  1/4/24 Left L3,4,5 RFA    Physical Therapy/Home Exercise:   yes in the past with limited benefit    Imaging:     3/31/18 Lumbar MRI    Narrative     EXAMINATION:  MRI LUMBAR SPINE WITHOUT CONTRAST    CLINICAL HISTORY:  Low back pain, >6wks conservative tx, persistent-progressive sx, surgical candidate; Other spondylosis with radiculopathy, lumbar region    TECHNIQUE:  Multiplanar, multisequence MR images were acquired from the thoracolumbar junction to the sacrum without the administration of contrast.    COMPARISON:  Plain films from 03/27/2018    FINDINGS:  There is grade 1 spondylolisthesis of L4 on L5. The vertebral body heights are well maintained, with no fracture.  No marrow signal abnormality suspicious for an infiltrative process.    The conus medullaris terminates at approximately the mid body of L1.  There is a cyst associated with the upper pole of the right kidney.  There is disc desiccation noted throughout the lumbar spine with relative sparing of the L5-S1 disc.  Mild disc space narrowing present at the L4-5 level.    L1-L2: Mild diffuse disc bulge resulting in no significant central or neural foraminal canal narrowing.    L2-L3: No significant central canal narrowing.  There is mild narrowing of either neural foraminal canal secondary to disc material.    L3-L4: Disc bulging in the bilateral foraminal  regions resulting in no significant central canal narrowing.  Mild-to-moderate bilateral facet arthropathy also noted.  The bilateral neural foraminal canals are moderately narrowed with some mild effacement of either exiting L3 nerve root in the extraforaminal regions bilaterally.    L4-L5:  Grade 1 spondylolisthesis along with moderate to severe bilateral facet arthropathy and ligamentum flavum hypertrophy resulting in at least moderate narrowing of the central canal.  The right neural foraminal canal is mildly to moderately narrowed.  Left neural foraminal canal is moderately to severely narrowed with mild effacement of the exiting L4 nerve root.    L5-S1:  No significant central or neural foraminal canal narrowing noted.  Mild bilateral facet arthropathy noted.   Impression       1. Multilevel degenerative changes of the lumbar spine as detailed above     Lumbar XRAYs 3/27/18    Narrative     EXAMINATION:  XR LUMBAR SPINE AP AND LAT WITH FLEX/EXT    CLINICAL HISTORY:  Low back pain    TECHNIQUE:  AP and lateral views as well as lateral flexion and extension images are performed through the lumbar spine.    COMPARISON:  None    FINDINGS:  X-ray lumbar spine with flexion and extension demonstrates grade 1 spondylolisthesis at L4-5 measuring around 6 mm which does not change significantly with flexion and extension.  The L4-5 disc is narrowed and degenerated.  Other lumbar vertebral discs are maintained.  Vertebral body heights are maintained.  Small anterior spurs are seen, and there is small posterior osteophyte along the inferior endplate of L4.  There is lumbar facet arthropathy at L4-5 and L5-S1.   Impression       Degenerative changes as above.  Grade 1 anterolisthesis and degenerative disc disease at L4-5.         Allergies:   Review of patient's allergies indicates:   Allergen Reactions    Aspirin Other (See Comments)     Has been told not to take it due to bariatric surgery       Current Medications:  "  Current Outpatient Medications   Medication Sig Dispense Refill    albuterol (PROAIR HFA) 90 mcg/actuation inhaler Inhale 2 puffs into the lungs every 4 (four) hours as needed for Wheezing or Shortness of Breath. Rescue 18 g 11    atenoloL (TENORMIN) 100 MG tablet TAKE 1 TABLET EVERY DAY 90 tablet 3    BD ULTRA-FINE MINI PEN NEEDLE 31 gauge x 3/16" Ndle       BD ULTRA-FINE FELICIA PEN NEEDLE 32 gauge x 5/32" Ndle       blood sugar diagnostic Strp To check BG 3 times daily, to use with insurance preferred meter 100 strip 11    EScitalopram oxalate (LEXAPRO) 20 MG tablet Take 1 tablet (20 mg total) by mouth once daily. 90 tablet 3    fluticasone propionate (FLONASE) 50 mcg/actuation nasal spray 2 sprays (100 mcg total) by Each Nostril route once daily. 11.1 mL 1    lancets Misc To check BG 3 times daily, to use with insurance preferred meter 100 each 11    loratadine (CLARITIN) 10 mg tablet Take 1 tablet (10 mg total) by mouth daily as needed for Allergies. 90 tablet 3    losartan (COZAAR) 50 MG tablet Take 1 tablet (50 mg total) by mouth once daily. 90 tablet 3    pantoprazole (PROTONIX) 20 MG tablet TAKE 1 TABLET TWICE DAILY BEFORE MEALS 180 tablet 3    pregabalin (LYRICA) 100 MG capsule TAKE 1 CAPSULE BY MOUTH THREE TIMES DAILY 90 capsule 0    rosuvastatin (CRESTOR) 20 MG tablet Take 1 tablet (20 mg total) by mouth every evening. 90 tablet 3    semaglutide (OZEMPIC) 1 mg/dose (4 mg/3 mL) Inject 1 mg into the skin every 7 days. 9 mL 1    tiZANidine (ZANAFLEX) 4 MG tablet Take 1 tablet (4 mg total) by mouth daily as needed (pain). 90 tablet 3    topiramate (TOPAMAX) 100 MG tablet Take 1 tablet (100 mg total) by mouth every evening. 90 tablet 1    traZODone (DESYREL) 100 MG tablet Take 1 tablet by mouth in the evening 90 tablet 3     No current facility-administered medications for this visit.     Facility-Administered Medications Ordered in Other Visits   Medication Dose Route Frequency Provider Last Rate Last Admin "    0.9%  NaCl infusion   Intravenous Continuous Uriel Aranda MD   New Bag at 10/30/24 6217       REVIEW OF SYSTEMS:    GENERAL:  No weight loss, malaise or fevers.  HEENT:  Negative for frequent or significant headaches.  NECK:  Negative for lumps, goiter, pain and significant neck swelling.  RESPIRATORY:  Negative for cough, wheezing or shortness of breath.  CARDIOVASCULAR:  Negative for chest pain, leg swelling or palpitations. Hypertension.  GI:  Negative for abdominal discomfort, blood in stools or black stools or change in bowel habits.  MUSCULOSKELETAL:  See HPI.  SKIN:  Negative for lesions, rash, and itching.  PSYCH:  Negative for sleep disturbance, mood disorder and recent psychosocial stressors.  HEMATOLOGY/LYMPHOLOGY:  Negative for prolonged bleeding, bruising easily or swollen nodes.  NEURO:   No history of headaches, syncope, paralysis, seizures or tremors.  ENDO: Diabetes.  All other reviewed and negative other than HPI.    Past Medical History:  Past Medical History:   Diagnosis Date    Anxiety with depression     Arthritis     CKD (chronic kidney disease) stage 2, GFR 60-89 ml/min     Diabetes mellitus     History of Clarisse-en-Y gastric bypass     Hypertension     Obesity, unspecified     DAHLIA (obstructive sleep apnea)     Spondylosis        Past Surgical History:  Past Surgical History:   Procedure Laterality Date    CARPAL TUNNEL RELEASE Right 3/8/2019    Procedure: RELEASE, CARPAL TUNNEL right;  Surgeon: Pan Goodman MD;  Location: [facility];  Service: Orthopedics;  Laterality: Right;    CARPAL TUNNEL RELEASE Left 2019    Procedure: RELEASE, CARPAL TUNNEL- LEFT;  Surgeon: Pan Goodman MD;  Location: [address];  Service: Orthopedics;  Laterality: Left;    CATARACT EXTRACTION Bilateral      SECTION      CHOLECYSTECTOMY      COLONOSCOPY N/A 2024    Procedure: COLONOSCOPY;  Surgeon: Otilio Man MD;  Location: [facility] ENDO;  Service: Endoscopy;  Laterality: N/A;  Referred by:  L. Sprague / CECILIA Meds: ozempic / diabetic / Prep: peg / Route instructions sent: portal  2/14- lvm and portal msg for pc. DBM  2/19- left 2nd vm for pc. DBM    COLONOSCOPY, SCREENING, HIGH RISK PATIENT N/A 10/30/2024    Procedure: COLONOSCOPY, SCREENING, HIGH RISK PATIENT;  Surgeon: Corinne Traylor MD;  Location: [facility] ENDO;  Service: Endoscopy;  Laterality: N/A;  WLMED: Ozempic Pt states she hasn't taken for several weeks  Outside referral from [facility]  ext suprep  inst portal  LW  10/23 r/s ext suprep to portal, holding ozempic since 10/16 cf    EPIDURAL STEROID INJECTION INTO LUMBAR SPINE N/A 6/14/2018    Procedure: INJECTION, STEROID, SPINE, LUMBAR, EPIDURAL;  Surgeon: Shaq Silverio MD;  Location: [facility]IN [facility]GT;  Service: Pain Management;  Laterality: N/A;  LUMBAR L4-L5 INTERLAMINAR ELISE'  [id number]  W/ SEDATION     ESOPHAGOGASTRODUODENOSCOPY N/A 5/12/2023    Procedure: EGD (ESOPHAGOGASTRODUODENOSCOPY);  Surgeon: Deann Jimenez MD;  Location: [facility] ENDO;  Service: Gastroenterology;  Laterality: N/A;    HYSTERECTOMY      INJECTION OF ANESTHETIC AGENT AROUND NERVE Bilateral 9/12/2019    Procedure: BLOCK, NERVE, L3-L4-L5 MEDIAL BRANCH;  Surgeon: Shaq Silverio MD;  Location: [facility]IN MGT;  Service: Pain Management;  Laterality: Bilateral;    INJECTION OF ANESTHETIC AGENT AROUND NERVE Bilateral 10/17/2019    Procedure: BLOCK, NERVE;  Surgeon: Shaq Silverio MD;  Location: [facility]IN MGT;  Service: Pain Management;  Laterality: Bilateral;  B/L MBB L3-L4-L5  REPEAT  CONSENT NEEDED    INJECTION OF FACET JOINT Bilateral 5/28/2018    Procedure: INJECTION-FACET;  Surgeon: Shaq Silverio MD;  Location: [facility]IN [facility]GT;  Service: Pain Management;  Laterality: Bilateral;  LUMBAR BILATERAL L4-L5 AND L5-S1 FACET STEROID INJECTION  [id number]    W/ SEDATION     RADIOFREQUENCY ABLATION Right 11/21/2019    Procedure: RADIOFREQUENCY ABLATION RIGHT L3, L4, L5;  Surgeon: Shaq Silverio MD;  Location: [facility]IN MGT;  Service: Pain  Management;  Laterality: Right;  Right RFA L3-L4-L5  1 of 2  Consent Needed    RADIOFREQUENCY ABLATION Left 12/5/2019    Procedure: RADIOFREQUENCY ABLATION LEFT L3-5;  Surgeon: Shaq Silverio MD;  Location: [facility] PAIN MGT;  Service: Pain Management;  Laterality: Left;  Left RFA L3-L4-L5  2 of 2  Consent Needed    RADIOFREQUENCY ABLATION Left 8/17/2020    Procedure: RADIOFREQUENCY ABLATION LEFT L3,4,5 1 of 2;  Surgeon: Shaq Silverio MD;  Location: [facility] PAIN MGT;  Service: Pain Management;  Laterality: Left;  Left RFA L3,4,5  1 of 2    RADIOFREQUENCY ABLATION Right 8/31/2020    Procedure: RADIOFREQUENCY ABLATION RIGHT L3,4,5 2 of 2;  Surgeon: Shaq Silverio MD;  Location: [facility] PAIN MGT;  Service: Pain Management;  Laterality: Right;  RADIOFREQUENCY ABLATION RIGHT L3,4,5  2 of 2    RADIOFREQUENCY ABLATION Left 9/9/2021    Procedure: RADIOFREQUENCY ABLATION, L3-L4 AND L5 MEDIAL BRANCH 1 OF 2;  Surgeon: Shaq Silverio MD;  Location: [facility] PAIN MGT;  Service: Pain Management;  Laterality: Left;    RADIOFREQUENCY ABLATION Right 9/20/2021    Procedure: RADIOFREQUENCY ABLATION, L3-L4 AND L5 MEDIAL BRANCH 2 OF 2;  Surgeon: Shaq Silverio MD;  Location: [facility] PAIN MGT;  Service: Pain Management;  Laterality: Right;    RADIOFREQUENCY ABLATION Right 7/7/2022    Procedure: RADIOFREQUENCY ABLATION, RIGHT L3-L4-L5 ONE OF TWO;  Surgeon: Shaq Silverio MD;  Location: [facility] PAIN [facility]GT;  Service: Pain Management;  Laterality: Right;    RADIOFREQUENCY ABLATION Left 7/21/2022    Procedure: RADIOFREQUENCY ABLATION, LEFT L3-L4-L5 TWO OF TWO *BRING SCS REMOTE*;  Surgeon: Shaq Silverio MD;  Location: [facility]IN [facility]GT;  Service: Pain Management;  Laterality: Left;    RADIOFREQUENCY ABLATION Left 3/16/2023    Procedure: RADIOFREQUENCY ABLATION LEFT L3,L4,L5 *BRING SCS REMOTE*;  Surgeon: Shaq Silverio MD;  Location: [facility]IN [facility]GT;  Service: Pain Management;  Laterality: Left;    RADIOFREQUENCY ABLATION Right 3/30/2023    Procedure: RADIOFREQUENCY ABLATION RIGHT  L3,L4,L5 *BRING SCS REMOTE*;  Surgeon: Shaq Silverio MD;  Location: [facility] [place]IN MGT;  Service: Pain Management;  Laterality: Right;    RADIOFREQUENCY ABLATION Right 12/18/2023    Procedure: RADIOFREQUENCY ABLATION RIGHT L3, 4, 5 1 OF 2;  Surgeon: Shaq Silverio MD;  Location: [facility] [place]IN [facility];  Service: Pain Management;  Laterality: Right;  [phone]    RADIOFREQUENCY ABLATION Left 1/4/2024    Procedure: RADIOFREQUENCY ABLATION LEFT L3, 4, 5 2 OF 2;  Surgeon: Shaq Silverio MD;  Location: [facility] [place] PAIN [facility]GT;  Service: Pain Management;  Laterality: Left;  [phone]  2 WK F/U WARREN    TRIAL OF SPINAL CORD NERVE STIMULATOR N/A 9/24/2018    Procedure: TRIAL, NEUROSTIMULATOR, SPINAL CORD, SPINAL CORD STIMULATOR TRIAL-INTERNAL WIRES TO EXTERNAL BATTERY;  Surgeon: Shaq Silverio MD;  Location: [facility] [place] PAIN MGT;  Service: Pain Management;  Laterality: N/A;  [employer] REP NOTIFIED       Family History:  Family History   Problem Relation Name Age of Onset    Cataracts Mother      Glaucoma Mother      No Known Problems Father      No Known Problems Sister      No Known Problems Brother      No Known Problems Maternal Aunt      No Known Problems Maternal Uncle      No Known Problems Paternal Aunt      No Known Problems Paternal Uncle      No Known Problems Maternal Grandmother      No Known Problems Maternal Grandfather      No Known Problems Paternal Grandmother      No Known Problems Paternal Grandfather         Social History:  Social History     Socioeconomic History    Marital status:    Tobacco Use    Smoking status: Never     Passive exposure: Never    Smokeless tobacco: Never   Substance and Sexual Activity    Alcohol use: Not Currently     Alcohol/week: 1.0 standard drink of alcohol     Types: 1 Glasses of wine per week     Comment: occ    Drug use: No    Sexual activity: Yes     Partners: Male       OBJECTIVE:    BP (!) 111/55   Pulse 78   Temp 98 °F (36.7 °C)   Resp 17   Wt 83 kg (182 lb 15.7 oz)   SpO2 100%   BMI 31.41  kg/m²     PHYSICAL EXAMINATION:    General appearance: Well appearing, in no acute distress, alert and oriented x3.  Psych:  Mood and affect appropriate.  Skin: Skin color, texture, turgor normal, no rashes or lesions, in both upper and lower body.   Head/face:  Atraumatic, normocephalic. No palpable lymph nodes  Back: Straight leg raising in the sitting and supine positions is negative to radicular pain bilaterally. There is pain with palpation to lumbar facet joints bilaterally. Limited ROM with pain on extension. Positive facet loading bilaterally.  Extremities: No deformities, edema, or skin discoloration. Good capillary refill.  Musculoskeletal: 5/5 strength in right ankle with plantar and dorsiflexion. 5/5 strength in left ankle with plantar and dorsiflexion. 5/5 strength with right knee flexion and extension. 5/5 strength with left knee flexion and extension.   Neuro: Decreased sensation to BLE.  Gait: Antalgic- ambulates without assistance.    ASSESSMENT: 74 y.o. year old female with back pain, consistent with the following diagnoses:     1. Chronic pain syndrome        2. Spondylosis of lumbosacral region without myelopathy or radiculopathy  Procedure Order to Pain Management      3. Lumbar spondylosis        4. Degeneration of intervertebral disc of lumbar region with discogenic back pain        5. S/P insertion of spinal cord stimulator                PLAN:     - Previous imaging was reviewed and discussed with the patient today. Labs reviewed.     - Schedule for bilateral L3,4,5 RFA.   The patient did previously have bilateral RFAs from L3 to L5 in the past with 80% relief for 7 month(s). During that time, the patients functional ability improved and was able to be more active without significant limitation by pain. Current pain is axial and non-radiating.     - Continue Lyrica 100 mg TID.     - Continue Zanaflex 4 mg daily PRN.    - The patient will continue a home exercise routine to help with pain  and strengthening.      - RTC 3 weeks after above procedure.     The above plan and management options were discussed at length with patient. Patient is in agreement with the above and verbalized understanding.    Kimberly Conner NP  12/13/2024

## 2024-12-17 ENCOUNTER — PATIENT MESSAGE (OUTPATIENT)
Dept: PAIN MEDICINE | Facility: OTHER | Age: 74
End: 2024-12-17
Payer: MEDICARE

## 2024-12-17 DIAGNOSIS — M47.817 SPONDYLOSIS OF LUMBOSACRAL REGION WITHOUT MYELOPATHY OR RADICULOPATHY: Primary | ICD-10-CM

## 2024-12-17 DIAGNOSIS — G89.4 CHRONIC PAIN SYNDROME: ICD-10-CM

## 2024-12-17 RX ORDER — TIZANIDINE 4 MG/1
TABLET ORAL
Qty: 90 TABLET | Refills: 3 | Status: SHIPPED | OUTPATIENT
Start: 2024-12-17

## 2025-01-06 ENCOUNTER — HOSPITAL ENCOUNTER (OUTPATIENT)
Facility: OTHER | Age: 75
Discharge: HOME | End: 2025-01-06
Attending: ANESTHESIOLOGY | Admitting: ANESTHESIOLOGY
Payer: MEDICARE

## 2025-01-06 VITALS
HEART RATE: 74 BPM | DIASTOLIC BLOOD PRESSURE: 69 MMHG | SYSTOLIC BLOOD PRESSURE: 115 MMHG | BODY MASS INDEX: 31.07 KG/M2 | HEIGHT: 64 IN | RESPIRATION RATE: 16 BRPM | WEIGHT: 182 LBS | OXYGEN SATURATION: 96 % | TEMPERATURE: 98 F

## 2025-01-06 DIAGNOSIS — M47.816 LUMBAR SPONDYLOSIS: Primary | ICD-10-CM

## 2025-01-06 DIAGNOSIS — G89.29 CHRONIC PAIN: ICD-10-CM

## 2025-01-06 PROCEDURE — 99152 MOD SED SAME PHYS/QHP 5/>YRS: CPT | Mod: HCNC,,, | Performed by: ANESTHESIOLOGY

## 2025-01-06 PROCEDURE — 64635 DESTROY LUMB/SAC FACET JNT: CPT | Mod: 50,HCNC,, | Performed by: ANESTHESIOLOGY

## 2025-01-06 PROCEDURE — 64636 DESTROY L/S FACET JNT ADDL: CPT | Mod: 50,HCNC,, | Performed by: ANESTHESIOLOGY

## 2025-01-06 PROCEDURE — 63600175 PHARM REV CODE 636 W HCPCS: Mod: HCNC | Performed by: ANESTHESIOLOGY

## 2025-01-06 PROCEDURE — 64636 DESTROY L/S FACET JNT ADDL: CPT | Mod: 50,HCNC | Performed by: ANESTHESIOLOGY

## 2025-01-06 PROCEDURE — 64635 DESTROY LUMB/SAC FACET JNT: CPT | Mod: 50,HCNC | Performed by: ANESTHESIOLOGY

## 2025-01-06 PROCEDURE — 99153 MOD SED SAME PHYS/QHP EA: CPT | Mod: HCNC | Performed by: ANESTHESIOLOGY

## 2025-01-06 PROCEDURE — 99152 MOD SED SAME PHYS/QHP 5/>YRS: CPT | Mod: HCNC | Performed by: ANESTHESIOLOGY

## 2025-01-06 RX ORDER — FENTANYL CITRATE 50 UG/ML
INJECTION, SOLUTION INTRAMUSCULAR; INTRAVENOUS
Status: DISCONTINUED | OUTPATIENT
Start: 2025-01-06 | End: 2025-01-06 | Stop reason: HOSPADM

## 2025-01-06 RX ORDER — SODIUM CHLORIDE 9 MG/ML
INJECTION, SOLUTION INTRAVENOUS CONTINUOUS
Status: DISCONTINUED | OUTPATIENT
Start: 2025-01-06 | End: 2025-01-06 | Stop reason: HOSPADM

## 2025-01-06 RX ORDER — LIDOCAINE HYDROCHLORIDE 20 MG/ML
INJECTION, SOLUTION INFILTRATION; PERINEURAL
Status: DISCONTINUED | OUTPATIENT
Start: 2025-01-06 | End: 2025-01-06 | Stop reason: HOSPADM

## 2025-01-06 RX ORDER — MIDAZOLAM HYDROCHLORIDE 1 MG/ML
INJECTION INTRAMUSCULAR; INTRAVENOUS
Status: DISCONTINUED | OUTPATIENT
Start: 2025-01-06 | End: 2025-01-06 | Stop reason: HOSPADM

## 2025-01-06 RX ORDER — BUPIVACAINE HYDROCHLORIDE 2.5 MG/ML
INJECTION, SOLUTION EPIDURAL; INFILTRATION; INTRACAUDAL
Status: DISCONTINUED | OUTPATIENT
Start: 2025-01-06 | End: 2025-01-06 | Stop reason: HOSPADM

## 2025-01-06 RX ORDER — DEXAMETHASONE SODIUM PHOSPHATE 10 MG/ML
INJECTION INTRAMUSCULAR; INTRAVENOUS
Status: DISCONTINUED | OUTPATIENT
Start: 2025-01-06 | End: 2025-01-06 | Stop reason: HOSPADM

## 2025-01-06 NOTE — DISCHARGE SUMMARY
"Discharge Note  Short Stay      SUMMARY     Admit Date: 1/6/2025    Attending Physician: Shaq Silverio      Discharge Physician: Shaq Silverio      Discharge Date: 1/6/2025 1:39 PM    Procedure(s) (LRB):  RADIOFREQUENCY ABLATION BILATERAL L3, 4, 5 (Bilateral)    Final Diagnosis: Spondylosis of lumbosacral region without myelopathy or radiculopathy [M47.817]    Disposition: Home or self care    Patient Instructions:   Current Discharge Medication List        CONTINUE these medications which have NOT CHANGED    Details   albuterol (PROAIR HFA) 90 mcg/actuation inhaler Inhale 2 puffs into the lungs every 4 (four) hours as needed for Wheezing or Shortness of Breath. Rescue  Qty: 18 g, Refills: 11    Associated Diagnoses: Mild intermittent asthma without complication      atenoloL (TENORMIN) 100 MG tablet TAKE 1 TABLET EVERY DAY  Qty: 90 tablet, Refills: 3    Comments: .  Associated Diagnoses: Essential hypertension      BD ULTRA-FINE MINI PEN NEEDLE 31 gauge x 3/16" Ndle       BD ULTRA-FINE FELICIA PEN NEEDLE 32 gauge x 5/32" Ndle       blood sugar diagnostic Strp To check BG 3 times daily, to use with insurance preferred meter  Qty: 100 strip, Refills: 11    Associated Diagnoses: Type 2 diabetes mellitus with stage 3a chronic kidney disease, without long-term current use of insulin      EScitalopram oxalate (LEXAPRO) 20 MG tablet Take 1 tablet (20 mg total) by mouth once daily.  Qty: 90 tablet, Refills: 3    Associated Diagnoses: Mild recurrent major depression; SANDRA (generalized anxiety disorder)      fluticasone propionate (FLONASE) 50 mcg/actuation nasal spray 2 sprays (100 mcg total) by Each Nostril route once daily.  Qty: 11.1 mL, Refills: 1    Associated Diagnoses: Seasonal allergic rhinitis, unspecified trigger      lancets Misc To check BG 3 times daily, to use with insurance preferred meter  Qty: 100 each, Refills: 11    Associated Diagnoses: Type 2 diabetes mellitus with stage 3a chronic kidney disease, without " long-term current use of insulin      loratadine (CLARITIN) 10 mg tablet Take 1 tablet (10 mg total) by mouth daily as needed for Allergies.  Qty: 90 tablet, Refills: 3    Associated Diagnoses: Seasonal allergies      losartan (COZAAR) 50 MG tablet Take 1 tablet (50 mg total) by mouth once daily.  Qty: 90 tablet, Refills: 3    Comments: .  Associated Diagnoses: Essential hypertension      pantoprazole (PROTONIX) 20 MG tablet TAKE 1 TABLET TWICE DAILY BEFORE MEALS  Qty: 180 tablet, Refills: 3    Associated Diagnoses: Gastroesophageal reflux disease without esophagitis      pregabalin (LYRICA) 100 MG capsule TAKE 1 CAPSULE BY MOUTH THREE TIMES DAILY  Qty: 90 capsule, Refills: 0      rosuvastatin (CRESTOR) 20 MG tablet Take 1 tablet (20 mg total) by mouth every evening.  Qty: 90 tablet, Refills: 3    Associated Diagnoses: Dyslipidemia associated with type 2 diabetes mellitus; Aortic atherosclerosis      semaglutide (OZEMPIC) 1 mg/dose (4 mg/3 mL) Inject 1 mg into the skin every 7 days.  Qty: 9 mL, Refills: 1    Associated Diagnoses: Type 2 diabetes mellitus with stage 3a chronic kidney disease, without long-term current use of insulin      tiZANidine (ZANAFLEX) 4 MG tablet TAKE 1 TABLET ONE TIME DAILY AS NEEDED FOR PAIN  Qty: 90 tablet, Refills: 3    Associated Diagnoses: Chronic pain syndrome      topiramate (TOPAMAX) 100 MG tablet Take 1 tablet (100 mg total) by mouth every evening.  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  Activity: as per instruction sheet.  Discharge to: Home with a responsible adult.  Follow up: 2-4 weeks       Please call my office or  pager at [phone] if experienced any weakness or loss of sensation, fever > 101.5, pain uncontrolled with oral medications, persistent nausea/vomiting/or diarrhea, redness or drainage from the incisions, or any other worrisome concerns. If physician on call was not reached or could not communicate with our office for any reason please go to the nearest emergency department

## 2025-01-06 NOTE — OP NOTE
Therapeutic Lumbar Medial Branch Radiofrequency Ablation under Fluoroscopy     The procedure, risks, benefits, and options were discussed with the patient. There are no contraindications to the procedure. The patent expressed understanding and agreed to the procedure. Informed written consent was obtained prior to the start of the procedure and can be found in the patient's chart.        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 Patient re-evaluation prior to administration of conscious sedation. No changes noted in patient's status from initial evaluation. The patient's vital signs were monitored by RN and patient remained hemodynamically stable throughout the procedure.    Event Time In   Sedation Start 1335   Sedation End 1358       ESTIMATED BLOOD LOSS:  None    COMPLICATIONS:  None     INTERVAL HISTORY: Patient has clinical and imaging findings suggestive of facet mediated pain. Patients has completed 2 previous diagnostic medial branch blocks at specified levels with at least 80% relief for the expected duration of the local anesthetic utilized.    TECHNIQUE: Time-out was performed to identify the patient and procedure to be performed. With the patient laying in a prone  position, the surgical area was prepped and draped in the usual sterile fashion using ChloraPrep and fenestrated drape. The levels were determined under fluoroscopic guidance. Skin anesthesia was achieved by injecting Lidocaine 2% over the injection sites. A 20 gauge 10mm curved active tip needle was introduced to the anatomic local of the medial branch at each of the above levels using AP, lateral and/or contralateral oblique fluoroscopic imaging. Then sensory and motor testing was performed to confirm that the needle tips were in the correct location. After negative aspiration for blood or CSF was confirmed, 1 mL of the lidocaine 2% listed above was injected slowly at each site. This was followed by thermal lesioning at 80 degrees celsius for 90 seconds. That was followed by slowly injecting 1.5 mL of the medication mixture listed above at each site. The needles were removed and bleeding was nil. A sterile dressing was applied. No specimens collected. The patient tolerated the procedure well and did not have any procedure related motor deficit at the conclusion of the procedure.    PRE-PROCEDURE PAIN SCORE: 10/10    POST-PROCEDURE PAIN SCORE: 0/10    The patient was monitored after the procedure in the recovery area. They were given post-procedure and discharge instructions to follow at home. The patient was discharged in a stable condition.        Shaq Silverio MD

## 2025-01-06 NOTE — DISCHARGE INSTRUCTIONS

## 2025-01-07 DIAGNOSIS — M47.816 LUMBAR SPONDYLOSIS: ICD-10-CM

## 2025-01-07 DIAGNOSIS — G89.4 CHRONIC PAIN SYNDROME: Primary | ICD-10-CM

## 2025-01-07 DIAGNOSIS — M51.360 DEGENERATION OF INTERVERTEBRAL DISC OF LUMBAR REGION WITH DISCOGENIC BACK PAIN: ICD-10-CM

## 2025-01-07 LAB — POCT GLUCOSE: 98 MG/DL (ref 70–110)

## 2025-01-08 RX ORDER — PREGABALIN 100 MG/1
100 CAPSULE ORAL 3 TIMES DAILY
Qty: 90 CAPSULE | Refills: 0 | Status: SHIPPED | OUTPATIENT
Start: 2025-01-08

## 2025-01-29 RX ORDER — TOPIRAMATE 100 MG/1
100 TABLET, FILM COATED ORAL NIGHTLY
Qty: 90 TABLET | Refills: 3 | Status: SHIPPED | OUTPATIENT
Start: 2025-01-29

## 2025-01-30 DIAGNOSIS — Z00.00 ENCOUNTER FOR MEDICARE ANNUAL WELLNESS EXAM: ICD-10-CM

## 2025-01-30 DIAGNOSIS — G47.00 INSOMNIA, UNSPECIFIED TYPE: ICD-10-CM

## 2025-01-30 RX ORDER — TRAZODONE HYDROCHLORIDE 100 MG/1
100 TABLET ORAL NIGHTLY
Qty: 90 TABLET | Refills: 1 | Status: SHIPPED | OUTPATIENT
Start: 2025-01-30

## 2025-01-30 NOTE — TELEPHONE ENCOUNTER
No care due was identified.  [facility] Embedded Care Due Messages. Reference number: [id number].   1/30/2025 7:01:53 AM CST

## 2025-01-30 NOTE — TELEPHONE ENCOUNTER
Refill Decision Note   Faby Luna  is requesting a refill authorization.  Brief Assessment and Rationale for Refill:  Approve     Medication Therapy Plan:         Comments:     Note composed:10:41 AM 01/30/2025

## 2025-02-07 ENCOUNTER — OFFICE VISIT (OUTPATIENT)
Dept: PAIN MEDICINE | Facility: CLINIC | Age: 75
End: 2025-02-07
Payer: MEDICARE

## 2025-02-07 VITALS
DIASTOLIC BLOOD PRESSURE: 89 MMHG | BODY MASS INDEX: 32.18 KG/M2 | SYSTOLIC BLOOD PRESSURE: 146 MMHG | TEMPERATURE: 98 F | HEIGHT: 64 IN | WEIGHT: 188.5 LBS | OXYGEN SATURATION: 100 % | HEART RATE: 86 BPM | RESPIRATION RATE: 18 BRPM

## 2025-02-07 DIAGNOSIS — M47.816 LUMBAR SPONDYLOSIS: ICD-10-CM

## 2025-02-07 DIAGNOSIS — M51.360 DEGENERATION OF INTERVERTEBRAL DISC OF LUMBAR REGION WITH DISCOGENIC BACK PAIN: ICD-10-CM

## 2025-02-07 DIAGNOSIS — G89.4 CHRONIC PAIN SYNDROME: Primary | ICD-10-CM

## 2025-02-07 DIAGNOSIS — Z96.89 S/P INSERTION OF SPINAL CORD STIMULATOR: ICD-10-CM

## 2025-02-07 DIAGNOSIS — G89.4 CHRONIC PAIN SYNDROME: ICD-10-CM

## 2025-02-07 PROCEDURE — 3079F DIAST BP 80-89 MM HG: CPT | Mod: HCNC,CPTII,S$GLB, | Performed by: NURSE PRACTITIONER

## 2025-02-07 PROCEDURE — 1159F MED LIST DOCD IN RCRD: CPT | Mod: HCNC,CPTII,S$GLB, | Performed by: NURSE PRACTITIONER

## 2025-02-07 PROCEDURE — 3077F SYST BP >= 140 MM HG: CPT | Mod: HCNC,CPTII,S$GLB, | Performed by: NURSE PRACTITIONER

## 2025-02-07 PROCEDURE — 3288F FALL RISK ASSESSMENT DOCD: CPT | Mod: HCNC,CPTII,S$GLB, | Performed by: NURSE PRACTITIONER

## 2025-02-07 PROCEDURE — 1125F AMNT PAIN NOTED PAIN PRSNT: CPT | Mod: HCNC,CPTII,S$GLB, | Performed by: NURSE PRACTITIONER

## 2025-02-07 PROCEDURE — 3072F LOW RISK FOR RETINOPATHY: CPT | Mod: HCNC,CPTII,S$GLB, | Performed by: NURSE PRACTITIONER

## 2025-02-07 PROCEDURE — 99214 OFFICE O/P EST MOD 30 MIN: CPT | Mod: HCNC,S$GLB,, | Performed by: NURSE PRACTITIONER

## 2025-02-07 PROCEDURE — 1160F RVW MEDS BY RX/DR IN RCRD: CPT | Mod: HCNC,CPTII,S$GLB, | Performed by: NURSE PRACTITIONER

## 2025-02-07 PROCEDURE — 99999 PR PBB SHADOW E&M-EST. PATIENT-LVL V: CPT | Mod: PBBFAC,HCNC,, | Performed by: NURSE PRACTITIONER

## 2025-02-07 PROCEDURE — 1101F PT FALLS ASSESS-DOCD LE1/YR: CPT | Mod: HCNC,CPTII,S$GLB, | Performed by: NURSE PRACTITIONER

## 2025-02-07 RX ORDER — PREGABALIN 100 MG/1
100 CAPSULE ORAL 3 TIMES DAILY
Qty: 90 CAPSULE | Refills: 0 | Status: SHIPPED | OUTPATIENT
Start: 2025-02-07 | End: 2025-02-13

## 2025-02-07 NOTE — PROGRESS NOTES
Chronic patient Established Note (Follow up visit)      SUBJECTIVE:    Interval History 2/7/2025:  The patient returns to clinic today for follow up of back pain. She is s/p bilateral L3,4,5 RFA on 1/6/2025. She reports 50% relief. She continues to report intermittent low back pain. She is having worsening leg pain. She has not adjusted her SCS in a while. She did find her controller. She is taking Lyrica, although not sure of benefit. She is taking Zanaflex. She is performing home exercises. She denies any other health changes. Her pain today is 5/10.    Interval History 12/13/2024:  The patient returns to clinic today for follow up of back pain. She reports increased low back pain. She denies any radicular leg pain at this time. She does have benefit with SCS. She does need a new controller. Her pain is worse with standing and walking. She is taking Lyrica and Zanaflex. She is performing a home exercise routine. She denies any other health changes. Her pain today is 8/10.    Interval History 10/11/2024:  The patient returns to clinic today for follow up of back pain. Since last visit, her insurance denied her procedure due to timing. She reports increased back pain at this time. She is having radiating pain into her legs to her ankles. Her SCS is currently not on. She is unable to find her controller. She has not reached out to representatives. Her pain is worse with standing and walking. She denies any other health changes. Her pain today is 10/10.    Interval History 7/15/2024:  The patient is here today to discuss lower back pain. Since previous visit, she did undergo right then left L3,4,5 RFAs completed with 80% relief for about 7 months. Her pain has now returned and she wishes to repeat the RFAs. The pain worsens with prolonged standing and walking. She continues with home PT exercises. Her pain today is 10/10.    Interval history 11/22/2023:  73-year-old female that presents today with axial low back pain.   Not been seen for approximately 7 months set of pain 1-2 weeks, is located in a bandlike distribution of low back she did not anesthesia like symptoms.  She is known to this clinic and was previously provided a or RFA targeting L3, L4 and L5 that provided her with 50-60% relief.  Like to be considered for repeat lumbar RFA she denies any recent incident or trauma denies any bowel bladder dysfunction anesthesia denies any profound weakness.    Interval History 4/20/2023:  The patient returns to clinic today for follow up of low back pain. She is s/p left L3,4,5 RFA on 3/16/2023 and right L3,4,5 RFA on 3/30/2023. She reports 50-60% relief of her pain. Her pain is tolerable at this time. She has good days and bad days. She denies any radicular leg pain at this time. She is not currently using her SCS. She has not contacted representatives. She reports that her mother passed away last week. She is dealing with a lot of stress due to this. She is taking Gabapentin, although not consistently. She also takes Zanaflex. She denies any other health changes. Her pain today is 6/10.     Interval History 2/8/2023:  The patient returns to clinic today for follow up of back pain. She reports worsened low back pain. She reports intermittent radiating pain into the posterior aspect of both legs to the ankles, left greater than right. Her pain is constant in nature. Her back pain is greater than her leg pain. She is reporting limited relief with SCS. She has not been able to meet with Roque or Ildefonso for programming. She is taking Gabapentin. She denies any other health changes. Her pain today is 9/10.    Interval History 11/4/2022:  Faby is here for follow up of back and leg pain. Unfortunately, she has been unable to meet up with Roque for SCS programming. She does report some improvement in symptoms since previous visit. She still has back pain with radiation into the legs. Recent XRAYs do not show any significant lead migration. She  only takes Gabapentin intermittently because she says it makes her feet swell but it does help. Her pain today is 8/10.    Interval History 10/4/2022:  The patient is here for follow up of chronic back pain. She reports more pain over the past month. No injury or trauma. She does have a lumbar SCS but is unsure if it is even on at this time. She does not think that it is. It did previously help with her back and leg pain. She has not been in contact with [employer] recently. The back pain radiates down the back of both legs to the feet. It worsens with walking and standing. Her pain today is 10/10.    Interval History 8/23/2022:  Faby is here for follow up of chronic lower back pain. She is now s/p right then left L3,4,5 RFAs completed on 7/21/22 with limited relief so far. She continues to report aching back pain with radiation into the legs and numbness. She has had her SCS off for a couple of months. She has not been in contact with [employer] rep for programming. She says that she turned it off due to limited benefit. No new weakness to legs or falls. No bowel/bladder incontinence. Her pain today is 10/10.    Interval History 6/17/2022:  The patient is here today for follow up of back pain. She has had more aching pain across the lower back. She had benefit with lumbar RFAs in the past and would like to reschedule this. She would also like to repeat aquatic PT as this was helpful in the past. Her pain is aching and throbbing in nature without radiation currently. No new falls or trauma. Her pain today is 8/10.    Interval History 5/5/2022:  The patient is here for follow up of chronic lower back pain. She has radiation into the legs. No recent falls or trauma. She saw Dr. Silverio last month and was under the impression that an [employer] rep would be here today for programming. She is still having difficulty programming her device. She has mild benefit with Gabapentin 900 mg daily without side effects. She is also having  muscle spasms intermittently. She is asking for a muscle relaxer. She has tried Robaxin in the past with mild benefit. She would like to try another medication. Her pain today is 8/10.    Interval History 4/20/2022: She presents today for follow up of low back pain. She states that she only had about 40% relief of LBP following RFAs in September that lasted a few weeks. Pain  continues to be in the low back and radiates into b/l LE. Pain encompasses the entire leg and does not follow a specific dermatomal pattern in the leg. She denies weakness, numbness or tingling in the lower extremities. She denies bowel or bladder incontinence. Pain is currently rated 8/10. She is currently on gabapentin 300mg tid with minimal relief. She has been unable to charge bret SCS for the last month and does not have the contact number for her rep.      Interval History 9/30/2021:  The patient returns to clinic today for follow up of low back pain. She is s/p left L3,4,5 RFA on 9/9/2021 and right L3,4,5 RFA on 9/20/2021. She is unsure of relief at this time. She reports increased pain to the right side. She describes this pain as burning in nature. She denies any radiating leg pain. Her pain is worse with bending and standing. She continues to report benefit with [employer] SCS. She denies any weakness. She denies any other health changes. Her pain today is 9/10.    Interval History 8/3/2021:   Ms. Luna returns in f/u re: low back/leg pain. She reports about three months ago she noticed her low back started bothering her, worse with bending and prolonged standing. No inciting event, no trauma to back since last visit. Reports continued leg pain down the backs of her legs as well. She reports intermittent instability in her legs - on and off - over the last year. Last time she has met with [employer] rep for reprogramming of SCS was fall 2020. Denies any current issues with SCS function/battery/remote.     Interval History 9/28/2020:  The  patient is here today for increased lower back pain.  She is status post right than left L3, 4, 5 radiofrequency ablation completed on 08/31/2020 with approximately 50% relief.  However, she is feeling tightness across the lower back without radiation at this time.  She would like an injection today.  Additionally, she states that she has not completed physical therapy for her back in some time and is not currently doing any exercises.  Her leg pain is currently well controlled with spinal cord stimulator and she had a recent adjustment.  Her pain today is 8/10.    Interval History 7/16/2020:  The patient is here for follow up of lower back pain.  She previously had benefit with lumbar RFAs for similar pain.  She is also having radiation into her legs with numbness, left greater than right.  Ildefonso is here today to meet with her for programming.  She previously was having significant benefit of leg pain with SCS implant.  She denies any recent trauma or falls. Her pain today is 9/10.    Interval History 1/9/2020:  The patient is here for follow up of lower back and leg pain.  She is s/p lumbar RFAs with about 50% relief.  Her leg pain is well controlled with implant.  She still has intermittent flare ups of back pain, like today.  When this happens, she has some benefit with rest.  She has tried Tylenol and OTC NSAIDs without benefit.  She denies any recent falls or weakness.  The patient denies any bowel or bladder incontinence or signs of saddle paresthesia.  The patient denies any major medical changes since last office visit.  Her pain today is 7/10.    Previous Encounter:  Aisha Manoj Luna presents to the clinic for a follow-up appointment for lower back pain.  She previously had significant leg pain which has resolved with [employer] SCS implant.  She is here today to discuss increased lower back pain.  It is sharp and throbbing in nature.  It is significant in the morning and with prolonged standing and  activity.  She has some benefit with stretching.  She denies any recent falls.  Since the last visit, Faby Luna states the pain has been worsening.  Current pain intensity is 8/10.    Pain Disability Index Review:      2/7/2025    10:35 AM 12/13/2024     2:40 PM 10/11/2024     2:00 PM   Last 3 PDI Scores   Pain Disability Index (PDI) 35 56 60       Opioid Contract: no     report:  Not applicable    Pain Procedures:   5/2/18 Left L3 and L4 TF ELISE- 20% relief  5/21/18 Bilateral L4-5 and L5-S1 facet joint injections- no relief  9/24/18 Lumbar SCS trial ([employer])- 100% relief  10/26/18 Lumbar SCS implant ([employer])- 100% relief of leg pain  9/12/19 Bilateral L3,4,5 MBB- helpful  10/17/19 Bilateral L3,4,5 MBB- helpful  11/21/19 Right L3,4,5 RFA- 50% relief  12/5/19 Left L3,4,5 RFA- 50% relief  8/17/20 Left L3,4,5 RFA- 50% relief  8/31/20 Right L3,4,5 RFA- 50% relief  9/9/2021- Left L3,4,5 RFA - 60% relief  9/20/2021- Right L3,4,5 RFA -60% relief  7/7/22 Right L3,4,5 RFA   7/21/22 Left L3,4,5 RFA   3/16/2023- Left L3,4,5 RFA  3/30/2023- Right L3,4,5 RFA  12/18/24 Right L3,4,5 RFA  1/4/24 Left L3,4,5 RFA  1/6/2025- Bilateral L3,4,5 RFA    Physical Therapy/Home Exercise:   yes in the past with limited benefit    Imaging:     3/31/18 Lumbar MRI    Narrative     EXAMINATION:  MRI LUMBAR SPINE WITHOUT CONTRAST    CLINICAL HISTORY:  Low back pain, >6wks conservative tx, persistent-progressive sx, surgical candidate; Other spondylosis with radiculopathy, lumbar region    TECHNIQUE:  Multiplanar, multisequence MR images were acquired from the thoracolumbar junction to the sacrum without the administration of contrast.    COMPARISON:  Plain films from 03/27/2018    FINDINGS:  There is grade 1 spondylolisthesis of L4 on L5. The vertebral body heights are well maintained, with no fracture.  No marrow signal abnormality suspicious for an infiltrative process.    The conus medullaris terminates at approximately the mid  body of L1.  There is a cyst associated with the upper pole of the right kidney.  There is disc desiccation noted throughout the lumbar spine with relative sparing of the L5-S1 disc.  Mild disc space narrowing present at the L4-5 level.    L1-L2: Mild diffuse disc bulge resulting in no significant central or neural foraminal canal narrowing.    L2-L3: No significant central canal narrowing.  There is mild narrowing of either neural foraminal canal secondary to disc material.    L3-L4: Disc bulging in the bilateral foraminal regions resulting in no significant central canal narrowing.  Mild-to-moderate bilateral facet arthropathy also noted.  The bilateral neural foraminal canals are moderately narrowed with some mild effacement of either exiting L3 nerve root in the extraforaminal regions bilaterally.    L4-L5:  Grade 1 spondylolisthesis along with moderate to severe bilateral facet arthropathy and ligamentum flavum hypertrophy resulting in at least moderate narrowing of the central canal.  The right neural foraminal canal is mildly to moderately narrowed.  Left neural foraminal canal is moderately to severely narrowed with mild effacement of the exiting L4 nerve root.    L5-S1:  No significant central or neural foraminal canal narrowing noted.  Mild bilateral facet arthropathy noted.   Impression       1. Multilevel degenerative changes of the lumbar spine as detailed above     Lumbar XRAYs 3/27/18    Narrative     EXAMINATION:  XR LUMBAR SPINE AP AND LAT WITH FLEX/EXT    CLINICAL HISTORY:  Low back pain    TECHNIQUE:  AP and lateral views as well as lateral flexion and extension images are performed through the lumbar spine.    COMPARISON:  None    FINDINGS:  X-ray lumbar spine with flexion and extension demonstrates grade 1 spondylolisthesis at L4-5 measuring around 6 mm which does not change significantly with flexion and extension.  The L4-5 disc is narrowed and degenerated.  Other lumbar vertebral discs are  "maintained.  Vertebral body heights are maintained.  Small anterior spurs are seen, and there is small posterior osteophyte along the inferior endplate of L4.  There is lumbar facet arthropathy at L4-5 and L5-S1.   Impression       Degenerative changes as above.  Grade 1 anterolisthesis and degenerative disc disease at L4-5.         Allergies:   Review of patient's allergies indicates:   Allergen Reactions    Aspirin Other (See Comments)     Has been told not to take it due to bariatric surgery       Current Medications:   Current Outpatient Medications   Medication Sig Dispense Refill    albuterol (PROAIR HFA) 90 mcg/actuation inhaler Inhale 2 puffs into the lungs every 4 (four) hours as needed for Wheezing or Shortness of Breath. Rescue 18 g 11    atenoloL (TENORMIN) 100 MG tablet TAKE 1 TABLET EVERY DAY 90 tablet 3    BD ULTRA-FINE MINI PEN NEEDLE 31 gauge x 3/16" Ndle       BD ULTRA-FINE FELICIA PEN NEEDLE 32 gauge x 5/32" Ndle       blood sugar diagnostic Strp To check BG 3 times daily, to use with insurance preferred meter 100 strip 11    EScitalopram oxalate (LEXAPRO) 20 MG tablet Take 1 tablet (20 mg total) by mouth once daily. 90 tablet 3    fluticasone propionate (FLONASE) 50 mcg/actuation nasal spray 2 sprays (100 mcg total) by Each Nostril route once daily. 11.1 mL 1    lancets Misc To check BG 3 times daily, to use with insurance preferred meter 100 each 11    loratadine (CLARITIN) 10 mg tablet Take 1 tablet (10 mg total) by mouth daily as needed for Allergies. 90 tablet 3    losartan (COZAAR) 50 MG tablet Take 1 tablet (50 mg total) by mouth once daily. 90 tablet 3    pantoprazole (PROTONIX) 20 MG tablet TAKE 1 TABLET TWICE DAILY BEFORE MEALS 180 tablet 3    pregabalin (LYRICA) 100 MG capsule TAKE 1 CAPSULE BY MOUTH THREE TIMES DAILY 90 capsule 0    rosuvastatin (CRESTOR) 20 MG tablet Take 1 tablet (20 mg total) by mouth every evening. 90 tablet 3    semaglutide (OZEMPIC) 1 mg/dose (4 mg/3 mL) Inject 1 mg " into the skin every 7 days. 9 mL 1    tiZANidine (ZANAFLEX) 4 MG tablet TAKE 1 TABLET ONE TIME DAILY AS NEEDED FOR PAIN 90 tablet 3    topiramate (TOPAMAX) 100 MG tablet TAKE 1 TABLET EVERY EVENING 90 tablet 3    traZODone (DESYREL) 100 MG tablet Take 1 tablet by mouth in the evening 90 tablet 1     No current facility-administered medications for this visit.     Facility-Administered Medications Ordered in Other Visits   Medication Dose Route Frequency Provider Last Rate Last Admin    0.9%  NaCl infusion   Intravenous Continuous Uriel Aranda MD   New Bag at 10/30/24 1107       REVIEW OF SYSTEMS:    GENERAL:  No weight loss, malaise or fevers.  HEENT:  Negative for frequent or significant headaches.  NECK:  Negative for lumps, goiter, pain and significant neck swelling.  RESPIRATORY:  Negative for cough, wheezing or shortness of breath.  CARDIOVASCULAR:  Negative for chest pain, leg swelling or palpitations. Hypertension.  GI:  Negative for abdominal discomfort, blood in stools or black stools or change in bowel habits.  MUSCULOSKELETAL:  See HPI.  SKIN:  Negative for lesions, rash, and itching.  PSYCH:  Negative for sleep disturbance, mood disorder and recent psychosocial stressors.  HEMATOLOGY/LYMPHOLOGY:  Negative for prolonged bleeding, bruising easily or swollen nodes.  NEURO:   No history of headaches, syncope, paralysis, seizures or tremors.  ENDO: Diabetes.  All other reviewed and negative other than HPI.    Past Medical History:  Past Medical History:   Diagnosis Date    Anxiety with depression     Arthritis     CKD (chronic kidney disease) stage 2, GFR 60-89 ml/min     Diabetes mellitus     History of Clarisse-en-Y gastric bypass     Hypertension     Obesity, unspecified     DAHLIA (obstructive sleep apnea)     Spondylosis        Past Surgical History:  Past Surgical History:   Procedure Laterality Date    CARPAL TUNNEL RELEASE Right 3/8/2019    Procedure: RELEASE, CARPAL TUNNEL right;  Surgeon: Pan CAMPOVERDE  MD Rex;  Location: [facility] OR;  Service: Orthopedics;  Laterality: Right;    CARPAL TUNNEL RELEASE Left 2019    Procedure: RELEASE, CARPAL TUNNEL- LEFT;  Surgeon: Pan Goodman MD;  Location: [facility] OR 2ND FLR;  Service: Orthopedics;  Laterality: Left;    CATARACT EXTRACTION Bilateral      SECTION      CHOLECYSTECTOMY      COLONOSCOPY N/A 2024    Procedure: COLONOSCOPY;  Surgeon: Otilio Man MD;  Location: [facility];  Service: Endoscopy;  Laterality: N/A;  Referred by: MYNOR Sprague / CECILIA Meds: ozempic / diabetic / Prep: peg / Route instructions sent: portal  - lvm and portal msg for pc. DBM  - left 2nd vm for pc. DBM    COLONOSCOPY, SCREENING, HIGH RISK PATIENT N/A 10/30/2024    Procedure: COLONOSCOPY, SCREENING, HIGH RISK PATIENT;  Surgeon: Corinne Traylor MD;  Location: [facility] ENDO;  Service: Endoscopy;  Laterality: N/A;  WLMED: Ozempic Pt states she hasn't taken for several weeks  Outside referral from [facility]  ext suprep  inst portal  LW  10/23 r/s ext suprep to portal, holding ozempic since 10/16 cf    EPIDURAL STEROID INJECTION INTO LUMBAR SPINE N/A 2018    Procedure: INJECTION, STEROID, SPINE, LUMBAR, EPIDURAL;  Surgeon: Shaq Silverio MD;  Location: [facility] PAIN MGT;  Service: Pain Management;  Laterality: N/A;  LUMBAR L4-L5 INTERLAMINAR ELISE'  [id number]  W/ SEDATION     ESOPHAGOGASTRODUODENOSCOPY N/A 2023    Procedure: EGD (ESOPHAGOGASTRODUODENOSCOPY);  Surgeon: Deann Jimenez MD;  Location: [facility];  Service: Gastroenterology;  Laterality: N/A;    HYSTERECTOMY      INJECTION OF ANESTHETIC AGENT AROUND NERVE Bilateral 2019    Procedure: BLOCK, NERVE, L3-L4-L5 MEDIAL BRANCH;  Surgeon: Shaq Silverio MD;  Location: [facility] PAIN MGT;  Service: Pain Management;  Laterality: Bilateral;    INJECTION OF ANESTHETIC AGENT AROUND NERVE Bilateral 10/17/2019    Procedure: BLOCK, NERVE;  Surgeon: Shaq Silverio MD;  Location: [facility] PAIN MGT;  Service: Pain Management;  Laterality:  Bilateral;  B/L MBB L3-L4-L5  REPEAT  CONSENT NEEDED    INJECTION OF FACET JOINT Bilateral 5/28/2018    Procedure: INJECTION-FACET;  Surgeon: Shaq Silverio MD;  Location: [facility] PAIN MGT;  Service: Pain Management;  Laterality: Bilateral;  LUMBAR BILATERAL L4-L5 AND L5-S1 FACET STEROID INJECTION  [id number]    W/ SEDATION     RADIOFREQUENCY ABLATION Right 11/21/2019    Procedure: RADIOFREQUENCY ABLATION RIGHT L3, L4, L5;  Surgeon: Shaq Silverio MD;  Location: [facility] PAIN MGT;  Service: Pain Management;  Laterality: Right;  Right RFA L3-L4-L5  1 of 2  Consent Needed    RADIOFREQUENCY ABLATION Left 12/5/2019    Procedure: RADIOFREQUENCY ABLATION LEFT L3-5;  Surgeon: Shaq Silverio MD;  Location: [facility] PAIN MGT;  Service: Pain Management;  Laterality: Left;  Left RFA L3-L4-L5  2 of 2  Consent Needed    RADIOFREQUENCY ABLATION Left 8/17/2020    Procedure: RADIOFREQUENCY ABLATION LEFT L3,4,5 1 of 2;  Surgeon: Shaq Silverio MD;  Location: [facility] PAIN MGT;  Service: Pain Management;  Laterality: Left;  Left RFA L3,4,5  1 of 2    RADIOFREQUENCY ABLATION Right 8/31/2020    Procedure: RADIOFREQUENCY ABLATION RIGHT L3,4,5 2 of 2;  Surgeon: Shaq Silverio MD;  Location: [facility] PAIN MGT;  Service: Pain Management;  Laterality: Right;  RADIOFREQUENCY ABLATION RIGHT L3,4,5  2 of 2    RADIOFREQUENCY ABLATION Left 9/9/2021    Procedure: RADIOFREQUENCY ABLATION, L3-L4 AND L5 MEDIAL BRANCH 1 OF 2;  Surgeon: Shaq Silverio MD;  Location: [facility] PAIN MGT;  Service: Pain Management;  Laterality: Left;    RADIOFREQUENCY ABLATION Right 9/20/2021    Procedure: RADIOFREQUENCY ABLATION, L3-L4 AND L5 MEDIAL BRANCH 2 OF 2;  Surgeon: Shaq Silverio MD;  Location: [facility] PAIN MGT;  Service: Pain Management;  Laterality: Right;    RADIOFREQUENCY ABLATION Right 7/7/2022    Procedure: RADIOFREQUENCY ABLATION, RIGHT L3-L4-L5 ONE OF TWO;  Surgeon: Shaq Silverio MD;  Location: [facility] PAIN MGT;  Service: Pain Management;  Laterality: Right;    RADIOFREQUENCY ABLATION Left  7/21/2022    Procedure: RADIOFREQUENCY ABLATION, LEFT L3-L4-L5 TWO OF TWO *BRING SCS REMOTE*;  Surgeon: Shaq Silverio MD;  Location: [facility] PAIN MGT;  Service: Pain Management;  Laterality: Left;    RADIOFREQUENCY ABLATION Left 3/16/2023    Procedure: RADIOFREQUENCY ABLATION LEFT L3,L4,L5 *BRING SCS REMOTE*;  Surgeon: Shaq Silverio MD;  Location: [facility] PAIN MGT;  Service: Pain Management;  Laterality: Left;    RADIOFREQUENCY ABLATION Right 3/30/2023    Procedure: RADIOFREQUENCY ABLATION RIGHT L3,L4,L5 *BRING SCS REMOTE*;  Surgeon: Shaq Silverio MD;  Location: [facility] PAIN MGT;  Service: Pain Management;  Laterality: Right;    RADIOFREQUENCY ABLATION Right 12/18/2023    Procedure: RADIOFREQUENCY ABLATION RIGHT L3, 4, 5 1 OF 2;  Surgeon: Shaq Silverio MD;  Location: [facility] PAIN MGT;  Service: Pain Management;  Laterality: Right;  [phone]    RADIOFREQUENCY ABLATION Left 1/4/2024    Procedure: RADIOFREQUENCY ABLATION LEFT L3, 4, 5 2 OF 2;  Surgeon: Shaq Silverio MD;  Location: [facility] PAIN MGT;  Service: Pain Management;  Laterality: Left;  [phone]  2 WK F/U WARREN    RADIOFREQUENCY ABLATION Bilateral 1/6/2025    Procedure: RADIOFREQUENCY ABLATION BILATERAL L3, 4, 5;  Surgeon: Shaq Silverio MD;  Location: [facility] PAIN MGT;  Service: Pain Management;  Laterality: Bilateral;  3 WK F/U KRANTHI    TRIAL OF SPINAL CORD NERVE STIMULATOR N/A 9/24/2018    Procedure: TRIAL, NEUROSTIMULATOR, SPINAL CORD, SPINAL CORD STIMULATOR TRIAL-INTERNAL WIRES TO EXTERNAL BATTERY;  Surgeon: Shaq Silverio MD;  Location: [facility] PAIN MGT;  Service: Pain Management;  Laterality: N/A;  [employer] REP NOTIFIED       Family History:  Family History   Problem Relation Name Age of Onset    Cataracts Mother      Glaucoma Mother      No Known Problems Father      No Known Problems Sister      No Known Problems Brother      No Known Problems Maternal Aunt      No Known Problems Maternal Uncle      No Known Problems Paternal Aunt      No Known Problems Paternal Uncle      No  "Known Problems Maternal Grandmother      No Known Problems Maternal Grandfather      No Known Problems Paternal Grandmother      No Known Problems Paternal Grandfather         Social History:  Social History     Socioeconomic History    Marital status:    Tobacco Use    Smoking status: Never     Passive exposure: Never    Smokeless tobacco: Never   Substance and Sexual Activity    Alcohol use: Not Currently     Alcohol/week: 1.0 standard drink of alcohol     Types: 1 Glasses of wine per week     Comment: occ    Drug use: No    Sexual activity: Yes     Partners: Male     Social Drivers of Health     Financial Resource Strain: Low Risk  (2/7/2025)    Overall Financial Resource Strain (CARDIA)     Difficulty of Paying Living Expenses: Not very hard   Food Insecurity: No Food Insecurity (2/7/2025)    Hunger Vital Sign     Worried About Running Out of Food in the Last Year: Never true     Ran Out of Food in the Last Year: Never true   Physical Activity: Inactive (2/7/2025)    Exercise Vital Sign     Days of Exercise per Week: 0 days     Minutes of Exercise per Session: 10 min   Stress: No Stress Concern Present (2/7/2025)    [place] citizen [place] [employer] - Occupational Stress Questionnaire     Feeling of Stress : Only a little   Housing Stability: Unknown (2/7/2025)    Housing Stability Vital Sign     Unable to Pay for Housing in the Last Year: No       OBJECTIVE:    BP (!) 146/89   Pulse 86   Temp 98 °F (36.7 °C)   Resp 18   Ht 5' 4" (1.626 m)   Wt 85.5 kg (188 lb 7.9 oz)   SpO2 100%   BMI 32.35 kg/m²     PHYSICAL EXAMINATION:    General appearance: Well appearing, in no acute distress, alert and oriented x3.  Psych:  Mood and affect appropriate.  Skin: Skin color, texture, turgor normal, no rashes or lesions, in both upper and lower body.   Head/face:  Atraumatic, normocephalic. No palpable lymph nodes  Back: Straight leg raising in the sitting and supine positions is negative to radicular pain " bilaterally. There is pain with palpation to lumbar facet joints bilaterally. Limited ROM with pain on extension. Positive facet loading bilaterally.  Extremities: No deformities, edema, or skin discoloration. Good capillary refill.  Musculoskeletal: 5/5 strength in right ankle with plantar and dorsiflexion. 5/5 strength in left ankle with plantar and dorsiflexion. 5/5 strength with right knee flexion and extension. 5/5 strength with left knee flexion and extension.   Neuro: Decreased sensation to BLE.  Gait: Antalgic- ambulates without assistance.    ASSESSMENT: 74 y.o. year old female with back pain, consistent with the following diagnoses:     1. Chronic pain syndrome        2. Lumbar spondylosis        3. Degeneration of intervertebral disc of lumbar region with discogenic back pain        4. S/P insertion of spinal cord stimulator                  PLAN:     - Previous imaging reviewed.  Labs reviewed.     - She is s/p bilateral L3,4,5 RFA with benefit. We can repeat this as needed.     - Increase Lyrica to 150 mg TID.     - Continue Zanaflex 4 mg daily PRN.    - The patient will continue a home exercise routine to help with pain and strengthening.      - RTC in 1 month.      The above plan and management options were discussed at length with patient. Patient is in agreement with the above and verbalized understanding.    Kimberly Conner NP  02/07/2025

## 2025-02-07 NOTE — TELEPHONE ENCOUNTER
----- Message from Marky sent at 2/7/2025 12:08 PM CST -----  Who Called:        Refill or New Rx: refill         RX Name and Strength:  pregabalin (LYRICA) 150 MG capsule          Is this a 30 day or 90 day RX        Preferred Pharmacy with phone number:  [id number] - [place], [place] - [address]        Local or Mail Order: local           Would the patient rather a call back or a response via [facility]? call back         Best Call Back Number:        Additional Information:

## 2025-02-11 ENCOUNTER — OFFICE VISIT (OUTPATIENT)
Dept: BARIATRICS | Facility: CLINIC | Age: 75
End: 2025-02-11
Payer: MEDICARE

## 2025-02-11 VITALS
BODY MASS INDEX: 32.41 KG/M2 | HEART RATE: 82 BPM | WEIGHT: 189.81 LBS | OXYGEN SATURATION: 95 % | DIASTOLIC BLOOD PRESSURE: 76 MMHG | SYSTOLIC BLOOD PRESSURE: 132 MMHG | HEIGHT: 64 IN

## 2025-02-11 DIAGNOSIS — E11.22 TYPE 2 DIABETES MELLITUS WITH STAGE 3A CHRONIC KIDNEY DISEASE, WITHOUT LONG-TERM CURRENT USE OF INSULIN: Primary | ICD-10-CM

## 2025-02-11 DIAGNOSIS — Z98.84 HISTORY OF ROUX-EN-Y GASTRIC BYPASS: ICD-10-CM

## 2025-02-11 DIAGNOSIS — N18.31 TYPE 2 DIABETES MELLITUS WITH STAGE 3A CHRONIC KIDNEY DISEASE, WITHOUT LONG-TERM CURRENT USE OF INSULIN: Primary | ICD-10-CM

## 2025-02-11 DIAGNOSIS — E66.811 OBESITY, CLASS I, BMI 30.0-34.9 (SEE ACTUAL BMI): ICD-10-CM

## 2025-02-11 PROCEDURE — 1126F AMNT PAIN NOTED NONE PRSNT: CPT | Mod: HCNC,CPTII,S$GLB, | Performed by: INTERNAL MEDICINE

## 2025-02-11 PROCEDURE — 1160F RVW MEDS BY RX/DR IN RCRD: CPT | Mod: HCNC,CPTII,S$GLB, | Performed by: INTERNAL MEDICINE

## 2025-02-11 PROCEDURE — 3078F DIAST BP <80 MM HG: CPT | Mod: HCNC,CPTII,S$GLB, | Performed by: INTERNAL MEDICINE

## 2025-02-11 PROCEDURE — 3072F LOW RISK FOR RETINOPATHY: CPT | Mod: HCNC,CPTII,S$GLB, | Performed by: INTERNAL MEDICINE

## 2025-02-11 PROCEDURE — 1101F PT FALLS ASSESS-DOCD LE1/YR: CPT | Mod: HCNC,CPTII,S$GLB, | Performed by: INTERNAL MEDICINE

## 2025-02-11 PROCEDURE — 99213 OFFICE O/P EST LOW 20 MIN: CPT | Mod: HCNC,S$GLB,, | Performed by: INTERNAL MEDICINE

## 2025-02-11 PROCEDURE — 99999 PR PBB SHADOW E&M-EST. PATIENT-LVL V: CPT | Mod: PBBFAC,HCNC,, | Performed by: INTERNAL MEDICINE

## 2025-02-11 PROCEDURE — 3288F FALL RISK ASSESSMENT DOCD: CPT | Mod: HCNC,CPTII,S$GLB, | Performed by: INTERNAL MEDICINE

## 2025-02-11 PROCEDURE — 1159F MED LIST DOCD IN RCRD: CPT | Mod: HCNC,CPTII,S$GLB, | Performed by: INTERNAL MEDICINE

## 2025-02-11 PROCEDURE — 3075F SYST BP GE 130 - 139MM HG: CPT | Mod: HCNC,CPTII,S$GLB, | Performed by: INTERNAL MEDICINE

## 2025-02-11 RX ORDER — SEMAGLUTIDE 2.68 MG/ML
INJECTION, SOLUTION SUBCUTANEOUS
COMMUNITY
Start: 2025-01-29 | End: 2025-02-11

## 2025-02-11 RX ORDER — TIRZEPATIDE 7.5 MG/.5ML
7.5 INJECTION, SOLUTION SUBCUTANEOUS
Qty: 4 PEN | Refills: 0 | Status: SHIPPED | OUTPATIENT
Start: 2025-02-11

## 2025-02-11 RX ORDER — TIRZEPATIDE 10 MG/.5ML
10 INJECTION, SOLUTION SUBCUTANEOUS
Qty: 4 PEN | Refills: 4 | Status: SHIPPED | OUTPATIENT
Start: 2025-03-11

## 2025-02-11 NOTE — PROGRESS NOTES
Subjective:       Patient ID: Faby Luna is a 74 y.o. female.    Chief Complaint: Follow-up      CC:    Pt here today for follow-up. Has lost 28.1 lbs (-5.5 lbs muscle. -19.9 lbs fat). Was to get back on track with bariatric diet and started Ozempic 2 mg weekly 2 weeks ago. States craving a lot of sweets during the day.  Denies SE. She states she is taking both of them together. States she is eating fruit, eggs, salad. She is skipping meals.  Not using protein shake. Walking up to 3 blocks. Has started at the gym.      She had been off rybelsus 2/2 to cost. Has started Ozempic. Getting for $35/month. She does feel appetite is down a little. BS improving.     Currently at 41% EWL from bypass    New BMR: 1534    New PBF: 39.9%     Initial:  BMR:1617    PBF:  41.6%    Lab Results       Component                Value               Date                       HGBA1C                   6.0 (H)             08/23/2024                 HGBA1C                   6.7 (H)             02/23/2024                 HGBA1C                   6.5 (H)             11/20/2023            Lab Results       Component                Value               Date                       LDLCALC                  76.2                08/23/2024                 CREATININE               1.0                 08/23/2024                           From recent RD notes.   Back on track Bariatric Diet.  Diet Recall: 60-80 grams of protein/day; 32-48 oz of fluids/day     Current diet recall:  B: prot shake OR skips  L: 1/4 cup nuts or cheese + grapes or kiwi or small apple or banana  D: baked/smothered chicken or fish with variety of veg (asparagus, greens or spinach cooked down with olive oil, brussels, broccoli)  Sn: 1/4 cup nuts, piece of fruit     Diet includes:  Meal Pattern: 1 meal(s) + 1-2 snack(s) + 2 protein supplement(s)  Adequate protein supplement intake. Premier shakes.  Adequate dairy intake.  Adequate vegetable intake. Tolerates raw  "vegetables and lettuce.  Adequate fruit intake.  Starchy CHO: reduced lately, small amounts  Beverages: water, CL, Coke zero  Alcohol: twice/month white liquor + diet cranberry juice     EXERCISE:  None.  Considering joining the gym (it's free with her insurance)     Restrictions to Exercise: None. Back pain.     VITAMINS / MINERALS:  Multivitamins  B-Complex  Calcium Citrate + Vitamin D  Vitamin B12: Sublingual.     ASSESSMENT:  Doing well overall.  Weight loss.  Adequate calorie intake.  Adequate protein intake.  Inadequate fluid intake.  Following diet inappropriately.  Not exercising.  Adequate vitamins & minerals.     BARIATRIC DIET DISCUSSION:  Instructed and provided written materials on bariatric diet plan.  Reinforced post-op nutrition guidelines.     PLAN / RECOMMENDATIONS:  Continue Back on track with diet plan.  Maintain/Increase protein intake.  Maintain/Increase fluid intake.  Begin light exercise as mary.  Continue appropriate vitamins & minerals.     Return to clinic in 1 months with med wt loss.      Review of Systems      Objective:    /76   Pulse 82   Ht 5' 4" (1.626 m)   Wt 86.1 kg (189 lb 12.8 oz)   SpO2 95%   BMI 32.58 kg/m²    Physical Exam  Vitals reviewed.   Constitutional:       General: She is not in acute distress.     Appearance: She is well-developed.   HENT:      Head: Normocephalic and atraumatic.   Eyes:      General: No scleral icterus.     Pupils: Pupils are equal, round, and reactive to light.   Neck:      Thyroid: No thyromegaly.   Cardiovascular:      Rate and Rhythm: Normal rate.   Pulmonary:      Effort: Pulmonary effort is normal. No respiratory distress.   Musculoskeletal:         General: Normal range of motion.      Cervical back: Normal range of motion and neck supple.   Skin:     General: Skin is warm and dry.      Findings: No erythema.   Neurological:      Mental Status: She is alert and oriented to person, place, and time.      Cranial Nerves: No cranial " nerve deficit.   Psychiatric:         Behavior: Behavior normal.         Judgment: Judgment normal.         Assessment:       Problem List Items Addressed This Visit       Type 2 diabetes mellitus with stage 3a chronic kidney disease, without long-term current use of insulin - Primary (Chronic)    Relevant Medications    tirzepatide (MOUNJARO) 7.5 mg/0.5 mL PnIj    tirzepatide (MOUNJARO) 10 mg/0.5 mL PnIj (Start on 3/11/2025)     Other Visit Diagnoses       Obesity, Class I, BMI 30.0-34.9 (see actual BMI)        History of Clarisse-en-Y gastric bypass                            Plan:           Faby was seen today for follow-up.    Diagnoses and all orders for this visit:    Type 2 diabetes mellitus with stage 3a chronic kidney disease, without long-term current use of insulin  -     tirzepatide (MOUNJARO) 7.5 mg/0.5 mL PnIj; Inject 7.5 mg into the skin every 7 days.  -     tirzepatide (MOUNJARO) 10 mg/0.5 mL PnIj; Inject 10 mg into the skin every 7 days.    Obesity, Class I, BMI 30.0-34.9 (see actual BMI)    History of Clarisse-en-Y gastric bypass              Start Mounjaro 7.5 mg once a week  x 4 weeks, then 10 mg weekly. Rx sent in predated      Decrease portions as soon as you start Mounjaro. Avoid fried, greasy, fatty foods.     Some nausea in the first 2 weeks is not unusual.     If you get pain across the upper abdomen and around to your back, please call the office.            [email or web address]/savings-resources for coupon/videos.           Patient was informed that topiramate is used for migraine prevention and seizures. Weight loss is a common side effect that is well documented. S/he understands this. S/he was informed of the potential side effects such as serious and possibly fatal rash in which case the medication should be discontinued immediately. Paresthesias, forgetfulness, fatigue, kidney stones, GI symptoms, and changes in lab values such as electrolytes, blood counts and kidney  function.      topiramate 100  mg evening only.     - To lose weight you want to cut 100% starchy carbohydrates out of your diet (bread, rice, pasta, potatoes, granola, flour, corn, peas, oatmeal, grits, tortillas, crackers, chips) and get  grams of protein.  Aim for 100 grams of protein 6276-3302 bianka  daily.        - No soda, sweet tea, juices, sports drinks or lemonade     -Exercise daily. Increase low impact activity as tolerated.  Avoid high impact activity, very heavy lifting or other exercise regimens that may cause discomfort.     Pt advised to check blood sugar regularly as medications may need to be adjusted with changes in diet and weight loss.     Weight training handouts given.

## 2025-02-11 NOTE — PATIENT INSTRUCTIONS
Start Mounjaro 7.5 mg once a week  x 4 weeks, then 10 mg weekly. Rx sent in predated      Decrease portions as soon as you start Mounjaro. Avoid fried, greasy, fatty foods.     Some nausea in the first 2 weeks is not unusual.     If you get pain across the upper abdomen and around to your back, please call the office.            https://[email or web address]-resources for coupon/videos.       Patient was informed that topiramate is used for migraine prevention and seizures. Weight loss is a common side effect that is well documented. S/he understands this. S/he was informed of the potential side effects such as serious and possibly fatal rash in which case the medication should be discontinued immediately. Paresthesias, forgetfulness, fatigue, kidney stones, GI symptoms, and changes in lab values such as electrolytes, blood counts and kidney function.      topiramate 100  mg evening only.     - To lose weight you want to cut 100% starchy carbohydrates out of your diet (bread, rice, pasta, potatoes, granola, flour, corn, peas, oatmeal, grits, tortillas, crackers, chips) and get  grams of protein.  Aim for 100 grams of protein 1265-7283 bianka  daily.        - No soda, sweet tea, juices, sports drinks or lemonade     -Exercise daily. Increase low impact activity as tolerated.  Avoid high impact activity, very heavy lifting or other exercise regimens that may cause discomfort.     Pt advised to check blood sugar regularly as medications may need to be adjusted with changes in diet and weight loss.       Sample Weight Training Plan ( adapted from [facility]):    1.Goblet Squat  How to:    Stand with feet hip-width apart and hold a weight vertically in front of chest, elbows pointing toward the floor.  Push hips back and bend knees to lower into a squat.  Drive through heels to stand back up to starting position. That's 1 rep. Complete three to five reps with a heavy weight.      2.Bent-Over Row  How  to:    With a dumbbell in each hand and feet under hips, hinge at hips with knees slightly bent and arms just in front of legs.  Drive one elbow back toward hips, feeling shoulder blades squeeze together, pulling weight toward side body.  Slowly lower weight back down, then repeat with other arm. That's 1 rep. Complete three to five reps with a medium-heavy weight.        3.Lateral Lunge  How to:    Holding a weight at chest or dumbbells at each side, stand up straight with feet hip-width apart.  Take a large step to the right, sit hips back, and lower down until right knee is nearly parallel with the floor. Your left leg should be straight.  Return to start. That's 1 rep. Complete 10 reps on each side.      4.Chest Press  How to:    Lie flat on back, or on a bench, with feet flat on the ground. With a dumbbell in each hand, extend arms directly over shoulders, palms facing toward feet.  Squeeze shoulder blades together and slowly bend elbows, lowering the weights out to the side, parallel with shoulders, until elbows form 90-degree angles.  Slowly drive the dumbbells back up to start, squeezing shoulder blades the entire time. Thats 1 rep. Complete three to five reps with a medium-heavy weight.      5.Split Stance Shoulder Press    How to:    Grab a pair of dumbbells or a resistance band. Stagger stance into a wide step, one foot forward and one back with hips squared, and hold the weights or band just above shoulders, elbows close to sides.  Leaning forward ever so slightly, bend both knees, and press through front heel while simultaneously lifting the weights or band to the sha, keeping elbows forward and arms in line with your ears.  Lower weights or band back to shoulders. That's 1 rep. Do 10 reps, alternating side for each set.        6.Alternating Reverse Lunge To Bicep Curl  How to:    Grab a pair of dumbbells and hold them at arm's length next to sides, palms facing each other. Stand tall with feet  hip-width apart.  Step backward with right leg and lower body until front knee is bent 90 degrees. At the same time as you lunge, curl both dumbbells up to shoulders.  Lower the dumbbells as you return to the starting position. Step back with the other leg and repeat.That's 1 rep. Complete 12 reps with a medium weight.

## 2025-02-12 DIAGNOSIS — G89.4 CHRONIC PAIN SYNDROME: ICD-10-CM

## 2025-02-12 DIAGNOSIS — M51.360 DEGENERATION OF INTERVERTEBRAL DISC OF LUMBAR REGION WITH DISCOGENIC BACK PAIN: ICD-10-CM

## 2025-02-12 DIAGNOSIS — M47.816 LUMBAR SPONDYLOSIS: ICD-10-CM

## 2025-02-12 NOTE — TELEPHONE ENCOUNTER
----- Message from [employer] sent at 2/12/2025 12:51 PM CST -----  Who Called: CLAUDIA MARTINEZ [[id number]]              New Prescription or Refill : Refill        RX Name and Strength:  pregabalin (LYRICA) 150 MG capsule           30 day or 90 day RX: 90 day               Local or Mail Order : local               Preferred Pharmacy: [facility] [address] [place] [address] [address]        Would the patient rather a call back or a response via [facility]? Call back         Best Call Back Number:          Additional Information: Pt advised rx was sent to pharmacy for 100mg instead of 150mg

## 2025-02-13 ENCOUNTER — OFFICE VISIT (OUTPATIENT)
Dept: CARDIOLOGY | Facility: CLINIC | Age: 75
End: 2025-02-13
Payer: MEDICARE

## 2025-02-13 VITALS
BODY MASS INDEX: 31.88 KG/M2 | SYSTOLIC BLOOD PRESSURE: 132 MMHG | WEIGHT: 186.75 LBS | HEIGHT: 64 IN | RESPIRATION RATE: 15 BRPM | DIASTOLIC BLOOD PRESSURE: 79 MMHG | OXYGEN SATURATION: 95 % | HEART RATE: 78 BPM

## 2025-02-13 DIAGNOSIS — I25.10 ATHEROSCLEROSIS OF NATIVE CORONARY ARTERY OF NATIVE HEART WITHOUT ANGINA PECTORIS: Primary | ICD-10-CM

## 2025-02-13 DIAGNOSIS — I70.0 AORTIC ATHEROSCLEROSIS: ICD-10-CM

## 2025-02-13 DIAGNOSIS — E66.2 CLASS 1 OBESITY WITH ALVEOLAR HYPOVENTILATION, SERIOUS COMORBIDITY, AND BODY MASS INDEX (BMI) OF 32.0 TO 32.9 IN ADULT: ICD-10-CM

## 2025-02-13 DIAGNOSIS — N18.31 TYPE 2 DIABETES MELLITUS WITH STAGE 3A CHRONIC KIDNEY DISEASE, WITHOUT LONG-TERM CURRENT USE OF INSULIN: Chronic | ICD-10-CM

## 2025-02-13 DIAGNOSIS — I50.32 CHRONIC DIASTOLIC HEART FAILURE: ICD-10-CM

## 2025-02-13 DIAGNOSIS — G47.33 OSA (OBSTRUCTIVE SLEEP APNEA): Chronic | ICD-10-CM

## 2025-02-13 DIAGNOSIS — I10 ESSENTIAL HYPERTENSION: Chronic | ICD-10-CM

## 2025-02-13 DIAGNOSIS — E66.811 CLASS 1 OBESITY WITH ALVEOLAR HYPOVENTILATION, SERIOUS COMORBIDITY, AND BODY MASS INDEX (BMI) OF 32.0 TO 32.9 IN ADULT: ICD-10-CM

## 2025-02-13 DIAGNOSIS — E11.69 HYPERLIPIDEMIA ASSOCIATED WITH TYPE 2 DIABETES MELLITUS: ICD-10-CM

## 2025-02-13 DIAGNOSIS — E11.22 TYPE 2 DIABETES MELLITUS WITH STAGE 3A CHRONIC KIDNEY DISEASE, WITHOUT LONG-TERM CURRENT USE OF INSULIN: Chronic | ICD-10-CM

## 2025-02-13 DIAGNOSIS — E78.5 HYPERLIPIDEMIA ASSOCIATED WITH TYPE 2 DIABETES MELLITUS: ICD-10-CM

## 2025-02-13 LAB
OHS QRS DURATION: 68 MS
OHS QTC CALCULATION: 409 MS

## 2025-02-13 PROCEDURE — 99999 PR PBB SHADOW E&M-EST. PATIENT-LVL V: CPT | Mod: PBBFAC,HCNC,, | Performed by: INTERNAL MEDICINE

## 2025-02-13 RX ORDER — PREGABALIN 150 MG/1
150 CAPSULE ORAL 3 TIMES DAILY
Qty: 90 CAPSULE | Refills: 3 | Status: SHIPPED | OUTPATIENT
Start: 2025-02-13 | End: 2025-08-14

## 2025-02-13 NOTE — PROGRESS NOTES
CARDIOLOGY CLINIC VISIT        HISTORY OF PRESENT ILLNESS:     2023: Faby Luna presents for cardiovascular evaluation. Referred by pulmonary secondary to LVH. Complaints of shortness of breath. Exertional. Symptoms < one block. Diabetes. Hypertension. DAHLIA. Obesity. Echocardiogram from 2022 showed an EF of 55%. Grade 1 DD. No pulmonary hypertension.    2023:  Last visit increased losartan and Lasix. Shortness of breath has improved.  Nuclear stress negative for ischemia.    2024:  Recently had her losartan decreased to 50 mg p.o. q.d..  Was experiencing some dizziness.  Hypotension.  States that she feels good.  Trying to lose weight.  EKG today shows normal sinus rhythm, nonspecific T-wave abnormality.    2025:  No complaints.  No chest pain or shortness of breath.  No PND orthopnea.  EKG shows normal sinus rhythm.    CARDIOVASCULAR HISTORY:     Coronary atherosclerosis - CT 2022  Chronic diastolic heart failure    PAST MEDICAL HISTORY:     Past Medical History:   Diagnosis Date    Anxiety with depression     Arthritis     CKD (chronic kidney disease) stage 2, GFR 60-89 ml/min     Diabetes mellitus     History of Clarisse-en-Y gastric bypass     Hypertension     Obesity, unspecified     DAHLIA (obstructive sleep apnea)     Spondylosis        PAST SURGICAL HISTORY:     Past Surgical History:   Procedure Laterality Date    CARPAL TUNNEL RELEASE Right 3/8/2019    Procedure: RELEASE, CARPAL TUNNEL right;  Surgeon: Pan Goodman MD;  Location: [facility];  Service: Orthopedics;  Laterality: Right;    CARPAL TUNNEL RELEASE Left 2019    Procedure: RELEASE, CARPAL TUNNEL- LEFT;  Surgeon: Pan Goodman MD;  Location: [address];  Service: Orthopedics;  Laterality: Left;    CATARACT EXTRACTION Bilateral      SECTION      CHOLECYSTECTOMY      COLONOSCOPY N/A 2024    Procedure: COLONOSCOPY;  Surgeon: Otilio Man MD;  Location: [facility];  Service: Endoscopy;   Laterality: N/A;  Referred by: MYNOR Sprague / CECILIA Meds: ozempic / diabetic / Prep: peg / Route instructions sent: portal  2/14- lvm and portal msg for pc. DBM  2/19- left 2nd vm for pc. DBM    COLONOSCOPY, SCREENING, HIGH RISK PATIENT N/A 10/30/2024    Procedure: COLONOSCOPY, SCREENING, HIGH RISK PATIENT;  Surgeon: Corinne Traylor MD;  Location: [facility] ENDO;  Service: Endoscopy;  Laterality: N/A;  WLMED: Ozempic Pt states she hasn't taken for several weeks  Outside referral from [facility]  ext suprep  inst portal  LW  10/23 r/s ext suprep to portal, holding ozempic since 10/16 cf    EPIDURAL STEROID INJECTION INTO LUMBAR SPINE N/A 6/14/2018    Procedure: INJECTION, STEROID, SPINE, LUMBAR, EPIDURAL;  Surgeon: Shaq Silverio MD;  Location: [facility] PAIN MGT;  Service: Pain Management;  Laterality: N/A;  LUMBAR L4-L5 INTERLAMINAR ELISE'  [id number]  W/ SEDATION     ESOPHAGOGASTRODUODENOSCOPY N/A 5/12/2023    Procedure: EGD (ESOPHAGOGASTRODUODENOSCOPY);  Surgeon: Deann Jimenez MD;  Location: [facility];  Service: Gastroenterology;  Laterality: N/A;    HYSTERECTOMY      INJECTION OF ANESTHETIC AGENT AROUND NERVE Bilateral 9/12/2019    Procedure: BLOCK, NERVE, L3-L4-L5 MEDIAL BRANCH;  Surgeon: Shaq Silverio MD;  Location: [facility] PAIN MGT;  Service: Pain Management;  Laterality: Bilateral;    INJECTION OF ANESTHETIC AGENT AROUND NERVE Bilateral 10/17/2019    Procedure: BLOCK, NERVE;  Surgeon: Shaq Silverio MD;  Location: [facility] PAIN MGT;  Service: Pain Management;  Laterality: Bilateral;  B/L MBB L3-L4-L5  REPEAT  CONSENT NEEDED    INJECTION OF FACET JOINT Bilateral 5/28/2018    Procedure: INJECTION-FACET;  Surgeon: Shaq Silverio MD;  Location: [facility] PAIN MGT;  Service: Pain Management;  Laterality: Bilateral;  LUMBAR BILATERAL L4-L5 AND L5-S1 FACET STEROID INJECTION  [id number]    W/ SEDATION     RADIOFREQUENCY ABLATION Right 11/21/2019    Procedure: RADIOFREQUENCY ABLATION RIGHT L3, L4, L5;  Surgeon: Shaq Silverio MD;  Location: [facility]  PAIN MGT;  Service: Pain Management;  Laterality: Right;  Right RFA L3-L4-L5  1 of 2  Consent Needed    RADIOFREQUENCY ABLATION Left 12/5/2019    Procedure: RADIOFREQUENCY ABLATION LEFT L3-5;  Surgeon: Shaq Silverio MD;  Location: [facility]IN [facility]GT;  Service: Pain Management;  Laterality: Left;  Left RFA L3-L4-L5  2 of 2  Consent Needed    RADIOFREQUENCY ABLATION Left 8/17/2020    Procedure: RADIOFREQUENCY ABLATION LEFT L3,4,5 1 of 2;  Surgeon: Shaq Silverio MD;  Location: [facility]IN [facility];  Service: Pain Management;  Laterality: Left;  Left RFA L3,4,5  1 of 2    RADIOFREQUENCY ABLATION Right 8/31/2020    Procedure: RADIOFREQUENCY ABLATION RIGHT L3,4,5 2 of 2;  Surgeon: Shaq Silverio MD;  Location: [facility] PAIN MGT;  Service: Pain Management;  Laterality: Right;  RADIOFREQUENCY ABLATION RIGHT L3,4,5  2 of 2    RADIOFREQUENCY ABLATION Left 9/9/2021    Procedure: RADIOFREQUENCY ABLATION, L3-L4 AND L5 MEDIAL BRANCH 1 OF 2;  Surgeon: Shaq Silverio MD;  Location: [facility] PAIN MGT;  Service: Pain Management;  Laterality: Left;    RADIOFREQUENCY ABLATION Right 9/20/2021    Procedure: RADIOFREQUENCY ABLATION, L3-L4 AND L5 MEDIAL BRANCH 2 OF 2;  Surgeon: Shaq Silverio MD;  Location: [facility] PAIN MGT;  Service: Pain Management;  Laterality: Right;    RADIOFREQUENCY ABLATION Right 7/7/2022    Procedure: RADIOFREQUENCY ABLATION, RIGHT L3-L4-L5 ONE OF TWO;  Surgeon: Shaq Silverio MD;  Location: [facility] PAIN MGT;  Service: Pain Management;  Laterality: Right;    RADIOFREQUENCY ABLATION Left 7/21/2022    Procedure: RADIOFREQUENCY ABLATION, LEFT L3-L4-L5 TWO OF TWO *BRING SCS REMOTE*;  Surgeon: Shaq Silverio MD;  Location: [facility] PAIN MGT;  Service: Pain Management;  Laterality: Left;    RADIOFREQUENCY ABLATION Left 3/16/2023    Procedure: RADIOFREQUENCY ABLATION LEFT L3,L4,L5 *BRING SCS REMOTE*;  Surgeon: Shaq Silverio MD;  Location: [facility] PAIN MGT;  Service: Pain Management;  Laterality: Left;    RADIOFREQUENCY ABLATION Right 3/30/2023    Procedure:  "RADIOFREQUENCY ABLATION RIGHT L3,L4,L5 *BRING SCS REMOTE*;  Surgeon: Shaq Silverio MD;  Location: [facility]IN [facility]GT;  Service: Pain Management;  Laterality: Right;    RADIOFREQUENCY ABLATION Right 12/18/2023    Procedure: RADIOFREQUENCY ABLATION RIGHT L3, 4, 5 1 OF 2;  Surgeon: Shaq Silverio MD;  Location: [facility]IN [facility]GT;  Service: Pain Management;  Laterality: Right;  [phone]    RADIOFREQUENCY ABLATION Left 1/4/2024    Procedure: RADIOFREQUENCY ABLATION LEFT L3, 4, 5 2 OF 2;  Surgeon: Shaq Silverio MD;  Location: [facility]IN [facility]GT;  Service: Pain Management;  Laterality: Left;  [phone]  2 WK F/U WARREN    RADIOFREQUENCY ABLATION Bilateral 1/6/2025    Procedure: RADIOFREQUENCY ABLATION BILATERAL L3, 4, 5;  Surgeon: Shaq Silverio MD;  Location: [facility]IN [facility]GT;  Service: Pain Management;  Laterality: Bilateral;  3 WK F/U KRANTHI    TRIAL OF SPINAL CORD NERVE STIMULATOR N/A 9/24/2018    Procedure: TRIAL, NEUROSTIMULATOR, SPINAL CORD, SPINAL CORD STIMULATOR TRIAL-INTERNAL WIRES TO EXTERNAL BATTERY;  Surgeon: Shaq Silverio MD;  Location: [facility]IN [facility]GT;  Service: Pain Management;  Laterality: N/A;  ABBOTT REP NOTIFIED       ALLERGIES AND MEDICATION:   Review of patient's allergies indicates:  No Known Allergies     Medication List            Accurate as of February 13, 2025  3:03 PM. If you have any questions, ask your nurse or doctor.                CONTINUE taking these medications      albuterol 90 mcg/actuation inhaler  Commonly known as: PROAIR HFA  Inhale 2 puffs into the lungs every 4 (four) hours as needed for Wheezing or Shortness of Breath. Rescue     atenoloL 100 MG tablet  Commonly known as: TENORMIN  TAKE 1 TABLET EVERY DAY     * BD ULTRA-FINE MINI PEN NEEDLE 31 gauge x 3/16" Ndle  Generic drug: pen needle, diabetic     * BD ULTRA-FINE FELICIA PEN NEEDLE 32 gauge x 5/32" Ndle  Generic drug: pen needle, diabetic     blood sugar diagnostic Strp  To check BG 3 times daily, to use with insurance preferred meter   "   EScitalopram oxalate 20 MG tablet  Commonly known as: LEXAPRO  Take 1 tablet (20 mg total) by mouth once daily.     fluticasone propionate 50 mcg/actuation nasal spray  Commonly known as: FLONASE  2 sprays (100 mcg total) by Each Nostril route once daily.     lancets Misc  To check BG 3 times daily, to use with insurance preferred meter     loratadine 10 mg tablet  Commonly known as: CLARITIN  Take 1 tablet (10 mg total) by mouth daily as needed for Allergies.     losartan 50 MG tablet  Commonly known as: COZAAR  Take 1 tablet (50 mg total) by mouth once daily.     * MOUNJARO 7.5 mg/0.5 mL Pnij  Generic drug: tirzepatide  Inject 7.5 mg into the skin every 7 days.     * MOUNJARO 10 mg/0.5 mL Pnij  Generic drug: tirzepatide  Inject 10 mg into the skin every 7 days.  Start taking on: March 11, 2025     pantoprazole 20 MG tablet  Commonly known as: PROTONIX  TAKE 1 TABLET TWICE DAILY BEFORE MEALS     pregabalin 150 MG capsule  Commonly known as: LYRICA  Take 1 capsule (150 mg total) by mouth 3 (three) times daily.     rosuvastatin 20 MG tablet  Commonly known as: CRESTOR  Take 1 tablet (20 mg total) by mouth every evening.     tiZANidine 4 MG tablet  Commonly known as: ZANAFLEX  TAKE 1 TABLET ONE TIME DAILY AS NEEDED FOR PAIN     topiramate 100 MG tablet  Commonly known as: TOPAMAX  TAKE 1 TABLET EVERY EVENING     traZODone 100 MG tablet  Commonly known as: DESYREL  Take 1 tablet by mouth in the evening           * This list has 4 medication(s) that are the same as other medications prescribed for you. Read the directions carefully, and ask your doctor or other care provider to review them with you.                  SOCIAL HISTORY:     Social History     Socioeconomic History    Marital status:    Tobacco Use    Smoking status: Never     Passive exposure: Never    Smokeless tobacco: Never   Substance and Sexual Activity    Alcohol use: Not Currently     Alcohol/week: 1.0 standard drink of alcohol     Types: 1  Glasses of wine per week     Comment: occ    Drug use: No    Sexual activity: Yes     Partners: Male     Social Drivers of Health     Financial Resource Strain: Low Risk  (2/7/2025)    Overall Financial Resource Strain (CARDIA)     Difficulty of Paying Living Expenses: Not very hard   Food Insecurity: No Food Insecurity (2/7/2025)    Hunger Vital Sign     Worried About Running Out of Food in the Last Year: Never true     Ran Out of Food in the Last Year: Never true   Physical Activity: Inactive (2/7/2025)    Exercise Vital Sign     Days of Exercise per Week: 0 days     Minutes of Exercise per Session: 10 min   Stress: No Stress Concern Present (2/7/2025)    [employer] - Occupational Stress Questionnaire     Feeling of Stress : Only a little   Housing Stability: Unknown (2/7/2025)    Housing Stability Vital Sign     Unable to Pay for Housing in the Last Year: No       FAMILY HISTORY:     Family History   Problem Relation Name Age of Onset    Cataracts Mother      Glaucoma Mother      No Known Problems Father      No Known Problems Sister      No Known Problems Brother      No Known Problems Maternal Aunt      No Known Problems Maternal Uncle      No Known Problems Paternal Aunt      No Known Problems Paternal Uncle      No Known Problems Maternal Grandmother      No Known Problems Maternal Grandfather      No Known Problems Paternal Grandmother      No Known Problems Paternal Grandfather         REVIEW OF SYSTEMS:   Review of Systems   Constitutional:  Negative for chills, diaphoresis, fever, malaise/fatigue and weight loss.   HENT:  Negative for congestion, hearing loss, sinus pain, sore throat and tinnitus.    Eyes:  Negative for blurred vision, double vision, photophobia and pain.   Respiratory:  Negative for cough, hemoptysis, sputum production, shortness of breath, wheezing and stridor.    Cardiovascular:  Negative for chest pain, palpitations, orthopnea, claudication, leg swelling  "and PND.   Gastrointestinal:  Negative for abdominal pain, blood in stool, heartburn, melena, nausea and vomiting.   Musculoskeletal:  Negative for back pain, falls, joint pain, myalgias and neck pain.   Neurological:  Negative for dizziness, tingling, tremors, sensory change, speech change, focal weakness, seizures, loss of consciousness, weakness and headaches.   Endo/Heme/Allergies:  Does not bruise/bleed easily.   Psychiatric/Behavioral:  Negative for depression, memory loss and substance abuse. The patient is not nervous/anxious.        PHYSICAL EXAM:     Vitals:    02/13/25 1452   BP: 132/79   Pulse: 78   Resp: 15        Body mass index is 32.05 kg/m².  Weight: 84.7 kg (186 lb 11.7 oz)   Height: 5' 4" (162.6 cm)     Physical Exam  Vitals reviewed.   Constitutional:       General: She is not in acute distress.     Appearance: She is well-developed. She is obese. She is not diaphoretic.   HENT:      Head: Normocephalic.   Neck:      Vascular: No carotid bruit or JVD.   Cardiovascular:      Rate and Rhythm: Normal rate and regular rhythm.      Pulses: Normal pulses.      Heart sounds: Normal heart sounds.   Pulmonary:      Effort: Pulmonary effort is normal.      Breath sounds: Normal breath sounds.   Abdominal:      General: Bowel sounds are normal.      Palpations: Abdomen is soft.      Tenderness: There is no abdominal tenderness.   Skin:     General: Skin is warm and dry.   Neurological:      Mental Status: She is alert and oriented to person, place, and time.   Psychiatric:         Speech: Speech normal.         Behavior: Behavior normal.         Thought Content: Thought content normal.         DATA:   EKG: (personally reviewed tracing)  02/13/2025-normal sinus rhythm  04/26/2024-normal sinus rhythm, nonspecific T-wave abnormality  03/14/2023 - NSR, non specific t wave abnormality  11/25/2022 - NSR, non specific t wave abnormality  Laboratory:  CBC:  Recent Labs   Lab 08/16/23  0820 02/23/24  0910 " 08/23/24  0900   WBC 10.07 9.53 8.68   Hemoglobin 13.4 12.8 13.1   Hematocrit 42.7 41.1 41.1   Platelets 337 314 293       CHEMISTRIES:  Recent Labs   Lab 08/16/23  0820 02/23/24  0910 08/23/24  0900   Glucose 147 H 107 98   Sodium 141 141 141   Potassium 3.9 3.5 4.1   BUN 15 12 15   Creatinine 1.1 0.9 1.0   Calcium 9.1 9.5 8.8       CARDIAC BIOMARKERS:  Recent Labs   Lab 05/03/23  1715   Troponin I <0.006       COAGS:        LIPIDS/LFTS:  Recent Labs   Lab 08/16/23  0820 02/23/24  0910 08/23/24  0900   Cholesterol 159 154 145   Triglycerides 157 H 133 89   HDL 49 51 51   LDL Cholesterol 78.6 76.4 76.2   Non-HDL Cholesterol 110 103 94   AST 25 28 32   ALT 26 39 38       Cardiovascular Testing:    Nuclear stress 03/07/2023:      Normal myocardial perfusion scan. There is no evidence of myocardial ischemia or infarction.    There is a  mild intensity fixed perfusion abnormality in the inferior wall of the left ventricle secondary to diaphragm attenuation.    The gated perfusion images showed an ejection fraction of 83% at rest.    There is normal wall motion at rest and post stress.    The ECG portion of the study is negative for ischemia.    The patient reported no chest pain during the stress test.    There were no arrhythmias during stress.    Echocardiogram 09/16/2022:     The estimated ejection fraction is 55%.  The left ventricle is normal in size with mild concentric hypertrophy and normal systolic function.  Grade I left ventricular diastolic dysfunction.  Normal right ventricular size with normal right ventricular systolic function.  Mild left atrial enlargement.  Normal central venous pressure (3 mmHg).  The estimated PA systolic pressure is 17 mmHg.    ASSESSMENT:     Coronary atherosclerosis  Chronic diastolic heart failure  Hypertension  Hyperlipidemia: LDL 76 rosuvastatin 20  Diabetes  Obstructive sleep apnea  Obesity      PLAN:     Coronary atherosclerosis: risk factor modification.  Chronic diastolic  heart failure: Compensated.  Hypertension:  Continue current management.  Hyperlipidemia: Continue current management.  Return to clinic 6 months.           Tramaine Fraga MD, MPH, FACC, [facility]

## 2025-02-21 ENCOUNTER — RESULTS FOLLOW-UP (OUTPATIENT)
Dept: FAMILY MEDICINE | Facility: CLINIC | Age: 75
End: 2025-02-21

## 2025-02-21 ENCOUNTER — LAB VISIT (OUTPATIENT)
Dept: LAB | Facility: HOSPITAL | Age: 75
End: 2025-02-21
Attending: FAMILY MEDICINE
Payer: MEDICARE

## 2025-02-21 DIAGNOSIS — E11.22 TYPE 2 DIABETES MELLITUS WITH STAGE 3A CHRONIC KIDNEY DISEASE, WITHOUT LONG-TERM CURRENT USE OF INSULIN: ICD-10-CM

## 2025-02-21 DIAGNOSIS — E78.5 DYSLIPIDEMIA ASSOCIATED WITH TYPE 2 DIABETES MELLITUS: ICD-10-CM

## 2025-02-21 DIAGNOSIS — N18.31 TYPE 2 DIABETES MELLITUS WITH STAGE 3A CHRONIC KIDNEY DISEASE, WITHOUT LONG-TERM CURRENT USE OF INSULIN: ICD-10-CM

## 2025-02-21 DIAGNOSIS — I10 ESSENTIAL HYPERTENSION: ICD-10-CM

## 2025-02-21 DIAGNOSIS — E11.69 DYSLIPIDEMIA ASSOCIATED WITH TYPE 2 DIABETES MELLITUS: ICD-10-CM

## 2025-02-21 DIAGNOSIS — I70.0 AORTIC ATHEROSCLEROSIS: ICD-10-CM

## 2025-02-21 LAB
ALBUMIN SERPL BCP-MCNC: 3.7 G/DL (ref 3.5–5.2)
ALP SERPL-CCNC: 95 U/L (ref 40–150)
ALT SERPL W/O P-5'-P-CCNC: 22 U/L (ref 10–44)
ANION GAP SERPL CALC-SCNC: 7 MMOL/L (ref 8–16)
AST SERPL-CCNC: 27 U/L (ref 10–40)
BASOPHILS # BLD AUTO: 0.07 K/UL (ref 0–0.2)
BASOPHILS NFR BLD: 0.8 % (ref 0–1.9)
BILIRUB SERPL-MCNC: 0.5 MG/DL (ref 0.1–1)
BUN SERPL-MCNC: 12 MG/DL (ref 8–23)
CALCIUM SERPL-MCNC: 8.7 MG/DL (ref 8.7–10.5)
CHLORIDE SERPL-SCNC: 110 MMOL/L (ref 95–110)
CHOLEST SERPL-MCNC: 152 MG/DL (ref 120–199)
CHOLEST/HDLC SERPL: 2.9 {RATIO} (ref 2–5)
CO2 SERPL-SCNC: 24 MMOL/L (ref 23–29)
CREAT SERPL-MCNC: 1 MG/DL (ref 0.5–1.4)
DIFFERENTIAL METHOD BLD: ABNORMAL
EOSINOPHIL # BLD AUTO: 0.5 K/UL (ref 0–0.5)
EOSINOPHIL NFR BLD: 5 % (ref 0–8)
ERYTHROCYTE [DISTWIDTH] IN BLOOD BY AUTOMATED COUNT: 16.9 % (ref 11.5–14.5)
EST. GFR  (NO RACE VARIABLE): 58.8 ML/MIN/1.73 M^2
ESTIMATED AVG GLUCOSE: 126 MG/DL (ref 68–131)
GLUCOSE SERPL-MCNC: 94 MG/DL (ref 70–110)
HBA1C MFR BLD: 6 % (ref 4–5.6)
HCT VFR BLD AUTO: 43.3 % (ref 37–48.5)
HDLC SERPL-MCNC: 53 MG/DL (ref 40–75)
HDLC SERPL: 34.9 % (ref 20–50)
HGB BLD-MCNC: 13.3 G/DL (ref 12–16)
IMM GRANULOCYTES # BLD AUTO: 0.03 K/UL (ref 0–0.04)
IMM GRANULOCYTES NFR BLD AUTO: 0.3 % (ref 0–0.5)
LDLC SERPL CALC-MCNC: 81.4 MG/DL (ref 63–159)
LYMPHOCYTES # BLD AUTO: 4.1 K/UL (ref 1–4.8)
LYMPHOCYTES NFR BLD: 43.5 % (ref 18–48)
MCH RBC QN AUTO: 26.1 PG (ref 27–31)
MCHC RBC AUTO-ENTMCNC: 30.7 G/DL (ref 32–36)
MCV RBC AUTO: 85 FL (ref 82–98)
MONOCYTES # BLD AUTO: 0.5 K/UL (ref 0.3–1)
MONOCYTES NFR BLD: 5.7 % (ref 4–15)
NEUTROPHILS # BLD AUTO: 4.2 K/UL (ref 1.8–7.7)
NEUTROPHILS NFR BLD: 44.7 % (ref 38–73)
NONHDLC SERPL-MCNC: 99 MG/DL
NRBC BLD-RTO: 0 /100 WBC
PLATELET # BLD AUTO: 354 K/UL (ref 150–450)
PMV BLD AUTO: 10.4 FL (ref 9.2–12.9)
POTASSIUM SERPL-SCNC: 3.8 MMOL/L (ref 3.5–5.1)
PROT SERPL-MCNC: 8 G/DL (ref 6–8.4)
RBC # BLD AUTO: 5.1 M/UL (ref 4–5.4)
SODIUM SERPL-SCNC: 141 MMOL/L (ref 136–145)
TRIGL SERPL-MCNC: 88 MG/DL (ref 30–150)
WBC # BLD AUTO: 9.31 K/UL (ref 3.9–12.7)

## 2025-02-21 PROCEDURE — 80053 COMPREHEN METABOLIC PANEL: CPT | Mod: HCNC | Performed by: FAMILY MEDICINE

## 2025-02-21 PROCEDURE — 80061 LIPID PANEL: CPT | Mod: HCNC | Performed by: FAMILY MEDICINE

## 2025-02-21 PROCEDURE — 36415 COLL VENOUS BLD VENIPUNCTURE: CPT | Mod: HCNC,PO | Performed by: FAMILY MEDICINE

## 2025-02-21 PROCEDURE — 83036 HEMOGLOBIN GLYCOSYLATED A1C: CPT | Mod: HCNC | Performed by: FAMILY MEDICINE

## 2025-02-21 PROCEDURE — 85025 COMPLETE CBC W/AUTO DIFF WBC: CPT | Mod: HCNC | Performed by: FAMILY MEDICINE

## 2025-03-06 ENCOUNTER — OFFICE VISIT (OUTPATIENT)
Dept: FAMILY MEDICINE | Facility: CLINIC | Age: 75
End: 2025-03-06
Payer: MEDICARE

## 2025-03-06 VITALS
OXYGEN SATURATION: 96 % | WEIGHT: 187.5 LBS | HEART RATE: 82 BPM | SYSTOLIC BLOOD PRESSURE: 118 MMHG | DIASTOLIC BLOOD PRESSURE: 68 MMHG | BODY MASS INDEX: 32.01 KG/M2 | HEIGHT: 64 IN | TEMPERATURE: 98 F

## 2025-03-06 DIAGNOSIS — E11.22 TYPE 2 DIABETES MELLITUS WITH STAGE 3A CHRONIC KIDNEY DISEASE, WITHOUT LONG-TERM CURRENT USE OF INSULIN: ICD-10-CM

## 2025-03-06 DIAGNOSIS — E66.811 CLASS 1 OBESITY DUE TO EXCESS CALORIES WITH SERIOUS COMORBIDITY AND BODY MASS INDEX (BMI) OF 32.0 TO 32.9 IN ADULT: ICD-10-CM

## 2025-03-06 DIAGNOSIS — F33.0 MILD RECURRENT MAJOR DEPRESSION: ICD-10-CM

## 2025-03-06 DIAGNOSIS — J45.40 MODERATE PERSISTENT ASTHMA WITHOUT COMPLICATION: ICD-10-CM

## 2025-03-06 DIAGNOSIS — I70.0 AORTIC ATHEROSCLEROSIS: ICD-10-CM

## 2025-03-06 DIAGNOSIS — F51.04 PSYCHOPHYSIOLOGICAL INSOMNIA: ICD-10-CM

## 2025-03-06 DIAGNOSIS — F41.1 GAD (GENERALIZED ANXIETY DISORDER): ICD-10-CM

## 2025-03-06 DIAGNOSIS — I10 ESSENTIAL HYPERTENSION: Primary | ICD-10-CM

## 2025-03-06 DIAGNOSIS — E66.09 CLASS 1 OBESITY DUE TO EXCESS CALORIES WITH SERIOUS COMORBIDITY AND BODY MASS INDEX (BMI) OF 32.0 TO 32.9 IN ADULT: ICD-10-CM

## 2025-03-06 DIAGNOSIS — E11.69 HYPERLIPIDEMIA ASSOCIATED WITH TYPE 2 DIABETES MELLITUS: ICD-10-CM

## 2025-03-06 DIAGNOSIS — J47.9 BRONCHIECTASIS WITHOUT COMPLICATION: ICD-10-CM

## 2025-03-06 DIAGNOSIS — E78.5 HYPERLIPIDEMIA ASSOCIATED WITH TYPE 2 DIABETES MELLITUS: ICD-10-CM

## 2025-03-06 DIAGNOSIS — N18.31 TYPE 2 DIABETES MELLITUS WITH STAGE 3A CHRONIC KIDNEY DISEASE, WITHOUT LONG-TERM CURRENT USE OF INSULIN: ICD-10-CM

## 2025-03-06 DIAGNOSIS — G47.33 OSA (OBSTRUCTIVE SLEEP APNEA): ICD-10-CM

## 2025-03-06 PROCEDURE — 99999 PR PBB SHADOW E&M-EST. PATIENT-LVL V: CPT | Mod: PBBFAC,HCNC,, | Performed by: FAMILY MEDICINE

## 2025-03-06 NOTE — PROGRESS NOTES
Assessment & Plan:    Essential hypertension  -     CBC Auto Differential; Future; Expected date: 03/06/2025  -     Comprehensive Metabolic Panel; Future; Expected date: 03/06/2025    Controlled. Continue current therapy.     Type 2 diabetes mellitus with stage 3a chronic kidney disease, without long-term current use of insulin  -     CBC Auto Differential; Future; Expected date: 03/06/2025  -     Comprehensive Metabolic Panel; Future; Expected date: 03/06/2025  -     Hemoglobin A1C; Future; Expected date: 03/06/2025  -     Lipid Panel; Future; Expected date: 03/06/2025    Controlled. Continue current therapy.   Encouraged annual eye exams.  Repeat labs in 6 mo.    Hyperlipidemia associated with type 2 diabetes mellitus  -     Lipid Panel; Future; Expected date: 03/06/2025    Aortic atherosclerosis  -     Lipid Panel; Future; Expected date: 03/06/2025    Controlled. Continue current therapy.     Class 1 obesity due to excess calories with serious comorbidity and body mass index (BMI) of 32.0 to 32.9 in adult  Continue targeted dietary modifications, active lifestyle, and GLP-1 agonist.    SANDRA (generalized anxiety disorder)  Mild recurrent major depression  Psychophysiological insomnia  Controlled. Continue current therapy.     Moderate persistent asthma without complication  Bronchiectasis without complication  Chronic, stable.    DAHLIA (obstructive sleep apnea)  Continue CPAP use.      Health maintenance reviewed & addressed above.    Encouraged RSV vaccine.     Follow-up: Follow up in about 6 months (around 9/6/2025).  ______________________________________________________________________    Chief Complaint  Chief Complaint   Patient presents with    Health Maintenance     6 month follow up        HPI  Faby Luna is a 74 y.o. female with medical diagnoses as listed in the medical history and problem list that presents to the office to follow up on her chronic conditions.  She is in her usual state of  health today and does not report any major new concerns. Reports average BG in the 90-100s, denies hypoglycemia.     Most recent pertinent workup:     Last CBC Results:   Lab Results   Component Value Date    WBC 9.31 02/21/2025    HGB 13.3 02/21/2025    HCT 43.3 02/21/2025     02/21/2025       Last CMP Results  Lab Results   Component Value Date     02/21/2025    K 3.8 02/21/2025     02/21/2025    CO2 24 02/21/2025    BUN 12 02/21/2025    CREATININE 1.0 02/21/2025    CALCIUM 8.7 02/21/2025    ALBUMIN 3.7 02/21/2025    AST 27 02/21/2025    ALT 22 02/21/2025       Last Lipids  Lab Results   Component Value Date    CHOL 152 02/21/2025    TRIG 88 02/21/2025    HDL 53 02/21/2025    LDLCALC 81.4 02/21/2025       Last A1C  Lab Results   Component Value Date    HGBA1C 6.0 (H) 02/21/2025         Health Maintenance         Date Due Completion Date    Aspirin/Antiplatelet Therapy Never done ---    Shingles Vaccine (1 of 2) Never done ---    RSV Vaccine (Age 60+ and Pregnant patients) (1 - 1-dose 75+ series) Never done ---    Diabetic Eye Exam 01/19/2025 1/19/2024    Hemoglobin A1c 08/21/2025 2/21/2025    Foot Exam 08/30/2025 8/30/2024    Override on 8/30/2024: Done    Override on 8/29/2023: Done    Override on 7/5/2018: Done    Mammogram 10/02/2025 10/2/2024    Diabetes Urine Screening 02/21/2026 2/21/2025    Lipid Panel 02/21/2026 2/21/2025    Colorectal Cancer Screening 10/30/2029 10/30/2024    TETANUS VACCINE 09/21/2032 9/21/2022              PAST MEDICAL HISTORY:  Past Medical History:   Diagnosis Date    Anxiety with depression     Arthritis     CKD (chronic kidney disease) stage 2, GFR 60-89 ml/min     Diabetes mellitus     History of Clarisse-en-Y gastric bypass     Hypertension     Obesity, unspecified     DAHLIA (obstructive sleep apnea)     Spondylosis        PAST SURGICAL HISTORY:  Past Surgical History:   Procedure Laterality Date    CARPAL TUNNEL RELEASE Right 3/8/2019    Procedure: RELEASE, CARPAL  TUNNEL right;  Surgeon: Pan Goodman MD;  Location: [facility] OR;  Service: Orthopedics;  Laterality: Right;    CARPAL TUNNEL RELEASE Left 2019    Procedure: RELEASE, CARPAL TUNNEL- LEFT;  Surgeon: Pan Goodman MD;  Location: [facility] OR 2ND FLR;  Service: Orthopedics;  Laterality: Left;    CATARACT EXTRACTION Bilateral      SECTION      CHOLECYSTECTOMY      COLONOSCOPY N/A 2024    Procedure: COLONOSCOPY;  Surgeon: Otilio Man MD;  Location: [facility] ENDO;  Service: Endoscopy;  Laterality: N/A;  Referred by: MYNOR Sprague / CECILIA Meds: ozempic / diabetic / Prep: peg / Route instructions sent: portal  - lvm and portal msg for pc. DBM  - left 2nd vm for pc. DBM    COLONOSCOPY, SCREENING, HIGH RISK PATIENT N/A 10/30/2024    Procedure: COLONOSCOPY, SCREENING, HIGH RISK PATIENT;  Surgeon: Corinne Traylor MD;  Location: [facility] ENDO;  Service: Endoscopy;  Laterality: N/A;  WLMED: Ozempic Pt states she hasn't taken for several weeks  Outside referral from [facility]  ext suprep  inst portal  LW  10/23 r/s ext suprep to portal, holding ozempic since 10/16 cf    EPIDURAL STEROID INJECTION INTO LUMBAR SPINE N/A 2018    Procedure: INJECTION, STEROID, SPINE, LUMBAR, EPIDURAL;  Surgeon: Shaq Silverio MD;  Location: [facility] PAIN MGT;  Service: Pain Management;  Laterality: N/A;  LUMBAR L4-L5 INTERLAMINAR ELISE'  [id number]  W/ SEDATION     ESOPHAGOGASTRODUODENOSCOPY N/A 2023    Procedure: EGD (ESOPHAGOGASTRODUODENOSCOPY);  Surgeon: Deann Jimenez MD;  Location: [facility];  Service: Gastroenterology;  Laterality: N/A;    HYSTERECTOMY      INJECTION OF ANESTHETIC AGENT AROUND NERVE Bilateral 2019    Procedure: BLOCK, NERVE, L3-L4-L5 MEDIAL BRANCH;  Surgeon: Shaq Silverio MD;  Location: [facility] PAIN MGT;  Service: Pain Management;  Laterality: Bilateral;    INJECTION OF ANESTHETIC AGENT AROUND NERVE Bilateral 10/17/2019    Procedure: BLOCK, NERVE;  Surgeon: Shaq Silverio MD;  Location: [facility] PAIN MGT;  Service:  Pain Management;  Laterality: Bilateral;  B/L MBB L3-L4-L5  REPEAT  CONSENT NEEDED    INJECTION OF FACET JOINT Bilateral 5/28/2018    Procedure: INJECTION-FACET;  Surgeon: Shaq Silverio MD;  Location: [facility] PAIN MGT;  Service: Pain Management;  Laterality: Bilateral;  LUMBAR BILATERAL L4-L5 AND L5-S1 FACET STEROID INJECTION  [id number]    W/ SEDATION     RADIOFREQUENCY ABLATION Right 11/21/2019    Procedure: RADIOFREQUENCY ABLATION RIGHT L3, L4, L5;  Surgeon: Shaq Silverio MD;  Location: [facility] PAIN MGT;  Service: Pain Management;  Laterality: Right;  Right RFA L3-L4-L5  1 of 2  Consent Needed    RADIOFREQUENCY ABLATION Left 12/5/2019    Procedure: RADIOFREQUENCY ABLATION LEFT L3-5;  Surgeon: Shaq Silverio MD;  Location: [facility] PAIN MGT;  Service: Pain Management;  Laterality: Left;  Left RFA L3-L4-L5  2 of 2  Consent Needed    RADIOFREQUENCY ABLATION Left 8/17/2020    Procedure: RADIOFREQUENCY ABLATION LEFT L3,4,5 1 of 2;  Surgeon: Shaq Silverio MD;  Location: [facility] PAIN MGT;  Service: Pain Management;  Laterality: Left;  Left RFA L3,4,5  1 of 2    RADIOFREQUENCY ABLATION Right 8/31/2020    Procedure: RADIOFREQUENCY ABLATION RIGHT L3,4,5 2 of 2;  Surgeon: Shaq Silverio MD;  Location: [facility] PAIN MGT;  Service: Pain Management;  Laterality: Right;  RADIOFREQUENCY ABLATION RIGHT L3,4,5  2 of 2    RADIOFREQUENCY ABLATION Left 9/9/2021    Procedure: RADIOFREQUENCY ABLATION, L3-L4 AND L5 MEDIAL BRANCH 1 OF 2;  Surgeon: Shaq Silverio MD;  Location: [facility] PAIN MGT;  Service: Pain Management;  Laterality: Left;    RADIOFREQUENCY ABLATION Right 9/20/2021    Procedure: RADIOFREQUENCY ABLATION, L3-L4 AND L5 MEDIAL BRANCH 2 OF 2;  Surgeon: Shaq Silverio MD;  Location: [facility] PAIN MGT;  Service: Pain Management;  Laterality: Right;    RADIOFREQUENCY ABLATION Right 7/7/2022    Procedure: RADIOFREQUENCY ABLATION, RIGHT L3-L4-L5 ONE OF TWO;  Surgeon: Shaq Silverio MD;  Location: [facility] PAIN MGT;  Service: Pain Management;  Laterality: Right;     RADIOFREQUENCY ABLATION Left 7/21/2022    Procedure: RADIOFREQUENCY ABLATION, LEFT L3-L4-L5 TWO OF TWO *BRING SCS REMOTE*;  Surgeon: Shaq Silverio MD;  Location: [facility] PAIN MGT;  Service: Pain Management;  Laterality: Left;    RADIOFREQUENCY ABLATION Left 3/16/2023    Procedure: RADIOFREQUENCY ABLATION LEFT L3,L4,L5 *BRING SCS REMOTE*;  Surgeon: Shaq Silverio MD;  Location: [facility] PAIN MGT;  Service: Pain Management;  Laterality: Left;    RADIOFREQUENCY ABLATION Right 3/30/2023    Procedure: RADIOFREQUENCY ABLATION RIGHT L3,L4,L5 *BRING SCS REMOTE*;  Surgeon: Shaq Silverio MD;  Location: [facility] PAIN MGT;  Service: Pain Management;  Laterality: Right;    RADIOFREQUENCY ABLATION Right 12/18/2023    Procedure: RADIOFREQUENCY ABLATION RIGHT L3, 4, 5 1 OF 2;  Surgeon: Shaq Silverio MD;  Location: [facility] PAIN MGT;  Service: Pain Management;  Laterality: Right;  [phone]    RADIOFREQUENCY ABLATION Left 1/4/2024    Procedure: RADIOFREQUENCY ABLATION LEFT L3, 4, 5 2 OF 2;  Surgeon: Shaq Silverio MD;  Location: [facility] PAIN MGT;  Service: Pain Management;  Laterality: Left;  [phone]  2 WK F/U WARREN    RADIOFREQUENCY ABLATION Bilateral 1/6/2025    Procedure: RADIOFREQUENCY ABLATION BILATERAL L3, 4, 5;  Surgeon: Shaq Silverio MD;  Location: [facility] PAIN MGT;  Service: Pain Management;  Laterality: Bilateral;  3 WK F/U KRANTHI    TRIAL OF SPINAL CORD NERVE STIMULATOR N/A 9/24/2018    Procedure: TRIAL, NEUROSTIMULATOR, SPINAL CORD, SPINAL CORD STIMULATOR TRIAL-INTERNAL WIRES TO EXTERNAL BATTERY;  Surgeon: Shaq Silverio MD;  Location: [facility] PAIN MGT;  Service: Pain Management;  Laterality: N/A;  [facility]OTT REP NOTIFIED       SOCIAL HISTORY:  Social History[1]    FAMILY HISTORY:  Family History   Problem Relation Name Age of Onset    Cataracts Mother      Glaucoma Mother      No Known Problems Father      No Known Problems Sister      No Known Problems Brother      No Known Problems Maternal Aunt      No Known Problems Maternal Uncle      No Known  "Problems Paternal Aunt      No Known Problems Paternal Uncle      No Known Problems Maternal Grandmother      No Known Problems Maternal Grandfather      No Known Problems Paternal Grandmother      No Known Problems Paternal Grandfather         ALLERGIES AND MEDICATIONS: updated and reviewed.  Review of patient's allergies indicates:  No Known Allergies  Current Medications[2]      ROS  Review of Systems   Constitutional:  Negative for activity change.           Physical Exam  Vitals:    03/06/25 1405   BP: 118/68   Pulse: 82   Temp: 98 °F (36.7 °C)   TempSrc: Oral   SpO2: 96%   Weight: 85.1 kg (187 lb 8 oz)   Height: 5' 4" (1.626 m)    Body mass index is 32.18 kg/m².  Weight: 85.1 kg (187 lb 8 oz)   Height: 5' 4" (162.6 cm)   Physical Exam  Constitutional:       General: She is not in acute distress.     Appearance: She is obese.   HENT:      Head: Normocephalic and atraumatic.   Neck:      Thyroid: No thyromegaly.      Vascular: No carotid bruit.   Cardiovascular:      Rate and Rhythm: Normal rate and regular rhythm.      Pulses: Normal pulses.      Heart sounds: Normal heart sounds.   Pulmonary:      Effort: Pulmonary effort is normal. No respiratory distress.      Breath sounds: Normal breath sounds.   Musculoskeletal:      Cervical back: Neck supple.      Right lower leg: No edema.      Left lower leg: No edema.   Lymphadenopathy:      Cervical: No cervical adenopathy.   Skin:     General: Skin is warm and dry.      Findings: No rash.   Neurological:      General: No focal deficit present.      Mental Status: She is alert and oriented to person, place, and time.   Psychiatric:         Mood and Affect: Mood normal.         Behavior: Behavior normal.         Thought Content: Thought content normal.                  [1]   Social History  Socioeconomic History    Marital status:    Tobacco Use    Smoking status: Never     Passive exposure: Never    Smokeless tobacco: Never   Substance and Sexual Activity    " "Alcohol use: Not Currently     Alcohol/week: 1.0 standard drink of alcohol     Types: 1 Glasses of wine per week     Comment: occ    Drug use: No    Sexual activity: Yes     Partners: Male     Social Drivers of Health     Financial Resource Strain: Medium Risk (3/6/2025)    Overall Financial Resource Strain (CARDIA)     Difficulty of Paying Living Expenses: Somewhat hard   Food Insecurity: No Food Insecurity (3/6/2025)    Hunger Vital Sign     Worried About Running Out of Food in the Last Year: Never true     Ran Out of Food in the Last Year: Never true   Transportation Needs: No Transportation Needs (3/6/2025)    PRAPARE - Transportation     Lack of Transportation (Medical): No     Lack of Transportation (Non-Medical): No   Physical Activity: Inactive (3/6/2025)    Exercise Vital Sign     Days of Exercise per Week: 0 days     Minutes of Exercise per Session: 10 min   Stress: No Stress Concern Present (3/6/2025)    [employer] [place] [employer] - Occupational Stress Questionnaire     Feeling of Stress : Not at all   Housing Stability: Low Risk  (3/6/2025)    Housing Stability Vital Sign     Unable to Pay for Housing in the Last Year: No     Number of Times Moved in the Last Year: 0     Homeless in the Last Year: No   [2]   Current Outpatient Medications   Medication Sig Dispense Refill    albuterol (PROAIR HFA) 90 mcg/actuation inhaler Inhale 2 puffs into the lungs every 4 (four) hours as needed for Wheezing or Shortness of Breath. Rescue 18 g 11    atenoloL (TENORMIN) 100 MG tablet TAKE 1 TABLET EVERY DAY 90 tablet 3    BD ULTRA-FINE MINI PEN NEEDLE 31 gauge x 3/16" Ndle       BD ULTRA-FINE FELICIA PEN NEEDLE 32 gauge x 5/32" Ndle       blood sugar diagnostic Strp To check BG 3 times daily, to use with insurance preferred meter 100 strip 11    EScitalopram oxalate (LEXAPRO) 20 MG tablet Take 1 tablet (20 mg total) by mouth once daily. 90 tablet 3    fluticasone propionate (FLONASE) 50 mcg/actuation nasal " spray 2 sprays (100 mcg total) by Each Nostril route once daily. 11.1 mL 1    lancets Misc To check BG 3 times daily, to use with insurance preferred meter 100 each 11    loratadine (CLARITIN) 10 mg tablet Take 1 tablet (10 mg total) by mouth daily as needed for Allergies. 90 tablet 3    losartan (COZAAR) 50 MG tablet Take 1 tablet (50 mg total) by mouth once daily. 90 tablet 3    pantoprazole (PROTONIX) 20 MG tablet TAKE 1 TABLET TWICE DAILY BEFORE MEALS 180 tablet 3    pregabalin (LYRICA) 150 MG capsule Take 1 capsule (150 mg total) by mouth 3 (three) times daily. 90 capsule 3    rosuvastatin (CRESTOR) 20 MG tablet Take 1 tablet (20 mg total) by mouth every evening. 90 tablet 3    [START ON 3/11/2025] tirzepatide (MOUNJARO) 10 mg/0.5 mL PnIj Inject 10 mg into the skin every 7 days. 4 Pen 4    tirzepatide (MOUNJARO) 7.5 mg/0.5 mL PnIj Inject 7.5 mg into the skin every 7 days. 4 Pen 0    tiZANidine (ZANAFLEX) 4 MG tablet TAKE 1 TABLET ONE TIME DAILY AS NEEDED FOR PAIN 90 tablet 3    topiramate (TOPAMAX) 100 MG tablet TAKE 1 TABLET EVERY EVENING 90 tablet 3    traZODone (DESYREL) 100 MG tablet Take 1 tablet by mouth in the evening 90 tablet 1     No current facility-administered medications for this visit.     Facility-Administered Medications Ordered in Other Visits   Medication Dose Route Frequency Provider Last Rate Last Admin    0.9%  NaCl infusion   Intravenous Continuous Uriel Aranda MD   New Bag at 10/30/24 [id number]

## 2025-03-07 ENCOUNTER — TELEPHONE (OUTPATIENT)
Dept: PAIN MEDICINE | Facility: CLINIC | Age: 75
End: 2025-03-07
Payer: MEDICARE

## 2025-03-07 ENCOUNTER — HOSPITAL ENCOUNTER (OUTPATIENT)
Dept: RADIOLOGY | Facility: OTHER | Age: 75
Discharge: HOME | End: 2025-03-07
Attending: NURSE PRACTITIONER
Payer: MEDICARE

## 2025-03-07 ENCOUNTER — OFFICE VISIT (OUTPATIENT)
Dept: PAIN MEDICINE | Facility: CLINIC | Age: 75
End: 2025-03-07
Payer: MEDICARE

## 2025-03-07 VITALS
DIASTOLIC BLOOD PRESSURE: 77 MMHG | HEART RATE: 71 BPM | BODY MASS INDEX: 32.2 KG/M2 | WEIGHT: 187.63 LBS | SYSTOLIC BLOOD PRESSURE: 121 MMHG

## 2025-03-07 DIAGNOSIS — G89.4 CHRONIC PAIN SYNDROME: Primary | ICD-10-CM

## 2025-03-07 DIAGNOSIS — T85.192A MALFUNCTION OF SPINAL CORD STIMULATOR, INITIAL ENCOUNTER: ICD-10-CM

## 2025-03-07 PROCEDURE — 72070 X-RAY EXAM THORAC SPINE 2VWS: CPT | Mod: 26,HCNC,, | Performed by: RADIOLOGY

## 2025-03-07 PROCEDURE — 72070 X-RAY EXAM THORAC SPINE 2VWS: CPT | Mod: TC,HCNC,FY

## 2025-03-07 PROCEDURE — 99999 PR PBB SHADOW E&M-EST. PATIENT-LVL IV: CPT | Mod: PBBFAC,HCNC,, | Performed by: NURSE PRACTITIONER

## 2025-03-07 NOTE — TELEPHONE ENCOUNTER
Staff tried reaching patient to confirm she's coming because the rep is also coming to patients appointment.

## 2025-03-07 NOTE — PROGRESS NOTES
Chronic patient Established Note (Follow up visit)      SUBJECTIVE:    Interval History 3/7/2025:  The patient returns to clinic today for follow up of back pain. She reports worsened leg pain. She did find her controller. She is meeting with Haley from [employer] today for programming. She did not receive increased dose of Lyrica. She is currently taking 100 mg. She is taking Zanaflex. She is performing a home exercise routine. She denies any other health changes. Her pain today is 8/10.    Interval History 2/7/2025:  The patient returns to clinic today for follow up of back pain. She is s/p bilateral L3,4,5 RFA on 1/6/2025. She reports 50% relief. She continues to report intermittent low back pain. She is having worsening leg pain. She has not adjusted her SCS in a while. She did find her controller. She is taking Lyrica, although not sure of benefit. She is taking Zanaflex. She is performing home exercises. She denies any other health changes. Her pain today is 5/10.    Interval History 12/13/2024:  The patient returns to clinic today for follow up of back pain. She reports increased low back pain. She denies any radicular leg pain at this time. She does have benefit with SCS. She does need a new controller. Her pain is worse with standing and walking. She is taking Lyrica and Zanaflex. She is performing a home exercise routine. She denies any other health changes. Her pain today is 8/10.    Interval History 10/11/2024:  The patient returns to clinic today for follow up of back pain. Since last visit, her insurance denied her procedure due to timing. She reports increased back pain at this time. She is having radiating pain into her legs to her ankles. Her SCS is currently not on. She is unable to find her controller. She has not reached out to representatives. Her pain is worse with standing and walking. She denies any other health changes. Her pain today is 10/10.    Interval History 7/15/2024:  The patient is  here today to discuss lower back pain. Since previous visit, she did undergo right then left L3,4,5 RFAs completed with 80% relief for about 7 months. Her pain has now returned and she wishes to repeat the RFAs. The pain worsens with prolonged standing and walking. She continues with home PT exercises. Her pain today is 10/10.    Interval history 11/22/2023:  73-year-old female that presents today with axial low back pain.  Not been seen for approximately 7 months set of pain 1-2 weeks, is located in a bandlike distribution of low back she did not anesthesia like symptoms.  She is known to this clinic and was previously provided a or RFA targeting L3, L4 and L5 that provided her with 50-60% relief.  Like to be considered for repeat lumbar RFA she denies any recent incident or trauma denies any bowel bladder dysfunction anesthesia denies any profound weakness.    Interval History 4/20/2023:  The patient returns to clinic today for follow up of low back pain. She is s/p left L3,4,5 RFA on 3/16/2023 and right L3,4,5 RFA on 3/30/2023. She reports 50-60% relief of her pain. Her pain is tolerable at this time. She has good days and bad days. She denies any radicular leg pain at this time. She is not currently using her SCS. She has not contacted representatives. She reports that her mother passed away last week. She is dealing with a lot of stress due to this. She is taking Gabapentin, although not consistently. She also takes Zanaflex. She denies any other health changes. Her pain today is 6/10.     Interval History 2/8/2023:  The patient returns to clinic today for follow up of back pain. She reports worsened low back pain. She reports intermittent radiating pain into the posterior aspect of both legs to the ankles, left greater than right. Her pain is constant in nature. Her back pain is greater than her leg pain. She is reporting limited relief with SCS. She has not been able to meet with [facility] or [employer] for programming.  She is taking Gabapentin. She denies any other health changes. Her pain today is 9/10.    Interval History 11/4/2022:  Faby is here for follow up of back and leg pain. Unfortunately, she has been unable to meet up with [employer] for SCS programming. She does report some improvement in symptoms since previous visit. She still has back pain with radiation into the legs. Recent XRAYs do not show any significant lead migration. She only takes Gabapentin intermittently because she says it makes her feet swell but it does help. Her pain today is 8/10.    Interval History 10/4/2022:  The patient is here for follow up of chronic back pain. She reports more pain over the past month. No injury or trauma. She does have a lumbar SCS but is unsure if it is even on at this time. She does not think that it is. It did previously help with her back and leg pain. She has not been in contact with [employer] recently. The back pain radiates down the back of both legs to the feet. It worsens with walking and standing. Her pain today is 10/10.    Interval History 8/23/2022:  Faby is here for follow up of chronic lower back pain. She is now s/p right then left L3,4,5 RFAs completed on 7/21/22 with limited relief so far. She continues to report aching back pain with radiation into the legs and numbness. She has had her SCS off for a couple of months. She has not been in contact with [employer] rep for programming. She says that she turned it off due to limited benefit. No new weakness to legs or falls. No bowel/bladder incontinence. Her pain today is 10/10.    Interval History 6/17/2022:  The patient is here today for follow up of back pain. She has had more aching pain across the lower back. She had benefit with lumbar RFAs in the past and would like to reschedule this. She would also like to repeat aquatic PT as this was helpful in the past. Her pain is aching and throbbing in nature without radiation currently. No new falls or trauma. Her pain today  is 8/10.    Interval History 5/5/2022:  The patient is here for follow up of chronic lower back pain. She has radiation into the legs. No recent falls or trauma. She saw Dr. Silverio last month and was under the impression that an [employer] rep would be here today for programming. She is still having difficulty programming her device. She has mild benefit with Gabapentin 900 mg daily without side effects. She is also having muscle spasms intermittently. She is asking for a muscle relaxer. She has tried Robaxin in the past with mild benefit. She would like to try another medication. Her pain today is 8/10.    Interval History 4/20/2022: She presents today for follow up of low back pain. She states that she only had about 40% relief of LBP following RFAs in September that lasted a few weeks. Pain  continues to be in the low back and radiates into b/l LE. Pain encompasses the entire leg and does not follow a specific dermatomal pattern in the leg. She denies weakness, numbness or tingling in the lower extremities. She denies bowel or bladder incontinence. Pain is currently rated 8/10. She is currently on gabapentin 300mg tid with minimal relief. She has been unable to charge bret SCS for the last month and does not have the contact number for her rep.      Interval History 9/30/2021:  The patient returns to clinic today for follow up of low back pain. She is s/p left L3,4,5 RFA on 9/9/2021 and right L3,4,5 RFA on 9/20/2021. She is unsure of relief at this time. She reports increased pain to the right side. She describes this pain as burning in nature. She denies any radiating leg pain. Her pain is worse with bending and standing. She continues to report benefit with "[employer] SCS. She denies any weakness. She denies any other health changes. Her pain today is 9/10.    Interval History 8/3/2021:   Ms. Luna returns in f/u re: low back/leg pain. She reports about three months ago she noticed her low back started bothering her,  worse with bending and prolonged standing. No inciting event, no trauma to back since last visit. Reports continued leg pain down the backs of her legs as well. She reports intermittent instability in her legs - on and off - over the last year. Last time she has met with [facility] for reprogramming of SCS was fall 2020. Denies any current issues with SCS function/battery/remote.     Interval History 9/28/2020:  The patient is here today for increased lower back pain.  She is status post right than left L3, 4, 5 radiofrequency ablation completed on 08/31/2020 with approximately 50% relief.  However, she is feeling tightness across the lower back without radiation at this time.  She would like an injection today.  Additionally, she states that she has not completed physical therapy for her back in some time and is not currently doing any exercises.  Her leg pain is currently well controlled with spinal cord stimulator and she had a recent adjustment.  Her pain today is 8/10.    Interval History 7/16/2020:  The patient is here for follow up of lower back pain.  She previously had benefit with lumbar RFAs for similar pain.  She is also having radiation into her legs with numbness, left greater than right.  I[employer] is here today to meet with her for programming.  She previously was having significant benefit of leg pain with SCS implant.  She denies any recent trauma or falls. Her pain today is 9/10.    Interval History 1/9/2020:  The patient is here for follow up of lower back and leg pain.  She is s/p lumbar RFAs with about 50% relief.  Her leg pain is well controlled with implant.  She still has intermittent flare ups of back pain, like today.  When this happens, she has some benefit with rest.  She has tried Tylenol and OTC NSAIDs without benefit.  She denies any recent falls or weakness.  The patient denies any bowel or bladder incontinence or signs of saddle paresthesia.  The patient denies any major medical changes  since last office visit.  Her pain today is 7/10.    Previous Encounter:  Faby Luna presents to the clinic for a follow-up appointment for lower back pain.  She previously had significant leg pain which has resolved with [employer] SCS implant.  She is here today to discuss increased lower back pain.  It is sharp and throbbing in nature.  It is significant in the morning and with prolonged standing and activity.  She has some benefit with stretching.  She denies any recent falls.  Since the last visit, Faby Luna states the pain has been worsening.  Current pain intensity is 8/10.    Pain Disability Index Review:      3/7/2025     2:00 PM 2/7/2025    10:35 AM 12/13/2024     2:40 PM   Last 3 PDI Scores   Pain Disability Index (PDI) [phone]       Opioid Contract: no     report:  Not applicable    Pain Procedures:   5/2/18 Left L3 and L4 TF ELISE- 20% relief  5/21/18 Bilateral L4-5 and L5-S1 facet joint injections- no relief  9/24/18 Lumbar SCS trial ([employer])- 100% relief  10/26/18 Lumbar SCS implant ([employer])- 100% relief of leg pain  9/12/19 Bilateral L3,4,5 MBB- helpful  10/17/19 Bilateral L3,4,5 MBB- helpful  11/21/19 Right L3,4,5 RFA- 50% relief  12/5/19 Left L3,4,5 RFA- 50% relief  8/17/20 Left L3,4,5 RFA- 50% relief  8/31/20 Right L3,4,5 RFA- 50% relief  9/9/2021- Left L3,4,5 RFA - 60% relief  9/20/2021- Right L3,4,5 RFA -60% relief  7/7/22 Right L3,4,5 RFA   7/21/22 Left L3,4,5 RFA   3/16/2023- Left L3,4,5 RFA  3/30/2023- Right L3,4,5 RFA  12/18/24 Right L3,4,5 RFA  1/4/24 Left L3,4,5 RFA  1/6/2025- Bilateral L3,4,5 RFA    Physical Therapy/Home Exercise:   yes in the past with limited benefit    Imaging:     3/31/18 Lumbar MRI    Narrative     EXAMINATION:  MRI LUMBAR SPINE WITHOUT CONTRAST    CLINICAL HISTORY:  Low back pain, >6wks conservative tx, persistent-progressive sx, surgical candidate; Other spondylosis with radiculopathy, lumbar region    TECHNIQUE:  Multiplanar,  multisequence MR images were acquired from the thoracolumbar junction to the sacrum without the administration of contrast.    COMPARISON:  Plain films from 03/27/2018    FINDINGS:  There is grade 1 spondylolisthesis of L4 on L5. The vertebral body heights are well maintained, with no fracture.  No marrow signal abnormality suspicious for an infiltrative process.    The conus medullaris terminates at approximately the mid body of L1.  There is a cyst associated with the upper pole of the right kidney.  There is disc desiccation noted throughout the lumbar spine with relative sparing of the L5-S1 disc.  Mild disc space narrowing present at the L4-5 level.    L1-L2: Mild diffuse disc bulge resulting in no significant central or neural foraminal canal narrowing.    L2-L3: No significant central canal narrowing.  There is mild narrowing of either neural foraminal canal secondary to disc material.    L3-L4: Disc bulging in the bilateral foraminal regions resulting in no significant central canal narrowing.  Mild-to-moderate bilateral facet arthropathy also noted.  The bilateral neural foraminal canals are moderately narrowed with some mild effacement of either exiting L3 nerve root in the extraforaminal regions bilaterally.    L4-L5:  Grade 1 spondylolisthesis along with moderate to severe bilateral facet arthropathy and ligamentum flavum hypertrophy resulting in at least moderate narrowing of the central canal.  The right neural foraminal canal is mildly to moderately narrowed.  Left neural foraminal canal is moderately to severely narrowed with mild effacement of the exiting L4 nerve root.    L5-S1:  No significant central or neural foraminal canal narrowing noted.  Mild bilateral facet arthropathy noted.   Impression       1. Multilevel degenerative changes of the lumbar spine as detailed above     Lumbar XRAYs 3/27/18    Narrative     EXAMINATION:  XR LUMBAR SPINE AP AND LAT WITH FLEX/EXT    CLINICAL HISTORY:  Low  "back pain    TECHNIQUE:  AP and lateral views as well as lateral flexion and extension images are performed through the lumbar spine.    COMPARISON:  None    FINDINGS:  X-ray lumbar spine with flexion and extension demonstrates grade 1 spondylolisthesis at L4-5 measuring around 6 mm which does not change significantly with flexion and extension.  The L4-5 disc is narrowed and degenerated.  Other lumbar vertebral discs are maintained.  Vertebral body heights are maintained.  Small anterior spurs are seen, and there is small posterior osteophyte along the inferior endplate of L4.  There is lumbar facet arthropathy at L4-5 and L5-S1.   Impression       Degenerative changes as above.  Grade 1 anterolisthesis and degenerative disc disease at L4-5.         Allergies:   Review of patient's allergies indicates:   Allergen Reactions    Aspirin Other (See Comments)     Has been told not to take it due to bariatric surgery       Current Medications:   Current Outpatient Medications   Medication Sig Dispense Refill    albuterol (PROAIR HFA) 90 mcg/actuation inhaler Inhale 2 puffs into the lungs every 4 (four) hours as needed for Wheezing or Shortness of Breath. Rescue 18 g 11    atenoloL (TENORMIN) 100 MG tablet TAKE 1 TABLET EVERY DAY 90 tablet 3    BD ULTRA-FINE MINI PEN NEEDLE 31 gauge x 3/16" Ndle       BD ULTRA-FINE FELICIA PEN NEEDLE 32 gauge x 5/32" Ndle       blood sugar diagnostic Strp To check BG 3 times daily, to use with insurance preferred meter 100 strip 11    EScitalopram oxalate (LEXAPRO) 20 MG tablet Take 1 tablet (20 mg total) by mouth once daily. 90 tablet 3    fluticasone propionate (FLONASE) 50 mcg/actuation nasal spray 2 sprays (100 mcg total) by Each Nostril route once daily. 11.1 mL 1    lancets Misc To check BG 3 times daily, to use with insurance preferred meter 100 each 11    loratadine (CLARITIN) 10 mg tablet Take 1 tablet (10 mg total) by mouth daily as needed for Allergies. 90 tablet 3    losartan " (COZAAR) 50 MG tablet Take 1 tablet (50 mg total) by mouth once daily. 90 tablet 3    pantoprazole (PROTONIX) 20 MG tablet TAKE 1 TABLET TWICE DAILY BEFORE MEALS 180 tablet 3    pregabalin (LYRICA) 150 MG capsule Take 1 capsule (150 mg total) by mouth 3 (three) times daily. 90 capsule 3    rosuvastatin (CRESTOR) 20 MG tablet Take 1 tablet (20 mg total) by mouth every evening. 90 tablet 3    [START ON 3/11/2025] tirzepatide (MOUNJARO) 10 mg/0.5 mL PnIj Inject 10 mg into the skin every 7 days. 4 Pen 4    tirzepatide (MOUNJARO) 7.5 mg/0.5 mL PnIj Inject 7.5 mg into the skin every 7 days. 4 Pen 0    tiZANidine (ZANAFLEX) 4 MG tablet TAKE 1 TABLET ONE TIME DAILY AS NEEDED FOR PAIN 90 tablet 3    topiramate (TOPAMAX) 100 MG tablet TAKE 1 TABLET EVERY EVENING 90 tablet 3    traZODone (DESYREL) 100 MG tablet Take 1 tablet by mouth in the evening 90 tablet 1     No current facility-administered medications for this visit.     Facility-Administered Medications Ordered in Other Visits   Medication Dose Route Frequency Provider Last Rate Last Admin    0.9%  NaCl infusion   Intravenous Continuous Uriel Aranda MD   New Bag at 10/30/24 1107       REVIEW OF SYSTEMS:    GENERAL:  No weight loss, malaise or fevers.  HEENT:  Negative for frequent or significant headaches.  NECK:  Negative for lumps, goiter, pain and significant neck swelling.  RESPIRATORY:  Negative for cough, wheezing or shortness of breath.  CARDIOVASCULAR:  Negative for chest pain, leg swelling or palpitations. Hypertension.  GI:  Negative for abdominal discomfort, blood in stools or black stools or change in bowel habits.  MUSCULOSKELETAL:  See HPI.  SKIN:  Negative for lesions, rash, and itching.  PSYCH:  Negative for sleep disturbance, mood disorder and recent psychosocial stressors.  HEMATOLOGY/LYMPHOLOGY:  Negative for prolonged bleeding, bruising easily or swollen nodes.  NEURO:   No history of headaches, syncope, paralysis, seizures or tremors.  ENDO:  Diabetes.  All other reviewed and negative other than HPI.    Past Medical History:  Past Medical History:   Diagnosis Date    Anxiety with depression     Arthritis     CKD (chronic kidney disease) stage 2, GFR 60-89 ml/min     Diabetes mellitus     History of Clarisse-en-Y gastric bypass     Hypertension     Obesity, unspecified     DAHLIA (obstructive sleep apnea)     Spondylosis        Past Surgical History:  Past Surgical History:   Procedure Laterality Date    CARPAL TUNNEL RELEASE Right 3/8/2019    Procedure: RELEASE, CARPAL TUNNEL right;  Surgeon: Pan Goodman MD;  Location: [facility] OR;  Service: Orthopedics;  Laterality: Right;    CARPAL TUNNEL RELEASE Left 2019    Procedure: RELEASE, CARPAL TUNNEL- LEFT;  Surgeon: Pan Goodman MD;  Location: [facility] OR 2ND FLR;  Service: Orthopedics;  Laterality: Left;    CATARACT EXTRACTION Bilateral      SECTION      CHOLECYSTECTOMY      COLONOSCOPY N/A 2024    Procedure: COLONOSCOPY;  Surgeon: Otilio Man MD;  Location: [facility] ENDO;  Service: Endoscopy;  Laterality: N/A;  Referred by: MYNOR Sprague / CECILIA Meds: ozempic / diabetic / Prep: peg / Route instructions sent: portal  - lvm and portal msg for pc. DBM  - left 2nd vm for pc. DBM    COLONOSCOPY, SCREENING, HIGH RISK PATIENT N/A 10/30/2024    Procedure: COLONOSCOPY, SCREENING, HIGH RISK PATIENT;  Surgeon: Corinne Traylor MD;  [facility];  Service: Endoscopy;  Laterality: N/A;  WLMED: Ozempic Pt states she hasn't taken for several weeks  Outside referral from [facility]  ext suprep  inst portal  LW  10/23 r/s ext suprep to portal, holding ozempic since 10/16 cf    EPIDURAL STEROID INJECTION INTO LUMBAR SPINE N/A 2018    Procedure: INJECTION, STEROID, SPINE, LUMBAR, EPIDURAL;  Surgeon: Shaq Silverio MD;  Location: [facility] PAIN MGT;  Service: Pain Management;  Laterality: N/A;  LUMBAR L4-L5 INTERLAMINAR ELISE'  [id number]  W/ SEDATION     ESOPHAGOGASTRODUODENOSCOPY N/A 2023    Procedure: EGD  (ESOPHAGOGASTRODUODENOSCOPY);  Surgeon: Deann Jimenez MD;  Location: [facility] ENDO;  Service: Gastroenterology;  Laterality: N/A;    HYSTERECTOMY      INJECTION OF ANESTHETIC AGENT AROUND NERVE Bilateral 9/12/2019    Procedure: BLOCK, NERVE, L3-L4-L5 MEDIAL BRANCH;  Surgeon: Shaq Silverio MD;  [place] [facility] [place] PAIN MGT;  Service: Pain Management;  Laterality: Bilateral;    INJECTION OF ANESTHETIC AGENT AROUND NERVE Bilateral 10/17/2019    Procedure: BLOCK, NERVE;  Surgeon: Shaq Silverio MD;  Location: [facility] [place] PAIN MGT;  Service: Pain Management;  Laterality: Bilateral;  B/L MBB L3-L4-L5  REPEAT  CONSENT NEEDED    INJECTION OF FACET JOINT Bilateral 5/28/2018    Procedure: INJECTION-FACET;  Surgeon: Shaq Silverio MD;  Location: [facility] [place] PAIN MGT;  Service: Pain Management;  Laterality: Bilateral;  LUMBAR BILATERAL L4-L5 AND L5-S1 FACET STEROID INJECTION  [id number]    W/ SEDATION     RADIOFREQUENCY ABLATION Right 11/21/2019    Procedure: RADIOFREQUENCY ABLATION RIGHT L3, L4, L5;  Surgeon: Shaq Silverio MD;  Location: [facility] [place] PAIN MGT;  Service: Pain Management;  Laterality: Right;  Right RFA L3-L4-L5  1 of 2  Consent Needed    RADIOFREQUENCY ABLATION Left 12/5/2019    Procedure: RADIOFREQUENCY ABLATION LEFT L3-5;  Surgeon: Shaq Silverio MD;  Location: [facility] [place] PAIN MGT;  Service: Pain Management;  Laterality: Left;  Left RFA L3-L4-L5  2 of 2  Consent Needed    RADIOFREQUENCY ABLATION Left 8/17/2020    Procedure: RADIOFREQUENCY ABLATION LEFT L3,4,5 1 of 2;  Surgeon: Shaq Silverio MD;  Location: [facility] [place] PAIN MGT;  Service: Pain Management;  Laterality: Left;  Left RFA L3,4,5  1 of 2    RADIOFREQUENCY ABLATION Right 8/31/2020    Procedure: RADIOFREQUENCY ABLATION RIGHT L3,4,5 2 of 2;  Surgeon: Shaq Silverio MD;  Location: [facility] [place] PAIN MGT;  Service: Pain Management;  Laterality: Right;  RADIOFREQUENCY ABLATION RIGHT L3,4,5  2 of 2    RADIOFREQUENCY ABLATION Left 9/9/2021    Procedure: RADIOFREQUENCY ABLATION, L3-L4 AND L5 MEDIAL BRANCH 1 OF 2;   Surgeon: Shaq Silverio MD;  Location: [facility] PAIN MGT;  Service: Pain Management;  Laterality: Left;    RADIOFREQUENCY ABLATION Right 9/20/2021    Procedure: RADIOFREQUENCY ABLATION, L3-L4 AND L5 MEDIAL BRANCH 2 OF 2;  Surgeon: Shaq Silverio MD;  Location: [facility] PAIN MGT;  Service: Pain Management;  Laterality: Right;    RADIOFREQUENCY ABLATION Right 7/7/2022    Procedure: RADIOFREQUENCY ABLATION, RIGHT L3-L4-L5 ONE OF TWO;  Surgeon: Shaq Silverio MD;  Location: [facility] PAIN MGT;  Service: Pain Management;  Laterality: Right;    RADIOFREQUENCY ABLATION Left 7/21/2022    Procedure: RADIOFREQUENCY ABLATION, LEFT L3-L4-L5 TWO OF TWO *BRING SCS REMOTE*;  Surgeon: Shaq Silverio MD;  Location: [facility] PAIN MGT;  Service: Pain Management;  Laterality: Left;    RADIOFREQUENCY ABLATION Left 3/16/2023    Procedure: RADIOFREQUENCY ABLATION LEFT L3,L4,L5 *BRING SCS REMOTE*;  Surgeon: Shaq Silverio MD;  Location: [facility] PAIN MGT;  Service: Pain Management;  Laterality: Left;    RADIOFREQUENCY ABLATION Right 3/30/2023    Procedure: RADIOFREQUENCY ABLATION RIGHT L3,L4,L5 *BRING SCS REMOTE*;  Surgeon: Shaq Silverio MD;  Location: [facility] PAIN MGT;  Service: Pain Management;  Laterality: Right;    RADIOFREQUENCY ABLATION Right 12/18/2023    Procedure: RADIOFREQUENCY ABLATION RIGHT L3, 4, 5 1 OF 2;  Surgeon: Shaq Silverio MD;  Location: [facility] PAIN MGT;  Service: Pain Management;  Laterality: Right;  [phone]    RADIOFREQUENCY ABLATION Left 1/4/2024    Procedure: RADIOFREQUENCY ABLATION LEFT L3, 4, 5 2 OF 2;  Surgeon: Shaq Silverio MD;  Location: [facility] PAIN MGT;  Service: Pain Management;  Laterality: Left;  [phone]  2 WK F/U WARREN    RADIOFREQUENCY ABLATION Bilateral 1/6/2025    Procedure: RADIOFREQUENCY ABLATION BILATERAL L3, 4, 5;  Surgeon: Shaq Silverio MD;  Location: [facility] PAIN MGT;  Service: Pain Management;  Laterality: Bilateral;  3 WK F/U KRANTHI    TRIAL OF SPINAL CORD NERVE STIMULATOR N/A 9/24/2018    Procedure: TRIAL, NEUROSTIMULATOR, SPINAL  CORD, SPINAL CORD STIMULATOR TRIAL-INTERNAL WIRES TO EXTERNAL BATTERY;  Surgeon: Shaq Silverio MD;  Location: [facility];  Service: Pain Management;  Laterality: N/A;  ABBOTT REP NOTIFIED       Family History:  Family History   Problem Relation Name Age of Onset    Cataracts Mother      Glaucoma Mother      No Known Problems Father      No Known Problems Sister      No Known Problems Brother      No Known Problems Maternal Aunt      No Known Problems Maternal Uncle      No Known Problems Paternal Aunt      No Known Problems Paternal Uncle      No Known Problems Maternal Grandmother      No Known Problems Maternal Grandfather      No Known Problems Paternal Grandmother      No Known Problems Paternal Grandfather         Social History:  Social History     Socioeconomic History    Marital status:    Tobacco Use    Smoking status: Never     Passive exposure: Never    Smokeless tobacco: Never   Substance and Sexual Activity    Alcohol use: Not Currently     Alcohol/week: 1.0 standard drink of alcohol     Types: 1 Glasses of wine per week     Comment: occ    Drug use: No    Sexual activity: Yes     Partners: Male     Social Drivers of Health     Financial Resource Strain: Medium Risk (3/6/2025)    Overall Financial Resource Strain (CARDIA)     Difficulty of Paying Living Expenses: Somewhat hard   Food Insecurity: No Food Insecurity (3/6/2025)    Hunger Vital Sign     Worried About Running Out of Food in the Last Year: Never true     Ran Out of Food in the Last Year: Never true   Transportation Needs: No Transportation Needs (3/6/2025)    PRAPARE - Transportation     Lack of Transportation (Medical): No     Lack of Transportation (Non-Medical): No   Physical Activity: Inactive (3/6/2025)    Exercise Vital Sign     Days of Exercise per Week: 0 days     Minutes of Exercise per Session: 10 min   Stress: No Stress Concern Present (3/6/2025)    [employer] - Occupational Stress  Questionnaire     Feeling of Stress : Not at all   Housing Stability: Low Risk  (3/6/2025)    Housing Stability Vital Sign     Unable to Pay for Housing in the Last Year: No     Number of Times Moved in the Last Year: 0     Homeless in the Last Year: No       OBJECTIVE:    /77 (Patient Position: Sitting)   Pulse 71   Wt 85.1 kg (187 lb 9.8 oz)   BMI 32.20 kg/m²     PHYSICAL EXAMINATION:    General appearance: Well appearing, in no acute distress, alert and oriented x3.  Psych:  Mood and affect appropriate.  Skin: Skin color, texture, turgor normal, no rashes or lesions, in both upper and lower body.   Head/face:  Atraumatic, normocephalic. No palpable lymph nodes  Back: Straight leg raising in the sitting and supine positions is negative to radicular pain bilaterally. There is pain with palpation to lumbar facet joints bilaterally. Limited ROM with pain on extension.   Extremities: No deformities, edema, or skin discoloration. Good capillary refill.  Musculoskeletal: 5/5 strength in right ankle with plantar and dorsiflexion. 5/5 strength in left ankle with plantar and dorsiflexion. 5/5 strength with right knee flexion and extension. 5/5 strength with left knee flexion and extension.   Neuro: Decreased sensation to BLE.  Gait: Antalgic- ambulates without assistance.    ASSESSMENT: 74 y.o. year old female with back pain, consistent with the following diagnoses:     1. Chronic pain syndrome        2. Malfunction of spinal cord stimulator, initial encounter  X-Ray Thoracic Spine AP Lateral          PLAN:     - Previous imaging reviewed.  Labs reviewed.     - She did meet with Haley with [facility] today. Ms. Hobbs's IPG is dead.     - Obtain xray of thoracic spine to check lead placement.     - Will schedule patient to meet with Dr. Rivera to discuss SCS battery revision.      - Increase Lyrica to 150 mg TID.     - Continue Zanaflex 4 mg daily PRN.    - The patient will continue a home exercise routine to help with  pain and strengthening.      - RTC in 2 weeks with Dr. Rivera.       The above plan and management options were discussed at length with patient. Patient is in agreement with the above and verbalized understanding.    Kimberly Conner NP  03/07/2025

## 2025-03-12 ENCOUNTER — PATIENT MESSAGE (OUTPATIENT)
Dept: ADMINISTRATIVE | Facility: CLINIC | Age: 75
End: 2025-03-12
Payer: MEDICARE

## 2025-03-13 ENCOUNTER — PATIENT OUTREACH (OUTPATIENT)
Dept: ADMINISTRATIVE | Facility: HOSPITAL | Age: 75
End: 2025-03-13
Payer: MEDICARE

## 2025-03-13 NOTE — PROGRESS NOTES
Population Health Chart Review & Patient Outreach Details      Additional Pop Health Notes:      HM and immunization's reviewed and updated.  DUE FOR VISIT WITH EYE DOCTOR. IF ALREADY DONE, NEED TO GET RECORDS INFORMATION.    Place in visit note to advise.          Updates Requested / Reviewed:      Updated Care Coordination Note and Care Everywhere         Health Maintenance Topics Overdue:      [facility] Score: 1     Eye Exam                       Health Maintenance Topic(s) Outreach Outcomes & Actions Taken:    Eye Exam - Outreach Outcomes & Actions Taken  : Place in visit note to advise.

## 2025-03-17 ENCOUNTER — OFFICE VISIT (OUTPATIENT)
Dept: PAIN MEDICINE | Facility: CLINIC | Age: 75
End: 2025-03-17
Payer: MEDICARE

## 2025-03-17 VITALS
SYSTOLIC BLOOD PRESSURE: 120 MMHG | RESPIRATION RATE: 18 BRPM | TEMPERATURE: 98 F | WEIGHT: 187.38 LBS | BODY MASS INDEX: 32.17 KG/M2 | HEART RATE: 78 BPM | DIASTOLIC BLOOD PRESSURE: 75 MMHG | OXYGEN SATURATION: 100 %

## 2025-03-17 DIAGNOSIS — Z96.89 S/P INSERTION OF SPINAL CORD STIMULATOR: Primary | ICD-10-CM

## 2025-03-17 PROCEDURE — 99214 OFFICE O/P EST MOD 30 MIN: CPT | Mod: HCNC,S$GLB,, | Performed by: STUDENT IN AN ORGANIZED HEALTH CARE EDUCATION/TRAINING PROGRAM

## 2025-03-17 PROCEDURE — 3044F HG A1C LEVEL LT 7.0%: CPT | Mod: HCNC,CPTII,S$GLB, | Performed by: STUDENT IN AN ORGANIZED HEALTH CARE EDUCATION/TRAINING PROGRAM

## 2025-03-17 PROCEDURE — 3072F LOW RISK FOR RETINOPATHY: CPT | Mod: HCNC,CPTII,S$GLB, | Performed by: STUDENT IN AN ORGANIZED HEALTH CARE EDUCATION/TRAINING PROGRAM

## 2025-03-17 PROCEDURE — 3078F DIAST BP <80 MM HG: CPT | Mod: HCNC,CPTII,S$GLB, | Performed by: STUDENT IN AN ORGANIZED HEALTH CARE EDUCATION/TRAINING PROGRAM

## 2025-03-17 PROCEDURE — 1160F RVW MEDS BY RX/DR IN RCRD: CPT | Mod: HCNC,CPTII,S$GLB, | Performed by: STUDENT IN AN ORGANIZED HEALTH CARE EDUCATION/TRAINING PROGRAM

## 2025-03-17 PROCEDURE — 3074F SYST BP LT 130 MM HG: CPT | Mod: HCNC,CPTII,S$GLB, | Performed by: STUDENT IN AN ORGANIZED HEALTH CARE EDUCATION/TRAINING PROGRAM

## 2025-03-17 PROCEDURE — 1125F AMNT PAIN NOTED PAIN PRSNT: CPT | Mod: HCNC,CPTII,S$GLB, | Performed by: STUDENT IN AN ORGANIZED HEALTH CARE EDUCATION/TRAINING PROGRAM

## 2025-03-17 PROCEDURE — 3288F FALL RISK ASSESSMENT DOCD: CPT | Mod: HCNC,CPTII,S$GLB, | Performed by: STUDENT IN AN ORGANIZED HEALTH CARE EDUCATION/TRAINING PROGRAM

## 2025-03-17 PROCEDURE — 1101F PT FALLS ASSESS-DOCD LE1/YR: CPT | Mod: HCNC,CPTII,S$GLB, | Performed by: STUDENT IN AN ORGANIZED HEALTH CARE EDUCATION/TRAINING PROGRAM

## 2025-03-17 PROCEDURE — 99999 PR PBB SHADOW E&M-EST. PATIENT-LVL IV: CPT | Mod: PBBFAC,HCNC,, | Performed by: STUDENT IN AN ORGANIZED HEALTH CARE EDUCATION/TRAINING PROGRAM

## 2025-03-17 PROCEDURE — 1159F MED LIST DOCD IN RCRD: CPT | Mod: HCNC,CPTII,S$GLB, | Performed by: STUDENT IN AN ORGANIZED HEALTH CARE EDUCATION/TRAINING PROGRAM

## 2025-03-17 RX ORDER — CHLORHEXIDINE GLUCONATE 40 MG/ML
SOLUTION TOPICAL
Qty: 15 ML | Refills: 0 | Status: SHIPPED | OUTPATIENT
Start: 2025-03-17

## 2025-03-17 RX ORDER — MUPIROCIN 20 MG/G
OINTMENT TOPICAL
Qty: 5 G | Refills: 0 | Status: SHIPPED | OUTPATIENT
Start: 2025-03-17

## 2025-03-17 NOTE — PROGRESS NOTES
Chronic patient Established Note (Follow up visit)      SUBJECTIVE:  INTERVAL HISTORY 3/17/2025:  Ms. Luna is a patient of Dr. Silverio and presents for end of life SCS pulse generator ([employer]). Current Pain level is 7/10. She reports the pain comes down both legs. She reports that while the stimulator was working, she didn't have pain in her legs.  She was recently interrogated and found to have a dead battery.    Interval History 3/7/2025:  The patient returns to clinic today for follow up of back pain. She reports worsened leg pain. She did find her controller. She is meeting with Haley from [employer] today for programming. She did not receive increased dose of Lyrica. She is currently taking 100 mg. She is taking Zanaflex. She is performing a home exercise routine. She denies any other health changes. Her pain today is 8/10.    Interval History 2/7/2025:  The patient returns to clinic today for follow up of back pain. She is s/p bilateral L3,4,5 RFA on 1/6/2025. She reports 50% relief. She continues to report intermittent low back pain. She is having worsening leg pain. She has not adjusted her SCS in a while. She did find her controller. She is taking Lyrica, although not sure of benefit. She is taking Zanaflex. She is performing home exercises. She denies any other health changes. Her pain today is 5/10.    Interval History 12/13/2024:  The patient returns to clinic today for follow up of back pain. She reports increased low back pain. She denies any radicular leg pain at this time. She does have benefit with SCS. She does need a new controller. Her pain is worse with standing and walking. She is taking Lyrica and Zanaflex. She is performing a home exercise routine. She denies any other health changes. Her pain today is 8/10.    Interval History 10/11/2024:  The patient returns to clinic today for follow up of back pain. Since last visit, her insurance denied her procedure due to timing. She reports increased back  pain at this time. She is having radiating pain into her legs to her ankles. Her SCS is currently not on. She is unable to find her controller. She has not reached out to representatives. Her pain is worse with standing and walking. She denies any other health changes. Her pain today is 10/10.    Interval History 7/15/2024:  The patient is here today to discuss lower back pain. Since previous visit, she did undergo right then left L3,4,5 RFAs completed with 80% relief for about 7 months. Her pain has now returned and she wishes to repeat the RFAs. The pain worsens with prolonged standing and walking. She continues with home PT exercises. Her pain today is 10/10.    Interval history 11/22/2023:  73-year-old female that presents today with axial low back pain.  Not been seen for approximately 7 months set of pain 1-2 weeks, is located in a bandlike distribution of low back she did not anesthesia like symptoms.  She is known to this clinic and was previously provided a or RFA targeting L3, L4 and L5 that provided her with 50-60% relief.  Like to be considered for repeat lumbar RFA she denies any recent incident or trauma denies any bowel bladder dysfunction anesthesia denies any profound weakness.    Interval History 4/20/2023:  The patient returns to clinic today for follow up of low back pain. She is s/p left L3,4,5 RFA on 3/16/2023 and right L3,4,5 RFA on 3/30/2023. She reports 50-60% relief of her pain. Her pain is tolerable at this time. She has good days and bad days. She denies any radicular leg pain at this time. She is not currently using her SCS. She has not contacted representatives. She reports that her mother passed away last week. She is dealing with a lot of stress due to this. She is taking Gabapentin, although not consistently. She also takes Zanaflex. She denies any other health changes. Her pain today is 6/10.     Interval History 2/8/2023:  The patient returns to clinic today for follow up of back  pain. She reports worsened low back pain. She reports intermittent radiating pain into the posterior aspect of both legs to the ankles, left greater than right. Her pain is constant in nature. Her back pain is greater than her leg pain. She is reporting limited relief with SCS. She has not been able to meet with Roque or [employer] for programming. She is taking Gabapentin. She denies any other health changes. Her pain today is 9/10.    Interval History 11/4/2022:  Faby is here for follow up of back and leg pain. Unfortunately, she has been unable to meet up with Roque for SCS programming. She does report some improvement in symptoms since previous visit. She still has back pain with radiation into the legs. Recent XRAYs do not show any significant lead migration. She only takes Gabapentin intermittently because she says it makes her feet swell but it does help. Her pain today is 8/10.    Interval History 10/4/2022:  The patient is here for follow up of chronic back pain. She reports more pain over the past month. No injury or trauma. She does have a lumbar SCS but is unsure if it is even on at this time. She does not think that it is. It did previously help with her back and leg pain. She has not been in contact with "[employer] recently. The back pain radiates down the back of both legs to the feet. It worsens with walking and standing. Her pain today is 10/10.    Interval History 8/23/2022:  Faby is here for follow up of chronic lower back pain. She is now s/p right then left L3,4,5 RFAs completed on 7/21/22 with limited relief so far. She continues to report aching back pain with radiation into the legs and numbness. She has had her SCS off for a couple of months. She has not been in contact with [employer] rep for programming. She says that she turned it off due to limited benefit. No new weakness to legs or falls. No bowel/bladder incontinence. Her pain today is 10/10.    Interval History 6/17/2022:  The patient is here today for  follow up of back pain. She has had more aching pain across the lower back. She had benefit with lumbar RFAs in the past and would like to reschedule this. She would also like to repeat aquatic PT as this was helpful in the past. Her pain is aching and throbbing in nature without radiation currently. No new falls or trauma. Her pain today is 8/10.    Interval History 5/5/2022:  The patient is here for follow up of chronic lower back pain. She has radiation into the legs. No recent falls or trauma. She saw Dr. Silverio last month and was under the impression that an [employer] rep would be here today for programming. She is still having difficulty programming her device. She has mild benefit with Gabapentin 900 mg daily without side effects. She is also having muscle spasms intermittently. She is asking for a muscle relaxer. She has tried Robaxin in the past with mild benefit. She would like to try another medication. Her pain today is 8/10.    Interval History 4/20/2022: She presents today for follow up of low back pain. She states that she only had about 40% relief of LBP following RFAs in September that lasted a few weeks. Pain  continues to be in the low back and radiates into b/l LE. Pain encompasses the entire leg and does not follow a specific dermatomal pattern in the leg. She denies weakness, numbness or tingling in the lower extremities. She denies bowel or bladder incontinence. Pain is currently rated 8/10. She is currently on gabapentin 300mg tid with minimal relief. She has been unable to charge bret SCS for the last month and does not have the contact number for her rep.      Interval History 9/30/2021:  The patient returns to clinic today for follow up of low back pain. She is s/p left L3,4,5 RFA on 9/9/2021 and right L3,4,5 RFA on 9/20/2021. She is unsure of relief at this time. She reports increased pain to the right side. She describes this pain as burning in nature. She denies any radiating leg pain. Her  pain is worse with bending and standing. She continues to report benefit with [employer] SCS. She denies any weakness. She denies any other health changes. Her pain today is 9/10.    Interval History 8/3/2021:   Ms. Luna returns in f/u re: low back/leg pain. She reports about three months ago she noticed her low back started bothering her, worse with bending and prolonged standing. No inciting event, no trauma to back since last visit. Reports continued leg pain down the backs of her legs as well. She reports intermittent instability in her legs - on and off - over the last year. Last time she has met with [employer] rep for reprogramming of SCS was fall 2020. Denies any current issues with SCS function/battery/remote.     Interval History 9/28/2020:  The patient is here today for increased lower back pain.  She is status post right than left L3, 4, 5 radiofrequency ablation completed on 08/31/2020 with approximately 50% relief.  However, she is feeling tightness across the lower back without radiation at this time.  She would like an injection today.  Additionally, she states that she has not completed physical therapy for her back in some time and is not currently doing any exercises.  Her leg pain is currently well controlled with spinal cord stimulator and she had a recent adjustment.  Her pain today is 8/10.    Interval History 7/16/2020:  The patient is here for follow up of lower back pain.  She previously had benefit with lumbar RFAs for similar pain.  She is also having radiation into her legs with numbness, left greater than right.  Ildefonso is here today to meet with her for programming.  She previously was having significant benefit of leg pain with SCS implant.  She denies any recent trauma or falls. Her pain today is 9/10.    Interval History 1/9/2020:  The patient is here for follow up of lower back and leg pain.  She is s/p lumbar RFAs with about 50% relief.  Her leg pain is well controlled with implant.  She  still has intermittent flare ups of back pain, like today.  When this happens, she has some benefit with rest.  She has tried Tylenol and OTC NSAIDs without benefit.  She denies any recent falls or weakness.  The patient denies any bowel or bladder incontinence or signs of saddle paresthesia.  The patient denies any major medical changes since last office visit.  Her pain today is 7/10.    Previous Encounter:  Faby Luna presents to the clinic for a follow-up appointment for lower back pain.  She previously had significant leg pain which has resolved with [employer] SCS implant.  She is here today to discuss increased lower back pain.  It is sharp and throbbing in nature.  It is significant in the morning and with prolonged standing and activity.  She has some benefit with stretching.  She denies any recent falls.  Since the last visit, Faby Luna states the pain has been worsening.  Current pain intensity is 8/10.    Pain Disability Index Review:      3/17/2025     3:47 PM 3/7/2025     2:00 PM 2/7/2025    10:35 AM   Last 3 PDI Scores   Pain Disability Index (PDI) 35 48 35       Opioid Contract: no     report:  Not applicable    Pain Procedures:   5/2/18 Left L3 and L4 TF ELISE- 20% relief  5/21/18 Bilateral L4-5 and L5-S1 facet joint injections- no relief  9/24/18 Lumbar SCS trial ([employer])- 100% relief  10/26/18 Lumbar SCS implant ([employer])- 100% relief of leg pain  9/12/19 Bilateral L3,4,5 MBB- helpful  10/17/19 Bilateral L3,4,5 MBB- helpful  11/21/19 Right L3,4,5 RFA- 50% relief  12/5/19 Left L3,4,5 RFA- 50% relief  8/17/20 Left L3,4,5 RFA- 50% relief  8/31/20 Right L3,4,5 RFA- 50% relief  9/9/2021- Left L3,4,5 RFA - 60% relief  9/20/2021- Right L3,4,5 RFA -60% relief  7/7/22 Right L3,4,5 RFA   7/21/22 Left L3,4,5 RFA   3/16/2023- Left L3,4,5 RFA  3/30/2023- Right L3,4,5 RFA  12/18/24 Right L3,4,5 RFA  1/4/24 Left L3,4,5 RFA  1/6/2025- Bilateral L3,4,5 RFA    Physical Therapy/Home  Exercise:   yes in the past with limited benefit    Imaging:   XR THORACIC SPINE AP LATERAL     CLINICAL HISTORY:  check SCS lead placement;  Other mechanical complication of implanted electronic neurostimulator of spinal cord electrode (lead), initial encounter     TECHNIQUE:  AP and lateral views of the thoracic spine were performed.     COMPARISON:  10/04/2022     FINDINGS:  Vertebral body heights are maintained.  Multilevel disc space narrowing.     AP alignment is anatomic.  Dextroconvex curvature thoracic spine.     Leads terminate at T6-7 and T7.     Impression:     No acute osseous abnormality seen.        Electronically signed by:Daniela Hernadez  Date:                                            03/07/2025  Time:                                           16:06    3/31/18 Lumbar MRI    Narrative     EXAMINATION:  MRI LUMBAR SPINE WITHOUT CONTRAST    CLINICAL HISTORY:  Low back pain, >6wks conservative tx, persistent-progressive sx, surgical candidate; Other spondylosis with radiculopathy, lumbar region    TECHNIQUE:  Multiplanar, multisequence MR images were acquired from the thoracolumbar junction to the sacrum without the administration of contrast.    COMPARISON:  Plain films from 03/27/2018    FINDINGS:  There is grade 1 spondylolisthesis of L4 on L5. The vertebral body heights are well maintained, with no fracture.  No marrow signal abnormality suspicious for an infiltrative process.    The conus medullaris terminates at approximately the mid body of L1.  There is a cyst associated with the upper pole of the right kidney.  There is disc desiccation noted throughout the lumbar spine with relative sparing of the L5-S1 disc.  Mild disc space narrowing present at the L4-5 level.    L1-L2: Mild diffuse disc bulge resulting in no significant central or neural foraminal canal narrowing.    L2-L3: No significant central canal narrowing.  There is mild narrowing of either neural foraminal canal secondary to disc  material.    L3-L4: Disc bulging in the bilateral foraminal regions resulting in no significant central canal narrowing.  Mild-to-moderate bilateral facet arthropathy also noted.  The bilateral neural foraminal canals are moderately narrowed with some mild effacement of either exiting L3 nerve root in the extraforaminal regions bilaterally.    L4-L5:  Grade 1 spondylolisthesis along with moderate to severe bilateral facet arthropathy and ligamentum flavum hypertrophy resulting in at least moderate narrowing of the central canal.  The right neural foraminal canal is mildly to moderately narrowed.  Left neural foraminal canal is moderately to severely narrowed with mild effacement of the exiting L4 nerve root.    L5-S1:  No significant central or neural foraminal canal narrowing noted.  Mild bilateral facet arthropathy noted.   Impression       1. Multilevel degenerative changes of the lumbar spine as detailed above     Lumbar XRAYs 3/27/18    Narrative     EXAMINATION:  XR LUMBAR SPINE AP AND LAT WITH FLEX/EXT    CLINICAL HISTORY:  Low back pain    TECHNIQUE:  AP and lateral views as well as lateral flexion and extension images are performed through the lumbar spine.    COMPARISON:  None    FINDINGS:  X-ray lumbar spine with flexion and extension demonstrates grade 1 spondylolisthesis at L4-5 measuring around 6 mm which does not change significantly with flexion and extension.  The L4-5 disc is narrowed and degenerated.  Other lumbar vertebral discs are maintained.  Vertebral body heights are maintained.  Small anterior spurs are seen, and there is small posterior osteophyte along the inferior endplate of L4.  There is lumbar facet arthropathy at L4-5 and L5-S1.   Impression       Degenerative changes as above.  Grade 1 anterolisthesis and degenerative disc disease at L4-5.         Allergies:   Review of patient's allergies indicates:   Allergen Reactions    Aspirin Other (See Comments)     Has been told not to take  "it due to bariatric surgery       Current Medications:   Current Outpatient Medications   Medication Sig Dispense Refill    albuterol (PROAIR HFA) 90 mcg/actuation inhaler Inhale 2 puffs into the lungs every 4 (four) hours as needed for Wheezing or Shortness of Breath. Rescue 18 g 11    atenoloL (TENORMIN) 100 MG tablet TAKE 1 TABLET EVERY DAY 90 tablet 3    BD ULTRA-FINE MINI PEN NEEDLE 31 gauge x 3/16" Ndle       BD ULTRA-FINE FELICIA PEN NEEDLE 32 gauge x 5/32" Ndle       blood sugar diagnostic Strp To check BG 3 times daily, to use with insurance preferred meter 100 strip 11    EScitalopram oxalate (LEXAPRO) 20 MG tablet Take 1 tablet (20 mg total) by mouth once daily. 90 tablet 3    fluticasone propionate (FLONASE) 50 mcg/actuation nasal spray 2 sprays (100 mcg total) by Each Nostril route once daily. 11.1 mL 1    lancets Misc To check BG 3 times daily, to use with insurance preferred meter 100 each 11    loratadine (CLARITIN) 10 mg tablet Take 1 tablet (10 mg total) by mouth daily as needed for Allergies. 90 tablet 3    losartan (COZAAR) 50 MG tablet Take 1 tablet (50 mg total) by mouth once daily. 90 tablet 3    pantoprazole (PROTONIX) 20 MG tablet TAKE 1 TABLET TWICE DAILY BEFORE MEALS 180 tablet 3    pregabalin (LYRICA) 150 MG capsule Take 1 capsule (150 mg total) by mouth 3 (three) times daily. 90 capsule 3    rosuvastatin (CRESTOR) 20 MG tablet Take 1 tablet (20 mg total) by mouth every evening. 90 tablet 3    tirzepatide (MOUNJARO) 10 mg/0.5 mL PnIj Inject 10 mg into the skin every 7 days. 4 Pen 4    tirzepatide (MOUNJARO) 7.5 mg/0.5 mL PnIj Inject 7.5 mg into the skin every 7 days. 4 Pen 0    tiZANidine (ZANAFLEX) 4 MG tablet TAKE 1 TABLET ONE TIME DAILY AS NEEDED FOR PAIN 90 tablet 3    topiramate (TOPAMAX) 100 MG tablet TAKE 1 TABLET EVERY EVENING 90 tablet 3    traZODone (DESYREL) 100 MG tablet Take 1 tablet by mouth in the evening 90 tablet 1    chlorhexidine (HIBICLENS) 4 % external liquid Wash body " in shower with this liquid every day for the 3 days before surgery. Rinse after 1-2 minutes. Do not use on head or face. 15 mL 0    mupirocin (BACTROBAN) 2 % ointment Blow nose. Place this medicine on a Q-tip. Rub medicine around inside one nostril. Repeat in the other nostril. Pinch nose together for 1 minute. Do this 2 times per day for 3 days before surgery. 5 g 0     No current facility-administered medications for this visit.     Facility-Administered Medications Ordered in Other Visits   Medication Dose Route Frequency Provider Last Rate Last Admin    0.9%  NaCl infusion   Intravenous Continuous Uriel Aranda MD   New Bag at 10/30/24 1107       REVIEW OF SYSTEMS:    GENERAL:  No weight loss, malaise or fevers.  HEENT:  Negative for frequent or significant headaches.  NECK:  Negative for lumps, goiter, pain and significant neck swelling.  RESPIRATORY:  Negative for cough, wheezing or shortness of breath.  CARDIOVASCULAR:  Negative for chest pain, leg swelling or palpitations. Hypertension.  GI:  Negative for abdominal discomfort, blood in stools or black stools or change in bowel habits.  MUSCULOSKELETAL:  See HPI.  SKIN:  Negative for lesions, rash, and itching.  PSYCH:  Negative for sleep disturbance, mood disorder and recent psychosocial stressors.  HEMATOLOGY/LYMPHOLOGY:  Negative for prolonged bleeding, bruising easily or swollen nodes.  NEURO:   No history of headaches, syncope, paralysis, seizures or tremors.  ENDO: Diabetes.  All other reviewed and negative other than HPI.    Past Medical History:  Past Medical History:   Diagnosis Date    Anxiety with depression     Arthritis     CKD (chronic kidney disease) stage 2, GFR 60-89 ml/min     Diabetes mellitus     History of Clarisse-en-Y gastric bypass     Hypertension     Obesity, unspecified     DAHLIA (obstructive sleep apnea)     Spondylosis        Past Surgical History:  Past Surgical History:   Procedure Laterality Date    CARPAL TUNNEL RELEASE Right  3/8/2019    Procedure: RELEASE, CARPAL TUNNEL right;  Surgeon: Pan Goodman MD;  Location: [facility] OR;  Service: Orthopedics;  Laterality: Right;    CARPAL TUNNEL RELEASE Left 2019    Procedure: RELEASE, CARPAL TUNNEL- LEFT;  Surgeon: Pan Goodman MD;  Location: [facility] OR 2ND FLR;  Service: Orthopedics;  Laterality: Left;    CATARACT EXTRACTION Bilateral      SECTION      CHOLECYSTECTOMY      COLONOSCOPY N/A 2024    Procedure: COLONOSCOPY;  Surgeon: Otilio Man MD;  Location: [facility];  Service: Endoscopy;  Laterality: N/A;  Referred by: MYNOR Sprague / CECILIA Meds: ozempic / diabetic / Prep: peg / Route instructions sent: portal  - lvm and portal msg for pc. DBM  - left 2nd vm for pc. DBM    COLONOSCOPY, SCREENING, HIGH RISK PATIENT N/A 10/30/2024    Procedure: COLONOSCOPY, SCREENING, HIGH RISK PATIENT;  Surgeon: Corinne Traylor MD;  Location: [facility];  Service: Endoscopy;  Laterality: N/A;  WLMED: Ozempic Pt states she hasn't taken for several weeks  Outside referral from [facility]  ext suprep  inst portal  LW  10/23 r/s ext suprep to portal, holding ozempic since 10/16 cf    EPIDURAL STEROID INJECTION INTO LUMBAR SPINE N/A 2018    Procedure: INJECTION, STEROID, SPINE, LUMBAR, EPIDURAL;  Surgeon: Shaq Silveiro MD;  Location: [facility] PAIN MGT;  Service: Pain Management;  Laterality: N/A;  LUMBAR L4-L5 INTERLAMINAR ELISE'  [id number]  W/ SEDATION     ESOPHAGOGASTRODUODENOSCOPY N/A 2023    Procedure: EGD (ESOPHAGOGASTRODUODENOSCOPY);  Surgeon: Deann Jimenez MD;  Location: [facility];  Service: Gastroenterology;  Laterality: N/A;    HYSTERECTOMY      INJECTION OF ANESTHETIC AGENT AROUND NERVE Bilateral 2019    Procedure: BLOCK, NERVE, L3-L4-L5 MEDIAL BRANCH;  Surgeon: Shaq Silverio MD;  Location: [facility] PAIN MGT;  Service: Pain Management;  Laterality: Bilateral;    INJECTION OF ANESTHETIC AGENT AROUND NERVE Bilateral 10/17/2019    Procedure: BLOCK, NERVE;  Surgeon: Shaq Silverio MD;   Location: [facility] PAIN MGT;  Service: Pain Management;  Laterality: Bilateral;  B/L MBB L3-L4-L5  REPEAT  CONSENT NEEDED    INJECTION OF FACET JOINT Bilateral 5/28/2018    Procedure: INJECTION-FACET;  Surgeon: Shaq Silverio MD;  Location: [facility] PAIN MGT;  Service: Pain Management;  Laterality: Bilateral;  LUMBAR BILATERAL L4-L5 AND L5-S1 FACET STEROID INJECTION  [id number]    W/ SEDATION     RADIOFREQUENCY ABLATION Right 11/21/2019    Procedure: RADIOFREQUENCY ABLATION RIGHT L3, L4, L5;  Surgeon: Shaq Silverio MD;  Location: [facility] PAIN MGT;  Service: Pain Management;  Laterality: Right;  Right RFA L3-L4-L5  1 of 2  Consent Needed    RADIOFREQUENCY ABLATION Left 12/5/2019    Procedure: RADIOFREQUENCY ABLATION LEFT L3-5;  Surgeon: Shaq Silverio MD;  Location: [facility] PAIN [facility]GT;  Service: Pain Management;  Laterality: Left;  Left RFA L3-L4-L5  2 of 2  Consent Needed    RADIOFREQUENCY ABLATION Left 8/17/2020    Procedure: RADIOFREQUENCY ABLATION LEFT L3,4,5 1 of 2;  Surgeon: Shaq Silverio MD;  Location: [facility] PAIN MGT;  Service: Pain Management;  Laterality: Left;  Left RFA L3,4,5  1 of 2    RADIOFREQUENCY ABLATION Right 8/31/2020    Procedure: RADIOFREQUENCY ABLATION RIGHT L3,4,5 2 of 2;  Surgeon: Shaq Silverio MD;  Location: [facility] PAIN MGT;  Service: Pain Management;  Laterality: Right;  RADIOFREQUENCY ABLATION RIGHT L3,4,5  2 of 2    RADIOFREQUENCY ABLATION Left 9/9/2021    Procedure: RADIOFREQUENCY ABLATION, L3-L4 AND L5 MEDIAL BRANCH 1 OF 2;  Surgeon: Shaq Silverio MD;  Location: [facility] PAIN MGT;  Service: Pain Management;  Laterality: Left;    RADIOFREQUENCY ABLATION Right 9/20/2021    Procedure: RADIOFREQUENCY ABLATION, L3-L4 AND L5 MEDIAL BRANCH 2 OF 2;  Surgeon: Shaq Silverio MD;  Location: [facility] PAIN [facility]GT;  Service: Pain Management;  Laterality: Right;    RADIOFREQUENCY ABLATION Right 7/7/2022    Procedure: RADIOFREQUENCY ABLATION, RIGHT L3-L4-L5 ONE OF TWO;  Surgeon: Shaq Silverio MD;  Location: [facility] PAIN MGT;  Service: Pain  Management;  Laterality: Right;    RADIOFREQUENCY ABLATION Left 7/21/2022    Procedure: RADIOFREQUENCY ABLATION, LEFT L3-L4-L5 TWO OF TWO *BRING SCS REMOTE*;  Surgeon: Shaq Silverio MD;  Location: [facility] PAIN [facility]GT;  Service: Pain Management;  Laterality: Left;    RADIOFREQUENCY ABLATION Left 3/16/2023    Procedure: RADIOFREQUENCY ABLATION LEFT L3,L4,L5 *BRING SCS REMOTE*;  Surgeon: Shaq Silverio MD;  Location: [facility];  Service: Pain Management;  Laterality: Left;    RADIOFREQUENCY ABLATION Right 3/30/2023    Procedure: RADIOFREQUENCY ABLATION RIGHT L3,L4,L5 *BRING SCS REMOTE*;  Surgeon: Shaq Silverio MD;  Location: [facility];  Service: Pain Management;  Laterality: Right;    RADIOFREQUENCY ABLATION Right 12/18/2023    Procedure: RADIOFREQUENCY ABLATION RIGHT L3, 4, 5 1 OF 2;  Surgeon: Shaq Silverio MD;  Location: [facility];  Service: Pain Management;  Laterality: Right;  [phone]    RADIOFREQUENCY ABLATION Left 1/4/2024    Procedure: RADIOFREQUENCY ABLATION LEFT L3, 4, 5 2 OF 2;  Surgeon: Shaq Silverio MD;  Location: [facility];  Service: Pain Management;  Laterality: Left;  [phone]  2 WK F/U WARREN    RADIOFREQUENCY ABLATION Bilateral 1/6/2025    Procedure: RADIOFREQUENCY ABLATION BILATERAL L3, 4, 5;  Surgeon: Shaq Silverio MD;  Location: [facility];  Service: Pain Management;  Laterality: Bilateral;  3 WK F/U KRANTHI    TRIAL OF SPINAL CORD NERVE STIMULATOR N/A 9/24/2018    Procedure: TRIAL, NEUROSTIMULATOR, SPINAL CORD, SPINAL CORD STIMULATOR TRIAL-INTERNAL WIRES TO EXTERNAL BATTERY;  Surgeon: Shaq Silverio MD;  Location: [facility];  Service: Pain Management;  Laterality: N/A;  ABBOTT REP NOTIFIED       Family History:  Family History   Problem Relation Name Age of Onset    Cataracts Mother      Glaucoma Mother      No Known Problems Father      No Known Problems Sister      No Known Problems Brother      No Known Problems Maternal Aunt      No Known Problems Maternal Uncle      No Known  Problems Paternal Aunt      No Known Problems Paternal Uncle      No Known Problems Maternal Grandmother      No Known Problems Maternal Grandfather      No Known Problems Paternal Grandmother      No Known Problems Paternal Grandfather         Social History:  Social History     Socioeconomic History    Marital status:    Tobacco Use    Smoking status: Never     Passive exposure: Never    Smokeless tobacco: Never   Substance and Sexual Activity    Alcohol use: Not Currently     Alcohol/week: 1.0 standard drink of alcohol     Types: 1 Glasses of wine per week     Comment: occ    Drug use: No    Sexual activity: Yes     Partners: Male     Social Drivers of Health     Financial Resource Strain: Medium Risk (3/6/2025)    Overall Financial Resource Strain (CARDIA)     Difficulty of Paying Living Expenses: Somewhat hard   Food Insecurity: No Food Insecurity (3/6/2025)    Hunger Vital Sign     Worried About Running Out of Food in the Last Year: Never true     Ran Out of Food in the Last Year: Never true   Transportation Needs: No Transportation Needs (3/6/2025)    PRAPARE - Transportation     Lack of Transportation (Medical): No     Lack of Transportation (Non-Medical): No   Physical Activity: Inactive (3/6/2025)    Exercise Vital Sign     Days of Exercise per Week: 0 days     Minutes of Exercise per Session: 10 min   Stress: No Stress Concern Present (3/6/2025)    [employer] [place] [employer] - Occupational Stress Questionnaire     Feeling of Stress : Not at all   Housing Stability: Low Risk  (3/6/2025)    Housing Stability Vital Sign     Unable to Pay for Housing in the Last Year: No     Number of Times Moved in the Last Year: 0     Homeless in the Last Year: No       OBJECTIVE:    /75   Pulse 78   Temp 98 °F (36.7 °C)   Resp 18   Wt 85 kg (187 lb 6.3 oz)   SpO2 100%   BMI 32.17 kg/m²     PHYSICAL EXAMINATION:    General appearance: Well appearing, in no acute distress, alert and oriented  x3.  Psych:  Mood and affect appropriate.  Skin: Skin color, texture, turgor normal, no rashes or lesions, in both upper and lower body.   Head/face:  Atraumatic, normocephalic. No palpable lymph nodes  Back: Straight leg raising in the sitting and supine positions is negative to radicular pain bilaterally. There is pain with palpation to lumbar facet joints bilaterally. Limited ROM with pain on extension.   Extremities: No deformities, edema, or skin discoloration. Good capillary refill.  Musculoskeletal: 5/5 strength in right ankle with plantar and dorsiflexion. 5/5 strength in left ankle with plantar and dorsiflexion. 5/5 strength with right knee flexion and extension. 5/5 strength with left knee flexion and extension.   Neuro: Decreased sensation to BLE.  Gait: Antalgic- ambulates without assistance.    ASSESSMENT: 74 y.o. year old female with back pain, consistent with the following diagnoses:     1. S/P insertion of spinal cord stimulator  mupirocin (BACTROBAN) 2 % ointment    chlorhexidine (HIBICLENS) 4 % external liquid    Procedure Order to Pain Management        PLAN:     - Previous imaging reviewed.  Labs reviewed.   - CBC completed 2/21/25  - scripts given for chlorhexadine wash and mupirocin nasal ppx  - xray of thoracic spine comparable to last one in 2022  - Continue Lyrica to 150 mg TID.   - Continue Zanaflex 4 mg daily PRN.  - The patient will continue a home exercise routine to help with pain and strengthening.    - RTC 2 weeks after SCS IPG change    The above plan and management options were discussed at length with patient. Patient is in agreement with the above and verbalized understanding.    Mita Rivera MD  03/17/2025

## 2025-03-18 ENCOUNTER — PATIENT MESSAGE (OUTPATIENT)
Dept: ADMINISTRATIVE | Facility: HOSPITAL | Age: 75
End: 2025-03-18
Payer: MEDICARE

## 2025-03-19 ENCOUNTER — PATIENT MESSAGE (OUTPATIENT)
Dept: PAIN MEDICINE | Facility: OTHER | Age: 75
End: 2025-03-19
Payer: MEDICARE

## 2025-03-20 ENCOUNTER — PATIENT MESSAGE (OUTPATIENT)
Dept: PAIN MEDICINE | Facility: OTHER | Age: 75
End: 2025-03-20
Payer: MEDICARE

## 2025-03-20 DIAGNOSIS — G89.4 CHRONIC PAIN SYNDROME: Primary | ICD-10-CM

## 2025-03-20 DIAGNOSIS — Z96.89 S/P INSERTION OF SPINAL CORD STIMULATOR: ICD-10-CM

## 2025-04-22 ENCOUNTER — ANESTHESIA EVENT (OUTPATIENT)
Dept: SURGERY | Facility: OTHER | Age: 75
End: 2025-04-22
Payer: MEDICARE

## 2025-04-23 ENCOUNTER — HOSPITAL ENCOUNTER (OUTPATIENT)
Dept: PREADMISSION TESTING | Facility: OTHER | Age: 75
Discharge: HOME | End: 2025-04-23
Attending: STUDENT IN AN ORGANIZED HEALTH CARE EDUCATION/TRAINING PROGRAM
Payer: MEDICARE

## 2025-04-23 VITALS
HEIGHT: 64 IN | RESPIRATION RATE: 16 BRPM | HEART RATE: 75 BPM | DIASTOLIC BLOOD PRESSURE: 65 MMHG | BODY MASS INDEX: 31.07 KG/M2 | TEMPERATURE: 98 F | SYSTOLIC BLOOD PRESSURE: 114 MMHG | OXYGEN SATURATION: 95 % | WEIGHT: 182 LBS

## 2025-04-23 RX ORDER — LIDOCAINE HYDROCHLORIDE 10 MG/ML
0.5 INJECTION, SOLUTION EPIDURAL; INFILTRATION; INTRACAUDAL; PERINEURAL ONCE
OUTPATIENT
Start: 2025-04-23 | End: 2025-04-23

## 2025-04-23 RX ORDER — SODIUM CHLORIDE, SODIUM LACTATE, POTASSIUM CHLORIDE, CALCIUM CHLORIDE 600; 310; 30; 20 MG/100ML; MG/100ML; MG/100ML; MG/100ML
INJECTION, SOLUTION INTRAVENOUS CONTINUOUS
OUTPATIENT
Start: 2025-04-23

## 2025-04-23 NOTE — ANESTHESIA PREPROCEDURE EVALUATION
04/22/2025  Faby Luna is a 74 y.o., female.      Pre-op Assessment    I have reviewed the Patient Summary Reports.     I have reviewed the Nursing Notes. I have reviewed the NPO Status.   I have reviewed the Medications.     Review of Systems  Anesthesia Hx:             Denies Family Hx of Anesthesia complications.    Denies Personal Hx of Anesthesia complications.                    Social:  Non-Smoker       Hematology/Oncology:  Hematology Normal   Oncology Normal                                   EENT/Dental:  EENT/Dental Normal           Cardiovascular:     Hypertension   CAD       CHF    hyperlipidemia    ECHO 2022: EF 55%. grade I diastolic dysfunction    Stress test 2023: negative    EKG 02/2025: NSR                           Pulmonary:    Asthma (has not had to use rescue inhaler in months)  Shortness of breath (reports improved)  Sleep Apnea (has CPAP, does not use consistently) Localized pulmonary fibrosis                 Renal/:  Chronic Renal Disease, CKD                Hepatic/GI:     GERD, well controlled   Hx gastric bypass    On GLP1 for DM, last took 4/24/25 Taking GLP-1 Agonists            Musculoskeletal:  Arthritis               Neurological:    Neuromuscular Disease,                                   Endocrine:  Diabetes, well controlled, type 2         Obesity / BMI > 30  Dermatological:  Skin Normal    Psych:   anxiety depression                Physical Exam  General: Well nourished, Cooperative, Alert and Oriented    Airway:  Mallampati: II   Mouth Opening: Normal  TM Distance: Normal  Tongue: Normal  Neck ROM: Normal ROM    Dental:  Dentures  Bottom and top dentures      Anesthesia Plan  Type of Anesthesia, risks & benefits discussed:    Anesthesia Type: MAC  Intra-op Monitoring Plan: Standard ASA Monitors  Post Op Pain Control Plan: multimodal analgesia  Induction:   IV  Informed Consent: Informed consent signed with the Patient and all parties understand the risks and agree with anesthesia plan.  All questions answered.   ASA Score: 3  Anesthesia Plan Notes: CBC, CMP 02/2025 in [facility]  Routine Cardiology vist note 02/2025 in [facility]  Routine PCP visit note 03/2025 in [facility]  On GLP1 for DM    Plan: minimal sedation    Ready For Surgery From Anesthesia Perspective.     .

## 2025-04-23 NOTE — DISCHARGE INSTRUCTIONS
Information to Prepare you for your Surgery    PRE-ADMIT TESTING   [address]  [place] [address]  [address]   [phone]  - ABBI Cervantes    Your surgery has been scheduled at [facility]. We are pleased to have the opportunity to serve you. For Further Information please call [phone].    On the day of surgery please report to Registration on the 1st floor of the [facility].    CONTACT YOUR PHYSICIAN'S OFFICE THE DAY PRIOR TO YOUR SURGERY TO OBTAIN YOUR ARRIVAL TIME.     The evening before surgery do not eat anything after 9 p.m. ( this includes hard candy, chewing gum and mints).  You may only have GATORADE, POWERADE AND WATER  from 9 p.m. until you leave your home.     DRINK AT LEAST 12 OUNCES THE MORNING OF SURGERY    DO NOT DRINK ANY LIQUIDS ON THE WAY TO THE HOSPITAL.      Why does your anesthesiologist allow you to drink Gatorade/Powerade before surgery?    Gatorade/Powerade helps to increase your comfort before surgery and to decrease your nausea after surgery.  The carbohydrates in the Gatorade/Powerade help reduce your body's stress response to surgery.  If you are diabetic, drink only water prior to surgery.       Outpatient Surgery- May allow 2 adults (18 and older)/ Support Persons (1 being the designated ) for all surgical/procedural patients. A breastfeeding mother will be allowed her infant and 2 adult Support Persons. No one under the age of 18 will be allowed in the building.    MEDICATION INSTRUCTIONS: TAKE medications checked off by the Anesthesiologist on your Medication List.      Angiogram Patients: Take medications as instructed by your physician, including aspirin.     Surgery Patients:  If you take ASPIRIN - Your PHYSICIAN/SURGEON will need to inform you IF/OR when you need to stop taking aspirin prior to your surgery.     Starting the week prior to surgery, do not take any medications containing IBUPROFEN or NSAIDS (Advil,  Aleve, BC, Celebrex, Goody's, Ketorolac, Meloxicam, Mobic, Motrin, Naproxen, Toradol, etc).  If you are not sure if you should take a medicine please call your surgeon's office.  You may take Tylenol.    Do Not Wear any make-up (especially eye make-up) to surgery. Please remove any false eyelashes or eyelash extensions. If you arrive the day of surgery with makeup/eyelashes on you will be required to remove prior to surgery. (There is a risk of corneal abrasions if eye makeup/eyelash extensions are not removed)    Leave all valuables at home.   Do Not wear any jewelry or watches, including any metal in body piercings. Jewelry must be removed prior to coming to the hospital.  There is a possibility that rings that are unable to be removed may be cut off if they are on the surgical extremity.    Please remove all hair extensions, wigs, clips and any other metal accessories/ ornaments from your hair.  These items may pose a flammable/fire risk in Surgery and must be removed.    Do not shave your surgical area at least 5 days prior to your surgery. The surgical prep will be performed at the hospital according to Infection Control regulations.    Contact Lens must be removed before surgery. Either do not wear the contact lens or bring a case and solution for storage.  Please bring a container for eyeglasses or dentures as required.  Bring any paperwork your physician has provided, such as consent forms,  history and physicals, doctor's orders, etc.   Bring comfortable clothes that are loose fitting to wear upon discharge. Take into consideration the type of surgery being performed.  Maintain your diet as advised per your physician the day prior to surgery.    Adequate rest the night before surgery is advised.   Park in the Parking lot behind the hospital or in the [employer] [facility] across the street from the parking lot. Parking is complimentary.  If you will be discharged the same day as your procedure, please  arrange for a responsible adult to drive you home or to accompany you if traveling by taxi.   YOU WILL NOT BE PERMITTED TO DRIVE OR TO LEAVE THE HOSPITAL ALONE AFTER SURGERY.   If you are being discharged the same day, it is strongly recommended that you arrange for someone to remain with you for the first 24 hrs following your surgery.    The Surgeon will speak to your family/visitor after your surgery regarding the outcome of your surgery and post op care.  The Surgeon may speak to you after your surgery, but there is a possibility you may not remember the details.  Please check with your family members regarding the conversation with the Surgeon.         Bathing Instructions with Hibiclens:    Shower the evening before and morning of your procedure with Chlorhexidine (Hibiclens)  do not use Chlorhexidine on your face or genitals. Do not get in your eyes.  Wash your face with water and your regular face wash/soap  Use your regular shampoo  Apply Chlorhexidine (Hibiclens) directly on your skin or on a wet washcloth and wash gently. When showering: Move away from the shower stream when applying Chlorhexidine (Hibiclens) to avoid rinsing off too soon.  Rinse thoroughly with warm water  Do not dilute Chlorhexidine (Hibiclens)   Dry off as usual, do not use any deodorant, powder, body lotions, perfume, after shave or cologne.     We strongly recommend whoever is bringing you home be present for discharge instructions.  This will ensure a thorough understanding for your post op home care.    If the patient has fever, cough, or signs/symptoms of Flu or Covid please do not come in for your surgery.  First, contact the pre op department at [phone] (unit opens at 5AM) and then contact your surgeon and your primary care physician for further instructions.      If applicable, please bring any blood pressure and/or diabetes medications with you the day of surgery.  If on home oxygen, even at night, please bring your  portable oxygen tank with you the day of surgery in case it is needed for your discharge.      Thanks,  ABBI Cervantes

## 2025-04-25 ENCOUNTER — TELEPHONE (OUTPATIENT)
Dept: PAIN MEDICINE | Facility: CLINIC | Age: 75
End: 2025-04-25
Payer: MEDICARE

## 2025-04-25 NOTE — TELEPHONE ENCOUNTER
----- Message from [employer] sent at 4/25/2025  3:58 PM CDT -----  Who called:selfWhat is the request in detail: states she has not been contacted in regards to the procedure on mondayCan the clinic reply by [facility]?noWould the patient rather a call back or a response via [facility] call back number:.[phone] Information:

## 2025-04-28 ENCOUNTER — HOSPITAL ENCOUNTER (OUTPATIENT)
Facility: OTHER | Age: 75
Discharge: HOME | End: 2025-04-28
Attending: STUDENT IN AN ORGANIZED HEALTH CARE EDUCATION/TRAINING PROGRAM | Admitting: STUDENT IN AN ORGANIZED HEALTH CARE EDUCATION/TRAINING PROGRAM
Payer: MEDICARE

## 2025-04-28 ENCOUNTER — ANESTHESIA (OUTPATIENT)
Dept: SURGERY | Facility: OTHER | Age: 75
End: 2025-04-28
Payer: MEDICARE

## 2025-04-28 VITALS
WEIGHT: 182 LBS | SYSTOLIC BLOOD PRESSURE: 143 MMHG | HEIGHT: 64 IN | OXYGEN SATURATION: 99 % | BODY MASS INDEX: 31.07 KG/M2 | DIASTOLIC BLOOD PRESSURE: 56 MMHG | HEART RATE: 95 BPM | TEMPERATURE: 98 F

## 2025-04-28 DIAGNOSIS — Z96.89 S/P INSERTION OF SPINAL CORD STIMULATOR: Primary | ICD-10-CM

## 2025-04-28 DIAGNOSIS — G89.29 CHRONIC PAIN: ICD-10-CM

## 2025-04-28 DIAGNOSIS — Z45.42 BATTERY END OF LIFE OF SPINAL CORD STIMULATOR: Primary | ICD-10-CM

## 2025-04-28 LAB — POCT GLUCOSE: 153 MG/DL (ref 70–110)

## 2025-04-28 PROCEDURE — D9220A PRA ANESTHESIA: Mod: HCNC,,, | Performed by: STUDENT IN AN ORGANIZED HEALTH CARE EDUCATION/TRAINING PROGRAM

## 2025-04-28 PROCEDURE — 36000706: Mod: HCNC | Performed by: STUDENT IN AN ORGANIZED HEALTH CARE EDUCATION/TRAINING PROGRAM

## 2025-04-28 PROCEDURE — 63600175 PHARM REV CODE 636 W HCPCS: Mod: HCNC | Performed by: STUDENT IN AN ORGANIZED HEALTH CARE EDUCATION/TRAINING PROGRAM

## 2025-04-28 PROCEDURE — 37000008 HC ANESTHESIA 1ST 15 MINUTES: Mod: HCNC | Performed by: STUDENT IN AN ORGANIZED HEALTH CARE EDUCATION/TRAINING PROGRAM

## 2025-04-28 PROCEDURE — 64555 IMPLANT NEUROELECTRODES: CPT | Mod: ,,, | Performed by: STUDENT IN AN ORGANIZED HEALTH CARE EDUCATION/TRAINING PROGRAM

## 2025-04-28 PROCEDURE — 36000707: Mod: HCNC | Performed by: STUDENT IN AN ORGANIZED HEALTH CARE EDUCATION/TRAINING PROGRAM

## 2025-04-28 PROCEDURE — 71000015 HC POSTOP RECOV 1ST HR: Mod: HCNC | Performed by: STUDENT IN AN ORGANIZED HEALTH CARE EDUCATION/TRAINING PROGRAM

## 2025-04-28 PROCEDURE — 71000016 HC POSTOP RECOV ADDL HR: Mod: HCNC | Performed by: STUDENT IN AN ORGANIZED HEALTH CARE EDUCATION/TRAINING PROGRAM

## 2025-04-28 PROCEDURE — C1767 GENERATOR, NEURO NON-RECHARG: HCPCS | Mod: HCNC | Performed by: STUDENT IN AN ORGANIZED HEALTH CARE EDUCATION/TRAINING PROGRAM

## 2025-04-28 PROCEDURE — 37000009 HC ANESTHESIA EA ADD 15 MINS: Mod: HCNC | Performed by: STUDENT IN AN ORGANIZED HEALTH CARE EDUCATION/TRAINING PROGRAM

## 2025-04-28 DEVICE — IMPLANTABLE DEVICE: Type: IMPLANTABLE DEVICE | Site: BACK | Status: FUNCTIONAL

## 2025-04-28 RX ORDER — SODIUM CHLORIDE, SODIUM LACTATE, POTASSIUM CHLORIDE, CALCIUM CHLORIDE 600; 310; 30; 20 MG/100ML; MG/100ML; MG/100ML; MG/100ML
INJECTION, SOLUTION INTRAVENOUS CONTINUOUS
Status: DISCONTINUED | OUTPATIENT
Start: 2025-04-28 | End: 2025-04-28 | Stop reason: HOSPADM

## 2025-04-28 RX ORDER — CEFAZOLIN 2 G/1
2 INJECTION, POWDER, FOR SOLUTION INTRAMUSCULAR; INTRAVENOUS
Status: COMPLETED | OUTPATIENT
Start: 2025-04-28 | End: 2025-04-28

## 2025-04-28 RX ORDER — LIDOCAINE HYDROCHLORIDE 10 MG/ML
0.5 INJECTION, SOLUTION EPIDURAL; INFILTRATION; INTRACAUDAL; PERINEURAL ONCE
Status: DISCONTINUED | OUTPATIENT
Start: 2025-04-28 | End: 2025-04-28 | Stop reason: HOSPADM

## 2025-04-28 RX ORDER — SODIUM CHLORIDE 0.9 % (FLUSH) 0.9 %
3 SYRINGE (ML) INJECTION
OUTPATIENT
Start: 2025-04-28

## 2025-04-28 RX ORDER — SODIUM CHLORIDE 9 MG/ML
INJECTION, SOLUTION INTRAVENOUS CONTINUOUS
Status: DISCONTINUED | OUTPATIENT
Start: 2025-04-28 | End: 2025-04-28 | Stop reason: HOSPADM

## 2025-04-28 RX ORDER — PHENYLEPHRINE HYDROCHLORIDE 10 MG/ML
INJECTION INTRAVENOUS
Status: DISCONTINUED | OUTPATIENT
Start: 2025-04-28 | End: 2025-04-28

## 2025-04-28 RX ORDER — HYDROCODONE BITARTRATE AND ACETAMINOPHEN 5; 325 MG/1; MG/1
1 TABLET ORAL EVERY 8 HOURS PRN
Qty: 21 TABLET | Refills: 0 | OUTPATIENT
Start: 2025-04-28 | End: 2025-05-05

## 2025-04-28 RX ORDER — MIDAZOLAM HYDROCHLORIDE 1 MG/ML
INJECTION INTRAMUSCULAR; INTRAVENOUS
Status: DISCONTINUED | OUTPATIENT
Start: 2025-04-28 | End: 2025-04-28

## 2025-04-28 RX ORDER — VANCOMYCIN HYDROCHLORIDE 1 G/20ML
INJECTION, POWDER, LYOPHILIZED, FOR SOLUTION INTRAVENOUS
Status: DISCONTINUED | OUTPATIENT
Start: 2025-04-28 | End: 2025-04-28 | Stop reason: HOSPADM

## 2025-04-28 RX ORDER — FENTANYL CITRATE 50 UG/ML
INJECTION, SOLUTION INTRAMUSCULAR; INTRAVENOUS
Status: DISCONTINUED | OUTPATIENT
Start: 2025-04-28 | End: 2025-04-28

## 2025-04-28 RX ORDER — HYDROMORPHONE HYDROCHLORIDE 2 MG/ML
0.4 INJECTION, SOLUTION INTRAMUSCULAR; INTRAVENOUS; SUBCUTANEOUS EVERY 5 MIN PRN
Refills: 0 | OUTPATIENT
Start: 2025-04-28

## 2025-04-28 RX ORDER — GLUCAGON 1 MG
1 KIT INJECTION
OUTPATIENT
Start: 2025-04-28

## 2025-04-28 RX ORDER — OXYCODONE HYDROCHLORIDE 5 MG/1
5 TABLET ORAL
Refills: 0 | OUTPATIENT
Start: 2025-04-28

## 2025-04-28 RX ORDER — HYDROCODONE BITARTRATE AND ACETAMINOPHEN 5; 325 MG/1; MG/1
1 TABLET ORAL EVERY 8 HOURS PRN
Qty: 21 TABLET | Refills: 0 | Status: SHIPPED | OUTPATIENT
Start: 2025-04-28

## 2025-04-28 RX ORDER — BUPIVACAINE HYDROCHLORIDE 2.5 MG/ML
INJECTION, SOLUTION EPIDURAL; INFILTRATION; INTRACAUDAL; PERINEURAL
Status: DISCONTINUED | OUTPATIENT
Start: 2025-04-28 | End: 2025-04-28 | Stop reason: HOSPADM

## 2025-04-28 RX ORDER — MEPERIDINE HYDROCHLORIDE 25 MG/ML
12.5 INJECTION INTRAMUSCULAR; INTRAVENOUS; SUBCUTANEOUS ONCE AS NEEDED
Refills: 0 | OUTPATIENT
Start: 2025-04-28 | End: 2025-04-29

## 2025-04-28 RX ORDER — ONDANSETRON HYDROCHLORIDE 2 MG/ML
4 INJECTION, SOLUTION INTRAVENOUS DAILY PRN
OUTPATIENT
Start: 2025-04-28

## 2025-04-28 RX ORDER — DOXYCYCLINE HYCLATE 100 MG
100 TABLET ORAL 2 TIMES DAILY
Qty: 10 TABLET | Refills: 0 | Status: SHIPPED | OUTPATIENT
Start: 2025-04-28 | End: 2025-05-03

## 2025-04-28 RX ORDER — LIDOCAINE HYDROCHLORIDE AND EPINEPHRINE 10; 10 UG/ML; MG/ML
INJECTION, SOLUTION INFILTRATION; PERINEURAL
Status: DISCONTINUED | OUTPATIENT
Start: 2025-04-28 | End: 2025-04-28 | Stop reason: HOSPADM

## 2025-04-28 RX ADMIN — SODIUM CHLORIDE, SODIUM LACTATE, POTASSIUM CHLORIDE, AND CALCIUM CHLORIDE: .6; .31; .03; .02 INJECTION, SOLUTION INTRAVENOUS at 10:04

## 2025-04-28 RX ADMIN — MIDAZOLAM HYDROCHLORIDE 0.5 MG: 1 INJECTION INTRAMUSCULAR; INTRAVENOUS at 11:04

## 2025-04-28 RX ADMIN — FENTANYL CITRATE 25 MCG: 50 INJECTION, SOLUTION INTRAMUSCULAR; INTRAVENOUS at 11:04

## 2025-04-28 RX ADMIN — MIDAZOLAM HYDROCHLORIDE 1 MG: 1 INJECTION INTRAMUSCULAR; INTRAVENOUS at 11:04

## 2025-04-28 RX ADMIN — CEFAZOLIN 2 G: 2 INJECTION, POWDER, FOR SOLUTION INTRAMUSCULAR; INTRAVENOUS at 11:04

## 2025-04-28 RX ADMIN — PHENYLEPHRINE HYDROCHLORIDE 100 MCG: 10 INJECTION INTRAVENOUS at 11:04

## 2025-04-28 RX ADMIN — FENTANYL CITRATE 50 MCG: 50 INJECTION, SOLUTION INTRAMUSCULAR; INTRAVENOUS at 11:04

## 2025-04-28 NOTE — OR NURSING
Pt BP low this morning.  Pt states she is feeling fine, no dizziness/lightheadedness. Pt's BP in clinic usually 120s/70s. Pt did take several medication prior to arrival today and documented in med rec list.  Dr. Corona notified.  POC unchanged at this time.

## 2025-04-28 NOTE — DISCHARGE INSTRUCTIONS
Pain Post Surgery Instructions    If you have SEVERE headache with nausea, bleeding, weakness and extreme pain, please call office ([phone] or pager number [phone]). Unless you usually have this type of migraine headache.   If you develop fever (greater than 101 F) or have any redness, swelling, or drainage at the injection site(s), please call off ([phone] or pager number [phone]). This may be a sign of infection.   If a rash or whelps (like hives) occur, please call the office ([phone] or pager number [phone]).  If you are a heart patient, please watch for symptoms such as swelling in your hands and feet and shortness of breath. If any of these symptoms occur, please notify your primary care/ heart doctor and go to the nearest emergency room.  If you have high blood pressure, you may experience an increase in your blood pressure over the next two weeks. Please continue to monitor your blood pressure and contact your primary care provider if your blood pressure does not return to baseline.    NO HEAT TO THE SURGERY SITE for the next 7 days including: bath or shower, heating pad, moist heat, and / or hot tubs.   Keep site clean and dry until follow up visit. You can sponge bath for the first 48 hours, after which you can use Glad Press-n-Seal to the incision sites as you take a shower (avoid hot showers). Continue this until your follow up/wound care appointment.  DO NOT DRIVE OR OPERATE HEAVY MACHINERY UNTIL FOLLOW UP.   Avoid heavy lifting and excessive physical activity until follow up appointment.  OK to resume all medications unless otherwise directed by your doctor.      Activity  You may be up and about to take care of your personal needs but avoid any strenuous activity. Do not put  yourself in a position where you could fall.  Do not lift more than 8 pounds (equivalent to about a gallon of milk). Avoid pushing or pulling activity.  Going up and down stairs is permissible. Be  sure to use the handrails and take one step at a time until  comfortable. Take precautions to prevent falls and use assistance if unsure. If you were given ZAC hose  stockings you may discontinue once you are up and walking daily.  Avoid bending or twisting at the waist. Bend at your knees (squat) when picking up objects. Avoid  sitting for longer than 45 minutes to one hour at a time. Sitting for longer periods of time may add to  your discomfort. Take a 10 minute break to get up and move around or lie down before sitting again.    Exercise  Walking is the best exercise after surgery and you need to walk DAILY. You should not engage in any  other exercise until instructed by your physician. Gradually increase the distance you walk and, if  weather permits, you may walk outside. You should be able to gradually increase your distance until you  can walk about one mile within one to two months after surgery. Ladies avoid high heels for the first  month after surgery.  Incision Care  Keep the incision dry for 48 hours after surgery. You do not need to apply any ointment. You do not  need to keep the incision covered unless there is drainage from the incision. Contact us if drainage  persists for more than 2 days or if you have redness or swelling around the incision.  You will have dermabond (skin glue) over the incision. Do NOT pick at the incision.  If you have fevers or chills, take your temperature with a thermometer. If you have a temperature of 101  degrees Fahrenheit or 38.3 degrees Celsius or higher, contact our office.  Bathing You may do sponge bath for the first 48 hours.  After 48 hours, you can do showers, however please cover the incision to keep it from getting wet.  I recommend Glad Press-and-Seal.   Avoid scrubbing your incision site. Do  NOT soak the incision; so avoid baths, hot tubs or swimming for 1 month after surgery. It is normal for  the incision site to itch, but avoid  scratching.    Driving  Do not drive for the first week. You may ride in an automobile for short distances as tolerated.  Pain Medication  You will be given a prescription for pain medication when you are discharged from the hospital. You  may take the pain medication with a snack or meal if stomach upset occurs. If you need a refill,  have your pharmacy fax a refill request to our office      Diet    Eat a healthy, well balanced diet and avoid extra calories. You may have a decreased appetite after  surgery.  Constipation  You may be constipated after your surgery, so increase your intake of fiber (fruits and vegetables) and  fluid (unless instructed otherwise). You may use your choice of over-the-counter laxatives (such as  Senokot S, Dulcolax, Colace, or Milk of Magnesia). If you do not have a bowel movement, use an overthe-counter enema (i.e. Fleets Enema) as indicated on the bottle. If you are still unable to have a bowel  movement, or have nausea, vomiting or abdominal bloating, contact your family doctor for instructions.    Smoking  You should not smoke after surgery. Smoking decreases the rate of skin and bone healing. Smoking also  interferes with the effectiveness of your pain medication. The hospital campuses are smoke-free and you  will not be allowed to go outside to smoke. Contact your primary care physician for smoking cessation  options prior to surgery if needed.    Office Follow-up  You will need a post-operative appointment for an incision check and follow-up at 7-10 days after  Surgery. You will also be seen at 3 months after surgery.       Please call my office or pager at [phone] if experienced any weakness or loss of sensation, fever > 101.5, pain uncontrolled with oral medications, persistent nausea/vomiting/or diarrhea, redness or drainage from the incisions, or any other worrisome concerns. If physician on call was not reached or could not communicate with our office for any reason  please go to the nearest emergency department

## 2025-04-28 NOTE — OP NOTE
THIS DOCUMENT WAS MADE IN PART WITH VOICE RECOGNITION SOFTWARE.  OCCASIONALLY THIS SOFTWARE WILL MISINTERPRET WORDS OR PHRASES.      Farhan DESAI Darrellevelinayehuda  1952    Farhan was seen today for back pain and other mentions.    Diagnoses and all orders for this visit:    Muscle cramps  -     Comprehensive metabolic panel; Future  -     TSH; Future  -     Magnesium; Future  -     Vitamin D; Future  -     CBC auto differential; Future  -     CK; Future    Vitamin D deficiency, unspecified   -     Vitamin D; Future  Possible vitamin-D deficiency, I requested this based on symptoms but this was the only diagnosis that would work for coding    Back pain, unspecified back location, unspecified back pain laterality, unspecified chronicity  -     Urinalysis; Future  -     Urinalysis  Likely musculoskeletal, though different than his chronic musculoskeletal pain so will check urine as a precaution    Erectile dysfunction, unspecified erectile dysfunction type  -     Ambulatory referral/consult to Urology; Future  His on the long-acting nitrates so cannot take Viagra or similar.  Will refer to urology      Subjective     Chief Complaint   Patient presents with    Back Pain     right middle back pain x 1 month     other mentions       HPI      -  Location:  Right midback  -  Quality:  ache  -  Severity: moderate  -  Duration:  One month  -  Timing:  constant  -  Context:  No accident or injury  -  Modifying factors:    -  Associated signs and symptoms:  No dysuria, no gross hematuria, no fever, no rashes.    Has mostly resovled no since he mad appt.    2. Muscle cramps hands, forearms, not legs very often now but over the years has had a lot of leg cramps.  No claudication.        Active Ambulatory Problems     Diagnosis Date Noted    Low serum testosterone level 11/15/2012    Obstructive sleep apnea syndrome 11/15/2012    Osteoarthritis of right hip 07/08/2016    Lumbar spinal stenosis 12/20/2016    Lumbar degenerative disc  PROCEDURE DATE: 4/28/2025    PROCEDURE:   1. Peripheral Nerve Stimulator IPG exchange    Pre-op diagnosis:  1. Chronic Pain Syndrome   2. End of battery life    Post-op diagnosis: Same    Surgeon: Mita Rivera MD    Assistant: Mateus Duarte MD      Anesthesia: Monitored Anesthesia Care    Estimated Blood Loss: Minimal- <10ml    Urine Output: Not Measured    IV Fluids- See Anesthesia record    Complications: None     CONSENT: The risks, benefits, and options were discussed thoroughly with the patient. The patient's questions were answered. The patient understands the risk and benefits and wishes to proceed. Informed consent was obtained.     Technique:  Site of the implantable pulse generator was marked on the patients right side in the preoperative area. The patient was taken to the OR and placed in the prone position. The anesthesia provider throughout the case provided sedation and cardiopulmonary monitoring.   The Patient's back was prepped with Duraprep and then draped in usual sterile fashion. Local anesthetic was used at the IPG site with 1% Lidocaine mixed with Epi. Using a No. 10 scalpel, an incision was made over the right buttock scar from previous IPG placement and dissection carried out with blunt dissection. The battery was accessed and removed from pocket still attached to leads. The leads were removed from the old battery and then placed in exact location of the new battery. Then the system was checked by device representative in sterile fashion, found to be completely functional. Copious antibiotic bulb lavage was irrigated throughout the field and pocket, and hemostasis was maintained. The battery was then placed in the pocket.    Then the wound was closed with simple interrupted sutures using 2-0 Vicryl sutures and 3-0 vicryl in 2 separate layers and skin closed with 4-0 monofilament. This was followed by dermabond application, followed by Silver Aquacel dressing. Sponge and needle counts were  correct x2 at conclusion of the case.     Patient was then placed supine on the stretcher and transferred to the recovery room. Patient was programmed by the representative of the SCS. Patient was instructed not to perform any abrupt movements with the back, avoid bending, twisting, or lifting >10lbs. Thee patient has been scheduled to return to the clinic in approx 5-7 days. The patient tolerated the procedure well.      Mateus Duarte MD PGY-5  Interventional Pain Medicine Fellow   [facility]     I reviewed and edited the fellow/resident/student/provider's note. I conducted my own interview and physical examination. I agree with the findings. I was present and supervising all critical portions of the procedure.    Mita Rivera MD PhD     disease 12/20/2016    Primary insomnia 12/28/2016    New onset atrial fibrillation 12/03/2018    Elevated blood pressure reading 12/03/2018    Undiagnosed cardiac murmurs 12/03/2018    Obesity, Class I, BMI 30-34.9 12/03/2018    PAF (paroxysmal atrial fibrillation) 01/03/2019    Non-rheumatic mitral regurgitation 01/28/2019    Essential hypertension 01/28/2019    Long term (current) use of anticoagulants 01/28/2019    Precordial pain 03/21/2019    Acute chest pain 03/21/2019    Hypercholesteremia 03/21/2019    Abnormal nuclear stress test 04/04/2019    Dyslipidemia (high LDL; low HDL) 04/04/2019    Encounter for pre-operative cardiovascular clearance 04/29/2019    Stented coronary artery 04/29/2019    Coronary artery disease involving native coronary artery of native heart without angina pectoris 02/07/2020     Resolved Ambulatory Problems     Diagnosis Date Noted    No Resolved Ambulatory Problems     Past Medical History:   Diagnosis Date    Arthritis     Atrial fibrillation     Coronary artery disease     Hearing difficulty     Hypertension     Lumbar disc disease     REYES (obstructive sleep apnea)     REYES (obstructive sleep apnea)          Review of Systems   Respiratory: Negative.    Cardiovascular: Negative.    Gastrointestinal: Negative.    Genitourinary: Negative for dysuria and hematuria.   Musculoskeletal: Positive for arthralgias and back pain.   Skin: Negative for rash.       Objective     Physical Exam  Vitals signs reviewed.   Constitutional:       General: He is not in acute distress.     Appearance: He is well-developed. He is not diaphoretic.   HENT:      Head: Normocephalic and atraumatic.   Eyes:      General: No scleral icterus.  Neck:      Musculoskeletal: Normal range of motion and neck supple.   Cardiovascular:      Rate and Rhythm: Normal rate. Rhythm irregular.      Heart sounds: Normal heart sounds. No murmur. No gallop.    Pulmonary:      Effort: Pulmonary effort  "is normal. No respiratory distress.   Abdominal:      Comments: No CVA tenderness   Neurological:      Mental Status: He is alert and oriented to person, place, and time.      Deep Tendon Reflexes: Reflexes are normal and symmetric.   Psychiatric:         Behavior: Behavior normal.       Vitals:    06/16/20 1348   BP: 134/74   BP Location: Right arm   Patient Position: Sitting   BP Method: Medium (Manual)   Pulse: 62   Resp: 18   Temp: 99.9 °F (37.7 °C)   TempSrc: Oral   Weight: 94.6 kg (208 lb 8.9 oz)   Height: 5' 8" (1.727 m)       MOST RECENT LABS IN OUR ELECTRONIC MEDICAL RECORD:     Results for orders placed or performed in visit on 02/08/20   Comprehensive metabolic panel   Result Value Ref Range    Sodium 139 136 - 145 mmol/L    Potassium 4.2 3.5 - 5.1 mmol/L    Chloride 104 95 - 110 mmol/L    CO2 26 23 - 29 mmol/L    Glucose 113 (H) 70 - 110 mg/dL    BUN, Bld 20 8 - 23 mg/dL    Creatinine 1.0 0.5 - 1.4 mg/dL    Calcium 9.6 8.7 - 10.5 mg/dL    Total Protein 7.2 6.0 - 8.4 g/dL    Albumin 3.6 3.5 - 5.2 g/dL    Total Bilirubin 1.9 (H) 0.1 - 1.0 mg/dL    Alkaline Phosphatase 95 55 - 135 U/L    AST 33 10 - 40 U/L    ALT 31 10 - 44 U/L    Anion Gap 9 8 - 16 mmol/L    eGFR if African American >60.0 >60 mL/min/1.73 m^2    eGFR if non African American >60.0 >60 mL/min/1.73 m^2   Hemoglobin   Result Value Ref Range    Hemoglobin 9.1 (L) 14.0 - 18.0 g/dL   Lipid panel   Result Value Ref Range    Cholesterol 87 (L) 120 - 199 mg/dL    Triglycerides 63 30 - 150 mg/dL    HDL 28 (L) 40 - 75 mg/dL    LDL Cholesterol 46.4 (L) 63.0 - 159.0 mg/dL    Hdl/Cholesterol Ratio 32.2 20.0 - 50.0 %    Total Cholesterol/HDL Ratio 3.1 2.0 - 5.0    Non-HDL Cholesterol 59 mg/dL           "

## 2025-04-28 NOTE — PLAN OF CARE
Faby Luna has met all discharge criteria from Phase II. Vital Signs are stable, ambulating  without difficulty. Discharge instructions given, patient verbalized understanding. Discharged from facility via wheelchair in stable condition.

## 2025-04-28 NOTE — DISCHARGE SUMMARY
Discharge Note  Short Stay      SUMMARY     Admit Date: 4/28/2025    Attending Physician: Mita Rivera MD    Discharge Physician: Mita Rivera MD      Discharge Date: 4/28/2025 11:01 AM    Procedure(s) (LRB):  REPLACEMENT OF SCS IPG ABBOTT REP (N/A)    Final Diagnosis: Chronic pain syndrome [G89.4]  S/P insertion of spinal cord stimulator [Z96.89]    Disposition: Home or self care    Patient Instructions:   Current Discharge Medication List        CONTINUE these medications which have NOT CHANGED    Details   atenoloL (TENORMIN) 100 MG tablet TAKE 1 TABLET EVERY DAY  Qty: 90 tablet, Refills: 3    Comments: .  Associated Diagnoses: Essential hypertension      EScitalopram oxalate (LEXAPRO) 20 MG tablet Take 1 tablet (20 mg total) by mouth once daily.  Qty: 90 tablet, Refills: 3    Associated Diagnoses: Mild recurrent major depression; SANDRA (generalized anxiety disorder)      loratadine (CLARITIN) 10 mg tablet Take 1 tablet (10 mg total) by mouth daily as needed for Allergies.  Qty: 90 tablet, Refills: 3    Associated Diagnoses: Seasonal allergies      losartan (COZAAR) 50 MG tablet Take 1 tablet (50 mg total) by mouth once daily.  Qty: 90 tablet, Refills: 3    Comments: .  Associated Diagnoses: Essential hypertension      mupirocin (BACTROBAN) 2 % ointment Blow nose. Place this medicine on a Q-tip. Rub medicine around inside one nostril. Repeat in the other nostril. Pinch nose together for 1 minute. Do this 2 times per day for 3 days before surgery.  Qty: 5 g, Refills: 0    Associated Diagnoses: S/P insertion of spinal cord stimulator      pregabalin (LYRICA) 150 MG capsule Take 1 capsule (150 mg total) by mouth 3 (three) times daily.  Qty: 90 capsule, Refills: 3    Associated Diagnoses: Chronic pain syndrome; Lumbar spondylosis; Degeneration of intervertebral disc of lumbar region with discogenic back pain      rosuvastatin (CRESTOR) 20 MG tablet Take 1 tablet (20 mg total) by mouth every evening.  Qty: 90 tablet,  "Refills: 3    Associated Diagnoses: Dyslipidemia associated with type 2 diabetes mellitus; Aortic atherosclerosis      tiZANidine (ZANAFLEX) 4 MG tablet TAKE 1 TABLET ONE TIME DAILY AS NEEDED FOR PAIN  Qty: 90 tablet, Refills: 3    Associated Diagnoses: Chronic pain syndrome      topiramate (TOPAMAX) 100 MG tablet TAKE 1 TABLET EVERY EVENING  Qty: 90 tablet, Refills: 3      traZODone (DESYREL) 100 MG tablet Take 1 tablet by mouth in the evening  Qty: 90 tablet, Refills: 1    Associated Diagnoses: Insomnia, unspecified type      albuterol (PROAIR HFA) 90 mcg/actuation inhaler Inhale 2 puffs into the lungs every 4 (four) hours as needed for Wheezing or Shortness of Breath. Rescue  Qty: 18 g, Refills: 11    Associated Diagnoses: Mild intermittent asthma without complication      BD ULTRA-FINE MINI PEN NEEDLE 31 gauge x 3/16" Ndle       BD ULTRA-FINE FELICIA PEN NEEDLE 32 gauge x 5/32" Ndle       blood sugar diagnostic Strp To check BG 3 times daily, to use with insurance preferred meter  Qty: 100 strip, Refills: 11    Associated Diagnoses: Type 2 diabetes mellitus with stage 3a chronic kidney disease, without long-term current use of insulin      chlorhexidine (HIBICLENS) 4 % external liquid Wash body in shower with this liquid every day for the 3 days before surgery. Rinse after 1-2 minutes. Do not use on head or face.  Qty: 15 mL, Refills: 0    Associated Diagnoses: S/P insertion of spinal cord stimulator      fluticasone propionate (FLONASE) 50 mcg/actuation nasal spray 2 sprays (100 mcg total) by Each Nostril route once daily.  Qty: 11.1 mL, Refills: 1    Associated Diagnoses: Seasonal allergic rhinitis, unspecified trigger      lancets Misc To check BG 3 times daily, to use with insurance preferred meter  Qty: 100 each, Refills: 11    Associated Diagnoses: Type 2 diabetes mellitus with stage 3a chronic kidney disease, without long-term current use of insulin      pantoprazole (PROTONIX) 20 MG tablet TAKE 1 TABLET " TWICE DAILY BEFORE MEALS  Qty: 180 tablet, Refills: 3    Associated Diagnoses: Gastroesophageal reflux disease without esophagitis      tirzepatide (MOUNJARO) 10 mg/0.5 mL PnIj Inject 10 mg into the skin every 7 days.  Qty: 4 Pen, Refills: 4    Associated Diagnoses: Type 2 diabetes mellitus with stage 3a chronic kidney disease, without long-term current use of insulin      tirzepatide (MOUNJARO) 7.5 mg/0.5 mL PnIj Inject 7.5 mg into the skin every 7 days.  Qty: 4 Pen, Refills: 0    Associated Diagnoses: Type 2 diabetes mellitus with stage 3a chronic kidney disease, without long-term current use of insulin                                                                                                     Spinal Cord Stimulator    Brief Description:     A Spinal Cord Stimulator (SCS) is a device implanted in the spine that sends electrical impulses to the spinal cord to reduce chronic pain.     Pre-op Instructions:   Consultation: Discuss the procedure with your physician and ensure all your questions are answered.   Medications: Inform your doctor of all medications you are taking. You may need to stop certain medications, such as blood thinners, a few days before the procedure.   Fasting: Do not eat or drink anything after midnight before the day of surgery.    Arrangements: Arrange for someone to drive you home after the procedure.      Post-op Instructions:   Rest: Limit your activities and rest as much as possible until your follow up appointment in 7-10 days.   Medications: Take prescribed pain medications as directed.   Incision Care: Keep the incision site clean and dry. You may take a sponge bath, but do not submerge the incision area in water.   Follow-up: Attend all follow-up appointments to ensure the device is functioning correctly and to monitor your recovery.      Managing Pain:    Discomfort from your procedure is normal. Please fill your prescriptions for pain medication and take it as directed by  your doctor.    Contact your doctor if:     You develop new or increasing pain.   You have severe pain, or a headache that is not relieved by medication.    Infection:    A surgical site infection is one that occurs after surgery on the part of the body where surgery took place. Most patients do not develop an infection, but it could happen. Notify your doctor immediately if any signs of infection occur.    Common signs and symptoms of infection are:     Redness, warmth, tenderness or severe pain around area you had the procedure.   Swelling in the area.   Drainage that is not clear   Fever above 100.4°F using an oral thermometer.   Chills orShivering   Nausea:    Nausea is a common issuefollowing surgery or procedure. Please notify your doctor if you have a history of nausea after surgery or procedures. If vomiting occurs, please try to increase fluid intake to prevent dehydration. Notify your doctor if excessive vomiting occurs.    What can you do if you develop nausea at home?     Use a cold compress to cool your forehead.   Avoid excessive heat & humidity.   Certain scents have been known to help: essential oils like jennifer or peppermint as well as rubbing alcohol.   Relax and try to sleep through the nausea period.   Eat bland, unspiced, lean foods that are gentle on your stomach. Examples include crackers, rice, toasted whole grain bread, chicken without the skin.        For any questions regarding prescriptions, pain diaries, follow up appointments or any issues the day after the procedure, please call our clinic [phone] or  pager at [phone]. For all procedural questions please call our procedure area [phone].        Discharge Diagnosis: Chronic pain syndrome [G89.4]  S/P insertion of spinal cord stimulator [Z96.89]  Condition on Discharge: Stable with no complications to procedure   Diet on Discharge: Same as before.  Activity: as per instruction sheet.  Discharge to: Home with a  responsible adult.  Follow up: 2-4 weeks       Please call my office or pager at [phone] if experienced any weakness or loss of sensation, fever > 101.5, pain uncontrolled with oral medications, persistent nausea/vomiting/or diarrhea, redness or drainage from the incisions, or any other worrisome concerns. If physician on call was not reached or could not communicate with our office for any reason please go to the nearest emergency department     Mateus Duarte MD

## 2025-04-28 NOTE — H&P
HPI  Patient presenting for Procedure(s) (LRB):  REPLACEMENT OF SCS IPG ABBOTT REP (N/A)     Patient on Anti-coagulation No    No health changes since previous encounter    Past Medical History:   Diagnosis Date    Anxiety with depression     Arthritis     CKD (chronic kidney disease) stage 2, GFR 60-89 ml/min     Diabetes mellitus     History of Clarisse-en-Y gastric bypass     Hypertension     Obesity, unspecified     DAHLIA (obstructive sleep apnea)     Spondylosis     Wears dentures      Past Surgical History:   Procedure Laterality Date    CARPAL TUNNEL RELEASE Right 3/8/2019    Procedure: RELEASE, CARPAL TUNNEL right;  Surgeon: Pan Goodman MD;  Location: [facility] OR;  Service: Orthopedics;  Laterality: Right;    CARPAL TUNNEL RELEASE Left 2019    Procedure: RELEASE, CARPAL TUNNEL- LEFT;  Surgeon: Pan Goodman MD;  Location: [address] FLR;  Service: Orthopedics;  Laterality: Left;    CATARACT EXTRACTION Bilateral      SECTION      CHOLECYSTECTOMY      COLONOSCOPY N/A 2024    Procedure: COLONOSCOPY;  Surgeon: Otilio Man MD;  Location: [facility];  Service: Endoscopy;  Laterality: N/A;  Referred by: MYNOR Sprague / CECILIA Meds: ozempic / diabetic / Prep: peg / Route instructions sent: portal  - lvm and portal msg for pc. DBM  - left 2nd vm for pc. DBM    COLONOSCOPY, SCREENING, HIGH RISK PATIENT N/A 10/30/2024    Procedure: COLONOSCOPY, SCREENING, HIGH RISK PATIENT;  Surgeon: Corinne Traylor MD;  Location: [facility] ENDO;  Service: Endoscopy;  Laterality: N/A;  [facility]: Ozempic Pt states she hasn't taken for several weeks  Outside referral from [facility]  ext suprep  inst portal  LW  10/23 r/s ext suprep to portal, holding ozempic since 10/16 cf    EPIDURAL STEROID INJECTION INTO LUMBAR SPINE N/A 2018    Procedure: INJECTION, STEROID, SPINE, LUMBAR, EPIDURAL;  Surgeon: Shaq Silverio MD;  Location: [facility] PAIN MGT;  Service: Pain Management;  Laterality: N/A;  LUMBAR L4-L5 INTERLAMINAR  ELISE'  [id number]  W/ SEDATION     ESOPHAGOGASTRODUODENOSCOPY N/A 5/12/2023    Procedure: EGD (ESOPHAGOGASTRODUODENOSCOPY);  Surgeon: Deann Jimenez MD;  Location: [facility];  Service: Gastroenterology;  Laterality: N/A;    HYSTERECTOMY      INJECTION OF ANESTHETIC AGENT AROUND NERVE Bilateral 9/12/2019    Procedure: BLOCK, NERVE, L3-L4-L5 MEDIAL BRANCH;  Surgeon: Shaq Silverio MD;  Location: [facility] PAIN MGT;  Service: Pain Management;  Laterality: Bilateral;    INJECTION OF ANESTHETIC AGENT AROUND NERVE Bilateral 10/17/2019    Procedure: BLOCK, NERVE;  Surgeon: Shaq Silverio MD;  Location: [facility] PAIN MGT;  Service: Pain Management;  Laterality: Bilateral;  B/L MBB L3-L4-L5  REPEAT  CONSENT NEEDED    INJECTION OF FACET JOINT Bilateral 5/28/2018    Procedure: INJECTION-FACET;  Surgeon: Shaq Silverio MD;  Location: [facility] PAIN MGT;  Service: Pain Management;  Laterality: Bilateral;  LUMBAR BILATERAL L4-L5 AND L5-S1 FACET STEROID INJECTION  [id number]    W/ SEDATION     RADIOFREQUENCY ABLATION Right 11/21/2019    Procedure: RADIOFREQUENCY ABLATION RIGHT L3, L4, L5;  Surgeon: Shaq Silverio MD;  Location: [facility] PAIN MGT;  Service: Pain Management;  Laterality: Right;  Right RFA L3-L4-L5  1 of 2  Consent Needed    RADIOFREQUENCY ABLATION Left 12/5/2019    Procedure: RADIOFREQUENCY ABLATION LEFT L3-5;  Surgeon: Shaq Silverio MD;  Location: [facility] PAIN MGT;  Service: Pain Management;  Laterality: Left;  Left RFA L3-L4-L5  2 of 2  Consent Needed    RADIOFREQUENCY ABLATION Left 8/17/2020    Procedure: RADIOFREQUENCY ABLATION LEFT L3,4,5 1 of 2;  Surgeon: Shaq Silverio MD;  Location: [facility] PAIN MGT;  Service: Pain Management;  Laterality: Left;  Left RFA L3,4,5  1 of 2    RADIOFREQUENCY ABLATION Right 8/31/2020    Procedure: RADIOFREQUENCY ABLATION RIGHT L3,4,5 2 of 2;  Surgeon: Shaq Silverio MD;  Location: [facility] PAIN MGT;  Service: Pain Management;  Laterality: Right;  RADIOFREQUENCY ABLATION RIGHT L3,4,5  2 of 2    RADIOFREQUENCY ABLATION Left  9/9/2021    Procedure: RADIOFREQUENCY ABLATION, L3-L4 AND L5 MEDIAL BRANCH 1 OF 2;  Surgeon: Shaq Silverio MD;  Location: [facility] PAIN MGT;  Service: Pain Management;  Laterality: Left;    RADIOFREQUENCY ABLATION Right 9/20/2021    Procedure: RADIOFREQUENCY ABLATION, L3-L4 AND L5 MEDIAL BRANCH 2 OF 2;  Surgeon: Shaq Silverio MD;  Location: [facility] PAIN MGT;  Service: Pain Management;  Laterality: Right;    RADIOFREQUENCY ABLATION Right 7/7/2022    Procedure: RADIOFREQUENCY ABLATION, RIGHT L3-L4-L5 ONE OF TWO;  Surgeon: Shaq Silverio MD;  Location: [facility] PAIN MGT;  Service: Pain Management;  Laterality: Right;    RADIOFREQUENCY ABLATION Left 7/21/2022    Procedure: RADIOFREQUENCY ABLATION, LEFT L3-L4-L5 TWO OF TWO *BRING SCS REMOTE*;  Surgeon: Shaq Silverio MD;  Location: [facility] PAIN MGT;  Service: Pain Management;  Laterality: Left;    RADIOFREQUENCY ABLATION Left 3/16/2023    Procedure: RADIOFREQUENCY ABLATION LEFT L3,L4,L5 *BRING SCS REMOTE*;  Surgeon: Shaq Silverio MD;  Location: [facility] PAIN MGT;  Service: Pain Management;  Laterality: Left;    RADIOFREQUENCY ABLATION Right 3/30/2023    Procedure: RADIOFREQUENCY ABLATION RIGHT L3,L4,L5 *BRING SCS REMOTE*;  Surgeon: Shaq Silverio MD;  Location: [facility] PAIN MGT;  Service: Pain Management;  Laterality: Right;    RADIOFREQUENCY ABLATION Right 12/18/2023    Procedure: RADIOFREQUENCY ABLATION RIGHT L3, 4, 5 1 OF 2;  Surgeon: Shaq Silverio MD;  Location: [facility] PAIN MGT;  Service: Pain Management;  Laterality: Right;  [phone]    RADIOFREQUENCY ABLATION Left 1/4/2024    Procedure: RADIOFREQUENCY ABLATION LEFT L3, 4, 5 2 OF 2;  Surgeon: Shaq Silverio MD;  Location: [facility] PAIN MGT;  Service: Pain Management;  Laterality: Left;  [phone]  2 WK F/U WARREN    RADIOFREQUENCY ABLATION Bilateral 1/6/2025    Procedure: RADIOFREQUENCY ABLATION BILATERAL L3, 4, 5;  Surgeon: Shaq Silverio MD;  Location: [facility] PAIN MGT;  Service: Pain Management;  Laterality: Bilateral;  3 WK F/U KRANTHI    TRIAL OF  "SPINAL CORD NERVE STIMULATOR N/A 9/24/2018    Procedure: TRIAL, NEUROSTIMULATOR, SPINAL CORD, SPINAL CORD STIMULATOR TRIAL-INTERNAL WIRES TO EXTERNAL BATTERY;  Surgeon: Shaq Silverio MD;  Location: [facility];  Service: Pain Management;  Laterality: N/A;  ABBOTT REP NOTIFIED     Review of patient's allergies indicates:  No Known Allergies   Current Facility-Administered Medications   Medication    0.9% NaCl infusion    ceFAZolin 2 g    lactated ringers infusion    LIDOcaine (PF) 10 mg/ml (1%) injection 5 mg     Facility-Administered Medications Ordered in Other Encounters   Medication    0.9%  NaCl infusion       PMHx, PSHx, Allergies, Medications reviewed in epic    ROS negative except pain complaints in HPI    OBJECTIVE:    BP (!) 99/52   Pulse 95   Temp 98.6 °F (37 °C) (Oral)   Ht 5' 4" (1.626 m)   Wt 82.6 kg (182 lb)   SpO2 95%   Breastfeeding No   BMI 31.24 kg/m²     PHYSICAL EXAMINATION:    GENERAL: Well appearing, in no acute distress, alert and oriented x3.  PSYCH:  Mood and affect appropriate.  SKIN: Skin color, texture, turgor normal, no rashes or lesions which will impact the procedure.  CV: RRR with palpation of the radial artery.  PULM: No evidence of respiratory difficulty, symmetric chest rise. Clear to auscultation.  NEURO: Cranial nerves grossly intact.    Plan:    Proceed with procedure as planned Procedure(s) (LRB):  REPLACEMENT OF SCS IPG ABBOTT REP (N/A)    Mateus Kongwilly  04/28/2025            "

## 2025-04-28 NOTE — ANESTHESIA POSTPROCEDURE EVALUATION
Anesthesia Post Evaluation    Patient: Faby Lnua    Procedure(s) Performed: Procedure(s) (LRB):  REPLACEMENT OF SCS IPG ABBOTT REP (N/A)    Final Anesthesia Type: MAC      Patient location during evaluation: [facility]  Patient participation: Yes- Able to Participate  Level of consciousness: awake and alert  Post-procedure vital signs: reviewed and stable  Pain management: adequate  Airway patency: patent    PONV status at discharge: No PONV  Anesthetic complications: no      Cardiovascular status: blood pressure returned to baseline and hemodynamically stable  Respiratory status: unassisted, spontaneous ventilation and room air  Hydration status: euvolemic  Follow-up not needed.              Vitals Value Taken Time   BP 99/52 04/28/25 08:38   Temp 37 °C (98.6 °F) 04/28/25 08:38   Pulse 95 04/28/25 08:38   Resp 20 04/28/25 12:06   SpO2 95 % 04/28/25 08:18         No case tracking events are documented in the log.      Pain/Ron Score: No data recorded

## 2025-04-30 ENCOUNTER — TELEPHONE (OUTPATIENT)
Dept: PAIN MEDICINE | Facility: CLINIC | Age: 75
End: 2025-04-30
Payer: MEDICARE

## 2025-04-30 NOTE — TELEPHONE ENCOUNTER
Spoke with patient. Confirmed appointment location & time on 05/06/25  with Dr. Ro.  Patient expressed understanding & gratitude. Call ended.

## 2025-05-06 ENCOUNTER — OFFICE VISIT (OUTPATIENT)
Dept: PAIN MEDICINE | Facility: CLINIC | Age: 75
End: 2025-05-06
Payer: MEDICARE

## 2025-05-06 VITALS
HEIGHT: 64 IN | OXYGEN SATURATION: 100 % | HEART RATE: 75 BPM | BODY MASS INDEX: 31.09 KG/M2 | RESPIRATION RATE: 19 BRPM | DIASTOLIC BLOOD PRESSURE: 63 MMHG | SYSTOLIC BLOOD PRESSURE: 100 MMHG | WEIGHT: 182.13 LBS | TEMPERATURE: 98 F

## 2025-05-06 DIAGNOSIS — G89.4 CHRONIC PAIN SYNDROME: Primary | ICD-10-CM

## 2025-05-06 DIAGNOSIS — Z96.89 S/P INSERTION OF SPINAL CORD STIMULATOR: ICD-10-CM

## 2025-05-06 PROCEDURE — 99999 PR PBB SHADOW E&M-EST. PATIENT-LVL V: CPT | Mod: PBBFAC,HCNC,, | Performed by: NURSE PRACTITIONER

## 2025-05-06 NOTE — PROGRESS NOTES
Chronic patient Established Note (Follow up visit)      SUBJECTIVE:    Interval History 5/6/2025:  The patient returns to clinic today for post op. She is s/p [employer] SCS battery change on 4/28/2025. She denies any fever, chills, or drainage. She has completed her antibiotics. She is unsure how to work her remote. She is meeting with Ild[employer] today for programming. She denies any other health changes. Her pain today is 5/10.    INTERVAL HISTORY 3/17/2025:  Ms. Luna is a patient of Dr. Silverio and presents for end of life SCS pulse generator ([employer]). Current Pain level is 7/10. She reports the pain comes down both legs. She reports that while the stimulator was working, she didn't have pain in her legs.  She was recently interrogated and found to have a dead battery.  Interval History 3/7/2025:  The patient returns to clinic today for follow up of back pain. She reports worsened leg pain. She did find her controller. She is meeting with Haley from [employer] today for programming. She did not receive increased dose of Lyrica. She is currently taking 100 mg. She is taking Zanaflex. She is performing a home exercise routine. She denies any other health changes. Her pain today is 8/10.    Interval History 2/7/2025:  The patient returns to clinic today for follow up of back pain. She is s/p bilateral L3,4,5 RFA on 1/6/2025. She reports 50% relief. She continues to report intermittent low back pain. She is having worsening leg pain. She has not adjusted her SCS in a while. She did find her controller. She is taking Lyrica, although not sure of benefit. She is taking Zanaflex. She is performing home exercises. She denies any other health changes. Her pain today is 5/10.    Interval History 12/13/2024:  The patient returns to clinic today for follow up of back pain. She reports increased low back pain. She denies any radicular leg pain at this time. She does have benefit with SCS. She does need a new controller. Her pain is worse  with standing and walking. She is taking Lyrica and Zanaflex. She is performing a home exercise routine. She denies any other health changes. Her pain today is 8/10.    Interval History 10/11/2024:  The patient returns to clinic today for follow up of back pain. Since last visit, her insurance denied her procedure due to timing. She reports increased back pain at this time. She is having radiating pain into her legs to her ankles. Her SCS is currently not on. She is unable to find her controller. She has not reached out to representatives. Her pain is worse with standing and walking. She denies any other health changes. Her pain today is 10/10.    Interval History 7/15/2024:  The patient is here today to discuss lower back pain. Since previous visit, she did undergo right then left L3,4,5 RFAs completed with 80% relief for about 7 months. Her pain has now returned and she wishes to repeat the RFAs. The pain worsens with prolonged standing and walking. She continues with home PT exercises. Her pain today is 10/10.    Interval history 11/22/2023:  73-year-old female that presents today with axial low back pain.  Not been seen for approximately 7 months set of pain 1-2 weeks, is located in a bandlike distribution of low back she did not anesthesia like symptoms.  She is known to this clinic and was previously provided a or RFA targeting L3, L4 and L5 that provided her with 50-60% relief.  Like to be considered for repeat lumbar RFA she denies any recent incident or trauma denies any bowel bladder dysfunction anesthesia denies any profound weakness.    Interval History 4/20/2023:  The patient returns to clinic today for follow up of low back pain. She is s/p left L3,4,5 RFA on 3/16/2023 and right L3,4,5 RFA on 3/30/2023. She reports 50-60% relief of her pain. Her pain is tolerable at this time. She has good days and bad days. She denies any radicular leg pain at this time. She is not currently using her SCS. She has not  contacted representatives. She reports that her mother passed away last week. She is dealing with a lot of stress due to this. She is taking Gabapentin, although not consistently. She also takes Zanaflex. She denies any other health changes. Her pain today is 6/10.     Interval History 2/8/2023:  The patient returns to clinic today for follow up of back pain. She reports worsened low back pain. She reports intermittent radiating pain into the posterior aspect of both legs to the ankles, left greater than right. Her pain is constant in nature. Her back pain is greater than her leg pain. She is reporting limited relief with SCS. She has not been able to meet with Roque or I[employer] for programming. She is taking Gabapentin. She denies any other health changes. Her pain today is 9/10.    Interval History 11/4/2022:  Faby is here for follow up of back and leg pain. Unfortunately, she has been unable to meet up with Roque for SCS programming. She does report some improvement in symptoms since previous visit. She still has back pain with radiation into the legs. Recent XRAYs do not show any significant lead migration. She only takes Gabapentin intermittently because she says it makes her feet swell but it does help. Her pain today is 8/10.    Interval History 10/4/2022:  The patient is here for follow up of chronic back pain. She reports more pain over the past month. No injury or trauma. She does have a lumbar SCS but is unsure if it is even on at this time. She does not think that it is. It did previously help with her back and leg pain. She has not been in contact with [employer] recently. The back pain radiates down the back of both legs to the feet. It worsens with walking and standing. Her pain today is 10/10.    Interval History 8/23/2022:  Faby is here for follow up of chronic lower back pain. She is now s/p right then left L3,4,5 RFAs completed on 7/21/22 with limited relief so far. She continues to report aching back pain  with radiation into the legs and numbness. She has had her SCS off for a couple of months. She has not been in contact with [employer] rep for programming. She says that she turned it off due to limited benefit. No new weakness to legs or falls. No bowel/bladder incontinence. Her pain today is 10/10.    Interval History 6/17/2022:  The patient is here today for follow up of back pain. She has had more aching pain across the lower back. She had benefit with lumbar RFAs in the past and would like to reschedule this. She would also like to repeat aquatic PT as this was helpful in the past. Her pain is aching and throbbing in nature without radiation currently. No new falls or trauma. Her pain today is 8/10.    Interval History 5/5/2022:  The patient is here for follow up of chronic lower back pain. She has radiation into the legs. No recent falls or trauma. She saw Dr. Silverio last month and was under the impression that an [employer] rep would be here today for programming. She is still having difficulty programming her device. She has mild benefit with Gabapentin 900 mg daily without side effects. She is also having muscle spasms intermittently. She is asking for a muscle relaxer. She has tried Robaxin in the past with mild benefit. She would like to try another medication. Her pain today is 8/10.    Interval History 4/20/2022: She presents today for follow up of low back pain. She states that she only had about 40% relief of LBP following RFAs in September that lasted a few weeks. Pain  continues to be in the low back and radiates into b/l LE. Pain encompasses the entire leg and does not follow a specific dermatomal pattern in the leg. She denies weakness, numbness or tingling in the lower extremities. She denies bowel or bladder incontinence. Pain is currently rated 8/10. She is currently on gabapentin 300mg tid with minimal relief. She has been unable to charge bret SCS for the last month and does not have the contact number  for her rep.      Interval History 9/30/2021:  The patient returns to clinic today for follow up of low back pain. She is s/p left L3,4,5 RFA on 9/9/2021 and right L3,4,5 RFA on 9/20/2021. She is unsure of relief at this time. She reports increased pain to the right side. She describes this pain as burning in nature. She denies any radiating leg pain. Her pain is worse with bending and standing. She continues to report benefit with [employer] SCS. She denies any weakness. She denies any other health changes. Her pain today is 9/10.    Interval History 8/3/2021:   Ms. Luna returns in f/u re: low back/leg pain. She reports about three months ago she noticed her low back started bothering her, worse with bending and prolonged standing. No inciting event, no trauma to back since last visit. Reports continued leg pain down the backs of her legs as well. She reports intermittent instability in her legs - on and off - over the last year. Last time she has met with [employer] rep for reprogramming of SCS was fall 2020. Denies any current issues with SCS function/battery/remote.     Interval History 9/28/2020:  The patient is here today for increased lower back pain.  She is status post right than left L3, 4, 5 radiofrequency ablation completed on 08/31/2020 with approximately 50% relief.  However, she is feeling tightness across the lower back without radiation at this time.  She would like an injection today.  Additionally, she states that she has not completed physical therapy for her back in some time and is not currently doing any exercises.  Her leg pain is currently well controlled with spinal cord stimulator and she had a recent adjustment.  Her pain today is 8/10.    Interval History 7/16/2020:  The patient is here for follow up of lower back pain.  She previously had benefit with lumbar RFAs for similar pain.  She is also having radiation into her legs with numbness, left greater than right.  I[employer] is here today to meet  with her for programming.  She previously was having significant benefit of leg pain with SCS implant.  She denies any recent trauma or falls. Her pain today is 9/10.    Interval History 1/9/2020:  The patient is here for follow up of lower back and leg pain.  She is s/p lumbar RFAs with about 50% relief.  Her leg pain is well controlled with implant.  She still has intermittent flare ups of back pain, like today.  When this happens, she has some benefit with rest.  She has tried Tylenol and OTC NSAIDs without benefit.  She denies any recent falls or weakness.  The patient denies any bowel or bladder incontinence or signs of saddle paresthesia.  The patient denies any major medical changes since last office visit.  Her pain today is 7/10.    Previous Encounter:  Faby Luna presents to the clinic for a follow-up appointment for lower back pain.  She previously had significant leg pain which has resolved with [employer] SCS implant.  She is here today to discuss increased lower back pain.  It is sharp and throbbing in nature.  It is significant in the morning and with prolonged standing and activity.  She has some benefit with stretching.  She denies any recent falls.  Since the last visit, Faby Luna states the pain has been worsening.  Current pain intensity is 8/10.    Pain Disability Index Review:      5/6/2025    11:31 AM 3/17/2025     3:47 PM 3/7/2025     2:00 PM   Last 3 PDI Scores   Pain Disability Index (PDI) 30 35 48       Opioid Contract: no     report:  Not applicable    Pain Procedures:   5/2/18 Left L3 and L4 TF ELISE- 20% relief  5/21/18 Bilateral L4-5 and L5-S1 facet joint injections- no relief  9/24/18 Lumbar SCS trial ([employer])- 100% relief  10/26/18 Lumbar SCS implant ([employer])- 100% relief of leg pain  9/12/19 Bilateral L3,4,5 MBB- helpful  10/17/19 Bilateral L3,4,5 MBB- helpful  11/21/19 Right L3,4,5 RFA- 50% relief  12/5/19 Left L3,4,5 RFA- 50% relief  8/17/20 Left L3,4,5  RFA- 50% relief  8/31/20 Right L3,4,5 RFA- 50% relief  9/9/2021- Left L3,4,5 RFA - 60% relief  9/20/2021- Right L3,4,5 RFA -60% relief  7/7/22 Right L3,4,5 RFA   7/21/22 Left L3,4,5 RFA   3/16/2023- Left L3,4,5 RFA  3/30/2023- Right L3,4,5 RFA  12/18/24 Right L3,4,5 RFA  1/4/24 Left L3,4,5 RFA  1/6/2025- Bilateral L3,4,5 RFA  4/28/2025- [employer] SCS battery change    Physical Therapy/Home Exercise:   yes in the past with limited benefit    Imaging:   XR THORACIC SPINE AP LATERAL     CLINICAL HISTORY:  check SCS lead placement;  Other mechanical complication of implanted electronic neurostimulator of spinal cord electrode (lead), initial encounter     TECHNIQUE:  AP and lateral views of the thoracic spine were performed.     COMPARISON:  10/04/2022     FINDINGS:  Vertebral body heights are maintained.  Multilevel disc space narrowing.     AP alignment is anatomic.  Dextroconvex curvature thoracic spine.     Leads terminate at T6-7 and T7.     Impression:     No acute osseous abnormality seen.        Electronically signed by:Daniela Hernadez  Date:                                            03/07/2025  Time:                                           16:06    3/31/18 Lumbar MRI    Narrative     EXAMINATION:  MRI LUMBAR SPINE WITHOUT CONTRAST    CLINICAL HISTORY:  Low back pain, >6wks conservative tx, persistent-progressive sx, surgical candidate; Other spondylosis with radiculopathy, lumbar region    TECHNIQUE:  Multiplanar, multisequence MR images were acquired from the thoracolumbar junction to the sacrum without the administration of contrast.    COMPARISON:  Plain films from 03/27/2018    FINDINGS:  There is grade 1 spondylolisthesis of L4 on L5. The vertebral body heights are well maintained, with no fracture.  No marrow signal abnormality suspicious for an infiltrative process.    The conus medullaris terminates at approximately the mid body of L1.  There is a cyst associated with the upper pole of the right kidney.   There is disc desiccation noted throughout the lumbar spine with relative sparing of the L5-S1 disc.  Mild disc space narrowing present at the L4-5 level.    L1-L2: Mild diffuse disc bulge resulting in no significant central or neural foraminal canal narrowing.    L2-L3: No significant central canal narrowing.  There is mild narrowing of either neural foraminal canal secondary to disc material.    L3-L4: Disc bulging in the bilateral foraminal regions resulting in no significant central canal narrowing.  Mild-to-moderate bilateral facet arthropathy also noted.  The bilateral neural foraminal canals are moderately narrowed with some mild effacement of either exiting L3 nerve root in the extraforaminal regions bilaterally.    L4-L5:  Grade 1 spondylolisthesis along with moderate to severe bilateral facet arthropathy and ligamentum flavum hypertrophy resulting in at least moderate narrowing of the central canal.  The right neural foraminal canal is mildly to moderately narrowed.  Left neural foraminal canal is moderately to severely narrowed with mild effacement of the exiting L4 nerve root.    L5-S1:  No significant central or neural foraminal canal narrowing noted.  Mild bilateral facet arthropathy noted.   Impression       1. Multilevel degenerative changes of the lumbar spine as detailed above     Lumbar XRAYs 3/27/18    Narrative     EXAMINATION:  XR LUMBAR SPINE AP AND LAT WITH FLEX/EXT    CLINICAL HISTORY:  Low back pain    TECHNIQUE:  AP and lateral views as well as lateral flexion and extension images are performed through the lumbar spine.    COMPARISON:  None    FINDINGS:  X-ray lumbar spine with flexion and extension demonstrates grade 1 spondylolisthesis at L4-5 measuring around 6 mm which does not change significantly with flexion and extension.  The L4-5 disc is narrowed and degenerated.  Other lumbar vertebral discs are maintained.  Vertebral body heights are maintained.  Small anterior spurs are seen,  "and there is small posterior osteophyte along the inferior endplate of L4.  There is lumbar facet arthropathy at L4-5 and L5-S1.   Impression       Degenerative changes as above.  Grade 1 anterolisthesis and degenerative disc disease at L4-5.         Allergies:   Review of patient's allergies indicates:   Allergen Reactions    Aspirin Other (See Comments)     Has been told not to take it due to bariatric surgery       Current Medications:   Current Outpatient Medications   Medication Sig Dispense Refill    albuterol (PROAIR HFA) 90 mcg/actuation inhaler Inhale 2 puffs into the lungs every 4 (four) hours as needed for Wheezing or Shortness of Breath. Rescue 18 g 11    atenoloL (TENORMIN) 100 MG tablet TAKE 1 TABLET EVERY DAY 90 tablet 3    BD ULTRA-FINE MINI PEN NEEDLE 31 gauge x 3/16" Ndle       BD ULTRA-FINE FELICIA PEN NEEDLE 32 gauge x 5/32" Ndle       blood sugar diagnostic Strp To check BG 3 times daily, to use with insurance preferred meter 100 strip 11    chlorhexidine (HIBICLENS) 4 % external liquid Wash body in shower with this liquid every day for the 3 days before surgery. Rinse after 1-2 minutes. Do not use on head or face. 15 mL 0    EScitalopram oxalate (LEXAPRO) 20 MG tablet Take 1 tablet (20 mg total) by mouth once daily. 90 tablet 3    fluticasone propionate (FLONASE) 50 mcg/actuation nasal spray 2 sprays (100 mcg total) by Each Nostril route once daily. 11.1 mL 1    HYDROcodone-acetaminophen (NORCO) 5-325 mg per tablet Take 1 tablet by mouth every 8 (eight) hours as needed for Pain (POST OP PAIN). 21 tablet 0    lancets Misc To check BG 3 times daily, to use with insurance preferred meter 100 each 11    loratadine (CLARITIN) 10 mg tablet Take 1 tablet (10 mg total) by mouth daily as needed for Allergies. 90 tablet 3    losartan (COZAAR) 50 MG tablet Take 1 tablet (50 mg total) by mouth once daily. 90 tablet 3    mupirocin (BACTROBAN) 2 % ointment Blow nose. Place this medicine on a Q-tip. Rub medicine " around inside one nostril. Repeat in the other nostril. Pinch nose together for 1 minute. Do this 2 times per day for 3 days before surgery. 5 g 0    pantoprazole (PROTONIX) 20 MG tablet TAKE 1 TABLET TWICE DAILY BEFORE MEALS 180 tablet 3    pregabalin (LYRICA) 150 MG capsule Take 1 capsule (150 mg total) by mouth 3 (three) times daily. 90 capsule 3    rosuvastatin (CRESTOR) 20 MG tablet Take 1 tablet (20 mg total) by mouth every evening. 90 tablet 3    tirzepatide (MOUNJARO) 10 mg/0.5 mL PnIj Inject 10 mg into the skin every 7 days. 4 Pen 4    tirzepatide (MOUNJARO) 7.5 mg/0.5 mL PnIj Inject 7.5 mg into the skin every 7 days. 4 Pen 0    tiZANidine (ZANAFLEX) 4 MG tablet TAKE 1 TABLET ONE TIME DAILY AS NEEDED FOR PAIN 90 tablet 3    topiramate (TOPAMAX) 100 MG tablet TAKE 1 TABLET EVERY EVENING 90 tablet 3    traZODone (DESYREL) 100 MG tablet Take 1 tablet by mouth in the evening 90 tablet 1     No current facility-administered medications for this visit.     Facility-Administered Medications Ordered in Other Visits   Medication Dose Route Frequency Provider Last Rate Last Admin    0.9%  NaCl infusion   Intravenous Continuous Uriel Aranda MD   New Bag at 10/30/24 1107       REVIEW OF SYSTEMS:    GENERAL:  No weight loss, malaise or fevers.  HEENT:  Negative for frequent or significant headaches.  NECK:  Negative for lumps, goiter, pain and significant neck swelling.  RESPIRATORY:  Negative for cough, wheezing or shortness of breath.  CARDIOVASCULAR:  Negative for chest pain, leg swelling or palpitations. Hypertension.  GI:  Negative for abdominal discomfort, blood in stools or black stools or change in bowel habits.  MUSCULOSKELETAL:  See HPI.  SKIN:  Negative for lesions, rash, and itching.  PSYCH:  Negative for sleep disturbance, mood disorder and recent psychosocial stressors.  HEMATOLOGY/LYMPHOLOGY:  Negative for prolonged bleeding, bruising easily or swollen nodes.  NEURO:   No history of headaches, syncope,  paralysis, seizures or tremors.  ENDO: Diabetes.  All other reviewed and negative other than HPI.    Past Medical History:  Past Medical History:   Diagnosis Date    Anxiety with depression     Arthritis     CKD (chronic kidney disease) stage 2, GFR 60-89 ml/min     Diabetes mellitus     History of Clarisse-en-Y gastric bypass     Hypertension     Obesity, unspecified     DAHLIA (obstructive sleep apnea)     Spondylosis     Wears dentures        Past Surgical History:  Past Surgical History:   Procedure Laterality Date    CARPAL TUNNEL RELEASE Right 3/8/2019    Procedure: RELEASE, CARPAL TUNNEL right;  Surgeon: Pan Goodman MD;  Location: [facility] OR;  Service: Orthopedics;  Laterality: Right;    CARPAL TUNNEL RELEASE Left 2019    Procedure: RELEASE, CARPAL TUNNEL- LEFT;  Surgeon: Pan Goodman MD;  [address];  Service: Orthopedics;  Laterality: Left;    CATARACT EXTRACTION Bilateral      SECTION      CHOLECYSTECTOMY      COLONOSCOPY N/A 2024    Procedure: COLONOSCOPY;  Surgeon: Otilio Man MD;  Location: [facility] ENDO;  Service: Endoscopy;  Laterality: N/A;  Referred by: MYNOR Sprague / CECILIA Meds: ozempic / diabetic / Prep: peg / Route instructions sent: portal  - lvm and portal msg for pc. DBM  - left 2nd vm for pc. DBM    COLONOSCOPY, SCREENING, HIGH RISK PATIENT N/A 10/30/2024    Procedure: COLONOSCOPY, SCREENING, HIGH RISK PATIENT;  Surgeon: Corinne Traylor MD;  Location: [facility];  Service: Endoscopy;  Laterality: N/A;  WLMED: Ozempic Pt states she hasn't taken for several weeks  Outside referral from [facility]  ext suprep  inst portal  LW  10/23 r/s ext suprep to portal, holding ozempic since 10/16 cf    EPIDURAL STEROID INJECTION INTO LUMBAR SPINE N/A 2018    Procedure: INJECTION, STEROID, SPINE, LUMBAR, EPIDURAL;  Surgeon: Shaq Silverio MD;  Location: [facility] PAIN MGT;  Service: Pain Management;  Laterality: N/A;  LUMBAR L4-L5 INTERLAMINAR ELISE'  [id number]  W/ SEDATION      ESOPHAGOGASTRODUODENOSCOPY N/A 5/12/2023    Procedure: EGD (ESOPHAGOGASTRODUODENOSCOPY);  Surgeon: Deann Jimenez MD;  Location: [facility];  Service: Gastroenterology;  Laterality: N/A;    HYSTERECTOMY      INJECTION OF ANESTHETIC AGENT AROUND NERVE Bilateral 9/12/2019    Procedure: BLOCK, NERVE, L3-L4-L5 MEDIAL BRANCH;  Surgeon: Shaq Silverio MD;  Location: [facility] PAIN MGT;  Service: Pain Management;  Laterality: Bilateral;    INJECTION OF ANESTHETIC AGENT AROUND NERVE Bilateral 10/17/2019    Procedure: BLOCK, NERVE;  Surgeon: Shaq Silverio MD;  Location: [facility] PAIN MGT;  Service: Pain Management;  Laterality: Bilateral;  B/L MBB L3-L4-L5  REPEAT  CONSENT NEEDED    INJECTION OF FACET JOINT Bilateral 5/28/2018    Procedure: INJECTION-FACET;  Surgeon: Shaq Silverio MD;  Location: [facility] PAIN MGT;  Service: Pain Management;  Laterality: Bilateral;  LUMBAR BILATERAL L4-L5 AND L5-S1 FACET STEROID INJECTION  [id number]    W/ SEDATION     RADIOFREQUENCY ABLATION Right 11/21/2019    Procedure: RADIOFREQUENCY ABLATION RIGHT L3, L4, L5;  Surgeon: Shaq Silverio MD;  Location: [facility] PAIN MGT;  Service: Pain Management;  Laterality: Right;  Right RFA L3-L4-L5  1 of 2  Consent Needed    RADIOFREQUENCY ABLATION Left 12/5/2019    Procedure: RADIOFREQUENCY ABLATION LEFT L3-5;  Surgeon: Shaq Silverio MD;  Location: [facility] PAIN MGT;  Service: Pain Management;  Laterality: Left;  Left RFA L3-L4-L5  2 of 2  Consent Needed    RADIOFREQUENCY ABLATION Left 8/17/2020    Procedure: RADIOFREQUENCY ABLATION LEFT L3,4,5 1 of 2;  Surgeon: Shaq Silverio MD;  Location: [facility] PAIN MGT;  Service: Pain Management;  Laterality: Left;  Left RFA L3,4,5  1 of 2    RADIOFREQUENCY ABLATION Right 8/31/2020    Procedure: RADIOFREQUENCY ABLATION RIGHT L3,4,5 2 of 2;  Surgeon: Shaq Silverio MD;  Location: [facility] PAIN MGT;  Service: Pain Management;  Laterality: Right;  RADIOFREQUENCY ABLATION RIGHT L3,4,5  2 of 2    RADIOFREQUENCY ABLATION Left 9/9/2021    Procedure:  RADIOFREQUENCY ABLATION, L3-L4 AND L5 MEDIAL BRANCH 1 OF 2;  Surgeon: Shaq Silverio MD;  Location: [facility] PAIN MGT;  Service: Pain Management;  Laterality: Left;    RADIOFREQUENCY ABLATION Right 9/20/2021    Procedure: RADIOFREQUENCY ABLATION, L3-L4 AND L5 MEDIAL BRANCH 2 OF 2;  Surgeon: Shaq Silverio MD;  Location: [facility];  Service: Pain Management;  Laterality: Right;    RADIOFREQUENCY ABLATION Right 7/7/2022    Procedure: RADIOFREQUENCY ABLATION, RIGHT L3-L4-L5 ONE OF TWO;  Surgeon: Shaq Silverio MD;  Location: [facility];  Service: Pain Management;  Laterality: Right;    RADIOFREQUENCY ABLATION Left 7/21/2022    Procedure: RADIOFREQUENCY ABLATION, LEFT L3-L4-L5 TWO OF TWO *BRING SCS REMOTE*;  Surgeon: Shaq Silverio MD;  Location: [facility];  Service: Pain Management;  Laterality: Left;    RADIOFREQUENCY ABLATION Left 3/16/2023    Procedure: RADIOFREQUENCY ABLATION LEFT L3,L4,L5 *BRING SCS REMOTE*;  Surgeon: Shaq Silverio MD;  Location: [facility];  Service: Pain Management;  Laterality: Left;    RADIOFREQUENCY ABLATION Right 3/30/2023    Procedure: RADIOFREQUENCY ABLATION RIGHT L3,L4,L5 *BRING SCS REMOTE*;  Surgeon: Shaq Silverio MD;  Location: [facility];  Service: Pain Management;  Laterality: Right;    RADIOFREQUENCY ABLATION Right 12/18/2023    Procedure: RADIOFREQUENCY ABLATION RIGHT L3, 4, 5 1 OF 2;  Surgeon: Shaq Silverio MD;  Location: [facility];  Service: Pain Management;  Laterality: Right;  [phone]    RADIOFREQUENCY ABLATION Left 1/4/2024    Procedure: RADIOFREQUENCY ABLATION LEFT L3, 4, 5 2 OF 2;  Surgeon: Shaq Silverio MD;  Location: [facility];  Service: Pain Management;  Laterality: Left;  [phone]  2 WK F/U WARREN    RADIOFREQUENCY ABLATION Bilateral 1/6/2025    Procedure: RADIOFREQUENCY ABLATION BILATERAL L3, 4, 5;  Surgeon: Shaq Silverio MD;  Location: [facility];  Service: Pain Management;  Laterality: Bilateral;  3 WK F/U KRANTHI    REPLACEMENT OF SPINAL CORD  STIMULATOR  4/28/2025    Procedure: REPLACEMENT, SPINAL CORD STIMULATOR;  Surgeon: Mita Rivera MD;  Location: [facility];  Service: Pain Management;;    REVISION PROCEDURE INVOLVING SPINAL CORD NEUROSTIMULATOR N/A 4/28/2025    Procedure: REPLACEMENT OF SCS IPG COPE REP;  Surgeon: Mita Rivera MD;  Location: [facility] OR;  Service: Pain Management;  Laterality: N/A;    TRIAL OF SPINAL CORD NERVE STIMULATOR N/A 9/24/2018    Procedure: TRIAL, NEUROSTIMULATOR, SPINAL CORD, SPINAL CORD STIMULATOR TRIAL-INTERNAL WIRES TO EXTERNAL BATTERY;  Surgeon: Shaq Silverio MD;  Location: [facility] [place] [facility];  Service: Pain Management;  Laterality: N/A;  ABBOTT REP NOTIFIED       Family History:  Family History   Problem Relation Name Age of Onset    Cataracts Mother      Glaucoma Mother      No Known Problems Father      No Known Problems Sister      No Known Problems Brother      No Known Problems Maternal Aunt      No Known Problems Maternal Uncle      No Known Problems Paternal Aunt      No Known Problems Paternal Uncle      No Known Problems Maternal Grandmother      No Known Problems Maternal Grandfather      No Known Problems Paternal Grandmother      No Known Problems Paternal Grandfather         Social History:  Social History     Socioeconomic History    Marital status:    Tobacco Use    Smoking status: Never     Passive exposure: Never    Smokeless tobacco: Never   Substance and Sexual Activity    Alcohol use: Not Currently     Alcohol/week: 1.0 standard drink of alcohol     Types: 1 Glasses of wine per week     Comment: occ    Drug use: No    Sexual activity: Yes     Partners: Male     Social Drivers of Health     Financial Resource Strain: Medium Risk (3/6/2025)    Overall Financial Resource Strain (CARDIA)     Difficulty of Paying Living Expenses: Somewhat hard   Food Insecurity: No Food Insecurity (3/6/2025)    Hunger Vital Sign     Worried About Running Out of Food in the Last Year: Never true     Ran Out of Food in the  "Last Year: Never true   Transportation Needs: No Transportation Needs (3/6/2025)    PRAPARE - Transportation     Lack of Transportation (Medical): No     Lack of Transportation (Non-Medical): No   Physical Activity: Inactive (3/6/2025)    Exercise Vital Sign     Days of Exercise per Week: 0 days     Minutes of Exercise per Session: 10 min   Stress: No Stress Concern Present (3/6/2025)    [employer] - Occupational Stress Questionnaire     Feeling of Stress : Not at all   Housing Stability: Low Risk  (3/6/2025)    Housing Stability Vital Sign     Unable to Pay for Housing in the Last Year: No     Number of Times Moved in the Last Year: 0     Homeless in the Last Year: No       OBJECTIVE:    /63 (BP Location: Right arm, Patient Position: Sitting)   Pulse 75   Temp 98.2 °F (36.8 °C) (Oral)   Resp 19   Ht 5' 4" (1.626 m)   Wt 82.6 kg (182 lb 1.6 oz)   SpO2 100%   BMI 31.26 kg/m²     PHYSICAL EXAMINATION:    General appearance: Well appearing, in no acute distress, alert and oriented x3.  Psych:  Mood and affect appropriate.  Skin: Skin color, texture, turgor normal, no rashes or lesions, in both upper and lower body. SCS site without drainage or signs of infection.  Edges well approximated.   Head/face:  Atraumatic, normocephalic. No palpable lymph nodes    Previous physical exam:  Back: Straight leg raising in the sitting and supine positions is negative to radicular pain bilaterally. There is pain with palpation to lumbar facet joints bilaterally. Limited ROM with pain on extension.   Extremities: No deformities, edema, or skin discoloration. Good capillary refill.  Musculoskeletal: 5/5 strength in right ankle with plantar and dorsiflexion. 5/5 strength in left ankle with plantar and dorsiflexion. 5/5 strength with right knee flexion and extension. 5/5 strength with left knee flexion and extension.   Neuro: Decreased sensation to BLE.  Gait: Antalgic- ambulates without " assistance.        ASSESSMENT: 74 y.o. year old female with back pain, consistent with the following diagnoses:     1. Chronic pain syndrome        2. S/P insertion of spinal cord stimulator            PLAN:     - Previous imaging reviewed.  Labs reviewed.     - She is s/p Abbott SCS battery change. Incision healing well.     - She may shower, no tub soaks/swimming.     - Notify clinic for any fever, chills, or drainage.     - Continue current medications.     - RTC in 2 weeks for wound check.     The above plan and management options were discussed at length with patient. Patient is in agreement with the above and verbalized understanding.    Kimberly Conner NP  05/06/2025

## 2025-05-21 ENCOUNTER — OFFICE VISIT (OUTPATIENT)
Dept: PAIN MEDICINE | Facility: CLINIC | Age: 75
End: 2025-05-21
Payer: MEDICARE

## 2025-05-21 VITALS
BODY MASS INDEX: 32.21 KG/M2 | WEIGHT: 188.69 LBS | HEART RATE: 79 BPM | SYSTOLIC BLOOD PRESSURE: 99 MMHG | DIASTOLIC BLOOD PRESSURE: 60 MMHG | HEIGHT: 64 IN

## 2025-05-21 DIAGNOSIS — M79.18 MYOFASCIAL PAIN: ICD-10-CM

## 2025-05-21 DIAGNOSIS — M47.816 LUMBAR SPONDYLOSIS: ICD-10-CM

## 2025-05-21 DIAGNOSIS — Z48.89 ENCOUNTER FOR POSTOPERATIVE WOUND CHECK: ICD-10-CM

## 2025-05-21 DIAGNOSIS — M51.360 DEGENERATION OF INTERVERTEBRAL DISC OF LUMBAR REGION WITH DISCOGENIC BACK PAIN: ICD-10-CM

## 2025-05-21 DIAGNOSIS — Z96.89 S/P INSERTION OF SPINAL CORD STIMULATOR: ICD-10-CM

## 2025-05-21 DIAGNOSIS — G89.4 CHRONIC PAIN SYNDROME: Primary | ICD-10-CM

## 2025-05-21 PROCEDURE — 99999 PR PBB SHADOW E&M-EST. PATIENT-LVL IV: CPT | Mod: PBBFAC,HCNC,,

## 2025-05-21 NOTE — PROGRESS NOTES
Interventional Pain Management - Established Visit  Follow-Up       SUBJECTIVE:    Interval History 5/21/2025:  Faby Luna returns for follow-up after [employer] SCS battery change and wound check. She denies fever, chills, drainage or any other signs of infection. She denies recent health changes. She denies recent falls or trauma. She denies new onset fever/night sweats, urinary incontinence, bowel incontinence, significant weight changes, significant motor weakness or changes, or loss of sensations. Her pain today is 8/10.      Interval History 5/6/2025:  The patient returns to clinic today for post op. She is s/p [employer] SCS battery change on 4/28/2025. She denies any fever, chills, or drainage. She has completed her antibiotics. She is unsure how to work her remote. She is meeting with [employer] today for programming. She denies any other health changes. Her pain today is 5/10.    INTERVAL HISTORY 3/17/2025:  Ms. Luna is a patient of Dr. Silverio and presents for end of life SCS pulse generator ([employer]). Current Pain level is 7/10. She reports the pain comes down both legs. She reports that while the stimulator was working, she didn't have pain in her legs.  She was recently interrogated and found to have a dead battery.  Interval History 3/7/2025:  The patient returns to clinic today for follow up of back pain. She reports worsened leg pain. She did find her controller. She is meeting with Haley from [employer] for programming. She did not receive increased dose of Lyrica. She is currently taking 100 mg. She is taking Zanaflex. She is performing a home exercise routine. She denies any other health changes. Her pain today is 8/10.    Interval History 2/7/2025:  The patient returns to clinic today for follow up of back pain. She is s/p bilateral L3,4,5 RFA on 1/6/2025. She reports 50% relief. She continues to report intermittent low back pain. She is having worsening leg pain. She has not adjusted her SCS in  a while. She did find her controller. She is taking Lyrica, although not sure of benefit. She is taking Zanaflex. She is performing home exercises. She denies any other health changes. Her pain today is 5/10.    Interval History 12/13/2024:  The patient returns to clinic today for follow up of back pain. She reports increased low back pain. She denies any radicular leg pain at this time. She does have benefit with SCS. She does need a new controller. Her pain is worse with standing and walking. She is taking Lyrica and Zanaflex. She is performing a home exercise routine. She denies any other health changes. Her pain today is 8/10.    Interval History 10/11/2024:  The patient returns to clinic today for follow up of back pain. Since last visit, her insurance denied her procedure due to timing. She reports increased back pain at this time. She is having radiating pain into her legs to her ankles. Her SCS is currently not on. She is unable to find her controller. She has not reached out to representatives. Her pain is worse with standing and walking. She denies any other health changes. Her pain today is 10/10.    Interval History 7/15/2024:  The patient is here today to discuss lower back pain. Since previous visit, she did undergo right then left L3,4,5 RFAs completed with 80% relief for about 7 months. Her pain has now returned and she wishes to repeat the RFAs. The pain worsens with prolonged standing and walking. She continues with home PT exercises. Her pain today is 10/10.    Interval history 11/22/2023:  73-year-old female that presents today with axial low back pain.  Not been seen for approximately 7 months set of pain 1-2 weeks, is located in a bandlike distribution of low back she did not anesthesia like symptoms.  She is known to this clinic and was previously provided a or RFA targeting L3, L4 and L5 that provided her with 50-60% relief.  Like to be considered for repeat lumbar RFA she denies any recent  incident or trauma denies any bowel bladder dysfunction anesthesia denies any profound weakness.    Interval History 4/20/2023:  The patient returns to clinic today for follow up of low back pain. She is s/p left L3,4,5 RFA on 3/16/2023 and right L3,4,5 RFA on 3/30/2023. She reports 50-60% relief of her pain. Her pain is tolerable at this time. She has good days and bad days. She denies any radicular leg pain at this time. She is not currently using her SCS. She has not contacted representatives. She reports that her mother passed away last week. She is dealing with a lot of stress due to this. She is taking Gabapentin, although not consistently. She also takes Zanaflex. She denies any other health changes. Her pain today is 6/10.     Interval History 2/8/2023:  The patient returns to clinic today for follow up of back pain. She reports worsened low back pain. She reports intermittent radiating pain into the posterior aspect of both legs to the ankles, left greater than right. Her pain is constant in nature. Her back pain is greater than her leg pain. She is reporting limited relief with SCS. She has not been able to meet with Roque or [employer] for programming. She is taking Gabapentin. She denies any other health changes. Her pain today is 9/10.    Interval History 11/4/2022:  Faby is here for follow up of back and leg pain. Unfortunately, she has been unable to meet up with [employer] for SCS programming. She does report some improvement in symptoms since previous visit. She still has back pain with radiation into the legs. Recent XRAYs do not show any significant lead migration. She only takes Gabapentin intermittently because she says it makes her feet swell but it does help. Her pain today is 8/10.    Interval History 10/4/2022:  The patient is here for follow up of chronic back pain. She reports more pain over the past month. No injury or trauma. She does have a lumbar SCS but is unsure if it is even on at this time. She  does not think that it is. It did previously help with her back and leg pain. She has not been in contact with [employer] recently. The back pain radiates down the back of both legs to the feet. It worsens with walking and standing. Her pain today is 10/10.    Interval History 8/23/2022:  Faby is here for follow up of chronic lower back pain. She is now s/p right then left L3,4,5 RFAs completed on 7/21/22 with limited relief so far. She continues to report aching back pain with radiation into the legs and numbness. She has had her SCS off for a couple of months. She has not been in contact with [employer] rep for programming. She says that she turned it off due to limited benefit. No new weakness to legs or falls. No bowel/bladder incontinence. Her pain today is 10/10.    Interval History 6/17/2022:  The patient is here today for follow up of back pain. She has had more aching pain across the lower back. She had benefit with lumbar RFAs in the past and would like to reschedule this. She would also like to repeat aquatic PT as this was helpful in the past. Her pain is aching and throbbing in nature without radiation currently. No new falls or trauma. Her pain today is 8/10.    Interval History 5/5/2022:  The patient is here for follow up of chronic lower back pain. She has radiation into the legs. No recent falls or trauma. She saw Dr. Silverio last month and was under the impression that an [employer] rep would be here today for programming. She is still having difficulty programming her device. She has mild benefit with Gabapentin 900 mg daily without side effects. She is also having muscle spasms intermittently. She is asking for a muscle relaxer. She has tried Robaxin in the past with mild benefit. She would like to try another medication. Her pain today is 8/10.    Interval History 4/20/2022: She presents today for follow up of low back pain. She states that she only had about 40% relief of LBP following RFAs in September that  lasted a few weeks. Pain  continues to be in the low back and radiates into b/l LE. Pain encompasses the entire leg and does not follow a specific dermatomal pattern in the leg. She denies weakness, numbness or tingling in the lower extremities. She denies bowel or bladder incontinence. Pain is currently rated 8/10. She is currently on gabapentin 300mg tid with minimal relief. She has been unable to charge bret SCS for the last month and does not have the contact number for her rep.      Interval History 9/30/2021:  The patient returns to clinic today for follow up of low back pain. She is s/p left L3,4,5 RFA on 9/9/2021 and right L3,4,5 RFA on 9/20/2021. She is unsure of relief at this time. She reports increased pain to the right side. She describes this pain as burning in nature. She denies any radiating leg pain. Her pain is worse with bending and standing. She continues to report benefit with [employer] SCS. She denies any weakness. She denies any other health changes. Her pain today is 9/10.    Interval History 8/3/2021:   Ms. Luna returns in f/u re: low back/leg pain. She reports about three months ago she noticed her low back started bothering her, worse with bending and prolonged standing. No inciting event, no trauma to back since last visit. Reports continued leg pain down the backs of her legs as well. She reports intermittent instability in her legs - on and off - over the last year. Last time she has met with [employer] rep for reprogramming of SCS was fall 2020. Denies any current issues with SCS function/battery/remote.     Interval History 9/28/2020:  The patient is here today for increased lower back pain.  She is status post right than left L3, 4, 5 radiofrequency ablation completed on 08/31/2020 with approximately 50% relief.  However, she is feeling tightness across the lower back without radiation at this time.  She would like an injection today.  Additionally, she states that she has not completed  physical therapy for her back in some time and is not currently doing any exercises.  Her leg pain is currently well controlled with spinal cord stimulator and she had a recent adjustment.  Her pain today is 8/10.    Interval History 7/16/2020:  The patient is here for follow up of lower back pain.  She previously had benefit with lumbar RFAs for similar pain.  She is also having radiation into her legs with numbness, left greater than right.  [employer] is here today to meet with her for programming.  She previously was having significant benefit of leg pain with SCS implant.  She denies any recent trauma or falls. Her pain today is 9/10.    Interval History 1/9/2020:  The patient is here for follow up of lower back and leg pain.  She is s/p lumbar RFAs with about 50% relief.  Her leg pain is well controlled with implant.  She still has intermittent flare ups of back pain, like today.  When this happens, she has some benefit with rest.  She has tried Tylenol and OTC NSAIDs without benefit.  She denies any recent falls or weakness.  The patient denies any bowel or bladder incontinence or signs of saddle paresthesia.  The patient denies any major medical changes since last office visit.  Her pain today is 7/10.    Previous Encounter:  Faby Luna presents to the clinic for a follow-up appointment for lower back pain.  She previously had significant leg pain which has resolved with [employer] SCS implant.  She is here today to discuss increased lower back pain.  It is sharp and throbbing in nature.  It is significant in the morning and with prolonged standing and activity.  She has some benefit with stretching.  She denies any recent falls.  Since the last visit, Faby Luna states the pain has been worsening.  Current pain intensity is 8/10.    Pain Disability Index Review:      5/21/2025     1:31 PM 5/6/2025    11:31 AM 3/17/2025     3:47 PM   Last 3 PDI Scores   Pain Disability Index (PDI) 29 30 35        Opioid Contract: no     report:  Not applicable    Pain Procedures:   5/2/18 Left L3 and L4 TF ELISE- 20% relief  5/21/18 Bilateral L4-5 and L5-S1 facet joint injections- no relief  9/24/18 Lumbar SCS trial ([employer])- 100% relief  10/26/18 Lumbar SCS implant ([employer])- 100% relief of leg pain  9/12/19 Bilateral L3,4,5 MBB- helpful  10/17/19 Bilateral L3,4,5 MBB- helpful  11/21/19 Right L3,4,5 RFA- 50% relief  12/5/19 Left L3,4,5 RFA- 50% relief  8/17/20 Left L3,4,5 RFA- 50% relief  8/31/20 Right L3,4,5 RFA- 50% relief  9/9/2021- Left L3,4,5 RFA - 60% relief  9/20/2021- Right L3,4,5 RFA -60% relief  7/7/22 Right L3,4,5 RFA   7/21/22 Left L3,4,5 RFA   3/16/2023- Left L3,4,5 RFA  3/30/2023- Right L3,4,5 RFA  12/18/24 Right L3,4,5 RFA  1/4/24 Left L3,4,5 RFA  1/6/2025- Bilateral L3,4,5 RFA  4/28/2025- [employer] SCS battery change    Physical Therapy/Home Exercise:   yes in the past with limited benefit    Imaging:   XR THORACIC SPINE AP LATERAL     CLINICAL HISTORY:  check SCS lead placement;  Other mechanical complication of implanted electronic neurostimulator of spinal cord electrode (lead), initial encounter     TECHNIQUE:  AP and lateral views of the thoracic spine were performed.     COMPARISON:  10/04/2022     FINDINGS:  Vertebral body heights are maintained.  Multilevel disc space narrowing.     AP alignment is anatomic.  Dextroconvex curvature thoracic spine.     Leads terminate at T6-7 and T7.     Impression:     No acute osseous abnormality seen.        Electronically signed by:Daniela Hernadez  Date:                                            03/07/2025  Time:                                           16:06    3/31/18 Lumbar MRI    Narrative     EXAMINATION:  MRI LUMBAR SPINE WITHOUT CONTRAST    CLINICAL HISTORY:  Low back pain, >6wks conservative tx, persistent-progressive sx, surgical candidate; Other spondylosis with radiculopathy, lumbar region    TECHNIQUE:  Multiplanar, multisequence MR images were  acquired from the thoracolumbar junction to the sacrum without the administration of contrast.    COMPARISON:  Plain films from 03/27/2018    FINDINGS:  There is grade 1 spondylolisthesis of L4 on L5. The vertebral body heights are well maintained, with no fracture.  No marrow signal abnormality suspicious for an infiltrative process.    The conus medullaris terminates at approximately the mid body of L1.  There is a cyst associated with the upper pole of the right kidney.  There is disc desiccation noted throughout the lumbar spine with relative sparing of the L5-S1 disc.  Mild disc space narrowing present at the L4-5 level.    L1-L2: Mild diffuse disc bulge resulting in no significant central or neural foraminal canal narrowing.    L2-L3: No significant central canal narrowing.  There is mild narrowing of either neural foraminal canal secondary to disc material.    L3-L4: Disc bulging in the bilateral foraminal regions resulting in no significant central canal narrowing.  Mild-to-moderate bilateral facet arthropathy also noted.  The bilateral neural foraminal canals are moderately narrowed with some mild effacement of either exiting L3 nerve root in the extraforaminal regions bilaterally.    L4-L5:  Grade 1 spondylolisthesis along with moderate to severe bilateral facet arthropathy and ligamentum flavum hypertrophy resulting in at least moderate narrowing of the central canal.  The right neural foraminal canal is mildly to moderately narrowed.  Left neural foraminal canal is moderately to severely narrowed with mild effacement of the exiting L4 nerve root.    L5-S1:  No significant central or neural foraminal canal narrowing noted.  Mild bilateral facet arthropathy noted.   Impression       1. Multilevel degenerative changes of the lumbar spine as detailed above     Lumbar XRAYs 3/27/18    Narrative     EXAMINATION:  XR LUMBAR SPINE AP AND LAT WITH FLEX/EXT    CLINICAL HISTORY:  Low back pain    TECHNIQUE:  AP and  "lateral views as well as lateral flexion and extension images are performed through the lumbar spine.    COMPARISON:  None    FINDINGS:  X-ray lumbar spine with flexion and extension demonstrates grade 1 spondylolisthesis at L4-5 measuring around 6 mm which does not change significantly with flexion and extension.  The L4-5 disc is narrowed and degenerated.  Other lumbar vertebral discs are maintained.  Vertebral body heights are maintained.  Small anterior spurs are seen, and there is small posterior osteophyte along the inferior endplate of L4.  There is lumbar facet arthropathy at L4-5 and L5-S1.   Impression       Degenerative changes as above.  Grade 1 anterolisthesis and degenerative disc disease at L4-5.         Review of patient's allergies indicates:  No Known Allergies      Current Medications:   Current Outpatient Medications   Medication Sig Dispense Refill    albuterol (PROAIR HFA) 90 mcg/actuation inhaler Inhale 2 puffs into the lungs every 4 (four) hours as needed for Wheezing or Shortness of Breath. Rescue 18 g 11    atenoloL (TENORMIN) 100 MG tablet TAKE 1 TABLET EVERY DAY 90 tablet 3    BD ULTRA-FINE MINI PEN NEEDLE 31 gauge x 3/16" Ndle       BD ULTRA-FINE FELICIA PEN NEEDLE 32 gauge x 5/32" Ndle       blood sugar diagnostic Strp To check BG 3 times daily, to use with insurance preferred meter 100 strip 11    chlorhexidine (HIBICLENS) 4 % external liquid Wash body in shower with this liquid every day for the 3 days before surgery. Rinse after 1-2 minutes. Do not use on head or face. 15 mL 0    EScitalopram oxalate (LEXAPRO) 20 MG tablet Take 1 tablet (20 mg total) by mouth once daily. 90 tablet 3    fluticasone propionate (FLONASE) 50 mcg/actuation nasal spray 2 sprays (100 mcg total) by Each Nostril route once daily. 11.1 mL 1    HYDROcodone-acetaminophen (NORCO) 5-325 mg per tablet Take 1 tablet by mouth every 8 (eight) hours as needed for Pain (POST OP PAIN). 21 tablet 0    lancets Misc To check " BG 3 times daily, to use with insurance preferred meter 100 each 11    loratadine (CLARITIN) 10 mg tablet Take 1 tablet (10 mg total) by mouth daily as needed for Allergies. 90 tablet 3    losartan (COZAAR) 50 MG tablet Take 1 tablet (50 mg total) by mouth once daily. 90 tablet 3    mupirocin (BACTROBAN) 2 % ointment Blow nose. Place this medicine on a Q-tip. Rub medicine around inside one nostril. Repeat in the other nostril. Pinch nose together for 1 minute. Do this 2 times per day for 3 days before surgery. 5 g 0    pantoprazole (PROTONIX) 20 MG tablet TAKE 1 TABLET TWICE DAILY BEFORE MEALS 180 tablet 3    pregabalin (LYRICA) 150 MG capsule Take 1 capsule (150 mg total) by mouth 3 (three) times daily. 90 capsule 3    rosuvastatin (CRESTOR) 20 MG tablet Take 1 tablet (20 mg total) by mouth every evening. 90 tablet 3    tirzepatide (MOUNJARO) 10 mg/0.5 mL PnIj Inject 10 mg into the skin every 7 days. 4 Pen 4    tirzepatide (MOUNJARO) 7.5 mg/0.5 mL PnIj Inject 7.5 mg into the skin every 7 days. 4 Pen 0    tiZANidine (ZANAFLEX) 4 MG tablet TAKE 1 TABLET ONE TIME DAILY AS NEEDED FOR PAIN 90 tablet 3    topiramate (TOPAMAX) 100 MG tablet TAKE 1 TABLET EVERY EVENING 90 tablet 3    traZODone (DESYREL) 100 MG tablet Take 1 tablet by mouth in the evening 90 tablet 1     No current facility-administered medications for this visit.     Facility-Administered Medications Ordered in Other Visits   Medication Dose Route Frequency Provider Last Rate Last Admin    0.9%  NaCl infusion   Intravenous Continuous Uriel Aranda MD   New Bag at 10/30/24 1107       REVIEW OF SYSTEMS:    GENERAL:  No weight loss, malaise or fevers.  HEENT:  Negative for frequent or significant headaches.  NECK:  Negative for lumps, goiter, pain and significant neck swelling.  RESPIRATORY:  Negative for cough, wheezing or shortness of breath.  CARDIOVASCULAR:  Negative for chest pain, leg swelling or palpitations. Hypertension.  GI:  Negative for  abdominal discomfort, blood in stools or black stools or change in bowel habits.  MUSCULOSKELETAL:  See HPI.  SKIN:  Negative for lesions, rash, and itching.  PSYCH:  Negative for sleep disturbance, mood disorder and recent psychosocial stressors.  HEMATOLOGY/LYMPHOLOGY:  Negative for prolonged bleeding, bruising easily or swollen nodes.  NEURO:   No history of headaches, syncope, paralysis, seizures or tremors.  ENDO: Diabetes.  All other reviewed and negative other than HPI.    Past Medical History:  Past Medical History:   Diagnosis Date    Anxiety with depression     Arthritis     CKD (chronic kidney disease) stage 2, GFR 60-89 ml/min     Diabetes mellitus     History of Clarisse-en-Y gastric bypass     Hypertension     Obesity, unspecified     DAHLIA (obstructive sleep apnea)     Spondylosis     Wears dentures        Past Surgical History:  Past Surgical History:   Procedure Laterality Date    CARPAL TUNNEL RELEASE Right 3/8/2019    Procedure: RELEASE, CARPAL TUNNEL right;  Surgeon: Pan Goodman MD;  Location: [facility];  Service: Orthopedics;  Laterality: Right;    CARPAL TUNNEL RELEASE Left 2019    Procedure: RELEASE, CARPAL TUNNEL- LEFT;  Surgeon: Pan Goodman MD;  Location: [address];  Service: Orthopedics;  Laterality: Left;    CATARACT EXTRACTION Bilateral      SECTION      CHOLECYSTECTOMY      COLONOSCOPY N/A 2024    Procedure: COLONOSCOPY;  Surgeon: Otilio Man MD;  Location: [facility];  Service: Endoscopy;  Laterality: N/A;  Referred by: MYNOR Sprague / CECIILA Meds: ozempic / diabetic / Prep: peg / Route instructions sent: portal  - lvm and portal msg for pc. DBM  - left 2nd  for pc. DBM    COLONOSCOPY, SCREENING, HIGH RISK PATIENT N/A 10/30/2024    Procedure: COLONOSCOPY, SCREENING, HIGH RISK PATIENT;  Surgeon: Corinne Traylor MD;  Location: [facility];  Service: Endoscopy;  Laterality: N/A;  WLMED: Ozempic Pt states she hasn't taken for several weeks  [facility]  ext suprep  inst portal  LW  10/23 r/s ext suprep to portal, holding ozempic since 10/16 cf    EPIDURAL STEROID INJECTION INTO LUMBAR SPINE N/A 6/14/2018    Procedure: INJECTION, STEROID, SPINE, LUMBAR, EPIDURAL;  Surgeon: Shaq Silverio MD;  Location: [facility] PAIN [facility]GT;  Service: Pain Management;  Laterality: N/A;  LUMBAR L4-L5 INTERLAMINAR ELISE'  [id number]  W/ SEDATION     ESOPHAGOGASTRODUODENOSCOPY N/A 5/12/2023    Procedure: EGD (ESOPHAGOGASTRODUODENOSCOPY);  Surgeon: Deann Jimenez MD;  Location: [facility] ENDO;  Service: Gastroenterology;  Laterality: N/A;    HYSTERECTOMY      INJECTION OF ANESTHETIC AGENT AROUND NERVE Bilateral 9/12/2019    Procedure: BLOCK, NERVE, L3-L4-L5 MEDIAL BRANCH;  Surgeon: Shaq Silverio MD;  Location: [facility] PAIN [facility]GT;  Service: Pain Management;  Laterality: Bilateral;    INJECTION OF ANESTHETIC AGENT AROUND NERVE Bilateral 10/17/2019    Procedure: BLOCK, NERVE;  Surgeon: Shaq Silverio MD;  Location: [facility] PAIN [facility]GT;  Service: Pain Management;  Laterality: Bilateral;  B/L MBB L3-L4-L5  REPEAT  CONSENT NEEDED    INJECTION OF FACET JOINT Bilateral 5/28/2018    Procedure: INJECTION-FACET;  Surgeon: Shaq Silverio MD;  Location: [facility] PAIN [facility]GT;  Service: Pain Management;  Laterality: Bilateral;  LUMBAR BILATERAL L4-L5 AND L5-S1 FACET STEROID INJECTION  [id number]    W/ SEDATION     RADIOFREQUENCY ABLATION Right 11/21/2019    Procedure: RADIOFREQUENCY ABLATION RIGHT L3, L4, L5;  Surgeon: Shaq Silverio MD;  Location: [facility] PAIN [facility]GT;  Service: Pain Management;  Laterality: Right;  Right RFA L3-L4-L5  1 of 2  Consent Needed    RADIOFREQUENCY ABLATION Left 12/5/2019    Procedure: RADIOFREQUENCY ABLATION LEFT L3-5;  Surgeon: Shaq Silverio MD;  Location: [facility] PAIN [facility]GT;  Service: Pain Management;  Laterality: Left;  Left RFA L3-L4-L5  2 of 2  Consent Needed    RADIOFREQUENCY ABLATION Left 8/17/2020    Procedure: RADIOFREQUENCY ABLATION LEFT L3,4,5 1 of 2;  Surgeon: Shaq Silverio MD;  Location: [facility] PAIN  MGT;  Service: Pain Management;  Laterality: Left;  Left RFA L3,4,5  1 of 2    RADIOFREQUENCY ABLATION Right 8/31/2020    Procedure: RADIOFREQUENCY ABLATION RIGHT L3,4,5 2 of 2;  Surgeon: Shaq Silverio MD;  Location: [facility] PAIN [facility]GT;  Service: Pain Management;  Laterality: Right;  RADIOFREQUENCY ABLATION RIGHT L3,4,5  2 of 2    RADIOFREQUENCY ABLATION Left 9/9/2021    Procedure: RADIOFREQUENCY ABLATION, L3-L4 AND L5 MEDIAL BRANCH 1 OF 2;  Surgeon: Shaq Silverio MD;  Location: [facility];  Service: Pain Management;  Laterality: Left;    RADIOFREQUENCY ABLATION Right 9/20/2021    Procedure: RADIOFREQUENCY ABLATION, L3-L4 AND L5 MEDIAL BRANCH 2 OF 2;  Surgeon: Shaq Silverio MD;  Location: [facility];  Service: Pain Management;  Laterality: Right;    RADIOFREQUENCY ABLATION Right 7/7/2022    Procedure: RADIOFREQUENCY ABLATION, RIGHT L3-L4-L5 ONE OF TWO;  Surgeon: Shaq Silverio MD;  Location: [facility];  Service: Pain Management;  Laterality: Right;    RADIOFREQUENCY ABLATION Left 7/21/2022    Procedure: RADIOFREQUENCY ABLATION, LEFT L3-L4-L5 TWO OF TWO *BRING SCS REMOTE*;  Surgeon: Shaq Silverio MD;  Location: [facility];  Service: Pain Management;  Laterality: Left;    RADIOFREQUENCY ABLATION Left 3/16/2023    Procedure: RADIOFREQUENCY ABLATION LEFT L3,L4,L5 *BRING SCS REMOTE*;  Surgeon: Shaq Silverio MD;  Location: [facility];  Service: Pain Management;  Laterality: Left;    RADIOFREQUENCY ABLATION Right 3/30/2023    Procedure: RADIOFREQUENCY ABLATION RIGHT L3,L4,L5 *BRING SCS REMOTE*;  Surgeon: Shaq Silverio MD;  Location: [facility];  Service: Pain Management;  Laterality: Right;    RADIOFREQUENCY ABLATION Right 12/18/2023    Procedure: RADIOFREQUENCY ABLATION RIGHT L3, 4, 5 1 OF 2;  Surgeon: Shaq Silverio MD;  Location: [facility];  Service: Pain Management;  Laterality: Right;  [phone]    RADIOFREQUENCY ABLATION Left 1/4/2024    Procedure: RADIOFREQUENCY ABLATION LEFT L3, 4, 5 2 OF 2;  Surgeon:  Shaq Silverio MD;  Location: [facility];  Service: Pain Management;  Laterality: Left;  [phone]  2 WK F/U WARREN    RADIOFREQUENCY ABLATION Bilateral 1/6/2025    Procedure: RADIOFREQUENCY ABLATION BILATERAL L3, 4, 5;  Surgeon: Shaq Silverio MD;  Location: [facility];  Service: Pain Management;  Laterality: Bilateral;  3 WK F/U KRANTHI    REPLACEMENT OF SPINAL CORD STIMULATOR  4/28/2025    Procedure: REPLACEMENT, SPINAL CORD STIMULATOR;  Surgeon: Mita Rivera MD;  Location: [facility] OR;  Service: Pain Management;;    REVISION PROCEDURE INVOLVING SPINAL CORD NEUROSTIMULATOR N/A 4/28/2025    Procedure: REPLACEMENT OF SCS IPG COPE REP;  Surgeon: Mita Rivera MD;  Location: [facility] OR;  Service: Pain Management;  Laterality: N/A;    TRIAL OF SPINAL CORD NERVE STIMULATOR N/A 9/24/2018    Procedure: TRIAL, NEUROSTIMULATOR, SPINAL CORD, SPINAL CORD STIMULATOR TRIAL-INTERNAL WIRES TO EXTERNAL BATTERY;  Surgeon: Shaq Silverio MD;  Location: [facility];  Service: Pain Management;  Laterality: N/A;  ABBOTT REP NOTIFIED       Family History:  Family History   Problem Relation Name Age of Onset    Cataracts Mother      Glaucoma Mother      No Known Problems Father      No Known Problems Sister      No Known Problems Brother      No Known Problems Maternal Aunt      No Known Problems Maternal Uncle      No Known Problems Paternal Aunt      No Known Problems Paternal Uncle      No Known Problems Maternal Grandmother      No Known Problems Maternal Grandfather      No Known Problems Paternal Grandmother      No Known Problems Paternal Grandfather         Social History:  Social History     Socioeconomic History    Marital status:    Tobacco Use    Smoking status: Never     Passive exposure: Never    Smokeless tobacco: Never   Substance and Sexual Activity    Alcohol use: Not Currently     Alcohol/week: 1.0 standard drink of alcohol     Types: 1 Glasses of wine per week     Comment: occ    Drug use: No    Sexual activity:  "Yes     Partners: Male     Social Drivers of Health     Financial Resource Strain: Medium Risk (3/6/2025)    Overall Financial Resource Strain (CARDIA)     Difficulty of Paying Living Expenses: Somewhat hard   Food Insecurity: No Food Insecurity (3/6/2025)    Hunger Vital Sign     Worried About Running Out of Food in the Last Year: Never true     Ran Out of Food in the Last Year: Never true   Transportation Needs: No Transportation Needs (3/6/2025)    PRAPARE - Transportation     Lack of Transportation (Medical): No     Lack of Transportation (Non-Medical): No   Physical Activity: Inactive (3/6/2025)    Exercise Vital Sign     Days of Exercise per Week: 0 days     Minutes of Exercise per Session: 10 min   Stress: No Stress Concern Present (3/6/2025)    [employer] - Occupational Stress Questionnaire     Feeling of Stress : Not at all   Housing Stability: Low Risk  (3/6/2025)    Housing Stability Vital Sign     Unable to Pay for Housing in the Last Year: No     Number of Times Moved in the Last Year: 0     Homeless in the Last Year: No       OBJECTIVE:    BP 99/60 (BP Location: Left arm, Patient Position: Sitting)   Pulse 79   Ht 5' 4" (1.626 m)   Wt 85.6 kg (188 lb 11.4 oz)   BMI 32.39 kg/m²     PHYSICAL EXAMINATION:     General appearance: Well appearing, in no acute distress, alert and oriented x3.  Psych:  Mood and affect appropriate.  Skin: Skin color, texture, turgor normal, no rashes or lesions, in both upper and lower body. SCS site without drainage or signs of infection.  Edges well approximated.         Head/face:  Atraumatic, normocephalic. No palpable lymph nodes    Previous physical exam:  Back: Straight leg raising in the sitting and supine positions is negative to radicular pain bilaterally. There is pain with palpation to lumbar facet joints bilaterally. Limited ROM with pain on extension.   Extremities: No deformities, edema, or skin discoloration. Good capillary " nontoxic refill.  Musculoskeletal: 5/5 strength in right ankle with plantar and dorsiflexion. 5/5 strength in left ankle with plantar and dorsiflexion. 5/5 strength with right knee flexion and extension. 5/5 strength with left knee flexion and extension.   Neuro: Decreased sensation to BLE.  Gait: Antalgic- ambulates without assistance.        ASSESSMENT: 74 y.o. year old female with back pain, consistent with the following diagnoses:     1. Chronic pain syndrome        2. S/P insertion of spinal cord stimulator        3. Lumbar spondylosis        4. Degeneration of intervertebral disc of lumbar region with discogenic back pain        5. Myofascial pain        6. Encounter for postoperative wound check            PLAN:     - Previous imaging reviewed.  Labs reviewed.     - She is s/p Abbott SCS battery change. Incision healing well.     - She may shower, no tub soaks/swimming.     - Notify clinic for any fever, chills, or drainage.     - Continue current medications.     - RTC in 3-4 months or sooner if needed.     The above plan and management options were discussed at length with patient. Patient is in agreement with the above and verbalized understanding.    Saniya Louis NP  05/21/2025

## 2025-05-26 DIAGNOSIS — I10 ESSENTIAL HYPERTENSION: ICD-10-CM

## 2025-05-26 RX ORDER — LOSARTAN POTASSIUM 50 MG/1
50 TABLET ORAL
Qty: 90 TABLET | Refills: 2 | Status: SHIPPED | OUTPATIENT
Start: 2025-05-26

## 2025-05-26 NOTE — TELEPHONE ENCOUNTER
No care due was identified.  [facility] Embedded Care Due Messages. Reference number: [id number].   5/26/2025 3:37:36 PM CDT

## 2025-05-27 NOTE — TELEPHONE ENCOUNTER
Refill Decision Note   Faby Luna  is requesting a refill authorization.  Brief Assessment and Rationale for Refill:  Approve     Medication Therapy Plan:         Comments:     Note composed:9:17 PM 05/26/2025

## 2025-06-11 ENCOUNTER — PATIENT MESSAGE (OUTPATIENT)
Dept: FAMILY MEDICINE | Facility: CLINIC | Age: 75
End: 2025-06-11
Payer: MEDICARE

## 2025-06-11 ENCOUNTER — OFFICE VISIT (OUTPATIENT)
Dept: BARIATRICS | Facility: CLINIC | Age: 75
End: 2025-06-11
Payer: MEDICARE

## 2025-06-11 VITALS
BODY MASS INDEX: 31.44 KG/M2 | SYSTOLIC BLOOD PRESSURE: 132 MMHG | OXYGEN SATURATION: 97 % | WEIGHT: 184.13 LBS | DIASTOLIC BLOOD PRESSURE: 80 MMHG | HEART RATE: 75 BPM | HEIGHT: 64 IN

## 2025-06-11 DIAGNOSIS — Z98.84 HISTORY OF ROUX-EN-Y GASTRIC BYPASS: ICD-10-CM

## 2025-06-11 DIAGNOSIS — E11.22 TYPE 2 DIABETES MELLITUS WITH STAGE 3A CHRONIC KIDNEY DISEASE, WITHOUT LONG-TERM CURRENT USE OF INSULIN: ICD-10-CM

## 2025-06-11 DIAGNOSIS — N18.31 TYPE 2 DIABETES MELLITUS WITH STAGE 3A CHRONIC KIDNEY DISEASE, WITHOUT LONG-TERM CURRENT USE OF INSULIN: ICD-10-CM

## 2025-06-11 DIAGNOSIS — E66.811 OBESITY, CLASS I, BMI 30.0-34.9 (SEE ACTUAL BMI): Primary | ICD-10-CM

## 2025-06-11 PROCEDURE — 1160F RVW MEDS BY RX/DR IN RCRD: CPT | Mod: CPTII,HCNC,S$GLB, | Performed by: INTERNAL MEDICINE

## 2025-06-11 PROCEDURE — 3072F LOW RISK FOR RETINOPATHY: CPT | Mod: CPTII,HCNC,S$GLB, | Performed by: INTERNAL MEDICINE

## 2025-06-11 PROCEDURE — 1126F AMNT PAIN NOTED NONE PRSNT: CPT | Mod: CPTII,HCNC,S$GLB, | Performed by: INTERNAL MEDICINE

## 2025-06-11 PROCEDURE — 3288F FALL RISK ASSESSMENT DOCD: CPT | Mod: CPTII,HCNC,S$GLB, | Performed by: INTERNAL MEDICINE

## 2025-06-11 PROCEDURE — 1159F MED LIST DOCD IN RCRD: CPT | Mod: CPTII,HCNC,S$GLB, | Performed by: INTERNAL MEDICINE

## 2025-06-11 PROCEDURE — 99213 OFFICE O/P EST LOW 20 MIN: CPT | Mod: HCNC,S$GLB,, | Performed by: INTERNAL MEDICINE

## 2025-06-11 PROCEDURE — 3079F DIAST BP 80-89 MM HG: CPT | Mod: CPTII,HCNC,S$GLB, | Performed by: INTERNAL MEDICINE

## 2025-06-11 PROCEDURE — 3075F SYST BP GE 130 - 139MM HG: CPT | Mod: CPTII,HCNC,S$GLB, | Performed by: INTERNAL MEDICINE

## 2025-06-11 PROCEDURE — 99999 PR PBB SHADOW E&M-EST. PATIENT-LVL V: CPT | Mod: PBBFAC,HCNC,, | Performed by: INTERNAL MEDICINE

## 2025-06-11 PROCEDURE — 1101F PT FALLS ASSESS-DOCD LE1/YR: CPT | Mod: CPTII,HCNC,S$GLB, | Performed by: INTERNAL MEDICINE

## 2025-06-11 PROCEDURE — 3044F HG A1C LEVEL LT 7.0%: CPT | Mod: CPTII,HCNC,S$GLB, | Performed by: INTERNAL MEDICINE

## 2025-06-11 RX ORDER — TIRZEPATIDE 10 MG/.5ML
10 INJECTION, SOLUTION SUBCUTANEOUS
Qty: 4 PEN | Refills: 4 | Status: SHIPPED | OUTPATIENT
Start: 2025-06-11

## 2025-06-11 NOTE — PROGRESS NOTES
Subjective:       Patient ID: Faby Luna is a 74 y.o. female.    Chief Complaint: Follow-up and Weight Check      CC: weight    Pt here today for follow-up. Has lost 32.8 lbs (-10 lbs muscle. -18.1 lbs fat). Was to get back on track with bariatric diet and started Mounjaro, currently on 10mg.  States she is eating fruit, eggs, salad. She is skipping meals.  Not using protein shake.   Has not been exercising 2/2 to back pain and recovering from a procedure.      She had been off rybelsus 2/2 to cost. Has started Ozempic. Getting for $35/month. She does feel appetite is down a little. BS improving.     Currently at 47% EWL from bypass    New BMR: 1461    New PBF: 39.5%     Initial:  BMR:1617    PBF:  41.6%    Prev med hx:Ozempic 2 mg weekly. States craving a lot of sweets during the day.  Denies SE    Lab Results       Component                Value               Date                       HGBA1C                   6.0 (H)             02/21/2025                 HGBA1C                   6.0 (H)             08/23/2024                 HGBA1C                   6.7 (H)             02/23/2024            Lab Results       Component                Value               Date                       LDLCALC                  81.4                02/21/2025                 CREATININE               1.0                 02/21/2025              SLEEP STUDY FINDINGS: Patient underwent a 1 night Home Sleep Test and by behavioral criteria, slept for approximately  5.42 hours, with a sleep latency of 16 minutes and a sleep efficiency of 80.2%. Moderate sleep disordered breathing  (AHI=29) is noted based on a 4% hypopnea desaturation criteria, entirely in the supine position (29 events/hour). The  patient slept supine 100% of the night based on valid recording time of 5.42 hours. The apneas/hypopneas are  accompanied by moderate oxygen desaturation (percent time below 90% SpO2: 11.4%, Min SpO2: 70.6%). The average  desaturation  "across all sleep disordered breathing events is 4.2%. Snoring occurs for 38.8% (30 dB) of the study, 8.9% is  very loud. The mean pulse rate is 65 BPM.                           From recent RD notes.   Back on track Bariatric Diet.  Diet Recall: 60-80 grams of protein/day; 32-48 oz of fluids/day     Current diet recall:  B: prot shake OR skips  L: 1/4 cup nuts or cheese + grapes or kiwi or small apple or banana  D: baked/smothered chicken or fish with variety of veg (asparagus, greens or spinach cooked down with olive oil, brussels, broccoli)  Sn: 1/4 cup nuts, piece of fruit     Diet includes:  Meal Pattern: 1 meal(s) + 1-2 snack(s) + 2 protein supplement(s)  Adequate protein supplement intake. Premier shakes.  Adequate dairy intake.  Adequate vegetable intake. Tolerates raw vegetables and lettuce.  Adequate fruit intake.  Starchy CHO: reduced lately, small amounts  Beverages: water, CL, Coke zero  Alcohol: twice/month white liquor + diet cranberry juice     EXERCISE:  None.  Considering joining the gym (it's free with her insurance)     Restrictions to Exercise: None. Back pain.     VITAMINS / MINERALS:  Multivitamins  B-Complex  Calcium Citrate + Vitamin D  Vitamin B12: Sublingual.     ASSESSMENT:  Doing well overall.  Weight loss.  Adequate calorie intake.  Adequate protein intake.  Inadequate fluid intake.  Following diet inappropriately.  Not exercising.  Adequate vitamins & minerals.     BARIATRIC DIET DISCUSSION:  Instructed and provided written materials on bariatric diet plan.  Reinforced post-op nutrition guidelines.     PLAN / RECOMMENDATIONS:  Continue Back on track with diet plan.  Maintain/Increase protein intake.  Maintain/Increase fluid intake.  Begin light exercise as mary.  Continue appropriate vitamins & minerals.     Return to clinic in 1 months with med wt loss.      Review of Systems      Objective:    /80   Pulse 75   Ht 5' 4" (1.626 m)   Wt 83.5 kg (184 lb 1.6 oz)   SpO2 97%   BMI " 31.60 kg/m²    Physical Exam  Vitals reviewed.   Constitutional:       General: She is not in acute distress.     Appearance: She is well-developed.   HENT:      Head: Normocephalic and atraumatic.   Eyes:      General: No scleral icterus.     Pupils: Pupils are equal, round, and reactive to light.   Neck:      Thyroid: No thyromegaly.   Cardiovascular:      Rate and Rhythm: Normal rate.   Pulmonary:      Effort: Pulmonary effort is normal. No respiratory distress.   Musculoskeletal:         General: Normal range of motion.      Cervical back: Normal range of motion and neck supple.   Skin:     General: Skin is warm and dry.      Findings: No erythema.   Neurological:      Mental Status: She is alert and oriented to person, place, and time.      Cranial Nerves: No cranial nerve deficit.   Psychiatric:         Behavior: Behavior normal.         Judgment: Judgment normal.         Assessment:       Problem List Items Addressed This Visit       Type 2 diabetes mellitus with stage 3a chronic kidney disease, without long-term current use of insulin (Chronic)    Relevant Medications    tirzepatide (MOUNJARO) 10 mg/0.5 mL PnIj     Other Visit Diagnoses         Obesity, Class I, BMI 30.0-34.9 (see actual BMI)    -  Primary      History of Clarisse-en-Y gastric bypass                              Plan:           Faby was seen today for follow-up and weight check.    Diagnoses and all orders for this visit:    Obesity, Class I, BMI 30.0-34.9 (see actual BMI)    Type 2 diabetes mellitus with stage 3a chronic kidney disease, without long-term current use of insulin  -     tirzepatide (MOUNJARO) 10 mg/0.5 mL PnIj; Inject 10 mg into the skin every 7 days.    History of Clarisse-en-Y gastric bypass             Mounjaro 10 mg weekly.       Decrease portions as soon as you start Mounjaro. Avoid fried, greasy, fatty foods.     Some nausea in the first 2 weeks is not unusual.     If you get pain across the upper abdomen and around to your  back, please call the office.            https://[email or web address]/[email or web address]-resources for coupon/videos.           Patient was informed that topiramate is used for migraine prevention and seizures. Weight loss is a common side effect that is well documented. S/he understands this. S/he was informed of the potential side effects such as serious and possibly fatal rash in which case the medication should be discontinued immediately. Paresthesias, forgetfulness, fatigue, kidney stones, GI symptoms, and changes in lab values such as electrolytes, blood counts and kidney function.      topiramate 100  mg every other evening for 1 week then stop.     - To lose weight you want to cut 100% starchy carbohydrates out of your diet (bread, rice, pasta, potatoes, granola, flour, corn, peas, oatmeal, grits, tortillas, crackers, chips) and get  grams of protein.  Aim for 100 grams of protein [id number] bianka  daily.        - No soda, sweet tea, juices, sports drinks or lemonade     -Exercise daily once. Increase low impact activity as tolerated.  Avoid high impact activity, very heavy lifting or other exercise regimens that may cause discomfort. Add some type of resistance training 2-3 days a week. These can be body weight exercises, light weight or elastic bands. [employer] and [employer] are great sources for free work out plans and videos.       Pt advised to check blood sugar regularly as medications may need to be adjusted with changes in diet and weight loss.     Tips for preparing for hurricane season given.

## 2025-06-11 NOTE — PATIENT INSTRUCTIONS
Mounjaro 10 mg weekly.       Decrease portions as soon as you start Mounjaro. Avoid fried, greasy, fatty foods.     Some nausea in the first 2 weeks is not unusual.     If you get pain across the upper abdomen and around to your back, please call the office.            https://[email or web address]/savings-resources for coupon/videos.           Patient was informed that topiramate is used for migraine prevention and seizures. Weight loss is a common side effect that is well documented. S/he understands this. S/he was informed of the potential side effects such as serious and possibly fatal rash in which case the medication should be discontinued immediately. Paresthesias, forgetfulness, fatigue, kidney stones, GI symptoms, and changes in lab values such as electrolytes, blood counts and kidney function.      topiramate 100  mg every other evening for 1 week then stop.     - To lose weight you want to cut 100% starchy carbohydrates out of your diet (bread, rice, pasta, potatoes, granola, flour, corn, peas, oatmeal, grits, tortillas, crackers, chips) and get  grams of protein.  Aim for 100 grams of protein [id number] bianka  daily.        - No soda, sweet tea, juices, sports drinks or lemonade     -Exercise daily once. Increase low impact activity as tolerated.  Avoid high impact activity, very heavy lifting or other exercise regimens that may cause discomfort. Add some type of resistance training 2-3 days a week. These can be body weight exercises, light weight or elastic bands. [employer] and [employer] are great sources for free work out plans and videos.       Pt advised to check blood sugar regularly as medications may need to be adjusted with changes in diet and weight loss.     Tips for preparing for hurricane season:    If you are on weight loss medications, please take your medication with you in case of evacuation. Injectable medications should be transported with an ice pack if unopened or room temperature if  you have started to use them.   Hurricane supplies do not necessarily need to be junk food or high in carbs or sugar. Shelf stable and healthy choices to include in your supplies could include:  Canned/packets of tuna or chicken  Apples, oranges, banana, pears  Beef or turkey jerky  Sugar free pudding  Pickle, olives, dill relish (mix with the tuna or chicken!)  Low sodium or no salt added canned vegetables  If you get bread, get lite bread (40 calories a slice)  0 sugar sports drinks  Water  String cheese will be okay for a few days without refrigeration or in an ice chest.   Peanut or other nut butter.   Parmesan crisps  Pork skins  Protein shakes (20-30g protein, less than 5 g sugar)  Protein bars (15 or more g protein, less than 5 g sugar)  Don't forget disposable forks, spoons, plates in your supplies  If evacuated, manage stress by taking walks, reading or meditating.   If eating out make better choices when possible.   Salads with a lean protein and limited dressing, cheese or other toppings  Grilled chicken sandwich or burger without the bun.   Skip the fries!  Order water or unsweetened tea instead of soda  Grocery stores with a deli, salad/food bar can be a good quick and affordable option to replace fast food

## 2025-06-18 ENCOUNTER — OFFICE VISIT (OUTPATIENT)
Dept: PAIN MEDICINE | Facility: CLINIC | Age: 75
End: 2025-06-18
Payer: MEDICARE

## 2025-06-18 VITALS
TEMPERATURE: 98 F | HEIGHT: 64 IN | BODY MASS INDEX: 31.91 KG/M2 | HEART RATE: 70 BPM | WEIGHT: 186.94 LBS | DIASTOLIC BLOOD PRESSURE: 80 MMHG | OXYGEN SATURATION: 99 % | SYSTOLIC BLOOD PRESSURE: 151 MMHG

## 2025-06-18 DIAGNOSIS — M53.3 SACROILIAC JOINT PAIN: ICD-10-CM

## 2025-06-18 DIAGNOSIS — M47.816 LUMBAR SPONDYLOSIS: ICD-10-CM

## 2025-06-18 DIAGNOSIS — M51.360 DEGENERATION OF INTERVERTEBRAL DISC OF LUMBAR REGION WITH DISCOGENIC BACK PAIN: ICD-10-CM

## 2025-06-18 DIAGNOSIS — G89.4 CHRONIC PAIN SYNDROME: Primary | ICD-10-CM

## 2025-06-18 DIAGNOSIS — Z96.89 S/P INSERTION OF SPINAL CORD STIMULATOR: ICD-10-CM

## 2025-06-18 PROCEDURE — 99999 PR PBB SHADOW E&M-EST. PATIENT-LVL IV: CPT | Mod: PBBFAC,HCNC,, | Performed by: NURSE PRACTITIONER

## 2025-06-18 NOTE — H&P (VIEW-ONLY)
Interventional Pain Management - Established Visit  Follow-Up       SUBJECTIVE:    Interval History 6/18/2025:  The patient returns to clinic today for follow up of back and leg pain. She continues to report low back and buttock pain. She reports some benefit with [employer] SCS. She had reprogramming at last visit in May. She is taking Lyrica and Zanaflex. She denies any other health changes. Her pain today is 7/10.    Interval History 5/21/2025:  Faby Luna returns for follow-up after [employer] SCS battery change and wound check. She denies fever, chills, drainage or any other signs of infection. She denies recent health changes. She denies recent falls or trauma. She denies new onset fever/night sweats, urinary incontinence, bowel incontinence, significant weight changes, significant motor weakness or changes, or loss of sensations. Her pain today is 8/10.      Interval History 5/6/2025:  The patient returns to clinic today for post op. She is s/p [employer] SCS battery change on 4/28/2025. She denies any fever, chills, or drainage. She has completed her antibiotics. She is unsure how to work her remote. She is meeting with [employer] for programming. She denies any other health changes. Her pain today is 5/10.    INTERVAL HISTORY 3/17/2025:  Ms. Luna is a patient of Dr. Silverio and presents for end of life SCS pulse generator ([employer]). Current Pain level is 7/10. She reports the pain comes down both legs. She reports that while the stimulator was working, she didn't have pain in her legs.  She was recently interrogated and found to have a dead battery.  Interval History 3/7/2025:  The patient returns to clinic today for follow up of back pain. She reports worsened leg pain. She did find her controller. She is meeting with Haley from [employer] for programming. She did not receive increased dose of Lyrica. She is currently taking 100 mg. She is taking Zanaflex. She is performing a home exercise routine. She  denies any other health changes. Her pain today is 8/10.    Interval History 2/7/2025:  The patient returns to clinic today for follow up of back pain. She is s/p bilateral L3,4,5 RFA on 1/6/2025. She reports 50% relief. She continues to report intermittent low back pain. She is having worsening leg pain. She has not adjusted her SCS in a while. She did find her controller. She is taking Lyrica, although not sure of benefit. She is taking Zanaflex. She is performing home exercises. She denies any other health changes. Her pain today is 5/10.    Interval History 12/13/2024:  The patient returns to clinic today for follow up of back pain. She reports increased low back pain. She denies any radicular leg pain at this time. She does have benefit with SCS. She does need a new controller. Her pain is worse with standing and walking. She is taking Lyrica and Zanaflex. She is performing a home exercise routine. She denies any other health changes. Her pain today is 8/10.    Interval History 10/11/2024:  The patient returns to clinic today for follow up of back pain. Since last visit, her insurance denied her procedure due to timing. She reports increased back pain at this time. She is having radiating pain into her legs to her ankles. Her SCS is currently not on. She is unable to find her controller. She has not reached out to representatives. Her pain is worse with standing and walking. She denies any other health changes. Her pain today is 10/10.    Interval History 7/15/2024:  The patient is here today to discuss lower back pain. Since previous visit, she did undergo right then left L3,4,5 RFAs completed with 80% relief for about 7 months. Her pain has now returned and she wishes to repeat the RFAs. The pain worsens with prolonged standing and walking. She continues with home PT exercises. Her pain today is 10/10.    Interval history 11/22/2023:  73-year-old female that presents today with axial low back pain.  Not been  seen for approximately 7 months set of pain 1-2 weeks, is located in a bandlike distribution of low back she did not anesthesia like symptoms.  She is known to this clinic and was previously provided a or RFA targeting L3, L4 and L5 that provided her with 50-60% relief.  Like to be considered for repeat lumbar RFA she denies any recent incident or trauma denies any bowel bladder dysfunction anesthesia denies any profound weakness.    Interval History 4/20/2023:  The patient returns to clinic today for follow up of low back pain. She is s/p left L3,4,5 RFA on 3/16/2023 and right L3,4,5 RFA on 3/30/2023. She reports 50-60% relief of her pain. Her pain is tolerable at this time. She has good days and bad days. She denies any radicular leg pain at this time. She is not currently using her SCS. She has not contacted representatives. She reports that her mother passed away last week. She is dealing with a lot of stress due to this. She is taking Gabapentin, although not consistently. She also takes Zanaflex. She denies any other health changes. Her pain today is 6/10.     Interval History 2/8/2023:  The patient returns to clinic today for follow up of back pain. She reports worsened low back pain. She reports intermittent radiating pain into the posterior aspect of both legs to the ankles, left greater than right. Her pain is constant in nature. Her back pain is greater than her leg pain. She is reporting limited relief with SCS. She has not been able to meet with Roque or [employer] for programming. She is taking Gabapentin. She denies any other health changes. Her pain today is 9/10.    Interval History 11/4/2022:  Faby is here for follow up of back and leg pain. Unfortunately, she has been unable to meet up with Roque for SCS programming. She does report some improvement in symptoms since previous visit. She still has back pain with radiation into the legs. Recent XRAYs do not show any significant lead migration. She only takes  Gabapentin intermittently because she says it makes her feet swell but it does help. Her pain today is 8/10.    Interval History 10/4/2022:  The patient is here for follow up of chronic back pain. She reports more pain over the past month. No injury or trauma. She does have a lumbar SCS but is unsure if it is even on at this time. She does not think that it is. It did previously help with her back and leg pain. She has not been in contact with [email or web address] recently. The back pain radiates down the back of both legs to the feet. It worsens with walking and standing. Her pain today is 10/10.    Interval History 8/23/2022:  Faby is here for follow up of chronic lower back pain. She is now s/p right then left L3,4,5 RFAs completed on 7/21/22 with limited relief so far. She continues to report aching back pain with radiation into the legs and numbness. She has had her SCS off for a couple of months. She has not been in contact with [employer] rep for programming. She says that she turned it off due to limited benefit. No new weakness to legs or falls. No bowel/bladder incontinence. Her pain today is 10/10.    Interval History 6/17/2022:  The patient is here today for follow up of back pain. She has had more aching pain across the lower back. She had benefit with lumbar RFAs in the past and would like to reschedule this. She would also like to repeat aquatic PT as this was helpful in the past. Her pain is aching and throbbing in nature without radiation currently. No new falls or trauma. Her pain today is 8/10.    Interval History 5/5/2022:  The patient is here for follow up of chronic lower back pain. She has radiation into the legs. No recent falls or trauma. She saw Dr. Silverio last month and was under the impression that an [employer] rep would be here today for programming. She is still having difficulty programming her device. She has mild benefit with Gabapentin 900 mg daily without side effects. She is also having muscle spasms  intermittently. She is asking for a muscle relaxer. She has tried Robaxin in the past with mild benefit. She would like to try another medication. Her pain today is 8/10.    Interval History 4/20/2022: She presents today for follow up of low back pain. She states that she only had about 40% relief of LBP following RFAs in September that lasted a few weeks. Pain  continues to be in the low back and radiates into b/l LE. Pain encompasses the entire leg and does not follow a specific dermatomal pattern in the leg. She denies weakness, numbness or tingling in the lower extremities. She denies bowel or bladder incontinence. Pain is currently rated 8/10. She is currently on gabapentin 300mg tid with minimal relief. She has been unable to charge bret SCS for the last month and does not have the contact number for her rep.      Interval History 9/30/2021:  The patient returns to clinic today for follow up of low back pain. She is s/p left L3,4,5 RFA on 9/9/2021 and right L3,4,5 RFA on 9/20/2021. She is unsure of relief at this time. She reports increased pain to the right side. She describes this pain as burning in nature. She denies any radiating leg pain. Her pain is worse with bending and standing. She continues to report benefit with [employer] SCS. She denies any weakness. She denies any other health changes. Her pain today is 9/10.    Interval History 8/3/2021:   Ms. Luna returns in f/u re: low back/leg pain. She reports about three months ago she noticed her low back started bothering her, worse with bending and prolonged standing. No inciting event, no trauma to back since last visit. Reports continued leg pain down the backs of her legs as well. She reports intermittent instability in her legs - on and off - over the last year. Last time she has met with [employer] rep for reprogramming of SCS was fall 2020. Denies any current issues with SCS function/battery/remote.     Interval History 9/28/2020:  The patient is here  today for increased lower back pain.  She is status post right than left L3, 4, 5 radiofrequency ablation completed on 08/31/2020 with approximately 50% relief.  However, she is feeling tightness across the lower back without radiation at this time.  She would like an injection today.  Additionally, she states that she has not completed physical therapy for her back in some time and is not currently doing any exercises.  Her leg pain is currently well controlled with spinal cord stimulator and she had a recent adjustment.  Her pain today is 8/10.    Interval History 7/16/2020:  The patient is here for follow up of lower back pain.  She previously had benefit with lumbar RFAs for similar pain.  She is also having radiation into her legs with numbness, left greater than right.  Ildefonso is here today to meet with her for programming.  She previously was having significant benefit of leg pain with SCS implant.  She denies any recent trauma or falls. Her pain today is 9/10.    Interval History 1/9/2020:  The patient is here for follow up of lower back and leg pain.  She is s/p lumbar RFAs with about 50% relief.  Her leg pain is well controlled with implant.  She still has intermittent flare ups of back pain, like today.  When this happens, she has some benefit with rest.  She has tried Tylenol and OTC NSAIDs without benefit.  She denies any recent falls or weakness.  The patient denies any bowel or bladder incontinence or signs of saddle paresthesia.  The patient denies any major medical changes since last office visit.  Her pain today is 7/10.    Previous Encounter:  Faby Dejesus Manoj Jeremy presents to the clinic for a follow-up appointment for lower back pain.  She previously had significant leg pain which has resolved with [employer] SCS implant.  She is here today to discuss increased lower back pain.  It is sharp and throbbing in nature.  It is significant in the morning and with prolonged standing and activity.  She has  some benefit with stretching.  She denies any recent falls.  Since the last visit, Faby Luna states the pain has been worsening.  Current pain intensity is 8/10.    Pain Disability Index Review:      5/21/2025     1:31 PM 5/6/2025    11:31 AM 3/17/2025     3:47 PM   Last 3 PDI Scores   Pain Disability Index (PDI) 29 30 35       Opioid Contract: no     report:  Not applicable    Pain Procedures:   5/2/18 Left L3 and L4 TF ELISE- 20% relief  5/21/18 Bilateral L4-5 and L5-S1 facet joint injections- no relief  9/24/18 Lumbar SCS trial ([employer])- 100% relief  10/26/18 Lumbar SCS implant ([employer])- 100% relief of leg pain  9/12/19 Bilateral L3,4,5 MBB- helpful  10/17/19 Bilateral L3,4,5 MBB- helpful  11/21/19 Right L3,4,5 RFA- 50% relief  12/5/19 Left L3,4,5 RFA- 50% relief  8/17/20 Left L3,4,5 RFA- 50% relief  8/31/20 Right L3,4,5 RFA- 50% relief  9/9/2021- Left L3,4,5 RFA - 60% relief  9/20/2021- Right L3,4,5 RFA -60% relief  7/7/22 Right L3,4,5 RFA   7/21/22 Left L3,4,5 RFA   3/16/2023- Left L3,4,5 RFA  3/30/2023- Right L3,4,5 RFA  12/18/24 Right L3,4,5 RFA  1/4/24 Left L3,4,5 RFA  1/6/2025- Bilateral L3,4,5 RFA  4/28/2025- [employer] SCS battery change    Physical Therapy/Home Exercise:   yes in the past with limited benefit    Imaging:   XR THORACIC SPINE AP LATERAL     CLINICAL HISTORY:  check SCS lead placement;  Other mechanical complication of implanted electronic neurostimulator of spinal cord electrode (lead), initial encounter     TECHNIQUE:  AP and lateral views of the thoracic spine were performed.     COMPARISON:  10/04/2022     FINDINGS:  Vertebral body heights are maintained.  Multilevel disc space narrowing.     AP alignment is anatomic.  Dextroconvex curvature thoracic spine.     Leads terminate at T6-7 and T7.     Impression:     No acute osseous abnormality seen.        Electronically signed by:Daniela Hernadez  Date:                                            03/07/2025  Time:                                            16:06    3/31/18 Lumbar MRI    Narrative     EXAMINATION:  MRI LUMBAR SPINE WITHOUT CONTRAST    CLINICAL HISTORY:  Low back pain, >6wks conservative tx, persistent-progressive sx, surgical candidate; Other spondylosis with radiculopathy, lumbar region    TECHNIQUE:  Multiplanar, multisequence MR images were acquired from the thoracolumbar junction to the sacrum without the administration of contrast.    COMPARISON:  Plain films from 03/27/2018    FINDINGS:  There is grade 1 spondylolisthesis of L4 on L5. The vertebral body heights are well maintained, with no fracture.  No marrow signal abnormality suspicious for an infiltrative process.    The conus medullaris terminates at approximately the mid body of L1.  There is a cyst associated with the upper pole of the right kidney.  There is disc desiccation noted throughout the lumbar spine with relative sparing of the L5-S1 disc.  Mild disc space narrowing present at the L4-5 level.    L1-L2: Mild diffuse disc bulge resulting in no significant central or neural foraminal canal narrowing.    L2-L3: No significant central canal narrowing.  There is mild narrowing of either neural foraminal canal secondary to disc material.    L3-L4: Disc bulging in the bilateral foraminal regions resulting in no significant central canal narrowing.  Mild-to-moderate bilateral facet arthropathy also noted.  The bilateral neural foraminal canals are moderately narrowed with some mild effacement of either exiting L3 nerve root in the extraforaminal regions bilaterally.    L4-L5:  Grade 1 spondylolisthesis along with moderate to severe bilateral facet arthropathy and ligamentum flavum hypertrophy resulting in at least moderate narrowing of the central canal.  The right neural foraminal canal is mildly to moderately narrowed.  Left neural foraminal canal is moderately to severely narrowed with mild effacement of the exiting L4 nerve root.    L5-S1:  No significant  "central or neural foraminal canal narrowing noted.  Mild bilateral facet arthropathy noted.   Impression       1. Multilevel degenerative changes of the lumbar spine as detailed above     Lumbar XRAYs 3/27/18    Narrative     EXAMINATION:  XR LUMBAR SPINE AP AND LAT WITH FLEX/EXT    CLINICAL HISTORY:  Low back pain    TECHNIQUE:  AP and lateral views as well as lateral flexion and extension images are performed through the lumbar spine.    COMPARISON:  None    FINDINGS:  X-ray lumbar spine with flexion and extension demonstrates grade 1 spondylolisthesis at L4-5 measuring around 6 mm which does not change significantly with flexion and extension.  The L4-5 disc is narrowed and degenerated.  Other lumbar vertebral discs are maintained.  Vertebral body heights are maintained.  Small anterior spurs are seen, and there is small posterior osteophyte along the inferior endplate of L4.  There is lumbar facet arthropathy at L4-5 and L5-S1.   Impression       Degenerative changes as above.  Grade 1 anterolisthesis and degenerative disc disease at L4-5.         Review of patient's allergies indicates:  No Known Allergies      Current Medications:   Current Outpatient Medications   Medication Sig Dispense Refill    albuterol (PROAIR HFA) 90 mcg/actuation inhaler Inhale 2 puffs into the lungs every 4 (four) hours as needed for Wheezing or Shortness of Breath. Rescue 18 g 11    atenoloL (TENORMIN) 100 MG tablet TAKE 1 TABLET EVERY DAY 90 tablet 3    BD ULTRA-FINE MINI PEN NEEDLE 31 gauge x 3/16" Ndle       BD ULTRA-FINE FELICIA PEN NEEDLE 32 gauge x 5/32" Ndle       blood sugar diagnostic Strp To check BG 3 times daily, to use with insurance preferred meter 100 strip 11    EScitalopram oxalate (LEXAPRO) 20 MG tablet Take 1 tablet (20 mg total) by mouth once daily. 90 tablet 3    fluticasone propionate (FLONASE) 50 mcg/actuation nasal spray 2 sprays (100 mcg total) by Each Nostril route once daily. 11.1 mL 1    lancets Misc To check " BG 3 times daily, to use with insurance preferred meter 100 each 11    loratadine (CLARITIN) 10 mg tablet Take 1 tablet (10 mg total) by mouth daily as needed for Allergies. 90 tablet 3    losartan (COZAAR) 50 MG tablet Take 1 tablet by mouth once daily 90 tablet 2    pantoprazole (PROTONIX) 20 MG tablet TAKE 1 TABLET TWICE DAILY BEFORE MEALS 180 tablet 3    pregabalin (LYRICA) 150 MG capsule Take 1 capsule (150 mg total) by mouth 3 (three) times daily. 90 capsule 3    rosuvastatin (CRESTOR) 20 MG tablet Take 1 tablet (20 mg total) by mouth every evening. 90 tablet 3    tirzepatide (MOUNJARO) 10 mg/0.5 mL PnIj Inject 10 mg into the skin every 7 days. 4 Pen 4    tiZANidine (ZANAFLEX) 4 MG tablet TAKE 1 TABLET ONE TIME DAILY AS NEEDED FOR PAIN 90 tablet 3    traZODone (DESYREL) 100 MG tablet Take 1 tablet by mouth in the evening 90 tablet 1    chlorhexidine (HIBICLENS) 4 % external liquid Wash body in shower with this liquid every day for the 3 days before surgery. Rinse after 1-2 minutes. Do not use on head or face. (Patient not taking: Reported on 6/18/2025) 15 mL 0    HYDROcodone-acetaminophen (NORCO) 5-325 mg per tablet Take 1 tablet by mouth every 8 (eight) hours as needed for Pain (POST OP PAIN). (Patient not taking: Reported on 6/18/2025) 21 tablet 0    mupirocin (BACTROBAN) 2 % ointment Blow nose. Place this medicine on a Q-tip. Rub medicine around inside one nostril. Repeat in the other nostril. Pinch nose together for 1 minute. Do this 2 times per day for 3 days before surgery. (Patient not taking: Reported on 6/18/2025) 5 g 0     No current facility-administered medications for this visit.     Facility-Administered Medications Ordered in Other Visits   Medication Dose Route Frequency Provider Last Rate Last Admin    0.9%  NaCl infusion   Intravenous Continuous Uriel Aranda MD   New Bag at 10/30/24 1107       REVIEW OF SYSTEMS:    GENERAL:  No weight loss, malaise or fevers.  HEENT:  Negative for frequent  or significant headaches.  NECK:  Negative for lumps, goiter, pain and significant neck swelling.  RESPIRATORY:  Negative for cough, wheezing or shortness of breath.  CARDIOVASCULAR:  Negative for chest pain, leg swelling or palpitations. Hypertension.  GI:  Negative for abdominal discomfort, blood in stools or black stools or change in bowel habits.  MUSCULOSKELETAL:  See HPI.  SKIN:  Negative for lesions, rash, and itching.  PSYCH:  Negative for sleep disturbance, mood disorder and recent psychosocial stressors.  HEMATOLOGY/LYMPHOLOGY:  Negative for prolonged bleeding, bruising easily or swollen nodes.  NEURO:   No history of headaches, syncope, paralysis, seizures or tremors.  ENDO: Diabetes.  All other reviewed and negative other than HPI.    Past Medical History:  Past Medical History:   Diagnosis Date    Anxiety with depression     Arthritis     CKD (chronic kidney disease) stage 2, GFR 60-89 ml/min     Diabetes mellitus     History of Clarisse-en-Y gastric bypass     Hypertension     Obesity, unspecified     DAHLIA (obstructive sleep apnea)     Spondylosis     Wears dentures        Past Surgical History:  Past Surgical History:   Procedure Laterality Date    CARPAL TUNNEL RELEASE Right 3/8/2019    Procedure: RELEASE, CARPAL TUNNEL right;  Surgeon: Pan Goodman MD;  Location: [facility];  Service: Orthopedics;  Laterality: Right;    CARPAL TUNNEL RELEASE Left 2019    Procedure: RELEASE, CARPAL TUNNEL- LEFT;  Surgeon: Pan Goodman MD;  Location: [address];  Service: Orthopedics;  Laterality: Left;    CATARACT EXTRACTION Bilateral      SECTION      CHOLECYSTECTOMY      COLONOSCOPY N/A 2024    Procedure: COLONOSCOPY;  Surgeon: Otilio Man MD;  Location: [facility];  Service: Endoscopy;  Laterality: N/A;  Referred by: MYNOR Sprague / CECILIA Meds: ozempic / diabetic / Prep: peg / Route instructions sent: portal  - lvm and portal msg for pc. DBM  - left 2nd vm for pc. DBM    COLONOSCOPY,  SCREENING, HIGH RISK PATIENT N/A 10/30/2024    Procedure: COLONOSCOPY, SCREENING, HIGH RISK PATIENT;  Surgeon: Corinne Traylor MD;  Location: [facility] ENDO;  Service: Endoscopy;  Laterality: N/A;  WLMED: Ozempic Pt states she hasn't taken for several weeks  Outside referral from [facility]  ext suprep  inst portal  LW  10/23 r/s ext suprep to portal, holding ozempic since 10/16 cf    EPIDURAL STEROID INJECTION INTO LUMBAR SPINE N/A 6/14/2018    Procedure: INJECTION, STEROID, SPINE, LUMBAR, EPIDURAL;  Surgeon: Shaq Silverio MD;  Location: [facility] PAIN [facility]GT;  Service: Pain Management;  Laterality: N/A;  LUMBAR L4-L5 INTERLAMINAR ELISE'  [id number]  W/ SEDATION     ESOPHAGOGASTRODUODENOSCOPY N/A 5/12/2023    Procedure: EGD (ESOPHAGOGASTRODUODENOSCOPY);  Surgeon: Deann Jimenez MD;  Location: [facility] ENDO;  Service: Gastroenterology;  Laterality: N/A;    HYSTERECTOMY      INJECTION OF ANESTHETIC AGENT AROUND NERVE Bilateral 9/12/2019    Procedure: BLOCK, NERVE, L3-L4-L5 MEDIAL BRANCH;  Surgeon: Shaq Silverio MD;  Location: [facility]GT;  Service: Pain Management;  Laterality: Bilateral;    INJECTION OF ANESTHETIC AGENT AROUND NERVE Bilateral 10/17/2019    Procedure: BLOCK, NERVE;  Surgeon: Shaq Silverio MD;  Location: [facility];  Service: Pain Management;  Laterality: Bilateral;  B/L MBB L3-L4-L5  REPEAT  CONSENT NEEDED    INJECTION OF FACET JOINT Bilateral 5/28/2018    Procedure: INJECTION-FACET;  Surgeon: Shaq Silverio MD;  Location: [facility];  Service: Pain Management;  Laterality: Bilateral;  LUMBAR BILATERAL L4-L5 AND L5-S1 FACET STEROID INJECTION  [id number]    W/ SEDATION     RADIOFREQUENCY ABLATION Right 11/21/2019    Procedure: RADIOFREQUENCY ABLATION RIGHT L3, L4, L5;  Surgeon: Shaq Silverio MD;  Location: [facility];  Service: Pain Management;  Laterality: Right;  Right RFA L3-L4-L5  1 of 2  Consent Needed    RADIOFREQUENCY ABLATION Left 12/5/2019    Procedure: RADIOFREQUENCY ABLATION LEFT L3-5;  Surgeon:  Shaq Silverio MD;  Location: [facility];  Service: Pain Management;  Laterality: Left;  Left RFA L3-L4-L5  2 of 2  Consent Needed    RADIOFREQUENCY ABLATION Left 8/17/2020    Procedure: RADIOFREQUENCY ABLATION LEFT L3,4,5 1 of 2;  Surgeon: Shaq Silverio MD;  Location: [facility];  Service: Pain Management;  Laterality: Left;  Left RFA L3,4,5  1 of 2    RADIOFREQUENCY ABLATION Right 8/31/2020    Procedure: RADIOFREQUENCY ABLATION RIGHT L3,4,5 2 of 2;  Surgeon: Shaq Silverio MD;  Location: [facility];  Service: Pain Management;  Laterality: Right;  RADIOFREQUENCY ABLATION RIGHT L3,4,5  2 of 2    RADIOFREQUENCY ABLATION Left 9/9/2021    Procedure: RADIOFREQUENCY ABLATION, L3-L4 AND L5 MEDIAL BRANCH 1 OF 2;  Surgeon: Shaq Silverio MD;  Location: [facility];  Service: Pain Management;  Laterality: Left;    RADIOFREQUENCY ABLATION Right 9/20/2021    Procedure: RADIOFREQUENCY ABLATION, L3-L4 AND L5 MEDIAL BRANCH 2 OF 2;  Surgeon: Shaq Silverio MD;  Location: [facility];  Service: Pain Management;  Laterality: Right;    RADIOFREQUENCY ABLATION Right 7/7/2022    Procedure: RADIOFREQUENCY ABLATION, RIGHT L3-L4-L5 ONE OF TWO;  Surgeon: Shaq Silverio MD;  Location: [facility];  Service: Pain Management;  Laterality: Right;    RADIOFREQUENCY ABLATION Left 7/21/2022    Procedure: RADIOFREQUENCY ABLATION, LEFT L3-L4-L5 TWO OF TWO *BRING SCS REMOTE*;  Surgeon: Shaq Silverio MD;  Location: [facility];  Service: Pain Management;  Laterality: Left;    RADIOFREQUENCY ABLATION Left 3/16/2023    Procedure: RADIOFREQUENCY ABLATION LEFT L3,L4,L5 *BRING SCS REMOTE*;  Surgeon: Shaq Silverio MD;  Location: [facility];  Service: Pain Management;  Laterality: Left;    RADIOFREQUENCY ABLATION Right 3/30/2023    Procedure: RADIOFREQUENCY ABLATION RIGHT L3,L4,L5 *BRING SCS REMOTE*;  Surgeon: Shaq Silverio MD;  Location: [facility];  Service: Pain Management;  Laterality: Right;    RADIOFREQUENCY ABLATION Right 12/18/2023     Procedure: RADIOFREQUENCY ABLATION RIGHT L3, 4, 5 1 OF 2;  Surgeon: Shaq Silverio MD;  Location: [facility];  Service: Pain Management;  Laterality: Right;  [phone]    RADIOFREQUENCY ABLATION Left 1/4/2024    Procedure: RADIOFREQUENCY ABLATION LEFT L3, 4, 5 2 OF 2;  Surgeon: Shaq Silverio MD;  Location: [facility];  Service: Pain Management;  Laterality: Left;  [phone]  2 WK F/U WARREN    RADIOFREQUENCY ABLATION Bilateral 1/6/2025    Procedure: RADIOFREQUENCY ABLATION BILATERAL L3, 4, 5;  Surgeon: Shaq Silverio MD;  Location: [facility];  Service: Pain Management;  Laterality: Bilateral;  3 WK F/U KRANTHI    REPLACEMENT OF SPINAL CORD STIMULATOR  4/28/2025    Procedure: REPLACEMENT, SPINAL CORD STIMULATOR;  Surgeon: Mita Rivera MD;  Location: [facility] OR;  Service: Pain Management;;    REVISION PROCEDURE INVOLVING SPINAL CORD NEUROSTIMULATOR N/A 4/28/2025    Procedure: REPLACEMENT OF SCS IPG COPE REP;  Surgeon: Mita Rivera MD;  Location: [facility];  Service: Pain Management;  Laterality: N/A;    TRIAL OF SPINAL CORD NERVE STIMULATOR N/A 9/24/2018    Procedure: TRIAL, NEUROSTIMULATOR, SPINAL CORD, SPINAL CORD STIMULATOR TRIAL-INTERNAL WIRES TO EXTERNAL BATTERY;  Surgeon: Shaq Silverio MD;  Location: [facility];  Service: Pain Management;  Laterality: N/A;  [facility]OTT REP NOTIFIED       Family History:  Family History   Problem Relation Name Age of Onset    Cataracts Mother      Glaucoma Mother      No Known Problems Father      No Known Problems Sister      No Known Problems Brother      No Known Problems Maternal Aunt      No Known Problems Maternal Uncle      No Known Problems Paternal Aunt      No Known Problems Paternal Uncle      No Known Problems Maternal Grandmother      No Known Problems Maternal Grandfather      No Known Problems Paternal Grandmother      No Known Problems Paternal Grandfather         Social History:  Social History     Socioeconomic History    Marital status:    Tobacco Use  "   Smoking status: Never     Passive exposure: Never    Smokeless tobacco: Never   Substance and Sexual Activity    Alcohol use: Not Currently     Alcohol/week: 1.0 standard drink of alcohol     Types: 1 Glasses of wine per week     Comment: occ    Drug use: No    Sexual activity: Yes     Partners: Male     Social Drivers of Health     Financial Resource Strain: Medium Risk (3/6/2025)    Overall Financial Resource Strain (CARDIA)     Difficulty of Paying Living Expenses: Somewhat hard   Food Insecurity: No Food Insecurity (3/6/2025)    Hunger Vital Sign     Worried About Running Out of Food in the Last Year: Never true     Ran Out of Food in the Last Year: Never true   Transportation Needs: No Transportation Needs (3/6/2025)    PRAPARE - Transportation     Lack of Transportation (Medical): No     Lack of Transportation (Non-Medical): No   Physical Activity: Inactive (3/6/2025)    Exercise Vital Sign     Days of Exercise per Week: 0 days     Minutes of Exercise per Session: 10 min   Stress: No Stress Concern Present (3/6/2025)    [employer] - Occupational Stress Questionnaire     Feeling of Stress : Not at all   Housing Stability: Low Risk  (3/6/2025)    Housing Stability Vital Sign     Unable to Pay for Housing in the Last Year: No     Number of Times Moved in the Last Year: 0     Homeless in the Last Year: No       OBJECTIVE:    BP (!) 151/80 (BP Location: Right forearm)   Pulse 70   Temp 98.4 °F (36.9 °C) (Oral)   Ht 5' 4" (1.626 m)   Wt 84.8 kg (186 lb 15.2 oz)   SpO2 99%   BMI 32.09 kg/m²     PHYSICAL EXAMINATION:     General appearance: Well appearing, in no acute distress, alert and oriented x3.  Psych:  Mood and affect appropriate.  Skin: Skin color, texture, turgor normal, no rashes or lesions, in both upper and lower body. SCS site well approximated.   Head/face:  Atraumatic, normocephalic. No palpable lymph nodes  Back: Straight leg raising in the sitting and supine " positions is negative to radicular pain bilaterally. There is pain with palpation to lumbar facet joints bilaterally. Limited ROM with pain on extension.   Extremities: No deformities, edema, or skin discoloration. Good capillary refill.  Musculoskeletal: There is pain with palpation over bilateral SI joints. Positive FABERs, Gaenslen's and Finger Saw's bilaterally. 5/5 strength in right ankle with plantar and dorsiflexion. 5/5 strength in left ankle with plantar and dorsiflexion. 5/5 strength with right knee flexion and extension. 5/5 strength with left knee flexion and extension.   Neuro: Decreased sensation to BLE.  Gait: Antalgic- ambulates without assistance.        ASSESSMENT: 74 y.o. year old female with back pain, consistent with the following diagnoses:     1. Chronic pain syndrome        2. Sacroiliac joint pain  Procedure Order to Pain Management      3. Lumbar spondylosis        4. Degeneration of intervertebral disc of lumbar region with discogenic back pain        5. S/P insertion of spinal cord stimulator              PLAN:     - Previous imaging reviewed.  Labs reviewed.     - Schedule for bilateral SI joint injections.     - Will have [email or web address] representatives contact her for additional programming.     - Continue Lyrica and Zanaflex.     - RTC 2 weeks after above procedure.     The above plan and management options were discussed at length with patient. Patient is in agreement with the above and verbalized understanding.    Kimberly Conner NP  06/18/2025

## 2025-06-18 NOTE — PROGRESS NOTES
Interventional Pain Management - Established Visit  Follow-Up       SUBJECTIVE:    Interval History 6/18/2025:  The patient returns to clinic today for follow up of back and leg pain. She continues to report low back and buttock pain. She reports some benefit with [employer] SCS. She had reprogramming at last visit in May. She is taking Lyrica and Zanaflex. She denies any other health changes. Her pain today is 7/10.    Interval History 5/21/2025:  Faby Luna returns for follow-up after [employer] SCS battery change and wound check. She denies fever, chills, drainage or any other signs of infection. She denies recent health changes. She denies recent falls or trauma. She denies new onset fever/night sweats, urinary incontinence, bowel incontinence, significant weight changes, significant motor weakness or changes, or loss of sensations. Her pain today is 8/10.      Interval History 5/6/2025:  The patient returns to clinic today for post op. She is s/p [employer] SCS battery change on 4/28/2025. She denies any fever, chills, or drainage. She has completed her antibiotics. She is unsure how to work her remote. She is meeting with [employer] for programming. She denies any other health changes. Her pain today is 5/10.    INTERVAL HISTORY 3/17/2025:  Ms. Luna is a patient of Dr. Silverio and presents for end of life SCS pulse generator ([employer]). Current Pain level is 7/10. She reports the pain comes down both legs. She reports that while the stimulator was working, she didn't have pain in her legs.  She was recently interrogated and found to have a dead battery.  Interval History 3/7/2025:  The patient returns to clinic today for follow up of back pain. She reports worsened leg pain. She did find her controller. She is meeting with Haley from [employer] for programming. She did not receive increased dose of Lyrica. She is currently taking 100 mg. She is taking Zanaflex. She is performing a home exercise routine. She  denies any other health changes. Her pain today is 8/10.    Interval History 2/7/2025:  The patient returns to clinic today for follow up of back pain. She is s/p bilateral L3,4,5 RFA on 1/6/2025. She reports 50% relief. She continues to report intermittent low back pain. She is having worsening leg pain. She has not adjusted her SCS in a while. She did find her controller. She is taking Lyrica, although not sure of benefit. She is taking Zanaflex. She is performing home exercises. She denies any other health changes. Her pain today is 5/10.    Interval History 12/13/2024:  The patient returns to clinic today for follow up of back pain. She reports increased low back pain. She denies any radicular leg pain at this time. She does have benefit with SCS. She does need a new controller. Her pain is worse with standing and walking. She is taking Lyrica and Zanaflex. She is performing a home exercise routine. She denies any other health changes. Her pain today is 8/10.    Interval History 10/11/2024:  The patient returns to clinic today for follow up of back pain. Since last visit, her insurance denied her procedure due to timing. She reports increased back pain at this time. She is having radiating pain into her legs to her ankles. Her SCS is currently not on. She is unable to find her controller. She has not reached out to representatives. Her pain is worse with standing and walking. She denies any other health changes. Her pain today is 10/10.    Interval History 7/15/2024:  The patient is here today to discuss lower back pain. Since previous visit, she did undergo right then left L3,4,5 RFAs completed with 80% relief for about 7 months. Her pain has now returned and she wishes to repeat the RFAs. The pain worsens with prolonged standing and walking. She continues with home PT exercises. Her pain today is 10/10.    Interval history 11/22/2023:  73-year-old female that presents today with axial low back pain.  Not been  seen for approximately 7 months set of pain 1-2 weeks, is located in a bandlike distribution of low back she did not anesthesia like symptoms.  She is known to this clinic and was previously provided a or RFA targeting L3, L4 and L5 that provided her with 50-60% relief.  Like to be considered for repeat lumbar RFA she denies any recent incident or trauma denies any bowel bladder dysfunction anesthesia denies any profound weakness.    Interval History 4/20/2023:  The patient returns to clinic today for follow up of low back pain. She is s/p left L3,4,5 RFA on 3/16/2023 and right L3,4,5 RFA on 3/30/2023. She reports 50-60% relief of her pain. Her pain is tolerable at this time. She has good days and bad days. She denies any radicular leg pain at this time. She is not currently using her SCS. She has not contacted representatives. She reports that her mother passed away last week. She is dealing with a lot of stress due to this. She is taking Gabapentin, although not consistently. She also takes Zanaflex. She denies any other health changes. Her pain today is 6/10.     Interval History 2/8/2023:  The patient returns to clinic today for follow up of back pain. She reports worsened low back pain. She reports intermittent radiating pain into the posterior aspect of both legs to the ankles, left greater than right. Her pain is constant in nature. Her back pain is greater than her leg pain. She is reporting limited relief with SCS. She has not been able to meet with [employer] or [employer] for programming. She is taking Gabapentin. She denies any other health changes. Her pain today is 9/10.    Interval History 11/4/2022:  Faby is here for follow up of back and leg pain. Unfortunately, she has been unable to meet up with [employer] for SCS programming. She does report some improvement in symptoms since previous visit. She still has back pain with radiation into the legs. Recent XRAYs do not show any significant lead migration. She only takes  Gabapentin intermittently because she says it makes her feet swell but it does help. Her pain today is 8/10.    Interval History 10/4/2022:  The patient is here for follow up of chronic back pain. She reports more pain over the past month. No injury or trauma. She does have a lumbar SCS but is unsure if it is even on at this time. She does not think that it is. It did previously help with her back and leg pain. She has not been in contact with [employer] recently. The back pain radiates down the back of both legs to the feet. It worsens with walking and standing. Her pain today is 10/10.    Interval History 8/23/2022:  Faby is here for follow up of chronic lower back pain. She is now s/p right then left L3,4,5 RFAs completed on 7/21/22 with limited relief so far. She continues to report aching back pain with radiation into the legs and numbness. She has had her SCS off for a couple of months. She has not been in contact with [employer] rep for programming. She says that she turned it off due to limited benefit. No new weakness to legs or falls. No bowel/bladder incontinence. Her pain today is 10/10.    Interval History 6/17/2022:  The patient is here today for follow up of back pain. She has had more aching pain across the lower back. She had benefit with lumbar RFAs in the past and would like to reschedule this. She would also like to repeat aquatic PT as this was helpful in the past. Her pain is aching and throbbing in nature without radiation currently. No new falls or trauma. Her pain today is 8/10.    Interval History 5/5/2022:  The patient is here for follow up of chronic lower back pain. She has radiation into the legs. No recent falls or trauma. She saw Dr. Silverio last month and was under the impression that an [employer] rep would be here today for programming. She is still having difficulty programming her device. She has mild benefit with Gabapentin 900 mg daily without side effects. She is also having muscle spasms  intermittently. She is asking for a muscle relaxer. She has tried Robaxin in the past with mild benefit. She would like to try another medication. Her pain today is 8/10.    Interval History 4/20/2022: She presents today for follow up of low back pain. She states that she only had about 40% relief of LBP following RFAs in September that lasted a few weeks. Pain  continues to be in the low back and radiates into b/l LE. Pain encompasses the entire leg and does not follow a specific dermatomal pattern in the leg. She denies weakness, numbness or tingling in the lower extremities. She denies bowel or bladder incontinence. Pain is currently rated 8/10. She is currently on gabapentin 300mg tid with minimal relief. She has been unable to charge bret SCS for the last month and does not have the contact number for her rep.      Interval History 9/30/2021:  The patient returns to clinic today for follow up of low back pain. She is s/p left L3,4,5 RFA on 9/9/2021 and right L3,4,5 RFA on 9/20/2021. She is unsure of relief at this time. She reports increased pain to the right side. She describes this pain as burning in nature. She denies any radiating leg pain. Her pain is worse with bending and standing. She continues to report benefit with [email or web address] SCS. She denies any weakness. She denies any other health changes. Her pain today is 9/10.    Interval History 8/3/2021:   Ms. Luna returns in f/u re: low back/leg pain. She reports about three months ago she noticed her low back started bothering her, worse with bending and prolonged standing. No inciting event, no trauma to back since last visit. Reports continued leg pain down the backs of her legs as well. She reports intermittent instability in her legs - on and off - over the last year. Last time she has met with [employer] rep for reprogramming of SCS was fall 2020. Denies any current issues with SCS function/battery/remote.     Interval History 9/28/2020:  The patient is here  today for increased lower back pain.  She is status post right than left L3, 4, 5 radiofrequency ablation completed on 08/31/2020 with approximately 50% relief.  However, she is feeling tightness across the lower back without radiation at this time.  She would like an injection today.  Additionally, she states that she has not completed physical therapy for her back in some time and is not currently doing any exercises.  Her leg pain is currently well controlled with spinal cord stimulator and she had a recent adjustment.  Her pain today is 8/10.    Interval History 7/16/2020:  The patient is here for follow up of lower back pain.  She previously had benefit with lumbar RFAs for similar pain.  She is also having radiation into her legs with numbness, left greater than right.  [employer] is here today to meet with her for programming.  She previously was having significant benefit of leg pain with SCS implant.  She denies any recent trauma or falls. Her pain today is 9/10.    Interval History 1/9/2020:  The patient is here for follow up of lower back and leg pain.  She is s/p lumbar RFAs with about 50% relief.  Her leg pain is well controlled with implant.  She still has intermittent flare ups of back pain, like today.  When this happens, she has some benefit with rest.  She has tried Tylenol and OTC NSAIDs without benefit.  She denies any recent falls or weakness.  The patient denies any bowel or bladder incontinence or signs of saddle paresthesia.  The patient denies any major medical changes since last office visit.  Her pain today is 7/10.    Previous Encounter:  Faby Dejesus Manoj Jeremy presents to the clinic for a follow-up appointment for lower back pain.  She previously had significant leg pain which has resolved with [employer] SCS implant.  She is here today to discuss increased lower back pain.  It is sharp and throbbing in nature.  It is significant in the morning and with prolonged standing and activity.  She has  some benefit with stretching.  She denies any recent falls.  Since the last visit, Faby Luna states the pain has been worsening.  Current pain intensity is 8/10.    Pain Disability Index Review:      5/21/2025     1:31 PM 5/6/2025    11:31 AM 3/17/2025     3:47 PM   Last 3 PDI Scores   Pain Disability Index (PDI) 29 30 35       Opioid Contract: no     report:  Not applicable    Pain Procedures:   5/2/18 Left L3 and L4 TF ELISE- 20% relief  5/21/18 Bilateral L4-5 and L5-S1 facet joint injections- no relief  9/24/18 Lumbar SCS trial ([employer])- 100% relief  10/26/18 Lumbar SCS implant ([employer])- 100% relief of leg pain  9/12/19 Bilateral L3,4,5 MBB- helpful  10/17/19 Bilateral L3,4,5 MBB- helpful  11/21/19 Right L3,4,5 RFA- 50% relief  12/5/19 Left L3,4,5 RFA- 50% relief  8/17/20 Left L3,4,5 RFA- 50% relief  8/31/20 Right L3,4,5 RFA- 50% relief  9/9/2021- Left L3,4,5 RFA - 60% relief  9/20/2021- Right L3,4,5 RFA -60% relief  7/7/22 Right L3,4,5 RFA   7/21/22 Left L3,4,5 RFA   3/16/2023- Left L3,4,5 RFA  3/30/2023- Right L3,4,5 RFA  12/18/24 Right L3,4,5 RFA  1/4/24 Left L3,4,5 RFA  1/6/2025- Bilateral L3,4,5 RFA  4/28/2025- [employer] SCS battery change    Physical Therapy/Home Exercise:   yes in the past with limited benefit    Imaging:   XR THORACIC SPINE AP LATERAL     CLINICAL HISTORY:  check SCS lead placement;  Other mechanical complication of implanted electronic neurostimulator of spinal cord electrode (lead), initial encounter     TECHNIQUE:  AP and lateral views of the thoracic spine were performed.     COMPARISON:  10/04/2022     FINDINGS:  Vertebral body heights are maintained.  Multilevel disc space narrowing.     AP alignment is anatomic.  Dextroconvex curvature thoracic spine.     Leads terminate at T6-7 and T7.     Impression:     No acute osseous abnormality seen.        Electronically signed by:Daniela Hernadez  Date:                                            03/07/2025  Time:                                            16:06    3/31/18 Lumbar MRI    Narrative     EXAMINATION:  MRI LUMBAR SPINE WITHOUT CONTRAST    CLINICAL HISTORY:  Low back pain, >6wks conservative tx, persistent-progressive sx, surgical candidate; Other spondylosis with radiculopathy, lumbar region    TECHNIQUE:  Multiplanar, multisequence MR images were acquired from the thoracolumbar junction to the sacrum without the administration of contrast.    COMPARISON:  Plain films from 03/27/2018    FINDINGS:  There is grade 1 spondylolisthesis of L4 on L5. The vertebral body heights are well maintained, with no fracture.  No marrow signal abnormality suspicious for an infiltrative process.    The conus medullaris terminates at approximately the mid body of L1.  There is a cyst associated with the upper pole of the right kidney.  There is disc desiccation noted throughout the lumbar spine with relative sparing of the L5-S1 disc.  Mild disc space narrowing present at the L4-5 level.    L1-L2: Mild diffuse disc bulge resulting in no significant central or neural foraminal canal narrowing.    L2-L3: No significant central canal narrowing.  There is mild narrowing of either neural foraminal canal secondary to disc material.    L3-L4: Disc bulging in the bilateral foraminal regions resulting in no significant central canal narrowing.  Mild-to-moderate bilateral facet arthropathy also noted.  The bilateral neural foraminal canals are moderately narrowed with some mild effacement of either exiting L3 nerve root in the extraforaminal regions bilaterally.    L4-L5:  Grade 1 spondylolisthesis along with moderate to severe bilateral facet arthropathy and ligamentum flavum hypertrophy resulting in at least moderate narrowing of the central canal.  The right neural foraminal canal is mildly to moderately narrowed.  Left neural foraminal canal is moderately to severely narrowed with mild effacement of the exiting L4 nerve root.    L5-S1:  No significant  "central or neural foraminal canal narrowing noted.  Mild bilateral facet arthropathy noted.   Impression       1. Multilevel degenerative changes of the lumbar spine as detailed above     Lumbar XRAYs 3/27/18    Narrative     EXAMINATION:  XR LUMBAR SPINE AP AND LAT WITH FLEX/EXT    CLINICAL HISTORY:  Low back pain    TECHNIQUE:  AP and lateral views as well as lateral flexion and extension images are performed through the lumbar spine.    COMPARISON:  None    FINDINGS:  X-ray lumbar spine with flexion and extension demonstrates grade 1 spondylolisthesis at L4-5 measuring around 6 mm which does not change significantly with flexion and extension.  The L4-5 disc is narrowed and degenerated.  Other lumbar vertebral discs are maintained.  Vertebral body heights are maintained.  Small anterior spurs are seen, and there is small posterior osteophyte along the inferior endplate of L4.  There is lumbar facet arthropathy at L4-5 and L5-S1.   Impression       Degenerative changes as above.  Grade 1 anterolisthesis and degenerative disc disease at L4-5.         Review of patient's allergies indicates:  No Known Allergies      Current Medications:   Current Outpatient Medications   Medication Sig Dispense Refill    albuterol (PROAIR HFA) 90 mcg/actuation inhaler Inhale 2 puffs into the lungs every 4 (four) hours as needed for Wheezing or Shortness of Breath. Rescue 18 g 11    atenoloL (TENORMIN) 100 MG tablet TAKE 1 TABLET EVERY DAY 90 tablet 3    BD ULTRA-FINE MINI PEN NEEDLE 31 gauge x 3/16" Ndle       BD ULTRA-FINE FELICIA PEN NEEDLE 32 gauge x 5/32" Ndle       blood sugar diagnostic Strp To check BG 3 times daily, to use with insurance preferred meter 100 strip 11    EScitalopram oxalate (LEXAPRO) 20 MG tablet Take 1 tablet (20 mg total) by mouth once daily. 90 tablet 3    fluticasone propionate (FLONASE) 50 mcg/actuation nasal spray 2 sprays (100 mcg total) by Each Nostril route once daily. 11.1 mL 1    lancets Misc To check " BG 3 times daily, to use with insurance preferred meter 100 each 11    loratadine (CLARITIN) 10 mg tablet Take 1 tablet (10 mg total) by mouth daily as needed for Allergies. 90 tablet 3    losartan (COZAAR) 50 MG tablet Take 1 tablet by mouth once daily 90 tablet 2    pantoprazole (PROTONIX) 20 MG tablet TAKE 1 TABLET TWICE DAILY BEFORE MEALS 180 tablet 3    pregabalin (LYRICA) 150 MG capsule Take 1 capsule (150 mg total) by mouth 3 (three) times daily. 90 capsule 3    rosuvastatin (CRESTOR) 20 MG tablet Take 1 tablet (20 mg total) by mouth every evening. 90 tablet 3    tirzepatide (MOUNJARO) 10 mg/0.5 mL PnIj Inject 10 mg into the skin every 7 days. 4 Pen 4    tiZANidine (ZANAFLEX) 4 MG tablet TAKE 1 TABLET ONE TIME DAILY AS NEEDED FOR PAIN 90 tablet 3    traZODone (DESYREL) 100 MG tablet Take 1 tablet by mouth in the evening 90 tablet 1    chlorhexidine (HIBICLENS) 4 % external liquid Wash body in shower with this liquid every day for the 3 days before surgery. Rinse after 1-2 minutes. Do not use on head or face. (Patient not taking: Reported on 6/18/2025) 15 mL 0    HYDROcodone-acetaminophen (NORCO) 5-325 mg per tablet Take 1 tablet by mouth every 8 (eight) hours as needed for Pain (POST OP PAIN). (Patient not taking: Reported on 6/18/2025) 21 tablet 0    mupirocin (BACTROBAN) 2 % ointment Blow nose. Place this medicine on a Q-tip. Rub medicine around inside one nostril. Repeat in the other nostril. Pinch nose together for 1 minute. Do this 2 times per day for 3 days before surgery. (Patient not taking: Reported on 6/18/2025) 5 g 0     No current facility-administered medications for this visit.     Facility-Administered Medications Ordered in Other Visits   Medication Dose Route Frequency Provider Last Rate Last Admin    0.9%  NaCl infusion   Intravenous Continuous Uriel Aranda MD   New Bag at 10/30/24 1107       REVIEW OF SYSTEMS:    GENERAL:  No weight loss, malaise or fevers.  HEENT:  Negative for frequent  or significant headaches.  NECK:  Negative for lumps, goiter, pain and significant neck swelling.  RESPIRATORY:  Negative for cough, wheezing or shortness of breath.  CARDIOVASCULAR:  Negative for chest pain, leg swelling or palpitations. Hypertension.  GI:  Negative for abdominal discomfort, blood in stools or black stools or change in bowel habits.  MUSCULOSKELETAL:  See HPI.  SKIN:  Negative for lesions, rash, and itching.  PSYCH:  Negative for sleep disturbance, mood disorder and recent psychosocial stressors.  HEMATOLOGY/LYMPHOLOGY:  Negative for prolonged bleeding, bruising easily or swollen nodes.  NEURO:   No history of headaches, syncope, paralysis, seizures or tremors.  ENDO: Diabetes.  All other reviewed and negative other than HPI.    Past Medical History:  Past Medical History:   Diagnosis Date    Anxiety with depression     Arthritis     CKD (chronic kidney disease) stage 2, GFR 60-89 ml/min     Diabetes mellitus     History of Clarisse-en-Y gastric bypass     Hypertension     Obesity, unspecified     DAHLIA (obstructive sleep apnea)     Spondylosis     Wears dentures        Past Surgical History:  Past Surgical History:   Procedure Laterality Date    CARPAL TUNNEL RELEASE Right 3/8/2019    Procedure: RELEASE, CARPAL TUNNEL right;  Surgeon: Pan Goodman MD;  Location: [facility];  Service: Orthopedics;  Laterality: Right;    CARPAL TUNNEL RELEASE Left 2019    Procedure: RELEASE, CARPAL TUNNEL- LEFT;  Surgeon: Pan Goodman MD;  Location: [address];  Service: Orthopedics;  Laterality: Left;    CATARACT EXTRACTION Bilateral      SECTION      CHOLECYSTECTOMY      COLONOSCOPY N/A 2024    Procedure: COLONOSCOPY;  Surgeon: Otilio Man MD;  Location: [facility];  Service: Endoscopy;  Laterality: N/A;  Referred by: MYNOR Sprague / CECILIA Meds: ozempic / diabetic / Prep: peg / Route instructions sent: portal  - lvm and portal msg for pc. DBM  - left 2nd vm for pc. DBM    COLONOSCOPY,  SCREENING, HIGH RISK PATIENT N/A 10/30/2024    Procedure: COLONOSCOPY, SCREENING, HIGH RISK PATIENT;  Surgeon: Corinne Traylor MD;  Location: [facility] ENDO;  Service: Endoscopy;  Laterality: N/A;  WLMED: Ozempic Pt states she hasn't taken for several weeks  Outside referral from [facility]  ext suprep  inst portal  LW  10/23 r/s ext suprep to portal, holding ozempic since 10/16 cf    EPIDURAL STEROID INJECTION INTO LUMBAR SPINE N/A 6/14/2018    Procedure: INJECTION, STEROID, SPINE, LUMBAR, EPIDURAL;  Surgeon: Shaq Silverio MD;  Location: [facility]IN [facility]GT;  Service: Pain Management;  Laterality: N/A;  LUMBAR L4-L5 INTERLAMINAR ELISE'  [id number]  W/ SEDATION     ESOPHAGOGASTRODUODENOSCOPY N/A 5/12/2023    Procedure: EGD (ESOPHAGOGASTRODUODENOSCOPY);  Surgeon: Deann Jimenez MD;  Location: [facility] ENDO;  Service: Gastroenterology;  Laterality: N/A;    HYSTERECTOMY      INJECTION OF ANESTHETIC AGENT AROUND NERVE Bilateral 9/12/2019    Procedure: BLOCK, NERVE, L3-L4-L5 MEDIAL BRANCH;  Surgeon: Shaq Silverio MD;  Location: [facility];  Service: Pain Management;  Laterality: Bilateral;    INJECTION OF ANESTHETIC AGENT AROUND NERVE Bilateral 10/17/2019    Procedure: BLOCK, NERVE;  Surgeon: Shaq Silverio MD;  Location: [facility];  Service: Pain Management;  Laterality: Bilateral;  B/L MBB L3-L4-L5  REPEAT  CONSENT NEEDED    INJECTION OF FACET JOINT Bilateral 5/28/2018    Procedure: INJECTION-FACET;  Surgeon: Shaq Silverio MD;  Location: [facility];  Service: Pain Management;  Laterality: Bilateral;  LUMBAR BILATERAL L4-L5 AND L5-S1 FACET STEROID INJECTION  [id number]    W/ SEDATION     RADIOFREQUENCY ABLATION Right 11/21/2019    Procedure: RADIOFREQUENCY ABLATION RIGHT L3, L4, L5;  Surgeon: Shaq Silverio MD;  Location: [facility];  Service: Pain Management;  Laterality: Right;  Right RFA L3-L4-L5  1 of 2  Consent Needed    RADIOFREQUENCY ABLATION Left 12/5/2019    Procedure: RADIOFREQUENCY ABLATION LEFT L3-5;  Surgeon:  Shaq Silverio MD;  Location: [facility];  Service: Pain Management;  Laterality: Left;  Left RFA L3-L4-L5  2 of 2  Consent Needed    RADIOFREQUENCY ABLATION Left 8/17/2020    Procedure: RADIOFREQUENCY ABLATION LEFT L3,4,5 1 of 2;  Surgeon: Shaq Silverio MD;  Location: [facility];  Service: Pain Management;  Laterality: Left;  Left RFA L3,4,5  1 of 2    RADIOFREQUENCY ABLATION Right 8/31/2020    Procedure: RADIOFREQUENCY ABLATION RIGHT L3,4,5 2 of 2;  Surgeon: Shaq Silverio MD;  Location: [facility];  Service: Pain Management;  Laterality: Right;  RADIOFREQUENCY ABLATION RIGHT L3,4,5  2 of 2    RADIOFREQUENCY ABLATION Left 9/9/2021    Procedure: RADIOFREQUENCY ABLATION, L3-L4 AND L5 MEDIAL BRANCH 1 OF 2;  Surgeon: Shaq Silverio MD;  Location: [facility];  Service: Pain Management;  Laterality: Left;    RADIOFREQUENCY ABLATION Right 9/20/2021    Procedure: RADIOFREQUENCY ABLATION, L3-L4 AND L5 MEDIAL BRANCH 2 OF 2;  Surgeon: Shaq Silverio MD;  Location: [facility];  Service: Pain Management;  Laterality: Right;    RADIOFREQUENCY ABLATION Right 7/7/2022    Procedure: RADIOFREQUENCY ABLATION, RIGHT L3-L4-L5 ONE OF TWO;  Surgeon: Shaq Silverio MD;  Location: [facility];  Service: Pain Management;  Laterality: Right;    RADIOFREQUENCY ABLATION Left 7/21/2022    Procedure: RADIOFREQUENCY ABLATION, LEFT L3-L4-L5 TWO OF TWO *BRING SCS REMOTE*;  Surgeon: Shaq Silverio MD;  Location: [facility];  Service: Pain Management;  Laterality: Left;    RADIOFREQUENCY ABLATION Left 3/16/2023    Procedure: RADIOFREQUENCY ABLATION LEFT L3,L4,L5 *BRING SCS REMOTE*;  Surgeon: Shaq Silverio MD;  Location: [facility];  Service: Pain Management;  Laterality: Left;    RADIOFREQUENCY ABLATION Right 3/30/2023    Procedure: RADIOFREQUENCY ABLATION RIGHT L3,L4,L5 *BRING SCS REMOTE*;  Surgeon: Shaq Silverio MD;  Location: [facility];  Service: Pain Management;  Laterality: Right;    RADIOFREQUENCY ABLATION Right 12/18/2023     Procedure: RADIOFREQUENCY ABLATION RIGHT L3, 4, 5 1 OF 2;  Surgeon: Shaq Silverio MD;  Location: [facility];  Service: Pain Management;  Laterality: Right;  [phone]    RADIOFREQUENCY ABLATION Left 1/4/2024    Procedure: RADIOFREQUENCY ABLATION LEFT L3, 4, 5 2 OF 2;  Surgeon: Shaq Silverio MD;  Location: [facility];  Service: Pain Management;  Laterality: Left;  [phone]  2 WK F/U WARREN    RADIOFREQUENCY ABLATION Bilateral 1/6/2025    Procedure: RADIOFREQUENCY ABLATION BILATERAL L3, 4, 5;  Surgeon: Shaq Silverio MD;  Location: [facility];  Service: Pain Management;  Laterality: Bilateral;  3 WK F/U KRANTHI    REPLACEMENT OF SPINAL CORD STIMULATOR  4/28/2025    Procedure: REPLACEMENT, SPINAL CORD STIMULATOR;  Surgeon: Mita Rivera MD;  Location: [facility] OR;  Service: Pain Management;;    REVISION PROCEDURE INVOLVING SPINAL CORD NEUROSTIMULATOR N/A 4/28/2025    Procedure: REPLACEMENT OF SCS IPG COPE REP;  Surgeon: Mita Rivera MD;  Location: [facility] OR;  Service: Pain Management;  Laterality: N/A;    TRIAL OF SPINAL CORD NERVE STIMULATOR N/A 9/24/2018    Procedure: TRIAL, NEUROSTIMULATOR, SPINAL CORD, SPINAL CORD STIMULATOR TRIAL-INTERNAL WIRES TO EXTERNAL BATTERY;  Surgeon: Shaq Silverio MD;  Location: [facility];  Service: Pain Management;  Laterality: N/A;  ABBOTT REP NOTIFIED       Family History:  Family History   Problem Relation Name Age of Onset    Cataracts Mother      Glaucoma Mother      No Known Problems Father      No Known Problems Sister      No Known Problems Brother      No Known Problems Maternal Aunt      No Known Problems Maternal Uncle      No Known Problems Paternal Aunt      No Known Problems Paternal Uncle      No Known Problems Maternal Grandmother      No Known Problems Maternal Grandfather      No Known Problems Paternal Grandmother      No Known Problems Paternal Grandfather         Social History:  Social History     Socioeconomic History    Marital status:    Tobacco Use  "   Smoking status: Never     Passive exposure: Never    Smokeless tobacco: Never   Substance and Sexual Activity    Alcohol use: Not Currently     Alcohol/week: 1.0 standard drink of alcohol     Types: 1 Glasses of wine per week     Comment: occ    Drug use: No    Sexual activity: Yes     Partners: Male     Social Drivers of Health     Financial Resource Strain: Medium Risk (3/6/2025)    Overall Financial Resource Strain (CARDIA)     Difficulty of Paying Living Expenses: Somewhat hard   Food Insecurity: No Food Insecurity (3/6/2025)    Hunger Vital Sign     Worried About Running Out of Food in the Last Year: Never true     Ran Out of Food in the Last Year: Never true   Transportation Needs: No Transportation Needs (3/6/2025)    PRAPARE - Transportation     Lack of Transportation (Medical): No     Lack of Transportation (Non-Medical): No   Physical Activity: Inactive (3/6/2025)    Exercise Vital Sign     Days of Exercise per Week: 0 days     Minutes of Exercise per Session: 10 min   Stress: No Stress Concern Present (3/6/2025)    [employer] [place] [employer] - Occupational Stress Questionnaire     Feeling of Stress : Not at all   Housing Stability: Low Risk  (3/6/2025)    Housing Stability Vital Sign     Unable to Pay for Housing in the Last Year: No     Number of Times Moved in the Last Year: 0     Homeless in the Last Year: No       OBJECTIVE:    BP (!) 151/80 (BP Location: Right forearm)   Pulse 70   Temp 98.4 °F (36.9 °C) (Oral)   Ht 5' 4" (1.626 m)   Wt 84.8 kg (186 lb 15.2 oz)   SpO2 99%   BMI 32.09 kg/m²     PHYSICAL EXAMINATION:     General appearance: Well appearing, in no acute distress, alert and oriented x3.  Psych:  Mood and affect appropriate.  Skin: Skin color, texture, turgor normal, no rashes or lesions, in both upper and lower body. SCS site well approximated.   Head/face:  Atraumatic, normocephalic. No palpable lymph nodes  Back: Straight leg raising in the sitting and supine " positions is negative to radicular pain bilaterally. There is pain with palpation to lumbar facet joints bilaterally. Limited ROM with pain on extension.   Extremities: No deformities, edema, or skin discoloration. Good capillary refill.  Musculoskeletal: There is pain with palpation over bilateral SI joints. Positive FABERs, Gaenslen's and Finger Saw's bilaterally. 5/5 strength in right ankle with plantar and dorsiflexion. 5/5 strength in left ankle with plantar and dorsiflexion. 5/5 strength with right knee flexion and extension. 5/5 strength with left knee flexion and extension.   Neuro: Decreased sensation to BLE.  Gait: Antalgic- ambulates without assistance.        ASSESSMENT: 74 y.o. year old female with back pain, consistent with the following diagnoses:     1. Chronic pain syndrome        2. Sacroiliac joint pain  Procedure Order to Pain Management      3. Lumbar spondylosis        4. Degeneration of intervertebral disc of lumbar region with discogenic back pain        5. S/P insertion of spinal cord stimulator              PLAN:     - Previous imaging reviewed.  Labs reviewed.     - Schedule for bilateral SI joint injections.     - Will have [employer] representatives contact her for additional programming.     - Continue Lyrica and Zanaflex.     - RTC 2 weeks after above procedure.     The above plan and management options were discussed at length with patient. Patient is in agreement with the above and verbalized understanding.    Kimberly Conner NP  06/18/2025

## 2025-06-19 ENCOUNTER — PATIENT MESSAGE (OUTPATIENT)
Dept: PAIN MEDICINE | Facility: CLINIC | Age: 75
End: 2025-06-19
Payer: MEDICARE

## 2025-06-19 DIAGNOSIS — M53.3 SACROILIAC JOINT PAIN: Primary | ICD-10-CM

## 2025-07-07 ENCOUNTER — HOSPITAL ENCOUNTER (OUTPATIENT)
Facility: OTHER | Age: 75
Discharge: HOME | End: 2025-07-07
Attending: ANESTHESIOLOGY | Admitting: ANESTHESIOLOGY
Payer: MEDICARE

## 2025-07-07 VITALS
OXYGEN SATURATION: 97 % | SYSTOLIC BLOOD PRESSURE: 168 MMHG | RESPIRATION RATE: 18 BRPM | HEART RATE: 68 BPM | DIASTOLIC BLOOD PRESSURE: 86 MMHG | HEIGHT: 64 IN | WEIGHT: 180 LBS | BODY MASS INDEX: 30.73 KG/M2 | TEMPERATURE: 98 F

## 2025-07-07 DIAGNOSIS — M46.1 SACROILIITIS: Primary | ICD-10-CM

## 2025-07-07 LAB — POCT GLUCOSE: 79 MG/DL (ref 70–110)

## 2025-07-07 PROCEDURE — 25000003 PHARM REV CODE 250: Mod: HCNC | Performed by: ANESTHESIOLOGY

## 2025-07-07 PROCEDURE — 27096 INJECT SACROILIAC JOINT: CPT | Mod: 50,HCNC | Performed by: ANESTHESIOLOGY

## 2025-07-07 PROCEDURE — 25500020 PHARM REV CODE 255: Mod: HCNC | Performed by: ANESTHESIOLOGY

## 2025-07-07 PROCEDURE — 27096 INJECT SACROILIAC JOINT: CPT | Mod: 50,HCNC,, | Performed by: ANESTHESIOLOGY

## 2025-07-07 PROCEDURE — 63600175 PHARM REV CODE 636 W HCPCS: Mod: HCNC | Performed by: ANESTHESIOLOGY

## 2025-07-07 RX ORDER — SODIUM CHLORIDE 9 MG/ML
INJECTION, SOLUTION INTRAVENOUS CONTINUOUS
Status: DISCONTINUED | OUTPATIENT
Start: 2025-07-07 | End: 2025-07-07 | Stop reason: HOSPADM

## 2025-07-07 RX ORDER — ALPRAZOLAM 0.5 MG/1
0.5 TABLET, ORALLY DISINTEGRATING ORAL ONCE
Status: COMPLETED | OUTPATIENT
Start: 2025-07-07 | End: 2025-07-07

## 2025-07-07 RX ORDER — LIDOCAINE HYDROCHLORIDE 20 MG/ML
INJECTION, SOLUTION INFILTRATION; PERINEURAL
Status: DISCONTINUED | OUTPATIENT
Start: 2025-07-07 | End: 2025-07-07 | Stop reason: HOSPADM

## 2025-07-07 RX ORDER — TRIAMCINOLONE ACETONIDE 40 MG/ML
INJECTION, SUSPENSION INTRA-ARTICULAR; INTRAMUSCULAR
Status: DISCONTINUED | OUTPATIENT
Start: 2025-07-07 | End: 2025-07-07 | Stop reason: HOSPADM

## 2025-07-07 RX ORDER — BUPIVACAINE HYDROCHLORIDE 2.5 MG/ML
INJECTION, SOLUTION EPIDURAL; INFILTRATION; INTRACAUDAL; PERINEURAL
Status: DISCONTINUED | OUTPATIENT
Start: 2025-07-07 | End: 2025-07-07 | Stop reason: HOSPADM

## 2025-07-07 RX ADMIN — ALPRAZOLAM 0.5 MG: 0.5 TABLET, ORALLY DISINTEGRATING ORAL at 10:07

## 2025-07-07 NOTE — DISCHARGE INSTRUCTIONS

## 2025-07-07 NOTE — OP NOTE
Sacroiliac Joint Injection under Fluoroscopic Guidance    The procedure, risks, benefits, and options were discussed with the patient. There are no contraindications to the procedure. The patent expressed understanding and agreed to the procedure. Informed written consent was obtained prior to the start of the procedure and can be found in the patient's chart.    PATIENT NAME: Faby Luna   MRN: [id number]     DATE OF PROCEDURE: 07/07/2025    PROCEDURE: Bilateral Sacroiliac Joint Injection under Fluoroscopic Guidance    PRE-OP DIAGNOSIS: Sacroiliac joint pain [M53.3]    POST-OP DIAGNOSIS: Same    PHYSICIAN: Shaq Silverio MD    ASSISTANTS: Ibrahim Samarra'e, MD [facility] Fellow      MEDICATIONS INJECTED: Preservative-free Kenalog 40mg with 3cc of Bupivacine 0.25%     LOCAL ANESTHETIC INJECTED: Xylocaine 2%     SEDATION: None    ESTIMATED BLOOD LOSS: None    COMPLICATIONS: None    TECHNIQUE: Time-out was performed to identify the patient and procedure to be performed. With the patient laying in a prone position, the surgical area was prepped and draped in the usual sterile fashion using ChloraPrep and a fenestrated drape. The sacroiliac joint was determined under fluoroscopy guidance. Skin anesthesia was achieved by injecting Lidocaine 2% over the injection site. The sacroiliac joint was  then approached with a 25 gauge, 3.5 inch spinal quinke needle that was introduced under fluoroscopic guidance in the AP and Lateral views. Once the needle tip was in the area of the joint, and there was no blood, contrast dye Omnipaque (300mg/mL) was injected to confirm placement and there was no vascular runoff. Fluoroscopic imaging in the AP and lateral views revealed a clear outline of the joint space. 4 mL of the medication mixture listed above was injected slowly intraarticular and brionna-articular. Displacement of the radio opaque contrast after injection of the medication confirmed that the medication went into the area of the  joint. The needles were removed and bleeding was nil. The same procedure was repeated on the contralateral side. A sterile dressing was applied. No specimens collected. The patient tolerated the procedure well.     PRE-PROCEDURE PAIN SCORE: 7-10/10    POST-PROCEDURE PAIN SCORE: 0/10    The patient was monitored after the procedure in the recovery area. They were given post-procedure and discharge instructions to follow at home. The patient was discharged in a stable condition.    Shaq Silverio MD

## 2025-07-07 NOTE — INTERVAL H&P NOTE
The patient has been examined and the H&P has been reviewed:    I concur with the findings and no changes have occurred since H&P was written.    Anesthesia/Surgery risks, benefits and alternative options discussed and understood by patient/family.      Sacroiliitis bilaterally     There are no hospital problems to display for this patient.

## 2025-07-07 NOTE — DISCHARGE SUMMARY
"Discharge Note  Short Stay      SUMMARY     Admit Date: 7/7/2025    Attending Physician: Shaq Silverio      Discharge Physician: Shaq Silverio      Discharge Date: 7/7/2025 11:12 AM    Procedure(s) (LRB):  INJECTION,SACROILIAC JOINT BILATERAL (Bilateral)    Final Diagnosis: Sacroiliac joint pain [M53.3]    Disposition: Home or self care    Patient Instructions:   Current Discharge Medication List        CONTINUE these medications which have NOT CHANGED    Details   albuterol (PROAIR HFA) 90 mcg/actuation inhaler Inhale 2 puffs into the lungs every 4 (four) hours as needed for Wheezing or Shortness of Breath. Rescue  Qty: 18 g, Refills: 11    Associated Diagnoses: Mild intermittent asthma without complication      atenoloL (TENORMIN) 100 MG tablet TAKE 1 TABLET EVERY DAY  Qty: 90 tablet, Refills: 3    Comments: .  Associated Diagnoses: Essential hypertension      BD ULTRA-FINE MINI PEN NEEDLE 31 gauge x 3/16" Ndle       BD ULTRA-FINE FELICIA PEN NEEDLE 32 gauge x 5/32" Ndle       blood sugar diagnostic Strp To check BG 3 times daily, to use with insurance preferred meter  Qty: 100 strip, Refills: 11    Associated Diagnoses: Type 2 diabetes mellitus with stage 3a chronic kidney disease, without long-term current use of insulin      chlorhexidine (HIBICLENS) 4 % external liquid Wash body in shower with this liquid every day for the 3 days before surgery. Rinse after 1-2 minutes. Do not use on head or face.  Qty: 15 mL, Refills: 0    Associated Diagnoses: S/P insertion of spinal cord stimulator      EScitalopram oxalate (LEXAPRO) 20 MG tablet Take 1 tablet (20 mg total) by mouth once daily.  Qty: 90 tablet, Refills: 3    Associated Diagnoses: Mild recurrent major depression; SANDRA (generalized anxiety disorder)      fluticasone propionate (FLONASE) 50 mcg/actuation nasal spray 2 sprays (100 mcg total) by Each Nostril route once daily.  Qty: 11.1 mL, Refills: 1    Associated Diagnoses: Seasonal allergic rhinitis, unspecified " trigger      lancets Misc To check BG 3 times daily, to use with insurance preferred meter  Qty: 100 each, Refills: 11    Associated Diagnoses: Type 2 diabetes mellitus with stage 3a chronic kidney disease, without long-term current use of insulin      loratadine (CLARITIN) 10 mg tablet Take 1 tablet (10 mg total) by mouth daily as needed for Allergies.  Qty: 90 tablet, Refills: 3    Associated Diagnoses: Seasonal allergies      losartan (COZAAR) 50 MG tablet Take 1 tablet by mouth once daily  Qty: 90 tablet, Refills: 2    Comments: .  Associated Diagnoses: Essential hypertension      pantoprazole (PROTONIX) 20 MG tablet TAKE 1 TABLET TWICE DAILY BEFORE MEALS  Qty: 180 tablet, Refills: 3    Associated Diagnoses: Gastroesophageal reflux disease without esophagitis      pregabalin (LYRICA) 150 MG capsule Take 1 capsule (150 mg total) by mouth 3 (three) times daily.  Qty: 90 capsule, Refills: 3    Associated Diagnoses: Chronic pain syndrome; Lumbar spondylosis; Degeneration of intervertebral disc of lumbar region with discogenic back pain      rosuvastatin (CRESTOR) 20 MG tablet Take 1 tablet (20 mg total) by mouth every evening.  Qty: 90 tablet, Refills: 3    Associated Diagnoses: Dyslipidemia associated with type 2 diabetes mellitus; Aortic atherosclerosis      tirzepatide (MOUNJARO) 10 mg/0.5 mL PnIj Inject 10 mg into the skin every 7 days.  Qty: 4 Pen, Refills: 4    Associated Diagnoses: Type 2 diabetes mellitus with stage 3a chronic kidney disease, without long-term current use of insulin      tiZANidine (ZANAFLEX) 4 MG tablet TAKE 1 TABLET ONE TIME DAILY AS NEEDED FOR PAIN  Qty: 90 tablet, Refills: 3    Associated Diagnoses: Chronic pain syndrome      traZODone (DESYREL) 100 MG tablet Take 1 tablet by mouth in the evening  Qty: 90 tablet, Refills: 1    Associated Diagnoses: Insomnia, unspecified type                 Discharge Diagnosis: Sacroiliac joint pain [M53.3]  Condition on Discharge: Stable with no  complications to procedure   Diet on Discharge: Same as before.  Activity: as per instruction sheet.  Discharge to: Home with a responsible adult.  Follow up: 2-4 weeks       Please call my office or pager at [phone] if experienced any weakness or loss of sensation, fever > 101.5, pain uncontrolled with oral medications, persistent nausea/vomiting/or diarrhea, redness or drainage from the incisions, or any other worrisome concerns. If physician on call was not reached or could not communicate with our office for any reason please go to the nearest emergency department

## 2025-07-09 ENCOUNTER — PATIENT MESSAGE (OUTPATIENT)
Dept: FAMILY MEDICINE | Facility: CLINIC | Age: 75
End: 2025-07-09
Payer: MEDICARE

## 2025-07-18 ENCOUNTER — TELEPHONE (OUTPATIENT)
Dept: PAIN MEDICINE | Facility: CLINIC | Age: 75
End: 2025-07-18
Payer: MEDICARE

## 2025-07-18 NOTE — TELEPHONE ENCOUNTER
""Suscriber not in service".    "  You  Aishaepifanio Miller now (4:16 PM)     GS  Ms. Luna,  Good afternoon. I was unable to leave a voicemail earlier today, reminding you of your appointment with TANGELA Conner NP  on Tuesday, July 22, 2025 at 10:20 am.       We are located at:  [facility]  9th Floor, [address]  [place] [address]     See you then!     Thank you,  CHRISTOPHER Macario LPN    This [employer] message has not been read.   "  "

## 2025-07-22 ENCOUNTER — OFFICE VISIT (OUTPATIENT)
Dept: PAIN MEDICINE | Facility: CLINIC | Age: 75
End: 2025-07-22
Payer: MEDICARE

## 2025-07-22 VITALS
OXYGEN SATURATION: 97 % | WEIGHT: 186.31 LBS | DIASTOLIC BLOOD PRESSURE: 78 MMHG | RESPIRATION RATE: 19 BRPM | HEIGHT: 64 IN | BODY MASS INDEX: 31.81 KG/M2 | SYSTOLIC BLOOD PRESSURE: 137 MMHG | HEART RATE: 68 BPM | TEMPERATURE: 97 F

## 2025-07-22 DIAGNOSIS — M51.360 DEGENERATION OF INTERVERTEBRAL DISC OF LUMBAR REGION WITH DISCOGENIC BACK PAIN: ICD-10-CM

## 2025-07-22 DIAGNOSIS — M79.18 MYOFASCIAL PAIN: ICD-10-CM

## 2025-07-22 DIAGNOSIS — M53.3 SACROILIAC JOINT PAIN: ICD-10-CM

## 2025-07-22 DIAGNOSIS — G89.4 CHRONIC PAIN SYNDROME: Primary | ICD-10-CM

## 2025-07-22 DIAGNOSIS — M47.816 LUMBAR SPONDYLOSIS: ICD-10-CM

## 2025-07-22 PROCEDURE — 99999 PR PBB SHADOW E&M-EST. PATIENT-LVL V: CPT | Mod: PBBFAC,HCNC,, | Performed by: NURSE PRACTITIONER

## 2025-07-22 RX ORDER — TIZANIDINE 4 MG/1
4 TABLET ORAL 3 TIMES DAILY PRN
Qty: 90 TABLET | Refills: 3 | Status: SHIPPED | OUTPATIENT
Start: 2025-07-22

## 2025-07-22 NOTE — PROGRESS NOTES
Interventional Pain Management - Established Visit  Follow-Up       SUBJECTIVE:    Interval History 7/22/2025:  The patient returns to clinic today for follow up of back pain. She is s/p bilateral SI joint injections on 7/7/2025. She reports 70% relief of her pain. She has intermittent low back and buttock pain. She does have benefit with [employer] SCS. She is taking Lyrica, although not as helpful. She is taking Zanaflex as needed. She is performing a home exercise routine. She is going to the gym 3 times a week. She denies any other health changes. Her pain today is 0/10.    Interval History 6/18/2025:  The patient returns to clinic today for follow up of back and leg pain. She continues to report low back and buttock pain. She reports some benefit with [employer] SCS. She had reprogramming at last visit in May. She is taking Lyrica and Zanaflex. She denies any other health changes. Her pain today is 7/10.    Interval History 5/21/2025:  Faby Luna returns for follow-up after [employer] SCS battery change and wound check. She denies fever, chills, drainage or any other signs of infection. She denies recent health changes. She denies recent falls or trauma. She denies new onset fever/night sweats, urinary incontinence, bowel incontinence, significant weight changes, significant motor weakness or changes, or loss of sensations. Her pain today is 8/10.      Interval History 5/6/2025:  The patient returns to clinic today for post op. She is s/p [employer] SCS battery change on 4/28/2025. She denies any fever, chills, or drainage. She has completed her antibiotics. She is unsure how to work her remote. She is meeting with [employer] today for programming. She denies any other health changes. Her pain today is 5/10.    INTERVAL HISTORY 3/17/2025:  Ms. Luna is a patient of Dr. Silverio and presents for end of life SCS pulse generator ([employer]). Current Pain level is 7/10. She reports the pain comes down both legs. She reports that  while the stimulator was working, she didn't have pain in her legs.  She was recently interrogated and found to have a dead battery.  Interval History 3/7/2025:  The patient returns to clinic today for follow up of back pain. She reports worsened leg pain. She did find her controller. She is meeting with Haley from [employer] today for programming. She did not receive increased dose of Lyrica. She is currently taking 100 mg. She is taking Zanaflex. She is performing a home exercise routine. She denies any other health changes. Her pain today is 8/10.    Interval History 2/7/2025:  The patient returns to clinic today for follow up of back pain. She is s/p bilateral L3,4,5 RFA on 1/6/2025. She reports 50% relief. She continues to report intermittent low back pain. She is having worsening leg pain. She has not adjusted her SCS in a while. She did find her controller. She is taking Lyrica, although not sure of benefit. She is taking Zanaflex. She is performing home exercises. She denies any other health changes. Her pain today is 5/10.    Interval History 12/13/2024:  The patient returns to clinic today for follow up of back pain. She reports increased low back pain. She denies any radicular leg pain at this time. She does have benefit with SCS. She does need a new controller. Her pain is worse with standing and walking. She is taking Lyrica and Zanaflex. She is performing a home exercise routine. She denies any other health changes. Her pain today is 8/10.    Interval History 10/11/2024:  The patient returns to clinic today for follow up of back pain. Since last visit, her insurance denied her procedure due to timing. She reports increased back pain at this time. She is having radiating pain into her legs to her ankles. Her SCS is currently not on. She is unable to find her controller. She has not reached out to representatives. Her pain is worse with standing and walking. She denies any other health changes. Her pain  today is 10/10.    Interval History 7/15/2024:  The patient is here today to discuss lower back pain. Since previous visit, she did undergo right then left L3,4,5 RFAs completed with 80% relief for about 7 months. Her pain has now returned and she wishes to repeat the RFAs. The pain worsens with prolonged standing and walking. She continues with home PT exercises. Her pain today is 10/10.    Interval history 11/22/2023:  73-year-old female that presents today with axial low back pain.  Not been seen for approximately 7 months set of pain 1-2 weeks, is located in a bandlike distribution of low back she did not anesthesia like symptoms.  She is known to this clinic and was previously provided a or RFA targeting L3, L4 and L5 that provided her with 50-60% relief.  Like to be considered for repeat lumbar RFA she denies any recent incident or trauma denies any bowel bladder dysfunction anesthesia denies any profound weakness.    Interval History 4/20/2023:  The patient returns to clinic today for follow up of low back pain. She is s/p left L3,4,5 RFA on 3/16/2023 and right L3,4,5 RFA on 3/30/2023. She reports 50-60% relief of her pain. Her pain is tolerable at this time. She has good days and bad days. She denies any radicular leg pain at this time. She is not currently using her SCS. She has not contacted representatives. She reports that her mother passed away last week. She is dealing with a lot of stress due to this. She is taking Gabapentin, although not consistently. She also takes Zanaflex. She denies any other health changes. Her pain today is 6/10.     Interval History 2/8/2023:  The patient returns to clinic today for follow up of back pain. She reports worsened low back pain. She reports intermittent radiating pain into the posterior aspect of both legs to the ankles, left greater than right. Her pain is constant in nature. Her back pain is greater than her leg pain. She is reporting limited relief with SCS.  She has not been able to meet with Roque or [employer] for programming. She is taking Gabapentin. She denies any other health changes. Her pain today is 9/10.    Interval History 11/4/2022:  Faby is here for follow up of back and leg pain. Unfortunately, she has been unable to meet up with [employer] for SCS programming. She does report some improvement in symptoms since previous visit. She still has back pain with radiation into the legs. Recent XRAYs do not show any significant lead migration. She only takes Gabapentin intermittently because she says it makes her feet swell but it does help. Her pain today is 8/10.    Interval History 10/4/2022:  The patient is here for follow up of chronic back pain. She reports more pain over the past month. No injury or trauma. She does have a lumbar SCS but is unsure if it is even on at this time. She does not think that it is. It did previously help with her back and leg pain. She has not been in contact with [employer] recently. The back pain radiates down the back of both legs to the feet. It worsens with walking and standing. Her pain today is 10/10.    Interval History 8/23/2022:  Faby is here for follow up of chronic lower back pain. She is now s/p right then left L3,4,5 RFAs completed on 7/21/22 with limited relief so far. She continues to report aching back pain with radiation into the legs and numbness. She has had her SCS off for a couple of months. She has not been in contact with [employer] rep for programming. She says that she turned it off due to limited benefit. No new weakness to legs or falls. No bowel/bladder incontinence. Her pain today is 10/10.    Interval History 6/17/2022:  The patient is here today for follow up of back pain. She has had more aching pain across the lower back. She had benefit with lumbar RFAs in the past and would like to reschedule this. She would also like to repeat aquatic PT as this was helpful in the past. Her pain is aching and throbbing in nature  without radiation currently. No new falls or trauma. Her pain today is 8/10.    Interval History 5/5/2022:  The patient is here for follow up of chronic lower back pain. She has radiation into the legs. No recent falls or trauma. She saw Dr. Silverio last month and was under the impression that an [employer] rep would be here today for programming. She is still having difficulty programming her device. She has mild benefit with Gabapentin 900 mg daily without side effects. She is also having muscle spasms intermittently. She is asking for a muscle relaxer. She has tried Robaxin in the past with mild benefit. She would like to try another medication. Her pain today is 8/10.    Interval History 4/20/2022: She presents today for follow up of low back pain. She states that she only had about 40% relief of LBP following RFAs in September that lasted a few weeks. Pain  continues to be in the low back and radiates into b/l LE. Pain encompasses the entire leg and does not follow a specific dermatomal pattern in the leg. She denies weakness, numbness or tingling in the lower extremities. She denies bowel or bladder incontinence. Pain is currently rated 8/10. She is currently on gabapentin 300mg tid with minimal relief. She has been unable to charge bret SCS for the last month and does not have the contact number for her rep.      Interval History 9/30/2021:  The patient returns to clinic today for follow up of low back pain. She is s/p left L3,4,5 RFA on 9/9/2021 and right L3,4,5 RFA on 9/20/2021. She is unsure of relief at this time. She reports increased pain to the right side. She describes this pain as burning in nature. She denies any radiating leg pain. Her pain is worse with bending and standing. She continues to report benefit with [employer] SCS. She denies any weakness. She denies any other health changes. Her pain today is 9/10.    Interval History 8/3/2021:   Ms. Luna returns in f/u re: low back/leg pain. She reports about  three months ago she noticed her low back started bothering her, worse with bending and prolonged standing. No inciting event, no trauma to back since last visit. Reports continued leg pain down the backs of her legs as well. She reports intermittent instability in her legs - on and off - over the last year. Last time she has met with [employer] [facility] for reprogramming of SCS was fall 2020. Denies any current issues with SCS function/battery/remote.     Interval History 9/28/2020:  The patient is here today for increased lower back pain.  She is status post right than left L3, 4, 5 radiofrequency ablation completed on 08/31/2020 with approximately 50% relief.  However, she is feeling tightness across the lower back without radiation at this time.  She would like an injection today.  Additionally, she states that she has not completed physical therapy for her back in some time and is not currently doing any exercises.  Her leg pain is currently well controlled with spinal cord stimulator and she had a recent adjustment.  Her pain today is 8/10.    Interval History 7/16/2020:  The patient is here for follow up of lower back pain.  She previously had benefit with lumbar RFAs for similar pain.  She is also having radiation into her legs with numbness, left greater than right.  [employer] is here today to meet with her for programming.  She previously was having significant benefit of leg pain with SCS implant.  She denies any recent trauma or falls. Her pain today is 9/10.    Interval History 1/9/2020:  The patient is here for follow up of lower back and leg pain.  She is s/p lumbar RFAs with about 50% relief.  Her leg pain is well controlled with implant.  She still has intermittent flare ups of back pain, like today.  When this happens, she has some benefit with rest.  She has tried Tylenol and OTC NSAIDs without benefit.  She denies any recent falls or weakness.  The patient denies any bowel or bladder incontinence or signs of  saddle paresthesia.  The patient denies any major medical changes since last office visit.  Her pain today is 7/10.    Previous Encounter:  Faby Luna presents to the clinic for a follow-up appointment for lower back pain.  She previously had significant leg pain which has resolved with [employer] SCS implant.  She is here today to discuss increased lower back pain.  It is sharp and throbbing in nature.  It is significant in the morning and with prolonged standing and activity.  She has some benefit with stretching.  She denies any recent falls.  Since the last visit, Faby Luna states the pain has been worsening.  Current pain intensity is 8/10.    Pain Disability Index Review:      7/22/2025    10:44 AM 5/21/2025     1:31 PM 5/6/2025    11:31 AM   Last 3 PDI Scores   Pain Disability Index (PDI) 0 29 30       Opioid Contract: no     report:  Not applicable    Pain Procedures:   5/2/18 Left L3 and L4 TF ELISE- 20% relief  5/21/18 Bilateral L4-5 and L5-S1 facet joint injections- no relief  9/24/18 Lumbar SCS trial ([employer])- 100% relief  10/26/18 Lumbar SCS implant ([employer])- 100% relief of leg pain  9/12/19 Bilateral L3,4,5 MBB- helpful  10/17/19 Bilateral L3,4,5 MBB- helpful  11/21/19 Right L3,4,5 RFA- 50% relief  12/5/19 Left L3,4,5 RFA- 50% relief  8/17/20 Left L3,4,5 RFA- 50% relief  8/31/20 Right L3,4,5 RFA- 50% relief  9/9/2021- Left L3,4,5 RFA - 60% relief  9/20/2021- Right L3,4,5 RFA -60% relief  7/7/22 Right L3,4,5 RFA   7/21/22 Left L3,4,5 RFA   3/16/2023- Left L3,4,5 RFA  3/30/2023- Right L3,4,5 RFA  12/18/24 Right L3,4,5 RFA  1/4/24 Left L3,4,5 RFA  1/6/2025- Bilateral L3,4,5 RFA  4/28/2025- [employer] SCS battery change  7/7/2025- Bilateral SI joint injections    Physical Therapy/Home Exercise:   yes in the past with limited benefit    Imaging:   XR THORACIC SPINE AP LATERAL     CLINICAL HISTORY:  check SCS lead placement;  Other mechanical complication of implanted electronic  neurostimulator of spinal cord electrode (lead), initial encounter     TECHNIQUE:  AP and lateral views of the thoracic spine were performed.     COMPARISON:  10/04/2022     FINDINGS:  Vertebral body heights are maintained.  Multilevel disc space narrowing.     AP alignment is anatomic.  Dextroconvex curvature thoracic spine.     Leads terminate at T6-7 and T7.     Impression:     No acute osseous abnormality seen.        Electronically signed by:Daniela Hernadez  Date:                                            03/07/2025  Time:                                           16:06    3/31/18 Lumbar MRI    Narrative     EXAMINATION:  MRI LUMBAR SPINE WITHOUT CONTRAST    CLINICAL HISTORY:  Low back pain, >6wks conservative tx, persistent-progressive sx, surgical candidate; Other spondylosis with radiculopathy, lumbar region    TECHNIQUE:  Multiplanar, multisequence MR images were acquired from the thoracolumbar junction to the sacrum without the administration of contrast.    COMPARISON:  Plain films from 03/27/2018    FINDINGS:  There is grade 1 spondylolisthesis of L4 on L5. The vertebral body heights are well maintained, with no fracture.  No marrow signal abnormality suspicious for an infiltrative process.    The conus medullaris terminates at approximately the mid body of L1.  There is a cyst associated with the upper pole of the right kidney.  There is disc desiccation noted throughout the lumbar spine with relative sparing of the L5-S1 disc.  Mild disc space narrowing present at the L4-5 level.    L1-L2: Mild diffuse disc bulge resulting in no significant central or neural foraminal canal narrowing.    L2-L3: No significant central canal narrowing.  There is mild narrowing of either neural foraminal canal secondary to disc material.    L3-L4: Disc bulging in the bilateral foraminal regions resulting in no significant central canal narrowing.  Mild-to-moderate bilateral facet arthropathy also noted.  The bilateral  neural foraminal canals are moderately narrowed with some mild effacement of either exiting L3 nerve root in the extraforaminal regions bilaterally.    L4-L5:  Grade 1 spondylolisthesis along with moderate to severe bilateral facet arthropathy and ligamentum flavum hypertrophy resulting in at least moderate narrowing of the central canal.  The right neural foraminal canal is mildly to moderately narrowed.  Left neural foraminal canal is moderately to severely narrowed with mild effacement of the exiting L4 nerve root.    L5-S1:  No significant central or neural foraminal canal narrowing noted.  Mild bilateral facet arthropathy noted.   Impression       1. Multilevel degenerative changes of the lumbar spine as detailed above     Lumbar XRAYs 3/27/18    Narrative     EXAMINATION:  XR LUMBAR SPINE AP AND LAT WITH FLEX/EXT    CLINICAL HISTORY:  Low back pain    TECHNIQUE:  AP and lateral views as well as lateral flexion and extension images are performed through the lumbar spine.    COMPARISON:  None    FINDINGS:  X-ray lumbar spine with flexion and extension demonstrates grade 1 spondylolisthesis at L4-5 measuring around 6 mm which does not change significantly with flexion and extension.  The L4-5 disc is narrowed and degenerated.  Other lumbar vertebral discs are maintained.  Vertebral body heights are maintained.  Small anterior spurs are seen, and there is small posterior osteophyte along the inferior endplate of L4.  There is lumbar facet arthropathy at L4-5 and L5-S1.   Impression       Degenerative changes as above.  Grade 1 anterolisthesis and degenerative disc disease at L4-5.         Review of patient's allergies indicates:  No Known Allergies      Current Medications:   Current Outpatient Medications   Medication Sig Dispense Refill    albuterol (PROAIR HFA) 90 mcg/actuation inhaler Inhale 2 puffs into the lungs every 4 (four) hours as needed for Wheezing or Shortness of Breath. Rescue 18 g 11    atenoloL  "(TENORMIN) 100 MG tablet TAKE 1 TABLET EVERY DAY 90 tablet 3    BD ULTRA-FINE MINI PEN NEEDLE 31 gauge x 3/16" Ndle       BD ULTRA-FINE FELICIA PEN NEEDLE 32 gauge x 5/32" Ndle       blood sugar diagnostic Strp To check BG 3 times daily, to use with insurance preferred meter 100 strip 11    chlorhexidine (HIBICLENS) 4 % external liquid Wash body in shower with this liquid every day for the 3 days before surgery. Rinse after 1-2 minutes. Do not use on head or face. 15 mL 0    EScitalopram oxalate (LEXAPRO) 20 MG tablet Take 1 tablet (20 mg total) by mouth once daily. 90 tablet 3    fluticasone propionate (FLONASE) 50 mcg/actuation nasal spray 2 sprays (100 mcg total) by Each Nostril route once daily. 11.1 mL 1    lancets Misc To check BG 3 times daily, to use with insurance preferred meter 100 each 11    loratadine (CLARITIN) 10 mg tablet Take 1 tablet (10 mg total) by mouth daily as needed for Allergies. 90 tablet 3    losartan (COZAAR) 50 MG tablet Take 1 tablet by mouth once daily 90 tablet 2    pantoprazole (PROTONIX) 20 MG tablet TAKE 1 TABLET TWICE DAILY BEFORE MEALS 180 tablet 3    pregabalin (LYRICA) 150 MG capsule Take 1 capsule (150 mg total) by mouth 3 (three) times daily. 90 capsule 3    rosuvastatin (CRESTOR) 20 MG tablet Take 1 tablet (20 mg total) by mouth every evening. 90 tablet 3    tirzepatide (MOUNJARO) 10 mg/0.5 mL PnIj Inject 10 mg into the skin every 7 days. 4 Pen 4    tiZANidine (ZANAFLEX) 4 MG tablet TAKE 1 TABLET ONE TIME DAILY AS NEEDED FOR PAIN 90 tablet 3    traZODone (DESYREL) 100 MG tablet Take 1 tablet by mouth in the evening 90 tablet 1     No current facility-administered medications for this visit.     Facility-Administered Medications Ordered in Other Visits   Medication Dose Route Frequency Provider Last Rate Last Admin    0.9%  NaCl infusion   Intravenous Continuous Uriel Aranda MD   New Bag at 10/30/24 1253       REVIEW OF SYSTEMS:    GENERAL:  No weight loss, malaise or " fevers.  HEENT:  Negative for frequent or significant headaches.  NECK:  Negative for lumps, goiter, pain and significant neck swelling.  RESPIRATORY:  Negative for cough, wheezing or shortness of breath.  CARDIOVASCULAR:  Negative for chest pain, leg swelling or palpitations. Hypertension.  GI:  Negative for abdominal discomfort, blood in stools or black stools or change in bowel habits.  MUSCULOSKELETAL:  See HPI.  SKIN:  Negative for lesions, rash, and itching.  PSYCH:  Negative for sleep disturbance, mood disorder and recent psychosocial stressors.  HEMATOLOGY/LYMPHOLOGY:  Negative for prolonged bleeding, bruising easily or swollen nodes.  NEURO:   No history of headaches, syncope, paralysis, seizures or tremors.  ENDO: Diabetes.  All other reviewed and negative other than HPI.    Past Medical History:  Past Medical History:   Diagnosis Date    Anxiety with depression     Arthritis     CKD (chronic kidney disease) stage 2, GFR 60-89 ml/min     Diabetes mellitus     History of Clarisse-en-Y gastric bypass     Hypertension     Obesity, unspecified     DAHLIA (obstructive sleep apnea)     Spondylosis     Wears dentures        Past Surgical History:  Past Surgical History:   Procedure Laterality Date    CARPAL TUNNEL RELEASE Right 3/8/2019    Procedure: RELEASE, CARPAL TUNNEL right;  Surgeon: Pan Goodman MD;  [facility];  Service: Orthopedics;  Laterality: Right;    CARPAL TUNNEL RELEASE Left 2019    Procedure: RELEASE, CARPAL TUNNEL- LEFT;  Surgeon: Pan Goodman MD;  Location: [address];  Service: Orthopedics;  Laterality: Left;    CATARACT EXTRACTION Bilateral      SECTION      CHOLECYSTECTOMY      COLONOSCOPY N/A 2024    Procedure: COLONOSCOPY;  Surgeon: Otilio Man MD;  Location: [facility];  Service: Endoscopy;  Laterality: N/A;  Referred by: MYNOR Sprague / CECILIA Meds: ozempic / diabetic / Prep: peg / Route instructions sent: portal  - lvm and portal msg for pc. DBM  - left 2nd   for pc. DBM    COLONOSCOPY, SCREENING, HIGH RISK PATIENT N/A 10/30/2024    Procedure: COLONOSCOPY, SCREENING, HIGH RISK PATIENT;  Surgeon: Corinne Traylor MD;  Location: [facility] ENDO;  Service: Endoscopy;  Laterality: N/A;  WLMED: Ozempic Pt states she hasn't taken for several weeks  Outside referral from [facility]  ext suprep  inst portal  LW  10/23 r/s ext suprep to portal, holding ozempic since 10/16 cf    EPIDURAL STEROID INJECTION INTO LUMBAR SPINE N/A 6/14/2018    Procedure: INJECTION, STEROID, SPINE, LUMBAR, EPIDURAL;  Surgeon: Shaq Silverio MD;  Location: [facility] PAIN MGT;  Service: Pain Management;  Laterality: N/A;  LUMBAR L4-L5 INTERLAMINAR ELISE'  [id number]  W/ SEDATION     ESOPHAGOGASTRODUODENOSCOPY N/A 5/12/2023    Procedure: EGD (ESOPHAGOGASTRODUODENOSCOPY);  Surgeon: Deann Jimenez MD;  Location: [facility] ENDO;  Service: Gastroenterology;  Laterality: N/A;    HYSTERECTOMY      INJECTION OF ANESTHETIC AGENT AROUND NERVE Bilateral 9/12/2019    Procedure: BLOCK, NERVE, L3-L4-L5 MEDIAL BRANCH;  Surgeon: Shaq Silverio MD;  Location: [facility] PAIN MGT;  Service: Pain Management;  Laterality: Bilateral;    INJECTION OF ANESTHETIC AGENT AROUND NERVE Bilateral 10/17/2019    Procedure: BLOCK, NERVE;  Surgeon: Shaq Silverio MD;  Location: [facility] PAIN MGT;  Service: Pain Management;  Laterality: Bilateral;  B/L MBB L3-L4-L5  REPEAT  CONSENT NEEDED    INJECTION OF FACET JOINT Bilateral 5/28/2018    Procedure: INJECTION-FACET;  Surgeon: Shaq Silverio MD;  Location: [facility] PAIN [facility]GT;  Service: Pain Management;  Laterality: Bilateral;  LUMBAR BILATERAL L4-L5 AND L5-S1 FACET STEROID INJECTION  [id number]    W/ SEDATION     INJECTION, SACROILIAC JOINT Bilateral 7/7/2025    Procedure: INJECTION,SACROILIAC JOINT BILATERAL;  Surgeon: Shaq Silverio MD;  Location: [facility] PAIN [facility]GT;  Service: Pain Management;  Laterality: Bilateral;  2 WK F/U WARREN    RADIOFREQUENCY ABLATION Right 11/21/2019    Procedure: RADIOFREQUENCY ABLATION RIGHT L3,  L4, L5;  Surgeon: Shaq Silverio MD;  Location: [facility] PAIN MGT;  Service: Pain Management;  Laterality: Right;  Right RFA L3-L4-L5  1 of 2  Consent Needed    RADIOFREQUENCY ABLATION Left 12/5/2019    Procedure: RADIOFREQUENCY ABLATION LEFT L3-5;  Surgeon: Shaq Silverio MD;  Location: [facility] PAIN MGT;  Service: Pain Management;  Laterality: Left;  Left RFA L3-L4-L5  2 of 2  Consent Needed    RADIOFREQUENCY ABLATION Left 8/17/2020    Procedure: RADIOFREQUENCY ABLATION LEFT L3,4,5 1 of 2;  Surgeon: Shaq Silverio MD;  Location: [facility];  Service: Pain Management;  Laterality: Left;  Left RFA L3,4,5  1 of 2    RADIOFREQUENCY ABLATION Right 8/31/2020    Procedure: RADIOFREQUENCY ABLATION RIGHT L3,4,5 2 of 2;  Surgeon: Shaq Silverio MD;  Location: [facility];  Service: Pain Management;  Laterality: Right;  RADIOFREQUENCY ABLATION RIGHT L3,4,5  2 of 2    RADIOFREQUENCY ABLATION Left 9/9/2021    Procedure: RADIOFREQUENCY ABLATION, L3-L4 AND L5 MEDIAL BRANCH 1 OF 2;  Surgeon: Shaq Silverio MD;  Location: [facility];  Service: Pain Management;  Laterality: Left;    RADIOFREQUENCY ABLATION Right 9/20/2021    Procedure: RADIOFREQUENCY ABLATION, L3-L4 AND L5 MEDIAL BRANCH 2 OF 2;  Surgeon: Shaq Silverio MD;  Location: [facility];  Service: Pain Management;  Laterality: Right;    RADIOFREQUENCY ABLATION Right 7/7/2022    Procedure: RADIOFREQUENCY ABLATION, RIGHT L3-L4-L5 ONE OF TWO;  Surgeon: Shaq Silverio MD;  Location: [facility];  Service: Pain Management;  Laterality: Right;    RADIOFREQUENCY ABLATION Left 7/21/2022    Procedure: RADIOFREQUENCY ABLATION, LEFT L3-L4-L5 TWO OF TWO *BRING SCS REMOTE*;  Surgeon: Shaq Silverio MD;  Location: [facility];  Service: Pain Management;  Laterality: Left;    RADIOFREQUENCY ABLATION Left 3/16/2023    Procedure: RADIOFREQUENCY ABLATION LEFT L3,L4,L5 *BRING SCS REMOTE*;  Surgeon: Shaq Silverio MD;  Location: [facility];  Service: Pain Management;  Laterality: Left;     RADIOFREQUENCY ABLATION Right 3/30/2023    Procedure: RADIOFREQUENCY ABLATION RIGHT L3,L4,L5 *BRING SCS REMOTE*;  Surgeon: Shaq Silverio MD;  Location: [facility];  Service: Pain Management;  Laterality: Right;    RADIOFREQUENCY ABLATION Right 12/18/2023    Procedure: RADIOFREQUENCY ABLATION RIGHT L3, 4, 5 1 OF 2;  Surgeon: Shaq Silverio MD;  Location: [facility];  Service: Pain Management;  Laterality: Right;  [phone]    RADIOFREQUENCY ABLATION Left 1/4/2024    Procedure: RADIOFREQUENCY ABLATION LEFT L3, 4, 5 2 OF 2;  Surgeon: Shaq Silverio MD;  Location: [facility];  Service: Pain Management;  Laterality: Left;  [phone]  2 WK F/U WARREN    RADIOFREQUENCY ABLATION Bilateral 1/6/2025    Procedure: RADIOFREQUENCY ABLATION BILATERAL L3, 4, 5;  Surgeon: Shaq Silverio MD;  Location: [facility];  Service: Pain Management;  Laterality: Bilateral;  3 WK F/U KRANTHI    REPLACEMENT OF SPINAL CORD STIMULATOR  4/28/2025    Procedure: REPLACEMENT, SPINAL CORD STIMULATOR;  Surgeon: Mita Rivera MD;  Location: [facility] OR;  Service: Pain Management;;    REVISION PROCEDURE INVOLVING SPINAL CORD NEUROSTIMULATOR N/A 4/28/2025    Procedure: REPLACEMENT OF SCS IPG COPE REP;  Surgeon: Mita Rivera MD;  Location: [facility] OR;  Service: Pain Management;  Laterality: N/A;    TRIAL OF SPINAL CORD NERVE STIMULATOR N/A 9/24/2018    Procedure: TRIAL, NEUROSTIMULATOR, SPINAL CORD, SPINAL CORD STIMULATOR TRIAL-INTERNAL WIRES TO EXTERNAL BATTERY;  Surgeon: Shaq Silverio MD;  Location: [facility];  Service: Pain Management;  Laterality: N/A;  ABBOTT REP NOTIFIED       Family History:  Family History   Problem Relation Name Age of Onset    Cataracts Mother      Glaucoma Mother      No Known Problems Father      No Known Problems Sister      No Known Problems Brother      No Known Problems Maternal Aunt      No Known Problems Maternal Uncle      No Known Problems Paternal Aunt      No Known Problems Paternal Uncle      No Known Problems  "Maternal Grandmother      No Known Problems Maternal Grandfather      No Known Problems Paternal Grandmother      No Known Problems Paternal Grandfather         Social History:  Social History     Socioeconomic History    Marital status:    Tobacco Use    Smoking status: Never     Passive exposure: Never    Smokeless tobacco: Never   Substance and Sexual Activity    Alcohol use: Not Currently     Alcohol/week: 1.0 standard drink of alcohol     Types: 1 Glasses of wine per week     Comment: occ    Drug use: No    Sexual activity: Yes     Partners: Male     Social Drivers of Health     Financial Resource Strain: Medium Risk (3/6/2025)    Overall Financial Resource Strain (CARDIA)     Difficulty of Paying Living Expenses: Somewhat hard   Food Insecurity: No Food Insecurity (3/6/2025)    Hunger Vital Sign     Worried About Running Out of Food in the Last Year: Never true     Ran Out of Food in the Last Year: Never true   Transportation Needs: No Transportation Needs (3/6/2025)    PRAPARE - Transportation     Lack of Transportation (Medical): No     Lack of Transportation (Non-Medical): No   Physical Activity: Inactive (3/6/2025)    Exercise Vital Sign     Days of Exercise per Week: 0 days     Minutes of Exercise per Session: 10 min   Stress: No Stress Concern Present (3/6/2025)    [employer] [place] [employer] - Occupational Stress Questionnaire     Feeling of Stress : Not at all   Housing Stability: Low Risk  (3/6/2025)    Housing Stability Vital Sign     Unable to Pay for Housing in the Last Year: No     Number of Times Moved in the Last Year: 0     Homeless in the Last Year: No       OBJECTIVE:    /78 (BP Location: Right arm, Patient Position: Sitting)   Pulse 68   Temp 97.2 °F (36.2 °C) (Oral)   Resp 19   Ht 5' 4" (1.626 m)   Wt 84.5 kg (186 lb 4.6 oz)   SpO2 97%   BMI 31.98 kg/m²     PHYSICAL EXAMINATION:     General appearance: Well appearing, in no acute distress, alert and oriented " x3.  Psych:  Mood and affect appropriate.  Skin: Skin color, texture, turgor normal, no rashes or lesions, in both upper and lower body. SCS site well approximated.   Head/face:  Atraumatic, normocephalic. No palpable lymph nodes  Back: Straight leg raising in the sitting and supine positions is negative to radicular pain bilaterally.   Extremities: No deformities, edema, or skin discoloration. Good capillary refill.  Musculoskeletal: 5/5 strength in right ankle with plantar and dorsiflexion. 5/5 strength in left ankle with plantar and dorsiflexion. 5/5 strength with right knee flexion and extension. 5/5 strength with left knee flexion and extension.   Neuro: Decreased sensation to BLE.  Gait: Antalgic- ambulates without assistance.        ASSESSMENT: 74 y.o. year old female with back pain, consistent with the following diagnoses:     1. Chronic pain syndrome  tiZANidine (ZANAFLEX) 4 MG tablet      2. Sacroiliac joint pain        3. Lumbar spondylosis        4. Degeneration of intervertebral disc of lumbar region with discogenic back pain        5. Myofascial pain                PLAN:     - Previous imaging reviewed.  Labs reviewed.     - She is s/p bilateral SI joint injections with benefit.     - Continue Lyrica and Zanaflex. Refills provided.     - RTC in 3 months.     The above plan and management options were discussed at length with patient. Patient is in agreement with the above and verbalized understanding.    Kimberly Conner NP  07/22/2025

## 2025-07-29 ENCOUNTER — TELEPHONE (OUTPATIENT)
Dept: PAIN MEDICINE | Facility: CLINIC | Age: 75
End: 2025-07-29
Payer: MEDICARE

## 2025-07-29 DIAGNOSIS — M53.3 SACROILIAC JOINT PAIN: Primary | ICD-10-CM

## 2025-07-29 DIAGNOSIS — M51.360 DEGENERATION OF INTERVERTEBRAL DISC OF LUMBAR REGION WITH DISCOGENIC BACK PAIN: ICD-10-CM

## 2025-07-29 DIAGNOSIS — M47.816 LUMBAR SPONDYLOSIS: ICD-10-CM

## 2025-07-29 RX ORDER — PREGABALIN 200 MG/1
200 CAPSULE ORAL 3 TIMES DAILY
Qty: 90 CAPSULE | Refills: 3 | Status: SHIPPED | OUTPATIENT
Start: 2025-07-29 | End: 2026-01-27

## 2025-07-29 NOTE — TELEPHONE ENCOUNTER
Copied from CRM #[id number]. Topic: General Inquiry - Patient Advice  >> Jul 29, 2025 12:22 PM Kiersten wrote:  Type: Patient Call Back    Who called: Patient     What is the request in detail: Pt is requesting a call back in reference to the RX pregabalin (LYRICA) 150 MG capsule. Pt states the dosage was suppose to be increased and she was waiting on the Dr's approval. Please advise       Would the patient rather a call back or a response via [facility]?  Call     Best call back number: [phone]     Additional Information:

## 2025-08-01 DIAGNOSIS — G47.00 INSOMNIA, UNSPECIFIED TYPE: ICD-10-CM

## 2025-08-01 RX ORDER — TRAZODONE HYDROCHLORIDE 100 MG/1
100 TABLET ORAL NIGHTLY
Qty: 90 TABLET | Refills: 2 | Status: SHIPPED | OUTPATIENT
Start: 2025-08-01

## 2025-08-01 NOTE — TELEPHONE ENCOUNTER
No care due was identified.  [facility] Embedded Care Due Messages. Reference number: [id number].   8/01/2025 7:01:38 AM CDT

## 2025-08-01 NOTE — TELEPHONE ENCOUNTER
Refill Decision Note   Faby Luna  is requesting a refill authorization.  Brief Assessment and Rationale for Refill:  Approve     Medication Therapy Plan:         Comments:     Note composed:2:32 PM 08/01/2025

## 2025-08-06 ENCOUNTER — OFFICE VISIT (OUTPATIENT)
Dept: OPTOMETRY | Facility: CLINIC | Age: 75
End: 2025-08-06
Payer: MEDICARE

## 2025-08-06 DIAGNOSIS — E11.9 DIABETIC EYE EXAM: Primary | ICD-10-CM

## 2025-08-06 DIAGNOSIS — Z96.1 PSEUDOPHAKIA: ICD-10-CM

## 2025-08-06 DIAGNOSIS — H52.7 REFRACTIVE ERROR: ICD-10-CM

## 2025-08-06 DIAGNOSIS — Z01.00 DIABETIC EYE EXAM: Primary | ICD-10-CM

## 2025-08-06 PROCEDURE — 1159F MED LIST DOCD IN RCRD: CPT | Mod: CPTII,HCNC,S$GLB, | Performed by: OPTOMETRIST

## 2025-08-06 PROCEDURE — 92015 DETERMINE REFRACTIVE STATE: CPT | Mod: HCNC,S$GLB,, | Performed by: OPTOMETRIST

## 2025-08-06 PROCEDURE — 3288F FALL RISK ASSESSMENT DOCD: CPT | Mod: CPTII,HCNC,S$GLB, | Performed by: OPTOMETRIST

## 2025-08-06 PROCEDURE — 1126F AMNT PAIN NOTED NONE PRSNT: CPT | Mod: CPTII,HCNC,S$GLB, | Performed by: OPTOMETRIST

## 2025-08-06 PROCEDURE — 1101F PT FALLS ASSESS-DOCD LE1/YR: CPT | Mod: CPTII,HCNC,S$GLB, | Performed by: OPTOMETRIST

## 2025-08-06 PROCEDURE — 99999 PR PBB SHADOW E&M-EST. PATIENT-LVL III: CPT | Mod: PBBFAC,HCNC,, | Performed by: OPTOMETRIST

## 2025-08-06 PROCEDURE — 92014 COMPRE OPH EXAM EST PT 1/>: CPT | Mod: HCNC,S$GLB,, | Performed by: OPTOMETRIST

## 2025-08-06 PROCEDURE — 2023F DILAT RTA XM W/O RTNOPTHY: CPT | Mod: CPTII,HCNC,S$GLB, | Performed by: OPTOMETRIST

## 2025-08-06 PROCEDURE — 1160F RVW MEDS BY RX/DR IN RCRD: CPT | Mod: CPTII,HCNC,S$GLB, | Performed by: OPTOMETRIST

## 2025-08-06 PROCEDURE — 3044F HG A1C LEVEL LT 7.0%: CPT | Mod: CPTII,HCNC,S$GLB, | Performed by: OPTOMETRIST

## 2025-08-06 NOTE — PROGRESS NOTES
Subjective:       Patient ID: Faby Luna is a 74 y.o. female      Chief Complaint   Patient presents with    Concerns About Ocular Health    Diabetic Eye Exam     History of Present Illness  Dls: 1/19/24 Dr. Jimenez     75 y/o female presents today for diabetic eye exam.   Pt c/o blurry vision distance and near ou.  Pt wears pal's     LBS controlled     + ou  tearing  No itching  No burning  No pain  No ha's  + ou off/on floaters  No flashes    Eye meds  None    Pohx:   S/p CE Bilateral    Fohx:   Glaucoma -mother   Cat - mother     Hemoglobin A1C       Date                     Value               Ref Range             Status                02/21/2025               6.0 (H)             4.0 - 5.6 %           Final                 08/23/2024               6.0 (H)             4.0 - 5.6 %           Final                  02/23/2024               6.7 (H)             4.0 - 5.6 %           Final                 Assessment/Plan:     1. Diabetic eye exam (Primary)  [facility]    2. Refractive error  Educated patient on refractive error and discussed lens options. Dispensed updated spectacle Rx. Educated about adaptation period to new specs.    Eyeglass Final Rx       Eyeglass Final Rx         Sphere Cylinder Axis Add    Right +0.50 +0.75 105 +2.75    Left -1.00 +0.75 105 +2.75      Expiration Date: 8/6/2026    Comments: ANTIMETROPIA                            3. Pseudophakia  Well-centered. Clear.       Follow up in about 1 year (around 8/6/2026) for Diabetic Eye Exam.

## 2025-08-25 DIAGNOSIS — F41.1 GAD (GENERALIZED ANXIETY DISORDER): ICD-10-CM

## 2025-08-25 DIAGNOSIS — F33.0 MILD RECURRENT MAJOR DEPRESSION: ICD-10-CM

## 2025-08-25 RX ORDER — ESCITALOPRAM OXALATE 20 MG/1
20 TABLET ORAL
Qty: 90 TABLET | Refills: 1 | Status: SHIPPED | OUTPATIENT
Start: 2025-08-25

## 2025-09-04 DIAGNOSIS — I70.0 AORTIC ATHEROSCLEROSIS: ICD-10-CM

## 2025-09-04 DIAGNOSIS — E11.69 DYSLIPIDEMIA ASSOCIATED WITH TYPE 2 DIABETES MELLITUS: ICD-10-CM

## 2025-09-04 DIAGNOSIS — E78.5 DYSLIPIDEMIA ASSOCIATED WITH TYPE 2 DIABETES MELLITUS: ICD-10-CM

## 2025-09-04 RX ORDER — ROSUVASTATIN CALCIUM 20 MG/1
20 TABLET, COATED ORAL NIGHTLY
Qty: 90 TABLET | Refills: 1 | Status: SHIPPED | OUTPATIENT
Start: 2025-09-04

## (undated) DEVICE — ADHESIVE DERMABOND ADVANCED

## (undated) DEVICE — SUT CTD VICRYL 2.0

## (undated) DEVICE — BANDAGE ADHESIVE

## (undated) DEVICE — SEE MEDLINE ITEM 156955

## (undated) DEVICE — SEE MEDLINE ITEM 157117

## (undated) DEVICE — PACK UPPER EXTREMITY BAPTIST

## (undated) DEVICE — BRUSH SCRUB HIBICLENS 4%

## (undated) DEVICE — BANDAGE ELASTIC 2X5 VELCRO ST

## (undated) DEVICE — CHLORAPREP W TINT 26ML APPL

## (undated) DEVICE — DRAPE INCISE IOBAN 2 23X23IN

## (undated) DEVICE — BANDAGE KERLIX P/P 2.25IN STER

## (undated) DEVICE — DRESSING LEUKOPLAST FLEX 1X3IN

## (undated) DEVICE — NDL SPINAL SPINOCAN 22GX3.5

## (undated) DEVICE — SEE MEDLINE ITEM 152515

## (undated) DEVICE — SLING ARM LARGE FOAM STRAP

## (undated) DEVICE — Device

## (undated) DEVICE — SYR IRRIGATION BULB STER 60ML

## (undated) DEVICE — GAUZE SPONGE 4X4 12PLY

## (undated) DEVICE — DRESSING XEROFORM FOIL PK 1X8

## (undated) DEVICE — DRAPE C-ARM/MOBILE XRAY 44X80

## (undated) DEVICE — PATIENT CONTROLLER

## (undated) DEVICE — SUT 4/0 18IN ETHILON BL P3

## (undated) DEVICE — ELECTRODE REM PLYHSV RETURN 9

## (undated) DEVICE — SUT MONOCRYL 4-0 PS-2

## (undated) DEVICE — SYR B-D DISP CONTROL 10CC100/C

## (undated) DEVICE — SUT CTD VICRYL 0 UND BR

## (undated) DEVICE — GAUZE SPONGE 4'X4 12 PLY

## (undated) DEVICE — APPLICATOR CHLORAPREP ORN 26ML

## (undated) DEVICE — SUT 4-0 ETHILON 18 PS-2

## (undated) DEVICE — DRAPE STERI-DRAPE 1000 17X11IN

## (undated) DEVICE — DRAPE LAP T SHT W/ INSTR PAD

## (undated) DEVICE — PAD UNDERPAD 30X30

## (undated) DEVICE — CORD BIPOLAR 12 FOOT

## (undated) DEVICE — SEE MEDLINE ITEM 146268

## (undated) DEVICE — GLOVE PROTEXIS HYDROGEL SZ7.5

## (undated) DEVICE — SEE MEDLINE ITEM 152622

## (undated) DEVICE — SYR ONLY LUER LOCK 20CC

## (undated) DEVICE — CLIPPER BLADE MOD 4406 (CAREF)

## (undated) DEVICE — SUT VICRYL 4-0 RB1 27IN UD

## (undated) DEVICE — SEE MEDLINE ITEM 157116

## (undated) DEVICE — DRESSING XEROFORM 1X8IN

## (undated) DEVICE — PACK BASIC

## (undated) DEVICE — DRESSING ADAPTIC TOUCH 3X4

## (undated) DEVICE — SUT MONOCYRL 4-0 PS2 UND

## (undated) DEVICE — NDL 22GA X1 1/2 REG BEVEL

## (undated) DEVICE — SUPPORT ULNA NERVE PROTECTOR

## (undated) DEVICE — DRESSING TEGADERM HP 2 3/8X2 3

## (undated) DEVICE — GLOVE BIOGEL SKINSENSE PI 7.5

## (undated) DEVICE — TAPE SURG MEDIPORE 6X72IN

## (undated) DEVICE — DRAPE C-ARMOR EQUIPMENT COVER

## (undated) DEVICE — PATIENT MANUAL AND MAGNET

## (undated) DEVICE — SUT MCRYL PLUS 4-0 PS2 27IN

## (undated) DEVICE — TOURNIQUET SB QC DP 18X4IN

## (undated) DEVICE — ELECTRODE BLADE INSULATED 1 IN

## (undated) DEVICE — DRAPE SURG W/TWL 17 5/8X23

## (undated) DEVICE — STOCKINET 4INX48

## (undated) DEVICE — DRESSING TRANS 4X4 3/4

## (undated) DEVICE — SEE MEDLINE ITEM 157148

## (undated) DEVICE — SUT 3-0 VICRYL SH CR/8 18

## (undated) DEVICE — COVER OVERHEAD SURG LT BLUE

## (undated) DEVICE — PAD CAST SPECIALIST STRL 4

## (undated) DEVICE — SPONGE COTTON TRAY 4X4IN

## (undated) DEVICE — DRAPE PLASTIC U 60X72

## (undated) DEVICE — SYR 10CC LOSS OF RESISTANCE

## (undated) DEVICE — PADDING CAST 4IN DELTA ROLL

## (undated) DEVICE — SUT ETHIBOND 0 CR/CT-2 8-18

## (undated) DEVICE — SUT MONOCRYL 3-0 PS-2 UND

## (undated) DEVICE — BANDAGE ELASTIC ACE 2IN 10/CA

## (undated) DEVICE — DRESSING AQUACEL AG ADV 3.5X6

## (undated) DEVICE — SUT SILK 3-0 BLK BR SH 30IN

## (undated) DEVICE — UNDERGLOVES BIOGEL PI SIZE 7.5

## (undated) DEVICE — DRAPE INCISE IOBAN 2 23X17IN

## (undated) DEVICE — NDL HYPO REG 25G X 1 1/2

## (undated) DEVICE — PENCIL ELECTROSURG HOLST W/BLD

## (undated) DEVICE — CONTAINER SPECIMEN 4.5OZ STRL

## (undated) DEVICE — GLOVE BIOGEL SKINSENSE PI 7.0

## (undated) DEVICE — SYR 10CC LUER LOCK

## (undated) DEVICE — UNDERGLOVES BIOGEL PI SIZE 8